# Patient Record
Sex: FEMALE | Race: WHITE | NOT HISPANIC OR LATINO | Employment: OTHER | URBAN - METROPOLITAN AREA
[De-identification: names, ages, dates, MRNs, and addresses within clinical notes are randomized per-mention and may not be internally consistent; named-entity substitution may affect disease eponyms.]

---

## 2017-03-09 DIAGNOSIS — D32.0 MENINGIOMA, RECURRENT OF BRAIN (HCC): Primary | ICD-10-CM

## 2017-03-10 DIAGNOSIS — D32.0 MENINGIOMA, RECURRENT OF BRAIN (HCC): Primary | ICD-10-CM

## 2017-03-28 ENCOUNTER — APPOINTMENT (OUTPATIENT)
Dept: MRI IMAGING | Facility: HOSPITAL | Age: 62
End: 2017-03-28

## 2017-04-11 RX ORDER — DIPHENHYDRAMINE HCL 50 MG
50 CAPSULE ORAL AS NEEDED
Qty: 1 CAPSULE | Refills: 0 | OUTPATIENT
Start: 2017-04-11 | End: 2017-12-04

## 2017-04-11 RX ORDER — DIAZEPAM 10 MG/1
10 TABLET ORAL AS NEEDED
Qty: 1 TABLET | Refills: 0 | OUTPATIENT
Start: 2017-04-11 | End: 2017-12-04

## 2017-04-11 RX ORDER — PHENOBARBITAL 100 MG/1
100 TABLET ORAL AS NEEDED
Qty: 1 TABLET | Refills: 0 | OUTPATIENT
Start: 2017-04-11 | End: 2017-12-04

## 2017-04-17 ENCOUNTER — OFFICE VISIT (OUTPATIENT)
Dept: NEUROSURGERY | Facility: CLINIC | Age: 62
End: 2017-04-17

## 2017-04-17 ENCOUNTER — HOSPITAL ENCOUNTER (OUTPATIENT)
Dept: MRI IMAGING | Facility: HOSPITAL | Age: 62
Discharge: HOME | End: 2017-04-17
Admitting: PHYSICIAN ASSISTANT

## 2017-04-17 VITALS
HEIGHT: 63 IN | SYSTOLIC BLOOD PRESSURE: 120 MMHG | DIASTOLIC BLOOD PRESSURE: 72 MMHG | WEIGHT: 158 LBS | BODY MASS INDEX: 28 KG/M2 | TEMPERATURE: 97.5 F

## 2017-04-17 DIAGNOSIS — D32.0 MENINGIOMA, RECURRENT OF BRAIN (HCC): Primary | ICD-10-CM

## 2017-04-17 PROCEDURE — 70553 MRI BRAIN STEM W/O & W/DYE: CPT

## 2017-04-17 PROCEDURE — A9577 INJ MULTIHANCE: HCPCS | Performed by: PHYSICIAN ASSISTANT

## 2017-04-17 PROCEDURE — 82565 ASSAY OF CREATININE: CPT

## 2017-04-17 PROCEDURE — 99213 OFFICE O/P EST LOW 20 MIN: CPT | Performed by: NEUROLOGICAL SURGERY

## 2017-04-17 PROCEDURE — 0 GADOBENATE DIMEGLUMINE 529 MG/ML SOLUTION: Performed by: PHYSICIAN ASSISTANT

## 2017-04-17 RX ADMIN — GADOBENATE DIMEGLUMINE 14 ML: 529 INJECTION, SOLUTION INTRAVENOUS at 09:25

## 2017-04-17 NOTE — PROGRESS NOTES
Tereza Ackerman  1955  [id number]                       CURRENT WORKING DIAGNOSIS:  [ Left temporal meningioma         MEDICAL HISTORY SINCE LAST ENCOUNTER:  She reports for 6 months follow-up.  She continues to do well and has no symptomatology at this time            Past Medical History:   Diagnosis Date   • Acid reflux    • Acid reflux    • Arthritis    • Brain tumor    • Brain tumor    • Depression    • Depression    • History of blood transfusion    • Hyperlipemia    • Memory loss or impairment    • Migraine    • Parathyroid adenoma     Incidental on thyroid US.   • Prediabetes     Last Impression: 06 Feb 2015  Reviewed labs. Excellent control.  Emery Connell Impression: 06 Feb 2015  Reviewed labs. Excellent control.  Michaela Connell   • Thyroid nodule      Last Impression: 13 Jun 2015  r/o thyroid cancer, will proceed with US.  Michaela Connell (Internal Medicine)    • UTI (urinary tract infection)               Past Surgical History:   Procedure Laterality Date   • BRAIN SURGERY  2003 & 2014    Dr. Werner Hollis   • CARPAL TUNNEL RELEASE  2002   • HYSTERECTOMY     • OTHER SURGICAL HISTORY      craniotomy tumor removal-complete   • OTHER SURGICAL HISTORY      esophagogastric fundoplasty nissen fundoplication   • TUBAL ABDOMINAL LIGATION                Family History   Problem Relation Age of Onset   • Obesity Mother    • Heart attack Mother    • Migraines Father    • Stroke Father    • Cancer Other    • Diabetes Other    • Breast cancer Maternal Aunt    • Breast cancer Paternal Aunt               Social History     Social History   • Marital status:      Spouse name: N/A   • Number of children: N/A   • Years of education: N/A     Occupational History   • Not on file.     Social History Main Topics   • Smoking status: Never Smoker   • Smokeless tobacco: Never Used   • Alcohol use No   • Drug use: No   • Sexual activity: Not on file     Other Topics Concern   • Not on file     Social  History Narrative              Allergies   Allergen Reactions   • Dilantin [Phenytoin Sodium Extended]    • Phenytoin Rash              Current Outpatient Prescriptions:   •  ALPRAZolam (XANAX) 0.5 MG tablet, Take 1 tablet by mouth 3 (three) times a day as needed., Disp: , Rfl:   •  diazePAM (VALIUM) 10 MG tablet, Take 1 tablet by mouth As Needed for Anxiety. Take one hour prior to MRI., Disp: 1 tablet, Rfl: 0  •  diphenhydrAMINE (BENADRYL) 50 MG capsule, Take 1 capsule by mouth As Needed for Allergies. Take one hour prior to MRI, Disp: 1 capsule, Rfl: 0  •  losartan-hydrochlorothiazide (HYZAAR) 50-12.5 MG per tablet, Take 1 tablet by mouth daily. Replaces lisinopril, Disp: , Rfl:   •  PHENobarbital (LUMINAL) 100 MG tablet, Take 1 tablet by mouth As Needed (One hour prior to MRI). Take one hour prior to MRI, Disp: 1 tablet, Rfl: 0  •  promethazine (PHENERGAN) 25 MG tablet, Take 1 tablet by mouth every 6 (six) hours as needed for nausea or vomiting., Disp: 30 tablet, Rfl: 2  •  sertraline (ZOLOFT) 100 MG tablet, TAKE ONE TABLET BY MOUTH DAILY, Disp: 90 tablet, Rfl: 0  •  traZODone (DESYREL) 50 MG tablet, Take  by mouth., Disp: , Rfl:   No current facility-administered medications for this visit.          Review of Systems   Constitutional: Negative for activity change, appetite change, chills, diaphoresis, fatigue, fever and unexpected weight change.   HENT: Negative for congestion, dental problem, drooling, ear discharge, ear pain, facial swelling, hearing loss, mouth sores, nosebleeds, postnasal drip, rhinorrhea, sinus pressure, sneezing, sore throat, tinnitus, trouble swallowing and voice change.    Eyes: Negative for photophobia, pain, discharge, redness, itching and visual disturbance.   Respiratory: Negative for apnea, cough, choking, chest tightness, shortness of breath, wheezing and stridor.    Cardiovascular: Negative for chest pain, palpitations and leg swelling.   Gastrointestinal: Negative for abdominal  "distention, abdominal pain, anal bleeding, blood in stool, constipation, diarrhea, nausea, rectal pain and vomiting.   Endocrine: Negative for cold intolerance, heat intolerance, polydipsia, polyphagia and polyuria.   Genitourinary: Negative for decreased urine volume, difficulty urinating, dysuria, enuresis, flank pain, frequency, genital sores, hematuria and urgency.   Musculoskeletal: Negative for arthralgias, back pain, gait problem, joint swelling, myalgias, neck pain and neck stiffness.   Skin: Negative for color change, pallor, rash and wound.   Allergic/Immunologic: Negative for environmental allergies, food allergies and immunocompromised state.   Neurological: Negative for dizziness, tremors, seizures, syncope, facial asymmetry, speech difficulty, weakness, light-headedness, numbness and headaches.   Hematological: Negative for adenopathy. Does not bruise/bleed easily.   Psychiatric/Behavioral: Negative for agitation, behavioral problems, confusion, decreased concentration, dysphoric mood, hallucinations, self-injury, sleep disturbance and suicidal ideas. The patient is not nervous/anxious and is not hyperactive.                Vitals:    04/17/17 1115   BP: 120/72   Temp: 97.5 °F (36.4 °C)   Weight: 158 lb (71.7 kg)   Height: 63\" (160 cm)               EXAMINATION: [Alert and oriented.  Cranial nerves are intact.  No weakness sensory loss or reflex asymmetry.]            MEDICAL DECISION MAKING: Reviewed her MRI and appears to be essentially the same as that done previously.           ASSESSMENT/DISPOSITION: [We'll continue to follow her on a sequential basis.  The tumor is in beneath the dura and the temporal fossa.  This been treated with SRS and seen to be stable. ]              I APPRECIATE THE OPPORTUNITY OF THIS REFERRAL. PLEASE CALL IF ANY  QUESTIONS [phone]    Scribed for Werner Hollis MD by Joy Corral CMA. 4/17/2017  11:36 AM     I have read and concur with the information provided by " the scribe.  Werner Hollis MD

## 2017-04-18 ENCOUNTER — OFFICE VISIT (OUTPATIENT)
Dept: INTERNAL MEDICINE | Facility: CLINIC | Age: 62
End: 2017-04-18

## 2017-04-18 VITALS
HEIGHT: 63 IN | DIASTOLIC BLOOD PRESSURE: 82 MMHG | BODY MASS INDEX: 28.31 KG/M2 | HEART RATE: 73 BPM | WEIGHT: 159.8 LBS | OXYGEN SATURATION: 99 % | SYSTOLIC BLOOD PRESSURE: 140 MMHG

## 2017-04-18 DIAGNOSIS — J02.9 SORE THROAT: Primary | ICD-10-CM

## 2017-04-18 DIAGNOSIS — J01.80 ACUTE NON-RECURRENT SINUSITIS OF OTHER SINUS: ICD-10-CM

## 2017-04-18 LAB
CREAT BLDA-MCNC: 0.6 MG/DL (ref 0.6–1.3)
EXPIRATION DATE: NORMAL
INTERNAL CONTROL: NORMAL
Lab: NORMAL
S PYO AG THROAT QL: NEGATIVE

## 2017-04-18 PROCEDURE — 87880 STREP A ASSAY W/OPTIC: CPT | Performed by: INTERNAL MEDICINE

## 2017-04-18 PROCEDURE — 99213 OFFICE O/P EST LOW 20 MIN: CPT | Performed by: INTERNAL MEDICINE

## 2017-04-18 RX ORDER — AZITHROMYCIN 250 MG/1
TABLET, FILM COATED ORAL
Qty: 6 TABLET | Refills: 0 | Status: SHIPPED | OUTPATIENT
Start: 2017-04-18 | End: 2017-07-02 | Stop reason: HOSPADM

## 2017-04-18 RX ORDER — FLUTICASONE PROPIONATE 50 MCG
2 SPRAY, SUSPENSION (ML) NASAL DAILY
Qty: 1 EACH | Refills: 11 | Status: SHIPPED | OUTPATIENT
Start: 2017-04-18 | End: 2017-05-18

## 2017-04-18 NOTE — PROGRESS NOTES
Sore Throat; Earache; and Cough    Subjective   Tereza Ackerman is a 62 y.o. female is here today for follow-up.    History of Present Illness   Was congested 2 weeks ago, coughing bad, but then better, and then a few days ago, ST and ears started hurting.  No fever or chills.    Current Outpatient Prescriptions:   •  ALPRAZolam (XANAX) 0.5 MG tablet, Take 1 tablet by mouth 3 (three) times a day as needed., Disp: , Rfl:   •  diazePAM (VALIUM) 10 MG tablet, Take 1 tablet by mouth As Needed for Anxiety. Take one hour prior to MRI., Disp: 1 tablet, Rfl: 0  •  diphenhydrAMINE (BENADRYL) 50 MG capsule, Take 1 capsule by mouth As Needed for Allergies. Take one hour prior to MRI, Disp: 1 capsule, Rfl: 0  •  losartan-hydrochlorothiazide (HYZAAR) 50-12.5 MG per tablet, Take 1 tablet by mouth daily. Replaces lisinopril, Disp: , Rfl:   •  PHENobarbital (LUMINAL) 100 MG tablet, Take 1 tablet by mouth As Needed (One hour prior to MRI). Take one hour prior to MRI, Disp: 1 tablet, Rfl: 0  •  promethazine (PHENERGAN) 25 MG tablet, Take 1 tablet by mouth every 6 (six) hours as needed for nausea or vomiting., Disp: 30 tablet, Rfl: 2  •  sertraline (ZOLOFT) 100 MG tablet, TAKE ONE TABLET BY MOUTH DAILY, Disp: 90 tablet, Rfl: 0  •  traZODone (DESYREL) 50 MG tablet, Take  by mouth., Disp: , Rfl:   •  azithromycin (ZITHROMAX) 250 MG tablet, Take 2 tablets the first day, then 1 tablet daily for 4 days., Disp: 6 tablet, Rfl: 0  •  fluticasone (FLONASE) 50 MCG/ACT nasal spray, 2 sprays into each nostril Daily for 30 days. Administer 2 sprays in each nostril for each dose for allergies, Disp: 1 each, Rfl: 11      The following portions of the patient's history were reviewed and updated as appropriate: allergies, current medications, past family history, past medical history, past social history, past surgical history and problem list.    Review of Systems   Constitutional: Negative.  Negative for chills and fever.   HENT: Positive for  "congestion, ear pain, postnasal drip, sore throat and trouble swallowing. Negative for ear discharge and sinus pressure.    Respiratory: Positive for cough. Negative for chest tightness and shortness of breath.    Cardiovascular: Negative for chest pain, palpitations and leg swelling.   Gastrointestinal: Negative for diarrhea, nausea and vomiting.   Musculoskeletal: Negative for arthralgias, back pain and myalgias.   Neurological: Negative for dizziness, syncope and headaches.   Psychiatric/Behavioral: Negative for confusion and sleep disturbance.       Objective   /82  Pulse 73  Ht 63\" (160 cm)  Wt 159 lb 12.8 oz (72.5 kg)  SpO2 99% Comment: ra  BMI 28.31 kg/m2  Physical Exam   Constitutional: She is oriented to person, place, and time. She appears well-developed and well-nourished.   HENT:   Head: Normocephalic and atraumatic.   Right Ear: Tympanic membrane is bulging.   Left Ear: Tympanic membrane is bulging.   Mouth/Throat: Posterior oropharyngeal erythema present. No oropharyngeal exudate or tonsillar abscesses.   Eyes: Conjunctivae are normal. Pupils are equal, round, and reactive to light.   Neck: Normal range of motion. Neck supple. No thyromegaly present.   Cardiovascular: Normal rate, regular rhythm and intact distal pulses.    Pulmonary/Chest: Effort normal and breath sounds normal. No stridor.   Abdominal: Soft. Bowel sounds are normal. She exhibits no distension. There is no tenderness.   Musculoskeletal: She exhibits no edema.   Lymphadenopathy:     She has cervical adenopathy.   Neurological: She is alert and oriented to person, place, and time. No cranial nerve deficit.   Skin: Skin is warm and dry.   Psychiatric: She has a normal mood and affect. Judgment normal.   Nursing note and vitals reviewed.        Results for orders placed or performed in visit on 04/18/17   POC Rapid Strep A   Result Value Ref Range    Rapid Strep A Screen Negative Negative, VALID, INVALID, Not Performed    " Internal Control Passed Passed    Lot Number [id number]     Expiration Date 7/2018              Assessment/Plan   Diagnoses and all orders for this visit:    Sore throat  -     POC Rapid Strep A    Acute non-recurrent sinusitis of other sinus  -     azithromycin (ZITHROMAX) 250 MG tablet; Take 2 tablets the first day, then 1 tablet daily for 4 days.  -     fluticasone (FLONASE) 50 MCG/ACT nasal spray; 2 sprays into each nostril Daily for 30 days. Administer 2 sprays in each nostril for each dose for allergies           Return if symptoms worsen or fail to improve.

## 2017-06-14 RX ORDER — SERTRALINE HYDROCHLORIDE 100 MG/1
100 TABLET, FILM COATED ORAL DAILY
Qty: 90 TABLET | Refills: 0 | Status: SHIPPED | OUTPATIENT
Start: 2017-06-14 | End: 2017-11-28 | Stop reason: SDUPTHER

## 2017-06-30 ENCOUNTER — APPOINTMENT (OUTPATIENT)
Dept: GENERAL RADIOLOGY | Facility: HOSPITAL | Age: 62
End: 2017-06-30

## 2017-06-30 ENCOUNTER — APPOINTMENT (OUTPATIENT)
Dept: CT IMAGING | Facility: HOSPITAL | Age: 62
End: 2017-06-30

## 2017-06-30 ENCOUNTER — HOSPITAL ENCOUNTER (INPATIENT)
Facility: HOSPITAL | Age: 62
LOS: 2 days | Discharge: HOME | End: 2017-07-02
Attending: EMERGENCY MEDICINE | Admitting: INTERNAL MEDICINE

## 2017-06-30 DIAGNOSIS — I10 UNCONTROLLED HYPERTENSION: ICD-10-CM

## 2017-06-30 DIAGNOSIS — K56.609 SMALL BOWEL OBSTRUCTION (HCC): Primary | ICD-10-CM

## 2017-06-30 PROBLEM — I16.9 HYPERTENSIVE CRISIS: Status: ACTIVE | Noted: 2017-06-30

## 2017-06-30 LAB
ALBUMIN SERPL-MCNC: 4.8 G/DL (ref 3.2–4.8)
ALBUMIN/GLOB SERPL: 1.3 G/DL (ref 1.5–2.5)
ALP SERPL-CCNC: 103 U/L (ref 25–100)
ALT SERPL W P-5'-P-CCNC: 18 U/L (ref 7–40)
ANION GAP SERPL CALCULATED.3IONS-SCNC: 15 MMOL/L (ref 3–11)
AST SERPL-CCNC: 30 U/L (ref 0–33)
BACTERIA UR QL AUTO: ABNORMAL /HPF
BASOPHILS # BLD AUTO: 0.02 10*3/MM3 (ref 0–0.2)
BASOPHILS NFR BLD AUTO: 0.3 % (ref 0–1)
BILIRUB SERPL-MCNC: 0.5 MG/DL (ref 0.3–1.2)
BILIRUB UR QL STRIP: NEGATIVE
BUN BLD-MCNC: 11 MG/DL (ref 9–23)
BUN/CREAT SERPL: 15.7 (ref 7–25)
CALCIUM SPEC-SCNC: 10.6 MG/DL (ref 8.7–10.4)
CHLORIDE SERPL-SCNC: 103 MMOL/L (ref 99–109)
CLARITY UR: ABNORMAL
CO2 SERPL-SCNC: 20 MMOL/L (ref 20–31)
COLOR UR: YELLOW
CREAT BLD-MCNC: 0.7 MG/DL (ref 0.6–1.3)
DEPRECATED RDW RBC AUTO: 43.9 FL (ref 37–54)
EOSINOPHIL # BLD AUTO: 0.09 10*3/MM3 (ref 0–0.3)
EOSINOPHIL NFR BLD AUTO: 1.3 % (ref 0–3)
ERYTHROCYTE [DISTWIDTH] IN BLOOD BY AUTOMATED COUNT: 13.3 % (ref 11.3–14.5)
GFR SERPL CREATININE-BSD FRML MDRD: 84 ML/MIN/1.73
GLOBULIN UR ELPH-MCNC: 3.6 GM/DL
GLUCOSE BLD-MCNC: 137 MG/DL (ref 70–100)
GLUCOSE UR STRIP-MCNC: ABNORMAL MG/DL
HCT VFR BLD AUTO: 44.2 % (ref 34.5–44)
HGB BLD-MCNC: 14.6 G/DL (ref 11.5–15.5)
HGB UR QL STRIP.AUTO: ABNORMAL
HOLD SPECIMEN: NORMAL
HOLD SPECIMEN: NORMAL
HYALINE CASTS UR QL AUTO: ABNORMAL /LPF
IMM GRANULOCYTES # BLD: 0.02 10*3/MM3 (ref 0–0.03)
IMM GRANULOCYTES NFR BLD: 0.3 % (ref 0–0.6)
KETONES UR QL STRIP: ABNORMAL
LEUKOCYTE ESTERASE UR QL STRIP.AUTO: NEGATIVE
LIPASE SERPL-CCNC: 39 U/L (ref 6–51)
LYMPHOCYTES # BLD AUTO: 3.32 10*3/MM3 (ref 0.6–4.8)
LYMPHOCYTES NFR BLD AUTO: 48 % (ref 24–44)
MCH RBC QN AUTO: 29.8 PG (ref 27–31)
MCHC RBC AUTO-ENTMCNC: 33 G/DL (ref 32–36)
MCV RBC AUTO: 90.2 FL (ref 80–99)
MONOCYTES # BLD AUTO: 0.57 10*3/MM3 (ref 0–1)
MONOCYTES NFR BLD AUTO: 8.2 % (ref 0–12)
NEUTROPHILS # BLD AUTO: 2.89 10*3/MM3 (ref 1.5–8.3)
NEUTROPHILS NFR BLD AUTO: 41.9 % (ref 41–71)
NITRITE UR QL STRIP: NEGATIVE
PH UR STRIP.AUTO: 7.5 [PH] (ref 5–8)
PLATELET # BLD AUTO: 257 10*3/MM3 (ref 150–450)
PMV BLD AUTO: 10.1 FL (ref 6–12)
POTASSIUM BLD-SCNC: 3.3 MMOL/L (ref 3.5–5.5)
PROT SERPL-MCNC: 8.4 G/DL (ref 5.7–8.2)
PROT UR QL STRIP: NEGATIVE
RBC # BLD AUTO: 4.9 10*6/MM3 (ref 3.89–5.14)
RBC # UR: ABNORMAL /HPF
REF LAB TEST METHOD: ABNORMAL
SODIUM BLD-SCNC: 138 MMOL/L (ref 132–146)
SP GR UR STRIP: 1.01 (ref 1–1.03)
SQUAMOUS #/AREA URNS HPF: ABNORMAL /HPF
UROBILINOGEN UR QL STRIP: ABNORMAL
WBC NRBC COR # BLD: 6.91 10*3/MM3 (ref 3.5–10.8)
WBC UR QL AUTO: ABNORMAL /HPF
WHOLE BLOOD HOLD SPECIMEN: NORMAL
WHOLE BLOOD HOLD SPECIMEN: NORMAL

## 2017-06-30 PROCEDURE — 99285 EMERGENCY DEPT VISIT HI MDM: CPT

## 2017-06-30 PROCEDURE — 85025 COMPLETE CBC W/AUTO DIFF WBC: CPT | Performed by: EMERGENCY MEDICINE

## 2017-06-30 PROCEDURE — 99223 1ST HOSP IP/OBS HIGH 75: CPT | Performed by: INTERNAL MEDICINE

## 2017-06-30 PROCEDURE — 83690 ASSAY OF LIPASE: CPT | Performed by: EMERGENCY MEDICINE

## 2017-06-30 PROCEDURE — 81001 URINALYSIS AUTO W/SCOPE: CPT | Performed by: EMERGENCY MEDICINE

## 2017-06-30 PROCEDURE — 25010000002 PROMETHAZINE PER 50 MG: Performed by: EMERGENCY MEDICINE

## 2017-06-30 PROCEDURE — 74176 CT ABD & PELVIS W/O CONTRAST: CPT

## 2017-06-30 PROCEDURE — 71010 HC CHEST PA OR AP: CPT

## 2017-06-30 PROCEDURE — 25010000002 HYDROMORPHONE PER 4 MG: Performed by: EMERGENCY MEDICINE

## 2017-06-30 PROCEDURE — 80053 COMPREHEN METABOLIC PANEL: CPT | Performed by: EMERGENCY MEDICINE

## 2017-06-30 PROCEDURE — 25010000002 ONDANSETRON PER 1 MG: Performed by: EMERGENCY MEDICINE

## 2017-06-30 RX ORDER — ONDANSETRON 2 MG/ML
4 INJECTION INTRAMUSCULAR; INTRAVENOUS EVERY 6 HOURS PRN
Status: DISCONTINUED | OUTPATIENT
Start: 2017-06-30 | End: 2017-07-02 | Stop reason: HOSPADM

## 2017-06-30 RX ORDER — HYDROMORPHONE HYDROCHLORIDE 1 MG/ML
0.5 INJECTION, SOLUTION INTRAMUSCULAR; INTRAVENOUS; SUBCUTANEOUS
Status: DISCONTINUED | OUTPATIENT
Start: 2017-06-30 | End: 2017-07-02 | Stop reason: HOSPADM

## 2017-06-30 RX ORDER — LABETALOL HYDROCHLORIDE 5 MG/ML
20 INJECTION, SOLUTION INTRAVENOUS ONCE
Status: COMPLETED | OUTPATIENT
Start: 2017-06-30 | End: 2017-06-30

## 2017-06-30 RX ORDER — SODIUM CHLORIDE 0.9 % (FLUSH) 0.9 %
1-10 SYRINGE (ML) INJECTION AS NEEDED
Status: DISCONTINUED | OUTPATIENT
Start: 2017-06-30 | End: 2017-06-30

## 2017-06-30 RX ORDER — ONDANSETRON 2 MG/ML
4 INJECTION INTRAMUSCULAR; INTRAVENOUS ONCE
Status: COMPLETED | OUTPATIENT
Start: 2017-06-30 | End: 2017-06-30

## 2017-06-30 RX ORDER — PANTOPRAZOLE SODIUM 40 MG/10ML
40 INJECTION, POWDER, LYOPHILIZED, FOR SOLUTION INTRAVENOUS
Status: DISCONTINUED | OUTPATIENT
Start: 2017-07-01 | End: 2017-07-02 | Stop reason: HOSPADM

## 2017-06-30 RX ORDER — ACETAMINOPHEN 325 MG/1
650 TABLET ORAL EVERY 4 HOURS PRN
Status: DISCONTINUED | OUTPATIENT
Start: 2017-06-30 | End: 2017-07-02 | Stop reason: HOSPADM

## 2017-06-30 RX ORDER — POTASSIUM CHLORIDE 7.45 MG/ML
10 INJECTION INTRAVENOUS
Status: DISCONTINUED | OUTPATIENT
Start: 2017-06-30 | End: 2017-07-02 | Stop reason: HOSPADM

## 2017-06-30 RX ORDER — ONDANSETRON 4 MG/1
4 TABLET, FILM COATED ORAL EVERY 6 HOURS PRN
Status: DISCONTINUED | OUTPATIENT
Start: 2017-06-30 | End: 2017-07-02 | Stop reason: HOSPADM

## 2017-06-30 RX ORDER — POTASSIUM CHLORIDE 1.5 G/1.77G
40 POWDER, FOR SOLUTION ORAL AS NEEDED
Status: DISCONTINUED | OUTPATIENT
Start: 2017-06-30 | End: 2017-07-02 | Stop reason: HOSPADM

## 2017-06-30 RX ORDER — HYDROMORPHONE HYDROCHLORIDE 1 MG/ML
0.5 INJECTION, SOLUTION INTRAMUSCULAR; INTRAVENOUS; SUBCUTANEOUS ONCE
Status: COMPLETED | OUTPATIENT
Start: 2017-06-30 | End: 2017-06-30

## 2017-06-30 RX ORDER — PROMETHAZINE HYDROCHLORIDE 25 MG/ML
12.5 INJECTION, SOLUTION INTRAMUSCULAR; INTRAVENOUS ONCE
Status: COMPLETED | OUTPATIENT
Start: 2017-06-30 | End: 2017-06-30

## 2017-06-30 RX ORDER — POTASSIUM CHLORIDE 750 MG/1
40 CAPSULE, EXTENDED RELEASE ORAL AS NEEDED
Status: DISCONTINUED | OUTPATIENT
Start: 2017-06-30 | End: 2017-07-02 | Stop reason: HOSPADM

## 2017-06-30 RX ORDER — SODIUM CHLORIDE 9 MG/ML
100 INJECTION, SOLUTION INTRAVENOUS CONTINUOUS
Status: DISCONTINUED | OUTPATIENT
Start: 2017-06-30 | End: 2017-07-02 | Stop reason: HOSPADM

## 2017-06-30 RX ORDER — HYDRALAZINE HYDROCHLORIDE 20 MG/ML
10 INJECTION INTRAMUSCULAR; INTRAVENOUS EVERY 6 HOURS PRN
Status: DISCONTINUED | OUTPATIENT
Start: 2017-06-30 | End: 2017-07-02 | Stop reason: HOSPADM

## 2017-06-30 RX ORDER — SODIUM CHLORIDE 0.9 % (FLUSH) 0.9 %
10 SYRINGE (ML) INJECTION AS NEEDED
Status: DISCONTINUED | OUTPATIENT
Start: 2017-06-30 | End: 2017-07-02 | Stop reason: HOSPADM

## 2017-06-30 RX ADMIN — PROMETHAZINE HYDROCHLORIDE 12.5 MG: 25 INJECTION INTRAMUSCULAR; INTRAVENOUS at 20:29

## 2017-06-30 RX ADMIN — ONDANSETRON 4 MG: 2 INJECTION INTRAMUSCULAR; INTRAVENOUS at 20:06

## 2017-06-30 RX ADMIN — SODIUM CHLORIDE 1000 ML: 9 INJECTION, SOLUTION INTRAVENOUS at 20:06

## 2017-06-30 RX ADMIN — SODIUM CHLORIDE 1000 ML: 9 INJECTION, SOLUTION INTRAVENOUS at 22:00

## 2017-06-30 RX ADMIN — ONDANSETRON 4 MG: 2 INJECTION INTRAMUSCULAR; INTRAVENOUS at 22:00

## 2017-06-30 RX ADMIN — HYDROMORPHONE HYDROCHLORIDE 0.5 MG: 1 INJECTION, SOLUTION INTRAMUSCULAR; INTRAVENOUS; SUBCUTANEOUS at 22:00

## 2017-06-30 RX ADMIN — LABETALOL HYDROCHLORIDE 20 MG: 5 INJECTION, SOLUTION INTRAVENOUS at 22:06

## 2017-07-01 PROBLEM — G47.00 INSOMNIA: Status: ACTIVE | Noted: 2017-07-01

## 2017-07-01 PROBLEM — Z98.890 HISTORY OF NISSEN FUNDOPLICATION: Status: ACTIVE | Noted: 2017-07-01

## 2017-07-01 LAB
ANION GAP SERPL CALCULATED.3IONS-SCNC: 6 MMOL/L (ref 3–11)
BUN BLD-MCNC: 9 MG/DL (ref 9–23)
BUN/CREAT SERPL: 15 (ref 7–25)
CALCIUM SPEC-SCNC: 8.7 MG/DL (ref 8.7–10.4)
CHLORIDE SERPL-SCNC: 108 MMOL/L (ref 99–109)
CO2 SERPL-SCNC: 26 MMOL/L (ref 20–31)
CREAT BLD-MCNC: 0.6 MG/DL (ref 0.6–1.3)
D-LACTATE SERPL-SCNC: 0.9 MMOL/L (ref 0.5–2)
DEPRECATED RDW RBC AUTO: 46.5 FL (ref 37–54)
ERYTHROCYTE [DISTWIDTH] IN BLOOD BY AUTOMATED COUNT: 13.6 % (ref 11.3–14.5)
GFR SERPL CREATININE-BSD FRML MDRD: 101 ML/MIN/1.73
GLUCOSE BLD-MCNC: 102 MG/DL (ref 70–100)
HCT VFR BLD AUTO: 36.8 % (ref 34.5–44)
HGB BLD-MCNC: 11.8 G/DL (ref 11.5–15.5)
MAGNESIUM SERPL-MCNC: 2.1 MG/DL (ref 1.3–2.7)
MCH RBC QN AUTO: 29.8 PG (ref 27–31)
MCHC RBC AUTO-ENTMCNC: 32.1 G/DL (ref 32–36)
MCV RBC AUTO: 92.9 FL (ref 80–99)
PHOSPHATE SERPL-MCNC: 4 MG/DL (ref 2.4–5.1)
PLATELET # BLD AUTO: 206 10*3/MM3 (ref 150–450)
PMV BLD AUTO: 10.3 FL (ref 6–12)
POTASSIUM BLD-SCNC: 4 MMOL/L (ref 3.5–5.5)
RBC # BLD AUTO: 3.96 10*6/MM3 (ref 3.89–5.14)
SODIUM BLD-SCNC: 140 MMOL/L (ref 132–146)
WBC NRBC COR # BLD: 6.35 10*3/MM3 (ref 3.5–10.8)

## 2017-07-01 PROCEDURE — 25010000002 HEPARIN (PORCINE) PER 1000 UNITS: Performed by: INTERNAL MEDICINE

## 2017-07-01 PROCEDURE — 25010000002 HYDROMORPHONE PER 4 MG: Performed by: INTERNAL MEDICINE

## 2017-07-01 PROCEDURE — 25010000002 ONDANSETRON PER 1 MG: Performed by: NURSE PRACTITIONER

## 2017-07-01 PROCEDURE — 99232 SBSQ HOSP IP/OBS MODERATE 35: CPT | Performed by: FAMILY MEDICINE

## 2017-07-01 PROCEDURE — 83605 ASSAY OF LACTIC ACID: CPT | Performed by: INTERNAL MEDICINE

## 2017-07-01 PROCEDURE — 84100 ASSAY OF PHOSPHORUS: CPT | Performed by: INTERNAL MEDICINE

## 2017-07-01 PROCEDURE — 85027 COMPLETE CBC AUTOMATED: CPT | Performed by: NURSE PRACTITIONER

## 2017-07-01 PROCEDURE — 80048 BASIC METABOLIC PNL TOTAL CA: CPT | Performed by: NURSE PRACTITIONER

## 2017-07-01 PROCEDURE — 83735 ASSAY OF MAGNESIUM: CPT | Performed by: NURSE PRACTITIONER

## 2017-07-01 RX ORDER — SERTRALINE HYDROCHLORIDE 100 MG/1
100 TABLET, FILM COATED ORAL DAILY
Status: DISCONTINUED | OUTPATIENT
Start: 2017-07-01 | End: 2017-07-02 | Stop reason: HOSPADM

## 2017-07-01 RX ORDER — TRAZODONE HYDROCHLORIDE 50 MG/1
50 TABLET ORAL NIGHTLY PRN
Status: DISCONTINUED | OUTPATIENT
Start: 2017-07-01 | End: 2017-07-02 | Stop reason: HOSPADM

## 2017-07-01 RX ORDER — ENALAPRILAT 2.5 MG/2ML
0.62 INJECTION INTRAVENOUS EVERY 6 HOURS SCHEDULED
Status: DISCONTINUED | OUTPATIENT
Start: 2017-07-01 | End: 2017-07-02 | Stop reason: HOSPADM

## 2017-07-01 RX ORDER — HEPARIN SODIUM 5000 [USP'U]/ML
5000 INJECTION, SOLUTION INTRAVENOUS; SUBCUTANEOUS EVERY 12 HOURS SCHEDULED
Status: DISCONTINUED | OUTPATIENT
Start: 2017-07-01 | End: 2017-07-02 | Stop reason: HOSPADM

## 2017-07-01 RX ADMIN — ACETAMINOPHEN 650 MG: 325 TABLET, FILM COATED ORAL at 21:19

## 2017-07-01 RX ADMIN — ENALAPRILAT 0.62 MG: 1.25 INJECTION INTRAVENOUS at 12:23

## 2017-07-01 RX ADMIN — ENALAPRILAT 0.62 MG: 1.25 INJECTION INTRAVENOUS at 23:33

## 2017-07-01 RX ADMIN — HYDROMORPHONE HYDROCHLORIDE 0.5 MG: 1 INJECTION, SOLUTION INTRAMUSCULAR; INTRAVENOUS; SUBCUTANEOUS at 20:13

## 2017-07-01 RX ADMIN — SODIUM CHLORIDE 100 ML/HR: 9 INJECTION, SOLUTION INTRAVENOUS at 13:23

## 2017-07-01 RX ADMIN — ENALAPRILAT 0.62 MG: 1.25 INJECTION INTRAVENOUS at 18:11

## 2017-07-01 RX ADMIN — ONDANSETRON 4 MG: 2 INJECTION INTRAMUSCULAR; INTRAVENOUS at 21:09

## 2017-07-01 RX ADMIN — ENALAPRILAT 0.62 MG: 1.25 INJECTION INTRAVENOUS at 05:18

## 2017-07-01 RX ADMIN — ENALAPRILAT 0.62 MG: 1.25 INJECTION INTRAVENOUS at 01:27

## 2017-07-01 RX ADMIN — HEPARIN SODIUM 5000 UNITS: 5000 INJECTION, SOLUTION INTRAVENOUS; SUBCUTANEOUS at 20:13

## 2017-07-01 RX ADMIN — SODIUM CHLORIDE 75 ML/HR: 9 INJECTION, SOLUTION INTRAVENOUS at 00:35

## 2017-07-01 RX ADMIN — SODIUM CHLORIDE 100 ML/HR: 9 INJECTION, SOLUTION INTRAVENOUS at 23:39

## 2017-07-01 RX ADMIN — PANTOPRAZOLE SODIUM 40 MG: 40 INJECTION, POWDER, FOR SOLUTION INTRAVENOUS at 05:11

## 2017-07-01 RX ADMIN — HEPARIN SODIUM 5000 UNITS: 5000 INJECTION, SOLUTION INTRAVENOUS; SUBCUTANEOUS at 08:44

## 2017-07-01 NOTE — PLAN OF CARE
Problem: Constipation (Adult)  Goal: Effective Bowel Elimination  Outcome: Ongoing (interventions implemented as appropriate)

## 2017-07-01 NOTE — CONSULTS
Tereza Ackerman  [id number]  1955       Patient Care Team:  Michaela Connell MD as PCP - General  Michaela Connell MD as PCP - Family Medicine   Consulting: Gio      [facility] Surgery History and Physical / Consult Note      Chief complaint abd pain    Subjective     Patient is a 62 y.o. female presents with lower abdominal/pelvic pain that began last night.  She had nausea and vomiting in the ER and an episode of diarrhea.  CT scan revealed evidence of SBO and she was admitted for hydration and NG tube was placed.  Pt reports this am she feels well.  She has no pain.  NG tube is clear and only scant amount.  Abd surgeries: lap Nissen and EFE in past.    Review of Systems   Pertinent items are noted in HPI    History  Past Medical History:   Diagnosis Date   • Acid reflux    • Acid reflux    • Arthritis    • Brain tumor    • Brain tumor    • Depression    • Depression    • History of blood transfusion    • History of Nissen fundoplication 7/1/2017   • Hyperlipemia    • Memory loss or impairment    • Migraine    • Parathyroid adenoma     Incidental on thyroid US.   • Prediabetes     Last Impression: 06 Feb 2015  Reviewed labs. Excellent control.  Maren Connellast Impression: 06 Feb 2015  Reviewed labs. Excellent control.  Michaela Connell   • Thyroid nodule      Last Impression: 13 Jun 2015  r/o thyroid cancer, will proceed with US.  Michaela Connell (Internal Medicine)    • UTI (urinary tract infection)      Past Surgical History:   Procedure Laterality Date   • BRAIN SURGERY  2003 & 2014    Dr. Werner Hollis   • CARPAL TUNNEL RELEASE  2002   • HYSTERECTOMY     • OTHER SURGICAL HISTORY      craniotomy tumor removal-complete   • OTHER SURGICAL HISTORY      esophagogastric fundoplasty nissen fundoplication   • TUBAL ABDOMINAL LIGATION       Family History   Problem Relation Age of Onset   • Obesity Mother    • Heart attack Mother    • Migraines Father    • Stroke Father    • Cancer Other    •  "Diabetes Other    • Breast cancer Maternal Aunt    • Breast cancer Paternal Aunt      Social History   Substance Use Topics   • Smoking status: Never Smoker   • Smokeless tobacco: Never Used   • Alcohol use No     Prescriptions Prior to Admission   Medication Sig Dispense Refill Last Dose   • ALPRAZolam (XANAX) 0.5 MG tablet Take 1 tablet by mouth 3 (three) times a day as needed.   Taking   • diazePAM (VALIUM) 10 MG tablet Take 1 tablet by mouth As Needed for Anxiety. Take one hour prior to MRI. 1 tablet 0 Taking   • diphenhydrAMINE (BENADRYL) 50 MG capsule Take 1 capsule by mouth As Needed for Allergies. Take one hour prior to MRI 1 capsule 0 Taking   • losartan-hydrochlorothiazide (HYZAAR) 50-12.5 MG per tablet Take 1 tablet by mouth daily. Replaces lisinopril   Taking   • promethazine (PHENERGAN) 25 MG tablet Take 1 tablet by mouth every 6 (six) hours as needed for nausea or vomiting. 30 tablet 2 Taking   • sertraline (ZOLOFT) 100 MG tablet Take 1 tablet by mouth Daily. 90 tablet 0    • traZODone (DESYREL) 50 MG tablet Take  by mouth.   Taking   • azithromycin (ZITHROMAX) 250 MG tablet Take 2 tablets the first day, then 1 tablet daily for 4 days. 6 tablet 0    • PHENobarbital (LUMINAL) 100 MG tablet Take 1 tablet by mouth As Needed (One hour prior to MRI). Take one hour prior to MRI 1 tablet 0 Taking     Allergies:  Dilantin [phenytoin sodium extended] and Phenytoin    Objective     Vital Signs  Blood pressure 135/73, pulse 66, temperature 98.3 °F (36.8 °C), temperature source Oral, resp. rate 16, height 63\" (160 cm), weight 160 lb 6.4 oz (72.8 kg), SpO2 94 %.      Physical Exam:      General Appearance:    Alert, cooperative, in no acute distress   Head:    Normocephalic, without obvious abnormality, atraumatic   Eyes:            Lids and lashes normal, conjunctivae and sclerae normal, no   icterus, no pallor, corneas clear, PERRLA   Ears:    Ears appear intact with no abnormalities noted   Throat:   No oral " lesions, no thrush, oral mucosa moist   Neck:   No adenopathy, supple, trachea midline, no thyromegaly, no     carotid bruit, no JVD   Back:     No kyphosis present, no scoliosis present, no skin lesions,       erythema or scars, no tenderness to percussion or                   palpation,   range of motion normal   Lungs:     Clear to auscultation,respirations regular, even and                   unlabored    Heart:    Regular rhythm and normal rate, normal S1 and S2, no            murmur, no gallop, no rub, no click   Breast Exam:    Deferred   Abdomen:     Normal bowel sounds, no masses, no organomegaly, soft        non-tender, non-distended, no guarding, no rebound                 tenderness   Genitalia:    Deferred   Extremities:   Moves all extremities well, no edema, no cyanosis, no              redness   Pulses:   Pulses palpable and equal bilaterally   Skin:   No bleeding, bruising or rash   Lymph nodes:   No palpable adenopathy   Neurologic:   Cranial nerves 2 - 12 grossly intact, sensation intact, DTR        present and equal bilaterally       Results Review:    I reviewed the patient's new clinical results.        LABS:  Lab Results (last 24 hours)     Procedure Component Value Units Date/Time    CBC & Differential [[id number]] Collected:  06/30/17 1959    Specimen:  Blood Updated:  06/30/17 2008    Narrative:       The following orders were created for panel order CBC & Differential.  Procedure                               Abnormality         Status                     ---------                               -----------         ------                     CBC Auto Differential[[id number]]        Abnormal            Final result                 Please view results for these tests on the individual orders.    CBC Auto Differential [[id number]]  (Abnormal) Collected:  06/30/17 1959    Specimen:  Blood Updated:  06/30/17 2008     WBC 6.91 10*3/mm3      RBC 4.90 10*6/mm3      Hemoglobin 14.6 g/dL      Hematocrit  44.2 (H) %      MCV 90.2 fL      MCH 29.8 pg      MCHC 33.0 g/dL      RDW 13.3 %      RDW-SD 43.9 fl      MPV 10.1 fL      Platelets 257 10*3/mm3      Neutrophil % 41.9 %      Lymphocyte % 48.0 (H) %      Monocyte % 8.2 %      Eosinophil % 1.3 %      Basophil % 0.3 %      Immature Grans % 0.3 %      Neutrophils, Absolute 2.89 10*3/mm3      Lymphocytes, Absolute 3.32 10*3/mm3      Monocytes, Absolute 0.57 10*3/mm3      Eosinophils, Absolute 0.09 10*3/mm3      Basophils, Absolute 0.02 10*3/mm3      Immature Grans, Absolute 0.02 10*3/mm3     Comprehensive Metabolic Panel [[id number]]  (Abnormal) Collected:  06/30/17 1959    Specimen:  Blood Updated:  06/30/17 2024     Glucose 137 (H) mg/dL      BUN 11 mg/dL      Creatinine 0.70 mg/dL      Sodium 138 mmol/L      Potassium 3.3 (L) mmol/L      Chloride 103 mmol/L      CO2 20.0 mmol/L      Calcium 10.6 (H) mg/dL      Total Protein 8.4 (H) g/dL      Albumin 4.80 g/dL      ALT (SGPT) 18 U/L      AST (SGOT) 30 U/L      Alkaline Phosphatase 103 (H) U/L      Total Bilirubin 0.5 mg/dL      eGFR Non African Amer 84 mL/min/1.73      Globulin 3.6 gm/dL      A/G Ratio 1.3 (L) g/dL      BUN/Creatinine Ratio 15.7     Anion Gap 15.0 (H) mmol/L     Narrative:       National Kid[employer] Guidelines    Stage     Description        GFR  1         Normal or High     90+  2         Mild decrease      60-89  3         Moderate decrease  30-59  4         Severe decrease    15-29  5         Kidney failure     <15    Lipase [[id number]]  (Normal) Collected:  06/30/17 1959    Specimen:  Blood Updated:  06/30/17 2024     Lipase 39 U/L     [place] Draw [[id number]] Collected:  06/30/17 1959    Specimen:  Blood Updated:  06/30/17 2101    Narrative:       The following orders were created for panel order [place] Draw.  Procedure                               Abnormality         Status                     ---------                               -----------         ------                     Light  Blue Top[[id number]]                                   Final result               Green Top (Gel)[[id number]]                                  Final result               Lavender Top[[id number]]                                     Final result               Gold Top - SST[[id number]]                                   Final result               Green Top (No Gel)[[id number]]                                                            Please view results for these tests on the individual orders.    Light Blue Top [[id number]] Collected:  06/30/17 1959    Specimen:  Blood Updated:  06/30/17 2101     Extra Tube hold for add-on      Auto resulted       Green Top (Gel) [[id number]] Collected:  06/30/17 1959    Specimen:  Blood Updated:  06/30/17 2101     Extra Tube Hold for add-ons.      Auto resulted.       Lavender Top [[id number]] Collected:  06/30/17 1959    Specimen:  Blood Updated:  06/30/17 2101     Extra Tube hold for add-on      Auto resulted       Gold Top - SST [[id number]] Collected:  06/30/17 1959    Specimen:  Blood Updated:  06/30/17 2101     Extra Tube Hold for add-ons.      Auto resulted.       Urinalysis With / Culture If Indicated [[id number]]  (Abnormal) Collected:  06/30/17 2159    Specimen:  Urine from Urine, Clean Catch Updated:  06/30/17 2214     Color, UA Yellow     Appearance, UA Cloudy (A)     pH, UA 7.5     Specific Gravity, UA 1.013     Glucose,  mg/dL (Trace) (A)     Ketones, UA 15 mg/dL (1+) (A)     Bilirubin, UA Negative     Blood, UA Trace (A)     Protein, UA Negative     Leuk Esterase, UA Negative     Nitrite, UA Negative     Urobilinogen, UA 0.2 E.U./dL    Urinalysis, Microscopic Only [[id number]]  (Abnormal) Collected:  06/30/17 2159    Specimen:  Urine from Urine, Clean Catch Updated:  06/30/17 2214     RBC, UA 3-6 (A) /HPF      WBC, UA None Seen /HPF      Bacteria, UA None Seen /HPF      Squamous Epithelial Cells, UA 0-2 /HPF      Hyaline Casts, UA None Seen /LPF      Methodology Automated  Microscopy    Lactic Acid, Plasma [[id number]]  (Normal) Collected:  07/01/17 0517    Specimen:  Blood Updated:  07/01/17 0621     Lactate 0.9 mmol/L       Falsely depressed results may occur on samples drawn from patients receiving N-Acetylcysteine (NAC) or Metamizole.       CBC (No Diff) [[id number]]  (Normal) Collected:  07/01/17 0517    Specimen:  Blood Updated:  07/01/17 0640     WBC 6.35 10*3/mm3      RBC 3.96 10*6/mm3      Hemoglobin 11.8 g/dL      Hematocrit 36.8 %      MCV 92.9 fL      MCH 29.8 pg      MCHC 32.1 g/dL      RDW 13.6 %      RDW-SD 46.5 fl      MPV 10.3 fL      Platelets 206 10*3/mm3     Magnesium [[id number]]  (Normal) Collected:  07/01/17 0517    Specimen:  Blood Updated:  07/01/17 0710     Magnesium 2.1 mg/dL     Phosphorus [[id number]]  (Normal) Collected:  07/01/17 0517    Specimen:  Blood Updated:  07/01/17 0710     Phosphorus 4.0 mg/dL     Basic Metabolic Panel [[id number]]  (Abnormal) Collected:  07/01/17 0517    Specimen:  Blood Updated:  07/01/17 0711     Glucose 102 (H) mg/dL      BUN 9 mg/dL      Creatinine 0.60 mg/dL      Sodium 140 mmol/L      Potassium 4.0 mmol/L      Chloride 108 mmol/L      CO2 26.0 mmol/L      Calcium 8.7 mg/dL      eGFR Non African Amer 101 mL/min/1.73      BUN/Creatinine Ratio 15.0     Anion Gap 6.0 mmol/L     Narrative:       National Kidney Foundation Guidelines    Stage     Description        GFR  1         Normal or High     90+  2         Mild decrease      60-89  3         Moderate decrease  30-59  4         Severe decrease    15-29  5         Kidney failure     <15            IMAGING:  Imaging Results (last 24 hours)     Procedure Component Value Units Date/Time    CT Abdomen Pelvis Without Contrast [[id number]]  (Abnormal) Collected:  06/30/17 2019     Updated:  06/30/17 2116    Narrative:       EXAM:    CT Abdomen and Pelvis Without Intravenous Contrast    CLINICAL HISTORY:    62 years old, female; Pain and signs and symptoms; Nausea and vomiting;    Abdominal pain; Other: Suprapubic/periumbilical pain; Prior surgery; Surgery   date: 6+ months; Surgery type: Tubal ligation, hysterectomy; Additional info:   Abd pain    TECHNIQUE:    Axial computed tomography images of the abdomen and pelvis without   intravenous contrast.  This CT exam was performed using one or more of the   following dose reduction techniques:  automated exposure control, adjustment of   the mA and/or kV according to patient size, and/or use of iterative   reconstruction technique.    Coronal reformatted images were created and reviewed.    COMPARISON:      No relevant comparison exams are available.    FINDINGS:    PANCREATICOHEPATOBILIARY: The liver is normal without a focal intrahepatic   mass or abnormality. No significant intra-or extrahepatic ductal dilation.    Gallbladder, pancreas and spleen are unremarkable.       GENITOURINARY: No adrenal mass.  Kidneys are unremarkable without obstructive   renal/ureteric calculi and no hydronephrosis.  Distended urinary bladder   otherwise appears unremarkable.  Uterus is not visualized consistent with   hysterectomy.  No free fluid in the pelvis.      GASTROINTESTINAL:  Colon contains moderate amount of fecal matter.  A few   colonic diverticula without evidence of acute diverticulitis.  Cecum is in the   LEFT lower quadrant of the abdomen.  Mild to moderately dilated fluid and   air-filled loops of proximal and mid small bowel with nondistended distal small   bowel.  A cluster of small bowel loops extending posterior to the stomach   displacing it anteriorly.       A moderate size hiatal hernia with nonspecific wall thickening of the distal   thoracic esophagus suggestive of esophagitis/reflux.  No evidence of free   intraperitoneal air or fluid collection.  A normal APPENDIX is visualized in   the LEFT lower quadrant.       OTHER STRUCTURES: Aorta appears unremarkable without evidence of aortic   aneurysm.  No bulky lymph node enlargement.   Chronic degenerative changes in   the visualized spine and hip joints.       Impression:         Mild to moderate grade distal SMALL BOWEL OBSTRUCTION most likely due to an   internal hernia versus adhesions.      Moderate-sized hiatal hernia with findings suspicious for esophagitis/reflux,   recommend followup as clinically indicated.      Other nonemergent findings as described.       NOTE: Suboptimal evaluation of bowel loops and abdominal organs due to lack   of oral and intravenous contrast.       THIS DOCUMENT HAS BEEN ELECTRONICALLY SIGNED BY TISHA COX MD    XR Chest 1 View [[id number]] Updated:  06/30/17 2300          Assessment/Plan    SBO resolving.  Abdominal pain resolved.  Pt has a benign exam and no NG output.  I recommended removing NG and starting clears.  She can be advanced as tolerated and then d/c if tolerates.  Enc ambulation.    Principal Problem:    Small bowel obstruction  Active Problems:    Hypertension    Arthritis    Depression    Hypertensive crisis    Insomnia    History of Nissen fundoplication        I discussed the patients findings and my recommendations with patient and family.     Andrea Bocanegra MD  07/01/17  10:33 AM

## 2017-07-01 NOTE — PROGRESS NOTES
[facility] Medicine Services    ASSESSMENT / PLAN          Small bowel obstruction  - -NG tube   connected to LWS, now removed per Dr Bocanegra, LBM 2 nights ago, started on liquid diet per surgery    Monitor  supportive care    Hypertensive urgeny, improved         Hypokalemia, resolved         Hx of GERD  -had Nissen fundoplication surgery to treat acid reflux; previously taking Nexium     -Protonix 40 mg daily           Hx of Depression  -takes Zoloft  -continue home med     Hx of Insomnia  -takes Trazodone  -continue home med     Hx of Arthritis  -PRN pain control     DVT prophylaxis:  -Lovenox  -TEDs and SCDs     Code Status:   -FULL code       SUBJECTIVE--HPI/ Events overnight / CC- Hospital Follow up visit/ ROS-not detailed ,  as performed below    Feel better  No nausea/vomiting  Much improved abd pain  Npo flatus  No bm since 2 nights ago   Gen -no fevers, chills         OBJECTIVE        Vital Sign Min/Max for last 24 hours  Temp  Min: 98.2 °F (36.8 °C)  Max: 98.8 °F (37.1 °C)   BP  Min: 129/67  Max: 204/97   Pulse  Min: 61  Max: 78   Resp  Min: 16  Max: 20   SpO2  Min: 89 %  Max: 100 %   No Data Recorded      Intake/Output Summary (Last 24 hours) at 07/01/17 1254  Last data filed at 07/01/17 0840   Gross per 24 hour   Intake              240 ml   Output                0 ml   Net              240 ml           Gen/Constitutional-no acute distress  CV-RRR, S1 S2 normal, no m/r/g  Resp-CTAB, no wheezes  Abd-soft, NT, ND, +BS  Ext-no edema  Neuro-A&Ox3, no focal deficits  Psych-appropriate mood  Skin, no new rashes over last 24 hours    Medications    enalaprilat 0.625 mg Intravenous Q6H   heparin (porcine) 5,000 Units Subcutaneous Q12H   pantoprazole 40 mg Intravenous Q AM   sertraline 100 mg Oral Daily     Meds Prn  •  acetaminophen  •  hydrALAZINE  •  HYDROmorphone  •  ondansetron **OR** ondansetron  •  potassium chloride **OR** potassium chloride **OR** potassium chloride  •  sodium  chloride  •  traZODone    I have reviewed the labs, culture data, radiology results, and diagnostic studies.    Results from last 7 days  Lab Units 07/01/17 0517 06/30/17 1959   SODIUM mmol/L 140 138   POTASSIUM mmol/L 4.0 3.3*   CHLORIDE mmol/L 108 103   CO2 mmol/L 26.0 20.0   BUN mg/dL 9 11   CREATININE mg/dL 0.60 0.70   CALCIUM mg/dL 8.7 10.6*   BILIRUBIN mg/dL  --  0.5   ALK PHOS U/L  --  103*   ALT (SGPT) U/L  --  18   AST (SGOT) U/L  --  30   GLUCOSE mg/dL 102* 137*       Results from last 7 days  Lab Units 07/01/17 0517 06/30/17 1959   WBC 10*3/mm3 6.35 6.91   HEMOGLOBIN g/dL 11.8 14.6   HEMATOCRIT % 36.8 44.2*   PLATELETS 10*3/mm3 206 257           Culture Data:    Radiology Results:  Imaging Results (last 24 hours)     Procedure Component Value Units Date/Time    CT Abdomen Pelvis Without Contrast [[id number]]  (Abnormal) Collected:  06/30/17 2019     Updated:  06/30/17 2116    Narrative:       EXAM:    CT Abdomen and Pelvis Without Intravenous Contrast    CLINICAL HISTORY:    62 years old, female; Pain and signs and symptoms; Nausea and vomiting;   Abdominal pain; Other: Suprapubic/periumbilical pain; Prior surgery; Surgery   date: 6+ months; Surgery type: Tubal ligation, hysterectomy; Additional info:   Abd pain    TECHNIQUE:    Axial computed tomography images of the abdomen and pelvis without   intravenous contrast.  This CT exam was performed using one or more of the   following dose reduction techniques:  automated exposure control, adjustment of   the mA and/or kV according to patient size, and/or use of iterative   reconstruction technique.    Coronal reformatted images were created and reviewed.    COMPARISON:      No relevant comparison exams are available.    FINDINGS:    PANCREATICOHEPATOBILIARY: The liver is normal without a focal intrahepatic   mass or abnormality. No significant intra-or extrahepatic ductal dilation.    Gallbladder, pancreas and spleen are unremarkable.        GENITOURINARY: No adrenal mass.  Kidneys are unremarkable without obstructive   renal/ureteric calculi and no hydronephrosis.  Distended urinary bladder   otherwise appears unremarkable.  Uterus is not visualized consistent with   hysterectomy.  No free fluid in the pelvis.      GASTROINTESTINAL:  Colon contains moderate amount of fecal matter.  A few   colonic diverticula without evidence of acute diverticulitis.  Cecum is in the   LEFT lower quadrant of the abdomen.  Mild to moderately dilated fluid and   air-filled loops of proximal and mid small bowel with nondistended distal small   bowel.  A cluster of small bowel loops extending posterior to the stomach   displacing it anteriorly.       A moderate size hiatal hernia with nonspecific wall thickening of the distal   thoracic esophagus suggestive of esophagitis/reflux.  No evidence of free   intraperitoneal air or fluid collection.  A normal APPENDIX is visualized in   the LEFT lower quadrant.       OTHER STRUCTURES: Aorta appears unremarkable without evidence of aortic   aneurysm.  No bulky lymph node enlargement.  Chronic degenerative changes in   the visualized spine and hip joints.       Impression:         Mild to moderate grade distal SMALL BOWEL OBSTRUCTION most likely due to an   internal hernia versus adhesions.      Moderate-sized hiatal hernia with findings suspicious for esophagitis/reflux,   recommend followup as clinically indicated.      Other nonemergent findings as described.       NOTE: Suboptimal evaluation of bowel loops and abdominal organs due to lack   of oral and intravenous contrast.       THIS DOCUMENT HAS BEEN ELECTRONICALLY SIGNED BY TISHA COX MD    XR Chest 1 View [[id number]] Collected:  07/01/17 1153     Updated:  07/01/17 1153    Narrative:          EXAMINATION: XR CHEST - 06/30/2017     INDICATION: K56.69-Other intestinal obstruction; I10-Essential (primary)  hypertension.      COMPARISON: None.     FINDINGS: Portable chest  reveals nasogastric tube tip in the mid  esophagus. Advancement is recommended. Heart is enlarged. Chronic  changes seen in the lung fields bilaterally. Degenerative changes seen  within the spine. Pulmonary vascularity is within normal limits.       Impression:       Nasogastric tube tip is in the midesophagus; advancement  recommended. Chronic changes seen in the lung fields. No acute disease.     DICTATED:     07/01/2017  EDITED:         07/01//2017              *. Please note that portions of this note were completed with a voice recognition program. Efforts were made to edit the dictations, but occasionally words are mistranscribed.  Matthew Powers MD07/01/1712:54 PM

## 2017-07-01 NOTE — PLAN OF CARE
Problem: Constipation (Adult)  Goal: Identify Related Risk Factors and Signs and Symptoms  Outcome: Ongoing (interventions implemented as appropriate)

## 2017-07-01 NOTE — PLAN OF CARE
Problem: Constipation (Adult)  Goal: Comfort  Outcome: Ongoing (interventions implemented as appropriate)

## 2017-07-01 NOTE — H&P
"    [facility] Medicine Services  HISTORY AND PHYSICAL    Primary Care Physician: Michaela Connell MD    Subjective     Chief Complaint:  Abdominal pain    History of Present Illness:     Ms. Tereza Ackerman is a 62 year old  female with PMH significant for hypertension, GERD s/p Nissen fundoplication, depression, arthritis, and insomnia who presents to the [facility] ED for c/o abdominal pain. She reports sudden onset of abdominal pain in her suprapubic region initially rated as 10/10 pain that was described as a cramping pain that has been intermittent. She states pain has progressively worsened and she reports associated nausea and vomiting. She notes that she has vomited about 10 times since being in the ED. She reports vomiting small amounts of bile colored emesis. She has hx of previous Nissen fundoplication surgery to treat her acid reflux and states that she typically only vomits small amounts at a time. She denies F/C, appetite change, unexpected weight change, cough, choking, SOA, CP, palpitations, abd distention, constipation, diarrhea, dysuria, or any other acute sx at this time. She presents to the [facility] ED for further evaluation.    In the ED, she presents with hypertensive urgency. Her highest BP was 204/97. She was given Labetolol 20 mg IV x 1 dose in the ED. She was previously prescribed to take Losartan-HCTZ for HTN, but states that she has not taken this medication in several months because she states that the BP meds \"make me feel funny\". Her CT abdomen/pelvis reveals mild to moderate grade distal small bowel obstruction most likely due to internal hernia vs adhesions. She had an NG tube place in the ED and was given IV dilaudid, IV zofran, IV phenergan, and IV fluid boluses. Her potassium level was 3.3. All other labs were unremarkable. At the time of my evaluation, she is alert and oriented x 4. She is hemodynamically stable. She denies any current pain at this time. She " will be admitted to Hospital medicine service for further evaluation and management.     Review of Systems   Constitutional: Negative for appetite change, chills and fever.   Respiratory: Negative for cough, choking, shortness of breath and wheezing.    Cardiovascular: Negative for chest pain, palpitations and leg swelling.   Gastrointestinal: Positive for abdominal pain, nausea and vomiting. Negative for abdominal distention, blood in stool, constipation and diarrhea.   Genitourinary: Negative for dysuria, frequency and urgency.   Musculoskeletal: Negative for arthralgias and back pain.   Skin: Negative for color change, pallor and rash.   Neurological: Negative for dizziness, syncope, weakness, light-headedness and headaches.   Hematological: Does not bruise/bleed easily.   Psychiatric/Behavioral: Negative for confusion.   All other systems reviewed and are negative.     Otherwise complete ROS performed and negative except as mentioned in the HPI.    Past Medical History:   Diagnosis Date   • Acid reflux    • Acid reflux    • Arthritis    • Brain tumor    • Brain tumor    • Depression    • Depression    • History of blood transfusion    • Hyperlipemia    • Memory loss or impairment    • Migraine    • Parathyroid adenoma     Incidental on thyroid US.   • Prediabetes     Last Impression: 06 Feb 2015  Reviewed labs. Excellent control.  Maren Connellast Impression: 06 Feb 2015  Reviewed labs. Excellent control.  Michaela Connell   • Thyroid nodule      Last Impression: 13 Jun 2015  r/o thyroid cancer, will proceed with US.  Michaela Connell (Internal Medicine)    • UTI (urinary tract infection)      Past Surgical History:   Procedure Laterality Date   • BRAIN SURGERY  2003 & 2014    Dr. Werner Hollis   • CARPAL TUNNEL RELEASE  2002   • HYSTERECTOMY     • OTHER SURGICAL HISTORY      craniotomy tumor removal-complete   • OTHER SURGICAL HISTORY      esophagogastric fundoplasty nissen fundoplication   • TUBAL  "ABDOMINAL LIGATION       Family History   Problem Relation Age of Onset   • Obesity Mother    • Heart attack Mother    • Migraines Father    • Stroke Father    • Cancer Other    • Diabetes Other    • Breast cancer Maternal Aunt    • Breast cancer Paternal Aunt      Social History     Social History   • Marital status:      Spouse name: N/A   • Number of children: N/A   • Years of education: N/A     Occupational History   • Not on file.     Social History Main Topics   • Smoking status: Never Smoker   • Smokeless tobacco: Never Used   • Alcohol use No   • Drug use: No   • Sexual activity: Not on file     Other Topics Concern   • Not on file     Social History Narrative     Medications:  Reviewed upon admission and reconciled.    Scheduled Meds:    enoxaparin 40 mg Subcutaneous Daily   pantoprazole 40 mg Intravenous Q AM   sertraline 100 mg Oral Daily     Continuous Infusions:    sodium chloride 75 mL/hr Last Rate: 75 mL/hr (07/01/17 0035)     PRN Meds:.•  acetaminophen  •  hydrALAZINE  •  HYDROmorphone  •  ondansetron **OR** ondansetron  •  potassium chloride **OR** potassium chloride **OR** potassium chloride  •  sodium chloride  •  traZODone    Allergies:  Allergies   Allergen Reactions   • Dilantin [Phenytoin Sodium Extended]    • Phenytoin Rash     Objective     Physical Exam:  Vital Signs: /90 (BP Location: Right arm, Patient Position: Lying)  Pulse 68  Temp 98.3 °F (36.8 °C) (Oral)   Resp 16  Ht 63\" (160 cm)  Wt 160 lb 6.4 oz (72.8 kg)  SpO2 94%  BMI 28.41 kg/m2  Physical Exam   Constitutional: She appears well-developed and well-nourished. She is cooperative. She is easily aroused. No distress.   HENT:   Head: Normocephalic and atraumatic.   Eyes: EOM are normal. Pupils are equal, round, and reactive to light. No scleral icterus.   Neck: Normal range of motion. Neck supple. No thyromegaly present.   Cardiovascular: Normal rate, regular rhythm, normal heart sounds and intact distal pulses.  " Exam reveals no gallop and no friction rub.    No murmur heard.  Pulses:       Radial pulses are 2+ on the right side, and 2+ on the left side.        Dorsalis pedis pulses are 2+ on the right side, and 2+ on the left side.        Posterior tibial pulses are 2+ on the right side, and 2+ on the left side.   Pulmonary/Chest: Effort normal and breath sounds normal. No respiratory distress. She has no wheezes. She has no rales.   Abdominal: Soft. Bowel sounds are normal. She exhibits no distension and no mass. There is no tenderness. There is no rebound and no guarding. No hernia.   Musculoskeletal: Normal range of motion. She exhibits no edema.   Neurological: She is alert and easily aroused. She has normal strength. GCS eye subscore is 4. GCS verbal subscore is 5. GCS motor subscore is 6.   Skin: Skin is warm and dry. No erythema.   Psychiatric: She has a normal mood and affect. Her behavior is normal. Judgment and thought content normal.   Vitals reviewed.    Results Reviewed:  Lab Results (last 24 hours)     Procedure Component Value Units Date/Time    CBC & Differential [[id number]] Collected:  06/30/17 1959    Specimen:  Blood Updated:  06/30/17 2008    Narrative:       The following orders were created for panel order CBC & Differential.  Procedure                               Abnormality         Status                     ---------                               -----------         ------                     CBC Auto Differential[[id number]]        Abnormal            Final result                 Please view results for these tests on the individual orders.    CBC Auto Differential [[id number]]  (Abnormal) Collected:  06/30/17 1959    Specimen:  Blood Updated:  06/30/17 2008     WBC 6.91 10*3/mm3      RBC 4.90 10*6/mm3      Hemoglobin 14.6 g/dL      Hematocrit 44.2 (H) %      MCV 90.2 fL      MCH 29.8 pg      MCHC 33.0 g/dL      RDW 13.3 %      RDW-SD 43.9 fl      MPV 10.1 fL      Platelets 257 10*3/mm3       Neutrophil % 41.9 %      Lymphocyte % 48.0 (H) %      Monocyte % 8.2 %      Eosinophil % 1.3 %      Basophil % 0.3 %      Immature Grans % 0.3 %      Neutrophils, Absolute 2.89 10*3/mm3      Lymphocytes, Absolute 3.32 10*3/mm3      Monocytes, Absolute 0.57 10*3/mm3      Eosinophils, Absolute 0.09 10*3/mm3      Basophils, Absolute 0.02 10*3/mm3      Immature Grans, Absolute 0.02 10*3/mm3     Comprehensive Metabolic Panel [[id number]]  (Abnormal) Collected:  06/30/17 1959    Specimen:  Blood Updated:  06/30/17 2024     Glucose 137 (H) mg/dL      BUN 11 mg/dL      Creatinine 0.70 mg/dL      Sodium 138 mmol/L      Potassium 3.3 (L) mmol/L      Chloride 103 mmol/L      CO2 20.0 mmol/L      Calcium 10.6 (H) mg/dL      Total Protein 8.4 (H) g/dL      Albumin 4.80 g/dL      ALT (SGPT) 18 U/L      AST (SGOT) 30 U/L      Alkaline Phosphatase 103 (H) U/L      Total Bilirubin 0.5 mg/dL      eGFR Non African Amer 84 mL/min/1.73      Globulin 3.6 gm/dL      A/G Ratio 1.3 (L) g/dL      BUN/Creatinine Ratio 15.7     Anion Gap 15.0 (H) mmol/L     Narrative:       [employer] Guidelines    Stage     Description        GFR  1         Normal or High     90+  2         Mild decrease      60-89  3         Moderate decrease  30-59  4         Severe decrease    15-29  5         Kidney failure     <15    Lipase [[id number]]  (Normal) Collected:  06/30/17 1959    Specimen:  Blood Updated:  06/30/17 2024     Lipase 39 U/L     [place] Draw [[id number]] Collected:  06/30/17 1959    Specimen:  Blood Updated:  06/30/17 2101    Narrative:       The following orders were created for panel order [place] Draw.  Procedure                               Abnormality         Status                     ---------                               -----------         ------                     Light Blue Top[[id number]]                                   Final result               Green Top (Gel)[[id number]]                                  Final result                Lavender Top[[id number]]                                     Final result               Gold Top - SST[[id number]]                                   Final result               Green Top (No Gel)[[id number]]                                                            Please view results for these tests on the individual orders.    Light Blue Top [[id number]] Collected:  06/30/17 1959    Specimen:  Blood Updated:  06/30/17 2101     Extra Tube hold for add-on      Auto resulted       Green Top (Gel) [[id number]] Collected:  06/30/17 1959    Specimen:  Blood Updated:  06/30/17 2101     Extra Tube Hold for add-ons.      Auto resulted.       Lavender Top [[id number]] Collected:  06/30/17 1959    Specimen:  Blood Updated:  06/30/17 2101     Extra Tube hold for add-on      Auto resulted       Gold Top - SST [[id number]] Collected:  06/30/17 1959    Specimen:  Blood Updated:  06/30/17 2101     Extra Tube Hold for add-ons.      Auto resulted.       Urinalysis With / Culture If Indicated [[id number]]  (Abnormal) Collected:  06/30/17 2159    Specimen:  Urine from Urine, Clean Catch Updated:  06/30/17 2214     Color, UA Yellow     Appearance, UA Cloudy (A)     pH, UA 7.5     Specific Gravity, UA 1.013     Glucose,  mg/dL (Trace) (A)     Ketones, UA 15 mg/dL (1+) (A)     Bilirubin, UA Negative     Blood, UA Trace (A)     Protein, UA Negative     Leuk Esterase, UA Negative     Nitrite, UA Negative     Urobilinogen, UA 0.2 E.U./dL    Urinalysis, Microscopic Only [[id number]]  (Abnormal) Collected:  06/30/17 2159    Specimen:  Urine from Urine, Clean Catch Updated:  06/30/17 2214     RBC, UA 3-6 (A) /HPF      WBC, UA None Seen /HPF      Bacteria, UA None Seen /HPF      Squamous Epithelial Cells, UA 0-2 /HPF      Hyaline Casts, UA None Seen /LPF      Methodology Automated Microscopy        Imaging Results (last 24 hours)     Procedure Component Value Units Date/Time    CT Abdomen Pelvis Without Contrast [[id number]]   (Abnormal) Collected:  06/30/17 2019     Updated:  06/30/17 2116    Narrative:       EXAM:    CT Abdomen and Pelvis Without Intravenous Contrast    CLINICAL HISTORY:    62 years old, female; Pain and signs and symptoms; Nausea and vomiting;   Abdominal pain; Other: Suprapubic/periumbilical pain; Prior surgery; Surgery   date: 6+ months; Surgery type: Tubal ligation, hysterectomy; Additional info:   Abd pain    TECHNIQUE:    Axial computed tomography images of the abdomen and pelvis without   intravenous contrast.  This CT exam was performed using one or more of the   following dose reduction techniques:  automated exposure control, adjustment of   the mA and/or kV according to patient size, and/or use of iterative   reconstruction technique.    Coronal reformatted images were created and reviewed.    COMPARISON:      No relevant comparison exams are available.    FINDINGS:    PANCREATICOHEPATOBILIARY: The liver is normal without a focal intrahepatic   mass or abnormality. No significant intra-or extrahepatic ductal dilation.    Gallbladder, pancreas and spleen are unremarkable.       GENITOURINARY: No adrenal mass.  Kidneys are unremarkable without obstructive   renal/ureteric calculi and no hydronephrosis.  Distended urinary bladder   otherwise appears unremarkable.  Uterus is not visualized consistent with   hysterectomy.  No free fluid in the pelvis.      GASTROINTESTINAL:  Colon contains moderate amount of fecal matter.  A few   colonic diverticula without evidence of acute diverticulitis.  Cecum is in the   LEFT lower quadrant of the abdomen.  Mild to moderately dilated fluid and   air-filled loops of proximal and mid small bowel with nondistended distal small   bowel.  A cluster of small bowel loops extending posterior to the stomach   displacing it anteriorly.       A moderate size hiatal hernia with nonspecific wall thickening of the distal   thoracic esophagus suggestive of esophagitis/reflux.  No evidence  of free   intraperitoneal air or fluid collection.  A normal APPENDIX is visualized in   the LEFT lower quadrant.       OTHER STRUCTURES: Aorta appears unremarkable without evidence of aortic   aneurysm.  No bulky lymph node enlargement.  Chronic degenerative changes in   the visualized spine and hip joints.       Impression:         Mild to moderate grade distal SMALL BOWEL OBSTRUCTION most likely due to an   internal hernia versus adhesions.      Moderate-sized hiatal hernia with findings suspicious for esophagitis/reflux,   recommend followup as clinically indicated.      Other nonemergent findings as described.       NOTE: Suboptimal evaluation of bowel loops and abdominal organs due to lack   of oral and intravenous contrast.       THIS DOCUMENT HAS BEEN ELECTRONICALLY SIGNED BY TISHA COX MD    XR Chest 1 View [[id number]] Updated:  06/30/17 2300        I have personally reviewed and interpreted available lab data, radiology studies and ECG obtained at time of admission.     Assessment / Plan     Assessment/Problem List:   Hospital Problem List     * (Principal)Small bowel obstruction    Hypertension    Overview Signed 7/6/2016 12:38 PM by Michaela Connell MD     Impression: 11/16/2015 - BP slightly elevated on the 1/2 dose of losartan.  Hence adv. to take the whole pill..  Impression: 11/16/2015 - BP better on the 1/2 dose of losartan.  Impression: 02/06/2015 - Adv. to cut back on her lis/hct to 1/2 tab daily.  Monitor BP regularly.;          Arthritis    Depression    Hypertensive crisis    Insomnia        Plan:  Small bowel obstruction  -c/o abdominal pain, nausea and vomiting  -CT scan abdomen/pelvis reveals mild to moderate grade distal small bowel obstruction most likely due to internal hernia vs adhesions  -NG tube placed in the ED, connected to LWS  -continue NGT to LWS   -bowel rest, keep NPO  -PRN pain control  -PRN nausea control  -routine AM labs in the AM    Hypertensive urgeny  -BP in the ED  "highest 204/97; given Labetalol 20 mg IV  -admit to telemetry, vital signs Q4H  -scheduled IV Vasotec 0.625 mg Q6 hours  -PRN IV hydralazine 10 mg Q6H for SBP > 180    Hypokalemia  -potassium level 3.3.  -replace potassium per electrolyte sliding scale  -add on magnesium level  -BMP and mag level in the AM    Hx of GERD  -had Nissen fundoplication surgery to treat acid reflux; previously taking Nexium  -reports some trouble with swallowing  -nursing swallow assessment  -Protonix 40 mg daily    Hx of HTN  -prescribed to take Losartan-HCTZ, but has not taken med for several months, states \"it makes me feel funny\"  -discussed importance of taking BP meds; plans to f/u PCP about restarting a BP medication  -will need to start a home BP med; defer to rounding physician in the AM  -consult CM in the AM    Hx of Depression  -takes Zoloft  -continue home med    Hx of Insomnia  -takes Trazodone  -continue home med    Hx of Arthritis  -PRN pain control    DVT prophylaxis:  -Lovenox  -TEDs and SCDs    Code Status:   -FULL code    Admission Status: Patient will be admitted to INPATIENT status due to the need for care which can only be reasonably provided in an hospital setting such as aggressive/expedited ancillary services and/or consultation services and the necessity for IV medications, close physician monitoring and/or the possible need for procedures.  In such, I feel patient’s risk for adverse outcomes and need for care warrant INPATIENT evaluation and predict the patient’s care encounter to likely last beyond 2 midnights.       AYDEE Ledezma 07/01/17 12:47 AM     Vision seen and examined at the bedside.  This 62-year-old  female with past medical history significant for hypertension, GERD, depression.  Is in her usual state of health until yesterday afternoon when she is started experiencing abdominal pain.  Pain was generalized and the intensity with very with time but at times it would reach 10 out of " 10.  After a few hours patient also started experiencing nausea and couple of episodes of vomiting.  Denies any fever or chills.  Patient has had previous total hysterectomy however this is the first time that patient has signs and symptoms of  bowel obstruction.    On physical exam patient is resting in bed and feels much more comfortable and is in no acute distress.  Patient is awake, alert, oriented ×3 in no focalities present.  Lungs are clear to auscultation and breathing is without labor.  S1 and S2 are present and normal no murmur gallop or rub audible.  Abdomen is soft, slightly tender diffusely, very sluggish bowel sounds are present.  No edema lower extremities.    Assessment and plan:  * Abdominal pain and nausea vomiting secondary to small bowel obstruction.  Patient does not have any flatus.  NG tube in place..  Admit the patient and surgery will see the patient.  We will continue the conservative treatment and pain control.  Hopefully this will resolve without any surgical  intervention.  For further details please see the above.  I have reviewed/edited the above to reflect my medical opinion.  Findings and plan of care was explained in detail to the patient at the bedside.  Patient needs in-hospital monitoring and treatment and hence will be admitted as inpatient.

## 2017-07-02 VITALS
WEIGHT: 160.4 LBS | BODY MASS INDEX: 28.42 KG/M2 | OXYGEN SATURATION: 94 % | HEIGHT: 63 IN | SYSTOLIC BLOOD PRESSURE: 149 MMHG | HEART RATE: 59 BPM | DIASTOLIC BLOOD PRESSURE: 80 MMHG | TEMPERATURE: 98.4 F | RESPIRATION RATE: 18 BRPM

## 2017-07-02 LAB
ANION GAP SERPL CALCULATED.3IONS-SCNC: 6 MMOL/L (ref 3–11)
BASOPHILS # BLD AUTO: 0.01 10*3/MM3 (ref 0–0.2)
BASOPHILS NFR BLD AUTO: 0.2 % (ref 0–1)
BUN BLD-MCNC: 6 MG/DL (ref 9–23)
BUN/CREAT SERPL: 10 (ref 7–25)
CALCIUM SPEC-SCNC: 9.2 MG/DL (ref 8.7–10.4)
CHLORIDE SERPL-SCNC: 106 MMOL/L (ref 99–109)
CO2 SERPL-SCNC: 26 MMOL/L (ref 20–31)
CREAT BLD-MCNC: 0.6 MG/DL (ref 0.6–1.3)
DEPRECATED RDW RBC AUTO: 46.6 FL (ref 37–54)
EOSINOPHIL # BLD AUTO: 0.08 10*3/MM3 (ref 0–0.3)
EOSINOPHIL NFR BLD AUTO: 1.7 % (ref 0–3)
ERYTHROCYTE [DISTWIDTH] IN BLOOD BY AUTOMATED COUNT: 13.6 % (ref 11.3–14.5)
GFR SERPL CREATININE-BSD FRML MDRD: 101 ML/MIN/1.73
GLUCOSE BLD-MCNC: 131 MG/DL (ref 70–100)
HCT VFR BLD AUTO: 38.6 % (ref 34.5–44)
HGB BLD-MCNC: 12.4 G/DL (ref 11.5–15.5)
IMM GRANULOCYTES # BLD: 0 10*3/MM3 (ref 0–0.03)
IMM GRANULOCYTES NFR BLD: 0 % (ref 0–0.6)
LYMPHOCYTES # BLD AUTO: 1.87 10*3/MM3 (ref 0.6–4.8)
LYMPHOCYTES NFR BLD AUTO: 40.4 % (ref 24–44)
MAGNESIUM SERPL-MCNC: 2 MG/DL (ref 1.3–2.7)
MCH RBC QN AUTO: 29.8 PG (ref 27–31)
MCHC RBC AUTO-ENTMCNC: 32.1 G/DL (ref 32–36)
MCV RBC AUTO: 92.8 FL (ref 80–99)
MONOCYTES # BLD AUTO: 0.36 10*3/MM3 (ref 0–1)
MONOCYTES NFR BLD AUTO: 7.8 % (ref 0–12)
NEUTROPHILS # BLD AUTO: 2.31 10*3/MM3 (ref 1.5–8.3)
NEUTROPHILS NFR BLD AUTO: 49.9 % (ref 41–71)
PLATELET # BLD AUTO: 205 10*3/MM3 (ref 150–450)
PMV BLD AUTO: 10.5 FL (ref 6–12)
POTASSIUM BLD-SCNC: 3.6 MMOL/L (ref 3.5–5.5)
RBC # BLD AUTO: 4.16 10*6/MM3 (ref 3.89–5.14)
SODIUM BLD-SCNC: 138 MMOL/L (ref 132–146)
WBC NRBC COR # BLD: 4.63 10*3/MM3 (ref 3.5–10.8)

## 2017-07-02 PROCEDURE — 99239 HOSP IP/OBS DSCHRG MGMT >30: CPT | Performed by: INTERNAL MEDICINE

## 2017-07-02 PROCEDURE — 25010000002 HEPARIN (PORCINE) PER 1000 UNITS: Performed by: INTERNAL MEDICINE

## 2017-07-02 PROCEDURE — 83735 ASSAY OF MAGNESIUM: CPT | Performed by: FAMILY MEDICINE

## 2017-07-02 PROCEDURE — 25010000002 HYDROMORPHONE PER 4 MG: Performed by: INTERNAL MEDICINE

## 2017-07-02 PROCEDURE — 80048 BASIC METABOLIC PNL TOTAL CA: CPT | Performed by: FAMILY MEDICINE

## 2017-07-02 PROCEDURE — 85025 COMPLETE CBC W/AUTO DIFF WBC: CPT | Performed by: FAMILY MEDICINE

## 2017-07-02 RX ORDER — LOSARTAN POTASSIUM AND HYDROCHLOROTHIAZIDE 12.5; 5 MG/1; MG/1
1 TABLET ORAL DAILY
Qty: 30 TABLET | Refills: 0 | Status: SHIPPED | OUTPATIENT
Start: 2017-07-02 | End: 2018-01-12 | Stop reason: HOSPADM

## 2017-07-02 RX ADMIN — ACETAMINOPHEN 650 MG: 325 TABLET, FILM COATED ORAL at 07:38

## 2017-07-02 RX ADMIN — SERTRALINE 100 MG: 100 TABLET, FILM COATED ORAL at 08:18

## 2017-07-02 RX ADMIN — PANTOPRAZOLE SODIUM 40 MG: 40 INJECTION, POWDER, FOR SOLUTION INTRAVENOUS at 06:31

## 2017-07-02 RX ADMIN — HYDROMORPHONE HYDROCHLORIDE 0.5 MG: 1 INJECTION, SOLUTION INTRAMUSCULAR; INTRAVENOUS; SUBCUTANEOUS at 06:52

## 2017-07-02 RX ADMIN — ENALAPRILAT 0.62 MG: 1.25 INJECTION INTRAVENOUS at 12:25

## 2017-07-02 RX ADMIN — HEPARIN SODIUM 5000 UNITS: 5000 INJECTION, SOLUTION INTRAVENOUS; SUBCUTANEOUS at 08:19

## 2017-07-02 RX ADMIN — ENALAPRILAT 0.62 MG: 1.25 INJECTION INTRAVENOUS at 06:32

## 2017-07-02 NOTE — DISCHARGE SUMMARY
[facility] Medicine Services  DISCHARGE SUMMARY       Date of Admission: 6/30/2017  Date of Discharge:  7/2/2017  Primary Care Physician: Michaela Connell MD  Consulting Physician(s)     Provider Relationship    Andrea Bocanegra MD Consulting Physician          Discharge Diagnoses:  Active Hospital Problems (** Indicates Principal Problem)    Diagnosis Date Noted   • **Small bowel obstruction [K56.69] 06/30/2017     Priority: High   • History of Nissen fundoplication [Z98.890] 07/01/2017     Priority: Medium   • Hypertensive crisis [I16.9] 06/30/2017     Priority: Medium   • Hypertension [I10] 07/06/2016     Priority: Medium     Impression: 11/16/2015 - BP slightly elevated on the 1/2 dose of losartan.  Hence adv. to take the whole pill..  Impression: 11/16/2015 - BP better on the 1/2 dose of losartan.  Impression: 02/06/2015 - Adv. to cut back on her lis/hct to 1/2 tab daily.  Monitor BP regularly.;      • Insomnia [G47.00] 07/01/2017     Priority: Low   • Depression [F32.9] 12/13/2016     Priority: Low   • Arthritis [M19.90] 12/13/2016     Priority: Low      Resolved Hospital Problems    Diagnosis Date Noted Date Resolved   No resolved problems to display.       Presenting Problem/History of Present Illness  Small bowel obstruction [K56.69]     Chief Complaint: abdominal pain    Day of Discharge: Doing well this am, still complaining of headache but thinks that is likely due to her lack of PO intake while abdominal issues were resolving vs due to uncontrolled HTN (BP currently 145 systolic).  Abdominal pain is better and she tolerated clear liquids well- wants to try full diet this afternoon.      Hospital Course  Patient is a a 62 year old  female with PMH significant for hypertension, GERD s/p Nissen fundoplication, depression, arthritis, and insomnia who presents to [facility] ED for c/o abdominal pain. CT A/P showed mild to moderate grade distal small bowel obstruction most  "likely due to internal hernia vs adhesions.  She had an NGT placed in the ED and was admitted to our service for further management.  We consulted General Surgery, Dr. Andrea Bocanegra, who advised nonoperative management.  Pt's symptoms resolved with symptomatic treatment and her NGT was removed successfully.  Her diet was advanced and she has had full return of bowel function.      Also of note, the patient had uncontrolled HTN on admission to the ER with BP of 204/97. She was given IV Labetalol initially and her BP improved.  She will be discharged home on her regular regimen.    Pt needs to follow up with her PCP within a week of discharge.     Procedures Performed     CT A/P-Mild to moderate grade distal SMALL BOWEL OBSTRUCTION most likely due to an   internal hernia versus adhesions.     Moderate-sized hiatal hernia with findings suspicious for esophagitis/reflux,   recommend followup as clinically indicated.    Consults:   Consults     Date and Time Order Name Status Description    7/1/2017 0622 Inpatient Consult to General Surgery Completed           Pertinent Test Results:    Results from last 7 days  Lab Units 07/02/17 0759 07/01/17 0517 06/30/17 1959   WBC 10*3/mm3 4.63 6.35 6.91   HEMOGLOBIN g/dL 12.4 11.8 14.6   HEMATOCRIT % 38.6 36.8 44.2*   PLATELETS 10*3/mm3 205 206 257       Results from last 7 days  Lab Units 07/02/17 0759 07/01/17 0517 06/30/17 1959   SODIUM mmol/L 138 140 138   POTASSIUM mmol/L 3.6 4.0 3.3*   CHLORIDE mmol/L 106 108 103   CO2 mmol/L 26.0 26.0 20.0   BUN mg/dL 6* 9 11   CREATININE mg/dL 0.60 0.60 0.70   GLUCOSE mg/dL 131* 102* 137*   CALCIUM mg/dL 9.2 8.7 10.6*         Condition on Discharge:  stable    Physical Exam on Discharge:/80 (BP Location: Left arm, Patient Position: Lying)  Pulse 59  Temp 98.4 °F (36.9 °C) (Oral)   Resp 18  Ht 63\" (160 cm)  Wt 160 lb 6.4 oz (72.8 kg)  SpO2 94%  BMI 28.41 kg/m2  Physical Exam  General- AOx3, NAD  HEENT- PERRLA  CVS- RRR, " no M/R/G  Pulm- CTAB  Abd- soft, NT, ND, (+) BS  Extr- no c/c/e  Neuro- no focal deficits  Psych- normal affect    Discharge Disposition  Home or Self Care    Discharge Medications   Tereza Ackerman   Home Medication Instructions MERCED:[id number]    Printed on:07/02/17 7214   Medication Information                      ALPRAZolam (XANAX) 0.5 MG tablet  Take 1 tablet by mouth 3 (three) times a day as needed.             diazePAM (VALIUM) 10 MG tablet  Take 1 tablet by mouth As Needed for Anxiety. Take one hour prior to MRI.             diphenhydrAMINE (BENADRYL) 50 MG capsule  Take 1 capsule by mouth As Needed for Allergies. Take one hour prior to MRI             losartan-hydrochlorothiazide (HYZAAR) 50-12.5 MG per tablet  Take 1 tablet by mouth daily. Replaces lisinopril             PHENobarbital (LUMINAL) 100 MG tablet  Take 1 tablet by mouth As Needed (One hour prior to MRI). Take one hour prior to MRI             promethazine (PHENERGAN) 25 MG tablet  Take 1 tablet by mouth every 6 (six) hours as needed for nausea or vomiting.             sertraline (ZOLOFT) 100 MG tablet  Take 1 tablet by mouth Daily.             traZODone (DESYREL) 50 MG tablet  Take  by mouth.                 Discharge Diet:   Diet Instructions     Advance Diet As Tolerated                     Discharge Care Plan / Instructions:    Activity at Discharge:   Activity Instructions     Activity as Tolerated                     Follow-up Appointments  Future Appointments  Date Time Provider Department Center   10/17/2017 11:00 AM [facility]T [facility]   10/17/2017 2:00 PM Werner Hollis MD [facility] None     Additional Instructions for the Follow-ups that You Need to Schedule     Discharge Follow-up with PCP    As directed    Follow Up Details:  one week with PCP                 Test Results Pending at Discharge   Order Current Status    [place] Draw In process           Oralia Newby MD 07/02/17 10:53 AM    Time: 35 minutes  spent in discharge coordination today.

## 2017-07-02 NOTE — PLAN OF CARE
Problem: Patient Care Overview (Adult)  Goal: Plan of Care Review  Outcome: Outcome(s) achieved Date Met:  07/02/17  Goal: Adult Individualization and Mutuality  Outcome: Outcome(s) achieved Date Met:  07/02/17  Goal: Discharge Needs Assessment  Outcome: Outcome(s) achieved Date Met:  07/02/17    Problem: Pain, Acute (Adult)  Goal: Identify Related Risk Factors and Signs and Symptoms  Outcome: Outcome(s) achieved Date Met:  07/02/17  Goal: Acceptable Pain Control/Comfort Level  Outcome: Outcome(s) achieved Date Met:  07/02/17    Problem: Constipation (Adult)  Goal: Identify Related Risk Factors and Signs and Symptoms  Outcome: Outcome(s) achieved Date Met:  07/02/17  Goal: Effective Bowel Elimination  Outcome: Outcome(s) achieved Date Met:  07/02/17  Goal: Comfort  Outcome: Outcome(s) achieved Date Met:  07/02/17

## 2017-07-02 NOTE — PLAN OF CARE
Problem: Patient Care Overview (Adult)  Goal: Plan of Care Review  Outcome: Ongoing (interventions implemented as appropriate)    07/02/17 0350   Coping/Psychosocial Response Interventions   Plan Of Care Reviewed With patient   Patient Care Overview   Progress improving   Outcome Evaluation   Outcome Summary/Follow up Plan BPs still elevated. Patient c/o headache and did have one small episode of N/V at beginning of shift after recieving Dilaudid IV.          Problem: Pain, Acute (Adult)  Goal: Identify Related Risk Factors and Signs and Symptoms  Outcome: Ongoing (interventions implemented as appropriate)    07/02/17 0350   Pain, Acute   Related Risk Factors (Acute Pain) patient perception   Signs and Symptoms (Acute Pain) sleep pattern alteration;nausea/vomiting/anorexia;verbalization of pain descriptors       Goal: Acceptable Pain Control/Comfort Level  Outcome: Ongoing (interventions implemented as appropriate)    07/02/17 0350   Pain, Acute (Adult)   Acceptable Pain Control/Comfort Level making progress toward outcome         Problem: Constipation (Adult)  Goal: Identify Related Risk Factors and Signs and Symptoms  Outcome: Ongoing (interventions implemented as appropriate)    07/02/17 0350   Constipation   Constipation: Related Risk Factors diet/fluid restriction;medication effects;weakness/fatigue   Signs and Symptoms (Constipation) abdominal pain/cramps;nausea and vomiting       Goal: Effective Bowel Elimination  Outcome: Ongoing (interventions implemented as appropriate)    07/02/17 0350   Constipation (Adult)   Effective Bowel Elimination making progress toward outcome       Goal: Comfort  Outcome: Ongoing (interventions implemented as appropriate)    07/02/17 0350   Constipation (Adult)   Comfort making progress toward outcome

## 2017-07-02 NOTE — NURSING NOTE
Discharged  home with her spouse.  Instructed on restarting her blood pressure medication (Losartin). She was also instructed to call her PCP for follow-up

## 2017-07-02 NOTE — PROGRESS NOTES
" LOS: 2 days   Patient Care Team:  Michaela Connell MD as PCP - General  Michaela Connell MD as PCP - Family Medicine        Subjective: feels great, tolerataing liquids, no abdominal pain, large BM yesterday      Objective     Vital Signs  Blood pressure 149/80, pulse 59, temperature 98.4 °F (36.9 °C), temperature source Oral, resp. rate 18, height 63\" (160 cm), weight 160 lb 6.4 oz (72.8 kg), SpO2 94 %.    Physical Exam:   Soft, no tenderness     Medication Review:  The patient's medications were reviewed and up to date      Assessment/Plan    SBO resolved: d/c home.    Principal Problem:    Small bowel obstruction  Active Problems:    Hypertension    Arthritis    Depression    Hypertensive crisis    Insomnia    History of Nissen fundoplication        Plan for disposition:Where: home    Andrea Bocanegra MD  07/02/17  9:31 AM      "

## 2017-07-03 ENCOUNTER — TRANSITIONAL CARE MANAGEMENT TELEPHONE ENCOUNTER (OUTPATIENT)
Dept: INTERNAL MEDICINE | Facility: CLINIC | Age: 62
End: 2017-07-03

## 2017-07-05 ENCOUNTER — OFFICE VISIT (OUTPATIENT)
Dept: INTERNAL MEDICINE | Facility: CLINIC | Age: 62
End: 2017-07-05

## 2017-07-05 VITALS
DIASTOLIC BLOOD PRESSURE: 74 MMHG | HEART RATE: 73 BPM | BODY MASS INDEX: 28.34 KG/M2 | SYSTOLIC BLOOD PRESSURE: 130 MMHG | OXYGEN SATURATION: 100 % | WEIGHT: 160 LBS

## 2017-07-05 DIAGNOSIS — K56.609 SMALL BOWEL OBSTRUCTION (HCC): Primary | ICD-10-CM

## 2017-07-05 PROCEDURE — 99495 TRANSJ CARE MGMT MOD F2F 14D: CPT | Performed by: NURSE PRACTITIONER

## 2017-07-05 NOTE — PROGRESS NOTES
"Subjective  Follow-up (Hospital follow up/[facility] for small bowel obstruction )      Tereza Ackerman is a 62 y.o. female.   Allergies   Allergen Reactions   • Dilantin [Phenytoin Sodium Extended]    • Phenytoin Rash     History of Present Illness      Was [facility] 6/30- 7/2/17, had bad low stomach pain with n/v, they did a ct scan and showed sbo, feels a little fatigued now and a little h/a \"but not that bad\"   The following portions of the patient's history were reviewed and updated as appropriate: allergies, current medications, past family history, past medical history, past social history, past surgical history and problem list.    Review of Systems   Constitutional: Positive for fatigue.   Gastrointestinal: Negative for abdominal distention, abdominal pain, anal bleeding, constipation, diarrhea, nausea, rectal pain and vomiting.   Neurological: Positive for headaches.   All other systems reviewed and are negative.      Objective   Physical Exam   Constitutional: She is oriented to person, place, and time. She appears well-developed and well-nourished.   HENT:   Head: Normocephalic and atraumatic.   Eyes: Conjunctivae are normal.   Cardiovascular: Normal rate and regular rhythm.    Pulmonary/Chest: Effort normal and breath sounds normal.   Abdominal: Soft. Bowel sounds are normal. She exhibits no distension and no mass. There is no tenderness. No hernia.   Neurological: She is alert and oriented to person, place, and time.   Skin: Skin is warm and dry.   Psychiatric: She has a normal mood and affect. Her behavior is normal. Judgment and thought content normal.   Nursing note and vitals reviewed.    /74  Pulse 73  Wt 160 lb (72.6 kg)  SpO2 100%  BMI 28.34 kg/m2    Assessment/Plan     Problem List Items Addressed This Visit        Digestive    Small bowel obstruction - Primary        Resolved  Continue bland diet x 1 week       "

## 2017-08-08 ENCOUNTER — TRANSCRIBE ORDERS (OUTPATIENT)
Dept: ADMINISTRATIVE | Facility: HOSPITAL | Age: 62
End: 2017-08-08

## 2017-08-08 DIAGNOSIS — Z12.31 VISIT FOR SCREENING MAMMOGRAM: Primary | ICD-10-CM

## 2017-08-28 ENCOUNTER — TELEPHONE (OUTPATIENT)
Dept: NEUROSURGERY | Facility: CLINIC | Age: 62
End: 2017-08-28

## 2017-09-11 ENCOUNTER — APPOINTMENT (OUTPATIENT)
Dept: MAMMOGRAPHY | Facility: HOSPITAL | Age: 62
End: 2017-09-11
Attending: OBSTETRICS & GYNECOLOGY

## 2017-10-03 ENCOUNTER — HOSPITAL ENCOUNTER (OUTPATIENT)
Dept: MAMMOGRAPHY | Facility: HOSPITAL | Age: 62
Discharge: HOME | End: 2017-10-03
Attending: OBSTETRICS & GYNECOLOGY | Admitting: OBSTETRICS & GYNECOLOGY

## 2017-10-03 ENCOUNTER — CLINICAL SUPPORT (OUTPATIENT)
Dept: INTERNAL MEDICINE | Facility: CLINIC | Age: 62
End: 2017-10-03

## 2017-10-03 DIAGNOSIS — Z12.31 VISIT FOR SCREENING MAMMOGRAM: ICD-10-CM

## 2017-10-03 PROCEDURE — 77063 BREAST TOMOSYNTHESIS BI: CPT

## 2017-10-03 PROCEDURE — G0202 SCR MAMMO BI INCL CAD: HCPCS

## 2017-10-03 PROCEDURE — 90686 IIV4 VACC NO PRSV 0.5 ML IM: CPT | Performed by: INTERNAL MEDICINE

## 2017-10-03 PROCEDURE — G0008 ADMIN INFLUENZA VIRUS VAC: HCPCS | Performed by: INTERNAL MEDICINE

## 2017-10-04 PROCEDURE — 77063 BREAST TOMOSYNTHESIS BI: CPT | Performed by: RADIOLOGY

## 2017-10-04 PROCEDURE — G0202 SCR MAMMO BI INCL CAD: HCPCS | Performed by: RADIOLOGY

## 2017-10-04 RX ORDER — DIAZEPAM 5 MG/1
5 TABLET ORAL AS NEEDED
Qty: 1 TABLET | Refills: 0 | Status: SHIPPED | OUTPATIENT
Start: 2017-10-04 | End: 2017-11-02

## 2017-10-04 NOTE — TELEPHONE ENCOUNTER
Provider:    Caller: Tereza   Phone #: [phone]   Surgery:  Crani  Surgery Date:  5/12/14  Last visit:   4/17/17  Next visit: 10/17/17    BENITA:         Reason for call:         Pt is requesting Valium 10 mg for her MRI on 10/17

## 2017-10-17 ENCOUNTER — OFFICE VISIT (OUTPATIENT)
Dept: NEUROSURGERY | Facility: CLINIC | Age: 62
End: 2017-10-17

## 2017-10-17 ENCOUNTER — HOSPITAL ENCOUNTER (OUTPATIENT)
Dept: MRI IMAGING | Facility: HOSPITAL | Age: 62
Discharge: HOME | End: 2017-10-17
Attending: NEUROLOGICAL SURGERY | Admitting: NEUROLOGICAL SURGERY

## 2017-10-17 VITALS
WEIGHT: 158.6 LBS | HEIGHT: 63 IN | DIASTOLIC BLOOD PRESSURE: 60 MMHG | SYSTOLIC BLOOD PRESSURE: 100 MMHG | BODY MASS INDEX: 28.1 KG/M2 | TEMPERATURE: 97.2 F

## 2017-10-17 DIAGNOSIS — D32.0 MENINGIOMA, RECURRENT OF BRAIN (HCC): Primary | ICD-10-CM

## 2017-10-17 PROCEDURE — 99213 OFFICE O/P EST LOW 20 MIN: CPT | Performed by: NEUROLOGICAL SURGERY

## 2017-10-17 PROCEDURE — A9577 INJ MULTIHANCE: HCPCS | Performed by: NEUROLOGICAL SURGERY

## 2017-10-17 PROCEDURE — 0 GADOBENATE DIMEGLUMINE 529 MG/ML SOLUTION: Performed by: NEUROLOGICAL SURGERY

## 2017-10-17 PROCEDURE — 70553 MRI BRAIN STEM W/O & W/DYE: CPT

## 2017-10-17 PROCEDURE — 82565 ASSAY OF CREATININE: CPT

## 2017-10-17 RX ADMIN — GADOBENATE DIMEGLUMINE 14 ML: 529 INJECTION, SOLUTION INTRAVENOUS at 11:50

## 2017-10-17 NOTE — PROGRESS NOTES
Tereza Ackerman  1955  [id number]                       CURRENT WORKING DIAGNOSIS:  Left orbital meningioma          MEDICAL HISTORY SINCE LAST ENCOUNTER:  We have been following this 62-year-old female for several years.  She had initial meningioma resected approximately 14 years ago from the left temporal fossa.  She had a recurrence in 2014 which was resected and she has done reasonably well since that time.  Nevertheless within the past several months she has developed diminished vision in her left eye.  She saw an ophthalmologist who was not terribly concerned and asked that she will return to see him in 4 months.  She will return to see me for regular follow-up with MRI.            Past Medical History:   Diagnosis Date   • Acid reflux    • Acid reflux    • Arthritis    • Brain tumor    • Brain tumor    • Depression    • Depression    • History of blood transfusion    • History of Nissen fundoplication 7/1/2017   • Hyperlipemia    • Memory loss or impairment    • Migraine    • Parathyroid adenoma     Incidental on thyroid US.   • Prediabetes     Last Impression: 06 Feb 2015  Reviewed labs. Excellent control.  Maren Connellast Impression: 06 Feb 2015  Reviewed labs. Excellent control.  Michaela Connell   • Thyroid nodule      Last Impression: 13 Jun 2015  r/o thyroid cancer, will proceed with US.  Michaela Connell (Internal Medicine)    • UTI (urinary tract infection)               Past Surgical History:   Procedure Laterality Date   • BRAIN SURGERY  2003 & 2014    Dr. Werner Hollis   • CARPAL TUNNEL RELEASE  2002   • HYSTERECTOMY     • OTHER SURGICAL HISTORY      craniotomy tumor removal-complete   • OTHER SURGICAL HISTORY      esophagogastric fundoplasty nissen fundoplication   • TUBAL ABDOMINAL LIGATION                Family History   Problem Relation Age of Onset   • Obesity Mother    • Heart attack Mother    • Migraines Father    • Stroke Father    • Cancer Other    • Diabetes Other    •  Breast cancer Maternal Aunt    • Breast cancer Paternal Aunt    • Ovarian cancer Neg Hx               Social History     Social History   • Marital status:      Spouse name: N/A   • Number of children: N/A   • Years of education: N/A     Occupational History   • Not on file.     Social History Main Topics   • Smoking status: Never Smoker   • Smokeless tobacco: Never Used   • Alcohol use No   • Drug use: No   • Sexual activity: Not on file     Other Topics Concern   • Not on file     Social History Narrative              Allergies   Allergen Reactions   • Dilantin [Phenytoin Sodium Extended]    • Phenytoin Rash              Current Outpatient Prescriptions:   •  ALPRAZolam (XANAX) 0.5 MG tablet, Take 1 tablet by mouth 3 (three) times a day as needed., Disp: , Rfl:   •  diazePAM (VALIUM) 10 MG tablet, Take 1 tablet by mouth As Needed for Anxiety. Take one hour prior to MRI., Disp: 1 tablet, Rfl: 0  •  diazePAM (VALIUM) 5 MG tablet, Take 1 tablet by mouth As Needed for Anxiety (Take 1 tablet 30 minutes Prior to MRI)., Disp: 1 tablet, Rfl: 0  •  diphenhydrAMINE (BENADRYL) 50 MG capsule, Take 1 capsule by mouth As Needed for Allergies. Take one hour prior to MRI, Disp: 1 capsule, Rfl: 0  •  losartan-hydrochlorothiazide (HYZAAR) 50-12.5 MG per tablet, Take 1 tablet by mouth Daily. Replaces lisinopril, Disp: 30 tablet, Rfl: 0  •  PHENobarbital (LUMINAL) 100 MG tablet, Take 1 tablet by mouth As Needed (One hour prior to MRI). Take one hour prior to MRI, Disp: 1 tablet, Rfl: 0  •  promethazine (PHENERGAN) 25 MG tablet, Take 1 tablet by mouth every 6 (six) hours as needed for nausea or vomiting., Disp: 30 tablet, Rfl: 2  •  sertraline (ZOLOFT) 100 MG tablet, Take 1 tablet by mouth Daily., Disp: 90 tablet, Rfl: 0  •  traZODone (DESYREL) 50 MG tablet, Take  by mouth., Disp: , Rfl:   No current facility-administered medications for this visit.          Review of Systems   Constitutional: Negative for activity change,  appetite change, chills, diaphoresis, fatigue, fever and unexpected weight change.   HENT: Negative for congestion, dental problem, drooling, ear discharge, ear pain, facial swelling, hearing loss, mouth sores, nosebleeds, postnasal drip, rhinorrhea, sinus pressure, sneezing, sore throat, tinnitus, trouble swallowing and voice change.    Eyes: Positive for visual disturbance. Negative for photophobia, pain, discharge, redness and itching.   Respiratory: Negative for apnea, cough, choking, chest tightness, shortness of breath, wheezing and stridor.    Cardiovascular: Negative for chest pain, palpitations and leg swelling.   Gastrointestinal: Negative for abdominal distention, abdominal pain, anal bleeding, blood in stool, constipation, diarrhea, nausea, rectal pain and vomiting.   Endocrine: Negative for cold intolerance, heat intolerance, polydipsia, polyphagia and polyuria.   Genitourinary: Negative for decreased urine volume, difficulty urinating, dysuria, enuresis, flank pain, frequency, genital sores, hematuria and urgency.   Musculoskeletal: Positive for back pain. Negative for arthralgias, gait problem, joint swelling, myalgias, neck pain and neck stiffness.   Skin: Negative for color change, pallor, rash and wound.   Allergic/Immunologic: Negative for environmental allergies, food allergies and immunocompromised state.   Neurological: Negative for dizziness, tremors, seizures, syncope, facial asymmetry, speech difficulty, weakness, light-headedness, numbness and headaches.   Hematological: Negative for adenopathy. Does not bruise/bleed easily.   Psychiatric/Behavioral: Positive for decreased concentration. Negative for agitation, behavioral problems, confusion, dysphoric mood, hallucinations, self-injury, sleep disturbance and suicidal ideas. The patient is nervous/anxious. The patient is not hyperactive.                Vitals:    10/17/17 1414   BP: 100/60   Temp: 97.2 °F (36.2 °C)   Weight: 158 lb 9.6 oz  "(71.9 kg)   Height: 63\" (160 cm)               EXAMINATION: She has a mild fullness and proptosis of her left eye which is new.  This was not present on her last encounter the proximal me 6 months ago.            MEDICAL DECISION MAKING: The MRI shows the tumor that has invaded of the orbit.  Tumor extends from the side of the skull base into the orbit on the left side.           ASSESSMENT/DISPOSITION: [I've asked her to see Dr. Moo Hopkins for ophthalmologically evaluation.  I believe that this needs to be treated either with surgical excision and or SRS. ]              I APPRECIATE THE OPPORTUNITY OF THIS REFERRAL. PLEASE CALL IF ANY       QUESTIONS [phone]    Scribed for Werner Hollis MD by Joy Corral CMA. 10/17/2017  2:23 PM     I have read and concur with the information provided by the scribe.  Werner Hollis MD      "

## 2017-10-17 NOTE — PATIENT INSTRUCTIONS
Call Dr. Hollis on a Monday or Tuesday.   Ask for Hanna,  and leave a message for  Dr. Hollis.  He will call you back at the end of the day as soon as he can.     [phone]

## 2017-10-18 LAB — CREAT BLDA-MCNC: 0.7 MG/DL (ref 0.6–1.3)

## 2017-10-24 ENCOUNTER — TELEPHONE (OUTPATIENT)
Dept: NEUROSURGERY | Facility: CLINIC | Age: 62
End: 2017-10-24

## 2017-10-24 NOTE — TELEPHONE ENCOUNTER
----- Message from Hanna Car sent at 10/23/2017 12:40 PM EDT -----  Contact: [phone]  PATIENT CALLING TO REPORT ON CONDITION.  WAS TOLD SHE NEEDS SURGERY.  HAS A TUMOR PUSHING ON HER EYE.      PATIENT IS IN EPIC.

## 2017-11-02 ENCOUNTER — APPOINTMENT (OUTPATIENT)
Dept: PREADMISSION TESTING | Facility: HOSPITAL | Age: 62
End: 2017-11-02

## 2017-11-02 ENCOUNTER — ANESTHESIA EVENT (OUTPATIENT)
Dept: PERIOP | Facility: HOSPITAL | Age: 62
End: 2017-11-02

## 2017-11-02 VITALS — HEIGHT: 63 IN | WEIGHT: 155.65 LBS | BODY MASS INDEX: 27.58 KG/M2

## 2017-11-02 LAB
DEPRECATED RDW RBC AUTO: 44.6 FL (ref 37–54)
ERYTHROCYTE [DISTWIDTH] IN BLOOD BY AUTOMATED COUNT: 13.5 % (ref 11.3–14.5)
HCT VFR BLD AUTO: 41.5 % (ref 34.5–44)
HGB BLD-MCNC: 13.7 G/DL (ref 11.5–15.5)
MCH RBC QN AUTO: 30.2 PG (ref 27–31)
MCHC RBC AUTO-ENTMCNC: 33 G/DL (ref 32–36)
MCV RBC AUTO: 91.4 FL (ref 80–99)
PLATELET # BLD AUTO: 255 10*3/MM3 (ref 150–450)
PMV BLD AUTO: 9.6 FL (ref 6–12)
POTASSIUM BLD-SCNC: 4 MMOL/L (ref 3.5–5.5)
RBC # BLD AUTO: 4.54 10*6/MM3 (ref 3.89–5.14)
WBC NRBC COR # BLD: 4.63 10*3/MM3 (ref 3.5–10.8)

## 2017-11-02 PROCEDURE — 93005 ELECTROCARDIOGRAM TRACING: CPT

## 2017-11-02 PROCEDURE — 84132 ASSAY OF SERUM POTASSIUM: CPT | Performed by: ANESTHESIOLOGY

## 2017-11-02 PROCEDURE — 36415 COLL VENOUS BLD VENIPUNCTURE: CPT

## 2017-11-02 PROCEDURE — 93010 ELECTROCARDIOGRAM REPORT: CPT | Performed by: INTERNAL MEDICINE

## 2017-11-02 PROCEDURE — 85027 COMPLETE CBC AUTOMATED: CPT | Performed by: ANESTHESIOLOGY

## 2017-11-03 ENCOUNTER — ANESTHESIA (OUTPATIENT)
Dept: PERIOP | Facility: HOSPITAL | Age: 62
End: 2017-11-03

## 2017-11-03 ENCOUNTER — HOSPITAL ENCOUNTER (OUTPATIENT)
Facility: HOSPITAL | Age: 62
Setting detail: OBSERVATION
Discharge: HOME | End: 2017-11-04
Attending: OPHTHALMOLOGY | Admitting: OPHTHALMOLOGY

## 2017-11-03 DIAGNOSIS — C69.62 MALIGNANT NEOPLASM OF LEFT ORBIT (HCC): ICD-10-CM

## 2017-11-03 PROCEDURE — 25010000002 EPINEPHRINE PER 0.1 MG: Performed by: OPHTHALMOLOGY

## 2017-11-03 PROCEDURE — G0378 HOSPITAL OBSERVATION PER HR: HCPCS

## 2017-11-03 PROCEDURE — 25010000002 DEXAMETHASONE PER 1 MG: Performed by: NURSE ANESTHETIST, CERTIFIED REGISTERED

## 2017-11-03 PROCEDURE — 25010000002 FENTANYL CITRATE (PF) 100 MCG/2ML SOLUTION: Performed by: NURSE ANESTHETIST, CERTIFIED REGISTERED

## 2017-11-03 PROCEDURE — 25010000002 NEOSTIGMINE PER 0.5 MG: Performed by: NURSE ANESTHETIST, CERTIFIED REGISTERED

## 2017-11-03 PROCEDURE — 25010000003 CEFAZOLIN IN DEXTROSE 2-4 GM/100ML-% SOLUTION: Performed by: OPHTHALMOLOGY

## 2017-11-03 PROCEDURE — 25010000002 ONDANSETRON PER 1 MG: Performed by: NURSE ANESTHETIST, CERTIFIED REGISTERED

## 2017-11-03 PROCEDURE — 88342 IMHCHEM/IMCYTCHM 1ST ANTB: CPT | Performed by: OPHTHALMOLOGY

## 2017-11-03 PROCEDURE — 88341 IMHCHEM/IMCYTCHM EA ADD ANTB: CPT | Performed by: OPHTHALMOLOGY

## 2017-11-03 PROCEDURE — 25010000002 PROPOFOL 10 MG/ML EMULSION: Performed by: NURSE ANESTHETIST, CERTIFIED REGISTERED

## 2017-11-03 PROCEDURE — 88360 TUMOR IMMUNOHISTOCHEM/MANUAL: CPT | Performed by: OPHTHALMOLOGY

## 2017-11-03 PROCEDURE — 88307 TISSUE EXAM BY PATHOLOGIST: CPT | Performed by: OPHTHALMOLOGY

## 2017-11-03 PROCEDURE — 25010000002 PROPOFOL 1000 MG/ML EMULSION: Performed by: NURSE ANESTHETIST, CERTIFIED REGISTERED

## 2017-11-03 RX ORDER — FENTANYL CITRATE 50 UG/ML
50 INJECTION, SOLUTION INTRAMUSCULAR; INTRAVENOUS
Status: DISCONTINUED | OUTPATIENT
Start: 2017-11-03 | End: 2017-11-03 | Stop reason: HOSPADM

## 2017-11-03 RX ORDER — MAGNESIUM HYDROXIDE 1200 MG/15ML
LIQUID ORAL AS NEEDED
Status: DISCONTINUED | OUTPATIENT
Start: 2017-11-03 | End: 2017-11-03 | Stop reason: HOSPADM

## 2017-11-03 RX ORDER — ONDANSETRON 2 MG/ML
4 INJECTION INTRAMUSCULAR; INTRAVENOUS EVERY 6 HOURS PRN
Status: DISCONTINUED | OUTPATIENT
Start: 2017-11-03 | End: 2017-11-04 | Stop reason: HOSPADM

## 2017-11-03 RX ORDER — DEXAMETHASONE SODIUM PHOSPHATE 4 MG/ML
INJECTION, SOLUTION INTRA-ARTICULAR; INTRALESIONAL; INTRAMUSCULAR; INTRAVENOUS; SOFT TISSUE AS NEEDED
Status: DISCONTINUED | OUTPATIENT
Start: 2017-11-03 | End: 2017-11-03 | Stop reason: SURG

## 2017-11-03 RX ORDER — HYDROMORPHONE HYDROCHLORIDE 1 MG/ML
0.5 INJECTION, SOLUTION INTRAMUSCULAR; INTRAVENOUS; SUBCUTANEOUS
Status: DISCONTINUED | OUTPATIENT
Start: 2017-11-03 | End: 2017-11-03 | Stop reason: HOSPADM

## 2017-11-03 RX ORDER — SCOLOPAMINE TRANSDERMAL SYSTEM 1 MG/1
1 PATCH, EXTENDED RELEASE TRANSDERMAL ONCE
Status: DISCONTINUED | OUTPATIENT
Start: 2017-11-03 | End: 2017-11-03

## 2017-11-03 RX ORDER — ONDANSETRON 2 MG/ML
4 INJECTION INTRAMUSCULAR; INTRAVENOUS ONCE AS NEEDED
Status: DISCONTINUED | OUTPATIENT
Start: 2017-11-03 | End: 2017-11-03 | Stop reason: HOSPADM

## 2017-11-03 RX ORDER — PROMETHAZINE HYDROCHLORIDE 25 MG/ML
6.25 INJECTION, SOLUTION INTRAMUSCULAR; INTRAVENOUS ONCE AS NEEDED
Status: DISCONTINUED | OUTPATIENT
Start: 2017-11-03 | End: 2017-11-03 | Stop reason: HOSPADM

## 2017-11-03 RX ORDER — FAMOTIDINE 20 MG/1
20 TABLET, FILM COATED ORAL ONCE
Status: COMPLETED | OUTPATIENT
Start: 2017-11-03 | End: 2017-11-03

## 2017-11-03 RX ORDER — NALOXONE HCL 0.4 MG/ML
0.1 VIAL (ML) INJECTION
Status: DISCONTINUED | OUTPATIENT
Start: 2017-11-03 | End: 2017-11-04 | Stop reason: HOSPADM

## 2017-11-03 RX ORDER — HYDROCODONE BITARTRATE AND ACETAMINOPHEN 7.5; 325 MG/1; MG/1
1 TABLET ORAL EVERY 4 HOURS PRN
Status: DISCONTINUED | OUTPATIENT
Start: 2017-11-03 | End: 2017-11-04 | Stop reason: HOSPADM

## 2017-11-03 RX ORDER — CEFAZOLIN SODIUM 2 G/100ML
2 INJECTION, SOLUTION INTRAVENOUS ONCE
Status: COMPLETED | OUTPATIENT
Start: 2017-11-03 | End: 2017-11-03

## 2017-11-03 RX ORDER — ONDANSETRON 2 MG/ML
INJECTION INTRAMUSCULAR; INTRAVENOUS AS NEEDED
Status: DISCONTINUED | OUTPATIENT
Start: 2017-11-03 | End: 2017-11-03 | Stop reason: SURG

## 2017-11-03 RX ORDER — ERYTHROMYCIN 5 MG/G
OINTMENT OPHTHALMIC EVERY 12 HOURS
Status: DISCONTINUED | OUTPATIENT
Start: 2017-11-03 | End: 2017-11-04 | Stop reason: HOSPADM

## 2017-11-03 RX ORDER — BALANCED SALT SOLUTION 6.4; .75; .48; .3; 3.9; 1.7 MG/ML; MG/ML; MG/ML; MG/ML; MG/ML; MG/ML
SOLUTION OPHTHALMIC AS NEEDED
Status: DISCONTINUED | OUTPATIENT
Start: 2017-11-03 | End: 2017-11-03 | Stop reason: HOSPADM

## 2017-11-03 RX ORDER — SODIUM CHLORIDE 0.9 % (FLUSH) 0.9 %
1-10 SYRINGE (ML) INJECTION AS NEEDED
Status: DISCONTINUED | OUTPATIENT
Start: 2017-11-03 | End: 2017-11-03

## 2017-11-03 RX ORDER — SODIUM CHLORIDE 0.9 % (FLUSH) 0.9 %
1-10 SYRINGE (ML) INJECTION AS NEEDED
Status: DISCONTINUED | OUTPATIENT
Start: 2017-11-03 | End: 2017-11-04 | Stop reason: HOSPADM

## 2017-11-03 RX ORDER — PROMETHAZINE HYDROCHLORIDE 25 MG/1
25 SUPPOSITORY RECTAL ONCE AS NEEDED
Status: DISCONTINUED | OUTPATIENT
Start: 2017-11-03 | End: 2017-11-03 | Stop reason: HOSPADM

## 2017-11-03 RX ORDER — ONDANSETRON 4 MG/1
4 TABLET, FILM COATED ORAL EVERY 6 HOURS PRN
Status: DISCONTINUED | OUTPATIENT
Start: 2017-11-03 | End: 2017-11-04 | Stop reason: HOSPADM

## 2017-11-03 RX ORDER — PROMETHAZINE HYDROCHLORIDE 25 MG/1
25 TABLET ORAL ONCE AS NEEDED
Status: DISCONTINUED | OUTPATIENT
Start: 2017-11-03 | End: 2017-11-03 | Stop reason: HOSPADM

## 2017-11-03 RX ORDER — LIDOCAINE HYDROCHLORIDE 10 MG/ML
INJECTION, SOLUTION INFILTRATION; PERINEURAL AS NEEDED
Status: DISCONTINUED | OUTPATIENT
Start: 2017-11-03 | End: 2017-11-03 | Stop reason: SURG

## 2017-11-03 RX ORDER — FAMOTIDINE 10 MG/ML
20 INJECTION, SOLUTION INTRAVENOUS ONCE
Status: DISCONTINUED | OUTPATIENT
Start: 2017-11-03 | End: 2017-11-03

## 2017-11-03 RX ORDER — SCOLOPAMINE TRANSDERMAL SYSTEM 1 MG/1
1 PATCH, EXTENDED RELEASE TRANSDERMAL ONCE
Status: DISCONTINUED | OUTPATIENT
Start: 2017-11-03 | End: 2017-11-04 | Stop reason: HOSPADM

## 2017-11-03 RX ORDER — ATRACURIUM BESYLATE 10 MG/ML
INJECTION, SOLUTION INTRAVENOUS AS NEEDED
Status: DISCONTINUED | OUTPATIENT
Start: 2017-11-03 | End: 2017-11-03 | Stop reason: SURG

## 2017-11-03 RX ORDER — LIDOCAINE HYDROCHLORIDE 10 MG/ML
0.5 INJECTION, SOLUTION EPIDURAL; INFILTRATION; INTRACAUDAL; PERINEURAL ONCE AS NEEDED
Status: COMPLETED | OUTPATIENT
Start: 2017-11-03 | End: 2017-11-03

## 2017-11-03 RX ORDER — FENTANYL CITRATE 50 UG/ML
INJECTION, SOLUTION INTRAMUSCULAR; INTRAVENOUS AS NEEDED
Status: DISCONTINUED | OUTPATIENT
Start: 2017-11-03 | End: 2017-11-03 | Stop reason: SURG

## 2017-11-03 RX ORDER — SODIUM CHLORIDE, SODIUM LACTATE, POTASSIUM CHLORIDE, CALCIUM CHLORIDE 600; 310; 30; 20 MG/100ML; MG/100ML; MG/100ML; MG/100ML
9 INJECTION, SOLUTION INTRAVENOUS CONTINUOUS
Status: DISCONTINUED | OUTPATIENT
Start: 2017-11-03 | End: 2017-11-03 | Stop reason: HOSPADM

## 2017-11-03 RX ORDER — EPINEPHRINE 1 MG/ML
INJECTION, SOLUTION, CONCENTRATE INTRAVENOUS AS NEEDED
Status: DISCONTINUED | OUTPATIENT
Start: 2017-11-03 | End: 2017-11-03 | Stop reason: HOSPADM

## 2017-11-03 RX ORDER — ERYTHROMYCIN 5 MG/G
OINTMENT OPHTHALMIC AS NEEDED
Status: DISCONTINUED | OUTPATIENT
Start: 2017-11-03 | End: 2017-11-03 | Stop reason: HOSPADM

## 2017-11-03 RX ORDER — PROPOFOL 10 MG/ML
VIAL (ML) INTRAVENOUS AS NEEDED
Status: DISCONTINUED | OUTPATIENT
Start: 2017-11-03 | End: 2017-11-03 | Stop reason: SURG

## 2017-11-03 RX ORDER — GLYCOPYRROLATE 0.2 MG/ML
INJECTION INTRAMUSCULAR; INTRAVENOUS AS NEEDED
Status: DISCONTINUED | OUTPATIENT
Start: 2017-11-03 | End: 2017-11-03 | Stop reason: SURG

## 2017-11-03 RX ADMIN — LIDOCAINE HYDROCHLORIDE 0.2 ML: 10 INJECTION, SOLUTION EPIDURAL; INFILTRATION; INTRACAUDAL; PERINEURAL at 11:38

## 2017-11-03 RX ADMIN — DEXAMETHASONE SODIUM PHOSPHATE 8 MG: 4 INJECTION, SOLUTION INTRAMUSCULAR; INTRAVENOUS at 13:25

## 2017-11-03 RX ADMIN — GLYCOPYRROLATE 0.2 MG: 0.2 INJECTION, SOLUTION INTRAMUSCULAR; INTRAVENOUS at 13:50

## 2017-11-03 RX ADMIN — SCOPALAMINE 1 PATCH: 1 PATCH, EXTENDED RELEASE TRANSDERMAL at 12:13

## 2017-11-03 RX ADMIN — SODIUM CHLORIDE, POTASSIUM CHLORIDE, SODIUM LACTATE AND CALCIUM CHLORIDE 9 ML/HR: 600; 310; 30; 20 INJECTION, SOLUTION INTRAVENOUS at 11:38

## 2017-11-03 RX ADMIN — FAMOTIDINE 20 MG: 20 TABLET, FILM COATED ORAL at 11:38

## 2017-11-03 RX ADMIN — CEFAZOLIN SODIUM 2 G: 2 INJECTION, SOLUTION INTRAVENOUS at 13:11

## 2017-11-03 RX ADMIN — GLYCOPYRROLATE 0.4 MG: 0.2 INJECTION, SOLUTION INTRAMUSCULAR; INTRAVENOUS at 15:53

## 2017-11-03 RX ADMIN — PROPOFOL 160 MG: 10 INJECTION, EMULSION INTRAVENOUS at 13:19

## 2017-11-03 RX ADMIN — Medication 3.5 MG: at 15:53

## 2017-11-03 RX ADMIN — PROPOFOL 25 MCG/KG/MIN: 10 INJECTION, EMULSION INTRAVENOUS at 13:22

## 2017-11-03 RX ADMIN — FENTANYL CITRATE 50 MCG: 50 INJECTION INTRAMUSCULAR; INTRAVENOUS at 17:20

## 2017-11-03 RX ADMIN — EPHEDRINE SULFATE 5 MG: 50 INJECTION INTRAMUSCULAR; INTRAVENOUS; SUBCUTANEOUS at 13:30

## 2017-11-03 RX ADMIN — FENTANYL CITRATE 100 MCG: 50 INJECTION, SOLUTION INTRAMUSCULAR; INTRAVENOUS at 13:19

## 2017-11-03 RX ADMIN — FENTANYL CITRATE 50 MCG: 50 INJECTION, SOLUTION INTRAMUSCULAR; INTRAVENOUS at 15:59

## 2017-11-03 RX ADMIN — ATRACURIUM BESYLATE 50 MG: 10 INJECTION, SOLUTION INTRAVENOUS at 13:19

## 2017-11-03 RX ADMIN — HYDROCODONE BITARTRATE AND ACETAMINOPHEN 1 TABLET: 7.5; 325 TABLET ORAL at 21:40

## 2017-11-03 RX ADMIN — EPHEDRINE SULFATE 5 MG: 50 INJECTION INTRAMUSCULAR; INTRAVENOUS; SUBCUTANEOUS at 14:08

## 2017-11-03 RX ADMIN — LIDOCAINE HYDROCHLORIDE 50 MG: 10 INJECTION, SOLUTION INFILTRATION; PERINEURAL at 13:19

## 2017-11-03 RX ADMIN — ERYTHROMYCIN 1 APPLICATION: 5 OINTMENT OPHTHALMIC at 21:39

## 2017-11-03 RX ADMIN — EPHEDRINE SULFATE 5 MG: 50 INJECTION INTRAMUSCULAR; INTRAVENOUS; SUBCUTANEOUS at 13:45

## 2017-11-03 RX ADMIN — ONDANSETRON 4 MG: 2 INJECTION INTRAMUSCULAR; INTRAVENOUS at 15:41

## 2017-11-03 NOTE — ANESTHESIA POSTPROCEDURE EVALUATION
Patient: Tereza Ackerman    Procedure Summary     Date Anesthesia Start Anesthesia Stop Room / Location    11/03/17 1311 1615 [facility] OR 14 / [facility]       Procedure Diagnosis Surgeon Provider     LEFT LATERAL ORBITOTOMY WITH DEBULKING OF TUMOR  (Left Eye) No diagnosis on file. MD Chip Aguirre MD          Anesthesia Type: general  Last vitals  BP   144/76 (11/03/17 1131)   Temp   98.3 °F (36.8 °C) (11/03/17 1131)   Pulse   70 (11/03/17 1131)   Resp   12 (11/03/17 1131)     SpO2   98 % (11/03/17 1131)     Post Anesthesia Care and Evaluation    Patient location during evaluation: PACU  Patient participation: complete - patient participated  Level of consciousness: awake and alert  Pain score: 0  Pain management: adequate  Airway patency: patent  Anesthetic complications: No anesthetic complications  PONV Status: none  Cardiovascular status: hemodynamically stable and acceptable  Respiratory status: nonlabored ventilation, acceptable and nasal cannula  Hydration status: acceptable

## 2017-11-03 NOTE — PLAN OF CARE
Problem: Perioperative Period (Adult)  Goal: Signs and Symptoms of Listed Potential Problems Will be Absent or Manageable (Perioperative Period)  Outcome: Ongoing (interventions implemented as appropriate)    11/03/17 1136   Perioperative Period   Problems Assessed (Perioperative Period) pain   Problems Present (Perioperative Period) none

## 2017-11-03 NOTE — OP NOTE
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       Post-Op Diagnosis Codes:  Same    Procedure/CPT® Codes:      Procedure(s):   LEFT LATERAL ORBITOTOMY WITH DEBULKING OF TUMOR     Surgeon(s):  Moo Hopkins MD    Anesthesia: General with local mixture of 2% lidocaine with 1:100,000 units epinephrine, .75% marcaine and vitrase    Staff:   Circulator: Kathy Pack RN  Scrub Person: Steve Newby  [place]: Natividad Dutta    Estimated Blood Loss: < 50 ml    Specimens:                  ID Type Source Tests Collected by Time Destination   A : SPHENOID WING MENINGOMA Tissue Eye, Left TISSUE EXAM Moo Hopkins MD 11/3/2017 1442        Findings: Left sphenoid wing meningioma    Complications: None    Description of procedure:The patient was brought to the operating room and general anesthesia was induced by the anesthesia service.  The patient was then prepped and draped in usual sterile ophthalmic fashion.  A time out was performed.  A left upper lid crease incision was marked.  Local anesthetic was injected to the left upper eyelid and lateral canthus using a 27-gauge needle.  A #15 blade was used to incise along the markings.  Hemostasis was achieved with bipolar cautery.  A Bovie cutting device was used to dissect deep to the orbicularis muscle to the superior and lateral orbital rims.  4-0 silk sutures were used for traction.  The Bovie cutting device was used to incise the periosteum from the fronto-zygomatic suture to the zygomatic arch.  A [place] elevator was used to elevate the periorbita.  The perforating vessels were cauterized carefully with the Bovie.  Next, the periorbita was reflected from the outside of the lateral orbital.  The temporalis muscle was dissected free using a Víctor elevator.  A wire pass drill bit was used to penetrate the bone in four places.  A  bone saw was used to cut the bone between the two drill holes.  A bone clamp was used to remove the lateral orbital rim, which was placed in moist gauze.  A Rongeur was used to remove the lateral orbital wall to the apex.  Care was taken to relax instrumentation intermittently to avoid pressure on the left globe.  A specimen of the meningioma was passed off the field for pathologic examination.  Hemostasis was achieved using the Bovie as well as gel-foam saturated in thrombin and bone wax.  Once complete hemostasis was achieved and as much of the mass was debulked as safely possible, attention was turned to closing the wound.  The bone window was re-attached with 2-0 prolene through the drill holes.  The periorbita was closed with 4-0 vicryl suture.  The orbicularis muscle layer was re-approximated with 6-0 vicryl suture.  The skin was closed with a running 6-0 prolene suture.  Erythromycin ophthalmic ointment was placed over the sutures.  A clear eye shield was gently secured over the left eye with paper tape.  The patient tolerated the procedure well, was extubated and transported to the recovery room in good condition.  The patient's vision was checked in the recovery room and was count fingers.  The patient will be admitted overnight for observation.         Moo Hopkins MD     Date: 11/3/2017  Time: 4:52 PM

## 2017-11-03 NOTE — BRIEF OP NOTE
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       Post-Op Diagnosis Codes:   Same    Procedure/CPT® Codes:      Procedure(s):  Left lateral orbitotomy with debulking of sphenoid wing meningioma    Surgeon(s):  Moo Hopkins MD    Anesthesia: General with local mixture (2% lido c epi, .75% marcaine and vitrase)    Staff:   Circulator: Kathy Pack RN  Scrub Person: Steve Newby    Estimated Blood Loss: < 25 ml    Urine Voided: * No values recorded between 11/3/2017 12:00 AM and 11/3/2017  1:08 PM *    Specimens:                Left sphenoid wing meningioma      Drains:       [REMOVED] Naso/Oral/Gastric Tube 06/30/17 4235 nasogastric 16 right nostril (Removed)   Removed 07/01/17 1109       Findings: Left sphenoid wing meningioma    Complications: None      Moo Hopkins MD     Date: 11/3/2017  Time: 1:08 PM

## 2017-11-03 NOTE — H&P
Pre-Op H&P  Tereza Ackerman  [id number]  1955    Chief complaint: Left vision changes    HPI:    Patient is a 62 y.o.female presents with left sphenoid wing meningioma with compressive optic neuropathy and here today for left lateral orbitotomy with removal of tumor.  S/P meningioma resection with Dr. Hollis in 2003 and recurrence with resection 2014.    Review of Systems:  General ROS: negative for chills, fever or skin lesions;  No changes since last office visit  Cardiovascular ROS: no chest pain or dyspnea on exertion  Respiratory ROS: no cough, shortness of breath, or wheezing  Neuro:  Left vision changes worsened    Allergies:   Allergies   Allergen Reactions   • Dilantin [Phenytoin Sodium Extended] Rash   • Phenytoin Rash     duplicate        Home Meds:    Prescriptions Prior to Admission   Medication Sig Dispense Refill Last Dose   • diazePAM (VALIUM) 10 MG tablet Take 1 tablet by mouth As Needed for Anxiety. Take one hour prior to MRI. 1 tablet 0 Past Month at Unknown time   • losartan-hydrochlorothiazide (HYZAAR) 50-12.5 MG per tablet Take 1 tablet by mouth Daily. Replaces lisinopril 30 tablet 0 11/3/2017 at 0800   • sertraline (ZOLOFT) 100 MG tablet Take 1 tablet by mouth Daily. 90 tablet 0 11/2/2017 at Unknown time   • diphenhydrAMINE (BENADRYL) 50 MG capsule Take 1 capsule by mouth As Needed for Allergies. Take one hour prior to MRI 1 capsule 0 More than a month at Unknown time   • PHENobarbital (LUMINAL) 100 MG tablet Take 1 tablet by mouth As Needed (One hour prior to MRI). Take one hour prior to MRI 1 tablet 0 More than a month at Unknown time   • promethazine (PHENERGAN) 25 MG tablet Take 1 tablet by mouth every 6 (six) hours as needed for nausea or vomiting. 30 tablet 2 More than a month at Unknown time   • traZODone (DESYREL) 50 MG tablet Take 50 mg by mouth At Night As Needed for Sleep.   More than a month at Unknown time       PMH:   Past Medical History:   Diagnosis Date   • Acid reflux     • Arthritis    • Back pain    • Brain tumor    • Depression    • History of blood transfusion    • History of Nissen fundoplication 7/1/2017   • Hyperlipemia    • Hypertension    • Memory loss or impairment    • Migraine    • Parathyroid adenoma     Incidental on thyroid US.   • PONV (postoperative nausea and vomiting)     nausea - preprocedural meds help    • Prediabetes     Last Impression: 06 Feb 2015  Reviewed labs. Excellent control.  Maren Connellast Impression: 06 Feb 2015  Reviewed labs. Excellent control.  Michaela Connell   • Thyroid nodule      Last Impression: 13 Jun 2015  r/o thyroid cancer, will proceed with US.  Michaela Connell (Internal Medicine)    • UTI (urinary tract infection)     h/o   • Vision changes     blockages left eye    • Wears glasses     readers     PSH:    Past Surgical History:   Procedure Laterality Date   • BRAIN SURGERY  2003 & 2014    Dr. Werner Hollis   • CARPAL TUNNEL RELEASE  2002    right    • COLONOSCOPY     • ENDOSCOPY     • HYSTERECTOMY      partial - both ovaries still present pt believes    • OTHER SURGICAL HISTORY      craniotomy tumor removal-complete   • OTHER SURGICAL HISTORY      esophagogastric fundoplasty nissen fundoplication   • TUBAL ABDOMINAL LIGATION         Immunization History:  Influenza: yes 2017  Pneumococcal: no  Tetanus: no    Social History:   Tobacco:   History   Smoking Status   • Never Smoker   Smokeless Tobacco   • Never Used      Alcohol:     History   Alcohol Use No       Vitals:           /76 (BP Location: Right arm, Patient Position: Lying)  Pulse 70  Temp 98.3 °F (36.8 °C) (Temporal Artery )   Resp 12  SpO2 98%    Physical Exam:  General Appearance:    Alert, cooperative, no distress, appears stated age   Head:    Normocephalic, without obvious abnormality, atraumatic  EYE:  Left eye fullness and proptosis   Lungs:     Clear to auscultation bilaterally, respirations unlabored    Heart:   Regular rate and rhythm, S1 and  S2 normal, no murmur, rub    or gallop    Abdomen:    Soft, non-tender.  +bowel sounds   Breast Exam:    deferred   Genitalia:    deferred   Extremities:   Extremities normal, atraumatic, no cyanosis or edema   Skin:   Skin color, texture, turgor normal, no rashes or lesions   Neurologic:   Grossly intact   Results Review  I reviewed the patient's new clinical results.    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  S/P meningioma resection with Dr. Hollis in 2003 and recurrence with resection 2014.    Eunice Bazan, AYDEE   11/3/2017   12:20 PM

## 2017-11-03 NOTE — ANESTHESIA PREPROCEDURE EVALUATION
Anesthesia Evaluation     Patient summary reviewed and Nursing notes reviewed   history of anesthetic complications: PONV         Airway   Mallampati: I  TM distance: >3 FB  Neck ROM: full  no difficulty expected  Dental      Pulmonary - negative pulmonary ROS   Cardiovascular     ECG reviewed    (+) hypertension, hyperlipidemia      Neuro/Psych  (+) headaches,    GI/Hepatic/Renal/Endo    (+)  GERD,     Musculoskeletal (-) negative ROS    Abdominal    Substance History - negative use     OB/GYN negative ob/gyn ROS         Other                                        Anesthesia Plan    ASA 2     general     intravenous induction   Anesthetic plan and risks discussed with patient.    Plan discussed with CRNA.

## 2017-11-04 VITALS
OXYGEN SATURATION: 97 % | DIASTOLIC BLOOD PRESSURE: 73 MMHG | HEART RATE: 56 BPM | TEMPERATURE: 98.1 F | SYSTOLIC BLOOD PRESSURE: 115 MMHG | RESPIRATION RATE: 17 BRPM

## 2017-11-04 PROCEDURE — G0378 HOSPITAL OBSERVATION PER HR: HCPCS

## 2017-11-04 RX ADMIN — HYDROCODONE BITARTRATE AND ACETAMINOPHEN 1 TABLET: 7.5; 325 TABLET ORAL at 08:11

## 2017-11-04 NOTE — PROGRESS NOTES
S:   Patient without events overnight.  Did not receive any cool compresses until this morning.  Per patient slept ok.  Has not received any pain medication since 8 pm last night.    O:  Left eye swollen shut.  Soft to retropulsion.  Sutures c/d/i.  EOM full and without pain.  No APD.  UCNA OS: 20/200    A/P:  Doing well POD #1 s/p left lateral orbitotomy with debulking of sphenoid wing meningioma    Discharge to home today with follow up in 1 week.  Prescriptions for Norco, Zofran, Medrol dose pack and erythromycin ophthalmic ointment given to patient's  yesterday as well as care instructions and my cell phone.

## 2017-11-04 NOTE — PLAN OF CARE
Problem: Patient Care Overview (Adult)  Goal: Plan of Care Review  Outcome: Ongoing (interventions implemented as appropriate)    11/04/17 0428   Coping/Psychosocial Response Interventions   Plan Of Care Reviewed With patient   Patient Care Overview   Progress progress toward functional goals as expected   Outcome Evaluation   Outcome Summary/Follow up Plan Patient reports pressure/aching to left side of face beginning of shift relieved with PRN pain meds. patient is sleeping well. Encourage fluid intake and ice pack use. vitals stable. plan to discharge today. continue to monitor.          Problem: Perioperative Period (Adult)  Goal: Signs and Symptoms of Listed Potential Problems Will be Absent or Manageable (Perioperative Period)  Outcome: Ongoing (interventions implemented as appropriate)    11/04/17 0428   Perioperative Period   Problems Assessed (Perioperative Period) all   Problems Present (Perioperative Period) pain

## 2017-11-06 ENCOUNTER — TELEPHONE (OUTPATIENT)
Dept: INTERNAL MEDICINE | Facility: CLINIC | Age: 62
End: 2017-11-06

## 2017-11-06 ENCOUNTER — TRANSITIONAL CARE MANAGEMENT TELEPHONE ENCOUNTER (OUTPATIENT)
Dept: INTERNAL MEDICINE | Facility: CLINIC | Age: 62
End: 2017-11-06

## 2017-11-06 ENCOUNTER — TRANSCRIBE ORDERS (OUTPATIENT)
Dept: INTERNAL MEDICINE | Facility: CLINIC | Age: 62
End: 2017-11-06

## 2017-11-13 ENCOUNTER — OFFICE VISIT (OUTPATIENT)
Dept: INTERNAL MEDICINE | Facility: CLINIC | Age: 62
End: 2017-11-13

## 2017-11-13 VITALS
DIASTOLIC BLOOD PRESSURE: 68 MMHG | WEIGHT: 154 LBS | HEIGHT: 63 IN | OXYGEN SATURATION: 98 % | BODY MASS INDEX: 27.29 KG/M2 | RESPIRATION RATE: 18 BRPM | SYSTOLIC BLOOD PRESSURE: 104 MMHG | HEART RATE: 62 BPM

## 2017-11-13 DIAGNOSIS — D32.0 MENINGIOMA, RECURRENT OF BRAIN (HCC): Primary | ICD-10-CM

## 2017-11-13 DIAGNOSIS — E78.2 MIXED HYPERLIPIDEMIA: ICD-10-CM

## 2017-11-13 DIAGNOSIS — Z11.59 SCREENING FOR VIRAL DISEASE: ICD-10-CM

## 2017-11-13 DIAGNOSIS — R73.02 IMPAIRED GLUCOSE TOLERANCE: ICD-10-CM

## 2017-11-13 DIAGNOSIS — F51.01 PRIMARY INSOMNIA: ICD-10-CM

## 2017-11-13 DIAGNOSIS — I10 ESSENTIAL HYPERTENSION: ICD-10-CM

## 2017-11-13 DIAGNOSIS — E55.9 VITAMIN D DEFICIENCY: ICD-10-CM

## 2017-11-13 PROBLEM — E78.5 HYPERLIPEMIA: Status: ACTIVE | Noted: 2017-11-13

## 2017-11-13 PROBLEM — R73.03 PREDIABETES: Status: ACTIVE | Noted: 2017-11-13

## 2017-11-13 LAB
25(OH)D3 SERPL-MCNC: 21.7 NG/ML
ALBUMIN SERPL-MCNC: 4.2 G/DL (ref 3.2–4.8)
ALBUMIN/GLOB SERPL: 1.4 G/DL (ref 1.5–2.5)
ALP SERPL-CCNC: 93 U/L (ref 25–100)
ALT SERPL W P-5'-P-CCNC: 17 U/L (ref 7–40)
ANION GAP SERPL CALCULATED.3IONS-SCNC: 6 MMOL/L (ref 3–11)
ARTICHOKE IGE QN: 148 MG/DL (ref 0–130)
AST SERPL-CCNC: 20 U/L (ref 0–33)
BILIRUB SERPL-MCNC: 0.4 MG/DL (ref 0.3–1.2)
BUN BLD-MCNC: 11 MG/DL (ref 9–23)
BUN/CREAT SERPL: 15.7 (ref 7–25)
CALCIUM SPEC-SCNC: 9.4 MG/DL (ref 8.7–10.4)
CHLORIDE SERPL-SCNC: 105 MMOL/L (ref 99–109)
CHOLEST SERPL-MCNC: 234 MG/DL (ref 0–200)
CO2 SERPL-SCNC: 29 MMOL/L (ref 20–31)
CREAT BLD-MCNC: 0.7 MG/DL (ref 0.6–1.3)
DEPRECATED RDW RBC AUTO: 43.9 FL (ref 37–54)
ERYTHROCYTE [DISTWIDTH] IN BLOOD BY AUTOMATED COUNT: 13.1 % (ref 11.3–14.5)
GFR SERPL CREATININE-BSD FRML MDRD: 84 ML/MIN/1.73
GLOBULIN UR ELPH-MCNC: 2.9 GM/DL
GLUCOSE BLD-MCNC: 89 MG/DL (ref 70–100)
HBA1C MFR BLD: 5.6 % (ref 4.8–5.6)
HCT VFR BLD AUTO: 41.7 % (ref 34.5–44)
HCV AB SER DONR QL: NORMAL
HDLC SERPL-MCNC: 62 MG/DL (ref 40–60)
HGB BLD-MCNC: 13.7 G/DL (ref 11.5–15.5)
MCH RBC QN AUTO: 30.2 PG (ref 27–31)
MCHC RBC AUTO-ENTMCNC: 32.9 G/DL (ref 32–36)
MCV RBC AUTO: 91.9 FL (ref 80–99)
PLATELET # BLD AUTO: 273 10*3/MM3 (ref 150–450)
PMV BLD AUTO: 9.8 FL (ref 6–12)
POTASSIUM BLD-SCNC: 4.3 MMOL/L (ref 3.5–5.5)
PROT SERPL-MCNC: 7.1 G/DL (ref 5.7–8.2)
RBC # BLD AUTO: 4.54 10*6/MM3 (ref 3.89–5.14)
SODIUM BLD-SCNC: 140 MMOL/L (ref 132–146)
TRIGL SERPL-MCNC: 180 MG/DL (ref 0–150)
TSH SERPL DL<=0.05 MIU/L-ACNC: 1.44 MIU/ML (ref 0.35–5.35)
WBC NRBC COR # BLD: 6.31 10*3/MM3 (ref 3.5–10.8)

## 2017-11-13 PROCEDURE — 99495 TRANSJ CARE MGMT MOD F2F 14D: CPT | Performed by: INTERNAL MEDICINE

## 2017-11-13 PROCEDURE — 80061 LIPID PANEL: CPT | Performed by: INTERNAL MEDICINE

## 2017-11-13 PROCEDURE — 86803 HEPATITIS C AB TEST: CPT | Performed by: INTERNAL MEDICINE

## 2017-11-13 PROCEDURE — 84443 ASSAY THYROID STIM HORMONE: CPT | Performed by: INTERNAL MEDICINE

## 2017-11-13 PROCEDURE — 83036 HEMOGLOBIN GLYCOSYLATED A1C: CPT | Performed by: INTERNAL MEDICINE

## 2017-11-13 PROCEDURE — 82306 VITAMIN D 25 HYDROXY: CPT | Performed by: INTERNAL MEDICINE

## 2017-11-13 PROCEDURE — 80053 COMPREHEN METABOLIC PANEL: CPT | Performed by: INTERNAL MEDICINE

## 2017-11-13 PROCEDURE — 85027 COMPLETE CBC AUTOMATED: CPT | Performed by: INTERNAL MEDICINE

## 2017-11-13 NOTE — PROGRESS NOTES
Follow-up (Hospital follow up, In [facility])  Transitional Care Follow Up Visit  Lam Ackerman is a 62 y.o. female who presents for a transitional care management visit.    Within 48 business hours after discharge our office contacted her via telephone to coordinate her care and needs.      I reviewed and discussed the details of that call along with the discharge summary, hospital problems, inpatient lab results, inpatient diagnostic studies, and consultation reports with Tereza.     Current outpatient and discharge medications have been reconciled for the patient.    Date of TCM Phone Call 11/6/2017   Hospital [facility]   Date of Admission 11/3/2017   Date of Discharge 11/4/2017   Discharge Disposition -     Risk for Readmission (LACE) Score: 4    History of Present Illness   Course During Hospital Stay: She had left sphenoid wing meningioma with compressive optic neuropathy and had left lateral orbitotomy with removal of tumor.  S/P meningioma resection with Dr. Hollis in 2003 and recurrence with resection 2014.She is s/p left lateral orbitotomy with removal of tumor.   Lam Ackerman is a 62 y.o. female is here today for follow-up.    History of Present Illness   Pt. Is here for a follow up after her Left Meningioma surgery. She is very confused as to why this was not found sooner, and what she should have done to have avoided this. She is now to be scheduled for Radiation, asking if malignant. Has appointment with Dr. Moo Hopkins next Friday. Wellness appt. Here next week       Current Outpatient Prescriptions:   •  diazePAM (VALIUM) 10 MG tablet, Take 1 tablet by mouth As Needed for Anxiety. Take one hour prior to MRI., Disp: 1 tablet, Rfl: 0  •  diphenhydrAMINE (BENADRYL) 50 MG capsule, Take 1 capsule by mouth As Needed for Allergies. Take one hour prior to MRI, Disp: 1 capsule, Rfl: 0  •  losartan-hydrochlorothiazide (HYZAAR) 50-12.5 MG per tablet, Take 1 tablet by mouth Daily.  "Replaces lisinopril, Disp: 30 tablet, Rfl: 0  •  PHENobarbital (LUMINAL) 100 MG tablet, Take 1 tablet by mouth As Needed (One hour prior to MRI). Take one hour prior to MRI, Disp: 1 tablet, Rfl: 0  •  promethazine (PHENERGAN) 25 MG tablet, Take 1 tablet by mouth every 6 (six) hours as needed for nausea or vomiting., Disp: 30 tablet, Rfl: 2  •  sertraline (ZOLOFT) 100 MG tablet, Take 1 tablet by mouth Daily., Disp: 90 tablet, Rfl: 0  •  traZODone (DESYREL) 50 MG tablet, Take 50 mg by mouth At Night As Needed for Sleep., Disp: , Rfl:       The following portions of the patient's history were reviewed and updated as appropriate: allergies, current medications, past family history, past medical history, past social history, past surgical history and problem list.    Review of Systems   Constitutional: Negative.  Negative for chills and fever.   HENT: Negative for ear discharge, ear pain, sinus pressure and sore throat.    Eyes: Positive for pain and visual disturbance.   Respiratory: Negative for cough, chest tightness and shortness of breath.    Cardiovascular: Negative for chest pain, palpitations and leg swelling.   Gastrointestinal: Negative for diarrhea, nausea and vomiting.   Musculoskeletal: Negative for arthralgias, back pain and myalgias.   Neurological: Negative for dizziness, syncope and headaches.   Psychiatric/Behavioral: Negative for confusion and sleep disturbance.       Objective   /68  Pulse 62  Resp 18  Ht 63\" (160 cm)  Wt 154 lb (69.9 kg)  SpO2 98%  BMI 27.28 kg/m2  Physical Exam   Constitutional: She is oriented to person, place, and time. She appears well-developed and well-nourished.   HENT:   Head: Normocephalic and atraumatic.   Right Ear: External ear normal.   Left Ear: External ear normal.   Mouth/Throat: No oropharyngeal exudate.   Eyes: Left eye exhibits chemosis. Left conjunctiva is injected. Left eye exhibits abnormal extraocular motion.   Neck: Neck supple. No thyromegaly " present.   Cardiovascular: Normal rate, regular rhythm and intact distal pulses.    Pulmonary/Chest: Effort normal and breath sounds normal.   Abdominal: Soft. Bowel sounds are normal. She exhibits no distension. There is no tenderness.   Musculoskeletal: She exhibits no edema.   Neurological: She is alert and oriented to person, place, and time. No cranial nerve deficit.   Skin: Skin is warm and dry. Bruising and ecchymosis (s/p surgery- improved) noted.        Psychiatric: She has a normal mood and affect. Judgment normal.   Nursing note and vitals reviewed.        Results for orders placed or performed in visit on 11/13/17   CBC (No Diff)   Result Value Ref Range    WBC 6.31 3.50 - 10.80 10*3/mm3    RBC 4.54 3.89 - 5.14 10*6/mm3    Hemoglobin 13.7 11.5 - 15.5 g/dL    Hematocrit 41.7 34.5 - 44.0 %    MCV 91.9 80.0 - 99.0 fL    MCH 30.2 27.0 - 31.0 pg    MCHC 32.9 32.0 - 36.0 g/dL    RDW 13.1 11.3 - 14.5 %    RDW-SD 43.9 37.0 - 54.0 fl    MPV 9.8 6.0 - 12.0 fL    Platelets 273 150 - 450 10*3/mm3   Comprehensive Metabolic Panel   Result Value Ref Range    Glucose 89 70 - 100 mg/dL    BUN 11 9 - 23 mg/dL    Creatinine 0.70 0.60 - 1.30 mg/dL    Sodium 140 132 - 146 mmol/L    Potassium 4.3 3.5 - 5.5 mmol/L    Chloride 105 99 - 109 mmol/L    CO2 29.0 20.0 - 31.0 mmol/L    Calcium 9.4 8.7 - 10.4 mg/dL    Total Protein 7.1 5.7 - 8.2 g/dL    Albumin 4.20 3.20 - 4.80 g/dL    ALT (SGPT) 17 7 - 40 U/L    AST (SGOT) 20 0 - 33 U/L    Alkaline Phosphatase 93 25 - 100 U/L    Total Bilirubin 0.4 0.3 - 1.2 mg/dL    eGFR Non African Amer 84 >60 mL/min/1.73    Globulin 2.9 gm/dL    A/G Ratio 1.4 (L) 1.5 - 2.5 g/dL    BUN/Creatinine Ratio 15.7 7.0 - 25.0    Anion Gap 6.0 3.0 - 11.0 mmol/L   Lipid Panel   Result Value Ref Range    Total Cholesterol 234 (H) 0 - 200 mg/dL    Triglycerides 180 (H) 0 - 150 mg/dL    HDL Cholesterol 62 (H) 40 - 60 mg/dL    LDL Cholesterol  148 (H) 0 - 130 mg/dL   TSH   Result Value Ref Range    TSH 1.440  0.350 - 5.350 mIU/mL   Vitamin D 25 Hydroxy   Result Value Ref Range    25 Hydroxy, Vitamin D 21.7 ng/ml   Hepatitis C Antibody   Result Value Ref Range    Hepatitis C Ab Non-Reactive Non-Reactive   Hemoglobin A1c   Result Value Ref Range    Hemoglobin A1C 5.60 4.80 - 5.60 %             Assessment/Plan   Diagnoses and all orders for this visit:    Meningioma, recurrent of brain  -     CBC (No Diff)    Essential hypertension  -     Comprehensive Metabolic Panel    Primary insomnia    Screening for viral disease  -     Hepatitis C Antibody    Mixed hyperlipidemia  -     Comprehensive Metabolic Panel  -     Lipid Panel  -     TSH    Vitamin D deficiency  -     Vitamin D 25 Hydroxy    Impaired glucose tolerance  -     Hemoglobin A1c      Reassured she did the right thing. MRI always came back stable, but was symptomatic, and dxed by Dr. Hollis.  Pathology still pending.           Return for Next scheduled follow up.

## 2017-11-15 DIAGNOSIS — D32.0 MENINGIOMA, RECURRENT OF BRAIN (HCC): Primary | ICD-10-CM

## 2017-11-15 DIAGNOSIS — Z98.890 S/P CRANIOTOMY: ICD-10-CM

## 2017-11-16 LAB
CYTO UR: NORMAL
LAB AP CASE REPORT: NORMAL
LAB AP CLINICAL INFORMATION: NORMAL
Lab: NORMAL
PATH REPORT.FINAL DX SPEC: NORMAL
PATH REPORT.GROSS SPEC: NORMAL

## 2017-11-17 ENCOUNTER — TELEPHONE (OUTPATIENT)
Dept: NEUROSURGERY | Facility: CLINIC | Age: 62
End: 2017-11-17

## 2017-11-17 NOTE — TELEPHONE ENCOUNTER
Provider:    Caller: Tereza   Phone #: [phone]   Surgery:  Crani   ALSO  LEFT LATERAL ORBITOTOMY WITH DEBULKING OF TUMOR  W    Surgery Date:04/17/17 - 11/03/17      Last visit:   10/17/17  Next visit: 11/22/17     BENITA:          Reason for call:          Pt is requesting Valium 10 mg for her MRI on 11/22/17

## 2017-11-22 ENCOUNTER — OFFICE VISIT (OUTPATIENT)
Dept: NEUROSURGERY | Facility: CLINIC | Age: 62
End: 2017-11-22

## 2017-11-22 ENCOUNTER — HOSPITAL ENCOUNTER (OUTPATIENT)
Dept: MRI IMAGING | Facility: HOSPITAL | Age: 62
Discharge: HOME | End: 2017-11-22
Attending: NEUROLOGICAL SURGERY | Admitting: NEUROLOGICAL SURGERY

## 2017-11-22 VITALS
DIASTOLIC BLOOD PRESSURE: 90 MMHG | HEIGHT: 65 IN | TEMPERATURE: 96.8 F | BODY MASS INDEX: 26.12 KG/M2 | WEIGHT: 156.8 LBS | SYSTOLIC BLOOD PRESSURE: 130 MMHG

## 2017-11-22 DIAGNOSIS — D32.0 MENINGIOMA, RECURRENT OF BRAIN (HCC): Primary | ICD-10-CM

## 2017-11-22 PROCEDURE — 70553 MRI BRAIN STEM W/O & W/DYE: CPT

## 2017-11-22 PROCEDURE — 0 GADOBENATE DIMEGLUMINE 529 MG/ML SOLUTION: Performed by: NEUROLOGICAL SURGERY

## 2017-11-22 PROCEDURE — A9577 INJ MULTIHANCE: HCPCS | Performed by: NEUROLOGICAL SURGERY

## 2017-11-22 PROCEDURE — 99213 OFFICE O/P EST LOW 20 MIN: CPT | Performed by: NEUROLOGICAL SURGERY

## 2017-11-22 RX ADMIN — GADOBENATE DIMEGLUMINE 14 ML: 529 INJECTION, SOLUTION INTRAVENOUS at 11:20

## 2017-11-22 NOTE — PROGRESS NOTES
Tereza Ackerman  1955  [id number]                       CURRENT WORKING DIAGNOSIS:  Right temporal meningioma          MEDICAL HISTORY SINCE LAST ENCOUNTER:   She is recent from her exploration of the orbit.  Her vision is less than that it was prior to surgery.           Past Medical History:   Diagnosis Date   • Acid reflux    • Arthritis    • Back pain    • Brain tumor    • Depression    • History of blood transfusion    • History of Nissen fundoplication 7/1/2017   • Hyperlipemia    • Hypertension    • Memory loss or impairment    • Migraine    • Parathyroid adenoma     Incidental on thyroid US.   • PONV (postoperative nausea and vomiting)     nausea - preprocedural meds help    • Prediabetes     Last Impression: 06 Feb 2015  Reviewed labs. Excellent control.  Emery Connell Impression: 06 Feb 2015  Reviewed labs. Excellent control.  Michaela Cnonell   • Thyroid nodule      Last Impression: 13 Jun 2015  r/o thyroid cancer, will proceed with US.  Michaela Connell (Internal Medicine)    • UTI (urinary tract infection)     h/o   • Vision changes     blockages left eye    • Wears glasses     readers              Past Surgical History:   Procedure Laterality Date   • BRAIN SURGERY  2003 & 2014    Dr. Werner Hollis   • CARPAL TUNNEL RELEASE  2002    right    • COLONOSCOPY     • ENDOSCOPY     • HYSTERECTOMY      partial - both ovaries still present pt believes    • ORBITOTOMY Left 11/3/2017    Procedure:  LEFT LATERAL ORBITOTOMY WITH DEBULKING OF TUMOR ;  Surgeon: Moo Hopkins MD;  Location: [facility] OR;  Service:    • OTHER SURGICAL HISTORY      craniotomy tumor removal-complete   • OTHER SURGICAL HISTORY      esophagogastric fundoplasty nissen fundoplication   • TUBAL ABDOMINAL LIGATION                Family History   Problem Relation Age of Onset   • Obesity Mother    • Heart attack Mother    • Migraines Father    • Stroke Father    • Cancer Other    • Diabetes Other    • Breast cancer  Maternal Aunt    • Breast cancer Paternal Aunt    • Ovarian cancer Neg Hx               Social History     Social History   • Marital status:      Spouse name: N/A   • Number of children: N/A   • Years of education: N/A     Occupational History   • Not on file.     Social History Main Topics   • Smoking status: Never Smoker   • Smokeless tobacco: Never Used   • Alcohol use No   • Drug use: No   • Sexual activity: Defer     Other Topics Concern   • Not on file     Social History Narrative              Allergies   Allergen Reactions   • Dilantin [Phenytoin Sodium Extended] Rash   • Phenytoin Rash     duplicate               Current Outpatient Prescriptions:   •  diazePAM (VALIUM) 10 MG tablet, Take 1 tablet by mouth As Needed for Anxiety. Take one hour prior to MRI., Disp: 1 tablet, Rfl: 0  •  diphenhydrAMINE (BENADRYL) 50 MG capsule, Take 1 capsule by mouth As Needed for Allergies. Take one hour prior to MRI, Disp: 1 capsule, Rfl: 0  •  losartan-hydrochlorothiazide (HYZAAR) 50-12.5 MG per tablet, Take 1 tablet by mouth Daily. Replaces lisinopril, Disp: 30 tablet, Rfl: 0  •  PHENobarbital (LUMINAL) 100 MG tablet, Take 1 tablet by mouth As Needed (One hour prior to MRI). Take one hour prior to MRI, Disp: 1 tablet, Rfl: 0  •  promethazine (PHENERGAN) 25 MG tablet, Take 1 tablet by mouth every 6 (six) hours as needed for nausea or vomiting., Disp: 30 tablet, Rfl: 2  •  sertraline (ZOLOFT) 100 MG tablet, Take 1 tablet by mouth Daily., Disp: 90 tablet, Rfl: 0  •  traZODone (DESYREL) 50 MG tablet, Take 50 mg by mouth At Night As Needed for Sleep., Disp: , Rfl:   No current facility-administered medications for this visit.          Review of Systems   Constitutional: Negative for activity change, appetite change, chills, diaphoresis, fatigue, fever and unexpected weight change.   HENT: Negative for congestion, dental problem, drooling, ear discharge, ear pain, facial swelling, hearing loss, mouth sores, nosebleeds,  "postnasal drip, rhinorrhea, sinus pressure, sneezing, sore throat, tinnitus, trouble swallowing and voice change.    Eyes: Positive for visual disturbance. Negative for photophobia, pain, discharge, redness and itching.   Respiratory: Negative for apnea, cough, choking, chest tightness, shortness of breath, wheezing and stridor.    Cardiovascular: Negative for chest pain, palpitations and leg swelling.   Gastrointestinal: Negative for abdominal distention, abdominal pain, anal bleeding, blood in stool, constipation, diarrhea, nausea, rectal pain and vomiting.   Endocrine: Negative for cold intolerance, heat intolerance, polydipsia, polyphagia and polyuria.   Genitourinary: Negative for decreased urine volume, difficulty urinating, dysuria, enuresis, flank pain, frequency, genital sores, hematuria and urgency.   Musculoskeletal: Positive for back pain. Negative for arthralgias, gait problem, joint swelling, myalgias, neck pain and neck stiffness.   Skin: Negative for color change, pallor, rash and wound.   Allergic/Immunologic: Negative for environmental allergies, food allergies and immunocompromised state.   Neurological: Negative for dizziness, tremors, seizures, syncope, facial asymmetry, speech difficulty, weakness, light-headedness, numbness and headaches.   Hematological: Negative for adenopathy. Does not bruise/bleed easily.   Psychiatric/Behavioral: Positive for decreased concentration. Negative for agitation, behavioral problems, confusion, dysphoric mood, hallucinations, self-injury, sleep disturbance and suicidal ideas. The patient is nervous/anxious. The patient is not hyperactive.                Vitals:    11/22/17 1217   BP: 130/90   Temp: 96.8 °F (36 °C)   Weight: 156 lb 12.8 oz (71.1 kg)   Height: 65\" (165.1 cm)               EXAMINATION: [ She had decreased visual acuity in her left eye.  I'm unable to detect any other abnormalities.]            MEDICAL DECISION MAKING: [The MRI of the brain " "compared to that prior to surgery looks very little different unfortunately.  I had hoped for a larger resection of the tumor and around the orbit. ]           ASSESSMENT/DISPOSITION: [I will review the case with ophthalmology and determine our next \"move\".  She will have her MRI repeated in 3 months ]              I APPRECIATE THE OPPORTUNITY OF THIS REFERRAL. PLEASE CALL IF ANY   QUESTIONS [phone]    Scribed for Werner Hollis MD by Joy Corral CMA. 11/22/2017  12:37 PM     I have read and concur with the information provided by the scribe.  Werner Hollis MD    "

## 2017-11-24 ENCOUNTER — TRANSCRIBE ORDERS (OUTPATIENT)
Dept: ADMINISTRATIVE | Facility: HOSPITAL | Age: 62
End: 2017-11-24

## 2017-11-24 ENCOUNTER — HOSPITAL ENCOUNTER (OUTPATIENT)
Dept: CT IMAGING | Facility: HOSPITAL | Age: 62
Discharge: HOME | End: 2017-11-24
Attending: OPHTHALMOLOGY | Admitting: OPHTHALMOLOGY

## 2017-11-24 DIAGNOSIS — C69.62 MALIGNANT NEOPLASM OF LEFT ORBIT (HCC): Primary | ICD-10-CM

## 2017-11-24 DIAGNOSIS — C69.62 MALIGNANT NEOPLASM OF LEFT ORBIT (HCC): ICD-10-CM

## 2017-11-24 PROCEDURE — 70486 CT MAXILLOFACIAL W/O DYE: CPT

## 2017-11-28 ENCOUNTER — TELEPHONE (OUTPATIENT)
Dept: NEUROSURGERY | Facility: CLINIC | Age: 62
End: 2017-11-28

## 2017-11-28 RX ORDER — SERTRALINE HYDROCHLORIDE 100 MG/1
100 TABLET, FILM COATED ORAL DAILY
Qty: 90 TABLET | Refills: 0 | Status: SHIPPED | OUTPATIENT
Start: 2017-11-28 | End: 2018-07-12 | Stop reason: SDUPTHER

## 2017-11-28 NOTE — TELEPHONE ENCOUNTER
----- Message from Hanna Car sent at 11/28/2017 10:31 AM EST -----  Contact: [phone]  PATIENT CALLING WITH SOME QUESTIONS ABOUT PROCEDURE SHE IS GOING TO HAVE TOMORROW.  WANTS TO KNOW IF YOU WILL BE THERE, ETC.

## 2017-12-04 ENCOUNTER — APPOINTMENT (OUTPATIENT)
Dept: CT IMAGING | Facility: HOSPITAL | Age: 62
End: 2017-12-04

## 2017-12-04 ENCOUNTER — HOSPITAL ENCOUNTER (INPATIENT)
Facility: HOSPITAL | Age: 62
LOS: 39 days | Discharge: HOME | End: 2018-01-12
Attending: EMERGENCY MEDICINE | Admitting: INTERNAL MEDICINE

## 2017-12-04 DIAGNOSIS — M19.90 ARTHRITIS: ICD-10-CM

## 2017-12-04 DIAGNOSIS — N17.9 ACUTE RENAL FAILURE, UNSPECIFIED ACUTE RENAL FAILURE TYPE (HCC): ICD-10-CM

## 2017-12-04 DIAGNOSIS — K56.609 SMALL BOWEL OBSTRUCTION (HCC): Primary | ICD-10-CM

## 2017-12-04 DIAGNOSIS — D32.0 MENINGIOMA, RECURRENT OF BRAIN (HCC): ICD-10-CM

## 2017-12-04 DIAGNOSIS — I10 ESSENTIAL HYPERTENSION: ICD-10-CM

## 2017-12-04 DIAGNOSIS — R55 SYNCOPE, UNSPECIFIED SYNCOPE TYPE: ICD-10-CM

## 2017-12-04 DIAGNOSIS — Z74.09 IMPAIRED FUNCTIONAL MOBILITY, BALANCE, GAIT, AND ENDURANCE: ICD-10-CM

## 2017-12-04 DIAGNOSIS — R11.2 INTRACTABLE VOMITING WITH NAUSEA, UNSPECIFIED VOMITING TYPE: ICD-10-CM

## 2017-12-04 DIAGNOSIS — I10 UNCONTROLLED HYPERTENSION: ICD-10-CM

## 2017-12-04 PROBLEM — R73.9 HYPERGLYCEMIA: Status: ACTIVE | Noted: 2017-12-04

## 2017-12-04 PROBLEM — D72.829 LEUKOCYTOSIS: Status: ACTIVE | Noted: 2017-12-04

## 2017-12-04 PROBLEM — R79.89 ELEVATED LACTIC ACID LEVEL: Status: ACTIVE | Noted: 2017-12-04

## 2017-12-04 LAB
ALBUMIN SERPL-MCNC: 4.3 G/DL (ref 3.2–4.8)
ALBUMIN/GLOB SERPL: 1.4 G/DL (ref 1.5–2.5)
ALP SERPL-CCNC: 83 U/L (ref 25–100)
ALT SERPL W P-5'-P-CCNC: 22 U/L (ref 7–40)
ANION GAP SERPL CALCULATED.3IONS-SCNC: 11 MMOL/L (ref 3–11)
AST SERPL-CCNC: 24 U/L (ref 0–33)
BASOPHILS # BLD AUTO: 0 10*3/MM3 (ref 0–0.2)
BASOPHILS NFR BLD AUTO: 0 % (ref 0–1)
BILIRUB SERPL-MCNC: 0.6 MG/DL (ref 0.3–1.2)
BILIRUB UR QL STRIP: NEGATIVE
BUN BLD-MCNC: 15 MG/DL (ref 9–23)
BUN/CREAT SERPL: 18.8 (ref 7–25)
CALCIUM SPEC-SCNC: 9.9 MG/DL (ref 8.7–10.4)
CHLORIDE SERPL-SCNC: 106 MMOL/L (ref 99–109)
CLARITY UR: CLEAR
CO2 SERPL-SCNC: 21 MMOL/L (ref 20–31)
COLOR UR: YELLOW
CREAT BLD-MCNC: 0.8 MG/DL (ref 0.6–1.3)
D-LACTATE SERPL-SCNC: 2.8 MMOL/L (ref 0.5–2)
D-LACTATE SERPL-SCNC: 3.2 MMOL/L (ref 0.5–2)
DEPRECATED RDW RBC AUTO: 44.1 FL (ref 37–54)
EOSINOPHIL # BLD AUTO: 0.01 10*3/MM3 (ref 0–0.3)
EOSINOPHIL NFR BLD AUTO: 0.1 % (ref 0–3)
ERYTHROCYTE [DISTWIDTH] IN BLOOD BY AUTOMATED COUNT: 13.4 % (ref 11.3–14.5)
GFR SERPL CREATININE-BSD FRML MDRD: 72 ML/MIN/1.73
GLOBULIN UR ELPH-MCNC: 3 GM/DL
GLUCOSE BLD-MCNC: 182 MG/DL (ref 70–100)
GLUCOSE BLDC GLUCOMTR-MCNC: 142 MG/DL (ref 70–130)
GLUCOSE BLDC GLUCOMTR-MCNC: 181 MG/DL (ref 70–130)
GLUCOSE UR STRIP-MCNC: ABNORMAL MG/DL
HCT VFR BLD AUTO: 44 % (ref 34.5–44)
HGB BLD-MCNC: 15.1 G/DL (ref 11.5–15.5)
HGB UR QL STRIP.AUTO: NEGATIVE
HOLD SPECIMEN: NORMAL
IMM GRANULOCYTES # BLD: 0.04 10*3/MM3 (ref 0–0.03)
IMM GRANULOCYTES NFR BLD: 0.3 % (ref 0–0.6)
KETONES UR QL STRIP: ABNORMAL
LEUKOCYTE ESTERASE UR QL STRIP.AUTO: NEGATIVE
LIPASE SERPL-CCNC: 52 U/L (ref 6–51)
LYMPHOCYTES # BLD AUTO: 1.81 10*3/MM3 (ref 0.6–4.8)
LYMPHOCYTES NFR BLD AUTO: 13.2 % (ref 24–44)
MCH RBC QN AUTO: 30.8 PG (ref 27–31)
MCHC RBC AUTO-ENTMCNC: 34.3 G/DL (ref 32–36)
MCV RBC AUTO: 89.6 FL (ref 80–99)
MONOCYTES # BLD AUTO: 0.58 10*3/MM3 (ref 0–1)
MONOCYTES NFR BLD AUTO: 4.2 % (ref 0–12)
NEUTROPHILS # BLD AUTO: 11.27 10*3/MM3 (ref 1.5–8.3)
NEUTROPHILS NFR BLD AUTO: 82.2 % (ref 41–71)
NITRITE UR QL STRIP: NEGATIVE
PH UR STRIP.AUTO: 7.5 [PH] (ref 5–8)
PLATELET # BLD AUTO: 321 10*3/MM3 (ref 150–450)
PMV BLD AUTO: 9.9 FL (ref 6–12)
POTASSIUM BLD-SCNC: 3.6 MMOL/L (ref 3.5–5.5)
PROT SERPL-MCNC: 7.3 G/DL (ref 5.7–8.2)
PROT UR QL STRIP: NEGATIVE
RBC # BLD AUTO: 4.91 10*6/MM3 (ref 3.89–5.14)
SODIUM BLD-SCNC: 138 MMOL/L (ref 132–146)
SP GR UR STRIP: 1.02 (ref 1–1.03)
TROPONIN I SERPL-MCNC: <0.006 NG/ML
UROBILINOGEN UR QL STRIP: ABNORMAL
WBC NRBC COR # BLD: 13.71 10*3/MM3 (ref 3.5–10.8)
WHOLE BLOOD HOLD SPECIMEN: NORMAL
WHOLE BLOOD HOLD SPECIMEN: NORMAL

## 2017-12-04 PROCEDURE — 25010000002 FENTANYL CITRATE (PF) 100 MCG/2ML SOLUTION: Performed by: EMERGENCY MEDICINE

## 2017-12-04 PROCEDURE — 83605 ASSAY OF LACTIC ACID: CPT | Performed by: EMERGENCY MEDICINE

## 2017-12-04 PROCEDURE — 25010000002 HYDRALAZINE PER 20 MG: Performed by: INTERNAL MEDICINE

## 2017-12-04 PROCEDURE — 25010000002 HYDROMORPHONE PER 4 MG: Performed by: EMERGENCY MEDICINE

## 2017-12-04 PROCEDURE — 99223 1ST HOSP IP/OBS HIGH 75: CPT | Performed by: INTERNAL MEDICINE

## 2017-12-04 PROCEDURE — 25010000002 PROMETHAZINE PER 50 MG: Performed by: INTERNAL MEDICINE

## 2017-12-04 PROCEDURE — 99285 EMERGENCY DEPT VISIT HI MDM: CPT

## 2017-12-04 PROCEDURE — 82962 GLUCOSE BLOOD TEST: CPT

## 2017-12-04 PROCEDURE — 84484 ASSAY OF TROPONIN QUANT: CPT | Performed by: EMERGENCY MEDICINE

## 2017-12-04 PROCEDURE — 83690 ASSAY OF LIPASE: CPT | Performed by: EMERGENCY MEDICINE

## 2017-12-04 PROCEDURE — 80053 COMPREHEN METABOLIC PANEL: CPT | Performed by: EMERGENCY MEDICINE

## 2017-12-04 PROCEDURE — 81003 URINALYSIS AUTO W/O SCOPE: CPT | Performed by: EMERGENCY MEDICINE

## 2017-12-04 PROCEDURE — 74176 CT ABD & PELVIS W/O CONTRAST: CPT

## 2017-12-04 PROCEDURE — 25010000002 ONDANSETRON PER 1 MG: Performed by: INTERNAL MEDICINE

## 2017-12-04 PROCEDURE — 85025 COMPLETE CBC W/AUTO DIFF WBC: CPT | Performed by: EMERGENCY MEDICINE

## 2017-12-04 PROCEDURE — 63710000001 INSULIN LISPRO (HUMAN) PER 5 UNITS: Performed by: NURSE PRACTITIONER

## 2017-12-04 PROCEDURE — 25010000002 PROMETHAZINE PER 50 MG: Performed by: EMERGENCY MEDICINE

## 2017-12-04 PROCEDURE — 25010000002 ONDANSETRON PER 1 MG

## 2017-12-04 RX ORDER — SODIUM CHLORIDE 0.9 % (FLUSH) 0.9 %
1-10 SYRINGE (ML) INJECTION AS NEEDED
Status: DISCONTINUED | OUTPATIENT
Start: 2017-12-04 | End: 2017-12-05

## 2017-12-04 RX ORDER — HYDROMORPHONE HYDROCHLORIDE 1 MG/ML
0.5 INJECTION, SOLUTION INTRAMUSCULAR; INTRAVENOUS; SUBCUTANEOUS ONCE
Status: COMPLETED | OUTPATIENT
Start: 2017-12-04 | End: 2017-12-04

## 2017-12-04 RX ORDER — PROMETHAZINE HYDROCHLORIDE 12.5 MG/1
12.5 TABLET ORAL EVERY 6 HOURS PRN
Status: DISCONTINUED | OUTPATIENT
Start: 2017-12-04 | End: 2017-12-04

## 2017-12-04 RX ORDER — PREDNISONE 20 MG/1
60 TABLET ORAL DAILY
COMMUNITY
End: 2018-01-12 | Stop reason: HOSPADM

## 2017-12-04 RX ORDER — LABETALOL HYDROCHLORIDE 5 MG/ML
10 INJECTION, SOLUTION INTRAVENOUS ONCE
Status: COMPLETED | OUTPATIENT
Start: 2017-12-04 | End: 2017-12-04

## 2017-12-04 RX ORDER — PROMETHAZINE HYDROCHLORIDE 25 MG/ML
6.25 INJECTION, SOLUTION INTRAMUSCULAR; INTRAVENOUS ONCE
Status: COMPLETED | OUTPATIENT
Start: 2017-12-04 | End: 2017-12-04

## 2017-12-04 RX ORDER — ONDANSETRON 2 MG/ML
4 INJECTION INTRAMUSCULAR; INTRAVENOUS ONCE
Status: COMPLETED | OUTPATIENT
Start: 2017-12-04 | End: 2017-12-04

## 2017-12-04 RX ORDER — SODIUM CHLORIDE 0.9 % (FLUSH) 0.9 %
10 SYRINGE (ML) INJECTION AS NEEDED
Status: DISCONTINUED | OUTPATIENT
Start: 2017-12-04 | End: 2018-01-12 | Stop reason: HOSPADM

## 2017-12-04 RX ORDER — ONDANSETRON 2 MG/ML
INJECTION INTRAMUSCULAR; INTRAVENOUS
Status: COMPLETED
Start: 2017-12-04 | End: 2017-12-04

## 2017-12-04 RX ORDER — DEXTROSE MONOHYDRATE 25 G/50ML
25 INJECTION, SOLUTION INTRAVENOUS
Status: DISCONTINUED | OUTPATIENT
Start: 2017-12-04 | End: 2017-12-06

## 2017-12-04 RX ORDER — HYDRALAZINE HYDROCHLORIDE 20 MG/ML
10 INJECTION INTRAMUSCULAR; INTRAVENOUS EVERY 6 HOURS PRN
Status: DISCONTINUED | OUTPATIENT
Start: 2017-12-04 | End: 2017-12-06

## 2017-12-04 RX ORDER — PREDNISONE 20 MG/1
60 TABLET ORAL DAILY
Status: DISCONTINUED | OUTPATIENT
Start: 2017-12-04 | End: 2017-12-05

## 2017-12-04 RX ORDER — FENTANYL CITRATE 50 UG/ML
25 INJECTION, SOLUTION INTRAMUSCULAR; INTRAVENOUS ONCE
Status: COMPLETED | OUTPATIENT
Start: 2017-12-04 | End: 2017-12-04

## 2017-12-04 RX ORDER — LOSARTAN POTASSIUM 50 MG/1
50 TABLET ORAL
Status: DISCONTINUED | OUTPATIENT
Start: 2017-12-04 | End: 2017-12-05

## 2017-12-04 RX ORDER — PROMETHAZINE HYDROCHLORIDE 12.5 MG/1
12.5 SUPPOSITORY RECTAL EVERY 6 HOURS PRN
Status: DISCONTINUED | OUTPATIENT
Start: 2017-12-04 | End: 2017-12-04

## 2017-12-04 RX ORDER — HYDROCODONE BITARTRATE AND ACETAMINOPHEN 5; 325 MG/1; MG/1
1 TABLET ORAL EVERY 4 HOURS PRN
COMMUNITY
End: 2018-01-12 | Stop reason: HOSPADM

## 2017-12-04 RX ORDER — SERTRALINE HYDROCHLORIDE 100 MG/1
100 TABLET, FILM COATED ORAL DAILY
Status: DISCONTINUED | OUTPATIENT
Start: 2017-12-04 | End: 2017-12-05

## 2017-12-04 RX ORDER — TRAZODONE HYDROCHLORIDE 50 MG/1
50 TABLET ORAL NIGHTLY PRN
Status: DISCONTINUED | OUTPATIENT
Start: 2017-12-04 | End: 2017-12-05

## 2017-12-04 RX ORDER — METHYLPREDNISOLONE SODIUM SUCCINATE 40 MG/ML
40 INJECTION, POWDER, LYOPHILIZED, FOR SOLUTION INTRAMUSCULAR; INTRAVENOUS ONCE
Status: DISCONTINUED | OUTPATIENT
Start: 2017-12-04 | End: 2017-12-05

## 2017-12-04 RX ORDER — SODIUM CHLORIDE 9 MG/ML
125 INJECTION, SOLUTION INTRAVENOUS CONTINUOUS
Status: DISCONTINUED | OUTPATIENT
Start: 2017-12-04 | End: 2017-12-05

## 2017-12-04 RX ORDER — PROMETHAZINE HYDROCHLORIDE 12.5 MG/1
12.5 SUPPOSITORY RECTAL EVERY 6 HOURS PRN
Status: DISCONTINUED | OUTPATIENT
Start: 2017-12-04 | End: 2017-12-25

## 2017-12-04 RX ORDER — PROMETHAZINE HYDROCHLORIDE 25 MG/ML
6.25 INJECTION, SOLUTION INTRAMUSCULAR; INTRAVENOUS EVERY 6 HOURS PRN
Status: DISCONTINUED | OUTPATIENT
Start: 2017-12-04 | End: 2017-12-04

## 2017-12-04 RX ORDER — PROMETHAZINE HYDROCHLORIDE 25 MG/ML
12.5 INJECTION, SOLUTION INTRAMUSCULAR; INTRAVENOUS EVERY 6 HOURS PRN
Status: DISCONTINUED | OUTPATIENT
Start: 2017-12-04 | End: 2017-12-04

## 2017-12-04 RX ORDER — ONDANSETRON 2 MG/ML
4 INJECTION INTRAMUSCULAR; INTRAVENOUS EVERY 6 HOURS PRN
Status: DISCONTINUED | OUTPATIENT
Start: 2017-12-04 | End: 2018-01-12 | Stop reason: HOSPADM

## 2017-12-04 RX ORDER — PROMETHAZINE HYDROCHLORIDE 25 MG/ML
12.5 INJECTION, SOLUTION INTRAMUSCULAR; INTRAVENOUS EVERY 6 HOURS PRN
Status: DISCONTINUED | OUTPATIENT
Start: 2017-12-04 | End: 2017-12-25

## 2017-12-04 RX ORDER — NICOTINE POLACRILEX 4 MG
15 LOZENGE BUCCAL
Status: DISCONTINUED | OUTPATIENT
Start: 2017-12-04 | End: 2017-12-06

## 2017-12-04 RX ORDER — PROMETHAZINE HYDROCHLORIDE 12.5 MG/1
12.5 TABLET ORAL EVERY 6 HOURS PRN
Status: DISCONTINUED | OUTPATIENT
Start: 2017-12-04 | End: 2017-12-25

## 2017-12-04 RX ORDER — MAGNESIUM CARB/ALUMINUM HYDROX 105-160MG
30 TABLET,CHEWABLE ORAL 3 TIMES DAILY
Status: DISCONTINUED | OUTPATIENT
Start: 2017-12-04 | End: 2017-12-05

## 2017-12-04 RX ADMIN — SODIUM CHLORIDE 1000 ML: 9 INJECTION, SOLUTION INTRAVENOUS at 12:17

## 2017-12-04 RX ADMIN — MINERAL OIL 30 ML: 1000 SOLUTION ORAL at 17:07

## 2017-12-04 RX ADMIN — PROMETHAZINE HYDROCHLORIDE 12.5 MG: 25 INJECTION INTRAMUSCULAR; INTRAVENOUS at 17:25

## 2017-12-04 RX ADMIN — ONDANSETRON 4 MG: 2 INJECTION INTRAMUSCULAR; INTRAVENOUS at 20:53

## 2017-12-04 RX ADMIN — HYDRALAZINE HYDROCHLORIDE 10 MG: 20 INJECTION INTRAMUSCULAR; INTRAVENOUS at 17:12

## 2017-12-04 RX ADMIN — SODIUM CHLORIDE 125 ML/HR: 9 INJECTION, SOLUTION INTRAVENOUS at 17:49

## 2017-12-04 RX ADMIN — INSULIN LISPRO 2 UNITS: 100 INJECTION, SOLUTION INTRAVENOUS; SUBCUTANEOUS at 17:27

## 2017-12-04 RX ADMIN — FENTANYL CITRATE 25 MCG: 50 INJECTION INTRAMUSCULAR; INTRAVENOUS at 11:33

## 2017-12-04 RX ADMIN — PROMETHAZINE HYDROCHLORIDE 6.25 MG: 25 INJECTION INTRAMUSCULAR; INTRAVENOUS at 10:52

## 2017-12-04 RX ADMIN — HYDROMORPHONE HYDROCHLORIDE 0.5 MG: 1 INJECTION, SOLUTION INTRAMUSCULAR; INTRAVENOUS; SUBCUTANEOUS at 13:56

## 2017-12-04 RX ADMIN — LABETALOL HYDROCHLORIDE 10 MG: 5 INJECTION INTRAVENOUS at 13:58

## 2017-12-04 RX ADMIN — SODIUM CHLORIDE 1000 ML: 9 INJECTION, SOLUTION INTRAVENOUS at 10:42

## 2017-12-04 RX ADMIN — ONDANSETRON 4 MG: 2 INJECTION INTRAMUSCULAR; INTRAVENOUS at 10:41

## 2017-12-05 ENCOUNTER — ANESTHESIA EVENT (OUTPATIENT)
Dept: PERIOP | Facility: HOSPITAL | Age: 62
End: 2017-12-05

## 2017-12-05 ENCOUNTER — ANESTHESIA (OUTPATIENT)
Dept: PERIOP | Facility: HOSPITAL | Age: 62
End: 2017-12-05

## 2017-12-05 ENCOUNTER — APPOINTMENT (OUTPATIENT)
Dept: GENERAL RADIOLOGY | Facility: HOSPITAL | Age: 62
End: 2017-12-05

## 2017-12-05 ENCOUNTER — APPOINTMENT (OUTPATIENT)
Dept: CARDIOLOGY | Facility: HOSPITAL | Age: 62
End: 2017-12-05
Attending: SURGERY

## 2017-12-05 ENCOUNTER — APPOINTMENT (OUTPATIENT)
Dept: CT IMAGING | Facility: HOSPITAL | Age: 62
End: 2017-12-05

## 2017-12-05 PROBLEM — R55 SYNCOPE: Status: ACTIVE | Noted: 2017-12-05

## 2017-12-05 PROBLEM — N17.9 ACUTE KIDNEY INJURY (HCC): Status: ACTIVE | Noted: 2017-12-05

## 2017-12-05 PROBLEM — R79.89 ELEVATED TROPONIN LEVEL: Status: ACTIVE | Noted: 2017-12-05

## 2017-12-05 PROBLEM — R77.8 ELEVATED TROPONIN LEVEL: Status: ACTIVE | Noted: 2017-12-05

## 2017-12-05 PROBLEM — N17.9 ACUTE RENAL FAILURE: Status: ACTIVE | Noted: 2017-12-05

## 2017-12-05 PROBLEM — I95.9 HYPOTENSION: Status: ACTIVE | Noted: 2017-12-05

## 2017-12-05 LAB
ALBUMIN SERPL-MCNC: 4.1 G/DL (ref 3.2–4.8)
ALBUMIN/GLOB SERPL: 1.3 G/DL (ref 1.5–2.5)
ALP SERPL-CCNC: 80 U/L (ref 25–100)
ALT SERPL W P-5'-P-CCNC: 28 U/L (ref 7–40)
ANION GAP SERPL CALCULATED.3IONS-SCNC: 17 MMOL/L (ref 3–11)
ANION GAP SERPL CALCULATED.3IONS-SCNC: 17 MMOL/L (ref 3–11)
AST SERPL-CCNC: 30 U/L (ref 0–33)
BACTERIA UR QL AUTO: ABNORMAL /HPF
BACTERIA UR QL AUTO: ABNORMAL /HPF
BASOPHILS # BLD AUTO: 0 10*3/MM3 (ref 0–0.2)
BASOPHILS NFR BLD AUTO: 0 % (ref 0–1)
BILIRUB SERPL-MCNC: 1 MG/DL (ref 0.3–1.2)
BILIRUB UR QL STRIP: ABNORMAL
BILIRUB UR QL STRIP: NEGATIVE
BUN BLD-MCNC: 18 MG/DL (ref 9–23)
BUN BLD-MCNC: 27 MG/DL (ref 9–23)
BUN/CREAT SERPL: 10 (ref 7–25)
BUN/CREAT SERPL: 9 (ref 7–25)
CALCIUM SPEC-SCNC: 8.4 MG/DL (ref 8.7–10.4)
CALCIUM SPEC-SCNC: 9.7 MG/DL (ref 8.7–10.4)
CHLORIDE SERPL-SCNC: 97 MMOL/L (ref 99–109)
CHLORIDE SERPL-SCNC: 99 MMOL/L (ref 99–109)
CLARITY UR: ABNORMAL
CLARITY UR: ABNORMAL
CO2 SERPL-SCNC: 18 MMOL/L (ref 20–31)
CO2 SERPL-SCNC: 23 MMOL/L (ref 20–31)
COLOR UR: ABNORMAL
COLOR UR: YELLOW
CREAT BLD-MCNC: 2 MG/DL (ref 0.6–1.3)
CREAT BLD-MCNC: 2.7 MG/DL (ref 0.6–1.3)
D-LACTATE SERPL-SCNC: 3.3 MMOL/L (ref 0.5–2)
D-LACTATE SERPL-SCNC: 5.9 MMOL/L (ref 0.5–2)
D-LACTATE SERPL-SCNC: 7.8 MMOL/L (ref 0.5–2)
DEPRECATED RDW RBC AUTO: 45.8 FL (ref 37–54)
DEPRECATED RDW RBC AUTO: 46.9 FL (ref 37–54)
EOSINOPHIL # BLD AUTO: 0 10*3/MM3 (ref 0–0.3)
EOSINOPHIL NFR BLD AUTO: 0 % (ref 0–3)
ERYTHROCYTE [DISTWIDTH] IN BLOOD BY AUTOMATED COUNT: 13.7 % (ref 11.3–14.5)
ERYTHROCYTE [DISTWIDTH] IN BLOOD BY AUTOMATED COUNT: 13.8 % (ref 11.3–14.5)
GASTROCULT GAST QL: POSITIVE
GFR SERPL CREATININE-BSD FRML MDRD: 18 ML/MIN/1.73
GFR SERPL CREATININE-BSD FRML MDRD: 25 ML/MIN/1.73
GLOBULIN UR ELPH-MCNC: 3.1 GM/DL
GLUCOSE BLD-MCNC: 171 MG/DL (ref 70–100)
GLUCOSE BLD-MCNC: 174 MG/DL (ref 70–100)
GLUCOSE BLDC GLUCOMTR-MCNC: 101 MG/DL (ref 70–130)
GLUCOSE BLDC GLUCOMTR-MCNC: 168 MG/DL (ref 70–130)
GLUCOSE BLDC GLUCOMTR-MCNC: 92 MG/DL (ref 70–130)
GLUCOSE BLDC GLUCOMTR-MCNC: 98 MG/DL (ref 70–130)
GLUCOSE UR STRIP-MCNC: ABNORMAL MG/DL
GLUCOSE UR STRIP-MCNC: NEGATIVE MG/DL
HCT VFR BLD AUTO: 44.2 % (ref 34.5–44)
HCT VFR BLD AUTO: 48.2 % (ref 34.5–44)
HGB BLD-MCNC: 14.7 G/DL (ref 11.5–15.5)
HGB BLD-MCNC: 16.4 G/DL (ref 11.5–15.5)
HGB UR QL STRIP.AUTO: ABNORMAL
HGB UR QL STRIP.AUTO: NEGATIVE
HYALINE CASTS UR QL AUTO: ABNORMAL /LPF
IMM GRANULOCYTES # BLD: 0.02 10*3/MM3 (ref 0–0.03)
IMM GRANULOCYTES NFR BLD: 0.2 % (ref 0–0.6)
KETONES UR QL STRIP: ABNORMAL
KETONES UR QL STRIP: NEGATIVE
LEUKOCYTE ESTERASE UR QL STRIP.AUTO: ABNORMAL
LEUKOCYTE ESTERASE UR QL STRIP.AUTO: NEGATIVE
LYMPHOCYTES # BLD AUTO: 1.11 10*3/MM3 (ref 0.6–4.8)
LYMPHOCYTES NFR BLD AUTO: 12.9 % (ref 24–44)
MAGNESIUM SERPL-MCNC: 1.8 MG/DL (ref 1.3–2.7)
MCH RBC QN AUTO: 30.4 PG (ref 27–31)
MCH RBC QN AUTO: 31.4 PG (ref 27–31)
MCHC RBC AUTO-ENTMCNC: 33.3 G/DL (ref 32–36)
MCHC RBC AUTO-ENTMCNC: 34 G/DL (ref 32–36)
MCV RBC AUTO: 91.3 FL (ref 80–99)
MCV RBC AUTO: 92.3 FL (ref 80–99)
MONOCYTES # BLD AUTO: 0.43 10*3/MM3 (ref 0–1)
MONOCYTES NFR BLD AUTO: 5 % (ref 0–12)
NEUTROPHILS # BLD AUTO: 7.03 10*3/MM3 (ref 1.5–8.3)
NEUTROPHILS NFR BLD AUTO: 81.9 % (ref 41–71)
NITRITE UR QL STRIP: NEGATIVE
NITRITE UR QL STRIP: NEGATIVE
PH GAST: ABNORMAL [PH]
PH UR STRIP.AUTO: 5.5 [PH] (ref 5–8)
PH UR STRIP.AUTO: 7.5 [PH] (ref 5–8)
PLATELET # BLD AUTO: 287 10*3/MM3 (ref 150–450)
PLATELET # BLD AUTO: 343 10*3/MM3 (ref 150–450)
PMV BLD AUTO: 9.6 FL (ref 6–12)
PMV BLD AUTO: 9.6 FL (ref 6–12)
POTASSIUM BLD-SCNC: 3.4 MMOL/L (ref 3.5–5.5)
POTASSIUM BLD-SCNC: 3.4 MMOL/L (ref 3.5–5.5)
PROCALCITONIN SERPL-MCNC: 47 NG/ML
PROT SERPL-MCNC: 7.2 G/DL (ref 5.7–8.2)
PROT UR QL STRIP: ABNORMAL
PROT UR QL STRIP: ABNORMAL
RBC # BLD AUTO: 4.84 10*6/MM3 (ref 3.89–5.14)
RBC # BLD AUTO: 5.22 10*6/MM3 (ref 3.89–5.14)
RBC # UR: ABNORMAL /HPF
RBC # UR: ABNORMAL /HPF
REF LAB TEST METHOD: ABNORMAL
REF LAB TEST METHOD: ABNORMAL
SODIUM BLD-SCNC: 134 MMOL/L (ref 132–146)
SODIUM BLD-SCNC: 137 MMOL/L (ref 132–146)
SP GR UR STRIP: 1.02 (ref 1–1.03)
SP GR UR STRIP: 1.02 (ref 1–1.03)
SQUAMOUS #/AREA URNS HPF: ABNORMAL /HPF
SQUAMOUS #/AREA URNS HPF: ABNORMAL /HPF
TROPONIN I SERPL-MCNC: 0.17 NG/ML
TROPONIN I SERPL-MCNC: 0.27 NG/ML
UROBILINOGEN UR QL STRIP: ABNORMAL
UROBILINOGEN UR QL STRIP: ABNORMAL
WBC NRBC COR # BLD: 10.3 10*3/MM3 (ref 3.5–10.8)
WBC NRBC COR # BLD: 8.59 10*3/MM3 (ref 3.5–10.8)
WBC UR QL AUTO: ABNORMAL /HPF
WBC UR QL AUTO: ABNORMAL /HPF

## 2017-12-05 PROCEDURE — 80053 COMPREHEN METABOLIC PANEL: CPT | Performed by: NURSE PRACTITIONER

## 2017-12-05 PROCEDURE — 87040 BLOOD CULTURE FOR BACTERIA: CPT | Performed by: NURSE PRACTITIONER

## 2017-12-05 PROCEDURE — 25010000002 PHENYLEPHRINE PER 1 ML: Performed by: NURSE ANESTHETIST, CERTIFIED REGISTERED

## 2017-12-05 PROCEDURE — 81001 URINALYSIS AUTO W/SCOPE: CPT | Performed by: FAMILY MEDICINE

## 2017-12-05 PROCEDURE — 70450 CT HEAD/BRAIN W/O DYE: CPT

## 2017-12-05 PROCEDURE — 25010000002 FENTANYL CITRATE (PF) 100 MCG/2ML SOLUTION: Performed by: NURSE ANESTHETIST, CERTIFIED REGISTERED

## 2017-12-05 PROCEDURE — 85027 COMPLETE CBC AUTOMATED: CPT | Performed by: SURGERY

## 2017-12-05 PROCEDURE — 99233 SBSQ HOSP IP/OBS HIGH 50: CPT | Performed by: INTERNAL MEDICINE

## 2017-12-05 PROCEDURE — 82962 GLUCOSE BLOOD TEST: CPT

## 2017-12-05 PROCEDURE — 25010000002 LORAZEPAM PER 2 MG: Performed by: FAMILY MEDICINE

## 2017-12-05 PROCEDURE — 0DN80ZZ RELEASE SMALL INTESTINE, OPEN APPROACH: ICD-10-PCS | Performed by: SURGERY

## 2017-12-05 PROCEDURE — 83735 ASSAY OF MAGNESIUM: CPT | Performed by: NURSE PRACTITIONER

## 2017-12-05 PROCEDURE — 25010000002 HYDROCORTISONE SODIUM SUCCINATE 100 MG RECONSTITUTED SOLUTION: Performed by: NURSE ANESTHETIST, CERTIFIED REGISTERED

## 2017-12-05 PROCEDURE — 93306 TTE W/DOPPLER COMPLETE: CPT | Performed by: INTERNAL MEDICINE

## 2017-12-05 PROCEDURE — 87086 URINE CULTURE/COLONY COUNT: CPT | Performed by: INTERNAL MEDICINE

## 2017-12-05 PROCEDURE — 84300 ASSAY OF URINE SODIUM: CPT | Performed by: INTERNAL MEDICINE

## 2017-12-05 PROCEDURE — 93306 TTE W/DOPPLER COMPLETE: CPT

## 2017-12-05 PROCEDURE — 99292 CRITICAL CARE ADDL 30 MIN: CPT | Performed by: NURSE PRACTITIONER

## 2017-12-05 PROCEDURE — 81001 URINALYSIS AUTO W/SCOPE: CPT | Performed by: INTERNAL MEDICINE

## 2017-12-05 PROCEDURE — 82271 OCCULT BLOOD OTHER SOURCES: CPT | Performed by: NURSE PRACTITIONER

## 2017-12-05 PROCEDURE — 88307 TISSUE EXAM BY PATHOLOGIST: CPT | Performed by: SURGERY

## 2017-12-05 PROCEDURE — 25010000002 PROPOFOL 10 MG/ML EMULSION: Performed by: NURSE ANESTHETIST, CERTIFIED REGISTERED

## 2017-12-05 PROCEDURE — 82570 ASSAY OF URINE CREATININE: CPT | Performed by: INTERNAL MEDICINE

## 2017-12-05 PROCEDURE — 25010000002 SUCCINYLCHOLINE PER 20 MG: Performed by: NURSE ANESTHETIST, CERTIFIED REGISTERED

## 2017-12-05 PROCEDURE — 25010000002 PROMETHAZINE PER 50 MG: Performed by: INTERNAL MEDICINE

## 2017-12-05 PROCEDURE — 93010 ELECTROCARDIOGRAM REPORT: CPT | Performed by: INTERNAL MEDICINE

## 2017-12-05 PROCEDURE — P9041 ALBUMIN (HUMAN),5%, 50ML: HCPCS | Performed by: NURSE ANESTHETIST, CERTIFIED REGISTERED

## 2017-12-05 PROCEDURE — 25010000002 NEOSTIGMINE PER 0.5 MG: Performed by: NURSE ANESTHETIST, CERTIFIED REGISTERED

## 2017-12-05 PROCEDURE — 0DB80ZZ EXCISION OF SMALL INTESTINE, OPEN APPROACH: ICD-10-PCS | Performed by: SURGERY

## 2017-12-05 PROCEDURE — 25010000002 ONDANSETRON PER 1 MG: Performed by: NURSE ANESTHETIST, CERTIFIED REGISTERED

## 2017-12-05 PROCEDURE — 93005 ELECTROCARDIOGRAM TRACING: CPT | Performed by: NURSE PRACTITIONER

## 2017-12-05 PROCEDURE — 83605 ASSAY OF LACTIC ACID: CPT | Performed by: NURSE PRACTITIONER

## 2017-12-05 PROCEDURE — 99291 CRITICAL CARE FIRST HOUR: CPT | Performed by: NURSE PRACTITIONER

## 2017-12-05 PROCEDURE — 71010 HC CHEST PA OR AP: CPT

## 2017-12-05 PROCEDURE — 25010000002 PIPERACILLIN SOD-TAZOBACTAM PER 1 G: Performed by: NURSE PRACTITIONER

## 2017-12-05 PROCEDURE — 25010000002 ALBUMIN HUMAN 5% PER 50 ML: Performed by: NURSE ANESTHETIST, CERTIFIED REGISTERED

## 2017-12-05 PROCEDURE — 85025 COMPLETE CBC W/AUTO DIFF WBC: CPT | Performed by: NURSE PRACTITIONER

## 2017-12-05 PROCEDURE — 84145 PROCALCITONIN (PCT): CPT | Performed by: NURSE PRACTITIONER

## 2017-12-05 PROCEDURE — 84484 ASSAY OF TROPONIN QUANT: CPT | Performed by: NURSE PRACTITIONER

## 2017-12-05 RX ORDER — IPRATROPIUM BROMIDE AND ALBUTEROL SULFATE 2.5; .5 MG/3ML; MG/3ML
3 SOLUTION RESPIRATORY (INHALATION) ONCE AS NEEDED
Status: DISCONTINUED | OUTPATIENT
Start: 2017-12-05 | End: 2017-12-05

## 2017-12-05 RX ORDER — SODIUM CHLORIDE 9 MG/ML
9 INJECTION, SOLUTION INTRAVENOUS CONTINUOUS
Status: DISCONTINUED | OUTPATIENT
Start: 2017-12-05 | End: 2017-12-05

## 2017-12-05 RX ORDER — PROMETHAZINE HYDROCHLORIDE 25 MG/1
25 SUPPOSITORY RECTAL ONCE AS NEEDED
Status: DISCONTINUED | OUTPATIENT
Start: 2017-12-05 | End: 2017-12-05

## 2017-12-05 RX ORDER — LIDOCAINE HYDROCHLORIDE 10 MG/ML
INJECTION, SOLUTION INFILTRATION; PERINEURAL AS NEEDED
Status: DISCONTINUED | OUTPATIENT
Start: 2017-12-05 | End: 2017-12-05 | Stop reason: SURG

## 2017-12-05 RX ORDER — FAMOTIDINE 10 MG/ML
20 INJECTION, SOLUTION INTRAVENOUS EVERY 12 HOURS SCHEDULED
Status: DISCONTINUED | OUTPATIENT
Start: 2017-12-05 | End: 2017-12-06

## 2017-12-05 RX ORDER — SODIUM CHLORIDE 9 MG/ML
75 INJECTION, SOLUTION INTRAVENOUS CONTINUOUS
Status: DISCONTINUED | OUTPATIENT
Start: 2017-12-05 | End: 2017-12-06 | Stop reason: DRUGHIGH

## 2017-12-05 RX ORDER — ALBUMIN, HUMAN INJ 5% 5 %
SOLUTION INTRAVENOUS CONTINUOUS PRN
Status: DISCONTINUED | OUTPATIENT
Start: 2017-12-05 | End: 2017-12-05 | Stop reason: SURG

## 2017-12-05 RX ORDER — ATRACURIUM BESYLATE 10 MG/ML
INJECTION, SOLUTION INTRAVENOUS AS NEEDED
Status: DISCONTINUED | OUTPATIENT
Start: 2017-12-05 | End: 2017-12-05 | Stop reason: SURG

## 2017-12-05 RX ORDER — PROPOFOL 10 MG/ML
VIAL (ML) INTRAVENOUS AS NEEDED
Status: DISCONTINUED | OUTPATIENT
Start: 2017-12-05 | End: 2017-12-05 | Stop reason: SURG

## 2017-12-05 RX ORDER — HYDROMORPHONE HYDROCHLORIDE 1 MG/ML
0.5 INJECTION, SOLUTION INTRAMUSCULAR; INTRAVENOUS; SUBCUTANEOUS
Status: DISCONTINUED | OUTPATIENT
Start: 2017-12-05 | End: 2017-12-07

## 2017-12-05 RX ORDER — SODIUM CHLORIDE 9 MG/ML
1000 INJECTION, SOLUTION INTRAVENOUS ONCE
Status: COMPLETED | OUTPATIENT
Start: 2017-12-05 | End: 2017-12-05

## 2017-12-05 RX ORDER — PROMETHAZINE HYDROCHLORIDE 25 MG/ML
6.25 INJECTION, SOLUTION INTRAMUSCULAR; INTRAVENOUS ONCE AS NEEDED
Status: DISCONTINUED | OUTPATIENT
Start: 2017-12-05 | End: 2017-12-05

## 2017-12-05 RX ORDER — FENTANYL CITRATE 50 UG/ML
INJECTION, SOLUTION INTRAMUSCULAR; INTRAVENOUS AS NEEDED
Status: DISCONTINUED | OUTPATIENT
Start: 2017-12-05 | End: 2017-12-05 | Stop reason: SURG

## 2017-12-05 RX ORDER — PROMETHAZINE HYDROCHLORIDE 25 MG/1
25 TABLET ORAL ONCE AS NEEDED
Status: DISCONTINUED | OUTPATIENT
Start: 2017-12-05 | End: 2017-12-05

## 2017-12-05 RX ORDER — HYDROMORPHONE HYDROCHLORIDE 1 MG/ML
0.5 INJECTION, SOLUTION INTRAMUSCULAR; INTRAVENOUS; SUBCUTANEOUS
Status: DISCONTINUED | OUTPATIENT
Start: 2017-12-05 | End: 2017-12-05

## 2017-12-05 RX ORDER — LORAZEPAM 2 MG/ML
0.5 INJECTION INTRAMUSCULAR ONCE
Status: COMPLETED | OUTPATIENT
Start: 2017-12-05 | End: 2017-12-05

## 2017-12-05 RX ORDER — SUCCINYLCHOLINE CHLORIDE 20 MG/ML
INJECTION INTRAMUSCULAR; INTRAVENOUS AS NEEDED
Status: DISCONTINUED | OUTPATIENT
Start: 2017-12-05 | End: 2017-12-05 | Stop reason: SURG

## 2017-12-05 RX ORDER — FENTANYL CITRATE 50 UG/ML
50 INJECTION, SOLUTION INTRAMUSCULAR; INTRAVENOUS
Status: DISCONTINUED | OUTPATIENT
Start: 2017-12-05 | End: 2017-12-05

## 2017-12-05 RX ORDER — MAGNESIUM HYDROXIDE 1200 MG/15ML
LIQUID ORAL AS NEEDED
Status: DISCONTINUED | OUTPATIENT
Start: 2017-12-05 | End: 2017-12-05 | Stop reason: HOSPADM

## 2017-12-05 RX ORDER — GLYCOPYRROLATE 0.2 MG/ML
INJECTION INTRAMUSCULAR; INTRAVENOUS AS NEEDED
Status: DISCONTINUED | OUTPATIENT
Start: 2017-12-05 | End: 2017-12-05 | Stop reason: SURG

## 2017-12-05 RX ORDER — ONDANSETRON 2 MG/ML
INJECTION INTRAMUSCULAR; INTRAVENOUS AS NEEDED
Status: DISCONTINUED | OUTPATIENT
Start: 2017-12-05 | End: 2017-12-05 | Stop reason: SURG

## 2017-12-05 RX ADMIN — Medication 3 MG: at 13:27

## 2017-12-05 RX ADMIN — SODIUM CHLORIDE 150 ML/HR: 9 INJECTION, SOLUTION INTRAVENOUS at 21:01

## 2017-12-05 RX ADMIN — HYDROCORTISONE SODIUM SUCCINATE 100 MG: 100 INJECTION, POWDER, FOR SOLUTION INTRAMUSCULAR; INTRAVENOUS at 12:36

## 2017-12-05 RX ADMIN — ATRACURIUM BESYLATE 5 MG: 10 INJECTION, SOLUTION INTRAVENOUS at 12:30

## 2017-12-05 RX ADMIN — SODIUM CHLORIDE 150 ML/HR: 9 INJECTION, SOLUTION INTRAVENOUS at 16:31

## 2017-12-05 RX ADMIN — PROMETHAZINE HYDROCHLORIDE 12.5 MG: 25 INJECTION INTRAMUSCULAR; INTRAVENOUS at 09:45

## 2017-12-05 RX ADMIN — PHENYLEPHRINE HYDROCHLORIDE 200 MCG: 10 INJECTION INTRAVENOUS at 12:39

## 2017-12-05 RX ADMIN — TAZOBACTAM SODIUM AND PIPERACILLIN SODIUM 3.38 G: 375; 3 INJECTION, SOLUTION INTRAVENOUS at 21:02

## 2017-12-05 RX ADMIN — FAMOTIDINE 20 MG: 10 INJECTION, SOLUTION INTRAVENOUS at 20:29

## 2017-12-05 RX ADMIN — SODIUM CHLORIDE 1000 ML: 9 INJECTION, SOLUTION INTRAVENOUS at 02:57

## 2017-12-05 RX ADMIN — SUCCINYLCHOLINE CHLORIDE 120 MG: 20 INJECTION, SOLUTION INTRAMUSCULAR; INTRAVENOUS at 12:30

## 2017-12-05 RX ADMIN — SERTRALINE HYDROCHLORIDE 100 MG: 100 TABLET ORAL at 09:44

## 2017-12-05 RX ADMIN — GLYCOPYRROLATE 0.4 MG: 0.2 INJECTION, SOLUTION INTRAMUSCULAR; INTRAVENOUS at 13:27

## 2017-12-05 RX ADMIN — SODIUM CHLORIDE 1000 ML: 9 INJECTION, SOLUTION INTRAVENOUS at 08:15

## 2017-12-05 RX ADMIN — TAZOBACTAM SODIUM AND PIPERACILLIN SODIUM 3.38 G: 375; 3 INJECTION, SOLUTION INTRAVENOUS at 05:54

## 2017-12-05 RX ADMIN — ALBUMIN HUMAN: 0.05 INJECTION, SOLUTION INTRAVENOUS at 12:45

## 2017-12-05 RX ADMIN — ATRACURIUM BESYLATE 35 MG: 10 INJECTION, SOLUTION INTRAVENOUS at 12:40

## 2017-12-05 RX ADMIN — MINERAL OIL 30 ML: 1000 SOLUTION ORAL at 09:44

## 2017-12-05 RX ADMIN — ALBUMIN HUMAN: 0.05 INJECTION, SOLUTION INTRAVENOUS at 13:08

## 2017-12-05 RX ADMIN — LIDOCAINE HYDROCHLORIDE 50 MG: 10 INJECTION, SOLUTION INFILTRATION; PERINEURAL at 12:30

## 2017-12-05 RX ADMIN — FENTANYL CITRATE 50 MCG: 50 INJECTION, SOLUTION INTRAMUSCULAR; INTRAVENOUS at 12:30

## 2017-12-05 RX ADMIN — SODIUM CHLORIDE 125 ML/HR: 9 INJECTION, SOLUTION INTRAVENOUS at 08:49

## 2017-12-05 RX ADMIN — PHENYLEPHRINE HYDROCHLORIDE 100 MCG: 10 INJECTION INTRAVENOUS at 12:50

## 2017-12-05 RX ADMIN — SODIUM CHLORIDE 500 ML: 9 INJECTION, SOLUTION INTRAVENOUS at 01:45

## 2017-12-05 RX ADMIN — SODIUM CHLORIDE 500 ML: 9 INJECTION, SOLUTION INTRAVENOUS at 23:40

## 2017-12-05 RX ADMIN — LORAZEPAM 0.5 MG: 2 INJECTION, SOLUTION INTRAMUSCULAR; INTRAVENOUS at 01:48

## 2017-12-05 RX ADMIN — PHENYLEPHRINE HYDROCHLORIDE 100 MCG: 10 INJECTION INTRAVENOUS at 12:45

## 2017-12-05 RX ADMIN — ONDANSETRON 4 MG: 2 INJECTION INTRAMUSCULAR; INTRAVENOUS at 13:19

## 2017-12-05 RX ADMIN — SODIUM CHLORIDE 1000 ML: 9 INJECTION, SOLUTION INTRAVENOUS at 20:51

## 2017-12-05 RX ADMIN — EPHEDRINE SULFATE 10 MG: 50 INJECTION INTRAMUSCULAR; INTRAVENOUS; SUBCUTANEOUS at 12:43

## 2017-12-05 RX ADMIN — PROPOFOL 140 MG: 10 INJECTION, EMULSION INTRAVENOUS at 12:30

## 2017-12-05 RX ADMIN — SODIUM CHLORIDE 9 ML/HR: 9 INJECTION, SOLUTION INTRAVENOUS at 11:34

## 2017-12-05 RX ADMIN — TAZOBACTAM SODIUM AND PIPERACILLIN SODIUM 3.38 G: 375; 3 INJECTION, SOLUTION INTRAVENOUS at 12:23

## 2017-12-05 NOTE — ANESTHESIA PROCEDURE NOTES
Peripheral Block    Patient location during procedure: OR  Reason for block: at surgeon's request and post-op pain management  Performed by  Anesthesiologist: ROBB BARRIOS  Preanesthetic Checklist  Completed: patient identified, site marked, surgical consent, pre-op evaluation, timeout performed, IV checked, risks and benefits discussed and monitors and equipment checked  Prep:  Pt Position: supine  Sterile barriers:cap, gloves, sterile barriers and mask  Prep: ChloraPrep  Patient monitoring: blood pressure monitoring, continuous pulse oximetry and EKG  Procedure  Sedation:yes  Performed under: general  Guidance:ultrasound guided  Images:still images obtained    Laterality:Bilateral  Block Type:TAP  Injection Technique:single-shot  Needle Type:short-bevel and echogenic  Needle Gauge:20 G    Medications  Comment:Block Injection:  LA dose divided between Right and Left block       Adjuncts:  Decadron 4mg PSF, Buprenex 0.3mg (Per total volume of LA)  Local Injected:bupivacaine 0.25% Local Amount Injected:60mL  Post Assessment  Injection Assessment: negative aspiration for heme, incremental injection and no paresthesia on injection  Patient Tolerance:comfortable throughout block  Complications:no  Additional Notes      Under Ultrasound guidance, a BBraun 4inch 360 degree needle was advanced with Normal Saline hydro dissection of tissue.  The Internal Oblique and Transversus Abdominus muscles where visualized.  At or before the aponeurosis of Internal Oblique, local anesthetic spread was visualized in the Transversus Abdominus Plane. Injection was made incrementally with aspiration every 5 mls.  There was no  intravascular injection,  injection pressure was normal, there was no neural injection, and the procedure was completed without difficulty.  Thank You.

## 2017-12-05 NOTE — ANESTHESIA PROCEDURE NOTES
Airway  Urgency: elective    Airway not difficult    General Information and Staff    Patient location during procedure: OR  CRNA: RIVAS WHATLEY    Indications and Patient Condition  Indications for airway management: airway protection    Preoxygenated: yes  MILS not maintained throughout  Mask difficulty assessment: 0 - not attempted (RSI)    Final Airway Details  Final airway type: endotracheal airway      Successful airway: ETT  Cuffed: yes   Successful intubation technique: direct laryngoscopy  Facilitating devices/methods: intubating stylet  Endotracheal tube insertion site: oral  Blade: José Miguel  Blade size: #3  ETT size: 7.0 mm  Cormack-Lehane Classification: grade I - full view of glottis  Placement verified by: chest auscultation and capnometry   Cuff volume (mL): 6  Measured from: lips  ETT to lips (cm): 20  Number of attempts at approach: 1    Additional Comments  Patient history, labs, physical exam, anesthetic plan reviewed with MDA and patient in preop.  Pt transported to room via RN. All ASA monitors applied, preoxygenated x 3 min/ ETO2 of >85, IV patent, rapid sequence induction with cricoid pressure, easy intubation, + ETCO2, negative epigastric sounds, breath sound equal bilaterally with symmetric chest rise and fall, tube secured, all VSS, cspine neutrality maintained throughout induction, intubation and postitioning, all ppp, arms <90.

## 2017-12-05 NOTE — ANESTHESIA POSTPROCEDURE EVALUATION
Patient: Tereza Ackerman    Procedure Summary     Date Anesthesia Start Anesthesia Stop Room / Location    12/05/17 1223   [facility] / [facility]       Procedure Diagnosis Surgeon Provider    eXPLORATORY LAPAROTOMY, SMALL BOWEL RESECTION (N/A Abdomen) Small bowel obstruction due to adhesions MD Chris Celeste MD          Anesthesia Type: general  Last vitals  BP   108/54 (12/05/17 1337)   Temp   99.3 °F (37.4 °C) (12/05/17 1337)   Pulse   (!) 128 (12/05/17 1337)   Resp   20 (12/05/17 1337)     SpO2   94 % (12/05/17 1337)     Post Anesthesia Care and Evaluation    Patient location during evaluation: PACU  Patient participation: complete - patient participated  Level of consciousness: awake and alert  Pain score: 0  Pain management: adequate  Airway patency: patent  Anesthetic complications: No anesthetic complications  PONV Status: none  Cardiovascular status: hemodynamically stable and acceptable  Respiratory status: nonlabored ventilation, acceptable and nasal cannula  Hydration status: acceptable

## 2017-12-05 NOTE — ANESTHESIA PREPROCEDURE EVALUATION
Anesthesia Evaluation     Patient summary reviewed and Nursing notes reviewed   history of anesthetic complications: PONV         Airway   Mallampati: I  TM distance: >3 FB  Neck ROM: full  no difficulty expected  Dental      Pulmonary - negative pulmonary ROS   Cardiovascular     ECG reviewed    (+) hypertension, hyperlipidemia      Neuro/Psych  (+) headaches, psychiatric history,    GI/Hepatic/Renal/Endo    (+)  GERD,     Musculoskeletal     (+) back pain,   Abdominal    Substance History - negative use     OB/GYN negative ob/gyn ROS         Other   (+) arthritis   history of cancer                                    Anesthesia Plan    ASA 4     general   (Tap, hydrocortisone 100 mg bolus)  intravenous induction   Anesthetic plan and risks discussed with patient.    Plan discussed with CRNA.

## 2017-12-06 ENCOUNTER — APPOINTMENT (OUTPATIENT)
Dept: ULTRASOUND IMAGING | Facility: HOSPITAL | Age: 62
End: 2017-12-06

## 2017-12-06 PROBLEM — E87.20 METABOLIC ACIDOSIS: Status: ACTIVE | Noted: 2017-12-06

## 2017-12-06 LAB
ANION GAP SERPL CALCULATED.3IONS-SCNC: 14 MMOL/L (ref 3–11)
ANION GAP SERPL CALCULATED.3IONS-SCNC: 14 MMOL/L (ref 3–11)
ANION GAP SERPL CALCULATED.3IONS-SCNC: 15 MMOL/L (ref 3–11)
ANION GAP SERPL CALCULATED.3IONS-SCNC: 16 MMOL/L (ref 3–11)
ANION GAP SERPL CALCULATED.3IONS-SCNC: 16 MMOL/L (ref 3–11)
ARTERIAL PATENCY WRIST A: ABNORMAL
ARTERIAL PATENCY WRIST A: ABNORMAL
ATMOSPHERIC PRESS: ABNORMAL MMHG
ATMOSPHERIC PRESS: ABNORMAL MMHG
BASE EXCESS BLDA CALC-SCNC: -11.7 MMOL/L (ref 0–2)
BASE EXCESS BLDA CALC-SCNC: -13.5 MMOL/L (ref 0–2)
BDY SITE: ABNORMAL
BDY SITE: ABNORMAL
BH CV ECHO MEAS - AO ROOT AREA (BSA CORRECTED): 1.4
BH CV ECHO MEAS - AO ROOT AREA: 4.3 CM^2
BH CV ECHO MEAS - AO ROOT DIAM: 2.3 CM
BH CV ECHO MEAS - BSA(HAYCOCK): 1.8 M^2
BH CV ECHO MEAS - BSA: 1.7 M^2
BH CV ECHO MEAS - BZI_BMI: 27.6 KILOGRAMS/M^2
BH CV ECHO MEAS - BZI_METRIC_HEIGHT: 157.5 CM
BH CV ECHO MEAS - BZI_METRIC_WEIGHT: 68.5 KG
BH CV ECHO MEAS - CONTRAST EF (2CH): 45.8 ML/M^2
BH CV ECHO MEAS - CONTRAST EF 4CH: 64.1 ML/M^2
BH CV ECHO MEAS - EDV(CUBED): 16.7 ML
BH CV ECHO MEAS - EDV(MOD-SP2): 24 ML
BH CV ECHO MEAS - EDV(MOD-SP4): 39 ML
BH CV ECHO MEAS - EDV(TEICH): 23.6 ML
BH CV ECHO MEAS - EF(CUBED): 50.7 %
BH CV ECHO MEAS - EF(MOD-SP2): 45.8 %
BH CV ECHO MEAS - EF(MOD-SP4): 64.1 %
BH CV ECHO MEAS - EF(TEICH): 44.7 %
BH CV ECHO MEAS - ESV(CUBED): 8.2 ML
BH CV ECHO MEAS - ESV(MOD-SP2): 13 ML
BH CV ECHO MEAS - ESV(MOD-SP4): 14 ML
BH CV ECHO MEAS - ESV(TEICH): 13 ML
BH CV ECHO MEAS - FS: 21 %
BH CV ECHO MEAS - IVS/LVPW: 1.2
BH CV ECHO MEAS - IVSD: 1.5 CM
BH CV ECHO MEAS - LA DIMENSION: 3.5 CM
BH CV ECHO MEAS - LA/AO: 1.5
BH CV ECHO MEAS - LAT PEAK E' VEL: 6 CM/SEC
BH CV ECHO MEAS - LV DIASTOLIC VOL/BSA (35-75): 23 ML/M^2
BH CV ECHO MEAS - LV MASS(C)D: 105.2 GRAMS
BH CV ECHO MEAS - LV MASS(C)DI: 62 GRAMS/M^2
BH CV ECHO MEAS - LV SYSTOLIC VOL/BSA (12-30): 8.3 ML/M^2
BH CV ECHO MEAS - LVIDD: 2.6 CM
BH CV ECHO MEAS - LVIDS: 2 CM
BH CV ECHO MEAS - LVLD AP2: 4.9 CM
BH CV ECHO MEAS - LVLD AP4: 5 CM
BH CV ECHO MEAS - LVLS AP2: 4.4 CM
BH CV ECHO MEAS - LVLS AP4: 4.7 CM
BH CV ECHO MEAS - LVPWD: 1.2 CM
BH CV ECHO MEAS - MED PEAK E' VEL: 5.8 CM/SEC
BH CV ECHO MEAS - MV A MAX VEL: 66.6 CM/SEC
BH CV ECHO MEAS - MV E MAX VEL: 33 CM/SEC
BH CV ECHO MEAS - MV E/A: 0.5
BH CV ECHO MEAS - PA ACC SLOPE: 656.9 CM/SEC^2
BH CV ECHO MEAS - PA ACC TIME: 0.12 SEC
BH CV ECHO MEAS - PA PR(ACCEL): 23.5 MMHG
BH CV ECHO MEAS - RAP SYSTOLE: 8 MMHG
BH CV ECHO MEAS - RVSP: 32 MMHG
BH CV ECHO MEAS - SI(CUBED): 5 ML/M^2
BH CV ECHO MEAS - SI(MOD-SP2): 6.5 ML/M^2
BH CV ECHO MEAS - SI(MOD-SP4): 14.7 ML/M^2
BH CV ECHO MEAS - SI(TEICH): 6.2 ML/M^2
BH CV ECHO MEAS - SV(CUBED): 8.4 ML
BH CV ECHO MEAS - SV(MOD-SP2): 11 ML
BH CV ECHO MEAS - SV(MOD-SP4): 25 ML
BH CV ECHO MEAS - SV(TEICH): 10.5 ML
BH CV ECHO MEAS - TAPSE (>1.6): 0.8 CM2
BH CV ECHO MEAS - TR MAX VEL: 245.2 CM/SEC
BH CV VAS BP RIGHT ARM: NORMAL MMHG
BH CV XLRA - RV BASE: 2.9 CM
BH CV XLRA - RV LENGTH: 5.4 CM
BH CV XLRA - RV MID: 2.4 CM
BH CV XLRA - TDI S': 19.4 CM/SEC
BUN BLD-MCNC: 44 MG/DL (ref 9–23)
BUN BLD-MCNC: 46 MG/DL (ref 9–23)
BUN BLD-MCNC: 47 MG/DL (ref 9–23)
BUN BLD-MCNC: 50 MG/DL (ref 9–23)
BUN BLD-MCNC: 61 MG/DL (ref 9–23)
BUN/CREAT SERPL: 11.5 (ref 7–25)
BUN/CREAT SERPL: 11.6 (ref 7–25)
BUN/CREAT SERPL: 11.6 (ref 7–25)
BUN/CREAT SERPL: 12.1 (ref 7–25)
BUN/CREAT SERPL: 12.2 (ref 7–25)
CALCIUM SPEC-SCNC: 6.9 MG/DL (ref 8.7–10.4)
CALCIUM SPEC-SCNC: 7.1 MG/DL (ref 8.7–10.4)
CALCIUM SPEC-SCNC: 7.3 MG/DL (ref 8.7–10.4)
CALCIUM SPEC-SCNC: 7.3 MG/DL (ref 8.7–10.4)
CALCIUM SPEC-SCNC: 8 MG/DL (ref 8.7–10.4)
CHLORIDE SERPL-SCNC: 105 MMOL/L (ref 99–109)
CHLORIDE SERPL-SCNC: 109 MMOL/L (ref 99–109)
CHLORIDE SERPL-SCNC: 111 MMOL/L (ref 99–109)
CHLORIDE SERPL-SCNC: 111 MMOL/L (ref 99–109)
CHLORIDE SERPL-SCNC: 112 MMOL/L (ref 99–109)
CO2 BLDA-SCNC: 13.4 MMOL/L (ref 22–33)
CO2 BLDA-SCNC: 16.2 MMOL/L (ref 22–33)
CO2 SERPL-SCNC: 14 MMOL/L (ref 20–31)
CO2 SERPL-SCNC: 14 MMOL/L (ref 20–31)
CO2 SERPL-SCNC: 15 MMOL/L (ref 20–31)
CO2 SERPL-SCNC: 15 MMOL/L (ref 20–31)
CO2 SERPL-SCNC: 20 MMOL/L (ref 20–31)
COHGB MFR BLD: 1.3 % (ref 0–2)
COHGB MFR BLD: 1.3 % (ref 0–2)
CREAT BLD-MCNC: 3.8 MG/DL (ref 0.6–1.3)
CREAT BLD-MCNC: 3.8 MG/DL (ref 0.6–1.3)
CREAT BLD-MCNC: 4.1 MG/DL (ref 0.6–1.3)
CREAT BLD-MCNC: 4.3 MG/DL (ref 0.6–1.3)
CREAT BLD-MCNC: 5 MG/DL (ref 0.6–1.3)
CREAT UR-MCNC: 113.3 MG/DL
CREAT UR-MCNC: 139.8 MG/DL
D-LACTATE SERPL-SCNC: 3 MMOL/L (ref 0.5–2)
D-LACTATE SERPL-SCNC: 3.2 MMOL/L (ref 0.5–2)
D-LACTATE SERPL-SCNC: 3.5 MMOL/L (ref 0.5–2)
D-LACTATE SERPL-SCNC: 4.5 MMOL/L (ref 0.5–2)
DEPRECATED RDW RBC AUTO: 51.4 FL (ref 37–54)
EOSINOPHIL SPEC QL MICRO: 0 % EOS/100 CELLS (ref 0–0)
ERYTHROCYTE [DISTWIDTH] IN BLOOD BY AUTOMATED COUNT: 15 % (ref 11.3–14.5)
GFR SERPL CREATININE-BSD FRML MDRD: 10 ML/MIN/1.73
GFR SERPL CREATININE-BSD FRML MDRD: 11 ML/MIN/1.73
GFR SERPL CREATININE-BSD FRML MDRD: 12 ML/MIN/1.73
GFR SERPL CREATININE-BSD FRML MDRD: 12 ML/MIN/1.73
GFR SERPL CREATININE-BSD FRML MDRD: 9 ML/MIN/1.73
GLUCOSE BLD-MCNC: 101 MG/DL (ref 70–100)
GLUCOSE BLD-MCNC: 104 MG/DL (ref 70–100)
GLUCOSE BLD-MCNC: 127 MG/DL (ref 70–100)
GLUCOSE BLD-MCNC: 174 MG/DL (ref 70–100)
GLUCOSE BLD-MCNC: 97 MG/DL (ref 70–100)
GLUCOSE BLDC GLUCOMTR-MCNC: 115 MG/DL (ref 70–130)
GLUCOSE BLDC GLUCOMTR-MCNC: 167 MG/DL (ref 70–130)
GLUCOSE BLDC GLUCOMTR-MCNC: 193 MG/DL (ref 70–130)
GLUCOSE BLDC GLUCOMTR-MCNC: 97 MG/DL (ref 70–130)
HCO3 BLDA-SCNC: 12.5 MMOL/L (ref 20–26)
HCO3 BLDA-SCNC: 15.1 MMOL/L (ref 20–26)
HCT VFR BLD AUTO: 39.5 % (ref 34.5–44)
HCT VFR BLD CALC: 39.3 %
HCT VFR BLD CALC: 40.5 %
HGB BLD-MCNC: 12.6 G/DL (ref 11.5–15.5)
HGB BLDA-MCNC: 12.8 G/DL (ref 14–18)
HGB BLDA-MCNC: 13.2 G/DL (ref 14–18)
HOROWITZ INDEX BLD+IHG-RTO: 30 %
HOROWITZ INDEX BLD+IHG-RTO: 32 %
MAGNESIUM SERPL-MCNC: 1.6 MG/DL (ref 1.3–2.7)
MAXIMAL PREDICTED HEART RATE: 156 BPM
MCH RBC QN AUTO: 30 PG (ref 27–31)
MCHC RBC AUTO-ENTMCNC: 31.9 G/DL (ref 32–36)
MCV RBC AUTO: 94 FL (ref 80–99)
METHGB BLD QL: 0.5 % (ref 0–1.5)
METHGB BLD QL: 0.9 % (ref 0–1.5)
MODALITY: ABNORMAL
MODALITY: ABNORMAL
OXYHGB MFR BLDV: 93.2 % (ref 94–99)
OXYHGB MFR BLDV: 93.7 % (ref 94–99)
PCO2 BLDA: 29.5 MM HG (ref 35–45)
PCO2 BLDA: 36.7 MM HG (ref 35–45)
PH BLDA: 7.22 PH UNITS (ref 7.35–7.45)
PH BLDA: 7.24 PH UNITS (ref 7.35–7.45)
PHOSPHATE SERPL-MCNC: 8 MG/DL (ref 2.4–5.1)
PLATELET # BLD AUTO: 227 10*3/MM3 (ref 150–450)
PMV BLD AUTO: 10.5 FL (ref 6–12)
PO2 BLDA: 78.1 MM HG (ref 83–108)
PO2 BLDA: 82.1 MM HG (ref 83–108)
POTASSIUM BLD-SCNC: 4.9 MMOL/L (ref 3.5–5.5)
POTASSIUM BLD-SCNC: 4.9 MMOL/L (ref 3.5–5.5)
POTASSIUM BLD-SCNC: 5 MMOL/L (ref 3.5–5.5)
POTASSIUM BLD-SCNC: 5.1 MMOL/L (ref 3.5–5.5)
POTASSIUM BLD-SCNC: 5.1 MMOL/L (ref 3.5–5.5)
PROT UR-MCNC: 294 MG/DL (ref 1–14)
RBC # BLD AUTO: 4.2 10*6/MM3 (ref 3.89–5.14)
SAO2 % BLDCOA: 93.2 %
SAO2 % BLDCOA: 93.2 %
SODIUM BLD-SCNC: 139 MMOL/L (ref 132–146)
SODIUM BLD-SCNC: 140 MMOL/L (ref 132–146)
SODIUM BLD-SCNC: 140 MMOL/L (ref 132–146)
SODIUM BLD-SCNC: 141 MMOL/L (ref 132–146)
SODIUM BLD-SCNC: 141 MMOL/L (ref 132–146)
SODIUM UR-SCNC: 40 MMOL/L (ref 30–90)
STRESS TARGET HR: 133 BPM
TROPONIN I SERPL-MCNC: 0.45 NG/ML
WBC NRBC COR # BLD: 14.95 10*3/MM3 (ref 3.5–10.8)

## 2017-12-06 PROCEDURE — 76775 US EXAM ABDO BACK WALL LIM: CPT

## 2017-12-06 PROCEDURE — 80048 BASIC METABOLIC PNL TOTAL CA: CPT | Performed by: NURSE PRACTITIONER

## 2017-12-06 PROCEDURE — 5A09357 ASSISTANCE WITH RESPIRATORY VENTILATION, LESS THAN 24 CONSECUTIVE HOURS, CONTINUOUS POSITIVE AIRWAY PRESSURE: ICD-10-PCS | Performed by: INTERNAL MEDICINE

## 2017-12-06 PROCEDURE — 83735 ASSAY OF MAGNESIUM: CPT | Performed by: NURSE PRACTITIONER

## 2017-12-06 PROCEDURE — 36600 WITHDRAWAL OF ARTERIAL BLOOD: CPT | Performed by: NURSE PRACTITIONER

## 2017-12-06 PROCEDURE — 94660 CPAP INITIATION&MGMT: CPT

## 2017-12-06 PROCEDURE — 94799 UNLISTED PULMONARY SVC/PX: CPT

## 2017-12-06 PROCEDURE — 25010000002 CALCIUM GLUCONATE PER 10 ML: Performed by: NURSE PRACTITIONER

## 2017-12-06 PROCEDURE — 84100 ASSAY OF PHOSPHORUS: CPT | Performed by: NURSE PRACTITIONER

## 2017-12-06 PROCEDURE — 82805 BLOOD GASES W/O2 SATURATION: CPT | Performed by: NURSE PRACTITIONER

## 2017-12-06 PROCEDURE — 82570 ASSAY OF URINE CREATININE: CPT | Performed by: INTERNAL MEDICINE

## 2017-12-06 PROCEDURE — 84156 ASSAY OF PROTEIN URINE: CPT | Performed by: INTERNAL MEDICINE

## 2017-12-06 PROCEDURE — 25010000002 PIPERACILLIN SOD-TAZOBACTAM PER 1 G: Performed by: NURSE PRACTITIONER

## 2017-12-06 PROCEDURE — 25010000002 MAGNESIUM SULFATE 2 GM/50ML SOLUTION: Performed by: NURSE PRACTITIONER

## 2017-12-06 PROCEDURE — 85027 COMPLETE CBC AUTOMATED: CPT | Performed by: SURGERY

## 2017-12-06 PROCEDURE — 83605 ASSAY OF LACTIC ACID: CPT | Performed by: NURSE PRACTITIONER

## 2017-12-06 PROCEDURE — 80048 BASIC METABOLIC PNL TOTAL CA: CPT | Performed by: INTERNAL MEDICINE

## 2017-12-06 PROCEDURE — 82962 GLUCOSE BLOOD TEST: CPT

## 2017-12-06 PROCEDURE — 94760 N-INVAS EAR/PLS OXIMETRY 1: CPT

## 2017-12-06 PROCEDURE — 25010000002 HEPARIN (PORCINE) PER 1000 UNITS: Performed by: SURGERY

## 2017-12-06 PROCEDURE — 25010000002 PIPERACILLIN SOD-TAZOBACTAM PER 1 G

## 2017-12-06 PROCEDURE — 84484 ASSAY OF TROPONIN QUANT: CPT | Performed by: INTERNAL MEDICINE

## 2017-12-06 PROCEDURE — 25010000002 METHYLPREDNISOLONE PER 40 MG: Performed by: INTERNAL MEDICINE

## 2017-12-06 PROCEDURE — 25010000002 HYDROMORPHONE PER 4 MG: Performed by: INTERNAL MEDICINE

## 2017-12-06 PROCEDURE — P9041 ALBUMIN (HUMAN),5%, 50ML: HCPCS | Performed by: NURSE PRACTITIONER

## 2017-12-06 PROCEDURE — 87205 SMEAR GRAM STAIN: CPT | Performed by: INTERNAL MEDICINE

## 2017-12-06 PROCEDURE — 99291 CRITICAL CARE FIRST HOUR: CPT | Performed by: INTERNAL MEDICINE

## 2017-12-06 PROCEDURE — 25010000002 ALBUMIN HUMAN 5% PER 50 ML: Performed by: NURSE PRACTITIONER

## 2017-12-06 RX ORDER — PREDNISONE 20 MG/1
60 TABLET ORAL
Status: DISCONTINUED | OUTPATIENT
Start: 2017-12-06 | End: 2017-12-06

## 2017-12-06 RX ORDER — BUMETANIDE 0.25 MG/ML
2 INJECTION INTRAMUSCULAR; INTRAVENOUS ONCE
Status: COMPLETED | OUTPATIENT
Start: 2017-12-06 | End: 2017-12-06

## 2017-12-06 RX ORDER — FAMOTIDINE 10 MG/ML
20 INJECTION, SOLUTION INTRAVENOUS DAILY
Status: DISCONTINUED | OUTPATIENT
Start: 2017-12-07 | End: 2017-12-11

## 2017-12-06 RX ORDER — MAGNESIUM SULFATE HEPTAHYDRATE 40 MG/ML
2 INJECTION, SOLUTION INTRAVENOUS ONCE
Status: COMPLETED | OUTPATIENT
Start: 2017-12-06 | End: 2017-12-06

## 2017-12-06 RX ORDER — METHYLPREDNISOLONE SODIUM SUCCINATE 40 MG/ML
40 INJECTION, POWDER, LYOPHILIZED, FOR SOLUTION INTRAMUSCULAR; INTRAVENOUS EVERY 12 HOURS
Status: COMPLETED | OUTPATIENT
Start: 2017-12-06 | End: 2017-12-07

## 2017-12-06 RX ORDER — ALBUMIN, HUMAN INJ 5% 5 %
500 SOLUTION INTRAVENOUS ONCE
Status: COMPLETED | OUTPATIENT
Start: 2017-12-06 | End: 2017-12-06

## 2017-12-06 RX ORDER — HEPARIN SODIUM 5000 [USP'U]/ML
5000 INJECTION, SOLUTION INTRAVENOUS; SUBCUTANEOUS EVERY 8 HOURS SCHEDULED
Status: DISCONTINUED | OUTPATIENT
Start: 2017-12-06 | End: 2017-12-08

## 2017-12-06 RX ADMIN — SODIUM BICARBONATE 150 ML/HR: 84 INJECTION, SOLUTION INTRAVENOUS at 23:30

## 2017-12-06 RX ADMIN — METHYLPREDNISOLONE SODIUM SUCCINATE 40 MG: 40 INJECTION, POWDER, FOR SOLUTION INTRAMUSCULAR; INTRAVENOUS at 13:13

## 2017-12-06 RX ADMIN — SODIUM CHLORIDE 150 ML/HR: 9 INJECTION, SOLUTION INTRAVENOUS at 02:46

## 2017-12-06 RX ADMIN — ALBUMIN HUMAN 500 ML: 0.05 INJECTION, SOLUTION INTRAVENOUS at 04:34

## 2017-12-06 RX ADMIN — BUMETANIDE 2 MG: 0.25 INJECTION INTRAMUSCULAR; INTRAVENOUS at 11:39

## 2017-12-06 RX ADMIN — FAMOTIDINE 20 MG: 10 INJECTION, SOLUTION INTRAVENOUS at 08:36

## 2017-12-06 RX ADMIN — TAZOBACTAM SODIUM AND PIPERACILLIN SODIUM 3.38 G: 375; 3 INJECTION, SOLUTION INTRAVENOUS at 16:14

## 2017-12-06 RX ADMIN — HYDROMORPHONE HYDROCHLORIDE 0.5 MG: 1 INJECTION, SOLUTION INTRAMUSCULAR; INTRAVENOUS; SUBCUTANEOUS at 12:59

## 2017-12-06 RX ADMIN — SODIUM BICARBONATE 75 ML/HR: 84 INJECTION, SOLUTION INTRAVENOUS at 06:00

## 2017-12-06 RX ADMIN — SODIUM CHLORIDE 1000 ML: 9 INJECTION, SOLUTION INTRAVENOUS at 01:18

## 2017-12-06 RX ADMIN — HEPARIN SODIUM 5000 UNITS: 5000 INJECTION, SOLUTION INTRAVENOUS; SUBCUTANEOUS at 13:10

## 2017-12-06 RX ADMIN — MAGNESIUM SULFATE HEPTAHYDRATE 2 G: 40 INJECTION, SOLUTION INTRAVENOUS at 05:48

## 2017-12-06 RX ADMIN — HEPARIN SODIUM 5000 UNITS: 5000 INJECTION, SOLUTION INTRAVENOUS; SUBCUTANEOUS at 21:10

## 2017-12-06 RX ADMIN — TAZOBACTAM SODIUM AND PIPERACILLIN SODIUM 3.38 G: 375; 3 INJECTION, SOLUTION INTRAVENOUS at 03:55

## 2017-12-06 RX ADMIN — CALCIUM GLUCONATE: 94 INJECTION, SOLUTION INTRAVENOUS at 06:00

## 2017-12-07 ENCOUNTER — ANESTHESIA (OUTPATIENT)
Dept: PERIOP | Facility: HOSPITAL | Age: 62
End: 2017-12-07

## 2017-12-07 ENCOUNTER — ANESTHESIA EVENT (OUTPATIENT)
Dept: PERIOP | Facility: HOSPITAL | Age: 62
End: 2017-12-07

## 2017-12-07 ENCOUNTER — APPOINTMENT (OUTPATIENT)
Dept: GENERAL RADIOLOGY | Facility: HOSPITAL | Age: 62
End: 2017-12-07

## 2017-12-07 PROBLEM — R79.89 ELEVATED LACTIC ACID LEVEL: Status: RESOLVED | Noted: 2017-12-04 | Resolved: 2017-12-07

## 2017-12-07 PROBLEM — I95.9 HYPOTENSION: Status: RESOLVED | Noted: 2017-12-05 | Resolved: 2017-12-07

## 2017-12-07 PROBLEM — R55 SYNCOPE: Status: RESOLVED | Noted: 2017-12-05 | Resolved: 2017-12-07

## 2017-12-07 LAB
ALBUMIN SERPL-MCNC: 2.5 G/DL (ref 3.2–4.8)
ALBUMIN/GLOB SERPL: 1.5 G/DL (ref 1.5–2.5)
ALP SERPL-CCNC: 74 U/L (ref 25–100)
ALT SERPL W P-5'-P-CCNC: 377 U/L (ref 7–40)
ANION GAP SERPL CALCULATED.3IONS-SCNC: 17 MMOL/L (ref 3–11)
ANION GAP SERPL CALCULATED.3IONS-SCNC: 17 MMOL/L (ref 3–11)
ARTERIAL PATENCY WRIST A: ABNORMAL
AST SERPL-CCNC: 243 U/L (ref 0–33)
ATMOSPHERIC PRESS: ABNORMAL MMHG
BACTERIA SPEC AEROBE CULT: NORMAL
BASE EXCESS BLDA CALC-SCNC: 1.2 MMOL/L (ref 0–2)
BDY SITE: ABNORMAL
BILIRUB SERPL-MCNC: 1 MG/DL (ref 0.3–1.2)
BUN BLD-MCNC: 66 MG/DL (ref 9–23)
BUN BLD-MCNC: 71 MG/DL (ref 9–23)
BUN/CREAT SERPL: 12.9 (ref 7–25)
BUN/CREAT SERPL: 13.1 (ref 7–25)
CALCIUM SPEC-SCNC: 7 MG/DL (ref 8.7–10.4)
CALCIUM SPEC-SCNC: 7 MG/DL (ref 8.7–10.4)
CHLORIDE SERPL-SCNC: 101 MMOL/L (ref 99–109)
CHLORIDE SERPL-SCNC: 98 MMOL/L (ref 99–109)
CO2 BLDA-SCNC: 25.5 MMOL/L (ref 22–33)
CO2 SERPL-SCNC: 22 MMOL/L (ref 20–31)
CO2 SERPL-SCNC: 23 MMOL/L (ref 20–31)
COHGB MFR BLD: 1.4 % (ref 0–2)
CREAT BLD-MCNC: 5.1 MG/DL (ref 0.6–1.3)
CREAT BLD-MCNC: 5.4 MG/DL (ref 0.6–1.3)
CYTO UR: NORMAL
D-LACTATE SERPL-SCNC: 3.6 MMOL/L (ref 0.5–2)
DEPRECATED RDW RBC AUTO: 48.6 FL (ref 37–54)
ERYTHROCYTE [DISTWIDTH] IN BLOOD BY AUTOMATED COUNT: 14.7 % (ref 11.3–14.5)
GFR SERPL CREATININE-BSD FRML MDRD: 8 ML/MIN/1.73
GFR SERPL CREATININE-BSD FRML MDRD: 9 ML/MIN/1.73
GLOBULIN UR ELPH-MCNC: 1.7 GM/DL
GLUCOSE BLD-MCNC: 161 MG/DL (ref 70–100)
GLUCOSE BLD-MCNC: 191 MG/DL (ref 70–100)
GLUCOSE BLDC GLUCOMTR-MCNC: 123 MG/DL (ref 70–130)
GLUCOSE BLDC GLUCOMTR-MCNC: 126 MG/DL (ref 70–130)
GLUCOSE BLDC GLUCOMTR-MCNC: 158 MG/DL (ref 70–130)
HAV IGM SERPL QL IA: NORMAL
HBV CORE IGM SERPL QL IA: NORMAL
HBV SURFACE AG SERPL QL IA: NORMAL
HCO3 BLDA-SCNC: 24.4 MMOL/L (ref 20–26)
HCT VFR BLD AUTO: 32.5 % (ref 34.5–44)
HCT VFR BLD CALC: 34.9 %
HCV AB SER DONR QL: NORMAL
HGB BLD-MCNC: 10.8 G/DL (ref 11.5–15.5)
HGB BLDA-MCNC: 11.4 G/DL (ref 14–18)
HOROWITZ INDEX BLD+IHG-RTO: 32 %
INR PPP: 1.38
LAB AP CASE REPORT: NORMAL
LAB AP CLINICAL INFORMATION: NORMAL
Lab: NORMAL
MAGNESIUM SERPL-MCNC: 2.1 MG/DL (ref 1.3–2.7)
MCH RBC QN AUTO: 29.8 PG (ref 27–31)
MCHC RBC AUTO-ENTMCNC: 33.2 G/DL (ref 32–36)
MCV RBC AUTO: 89.8 FL (ref 80–99)
METHGB BLD QL: 0.9 % (ref 0–1.5)
MODALITY: ABNORMAL
OXYHGB MFR BLDV: 92.1 % (ref 94–99)
PATH REPORT.FINAL DX SPEC: NORMAL
PATH REPORT.GROSS SPEC: NORMAL
PCO2 BLDA: 33.2 MM HG (ref 35–45)
PH BLDA: 7.47 PH UNITS (ref 7.35–7.45)
PHOSPHATE SERPL-MCNC: 6.5 MG/DL (ref 2.4–5.1)
PLATELET # BLD AUTO: 128 10*3/MM3 (ref 150–450)
PMV BLD AUTO: 10.2 FL (ref 6–12)
PO2 BLDA: 63.5 MM HG (ref 83–108)
POTASSIUM BLD-SCNC: 4.4 MMOL/L (ref 3.5–5.5)
POTASSIUM BLD-SCNC: 5 MMOL/L (ref 3.5–5.5)
PROT SERPL-MCNC: 4.2 G/DL (ref 5.7–8.2)
PROTHROMBIN TIME: 15.2 SECONDS (ref 9.6–11.5)
RBC # BLD AUTO: 3.62 10*6/MM3 (ref 3.89–5.14)
SODIUM BLD-SCNC: 138 MMOL/L (ref 132–146)
SODIUM BLD-SCNC: 140 MMOL/L (ref 132–146)
WBC NRBC COR # BLD: 11.71 10*3/MM3 (ref 3.5–10.8)

## 2017-12-07 PROCEDURE — 25010000002 PIPERACILLIN SOD-TAZOBACTAM PER 1 G

## 2017-12-07 PROCEDURE — 85027 COMPLETE CBC AUTOMATED: CPT | Performed by: SURGERY

## 2017-12-07 PROCEDURE — 83735 ASSAY OF MAGNESIUM: CPT | Performed by: INTERNAL MEDICINE

## 2017-12-07 PROCEDURE — 25010000002 FENTANYL CITRATE (PF) 100 MCG/2ML SOLUTION: Performed by: INTERNAL MEDICINE

## 2017-12-07 PROCEDURE — 94799 UNLISTED PULMONARY SVC/PX: CPT

## 2017-12-07 PROCEDURE — 36600 WITHDRAWAL OF ARTERIAL BLOOD: CPT | Performed by: INTERNAL MEDICINE

## 2017-12-07 PROCEDURE — 02HV33Z INSERTION OF INFUSION DEVICE INTO SUPERIOR VENA CAVA, PERCUTANEOUS APPROACH: ICD-10-PCS | Performed by: SURGERY

## 2017-12-07 PROCEDURE — 25010000002 HEPARIN (PORCINE) PER 1000 UNITS: Performed by: SURGERY

## 2017-12-07 PROCEDURE — 76000 FLUOROSCOPY <1 HR PHYS/QHP: CPT

## 2017-12-07 PROCEDURE — 5A1D70Z PERFORMANCE OF URINARY FILTRATION, INTERMITTENT, LESS THAN 6 HOURS PER DAY: ICD-10-PCS | Performed by: INTERNAL MEDICINE

## 2017-12-07 PROCEDURE — 84100 ASSAY OF PHOSPHORUS: CPT | Performed by: INTERNAL MEDICINE

## 2017-12-07 PROCEDURE — 99291 CRITICAL CARE FIRST HOUR: CPT | Performed by: INTERNAL MEDICINE

## 2017-12-07 PROCEDURE — 85610 PROTHROMBIN TIME: CPT | Performed by: INTERNAL MEDICINE

## 2017-12-07 PROCEDURE — 25010000002 METHYLPREDNISOLONE PER 40 MG: Performed by: INTERNAL MEDICINE

## 2017-12-07 PROCEDURE — 25010000002 PROPOFOL 1000 MG/ML EMULSION: Performed by: NURSE ANESTHETIST, CERTIFIED REGISTERED

## 2017-12-07 PROCEDURE — 80074 ACUTE HEPATITIS PANEL: CPT | Performed by: INTERNAL MEDICINE

## 2017-12-07 PROCEDURE — 82962 GLUCOSE BLOOD TEST: CPT

## 2017-12-07 PROCEDURE — 80053 COMPREHEN METABOLIC PANEL: CPT | Performed by: SURGERY

## 2017-12-07 PROCEDURE — C1894 INTRO/SHEATH, NON-LASER: HCPCS | Performed by: SURGERY

## 2017-12-07 PROCEDURE — 71010 HC CHEST PA OR AP: CPT

## 2017-12-07 PROCEDURE — 82805 BLOOD GASES W/O2 SATURATION: CPT | Performed by: INTERNAL MEDICINE

## 2017-12-07 PROCEDURE — C1750 CATH, HEMODIALYSIS,LONG-TERM: HCPCS | Performed by: SURGERY

## 2017-12-07 PROCEDURE — B548ZZA ULTRASONOGRAPHY OF SUPERIOR VENA CAVA, GUIDANCE: ICD-10-PCS | Performed by: SURGERY

## 2017-12-07 PROCEDURE — 25010000002 PROPOFOL 10 MG/ML EMULSION: Performed by: NURSE ANESTHETIST, CERTIFIED REGISTERED

## 2017-12-07 RX ORDER — METHYLPREDNISOLONE SODIUM SUCCINATE 40 MG/ML
40 INJECTION, POWDER, LYOPHILIZED, FOR SOLUTION INTRAMUSCULAR; INTRAVENOUS EVERY 24 HOURS
Status: DISCONTINUED | OUTPATIENT
Start: 2017-12-08 | End: 2017-12-08

## 2017-12-07 RX ORDER — QUETIAPINE FUMARATE 25 MG/1
25 TABLET, FILM COATED ORAL NIGHTLY
Status: DISCONTINUED | OUTPATIENT
Start: 2017-12-07 | End: 2017-12-08

## 2017-12-07 RX ORDER — SODIUM CHLORIDE, SODIUM LACTATE, POTASSIUM CHLORIDE, CALCIUM CHLORIDE 600; 310; 30; 20 MG/100ML; MG/100ML; MG/100ML; MG/100ML
INJECTION, SOLUTION INTRAVENOUS CONTINUOUS PRN
Status: DISCONTINUED | OUTPATIENT
Start: 2017-12-07 | End: 2017-12-07 | Stop reason: SURG

## 2017-12-07 RX ORDER — NICOTINE POLACRILEX 4 MG
15 LOZENGE BUCCAL
Status: DISCONTINUED | OUTPATIENT
Start: 2017-12-07 | End: 2018-01-12 | Stop reason: HOSPADM

## 2017-12-07 RX ORDER — PROPOFOL 10 MG/ML
VIAL (ML) INTRAVENOUS AS NEEDED
Status: DISCONTINUED | OUTPATIENT
Start: 2017-12-07 | End: 2017-12-07 | Stop reason: SURG

## 2017-12-07 RX ORDER — FENTANYL CITRATE 50 UG/ML
50 INJECTION, SOLUTION INTRAMUSCULAR; INTRAVENOUS
Status: DISCONTINUED | OUTPATIENT
Start: 2017-12-07 | End: 2017-12-07 | Stop reason: HOSPADM

## 2017-12-07 RX ORDER — PROMETHAZINE HYDROCHLORIDE 25 MG/1
25 SUPPOSITORY RECTAL ONCE AS NEEDED
Status: DISCONTINUED | OUTPATIENT
Start: 2017-12-07 | End: 2017-12-07 | Stop reason: HOSPADM

## 2017-12-07 RX ORDER — PROMETHAZINE HYDROCHLORIDE 25 MG/ML
6.25 INJECTION, SOLUTION INTRAMUSCULAR; INTRAVENOUS ONCE AS NEEDED
Status: DISCONTINUED | OUTPATIENT
Start: 2017-12-07 | End: 2017-12-07 | Stop reason: HOSPADM

## 2017-12-07 RX ORDER — LIDOCAINE HYDROCHLORIDE 10 MG/ML
INJECTION, SOLUTION INFILTRATION; PERINEURAL AS NEEDED
Status: DISCONTINUED | OUTPATIENT
Start: 2017-12-07 | End: 2017-12-07 | Stop reason: HOSPADM

## 2017-12-07 RX ORDER — ONDANSETRON 2 MG/ML
4 INJECTION INTRAMUSCULAR; INTRAVENOUS ONCE AS NEEDED
Status: DISCONTINUED | OUTPATIENT
Start: 2017-12-07 | End: 2017-12-07 | Stop reason: HOSPADM

## 2017-12-07 RX ORDER — LIDOCAINE HYDROCHLORIDE 10 MG/ML
INJECTION, SOLUTION INFILTRATION; PERINEURAL AS NEEDED
Status: DISCONTINUED | OUTPATIENT
Start: 2017-12-07 | End: 2017-12-07 | Stop reason: SURG

## 2017-12-07 RX ORDER — DEXTROSE MONOHYDRATE 25 G/50ML
25 INJECTION, SOLUTION INTRAVENOUS
Status: DISCONTINUED | OUTPATIENT
Start: 2017-12-07 | End: 2018-01-12 | Stop reason: HOSPADM

## 2017-12-07 RX ORDER — FENTANYL CITRATE 50 UG/ML
25 INJECTION, SOLUTION INTRAMUSCULAR; INTRAVENOUS
Status: DISCONTINUED | OUTPATIENT
Start: 2017-12-07 | End: 2017-12-11

## 2017-12-07 RX ORDER — HEPARIN SODIUM 5000 [USP'U]/ML
INJECTION, SOLUTION INTRAVENOUS; SUBCUTANEOUS AS NEEDED
Status: DISCONTINUED | OUTPATIENT
Start: 2017-12-07 | End: 2017-12-07 | Stop reason: HOSPADM

## 2017-12-07 RX ORDER — PROMETHAZINE HYDROCHLORIDE 25 MG/1
25 TABLET ORAL ONCE AS NEEDED
Status: DISCONTINUED | OUTPATIENT
Start: 2017-12-07 | End: 2017-12-07 | Stop reason: HOSPADM

## 2017-12-07 RX ADMIN — METHYLPREDNISOLONE SODIUM SUCCINATE 40 MG: 40 INJECTION, POWDER, FOR SOLUTION INTRAMUSCULAR; INTRAVENOUS at 13:09

## 2017-12-07 RX ADMIN — FAMOTIDINE 20 MG: 10 INJECTION, SOLUTION INTRAVENOUS at 09:22

## 2017-12-07 RX ADMIN — METHYLPREDNISOLONE SODIUM SUCCINATE 40 MG: 40 INJECTION, POWDER, FOR SOLUTION INTRAMUSCULAR; INTRAVENOUS at 01:49

## 2017-12-07 RX ADMIN — TAZOBACTAM SODIUM AND PIPERACILLIN SODIUM 3.38 G: 375; 3 INJECTION, SOLUTION INTRAVENOUS at 04:04

## 2017-12-07 RX ADMIN — PROPOFOL 50 MCG/KG/MIN: 10 INJECTION, EMULSION INTRAVENOUS at 15:05

## 2017-12-07 RX ADMIN — PROPOFOL 50 MG: 10 INJECTION, EMULSION INTRAVENOUS at 15:05

## 2017-12-07 RX ADMIN — HEPARIN SODIUM 5000 UNITS: 5000 INJECTION, SOLUTION INTRAVENOUS; SUBCUTANEOUS at 05:25

## 2017-12-07 RX ADMIN — LIDOCAINE HYDROCHLORIDE 50 MG: 10 INJECTION, SOLUTION INFILTRATION; PERINEURAL at 15:05

## 2017-12-07 RX ADMIN — FENTANYL CITRATE 25 MCG: 50 INJECTION INTRAMUSCULAR; INTRAVENOUS at 13:09

## 2017-12-07 RX ADMIN — SODIUM CHLORIDE, POTASSIUM CHLORIDE, SODIUM LACTATE AND CALCIUM CHLORIDE: 600; 310; 30; 20 INJECTION, SOLUTION INTRAVENOUS at 14:57

## 2017-12-07 RX ADMIN — HEPARIN SODIUM 5000 UNITS: 5000 INJECTION, SOLUTION INTRAVENOUS; SUBCUTANEOUS at 13:09

## 2017-12-07 NOTE — ANESTHESIA PREPROCEDURE EVALUATION
Anesthesia Evaluation     Patient summary reviewed and Nursing notes reviewed   history of anesthetic complications: PONV         Airway   Mallampati: II  TM distance: >3 FB  Neck ROM: full  possible difficult intubation  Dental - normal exam     Pulmonary    Cardiovascular     (+) hypertension,       Neuro/Psych  (+) syncope,    GI/Hepatic/Renal/Endo    (+)  renal disease (metabolic acidosis) ARF, diabetes mellitus (predm),     Musculoskeletal     Abdominal    Substance History      OB/GYN          Other                                      Anesthesia Plan    ASA 4     general and MAC     intravenous induction   Anesthetic plan and risks discussed with patient.    Plan discussed with CRNA.

## 2017-12-07 NOTE — ANESTHESIA POSTPROCEDURE EVALUATION
Patient: Tereza Ackerman    Procedure Summary     Date Anesthesia Start Anesthesia Stop Room / Location    12/07/17 [id number]  [facility] / [facility]       Procedure Diagnosis Surgeon Provider    HEMODIALYSIS CATHETER INSERTION (N/A ) No diagnosis on file. MD Roderick Love MD          Anesthesia Type: general, MAC  Last vitals  BP   126/82   Temp 37.2F   Pulse   90   Resp   22     SpO2   93%     Post Anesthesia Care and Evaluation    Patient location during evaluation: ICU  Patient participation: complete - patient participated  Level of consciousness: sleepy but conscious  Pain management: adequate  Airway patency: patent  Anesthetic complications: No anesthetic complications  PONV Status: none  Cardiovascular status: hemodynamically stable and acceptable  Respiratory status: nonlabored ventilation, acceptable and nasal cannula  Hydration status: acceptable    Comments: Pt transported to ICU with O2 via nasal cannula at 6 L/MIN. Vital signs stable.  Pt shows no signs of distress.  Report given to ICU RN.

## 2017-12-08 ENCOUNTER — APPOINTMENT (OUTPATIENT)
Dept: GENERAL RADIOLOGY | Facility: HOSPITAL | Age: 62
End: 2017-12-08

## 2017-12-08 ENCOUNTER — APPOINTMENT (OUTPATIENT)
Dept: NEPHROLOGY | Facility: HOSPITAL | Age: 62
End: 2017-12-08
Attending: INTERNAL MEDICINE

## 2017-12-08 PROBLEM — R74.01 TRANSAMINITIS: Status: ACTIVE | Noted: 2017-12-08

## 2017-12-08 LAB
ALBUMIN SERPL-MCNC: 2.6 G/DL (ref 3.2–4.8)
ANION GAP SERPL CALCULATED.3IONS-SCNC: 19 MMOL/L (ref 3–11)
BUN BLD-MCNC: 97 MG/DL (ref 9–23)
BUN/CREAT SERPL: 14.5 (ref 7–25)
CALCIUM SPEC-SCNC: 7.3 MG/DL (ref 8.7–10.4)
CHLORIDE SERPL-SCNC: 95 MMOL/L (ref 99–109)
CO2 SERPL-SCNC: 26 MMOL/L (ref 20–31)
CREAT BLD-MCNC: 6.7 MG/DL (ref 0.6–1.3)
DEPRECATED RDW RBC AUTO: 47 FL (ref 37–54)
ERYTHROCYTE [DISTWIDTH] IN BLOOD BY AUTOMATED COUNT: 14.6 % (ref 11.3–14.5)
GFR SERPL CREATININE-BSD FRML MDRD: 6 ML/MIN/1.73
GLUCOSE BLD-MCNC: 96 MG/DL (ref 70–100)
GLUCOSE BLDC GLUCOMTR-MCNC: 119 MG/DL (ref 70–130)
GLUCOSE BLDC GLUCOMTR-MCNC: 79 MG/DL (ref 70–130)
GLUCOSE BLDC GLUCOMTR-MCNC: 84 MG/DL (ref 70–130)
GLUCOSE BLDC GLUCOMTR-MCNC: 85 MG/DL (ref 70–130)
GLUCOSE BLDC GLUCOMTR-MCNC: 86 MG/DL (ref 70–130)
HCT VFR BLD AUTO: 30.8 % (ref 34.5–44)
HGB BLD-MCNC: 10.4 G/DL (ref 11.5–15.5)
MAGNESIUM SERPL-MCNC: 2.4 MG/DL (ref 1.3–2.7)
MCH RBC QN AUTO: 29.7 PG (ref 27–31)
MCHC RBC AUTO-ENTMCNC: 33.8 G/DL (ref 32–36)
MCV RBC AUTO: 88 FL (ref 80–99)
PHOSPHATE SERPL-MCNC: 8.2 MG/DL (ref 2.4–5.1)
PLATELET # BLD AUTO: 110 10*3/MM3 (ref 150–450)
PMV BLD AUTO: 11.6 FL (ref 6–12)
POTASSIUM BLD-SCNC: 4.5 MMOL/L (ref 3.5–5.5)
RBC # BLD AUTO: 3.5 10*6/MM3 (ref 3.89–5.14)
SODIUM BLD-SCNC: 140 MMOL/L (ref 132–146)
WBC NRBC COR # BLD: 13.27 10*3/MM3 (ref 3.5–10.8)

## 2017-12-08 PROCEDURE — 71010 HC CHEST PA OR AP: CPT

## 2017-12-08 PROCEDURE — 99233 SBSQ HOSP IP/OBS HIGH 50: CPT | Performed by: INTERNAL MEDICINE

## 2017-12-08 PROCEDURE — 83735 ASSAY OF MAGNESIUM: CPT | Performed by: INTERNAL MEDICINE

## 2017-12-08 PROCEDURE — 80069 RENAL FUNCTION PANEL: CPT | Performed by: INTERNAL MEDICINE

## 2017-12-08 PROCEDURE — 82962 GLUCOSE BLOOD TEST: CPT

## 2017-12-08 PROCEDURE — 25010000002 FENTANYL CITRATE (PF) 100 MCG/2ML SOLUTION: Performed by: INTERNAL MEDICINE

## 2017-12-08 PROCEDURE — 25010000002 PIPERACILLIN SOD-TAZOBACTAM PER 1 G

## 2017-12-08 PROCEDURE — 97162 PT EVAL MOD COMPLEX 30 MIN: CPT

## 2017-12-08 PROCEDURE — 85027 COMPLETE CBC AUTOMATED: CPT | Performed by: INTERNAL MEDICINE

## 2017-12-08 PROCEDURE — 25010000002 HEPARIN (PORCINE) PER 1000 UNITS: Performed by: INTERNAL MEDICINE

## 2017-12-08 RX ORDER — METHYLPREDNISOLONE SODIUM SUCCINATE 40 MG/ML
20 INJECTION, POWDER, LYOPHILIZED, FOR SOLUTION INTRAMUSCULAR; INTRAVENOUS EVERY 12 HOURS
Status: DISCONTINUED | OUTPATIENT
Start: 2017-12-09 | End: 2017-12-08

## 2017-12-08 RX ORDER — METHYLPREDNISOLONE SODIUM SUCCINATE 40 MG/ML
20 INJECTION, POWDER, LYOPHILIZED, FOR SOLUTION INTRAMUSCULAR; INTRAVENOUS EVERY 24 HOURS
Status: DISCONTINUED | OUTPATIENT
Start: 2017-12-11 | End: 2017-12-11

## 2017-12-08 RX ORDER — HALOPERIDOL 5 MG/ML
2 INJECTION INTRAMUSCULAR EVERY 6 HOURS PRN
Status: DISCONTINUED | OUTPATIENT
Start: 2017-12-08 | End: 2018-01-02

## 2017-12-08 RX ORDER — HEPARIN SODIUM 5000 [USP'U]/ML
5000 INJECTION, SOLUTION INTRAVENOUS; SUBCUTANEOUS EVERY 12 HOURS SCHEDULED
Status: DISCONTINUED | OUTPATIENT
Start: 2017-12-08 | End: 2017-12-11

## 2017-12-08 RX ORDER — HYDRALAZINE HYDROCHLORIDE 20 MG/ML
10 INJECTION INTRAMUSCULAR; INTRAVENOUS EVERY 6 HOURS PRN
Status: DISCONTINUED | OUTPATIENT
Start: 2017-12-08 | End: 2017-12-13 | Stop reason: SDUPTHER

## 2017-12-08 RX ORDER — HEPARIN SODIUM 5000 [USP'U]/ML
5000 INJECTION, SOLUTION INTRAVENOUS; SUBCUTANEOUS EVERY 12 HOURS SCHEDULED
Status: CANCELLED | OUTPATIENT
Start: 2017-12-08

## 2017-12-08 RX ORDER — METHYLPREDNISOLONE SODIUM SUCCINATE 40 MG/ML
20 INJECTION, POWDER, LYOPHILIZED, FOR SOLUTION INTRAMUSCULAR; INTRAVENOUS EVERY 12 HOURS
Status: COMPLETED | OUTPATIENT
Start: 2017-12-09 | End: 2017-12-10

## 2017-12-08 RX ORDER — ALBUMIN (HUMAN) 12.5 G/50ML
12.5 SOLUTION INTRAVENOUS AS NEEDED
Status: DISPENSED | OUTPATIENT
Start: 2017-12-08 | End: 2017-12-08

## 2017-12-08 RX ORDER — METHYLPREDNISOLONE SODIUM SUCCINATE 40 MG/ML
20 INJECTION, POWDER, LYOPHILIZED, FOR SOLUTION INTRAMUSCULAR; INTRAVENOUS EVERY 24 HOURS
Status: DISCONTINUED | OUTPATIENT
Start: 2017-12-10 | End: 2017-12-08

## 2017-12-08 RX ADMIN — FENTANYL CITRATE 25 MCG: 50 INJECTION INTRAMUSCULAR; INTRAVENOUS at 16:35

## 2017-12-08 RX ADMIN — TAZOBACTAM SODIUM AND PIPERACILLIN SODIUM 3.38 G: 375; 3 INJECTION, SOLUTION INTRAVENOUS at 04:50

## 2017-12-08 RX ADMIN — TAZOBACTAM SODIUM AND PIPERACILLIN SODIUM 3.38 G: 375; 3 INJECTION, SOLUTION INTRAVENOUS at 17:41

## 2017-12-08 RX ADMIN — HEPARIN SODIUM 5000 UNITS: 5000 INJECTION, SOLUTION INTRAVENOUS; SUBCUTANEOUS at 20:38

## 2017-12-08 RX ADMIN — FAMOTIDINE 20 MG: 10 INJECTION, SOLUTION INTRAVENOUS at 09:52

## 2017-12-09 ENCOUNTER — APPOINTMENT (OUTPATIENT)
Dept: NEPHROLOGY | Facility: HOSPITAL | Age: 62
End: 2017-12-09
Attending: INTERNAL MEDICINE

## 2017-12-09 ENCOUNTER — APPOINTMENT (OUTPATIENT)
Dept: GENERAL RADIOLOGY | Facility: HOSPITAL | Age: 62
End: 2017-12-09

## 2017-12-09 LAB
ALBUMIN SERPL-MCNC: 3.2 G/DL (ref 3.2–4.8)
ALBUMIN/GLOB SERPL: 1.5 G/DL (ref 1.5–2.5)
ALP SERPL-CCNC: 68 U/L (ref 25–100)
ALT SERPL W P-5'-P-CCNC: 124 U/L (ref 7–40)
ANION GAP SERPL CALCULATED.3IONS-SCNC: 17 MMOL/L (ref 3–11)
AST SERPL-CCNC: 47 U/L (ref 0–33)
BILIRUB SERPL-MCNC: 0.8 MG/DL (ref 0.3–1.2)
BUN BLD-MCNC: 81 MG/DL (ref 9–23)
BUN/CREAT SERPL: 15.6 (ref 7–25)
CALCIUM SPEC-SCNC: 8.3 MG/DL (ref 8.7–10.4)
CHLORIDE SERPL-SCNC: 105 MMOL/L (ref 99–109)
CO2 SERPL-SCNC: 19 MMOL/L (ref 20–31)
CREAT BLD-MCNC: 5.2 MG/DL (ref 0.6–1.3)
DEPRECATED RDW RBC AUTO: 47.5 FL (ref 37–54)
ERYTHROCYTE [DISTWIDTH] IN BLOOD BY AUTOMATED COUNT: 14.5 % (ref 11.3–14.5)
GFR SERPL CREATININE-BSD FRML MDRD: 8 ML/MIN/1.73
GLOBULIN UR ELPH-MCNC: 2.1 GM/DL
GLUCOSE BLD-MCNC: 78 MG/DL (ref 70–100)
GLUCOSE BLDC GLUCOMTR-MCNC: 76 MG/DL (ref 70–130)
GLUCOSE BLDC GLUCOMTR-MCNC: 82 MG/DL (ref 70–130)
GLUCOSE BLDC GLUCOMTR-MCNC: 88 MG/DL (ref 70–130)
GLUCOSE BLDC GLUCOMTR-MCNC: 96 MG/DL (ref 70–130)
HCT VFR BLD AUTO: 27.2 % (ref 34.5–44)
HGB BLD-MCNC: 9.3 G/DL (ref 11.5–15.5)
MAGNESIUM SERPL-MCNC: 2.6 MG/DL (ref 1.3–2.7)
MCH RBC QN AUTO: 30.3 PG (ref 27–31)
MCHC RBC AUTO-ENTMCNC: 34.2 G/DL (ref 32–36)
MCV RBC AUTO: 88.6 FL (ref 80–99)
PHOSPHATE SERPL-MCNC: 6.8 MG/DL (ref 2.4–5.1)
PLATELET # BLD AUTO: 80 10*3/MM3 (ref 150–450)
PMV BLD AUTO: 11.4 FL (ref 6–12)
POTASSIUM BLD-SCNC: 4.6 MMOL/L (ref 3.5–5.5)
PROT SERPL-MCNC: 5.3 G/DL (ref 5.7–8.2)
RBC # BLD AUTO: 3.07 10*6/MM3 (ref 3.89–5.14)
SODIUM BLD-SCNC: 141 MMOL/L (ref 132–146)
WBC NRBC COR # BLD: 7.76 10*3/MM3 (ref 3.5–10.8)

## 2017-12-09 PROCEDURE — 83735 ASSAY OF MAGNESIUM: CPT | Performed by: INTERNAL MEDICINE

## 2017-12-09 PROCEDURE — 25010000002 HEPARIN (PORCINE) PER 1000 UNITS: Performed by: INTERNAL MEDICINE

## 2017-12-09 PROCEDURE — 71010 HC CHEST PA OR AP: CPT

## 2017-12-09 PROCEDURE — 25010000002 METHYLPREDNISOLONE PER 40 MG: Performed by: INTERNAL MEDICINE

## 2017-12-09 PROCEDURE — 80053 COMPREHEN METABOLIC PANEL: CPT | Performed by: INTERNAL MEDICINE

## 2017-12-09 PROCEDURE — 99233 SBSQ HOSP IP/OBS HIGH 50: CPT | Performed by: INTERNAL MEDICINE

## 2017-12-09 PROCEDURE — 25010000002 PIPERACILLIN SOD-TAZOBACTAM PER 1 G

## 2017-12-09 PROCEDURE — 82962 GLUCOSE BLOOD TEST: CPT

## 2017-12-09 PROCEDURE — 84100 ASSAY OF PHOSPHORUS: CPT | Performed by: INTERNAL MEDICINE

## 2017-12-09 PROCEDURE — 25010000002 HYDRALAZINE PER 20 MG: Performed by: INTERNAL MEDICINE

## 2017-12-09 PROCEDURE — 85027 COMPLETE CBC AUTOMATED: CPT | Performed by: INTERNAL MEDICINE

## 2017-12-09 PROCEDURE — 25010000002 FENTANYL CITRATE (PF) 100 MCG/2ML SOLUTION: Performed by: INTERNAL MEDICINE

## 2017-12-09 RX ORDER — ALPRAZOLAM 0.5 MG/1
0.5 TABLET ORAL 3 TIMES DAILY PRN
Status: DISCONTINUED | OUTPATIENT
Start: 2017-12-09 | End: 2017-12-11

## 2017-12-09 RX ADMIN — HEPARIN SODIUM 5000 UNITS: 5000 INJECTION, SOLUTION INTRAVENOUS; SUBCUTANEOUS at 08:16

## 2017-12-09 RX ADMIN — TAZOBACTAM SODIUM AND PIPERACILLIN SODIUM 3.38 G: 375; 3 INJECTION, SOLUTION INTRAVENOUS at 16:20

## 2017-12-09 RX ADMIN — METHYLPREDNISOLONE SODIUM SUCCINATE 20 MG: 40 INJECTION, POWDER, FOR SOLUTION INTRAMUSCULAR; INTRAVENOUS at 08:15

## 2017-12-09 RX ADMIN — FAMOTIDINE 20 MG: 10 INJECTION, SOLUTION INTRAVENOUS at 08:16

## 2017-12-09 RX ADMIN — HEPARIN SODIUM 5000 UNITS: 5000 INJECTION, SOLUTION INTRAVENOUS; SUBCUTANEOUS at 20:00

## 2017-12-09 RX ADMIN — FENTANYL CITRATE 25 MCG: 50 INJECTION INTRAMUSCULAR; INTRAVENOUS at 07:46

## 2017-12-09 RX ADMIN — METHYLPREDNISOLONE SODIUM SUCCINATE 20 MG: 40 INJECTION, POWDER, FOR SOLUTION INTRAMUSCULAR; INTRAVENOUS at 20:00

## 2017-12-09 RX ADMIN — FENTANYL CITRATE 25 MCG: 50 INJECTION INTRAMUSCULAR; INTRAVENOUS at 05:09

## 2017-12-09 RX ADMIN — ALPRAZOLAM 0.5 MG: 0.5 TABLET ORAL at 11:33

## 2017-12-09 RX ADMIN — TAZOBACTAM SODIUM AND PIPERACILLIN SODIUM 3.38 G: 375; 3 INJECTION, SOLUTION INTRAVENOUS at 04:16

## 2017-12-09 RX ADMIN — HYDRALAZINE HYDROCHLORIDE 10 MG: 20 INJECTION INTRAMUSCULAR; INTRAVENOUS at 06:19

## 2017-12-09 RX ADMIN — FENTANYL CITRATE 25 MCG: 50 INJECTION INTRAMUSCULAR; INTRAVENOUS at 14:01

## 2017-12-09 RX ADMIN — ALPRAZOLAM 0.5 MG: 0.5 TABLET ORAL at 20:00

## 2017-12-10 LAB
ANION GAP SERPL CALCULATED.3IONS-SCNC: 16 MMOL/L (ref 3–11)
BACTERIA SPEC AEROBE CULT: NORMAL
BACTERIA SPEC AEROBE CULT: NORMAL
BASOPHILS # BLD AUTO: 0.01 10*3/MM3 (ref 0–0.2)
BASOPHILS NFR BLD AUTO: 0.1 % (ref 0–1)
BUN BLD-MCNC: 86 MG/DL (ref 9–23)
BUN/CREAT SERPL: 19.5 (ref 7–25)
CALCIUM SPEC-SCNC: 8 MG/DL (ref 8.7–10.4)
CHLORIDE SERPL-SCNC: 101 MMOL/L (ref 99–109)
CO2 SERPL-SCNC: 18 MMOL/L (ref 20–31)
CREAT BLD-MCNC: 4.4 MG/DL (ref 0.6–1.3)
DEPRECATED RDW RBC AUTO: 47.8 FL (ref 37–54)
EOSINOPHIL # BLD AUTO: 0 10*3/MM3 (ref 0–0.3)
EOSINOPHIL NFR BLD AUTO: 0 % (ref 0–3)
ERYTHROCYTE [DISTWIDTH] IN BLOOD BY AUTOMATED COUNT: 14.5 % (ref 11.3–14.5)
GFR SERPL CREATININE-BSD FRML MDRD: 10 ML/MIN/1.73
GLUCOSE BLD-MCNC: 111 MG/DL (ref 70–100)
GLUCOSE BLDC GLUCOMTR-MCNC: 108 MG/DL (ref 70–130)
GLUCOSE BLDC GLUCOMTR-MCNC: 120 MG/DL (ref 70–130)
GLUCOSE BLDC GLUCOMTR-MCNC: 90 MG/DL (ref 70–130)
HCT VFR BLD AUTO: 30.2 % (ref 34.5–44)
HGB BLD-MCNC: 10 G/DL (ref 11.5–15.5)
IMM GRANULOCYTES # BLD: 0.06 10*3/MM3 (ref 0–0.03)
IMM GRANULOCYTES NFR BLD: 0.7 % (ref 0–0.6)
LYMPHOCYTES # BLD AUTO: 0.69 10*3/MM3 (ref 0.6–4.8)
LYMPHOCYTES NFR BLD AUTO: 8 % (ref 24–44)
MCH RBC QN AUTO: 29.4 PG (ref 27–31)
MCHC RBC AUTO-ENTMCNC: 33.1 G/DL (ref 32–36)
MCV RBC AUTO: 88.8 FL (ref 80–99)
MONOCYTES # BLD AUTO: 0.54 10*3/MM3 (ref 0–1)
MONOCYTES NFR BLD AUTO: 6.3 % (ref 0–12)
NEUTROPHILS # BLD AUTO: 7.33 10*3/MM3 (ref 1.5–8.3)
NEUTROPHILS NFR BLD AUTO: 84.9 % (ref 41–71)
PLATELET # BLD AUTO: 75 10*3/MM3 (ref 150–450)
PMV BLD AUTO: 11.5 FL (ref 6–12)
POTASSIUM BLD-SCNC: 4.5 MMOL/L (ref 3.5–5.5)
RBC # BLD AUTO: 3.4 10*6/MM3 (ref 3.89–5.14)
SODIUM BLD-SCNC: 135 MMOL/L (ref 132–146)
WBC NRBC COR # BLD: 8.63 10*3/MM3 (ref 3.5–10.8)

## 2017-12-10 PROCEDURE — 99233 SBSQ HOSP IP/OBS HIGH 50: CPT | Performed by: INTERNAL MEDICINE

## 2017-12-10 PROCEDURE — 82962 GLUCOSE BLOOD TEST: CPT

## 2017-12-10 PROCEDURE — 80048 BASIC METABOLIC PNL TOTAL CA: CPT | Performed by: INTERNAL MEDICINE

## 2017-12-10 PROCEDURE — 25010000002 HEPARIN (PORCINE) PER 1000 UNITS: Performed by: INTERNAL MEDICINE

## 2017-12-10 PROCEDURE — 25010000002 HYDRALAZINE PER 20 MG: Performed by: INTERNAL MEDICINE

## 2017-12-10 PROCEDURE — 25010000002 ONDANSETRON PER 1 MG: Performed by: INTERNAL MEDICINE

## 2017-12-10 PROCEDURE — 97110 THERAPEUTIC EXERCISES: CPT

## 2017-12-10 PROCEDURE — 85025 COMPLETE CBC W/AUTO DIFF WBC: CPT | Performed by: INTERNAL MEDICINE

## 2017-12-10 PROCEDURE — 25010000002 PIPERACILLIN SOD-TAZOBACTAM PER 1 G

## 2017-12-10 PROCEDURE — 25010000002 FENTANYL CITRATE (PF) 100 MCG/2ML SOLUTION: Performed by: INTERNAL MEDICINE

## 2017-12-10 PROCEDURE — 25010000002 METHYLPREDNISOLONE PER 40 MG: Performed by: INTERNAL MEDICINE

## 2017-12-10 RX ORDER — CALCIUM CARBONATE 200(500)MG
2 TABLET,CHEWABLE ORAL 3 TIMES DAILY PRN
Status: DISCONTINUED | OUTPATIENT
Start: 2017-12-10 | End: 2018-01-12 | Stop reason: HOSPADM

## 2017-12-10 RX ORDER — HYDRALAZINE HYDROCHLORIDE 20 MG/ML
10 INJECTION INTRAMUSCULAR; INTRAVENOUS 3 TIMES DAILY
Status: DISCONTINUED | OUTPATIENT
Start: 2017-12-10 | End: 2017-12-11

## 2017-12-10 RX ADMIN — ALPRAZOLAM 0.5 MG: 0.5 TABLET ORAL at 21:38

## 2017-12-10 RX ADMIN — TAZOBACTAM SODIUM AND PIPERACILLIN SODIUM 3.38 G: 375; 3 INJECTION, SOLUTION INTRAVENOUS at 04:03

## 2017-12-10 RX ADMIN — HEPARIN SODIUM 5000 UNITS: 5000 INJECTION, SOLUTION INTRAVENOUS; SUBCUTANEOUS at 21:31

## 2017-12-10 RX ADMIN — HEPARIN SODIUM 5000 UNITS: 5000 INJECTION, SOLUTION INTRAVENOUS; SUBCUTANEOUS at 10:07

## 2017-12-10 RX ADMIN — HYDRALAZINE HYDROCHLORIDE 10 MG: 20 INJECTION INTRAMUSCULAR; INTRAVENOUS at 07:44

## 2017-12-10 RX ADMIN — FENTANYL CITRATE 25 MCG: 50 INJECTION INTRAMUSCULAR; INTRAVENOUS at 16:00

## 2017-12-10 RX ADMIN — TAZOBACTAM SODIUM AND PIPERACILLIN SODIUM 3.38 G: 375; 3 INJECTION, SOLUTION INTRAVENOUS at 16:02

## 2017-12-10 RX ADMIN — ONDANSETRON 4 MG: 2 INJECTION INTRAMUSCULAR; INTRAVENOUS at 13:36

## 2017-12-10 RX ADMIN — FENTANYL CITRATE 25 MCG: 50 INJECTION INTRAMUSCULAR; INTRAVENOUS at 23:02

## 2017-12-10 RX ADMIN — CALCIUM CARBONATE 2 TABLET: 500 TABLET ORAL at 16:11

## 2017-12-10 RX ADMIN — ALPRAZOLAM 0.5 MG: 0.5 TABLET ORAL at 15:59

## 2017-12-10 RX ADMIN — HYDRALAZINE HYDROCHLORIDE 10 MG: 20 INJECTION INTRAMUSCULAR; INTRAVENOUS at 16:00

## 2017-12-10 RX ADMIN — CALCIUM CARBONATE 2 TABLET: 500 TABLET ORAL at 13:54

## 2017-12-10 RX ADMIN — HYDRALAZINE HYDROCHLORIDE 10 MG: 20 INJECTION INTRAMUSCULAR; INTRAVENOUS at 21:31

## 2017-12-10 RX ADMIN — FENTANYL CITRATE 25 MCG: 50 INJECTION INTRAMUSCULAR; INTRAVENOUS at 07:44

## 2017-12-10 RX ADMIN — FENTANYL CITRATE 25 MCG: 50 INJECTION INTRAMUSCULAR; INTRAVENOUS at 10:07

## 2017-12-10 RX ADMIN — FAMOTIDINE 20 MG: 10 INJECTION, SOLUTION INTRAVENOUS at 10:07

## 2017-12-10 RX ADMIN — ALPRAZOLAM 0.5 MG: 0.5 TABLET ORAL at 07:43

## 2017-12-10 RX ADMIN — FENTANYL CITRATE 25 MCG: 50 INJECTION INTRAMUSCULAR; INTRAVENOUS at 00:54

## 2017-12-10 RX ADMIN — METHYLPREDNISOLONE SODIUM SUCCINATE 20 MG: 40 INJECTION, POWDER, FOR SOLUTION INTRAMUSCULAR; INTRAVENOUS at 10:07

## 2017-12-11 ENCOUNTER — APPOINTMENT (OUTPATIENT)
Dept: NEPHROLOGY | Facility: HOSPITAL | Age: 62
End: 2017-12-11
Attending: INTERNAL MEDICINE

## 2017-12-11 ENCOUNTER — APPOINTMENT (OUTPATIENT)
Dept: GENERAL RADIOLOGY | Facility: HOSPITAL | Age: 62
End: 2017-12-11

## 2017-12-11 LAB
ALBUMIN SERPL-MCNC: 2.9 G/DL (ref 3.2–4.8)
ANION GAP SERPL CALCULATED.3IONS-SCNC: 17 MMOL/L (ref 3–11)
BUN BLD-MCNC: 142 MG/DL (ref 9–23)
BUN/CREAT SERPL: 23.7 (ref 7–25)
CALCIUM SPEC-SCNC: 7.9 MG/DL (ref 8.7–10.4)
CHLORIDE SERPL-SCNC: 98 MMOL/L (ref 99–109)
CO2 SERPL-SCNC: 19 MMOL/L (ref 20–31)
CREAT BLD-MCNC: 6 MG/DL (ref 0.6–1.3)
GFR SERPL CREATININE-BSD FRML MDRD: 7 ML/MIN/1.73
GLUCOSE BLD-MCNC: 109 MG/DL (ref 70–100)
GLUCOSE BLDC GLUCOMTR-MCNC: 121 MG/DL (ref 70–130)
GLUCOSE BLDC GLUCOMTR-MCNC: 121 MG/DL (ref 70–130)
GLUCOSE BLDC GLUCOMTR-MCNC: 87 MG/DL (ref 70–130)
GLUCOSE BLDC GLUCOMTR-MCNC: 89 MG/DL (ref 70–130)
GLUCOSE BLDC GLUCOMTR-MCNC: 98 MG/DL (ref 70–130)
PHOSPHATE SERPL-MCNC: 7.5 MG/DL (ref 2.4–5.1)
POTASSIUM BLD-SCNC: 5 MMOL/L (ref 3.5–5.5)
SODIUM BLD-SCNC: 134 MMOL/L (ref 132–146)

## 2017-12-11 PROCEDURE — 25010000002 ALBUMIN HUMAN 25% PER 50 ML: Performed by: INTERNAL MEDICINE

## 2017-12-11 PROCEDURE — 25010000002 HEPARIN (PORCINE) PER 1000 UNITS: Performed by: INTERNAL MEDICINE

## 2017-12-11 PROCEDURE — P9046 ALBUMIN (HUMAN), 25%, 20 ML: HCPCS | Performed by: INTERNAL MEDICINE

## 2017-12-11 PROCEDURE — 85060 BLOOD SMEAR INTERPRETATION: CPT | Performed by: INTERNAL MEDICINE

## 2017-12-11 PROCEDURE — 25010000002 PROMETHAZINE PER 50 MG: Performed by: INTERNAL MEDICINE

## 2017-12-11 PROCEDURE — 86022 PLATELET ANTIBODIES: CPT | Performed by: INTERNAL MEDICINE

## 2017-12-11 PROCEDURE — 80069 RENAL FUNCTION PANEL: CPT | Performed by: INTERNAL MEDICINE

## 2017-12-11 PROCEDURE — 25010000002 HYDRALAZINE PER 20 MG: Performed by: INTERNAL MEDICINE

## 2017-12-11 PROCEDURE — 25010000002 PIPERACILLIN SOD-TAZOBACTAM PER 1 G

## 2017-12-11 PROCEDURE — 82962 GLUCOSE BLOOD TEST: CPT

## 2017-12-11 PROCEDURE — 94799 UNLISTED PULMONARY SVC/PX: CPT

## 2017-12-11 PROCEDURE — 25010000002 ONDANSETRON PER 1 MG: Performed by: INTERNAL MEDICINE

## 2017-12-11 PROCEDURE — 25010000002 METHYLPREDNISOLONE PER 40 MG: Performed by: INTERNAL MEDICINE

## 2017-12-11 PROCEDURE — 71010 HC CHEST PA OR AP: CPT

## 2017-12-11 PROCEDURE — 99232 SBSQ HOSP IP/OBS MODERATE 35: CPT | Performed by: INTERNAL MEDICINE

## 2017-12-11 RX ORDER — SERTRALINE HYDROCHLORIDE 100 MG/1
100 TABLET, FILM COATED ORAL DAILY
Status: DISCONTINUED | OUTPATIENT
Start: 2017-12-11 | End: 2018-01-12 | Stop reason: HOSPADM

## 2017-12-11 RX ORDER — OXYCODONE HYDROCHLORIDE AND ACETAMINOPHEN 5; 325 MG/1; MG/1
1 TABLET ORAL EVERY 4 HOURS PRN
Status: DISPENSED | OUTPATIENT
Start: 2017-12-11 | End: 2018-01-08

## 2017-12-11 RX ORDER — HYDRALAZINE HYDROCHLORIDE 20 MG/ML
10 INJECTION INTRAMUSCULAR; INTRAVENOUS EVERY 8 HOURS PRN
Status: DISCONTINUED | OUTPATIENT
Start: 2017-12-11 | End: 2018-01-06

## 2017-12-11 RX ORDER — OXYCODONE HYDROCHLORIDE AND ACETAMINOPHEN 5; 325 MG/1; MG/1
2 TABLET ORAL EVERY 4 HOURS PRN
Status: DISCONTINUED | OUTPATIENT
Start: 2017-12-11 | End: 2017-12-20

## 2017-12-11 RX ORDER — ALBUMIN (HUMAN) 12.5 G/50ML
12.5 SOLUTION INTRAVENOUS AS NEEDED
Status: ACTIVE | OUTPATIENT
Start: 2017-12-11 | End: 2017-12-12

## 2017-12-11 RX ORDER — FAMOTIDINE 20 MG/1
20 TABLET, FILM COATED ORAL 2 TIMES DAILY
Status: DISCONTINUED | OUTPATIENT
Start: 2017-12-11 | End: 2017-12-14

## 2017-12-11 RX ORDER — ACETAMINOPHEN 325 MG/1
650 TABLET ORAL EVERY 6 HOURS PRN
Status: DISCONTINUED | OUTPATIENT
Start: 2017-12-11 | End: 2017-12-22

## 2017-12-11 RX ORDER — ALBUMIN (HUMAN) 12.5 G/50ML
12.5 SOLUTION INTRAVENOUS AS NEEDED
Status: ACTIVE | OUTPATIENT
Start: 2017-12-12 | End: 2017-12-12

## 2017-12-11 RX ORDER — ALBUMIN (HUMAN) 12.5 G/50ML
25 SOLUTION INTRAVENOUS AS NEEDED
Status: COMPLETED | OUTPATIENT
Start: 2017-12-11 | End: 2017-12-12

## 2017-12-11 RX ORDER — SODIUM BICARBONATE 650 MG/1
650 TABLET ORAL 3 TIMES DAILY
Status: DISCONTINUED | OUTPATIENT
Start: 2017-12-11 | End: 2018-01-06

## 2017-12-11 RX ORDER — ALBUMIN (HUMAN) 12.5 G/50ML
12.5 SOLUTION INTRAVENOUS AS NEEDED
Status: DISCONTINUED | OUTPATIENT
Start: 2017-12-11 | End: 2017-12-11 | Stop reason: SDUPTHER

## 2017-12-11 RX ADMIN — HEPARIN SODIUM 5000 UNITS: 5000 INJECTION, SOLUTION INTRAVENOUS; SUBCUTANEOUS at 10:09

## 2017-12-11 RX ADMIN — METHYLPREDNISOLONE SODIUM SUCCINATE 20 MG: 40 INJECTION, POWDER, FOR SOLUTION INTRAMUSCULAR; INTRAVENOUS at 10:08

## 2017-12-11 RX ADMIN — OXYCODONE AND ACETAMINOPHEN 2 TABLET: 5; 325 TABLET ORAL at 20:24

## 2017-12-11 RX ADMIN — HYDRALAZINE HYDROCHLORIDE 10 MG: 20 INJECTION INTRAMUSCULAR; INTRAVENOUS at 10:08

## 2017-12-11 RX ADMIN — ONDANSETRON 4 MG: 2 INJECTION INTRAMUSCULAR; INTRAVENOUS at 21:16

## 2017-12-11 RX ADMIN — ONDANSETRON 4 MG: 2 INJECTION INTRAMUSCULAR; INTRAVENOUS at 08:13

## 2017-12-11 RX ADMIN — TAZOBACTAM SODIUM AND PIPERACILLIN SODIUM 3.38 G: 375; 3 INJECTION, SOLUTION INTRAVENOUS at 04:47

## 2017-12-11 RX ADMIN — ALBUMIN HUMAN 25 G: 0.25 SOLUTION INTRAVENOUS at 13:09

## 2017-12-11 RX ADMIN — ALBUMIN HUMAN 25 G: 0.25 SOLUTION INTRAVENOUS at 13:07

## 2017-12-11 RX ADMIN — FAMOTIDINE 20 MG: 20 TABLET, FILM COATED ORAL at 11:14

## 2017-12-11 RX ADMIN — SODIUM BICARBONATE 650 MG TABLET 650 MG: at 20:19

## 2017-12-11 RX ADMIN — SERTRALINE HYDROCHLORIDE 100 MG: 100 TABLET ORAL at 17:03

## 2017-12-11 RX ADMIN — PROMETHAZINE HYDROCHLORIDE 12.5 MG: 25 INJECTION INTRAMUSCULAR; INTRAVENOUS at 00:35

## 2017-12-11 RX ADMIN — TAZOBACTAM SODIUM AND PIPERACILLIN SODIUM 3.38 G: 375; 3 INJECTION, SOLUTION INTRAVENOUS at 17:03

## 2017-12-11 RX ADMIN — OXYCODONE AND ACETAMINOPHEN 2 TABLET: 5; 325 TABLET ORAL at 11:14

## 2017-12-11 RX ADMIN — ALPRAZOLAM 0.5 MG: 0.5 TABLET ORAL at 04:46

## 2017-12-11 RX ADMIN — SODIUM BICARBONATE 650 MG TABLET 650 MG: at 17:03

## 2017-12-11 RX ADMIN — FAMOTIDINE 20 MG: 20 TABLET, FILM COATED ORAL at 17:03

## 2017-12-12 ENCOUNTER — APPOINTMENT (OUTPATIENT)
Dept: NEPHROLOGY | Facility: HOSPITAL | Age: 62
End: 2017-12-12
Attending: INTERNAL MEDICINE

## 2017-12-12 LAB
ALBUMIN SERPL-MCNC: 3.5 G/DL (ref 3.2–4.8)
ANION GAP SERPL CALCULATED.3IONS-SCNC: 15 MMOL/L (ref 3–11)
BUN BLD-MCNC: 80 MG/DL (ref 9–23)
BUN/CREAT SERPL: 18.2 (ref 7–25)
CALCIUM SPEC-SCNC: 8.4 MG/DL (ref 8.7–10.4)
CHLORIDE SERPL-SCNC: 100 MMOL/L (ref 99–109)
CO2 SERPL-SCNC: 20 MMOL/L (ref 20–31)
CREAT BLD-MCNC: 4.4 MG/DL (ref 0.6–1.3)
CYTOLOGIST CVX/VAG CYTO: NORMAL
DEPRECATED RDW RBC AUTO: 46.5 FL (ref 37–54)
ERYTHROCYTE [DISTWIDTH] IN BLOOD BY AUTOMATED COUNT: 14 % (ref 11.3–14.5)
GFR SERPL CREATININE-BSD FRML MDRD: 10 ML/MIN/1.73
GLUCOSE BLD-MCNC: 87 MG/DL (ref 70–100)
GLUCOSE BLDC GLUCOMTR-MCNC: 114 MG/DL (ref 70–130)
GLUCOSE BLDC GLUCOMTR-MCNC: 83 MG/DL (ref 70–130)
GLUCOSE BLDC GLUCOMTR-MCNC: 87 MG/DL (ref 70–130)
GLUCOSE BLDC GLUCOMTR-MCNC: 93 MG/DL (ref 70–130)
GLUCOSE BLDC GLUCOMTR-MCNC: 94 MG/DL (ref 70–130)
HCT VFR BLD AUTO: 27.5 % (ref 34.5–44)
HGB BLD-MCNC: 8.9 G/DL (ref 11.5–15.5)
MCH RBC QN AUTO: 29.6 PG (ref 27–31)
MCHC RBC AUTO-ENTMCNC: 32.4 G/DL (ref 32–36)
MCV RBC AUTO: 91.4 FL (ref 80–99)
PATH INTERP BLD-IMP: NORMAL
PF4 HEPARIN CMPLX AB SER-ACNC: 0.17 OD (ref 0–0.4)
PHOSPHATE SERPL-MCNC: 6.4 MG/DL (ref 2.4–5.1)
PLATELET # BLD AUTO: 109 10*3/MM3 (ref 150–450)
PMV BLD AUTO: 11.4 FL (ref 6–12)
POTASSIUM BLD-SCNC: 4.7 MMOL/L (ref 3.5–5.5)
RBC # BLD AUTO: 3.01 10*6/MM3 (ref 3.89–5.14)
SODIUM BLD-SCNC: 135 MMOL/L (ref 132–146)
WBC NRBC COR # BLD: 11.98 10*3/MM3 (ref 3.5–10.8)

## 2017-12-12 PROCEDURE — 25010000002 ALBUMIN HUMAN 25% PER 50 ML: Performed by: INTERNAL MEDICINE

## 2017-12-12 PROCEDURE — 63510000001 EPOETIN ALFA PER 1000 UNITS: Performed by: INTERNAL MEDICINE

## 2017-12-12 PROCEDURE — 63710000001 PREDNISONE PER 5 MG: Performed by: INTERNAL MEDICINE

## 2017-12-12 PROCEDURE — 99232 SBSQ HOSP IP/OBS MODERATE 35: CPT | Performed by: INTERNAL MEDICINE

## 2017-12-12 PROCEDURE — 25010000002 PIPERACILLIN SOD-TAZOBACTAM PER 1 G

## 2017-12-12 PROCEDURE — 82962 GLUCOSE BLOOD TEST: CPT

## 2017-12-12 PROCEDURE — 87086 URINE CULTURE/COLONY COUNT: CPT | Performed by: NURSE PRACTITIONER

## 2017-12-12 PROCEDURE — 80069 RENAL FUNCTION PANEL: CPT | Performed by: INTERNAL MEDICINE

## 2017-12-12 PROCEDURE — 63710000001 PREDNISONE PER 1 MG: Performed by: INTERNAL MEDICINE

## 2017-12-12 PROCEDURE — P9046 ALBUMIN (HUMAN), 25%, 20 ML: HCPCS | Performed by: INTERNAL MEDICINE

## 2017-12-12 PROCEDURE — 25010000002 ONDANSETRON PER 1 MG: Performed by: INTERNAL MEDICINE

## 2017-12-12 PROCEDURE — 85027 COMPLETE CBC AUTOMATED: CPT | Performed by: INTERNAL MEDICINE

## 2017-12-12 RX ORDER — ALBUMIN (HUMAN) 12.5 G/50ML
25 SOLUTION INTRAVENOUS AS NEEDED
Status: ACTIVE | OUTPATIENT
Start: 2017-12-12 | End: 2017-12-13

## 2017-12-12 RX ORDER — BISACODYL 10 MG
10 SUPPOSITORY, RECTAL RECTAL 3 TIMES DAILY PRN
Status: DISCONTINUED | OUTPATIENT
Start: 2017-12-12 | End: 2017-12-22

## 2017-12-12 RX ADMIN — TAZOBACTAM SODIUM AND PIPERACILLIN SODIUM 3.38 G: 375; 3 INJECTION, SOLUTION INTRAVENOUS at 04:55

## 2017-12-12 RX ADMIN — SERTRALINE HYDROCHLORIDE 100 MG: 100 TABLET ORAL at 10:24

## 2017-12-12 RX ADMIN — FAMOTIDINE 20 MG: 20 TABLET, FILM COATED ORAL at 18:50

## 2017-12-12 RX ADMIN — FAMOTIDINE 20 MG: 20 TABLET, FILM COATED ORAL at 10:25

## 2017-12-12 RX ADMIN — SODIUM BICARBONATE 650 MG TABLET 650 MG: at 21:13

## 2017-12-12 RX ADMIN — OXYCODONE AND ACETAMINOPHEN 2 TABLET: 5; 325 TABLET ORAL at 21:14

## 2017-12-12 RX ADMIN — PREDNISONE 25 MG: 5 TABLET ORAL at 12:03

## 2017-12-12 RX ADMIN — ALBUMIN HUMAN 25 G: 0.25 SOLUTION INTRAVENOUS at 09:04

## 2017-12-12 RX ADMIN — OXYCODONE AND ACETAMINOPHEN 2 TABLET: 5; 325 TABLET ORAL at 00:43

## 2017-12-12 RX ADMIN — OXYCODONE AND ACETAMINOPHEN 2 TABLET: 5; 325 TABLET ORAL at 08:25

## 2017-12-12 RX ADMIN — BISACODYL 10 MG: 10 SUPPOSITORY RECTAL at 19:02

## 2017-12-12 RX ADMIN — SODIUM BICARBONATE 650 MG TABLET 650 MG: at 18:50

## 2017-12-12 RX ADMIN — SODIUM BICARBONATE 650 MG TABLET 650 MG: at 10:25

## 2017-12-12 RX ADMIN — ERYTHROPOIETIN 10000 UNITS: 10000 INJECTION, SOLUTION INTRAVENOUS; SUBCUTANEOUS at 10:23

## 2017-12-12 RX ADMIN — ONDANSETRON 4 MG: 2 INJECTION INTRAMUSCULAR; INTRAVENOUS at 08:22

## 2017-12-12 RX ADMIN — ALBUMIN HUMAN 25 G: 0.25 SOLUTION INTRAVENOUS at 09:03

## 2017-12-13 ENCOUNTER — APPOINTMENT (OUTPATIENT)
Dept: GENERAL RADIOLOGY | Facility: HOSPITAL | Age: 62
End: 2017-12-13
Attending: SURGERY

## 2017-12-13 LAB
ANION GAP SERPL CALCULATED.3IONS-SCNC: 15 MMOL/L (ref 3–11)
BUN BLD-MCNC: 59 MG/DL (ref 9–23)
BUN/CREAT SERPL: 16.4 (ref 7–25)
CALCIUM SPEC-SCNC: 8.2 MG/DL (ref 8.7–10.4)
CHLORIDE SERPL-SCNC: 100 MMOL/L (ref 99–109)
CO2 SERPL-SCNC: 19 MMOL/L (ref 20–31)
CREAT BLD-MCNC: 3.6 MG/DL (ref 0.6–1.3)
DEPRECATED RDW RBC AUTO: 47.5 FL (ref 37–54)
ERYTHROCYTE [DISTWIDTH] IN BLOOD BY AUTOMATED COUNT: 13.8 % (ref 11.3–14.5)
FERRITIN SERPL-MCNC: 417 NG/ML (ref 10–291)
GFR SERPL CREATININE-BSD FRML MDRD: 13 ML/MIN/1.73
GLUCOSE BLD-MCNC: 104 MG/DL (ref 70–100)
GLUCOSE BLDC GLUCOMTR-MCNC: 101 MG/DL (ref 70–130)
GLUCOSE BLDC GLUCOMTR-MCNC: 111 MG/DL (ref 70–130)
GLUCOSE BLDC GLUCOMTR-MCNC: 164 MG/DL (ref 70–130)
GLUCOSE BLDC GLUCOMTR-MCNC: 97 MG/DL (ref 70–130)
HCT VFR BLD AUTO: 26.5 % (ref 34.5–44)
HGB BLD-MCNC: 8.6 G/DL (ref 11.5–15.5)
IRON 24H UR-MRATE: 10 MCG/DL (ref 50–175)
IRON SATN MFR SERPL: 5 % (ref 15–50)
MCH RBC QN AUTO: 30 PG (ref 27–31)
MCHC RBC AUTO-ENTMCNC: 32.5 G/DL (ref 32–36)
MCV RBC AUTO: 92.3 FL (ref 80–99)
PLATELET # BLD AUTO: 200 10*3/MM3 (ref 150–450)
PMV BLD AUTO: 11 FL (ref 6–12)
POTASSIUM BLD-SCNC: 5.1 MMOL/L (ref 3.5–5.5)
RBC # BLD AUTO: 2.87 10*6/MM3 (ref 3.89–5.14)
SODIUM BLD-SCNC: 134 MMOL/L (ref 132–146)
TIBC SERPL-MCNC: 184 MCG/DL (ref 250–450)
WBC NRBC COR # BLD: 14.35 10*3/MM3 (ref 3.5–10.8)

## 2017-12-13 PROCEDURE — 25010000003 CEFAZOLIN IN DEXTROSE 2-4 GM/100ML-% SOLUTION: Performed by: SURGERY

## 2017-12-13 PROCEDURE — 25010000002 ONDANSETRON PER 1 MG: Performed by: INTERNAL MEDICINE

## 2017-12-13 PROCEDURE — 82728 ASSAY OF FERRITIN: CPT | Performed by: INTERNAL MEDICINE

## 2017-12-13 PROCEDURE — 25010000002 NA FERRIC GLUC CPLX PER 12.5 MG: Performed by: INTERNAL MEDICINE

## 2017-12-13 PROCEDURE — 80048 BASIC METABOLIC PNL TOTAL CA: CPT | Performed by: INTERNAL MEDICINE

## 2017-12-13 PROCEDURE — 82962 GLUCOSE BLOOD TEST: CPT

## 2017-12-13 PROCEDURE — 97116 GAIT TRAINING THERAPY: CPT

## 2017-12-13 PROCEDURE — 83550 IRON BINDING TEST: CPT | Performed by: INTERNAL MEDICINE

## 2017-12-13 PROCEDURE — 63710000001 PREDNISONE PER 1 MG: Performed by: INTERNAL MEDICINE

## 2017-12-13 PROCEDURE — 99232 SBSQ HOSP IP/OBS MODERATE 35: CPT | Performed by: INTERNAL MEDICINE

## 2017-12-13 PROCEDURE — 74020 HC XR ABDOMEN FLAT & UPRIGHT: CPT

## 2017-12-13 PROCEDURE — 83540 ASSAY OF IRON: CPT | Performed by: INTERNAL MEDICINE

## 2017-12-13 PROCEDURE — 85027 COMPLETE CBC AUTOMATED: CPT | Performed by: INTERNAL MEDICINE

## 2017-12-13 RX ORDER — PREDNISONE 20 MG/1
20 TABLET ORAL
Status: COMPLETED | OUTPATIENT
Start: 2017-12-13 | End: 2017-12-13

## 2017-12-13 RX ORDER — PREDNISONE 1 MG/1
5 TABLET ORAL
Status: COMPLETED | OUTPATIENT
Start: 2017-12-16 | End: 2017-12-17

## 2017-12-13 RX ORDER — CEFAZOLIN SODIUM 2 G/100ML
2 INJECTION, SOLUTION INTRAVENOUS EVERY 8 HOURS
Status: DISCONTINUED | OUTPATIENT
Start: 2017-12-13 | End: 2017-12-14

## 2017-12-13 RX ORDER — PREDNISONE 10 MG/1
10 TABLET ORAL
Status: COMPLETED | OUTPATIENT
Start: 2017-12-14 | End: 2017-12-15

## 2017-12-13 RX ORDER — ALPRAZOLAM 0.5 MG/1
0.5 TABLET ORAL 3 TIMES DAILY PRN
Status: DISPENSED | OUTPATIENT
Start: 2017-12-13 | End: 2018-01-01

## 2017-12-13 RX ORDER — AMLODIPINE BESYLATE 5 MG/1
5 TABLET ORAL
Status: DISCONTINUED | OUTPATIENT
Start: 2017-12-13 | End: 2017-12-14

## 2017-12-13 RX ADMIN — OXYCODONE AND ACETAMINOPHEN 2 TABLET: 5; 325 TABLET ORAL at 04:17

## 2017-12-13 RX ADMIN — ONDANSETRON 4 MG: 2 INJECTION INTRAMUSCULAR; INTRAVENOUS at 09:01

## 2017-12-13 RX ADMIN — OXYCODONE AND ACETAMINOPHEN 1 TABLET: 5; 325 TABLET ORAL at 11:28

## 2017-12-13 RX ADMIN — PREDNISONE 20 MG: 20 TABLET ORAL at 09:29

## 2017-12-13 RX ADMIN — FAMOTIDINE 20 MG: 20 TABLET, FILM COATED ORAL at 18:20

## 2017-12-13 RX ADMIN — SODIUM BICARBONATE 650 MG TABLET 650 MG: at 18:20

## 2017-12-13 RX ADMIN — SODIUM CHLORIDE 125 MG: 9 INJECTION, SOLUTION INTRAVENOUS at 12:59

## 2017-12-13 RX ADMIN — CEFAZOLIN SODIUM 2 G: 2 INJECTION, SOLUTION INTRAVENOUS at 09:10

## 2017-12-13 RX ADMIN — BISACODYL 10 MG: 10 SUPPOSITORY RECTAL at 18:20

## 2017-12-13 RX ADMIN — AMLODIPINE BESYLATE 5 MG: 5 TABLET ORAL at 09:28

## 2017-12-13 RX ADMIN — CEFAZOLIN SODIUM 2 G: 2 INJECTION, SOLUTION INTRAVENOUS at 18:23

## 2017-12-13 RX ADMIN — FAMOTIDINE 20 MG: 20 TABLET, FILM COATED ORAL at 09:22

## 2017-12-13 RX ADMIN — OXYCODONE AND ACETAMINOPHEN 1 TABLET: 5; 325 TABLET ORAL at 21:27

## 2017-12-13 RX ADMIN — SERTRALINE HYDROCHLORIDE 100 MG: 100 TABLET ORAL at 09:22

## 2017-12-13 RX ADMIN — SODIUM BICARBONATE 650 MG TABLET 650 MG: at 09:22

## 2017-12-13 RX ADMIN — BISACODYL 10 MG: 10 SUPPOSITORY RECTAL at 11:27

## 2017-12-13 RX ADMIN — SODIUM BICARBONATE 650 MG TABLET 650 MG: at 21:28

## 2017-12-14 ENCOUNTER — APPOINTMENT (OUTPATIENT)
Dept: NEPHROLOGY | Facility: HOSPITAL | Age: 62
End: 2017-12-14
Attending: INTERNAL MEDICINE

## 2017-12-14 LAB
ANION GAP SERPL CALCULATED.3IONS-SCNC: 14 MMOL/L (ref 3–11)
BACTERIA SPEC AEROBE CULT: ABNORMAL
BACTERIA SPEC AEROBE CULT: ABNORMAL
BUN BLD-MCNC: 74 MG/DL (ref 9–23)
BUN/CREAT SERPL: 14.2 (ref 7–25)
CALCIUM SPEC-SCNC: 8.5 MG/DL (ref 8.7–10.4)
CHLORIDE SERPL-SCNC: 101 MMOL/L (ref 99–109)
CO2 SERPL-SCNC: 19 MMOL/L (ref 20–31)
CREAT BLD-MCNC: 5.2 MG/DL (ref 0.6–1.3)
DEPRECATED RDW RBC AUTO: 47.4 FL (ref 37–54)
ERYTHROCYTE [DISTWIDTH] IN BLOOD BY AUTOMATED COUNT: 14.2 % (ref 11.3–14.5)
GFR SERPL CREATININE-BSD FRML MDRD: 8 ML/MIN/1.73
GLUCOSE BLD-MCNC: 76 MG/DL (ref 70–100)
GLUCOSE BLDC GLUCOMTR-MCNC: 74 MG/DL (ref 70–130)
GLUCOSE BLDC GLUCOMTR-MCNC: 75 MG/DL (ref 70–130)
GLUCOSE BLDC GLUCOMTR-MCNC: 79 MG/DL (ref 70–130)
GLUCOSE BLDC GLUCOMTR-MCNC: 89 MG/DL (ref 70–130)
GLUCOSE BLDC GLUCOMTR-MCNC: 91 MG/DL (ref 70–130)
HCT VFR BLD AUTO: 24.8 % (ref 34.5–44)
HGB BLD-MCNC: 8.1 G/DL (ref 11.5–15.5)
MCH RBC QN AUTO: 30.2 PG (ref 27–31)
MCHC RBC AUTO-ENTMCNC: 32.7 G/DL (ref 32–36)
MCV RBC AUTO: 92.5 FL (ref 80–99)
PLATELET # BLD AUTO: 225 10*3/MM3 (ref 150–450)
PMV BLD AUTO: 10.8 FL (ref 6–12)
POTASSIUM BLD-SCNC: 5 MMOL/L (ref 3.5–5.5)
RBC # BLD AUTO: 2.68 10*6/MM3 (ref 3.89–5.14)
SODIUM BLD-SCNC: 134 MMOL/L (ref 132–146)
WBC NRBC COR # BLD: 11.66 10*3/MM3 (ref 3.5–10.8)

## 2017-12-14 PROCEDURE — 25010000003 CEFAZOLIN IN DEXTROSE 2-4 GM/100ML-% SOLUTION: Performed by: SURGERY

## 2017-12-14 PROCEDURE — 85027 COMPLETE CBC AUTOMATED: CPT | Performed by: SURGERY

## 2017-12-14 PROCEDURE — 80048 BASIC METABOLIC PNL TOTAL CA: CPT | Performed by: INTERNAL MEDICINE

## 2017-12-14 PROCEDURE — 25010000002 NA FERRIC GLUC CPLX PER 12.5 MG: Performed by: INTERNAL MEDICINE

## 2017-12-14 PROCEDURE — 25010000002 HEPARIN (PORCINE) PER 1000 UNITS: Performed by: INTERNAL MEDICINE

## 2017-12-14 PROCEDURE — 82962 GLUCOSE BLOOD TEST: CPT

## 2017-12-14 PROCEDURE — P9046 ALBUMIN (HUMAN), 25%, 20 ML: HCPCS | Performed by: INTERNAL MEDICINE

## 2017-12-14 PROCEDURE — 63710000001 PREDNISONE PER 5 MG: Performed by: INTERNAL MEDICINE

## 2017-12-14 PROCEDURE — 25010000002 ALBUMIN HUMAN 25% PER 50 ML: Performed by: INTERNAL MEDICINE

## 2017-12-14 PROCEDURE — 63510000001 EPOETIN ALFA PER 1000 UNITS: Performed by: INTERNAL MEDICINE

## 2017-12-14 PROCEDURE — 99233 SBSQ HOSP IP/OBS HIGH 50: CPT | Performed by: INTERNAL MEDICINE

## 2017-12-14 PROCEDURE — 25010000002 METOCLOPRAMIDE PER 10 MG

## 2017-12-14 RX ORDER — MAGNESIUM CARB/ALUMINUM HYDROX 105-160MG
30 TABLET,CHEWABLE ORAL 3 TIMES DAILY
Status: DISCONTINUED | OUTPATIENT
Start: 2017-12-14 | End: 2017-12-20

## 2017-12-14 RX ORDER — ALBUMIN (HUMAN) 12.5 G/50ML
25 SOLUTION INTRAVENOUS AS NEEDED
Status: ACTIVE | OUTPATIENT
Start: 2017-12-14 | End: 2017-12-15

## 2017-12-14 RX ORDER — ALBUMIN (HUMAN) 12.5 G/50ML
12.5 SOLUTION INTRAVENOUS AS NEEDED
Status: ACTIVE | OUTPATIENT
Start: 2017-12-14 | End: 2017-12-14

## 2017-12-14 RX ORDER — HEPARIN SODIUM 5000 [USP'U]/ML
5000 INJECTION, SOLUTION INTRAVENOUS; SUBCUTANEOUS EVERY 8 HOURS SCHEDULED
Status: DISCONTINUED | OUTPATIENT
Start: 2017-12-14 | End: 2017-12-19

## 2017-12-14 RX ORDER — METOCLOPRAMIDE HYDROCHLORIDE 5 MG/ML
5 INJECTION INTRAMUSCULAR; INTRAVENOUS EVERY 6 HOURS SCHEDULED
Status: DISCONTINUED | OUTPATIENT
Start: 2017-12-14 | End: 2017-12-22

## 2017-12-14 RX ORDER — METOCLOPRAMIDE HYDROCHLORIDE 5 MG/ML
10 INJECTION INTRAMUSCULAR; INTRAVENOUS EVERY 6 HOURS
Status: DISCONTINUED | OUTPATIENT
Start: 2017-12-14 | End: 2017-12-14

## 2017-12-14 RX ORDER — FAMOTIDINE 20 MG/1
20 TABLET, FILM COATED ORAL DAILY
Status: DISCONTINUED | OUTPATIENT
Start: 2017-12-15 | End: 2017-12-22

## 2017-12-14 RX ADMIN — ALBUMIN HUMAN 25 G: 0.25 SOLUTION INTRAVENOUS at 08:32

## 2017-12-14 RX ADMIN — HEPARIN SODIUM 5000 UNITS: 5000 INJECTION, SOLUTION INTRAVENOUS; SUBCUTANEOUS at 21:11

## 2017-12-14 RX ADMIN — ERYTHROPOIETIN 10000 UNITS: 10000 INJECTION, SOLUTION INTRAVENOUS; SUBCUTANEOUS at 08:26

## 2017-12-14 RX ADMIN — MINERAL OIL 30 ML: 1000 SOLUTION ORAL at 17:57

## 2017-12-14 RX ADMIN — OXYCODONE AND ACETAMINOPHEN 1 TABLET: 5; 325 TABLET ORAL at 15:11

## 2017-12-14 RX ADMIN — SODIUM CHLORIDE 125 MG: 9 INJECTION, SOLUTION INTRAVENOUS at 08:26

## 2017-12-14 RX ADMIN — METOCLOPRAMIDE 5 MG: 5 INJECTION, SOLUTION INTRAMUSCULAR; INTRAVENOUS at 17:56

## 2017-12-14 RX ADMIN — MINERAL OIL 30 ML: 1000 SOLUTION ORAL at 13:07

## 2017-12-14 RX ADMIN — SODIUM BICARBONATE 650 MG TABLET 650 MG: at 17:56

## 2017-12-14 RX ADMIN — OXYCODONE AND ACETAMINOPHEN 1 TABLET: 5; 325 TABLET ORAL at 22:43

## 2017-12-14 RX ADMIN — ALBUMIN HUMAN 25 G: 0.25 SOLUTION INTRAVENOUS at 08:33

## 2017-12-14 RX ADMIN — SODIUM BICARBONATE 650 MG TABLET 650 MG: at 10:47

## 2017-12-14 RX ADMIN — MINERAL OIL 30 ML: 1000 SOLUTION ORAL at 21:11

## 2017-12-14 RX ADMIN — PREDNISONE 10 MG: 10 TABLET ORAL at 10:55

## 2017-12-14 RX ADMIN — SODIUM BICARBONATE 650 MG TABLET 650 MG: at 21:11

## 2017-12-14 RX ADMIN — FAMOTIDINE 20 MG: 20 TABLET, FILM COATED ORAL at 10:47

## 2017-12-14 RX ADMIN — CEFAZOLIN SODIUM 2 G: 2 INJECTION, SOLUTION INTRAVENOUS at 01:23

## 2017-12-14 RX ADMIN — SERTRALINE HYDROCHLORIDE 100 MG: 100 TABLET ORAL at 10:48

## 2017-12-14 RX ADMIN — HEPARIN SODIUM 5000 UNITS: 5000 INJECTION, SOLUTION INTRAVENOUS; SUBCUTANEOUS at 15:10

## 2017-12-14 RX ADMIN — ALPRAZOLAM 0.5 MG: 0.5 TABLET ORAL at 06:09

## 2017-12-14 RX ADMIN — CEFAZOLIN SODIUM 2 G: 2 INJECTION, SOLUTION INTRAVENOUS at 12:50

## 2017-12-15 ENCOUNTER — APPOINTMENT (OUTPATIENT)
Dept: GENERAL RADIOLOGY | Facility: HOSPITAL | Age: 62
End: 2017-12-15
Attending: SURGERY

## 2017-12-15 ENCOUNTER — TELEPHONE (OUTPATIENT)
Dept: NEUROSURGERY | Facility: CLINIC | Age: 62
End: 2017-12-15

## 2017-12-15 ENCOUNTER — APPOINTMENT (OUTPATIENT)
Dept: GENERAL RADIOLOGY | Facility: HOSPITAL | Age: 62
End: 2017-12-15

## 2017-12-15 LAB
ANION GAP SERPL CALCULATED.3IONS-SCNC: 17 MMOL/L (ref 3–11)
BUN BLD-MCNC: 44 MG/DL (ref 9–23)
BUN/CREAT SERPL: 11.6 (ref 7–25)
CALCIUM SPEC-SCNC: 9.2 MG/DL (ref 8.7–10.4)
CHLORIDE SERPL-SCNC: 102 MMOL/L (ref 99–109)
CO2 SERPL-SCNC: 21 MMOL/L (ref 20–31)
CREAT BLD-MCNC: 3.8 MG/DL (ref 0.6–1.3)
DEPRECATED RDW RBC AUTO: 49.3 FL (ref 37–54)
ERYTHROCYTE [DISTWIDTH] IN BLOOD BY AUTOMATED COUNT: 14.3 % (ref 11.3–14.5)
GFR SERPL CREATININE-BSD FRML MDRD: 12 ML/MIN/1.73
GLUCOSE BLD-MCNC: 92 MG/DL (ref 70–100)
GLUCOSE BLDC GLUCOMTR-MCNC: 110 MG/DL (ref 70–130)
GLUCOSE BLDC GLUCOMTR-MCNC: 112 MG/DL (ref 70–130)
GLUCOSE BLDC GLUCOMTR-MCNC: 135 MG/DL (ref 70–130)
HCT VFR BLD AUTO: 24.1 % (ref 34.5–44)
HGB BLD-MCNC: 7.7 G/DL (ref 11.5–15.5)
MAGNESIUM SERPL-MCNC: 2.4 MG/DL (ref 1.3–2.7)
MCH RBC QN AUTO: 30.2 PG (ref 27–31)
MCHC RBC AUTO-ENTMCNC: 32 G/DL (ref 32–36)
MCV RBC AUTO: 94.5 FL (ref 80–99)
PHOSPHATE SERPL-MCNC: 6.1 MG/DL (ref 2.4–5.1)
PLATELET # BLD AUTO: 261 10*3/MM3 (ref 150–450)
PMV BLD AUTO: 10.7 FL (ref 6–12)
POTASSIUM BLD-SCNC: 4.3 MMOL/L (ref 3.5–5.5)
RBC # BLD AUTO: 2.55 10*6/MM3 (ref 3.89–5.14)
SODIUM BLD-SCNC: 140 MMOL/L (ref 132–146)
WBC NRBC COR # BLD: 9.98 10*3/MM3 (ref 3.5–10.8)

## 2017-12-15 PROCEDURE — 71010 HC CHEST PA OR AP: CPT

## 2017-12-15 PROCEDURE — 25010000002 NA FERRIC GLUC CPLX PER 12.5 MG: Performed by: INTERNAL MEDICINE

## 2017-12-15 PROCEDURE — 25010000002 METOCLOPRAMIDE PER 10 MG

## 2017-12-15 PROCEDURE — 25010000002 MAGNESIUM SULFATE PER 500 MG OF MAGNESIUM: Performed by: SURGERY

## 2017-12-15 PROCEDURE — 05H633Z INSERTION OF INFUSION DEVICE INTO LEFT SUBCLAVIAN VEIN, PERCUTANEOUS APPROACH: ICD-10-PCS | Performed by: SURGERY

## 2017-12-15 PROCEDURE — 99233 SBSQ HOSP IP/OBS HIGH 50: CPT | Performed by: INTERNAL MEDICINE

## 2017-12-15 PROCEDURE — 25010000002 CALCIUM GLUCONATE PER 10 ML: Performed by: SURGERY

## 2017-12-15 PROCEDURE — 83735 ASSAY OF MAGNESIUM: CPT

## 2017-12-15 PROCEDURE — 80048 BASIC METABOLIC PNL TOTAL CA: CPT | Performed by: INTERNAL MEDICINE

## 2017-12-15 PROCEDURE — 63710000001 PREDNISONE PER 5 MG: Performed by: INTERNAL MEDICINE

## 2017-12-15 PROCEDURE — 74020 HC XR ABDOMEN FLAT & UPRIGHT: CPT

## 2017-12-15 PROCEDURE — 85027 COMPLETE CBC AUTOMATED: CPT | Performed by: INTERNAL MEDICINE

## 2017-12-15 PROCEDURE — 97110 THERAPEUTIC EXERCISES: CPT

## 2017-12-15 PROCEDURE — 25010000003 CEFAZOLIN 1 GM/50ML SOLUTION

## 2017-12-15 PROCEDURE — 84100 ASSAY OF PHOSPHORUS: CPT

## 2017-12-15 PROCEDURE — 3E0336Z INTRODUCTION OF NUTRITIONAL SUBSTANCE INTO PERIPHERAL VEIN, PERCUTANEOUS APPROACH: ICD-10-PCS | Performed by: INTERNAL MEDICINE

## 2017-12-15 PROCEDURE — 25010000002 HEPARIN (PORCINE) PER 1000 UNITS: Performed by: INTERNAL MEDICINE

## 2017-12-15 PROCEDURE — 97116 GAIT TRAINING THERAPY: CPT

## 2017-12-15 PROCEDURE — 82962 GLUCOSE BLOOD TEST: CPT

## 2017-12-15 PROCEDURE — 25010000002 POTASSIUM CHLORIDE PER 2 MEQ OF POTASSIUM: Performed by: SURGERY

## 2017-12-15 RX ADMIN — SODIUM BICARBONATE 650 MG TABLET 650 MG: at 17:40

## 2017-12-15 RX ADMIN — FAMOTIDINE 20 MG: 20 TABLET, FILM COATED ORAL at 08:46

## 2017-12-15 RX ADMIN — HEPARIN SODIUM 5000 UNITS: 5000 INJECTION, SOLUTION INTRAVENOUS; SUBCUTANEOUS at 13:46

## 2017-12-15 RX ADMIN — I.V. FAT EMULSION 250 ML: 20 EMULSION INTRAVENOUS at 21:00

## 2017-12-15 RX ADMIN — SODIUM CHLORIDE 125 MG: 9 INJECTION, SOLUTION INTRAVENOUS at 08:46

## 2017-12-15 RX ADMIN — MINERAL OIL 30 ML: 1000 SOLUTION ORAL at 20:58

## 2017-12-15 RX ADMIN — SERTRALINE HYDROCHLORIDE 100 MG: 100 TABLET ORAL at 08:46

## 2017-12-15 RX ADMIN — SODIUM BICARBONATE 650 MG TABLET 650 MG: at 08:46

## 2017-12-15 RX ADMIN — METOCLOPRAMIDE 5 MG: 5 INJECTION, SOLUTION INTRAMUSCULAR; INTRAVENOUS at 13:47

## 2017-12-15 RX ADMIN — HEPARIN SODIUM 5000 UNITS: 5000 INJECTION, SOLUTION INTRAVENOUS; SUBCUTANEOUS at 06:17

## 2017-12-15 RX ADMIN — HEPARIN SODIUM 5000 UNITS: 5000 INJECTION, SOLUTION INTRAVENOUS; SUBCUTANEOUS at 20:59

## 2017-12-15 RX ADMIN — POTASSIUM CHLORIDE: 2 INJECTION, SOLUTION, CONCENTRATE INTRAVENOUS at 21:00

## 2017-12-15 RX ADMIN — PREDNISONE 10 MG: 10 TABLET ORAL at 08:46

## 2017-12-15 RX ADMIN — SODIUM BICARBONATE 650 MG TABLET 650 MG: at 20:58

## 2017-12-15 RX ADMIN — METOCLOPRAMIDE 5 MG: 5 INJECTION, SOLUTION INTRAMUSCULAR; INTRAVENOUS at 00:16

## 2017-12-15 RX ADMIN — MINERAL OIL 30 ML: 1000 SOLUTION ORAL at 17:40

## 2017-12-15 RX ADMIN — CEFAZOLIN SODIUM 1 G: 1 INJECTION, SOLUTION INTRAVENOUS at 13:46

## 2017-12-15 RX ADMIN — METOCLOPRAMIDE 5 MG: 5 INJECTION, SOLUTION INTRAMUSCULAR; INTRAVENOUS at 23:50

## 2017-12-15 RX ADMIN — METOCLOPRAMIDE 5 MG: 5 INJECTION, SOLUTION INTRAMUSCULAR; INTRAVENOUS at 17:40

## 2017-12-15 RX ADMIN — ALPRAZOLAM 0.5 MG: 0.5 TABLET ORAL at 00:52

## 2017-12-15 RX ADMIN — OXYCODONE AND ACETAMINOPHEN 1 TABLET: 5; 325 TABLET ORAL at 15:11

## 2017-12-15 RX ADMIN — OXYCODONE AND ACETAMINOPHEN 1 TABLET: 5; 325 TABLET ORAL at 07:28

## 2017-12-15 RX ADMIN — METOCLOPRAMIDE 5 MG: 5 INJECTION, SOLUTION INTRAMUSCULAR; INTRAVENOUS at 06:17

## 2017-12-15 RX ADMIN — MINERAL OIL 30 ML: 1000 SOLUTION ORAL at 08:46

## 2017-12-15 RX ADMIN — OXYCODONE AND ACETAMINOPHEN 1 TABLET: 5; 325 TABLET ORAL at 20:58

## 2017-12-15 NOTE — TELEPHONE ENCOUNTER
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 Hollis a message, \" Tereza Ackerman is in rm 565 and has been there since 12/4.  We would like your advice on changing treatment.  Thanks.\"     He also wanted Dr. Hollis to know the pt is now having kidney problems and would like Dr. Hollis opinion as to what to do.     I sent Dr. Hollis a text message since he is at the hospital today. Kathy informed the  that we would be letting Dr. Hollis know.       "

## 2017-12-16 ENCOUNTER — APPOINTMENT (OUTPATIENT)
Dept: NEPHROLOGY | Facility: HOSPITAL | Age: 62
End: 2017-12-16
Attending: INTERNAL MEDICINE

## 2017-12-16 LAB
ALBUMIN SERPL-MCNC: 3.6 G/DL (ref 3.2–4.8)
ALBUMIN SERPL-MCNC: 3.6 G/DL (ref 3.2–4.8)
ALBUMIN/GLOB SERPL: 1.4 G/DL (ref 1.5–2.5)
ALP SERPL-CCNC: 110 U/L (ref 25–100)
ALT SERPL W P-5'-P-CCNC: 2 U/L (ref 7–40)
ANION GAP SERPL CALCULATED.3IONS-SCNC: 15 MMOL/L (ref 3–11)
ANION GAP SERPL CALCULATED.3IONS-SCNC: 17 MMOL/L (ref 3–11)
AST SERPL-CCNC: 26 U/L (ref 0–33)
BILIRUB SERPL-MCNC: 0.2 MG/DL (ref 0.3–1.2)
BUN BLD-MCNC: 67 MG/DL (ref 9–23)
BUN BLD-MCNC: 68 MG/DL (ref 9–23)
BUN/CREAT SERPL: 12 (ref 7–25)
BUN/CREAT SERPL: 12.4 (ref 7–25)
CA-I SERPL ISE-MCNC: 1.2 MMOL/L (ref 1.12–1.32)
CALCIUM SPEC-SCNC: 8.9 MG/DL (ref 8.7–10.4)
CALCIUM SPEC-SCNC: 9.1 MG/DL (ref 8.7–10.4)
CHLORIDE SERPL-SCNC: 100 MMOL/L (ref 99–109)
CHLORIDE SERPL-SCNC: 103 MMOL/L (ref 99–109)
CO2 SERPL-SCNC: 20 MMOL/L (ref 20–31)
CO2 SERPL-SCNC: 22 MMOL/L (ref 20–31)
CREAT BLD-MCNC: 5.5 MG/DL (ref 0.6–1.3)
CREAT BLD-MCNC: 5.6 MG/DL (ref 0.6–1.3)
DEPRECATED RDW RBC AUTO: 50.1 FL (ref 37–54)
ERYTHROCYTE [DISTWIDTH] IN BLOOD BY AUTOMATED COUNT: 14.6 % (ref 11.3–14.5)
GFR SERPL CREATININE-BSD FRML MDRD: 8 ML/MIN/1.73
GFR SERPL CREATININE-BSD FRML MDRD: 8 ML/MIN/1.73
GLOBULIN UR ELPH-MCNC: 2.6 GM/DL
GLUCOSE BLD-MCNC: 125 MG/DL (ref 70–100)
GLUCOSE BLD-MCNC: 126 MG/DL (ref 70–100)
GLUCOSE BLDC GLUCOMTR-MCNC: 132 MG/DL (ref 70–130)
GLUCOSE BLDC GLUCOMTR-MCNC: 136 MG/DL (ref 70–130)
GLUCOSE BLDC GLUCOMTR-MCNC: 141 MG/DL (ref 70–130)
GLUCOSE BLDC GLUCOMTR-MCNC: 150 MG/DL (ref 70–130)
HCT VFR BLD AUTO: 25.8 % (ref 34.5–44)
HGB BLD-MCNC: 8.2 G/DL (ref 11.5–15.5)
MAGNESIUM SERPL-MCNC: 2.5 MG/DL (ref 1.3–2.7)
MCH RBC QN AUTO: 30.3 PG (ref 27–31)
MCHC RBC AUTO-ENTMCNC: 31.8 G/DL (ref 32–36)
MCV RBC AUTO: 95.2 FL (ref 80–99)
PHOSPHATE SERPL-MCNC: 7.2 MG/DL (ref 2.4–5.1)
PHOSPHATE SERPL-MCNC: 7.2 MG/DL (ref 2.4–5.1)
PLATELET # BLD AUTO: 281 10*3/MM3 (ref 150–450)
PMV BLD AUTO: 10.5 FL (ref 6–12)
POTASSIUM BLD-SCNC: 4 MMOL/L (ref 3.5–5.5)
POTASSIUM BLD-SCNC: 4.1 MMOL/L (ref 3.5–5.5)
PROT SERPL-MCNC: 6.2 G/DL (ref 5.7–8.2)
RBC # BLD AUTO: 2.71 10*6/MM3 (ref 3.89–5.14)
SODIUM BLD-SCNC: 137 MMOL/L (ref 132–146)
SODIUM BLD-SCNC: 140 MMOL/L (ref 132–146)
WBC NRBC COR # BLD: 10.68 10*3/MM3 (ref 3.5–10.8)

## 2017-12-16 PROCEDURE — 25010000002 NA FERRIC GLUC CPLX PER 12.5 MG: Performed by: INTERNAL MEDICINE

## 2017-12-16 PROCEDURE — 85027 COMPLETE CBC AUTOMATED: CPT | Performed by: INTERNAL MEDICINE

## 2017-12-16 PROCEDURE — 25010000002 POTASSIUM CHLORIDE PER 2 MEQ OF POTASSIUM

## 2017-12-16 PROCEDURE — 99233 SBSQ HOSP IP/OBS HIGH 50: CPT | Performed by: INTERNAL MEDICINE

## 2017-12-16 PROCEDURE — 82330 ASSAY OF CALCIUM: CPT | Performed by: SURGERY

## 2017-12-16 PROCEDURE — 84100 ASSAY OF PHOSPHORUS: CPT | Performed by: SURGERY

## 2017-12-16 PROCEDURE — 80053 COMPREHEN METABOLIC PANEL: CPT | Performed by: SURGERY

## 2017-12-16 PROCEDURE — 25010000002 METOCLOPRAMIDE PER 10 MG

## 2017-12-16 PROCEDURE — 25010000002 HEPARIN (PORCINE) PER 1000 UNITS: Performed by: INTERNAL MEDICINE

## 2017-12-16 PROCEDURE — 63510000001 EPOETIN ALFA PER 1000 UNITS: Performed by: INTERNAL MEDICINE

## 2017-12-16 PROCEDURE — 25010000002 MAGNESIUM SULFATE PER 500 MG OF MAGNESIUM

## 2017-12-16 PROCEDURE — 83735 ASSAY OF MAGNESIUM: CPT | Performed by: SURGERY

## 2017-12-16 PROCEDURE — 82962 GLUCOSE BLOOD TEST: CPT

## 2017-12-16 PROCEDURE — 25010000002 CALCIUM GLUCONATE PER 10 ML

## 2017-12-16 PROCEDURE — 25010000002 THIAMINE PER 100 MG

## 2017-12-16 PROCEDURE — 80069 RENAL FUNCTION PANEL: CPT | Performed by: INTERNAL MEDICINE

## 2017-12-16 PROCEDURE — 63710000001 PREDNISONE PER 5 MG: Performed by: INTERNAL MEDICINE

## 2017-12-16 RX ORDER — THIAMINE HYDROCHLORIDE 100 MG/ML
100 INJECTION, SOLUTION INTRAMUSCULAR; INTRAVENOUS DAILY
Status: DISCONTINUED | OUTPATIENT
Start: 2017-12-21 | End: 2017-12-27

## 2017-12-16 RX ADMIN — METOCLOPRAMIDE 5 MG: 5 INJECTION, SOLUTION INTRAMUSCULAR; INTRAVENOUS at 12:04

## 2017-12-16 RX ADMIN — SODIUM BICARBONATE 650 MG TABLET 650 MG: at 20:36

## 2017-12-16 RX ADMIN — SODIUM BICARBONATE 650 MG TABLET 650 MG: at 09:36

## 2017-12-16 RX ADMIN — ALPRAZOLAM 0.5 MG: 0.5 TABLET ORAL at 05:35

## 2017-12-16 RX ADMIN — HEPARIN SODIUM 5000 UNITS: 5000 INJECTION, SOLUTION INTRAVENOUS; SUBCUTANEOUS at 20:36

## 2017-12-16 RX ADMIN — MINERAL OIL 30 ML: 1000 SOLUTION ORAL at 20:36

## 2017-12-16 RX ADMIN — THIAMINE HYDROCHLORIDE 500 MG: 100 INJECTION, SOLUTION INTRAMUSCULAR; INTRAVENOUS at 12:04

## 2017-12-16 RX ADMIN — MINERAL OIL 30 ML: 1000 SOLUTION ORAL at 17:56

## 2017-12-16 RX ADMIN — METOCLOPRAMIDE 5 MG: 5 INJECTION, SOLUTION INTRAMUSCULAR; INTRAVENOUS at 07:46

## 2017-12-16 RX ADMIN — MINERAL OIL 30 ML: 1000 SOLUTION ORAL at 09:37

## 2017-12-16 RX ADMIN — SERTRALINE HYDROCHLORIDE 100 MG: 100 TABLET ORAL at 09:36

## 2017-12-16 RX ADMIN — POTASSIUM CHLORIDE: 2 INJECTION, SOLUTION, CONCENTRATE INTRAVENOUS at 17:56

## 2017-12-16 RX ADMIN — FAMOTIDINE 20 MG: 20 TABLET, FILM COATED ORAL at 09:36

## 2017-12-16 RX ADMIN — SODIUM CHLORIDE 125 MG: 9 INJECTION, SOLUTION INTRAVENOUS at 08:10

## 2017-12-16 RX ADMIN — METOCLOPRAMIDE 5 MG: 5 INJECTION, SOLUTION INTRAMUSCULAR; INTRAVENOUS at 17:56

## 2017-12-16 RX ADMIN — SODIUM BICARBONATE 650 MG TABLET 650 MG: at 15:11

## 2017-12-16 RX ADMIN — ERYTHROPOIETIN 10000 UNITS: 10000 INJECTION, SOLUTION INTRAVENOUS; SUBCUTANEOUS at 09:30

## 2017-12-16 RX ADMIN — HEPARIN SODIUM 5000 UNITS: 5000 INJECTION, SOLUTION INTRAVENOUS; SUBCUTANEOUS at 09:32

## 2017-12-16 RX ADMIN — OXYCODONE AND ACETAMINOPHEN 1 TABLET: 5; 325 TABLET ORAL at 14:11

## 2017-12-16 RX ADMIN — HEPARIN SODIUM 5000 UNITS: 5000 INJECTION, SOLUTION INTRAVENOUS; SUBCUTANEOUS at 15:10

## 2017-12-16 RX ADMIN — PREDNISONE 5 MG: 5 TABLET ORAL at 09:36

## 2017-12-17 ENCOUNTER — APPOINTMENT (OUTPATIENT)
Dept: CT IMAGING | Facility: HOSPITAL | Age: 62
End: 2017-12-17

## 2017-12-17 LAB
ALBUMIN SERPL-MCNC: 3.8 G/DL (ref 3.2–4.8)
ALBUMIN/GLOB SERPL: 1.7 G/DL (ref 1.5–2.5)
ALP SERPL-CCNC: 93 U/L (ref 25–100)
ALT SERPL W P-5'-P-CCNC: 5 U/L (ref 7–40)
ANION GAP SERPL CALCULATED.3IONS-SCNC: 13 MMOL/L (ref 3–11)
AST SERPL-CCNC: 22 U/L (ref 0–33)
BILIRUB SERPL-MCNC: 0.3 MG/DL (ref 0.3–1.2)
BUN BLD-MCNC: 43 MG/DL (ref 9–23)
BUN/CREAT SERPL: 11.9 (ref 7–25)
CA-I SERPL ISE-MCNC: 1.2 MMOL/L (ref 1.12–1.32)
CALCIUM SPEC-SCNC: 8.7 MG/DL (ref 8.7–10.4)
CHLORIDE SERPL-SCNC: 100 MMOL/L (ref 99–109)
CO2 SERPL-SCNC: 22 MMOL/L (ref 20–31)
CREAT BLD-MCNC: 3.6 MG/DL (ref 0.6–1.3)
DEPRECATED RDW RBC AUTO: 50.2 FL (ref 37–54)
ERYTHROCYTE [DISTWIDTH] IN BLOOD BY AUTOMATED COUNT: 15 % (ref 11.3–14.5)
GFR SERPL CREATININE-BSD FRML MDRD: 13 ML/MIN/1.73
GLOBULIN UR ELPH-MCNC: 2.3 GM/DL
GLUCOSE BLD-MCNC: 124 MG/DL (ref 70–100)
GLUCOSE BLDC GLUCOMTR-MCNC: 118 MG/DL (ref 70–130)
GLUCOSE BLDC GLUCOMTR-MCNC: 142 MG/DL (ref 70–130)
GLUCOSE BLDC GLUCOMTR-MCNC: 148 MG/DL (ref 70–130)
HCT VFR BLD AUTO: 25.6 % (ref 34.5–44)
HGB BLD-MCNC: 8 G/DL (ref 11.5–15.5)
MAGNESIUM SERPL-MCNC: 2.5 MG/DL (ref 1.3–2.7)
MCH RBC QN AUTO: 29.5 PG (ref 27–31)
MCHC RBC AUTO-ENTMCNC: 31.3 G/DL (ref 32–36)
MCV RBC AUTO: 94.5 FL (ref 80–99)
PHOSPHATE SERPL-MCNC: 2.1 MG/DL (ref 2.4–5.1)
PLATELET # BLD AUTO: 290 10*3/MM3 (ref 150–450)
PMV BLD AUTO: 10.8 FL (ref 6–12)
POTASSIUM BLD-SCNC: 4.4 MMOL/L (ref 3.5–5.5)
PROT SERPL-MCNC: 6.1 G/DL (ref 5.7–8.2)
RBC # BLD AUTO: 2.71 10*6/MM3 (ref 3.89–5.14)
SODIUM BLD-SCNC: 135 MMOL/L (ref 132–146)
WBC NRBC COR # BLD: 8.34 10*3/MM3 (ref 3.5–10.8)

## 2017-12-17 PROCEDURE — 25010000002 METOCLOPRAMIDE PER 10 MG

## 2017-12-17 PROCEDURE — 25010000002 POTASSIUM CHLORIDE PER 2 MEQ OF POTASSIUM

## 2017-12-17 PROCEDURE — 74176 CT ABD & PELVIS W/O CONTRAST: CPT

## 2017-12-17 PROCEDURE — 25010000002 THIAMINE PER 100 MG

## 2017-12-17 PROCEDURE — 63710000001 PREDNISONE PER 5 MG: Performed by: INTERNAL MEDICINE

## 2017-12-17 PROCEDURE — 25010000002 HEPARIN (PORCINE) PER 1000 UNITS: Performed by: INTERNAL MEDICINE

## 2017-12-17 PROCEDURE — 83735 ASSAY OF MAGNESIUM: CPT | Performed by: SURGERY

## 2017-12-17 PROCEDURE — 85027 COMPLETE CBC AUTOMATED: CPT | Performed by: SURGERY

## 2017-12-17 PROCEDURE — 80053 COMPREHEN METABOLIC PANEL: CPT | Performed by: SURGERY

## 2017-12-17 PROCEDURE — 99232 SBSQ HOSP IP/OBS MODERATE 35: CPT | Performed by: INTERNAL MEDICINE

## 2017-12-17 PROCEDURE — 84100 ASSAY OF PHOSPHORUS: CPT | Performed by: SURGERY

## 2017-12-17 PROCEDURE — 25010000002 CALCIUM GLUCONATE PER 10 ML

## 2017-12-17 PROCEDURE — 82330 ASSAY OF CALCIUM: CPT | Performed by: SURGERY

## 2017-12-17 PROCEDURE — 25010000002 MAGNESIUM SULFATE PER 500 MG OF MAGNESIUM

## 2017-12-17 PROCEDURE — 82962 GLUCOSE BLOOD TEST: CPT

## 2017-12-17 RX ADMIN — METOCLOPRAMIDE 5 MG: 5 INJECTION, SOLUTION INTRAMUSCULAR; INTRAVENOUS at 11:31

## 2017-12-17 RX ADMIN — OXYCODONE AND ACETAMINOPHEN 2 TABLET: 5; 325 TABLET ORAL at 21:30

## 2017-12-17 RX ADMIN — MINERAL OIL 30 ML: 1000 SOLUTION ORAL at 21:18

## 2017-12-17 RX ADMIN — SODIUM BICARBONATE 650 MG TABLET 650 MG: at 15:18

## 2017-12-17 RX ADMIN — METOCLOPRAMIDE 5 MG: 5 INJECTION, SOLUTION INTRAMUSCULAR; INTRAVENOUS at 05:48

## 2017-12-17 RX ADMIN — HEPARIN SODIUM 5000 UNITS: 5000 INJECTION, SOLUTION INTRAVENOUS; SUBCUTANEOUS at 15:18

## 2017-12-17 RX ADMIN — HEPARIN SODIUM 5000 UNITS: 5000 INJECTION, SOLUTION INTRAVENOUS; SUBCUTANEOUS at 05:47

## 2017-12-17 RX ADMIN — POTASSIUM CHLORIDE: 2 INJECTION, SOLUTION, CONCENTRATE INTRAVENOUS at 17:44

## 2017-12-17 RX ADMIN — METOCLOPRAMIDE 5 MG: 5 INJECTION, SOLUTION INTRAMUSCULAR; INTRAVENOUS at 17:44

## 2017-12-17 RX ADMIN — THIAMINE HYDROCHLORIDE 500 MG: 100 INJECTION, SOLUTION INTRAMUSCULAR; INTRAVENOUS at 08:21

## 2017-12-17 RX ADMIN — SODIUM BICARBONATE 650 MG TABLET 650 MG: at 21:19

## 2017-12-17 RX ADMIN — MINERAL OIL 30 ML: 1000 SOLUTION ORAL at 08:21

## 2017-12-17 RX ADMIN — OXYCODONE AND ACETAMINOPHEN 1 TABLET: 5; 325 TABLET ORAL at 04:14

## 2017-12-17 RX ADMIN — SERTRALINE HYDROCHLORIDE 100 MG: 100 TABLET ORAL at 08:21

## 2017-12-17 RX ADMIN — MINERAL OIL 30 ML: 1000 SOLUTION ORAL at 15:18

## 2017-12-17 RX ADMIN — METOCLOPRAMIDE 5 MG: 5 INJECTION, SOLUTION INTRAMUSCULAR; INTRAVENOUS at 01:25

## 2017-12-17 RX ADMIN — FAMOTIDINE 20 MG: 20 TABLET, FILM COATED ORAL at 08:21

## 2017-12-17 RX ADMIN — SODIUM BICARBONATE 650 MG TABLET 650 MG: at 08:21

## 2017-12-17 RX ADMIN — BISACODYL 10 MG: 10 SUPPOSITORY RECTAL at 11:32

## 2017-12-17 RX ADMIN — PREDNISONE 5 MG: 5 TABLET ORAL at 08:21

## 2017-12-17 RX ADMIN — HEPARIN SODIUM 5000 UNITS: 5000 INJECTION, SOLUTION INTRAVENOUS; SUBCUTANEOUS at 21:19

## 2017-12-18 LAB
ALBUMIN SERPL-MCNC: 3.4 G/DL (ref 3.2–4.8)
ALBUMIN/GLOB SERPL: 1.3 G/DL (ref 1.5–2.5)
ALP SERPL-CCNC: 108 U/L (ref 25–100)
ALT SERPL W P-5'-P-CCNC: 6 U/L (ref 7–40)
ANION GAP SERPL CALCULATED.3IONS-SCNC: 8 MMOL/L (ref 3–11)
AST SERPL-CCNC: 29 U/L (ref 0–33)
BILIRUB SERPL-MCNC: 0.2 MG/DL (ref 0.3–1.2)
BUN BLD-MCNC: 75 MG/DL (ref 9–23)
BUN/CREAT SERPL: 13.6 (ref 7–25)
CA-I SERPL ISE-MCNC: 1.24 MMOL/L (ref 1.12–1.32)
CALCIUM SPEC-SCNC: 9 MG/DL (ref 8.7–10.4)
CHLORIDE SERPL-SCNC: 104 MMOL/L (ref 99–109)
CO2 SERPL-SCNC: 23 MMOL/L (ref 20–31)
CREAT BLD-MCNC: 5.5 MG/DL (ref 0.6–1.3)
GFR SERPL CREATININE-BSD FRML MDRD: 8 ML/MIN/1.73
GLOBULIN UR ELPH-MCNC: 2.6 GM/DL
GLUCOSE BLD-MCNC: 128 MG/DL (ref 70–100)
GLUCOSE BLDC GLUCOMTR-MCNC: 129 MG/DL (ref 70–130)
GLUCOSE BLDC GLUCOMTR-MCNC: 131 MG/DL (ref 70–130)
GLUCOSE BLDC GLUCOMTR-MCNC: 138 MG/DL (ref 70–130)
GLUCOSE BLDC GLUCOMTR-MCNC: 150 MG/DL (ref 70–130)
GLUCOSE BLDC GLUCOMTR-MCNC: 159 MG/DL (ref 70–130)
GLUCOSE BLDC GLUCOMTR-MCNC: 160 MG/DL (ref 70–130)
MAGNESIUM SERPL-MCNC: 2.9 MG/DL (ref 1.3–2.7)
PHOSPHATE SERPL-MCNC: 2.2 MG/DL (ref 2.4–5.1)
POTASSIUM BLD-SCNC: 5.3 MMOL/L (ref 3.5–5.5)
PROT SERPL-MCNC: 6 G/DL (ref 5.7–8.2)
SODIUM BLD-SCNC: 135 MMOL/L (ref 132–146)

## 2017-12-18 PROCEDURE — 99232 SBSQ HOSP IP/OBS MODERATE 35: CPT | Performed by: NURSE PRACTITIONER

## 2017-12-18 PROCEDURE — 82962 GLUCOSE BLOOD TEST: CPT

## 2017-12-18 PROCEDURE — 94799 UNLISTED PULMONARY SVC/PX: CPT

## 2017-12-18 PROCEDURE — 25010000002 POTASSIUM CHLORIDE PER 2 MEQ OF POTASSIUM

## 2017-12-18 PROCEDURE — 25010000002 HEPARIN (PORCINE) PER 1000 UNITS: Performed by: INTERNAL MEDICINE

## 2017-12-18 PROCEDURE — 80053 COMPREHEN METABOLIC PANEL: CPT | Performed by: SURGERY

## 2017-12-18 PROCEDURE — 25010000002 THIAMINE PER 100 MG

## 2017-12-18 PROCEDURE — 84100 ASSAY OF PHOSPHORUS: CPT | Performed by: INTERNAL MEDICINE

## 2017-12-18 PROCEDURE — 25010000002 MAGNESIUM SULFATE PER 500 MG OF MAGNESIUM

## 2017-12-18 PROCEDURE — 25010000002 METOCLOPRAMIDE PER 10 MG

## 2017-12-18 PROCEDURE — 82330 ASSAY OF CALCIUM: CPT | Performed by: SURGERY

## 2017-12-18 PROCEDURE — 25010000002 CALCIUM GLUCONATE PER 10 ML

## 2017-12-18 PROCEDURE — 63710000001 INSULIN REGULAR HUMAN PER 5 UNITS: Performed by: SURGERY

## 2017-12-18 PROCEDURE — 83735 ASSAY OF MAGNESIUM: CPT | Performed by: SURGERY

## 2017-12-18 RX ORDER — AMLODIPINE BESYLATE 2.5 MG/1
2.5 TABLET ORAL
Status: DISCONTINUED | OUTPATIENT
Start: 2017-12-18 | End: 2017-12-19

## 2017-12-18 RX ADMIN — METOCLOPRAMIDE 5 MG: 5 INJECTION, SOLUTION INTRAMUSCULAR; INTRAVENOUS at 23:27

## 2017-12-18 RX ADMIN — OXYCODONE AND ACETAMINOPHEN 2 TABLET: 5; 325 TABLET ORAL at 09:24

## 2017-12-18 RX ADMIN — SODIUM BICARBONATE 650 MG TABLET 650 MG: at 08:08

## 2017-12-18 RX ADMIN — SERTRALINE HYDROCHLORIDE 100 MG: 100 TABLET ORAL at 08:08

## 2017-12-18 RX ADMIN — MINERAL OIL 30 ML: 1000 SOLUTION ORAL at 21:39

## 2017-12-18 RX ADMIN — THIAMINE HYDROCHLORIDE 250 MG: 100 INJECTION, SOLUTION INTRAMUSCULAR; INTRAVENOUS at 08:07

## 2017-12-18 RX ADMIN — METOCLOPRAMIDE 5 MG: 5 INJECTION, SOLUTION INTRAMUSCULAR; INTRAVENOUS at 17:39

## 2017-12-18 RX ADMIN — HEPARIN SODIUM 5000 UNITS: 5000 INJECTION, SOLUTION INTRAVENOUS; SUBCUTANEOUS at 14:48

## 2017-12-18 RX ADMIN — MINERAL OIL 30 ML: 1000 SOLUTION ORAL at 16:01

## 2017-12-18 RX ADMIN — METOCLOPRAMIDE 5 MG: 5 INJECTION, SOLUTION INTRAMUSCULAR; INTRAVENOUS at 10:46

## 2017-12-18 RX ADMIN — SODIUM BICARBONATE 650 MG TABLET 650 MG: at 16:01

## 2017-12-18 RX ADMIN — HEPARIN SODIUM 5000 UNITS: 5000 INJECTION, SOLUTION INTRAVENOUS; SUBCUTANEOUS at 21:39

## 2017-12-18 RX ADMIN — SODIUM BICARBONATE 650 MG TABLET 650 MG: at 21:39

## 2017-12-18 RX ADMIN — OXYCODONE AND ACETAMINOPHEN 2 TABLET: 5; 325 TABLET ORAL at 21:49

## 2017-12-18 RX ADMIN — INSULIN HUMAN 2 UNITS: 100 INJECTION, SOLUTION PARENTERAL at 00:10

## 2017-12-18 RX ADMIN — FAMOTIDINE 20 MG: 20 TABLET, FILM COATED ORAL at 08:07

## 2017-12-18 RX ADMIN — OXYCODONE AND ACETAMINOPHEN 2 TABLET: 5; 325 TABLET ORAL at 13:51

## 2017-12-18 RX ADMIN — HEPARIN SODIUM 5000 UNITS: 5000 INJECTION, SOLUTION INTRAVENOUS; SUBCUTANEOUS at 05:42

## 2017-12-18 RX ADMIN — METOCLOPRAMIDE 5 MG: 5 INJECTION, SOLUTION INTRAMUSCULAR; INTRAVENOUS at 05:42

## 2017-12-18 RX ADMIN — AMLODIPINE BESYLATE 2.5 MG: 2.5 TABLET ORAL at 12:42

## 2017-12-18 RX ADMIN — MINERAL OIL 30 ML: 1000 SOLUTION ORAL at 08:07

## 2017-12-18 RX ADMIN — POTASSIUM CHLORIDE: 2 INJECTION, SOLUTION, CONCENTRATE INTRAVENOUS at 17:38

## 2017-12-18 RX ADMIN — METOCLOPRAMIDE 5 MG: 5 INJECTION, SOLUTION INTRAMUSCULAR; INTRAVENOUS at 00:03

## 2017-12-18 RX ADMIN — I.V. FAT EMULSION 250 ML: 20 EMULSION INTRAVENOUS at 17:39

## 2017-12-19 ENCOUNTER — APPOINTMENT (OUTPATIENT)
Dept: GENERAL RADIOLOGY | Facility: HOSPITAL | Age: 62
End: 2017-12-19

## 2017-12-19 ENCOUNTER — APPOINTMENT (OUTPATIENT)
Dept: NEPHROLOGY | Facility: HOSPITAL | Age: 62
End: 2017-12-19
Attending: INTERNAL MEDICINE

## 2017-12-19 LAB
ABO GROUP BLD: NORMAL
ALBUMIN SERPL-MCNC: 3.4 G/DL (ref 3.2–4.8)
ALBUMIN/GLOB SERPL: 1.2 G/DL (ref 1.5–2.5)
ALP SERPL-CCNC: 134 U/L (ref 25–100)
ALT SERPL W P-5'-P-CCNC: 4 U/L (ref 7–40)
ANION GAP SERPL CALCULATED.3IONS-SCNC: 14 MMOL/L (ref 3–11)
ANTI-FYA: NORMAL
ANTI-K: NORMAL
AST SERPL-CCNC: 25 U/L (ref 0–33)
BASOPHILS # BLD MANUAL: 0.32 10*3/MM3 (ref 0–0.2)
BASOPHILS NFR BLD AUTO: 2 % (ref 0–1)
BILIRUB SERPL-MCNC: 0.2 MG/DL (ref 0.3–1.2)
BLD GP AB SCN SERPL QL: POSITIVE
BUN BLD-MCNC: 92 MG/DL (ref 9–23)
BUN/CREAT SERPL: 15.3 (ref 7–25)
CALCIUM SPEC-SCNC: 8.9 MG/DL (ref 8.7–10.4)
CHLORIDE SERPL-SCNC: 99 MMOL/L (ref 99–109)
CO2 SERPL-SCNC: 19 MMOL/L (ref 20–31)
CREAT BLD-MCNC: 6 MG/DL (ref 0.6–1.3)
DAT IGG GEL: POSITIVE
DEPRECATED RDW RBC AUTO: 52.6 FL (ref 37–54)
EOSINOPHIL # BLD MANUAL: 0.32 10*3/MM3 (ref 0.1–0.3)
EOSINOPHIL NFR BLD MANUAL: 2 % (ref 0–3)
ERYTHROCYTE [DISTWIDTH] IN BLOOD BY AUTOMATED COUNT: 15.5 % (ref 11.3–14.5)
FERRITIN SERPL-MCNC: 1388 NG/ML (ref 10–291)
FOLATE SERPL-MCNC: 9.1 NG/ML (ref 3.2–20)
GFR SERPL CREATININE-BSD FRML MDRD: 7 ML/MIN/1.73
GLOBULIN UR ELPH-MCNC: 2.8 GM/DL
GLUCOSE BLD-MCNC: 128 MG/DL (ref 70–100)
GLUCOSE BLDC GLUCOMTR-MCNC: 109 MG/DL (ref 70–130)
GLUCOSE BLDC GLUCOMTR-MCNC: 131 MG/DL (ref 70–130)
GLUCOSE BLDC GLUCOMTR-MCNC: 131 MG/DL (ref 70–130)
GLUCOSE BLDC GLUCOMTR-MCNC: 148 MG/DL (ref 70–130)
HCT VFR BLD AUTO: 20.4 % (ref 34.5–44)
HCT VFR BLD AUTO: 21.4 % (ref 34.5–44)
HGB BLD-MCNC: 6.4 G/DL (ref 11.5–15.5)
HGB BLD-MCNC: 6.9 G/DL (ref 11.5–15.5)
IRON 24H UR-MRATE: 7 MCG/DL (ref 50–175)
IRON SATN MFR SERPL: 5 % (ref 15–50)
LYMPHOCYTES # BLD MANUAL: 0.81 10*3/MM3 (ref 0.6–4.8)
LYMPHOCYTES NFR BLD MANUAL: 10 % (ref 0–12)
LYMPHOCYTES NFR BLD MANUAL: 5 % (ref 24–44)
MAGNESIUM SERPL-MCNC: 3.1 MG/DL (ref 1.3–2.7)
MCH RBC QN AUTO: 30.1 PG (ref 27–31)
MCHC RBC AUTO-ENTMCNC: 32.2 G/DL (ref 32–36)
MCV RBC AUTO: 93.4 FL (ref 80–99)
MONOCYTES # BLD AUTO: 1.62 10*3/MM3 (ref 0–1)
MYELOCYTES NFR BLD MANUAL: 1 % (ref 0–0)
NEUTROPHILS # BLD AUTO: 12.98 10*3/MM3 (ref 1.5–8.3)
NEUTROPHILS NFR BLD MANUAL: 70 % (ref 41–71)
NEUTS BAND NFR BLD MANUAL: 10 % (ref 0–5)
PHOSPHATE SERPL-MCNC: 2.3 MG/DL (ref 2.4–5.1)
PLAT MORPH BLD: NORMAL
PLATELET # BLD AUTO: 297 10*3/MM3 (ref 150–450)
PMV BLD AUTO: 10.3 FL (ref 6–12)
POTASSIUM BLD-SCNC: 4.2 MMOL/L (ref 3.5–5.5)
POTASSIUM BLD-SCNC: 6 MMOL/L (ref 3.5–5.5)
PROT SERPL-MCNC: 6.2 G/DL (ref 5.7–8.2)
RBC # BLD AUTO: 2.29 10*6/MM3 (ref 3.89–5.14)
RBC MORPH BLD: NORMAL
RETICS/RBC NFR AUTO: 1.79 % (ref 0.5–1.5)
RH BLD: POSITIVE
SCAN SLIDE: NORMAL
SODIUM BLD-SCNC: 132 MMOL/L (ref 132–146)
TIBC SERPL-MCNC: 153 MCG/DL (ref 250–450)
VIT B12 BLD-MCNC: 847 PG/ML (ref 211–911)
WBC MORPH BLD: NORMAL
WBC NRBC COR # BLD: 16.23 10*3/MM3 (ref 3.5–10.8)

## 2017-12-19 PROCEDURE — 82728 ASSAY OF FERRITIN: CPT | Performed by: NURSE PRACTITIONER

## 2017-12-19 PROCEDURE — 86902 BLOOD TYPE ANTIGEN DONOR EA: CPT

## 2017-12-19 PROCEDURE — 86901 BLOOD TYPING SEROLOGIC RH(D): CPT | Performed by: NURSE PRACTITIONER

## 2017-12-19 PROCEDURE — 80053 COMPREHEN METABOLIC PANEL: CPT

## 2017-12-19 PROCEDURE — 85007 BL SMEAR W/DIFF WBC COUNT: CPT | Performed by: NURSE PRACTITIONER

## 2017-12-19 PROCEDURE — 63710000001 INSULIN REGULAR HUMAN PER 5 UNITS: Performed by: SURGERY

## 2017-12-19 PROCEDURE — 63510000001 EPOETIN ALFA PER 1000 UNITS: Performed by: INTERNAL MEDICINE

## 2017-12-19 PROCEDURE — 25010000002 NA FERRIC GLUC CPLX PER 12.5 MG: Performed by: INTERNAL MEDICINE

## 2017-12-19 PROCEDURE — 85025 COMPLETE CBC W/AUTO DIFF WBC: CPT | Performed by: NURSE PRACTITIONER

## 2017-12-19 PROCEDURE — 84132 ASSAY OF SERUM POTASSIUM: CPT

## 2017-12-19 PROCEDURE — 87040 BLOOD CULTURE FOR BACTERIA: CPT | Performed by: NURSE PRACTITIONER

## 2017-12-19 PROCEDURE — 84100 ASSAY OF PHOSPHORUS: CPT

## 2017-12-19 PROCEDURE — 86880 COOMBS TEST DIRECT: CPT | Performed by: NURSE PRACTITIONER

## 2017-12-19 PROCEDURE — 25010000002 METOCLOPRAMIDE PER 10 MG

## 2017-12-19 PROCEDURE — 86870 RBC ANTIBODY IDENTIFICATION: CPT | Performed by: NURSE PRACTITIONER

## 2017-12-19 PROCEDURE — 83735 ASSAY OF MAGNESIUM: CPT

## 2017-12-19 PROCEDURE — 86905 BLOOD TYPING RBC ANTIGENS: CPT

## 2017-12-19 PROCEDURE — 86850 RBC ANTIBODY SCREEN: CPT | Performed by: NURSE PRACTITIONER

## 2017-12-19 PROCEDURE — 85018 HEMOGLOBIN: CPT | Performed by: NURSE PRACTITIONER

## 2017-12-19 PROCEDURE — 86900 BLOOD TYPING SEROLOGIC ABO: CPT | Performed by: NURSE PRACTITIONER

## 2017-12-19 PROCEDURE — 85045 AUTOMATED RETICULOCYTE COUNT: CPT | Performed by: NURSE PRACTITIONER

## 2017-12-19 PROCEDURE — 25010000002 CALCIUM GLUCONATE PER 10 ML

## 2017-12-19 PROCEDURE — 85014 HEMATOCRIT: CPT | Performed by: NURSE PRACTITIONER

## 2017-12-19 PROCEDURE — 25010000002 ALBUMIN HUMAN 25% PER 50 ML: Performed by: INTERNAL MEDICINE

## 2017-12-19 PROCEDURE — 82962 GLUCOSE BLOOD TEST: CPT

## 2017-12-19 PROCEDURE — 82607 VITAMIN B-12: CPT | Performed by: NURSE PRACTITIONER

## 2017-12-19 PROCEDURE — P9016 RBC LEUKOCYTES REDUCED: HCPCS

## 2017-12-19 PROCEDURE — 83540 ASSAY OF IRON: CPT | Performed by: NURSE PRACTITIONER

## 2017-12-19 PROCEDURE — P9046 ALBUMIN (HUMAN), 25%, 20 ML: HCPCS | Performed by: INTERNAL MEDICINE

## 2017-12-19 PROCEDURE — 86900 BLOOD TYPING SEROLOGIC ABO: CPT

## 2017-12-19 PROCEDURE — 97110 THERAPEUTIC EXERCISES: CPT

## 2017-12-19 PROCEDURE — 99232 SBSQ HOSP IP/OBS MODERATE 35: CPT | Performed by: NURSE PRACTITIONER

## 2017-12-19 PROCEDURE — 71010 HC CHEST PA OR AP: CPT

## 2017-12-19 PROCEDURE — 25010000002 THIAMINE PER 100 MG

## 2017-12-19 PROCEDURE — 83550 IRON BINDING TEST: CPT | Performed by: NURSE PRACTITIONER

## 2017-12-19 PROCEDURE — 25010000002 HEPARIN (PORCINE) PER 1000 UNITS: Performed by: NURSE PRACTITIONER

## 2017-12-19 PROCEDURE — 86922 COMPATIBILITY TEST ANTIGLOB: CPT

## 2017-12-19 PROCEDURE — 82746 ASSAY OF FOLIC ACID SERUM: CPT | Performed by: NURSE PRACTITIONER

## 2017-12-19 PROCEDURE — 25010000002 HEPARIN (PORCINE) PER 1000 UNITS: Performed by: INTERNAL MEDICINE

## 2017-12-19 PROCEDURE — 86920 COMPATIBILITY TEST SPIN: CPT

## 2017-12-19 PROCEDURE — 86850 RBC ANTIBODY SCREEN: CPT

## 2017-12-19 RX ORDER — HEPARIN SODIUM 5000 [USP'U]/ML
5000 INJECTION, SOLUTION INTRAVENOUS; SUBCUTANEOUS EVERY 12 HOURS SCHEDULED
Status: DISCONTINUED | OUTPATIENT
Start: 2017-12-19 | End: 2017-12-27

## 2017-12-19 RX ORDER — ALBUMIN (HUMAN) 12.5 G/50ML
12.5 SOLUTION INTRAVENOUS AS NEEDED
Status: ACTIVE | OUTPATIENT
Start: 2017-12-19 | End: 2017-12-19

## 2017-12-19 RX ORDER — ALBUMIN (HUMAN) 12.5 G/50ML
25 SOLUTION INTRAVENOUS AS NEEDED
Status: DISPENSED | OUTPATIENT
Start: 2017-12-19 | End: 2017-12-20

## 2017-12-19 RX ADMIN — ALBUMIN HUMAN 25 G: 0.25 SOLUTION INTRAVENOUS at 07:37

## 2017-12-19 RX ADMIN — FAMOTIDINE 20 MG: 20 TABLET, FILM COATED ORAL at 10:06

## 2017-12-19 RX ADMIN — OXYCODONE AND ACETAMINOPHEN 2 TABLET: 5; 325 TABLET ORAL at 11:46

## 2017-12-19 RX ADMIN — HEPARIN SODIUM 5000 UNITS: 5000 INJECTION, SOLUTION INTRAVENOUS; SUBCUTANEOUS at 05:48

## 2017-12-19 RX ADMIN — ALBUMIN HUMAN 25 G: 0.25 SOLUTION INTRAVENOUS at 08:14

## 2017-12-19 RX ADMIN — METOCLOPRAMIDE 5 MG: 5 INJECTION, SOLUTION INTRAMUSCULAR; INTRAVENOUS at 18:17

## 2017-12-19 RX ADMIN — INSULIN HUMAN 2 UNITS: 100 INJECTION, SOLUTION PARENTERAL at 00:51

## 2017-12-19 RX ADMIN — ACETAMINOPHEN 650 MG: 325 TABLET, FILM COATED ORAL at 15:59

## 2017-12-19 RX ADMIN — SODIUM CHLORIDE 125 MG: 9 INJECTION, SOLUTION INTRAVENOUS at 18:16

## 2017-12-19 RX ADMIN — MINERAL OIL 30 ML: 1000 SOLUTION ORAL at 15:59

## 2017-12-19 RX ADMIN — MINERAL OIL 30 ML: 1000 SOLUTION ORAL at 21:45

## 2017-12-19 RX ADMIN — ALBUMIN HUMAN 25 G: 0.25 SOLUTION INTRAVENOUS at 08:09

## 2017-12-19 RX ADMIN — METOCLOPRAMIDE 5 MG: 5 INJECTION, SOLUTION INTRAMUSCULAR; INTRAVENOUS at 11:20

## 2017-12-19 RX ADMIN — SODIUM BICARBONATE 650 MG TABLET 650 MG: at 10:06

## 2017-12-19 RX ADMIN — ACETAMINOPHEN 650 MG: 325 TABLET, FILM COATED ORAL at 21:59

## 2017-12-19 RX ADMIN — ERYTHROPOIETIN 10000 UNITS: 10000 INJECTION, SOLUTION INTRAVENOUS; SUBCUTANEOUS at 09:43

## 2017-12-19 RX ADMIN — HEPARIN SODIUM 5000 UNITS: 5000 INJECTION, SOLUTION INTRAVENOUS; SUBCUTANEOUS at 21:45

## 2017-12-19 RX ADMIN — SODIUM BICARBONATE 650 MG TABLET 650 MG: at 21:45

## 2017-12-19 RX ADMIN — SODIUM BICARBONATE 650 MG TABLET 650 MG: at 15:59

## 2017-12-19 RX ADMIN — CALCIUM GLUCONATE: 94 INJECTION, SOLUTION INTRAVENOUS at 18:17

## 2017-12-19 RX ADMIN — METOCLOPRAMIDE 5 MG: 5 INJECTION, SOLUTION INTRAMUSCULAR; INTRAVENOUS at 05:48

## 2017-12-19 RX ADMIN — ALPRAZOLAM 0.5 MG: 0.5 TABLET ORAL at 05:12

## 2017-12-19 RX ADMIN — SERTRALINE HYDROCHLORIDE 100 MG: 100 TABLET ORAL at 10:06

## 2017-12-19 RX ADMIN — HEPARIN SODIUM 5000 UNITS: 5000 INJECTION, SOLUTION INTRAVENOUS; SUBCUTANEOUS at 14:08

## 2017-12-19 RX ADMIN — THIAMINE HYDROCHLORIDE 250 MG: 100 INJECTION, SOLUTION INTRAMUSCULAR; INTRAVENOUS at 11:20

## 2017-12-19 RX ADMIN — METOCLOPRAMIDE 5 MG: 5 INJECTION, SOLUTION INTRAMUSCULAR; INTRAVENOUS at 23:23

## 2017-12-20 LAB
ABO + RH BLD: NORMAL
ALBUMIN SERPL-MCNC: 3.8 G/DL (ref 3.2–4.8)
ALBUMIN/GLOB SERPL: 1.4 G/DL (ref 1.5–2.5)
ALP SERPL-CCNC: 161 U/L (ref 25–100)
ALT SERPL W P-5'-P-CCNC: 2 U/L (ref 7–40)
ANION GAP SERPL CALCULATED.3IONS-SCNC: 12 MMOL/L (ref 3–11)
AST SERPL-CCNC: 22 U/L (ref 0–33)
BH BB BLOOD EXPIRATION DATE: NORMAL
BH BB BLOOD TYPE BARCODE: 5100
BH BB DISPENSE STATUS: NORMAL
BH BB PRODUCT CODE: NORMAL
BH BB UNIT NUMBER: NORMAL
BILIRUB SERPL-MCNC: 0.4 MG/DL (ref 0.3–1.2)
BUN BLD-MCNC: 59 MG/DL (ref 9–23)
BUN/CREAT SERPL: 15.9 (ref 7–25)
CALCIUM SPEC-SCNC: 8.9 MG/DL (ref 8.7–10.4)
CHLORIDE SERPL-SCNC: 96 MMOL/L (ref 99–109)
CO2 SERPL-SCNC: 26 MMOL/L (ref 20–31)
CREAT BLD-MCNC: 3.7 MG/DL (ref 0.6–1.3)
CROSSMATCH INTERPRETATION: NORMAL
DEPRECATED RDW RBC AUTO: 62.7 FL (ref 37–54)
ERYTHROCYTE [DISTWIDTH] IN BLOOD BY AUTOMATED COUNT: 18.7 % (ref 11.3–14.5)
GFR SERPL CREATININE-BSD FRML MDRD: 12 ML/MIN/1.73
GLOBULIN UR ELPH-MCNC: 2.8 GM/DL
GLUCOSE BLD-MCNC: 109 MG/DL (ref 70–100)
GLUCOSE BLDC GLUCOMTR-MCNC: 137 MG/DL (ref 70–130)
GLUCOSE BLDC GLUCOMTR-MCNC: 144 MG/DL (ref 70–130)
GLUCOSE BLDC GLUCOMTR-MCNC: 147 MG/DL (ref 70–130)
GLUCOSE BLDC GLUCOMTR-MCNC: 149 MG/DL (ref 70–130)
HCT VFR BLD AUTO: 24.1 % (ref 34.5–44)
HGB BLD-MCNC: 7.5 G/DL (ref 11.5–15.5)
MAGNESIUM SERPL-MCNC: 2.4 MG/DL (ref 1.3–2.7)
MCH RBC QN AUTO: 28.6 PG (ref 27–31)
MCHC RBC AUTO-ENTMCNC: 31.1 G/DL (ref 32–36)
MCV RBC AUTO: 92 FL (ref 80–99)
PHOSPHATE SERPL-MCNC: 2.7 MG/DL (ref 2.4–5.1)
PLATELET # BLD AUTO: 271 10*3/MM3 (ref 150–450)
PMV BLD AUTO: 10.5 FL (ref 6–12)
POTASSIUM BLD-SCNC: 4 MMOL/L (ref 3.5–5.5)
PROT SERPL-MCNC: 6.6 G/DL (ref 5.7–8.2)
RBC # BLD AUTO: 2.62 10*6/MM3 (ref 3.89–5.14)
SODIUM BLD-SCNC: 134 MMOL/L (ref 132–146)
UNIT  ABO: NORMAL
UNIT  RH: NORMAL
WBC NRBC COR # BLD: 16.84 10*3/MM3 (ref 3.5–10.8)

## 2017-12-20 PROCEDURE — 99233 SBSQ HOSP IP/OBS HIGH 50: CPT | Performed by: HOSPITALIST

## 2017-12-20 PROCEDURE — 25010000002 METOCLOPRAMIDE PER 10 MG

## 2017-12-20 PROCEDURE — 93005 ELECTROCARDIOGRAM TRACING: CPT | Performed by: NURSE PRACTITIONER

## 2017-12-20 PROCEDURE — 25010000002 HEPARIN (PORCINE) PER 1000 UNITS: Performed by: NURSE PRACTITIONER

## 2017-12-20 PROCEDURE — 83605 ASSAY OF LACTIC ACID: CPT | Performed by: NURSE PRACTITIONER

## 2017-12-20 PROCEDURE — 99291 CRITICAL CARE FIRST HOUR: CPT | Performed by: NURSE PRACTITIONER

## 2017-12-20 PROCEDURE — 83735 ASSAY OF MAGNESIUM: CPT | Performed by: NURSE PRACTITIONER

## 2017-12-20 PROCEDURE — 25010000002 CALCIUM GLUCONATE PER 10 ML

## 2017-12-20 PROCEDURE — 84100 ASSAY OF PHOSPHORUS: CPT | Performed by: NURSE PRACTITIONER

## 2017-12-20 PROCEDURE — 85027 COMPLETE CBC AUTOMATED: CPT | Performed by: NURSE PRACTITIONER

## 2017-12-20 PROCEDURE — 84100 ASSAY OF PHOSPHORUS: CPT

## 2017-12-20 PROCEDURE — 82330 ASSAY OF CALCIUM: CPT | Performed by: NURSE PRACTITIONER

## 2017-12-20 PROCEDURE — 25010000002 MAGNESIUM SULFATE PER 500 MG OF MAGNESIUM

## 2017-12-20 PROCEDURE — 94799 UNLISTED PULMONARY SVC/PX: CPT

## 2017-12-20 PROCEDURE — 84484 ASSAY OF TROPONIN QUANT: CPT | Performed by: HOSPITALIST

## 2017-12-20 PROCEDURE — 85610 PROTHROMBIN TIME: CPT | Performed by: NURSE PRACTITIONER

## 2017-12-20 PROCEDURE — 25010000002 NA FERRIC GLUC CPLX PER 12.5 MG: Performed by: INTERNAL MEDICINE

## 2017-12-20 PROCEDURE — 82962 GLUCOSE BLOOD TEST: CPT

## 2017-12-20 PROCEDURE — 85027 COMPLETE CBC AUTOMATED: CPT | Performed by: HOSPITALIST

## 2017-12-20 PROCEDURE — 93010 ELECTROCARDIOGRAM REPORT: CPT | Performed by: INTERNAL MEDICINE

## 2017-12-20 PROCEDURE — 80053 COMPREHEN METABOLIC PANEL: CPT

## 2017-12-20 PROCEDURE — 25010000002 THIAMINE PER 100 MG

## 2017-12-20 PROCEDURE — 83735 ASSAY OF MAGNESIUM: CPT

## 2017-12-20 PROCEDURE — 85730 THROMBOPLASTIN TIME PARTIAL: CPT | Performed by: NURSE PRACTITIONER

## 2017-12-20 PROCEDURE — 25010000002 POTASSIUM CHLORIDE PER 2 MEQ OF POTASSIUM

## 2017-12-20 RX ORDER — METOPROLOL TARTRATE 5 MG/5ML
5 INJECTION INTRAVENOUS ONCE
Status: COMPLETED | OUTPATIENT
Start: 2017-12-21 | End: 2017-12-20

## 2017-12-20 RX ORDER — DILTIAZEM HYDROCHLORIDE 5 MG/ML
15 INJECTION INTRAVENOUS ONCE
Status: COMPLETED | OUTPATIENT
Start: 2017-12-21 | End: 2017-12-21

## 2017-12-20 RX ORDER — DILTIAZEM HCL IN NACL,ISO-OSM 125 MG/125
5-15 PLASTIC BAG, INJECTION (ML) INTRAVENOUS
Status: DISCONTINUED | OUTPATIENT
Start: 2017-12-21 | End: 2017-12-21

## 2017-12-20 RX ADMIN — OXYCODONE AND ACETAMINOPHEN 1 TABLET: 5; 325 TABLET ORAL at 10:37

## 2017-12-20 RX ADMIN — THIAMINE HYDROCHLORIDE 250 MG: 100 INJECTION, SOLUTION INTRAMUSCULAR; INTRAVENOUS at 08:23

## 2017-12-20 RX ADMIN — SERTRALINE HYDROCHLORIDE 100 MG: 100 TABLET ORAL at 08:23

## 2017-12-20 RX ADMIN — CALCIUM CARBONATE 2 TABLET: 500 TABLET ORAL at 10:40

## 2017-12-20 RX ADMIN — METOCLOPRAMIDE 5 MG: 5 INJECTION, SOLUTION INTRAMUSCULAR; INTRAVENOUS at 18:21

## 2017-12-20 RX ADMIN — SODIUM BICARBONATE 650 MG TABLET 650 MG: at 08:23

## 2017-12-20 RX ADMIN — METOCLOPRAMIDE 5 MG: 5 INJECTION, SOLUTION INTRAMUSCULAR; INTRAVENOUS at 13:15

## 2017-12-20 RX ADMIN — POTASSIUM CHLORIDE: 2 INJECTION, SOLUTION, CONCENTRATE INTRAVENOUS at 17:18

## 2017-12-20 RX ADMIN — SODIUM BICARBONATE 650 MG TABLET 650 MG: at 16:17

## 2017-12-20 RX ADMIN — HEPARIN SODIUM 5000 UNITS: 5000 INJECTION, SOLUTION INTRAVENOUS; SUBCUTANEOUS at 20:53

## 2017-12-20 RX ADMIN — FAMOTIDINE 20 MG: 20 TABLET, FILM COATED ORAL at 08:23

## 2017-12-20 RX ADMIN — OXYCODONE AND ACETAMINOPHEN 1 TABLET: 5; 325 TABLET ORAL at 22:52

## 2017-12-20 RX ADMIN — METOPROLOL TARTRATE 5 MG: 5 INJECTION INTRAVENOUS at 23:24

## 2017-12-20 RX ADMIN — SODIUM BICARBONATE 650 MG TABLET 650 MG: at 20:53

## 2017-12-20 RX ADMIN — HEPARIN SODIUM 5000 UNITS: 5000 INJECTION, SOLUTION INTRAVENOUS; SUBCUTANEOUS at 08:23

## 2017-12-20 RX ADMIN — ACETAMINOPHEN 650 MG: 325 TABLET, FILM COATED ORAL at 04:34

## 2017-12-20 RX ADMIN — I.V. FAT EMULSION 250 ML: 20 EMULSION INTRAVENOUS at 18:21

## 2017-12-20 RX ADMIN — SODIUM CHLORIDE 125 MG: 9 INJECTION, SOLUTION INTRAVENOUS at 08:23

## 2017-12-20 RX ADMIN — METOCLOPRAMIDE 5 MG: 5 INJECTION, SOLUTION INTRAMUSCULAR; INTRAVENOUS at 06:16

## 2017-12-20 RX ADMIN — ACETAMINOPHEN 650 MG: 325 TABLET, FILM COATED ORAL at 16:17

## 2017-12-21 ENCOUNTER — APPOINTMENT (OUTPATIENT)
Dept: GENERAL RADIOLOGY | Facility: HOSPITAL | Age: 62
End: 2017-12-21

## 2017-12-21 ENCOUNTER — TELEPHONE (OUTPATIENT)
Dept: NEUROSURGERY | Facility: CLINIC | Age: 62
End: 2017-12-21

## 2017-12-21 ENCOUNTER — APPOINTMENT (OUTPATIENT)
Dept: NEPHROLOGY | Facility: HOSPITAL | Age: 62
End: 2017-12-21
Attending: INTERNAL MEDICINE

## 2017-12-21 PROBLEM — I16.9 HYPERTENSIVE CRISIS: Status: RESOLVED | Noted: 2017-06-30 | Resolved: 2017-12-21

## 2017-12-21 PROBLEM — I48.92 ATRIAL FLUTTER WITH RAPID VENTRICULAR RESPONSE: Status: ACTIVE | Noted: 2017-12-21

## 2017-12-21 LAB
ALBUMIN SERPL-MCNC: 3.6 G/DL (ref 3.2–4.8)
ALBUMIN/GLOB SERPL: 1.2 G/DL (ref 1.5–2.5)
ALP SERPL-CCNC: 115 U/L (ref 25–100)
ALT SERPL W P-5'-P-CCNC: 3 U/L (ref 7–40)
ANION GAP SERPL CALCULATED.3IONS-SCNC: 12 MMOL/L (ref 3–11)
ANION GAP SERPL CALCULATED.3IONS-SCNC: 12 MMOL/L (ref 3–11)
APTT PPP: 51.9 SECONDS (ref 24–31)
APTT PPP: 53.6 SECONDS (ref 24–31)
AST SERPL-CCNC: 18 U/L (ref 0–33)
BILIRUB SERPL-MCNC: 0.2 MG/DL (ref 0.3–1.2)
BUN BLD-MCNC: 75 MG/DL (ref 9–23)
BUN BLD-MCNC: 81 MG/DL (ref 9–23)
BUN/CREAT SERPL: 17 (ref 7–25)
BUN/CREAT SERPL: 17.2 (ref 7–25)
CA-I SERPL ISE-MCNC: 1.21 MMOL/L (ref 1.12–1.32)
CALCIUM SPEC-SCNC: 9.2 MG/DL (ref 8.7–10.4)
CALCIUM SPEC-SCNC: 9.3 MG/DL (ref 8.7–10.4)
CHLORIDE SERPL-SCNC: 95 MMOL/L (ref 99–109)
CHLORIDE SERPL-SCNC: 97 MMOL/L (ref 99–109)
CK MB SERPL-CCNC: 3.29 NG/ML (ref 0–5)
CK SERPL-CCNC: 60 U/L (ref 26–174)
CO2 SERPL-SCNC: 22 MMOL/L (ref 20–31)
CO2 SERPL-SCNC: 24 MMOL/L (ref 20–31)
CREAT BLD-MCNC: 4.4 MG/DL (ref 0.6–1.3)
CREAT BLD-MCNC: 4.7 MG/DL (ref 0.6–1.3)
D-LACTATE SERPL-SCNC: 0.7 MMOL/L (ref 0.5–2)
D-LACTATE SERPL-SCNC: 0.9 MMOL/L (ref 0.5–2)
DEPRECATED RDW RBC AUTO: 59.5 FL (ref 37–54)
DEPRECATED RDW RBC AUTO: 59.7 FL (ref 37–54)
DEPRECATED RDW RBC AUTO: 62.6 FL (ref 37–54)
ERYTHROCYTE [DISTWIDTH] IN BLOOD BY AUTOMATED COUNT: 18.2 % (ref 11.3–14.5)
ERYTHROCYTE [DISTWIDTH] IN BLOOD BY AUTOMATED COUNT: 18.4 % (ref 11.3–14.5)
ERYTHROCYTE [DISTWIDTH] IN BLOOD BY AUTOMATED COUNT: 18.7 % (ref 11.3–14.5)
GFR SERPL CREATININE-BSD FRML MDRD: 10 ML/MIN/1.73
GFR SERPL CREATININE-BSD FRML MDRD: 9 ML/MIN/1.73
GLOBULIN UR ELPH-MCNC: 2.9 GM/DL
GLUCOSE BLD-MCNC: 134 MG/DL (ref 70–100)
GLUCOSE BLD-MCNC: 137 MG/DL (ref 70–100)
GLUCOSE BLDC GLUCOMTR-MCNC: 134 MG/DL (ref 70–130)
GLUCOSE BLDC GLUCOMTR-MCNC: 138 MG/DL (ref 70–130)
GLUCOSE BLDC GLUCOMTR-MCNC: 98 MG/DL (ref 70–130)
HCT VFR BLD AUTO: 23 % (ref 34.5–44)
HCT VFR BLD AUTO: 23.6 % (ref 34.5–44)
HCT VFR BLD AUTO: 23.8 % (ref 34.5–44)
HGB BLD-MCNC: 7.5 G/DL (ref 11.5–15.5)
HGB BLD-MCNC: 7.5 G/DL (ref 11.5–15.5)
HGB BLD-MCNC: 7.8 G/DL (ref 11.5–15.5)
INR PPP: 1.1
INR PPP: 1.13
MAGNESIUM SERPL-MCNC: 2.2 MG/DL (ref 1.3–2.7)
MAGNESIUM SERPL-MCNC: 2.2 MG/DL (ref 1.3–2.7)
MCH RBC QN AUTO: 29.1 PG (ref 27–31)
MCH RBC QN AUTO: 29.1 PG (ref 27–31)
MCH RBC QN AUTO: 29.4 PG (ref 27–31)
MCHC RBC AUTO-ENTMCNC: 31.5 G/DL (ref 32–36)
MCHC RBC AUTO-ENTMCNC: 32.6 G/DL (ref 32–36)
MCHC RBC AUTO-ENTMCNC: 33.1 G/DL (ref 32–36)
MCV RBC AUTO: 89.1 FL (ref 80–99)
MCV RBC AUTO: 89.1 FL (ref 80–99)
MCV RBC AUTO: 92.2 FL (ref 80–99)
PHOSPHATE SERPL-MCNC: 2.9 MG/DL (ref 2.4–5.1)
PHOSPHATE SERPL-MCNC: 3.3 MG/DL (ref 2.4–5.1)
PLATELET # BLD AUTO: 254 10*3/MM3 (ref 150–450)
PLATELET # BLD AUTO: 263 10*3/MM3 (ref 150–450)
PLATELET # BLD AUTO: 272 10*3/MM3 (ref 150–450)
PMV BLD AUTO: 9.6 FL (ref 6–12)
PMV BLD AUTO: 9.7 FL (ref 6–12)
PMV BLD AUTO: 9.7 FL (ref 6–12)
POTASSIUM BLD-SCNC: 3.9 MMOL/L (ref 3.5–5.5)
POTASSIUM BLD-SCNC: 4.1 MMOL/L (ref 3.5–5.5)
PROT SERPL-MCNC: 6.5 G/DL (ref 5.7–8.2)
PROTHROMBIN TIME: 12 SECONDS (ref 9.6–11.5)
PROTHROMBIN TIME: 12.4 SECONDS (ref 9.6–11.5)
RBC # BLD AUTO: 2.58 10*6/MM3 (ref 3.89–5.14)
RBC # BLD AUTO: 2.58 10*6/MM3 (ref 3.89–5.14)
RBC # BLD AUTO: 2.65 10*6/MM3 (ref 3.89–5.14)
SODIUM BLD-SCNC: 131 MMOL/L (ref 132–146)
SODIUM BLD-SCNC: 131 MMOL/L (ref 132–146)
TROPONIN I SERPL-MCNC: 0.05 NG/ML
WBC NRBC COR # BLD: 13.86 10*3/MM3 (ref 3.5–10.8)
WBC NRBC COR # BLD: 14.96 10*3/MM3 (ref 3.5–10.8)
WBC NRBC COR # BLD: 16.94 10*3/MM3 (ref 3.5–10.8)

## 2017-12-21 PROCEDURE — 99233 SBSQ HOSP IP/OBS HIGH 50: CPT | Performed by: HOSPITALIST

## 2017-12-21 PROCEDURE — 93010 ELECTROCARDIOGRAM REPORT: CPT | Performed by: INTERNAL MEDICINE

## 2017-12-21 PROCEDURE — 25010000002 THIAMINE PER 100 MG

## 2017-12-21 PROCEDURE — 25010000002 CALCIUM GLUCONATE PER 10 ML

## 2017-12-21 PROCEDURE — 94799 UNLISTED PULMONARY SVC/PX: CPT

## 2017-12-21 PROCEDURE — 82553 CREATINE MB FRACTION: CPT | Performed by: NURSE PRACTITIONER

## 2017-12-21 PROCEDURE — 93005 ELECTROCARDIOGRAM TRACING: CPT | Performed by: NURSE PRACTITIONER

## 2017-12-21 PROCEDURE — 99222 1ST HOSP IP/OBS MODERATE 55: CPT | Performed by: INTERNAL MEDICINE

## 2017-12-21 PROCEDURE — 25010000002 POTASSIUM CHLORIDE PER 2 MEQ OF POTASSIUM

## 2017-12-21 PROCEDURE — 82962 GLUCOSE BLOOD TEST: CPT

## 2017-12-21 PROCEDURE — 71010 HC CHEST PA OR AP: CPT

## 2017-12-21 PROCEDURE — 85027 COMPLETE CBC AUTOMATED: CPT | Performed by: HOSPITALIST

## 2017-12-21 PROCEDURE — 85610 PROTHROMBIN TIME: CPT | Performed by: NURSE PRACTITIONER

## 2017-12-21 PROCEDURE — 82550 ASSAY OF CK (CPK): CPT | Performed by: NURSE PRACTITIONER

## 2017-12-21 PROCEDURE — 93005 ELECTROCARDIOGRAM TRACING: CPT | Performed by: HOSPITALIST

## 2017-12-21 PROCEDURE — 25010000002 AMIODARONE IN DEXTROSE 5% 360-4.14 MG/200ML-% SOLUTION: Performed by: INTERNAL MEDICINE

## 2017-12-21 PROCEDURE — 74000 HC ABDOMEN KUB: CPT

## 2017-12-21 PROCEDURE — 25010000002 NA FERRIC GLUC CPLX PER 12.5 MG

## 2017-12-21 PROCEDURE — 83735 ASSAY OF MAGNESIUM: CPT

## 2017-12-21 PROCEDURE — 25010000002 AMIODARONE IN DEXTROSE 5% 150-4.21 MG/100ML-% SOLUTION: Performed by: INTERNAL MEDICINE

## 2017-12-21 PROCEDURE — 99291 CRITICAL CARE FIRST HOUR: CPT | Performed by: FAMILY MEDICINE

## 2017-12-21 PROCEDURE — 83605 ASSAY OF LACTIC ACID: CPT | Performed by: NURSE PRACTITIONER

## 2017-12-21 PROCEDURE — 25010000002 HEPARIN (PORCINE) PER 1000 UNITS: Performed by: NURSE PRACTITIONER

## 2017-12-21 PROCEDURE — 25010000002 PIPERACILLIN SOD-TAZOBACTAM PER 1 G

## 2017-12-21 PROCEDURE — 87040 BLOOD CULTURE FOR BACTERIA: CPT | Performed by: NURSE PRACTITIONER

## 2017-12-21 PROCEDURE — 85730 THROMBOPLASTIN TIME PARTIAL: CPT | Performed by: NURSE PRACTITIONER

## 2017-12-21 PROCEDURE — 25010000002 METOCLOPRAMIDE PER 10 MG

## 2017-12-21 PROCEDURE — 80053 COMPREHEN METABOLIC PANEL: CPT

## 2017-12-21 PROCEDURE — 84100 ASSAY OF PHOSPHORUS: CPT

## 2017-12-21 PROCEDURE — 25010000002 MAGNESIUM SULFATE PER 500 MG OF MAGNESIUM

## 2017-12-21 PROCEDURE — 25010000002 VANCOMYCIN 10 G RECONSTITUTED SOLUTION

## 2017-12-21 PROCEDURE — 84484 ASSAY OF TROPONIN QUANT: CPT | Performed by: HOSPITALIST

## 2017-12-21 RX ORDER — AMIODARONE HYDROCHLORIDE 200 MG/1
200 TABLET ORAL EVERY 12 HOURS
Status: COMPLETED | OUTPATIENT
Start: 2017-12-29 | End: 2018-01-11

## 2017-12-21 RX ORDER — VANCOMYCIN HYDROCHLORIDE
1250 ONCE
Status: COMPLETED | OUTPATIENT
Start: 2017-12-21 | End: 2017-12-21

## 2017-12-21 RX ORDER — AMIODARONE HYDROCHLORIDE 200 MG/1
200 TABLET ORAL EVERY 8 HOURS
Status: COMPLETED | OUTPATIENT
Start: 2017-12-23 | End: 2017-12-29

## 2017-12-21 RX ORDER — AMIODARONE HYDROCHLORIDE 200 MG/1
200 TABLET ORAL DAILY
Status: DISCONTINUED | OUTPATIENT
Start: 2018-01-12 | End: 2018-01-12 | Stop reason: HOSPADM

## 2017-12-21 RX ORDER — ALBUMIN (HUMAN) 12.5 G/50ML
12.5 SOLUTION INTRAVENOUS AS NEEDED
Status: ACTIVE | OUTPATIENT
Start: 2017-12-21 | End: 2017-12-21

## 2017-12-21 RX ORDER — ASPIRIN 325 MG
325 TABLET ORAL DAILY
Status: DISCONTINUED | OUTPATIENT
Start: 2017-12-21 | End: 2017-12-27

## 2017-12-21 RX ORDER — AMIODARONE HYDROCHLORIDE 200 MG/1
200 TABLET ORAL ONCE
Status: COMPLETED | OUTPATIENT
Start: 2017-12-22 | End: 2017-12-22

## 2017-12-21 RX ADMIN — ALPRAZOLAM 0.5 MG: 0.5 TABLET ORAL at 17:41

## 2017-12-21 RX ADMIN — OXYCODONE AND ACETAMINOPHEN 1 TABLET: 5; 325 TABLET ORAL at 15:18

## 2017-12-21 RX ADMIN — METOCLOPRAMIDE 5 MG: 5 INJECTION, SOLUTION INTRAMUSCULAR; INTRAVENOUS at 07:11

## 2017-12-21 RX ADMIN — AMIODARONE HYDROCHLORIDE 1 MG/MIN: 1.8 INJECTION, SOLUTION INTRAVENOUS at 17:12

## 2017-12-21 RX ADMIN — FAMOTIDINE 20 MG: 20 TABLET, FILM COATED ORAL at 11:12

## 2017-12-21 RX ADMIN — SODIUM CHLORIDE 125 MG: 9 INJECTION, SOLUTION INTRAVENOUS at 14:08

## 2017-12-21 RX ADMIN — ASPIRIN 325 MG ORAL TABLET 325 MG: 325 PILL ORAL at 17:49

## 2017-12-21 RX ADMIN — DILTIAZEM HYDROCHLORIDE 15 MG: 5 INJECTION INTRAVENOUS at 00:24

## 2017-12-21 RX ADMIN — METOCLOPRAMIDE 5 MG: 5 INJECTION, SOLUTION INTRAMUSCULAR; INTRAVENOUS at 00:46

## 2017-12-21 RX ADMIN — DOXYCYCLINE 100 MG: 100 INJECTION, POWDER, LYOPHILIZED, FOR SOLUTION INTRAVENOUS at 22:20

## 2017-12-21 RX ADMIN — ALPRAZOLAM 0.5 MG: 0.5 TABLET ORAL at 00:45

## 2017-12-21 RX ADMIN — METOCLOPRAMIDE 5 MG: 5 INJECTION, SOLUTION INTRAMUSCULAR; INTRAVENOUS at 17:38

## 2017-12-21 RX ADMIN — TAZOBACTAM SODIUM AND PIPERACILLIN SODIUM 2.25 G: 250; 2 INJECTION, SOLUTION INTRAVENOUS at 21:40

## 2017-12-21 RX ADMIN — TAZOBACTAM SODIUM AND PIPERACILLIN SODIUM 2.25 G: 250; 2 INJECTION, SOLUTION INTRAVENOUS at 07:12

## 2017-12-21 RX ADMIN — MAGNESIUM HYDROXIDE 10 ML: 2400 SUSPENSION ORAL at 21:40

## 2017-12-21 RX ADMIN — SODIUM BICARBONATE 650 MG TABLET 650 MG: at 21:41

## 2017-12-21 RX ADMIN — SODIUM BICARBONATE 650 MG TABLET 650 MG: at 11:12

## 2017-12-21 RX ADMIN — AMIODARONE HYDROCHLORIDE 150 MG: 1.5 INJECTION, SOLUTION INTRAVENOUS at 16:52

## 2017-12-21 RX ADMIN — HEPARIN SODIUM 5000 UNITS: 5000 INJECTION, SOLUTION INTRAVENOUS; SUBCUTANEOUS at 11:12

## 2017-12-21 RX ADMIN — SERTRALINE HYDROCHLORIDE 100 MG: 100 TABLET ORAL at 11:12

## 2017-12-21 RX ADMIN — POTASSIUM CHLORIDE: 2 INJECTION, SOLUTION, CONCENTRATE INTRAVENOUS at 18:28

## 2017-12-21 RX ADMIN — METOPROLOL TARTRATE 25 MG: 25 TABLET ORAL at 21:41

## 2017-12-21 RX ADMIN — VANCOMYCIN HYDROCHLORIDE 1250 MG: 10 INJECTION, POWDER, LYOPHILIZED, FOR SOLUTION INTRAVENOUS at 10:05

## 2017-12-21 RX ADMIN — OXYCODONE AND ACETAMINOPHEN 1 TABLET: 5; 325 TABLET ORAL at 22:19

## 2017-12-21 RX ADMIN — TAZOBACTAM SODIUM AND PIPERACILLIN SODIUM 2.25 G: 250; 2 INJECTION, SOLUTION INTRAVENOUS at 15:21

## 2017-12-21 RX ADMIN — METOCLOPRAMIDE 5 MG: 5 INJECTION, SOLUTION INTRAMUSCULAR; INTRAVENOUS at 11:47

## 2017-12-21 RX ADMIN — THIAMINE HYDROCHLORIDE 100 MG: 100 INJECTION, SOLUTION INTRAMUSCULAR; INTRAVENOUS at 11:46

## 2017-12-21 RX ADMIN — DILTIAZEM HCL-SODIUM CHLORIDE IV SOLN 125 MG/125ML-0.9% 5 MG/HR: 125-0.9/125 SOLUTION at 00:25

## 2017-12-21 RX ADMIN — HEPARIN SODIUM 5000 UNITS: 5000 INJECTION, SOLUTION INTRAVENOUS; SUBCUTANEOUS at 21:41

## 2017-12-21 RX ADMIN — DOXYCYCLINE 100 MG: 100 INJECTION, POWDER, LYOPHILIZED, FOR SOLUTION INTRAVENOUS at 11:45

## 2017-12-21 RX ADMIN — SODIUM BICARBONATE 650 MG TABLET 650 MG: at 17:37

## 2017-12-21 NOTE — TELEPHONE ENCOUNTER
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      Dr. Hollis told them to let him know if anything happens or changes.

## 2017-12-22 ENCOUNTER — APPOINTMENT (OUTPATIENT)
Dept: GENERAL RADIOLOGY | Facility: HOSPITAL | Age: 62
End: 2017-12-22
Attending: SURGERY

## 2017-12-22 ENCOUNTER — ANESTHESIA EVENT (OUTPATIENT)
Dept: PERIOP | Facility: HOSPITAL | Age: 62
End: 2017-12-22

## 2017-12-22 ENCOUNTER — APPOINTMENT (OUTPATIENT)
Dept: GENERAL RADIOLOGY | Facility: HOSPITAL | Age: 62
End: 2017-12-22

## 2017-12-22 ENCOUNTER — ANESTHESIA (OUTPATIENT)
Dept: PERIOP | Facility: HOSPITAL | Age: 62
End: 2017-12-22

## 2017-12-22 LAB
ALBUMIN SERPL-MCNC: 3.4 G/DL (ref 3.2–4.8)
ALBUMIN/GLOB SERPL: 1.2 G/DL (ref 1.5–2.5)
ALP SERPL-CCNC: 105 U/L (ref 25–100)
ALT SERPL W P-5'-P-CCNC: 4 U/L (ref 7–40)
ANION GAP SERPL CALCULATED.3IONS-SCNC: 14 MMOL/L (ref 3–11)
AST SERPL-CCNC: 19 U/L (ref 0–33)
BILIRUB SERPL-MCNC: 0.2 MG/DL (ref 0.3–1.2)
BUN BLD-MCNC: 59 MG/DL (ref 9–23)
BUN/CREAT SERPL: 16.9 (ref 7–25)
CALCIUM SPEC-SCNC: 9 MG/DL (ref 8.7–10.4)
CHLORIDE SERPL-SCNC: 94 MMOL/L (ref 99–109)
CO2 SERPL-SCNC: 27 MMOL/L (ref 20–31)
CREAT BLD-MCNC: 3.5 MG/DL (ref 0.6–1.3)
DEPRECATED RDW RBC AUTO: 58.8 FL (ref 37–54)
ERYTHROCYTE [DISTWIDTH] IN BLOOD BY AUTOMATED COUNT: 18.1 % (ref 11.3–14.5)
GFR SERPL CREATININE-BSD FRML MDRD: 13 ML/MIN/1.73
GLOBULIN UR ELPH-MCNC: 2.9 GM/DL
GLUCOSE BLD-MCNC: 106 MG/DL (ref 70–100)
GLUCOSE BLDC GLUCOMTR-MCNC: 130 MG/DL (ref 70–130)
GLUCOSE BLDC GLUCOMTR-MCNC: 135 MG/DL (ref 70–130)
GLUCOSE BLDC GLUCOMTR-MCNC: 140 MG/DL (ref 70–130)
HCT VFR BLD AUTO: 22.9 % (ref 34.5–44)
HGB BLD-MCNC: 7.3 G/DL (ref 11.5–15.5)
MAGNESIUM SERPL-MCNC: 2.1 MG/DL (ref 1.3–2.7)
MCH RBC QN AUTO: 28.6 PG (ref 27–31)
MCHC RBC AUTO-ENTMCNC: 31.9 G/DL (ref 32–36)
MCV RBC AUTO: 89.8 FL (ref 80–99)
PHOSPHATE SERPL-MCNC: 2.9 MG/DL (ref 2.4–5.1)
PLATELET # BLD AUTO: 267 10*3/MM3 (ref 150–450)
PMV BLD AUTO: 10.2 FL (ref 6–12)
POTASSIUM BLD-SCNC: 3.6 MMOL/L (ref 3.5–5.5)
PROT SERPL-MCNC: 6.3 G/DL (ref 5.7–8.2)
RBC # BLD AUTO: 2.55 10*6/MM3 (ref 3.89–5.14)
SODIUM BLD-SCNC: 135 MMOL/L (ref 132–146)
WBC NRBC COR # BLD: 11.68 10*3/MM3 (ref 3.5–10.8)

## 2017-12-22 PROCEDURE — 25010000002 PROPOFOL 10 MG/ML EMULSION: Performed by: NURSE ANESTHETIST, CERTIFIED REGISTERED

## 2017-12-22 PROCEDURE — 25010000002 SUCCINYLCHOLINE PER 20 MG: Performed by: NURSE ANESTHETIST, CERTIFIED REGISTERED

## 2017-12-22 PROCEDURE — 80053 COMPREHEN METABOLIC PANEL: CPT

## 2017-12-22 PROCEDURE — 25010000002 MAGNESIUM SULFATE PER 500 MG OF MAGNESIUM

## 2017-12-22 PROCEDURE — P9016 RBC LEUKOCYTES REDUCED: HCPCS

## 2017-12-22 PROCEDURE — 25010000002 NA FERRIC GLUC CPLX PER 12.5 MG

## 2017-12-22 PROCEDURE — 86900 BLOOD TYPING SEROLOGIC ABO: CPT

## 2017-12-22 PROCEDURE — 25010000002 HEPARIN (PORCINE) PER 1000 UNITS: Performed by: NURSE PRACTITIONER

## 2017-12-22 PROCEDURE — 25010000002 THIAMINE PER 100 MG

## 2017-12-22 PROCEDURE — 25010000002 AMIODARONE IN DEXTROSE 5% 360-4.14 MG/200ML-% SOLUTION: Performed by: INTERNAL MEDICINE

## 2017-12-22 PROCEDURE — 93005 ELECTROCARDIOGRAM TRACING: CPT | Performed by: INTERNAL MEDICINE

## 2017-12-22 PROCEDURE — 86920 COMPATIBILITY TEST SPIN: CPT

## 2017-12-22 PROCEDURE — 25010000002 POTASSIUM CHLORIDE PER 2 MEQ OF POTASSIUM

## 2017-12-22 PROCEDURE — 82962 GLUCOSE BLOOD TEST: CPT

## 2017-12-22 PROCEDURE — 25010000002 BUPRENORPHINE PER 0.1 MG: Performed by: NURSE ANESTHETIST, CERTIFIED REGISTERED

## 2017-12-22 PROCEDURE — 71010 HC CHEST PA OR AP: CPT

## 2017-12-22 PROCEDURE — 25010000002 PHENYLEPHRINE PER 1 ML: Performed by: NURSE ANESTHETIST, CERTIFIED REGISTERED

## 2017-12-22 PROCEDURE — 25010000002 CALCIUM GLUCONATE PER 10 ML

## 2017-12-22 PROCEDURE — 74250 X-RAY XM SM INT 1CNTRST STD: CPT

## 2017-12-22 PROCEDURE — 25010000002 DEXAMETHASONE PER 1 MG: Performed by: NURSE ANESTHETIST, CERTIFIED REGISTERED

## 2017-12-22 PROCEDURE — 25010000002 FENTANYL CITRATE (PF) 100 MCG/2ML SOLUTION: Performed by: NURSE ANESTHETIST, CERTIFIED REGISTERED

## 2017-12-22 PROCEDURE — 25010000002 ONDANSETRON PER 1 MG: Performed by: INTERNAL MEDICINE

## 2017-12-22 PROCEDURE — 83735 ASSAY OF MAGNESIUM: CPT

## 2017-12-22 PROCEDURE — 25010000002 PIPERACILLIN SOD-TAZOBACTAM PER 1 G

## 2017-12-22 PROCEDURE — 84100 ASSAY OF PHOSPHORUS: CPT

## 2017-12-22 PROCEDURE — 85027 COMPLETE CBC AUTOMATED: CPT | Performed by: SURGERY

## 2017-12-22 PROCEDURE — 0DN80ZZ RELEASE SMALL INTESTINE, OPEN APPROACH: ICD-10-PCS | Performed by: SURGERY

## 2017-12-22 PROCEDURE — 25010000002 DEXAMETHASONE SODIUM PHOSPHATE 10 MG/ML SOLUTION 1 ML VIAL: Performed by: NURSE ANESTHETIST, CERTIFIED REGISTERED

## 2017-12-22 PROCEDURE — 86922 COMPATIBILITY TEST ANTIGLOB: CPT

## 2017-12-22 PROCEDURE — 25010000002 METOCLOPRAMIDE PER 10 MG

## 2017-12-22 PROCEDURE — 36430 TRANSFUSION BLD/BLD COMPNT: CPT

## 2017-12-22 PROCEDURE — 99232 SBSQ HOSP IP/OBS MODERATE 35: CPT | Performed by: INTERNAL MEDICINE

## 2017-12-22 PROCEDURE — 99233 SBSQ HOSP IP/OBS HIGH 50: CPT | Performed by: FAMILY MEDICINE

## 2017-12-22 RX ORDER — MAGNESIUM HYDROXIDE 1200 MG/15ML
LIQUID ORAL AS NEEDED
Status: DISCONTINUED | OUTPATIENT
Start: 2017-12-22 | End: 2017-12-22 | Stop reason: HOSPADM

## 2017-12-22 RX ORDER — HYDROMORPHONE HYDROCHLORIDE 1 MG/ML
0.5 INJECTION, SOLUTION INTRAMUSCULAR; INTRAVENOUS; SUBCUTANEOUS
Status: DISCONTINUED | OUTPATIENT
Start: 2017-12-22 | End: 2017-12-22 | Stop reason: HOSPADM

## 2017-12-22 RX ORDER — FENTANYL CITRATE 50 UG/ML
50 INJECTION, SOLUTION INTRAMUSCULAR; INTRAVENOUS
Status: DISCONTINUED | OUTPATIENT
Start: 2017-12-22 | End: 2017-12-22 | Stop reason: HOSPADM

## 2017-12-22 RX ORDER — SUCCINYLCHOLINE CHLORIDE 20 MG/ML
INJECTION INTRAMUSCULAR; INTRAVENOUS AS NEEDED
Status: DISCONTINUED | OUTPATIENT
Start: 2017-12-22 | End: 2017-12-22 | Stop reason: SURG

## 2017-12-22 RX ORDER — LIDOCAINE HYDROCHLORIDE 10 MG/ML
INJECTION, SOLUTION EPIDURAL; INFILTRATION; INTRACAUDAL; PERINEURAL AS NEEDED
Status: DISCONTINUED | OUTPATIENT
Start: 2017-12-22 | End: 2017-12-22 | Stop reason: SURG

## 2017-12-22 RX ORDER — DEXAMETHASONE SODIUM PHOSPHATE 4 MG/ML
INJECTION, SOLUTION INTRA-ARTICULAR; INTRALESIONAL; INTRAMUSCULAR; INTRAVENOUS; SOFT TISSUE AS NEEDED
Status: DISCONTINUED | OUTPATIENT
Start: 2017-12-22 | End: 2017-12-22 | Stop reason: SURG

## 2017-12-22 RX ORDER — FENTANYL CITRATE 50 UG/ML
INJECTION, SOLUTION INTRAMUSCULAR; INTRAVENOUS AS NEEDED
Status: DISCONTINUED | OUTPATIENT
Start: 2017-12-22 | End: 2017-12-22 | Stop reason: SURG

## 2017-12-22 RX ORDER — SODIUM CHLORIDE, SODIUM LACTATE, POTASSIUM CHLORIDE, CALCIUM CHLORIDE 600; 310; 30; 20 MG/100ML; MG/100ML; MG/100ML; MG/100ML
INJECTION, SOLUTION INTRAVENOUS CONTINUOUS PRN
Status: DISCONTINUED | OUTPATIENT
Start: 2017-12-22 | End: 2017-12-22 | Stop reason: SURG

## 2017-12-22 RX ORDER — PROPOFOL 10 MG/ML
VIAL (ML) INTRAVENOUS AS NEEDED
Status: DISCONTINUED | OUTPATIENT
Start: 2017-12-22 | End: 2017-12-22 | Stop reason: SURG

## 2017-12-22 RX ORDER — PROMETHAZINE HYDROCHLORIDE 25 MG/ML
12.5 INJECTION, SOLUTION INTRAMUSCULAR; INTRAVENOUS ONCE AS NEEDED
Status: DISCONTINUED | OUTPATIENT
Start: 2017-12-22 | End: 2017-12-22 | Stop reason: HOSPADM

## 2017-12-22 RX ORDER — PROMETHAZINE HYDROCHLORIDE 25 MG/1
25 SUPPOSITORY RECTAL ONCE AS NEEDED
Status: DISCONTINUED | OUTPATIENT
Start: 2017-12-22 | End: 2017-12-22 | Stop reason: HOSPADM

## 2017-12-22 RX ORDER — HYDROMORPHONE HYDROCHLORIDE 1 MG/ML
0.5 INJECTION, SOLUTION INTRAMUSCULAR; INTRAVENOUS; SUBCUTANEOUS
Status: DISPENSED | OUTPATIENT
Start: 2017-12-22 | End: 2018-01-01

## 2017-12-22 RX ORDER — PROMETHAZINE HYDROCHLORIDE 25 MG/1
25 TABLET ORAL ONCE AS NEEDED
Status: DISCONTINUED | OUTPATIENT
Start: 2017-12-22 | End: 2017-12-22 | Stop reason: HOSPADM

## 2017-12-22 RX ORDER — ATRACURIUM BESYLATE 10 MG/ML
INJECTION, SOLUTION INTRAVENOUS AS NEEDED
Status: DISCONTINUED | OUTPATIENT
Start: 2017-12-22 | End: 2017-12-22 | Stop reason: SURG

## 2017-12-22 RX ORDER — ROCURONIUM BROMIDE 10 MG/ML
INJECTION, SOLUTION INTRAVENOUS AS NEEDED
Status: DISCONTINUED | OUTPATIENT
Start: 2017-12-22 | End: 2017-12-22 | Stop reason: SURG

## 2017-12-22 RX ADMIN — AMIODARONE HYDROCHLORIDE 200 MG: 200 TABLET ORAL at 21:29

## 2017-12-22 RX ADMIN — DEXAMETHASONE SODIUM PHOSPHATE 8 MG: 4 INJECTION, SOLUTION INTRAMUSCULAR; INTRAVENOUS at 16:10

## 2017-12-22 RX ADMIN — PHENYLEPHRINE HYDROCHLORIDE 200 MCG: 10 INJECTION INTRAVENOUS at 16:40

## 2017-12-22 RX ADMIN — PROPOFOL 100 MG: 10 INJECTION, EMULSION INTRAVENOUS at 16:03

## 2017-12-22 RX ADMIN — METOPROLOL TARTRATE 25 MG: 25 TABLET ORAL at 15:07

## 2017-12-22 RX ADMIN — PHENYLEPHRINE HYDROCHLORIDE 200 MCG: 10 INJECTION INTRAVENOUS at 16:15

## 2017-12-22 RX ADMIN — OXYCODONE AND ACETAMINOPHEN 1 TABLET: 5; 325 TABLET ORAL at 21:28

## 2017-12-22 RX ADMIN — FAMOTIDINE 20 MG: 20 TABLET, FILM COATED ORAL at 11:33

## 2017-12-22 RX ADMIN — LIDOCAINE HYDROCHLORIDE 60 MG: 10 INJECTION, SOLUTION EPIDURAL; INFILTRATION; INTRACAUDAL; PERINEURAL at 16:03

## 2017-12-22 RX ADMIN — OXYCODONE AND ACETAMINOPHEN 1 TABLET: 5; 325 TABLET ORAL at 06:58

## 2017-12-22 RX ADMIN — ROCURONIUM BROMIDE 5 MG: 10 INJECTION INTRAVENOUS at 16:03

## 2017-12-22 RX ADMIN — HEPARIN SODIUM 5000 UNITS: 5000 INJECTION, SOLUTION INTRAVENOUS; SUBCUTANEOUS at 21:35

## 2017-12-22 RX ADMIN — PHENYLEPHRINE HYDROCHLORIDE 200 MCG: 10 INJECTION INTRAVENOUS at 16:45

## 2017-12-22 RX ADMIN — AMIODARONE HYDROCHLORIDE 0.5 MG/MIN: 1.8 INJECTION, SOLUTION INTRAVENOUS at 02:09

## 2017-12-22 RX ADMIN — SODIUM CHLORIDE 125 MG: 9 INJECTION, SOLUTION INTRAVENOUS at 11:40

## 2017-12-22 RX ADMIN — SUCCINYLCHOLINE CHLORIDE 140 MG: 20 INJECTION, SOLUTION INTRAMUSCULAR; INTRAVENOUS at 16:03

## 2017-12-22 RX ADMIN — TAZOBACTAM SODIUM AND PIPERACILLIN SODIUM 2.25 G: 250; 2 INJECTION, SOLUTION INTRAVENOUS at 21:31

## 2017-12-22 RX ADMIN — ATRACURIUM BESYLATE 10 MG: 10 INJECTION, SOLUTION INTRAVENOUS at 16:30

## 2017-12-22 RX ADMIN — METOCLOPRAMIDE 5 MG: 5 INJECTION, SOLUTION INTRAMUSCULAR; INTRAVENOUS at 11:39

## 2017-12-22 RX ADMIN — PHENYLEPHRINE HYDROCHLORIDE 200 MCG: 10 INJECTION INTRAVENOUS at 16:50

## 2017-12-22 RX ADMIN — PHENYLEPHRINE HYDROCHLORIDE 200 MCG: 10 INJECTION INTRAVENOUS at 16:07

## 2017-12-22 RX ADMIN — PHENYLEPHRINE HYDROCHLORIDE 200 MCG: 10 INJECTION INTRAVENOUS at 16:10

## 2017-12-22 RX ADMIN — ASPIRIN 325 MG ORAL TABLET 325 MG: 325 PILL ORAL at 11:33

## 2017-12-22 RX ADMIN — AMIODARONE HYDROCHLORIDE 0.5 MG/MIN: 1.8 INJECTION, SOLUTION INTRAVENOUS at 13:21

## 2017-12-22 RX ADMIN — SODIUM CHLORIDE, POTASSIUM CHLORIDE, SODIUM LACTATE AND CALCIUM CHLORIDE: 600; 310; 30; 20 INJECTION, SOLUTION INTRAVENOUS at 16:00

## 2017-12-22 RX ADMIN — THIAMINE HYDROCHLORIDE 100 MG: 100 INJECTION, SOLUTION INTRAMUSCULAR; INTRAVENOUS at 11:34

## 2017-12-22 RX ADMIN — SODIUM BICARBONATE 650 MG TABLET 650 MG: at 21:31

## 2017-12-22 RX ADMIN — SERTRALINE HYDROCHLORIDE 100 MG: 100 TABLET ORAL at 11:39

## 2017-12-22 RX ADMIN — ONDANSETRON 4 MG: 2 INJECTION INTRAMUSCULAR; INTRAVENOUS at 16:30

## 2017-12-22 RX ADMIN — DOXYCYCLINE 100 MG: 100 INJECTION, POWDER, LYOPHILIZED, FOR SOLUTION INTRAVENOUS at 21:29

## 2017-12-22 RX ADMIN — SODIUM BICARBONATE 650 MG TABLET 650 MG: at 11:33

## 2017-12-22 RX ADMIN — ALPRAZOLAM 0.5 MG: 0.5 TABLET ORAL at 11:03

## 2017-12-22 RX ADMIN — TAZOBACTAM SODIUM AND PIPERACILLIN SODIUM 2.25 G: 250; 2 INJECTION, SOLUTION INTRAVENOUS at 06:59

## 2017-12-22 RX ADMIN — METOCLOPRAMIDE 5 MG: 5 INJECTION, SOLUTION INTRAMUSCULAR; INTRAVENOUS at 00:44

## 2017-12-22 RX ADMIN — PHENYLEPHRINE HYDROCHLORIDE 200 MCG: 10 INJECTION INTRAVENOUS at 16:36

## 2017-12-22 RX ADMIN — METOCLOPRAMIDE 5 MG: 5 INJECTION, SOLUTION INTRAMUSCULAR; INTRAVENOUS at 06:58

## 2017-12-22 RX ADMIN — POTASSIUM CHLORIDE: 2 INJECTION, SOLUTION, CONCENTRATE INTRAVENOUS at 18:52

## 2017-12-22 RX ADMIN — DOXYCYCLINE 100 MG: 100 INJECTION, POWDER, LYOPHILIZED, FOR SOLUTION INTRAVENOUS at 08:29

## 2017-12-22 RX ADMIN — DEXAMETHASONE SODIUM PHOSPHATE 60 ML: 10 INJECTION, SOLUTION INTRAMUSCULAR; INTRAVENOUS at 16:05

## 2017-12-22 RX ADMIN — TAZOBACTAM SODIUM AND PIPERACILLIN SODIUM 2.25 G: 250; 2 INJECTION, SOLUTION INTRAVENOUS at 15:12

## 2017-12-22 RX ADMIN — FENTANYL CITRATE 100 MCG: 50 INJECTION, SOLUTION INTRAMUSCULAR; INTRAVENOUS at 16:03

## 2017-12-22 NOTE — ANESTHESIA PROCEDURE NOTES
Airway  Urgency: elective    Airway not difficult    General Information and Staff    Patient location during procedure: OR    Indications and Patient Condition  Indications for airway management: airway protection    Preoxygenated: yes  MILS not maintained throughout  Mask difficulty assessment: 0 - not attempted    Final Airway Details  Final airway type: endotracheal airway      Successful airway: ETT  Cuffed: yes   Successful intubation technique: direct laryngoscopy  Facilitating devices/methods: cricoid pressure  Endotracheal tube insertion site: oral  Blade: José Miguel  Blade size: #3  ETT size: 6.5 mm  Cormack-Lehane Classification: grade I - full view of glottis  Placement verified by: chest auscultation and capnometry   Number of attempts at approach: 1    Additional Comments  Negative epigastric sounds, Breath sound equal bilaterally with symmetric chest rise and fall

## 2017-12-22 NOTE — ANESTHESIA PROCEDURE NOTES
Peripheral Block    Patient location during procedure: OR  Reason for block: at surgeon's request and post-op pain management  Preanesthetic Checklist  Completed: patient identified, site marked, surgical consent, pre-op evaluation, timeout performed, IV checked, risks and benefits discussed and monitors and equipment checked  Prep:  Pt Position: supine  Sterile barriers:cap, gloves, sterile barriers and mask  Prep: ChloraPrep  Patient monitoring: blood pressure monitoring, continuous pulse oximetry and EKG  Procedure  Sedation:yes  Performed under: general  Guidance:ultrasound guided  Images:still images obtained    Laterality:Bilateral  Block Type:TAP  Injection Technique:single-shot  Needle Type:short-bevel and echogenic  Needle Gauge:20 G    Medications  Comment:Block Injection:  LA dose divided between Right and Left block       Adjuncts:  Decadron 4mg PSF, Buprenex 0.3mg (Per total volume of LA)  Local Injected:bupivacaine 0.25% Local Amount Injected:60mL  Post Assessment  Injection Assessment: negative aspiration for heme, incremental injection and no paresthesia on injection  Patient Tolerance:comfortable throughout block  Complications:no  Additional Notes      Under Ultrasound guidance, a BBraun 4inch 360 degree needle was advanced with Normal Saline hydro dissection of tissue.  The Internal Oblique and Transversus Abdominus muscles where visualized.  At or before the aponeurosis of Internal Oblique, local anesthetic spread was visualized in the Transversus Abdominus Plane. Injection was made incrementally with aspiration every 5 mls.  There was no  intravascular injection,  injection pressure was normal, there was no neural injection, and the procedure was completed without difficulty.  Thank You.

## 2017-12-22 NOTE — ANESTHESIA PREPROCEDURE EVALUATION
Anesthesia Evaluation            Airway   Mallampati: II  TM distance: >3 FB  Neck ROM: full  no difficulty expected  Dental - normal exam     Pulmonary    (+) rhonchi,   Cardiovascular     Rhythm: regular  Rate: normal    (+) hypertension,   (-) murmur      Neuro/Psych  GI/Hepatic/Renal/Endo      Musculoskeletal     Abdominal    Substance History      OB/GYN          Other        ROS/Med Hx Other: Prior  SBO, exploratory lap back on 12/4/17, complicated course, now back with SBO per AJ, brought emergently to the OR>    Pt is anuric, had dialysis yesterday, labs were reviewed      Phys Exam Other: Distended abdomen                                Anesthesia Plan    ASA 3     general   Rapid sequence  intravenous induction     Plan discussed with CRNA.

## 2017-12-22 NOTE — ANESTHESIA POSTPROCEDURE EVALUATION
Patient: Tereza Ackerman    Procedure Summary     Date Anesthesia Start Anesthesia Stop Room / Location    12/22/17 1555   [facility]       Procedure Diagnosis Surgeon Provider    LAPAROTOMY EXPLORATORY FOR SMALL BOWEL OBSTRUCTION (N/A Abdomen) Partial small bowel obstruction MD Robi Celeste MD          Anesthesia Type: general  Last vitals  BP   126/79 (12/22/17 1507)   Temp   98 °F (36.7 °C) (12/22/17 0700)   Pulse   76 (12/22/17 1507)   Resp   18 (12/22/17 0700)     SpO2   98 % (12/22/17 0054)     Post Anesthesia Care and Evaluation    Patient location during evaluation: PACU  Patient participation: complete - patient participated  Level of consciousness: awake and alert  Pain score: 0  Pain management: adequate  Airway patency: patent  Anesthetic complications: No anesthetic complications  PONV Status: none  Cardiovascular status: hemodynamically stable and acceptable  Respiratory status: nonlabored ventilation, acceptable and nasal cannula  Hydration status: acceptable

## 2017-12-23 ENCOUNTER — APPOINTMENT (OUTPATIENT)
Dept: NEPHROLOGY | Facility: HOSPITAL | Age: 62
End: 2017-12-23
Attending: INTERNAL MEDICINE

## 2017-12-23 LAB
ABO + RH BLD: NORMAL
ABO GROUP BLD: NORMAL
ALBUMIN SERPL-MCNC: 3 G/DL (ref 3.2–4.8)
ALBUMIN SERPL-MCNC: 3.1 G/DL (ref 3.2–4.8)
ALBUMIN/GLOB SERPL: 1.1 G/DL (ref 1.5–2.5)
ALBUMIN/GLOB SERPL: 1.1 G/DL (ref 1.5–2.5)
ALP SERPL-CCNC: 112 U/L (ref 25–100)
ALP SERPL-CCNC: 116 U/L (ref 25–100)
ALT SERPL W P-5'-P-CCNC: 8 U/L (ref 7–40)
ALT SERPL W P-5'-P-CCNC: 9 U/L (ref 7–40)
ANION GAP SERPL CALCULATED.3IONS-SCNC: 11 MMOL/L (ref 3–11)
ANION GAP SERPL CALCULATED.3IONS-SCNC: 19 MMOL/L (ref 3–11)
ANTI-FYA: NORMAL
ANTI-K: NORMAL
AST SERPL-CCNC: 29 U/L (ref 0–33)
AST SERPL-CCNC: 30 U/L (ref 0–33)
BH BB BLOOD EXPIRATION DATE: NORMAL
BH BB BLOOD TYPE BARCODE: 5100
BH BB DISPENSE STATUS: NORMAL
BH BB PRODUCT CODE: NORMAL
BH BB UNIT NUMBER: NORMAL
BILIRUB SERPL-MCNC: 0.2 MG/DL (ref 0.3–1.2)
BILIRUB SERPL-MCNC: 0.4 MG/DL (ref 0.3–1.2)
BLD GP AB SCN SERPL QL ELUTION: NEGATIVE
BLD GP AB SCN SERPL QL: POSITIVE
BUN BLD-MCNC: 39 MG/DL (ref 9–23)
BUN BLD-MCNC: 81 MG/DL (ref 9–23)
BUN/CREAT SERPL: 16.3 (ref 7–25)
BUN/CREAT SERPL: 18.8 (ref 7–25)
CALCIUM SPEC-SCNC: 9.3 MG/DL (ref 8.7–10.4)
CALCIUM SPEC-SCNC: 9.4 MG/DL (ref 8.7–10.4)
CHLORIDE SERPL-SCNC: 103 MMOL/L (ref 99–109)
CHLORIDE SERPL-SCNC: 97 MMOL/L (ref 99–109)
CO2 SERPL-SCNC: 18 MMOL/L (ref 20–31)
CO2 SERPL-SCNC: 26 MMOL/L (ref 20–31)
CREAT BLD-MCNC: 2.4 MG/DL (ref 0.6–1.3)
CREAT BLD-MCNC: 4.3 MG/DL (ref 0.6–1.3)
CROSSMATCH INTERPRETATION: NORMAL
DAT IGG GEL: POSITIVE
DEPRECATED RDW RBC AUTO: 60.3 FL (ref 37–54)
ERYTHROCYTE [DISTWIDTH] IN BLOOD BY AUTOMATED COUNT: 19.3 % (ref 11.3–14.5)
GFR SERPL CREATININE-BSD FRML MDRD: 10 ML/MIN/1.73
GFR SERPL CREATININE-BSD FRML MDRD: 20 ML/MIN/1.73
GLOBULIN UR ELPH-MCNC: 2.8 GM/DL
GLOBULIN UR ELPH-MCNC: 2.8 GM/DL
GLUCOSE BLD-MCNC: 104 MG/DL (ref 70–100)
GLUCOSE BLD-MCNC: 147 MG/DL (ref 70–100)
GLUCOSE BLDC GLUCOMTR-MCNC: 105 MG/DL (ref 70–130)
GLUCOSE BLDC GLUCOMTR-MCNC: 111 MG/DL (ref 70–130)
GLUCOSE BLDC GLUCOMTR-MCNC: 139 MG/DL (ref 70–130)
GLUCOSE BLDC GLUCOMTR-MCNC: 190 MG/DL (ref 70–130)
GLUCOSE BLDC GLUCOMTR-MCNC: 209 MG/DL (ref 70–130)
HCT VFR BLD AUTO: 24 % (ref 34.5–44)
HCT VFR BLD AUTO: 27.7 % (ref 34.5–44)
HGB BLD-MCNC: 7.9 G/DL (ref 11.5–15.5)
HGB BLD-MCNC: 9.1 G/DL (ref 11.5–15.5)
MAGNESIUM SERPL-MCNC: 1.9 MG/DL (ref 1.3–2.7)
MCH RBC QN AUTO: 28.4 PG (ref 27–31)
MCHC RBC AUTO-ENTMCNC: 32.9 G/DL (ref 32–36)
MCV RBC AUTO: 86.3 FL (ref 80–99)
PHOSPHATE SERPL-MCNC: 2.1 MG/DL (ref 2.4–5.1)
PLATELET # BLD AUTO: 256 10*3/MM3 (ref 150–450)
PMV BLD AUTO: 10.8 FL (ref 6–12)
POTASSIUM BLD-SCNC: 3.4 MMOL/L (ref 3.5–5.5)
POTASSIUM BLD-SCNC: 4 MMOL/L (ref 3.5–5.5)
PROT SERPL-MCNC: 5.8 G/DL (ref 5.7–8.2)
PROT SERPL-MCNC: 5.9 G/DL (ref 5.7–8.2)
RBC # BLD AUTO: 2.78 10*6/MM3 (ref 3.89–5.14)
RH BLD: POSITIVE
SODIUM BLD-SCNC: 134 MMOL/L (ref 132–146)
SODIUM BLD-SCNC: 140 MMOL/L (ref 132–146)
UNIT  ABO: NORMAL
UNIT  RH: NORMAL
VANCOMYCIN SERPL-MCNC: 14.1 MCG/ML (ref 5–40)
WBC NRBC COR # BLD: 16.89 10*3/MM3 (ref 3.5–10.8)

## 2017-12-23 PROCEDURE — 25010000002 ONDANSETRON PER 1 MG: Performed by: INTERNAL MEDICINE

## 2017-12-23 PROCEDURE — 84100 ASSAY OF PHOSPHORUS: CPT

## 2017-12-23 PROCEDURE — 86900 BLOOD TYPING SEROLOGIC ABO: CPT | Performed by: SURGERY

## 2017-12-23 PROCEDURE — 86850 RBC ANTIBODY SCREEN: CPT

## 2017-12-23 PROCEDURE — 25010000002 THIAMINE PER 100 MG

## 2017-12-23 PROCEDURE — 25010000002 HYDROMORPHONE PER 4 MG: Performed by: SURGERY

## 2017-12-23 PROCEDURE — 25010000002 PIPERACILLIN SOD-TAZOBACTAM PER 1 G

## 2017-12-23 PROCEDURE — 82962 GLUCOSE BLOOD TEST: CPT

## 2017-12-23 PROCEDURE — 86920 COMPATIBILITY TEST SPIN: CPT

## 2017-12-23 PROCEDURE — 80202 ASSAY OF VANCOMYCIN: CPT

## 2017-12-23 PROCEDURE — 85018 HEMOGLOBIN: CPT | Performed by: SURGERY

## 2017-12-23 PROCEDURE — 86880 COOMBS TEST DIRECT: CPT | Performed by: SURGERY

## 2017-12-23 PROCEDURE — 86850 RBC ANTIBODY SCREEN: CPT | Performed by: SURGERY

## 2017-12-23 PROCEDURE — 86870 RBC ANTIBODY IDENTIFICATION: CPT | Performed by: SURGERY

## 2017-12-23 PROCEDURE — 25010000002 POTASSIUM CHLORIDE PER 2 MEQ OF POTASSIUM

## 2017-12-23 PROCEDURE — 25010000002 HEPARIN (PORCINE) PER 1000 UNITS: Performed by: NURSE PRACTITIONER

## 2017-12-23 PROCEDURE — 83735 ASSAY OF MAGNESIUM: CPT

## 2017-12-23 PROCEDURE — 85014 HEMATOCRIT: CPT | Performed by: SURGERY

## 2017-12-23 PROCEDURE — 86901 BLOOD TYPING SEROLOGIC RH(D): CPT | Performed by: SURGERY

## 2017-12-23 PROCEDURE — 80053 COMPREHEN METABOLIC PANEL: CPT | Performed by: FAMILY MEDICINE

## 2017-12-23 PROCEDURE — 86902 BLOOD TYPE ANTIGEN DONOR EA: CPT

## 2017-12-23 PROCEDURE — P9016 RBC LEUKOCYTES REDUCED: HCPCS

## 2017-12-23 PROCEDURE — 86922 COMPATIBILITY TEST ANTIGLOB: CPT

## 2017-12-23 PROCEDURE — 80053 COMPREHEN METABOLIC PANEL: CPT

## 2017-12-23 PROCEDURE — 25010000002 CALCIUM GLUCONATE PER 10 ML

## 2017-12-23 PROCEDURE — 86860 RBC ANTIBODY ELUTION: CPT | Performed by: SURGERY

## 2017-12-23 PROCEDURE — 99233 SBSQ HOSP IP/OBS HIGH 50: CPT | Performed by: FAMILY MEDICINE

## 2017-12-23 PROCEDURE — 99232 SBSQ HOSP IP/OBS MODERATE 35: CPT | Performed by: INTERNAL MEDICINE

## 2017-12-23 PROCEDURE — 25010000002 VANCOMYCIN PER 500 MG

## 2017-12-23 PROCEDURE — 63510000001 EPOETIN ALFA PER 1000 UNITS: Performed by: INTERNAL MEDICINE

## 2017-12-23 PROCEDURE — 86900 BLOOD TYPING SEROLOGIC ABO: CPT

## 2017-12-23 PROCEDURE — 85027 COMPLETE CBC AUTOMATED: CPT | Performed by: FAMILY MEDICINE

## 2017-12-23 PROCEDURE — 25010000002 MAGNESIUM SULFATE PER 500 MG OF MAGNESIUM

## 2017-12-23 RX ORDER — FAMOTIDINE 10 MG/ML
20 INJECTION, SOLUTION INTRAVENOUS EVERY 12 HOURS SCHEDULED
Status: DISCONTINUED | OUTPATIENT
Start: 2017-12-23 | End: 2018-01-03

## 2017-12-23 RX ORDER — ALBUMIN (HUMAN) 12.5 G/50ML
12.5 SOLUTION INTRAVENOUS AS NEEDED
Status: ACTIVE | OUTPATIENT
Start: 2017-12-23 | End: 2017-12-23

## 2017-12-23 RX ORDER — ALBUMIN (HUMAN) 12.5 G/50ML
25 SOLUTION INTRAVENOUS AS NEEDED
Status: ACTIVE | OUTPATIENT
Start: 2017-12-23 | End: 2017-12-24

## 2017-12-23 RX ADMIN — SERTRALINE HYDROCHLORIDE 100 MG: 100 TABLET ORAL at 09:06

## 2017-12-23 RX ADMIN — AMIODARONE HYDROCHLORIDE 200 MG: 200 TABLET ORAL at 11:54

## 2017-12-23 RX ADMIN — METOPROLOL TARTRATE 25 MG: 25 TABLET ORAL at 20:52

## 2017-12-23 RX ADMIN — AMIODARONE HYDROCHLORIDE 200 MG: 200 TABLET ORAL at 20:53

## 2017-12-23 RX ADMIN — TAZOBACTAM SODIUM AND PIPERACILLIN SODIUM 2.25 G: 250; 2 INJECTION, SOLUTION INTRAVENOUS at 09:04

## 2017-12-23 RX ADMIN — VANCOMYCIN HYDROCHLORIDE 750 MG: 750 INJECTION, SOLUTION INTRAVENOUS at 11:54

## 2017-12-23 RX ADMIN — HEPARIN SODIUM 5000 UNITS: 5000 INJECTION, SOLUTION INTRAVENOUS; SUBCUTANEOUS at 09:05

## 2017-12-23 RX ADMIN — DOXYCYCLINE 100 MG: 100 INJECTION, POWDER, LYOPHILIZED, FOR SOLUTION INTRAVENOUS at 09:22

## 2017-12-23 RX ADMIN — INSULIN HUMAN 3 UNITS: 100 INJECTION, SOLUTION PARENTERAL at 00:53

## 2017-12-23 RX ADMIN — THIAMINE HYDROCHLORIDE 100 MG: 100 INJECTION, SOLUTION INTRAMUSCULAR; INTRAVENOUS at 09:05

## 2017-12-23 RX ADMIN — OXYCODONE AND ACETAMINOPHEN 1 TABLET: 5; 325 TABLET ORAL at 16:10

## 2017-12-23 RX ADMIN — HEPARIN SODIUM 5000 UNITS: 5000 INJECTION, SOLUTION INTRAVENOUS; SUBCUTANEOUS at 20:53

## 2017-12-23 RX ADMIN — POTASSIUM CHLORIDE: 2 INJECTION, SOLUTION, CONCENTRATE INTRAVENOUS at 18:45

## 2017-12-23 RX ADMIN — TAZOBACTAM SODIUM AND PIPERACILLIN SODIUM 2.25 G: 250; 2 INJECTION, SOLUTION INTRAVENOUS at 23:23

## 2017-12-23 RX ADMIN — ALPRAZOLAM 0.5 MG: 0.5 TABLET ORAL at 04:56

## 2017-12-23 RX ADMIN — TAZOBACTAM SODIUM AND PIPERACILLIN SODIUM 2.25 G: 250; 2 INJECTION, SOLUTION INTRAVENOUS at 14:15

## 2017-12-23 RX ADMIN — DOXYCYCLINE 100 MG: 100 INJECTION, POWDER, LYOPHILIZED, FOR SOLUTION INTRAVENOUS at 20:53

## 2017-12-23 RX ADMIN — AMIODARONE HYDROCHLORIDE 200 MG: 200 TABLET ORAL at 04:56

## 2017-12-23 RX ADMIN — SODIUM BICARBONATE 650 MG TABLET 650 MG: at 16:10

## 2017-12-23 RX ADMIN — ERYTHROPOIETIN 10000 UNITS: 10000 INJECTION, SOLUTION INTRAVENOUS; SUBCUTANEOUS at 08:09

## 2017-12-23 RX ADMIN — FAMOTIDINE 20 MG: 10 INJECTION INTRAVENOUS at 20:53

## 2017-12-23 RX ADMIN — ASPIRIN 325 MG ORAL TABLET 325 MG: 325 PILL ORAL at 09:05

## 2017-12-23 RX ADMIN — SODIUM BICARBONATE 650 MG TABLET 650 MG: at 09:06

## 2017-12-23 RX ADMIN — SODIUM BICARBONATE 650 MG TABLET 650 MG: at 20:52

## 2017-12-23 RX ADMIN — HYDROMORPHONE HYDROCHLORIDE 0.5 MG: 1 INJECTION, SOLUTION INTRAMUSCULAR; INTRAVENOUS; SUBCUTANEOUS at 14:28

## 2017-12-23 RX ADMIN — ONDANSETRON 4 MG: 2 INJECTION INTRAMUSCULAR; INTRAVENOUS at 22:37

## 2017-12-23 RX ADMIN — FAMOTIDINE 20 MG: 10 INJECTION INTRAVENOUS at 11:53

## 2017-12-23 RX ADMIN — INSULIN HUMAN 2 UNITS: 100 INJECTION, SOLUTION PARENTERAL at 05:38

## 2017-12-23 RX ADMIN — HYDROMORPHONE HYDROCHLORIDE 0.5 MG: 1 INJECTION, SOLUTION INTRAMUSCULAR; INTRAVENOUS; SUBCUTANEOUS at 01:42

## 2017-12-23 RX ADMIN — METOPROLOL TARTRATE 25 MG: 25 TABLET ORAL at 09:55

## 2017-12-24 LAB
ABO + RH BLD: NORMAL
ALBUMIN SERPL-MCNC: 2.7 G/DL (ref 3.2–4.8)
AMMONIA BLD-SCNC: 25 UMOL/L (ref 19–60)
ANION GAP SERPL CALCULATED.3IONS-SCNC: 13 MMOL/L (ref 3–11)
BACTERIA SPEC AEROBE CULT: NORMAL
BACTERIA SPEC AEROBE CULT: NORMAL
BH BB BLOOD EXPIRATION DATE: NORMAL
BH BB BLOOD TYPE BARCODE: 5100
BH BB DISPENSE STATUS: NORMAL
BH BB PRODUCT CODE: NORMAL
BH BB UNIT NUMBER: NORMAL
BUN BLD-MCNC: 58 MG/DL (ref 9–23)
BUN/CREAT SERPL: 18.7 (ref 7–25)
CALCIUM SPEC-SCNC: 8.9 MG/DL (ref 8.7–10.4)
CHLORIDE SERPL-SCNC: 100 MMOL/L (ref 99–109)
CO2 SERPL-SCNC: 25 MMOL/L (ref 20–31)
CREAT BLD-MCNC: 3.1 MG/DL (ref 0.6–1.3)
CROSSMATCH INTERPRETATION: NORMAL
DEPRECATED RDW RBC AUTO: 59.8 FL (ref 37–54)
ERYTHROCYTE [DISTWIDTH] IN BLOOD BY AUTOMATED COUNT: 20.9 % (ref 11.3–14.5)
GFR SERPL CREATININE-BSD FRML MDRD: 15 ML/MIN/1.73
GLUCOSE BLD-MCNC: 117 MG/DL (ref 70–100)
GLUCOSE BLDC GLUCOMTR-MCNC: 113 MG/DL (ref 70–130)
GLUCOSE BLDC GLUCOMTR-MCNC: 114 MG/DL (ref 70–130)
GLUCOSE BLDC GLUCOMTR-MCNC: 119 MG/DL (ref 70–130)
GLUCOSE BLDC GLUCOMTR-MCNC: 126 MG/DL (ref 70–130)
GLUCOSE BLDC GLUCOMTR-MCNC: 132 MG/DL (ref 70–130)
HCT VFR BLD AUTO: 25.8 % (ref 34.5–44)
HGB BLD-MCNC: 8.4 G/DL (ref 11.5–15.5)
IRON 24H UR-MRATE: 14 MCG/DL (ref 50–175)
IRON SATN MFR SERPL: 8 % (ref 15–50)
MAGNESIUM SERPL-MCNC: 2 MG/DL (ref 1.3–2.7)
MCH RBC QN AUTO: 27.1 PG (ref 27–31)
MCHC RBC AUTO-ENTMCNC: 32.6 G/DL (ref 32–36)
MCV RBC AUTO: 83.2 FL (ref 80–99)
PHOSPHATE SERPL-MCNC: 2 MG/DL (ref 2.4–5.1)
PLATELET # BLD AUTO: 271 10*3/MM3 (ref 150–450)
PMV BLD AUTO: 10.4 FL (ref 6–12)
POTASSIUM BLD-SCNC: 3.5 MMOL/L (ref 3.5–5.5)
RBC # BLD AUTO: 3.1 10*6/MM3 (ref 3.89–5.14)
SODIUM BLD-SCNC: 138 MMOL/L (ref 132–146)
TIBC SERPL-MCNC: 167 MCG/DL (ref 250–450)
UNIT  ABO: NORMAL
UNIT  RH: NORMAL
WBC NRBC COR # BLD: 18.27 10*3/MM3 (ref 3.5–10.8)

## 2017-12-24 PROCEDURE — 99233 SBSQ HOSP IP/OBS HIGH 50: CPT | Performed by: FAMILY MEDICINE

## 2017-12-24 PROCEDURE — 25010000002 PIPERACILLIN SOD-TAZOBACTAM PER 1 G

## 2017-12-24 PROCEDURE — 83735 ASSAY OF MAGNESIUM: CPT

## 2017-12-24 PROCEDURE — 83550 IRON BINDING TEST: CPT | Performed by: INTERNAL MEDICINE

## 2017-12-24 PROCEDURE — 25010000002 MAGNESIUM SULFATE PER 500 MG OF MAGNESIUM

## 2017-12-24 PROCEDURE — 25010000002 CALCIUM GLUCONATE PER 10 ML

## 2017-12-24 PROCEDURE — 25010000002 HEPARIN (PORCINE) PER 1000 UNITS: Performed by: NURSE PRACTITIONER

## 2017-12-24 PROCEDURE — 82962 GLUCOSE BLOOD TEST: CPT

## 2017-12-24 PROCEDURE — 80069 RENAL FUNCTION PANEL: CPT | Performed by: INTERNAL MEDICINE

## 2017-12-24 PROCEDURE — 25010000002 HYDRALAZINE PER 20 MG: Performed by: INTERNAL MEDICINE

## 2017-12-24 PROCEDURE — 85027 COMPLETE CBC AUTOMATED: CPT | Performed by: SURGERY

## 2017-12-24 PROCEDURE — 25010000002 HYDROMORPHONE PER 4 MG: Performed by: SURGERY

## 2017-12-24 PROCEDURE — 83540 ASSAY OF IRON: CPT | Performed by: INTERNAL MEDICINE

## 2017-12-24 PROCEDURE — 99232 SBSQ HOSP IP/OBS MODERATE 35: CPT | Performed by: INTERNAL MEDICINE

## 2017-12-24 PROCEDURE — 25010000002 THIAMINE PER 100 MG

## 2017-12-24 PROCEDURE — 82140 ASSAY OF AMMONIA: CPT | Performed by: FAMILY MEDICINE

## 2017-12-24 PROCEDURE — 25010000002 POTASSIUM CHLORIDE PER 2 MEQ OF POTASSIUM

## 2017-12-24 RX ORDER — METOPROLOL TARTRATE 50 MG/1
50 TABLET, FILM COATED ORAL EVERY 12 HOURS SCHEDULED
Status: DISCONTINUED | OUTPATIENT
Start: 2017-12-24 | End: 2018-01-02

## 2017-12-24 RX ADMIN — SODIUM PHOSPHATE, MONOBASIC, MONOHYDRATE 9 MMOL: 276; 142 INJECTION, SOLUTION INTRAVENOUS at 14:43

## 2017-12-24 RX ADMIN — AMIODARONE HYDROCHLORIDE 200 MG: 200 TABLET ORAL at 04:21

## 2017-12-24 RX ADMIN — SODIUM BICARBONATE 650 MG TABLET 650 MG: at 18:31

## 2017-12-24 RX ADMIN — HEPARIN SODIUM 5000 UNITS: 5000 INJECTION, SOLUTION INTRAVENOUS; SUBCUTANEOUS at 20:07

## 2017-12-24 RX ADMIN — SODIUM BICARBONATE 650 MG TABLET 650 MG: at 20:08

## 2017-12-24 RX ADMIN — AMIODARONE HYDROCHLORIDE 200 MG: 200 TABLET ORAL at 12:20

## 2017-12-24 RX ADMIN — METOPROLOL TARTRATE 50 MG: 50 TABLET ORAL at 20:08

## 2017-12-24 RX ADMIN — TAZOBACTAM SODIUM AND PIPERACILLIN SODIUM 2.25 G: 250; 2 INJECTION, SOLUTION INTRAVENOUS at 14:43

## 2017-12-24 RX ADMIN — HYDROMORPHONE HYDROCHLORIDE 0.5 MG: 1 INJECTION, SOLUTION INTRAMUSCULAR; INTRAVENOUS; SUBCUTANEOUS at 21:09

## 2017-12-24 RX ADMIN — ASPIRIN 325 MG ORAL TABLET 325 MG: 325 PILL ORAL at 08:16

## 2017-12-24 RX ADMIN — DOXYCYCLINE 100 MG: 100 INJECTION, POWDER, LYOPHILIZED, FOR SOLUTION INTRAVENOUS at 20:08

## 2017-12-24 RX ADMIN — AMIODARONE HYDROCHLORIDE 200 MG: 200 TABLET ORAL at 20:08

## 2017-12-24 RX ADMIN — ALPRAZOLAM 0.5 MG: 0.5 TABLET ORAL at 12:19

## 2017-12-24 RX ADMIN — THIAMINE HYDROCHLORIDE 100 MG: 100 INJECTION, SOLUTION INTRAMUSCULAR; INTRAVENOUS at 08:16

## 2017-12-24 RX ADMIN — ALPRAZOLAM 0.5 MG: 0.5 TABLET ORAL at 23:18

## 2017-12-24 RX ADMIN — HEPARIN SODIUM 5000 UNITS: 5000 INJECTION, SOLUTION INTRAVENOUS; SUBCUTANEOUS at 08:16

## 2017-12-24 RX ADMIN — POTASSIUM CHLORIDE: 2 INJECTION, SOLUTION, CONCENTRATE INTRAVENOUS at 18:31

## 2017-12-24 RX ADMIN — FAMOTIDINE 20 MG: 10 INJECTION INTRAVENOUS at 08:16

## 2017-12-24 RX ADMIN — SERTRALINE HYDROCHLORIDE 100 MG: 100 TABLET ORAL at 08:16

## 2017-12-24 RX ADMIN — FAMOTIDINE 20 MG: 10 INJECTION INTRAVENOUS at 20:07

## 2017-12-24 RX ADMIN — HYDRALAZINE HYDROCHLORIDE 10 MG: 20 INJECTION INTRAMUSCULAR; INTRAVENOUS at 22:19

## 2017-12-24 RX ADMIN — PHENOL 2 SPRAY: 1.5 LIQUID ORAL at 08:15

## 2017-12-24 RX ADMIN — SODIUM BICARBONATE 650 MG TABLET 650 MG: at 08:15

## 2017-12-24 RX ADMIN — OXYCODONE AND ACETAMINOPHEN 1 TABLET: 5; 325 TABLET ORAL at 08:16

## 2017-12-24 RX ADMIN — DOXYCYCLINE 100 MG: 100 INJECTION, POWDER, LYOPHILIZED, FOR SOLUTION INTRAVENOUS at 11:17

## 2017-12-24 RX ADMIN — TAZOBACTAM SODIUM AND PIPERACILLIN SODIUM 2.25 G: 250; 2 INJECTION, SOLUTION INTRAVENOUS at 21:46

## 2017-12-24 RX ADMIN — METOPROLOL TARTRATE 25 MG: 25 TABLET ORAL at 08:15

## 2017-12-24 RX ADMIN — TAZOBACTAM SODIUM AND PIPERACILLIN SODIUM 2.25 G: 250; 2 INJECTION, SOLUTION INTRAVENOUS at 05:44

## 2017-12-24 RX ADMIN — ALPRAZOLAM 0.5 MG: 0.5 TABLET ORAL at 00:57

## 2017-12-25 ENCOUNTER — APPOINTMENT (OUTPATIENT)
Dept: GENERAL RADIOLOGY | Facility: HOSPITAL | Age: 62
End: 2017-12-25

## 2017-12-25 LAB
ALBUMIN SERPL-MCNC: 2.9 G/DL (ref 3.2–4.8)
ALBUMIN/GLOB SERPL: 1 G/DL (ref 1.5–2.5)
ALP SERPL-CCNC: 192 U/L (ref 25–100)
ALT SERPL W P-5'-P-CCNC: 5 U/L (ref 7–40)
ANION GAP SERPL CALCULATED.3IONS-SCNC: 16 MMOL/L (ref 3–11)
AST SERPL-CCNC: 23 U/L (ref 0–33)
BASOPHILS # BLD MANUAL: 0.14 10*3/MM3 (ref 0–0.2)
BASOPHILS NFR BLD AUTO: 1 % (ref 0–1)
BILIRUB SERPL-MCNC: 0.2 MG/DL (ref 0.3–1.2)
BUN BLD-MCNC: 80 MG/DL (ref 9–23)
BUN/CREAT SERPL: 19.5 (ref 7–25)
BURR CELLS BLD QL SMEAR: ABNORMAL
CA-I SERPL ISE-MCNC: 1.28 MMOL/L (ref 1.12–1.32)
CALCIUM SPEC-SCNC: 9.6 MG/DL (ref 8.7–10.4)
CHLORIDE SERPL-SCNC: 96 MMOL/L (ref 99–109)
CO2 SERPL-SCNC: 23 MMOL/L (ref 20–31)
CREAT BLD-MCNC: 4.1 MG/DL (ref 0.6–1.3)
CRP SERPL-MCNC: 30.35 MG/DL (ref 0–1)
DEPRECATED RDW RBC AUTO: 57.9 FL (ref 37–54)
EOSINOPHIL # BLD MANUAL: 0.14 10*3/MM3 (ref 0.1–0.3)
EOSINOPHIL NFR BLD MANUAL: 1 % (ref 0–3)
ERYTHROCYTE [DISTWIDTH] IN BLOOD BY AUTOMATED COUNT: 20.2 % (ref 11.3–14.5)
GFR SERPL CREATININE-BSD FRML MDRD: 11 ML/MIN/1.73
GLOBULIN UR ELPH-MCNC: 2.9 GM/DL
GLUCOSE BLD-MCNC: 118 MG/DL (ref 70–100)
GLUCOSE BLDC GLUCOMTR-MCNC: 116 MG/DL (ref 70–130)
GLUCOSE BLDC GLUCOMTR-MCNC: 134 MG/DL (ref 70–130)
GLUCOSE BLDC GLUCOMTR-MCNC: 143 MG/DL (ref 70–130)
GLUCOSE BLDC GLUCOMTR-MCNC: 143 MG/DL (ref 70–130)
HCT VFR BLD AUTO: 25.5 % (ref 34.5–44)
HEMOCCULT STL QL: POSITIVE
HGB BLD-MCNC: 8.6 G/DL (ref 11.5–15.5)
HYPOCHROMIA BLD QL: ABNORMAL
LYMPHOCYTES # BLD MANUAL: 1.28 10*3/MM3 (ref 0.6–4.8)
LYMPHOCYTES NFR BLD MANUAL: 7 % (ref 0–12)
LYMPHOCYTES NFR BLD MANUAL: 9 % (ref 24–44)
MAGNESIUM SERPL-MCNC: 2.1 MG/DL (ref 1.3–2.7)
MCH RBC QN AUTO: 27.6 PG (ref 27–31)
MCHC RBC AUTO-ENTMCNC: 33.7 G/DL (ref 32–36)
MCV RBC AUTO: 81.7 FL (ref 80–99)
METAMYELOCYTES NFR BLD MANUAL: 1 % (ref 0–0)
MONOCYTES # BLD AUTO: 0.99 10*3/MM3 (ref 0–1)
NEUTROPHILS # BLD AUTO: 11.34 10*3/MM3 (ref 1.5–8.3)
NEUTROPHILS NFR BLD MANUAL: 77 % (ref 41–71)
NEUTS BAND NFR BLD MANUAL: 3 % (ref 0–5)
NRBC SPEC MANUAL: 2 /100 WBC (ref 0–0)
PHOSPHATE SERPL-MCNC: 3.1 MG/DL (ref 2.4–5.1)
PLAT MORPH BLD: NORMAL
PLATELET # BLD AUTO: 298 10*3/MM3 (ref 150–450)
PMV BLD AUTO: 10.3 FL (ref 6–12)
POLYCHROMASIA BLD QL SMEAR: ABNORMAL
POTASSIUM BLD-SCNC: 3.3 MMOL/L (ref 3.5–5.5)
PREALB SERPL-MCNC: 6.3 MG/DL (ref 10–40)
PROT SERPL-MCNC: 5.8 G/DL (ref 5.7–8.2)
RBC # BLD AUTO: 3.12 10*6/MM3 (ref 3.89–5.14)
SODIUM BLD-SCNC: 135 MMOL/L (ref 132–146)
TARGETS BLD QL SMEAR: ABNORMAL
TRIGL SERPL-MCNC: 183 MG/DL (ref 0–150)
VARIANT LYMPHS NFR BLD MANUAL: 1 % (ref 0–5)
WBC MORPH BLD: NORMAL
WBC NRBC COR # BLD: 14.18 10*3/MM3 (ref 3.5–10.8)

## 2017-12-25 PROCEDURE — 25010000002 HYDROMORPHONE PER 4 MG: Performed by: SURGERY

## 2017-12-25 PROCEDURE — 86140 C-REACTIVE PROTEIN: CPT | Performed by: SURGERY

## 2017-12-25 PROCEDURE — 25010000002 POTASSIUM CHLORIDE PER 2 MEQ OF POTASSIUM

## 2017-12-25 PROCEDURE — 74000 HC ABDOMEN KUB: CPT

## 2017-12-25 PROCEDURE — 25010000002 HEPARIN (PORCINE) PER 1000 UNITS: Performed by: NURSE PRACTITIONER

## 2017-12-25 PROCEDURE — 97110 THERAPEUTIC EXERCISES: CPT

## 2017-12-25 PROCEDURE — 85025 COMPLETE CBC W/AUTO DIFF WBC: CPT | Performed by: FAMILY MEDICINE

## 2017-12-25 PROCEDURE — 99233 SBSQ HOSP IP/OBS HIGH 50: CPT | Performed by: FAMILY MEDICINE

## 2017-12-25 PROCEDURE — 84478 ASSAY OF TRIGLYCERIDES: CPT

## 2017-12-25 PROCEDURE — 83735 ASSAY OF MAGNESIUM: CPT

## 2017-12-25 PROCEDURE — 25010000002 HYDRALAZINE PER 20 MG: Performed by: INTERNAL MEDICINE

## 2017-12-25 PROCEDURE — 82330 ASSAY OF CALCIUM: CPT | Performed by: SURGERY

## 2017-12-25 PROCEDURE — 85007 BL SMEAR W/DIFF WBC COUNT: CPT | Performed by: FAMILY MEDICINE

## 2017-12-25 PROCEDURE — 84100 ASSAY OF PHOSPHORUS: CPT | Performed by: FAMILY MEDICINE

## 2017-12-25 PROCEDURE — 25010000002 METOCLOPRAMIDE PER 10 MG: Performed by: SURGERY

## 2017-12-25 PROCEDURE — 25010000002 CALCIUM GLUCONATE PER 10 ML

## 2017-12-25 PROCEDURE — 25010000002 PROMETHAZINE PER 50 MG: Performed by: INTERNAL MEDICINE

## 2017-12-25 PROCEDURE — 99232 SBSQ HOSP IP/OBS MODERATE 35: CPT | Performed by: INTERNAL MEDICINE

## 2017-12-25 PROCEDURE — 97116 GAIT TRAINING THERAPY: CPT

## 2017-12-25 PROCEDURE — 25010000002 THIAMINE PER 100 MG

## 2017-12-25 PROCEDURE — 25010000002 NA FERRIC GLUC CPLX PER 12.5 MG: Performed by: INTERNAL MEDICINE

## 2017-12-25 PROCEDURE — 80053 COMPREHEN METABOLIC PANEL: CPT

## 2017-12-25 PROCEDURE — 84134 ASSAY OF PREALBUMIN: CPT | Performed by: SURGERY

## 2017-12-25 PROCEDURE — 82272 OCCULT BLD FECES 1-3 TESTS: CPT | Performed by: FAMILY MEDICINE

## 2017-12-25 PROCEDURE — 97164 PT RE-EVAL EST PLAN CARE: CPT

## 2017-12-25 PROCEDURE — 82962 GLUCOSE BLOOD TEST: CPT

## 2017-12-25 PROCEDURE — 25010000003 POTASSIUM CHLORIDE 10 MEQ/100ML SOLUTION: Performed by: FAMILY MEDICINE

## 2017-12-25 PROCEDURE — 25010000002 PIPERACILLIN SOD-TAZOBACTAM PER 1 G

## 2017-12-25 PROCEDURE — 25010000002 MAGNESIUM SULFATE PER 500 MG OF MAGNESIUM

## 2017-12-25 RX ORDER — POTASSIUM CHLORIDE 7.45 MG/ML
10 INJECTION INTRAVENOUS
Status: DISCONTINUED | OUTPATIENT
Start: 2017-12-25 | End: 2018-01-12 | Stop reason: HOSPADM

## 2017-12-25 RX ORDER — MAGNESIUM SULFATE HEPTAHYDRATE 40 MG/ML
2 INJECTION, SOLUTION INTRAVENOUS AS NEEDED
Status: DISCONTINUED | OUTPATIENT
Start: 2017-12-25 | End: 2017-12-28

## 2017-12-25 RX ORDER — ALBUMIN (HUMAN) 12.5 G/50ML
12.5 SOLUTION INTRAVENOUS AS NEEDED
Status: ACTIVE | OUTPATIENT
Start: 2017-12-26 | End: 2017-12-26

## 2017-12-25 RX ORDER — MAGNESIUM SULFATE HEPTAHYDRATE 40 MG/ML
4 INJECTION, SOLUTION INTRAVENOUS AS NEEDED
Status: DISCONTINUED | OUTPATIENT
Start: 2017-12-25 | End: 2017-12-28

## 2017-12-25 RX ORDER — ACETAMINOPHEN 500 MG
500 TABLET ORAL 4 TIMES DAILY PRN
Status: DISCONTINUED | OUTPATIENT
Start: 2017-12-25 | End: 2018-01-12 | Stop reason: HOSPADM

## 2017-12-25 RX ORDER — METOCLOPRAMIDE HYDROCHLORIDE 5 MG/ML
10 INJECTION INTRAMUSCULAR; INTRAVENOUS EVERY 6 HOURS
Status: DISCONTINUED | OUTPATIENT
Start: 2017-12-25 | End: 2017-12-26

## 2017-12-25 RX ADMIN — FAMOTIDINE 20 MG: 10 INJECTION INTRAVENOUS at 21:16

## 2017-12-25 RX ADMIN — METOPROLOL TARTRATE 50 MG: 50 TABLET ORAL at 21:16

## 2017-12-25 RX ADMIN — METOCLOPRAMIDE 10 MG: 5 INJECTION, SOLUTION INTRAMUSCULAR; INTRAVENOUS at 15:54

## 2017-12-25 RX ADMIN — TAZOBACTAM SODIUM AND PIPERACILLIN SODIUM 2.25 G: 250; 2 INJECTION, SOLUTION INTRAVENOUS at 05:31

## 2017-12-25 RX ADMIN — I.V. FAT EMULSION 250 ML: 20 EMULSION INTRAVENOUS at 18:18

## 2017-12-25 RX ADMIN — POTASSIUM CHLORIDE 10 MEQ: 7.46 INJECTION, SOLUTION INTRAVENOUS at 22:20

## 2017-12-25 RX ADMIN — POTASSIUM CHLORIDE 10 MEQ: 7.46 INJECTION, SOLUTION INTRAVENOUS at 21:03

## 2017-12-25 RX ADMIN — HEPARIN SODIUM 5000 UNITS: 5000 INJECTION, SOLUTION INTRAVENOUS; SUBCUTANEOUS at 21:16

## 2017-12-25 RX ADMIN — ALPRAZOLAM 0.5 MG: 0.5 TABLET ORAL at 22:20

## 2017-12-25 RX ADMIN — DOXYCYCLINE 100 MG: 100 INJECTION, POWDER, LYOPHILIZED, FOR SOLUTION INTRAVENOUS at 21:17

## 2017-12-25 RX ADMIN — ALPRAZOLAM 0.5 MG: 0.5 TABLET ORAL at 05:55

## 2017-12-25 RX ADMIN — TAZOBACTAM SODIUM AND PIPERACILLIN SODIUM 2.25 G: 250; 2 INJECTION, SOLUTION INTRAVENOUS at 14:00

## 2017-12-25 RX ADMIN — HYDROMORPHONE HYDROCHLORIDE 0.5 MG: 1 INJECTION, SOLUTION INTRAMUSCULAR; INTRAVENOUS; SUBCUTANEOUS at 07:26

## 2017-12-25 RX ADMIN — METOPROLOL TARTRATE 50 MG: 50 TABLET ORAL at 11:21

## 2017-12-25 RX ADMIN — TAZOBACTAM SODIUM AND PIPERACILLIN SODIUM 2.25 G: 250; 2 INJECTION, SOLUTION INTRAVENOUS at 11:20

## 2017-12-25 RX ADMIN — METOCLOPRAMIDE 10 MG: 5 INJECTION, SOLUTION INTRAMUSCULAR; INTRAVENOUS at 21:16

## 2017-12-25 RX ADMIN — DOXYCYCLINE 100 MG: 100 INJECTION, POWDER, LYOPHILIZED, FOR SOLUTION INTRAVENOUS at 08:20

## 2017-12-25 RX ADMIN — ASPIRIN 325 MG ORAL TABLET 325 MG: 325 PILL ORAL at 11:21

## 2017-12-25 RX ADMIN — POTASSIUM CHLORIDE 10 MEQ: 7.46 INJECTION, SOLUTION INTRAVENOUS at 23:29

## 2017-12-25 RX ADMIN — AMIODARONE HYDROCHLORIDE 200 MG: 200 TABLET ORAL at 12:23

## 2017-12-25 RX ADMIN — HEPARIN SODIUM 5000 UNITS: 5000 INJECTION, SOLUTION INTRAVENOUS; SUBCUTANEOUS at 08:20

## 2017-12-25 RX ADMIN — PROMETHAZINE HYDROCHLORIDE 12.5 MG: 25 INJECTION INTRAMUSCULAR; INTRAVENOUS at 07:26

## 2017-12-25 RX ADMIN — FAMOTIDINE 20 MG: 10 INJECTION INTRAVENOUS at 08:21

## 2017-12-25 RX ADMIN — SERTRALINE HYDROCHLORIDE 100 MG: 100 TABLET ORAL at 11:22

## 2017-12-25 RX ADMIN — THIAMINE HYDROCHLORIDE 100 MG: 100 INJECTION, SOLUTION INTRAMUSCULAR; INTRAVENOUS at 11:22

## 2017-12-25 RX ADMIN — HYDRALAZINE HYDROCHLORIDE 10 MG: 20 INJECTION INTRAMUSCULAR; INTRAVENOUS at 05:55

## 2017-12-25 RX ADMIN — ACETAMINOPHEN 500 MG: 500 TABLET ORAL at 21:15

## 2017-12-25 RX ADMIN — POTASSIUM CHLORIDE: 2 INJECTION, SOLUTION, CONCENTRATE INTRAVENOUS at 18:18

## 2017-12-25 RX ADMIN — TAZOBACTAM SODIUM AND PIPERACILLIN SODIUM 2.25 G: 250; 2 INJECTION, SOLUTION INTRAVENOUS at 23:29

## 2017-12-25 RX ADMIN — POTASSIUM CHLORIDE 10 MEQ: 7.46 INJECTION, SOLUTION INTRAVENOUS at 19:44

## 2017-12-25 RX ADMIN — SODIUM CHLORIDE 250 MG: 9 INJECTION, SOLUTION INTRAVENOUS at 15:54

## 2017-12-25 RX ADMIN — SODIUM BICARBONATE 650 MG TABLET 650 MG: at 11:20

## 2017-12-25 RX ADMIN — AMIODARONE HYDROCHLORIDE 200 MG: 200 TABLET ORAL at 11:23

## 2017-12-25 RX ADMIN — AMIODARONE HYDROCHLORIDE 200 MG: 200 TABLET ORAL at 05:31

## 2017-12-25 RX ADMIN — SODIUM BICARBONATE 650 MG TABLET 650 MG: at 21:15

## 2017-12-25 RX ADMIN — SODIUM BICARBONATE 650 MG TABLET 650 MG: at 15:54

## 2017-12-25 RX ADMIN — AMIODARONE HYDROCHLORIDE 200 MG: 200 TABLET ORAL at 19:42

## 2017-12-26 ENCOUNTER — APPOINTMENT (OUTPATIENT)
Dept: NEPHROLOGY | Facility: HOSPITAL | Age: 62
End: 2017-12-26
Attending: INTERNAL MEDICINE

## 2017-12-26 LAB
ALBUMIN SERPL-MCNC: 2.8 G/DL (ref 3.2–4.8)
ALBUMIN/GLOB SERPL: 0.9 G/DL (ref 1.5–2.5)
ALP SERPL-CCNC: 116 U/L (ref 25–100)
ALT SERPL W P-5'-P-CCNC: 7 U/L (ref 7–40)
ANION GAP SERPL CALCULATED.3IONS-SCNC: 17 MMOL/L (ref 3–11)
AST SERPL-CCNC: 20 U/L (ref 0–33)
BACTERIA SPEC AEROBE CULT: NORMAL
BACTERIA SPEC AEROBE CULT: NORMAL
BASOPHILS # BLD AUTO: 0.04 10*3/MM3 (ref 0–0.2)
BASOPHILS NFR BLD AUTO: 0.3 % (ref 0–1)
BILIRUB SERPL-MCNC: 0.2 MG/DL (ref 0.3–1.2)
BUN BLD-MCNC: 99 MG/DL (ref 9–23)
BUN/CREAT SERPL: 20.6 (ref 7–25)
CA-I SERPL ISE-MCNC: 1.27 MMOL/L (ref 1.12–1.32)
CALCIUM SPEC-SCNC: 9.4 MG/DL (ref 8.7–10.4)
CHLORIDE SERPL-SCNC: 93 MMOL/L (ref 99–109)
CO2 SERPL-SCNC: 21 MMOL/L (ref 20–31)
CREAT BLD-MCNC: 4.8 MG/DL (ref 0.6–1.3)
DEPRECATED RDW RBC AUTO: 60.9 FL (ref 37–54)
EOSINOPHIL # BLD AUTO: 0.5 10*3/MM3 (ref 0–0.3)
EOSINOPHIL NFR BLD AUTO: 3.3 % (ref 0–3)
ERYTHROCYTE [DISTWIDTH] IN BLOOD BY AUTOMATED COUNT: 21.3 % (ref 11.3–14.5)
GFR SERPL CREATININE-BSD FRML MDRD: 9 ML/MIN/1.73
GLOBULIN UR ELPH-MCNC: 3 GM/DL
GLUCOSE BLD-MCNC: 110 MG/DL (ref 70–100)
GLUCOSE BLDC GLUCOMTR-MCNC: 103 MG/DL (ref 70–130)
GLUCOSE BLDC GLUCOMTR-MCNC: 107 MG/DL (ref 70–130)
GLUCOSE BLDC GLUCOMTR-MCNC: 117 MG/DL (ref 70–130)
GLUCOSE BLDC GLUCOMTR-MCNC: 118 MG/DL (ref 70–130)
GLUCOSE BLDC GLUCOMTR-MCNC: 128 MG/DL (ref 70–130)
HCT VFR BLD AUTO: 26.9 % (ref 34.5–44)
HGB BLD-MCNC: 8.9 G/DL (ref 11.5–15.5)
IMM GRANULOCYTES # BLD: 0.8 10*3/MM3 (ref 0–0.03)
IMM GRANULOCYTES NFR BLD: 5.2 % (ref 0–0.6)
LYMPHOCYTES # BLD AUTO: 1.13 10*3/MM3 (ref 0.6–4.8)
LYMPHOCYTES NFR BLD AUTO: 7.4 % (ref 24–44)
MAGNESIUM SERPL-MCNC: 2 MG/DL (ref 1.3–2.7)
MCH RBC QN AUTO: 27.6 PG (ref 27–31)
MCHC RBC AUTO-ENTMCNC: 33.1 G/DL (ref 32–36)
MCV RBC AUTO: 83.3 FL (ref 80–99)
MONOCYTES # BLD AUTO: 0.56 10*3/MM3 (ref 0–1)
MONOCYTES NFR BLD AUTO: 3.7 % (ref 0–12)
NEUTROPHILS # BLD AUTO: 12.31 10*3/MM3 (ref 1.5–8.3)
NEUTROPHILS NFR BLD AUTO: 80.1 % (ref 41–71)
PHOSPHATE SERPL-MCNC: 3.2 MG/DL (ref 2.4–5.1)
PLAT MORPH BLD: NORMAL
PLATELET # BLD AUTO: 305 10*3/MM3 (ref 150–450)
PMV BLD AUTO: 10.5 FL (ref 6–12)
POLYCHROMASIA BLD QL SMEAR: NORMAL
POTASSIUM BLD-SCNC: 3.4 MMOL/L (ref 3.5–5.5)
PROT SERPL-MCNC: 5.8 G/DL (ref 5.7–8.2)
RBC # BLD AUTO: 3.23 10*6/MM3 (ref 3.89–5.14)
SODIUM BLD-SCNC: 131 MMOL/L (ref 132–146)
TARGETS BLD QL SMEAR: NORMAL
VANCOMYCIN SERPL-MCNC: 16.4 MCG/ML (ref 5–40)
WBC MORPH BLD: NORMAL
WBC NRBC COR # BLD: 15.34 10*3/MM3 (ref 3.5–10.8)

## 2017-12-26 PROCEDURE — 82962 GLUCOSE BLOOD TEST: CPT

## 2017-12-26 PROCEDURE — 25010000002 METOCLOPRAMIDE PER 10 MG: Performed by: SURGERY

## 2017-12-26 PROCEDURE — 25010000002 HEPARIN (PORCINE) PER 1000 UNITS: Performed by: NURSE PRACTITIONER

## 2017-12-26 PROCEDURE — 83735 ASSAY OF MAGNESIUM: CPT

## 2017-12-26 PROCEDURE — 80202 ASSAY OF VANCOMYCIN: CPT

## 2017-12-26 PROCEDURE — 25010000002 PIPERACILLIN SOD-TAZOBACTAM PER 1 G

## 2017-12-26 PROCEDURE — 99232 SBSQ HOSP IP/OBS MODERATE 35: CPT | Performed by: FAMILY MEDICINE

## 2017-12-26 PROCEDURE — 80053 COMPREHEN METABOLIC PANEL: CPT | Performed by: FAMILY MEDICINE

## 2017-12-26 PROCEDURE — 93005 ELECTROCARDIOGRAM TRACING: CPT | Performed by: INTERNAL MEDICINE

## 2017-12-26 PROCEDURE — 85025 COMPLETE CBC W/AUTO DIFF WBC: CPT | Performed by: FAMILY MEDICINE

## 2017-12-26 PROCEDURE — 99232 SBSQ HOSP IP/OBS MODERATE 35: CPT | Performed by: INTERNAL MEDICINE

## 2017-12-26 PROCEDURE — 25010000002 NA FERRIC GLUC CPLX PER 12.5 MG: Performed by: INTERNAL MEDICINE

## 2017-12-26 PROCEDURE — 82330 ASSAY OF CALCIUM: CPT | Performed by: FAMILY MEDICINE

## 2017-12-26 PROCEDURE — 25010000002 POTASSIUM CHLORIDE PER 2 MEQ OF POTASSIUM

## 2017-12-26 PROCEDURE — 84100 ASSAY OF PHOSPHORUS: CPT

## 2017-12-26 PROCEDURE — 25010000002 VANCOMYCIN PER 500 MG

## 2017-12-26 PROCEDURE — 63510000001 EPOETIN ALFA PER 1000 UNITS: Performed by: INTERNAL MEDICINE

## 2017-12-26 PROCEDURE — 25010000002 CALCIUM GLUCONATE PER 10 ML

## 2017-12-26 PROCEDURE — 93010 ELECTROCARDIOGRAM REPORT: CPT | Performed by: INTERNAL MEDICINE

## 2017-12-26 PROCEDURE — 85007 BL SMEAR W/DIFF WBC COUNT: CPT | Performed by: FAMILY MEDICINE

## 2017-12-26 PROCEDURE — 25010000002 METOCLOPRAMIDE PER 10 MG

## 2017-12-26 PROCEDURE — 25010000002 MAGNESIUM SULFATE PER 500 MG OF MAGNESIUM

## 2017-12-26 RX ORDER — ALBUMIN (HUMAN) 12.5 G/50ML
25 SOLUTION INTRAVENOUS AS NEEDED
Status: DISCONTINUED | OUTPATIENT
Start: 2017-12-26 | End: 2017-12-26 | Stop reason: SDUPTHER

## 2017-12-26 RX ORDER — POTASSIUM CHLORIDE 1.5 G/1.77G
40 POWDER, FOR SOLUTION ORAL AS NEEDED
Status: DISCONTINUED | OUTPATIENT
Start: 2017-12-26 | End: 2018-01-12 | Stop reason: HOSPADM

## 2017-12-26 RX ORDER — POTASSIUM CHLORIDE 7.45 MG/ML
10 INJECTION INTRAVENOUS
Status: DISCONTINUED | OUTPATIENT
Start: 2017-12-26 | End: 2018-01-12 | Stop reason: HOSPADM

## 2017-12-26 RX ORDER — POTASSIUM CHLORIDE 750 MG/1
40 CAPSULE, EXTENDED RELEASE ORAL AS NEEDED
Status: DISCONTINUED | OUTPATIENT
Start: 2017-12-26 | End: 2018-01-12 | Stop reason: HOSPADM

## 2017-12-26 RX ORDER — METOCLOPRAMIDE HYDROCHLORIDE 5 MG/ML
5 INJECTION INTRAMUSCULAR; INTRAVENOUS EVERY 6 HOURS
Status: DISCONTINUED | OUTPATIENT
Start: 2017-12-26 | End: 2017-12-29

## 2017-12-26 RX ADMIN — ACETAMINOPHEN 500 MG: 500 TABLET ORAL at 05:11

## 2017-12-26 RX ADMIN — HEPARIN SODIUM 5000 UNITS: 5000 INJECTION, SOLUTION INTRAVENOUS; SUBCUTANEOUS at 22:09

## 2017-12-26 RX ADMIN — METOCLOPRAMIDE 5 MG: 5 INJECTION, SOLUTION INTRAMUSCULAR; INTRAVENOUS at 22:09

## 2017-12-26 RX ADMIN — SODIUM BICARBONATE 650 MG TABLET 650 MG: at 09:26

## 2017-12-26 RX ADMIN — ERYTHROPOIETIN 10000 UNITS: 10000 INJECTION, SOLUTION INTRAVENOUS; SUBCUTANEOUS at 09:27

## 2017-12-26 RX ADMIN — ASPIRIN 325 MG ORAL TABLET 325 MG: 325 PILL ORAL at 09:26

## 2017-12-26 RX ADMIN — DOXYCYCLINE 100 MG: 100 INJECTION, POWDER, LYOPHILIZED, FOR SOLUTION INTRAVENOUS at 09:26

## 2017-12-26 RX ADMIN — AMIODARONE HYDROCHLORIDE 200 MG: 200 TABLET ORAL at 03:42

## 2017-12-26 RX ADMIN — METOCLOPRAMIDE 10 MG: 5 INJECTION, SOLUTION INTRAMUSCULAR; INTRAVENOUS at 03:42

## 2017-12-26 RX ADMIN — METOPROLOL TARTRATE 50 MG: 50 TABLET ORAL at 22:06

## 2017-12-26 RX ADMIN — ALPRAZOLAM 0.5 MG: 0.5 TABLET ORAL at 05:02

## 2017-12-26 RX ADMIN — TAZOBACTAM SODIUM AND PIPERACILLIN SODIUM 2.25 G: 250; 2 INJECTION, SOLUTION INTRAVENOUS at 23:06

## 2017-12-26 RX ADMIN — ALPRAZOLAM 0.5 MG: 0.5 TABLET ORAL at 22:06

## 2017-12-26 RX ADMIN — FAMOTIDINE 20 MG: 10 INJECTION INTRAVENOUS at 22:09

## 2017-12-26 RX ADMIN — METOPROLOL TARTRATE 50 MG: 50 TABLET ORAL at 09:26

## 2017-12-26 RX ADMIN — SODIUM BICARBONATE 650 MG TABLET 650 MG: at 18:26

## 2017-12-26 RX ADMIN — AMIODARONE HYDROCHLORIDE 200 MG: 200 TABLET ORAL at 12:59

## 2017-12-26 RX ADMIN — SERTRALINE HYDROCHLORIDE 100 MG: 100 TABLET ORAL at 09:26

## 2017-12-26 RX ADMIN — SODIUM BICARBONATE 650 MG TABLET 650 MG: at 22:07

## 2017-12-26 RX ADMIN — FAMOTIDINE 20 MG: 10 INJECTION INTRAVENOUS at 09:28

## 2017-12-26 RX ADMIN — TAZOBACTAM SODIUM AND PIPERACILLIN SODIUM 2.25 G: 250; 2 INJECTION, SOLUTION INTRAVENOUS at 05:02

## 2017-12-26 RX ADMIN — TAZOBACTAM SODIUM AND PIPERACILLIN SODIUM 2.25 G: 250; 2 INJECTION, SOLUTION INTRAVENOUS at 13:00

## 2017-12-26 RX ADMIN — METOCLOPRAMIDE 10 MG: 5 INJECTION, SOLUTION INTRAMUSCULAR; INTRAVENOUS at 09:29

## 2017-12-26 RX ADMIN — DOXYCYCLINE 100 MG: 100 INJECTION, POWDER, LYOPHILIZED, FOR SOLUTION INTRAVENOUS at 22:19

## 2017-12-26 RX ADMIN — METOCLOPRAMIDE 5 MG: 5 INJECTION, SOLUTION INTRAMUSCULAR; INTRAVENOUS at 18:26

## 2017-12-26 RX ADMIN — AMIODARONE HYDROCHLORIDE 200 MG: 200 TABLET ORAL at 22:10

## 2017-12-26 RX ADMIN — VANCOMYCIN HYDROCHLORIDE 750 MG: 750 INJECTION, SOLUTION INTRAVENOUS at 13:00

## 2017-12-26 RX ADMIN — POTASSIUM CHLORIDE: 2 INJECTION, SOLUTION, CONCENTRATE INTRAVENOUS at 18:27

## 2017-12-26 RX ADMIN — SODIUM CHLORIDE 250 MG: 9 INJECTION, SOLUTION INTRAVENOUS at 08:17

## 2017-12-26 RX ADMIN — OXYCODONE AND ACETAMINOPHEN 1 TABLET: 5; 325 TABLET ORAL at 09:13

## 2017-12-26 RX ADMIN — HEPARIN SODIUM 5000 UNITS: 5000 INJECTION, SOLUTION INTRAVENOUS; SUBCUTANEOUS at 09:26

## 2017-12-27 LAB
ABO GROUP BLD: NORMAL
ALBUMIN SERPL-MCNC: 2.8 G/DL (ref 3.2–4.8)
ALBUMIN/GLOB SERPL: 1 G/DL (ref 1.5–2.5)
ALP SERPL-CCNC: 123 U/L (ref 25–100)
ALT SERPL W P-5'-P-CCNC: 10 U/L (ref 7–40)
ANION GAP SERPL CALCULATED.3IONS-SCNC: 12 MMOL/L (ref 3–11)
APTT PPP: 55 SECONDS (ref 24–31)
AST SERPL-CCNC: 26 U/L (ref 0–33)
BASOPHILS # BLD AUTO: 0.05 10*3/MM3 (ref 0–0.2)
BASOPHILS NFR BLD AUTO: 0.4 % (ref 0–1)
BILIRUB SERPL-MCNC: 0.2 MG/DL (ref 0.3–1.2)
BLD GP AB SCN SERPL QL: POSITIVE
BUN BLD-MCNC: 60 MG/DL (ref 9–23)
BUN/CREAT SERPL: 18.2 (ref 7–25)
CALCIUM SPEC-SCNC: 8.7 MG/DL (ref 8.7–10.4)
CHLORIDE SERPL-SCNC: 97 MMOL/L (ref 99–109)
CO2 SERPL-SCNC: 24 MMOL/L (ref 20–31)
CREAT BLD-MCNC: 3.3 MG/DL (ref 0.6–1.3)
DEPRECATED RDW RBC AUTO: 61.3 FL (ref 37–54)
EOSINOPHIL # BLD AUTO: 0.25 10*3/MM3 (ref 0–0.3)
EOSINOPHIL NFR BLD AUTO: 2 % (ref 0–3)
ERYTHROCYTE [DISTWIDTH] IN BLOOD BY AUTOMATED COUNT: 21.9 % (ref 11.3–14.5)
GFR SERPL CREATININE-BSD FRML MDRD: 14 ML/MIN/1.73
GLOBULIN UR ELPH-MCNC: 2.8 GM/DL
GLUCOSE BLD-MCNC: 113 MG/DL (ref 70–100)
GLUCOSE BLDC GLUCOMTR-MCNC: 108 MG/DL (ref 70–130)
GLUCOSE BLDC GLUCOMTR-MCNC: 121 MG/DL (ref 70–130)
GLUCOSE BLDC GLUCOMTR-MCNC: 132 MG/DL (ref 70–130)
HCT VFR BLD AUTO: 20.1 % (ref 34.5–44)
HCT VFR BLD AUTO: 24.3 % (ref 34.5–44)
HCT VFR BLD AUTO: 25 % (ref 34.5–44)
HGB BLD-MCNC: 6.7 G/DL (ref 11.5–15.5)
HGB BLD-MCNC: 7.9 G/DL (ref 11.5–15.5)
HGB BLD-MCNC: 8 G/DL (ref 11.5–15.5)
IMM GRANULOCYTES # BLD: 0.8 10*3/MM3 (ref 0–0.03)
IMM GRANULOCYTES NFR BLD: 6.4 % (ref 0–0.6)
INR PPP: 1.26
LYMPHOCYTES # BLD AUTO: 1.27 10*3/MM3 (ref 0.6–4.8)
LYMPHOCYTES NFR BLD AUTO: 10.2 % (ref 24–44)
MAGNESIUM SERPL-MCNC: 1.9 MG/DL (ref 1.3–2.7)
MCH RBC QN AUTO: 26.9 PG (ref 27–31)
MCHC RBC AUTO-ENTMCNC: 32 G/DL (ref 32–36)
MCV RBC AUTO: 84.2 FL (ref 80–99)
MONOCYTES # BLD AUTO: 0.7 10*3/MM3 (ref 0–1)
MONOCYTES NFR BLD AUTO: 5.6 % (ref 0–12)
NEUTROPHILS # BLD AUTO: 9.41 10*3/MM3 (ref 1.5–8.3)
NEUTROPHILS NFR BLD AUTO: 75.4 % (ref 41–71)
PHOSPHATE SERPL-MCNC: 2.3 MG/DL (ref 2.4–5.1)
PLATELET # BLD AUTO: 319 10*3/MM3 (ref 150–450)
PMV BLD AUTO: 10.5 FL (ref 6–12)
POTASSIUM BLD-SCNC: 3 MMOL/L (ref 3.5–5.5)
PROT SERPL-MCNC: 5.6 G/DL (ref 5.7–8.2)
PROTHROMBIN TIME: 13.8 SECONDS (ref 9.6–11.5)
RBC # BLD AUTO: 2.97 10*6/MM3 (ref 3.89–5.14)
RH BLD: POSITIVE
SODIUM BLD-SCNC: 133 MMOL/L (ref 132–146)
WBC NRBC COR # BLD: 12.48 10*3/MM3 (ref 3.5–10.8)

## 2017-12-27 PROCEDURE — 82962 GLUCOSE BLOOD TEST: CPT

## 2017-12-27 PROCEDURE — 86850 RBC ANTIBODY SCREEN: CPT

## 2017-12-27 PROCEDURE — 86900 BLOOD TYPING SEROLOGIC ABO: CPT | Performed by: INTERNAL MEDICINE

## 2017-12-27 PROCEDURE — 86922 COMPATIBILITY TEST ANTIGLOB: CPT

## 2017-12-27 PROCEDURE — 85730 THROMBOPLASTIN TIME PARTIAL: CPT | Performed by: INTERNAL MEDICINE

## 2017-12-27 PROCEDURE — 86850 RBC ANTIBODY SCREEN: CPT | Performed by: INTERNAL MEDICINE

## 2017-12-27 PROCEDURE — 99291 CRITICAL CARE FIRST HOUR: CPT | Performed by: NURSE PRACTITIONER

## 2017-12-27 PROCEDURE — 85014 HEMATOCRIT: CPT | Performed by: INTERNAL MEDICINE

## 2017-12-27 PROCEDURE — 25010000002 MAGNESIUM SULFATE PER 500 MG OF MAGNESIUM

## 2017-12-27 PROCEDURE — 86920 COMPATIBILITY TEST SPIN: CPT

## 2017-12-27 PROCEDURE — 85025 COMPLETE CBC W/AUTO DIFF WBC: CPT | Performed by: INTERNAL MEDICINE

## 2017-12-27 PROCEDURE — 25010000002 CALCIUM GLUCONATE PER 10 ML

## 2017-12-27 PROCEDURE — 25010000002 METOCLOPRAMIDE PER 10 MG

## 2017-12-27 PROCEDURE — 85018 HEMOGLOBIN: CPT | Performed by: INTERNAL MEDICINE

## 2017-12-27 PROCEDURE — 84100 ASSAY OF PHOSPHORUS: CPT | Performed by: INTERNAL MEDICINE

## 2017-12-27 PROCEDURE — 86880 COOMBS TEST DIRECT: CPT | Performed by: INTERNAL MEDICINE

## 2017-12-27 PROCEDURE — 86901 BLOOD TYPING SEROLOGIC RH(D): CPT | Performed by: INTERNAL MEDICINE

## 2017-12-27 PROCEDURE — 86902 BLOOD TYPE ANTIGEN DONOR EA: CPT

## 2017-12-27 PROCEDURE — 86860 RBC ANTIBODY ELUTION: CPT | Performed by: INTERNAL MEDICINE

## 2017-12-27 PROCEDURE — 25010000002 NA FERRIC GLUC CPLX PER 12.5 MG: Performed by: INTERNAL MEDICINE

## 2017-12-27 PROCEDURE — 80053 COMPREHEN METABOLIC PANEL: CPT | Performed by: FAMILY MEDICINE

## 2017-12-27 PROCEDURE — 25010000002 THIAMINE PER 100 MG

## 2017-12-27 PROCEDURE — 99232 SBSQ HOSP IP/OBS MODERATE 35: CPT | Performed by: NURSE PRACTITIONER

## 2017-12-27 PROCEDURE — 83735 ASSAY OF MAGNESIUM: CPT

## 2017-12-27 PROCEDURE — 25010000002 PIPERACILLIN SOD-TAZOBACTAM PER 1 G

## 2017-12-27 PROCEDURE — 85610 PROTHROMBIN TIME: CPT | Performed by: INTERNAL MEDICINE

## 2017-12-27 PROCEDURE — 25010000002 POTASSIUM CHLORIDE PER 2 MEQ OF POTASSIUM

## 2017-12-27 PROCEDURE — 86870 RBC ANTIBODY IDENTIFICATION: CPT | Performed by: INTERNAL MEDICINE

## 2017-12-27 PROCEDURE — 99232 SBSQ HOSP IP/OBS MODERATE 35: CPT | Performed by: INTERNAL MEDICINE

## 2017-12-27 PROCEDURE — 25010000002 HEPARIN (PORCINE) PER 1000 UNITS: Performed by: NURSE PRACTITIONER

## 2017-12-27 RX ORDER — AMLODIPINE BESYLATE 2.5 MG/1
2.5 TABLET ORAL
Status: DISCONTINUED | OUTPATIENT
Start: 2017-12-27 | End: 2017-12-27

## 2017-12-27 RX ORDER — AMLODIPINE BESYLATE 5 MG/1
5 TABLET ORAL
Status: DISCONTINUED | OUTPATIENT
Start: 2017-12-28 | End: 2018-01-05

## 2017-12-27 RX ORDER — DOCUSATE SODIUM 100 MG/1
100 CAPSULE, LIQUID FILLED ORAL 2 TIMES DAILY
Status: DISCONTINUED | OUTPATIENT
Start: 2017-12-27 | End: 2018-01-12 | Stop reason: HOSPADM

## 2017-12-27 RX ADMIN — SODIUM BICARBONATE 650 MG TABLET 650 MG: at 08:40

## 2017-12-27 RX ADMIN — DOCUSATE SODIUM 100 MG: 100 CAPSULE, LIQUID FILLED ORAL at 08:40

## 2017-12-27 RX ADMIN — METOCLOPRAMIDE 5 MG: 5 INJECTION, SOLUTION INTRAMUSCULAR; INTRAVENOUS at 08:44

## 2017-12-27 RX ADMIN — ASPIRIN 325 MG ORAL TABLET 325 MG: 325 PILL ORAL at 08:40

## 2017-12-27 RX ADMIN — AMIODARONE HYDROCHLORIDE 200 MG: 200 TABLET ORAL at 21:10

## 2017-12-27 RX ADMIN — SODIUM CHLORIDE 250 MG: 9 INJECTION, SOLUTION INTRAVENOUS at 08:44

## 2017-12-27 RX ADMIN — ACETAMINOPHEN 500 MG: 500 TABLET ORAL at 15:57

## 2017-12-27 RX ADMIN — PHENYLEPHRINE HYDROCHLORIDE 2 SPRAY: 0.5 SPRAY NASAL at 11:10

## 2017-12-27 RX ADMIN — DOXYCYCLINE 100 MG: 100 INJECTION, POWDER, LYOPHILIZED, FOR SOLUTION INTRAVENOUS at 08:44

## 2017-12-27 RX ADMIN — SODIUM BICARBONATE 650 MG TABLET 650 MG: at 15:41

## 2017-12-27 RX ADMIN — TAZOBACTAM SODIUM AND PIPERACILLIN SODIUM 2.25 G: 250; 2 INJECTION, SOLUTION INTRAVENOUS at 15:42

## 2017-12-27 RX ADMIN — METOCLOPRAMIDE 5 MG: 5 INJECTION, SOLUTION INTRAMUSCULAR; INTRAVENOUS at 15:42

## 2017-12-27 RX ADMIN — THIAMINE HYDROCHLORIDE 100 MG: 100 INJECTION, SOLUTION INTRAMUSCULAR; INTRAVENOUS at 12:31

## 2017-12-27 RX ADMIN — DOCUSATE SODIUM 100 MG: 100 CAPSULE, LIQUID FILLED ORAL at 21:10

## 2017-12-27 RX ADMIN — AMIODARONE HYDROCHLORIDE 200 MG: 200 TABLET ORAL at 04:42

## 2017-12-27 RX ADMIN — TAZOBACTAM SODIUM AND PIPERACILLIN SODIUM 2.25 G: 250; 2 INJECTION, SOLUTION INTRAVENOUS at 05:30

## 2017-12-27 RX ADMIN — FAMOTIDINE 20 MG: 10 INJECTION INTRAVENOUS at 08:41

## 2017-12-27 RX ADMIN — DOXYCYCLINE 100 MG: 100 INJECTION, POWDER, LYOPHILIZED, FOR SOLUTION INTRAVENOUS at 21:14

## 2017-12-27 RX ADMIN — I.V. FAT EMULSION 250 ML: 20 EMULSION INTRAVENOUS at 18:49

## 2017-12-27 RX ADMIN — FAMOTIDINE 20 MG: 10 INJECTION INTRAVENOUS at 21:10

## 2017-12-27 RX ADMIN — AMIODARONE HYDROCHLORIDE 200 MG: 200 TABLET ORAL at 13:22

## 2017-12-27 RX ADMIN — METOCLOPRAMIDE 5 MG: 5 INJECTION, SOLUTION INTRAMUSCULAR; INTRAVENOUS at 21:11

## 2017-12-27 RX ADMIN — METOPROLOL TARTRATE 50 MG: 50 TABLET ORAL at 21:10

## 2017-12-27 RX ADMIN — HEPARIN SODIUM 5000 UNITS: 5000 INJECTION, SOLUTION INTRAVENOUS; SUBCUTANEOUS at 08:43

## 2017-12-27 RX ADMIN — POTASSIUM CHLORIDE: 2 INJECTION, SOLUTION, CONCENTRATE INTRAVENOUS at 18:49

## 2017-12-27 RX ADMIN — METOCLOPRAMIDE 5 MG: 5 INJECTION, SOLUTION INTRAMUSCULAR; INTRAVENOUS at 04:43

## 2017-12-27 RX ADMIN — SERTRALINE HYDROCHLORIDE 100 MG: 100 TABLET ORAL at 08:42

## 2017-12-27 RX ADMIN — SODIUM BICARBONATE 650 MG TABLET 650 MG: at 21:10

## 2017-12-27 RX ADMIN — ALPRAZOLAM 0.5 MG: 0.5 TABLET ORAL at 22:12

## 2017-12-27 RX ADMIN — AMLODIPINE BESYLATE 2.5 MG: 2.5 TABLET ORAL at 08:43

## 2017-12-27 RX ADMIN — POTASSIUM CHLORIDE 40 MEQ: 750 CAPSULE, EXTENDED RELEASE ORAL at 12:31

## 2017-12-27 RX ADMIN — METOPROLOL TARTRATE 50 MG: 50 TABLET ORAL at 08:40

## 2017-12-28 ENCOUNTER — APPOINTMENT (OUTPATIENT)
Dept: NEPHROLOGY | Facility: HOSPITAL | Age: 62
End: 2017-12-28
Attending: INTERNAL MEDICINE

## 2017-12-28 LAB
ALBUMIN SERPL-MCNC: 2.6 G/DL (ref 3.2–4.8)
ANION GAP SERPL CALCULATED.3IONS-SCNC: 14 MMOL/L (ref 3–11)
ANTI-FYA: NORMAL
ANTI-K: NORMAL
BLD GP AB SCN SERPL QL ELUTION: NEGATIVE
BUN BLD-MCNC: 83 MG/DL (ref 9–23)
BUN/CREAT SERPL: 20.8 (ref 7–25)
CALCIUM SPEC-SCNC: 8.6 MG/DL (ref 8.7–10.4)
CHLORIDE SERPL-SCNC: 99 MMOL/L (ref 99–109)
CO2 SERPL-SCNC: 20 MMOL/L (ref 20–31)
CREAT BLD-MCNC: 4 MG/DL (ref 0.6–1.3)
DAT IGG GEL: POSITIVE
DEPRECATED RDW RBC AUTO: 60.9 FL (ref 37–54)
ERYTHROCYTE [DISTWIDTH] IN BLOOD BY AUTOMATED COUNT: 20.8 % (ref 11.3–14.5)
GFR SERPL CREATININE-BSD FRML MDRD: 11 ML/MIN/1.73
GLUCOSE BLD-MCNC: 121 MG/DL (ref 70–100)
GLUCOSE BLDC GLUCOMTR-MCNC: 106 MG/DL (ref 70–130)
GLUCOSE BLDC GLUCOMTR-MCNC: 123 MG/DL (ref 70–130)
GLUCOSE BLDC GLUCOMTR-MCNC: 123 MG/DL (ref 70–130)
GLUCOSE BLDC GLUCOMTR-MCNC: 139 MG/DL (ref 70–130)
GLUCOSE BLDC GLUCOMTR-MCNC: 139 MG/DL (ref 70–130)
HCT VFR BLD AUTO: 16.2 % (ref 34.5–44)
HCT VFR BLD AUTO: 24.3 % (ref 34.5–44)
HGB BLD-MCNC: 5.3 G/DL (ref 11.5–15.5)
HGB BLD-MCNC: 8.1 G/DL (ref 11.5–15.5)
MAGNESIUM SERPL-MCNC: 1.7 MG/DL (ref 1.3–2.7)
MCH RBC QN AUTO: 27.9 PG (ref 27–31)
MCHC RBC AUTO-ENTMCNC: 32.7 G/DL (ref 32–36)
MCV RBC AUTO: 85.3 FL (ref 80–99)
PHOSPHATE SERPL-MCNC: 2.7 MG/DL (ref 2.4–5.1)
PLATELET # BLD AUTO: 325 10*3/MM3 (ref 150–450)
PMV BLD AUTO: 10 FL (ref 6–12)
POTASSIUM BLD-SCNC: 3 MMOL/L (ref 3.5–5.5)
RBC # BLD AUTO: 1.9 10*6/MM3 (ref 3.89–5.14)
SODIUM BLD-SCNC: 133 MMOL/L (ref 132–146)
WBC NRBC COR # BLD: 16.92 10*3/MM3 (ref 3.5–10.8)

## 2017-12-28 PROCEDURE — 25010000002 MAGNESIUM SULFATE 2 GM/50ML SOLUTION: Performed by: INTERNAL MEDICINE

## 2017-12-28 PROCEDURE — 86900 BLOOD TYPING SEROLOGIC ABO: CPT

## 2017-12-28 PROCEDURE — 83735 ASSAY OF MAGNESIUM: CPT

## 2017-12-28 PROCEDURE — P9016 RBC LEUKOCYTES REDUCED: HCPCS

## 2017-12-28 PROCEDURE — 25010000002 THIAMINE PER 100 MG

## 2017-12-28 PROCEDURE — 99233 SBSQ HOSP IP/OBS HIGH 50: CPT | Performed by: INTERNAL MEDICINE

## 2017-12-28 PROCEDURE — 85014 HEMATOCRIT: CPT | Performed by: INTERNAL MEDICINE

## 2017-12-28 PROCEDURE — 85027 COMPLETE CBC AUTOMATED: CPT | Performed by: SURGERY

## 2017-12-28 PROCEDURE — 63510000001 EPOETIN ALFA PER 1000 UNITS: Performed by: INTERNAL MEDICINE

## 2017-12-28 PROCEDURE — 82962 GLUCOSE BLOOD TEST: CPT

## 2017-12-28 PROCEDURE — 25010000002 POTASSIUM CHLORIDE PER 2 MEQ OF POTASSIUM

## 2017-12-28 PROCEDURE — 25010000002 METOCLOPRAMIDE PER 10 MG

## 2017-12-28 PROCEDURE — 85018 HEMOGLOBIN: CPT | Performed by: INTERNAL MEDICINE

## 2017-12-28 PROCEDURE — 25010000002 CALCIUM GLUCONATE PER 10 ML

## 2017-12-28 PROCEDURE — 25010000002 MAGNESIUM SULFATE PER 500 MG OF MAGNESIUM

## 2017-12-28 PROCEDURE — 25010000002 NA FERRIC GLUC CPLX PER 12.5 MG: Performed by: INTERNAL MEDICINE

## 2017-12-28 PROCEDURE — 80069 RENAL FUNCTION PANEL: CPT | Performed by: INTERNAL MEDICINE

## 2017-12-28 RX ORDER — MAGNESIUM SULFATE HEPTAHYDRATE 40 MG/ML
2 INJECTION, SOLUTION INTRAVENOUS ONCE
Status: COMPLETED | OUTPATIENT
Start: 2017-12-28 | End: 2017-12-28

## 2017-12-28 RX ADMIN — MAGNESIUM SULFATE HEPTAHYDRATE 2 G: 40 INJECTION, SOLUTION INTRAVENOUS at 15:40

## 2017-12-28 RX ADMIN — THIAMINE HYDROCHLORIDE 100 MG: 100 INJECTION, SOLUTION INTRAMUSCULAR; INTRAVENOUS at 11:55

## 2017-12-28 RX ADMIN — AMIODARONE HYDROCHLORIDE 200 MG: 200 TABLET ORAL at 21:28

## 2017-12-28 RX ADMIN — AMIODARONE HYDROCHLORIDE 200 MG: 200 TABLET ORAL at 04:06

## 2017-12-28 RX ADMIN — ACETAMINOPHEN 500 MG: 500 TABLET ORAL at 04:06

## 2017-12-28 RX ADMIN — ERYTHROPOIETIN 10000 UNITS: 10000 INJECTION, SOLUTION INTRAVENOUS; SUBCUTANEOUS at 09:49

## 2017-12-28 RX ADMIN — FAMOTIDINE 20 MG: 10 INJECTION INTRAVENOUS at 09:50

## 2017-12-28 RX ADMIN — FAMOTIDINE 20 MG: 10 INJECTION INTRAVENOUS at 21:28

## 2017-12-28 RX ADMIN — DOCUSATE SODIUM 100 MG: 100 CAPSULE, LIQUID FILLED ORAL at 21:29

## 2017-12-28 RX ADMIN — POTASSIUM CHLORIDE 40 MEQ: 750 CAPSULE, EXTENDED RELEASE ORAL at 15:39

## 2017-12-28 RX ADMIN — POTASSIUM CHLORIDE 40 MEQ: 750 CAPSULE, EXTENDED RELEASE ORAL at 21:29

## 2017-12-28 RX ADMIN — AMLODIPINE BESYLATE 5 MG: 5 TABLET ORAL at 11:57

## 2017-12-28 RX ADMIN — SERTRALINE HYDROCHLORIDE 100 MG: 100 TABLET ORAL at 09:49

## 2017-12-28 RX ADMIN — POTASSIUM CHLORIDE 40 MEQ: 750 CAPSULE, EXTENDED RELEASE ORAL at 11:58

## 2017-12-28 RX ADMIN — SODIUM BICARBONATE 650 MG TABLET 650 MG: at 15:39

## 2017-12-28 RX ADMIN — SODIUM CHLORIDE 250 MG: 9 INJECTION, SOLUTION INTRAVENOUS at 06:23

## 2017-12-28 RX ADMIN — AMIODARONE HYDROCHLORIDE 200 MG: 200 TABLET ORAL at 11:56

## 2017-12-28 RX ADMIN — SODIUM BICARBONATE 650 MG TABLET 650 MG: at 09:49

## 2017-12-28 RX ADMIN — METOPROLOL TARTRATE 50 MG: 50 TABLET ORAL at 09:49

## 2017-12-28 RX ADMIN — ALPRAZOLAM 0.5 MG: 0.5 TABLET ORAL at 05:51

## 2017-12-28 RX ADMIN — POTASSIUM CHLORIDE: 2 INJECTION, SOLUTION, CONCENTRATE INTRAVENOUS at 18:23

## 2017-12-28 RX ADMIN — OXYCODONE AND ACETAMINOPHEN 1 TABLET: 5; 325 TABLET ORAL at 12:12

## 2017-12-28 RX ADMIN — SODIUM BICARBONATE 650 MG TABLET 650 MG: at 21:28

## 2017-12-28 RX ADMIN — METOCLOPRAMIDE 5 MG: 5 INJECTION, SOLUTION INTRAMUSCULAR; INTRAVENOUS at 09:51

## 2017-12-28 RX ADMIN — METOCLOPRAMIDE 5 MG: 5 INJECTION, SOLUTION INTRAMUSCULAR; INTRAVENOUS at 21:28

## 2017-12-28 RX ADMIN — METOCLOPRAMIDE 5 MG: 5 INJECTION, SOLUTION INTRAMUSCULAR; INTRAVENOUS at 15:39

## 2017-12-28 RX ADMIN — DOCUSATE SODIUM 100 MG: 100 CAPSULE, LIQUID FILLED ORAL at 09:49

## 2017-12-28 RX ADMIN — METOPROLOL TARTRATE 50 MG: 50 TABLET ORAL at 21:29

## 2017-12-28 RX ADMIN — METOCLOPRAMIDE 5 MG: 5 INJECTION, SOLUTION INTRAMUSCULAR; INTRAVENOUS at 04:05

## 2017-12-29 LAB
ABO + RH BLD: NORMAL
ABO + RH BLD: NORMAL
ALBUMIN SERPL-MCNC: 2.9 G/DL (ref 3.2–4.8)
ANION GAP SERPL CALCULATED.3IONS-SCNC: 10 MMOL/L (ref 3–11)
BASOPHILS # BLD MANUAL: 0 10*3/MM3 (ref 0–0.2)
BASOPHILS NFR BLD AUTO: 0 % (ref 0–1)
BH BB BLOOD EXPIRATION DATE: NORMAL
BH BB BLOOD EXPIRATION DATE: NORMAL
BH BB BLOOD TYPE BARCODE: 5100
BH BB BLOOD TYPE BARCODE: 5100
BH BB DISPENSE STATUS: NORMAL
BH BB DISPENSE STATUS: NORMAL
BH BB PRODUCT CODE: NORMAL
BH BB PRODUCT CODE: NORMAL
BH BB UNIT NUMBER: NORMAL
BH BB UNIT NUMBER: NORMAL
BUN BLD-MCNC: 57 MG/DL (ref 9–23)
BUN/CREAT SERPL: 19.7 (ref 7–25)
CALCIUM SPEC-SCNC: 8.6 MG/DL (ref 8.7–10.4)
CHLORIDE SERPL-SCNC: 103 MMOL/L (ref 99–109)
CO2 SERPL-SCNC: 24 MMOL/L (ref 20–31)
CREAT BLD-MCNC: 2.9 MG/DL (ref 0.6–1.3)
CROSSMATCH INTERPRETATION: NORMAL
CROSSMATCH INTERPRETATION: NORMAL
DEPRECATED RDW RBC AUTO: 56.6 FL (ref 37–54)
EOSINOPHIL # BLD MANUAL: 0.33 10*3/MM3 (ref 0.1–0.3)
EOSINOPHIL NFR BLD MANUAL: 2 % (ref 0–3)
ERYTHROCYTE [DISTWIDTH] IN BLOOD BY AUTOMATED COUNT: 20.2 % (ref 11.3–14.5)
GFR SERPL CREATININE-BSD FRML MDRD: 16 ML/MIN/1.73
GLUCOSE BLD-MCNC: 107 MG/DL (ref 70–100)
GLUCOSE BLDC GLUCOMTR-MCNC: 122 MG/DL (ref 70–130)
GLUCOSE BLDC GLUCOMTR-MCNC: 127 MG/DL (ref 70–130)
GLUCOSE BLDC GLUCOMTR-MCNC: 129 MG/DL (ref 70–130)
HCT VFR BLD AUTO: 22.8 % (ref 34.5–44)
HGB BLD-MCNC: 7.6 G/DL (ref 11.5–15.5)
LYMPHOCYTES # BLD MANUAL: 1.99 10*3/MM3 (ref 0.6–4.8)
LYMPHOCYTES NFR BLD MANUAL: 12 % (ref 24–44)
LYMPHOCYTES NFR BLD MANUAL: 5 % (ref 0–12)
MAGNESIUM SERPL-MCNC: 2.5 MG/DL (ref 1.3–2.7)
MCH RBC QN AUTO: 27.9 PG (ref 27–31)
MCHC RBC AUTO-ENTMCNC: 33.3 G/DL (ref 32–36)
MCV RBC AUTO: 83.8 FL (ref 80–99)
METAMYELOCYTES NFR BLD MANUAL: 3 % (ref 0–0)
MONOCYTES # BLD AUTO: 0.83 10*3/MM3 (ref 0–1)
NEUTROPHILS # BLD AUTO: 12.59 10*3/MM3 (ref 1.5–8.3)
NEUTROPHILS NFR BLD MANUAL: 73 % (ref 41–71)
NEUTS BAND NFR BLD MANUAL: 3 % (ref 0–5)
NRBC SPEC MANUAL: 2 /100 WBC (ref 0–0)
PHOSPHATE SERPL-MCNC: 2 MG/DL (ref 2.4–5.1)
PLAT MORPH BLD: NORMAL
PLATELET # BLD AUTO: 347 10*3/MM3 (ref 150–450)
PMV BLD AUTO: 10.9 FL (ref 6–12)
POTASSIUM BLD-SCNC: 4.1 MMOL/L (ref 3.5–5.5)
RBC # BLD AUTO: 2.72 10*6/MM3 (ref 3.89–5.14)
RBC MORPH BLD: NORMAL
SODIUM BLD-SCNC: 137 MMOL/L (ref 132–146)
UNIT  ABO: NORMAL
UNIT  ABO: NORMAL
UNIT  RH: NORMAL
UNIT  RH: NORMAL
VARIANT LYMPHS NFR BLD MANUAL: 2 % (ref 0–5)
WBC MORPH BLD: NORMAL
WBC NRBC COR # BLD: 16.57 10*3/MM3 (ref 3.5–10.8)

## 2017-12-29 PROCEDURE — 83735 ASSAY OF MAGNESIUM: CPT

## 2017-12-29 PROCEDURE — 25010000002 METOCLOPRAMIDE PER 10 MG

## 2017-12-29 PROCEDURE — 99233 SBSQ HOSP IP/OBS HIGH 50: CPT | Performed by: INTERNAL MEDICINE

## 2017-12-29 PROCEDURE — 94799 UNLISTED PULMONARY SVC/PX: CPT

## 2017-12-29 PROCEDURE — 82962 GLUCOSE BLOOD TEST: CPT

## 2017-12-29 PROCEDURE — 25010000002 POTASSIUM CHLORIDE PER 2 MEQ OF POTASSIUM

## 2017-12-29 PROCEDURE — 85007 BL SMEAR W/DIFF WBC COUNT: CPT | Performed by: INTERNAL MEDICINE

## 2017-12-29 PROCEDURE — 25010000002 MAGNESIUM SULFATE PER 500 MG OF MAGNESIUM

## 2017-12-29 PROCEDURE — 25010000002 METOCLOPRAMIDE PER 10 MG: Performed by: INTERNAL MEDICINE

## 2017-12-29 PROCEDURE — 25010000002 THIAMINE PER 100 MG

## 2017-12-29 PROCEDURE — 25010000002 HALOPERIDOL LACTATE PER 5 MG: Performed by: INTERNAL MEDICINE

## 2017-12-29 PROCEDURE — 80069 RENAL FUNCTION PANEL: CPT | Performed by: INTERNAL MEDICINE

## 2017-12-29 PROCEDURE — 25010000002 CALCIUM GLUCONATE PER 10 ML

## 2017-12-29 PROCEDURE — 85025 COMPLETE CBC W/AUTO DIFF WBC: CPT | Performed by: INTERNAL MEDICINE

## 2017-12-29 RX ORDER — METOCLOPRAMIDE HYDROCHLORIDE 5 MG/ML
5 INJECTION INTRAMUSCULAR; INTRAVENOUS EVERY 12 HOURS SCHEDULED
Status: DISCONTINUED | OUTPATIENT
Start: 2017-12-29 | End: 2018-01-11

## 2017-12-29 RX ADMIN — SODIUM BICARBONATE 650 MG TABLET 650 MG: at 21:30

## 2017-12-29 RX ADMIN — OXYCODONE AND ACETAMINOPHEN 1 TABLET: 5; 325 TABLET ORAL at 08:55

## 2017-12-29 RX ADMIN — FAMOTIDINE 20 MG: 10 INJECTION INTRAVENOUS at 21:29

## 2017-12-29 RX ADMIN — METOPROLOL TARTRATE 50 MG: 50 TABLET ORAL at 21:30

## 2017-12-29 RX ADMIN — AMLODIPINE BESYLATE 5 MG: 5 TABLET ORAL at 08:56

## 2017-12-29 RX ADMIN — DOCUSATE SODIUM 100 MG: 100 CAPSULE, LIQUID FILLED ORAL at 08:57

## 2017-12-29 RX ADMIN — METOPROLOL TARTRATE 50 MG: 50 TABLET ORAL at 08:57

## 2017-12-29 RX ADMIN — ALPRAZOLAM 0.5 MG: 0.5 TABLET ORAL at 00:10

## 2017-12-29 RX ADMIN — ALPRAZOLAM 0.5 MG: 0.5 TABLET ORAL at 23:38

## 2017-12-29 RX ADMIN — I.V. FAT EMULSION 250 ML: 20 EMULSION INTRAVENOUS at 17:07

## 2017-12-29 RX ADMIN — AMIODARONE HYDROCHLORIDE 200 MG: 200 TABLET ORAL at 11:32

## 2017-12-29 RX ADMIN — FAMOTIDINE 20 MG: 10 INJECTION INTRAVENOUS at 08:57

## 2017-12-29 RX ADMIN — METOCLOPRAMIDE 5 MG: 5 INJECTION, SOLUTION INTRAMUSCULAR; INTRAVENOUS at 21:29

## 2017-12-29 RX ADMIN — SODIUM PHOSPHATE, MONOBASIC, MONOHYDRATE AND SODIUM PHOSPHATE, DIBASIC, ANHYDROUS 9 MMOL: 276; 142 INJECTION, SOLUTION INTRAVENOUS at 16:05

## 2017-12-29 RX ADMIN — SODIUM BICARBONATE 650 MG TABLET 650 MG: at 08:57

## 2017-12-29 RX ADMIN — SERTRALINE HYDROCHLORIDE 100 MG: 100 TABLET ORAL at 08:57

## 2017-12-29 RX ADMIN — POTASSIUM CHLORIDE: 2 INJECTION, SOLUTION, CONCENTRATE INTRAVENOUS at 17:07

## 2017-12-29 RX ADMIN — SODIUM BICARBONATE 650 MG TABLET 650 MG: at 16:05

## 2017-12-29 RX ADMIN — AMIODARONE HYDROCHLORIDE 200 MG: 200 TABLET ORAL at 21:31

## 2017-12-29 RX ADMIN — AMIODARONE HYDROCHLORIDE 200 MG: 200 TABLET ORAL at 05:14

## 2017-12-29 RX ADMIN — HALOPERIDOL LACTATE 2 MG: 5 INJECTION, SOLUTION INTRAMUSCULAR at 05:14

## 2017-12-29 RX ADMIN — THIAMINE HYDROCHLORIDE 100 MG: 100 INJECTION, SOLUTION INTRAMUSCULAR; INTRAVENOUS at 08:58

## 2017-12-29 RX ADMIN — METOCLOPRAMIDE 5 MG: 5 INJECTION, SOLUTION INTRAMUSCULAR; INTRAVENOUS at 05:13

## 2017-12-29 RX ADMIN — METOCLOPRAMIDE 5 MG: 5 INJECTION, SOLUTION INTRAMUSCULAR; INTRAVENOUS at 08:55

## 2017-12-29 RX ADMIN — DOCUSATE SODIUM 100 MG: 100 CAPSULE, LIQUID FILLED ORAL at 21:30

## 2017-12-30 ENCOUNTER — APPOINTMENT (OUTPATIENT)
Dept: NEPHROLOGY | Facility: HOSPITAL | Age: 62
End: 2017-12-30
Attending: INTERNAL MEDICINE

## 2017-12-30 LAB
ALBUMIN SERPL-MCNC: 2.8 G/DL (ref 3.2–4.8)
ANION GAP SERPL CALCULATED.3IONS-SCNC: 8 MMOL/L (ref 3–11)
APTT PPP: 40.9 SECONDS (ref 24–31)
BUN BLD-MCNC: 65 MG/DL (ref 9–23)
BUN/CREAT SERPL: 18.6 (ref 7–25)
CALCIUM SPEC-SCNC: 8.5 MG/DL (ref 8.7–10.4)
CHLORIDE SERPL-SCNC: 101 MMOL/L (ref 99–109)
CO2 SERPL-SCNC: 26 MMOL/L (ref 20–31)
CREAT BLD-MCNC: 3.5 MG/DL (ref 0.6–1.3)
DEPRECATED RDW RBC AUTO: 56.8 FL (ref 37–54)
ERYTHROCYTE [DISTWIDTH] IN BLOOD BY AUTOMATED COUNT: 19.7 % (ref 11.3–14.5)
GFR SERPL CREATININE-BSD FRML MDRD: 13 ML/MIN/1.73
GLUCOSE BLD-MCNC: 105 MG/DL (ref 70–100)
GLUCOSE BLDC GLUCOMTR-MCNC: 105 MG/DL (ref 70–130)
GLUCOSE BLDC GLUCOMTR-MCNC: 119 MG/DL (ref 70–130)
GLUCOSE BLDC GLUCOMTR-MCNC: 119 MG/DL (ref 70–130)
GLUCOSE BLDC GLUCOMTR-MCNC: 154 MG/DL (ref 70–130)
HCT VFR BLD AUTO: 22.6 % (ref 34.5–44)
HCT VFR BLD AUTO: 22.6 % (ref 34.5–44)
HCT VFR BLD AUTO: 23 % (ref 34.5–44)
HGB BLD-MCNC: 7.2 G/DL (ref 11.5–15.5)
HGB BLD-MCNC: 7.2 G/DL (ref 11.5–15.5)
HGB BLD-MCNC: 7.4 G/DL (ref 11.5–15.5)
INR PPP: 1.18
MAGNESIUM SERPL-MCNC: 2.5 MG/DL (ref 1.3–2.7)
MCH RBC QN AUTO: 27.4 PG (ref 27–31)
MCHC RBC AUTO-ENTMCNC: 31.9 G/DL (ref 32–36)
MCV RBC AUTO: 85.9 FL (ref 80–99)
PHOSPHATE SERPL-MCNC: 2.9 MG/DL (ref 2.4–5.1)
PHOSPHATE SERPL-MCNC: 2.9 MG/DL (ref 2.4–5.1)
PLATELET # BLD AUTO: 362 10*3/MM3 (ref 150–450)
PMV BLD AUTO: 10.8 FL (ref 6–12)
POTASSIUM BLD-SCNC: 4.4 MMOL/L (ref 3.5–5.5)
PROTHROMBIN TIME: 12.9 SECONDS (ref 9.6–11.5)
RBC # BLD AUTO: 2.63 10*6/MM3 (ref 3.89–5.14)
SODIUM BLD-SCNC: 135 MMOL/L (ref 132–146)
WBC NRBC COR # BLD: 13.77 10*3/MM3 (ref 3.5–10.8)

## 2017-12-30 PROCEDURE — 82962 GLUCOSE BLOOD TEST: CPT

## 2017-12-30 PROCEDURE — 80069 RENAL FUNCTION PANEL: CPT | Performed by: INTERNAL MEDICINE

## 2017-12-30 PROCEDURE — 25010000002 CALCIUM GLUCONATE PER 10 ML

## 2017-12-30 PROCEDURE — 99233 SBSQ HOSP IP/OBS HIGH 50: CPT | Performed by: INTERNAL MEDICINE

## 2017-12-30 PROCEDURE — 85610 PROTHROMBIN TIME: CPT | Performed by: SURGERY

## 2017-12-30 PROCEDURE — 85014 HEMATOCRIT: CPT | Performed by: INTERNAL MEDICINE

## 2017-12-30 PROCEDURE — 84100 ASSAY OF PHOSPHORUS: CPT

## 2017-12-30 PROCEDURE — 85018 HEMOGLOBIN: CPT | Performed by: INTERNAL MEDICINE

## 2017-12-30 PROCEDURE — 94799 UNLISTED PULMONARY SVC/PX: CPT

## 2017-12-30 PROCEDURE — 83735 ASSAY OF MAGNESIUM: CPT

## 2017-12-30 PROCEDURE — 25010000002 POTASSIUM CHLORIDE PER 2 MEQ OF POTASSIUM

## 2017-12-30 PROCEDURE — 25010000002 MAGNESIUM SULFATE PER 500 MG OF MAGNESIUM

## 2017-12-30 PROCEDURE — 25010000002 THIAMINE PER 100 MG

## 2017-12-30 PROCEDURE — 85730 THROMBOPLASTIN TIME PARTIAL: CPT | Performed by: INTERNAL MEDICINE

## 2017-12-30 PROCEDURE — 25010000002 METOCLOPRAMIDE PER 10 MG: Performed by: INTERNAL MEDICINE

## 2017-12-30 PROCEDURE — 85027 COMPLETE CBC AUTOMATED: CPT | Performed by: INTERNAL MEDICINE

## 2017-12-30 RX ORDER — ALBUMIN (HUMAN) 12.5 G/50ML
12.5 SOLUTION INTRAVENOUS AS NEEDED
Status: ACTIVE | OUTPATIENT
Start: 2017-12-30 | End: 2017-12-31

## 2017-12-30 RX ORDER — ALBUMIN (HUMAN) 12.5 G/50ML
25 SOLUTION INTRAVENOUS AS NEEDED
Status: ACTIVE | OUTPATIENT
Start: 2017-12-30 | End: 2017-12-31

## 2017-12-30 RX ORDER — ALBUMIN (HUMAN) 12.5 G/50ML
12.5 SOLUTION INTRAVENOUS AS NEEDED
Status: ACTIVE | OUTPATIENT
Start: 2017-12-30 | End: 2017-12-30

## 2017-12-30 RX ADMIN — DOCUSATE SODIUM 100 MG: 100 CAPSULE, LIQUID FILLED ORAL at 22:07

## 2017-12-30 RX ADMIN — INSULIN HUMAN 2 UNITS: 100 INJECTION, SOLUTION PARENTERAL at 00:18

## 2017-12-30 RX ADMIN — AMIODARONE HYDROCHLORIDE 200 MG: 200 TABLET ORAL at 22:07

## 2017-12-30 RX ADMIN — METOCLOPRAMIDE 5 MG: 5 INJECTION, SOLUTION INTRAMUSCULAR; INTRAVENOUS at 22:06

## 2017-12-30 RX ADMIN — AMIODARONE HYDROCHLORIDE 200 MG: 200 TABLET ORAL at 11:22

## 2017-12-30 RX ADMIN — DOCUSATE SODIUM 100 MG: 100 CAPSULE, LIQUID FILLED ORAL at 11:22

## 2017-12-30 RX ADMIN — POTASSIUM CHLORIDE: 2 INJECTION, SOLUTION, CONCENTRATE INTRAVENOUS at 17:24

## 2017-12-30 RX ADMIN — METOCLOPRAMIDE 5 MG: 5 INJECTION, SOLUTION INTRAMUSCULAR; INTRAVENOUS at 11:21

## 2017-12-30 RX ADMIN — METOPROLOL TARTRATE 50 MG: 50 TABLET ORAL at 11:23

## 2017-12-30 RX ADMIN — METOPROLOL TARTRATE 50 MG: 50 TABLET ORAL at 22:06

## 2017-12-30 RX ADMIN — ALPRAZOLAM 0.5 MG: 0.5 TABLET ORAL at 08:00

## 2017-12-30 RX ADMIN — FAMOTIDINE 20 MG: 10 INJECTION INTRAVENOUS at 11:21

## 2017-12-30 RX ADMIN — AMLODIPINE BESYLATE 5 MG: 5 TABLET ORAL at 11:23

## 2017-12-30 RX ADMIN — OXYCODONE AND ACETAMINOPHEN 1 TABLET: 5; 325 TABLET ORAL at 09:49

## 2017-12-30 RX ADMIN — SODIUM BICARBONATE 650 MG TABLET 650 MG: at 15:36

## 2017-12-30 RX ADMIN — SODIUM BICARBONATE 650 MG TABLET 650 MG: at 11:21

## 2017-12-30 RX ADMIN — FAMOTIDINE 20 MG: 10 INJECTION INTRAVENOUS at 22:06

## 2017-12-30 RX ADMIN — SERTRALINE HYDROCHLORIDE 100 MG: 100 TABLET ORAL at 11:21

## 2017-12-30 RX ADMIN — THIAMINE HYDROCHLORIDE 100 MG: 100 INJECTION, SOLUTION INTRAMUSCULAR; INTRAVENOUS at 13:24

## 2017-12-31 LAB
ABO GROUP BLD: NORMAL
ALBUMIN SERPL-MCNC: 2.7 G/DL (ref 3.2–4.8)
ANION GAP SERPL CALCULATED.3IONS-SCNC: 11 MMOL/L (ref 3–11)
ANTI-FYA: NORMAL
ANTI-K: NORMAL
BLD GP AB SCN SERPL QL ELUTION: NEGATIVE
BLD GP AB SCN SERPL QL: POSITIVE
BUN BLD-MCNC: 40 MG/DL (ref 9–23)
BUN/CREAT SERPL: 15.4 (ref 7–25)
CALCIUM SPEC-SCNC: 8.2 MG/DL (ref 8.7–10.4)
CHLORIDE SERPL-SCNC: 99 MMOL/L (ref 99–109)
CO2 SERPL-SCNC: 28 MMOL/L (ref 20–31)
CREAT BLD-MCNC: 2.6 MG/DL (ref 0.6–1.3)
DAT IGG GEL: POSITIVE
DEPRECATED RDW RBC AUTO: 58.8 FL (ref 37–54)
ERYTHROCYTE [DISTWIDTH] IN BLOOD BY AUTOMATED COUNT: 20.6 % (ref 11.3–14.5)
GFR SERPL CREATININE-BSD FRML MDRD: 19 ML/MIN/1.73
GLUCOSE BLD-MCNC: 80 MG/DL (ref 70–100)
GLUCOSE BLDC GLUCOMTR-MCNC: 112 MG/DL (ref 70–130)
GLUCOSE BLDC GLUCOMTR-MCNC: 117 MG/DL (ref 70–130)
GLUCOSE BLDC GLUCOMTR-MCNC: 168 MG/DL (ref 70–130)
GLUCOSE BLDC GLUCOMTR-MCNC: 90 MG/DL (ref 70–130)
GLUCOSE BLDC GLUCOMTR-MCNC: 99 MG/DL (ref 70–130)
HCT VFR BLD AUTO: 22.1 % (ref 34.5–44)
HCT VFR BLD AUTO: 23.3 % (ref 34.5–44)
HCT VFR BLD AUTO: 23.5 % (ref 34.5–44)
HCT VFR BLD AUTO: 23.5 % (ref 34.5–44)
HGB BLD-MCNC: 6.8 G/DL (ref 11.5–15.5)
HGB BLD-MCNC: 7.2 G/DL (ref 11.5–15.5)
HGB BLD-MCNC: 7.3 G/DL (ref 11.5–15.5)
HGB BLD-MCNC: 7.3 G/DL (ref 11.5–15.5)
MAGNESIUM SERPL-MCNC: 2.2 MG/DL (ref 1.3–2.7)
MCH RBC QN AUTO: 27.3 PG (ref 27–31)
MCHC RBC AUTO-ENTMCNC: 30.8 G/DL (ref 32–36)
MCV RBC AUTO: 88.8 FL (ref 80–99)
PHOSPHATE SERPL-MCNC: 3.3 MG/DL (ref 2.4–5.1)
PLATELET # BLD AUTO: 380 10*3/MM3 (ref 150–450)
PMV BLD AUTO: 11.2 FL (ref 6–12)
POTASSIUM BLD-SCNC: 4.1 MMOL/L (ref 3.5–5.5)
RBC # BLD AUTO: 2.49 10*6/MM3 (ref 3.89–5.14)
RH BLD: POSITIVE
SODIUM BLD-SCNC: 138 MMOL/L (ref 132–146)
WBC NRBC COR # BLD: 12.32 10*3/MM3 (ref 3.5–10.8)

## 2017-12-31 PROCEDURE — 86902 BLOOD TYPE ANTIGEN DONOR EA: CPT

## 2017-12-31 PROCEDURE — 86860 RBC ANTIBODY ELUTION: CPT | Performed by: INTERNAL MEDICINE

## 2017-12-31 PROCEDURE — 86850 RBC ANTIBODY SCREEN: CPT | Performed by: INTERNAL MEDICINE

## 2017-12-31 PROCEDURE — 25010000002 THIAMINE PER 100 MG

## 2017-12-31 PROCEDURE — 86880 COOMBS TEST DIRECT: CPT | Performed by: INTERNAL MEDICINE

## 2017-12-31 PROCEDURE — 86901 BLOOD TYPING SEROLOGIC RH(D): CPT | Performed by: INTERNAL MEDICINE

## 2017-12-31 PROCEDURE — 86922 COMPATIBILITY TEST ANTIGLOB: CPT

## 2017-12-31 PROCEDURE — 86920 COMPATIBILITY TEST SPIN: CPT

## 2017-12-31 PROCEDURE — 85027 COMPLETE CBC AUTOMATED: CPT | Performed by: SURGERY

## 2017-12-31 PROCEDURE — 36430 TRANSFUSION BLD/BLD COMPNT: CPT

## 2017-12-31 PROCEDURE — 86900 BLOOD TYPING SEROLOGIC ABO: CPT | Performed by: INTERNAL MEDICINE

## 2017-12-31 PROCEDURE — 80069 RENAL FUNCTION PANEL: CPT | Performed by: SURGERY

## 2017-12-31 PROCEDURE — 83735 ASSAY OF MAGNESIUM: CPT

## 2017-12-31 PROCEDURE — P9016 RBC LEUKOCYTES REDUCED: HCPCS

## 2017-12-31 PROCEDURE — 25010000002 METOCLOPRAMIDE PER 10 MG: Performed by: INTERNAL MEDICINE

## 2017-12-31 PROCEDURE — 82962 GLUCOSE BLOOD TEST: CPT

## 2017-12-31 PROCEDURE — 86900 BLOOD TYPING SEROLOGIC ABO: CPT

## 2017-12-31 PROCEDURE — 85018 HEMOGLOBIN: CPT | Performed by: INTERNAL MEDICINE

## 2017-12-31 PROCEDURE — 86870 RBC ANTIBODY IDENTIFICATION: CPT | Performed by: INTERNAL MEDICINE

## 2017-12-31 PROCEDURE — 85014 HEMATOCRIT: CPT | Performed by: INTERNAL MEDICINE

## 2017-12-31 RX ORDER — FUROSEMIDE 10 MG/ML
20 INJECTION INTRAMUSCULAR; INTRAVENOUS ONCE
Status: DISCONTINUED | OUTPATIENT
Start: 2017-12-31 | End: 2018-01-12 | Stop reason: HOSPADM

## 2017-12-31 RX ADMIN — AMIODARONE HYDROCHLORIDE 200 MG: 200 TABLET ORAL at 09:45

## 2017-12-31 RX ADMIN — METOPROLOL TARTRATE 50 MG: 50 TABLET ORAL at 09:46

## 2017-12-31 RX ADMIN — SERTRALINE HYDROCHLORIDE 100 MG: 100 TABLET ORAL at 09:46

## 2017-12-31 RX ADMIN — SODIUM BICARBONATE 650 MG TABLET 650 MG: at 17:49

## 2017-12-31 RX ADMIN — FAMOTIDINE 20 MG: 10 INJECTION INTRAVENOUS at 20:24

## 2017-12-31 RX ADMIN — METOCLOPRAMIDE 5 MG: 5 INJECTION, SOLUTION INTRAMUSCULAR; INTRAVENOUS at 09:45

## 2017-12-31 RX ADMIN — AMIODARONE HYDROCHLORIDE 200 MG: 200 TABLET ORAL at 20:25

## 2017-12-31 RX ADMIN — METOPROLOL TARTRATE 50 MG: 50 TABLET ORAL at 20:25

## 2017-12-31 RX ADMIN — ACETAMINOPHEN 500 MG: 500 TABLET ORAL at 13:19

## 2017-12-31 RX ADMIN — SODIUM BICARBONATE 650 MG TABLET 650 MG: at 09:45

## 2017-12-31 RX ADMIN — FAMOTIDINE 20 MG: 10 INJECTION INTRAVENOUS at 09:45

## 2017-12-31 RX ADMIN — SODIUM BICARBONATE 650 MG TABLET 650 MG: at 20:25

## 2017-12-31 RX ADMIN — OXYCODONE AND ACETAMINOPHEN 1 TABLET: 5; 325 TABLET ORAL at 20:25

## 2017-12-31 RX ADMIN — DOCUSATE SODIUM 100 MG: 100 CAPSULE, LIQUID FILLED ORAL at 20:25

## 2017-12-31 RX ADMIN — METOCLOPRAMIDE 5 MG: 5 INJECTION, SOLUTION INTRAMUSCULAR; INTRAVENOUS at 20:24

## 2017-12-31 RX ADMIN — AMLODIPINE BESYLATE 5 MG: 5 TABLET ORAL at 09:46

## 2017-12-31 RX ADMIN — OXYCODONE AND ACETAMINOPHEN 1 TABLET: 5; 325 TABLET ORAL at 02:47

## 2017-12-31 RX ADMIN — THIAMINE HYDROCHLORIDE 100 MG: 100 INJECTION, SOLUTION INTRAMUSCULAR; INTRAVENOUS at 09:44

## 2018-01-01 LAB
ABO + RH BLD: NORMAL
ABO + RH BLD: NORMAL
ANION GAP SERPL CALCULATED.3IONS-SCNC: 14 MMOL/L (ref 3–11)
BH BB BLOOD EXPIRATION DATE: NORMAL
BH BB BLOOD EXPIRATION DATE: NORMAL
BH BB BLOOD TYPE BARCODE: 5100
BH BB BLOOD TYPE BARCODE: 9500
BH BB DISPENSE STATUS: NORMAL
BH BB DISPENSE STATUS: NORMAL
BH BB PRODUCT CODE: NORMAL
BH BB PRODUCT CODE: NORMAL
BH BB UNIT NUMBER: NORMAL
BH BB UNIT NUMBER: NORMAL
BUN BLD-MCNC: 48 MG/DL (ref 9–23)
BUN/CREAT SERPL: 14.5 (ref 7–25)
CA-I SERPL ISE-MCNC: 1.16 MMOL/L (ref 1.12–1.32)
CALCIUM SPEC-SCNC: 8.4 MG/DL (ref 8.7–10.4)
CHLORIDE SERPL-SCNC: 101 MMOL/L (ref 99–109)
CO2 SERPL-SCNC: 23 MMOL/L (ref 20–31)
CREAT BLD-MCNC: 3.3 MG/DL (ref 0.6–1.3)
CROSSMATCH INTERPRETATION: NORMAL
CROSSMATCH INTERPRETATION: NORMAL
CRP SERPL-MCNC: 15.07 MG/DL (ref 0–1)
DEPRECATED RDW RBC AUTO: 55.3 FL (ref 37–54)
ERYTHROCYTE [DISTWIDTH] IN BLOOD BY AUTOMATED COUNT: 19.1 % (ref 11.3–14.5)
GFR SERPL CREATININE-BSD FRML MDRD: 14 ML/MIN/1.73
GLUCOSE BLD-MCNC: 84 MG/DL (ref 70–100)
GLUCOSE BLDC GLUCOMTR-MCNC: 104 MG/DL (ref 70–130)
GLUCOSE BLDC GLUCOMTR-MCNC: 127 MG/DL (ref 70–130)
GLUCOSE BLDC GLUCOMTR-MCNC: 91 MG/DL (ref 70–130)
GLUCOSE BLDC GLUCOMTR-MCNC: 92 MG/DL (ref 70–130)
HCT VFR BLD AUTO: 31 % (ref 34.5–44)
HCT VFR BLD AUTO: 31.1 % (ref 34.5–44)
HCT VFR BLD AUTO: 31.1 % (ref 34.5–44)
HGB BLD-MCNC: 9.9 G/DL (ref 11.5–15.5)
MAGNESIUM SERPL-MCNC: 2.1 MG/DL (ref 1.3–2.7)
MCH RBC QN AUTO: 28.7 PG (ref 27–31)
MCHC RBC AUTO-ENTMCNC: 31.8 G/DL (ref 32–36)
MCV RBC AUTO: 90.1 FL (ref 80–99)
PHOSPHATE SERPL-MCNC: 4.9 MG/DL (ref 2.4–5.1)
PLATELET # BLD AUTO: 348 10*3/MM3 (ref 150–450)
PMV BLD AUTO: 10.2 FL (ref 6–12)
POTASSIUM BLD-SCNC: 3.7 MMOL/L (ref 3.5–5.5)
PREALB SERPL-MCNC: 10.8 MG/DL (ref 10–40)
RBC # BLD AUTO: 3.45 10*6/MM3 (ref 3.89–5.14)
SODIUM BLD-SCNC: 138 MMOL/L (ref 132–146)
UNIT  ABO: NORMAL
UNIT  ABO: NORMAL
UNIT  RH: NORMAL
UNIT  RH: NORMAL
WBC NRBC COR # BLD: 12.94 10*3/MM3 (ref 3.5–10.8)

## 2018-01-01 PROCEDURE — 25010000002 HYDRALAZINE PER 20 MG: Performed by: INTERNAL MEDICINE

## 2018-01-01 PROCEDURE — 85014 HEMATOCRIT: CPT | Performed by: INTERNAL MEDICINE

## 2018-01-01 PROCEDURE — 84134 ASSAY OF PREALBUMIN: CPT | Performed by: SURGERY

## 2018-01-01 PROCEDURE — 82962 GLUCOSE BLOOD TEST: CPT

## 2018-01-01 PROCEDURE — 84100 ASSAY OF PHOSPHORUS: CPT

## 2018-01-01 PROCEDURE — 85018 HEMOGLOBIN: CPT | Performed by: INTERNAL MEDICINE

## 2018-01-01 PROCEDURE — 82330 ASSAY OF CALCIUM: CPT | Performed by: SURGERY

## 2018-01-01 PROCEDURE — 86140 C-REACTIVE PROTEIN: CPT | Performed by: SURGERY

## 2018-01-01 PROCEDURE — 99233 SBSQ HOSP IP/OBS HIGH 50: CPT | Performed by: INTERNAL MEDICINE

## 2018-01-01 PROCEDURE — 85027 COMPLETE CBC AUTOMATED: CPT | Performed by: INTERNAL MEDICINE

## 2018-01-01 PROCEDURE — 83735 ASSAY OF MAGNESIUM: CPT

## 2018-01-01 PROCEDURE — 25010000002 METOCLOPRAMIDE PER 10 MG: Performed by: INTERNAL MEDICINE

## 2018-01-01 PROCEDURE — 25010000002 THIAMINE PER 100 MG

## 2018-01-01 PROCEDURE — 80048 BASIC METABOLIC PNL TOTAL CA: CPT | Performed by: INTERNAL MEDICINE

## 2018-01-01 RX ORDER — SODIUM CHLORIDE 0.9 % (FLUSH) 0.9 %
1-10 SYRINGE (ML) INJECTION AS NEEDED
Status: DISCONTINUED | OUTPATIENT
Start: 2018-01-01 | End: 2018-01-12 | Stop reason: HOSPADM

## 2018-01-01 RX ADMIN — METOCLOPRAMIDE 5 MG: 5 INJECTION, SOLUTION INTRAMUSCULAR; INTRAVENOUS at 08:45

## 2018-01-01 RX ADMIN — DOCUSATE SODIUM 100 MG: 100 CAPSULE, LIQUID FILLED ORAL at 20:14

## 2018-01-01 RX ADMIN — METOPROLOL TARTRATE 50 MG: 50 TABLET ORAL at 08:45

## 2018-01-01 RX ADMIN — METOCLOPRAMIDE 5 MG: 5 INJECTION, SOLUTION INTRAMUSCULAR; INTRAVENOUS at 20:14

## 2018-01-01 RX ADMIN — AMIODARONE HYDROCHLORIDE 200 MG: 200 TABLET ORAL at 20:15

## 2018-01-01 RX ADMIN — AMLODIPINE BESYLATE 5 MG: 5 TABLET ORAL at 08:45

## 2018-01-01 RX ADMIN — THIAMINE HYDROCHLORIDE 100 MG: 100 INJECTION, SOLUTION INTRAMUSCULAR; INTRAVENOUS at 08:44

## 2018-01-01 RX ADMIN — FAMOTIDINE 20 MG: 10 INJECTION INTRAVENOUS at 20:14

## 2018-01-01 RX ADMIN — SODIUM BICARBONATE 650 MG TABLET 650 MG: at 20:14

## 2018-01-01 RX ADMIN — SERTRALINE HYDROCHLORIDE 100 MG: 100 TABLET ORAL at 08:45

## 2018-01-01 RX ADMIN — OXYCODONE AND ACETAMINOPHEN 1 TABLET: 5; 325 TABLET ORAL at 16:17

## 2018-01-01 RX ADMIN — AMIODARONE HYDROCHLORIDE 200 MG: 200 TABLET ORAL at 08:45

## 2018-01-01 RX ADMIN — SODIUM BICARBONATE 650 MG TABLET 650 MG: at 16:59

## 2018-01-01 RX ADMIN — SODIUM BICARBONATE 650 MG TABLET 650 MG: at 08:45

## 2018-01-01 RX ADMIN — OXYCODONE AND ACETAMINOPHEN 1 TABLET: 5; 325 TABLET ORAL at 05:45

## 2018-01-01 RX ADMIN — OXYCODONE AND ACETAMINOPHEN 1 TABLET: 5; 325 TABLET ORAL at 20:37

## 2018-01-01 RX ADMIN — HYDRALAZINE HYDROCHLORIDE 10 MG: 20 INJECTION INTRAMUSCULAR; INTRAVENOUS at 05:08

## 2018-01-01 RX ADMIN — DOCUSATE SODIUM 100 MG: 100 CAPSULE, LIQUID FILLED ORAL at 08:45

## 2018-01-01 RX ADMIN — ALPRAZOLAM 0.5 MG: 0.5 TABLET ORAL at 01:57

## 2018-01-01 RX ADMIN — METOPROLOL TARTRATE 50 MG: 50 TABLET ORAL at 20:15

## 2018-01-01 RX ADMIN — FAMOTIDINE 20 MG: 10 INJECTION INTRAVENOUS at 08:45

## 2018-01-01 NOTE — PLAN OF CARE
Problem: Patient Care Overview (Adult)  Goal: Plan of Care Review  Outcome: Ongoing (interventions implemented as appropriate)   01/01/18 1736   Coping/Psychosocial Response Interventions   Plan Of Care Reviewed With patient   Patient Care Overview   Progress improving   Outcome Evaluation   Outcome Summary/Follow up Plan Pt has been up multiple times today. Deep line was pulled and PIV access was established. Dressing changed, wound is healing well, however periwound skin is becoming excoriated. VSS.        Problem: Renal Failure/Kidney Injury, Acute (Adult)  Goal: Signs and Symptoms of Listed Potential Problems Will be Absent or Manageable (Renal Failure/Kidney Injury, Acute)  Outcome: Ongoing (interventions implemented as appropriate)   01/01/18 1736   Renal Failure/Kidney Injury, Acute   Problems Assessed (Acute Renal Failure/Kidney Injury) all   Problems Present (Acute Renal Failure/Kidney Injury) situational response       Problem: Fall Risk (Adult)  Goal: Absence of Falls  Outcome: Ongoing (interventions implemented as appropriate)   01/01/18 1736   Fall Risk (Adult)   Absence of Falls making progress toward outcome       Problem: Pressure Ulcer Risk (Jairo Scale) (Adult,Obstetrics,Pediatric)  Goal: Skin Integrity  Outcome: Ongoing (interventions implemented as appropriate)   01/01/18 1736   Pressure Ulcer Risk (Jairo Scale) (Adult,Obstetrics,Pediatric)   Skin Integrity making progress toward outcome       Problem: Hemodialysis (Adult)  Goal: Signs and Symptoms of Listed Potential Problems Will be Absent or Manageable (Hemodialysis)  Outcome: Ongoing (interventions implemented as appropriate)   01/01/18 1736   Hemodialysis   Problems Assessed (Hemodialysis) all   Problems Present (Hemodialysis) fluid imbalance       Problem: Pain, Acute (Adult)  Goal: Acceptable Pain Control/Comfort Level  Outcome: Ongoing (interventions implemented as appropriate)   01/01/18 1736   Pain, Acute (Adult)   Acceptable Pain  Control/Comfort Level making progress toward outcome       Problem: Anxiety (Adult)  Goal: Identify Related Risk Factors and Signs and Symptoms  Outcome: Outcome(s) achieved Date Met: 01/01/18 01/01/18 3186   Anxiety   Related Risk Factors (Anxiety) cognitive status;knowledge deficit;health status change;procedure/treatment;psychosocial factor;pain   Signs and Symptoms (Anxiety) physical complaints/symptoms;nervousness/tension/restlessness     Goal: Reduction/Resolution  Outcome: Ongoing (interventions implemented as appropriate)   01/01/18 6421   Anxiety (Adult)   Reduction/Resolution making progress toward outcome

## 2018-01-01 NOTE — PROGRESS NOTES
"   LOS: 28 days    Patient Care Team:  Michaela Connell MD as PCP - General  Michaela Connell MD as PCP - Family Medicine    Reason For Visit:  F/U DEJA.  Subjective           Review of Systems:    Pulm: No soa   CV:  No CP      Objective       amiodarone 200 mg Oral Q12H   Followed by      [START ON 1/12/2018] amiodarone 200 mg Oral Daily   amLODIPine 5 mg Oral Q24H   docusate sodium 100 mg Oral BID   epoetin marcel 10,000 Units Subcutaneous Once per day on Tue Thu Sat   famotidine 20 mg Intravenous Q12H   furosemide 20 mg Intravenous Once   metoclopramide 5 mg Intravenous Q12H   metoprolol tartrate 50 mg Oral Q12H   sertraline 100 mg Oral Daily   sodium bicarbonate 650 mg Oral TID   thiamine (B-1) 100 mg in sodium chloride 0.9 % 250 mL 100 mg Intravenous Daily            Vital Signs:  Blood pressure 147/97, pulse 70, temperature 97.9 °F (36.6 °C), temperature source Oral, resp. rate 18, height 160 cm (62.99\"), weight 77.4 kg (170 lb 9.6 oz), SpO2 97 %.    Flowsheet Rows         First Filed Value    Admission Height  160 cm (63\") Documented at 12/04/2017 1002    Admission Weight  68.9 kg (152 lb) Documented at 12/04/2017 1002          12/31 0701 - 01/01 0700  In: 1964 [P.O.:360]  Out: 700 [Urine:700]    Physical Exam:    General Appearance: NAD, alert and cooperative, Ox3  Eyes: PER, conjunctivae and sclerae normal, no icterus  Lungs: respirations regular and unlabored, no crepitus, clear to auscultation  Heart/CV: regular rhythm & normal rate, no murmur, no gallop, no rub and 1+ edema  Abdomen: not distended, soft, non-tender, no masses,  bowel sounds present  Skin: No rash, Warm and dry. TUNNELED HD CATH.    Radiology:            Labs:    Results from last 7 days  Lab Units 01/01/18  0740 01/01/18  0342 12/31/17  1616  12/31/17  0523  12/30/17  0446   WBC 10*3/mm3  --  12.94*  --   --  12.32*  --  13.77*   HEMOGLOBIN g/dL 9.9* 9.9*  9.9* 7.3*  < > 6.8*  < > 7.2*  7.2*   HEMATOCRIT % 31.0* 31.1*  31.1* 23.3*  < > " 22.1*  < > 22.6*  22.6*   PLATELETS 10*3/mm3  --  348  --   --  380  --  362   < > = values in this interval not displayed.    Results from last 7 days  Lab Units 01/01/18  0342 12/31/17  0523 12/30/17  0446 12/29/17  1228 12/28/17  0456   SODIUM mmol/L 138 138 135 137 133   POTASSIUM mmol/L 3.7 4.1 4.4 4.1 3.0*   CHLORIDE mmol/L 101 99 101 103 99   CO2 mmol/L 23.0 28.0 26.0 24.0 20.0   BUN mg/dL 48* 40* 65* 57* 83*   CREATININE mg/dL 3.30* 2.60* 3.50* 2.90* 4.00*   CALCIUM mg/dL 8.4* 8.2* 8.5* 8.6* 8.6*   PHOSPHORUS mg/dL 4.9 3.3 2.9  2.9 2.0* 2.7   MAGNESIUM mg/dL 2.1 2.2 2.5 2.5 1.7   ALBUMIN g/dL  --  2.70* 2.80* 2.90* 2.60*       Results from last 7 days  Lab Units 01/01/18  0342   GLUCOSE mg/dL 84         Results from last 7 days  Lab Units 12/27/17  0637   ALK PHOS U/L 123*   BILIRUBIN mg/dL 0.2*   ALT (SGPT) U/L 10   AST (SGOT) U/L 26                 Estimated Creatinine Clearance: 17 mL/min (by C-G formula based on Cr of 3.3).      Assessment     Principal Problem:    Small bowel obstruction  Active Problems:    Meningioma, recurrent of brain    Insomnia    Malignant neoplasm of left orbit    Acute renal failure    Elevated troponin level    Metabolic acidosis    Transaminitis    Atrial flutter with rapid ventricular response            Impression: NONOLIGURIC DEJA. ANEMIA BETTER AFTER TRANSFUSION.             Recommendations: HD 1/2/18 WITH PARAMETERS.      Werner Covarrubias MD  01/01/18  11:14 AM

## 2018-01-01 NOTE — PROGRESS NOTES
"Adult Nutrition  Assessment/PES    Patient Name:  Tereza Ackerman  YOB: 1955  MRN: [id number]  Admit Date:  12/4/2017    Assessment Date:  1/1/2018          Reason for Assessment       01/01/18 1429    Reason for Assessment    Reason For Assessment/Visit follow up protocol    Time Spent (min) 20    Diagnosis Diagnosis   Per notes this admission               Nutrition/Diet History       01/01/18 1429    Nutrition/Diet History    Reported/Observed By Patient;Family    Other Pt reported appetite to be improving, tolerating solids so far. Pt reported drinking 1-2 supplements per day, prefers the nepro to the boost supplements.             Anthropometrics       01/01/18 1430    Anthropometrics    Height 160 cm (62.99\")    Weight 77.1 kg (170 lb)    Ideal Body Weight (IBW)    Ideal Body Weight (IBW), Female 53.1    % Ideal Body Weight 145.54    Body Mass Index (BMI)    BMI (kg/m2) 30.19            Labs/Tests/Procedures/Meds       01/01/18 1430    Labs/Tests/Procedures/Meds    Labs/Tests Review Reviewed                Nutrition Prescription Ordered       01/01/18 1430    Nutrition Prescription PO    Current PO Diet Regular    Supplement Nepro    Supplement Frequency 3 times a day    Common Modifiers Renal            Evaluation of Received Nutrient/Fluid Intake       01/01/18 1431    PO Evaluation    Number of Meals 7    % PO Intake 32   Pt reported drinking 1-2 supplements per day.             Problem/Interventions:        Problem 1       01/01/18 1431    Nutrition Diagnoses Problem 1    Problem 1 Inadequate Intake/Infusion    Etiology (related to) Medical Diagnosis   clinical condition     Signs/Symptoms (evidenced by) PO Intake    Percent (%) intake recorded 32 %   Pt reported drinking 1-2 supplements per day.     Over number of meals 7                    Intervention Goal       01/01/18 1432    Intervention Goal    General Nutrition support treatment    PO Increase intake            Nutrition " Intervention       01/01/18 1432    Nutrition Intervention    RD/Tech Action Advise alternate selection;Advise available snack;Encourage intake;Follow Tx progress;Care plan reviewd              Education/Evaluation       01/01/18 1433    Monitor/Evaluation    Monitor Per protocol;PO intake;Supplement intake        Electronically signed by:  Poornima James  01/01/18 2:34 PM

## 2018-01-01 NOTE — PROGRESS NOTES
"Patient Name:  Tereza Ackerman  YOB: 1955  [id number]    Surgery Progress Note    Date of visit: 1/1/2018    Subjective   Subjective: Feeling better. Tolerating PO, having bowel function. Transfused yesterday, HGB stable.         Objective     Objective:     /97  Pulse 70  Temp 97.9 °F (36.6 °C) (Oral)   Resp 18  Ht 160 cm (62.99\")  Wt 77.4 kg (170 lb 9.6 oz)  SpO2 97%  BMI 30.23 kg/m2    Intake/Output Summary (Last 24 hours) at 01/01/18 0905  Last data filed at 01/01/18 0302   Gross per 24 hour   Intake             1844 ml   Output              700 ml   Net             1144 ml       CV:  Rhythm  regular and rate regular   L:  Clear  to auscultation bilaterally   Abd:  Bowel sounds positive , soft, wound c/d/i  Ext:  No cyanosis, clubbing, edema    Labs that are back at this time have been reviewed. HGb stable       Assessment/Plan     Assessment/ Plan:    Hospital Problem List     * (Principal)Small bowel obstruction - Resolved. Agree with rehab placement when medically ready. Essentially ready from a surgical standpoint.    Overview Signed 12/5/2017  5:16 PM by AYDEE Hernandez     S/p abdominal exploration with adhesiolysis and resection of strangulated segment of small bowel by Dr. CUI 12/5/17         Meningioma, recurrent of brain    Overview Signed 12/6/2017  3:49 PM by Julia COTE Case, DO     First occurrence with resection by Bunny in 2003.   Recurrence with resection by Bunny in 2014.          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          Acute renal failure        Elevated troponin level    Metabolic acidosis    Transaminitis    Atrial flutter with rapid ventricular response    Overview Addendum 12/21/2017 12:10 PM by JEIMY Dalal     1. Echo 12-21-17:  · LVEF difficult to evaluate with tachycardia- globally " appears mildly depressed.  · Left ventricular wall thickness is consistent with concentric hypertrophy.  · Mild tricuspid valve regurgitation is present.  · Calculated right ventricular systolic pressure from tricuspid regurgitation is 32 mmHg.                   Ronnie Segura MD  1/1/2018  9:05 AM

## 2018-01-01 NOTE — PROGRESS NOTES
[facility] Medicine Services  PROGRESS NOTE    Patient Name: Tereza Ackerman  : 1955  MRN: [id number]    Date of Admission: 2017  Length of Stay: 28  Primary Care Physician: Michaela Connell MD    Subjective   Subjective     CC: f/u abd pain    HPI:  Still feels weak, but getting a little stronger each day. Tolerating diet well. Received 2 units prbc's overnight.       Review of Systems  Gen- No fevers, chills  CV- No chest pain, palpitations  Resp- No cough, dyspnea  GI- No N/V/D, abd pain    Otherwise ROS is negative except as mentioned in the HPI.    Objective   Objective     Vital Signs:   Temp:  [97.3 °F (36.3 °C)-98.6 °F (37 °C)] 98.1 °F (36.7 °C)  Heart Rate:  [62-76] 75  Resp:  [18] 18  BP: (124-198)/() 198/98      Physical Exam:  Constitutional -no acute distress, non toxic, in bed, right HD catheter, left subclavian  HEENT-NCAT, mucous membranes moist  CV-RRR, S1 S2 normal, no m/r/g  Resp-CTAB, no wheezes, rhonchi or rales  Abd-soft, non-tender, mild distention, bowel sounds a little hypoactive but present  Ext-1+ edema  Neuro-alert , speech clear, moves all extremities   Psych-normal affect   Skin- No rash on exposed UE or LE bilaterally      Results Reviewed:  I have personally reviewed current lab, radiology, and data and agree.      Results from last 7 days  Lab Units 18  0740 18  0342 17  1616  17  0523  17  0446  17  1332   WBC 10*3/mm3  --  12.94*  --   --  12.32*  --  13.77*  < >  --    HEMOGLOBIN g/dL 9.9* 9.9*  9.9* 7.3*  < > 6.8*  < > 7.2*  7.2*  < > 7.9*   HEMATOCRIT % 31.0* 31.1*  31.1* 23.3*  < > 22.1*  < > 22.6*  22.6*  < > 24.3*   PLATELETS 10*3/mm3  --  348  --   --  380  --  362  < >  --    INR   --   --   --   --   --   --  1.18  --  1.26   < > = values in this interval not displayed.    Results from last 7 days  Lab Units 18  0342 17  0523 17  0446  17  0637 17  0503    SODIUM mmol/L 138 138 135  < > 133 131*   POTASSIUM mmol/L 3.7 4.1 4.4  < > 3.0* 3.4*   CHLORIDE mmol/L 101 99 101  < > 97* 93*   CO2 mmol/L 23.0 28.0 26.0  < > 24.0 21.0   BUN mg/dL 48* 40* 65*  < > 60* 99*   CREATININE mg/dL 3.30* 2.60* 3.50*  < > 3.30* 4.80*   GLUCOSE mg/dL 84 80 105*  < > 113* 110*   CALCIUM mg/dL 8.4* 8.2* 8.5*  < > 8.7 9.4   ALT (SGPT) U/L  --   --   --   --  10 7   AST (SGOT) U/L  --   --   --   --  26 20   < > = values in this interval not displayed.  No results found for: BNP  No results found for: PHART    Microbiology Results Abnormal     Procedure Component Value - Date/Time    Blood Culture - Blood, [[id number]]  (Normal) Collected:  12/21/17 0700    Lab Status:  Final result Specimen:  Blood from Arm, Right Updated:  12/26/17 0816     Blood Culture No growth at 5 days    Blood Culture - Blood, [[id number]]  (Normal) Collected:  12/21/17 0705    Lab Status:  Final result Specimen:  Blood from Arm, Left Updated:  12/26/17 0816     Blood Culture No growth at 5 days    Blood Culture - Blood, [[id number]]  (Normal) Collected:  12/19/17 1316    Lab Status:  Final result Specimen:  Blood from Arm, Left Updated:  12/24/17 1346     Blood Culture No growth at 5 days    Blood Culture - Blood, [[id number]]  (Normal) Collected:  12/19/17 1316    Lab Status:  Final result Specimen:  Blood from Hand, Left Updated:  12/24/17 1346     Blood Culture No growth at 5 days    Urine Culture - Urine, Urine, Clean Catch [[id number]]  (Abnormal) Collected:  12/12/17 0001    Lab Status:  Final result Specimen:  Urine from Urine, Clean Catch Updated:  12/14/17 0646     Urine Culture --      <10,000 CFU/mL Yeast isolated (A)    Blood Culture - Blood, [[id number]]  (Normal) Collected:  12/05/17 0313    Lab Status:  Final result Specimen:  Blood from Arm, Right Updated:  12/10/17 0416     Blood Culture No growth at 5 days    Blood Culture - Blood, [[id number]]  (Normal) Collected:  12/05/17 0313    Lab Status:  Final  result Specimen:  Blood from Arm, Right Updated:  12/10/17 0416     Blood Culture No growth at 5 days    Narrative:       Aerobic bottle only    Urine Culture - Urine, Urine, Clean Catch [[id number]]  (Normal) Collected:  12/05/17 1704    Lab Status:  Final result Specimen:  Urine from Urine, Catheter Updated:  12/07/17 0842     Urine Culture No growth at 2 days    Eosinophil Smear - Urine, Urine, Clean Catch [[id number]]  (Normal) Collected:  12/06/17 1106    Lab Status:  Final result Specimen:  Urine from Urine, Catheter Updated:  12/06/17 1347     Eosinophil Smear 0 % EOS/100 Cells     Narrative:       No eosinophil seen        Results for orders placed during the hospital encounter of 12/04/17   Adult Transthoracic Echo Complete W/ Cont if Necessary Per Protocol    Narrative · LVEF difficult to evaluate with tachycardia- globally appears mildly   depressed.  · Left ventricular wall thickness is consistent with concentric   hypertrophy.  · Mild tricuspid valve regurgitation is present.  · Calculated right ventricular systolic pressure from tricuspid   regurgitation is 32 mmHg.          I have reviewed the medications.    amiodarone 200 mg Oral Q12H   Followed by      [START ON 1/12/2018] amiodarone 200 mg Oral Daily   amLODIPine 5 mg Oral Q24H   docusate sodium 100 mg Oral BID   epoetin marcel 10,000 Units Subcutaneous Once per day on Tue Thu Sat   famotidine 20 mg Intravenous Q12H   furosemide 20 mg Intravenous Once   metoclopramide 5 mg Intravenous Q12H   metoprolol tartrate 50 mg Oral Q12H   sertraline 100 mg Oral Daily   sodium bicarbonate 650 mg Oral TID   thiamine (B-1) 100 mg in sodium chloride 0.9 % 250 mL 100 mg Intravenous Daily         Assessment/Plan   Assessment / Plan     Hospital Problem List     * (Principal)Small bowel obstruction    Overview Signed 12/5/2017  5:16 PM by AYDEE Hernandez     S/p abdominal exploration with adhesiolysis and resection of strangulated segment of small bowel by  Dr. CUI 12/5/17         Meningioma, recurrent of brain    Overview Signed 12/6/2017  3:49 PM by Julia COTE Case, DO     First occurrence with resection by Bunny in 2003.   Recurrence with resection by Bunny in 2014.          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          Acute renal failure    Elevated troponin level    Metabolic acidosis    Transaminitis    Atrial flutter with rapid ventricular response    Overview Addendum 12/21/2017 12:10 PM by JEIMY Dalal     1. Echo 12-21-17:  · LVEF difficult to evaluate with tachycardia- globally appears mildly depressed.  · Left ventricular wall thickness is consistent with concentric hypertrophy.  · Mild tricuspid valve regurgitation is present.  · Calculated right ventricular systolic pressure from tricuspid regurgitation is 32 mmHg.               Brief Hospital Course to date:  Tereza Ackerman is a 62 y.o. female presenting with small bowel obstruction s/p ex-lap with adhesiolysis and resection of strangulated segment of small bowel by Dr. CUI 12/5/17 complicated by acute renal failure (ATN from sepsis/bowel ischemia/hypotension) requiring hemodialysis now transferred out of ICU but with persistent ileus/obstruction requiring NGT replacement and TPN for nutrition. Slow to improve. Developed rapid atrial flutter evening of 12/20, requiring amiodarone drip.      Assessment & Plan:    Anemia  - appropriate response to transfusion of 2 units PRBCs --> hgb 6.8 to 9.9  - hold off serial hemoglobin checks, repeat hemaglobin in am    Epistaxis  - resolved    SBO  - tolerating PO, off TPN, encourage ambulation and pulmonary toilet      Afib RVR  - continue amiodarone taper  - resume asa stroke prophylaxis when OK from bleeding/anemia standpoint  - if recurrent arrythmia, consider antiocoagulation  - may need future  outpatient stress testing. Follow up Cardiology Dr Aguirre in 4 weeks.    DEJA    --Nephrology following, S/P Right IJ tunneled hemodialysis catheter 12/7/17, requiring HD 3 times a week, TTS, dialysis Tuesday.    Access  - will obtain peripheral IV today then d/c sublcavian line    HTN  - some fluctuating blood pressures from high to low, sensitive to even low dose Amlodipine 2.5 mg which was stopped 12/19. Metoprolol with holding parameters.  On hydralazine IV prn. Has been dropping during dialysis. Continue to monitor closely.       TCP - resolved  - HIT ab sent 12/11-- negative    Generalized weakness  - not sure why PT dropped off - will reconsult      Complex patient.      DVT Prophylaxis:  [facility]      CODE STATUS: Full Code      Disposition: I expect the patient to be discharged TBD; considering inpatient rehab. Appears to be making progress.    Wil Garza MD  01/01/18  8:16 AM

## 2018-01-02 ENCOUNTER — APPOINTMENT (OUTPATIENT)
Dept: NEPHROLOGY | Facility: HOSPITAL | Age: 63
End: 2018-01-02
Attending: INTERNAL MEDICINE

## 2018-01-02 LAB
ALBUMIN SERPL-MCNC: 3.2 G/DL (ref 3.2–4.8)
ANION GAP SERPL CALCULATED.3IONS-SCNC: 14 MMOL/L (ref 3–11)
BUN BLD-MCNC: 55 MG/DL (ref 9–23)
BUN/CREAT SERPL: 14.1 (ref 7–25)
CALCIUM SPEC-SCNC: 8.4 MG/DL (ref 8.7–10.4)
CHLORIDE SERPL-SCNC: 99 MMOL/L (ref 99–109)
CO2 SERPL-SCNC: 25 MMOL/L (ref 20–31)
CREAT BLD-MCNC: 3.9 MG/DL (ref 0.6–1.3)
GFR SERPL CREATININE-BSD FRML MDRD: 12 ML/MIN/1.73
GLUCOSE BLD-MCNC: 101 MG/DL (ref 70–100)
GLUCOSE BLDC GLUCOMTR-MCNC: 101 MG/DL (ref 70–130)
GLUCOSE BLDC GLUCOMTR-MCNC: 106 MG/DL (ref 70–130)
GLUCOSE BLDC GLUCOMTR-MCNC: 106 MG/DL (ref 70–130)
GLUCOSE BLDC GLUCOMTR-MCNC: 82 MG/DL (ref 70–130)
HCT VFR BLD AUTO: 31.5 % (ref 34.5–44)
HGB BLD-MCNC: 10.1 G/DL (ref 11.5–15.5)
MAGNESIUM SERPL-MCNC: 1.9 MG/DL (ref 1.3–2.7)
PHOSPHATE SERPL-MCNC: 5.5 MG/DL (ref 2.4–5.1)
POTASSIUM BLD-SCNC: 3.3 MMOL/L (ref 3.5–5.5)
SODIUM BLD-SCNC: 138 MMOL/L (ref 132–146)

## 2018-01-02 PROCEDURE — 83735 ASSAY OF MAGNESIUM: CPT

## 2018-01-02 PROCEDURE — 25010000002 HEPARIN (PORCINE) PER 1000 UNITS: Performed by: HOSPITALIST

## 2018-01-02 PROCEDURE — 85018 HEMOGLOBIN: CPT | Performed by: INTERNAL MEDICINE

## 2018-01-02 PROCEDURE — 25010000002 METOCLOPRAMIDE PER 10 MG: Performed by: INTERNAL MEDICINE

## 2018-01-02 PROCEDURE — 80069 RENAL FUNCTION PANEL: CPT | Performed by: INTERNAL MEDICINE

## 2018-01-02 PROCEDURE — 25010000002 THIAMINE PER 100 MG

## 2018-01-02 PROCEDURE — 99232 SBSQ HOSP IP/OBS MODERATE 35: CPT | Performed by: HOSPITALIST

## 2018-01-02 PROCEDURE — 25010000002 HALOPERIDOL LACTATE PER 5 MG: Performed by: INTERNAL MEDICINE

## 2018-01-02 PROCEDURE — 85014 HEMATOCRIT: CPT | Performed by: INTERNAL MEDICINE

## 2018-01-02 PROCEDURE — 63510000001 EPOETIN ALFA PER 1000 UNITS: Performed by: INTERNAL MEDICINE

## 2018-01-02 PROCEDURE — 82962 GLUCOSE BLOOD TEST: CPT

## 2018-01-02 RX ORDER — QUETIAPINE FUMARATE 25 MG/1
25 TABLET, FILM COATED ORAL DAILY PRN
Status: DISCONTINUED | OUTPATIENT
Start: 2018-01-02 | End: 2018-01-05

## 2018-01-02 RX ORDER — DOCUSATE SODIUM 100 MG/1
100 CAPSULE, LIQUID FILLED ORAL 2 TIMES DAILY
Status: DISCONTINUED | OUTPATIENT
Start: 2018-01-02 | End: 2018-01-02

## 2018-01-02 RX ORDER — NYSTATIN 100000 U/G
CREAM TOPICAL EVERY 12 HOURS SCHEDULED
Status: DISCONTINUED | OUTPATIENT
Start: 2018-01-02 | End: 2018-01-12 | Stop reason: HOSPADM

## 2018-01-02 RX ORDER — HYDRALAZINE HYDROCHLORIDE 25 MG/1
25 TABLET, FILM COATED ORAL EVERY 8 HOURS PRN
Status: DISCONTINUED | OUTPATIENT
Start: 2018-01-02 | End: 2018-01-12 | Stop reason: HOSPADM

## 2018-01-02 RX ORDER — CARVEDILOL 12.5 MG/1
12.5 TABLET ORAL EVERY 12 HOURS SCHEDULED
Status: DISCONTINUED | OUTPATIENT
Start: 2018-01-02 | End: 2018-01-03

## 2018-01-02 RX ORDER — HEPARIN SODIUM 5000 [USP'U]/ML
5000 INJECTION, SOLUTION INTRAVENOUS; SUBCUTANEOUS EVERY 8 HOURS SCHEDULED
Status: DISCONTINUED | OUTPATIENT
Start: 2018-01-02 | End: 2018-01-12 | Stop reason: HOSPADM

## 2018-01-02 RX ADMIN — SODIUM BICARBONATE 650 MG TABLET 650 MG: at 11:21

## 2018-01-02 RX ADMIN — FAMOTIDINE 20 MG: 10 INJECTION INTRAVENOUS at 22:22

## 2018-01-02 RX ADMIN — OXYCODONE AND ACETAMINOPHEN 1 TABLET: 5; 325 TABLET ORAL at 02:28

## 2018-01-02 RX ADMIN — MAGNESIUM HYDROXIDE 10 ML: 2400 SUSPENSION ORAL at 22:21

## 2018-01-02 RX ADMIN — METOCLOPRAMIDE 5 MG: 5 INJECTION, SOLUTION INTRAMUSCULAR; INTRAVENOUS at 14:07

## 2018-01-02 RX ADMIN — METOCLOPRAMIDE 5 MG: 5 INJECTION, SOLUTION INTRAMUSCULAR; INTRAVENOUS at 22:22

## 2018-01-02 RX ADMIN — DOCUSATE SODIUM 100 MG: 100 CAPSULE, LIQUID FILLED ORAL at 11:22

## 2018-01-02 RX ADMIN — ERYTHROPOIETIN 10000 UNITS: 10000 INJECTION, SOLUTION INTRAVENOUS; SUBCUTANEOUS at 14:07

## 2018-01-02 RX ADMIN — NYSTATIN: 100000 CREAM TOPICAL at 22:22

## 2018-01-02 RX ADMIN — AMIODARONE HYDROCHLORIDE 200 MG: 200 TABLET ORAL at 22:23

## 2018-01-02 RX ADMIN — SODIUM BICARBONATE 650 MG TABLET 650 MG: at 16:55

## 2018-01-02 RX ADMIN — DOCUSATE SODIUM 100 MG: 100 CAPSULE, LIQUID FILLED ORAL at 22:21

## 2018-01-02 RX ADMIN — HEPARIN SODIUM 5000 UNITS: 5000 INJECTION, SOLUTION INTRAVENOUS; SUBCUTANEOUS at 22:21

## 2018-01-02 RX ADMIN — AMLODIPINE BESYLATE 5 MG: 5 TABLET ORAL at 11:21

## 2018-01-02 RX ADMIN — OXYCODONE AND ACETAMINOPHEN 1 TABLET: 5; 325 TABLET ORAL at 22:21

## 2018-01-02 RX ADMIN — METOPROLOL TARTRATE 50 MG: 50 TABLET ORAL at 11:21

## 2018-01-02 RX ADMIN — THIAMINE HYDROCHLORIDE 100 MG: 100 INJECTION, SOLUTION INTRAMUSCULAR; INTRAVENOUS at 14:06

## 2018-01-02 RX ADMIN — OXYCODONE AND ACETAMINOPHEN 1 TABLET: 5; 325 TABLET ORAL at 09:08

## 2018-01-02 RX ADMIN — FAMOTIDINE 20 MG: 10 INJECTION INTRAVENOUS at 11:22

## 2018-01-02 RX ADMIN — HALOPERIDOL LACTATE 2 MG: 5 INJECTION, SOLUTION INTRAMUSCULAR at 07:47

## 2018-01-02 RX ADMIN — SERTRALINE HYDROCHLORIDE 100 MG: 100 TABLET ORAL at 11:22

## 2018-01-02 RX ADMIN — CARVEDILOL 12.5 MG: 12.5 TABLET, FILM COATED ORAL at 22:23

## 2018-01-02 RX ADMIN — ACETAMINOPHEN 500 MG: 500 TABLET ORAL at 16:55

## 2018-01-02 RX ADMIN — NYSTATIN: 100000 CREAM TOPICAL at 14:08

## 2018-01-02 RX ADMIN — SODIUM BICARBONATE 650 MG TABLET 650 MG: at 22:21

## 2018-01-02 RX ADMIN — AMIODARONE HYDROCHLORIDE 200 MG: 200 TABLET ORAL at 11:22

## 2018-01-02 NOTE — PROGRESS NOTES
"Patient Name:  Tereza Ackerman  YOB: 1955  [id number]    Surgery Progress Note    Date of visit: 1/2/2018    Subjective   Subjective: Feels OK, tolerating PO.         Objective     Objective:     BP (!) 187/98 (BP Location: Right arm, Patient Position: Lying)  Pulse 89  Temp 97.1 °F (36.2 °C) (Oral)   Resp 18  Ht 160 cm (62.99\")  Wt 76.3 kg (168 lb 3.2 oz)  SpO2 97%  BMI 29.8 kg/m2    Intake/Output Summary (Last 24 hours) at 01/02/18 0654  Last data filed at 01/02/18 0400   Gross per 24 hour   Intake                0 ml   Output             1000 ml   Net            -1000 ml       CV:  Rhythm  regular and rate regular   L:  Clear  to auscultation bilaterally   Abd:  Bowel sounds positive , soft, wound c/d/i. There is some dermatitis from tape around incision  Ext:  No cyanosis, clubbing, edema    Labs that are back at this time have been reviewed. HGB stable       Assessment/Plan     Assessment/ Plan:    Hospital Problem List     * (Principal)Small bowel obstruction - Resolved. Continue current treatment, await rehab. Topical ointment to wound edges.    Overview Signed 12/5/2017  5:16 PM by AYDEE Hernandez     S/p abdominal exploration with adhesiolysis and resection of strangulated segment of small bowel by Dr. CUI 12/5/17         Meningioma, recurrent of brain    Overview Signed 12/6/2017  3:49 PM by Julia COTE Case, DO     First occurrence with resection by Bunny in 2003.   Recurrence with resection by Bunny in 2014.          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          Acute renal failure        Elevated troponin level    Metabolic acidosis    Transaminitis    Atrial flutter with rapid ventricular response    Overview Addendum 12/21/2017 12:10 PM by JEIMY Dalal     1. Echo 12-21-17:  · LVEF difficult to evaluate with " tachycardia- globally appears mildly depressed.  · Left ventricular wall thickness is consistent with concentric hypertrophy.  · Mild tricuspid valve regurgitation is present.  · Calculated right ventricular systolic pressure from tricuspid regurgitation is 32 mmHg.                   Ronnie Segura MD  1/2/2018  6:54 AM

## 2018-01-02 NOTE — PROGRESS NOTES
"   LOS: 29 days    Patient Care Team:  Michaela Connell MD as PCP - General  Michaela Connell MD as PCP - Family Medicine    Reason For Visit:  F/U DEJA. SEEN ON DIALYSIS.  Subjective           Review of Systems:    Pulm: No soa   CV:  No CP      Objective       amiodarone 200 mg Oral Q12H   Followed by      [START ON 1/12/2018] amiodarone 200 mg Oral Daily   amLODIPine 5 mg Oral Q24H   docusate sodium 100 mg Oral BID   epoetin marcel 10,000 Units Subcutaneous Once per day on Tue Thu Sat   famotidine 20 mg Intravenous Q12H   furosemide 20 mg Intravenous Once   metoclopramide 5 mg Intravenous Q12H   metoprolol tartrate 50 mg Oral Q12H   nystatin  Topical Q12H   sertraline 100 mg Oral Daily   sodium bicarbonate 650 mg Oral TID   thiamine (B-1) 100 mg in sodium chloride 0.9 % 250 mL 100 mg Intravenous Daily            Vital Signs:  Blood pressure (!) 187/98, pulse 89, temperature 97.1 °F (36.2 °C), temperature source Oral, resp. rate 18, height 160 cm (62.99\"), weight 76.3 kg (168 lb 3.2 oz), SpO2 97 %.    Flowsheet Rows         First Filed Value    Admission Height  160 cm (63\") Documented at 12/04/2017 1002    Admission Weight  68.9 kg (152 lb) Documented at 12/04/2017 1002          01/01 0701 - 01/02 0700  In: -   Out: 1000 [Urine:1000]    Physical Exam:    General Appearance: NAD, alert and cooperative, Ox3  Eyes: PER, conjunctivae and sclerae normal, no icterus  Lungs: respirations regular and unlabored, no crepitus, clear to auscultation  Heart/CV: regular rhythm & normal rate, no murmur, no gallop, no rub and 1+ edema  Abdomen: not distended, soft, non-tender, no masses,  bowel sounds present  Skin: No rash, Warm and dry. TUNNELED HD CATH.    Radiology:            Labs:    Results from last 7 days  Lab Units 01/02/18  0453 01/01/18  0740 01/01/18  0342  12/31/17  0523  12/30/17  0446   WBC 10*3/mm3  --   --  12.94*  --  12.32*  --  13.77*   HEMOGLOBIN g/dL 10.1* 9.9* 9.9*  9.9*  < > 6.8*  < > 7.2*  7.2* "   HEMATOCRIT % 31.5* 31.0* 31.1*  31.1*  < > 22.1*  < > 22.6*  22.6*   PLATELETS 10*3/mm3  --   --  348  --  380  --  362   < > = values in this interval not displayed.    Results from last 7 days  Lab Units 01/02/18  0453 01/01/18  0342 12/31/17  0523 12/30/17  0446 12/29/17  1228   SODIUM mmol/L 138 138 138 135 137   POTASSIUM mmol/L 3.3* 3.7 4.1 4.4 4.1   CHLORIDE mmol/L 99 101 99 101 103   CO2 mmol/L 25.0 23.0 28.0 26.0 24.0   BUN mg/dL 55* 48* 40* 65* 57*   CREATININE mg/dL 3.90* 3.30* 2.60* 3.50* 2.90*   CALCIUM mg/dL 8.4* 8.4* 8.2* 8.5* 8.6*   PHOSPHORUS mg/dL 5.5* 4.9 3.3 2.9  2.9 2.0*   MAGNESIUM mg/dL 1.9 2.1 2.2 2.5 2.5   ALBUMIN g/dL 3.20  --  2.70* 2.80* 2.90*       Results from last 7 days  Lab Units 01/02/18  0453   GLUCOSE mg/dL 101*         Results from last 7 days  Lab Units 12/27/17  0637   ALK PHOS U/L 123*   BILIRUBIN mg/dL 0.2*   ALT (SGPT) U/L 10   AST (SGOT) U/L 26                 Estimated Creatinine Clearance: 14.3 mL/min (by C-G formula based on Cr of 3.9).      Assessment     Principal Problem:    Small bowel obstruction  Active Problems:    Meningioma, recurrent of brain    Insomnia    Malignant neoplasm of left orbit    Acute renal failure    Elevated troponin level    Metabolic acidosis    Transaminitis    Atrial flutter with rapid ventricular response            Impression: NONOLIGURIC DEJA. ATN. INCREASED U/O.? STARTING TO RECOVER. POOR GFR. ANEMIA.              Recommendations: HD TODAY.      Werner Covarrubias MD  01/02/18  7:55 AM

## 2018-01-02 NOTE — PROGRESS NOTES
Continued Stay Note  [facility]     Patient Name: Tereza Ackerman  MRN: [id number]  Today's Date: 1/2/2018    Admit Date: 12/4/2017          Discharge Plan       01/02/18 1614    Case Management/Social Work Plan    Plan discharge planning    Additional Comments CM visited pt. this afternoon.  Pt. was sitting up in chair and c/o of being uncomfortable.  CM asked pt. about thoughts on SNF placement.  Pt. stated she hadn't looked at the list provided by CM last week.  Pt. stated her  feels SNF placement might be beneficial.  CM will follow-up with pt. and spouse together on 1/3/18 to further discuss discharge planning.              Discharge Codes     None        Expected Discharge Date and Time     Expected Discharge Date Expected Discharge Time    Dec 11, 2017             KIRSTY Santo

## 2018-01-02 NOTE — PROGRESS NOTES
Psychiatric Medicine Services  PROGRESS NOTE    Patient Name: Tereza Ackerman  : 1955  MRN: [id number]    Date of Admission: 2017  Length of Stay: 29  Primary Care Physician: Michaela Connell MD    Subjective   Subjective     CC:  FU SBO    HPI:  Pt reports feeling well. Tolerated the HD well. +Flatus. No BM yet.     Review of Systems  Gen- No fevers, chills  CV- No chest pain, palpitations  Resp- No cough, dyspnea  GI- No N/V/D, +abd pain     Otherwise ROS is negative except as mentioned in the HPI.    Objective   Objective     Vital Signs:   Temp:  [97.1 °F (36.2 °C)-97.6 °F (36.4 °C)] 97.6 °F (36.4 °C)  Heart Rate:  [71-89] 73  Resp:  [18] 18  BP: (153-187)/() 153/97        Physical Exam:  Constitutional: No acute distress, awake, alert on RA  Eyes: PERRLA, sclerae anicteric, no conjunctival injection  HENT: NCAT, mucous membranes moist  Neck: Supple, no thyromegaly, no lymphadenopathy, trachea midline  Respiratory: Clear to auscultation bilaterally, nonlabored respirations   Cardiovascular: RRR, no murmurs, rubs, or gallops, palpable pedal pulses bilaterally  Gastrointestinal: Positive bowel sounds, soft, nontender, nondistended, midline incision dressing C/D/I  Musculoskeletal: No bilateral ankle edema, no clubbing or cyanosis to extremities  Psychiatric: Appropriate affect, cooperative  Neurologic: Oriented x 3, strength symmetric in all extremities, Cranial Nerves grossly intact to confrontation, speech clear  Skin: No rashes    Results Reviewed:  I have personally reviewed current lab, radiology, and data and agree.      Results from last 7 days  Lab Units 18  0453 18  0740 18  0342  17  0523  17  0446  17  1332   WBC 10*3/mm3  --   --  12.94*  --  12.32*  --  13.77*  < >  --    HEMOGLOBIN g/dL 10.1* 9.9* 9.9*  9.9*  < > 6.8*  < > 7.2*  7.2*  < > 7.9*   HEMATOCRIT % 31.5* 31.0* 31.1*  31.1*  < > 22.1*  < > 22.6*  22.6*  < >  24.3*   PLATELETS 10*3/mm3  --   --  348  --  380  --  362  < >  --    INR   --   --   --   --   --   --  1.18  --  1.26   < > = values in this interval not displayed.    Results from last 7 days  Lab Units 01/02/18  0453 01/01/18  0342 12/31/17  0523  12/27/17  0637   SODIUM mmol/L 138 138 138  < > 133   POTASSIUM mmol/L 3.3* 3.7 4.1  < > 3.0*   CHLORIDE mmol/L 99 101 99  < > 97*   CO2 mmol/L 25.0 23.0 28.0  < > 24.0   BUN mg/dL 55* 48* 40*  < > 60*   CREATININE mg/dL 3.90* 3.30* 2.60*  < > 3.30*   GLUCOSE mg/dL 101* 84 80  < > 113*   CALCIUM mg/dL 8.4* 8.4* 8.2*  < > 8.7   ALT (SGPT) U/L  --   --   --   --  10   AST (SGOT) U/L  --   --   --   --  26   < > = values in this interval not displayed.  No results found for: BNP  No results found for: PHART    Microbiology Results Abnormal     Procedure Component Value - Date/Time    Blood Culture - Blood, [[id number]]  (Normal) Collected:  12/21/17 0700    Lab Status:  Final result Specimen:  Blood from Arm, Right Updated:  12/26/17 0816     Blood Culture No growth at 5 days    Blood Culture - Blood, [[id number]]  (Normal) Collected:  12/21/17 0705    Lab Status:  Final result Specimen:  Blood from Arm, Left Updated:  12/26/17 0816     Blood Culture No growth at 5 days    Blood Culture - Blood, [[id number]]  (Normal) Collected:  12/19/17 1316    Lab Status:  Final result Specimen:  Blood from Arm, Left Updated:  12/24/17 1346     Blood Culture No growth at 5 days    Blood Culture - Blood, [[id number]]  (Normal) Collected:  12/19/17 1316    Lab Status:  Final result Specimen:  Blood from Hand, Left Updated:  12/24/17 1346     Blood Culture No growth at 5 days    Urine Culture - Urine, Urine, Clean Catch [[id number]]  (Abnormal) Collected:  12/12/17 0001    Lab Status:  Final result Specimen:  Urine from Urine, Clean Catch Updated:  12/14/17 0646     Urine Culture --      <10,000 CFU/mL Yeast isolated (A)    Blood Culture - Blood, [[id number]]  (Normal) Collected:  12/05/17  0313    Lab Status:  Final result Specimen:  Blood from Arm, Right Updated:  12/10/17 0416     Blood Culture No growth at 5 days    Blood Culture - Blood, [[id number]]  (Normal) Collected:  12/05/17 0313    Lab Status:  Final result Specimen:  Blood from Arm, Right Updated:  12/10/17 0416     Blood Culture No growth at 5 days    Narrative:       Aerobic bottle only    Urine Culture - Urine, Urine, Clean Catch [[id number]]  (Normal) Collected:  12/05/17 1704    Lab Status:  Final result Specimen:  Urine from Urine, Catheter Updated:  12/07/17 0842     Urine Culture No growth at 2 days    Eosinophil Smear - Urine, Urine, Clean Catch [[id number]]  (Normal) Collected:  12/06/17 1106    Lab Status:  Final result Specimen:  Urine from Urine, Catheter Updated:  12/06/17 1347     Eosinophil Smear 0 % EOS/100 Cells     Narrative:       No eosinophil seen          Imaging Results (last 24 hours)     ** No results found for the last 24 hours. **        Results for orders placed during the hospital encounter of 12/04/17   Adult Transthoracic Echo Complete W/ Cont if Necessary Per Protocol    Narrative · LVEF difficult to evaluate with tachycardia- globally appears mildly   depressed.  · Left ventricular wall thickness is consistent with concentric   hypertrophy.  · Mild tricuspid valve regurgitation is present.  · Calculated right ventricular systolic pressure from tricuspid   regurgitation is 32 mmHg.          I have reviewed the medications.    Assessment/Plan   Assessment / Plan     Hospital Problem List     * (Principal)Small bowel obstruction    Overview Signed 12/5/2017  5:16 PM by AYDEE Hernandez     S/p abdominal exploration with adhesiolysis and resection of strangulated segment of small bowel by Dr. CUI 12/5/17         Meningioma, recurrent of brain    Overview Signed 12/6/2017  3:49 PM by Julia COTE Case, DO     First occurrence with resection by Bunny in 2003.   Recurrence with resection by Bunny in 2014.           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          Acute renal failure    Elevated troponin level    Metabolic acidosis    Transaminitis    Atrial flutter with rapid ventricular response    Overview Addendum 12/21/2017 12:10 PM by JEIMY Dalal     1. Echo 12-21-17:  · LVEF difficult to evaluate with tachycardia- globally appears mildly depressed.  · Left ventricular wall thickness is consistent with concentric hypertrophy.  · Mild tricuspid valve regurgitation is present.  · Calculated right ventricular systolic pressure from tricuspid regurgitation is 32 mmHg.             Assessment & Plan:  - SBO: s/p ex-lap with adhesiolysis and resection of strangulated segment of small bowel by Dr. CUI 12/5/17  - Non-oliguric DEJA: HD 1/2, sodium bicarb, Epo  - A. Flutter: amiodarone taper. BB. FU with Dr. Aguirre in 4 weeks  - HTN: Amlodipine. Metoprolol changed to carvedilol BID for better BP control. Increase dose as tolerated. Hydralazine PO PRN  - Mood: Sertraline. DC Haldol PRN. Seroquel PRN    DVT Prophylaxis:  H    CODE STATUS: Full Code    Disposition: I expect the patient to be discharged to [facility]     Maite Frank MD  01/02/18  5:38 PM

## 2018-01-02 NOTE — PROGRESS NOTES
"Tereza Ackerman  1955  [id number]    Surgery Progress Note    Date of visit: 1/2/2018    Subjective: in dialysis  Overall doing better  Taking PO with no complaints of nausea  Ambulating better  Renal function improving slowly          Objective:    /97 (BP Location: Right arm, Patient Position: Standing)  Pulse 73  Temp 97.6 °F (36.4 °C) (Temporal Artery )   Resp 18  Ht 160 cm (62.99\")  Wt 76.3 kg (168 lb 3.2 oz)  SpO2 97%  BMI 29.8 kg/m2    Intake/Output Summary (Last 24 hours) at 01/02/18 1154  Last data filed at 01/02/18 1100   Gross per 24 hour   Intake                0 ml   Output             3350 ml   Net            -3350 ml       CV: Regular rate and rhythm  L: normal air entry    Abd: Soft  Wound is healing well  Dressings intact    LABS:      Results from last 7 days  Lab Units 01/02/18 0453  01/01/18  0342   WBC 10*3/mm3  --   --  12.94*   HEMOGLOBIN g/dL 10.1*  < > 9.9*  9.9*   HEMATOCRIT % 31.5*  < > 31.1*  31.1*   PLATELETS 10*3/mm3  --   --  348   < > = values in this interval not displayed.    Results from last 7 days  Lab Units 01/02/18 0453  12/27/17  0637   SODIUM mmol/L 138  < > 133   POTASSIUM mmol/L 3.3*  < > 3.0*   CHLORIDE mmol/L 99  < > 97*   CO2 mmol/L 25.0  < > 24.0   BUN mg/dL 55*  < > 60*   CREATININE mg/dL 3.90*  < > 3.30*   CALCIUM mg/dL 8.4*  < > 8.7   BILIRUBIN mg/dL  --   --  0.2*   ALK PHOS U/L  --   --  123*   ALT (SGPT) U/L  --   --  10   AST (SGOT) U/L  --   --  26   GLUCOSE mg/dL 101*  < > 113*   < > = values in this interval not displayed.    Results from last 7 days  Lab Units 01/02/18  0453   SODIUM mmol/L 138   POTASSIUM mmol/L 3.3*   CHLORIDE mmol/L 99   CO2 mmol/L 25.0   BUN mg/dL 55*   CREATININE mg/dL 3.90*   GLUCOSE mg/dL 101*   CALCIUM mg/dL 8.4*       Lab Results  Lab Value Date/Time   LIPASE 52 (H) 12/04/2017 1043   LIPASE 39 06/30/2017 1959         Assessment/ Plan: Progressing slowly  Continue with current management  Awaiting rehabilitation " placement    Problem List Items Addressed This Visit     Hypertension    Relevant Medications    amLODIPine (NORVASC) tablet 5 mg    * (Principal)Small bowel obstruction - Primary    Relevant Orders    Tissue Pathology Exam - Tissue, Small Intestine (Completed)    Acute renal failure    Relevant Medications    potassium chloride (MICRO-K) CR capsule 40 mEq    potassium chloride (KLOR-CON) packet 40 mEq    potassium chloride 10 mEq in 100 mL IVPB      Other Visit Diagnoses     Intractable vomiting with nausea, unspecified vomiting type        Uncontrolled hypertension                Ирина Cobian MD  1/2/2018  11:54 AM

## 2018-01-02 NOTE — PLAN OF CARE
Problem: Patient Care Overview (Adult)  Goal: Plan of Care Review  Outcome: Ongoing (interventions implemented as appropriate)    Goal: Discharge Needs Assessment  Outcome: Ongoing (interventions implemented as appropriate)

## 2018-01-03 LAB
ALBUMIN SERPL-MCNC: 2.6 G/DL (ref 3.2–4.8)
ANION GAP SERPL CALCULATED.3IONS-SCNC: 12 MMOL/L (ref 3–11)
BUN BLD-MCNC: 28 MG/DL (ref 9–23)
BUN/CREAT SERPL: 10.8 (ref 7–25)
CALCIUM SPEC-SCNC: 7.8 MG/DL (ref 8.7–10.4)
CHLORIDE SERPL-SCNC: 102 MMOL/L (ref 99–109)
CO2 SERPL-SCNC: 26 MMOL/L (ref 20–31)
CREAT BLD-MCNC: 2.6 MG/DL (ref 0.6–1.3)
DEPRECATED RDW RBC AUTO: 56.7 FL (ref 37–54)
ERYTHROCYTE [DISTWIDTH] IN BLOOD BY AUTOMATED COUNT: 19.3 % (ref 11.3–14.5)
GFR SERPL CREATININE-BSD FRML MDRD: 19 ML/MIN/1.73
GLUCOSE BLD-MCNC: 101 MG/DL (ref 70–100)
GLUCOSE BLDC GLUCOMTR-MCNC: 111 MG/DL (ref 70–130)
GLUCOSE BLDC GLUCOMTR-MCNC: 129 MG/DL (ref 70–130)
GLUCOSE BLDC GLUCOMTR-MCNC: 139 MG/DL (ref 70–130)
GLUCOSE BLDC GLUCOMTR-MCNC: 94 MG/DL (ref 70–130)
HCT VFR BLD AUTO: 26.8 % (ref 34.5–44)
HGB BLD-MCNC: 8.5 G/DL (ref 11.5–15.5)
MAGNESIUM SERPL-MCNC: 1.9 MG/DL (ref 1.3–2.7)
MCH RBC QN AUTO: 28.6 PG (ref 27–31)
MCHC RBC AUTO-ENTMCNC: 31.7 G/DL (ref 32–36)
MCV RBC AUTO: 90.2 FL (ref 80–99)
PHOSPHATE SERPL-MCNC: 3.5 MG/DL (ref 2.4–5.1)
PLATELET # BLD AUTO: 357 10*3/MM3 (ref 150–450)
PMV BLD AUTO: 10.8 FL (ref 6–12)
POTASSIUM BLD-SCNC: 3.2 MMOL/L (ref 3.5–5.5)
RBC # BLD AUTO: 2.97 10*6/MM3 (ref 3.89–5.14)
SODIUM BLD-SCNC: 140 MMOL/L (ref 132–146)
WBC NRBC COR # BLD: 10.42 10*3/MM3 (ref 3.5–10.8)

## 2018-01-03 PROCEDURE — 25010000002 METOCLOPRAMIDE PER 10 MG: Performed by: INTERNAL MEDICINE

## 2018-01-03 PROCEDURE — 82962 GLUCOSE BLOOD TEST: CPT

## 2018-01-03 PROCEDURE — 80069 RENAL FUNCTION PANEL: CPT | Performed by: INTERNAL MEDICINE

## 2018-01-03 PROCEDURE — 83735 ASSAY OF MAGNESIUM: CPT

## 2018-01-03 PROCEDURE — 99232 SBSQ HOSP IP/OBS MODERATE 35: CPT | Performed by: HOSPITALIST

## 2018-01-03 PROCEDURE — 85027 COMPLETE CBC AUTOMATED: CPT | Performed by: HOSPITALIST

## 2018-01-03 PROCEDURE — 25010000002 HEPARIN (PORCINE) PER 1000 UNITS: Performed by: HOSPITALIST

## 2018-01-03 RX ORDER — THIAMINE MONONITRATE (VIT B1) 100 MG
100 TABLET ORAL DAILY
Status: DISCONTINUED | OUTPATIENT
Start: 2018-01-03 | End: 2018-01-12 | Stop reason: HOSPADM

## 2018-01-03 RX ORDER — CARVEDILOL 12.5 MG/1
12.5 TABLET ORAL ONCE
Status: COMPLETED | OUTPATIENT
Start: 2018-01-03 | End: 2018-01-03

## 2018-01-03 RX ORDER — CARVEDILOL 12.5 MG/1
25 TABLET ORAL EVERY 12 HOURS SCHEDULED
Status: DISCONTINUED | OUTPATIENT
Start: 2018-01-03 | End: 2018-01-12 | Stop reason: HOSPADM

## 2018-01-03 RX ORDER — THIAMINE MONONITRATE (VIT B1) 100 MG
100 TABLET ORAL DAILY
Status: DISCONTINUED | OUTPATIENT
Start: 2018-01-04 | End: 2018-01-03

## 2018-01-03 RX ADMIN — POTASSIUM CHLORIDE 40 MEQ: 750 CAPSULE, EXTENDED RELEASE ORAL at 16:29

## 2018-01-03 RX ADMIN — QUETIAPINE FUMARATE 25 MG: 25 TABLET ORAL at 03:59

## 2018-01-03 RX ADMIN — Medication 100 MG: at 10:02

## 2018-01-03 RX ADMIN — SODIUM BICARBONATE 650 MG TABLET 650 MG: at 21:08

## 2018-01-03 RX ADMIN — SODIUM BICARBONATE 650 MG TABLET 650 MG: at 09:47

## 2018-01-03 RX ADMIN — METOCLOPRAMIDE 5 MG: 5 INJECTION, SOLUTION INTRAMUSCULAR; INTRAVENOUS at 09:48

## 2018-01-03 RX ADMIN — CARVEDILOL 12.5 MG: 12.5 TABLET, FILM COATED ORAL at 16:30

## 2018-01-03 RX ADMIN — METOCLOPRAMIDE 5 MG: 5 INJECTION, SOLUTION INTRAMUSCULAR; INTRAVENOUS at 21:05

## 2018-01-03 RX ADMIN — SERTRALINE HYDROCHLORIDE 100 MG: 100 TABLET ORAL at 09:47

## 2018-01-03 RX ADMIN — MAGNESIUM HYDROXIDE 10 ML: 2400 SUSPENSION ORAL at 10:02

## 2018-01-03 RX ADMIN — DOCUSATE SODIUM 100 MG: 100 CAPSULE, LIQUID FILLED ORAL at 09:47

## 2018-01-03 RX ADMIN — POTASSIUM CHLORIDE 40 MEQ: 1.5 POWDER, FOR SOLUTION ORAL at 21:08

## 2018-01-03 RX ADMIN — CARVEDILOL 25 MG: 12.5 TABLET, FILM COATED ORAL at 21:08

## 2018-01-03 RX ADMIN — DOCUSATE SODIUM 100 MG: 100 CAPSULE, LIQUID FILLED ORAL at 21:08

## 2018-01-03 RX ADMIN — ACETAMINOPHEN 500 MG: 500 TABLET ORAL at 21:06

## 2018-01-03 RX ADMIN — AMIODARONE HYDROCHLORIDE 200 MG: 200 TABLET ORAL at 09:48

## 2018-01-03 RX ADMIN — NYSTATIN: 100000 CREAM TOPICAL at 21:08

## 2018-01-03 RX ADMIN — HEPARIN SODIUM 5000 UNITS: 5000 INJECTION, SOLUTION INTRAVENOUS; SUBCUTANEOUS at 21:04

## 2018-01-03 RX ADMIN — HEPARIN SODIUM 5000 UNITS: 5000 INJECTION, SOLUTION INTRAVENOUS; SUBCUTANEOUS at 16:31

## 2018-01-03 RX ADMIN — NYSTATIN: 100000 CREAM TOPICAL at 09:49

## 2018-01-03 RX ADMIN — ACETAMINOPHEN 500 MG: 500 TABLET ORAL at 10:02

## 2018-01-03 RX ADMIN — AMIODARONE HYDROCHLORIDE 200 MG: 200 TABLET ORAL at 21:07

## 2018-01-03 RX ADMIN — SODIUM BICARBONATE 650 MG TABLET 650 MG: at 16:30

## 2018-01-03 RX ADMIN — CARVEDILOL 12.5 MG: 12.5 TABLET, FILM COATED ORAL at 09:47

## 2018-01-03 RX ADMIN — AMLODIPINE BESYLATE 5 MG: 5 TABLET ORAL at 09:47

## 2018-01-03 NOTE — PROGRESS NOTES
Continued Stay Note  [facility]     Patient Name: Tereza Ackerman  MRN: [id number]  Today's Date: 1/3/2018    Admit Date: 12/4/2017          Discharge Plan       01/03/18 1020    Case Management/Social Work Plan    Plan referral to [facility]    Patient/Family In Agreement With Plan yes    Additional Comments CM met with pt. this morning.  She stated her spouse had not arrived at the hospital yet.  Pt. stated that after talking to Dr. CUI, she would like a referral made to [facility]. This is the first day pt has indicated any preference or desire to initiate discharge placement.  Pt. questioned if she will still require dialysis once she is discharged from [facility].  CM will verify discharge needs with providers and initiate [facility] referral today.  CM will continue to follow for discharge planning              Discharge Codes     None        Expected Discharge Date and Time     Expected Discharge Date Expected Discharge Time    Dec 11, 2017             KIRSTY Santo

## 2018-01-03 NOTE — PLAN OF CARE
Problem: Bowel Obstruction (Adult)  Goal: Signs and Symptoms of Listed Potential Problems Will be Absent or Manageable (Bowel Obstruction)  Outcome: Ongoing (interventions implemented as appropriate)   01/03/18 1821   Bowel Obstruction   Problems Assessed (Bowel Obstruction) all   Problems Present (Bowel Obstruction) pain       Problem: Skin Integrity Impairment, Risk/Actual (Adult)  Goal: Skin Integrity/Wound Healing  Outcome: Ongoing (interventions implemented as appropriate)   01/03/18 1821   Skin Integrity Impairment, Risk/Actual (Adult)   Skin Integrity/Wound Healing making progress toward outcome       Problem: Renal Failure/Kidney Injury, Acute (Adult)  Goal: Signs and Symptoms of Listed Potential Problems Will be Absent or Manageable (Renal Failure/Kidney Injury, Acute)  Outcome: Ongoing (interventions implemented as appropriate)   01/03/18 1821   Renal Failure/Kidney Injury, Acute   Problems Assessed (Acute Renal Failure/Kidney Injury) all   Problems Present (Acute Renal Failure/Kidney Injury) situational response       Problem: Fall Risk (Adult)  Goal: Absence of Falls  Outcome: Ongoing (interventions implemented as appropriate)   01/03/18 1821   Fall Risk (Adult)   Absence of Falls making progress toward outcome       Problem: Pressure Ulcer Risk (Jairo Scale) (Adult,Obstetrics,Pediatric)  Goal: Skin Integrity  Outcome: Ongoing (interventions implemented as appropriate)   01/03/18 1821   Pressure Ulcer Risk (Jairo Scale) (Adult,Obstetrics,Pediatric)   Skin Integrity making progress toward outcome       Problem: Hemodialysis (Adult)  Goal: Signs and Symptoms of Listed Potential Problems Will be Absent or Manageable (Hemodialysis)  Outcome: Ongoing (interventions implemented as appropriate)   01/03/18 1821   Hemodialysis   Problems Assessed (Hemodialysis) all   Problems Present (Hemodialysis) situational response       Problem: Pain, Acute (Adult)  Goal: Acceptable Pain Control/Comfort Level  Outcome:  Ongoing (interventions implemented as appropriate)   01/03/18 1821   Pain, Acute (Adult)   Acceptable Pain Control/Comfort Level making progress toward outcome       Problem: Anxiety (Adult)  Goal: Reduction/Resolution  Outcome: Ongoing (interventions implemented as appropriate)   01/03/18 1821   Anxiety (Adult)   Reduction/Resolution making progress toward outcome

## 2018-01-03 NOTE — PROGRESS NOTES
Adult Nutrition  Assessment/PES    Patient Name:  Tereza Ackerman  YOB: 1955  MRN: [id number]  Admit Date:  12/4/2017    Assessment Date:  1/3/2018          Reason for Assessment       01/03/18 1510    Reason for Assessment    Reason For Assessment/Visit follow up protocol    Time Spent (min) 20    Diagnosis --   per notes this admission    Gastrointestinal --   mild abdominal discomfort, having flatus, no bowel movements.                  Labs/Tests/Procedures/Meds       01/03/18 1513    Labs/Tests/Procedures/Meds    Labs/Tests Review Reviewed                Nutrition Prescription Ordered       01/03/18 1521    Nutrition Prescription PO    Current PO Diet Regular    Fluid Consistency Thin    Supplement Nepro    Supplement Frequency 3 times a day    Common Modifiers Renal            Evaluation of Received Nutrient/Fluid Intake       01/03/18 1521    PO Evaluation    Number of Meals 2    % PO Intake 63            Problem/Interventions:        Problem 1       01/03/18 1522    Nutrition Diagnoses Problem 1    Problem 1 Inadequate Intake/Infusion    Inadequate Intake Type Oral    Etiology (related to) Factors Affecting Nutrition    Appetite Improved    Percent (%) intake recorded 63 %    Over number of meals 2                    Intervention Goal       01/03/18 1523    Intervention Goal    General Nutrition support treatment    PO Continue positive trend            Nutrition Intervention       01/03/18 1523    Nutrition Intervention    RD/Tech Action Follow Tx progress;Supplement provided   At time of visit, pt. was not in room. Nsg. advised  patient did not advise her she was leaving the floor.              Education/Evaluation       01/03/18 1524    Monitor/Evaluation    Monitor Per protocol        Electronically signed by:  Santa Price RD  01/03/18 3:24 PM

## 2018-01-03 NOTE — PROGRESS NOTES
"Tereza Ackerman  1955  [id number]    Surgery Progress Note    Date of visit: 1/3/2018    Subjective: Up in chair eating breakfast  Complaints of mild abdominal discomfort  Having flatus  No bowel movement    Objective:    /83 (BP Location: Left arm, Patient Position: Lying)  Pulse 73  Temp 98.4 °F (36.9 °C) (Oral)   Resp 18  Ht 160 cm (62.99\")  Wt 74.1 kg (163 lb 6.4 oz)  SpO2 97%  BMI 28.95 kg/m2    Intake/Output Summary (Last 24 hours) at 01/03/18 0843  Last data filed at 01/03/18 0809   Gross per 24 hour   Intake              100 ml   Output             4125 ml   Net            -4025 ml       CV: Regular rate and rhythm  L: normal air entry    Abd: Soft  Wound is almost healed      LABS:      Results from last 7 days  Lab Units 01/03/18  0612   WBC 10*3/mm3 10.42   HEMOGLOBIN g/dL 8.5*   HEMATOCRIT % 26.8*   PLATELETS 10*3/mm3 357       Results from last 7 days  Lab Units 01/03/18  0612   SODIUM mmol/L 140   POTASSIUM mmol/L 3.2*   CHLORIDE mmol/L 102   CO2 mmol/L 26.0   BUN mg/dL 28*   CREATININE mg/dL 2.60*   CALCIUM mg/dL 7.8*   GLUCOSE mg/dL 101*       Results from last 7 days  Lab Units 01/03/18  0612   SODIUM mmol/L 140   POTASSIUM mmol/L 3.2*   CHLORIDE mmol/L 102   CO2 mmol/L 26.0   BUN mg/dL 28*   CREATININE mg/dL 2.60*   GLUCOSE mg/dL 101*   CALCIUM mg/dL 7.8*       Lab Results  Lab Value Date/Time   LIPASE 52 (H) 12/04/2017 1043   LIPASE 39 06/30/2017 1959         Assessment/ Plan: Overall progressing better  I encouraged the patient to stop taking any narcotics and increase her activity level  Renal function is improving as well  Milk of magnesia as needed for constipation  Await rehabilitation placement      Problem List Items Addressed This Visit     Hypertension    Relevant Medications    amLODIPine (NORVASC) tablet 5 mg    * (Principal)Small bowel obstruction - Primary    Relevant Orders    Tissue Pathology Exam - Tissue, Small Intestine (Completed)    Acute renal failure    " Relevant Medications    potassium chloride (MICRO-K) CR capsule 40 mEq    potassium chloride (KLOR-CON) packet 40 mEq    potassium chloride 10 mEq in 100 mL IVPB      Other Visit Diagnoses     Intractable vomiting with nausea, unspecified vomiting type        Uncontrolled hypertension                Ирина Cobian MD  1/3/2018  8:43 AM

## 2018-01-03 NOTE — PROGRESS NOTES
Continued Stay Note  [facility]     Patient Name: Tereza Ackerman  MRN: [id number]  Today's Date: 1/3/2018    Admit Date: 12/4/2017          Discharge Plan       01/03/18 1245    Case Management/Social Work Plan    Plan discharge planning    Additional Comments CM has completed referral to [facility] and provided information to Rula ([phone]).  CM also spoke with [facility] Dialysis coordinator, Selene ([phone]), and provided update.  [facility] is aware of referral to [facility] and will assist with coordinating dialysis with the facility in closest available proximity.      01/03/18 1020    Case Management/Social Work Plan    Plan referral to [facility]    Patient/Family In Agreement With Plan yes    Additional Comments CM met with pt. this morning.  She stated her spouse had not arrived at the hospital yet.  Pt. stated that after talking to Dr. CUI, she would like a referral made to [facility].  Pt. questioned if she will still require dialysis once she is discharged from [facility].  CM will verify discharge needs with providers and initiate [facility] referral today.  CM will continue to follow for discharge planning              Discharge Codes     None        Expected Discharge Date and Time     Expected Discharge Date Expected Discharge Time    Dec 11, 2017             KIRSTY Santo

## 2018-01-03 NOTE — PROGRESS NOTES
[facility] Medicine Services  PROGRESS NOTE    Patient Name: Tereza Ackerman  : 1955  MRN: [id number]    Date of Admission: 2017  Length of Stay: 30  Primary Care Physician: Michaela Connell MD    Subjective   Subjective     CC:  FU SBO    HPI:  Pt reports feeling well. +Flatus. No BM yet. Eating okay. Abd pain well-controlled.     Review of Systems  Gen- No fevers, chills  CV- No chest pain, palpitations  Resp- No cough, dyspnea  GI- No N/V/D, +abd pain     Otherwise ROS is negative except as mentioned in the HPI.    Objective   Objective     Vital Signs:   Temp:  [98.2 °F (36.8 °C)-98.4 °F (36.9 °C)] 98.4 °F (36.9 °C)  Heart Rate:  [70-73] 73  Resp:  [18] 18  BP: (131-173)/(80-94) 173/83        Physical Exam:  Constitutional: No acute distress, awake, alert on RA  Eyes: PERRLA, sclerae anicteric, no conjunctival injection  HENT: NCAT, mucous membranes moist  Neck: Supple, no thyromegaly, no lymphadenopathy, trachea midline  Respiratory: Clear to auscultation bilaterally, nonlabored respirations   Cardiovascular: RRR, no murmurs, rubs, or gallops, palpable pedal pulses bilaterally  Gastrointestinal: Positive bowel sounds, soft, nontender, nondistended, midline incision dressing healing well. Good granulation tissue, appears to have contact dermatitis in a rectangular form from previous dressing  Musculoskeletal: No bilateral ankle edema, no clubbing or cyanosis to extremities  Psychiatric: Appropriate affect, cooperative  Neurologic: Oriented x 3, strength symmetric in all extremities, Cranial Nerves grossly intact to confrontation, speech clear  Skin: No rashes    Results Reviewed:  I have personally reviewed current lab, radiology, and data and agree.      Results from last 7 days  Lab Units 18  0612 18  0453 18  0740 18  0342  17  0523  17  0446   WBC 10*3/mm3 10.42  --   --  12.94*  --  12.32*  --  13.77*   HEMOGLOBIN g/dL 8.5* 10.1*  9.9* 9.9*  9.9*  < > 6.8*  < > 7.2*  7.2*   HEMATOCRIT % 26.8* 31.5* 31.0* 31.1*  31.1*  < > 22.1*  < > 22.6*  22.6*   PLATELETS 10*3/mm3 357  --   --  348  --  380  --  362   INR   --   --   --   --   --   --   --  1.18   < > = values in this interval not displayed.    Results from last 7 days  Lab Units 01/03/18  0612 01/02/18  0453 01/01/18  0342   SODIUM mmol/L 140 138 138   POTASSIUM mmol/L 3.2* 3.3* 3.7   CHLORIDE mmol/L 102 99 101   CO2 mmol/L 26.0 25.0 23.0   BUN mg/dL 28* 55* 48*   CREATININE mg/dL 2.60* 3.90* 3.30*   GLUCOSE mg/dL 101* 101* 84   CALCIUM mg/dL 7.8* 8.4* 8.4*     No results found for: BNP  No results found for: PHART    Microbiology Results Abnormal     Procedure Component Value - Date/Time    Blood Culture - Blood, [[id number]]  (Normal) Collected:  12/21/17 0700    Lab Status:  Final result Specimen:  Blood from Arm, Right Updated:  12/26/17 0816     Blood Culture No growth at 5 days    Blood Culture - Blood, [[id number]]  (Normal) Collected:  12/21/17 0705    Lab Status:  Final result Specimen:  Blood from Arm, Left Updated:  12/26/17 0816     Blood Culture No growth at 5 days    Blood Culture - Blood, [[id number]]  (Normal) Collected:  12/19/17 1316    Lab Status:  Final result Specimen:  Blood from Arm, Left Updated:  12/24/17 1346     Blood Culture No growth at 5 days    Blood Culture - Blood, [[id number]]  (Normal) Collected:  12/19/17 1316    Lab Status:  Final result Specimen:  Blood from Hand, Left Updated:  12/24/17 1346     Blood Culture No growth at 5 days    Urine Culture - Urine, Urine, Clean Catch [[id number]]  (Abnormal) Collected:  12/12/17 0001    Lab Status:  Final result Specimen:  Urine from Urine, Clean Catch Updated:  12/14/17 0646     Urine Culture --      <10,000 CFU/mL Yeast isolated (A)    Blood Culture - Blood, [[id number]]  (Normal) Collected:  12/05/17 0313    Lab Status:  Final result Specimen:  Blood from Arm, Right Updated:  12/10/17 0416     Blood Culture No  growth at 5 days    Blood Culture - Blood, [[id number]]  (Normal) Collected:  12/05/17 0313    Lab Status:  Final result Specimen:  Blood from Arm, Right Updated:  12/10/17 0416     Blood Culture No growth at 5 days    Narrative:       Aerobic bottle only    Urine Culture - Urine, Urine, Clean Catch [[id number]]  (Normal) Collected:  12/05/17 1704    Lab Status:  Final result Specimen:  Urine from Urine, Catheter Updated:  12/07/17 0842     Urine Culture No growth at 2 days    Eosinophil Smear - Urine, Urine, Clean Catch [[id number]]  (Normal) Collected:  12/06/17 1106    Lab Status:  Final result Specimen:  Urine from Urine, Catheter Updated:  12/06/17 1347     Eosinophil Smear 0 % EOS/100 Cells     Narrative:       No eosinophil seen          Imaging Results (last 24 hours)     ** No results found for the last 24 hours. **        Results for orders placed during the hospital encounter of 12/04/17   Adult Transthoracic Echo Complete W/ Cont if Necessary Per Protocol    Narrative · LVEF difficult to evaluate with tachycardia- globally appears mildly   depressed.  · Left ventricular wall thickness is consistent with concentric   hypertrophy.  · Mild tricuspid valve regurgitation is present.  · Calculated right ventricular systolic pressure from tricuspid   regurgitation is 32 mmHg.          I have reviewed the medications.    Assessment/Plan   Assessment / Plan     Hospital Problem List     * (Principal)Small bowel obstruction    Overview Signed 12/5/2017  5:16 PM by AYDEE Hernandez     S/p abdominal exploration with adhesiolysis and resection of strangulated segment of small bowel by Dr. CUI 12/5/17         Meningioma, recurrent of brain    Overview Signed 12/6/2017  3:49 PM by Julia Kennedy, DO     First occurrence with resection by Bunny in 2003.   Recurrence with resection by Bunny in 2014.          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          Acute renal failure    Elevated troponin level    Metabolic acidosis    Transaminitis    Atrial flutter with rapid ventricular response    Overview Addendum 12/21/2017 12:10 PM by JEIMY Dalal     1. Echo 12-21-17:  · LVEF difficult to evaluate with tachycardia- globally appears mildly depressed.  · Left ventricular wall thickness is consistent with concentric hypertrophy.  · Mild tricuspid valve regurgitation is present.  · Calculated right ventricular systolic pressure from tricuspid regurgitation is 32 mmHg.             Assessment & Plan:  - SBO: s/p ex-lap with adhesiolysis and resection of strangulated segment of small bowel by Dr. CUI 12/5/17  - Non-oliguric DEJA: HD 1/2, sodium bicarb, Epo  - A. Flutter: amiodarone taper. BB. FU with Dr. Aguirre in 4 weeks  - HTN: Amlodipine. Metoprolol changed to carvedilol BID for better BP control. Increased dose of carvedilol. Increase dose as tolerated. Hydralazine PO PRN  - Mood: Sertraline. Seroquel PRN    DVT Prophylaxis:  SQH    CODE STATUS: Full Code    Disposition: I expect the patient to be discharged to [facility]     Maite Frank MD  01/03/18  3:03 PM

## 2018-01-03 NOTE — PLAN OF CARE
Problem: Patient Care Overview (Adult)  Goal: Plan of Care Review  Outcome: Ongoing (interventions implemented as appropriate)   01/03/18 1430   Coping/Psychosocial Response Interventions   Plan Of Care Reviewed With patient   Patient Care Overview   Progress improving   Outcome Evaluation   Outcome Summary/Follow up Plan WOC nurse f/u r/t abdominal incision. Area around incision where initial dressing was placed is reddened; appears to be excoriation from moisture and possible contact dermatitis; does not appear to be yeast. Open area of dehiscence is granulating. Barrier spray applied to reddened skin around wound. Open area lightly packed with gauze moistened with N/S. Dry gauze applied to incision, then covered with ABD pad and secured with paper tape. Dressing needs to be changed BID. WOC will f/u. Please contact WOC nurse as needed for concerns.

## 2018-01-04 ENCOUNTER — APPOINTMENT (OUTPATIENT)
Dept: NEPHROLOGY | Facility: HOSPITAL | Age: 63
End: 2018-01-04
Attending: INTERNAL MEDICINE

## 2018-01-04 LAB
ALBUMIN SERPL-MCNC: 3.2 G/DL (ref 3.2–4.8)
ANION GAP SERPL CALCULATED.3IONS-SCNC: 16 MMOL/L (ref 3–11)
BUN BLD-MCNC: 37 MG/DL (ref 9–23)
BUN/CREAT SERPL: 12.3 (ref 7–25)
CALCIUM SPEC-SCNC: 8.4 MG/DL (ref 8.7–10.4)
CHLORIDE SERPL-SCNC: 97 MMOL/L (ref 99–109)
CO2 SERPL-SCNC: 29 MMOL/L (ref 20–31)
CREAT BLD-MCNC: 3 MG/DL (ref 0.6–1.3)
DEPRECATED RDW RBC AUTO: 59.2 FL (ref 37–54)
ERYTHROCYTE [DISTWIDTH] IN BLOOD BY AUTOMATED COUNT: 19.4 % (ref 11.3–14.5)
GFR SERPL CREATININE-BSD FRML MDRD: 16 ML/MIN/1.73
GLUCOSE BLD-MCNC: 98 MG/DL (ref 70–100)
GLUCOSE BLDC GLUCOMTR-MCNC: 109 MG/DL (ref 70–130)
HCT VFR BLD AUTO: 31.3 % (ref 34.5–44)
HGB BLD-MCNC: 9.7 G/DL (ref 11.5–15.5)
MAGNESIUM SERPL-MCNC: 2.3 MG/DL (ref 1.3–2.7)
MCH RBC QN AUTO: 28.4 PG (ref 27–31)
MCHC RBC AUTO-ENTMCNC: 31 G/DL (ref 32–36)
MCV RBC AUTO: 91.5 FL (ref 80–99)
PHOSPHATE SERPL-MCNC: 2.6 MG/DL (ref 2.4–5.1)
PLATELET # BLD AUTO: 421 10*3/MM3 (ref 150–450)
PMV BLD AUTO: 9.9 FL (ref 6–12)
POTASSIUM BLD-SCNC: 3.8 MMOL/L (ref 3.5–5.5)
POTASSIUM BLD-SCNC: 3.8 MMOL/L (ref 3.5–5.5)
RBC # BLD AUTO: 3.42 10*6/MM3 (ref 3.89–5.14)
SODIUM BLD-SCNC: 142 MMOL/L (ref 132–146)
WBC NRBC COR # BLD: 10.57 10*3/MM3 (ref 3.5–10.8)

## 2018-01-04 PROCEDURE — 99232 SBSQ HOSP IP/OBS MODERATE 35: CPT | Performed by: NURSE PRACTITIONER

## 2018-01-04 PROCEDURE — 84132 ASSAY OF SERUM POTASSIUM: CPT | Performed by: HOSPITALIST

## 2018-01-04 PROCEDURE — 25010000002 METOCLOPRAMIDE PER 10 MG: Performed by: INTERNAL MEDICINE

## 2018-01-04 PROCEDURE — 83735 ASSAY OF MAGNESIUM: CPT

## 2018-01-04 PROCEDURE — 93010 ELECTROCARDIOGRAM REPORT: CPT | Performed by: INTERNAL MEDICINE

## 2018-01-04 PROCEDURE — 85027 COMPLETE CBC AUTOMATED: CPT | Performed by: HOSPITALIST

## 2018-01-04 PROCEDURE — 80069 RENAL FUNCTION PANEL: CPT | Performed by: HOSPITALIST

## 2018-01-04 PROCEDURE — 82962 GLUCOSE BLOOD TEST: CPT

## 2018-01-04 PROCEDURE — 63510000001 EPOETIN ALFA PER 1000 UNITS: Performed by: INTERNAL MEDICINE

## 2018-01-04 PROCEDURE — 93005 ELECTROCARDIOGRAM TRACING: CPT | Performed by: INTERNAL MEDICINE

## 2018-01-04 PROCEDURE — 25010000002 HEPARIN (PORCINE) PER 1000 UNITS: Performed by: HOSPITALIST

## 2018-01-04 RX ORDER — ALBUMIN (HUMAN) 12.5 G/50ML
12.5 SOLUTION INTRAVENOUS AS NEEDED
Status: ACTIVE | OUTPATIENT
Start: 2018-01-04 | End: 2018-01-04

## 2018-01-04 RX ORDER — ALBUMIN (HUMAN) 12.5 G/50ML
25 SOLUTION INTRAVENOUS AS NEEDED
Status: ACTIVE | OUTPATIENT
Start: 2018-01-04 | End: 2018-01-05

## 2018-01-04 RX ADMIN — SODIUM BICARBONATE 650 MG TABLET 650 MG: at 16:25

## 2018-01-04 RX ADMIN — CARVEDILOL 25 MG: 12.5 TABLET, FILM COATED ORAL at 10:07

## 2018-01-04 RX ADMIN — SODIUM BICARBONATE 650 MG TABLET 650 MG: at 22:10

## 2018-01-04 RX ADMIN — NYSTATIN: 100000 CREAM TOPICAL at 22:10

## 2018-01-04 RX ADMIN — AMLODIPINE BESYLATE 5 MG: 5 TABLET ORAL at 10:06

## 2018-01-04 RX ADMIN — QUETIAPINE FUMARATE 25 MG: 25 TABLET ORAL at 06:46

## 2018-01-04 RX ADMIN — HEPARIN SODIUM 5000 UNITS: 5000 INJECTION, SOLUTION INTRAVENOUS; SUBCUTANEOUS at 22:09

## 2018-01-04 RX ADMIN — AMIODARONE HYDROCHLORIDE 200 MG: 200 TABLET ORAL at 22:09

## 2018-01-04 RX ADMIN — Medication 100 MG: at 10:09

## 2018-01-04 RX ADMIN — SERTRALINE HYDROCHLORIDE 100 MG: 100 TABLET ORAL at 10:08

## 2018-01-04 RX ADMIN — ACETAMINOPHEN 500 MG: 500 TABLET ORAL at 15:14

## 2018-01-04 RX ADMIN — HEPARIN SODIUM 5000 UNITS: 5000 INJECTION, SOLUTION INTRAVENOUS; SUBCUTANEOUS at 05:42

## 2018-01-04 RX ADMIN — CARVEDILOL 25 MG: 12.5 TABLET, FILM COATED ORAL at 22:10

## 2018-01-04 RX ADMIN — ACETAMINOPHEN 500 MG: 500 TABLET ORAL at 22:09

## 2018-01-04 RX ADMIN — ERYTHROPOIETIN 10000 UNITS: 10000 INJECTION, SOLUTION INTRAVENOUS; SUBCUTANEOUS at 10:00

## 2018-01-04 RX ADMIN — METOCLOPRAMIDE 5 MG: 5 INJECTION, SOLUTION INTRAMUSCULAR; INTRAVENOUS at 22:10

## 2018-01-04 RX ADMIN — DOCUSATE SODIUM 100 MG: 100 CAPSULE, LIQUID FILLED ORAL at 10:05

## 2018-01-04 RX ADMIN — SODIUM BICARBONATE 650 MG TABLET 650 MG: at 10:08

## 2018-01-04 RX ADMIN — METOCLOPRAMIDE 5 MG: 5 INJECTION, SOLUTION INTRAMUSCULAR; INTRAVENOUS at 10:01

## 2018-01-04 RX ADMIN — HEPARIN SODIUM 5000 UNITS: 5000 INJECTION, SOLUTION INTRAVENOUS; SUBCUTANEOUS at 16:26

## 2018-01-04 RX ADMIN — AMIODARONE HYDROCHLORIDE 200 MG: 200 TABLET ORAL at 10:06

## 2018-01-04 NOTE — PLAN OF CARE
Problem: Patient Care Overview (Adult)  Goal: Plan of Care Review  Outcome: Ongoing (interventions implemented as appropriate)   01/04/18 1821   Coping/Psychosocial Response Interventions   Plan Of Care Reviewed With patient;spouse   Patient Care Overview   Progress improving     Goal: Discharge Needs Assessment  Outcome: Ongoing (interventions implemented as appropriate)   12/25/17 0900 01/04/18 0425 01/04/18 1821   Discharge Needs Assessment   Concerns To Be Addressed --  --  denies needs/concerns at this time   Readmission Within The Last 30 Days --  --  no previous admission in last 30 days   Discharge Facility/Level Of Care Needs --  rehabilitation facility --    Current Discharge Risk --  cognitively impaired;physical impairment --    Discharge Disposition --  still a patient --    Current Health   Anticipated Changes Related to Illness --  inability to care for self --    Living Environment   Transportation Available --  car;family or friend will provide --    Self-Care   Equipment Currently Used at Home walker, rolling;wheelchair;shower chair;commode --  --        Problem: Bowel Obstruction (Adult)  Goal: Signs and Symptoms of Listed Potential Problems Will be Absent or Manageable (Bowel Obstruction)  Outcome: Ongoing (interventions implemented as appropriate)   01/04/18 1821   Bowel Obstruction   Problems Assessed (Bowel Obstruction) all   Problems Present (Bowel Obstruction) pain       Problem: Perioperative Period (Adult)  Goal: Signs and Symptoms of Listed Potential Problems Will be Absent or Manageable (Perioperative Period)  Outcome: Ongoing (interventions implemented as appropriate)   01/04/18 1821   Perioperative Period   Problems Assessed (Perioperative Period) all   Problems Present (Perioperative Period) pain       Problem: Skin Integrity Impairment, Risk/Actual (Adult)  Goal: Identify Related Risk Factors and Signs and Symptoms  Outcome: Ongoing (interventions implemented as appropriate)    01/04/18 1821   Skin Integrity Impairment, Risk/Actual   Skin Integrity Impairment, Risk/Actual: Related Risk Factors surgery/procedure   Signs and Symptoms (Skin Integrity Impairment) edema     Goal: Skin Integrity/Wound Healing  Outcome: Ongoing (interventions implemented as appropriate)   01/04/18 1821   Skin Integrity Impairment, Risk/Actual (Adult)   Skin Integrity/Wound Healing making progress toward outcome       Problem: Renal Failure/Kidney Injury, Acute (Adult)  Goal: Signs and Symptoms of Listed Potential Problems Will be Absent or Manageable (Renal Failure/Kidney Injury, Acute)  Outcome: Ongoing (interventions implemented as appropriate)   01/04/18 1821   Renal Failure/Kidney Injury, Acute   Problems Assessed (Acute Renal Failure/Kidney Injury) all   Problems Present (Acute Renal Failure/Kidney Injury) situational response       Problem: Fall Risk (Adult)  Goal: Absence of Falls  Outcome: Ongoing (interventions implemented as appropriate)      Problem: Pressure Ulcer Risk (Jairo Scale) (Adult,Obstetrics,Pediatric)  Goal: Skin Integrity  Outcome: Ongoing (interventions implemented as appropriate)   01/04/18 1821   Pressure Ulcer Risk (Jairo Scale) (Adult,Obstetrics,Pediatric)   Skin Integrity making progress toward outcome       Problem: Pain, Acute (Adult)  Goal: Acceptable Pain Control/Comfort Level  Outcome: Ongoing (interventions implemented as appropriate)   01/04/18 1821   Pain, Acute (Adult)   Acceptable Pain Control/Comfort Level making progress toward outcome       Problem: Anxiety (Adult)  Goal: Reduction/Resolution   01/04/18 1821   Anxiety (Adult)   Reduction/Resolution making progress toward outcome

## 2018-01-04 NOTE — PROGRESS NOTES
"Tereza Ackerman  1955  [id number]    Surgery Progress Note    Date of visit: 1/4/2018    Subjective: in dialysis  Overall feels better  Had a bowel movement yesterday and eating better  Has been ambulating      Objective:    /80 (BP Location: Left arm, Patient Position: Lying)  Pulse 68  Temp 99 °F (37.2 °C) (Tympanic)   Resp 20  Ht 160 cm (62.99\")  Wt 74.1 kg (163 lb 6.4 oz)  SpO2 97%  BMI 28.95 kg/m2    Intake/Output Summary (Last 24 hours) at 01/04/18 0901  Last data filed at 01/04/18 0510   Gross per 24 hour   Intake              360 ml   Output                0 ml   Net              360 ml       CV: Regular rate and rhythm  L: normal air entry  Abd: Soft and nontender  Wound is granulating and healing well      LABS:      Results from last 7 days  Lab Units 01/04/18  0548   WBC 10*3/mm3 10.57   HEMOGLOBIN g/dL 9.7*   HEMATOCRIT % 31.3*   PLATELETS 10*3/mm3 421       Results from last 7 days  Lab Units 01/04/18  0548   SODIUM mmol/L 142   POTASSIUM mmol/L 3.8  3.8   CHLORIDE mmol/L 97*   CO2 mmol/L 29.0   BUN mg/dL 37*   CREATININE mg/dL 3.00*   CALCIUM mg/dL 8.4*   GLUCOSE mg/dL 98       Results from last 7 days  Lab Units 01/04/18  0548   SODIUM mmol/L 142   POTASSIUM mmol/L 3.8  3.8   CHLORIDE mmol/L 97*   CO2 mmol/L 29.0   BUN mg/dL 37*   CREATININE mg/dL 3.00*   GLUCOSE mg/dL 98   CALCIUM mg/dL 8.4*       Lab Results  Lab Value Date/Time   LIPASE 52 (H) 12/04/2017 1043   LIPASE 39 06/30/2017 1959         Assessment/ Plan: Overall stable course with slow progress   continue with the current management  Awaiting transfer to rehabilitation      Problem List Items Addressed This Visit     Hypertension    Relevant Medications    amLODIPine (NORVASC) tablet 5 mg    * (Principal)Small bowel obstruction - Primary    Relevant Orders    Tissue Pathology Exam - Tissue, Small Intestine (Completed)    Acute renal failure    Relevant Medications    potassium chloride (MICRO-K) CR capsule 40 mEq    " potassium chloride (KLOR-CON) packet 40 mEq    potassium chloride 10 mEq in 100 mL IVPB      Other Visit Diagnoses     Intractable vomiting with nausea, unspecified vomiting type        Uncontrolled hypertension                Ирина Cobian MD  1/4/2018  9:01 AM

## 2018-01-04 NOTE — PROGRESS NOTES
"   LOS: 31 days    Patient Care Team:  Michaela Connell MD as PCP - General  Michaela Connell MD as PCP - Family Medicine    Reason For Visit:  F/U DEJA. SEEN ON DIALYSIS.  Subjective           Review of Systems:    Pulm: No soa   CV:  No CP      Objective       amiodarone 200 mg Oral Q12H   Followed by      [START ON 1/12/2018] amiodarone 200 mg Oral Daily   amLODIPine 5 mg Oral Q24H   carvedilol 25 mg Oral Q12H   docusate sodium 100 mg Oral BID   epoetin marcel 10,000 Units Subcutaneous Once per day on Tue Thu Sat   furosemide 20 mg Intravenous Once   heparin (porcine) 5,000 Units Subcutaneous Q8H   metoclopramide 5 mg Intravenous Q12H   nystatin  Topical Q12H   sertraline 100 mg Oral Daily   sodium bicarbonate 650 mg Oral TID   thiamine 100 mg Oral Daily            Vital Signs:  Blood pressure 178/90, pulse 70, temperature 99 °F (37.2 °C), temperature source Tympanic, resp. rate 20, height 160 cm (62.99\"), weight 74.1 kg (163 lb 6.4 oz), SpO2 97 %.    Flowsheet Rows         First Filed Value    Admission Height  160 cm (63\") Documented at 12/04/2017 1002    Admission Weight  68.9 kg (152 lb) Documented at 12/04/2017 1002          01/03 0701 - 01/04 0700  In: 840 [P.O.:840]  Out: 250 [Urine:250]    Physical Exam:    General Appearance: NAD, alert and cooperative, Ox3  Eyes: PER, conjunctivae and sclerae normal, no icterus  Lungs: respirations regular and unlabored, no crepitus, clear to auscultation  Heart/CV: regular rhythm & normal rate, no murmur, no gallop, no rub and 1+ edema  Abdomen: not distended, soft, non-tender, no masses,  bowel sounds present  Skin: No rash, Warm and dry. TUNNELED HD CATH.    Radiology:            Labs:    Results from last 7 days  Lab Units 01/04/18  0548 01/03/18  0612 01/02/18  0453  01/01/18  0342   WBC 10*3/mm3 10.57 10.42  --   --  12.94*   HEMOGLOBIN g/dL 9.7* 8.5* 10.1*  < > 9.9*  9.9*   HEMATOCRIT % 31.3* 26.8* 31.5*  < > 31.1*  31.1*   PLATELETS 10*3/mm3 421 357  --   --  348 "   < > = values in this interval not displayed.    Results from last 7 days  Lab Units 01/04/18  0548 01/03/18  0612 01/02/18  0453 01/01/18  0342 12/31/17  0523   SODIUM mmol/L 142 140 138 138 138   POTASSIUM mmol/L 3.8  3.8 3.2* 3.3* 3.7 4.1   CHLORIDE mmol/L 97* 102 99 101 99   CO2 mmol/L 29.0 26.0 25.0 23.0 28.0   BUN mg/dL 37* 28* 55* 48* 40*   CREATININE mg/dL 3.00* 2.60* 3.90* 3.30* 2.60*   CALCIUM mg/dL 8.4* 7.8* 8.4* 8.4* 8.2*   PHOSPHORUS mg/dL 2.6 3.5 5.5* 4.9 3.3   MAGNESIUM mg/dL 2.3 1.9 1.9 2.1 2.2   ALBUMIN g/dL 3.20 2.60* 3.20  --  2.70*       Results from last 7 days  Lab Units 01/04/18  0548   GLUCOSE mg/dL 98                       Estimated Creatinine Clearance: 18.3 mL/min (by C-G formula based on Cr of 3).      Assessment     Principal Problem:    Small bowel obstruction  Active Problems:    Meningioma, recurrent of brain    Insomnia    Malignant neoplasm of left orbit    Acute renal failure    Elevated troponin level    Metabolic acidosis    Transaminitis    Atrial flutter with rapid ventricular response            Impression: NONOLIGURIC DEJA. ATN. INCREASED U/O.? STARTING TO RECOVER. POOR GFR. ANEMIA.        Recommendations:   Patient seen on dialysis. Minimal UF. Monitor I/O. UOP and renal function showing signs of recovery.   Renal diet.   Epo with HD.   Evaluate blood pressure after dialysis.     Jamal Ramos MD  01/04/18  9:28 AM

## 2018-01-04 NOTE — PROGRESS NOTES
"    [facility] Medicine Services  PROGRESS NOTE    Patient Name: Tereza Ackerman  : 1955  MRN: [id number]    Date of Admission: 2017  Length of Stay: 31  Primary Care Physician: Michaela Connell MD    Subjective   Subjective     CC:  FU SBO      HPI:  Seen at 10:45 AM resting upright in chair in no acute distress.  No visitors at bedside.  Reports that she is feeling much better.  She certainly is more awake, alert and has much better skin color then when I saw her last several weeks ago.  Sitting upright watching television.  Reports she is tolerating diet \"okay\".  Denies nausea, vomiting, chest pain, shortness of air.  Tolerating hemodialysis and reports that her nephrologist feels she is improving.  Passing flatus.  No new issues.    Review of Systems  Gen- No fevers, chills  CV- No chest pain, palpitations  Resp- No cough, dyspnea  GI- No N/V/D, +abd pain (improved)    Otherwise ROS is negative except as mentioned in the HPI.    Objective   Objective     Vital Signs:   Temp:  [97.9 °F (36.6 °C)-99.1 °F (37.3 °C)] 97.9 °F (36.6 °C)  Heart Rate:  [65-81] 81  Resp:  [16-20] 20  BP: (159-188)/(80-94) 178/94        Physical Exam:  Constitutional: No acute distress, awake, alert on RA.  Sitting upright in chair.  No visitors at bs.   Eyes: PERRLA, sclerae anicteric, no conjunctival injection  HENT: NCAT, mucous membranes moist  Neck: Supple,  trachea midline  Respiratory: Clear to auscultation bilaterally A/P, nonlabored respirations   Cardiovascular: RRR, no murmurs, rubs, or gallops, palpable pedal pulses bilaterally  Gastrointestinal: Positive bowel sounds, soft, nontender, nondistended,Abd incision with dressing in place c/d/i.  Good granulation tissue, appears to have contact dermatitis in a rectangular form from previous dressing (stable)  Musculoskeletal: No bilateral ankle edema, no clubbing or cyanosis to extremities  Psychiatric: Appropriate affect, " cooperative  Neurologic: Oriented x 3, strength symmetric in all extremities, Cranial Nerves grossly intact to confrontation, speech clear.  Follows commands   Skin: No rashes.  Rt upper CW tunneled HD cath in place.  C/d/i.        Results Reviewed:  I have personally reviewed current lab, radiology, and data and agree.      Results from last 7 days  Lab Units 01/04/18  0548 01/03/18  0612 01/02/18  0453  01/01/18  0342  12/30/17  0446   WBC 10*3/mm3 10.57 10.42  --   --  12.94*  < > 13.77*   HEMOGLOBIN g/dL 9.7* 8.5* 10.1*  < > 9.9*  9.9*  < > 7.2*  7.2*   HEMATOCRIT % 31.3* 26.8* 31.5*  < > 31.1*  31.1*  < > 22.6*  22.6*   PLATELETS 10*3/mm3 421 357  --   --  348  < > 362   INR   --   --   --   --   --   --  1.18   < > = values in this interval not displayed.    Results from last 7 days  Lab Units 01/04/18  0548 01/03/18  0612 01/02/18  0453   SODIUM mmol/L 142 140 138   POTASSIUM mmol/L 3.8  3.8 3.2* 3.3*   CHLORIDE mmol/L 97* 102 99   CO2 mmol/L 29.0 26.0 25.0   BUN mg/dL 37* 28* 55*   CREATININE mg/dL 3.00* 2.60* 3.90*   GLUCOSE mg/dL 98 101* 101*   CALCIUM mg/dL 8.4* 7.8* 8.4*     No results found for: BNP  No results found for: PHART    Microbiology Results Abnormal     Procedure Component Value - Date/Time    Blood Culture - Blood, [[id number]]  (Normal) Collected:  12/21/17 0700    Lab Status:  Final result Specimen:  Blood from Arm, Right Updated:  12/26/17 0816     Blood Culture No growth at 5 days    Blood Culture - Blood, [[id number]]  (Normal) Collected:  12/21/17 0705    Lab Status:  Final result Specimen:  Blood from Arm, Left Updated:  12/26/17 0816     Blood Culture No growth at 5 days    Blood Culture - Blood, [[id number]]  (Normal) Collected:  12/19/17 1316    Lab Status:  Final result Specimen:  Blood from Arm, Left Updated:  12/24/17 1346     Blood Culture No growth at 5 days    Blood Culture - Blood, [[id number]]  (Normal) Collected:  12/19/17 1316    Lab Status:  Final result Specimen:   Blood from Hand, Left Updated:  12/24/17 1346     Blood Culture No growth at 5 days    Urine Culture - Urine, Urine, Clean Catch [[id number]]  (Abnormal) Collected:  12/12/17 0001    Lab Status:  Final result Specimen:  Urine from Urine, Clean Catch Updated:  12/14/17 0646     Urine Culture --      <10,000 CFU/mL Yeast isolated (A)    Blood Culture - Blood, [[id number]]  (Normal) Collected:  12/05/17 0313    Lab Status:  Final result Specimen:  Blood from Arm, Right Updated:  12/10/17 0416     Blood Culture No growth at 5 days    Blood Culture - Blood, [[id number]]  (Normal) Collected:  12/05/17 0313    Lab Status:  Final result Specimen:  Blood from Arm, Right Updated:  12/10/17 0416     Blood Culture No growth at 5 days    Narrative:       Aerobic bottle only    Urine Culture - Urine, Urine, Clean Catch [[id number]]  (Normal) Collected:  12/05/17 1704    Lab Status:  Final result Specimen:  Urine from Urine, Catheter Updated:  12/07/17 0842     Urine Culture No growth at 2 days    Eosinophil Smear - Urine, Urine, Clean Catch [[id number]]  (Normal) Collected:  12/06/17 1106    Lab Status:  Final result Specimen:  Urine from Urine, Catheter Updated:  12/06/17 1347     Eosinophil Smear 0 % EOS/100 Cells     Narrative:       No eosinophil seen          Imaging Results (last 24 hours)     ** No results found for the last 24 hours. **        Results for orders placed during the hospital encounter of 12/04/17   Adult Transthoracic Echo Complete W/ Cont if Necessary Per Protocol    Narrative · LVEF difficult to evaluate with tachycardia- globally appears mildly   depressed.  · Left ventricular wall thickness is consistent with concentric   hypertrophy.  · Mild tricuspid valve regurgitation is present.  · Calculated right ventricular systolic pressure from tricuspid   regurgitation is 32 mmHg.          I have reviewed the medications.    Assessment/Plan   Assessment / Plan     Hospital Problem List     * (Principal)Small bowel  obstruction    Overview Signed 12/5/2017  5:16 PM by AYDEE Hernandez     S/p abdominal exploration with adhesiolysis and resection of strangulated segment of small bowel by Dr. CUI 12/5/17         Meningioma, recurrent of brain    Overview Signed 12/6/2017  3:49 PM by Julia COTE Case, DO     First occurrence with resection by Bunny in 2003.   Recurrence with resection by Bunny in 2014.          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          Acute renal failure    Elevated troponin level    Metabolic acidosis    Transaminitis    Atrial flutter with rapid ventricular response    Overview Addendum 12/21/2017 12:10 PM by JEIMY Dalal     1. Echo 12-21-17:  · LVEF difficult to evaluate with tachycardia- globally appears mildly depressed.  · Left ventricular wall thickness is consistent with concentric hypertrophy.  · Mild tricuspid valve regurgitation is present.  · Calculated right ventricular systolic pressure from tricuspid regurgitation is 32 mmHg.                       Assessment & Plan:  - SBO: s/p ex-lap with adhesiolysis and resection of strangulated segment of small bowel by Dr. CUI 12/5/17  - Non-oliguric DEJA: HD 1/2, sodium bicarb, Epo. Nephrology following.   - A. Flutter: amiodarone taper. BB. FU with Dr. Aguirre in 4 weeks.   - HTN: Amlodipine. Metoprolol changed to carvedilol BID for better BP control. Increased dose of carvedilol. Increase dose as tolerated. Hydralazine PO PRN  - Mood: slowly improving on Sertraline. Seroquel PRN  -glucoses wnl  --am labs  -am working on placement for rehab      DVT Prophylaxis:  [facility]     CODE STATUS: Full Code     Disposition: I expect the patient to be discharged to [facility]         Val Barker, APRN  01/04/18  2:54 PM

## 2018-01-04 NOTE — PLAN OF CARE
Problem: Patient Care Overview (Adult)  Goal: Plan of Care Review  Outcome: Ongoing (interventions implemented as appropriate)   01/04/18 0425   Coping/Psychosocial Response Interventions   Plan Of Care Reviewed With patient   Patient Care Overview   Progress improving     Goal: Discharge Needs Assessment  Outcome: Ongoing (interventions implemented as appropriate)   12/25/17 0900 01/04/18 0425   Discharge Needs Assessment   Concerns To Be Addressed --  denies needs/concerns at this time   Readmission Within The Last 30 Days --  no previous admission in last 30 days   Equipment Needed After Discharge --  none   Discharge Facility/Level Of Care Needs --  rehabilitation facility   Current Discharge Risk --  cognitively impaired;physical impairment   Discharge Disposition --  still a patient   Current Health   Anticipated Changes Related to Illness --  inability to care for self   Living Environment   Transportation Available --  car;family or friend will provide   Self-Care   Equipment Currently Used at Home walker, rolling;wheelchair;shower chair;commode --        Problem: Bowel Obstruction (Adult)  Goal: Signs and Symptoms of Listed Potential Problems Will be Absent or Manageable (Bowel Obstruction)  Outcome: Ongoing (interventions implemented as appropriate)   01/04/18 0425   Bowel Obstruction   Problems Assessed (Bowel Obstruction) all   Problems Present (Bowel Obstruction) pain       Problem: Perioperative Period (Adult)  Goal: Signs and Symptoms of Listed Potential Problems Will be Absent or Manageable (Perioperative Period)  Outcome: Ongoing (interventions implemented as appropriate)   01/04/18 0425   Perioperative Period   Problems Assessed (Perioperative Period) all   Problems Present (Perioperative Period) pain       Problem: Skin Integrity Impairment, Risk/Actual (Adult)  Goal: Identify Related Risk Factors and Signs and Symptoms  Outcome: Ongoing (interventions implemented as appropriate)   01/04/18 0425    Skin Integrity Impairment, Risk/Actual   Skin Integrity Impairment, Risk/Actual: Related Risk Factors surgery/procedure   Signs and Symptoms (Skin Integrity Impairment) edema     Goal: Skin Integrity/Wound Healing  Outcome: Ongoing (interventions implemented as appropriate)   01/04/18 0425   Skin Integrity Impairment, Risk/Actual (Adult)   Skin Integrity/Wound Healing making progress toward outcome       Problem: Renal Failure/Kidney Injury, Acute (Adult)  Goal: Signs and Symptoms of Listed Potential Problems Will be Absent or Manageable (Renal Failure/Kidney Injury, Acute)  Outcome: Ongoing (interventions implemented as appropriate)   01/04/18 0425   Renal Failure/Kidney Injury, Acute   Problems Assessed (Acute Renal Failure/Kidney Injury) all   Problems Present (Acute Renal Failure/Kidney Injury) situational response       Problem: Fall Risk (Adult)  Goal: Absence of Falls  Outcome: Ongoing (interventions implemented as appropriate)   01/04/18 0425   Fall Risk (Adult)   Absence of Falls making progress toward outcome       Problem: Pressure Ulcer Risk (Jairo Scale) (Adult,Obstetrics,Pediatric)  Goal: Skin Integrity  Outcome: Ongoing (interventions implemented as appropriate)   01/04/18 0425   Pressure Ulcer Risk (Jairo Scale) (Adult,Obstetrics,Pediatric)   Skin Integrity making progress toward outcome       Problem: Hemodialysis (Adult)  Goal: Signs and Symptoms of Listed Potential Problems Will be Absent or Manageable (Hemodialysis)  Outcome: Ongoing (interventions implemented as appropriate)   01/04/18 0425   Hemodialysis   Problems Assessed (Hemodialysis) all   Problems Present (Hemodialysis) situational response       Problem: Pain, Acute (Adult)  Goal: Acceptable Pain Control/Comfort Level  Outcome: Ongoing (interventions implemented as appropriate)   01/04/18 0425   Pain, Acute (Adult)   Acceptable Pain Control/Comfort Level making progress toward outcome       Problem: Anxiety (Adult)  Goal:  Reduction/Resolution  Outcome: Ongoing (interventions implemented as appropriate)   01/04/18 0425   Anxiety (Adult)   Reduction/Resolution making progress toward outcome

## 2018-01-05 LAB
ANION GAP SERPL CALCULATED.3IONS-SCNC: 12 MMOL/L (ref 3–11)
BASOPHILS # BLD AUTO: 0.05 10*3/MM3 (ref 0–0.2)
BASOPHILS NFR BLD AUTO: 0.6 % (ref 0–1)
BUN BLD-MCNC: 25 MG/DL (ref 9–23)
BUN/CREAT SERPL: 11.9 (ref 7–25)
CALCIUM SPEC-SCNC: 7.9 MG/DL (ref 8.7–10.4)
CHLORIDE SERPL-SCNC: 102 MMOL/L (ref 99–109)
CO2 SERPL-SCNC: 28 MMOL/L (ref 20–31)
CREAT BLD-MCNC: 2.1 MG/DL (ref 0.6–1.3)
DEPRECATED RDW RBC AUTO: 61.1 FL (ref 37–54)
EOSINOPHIL # BLD AUTO: 0.08 10*3/MM3 (ref 0–0.3)
EOSINOPHIL NFR BLD AUTO: 1 % (ref 0–3)
ERYTHROCYTE [DISTWIDTH] IN BLOOD BY AUTOMATED COUNT: 19.2 % (ref 11.3–14.5)
GFR SERPL CREATININE-BSD FRML MDRD: 24 ML/MIN/1.73
GLUCOSE BLD-MCNC: 82 MG/DL (ref 70–100)
GLUCOSE BLDC GLUCOMTR-MCNC: 125 MG/DL (ref 70–130)
HBV SURFACE AB SER RIA-ACNC: NORMAL
HCT VFR BLD AUTO: 29.2 % (ref 34.5–44)
HGB BLD-MCNC: 8.9 G/DL (ref 11.5–15.5)
IMM GRANULOCYTES # BLD: 0.06 10*3/MM3 (ref 0–0.03)
IMM GRANULOCYTES NFR BLD: 0.7 % (ref 0–0.6)
LYMPHOCYTES # BLD AUTO: 1.71 10*3/MM3 (ref 0.6–4.8)
LYMPHOCYTES NFR BLD AUTO: 21.2 % (ref 24–44)
MAGNESIUM SERPL-MCNC: 1.9 MG/DL (ref 1.3–2.7)
MCH RBC QN AUTO: 28.2 PG (ref 27–31)
MCHC RBC AUTO-ENTMCNC: 30.5 G/DL (ref 32–36)
MCV RBC AUTO: 92.4 FL (ref 80–99)
MONOCYTES # BLD AUTO: 0.62 10*3/MM3 (ref 0–1)
MONOCYTES NFR BLD AUTO: 7.7 % (ref 0–12)
NEUTROPHILS # BLD AUTO: 5.53 10*3/MM3 (ref 1.5–8.3)
NEUTROPHILS NFR BLD AUTO: 68.8 % (ref 41–71)
PHOSPHATE SERPL-MCNC: 2.5 MG/DL (ref 2.4–5.1)
PLATELET # BLD AUTO: 359 10*3/MM3 (ref 150–450)
PMV BLD AUTO: 10.1 FL (ref 6–12)
POTASSIUM BLD-SCNC: 3.5 MMOL/L (ref 3.5–5.5)
RBC # BLD AUTO: 3.16 10*6/MM3 (ref 3.89–5.14)
SODIUM BLD-SCNC: 142 MMOL/L (ref 132–146)
WBC NRBC COR # BLD: 8.05 10*3/MM3 (ref 3.5–10.8)

## 2018-01-05 PROCEDURE — 84100 ASSAY OF PHOSPHORUS: CPT

## 2018-01-05 PROCEDURE — 25010000002 METOCLOPRAMIDE PER 10 MG: Performed by: INTERNAL MEDICINE

## 2018-01-05 PROCEDURE — 99232 SBSQ HOSP IP/OBS MODERATE 35: CPT | Performed by: NURSE PRACTITIONER

## 2018-01-05 PROCEDURE — 25010000002 HEPARIN (PORCINE) PER 1000 UNITS: Performed by: HOSPITALIST

## 2018-01-05 PROCEDURE — 83735 ASSAY OF MAGNESIUM: CPT

## 2018-01-05 PROCEDURE — 97110 THERAPEUTIC EXERCISES: CPT

## 2018-01-05 PROCEDURE — 97116 GAIT TRAINING THERAPY: CPT

## 2018-01-05 PROCEDURE — 86706 HEP B SURFACE ANTIBODY: CPT | Performed by: NURSE PRACTITIONER

## 2018-01-05 PROCEDURE — 80048 BASIC METABOLIC PNL TOTAL CA: CPT | Performed by: NURSE PRACTITIONER

## 2018-01-05 PROCEDURE — 85025 COMPLETE CBC W/AUTO DIFF WBC: CPT | Performed by: NURSE PRACTITIONER

## 2018-01-05 PROCEDURE — 82962 GLUCOSE BLOOD TEST: CPT

## 2018-01-05 RX ORDER — AMLODIPINE BESYLATE 5 MG/1
5 TABLET ORAL ONCE
Status: COMPLETED | OUTPATIENT
Start: 2018-01-05 | End: 2018-01-05

## 2018-01-05 RX ORDER — QUETIAPINE FUMARATE 25 MG/1
25 TABLET, FILM COATED ORAL NIGHTLY
Status: DISCONTINUED | OUTPATIENT
Start: 2018-01-05 | End: 2018-01-12 | Stop reason: HOSPADM

## 2018-01-05 RX ORDER — AMLODIPINE BESYLATE 10 MG/1
10 TABLET ORAL
Status: DISCONTINUED | OUTPATIENT
Start: 2018-01-06 | End: 2018-01-12 | Stop reason: HOSPADM

## 2018-01-05 RX ADMIN — ACETAMINOPHEN 500 MG: 500 TABLET ORAL at 18:47

## 2018-01-05 RX ADMIN — HEPARIN SODIUM 5000 UNITS: 5000 INJECTION, SOLUTION INTRAVENOUS; SUBCUTANEOUS at 06:42

## 2018-01-05 RX ADMIN — ACETAMINOPHEN 500 MG: 500 TABLET ORAL at 13:02

## 2018-01-05 RX ADMIN — SERTRALINE HYDROCHLORIDE 100 MG: 100 TABLET ORAL at 08:02

## 2018-01-05 RX ADMIN — METOCLOPRAMIDE 5 MG: 5 INJECTION, SOLUTION INTRAMUSCULAR; INTRAVENOUS at 20:31

## 2018-01-05 RX ADMIN — AMIODARONE HYDROCHLORIDE 200 MG: 200 TABLET ORAL at 20:32

## 2018-01-05 RX ADMIN — CARVEDILOL 25 MG: 12.5 TABLET, FILM COATED ORAL at 08:02

## 2018-01-05 RX ADMIN — HEPARIN SODIUM 5000 UNITS: 5000 INJECTION, SOLUTION INTRAVENOUS; SUBCUTANEOUS at 13:02

## 2018-01-05 RX ADMIN — Medication 100 MG: at 08:02

## 2018-01-05 RX ADMIN — AMLODIPINE BESYLATE 5 MG: 5 TABLET ORAL at 10:01

## 2018-01-05 RX ADMIN — AMLODIPINE BESYLATE 5 MG: 5 TABLET ORAL at 08:01

## 2018-01-05 RX ADMIN — HEPARIN SODIUM 5000 UNITS: 5000 INJECTION, SOLUTION INTRAVENOUS; SUBCUTANEOUS at 20:33

## 2018-01-05 RX ADMIN — SODIUM BICARBONATE 650 MG TABLET 650 MG: at 08:01

## 2018-01-05 RX ADMIN — SODIUM BICARBONATE 650 MG TABLET 650 MG: at 20:34

## 2018-01-05 RX ADMIN — NYSTATIN: 100000 CREAM TOPICAL at 22:49

## 2018-01-05 RX ADMIN — CARVEDILOL 25 MG: 12.5 TABLET, FILM COATED ORAL at 20:33

## 2018-01-05 RX ADMIN — ACETAMINOPHEN 500 MG: 500 TABLET ORAL at 07:13

## 2018-01-05 RX ADMIN — NYSTATIN: 100000 CREAM TOPICAL at 08:02

## 2018-01-05 RX ADMIN — AMIODARONE HYDROCHLORIDE 200 MG: 200 TABLET ORAL at 08:02

## 2018-01-05 RX ADMIN — SODIUM BICARBONATE 650 MG TABLET 650 MG: at 14:46

## 2018-01-05 RX ADMIN — METOCLOPRAMIDE 5 MG: 5 INJECTION, SOLUTION INTRAMUSCULAR; INTRAVENOUS at 08:01

## 2018-01-05 NOTE — THERAPY TREATMENT NOTE
Acute Care - Physical Therapy Treatment Note  [facility]     Patient Name: Tereza Akcerman  : 1955  MRN: [id number]  Today's Date: 2018  Onset of Illness/Injury or Date of Surgery Date: 17  Date of Referral to PT: 17  Referring Physician: Dr CUI    Admit Date: 2017    Visit Dx:    ICD-10-CM ICD-9-CM   1. Small bowel obstruction K56.609 560.9   2. Intractable vomiting with nausea, unspecified vomiting type R11.2 536.2   3. Uncontrolled hypertension I10 401.9   4. Impaired functional mobility, balance, gait, and endurance Z74.09 V49.89   5. Acute renal failure, unspecified acute renal failure type N17.9 584.9   6. Essential hypertension I10 401.9     Patient Active Problem List   Diagnosis   • Impaired glucose tolerance   • Hypertension   • Parathyroid adenoma   • Reaction to chronic stress   • Multinodular goiter   • Vitamin D deficiency   • Meningioma, recurrent of brain   • Arthritis   • Depression   • Small bowel obstruction   • Insomnia   • History of Nissen fundoplication   • Malignant neoplasm of left orbit   • Prediabetes   • Hyperlipemia   • Hyperglycemia   • Acute renal failure   • Elevated troponin level   • Metabolic acidosis   • Transaminitis   • Atrial flutter with rapid ventricular response               Adult Rehabilitation Note       18 1025          Rehab Assessment/Intervention    Discipline physical therapy assistant  -AS      Document Type therapy note (daily note)  -AS      Subjective Information agree to therapy;complains of;weakness  -AS      Patient Effort, Rehab Treatment good  -AS      Symptoms Noted During/After Treatment none  -AS      Precautions/Limitations fall precautions;other (see comments)   abdominal incision  -AS      Recorded by [AS] Poornima Lebron PTA      Pain Assessment    Pain Assessment 0-10  -AS      Pain Score 3  -AS      Post Pain Score 3  -AS      Pain Type Acute pain;Surgical pain  -AS      Pain Location Abdomen  -AS      Pain  Intervention(s) Repositioned;Ambulation/increased activity  -AS      Response to Interventions tolerated  -AS      Recorded by [AS] Poornima Lebron PTA      Cognitive Assessment/Intervention    Current Cognitive/Communication Assessment functional  -AS      Orientation Status oriented x 4  -AS      Follows Commands/Answers Questions 100% of the time  -AS      Personal Safety WNL/WFL  -AS      Personal Safety Interventions gait belt;nonskid shoes/slippers when out of bed  -AS      Recorded by [AS] Poornima Lebron PTA      Bed Mobility, Assessment/Treatment    Bed Mobility, Comment UIC  -AS      Recorded by [AS] Poornima Lebron PTA      Transfer Assessment/Treatment    Transfers, Sit-Stand [place] contact guard assist  -AS      Transfers, Stand-Sit [place] contact guard assist  -AS      Transfers, Sit-Stand-Sit, Assist Device rolling walker  -AS      Transfer, Comment verbal cues for hand placement  -AS      Recorded by [AS] Poornima Lebron PTA      Gait Assessment/Treatment    Gait, [place] Level verbal cues required;contact guard assist  -AS      Gait, Assistive Device rolling walker  -AS      Gait, Distance (Feet) 150  -AS      Gait, Gait Deviations becky decreased;step length decreased  -AS      Gait, Safety Issues step length decreased  -AS      Gait, Impairments strength decreased  -AS      Gait, Comment patient demonstrated safe use of rolling walker, continues to have weakness with activity.  -AS      Recorded by [AS] Poornima Lebron PTA      Therapy Exercises    Bilateral Lower Extremities AROM:;10 reps;sitting;ankle pumps/circles;hip flexion;LAQ  -AS      Recorded by [AS] Poornima Lebron PTA      Positioning and Restraints    Pre-Treatment Position sitting in chair/recliner  -AS      Post Treatment Position chair  -AS      In Chair sitting;call light within reach;encouraged to call for assist;with family/caregiver  -AS      Recorded by [AS] Poornima Lebron PTA         User Key  (r) = Recorded By, (t) = Taken By, (c) = Cosigned By    Initials Name Effective Dates    AS Poornima Lebron PTA 06/22/15 -                 IP PT Goals       01/05/18 1059 12/25/17 1013       Bed Mobility PT LTG    Bed Mobility PT LTG, Date Goal Reviewed 01/05/18  -AS 12/25/17  -SC     Bed Mobility PT LTG, Outcome goal ongoing  -AS goal ongoing  -SC     Transfer Training PT LTG    Transfer Training PT  LTG, Date Goal Reviewed 01/05/18  -AS 12/25/17  -SC     Transfer Training PT LTG, Outcome goal ongoing  -AS goal ongoing  -SC     Gait Training PT LTG    Gait Training Goal PT LTG, Date Goal Reviewed 01/05/18  -AS 12/18/17  -SC     Gait Training Goal PT LTG, Outcome goal ongoing  -AS goal ongoing  -SC       User Key  (r) = Recorded By, (t) = Taken By, (c) = Cosigned By    Initials Name Provider Type    SC Charli Keller, PT Physical Therapist    AS Poornima Lebron PTA Physical Therapy Assistant          Physical Therapy Education     Title: PT OT SLP Therapies (Active)     Topic: Physical Therapy (Active)     Point: Mobility training (Active)    Learning Progress Summary    Learner Readiness Method Response Comment Documented by Status   Patient Acceptance E NR  AS 01/05/18 1058 Active    Acceptance E NL REVIEWED BENEFITS OF ACTIVITY--PATIENT VERY LETHERGIC SC 12/25/17 1012 Active    Eager E,D NR  DM 12/19/17 1730 Active    Acceptance E,D DU,NR  UD 12/15/17 1138 Done    Acceptance E NR  AS 12/13/17 1404 Active    Acceptance E NR  ES 12/10/17 1606 Active    Acceptance E,D,H NR  LS 12/08/17 1537 Active   Family Acceptance E NR  AS 01/05/18 1058 Active    Acceptance E,D DU,NR  UD 12/15/17 1138 Done    Acceptance E,D,H NR  LS 12/08/17 1537 Active   Significant Other Eager E,D NR  DM 12/19/17 1730 Active               Point: Home exercise program (Active)    Learning Progress Summary    Learner Readiness Method Response Comment Documented by Status   Patient Acceptance E NR  AS 01/05/18 1058 Active     Acceptance E NL REVIEWED BENEFITS OF ACTIVITY--PATIENT VERY LETHERGIC SC 12/25/17 1012 Active    Eager E,D NR  DM 12/19/17 1730 Active    Acceptance E,D DU,NR  UD 12/15/17 1138 Done    Acceptance E NR  AS 12/13/17 1404 Active    Acceptance E NR  ES 12/10/17 1606 Active    Acceptance E,D,H NR  LS 12/08/17 1537 Active   Family Acceptance E NR  AS 01/05/18 1058 Active    Acceptance E,D DU,NR  UD 12/15/17 1138 Done    Acceptance E,D,H NR  LS 12/08/17 1537 Active   Significant Other Eager E,D NR  DM 12/19/17 1730 Active               Point: Body mechanics (Active)    Learning Progress Summary    Learner Readiness Method Response Comment Documented by Status   Patient Acceptance E NR  AS 01/05/18 1058 Active    Acceptance E NL REVIEWED BENEFITS OF ACTIVITY--PATIENT VERY LETHERGIC SC 12/25/17 1012 Active    Eager E,D NR  DM 12/19/17 1730 Active    Acceptance E,D DU,NR  UD 12/15/17 1138 Done    Acceptance E NR  AS 12/13/17 1404 Active    Acceptance E NR  ES 12/10/17 1606 Active    Acceptance E,D,H NR  LS 12/08/17 1537 Active   Family Acceptance E NR  AS 01/05/18 1058 Active    Acceptance E,D DU,NR  UD 12/15/17 1138 Done    Acceptance E,D,H NR  LS 12/08/17 1537 Active   Significant Other Eager E,D NR  DM 12/19/17 1730 Active               Point: Precautions (Active)    Learning Progress Summary    Learner Readiness Method Response Comment Documented by Status   Patient Acceptance E NR  AS 01/05/18 1058 Active    Acceptance E NL REVIEWED BENEFITS OF ACTIVITY--PATIENT VERY LETHERGIC SC 12/25/17 1012 Active    Eager E,D NR  DM 12/19/17 1730 Active    Acceptance E,D DU,NR  UD 12/15/17 1138 Done    Acceptance E NR  AS 12/13/17 1404 Active    Acceptance E NR  ES 12/10/17 1606 Active    Acceptance E,D,H NR  LS 12/08/17 1537 Active   Family Acceptance E NR  AS 01/05/18 1058 Active    Acceptance E,D DU,NR  UD 12/15/17 1138 Done    Acceptance E,D,H NR  LS 12/08/17 1537 Active   Significant Other Anuer E,D NR  DM 12/19/17 5567 Active                       User Key     Initials Effective Dates Name Provider Type Discipline    SC 06/19/15 -  Eveon MEME Keller, PT Physical Therapist PT    UD 06/22/15 -  Tete Dawson, PTA Physical Therapy Assistant PT    DM 06/19/15 -  Santa Sherman, PT Physical Therapist PT    AS 06/22/15 -  Poornima Lebron, PTA Physical Therapy Assistant PT    LS 06/19/15 -  Namrata Crowe, PT Physical Therapist PT    ES 11/13/17 -  Camilla Cueva, PT Physical Therapist PT                    PT Recommendation and Plan  Anticipated Discharge Disposition: inpatient rehabilitation facility  Planned Therapy Interventions: bed mobility training, gait training, home exercise program, patient/family education, strengthening, transfer training  PT Frequency: daily  Plan of Care Review  Plan Of Care Reviewed With: patient  Progress: improving  Outcome Summary/Follow up Plan: patient doing welll with all activity, continues to use rolling walker for support, complaints of weakness with all activity.          Outcome Measures       01/05/18 1025          How much help from another person do you currently need...    Turning from your back to your side while in flat bed without using bedrails? 3  -AS      Moving from lying on back to sitting on the side of a flat bed without bedrails? 3  -AS      Moving to and from a bed to a chair (including a wheelchair)? 3  -AS      Standing up from a chair using your arms (e.g., wheelchair, bedside chair)? 3  -AS      Climbing 3-5 steps with a railing? 2  -AS      To walk in hospital room? 3  -AS      AM-PAC 6 Clicks Score 17  -AS      Functional Assessment    Outcome Measure Options AM-PAC 6 Clicks Basic Mobility (PT)  -AS        User Key  (r) = Recorded By, (t) = Taken By, (c) = Cosigned By    Initials Name Provider Type    AS Poornima Lebron, FLAVIA Physical Therapy Assistant           Time Calculation:         PT Charges       01/05/18 1100          Time Calculation    Start Time 1025  -AS      PT  Received On 01/05/18  -AS      PT Goal Re-Cert Due Date 01/04/18  -AS      Time Calculation- PT    Total Timed Code Minutes- PT 23 minute(s)  -AS        User Key  (r) = Recorded By, (t) = Taken By, (c) = Cosigned By    Initials Name Provider Type    AS Poornima Lebron PTA Physical Therapy Assistant          Therapy Charges for Today     Code Description Service Date Service Provider Modifiers Qty    98146961387 HC GAIT TRAINING EA 15 MIN 1/5/2018 Poornima Lebron PTA GP 1    08290038890 HC PT THER PROC EA 15 MIN 1/5/2018 Poornima Lebron PTA GP 1    74321463582 HC PT THER SUPP EA 15 MIN 1/5/2018 Poornima Lebron, FLAVIA GP 1          PT G-Codes  Outcome Measure Options: AM-PAC 6 Clicks Basic Mobility (PT)    Poornima Lebron PTA  1/5/2018

## 2018-01-05 NOTE — PROGRESS NOTES
Continued Stay Note  [facility]     Patient Name: Tereza Ackerman  MRN: [id number]  Today's Date: 1/5/2018    Admit Date: 12/4/2017          Discharge Plan       01/05/18 1532    Case Management/Social Work Plan    Plan discharge planning    Patient/Family In Agreement With Plan yes    Additional Comments CM met with pt. at bedside for a second time today.  Again, no family was present.  Pt seems more understanding that [facility] is unable to accept for rehab services.  Pt. is agreeable to referrals being sent out to local SNFs, including [facility], [facility], and [facility]; CM completed this task.  CM spoke with Marilyn villarreal, with [facility] and they do not have female beds available.  CM attempted to reach pt's spouse by phone to discuss transportation arrangements for HD, but CM had to leave a message and hasn't received call back.  CM will continue to follow for discharge planning.              Discharge Codes     None        Expected Discharge Date and Time     Expected Discharge Date Expected Discharge Time    Dec 11, 2017             KIRSTY Santo

## 2018-01-05 NOTE — PROGRESS NOTES
Continued Stay Note  [facility]     Patient Name: Tereza Ackerman  MRN: [id number]  Today's Date: 1/5/2018    Admit Date: 12/4/2017          Discharge Plan       01/05/18 0921    Case Management/Social Work Plan    Plan [facility] cannot accept pt     Additional Comments CM spoke with [facility] liaisonRula.  [facility] is not able to accept with for rehab at this time due to her also needing dialysis. It was suggested pt. consider SNF.  CM will speak with pt. about discharge planning today.              Discharge Codes     None        Expected Discharge Date and Time     Expected Discharge Date Expected Discharge Time    Dec 11, 2017             KIRSTY Santo

## 2018-01-05 NOTE — PROGRESS NOTES
"   LOS: 32 days    Patient Care Team:  Michaela Connell MD as PCP - General  Michaela Connell MD as PCP - Family Medicine    Reason For Visit:  F/U DEJA.   Subjective   No acute events overnight. No new complaints.         Review of Systems:    Pulm: No soa   CV:  No CP      Objective       amiodarone 200 mg Oral Q12H   Followed by      [START ON 1/12/2018] amiodarone 200 mg Oral Daily   amLODIPine 5 mg Oral Q24H   carvedilol 25 mg Oral Q12H   docusate sodium 100 mg Oral BID   epoetin marcel 10,000 Units Subcutaneous Once per day on Tue Thu Sat   furosemide 20 mg Intravenous Once   heparin (porcine) 5,000 Units Subcutaneous Q8H   metoclopramide 5 mg Intravenous Q12H   nystatin  Topical Q12H   sertraline 100 mg Oral Daily   sodium bicarbonate 650 mg Oral TID   thiamine 100 mg Oral Daily            Vital Signs:  Blood pressure 156/85, pulse 76, temperature 98.3 °F (36.8 °C), temperature source Oral, resp. rate 18, height 160 cm (62.99\"), weight 73.8 kg (162 lb 12.8 oz), SpO2 98 %.    Flowsheet Rows         First Filed Value    Admission Height  160 cm (63\") Documented at 12/04/2017 1002    Admission Weight  68.9 kg (152 lb) Documented at 12/04/2017 1002          01/04 0701 - 01/05 0700  In: -   Out: 1395 [Urine:565]    Physical Exam:    General Appearance: NAD, alert and cooperative, Ox3  Eyes: PER, conjunctivae and sclerae normal, no icterus  Lungs: respirations regular and unlabored, no crepitus, clear to auscultation  Heart/CV: regular rhythm & normal rate, no murmur, no gallop, no rub and 1+ edema  Abdomen: not distended, soft, non-tender, no masses,  bowel sounds present  Skin: No rash, Warm and dry. TUNNELED HD CATH.    Radiology:            Labs:    Results from last 7 days  Lab Units 01/05/18  0629 01/04/18  0548 01/03/18  0612   WBC 10*3/mm3 8.05 10.57 10.42   HEMOGLOBIN g/dL 8.9* 9.7* 8.5*   HEMATOCRIT % 29.2* 31.3* 26.8*   PLATELETS 10*3/mm3 359 421 357       Results from last 7 days  Lab Units 01/05/18  0629 " 01/04/18  0548 01/03/18  0612 01/02/18  0453  12/31/17  0523   SODIUM mmol/L 142 142 140 138  < > 138   POTASSIUM mmol/L 3.5 3.8  3.8 3.2* 3.3*  < > 4.1   CHLORIDE mmol/L 102 97* 102 99  < > 99   CO2 mmol/L 28.0 29.0 26.0 25.0  < > 28.0   BUN mg/dL 25* 37* 28* 55*  < > 40*   CREATININE mg/dL 2.10* 3.00* 2.60* 3.90*  < > 2.60*   CALCIUM mg/dL 7.9* 8.4* 7.8* 8.4*  < > 8.2*   PHOSPHORUS mg/dL 2.5 2.6 3.5 5.5*  < > 3.3   MAGNESIUM mg/dL 1.9 2.3 1.9 1.9  < > 2.2   ALBUMIN g/dL  --  3.20 2.60* 3.20  --  2.70*   < > = values in this interval not displayed.    Results from last 7 days  Lab Units 01/05/18  0629   GLUCOSE mg/dL 82                       Estimated Creatinine Clearance: 26.1 mL/min (by C-G formula based on Cr of 2.1).      Assessment     Principal Problem:    Small bowel obstruction  Active Problems:    Meningioma, recurrent of brain    Insomnia    Malignant neoplasm of left orbit    Acute renal failure    Elevated troponin level    Metabolic acidosis    Transaminitis    Atrial flutter with rapid ventricular response            Impression: NONOLIGURIC DEJA. ATN. INCREASED U/O.STARTING TO RECOVER. POOR GFR. ANEMIA.        Recommendations:    Monitor I/O. UOP and renal function showing signs of recovery.   Renal diet.   Epo with HD.   Will increase amlodipine to 10 mg po q daily.    Will evaluate for dialysis tomorrow.     Jamal Ramos MD  01/05/18  8:56 AM

## 2018-01-05 NOTE — PROGRESS NOTES
"Continued Stay Note  [facility]     Patient Name: Tereza Ackerman  MRN: [id number]  Today's Date: 1/5/2018    Admit Date: 12/4/2017          Discharge Plan       01/05/18 1033    Case Management/Social Work Plan    Plan discharge planning    Additional Comments CM me with pt. to discuss discharge planning.  No family was present.  CM reported to pt. that [facility] was unable to accept pt for rehab and did not have a bed to offer pt.  Pt. was upset by this and stated this is what her mind had been set on.  CM and pt. discussed that there are multiple other SNF facilities that can offer rehab, but pt. continued to state that she wanted her name to remain on [facility]'s \"list\".  CM attempted to explain to pt. numerous times that [facility] was not an option and other facilities need to be considered.  Pt. requested CM call and speak with Dr. Hollis about the situation.  CM will visit pt. again later today when spouse is present.       01/05/18 0921    Case Management/Social Work Plan    Plan [facility] cannot accept pt     Additional Comments CM spoke with [facility] liaisonRula.  [facility] is not able to accept with for rehab at this time due to her also needing dialysis. It was suggested pt. consider SNF.  CM will speak with pt. about discharge planning today.              Discharge Codes     None        Expected Discharge Date and Time     Expected Discharge Date Expected Discharge Time    Dec 11, 2017             KIRSTY Santo    "

## 2018-01-05 NOTE — CONSULTS
Adult Nutrition  Assessment/PES    Patient Name:  Tereza Ackerman  YOB: 1955  MRN: [id number]  Admit Date:  12/4/2017    Assessment Date:  1/5/2018    Comments:  Per patient's request gave her information to Pre-DM and renal diet. Advised her to get okay from  MD before starting any regular exercise. Gave her general guidelines regarding renal diet. Advised her as her  kidney function improves she may not be as restricted when she returns home. Noted plan of care is to go to rehab.  Advised her to contact RD as needed.          Reason for Assessment       01/05/18 1515    Reason for Assessment    Reason For Assessment/Visit education;follow up protocol   Per pt.'s request, wants info on Pre-DM and renal diet.    Time Spent (min) 30    Diagnosis --   per notes this admission    Hematological Anemia    Renal --   Increased U/O starting to recover poor GFR.              Nutrition/Diet History       01/05/18 1516    Nutrition/Diet History    Supplemental Drinks/Foods/Additives Pt. stated she averages drinking two nepros per day.              Labs/Tests/Procedures/Meds       01/05/18 1523    Labs/Tests/Procedures/Meds    Labs/Tests Review Reviewed;BUN;Creat;K+                Nutrition Prescription Ordered       01/05/18 1523    Nutrition Prescription PO    Current PO Diet Regular    Supplement Nepro    Supplement Frequency 3 times a day    Common Modifiers Renal            Evaluation of Received Nutrient/Fluid Intake       01/05/18 1524    PO Evaluation    Number of Days PO Intake Evaluated Insufficient Data            Problem/Interventions:          Problem 2       01/05/18 1524    Nutrition Diagnoses Problem 2    Resolved? Yes            Problem 3       01/05/18 1524    Nutrition Diagnoses Problem 3    Resolved? Yes            Problem 4       01/05/18 1524    Nutrition Diagnoses Problem 4    Problem 4 Knowledge Deficit    Etiology (related to) --    clinical condition    Signs/Symptoms (evidenced by)  Reported Information Deficit              Intervention Goal       01/05/18 1525    Intervention Goal    General Nutrition support treatment    PO Establish PO            Nutrition Intervention       01/05/18 1525    Nutrition Intervention    RD/Tech Action Follow Tx progress;Supplement provided   Catering assoc. assisting pt. with menu selections.              Education/Evaluation       01/05/18 1527    RD provided nutrition education. Written educational materials provided.Patient voiced understanding of information reviewed and asked appropriate questions. Advised patient to follow-up with RD as needed after discharge.    Education    Education Education topics    Education Topics Renal diet;PreDiabetes   Advised her for prevention of DM, wt. loss and regular physical activity is rec. Advised her wt. loss not  recommended as still recuperating for surgery. Gave her general guidelines related to renal diet as she may not need to be as restricted.    Monitor/Evaluation    Monitor Per protocol        Electronically signed by:  Santa Price RD  01/05/18 3:32 PM

## 2018-01-05 NOTE — PLAN OF CARE
Problem: Patient Care Overview (Adult)  Goal: Plan of Care Review  Outcome: Ongoing (interventions implemented as appropriate)   01/05/18 1059   Coping/Psychosocial Response Interventions   Plan Of Care Reviewed With patient   Patient Care Overview   Progress improving   Outcome Evaluation   Outcome Summary/Follow up Plan patient doing welll with all activity, continues to use rolling walker for support, complaints of weakness with all activity.       Problem: Inpatient Physical Therapy  Goal: Bed Mobility Goal LTG- PT  Outcome: Ongoing (interventions implemented as appropriate)   12/08/17 1538 01/05/18 1059   Bed Mobility PT LTG   Bed Mobility PT LTG, Date Established 12/08/17 --    Bed Mobility PT LTG, Time to Achieve 2 wks --    Bed Mobility PT LTG, Activity Type supine to sit/sit to supine --    Bed Mobility PT LTG, [place] Level minimum assist (75% patient effort) --    Bed Mobility PT LTG, Date Goal Reviewed --  01/05/18   Bed Mobility PT LTG, Outcome --  goal ongoing     Goal: Transfer Training Goal 1 LTG- PT  Outcome: Ongoing (interventions implemented as appropriate)   12/08/17 1538 01/05/18 1059   Transfer Training PT LTG   Transfer Training PT LTG, Date Established 12/08/17 --    Transfer Training PT LTG, Time to Achieve 2 wks --    Transfer Training PT LTG, Activity Type sit to stand/stand to sit --    Transfer Training PT LTG, [place] Level contact guard assist --    Transfer Training PT LTG, Assist Device walker, rolling --    Transfer Training PT LTG, Date Goal Reviewed --  01/05/18   Transfer Training PT LTG, Outcome --  goal ongoing     Goal: Gait Training Goal LTG- PT  Outcome: Ongoing (interventions implemented as appropriate)   12/08/17 1538 01/05/18 1059   Gait Training PT LTG   Gait Training Goal PT LTG, Date Established 12/08/17 --    Gait Training Goal PT LTG, Time to Achieve 2 wks --    Gait Training Goal PT LTG, [place] Level contact guard assist --    Gait Training Goal PT  LTG, Assist Device walker, rolling --    Gait Training Goal PT LTG, Distance to Achieve 100 --    Gait Training Goal PT LTG, Date Goal Reviewed --  01/05/18   Gait Training Goal PT LTG, Outcome --  goal ongoing

## 2018-01-05 NOTE — PROGRESS NOTES
"Tereza Ackerman  1955  [id number]    Surgery Progress Note    Date of visit: 1/5/2018    Subjective:overall feels better  Taking PO with adequate bowel function    Objective:    /85  Pulse 76  Temp 98.3 °F (36.8 °C) (Oral)   Resp 18  Ht 160 cm (62.99\")  Wt 73.8 kg (162 lb 12.8 oz)  SpO2 98%  BMI 28.85 kg/m2    Intake/Output Summary (Last 24 hours) at 01/05/18 1412  Last data filed at 01/05/18 0736   Gross per 24 hour   Intake                0 ml   Output              715 ml   Net             -715 ml       CV: Regular rate and rhythm  L: normal air entry  Abd: soft  wound granulating well      LABS:      Results from last 7 days  Lab Units 01/05/18  0629   WBC 10*3/mm3 8.05   HEMOGLOBIN g/dL 8.9*   HEMATOCRIT % 29.2*   PLATELETS 10*3/mm3 359       Results from last 7 days  Lab Units 01/05/18  0629   SODIUM mmol/L 142   POTASSIUM mmol/L 3.5   CHLORIDE mmol/L 102   CO2 mmol/L 28.0   BUN mg/dL 25*   CREATININE mg/dL 2.10*   CALCIUM mg/dL 7.9*   GLUCOSE mg/dL 82       Results from last 7 days  Lab Units 01/05/18  0629   SODIUM mmol/L 142   POTASSIUM mmol/L 3.5   CHLORIDE mmol/L 102   CO2 mmol/L 28.0   BUN mg/dL 25*   CREATININE mg/dL 2.10*   GLUCOSE mg/dL 82   CALCIUM mg/dL 7.9*       Lab Results  Lab Value Date/Time   LIPASE 52 (H) 12/04/2017 1043   LIPASE 39 06/30/2017 1959         Assessment/ Plan: stable course  Continue with current management   Awaiting rehab placement      Problem List Items Addressed This Visit     Hypertension    Relevant Medications    amLODIPine (NORVASC) tablet 10 mg (Start on 1/6/2018  9:00 AM)    * (Principal)Small bowel obstruction - Primary    Relevant Orders    Tissue Pathology Exam - Tissue, Small Intestine (Completed)    Acute renal failure    Relevant Medications    potassium chloride (MICRO-K) CR capsule 40 mEq    potassium chloride (KLOR-CON) packet 40 mEq    potassium chloride 10 mEq in 100 mL IVPB      Other Visit Diagnoses     Intractable vomiting with nausea, " unspecified vomiting type        Uncontrolled hypertension                Ирина Cobian MD  1/5/2018  2:12 PM

## 2018-01-05 NOTE — PLAN OF CARE
Problem: Patient Care Overview (Adult)  Goal: Plan of Care Review  Outcome: Ongoing (interventions implemented as appropriate)   01/05/18 0651   Coping/Psychosocial Response Interventions   Plan Of Care Reviewed With patient;spouse   Patient Care Overview   Progress improving     Goal: Discharge Needs Assessment   01/05/18 0651   Discharge Needs Assessment   Concerns To Be Addressed denies needs/concerns at this time   Readmission Within The Last 30 Days no previous admission in last 30 days   Equipment Needed After Discharge none   Discharge Facility/Level Of Care Needs rehabilitation facility   Current Discharge Risk cognitively impaired   Discharge Disposition still a patient   Current Health   Anticipated Changes Related to Illness inability to care for self   Living Environment   Transportation Available car;family or friend will provide

## 2018-01-05 NOTE — PROGRESS NOTES
[facility] Medicine Services  PROGRESS NOTE    Patient Name: Tereza Ackerman  : 1955  MRN: [id number]    Date of Admission: 2017  Length of Stay: 32  Primary Care Physician: Michaela Connell MD    Subjective   Subjective     CC:  FU SBO      HPI:  Didn't sleep well last night. Her anxiety is worse at night.     Review of Systems  Gen- No fevers, chills  CV- No chest pain, palpitations  Resp- No cough, dyspnea  GI- No N/V/D, +abd pain (improved)    Otherwise ROS is negative except as mentioned in the HPI.    Objective   Objective     Vital Signs:   Temp:  [98.3 °F (36.8 °C)] 98.3 °F (36.8 °C)  Heart Rate:  [76-78] 76  Resp:  [18] 18  BP: (156-169)/(85-91) 156/85        Physical Exam:  Constitutional: No acute distress, awake, alert   Respiratory: Clear to auscultation bilaterally A/P, nonlabored respirations   Cardiovascular: RRR, no murmurs, rubs, or gallops, palpable pedal pulses bilaterally  Gastrointestinal: Positive bowel sounds, soft, nontender, nondistended,Abd incision with dressing in place c/d/i.   Musculoskeletal: 3+ pitting edema BLE including feet  Psychiatric: Appropriate affect, cooperative  Neurologic: Oriented x 3, no focal deficits     Rt upper CW tunneled HD cath in place.  C/d/i.        Results Reviewed:  I have personally reviewed current lab, radiology, and data and agree.      Results from last 7 days  Lab Units 18  061848 18  0612  17  0446   WBC 10*3/mm3 8.05 10.57 10.42  < > 13.77*   HEMOGLOBIN g/dL 8.9* 9.7* 8.5*  < > 7.2*  7.2*   HEMATOCRIT % 29.2* 31.3* 26.8*  < > 22.6*  22.6*   PLATELETS 10*3/mm3 359 421 357  < > 362   INR   --   --   --   --  1.18   < > = values in this interval not displayed.    Results from last 7 days  Lab Units 18  061848 18  0612   SODIUM mmol/L 142 142 140   POTASSIUM mmol/L 3.5 3.8  3.8 3.2*   CHLORIDE mmol/L 102 97* 102   CO2 mmol/L 28.0 29.0 26.0   BUN mg/dL 25*  37* 28*   CREATININE mg/dL 2.10* 3.00* 2.60*   GLUCOSE mg/dL 82 98 101*   CALCIUM mg/dL 7.9* 8.4* 7.8*         Microbiology Results Abnormal     Procedure Component Value - Date/Time    Blood Culture - Blood, [[id number]]  (Normal) Collected:  12/21/17 0700    Lab Status:  Final result Specimen:  Blood from Arm, Right Updated:  12/26/17 0816     Blood Culture No growth at 5 days    Blood Culture - Blood, [[id number]]  (Normal) Collected:  12/21/17 0705    Lab Status:  Final result Specimen:  Blood from Arm, Left Updated:  12/26/17 0816     Blood Culture No growth at 5 days    Blood Culture - Blood, [[id number]]  (Normal) Collected:  12/19/17 1316    Lab Status:  Final result Specimen:  Blood from Arm, Left Updated:  12/24/17 1346     Blood Culture No growth at 5 days    Blood Culture - Blood, [[id number]]  (Normal) Collected:  12/19/17 1316    Lab Status:  Final result Specimen:  Blood from Hand, Left Updated:  12/24/17 1346     Blood Culture No growth at 5 days    Urine Culture - Urine, Urine, Clean Catch [[id number]]  (Abnormal) Collected:  12/12/17 0001    Lab Status:  Final result Specimen:  Urine from Urine, Clean Catch Updated:  12/14/17 0646     Urine Culture --      <10,000 CFU/mL Yeast isolated (A)    Blood Culture - Blood, [[id number]]  (Normal) Collected:  12/05/17 0313    Lab Status:  Final result Specimen:  Blood from Arm, Right Updated:  12/10/17 0416     Blood Culture No growth at 5 days    Blood Culture - Blood, [[id number]]  (Normal) Collected:  12/05/17 0313    Lab Status:  Final result Specimen:  Blood from Arm, Right Updated:  12/10/17 0416     Blood Culture No growth at 5 days    Narrative:       Aerobic bottle only    Urine Culture - Urine, Urine, Clean Catch [[id number]]  (Normal) Collected:  12/05/17 1704    Lab Status:  Final result Specimen:  Urine from Urine, Catheter Updated:  12/07/17 0842     Urine Culture No growth at 2 days    Eosinophil Smear - Urine, Urine, Clean Catch [[id number]]  (Normal)  Collected:  12/06/17 1106    Lab Status:  Final result Specimen:  Urine from Urine, Catheter Updated:  12/06/17 1347     Eosinophil Smear 0 % EOS/100 Cells     Narrative:       No eosinophil seen            Results for orders placed during the hospital encounter of 12/04/17   Adult Transthoracic Echo Complete W/ Cont if Necessary Per Protocol    Narrative · LVEF difficult to evaluate with tachycardia- globally appears mildly   depressed.  · Left ventricular wall thickness is consistent with concentric   hypertrophy.  · Mild tricuspid valve regurgitation is present.  · Calculated right ventricular systolic pressure from tricuspid   regurgitation is 32 mmHg.          I have reviewed the medications.    Assessment/Plan   Assessment / Plan     Hospital Problem List     * (Principal)Small bowel obstruction    Overview Signed 12/5/2017  5:16 PM by AYDEE Hernandez     S/p abdominal exploration with adhesiolysis and resection of strangulated segment of small bowel by Dr. CUI 12/5/17         Meningioma, recurrent of brain    Overview Signed 12/6/2017  3:49 PM by Julia COTE Case, DO     First occurrence with resection by Bunny in 2003.   Recurrence with resection by Bunny in 2014.          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          Acute renal failure    Elevated troponin level    Metabolic acidosis    Transaminitis    Atrial flutter with rapid ventricular response    Overview Addendum 12/21/2017 12:10 PM by JEIMY Dalal     1. Echo 12-21-17:  · LVEF difficult to evaluate with tachycardia- globally appears mildly depressed.  · Left ventricular wall thickness is consistent with concentric hypertrophy.  · Mild tricuspid valve regurgitation is present.  · Calculated right ventricular systolic pressure from tricuspid regurgitation is 32 mmHg.                          Brief Hospital Course to date:  Tereza Ackerman is a 62 y.o. female presenting with small bowel obstruction s/p ex-lap with adhesiolysis and resection of strangulated segment of small bowel by Dr. CUI 12/5/17 complicated by acute renal failure (ATN from sepsis/bowel ischemia/hypotension) requiring hemodialysis now transferred out of ICU but with persistent ileus/obstruction requiring NGT replacement and TPN for nutrition. Slow to improve. Developed rapid atrial flutter evening of 12/20, requiring amiodarone drip.        Assessment & Plan:  - SBO: s/p ex-lap with adhesiolysis and resection of strangulated segment of small bowel by Dr. CUI 12/5/17  - Non-oliguric DEJA: HD 1/2, sodium bicarb, Epo. Nephrology following, creatinine is improving.   - A. Flutter: amiodarone taper. BB. FU with Dr. Aguirre in 4 weeks.   - HTN: Amlodipine. coreg. Increase dose as tolerated. Hydralazine PO PRN  - Mood: slowly improving on Sertraline. Will scheduled seroquel at night for her anxiety and restlessness  --am labs  -cm working on placement for rehab cannot do [facility] due to dialysis     DVT Prophylaxis:  [facility]     CODE STATUS: Full Code     Disposition: I expect the patient to be discharged to [facility]         AYDEE Godwin  01/05/18  1:38 PM

## 2018-01-05 NOTE — DISCHARGE PLACEMENT REQUEST
"Gagandeep Whatley (62 y.o. Female)     To SNF    From Dominique [place], [phone]      Date of Birth Social Security Number Address Home Phone MRN    1955  [id number]  [facility] [address] [phone] [id number]    Congregation Marital Status          Zoroastrian        Admission Date Admission Type Admitting Provider Attending Provider Department, Room/Bed    17 Emergency Maite Farnk MD Krill, Kaleigh, MD [facility] 5G, S565/1    Discharge Date Discharge Disposition Discharge Destination                      Attending Provider: Maite Frank MD     Allergies:  Dilantin [Phenytoin Sodium Extended], Phenytoin    Isolation:  None   Infection:  None   Code Status:  FULL    Ht:  160 cm (62.99\")   Wt:  73.8 kg (162 lb 12.8 oz)    Admission Cmt:  None   Principal Problem:  Small bowel obstruction [K56.609] More...                 Active Insurance as of 2017     Primary Coverage     Payor Plan Insurance Group Employer/Plan Group    ANTHEM MEDICARE REPLACEMENT ANTHEM MEDICARE ADVANTAGE [id number]     Payor Plan Address Payor Plan Phone Number Effective From Effective To    [address] [phone] 2017     [place] [address]       Subscriber Name Subscriber Birth Date Member ID       GAGANDEEP WHATLEY 1955 [id number]                 Emergency Contacts      (Rel.) Home Phone Work Phone Mobile Phone    Cash Whatley (Spouse) [phone] -- --               History & Physical      Christine Bourgeois MD at 2017  3:43 PM              [facility] Medicine Services  HISTORY AND PHYSICAL    Patient Name: Gagandeep Whatley  : 1955  MRN: [id number]  Primary Care Physician: Michaela Connell MD    Subjective   Subjective     Chief Complaint:  N/V and abdominal pain    HPI:  Gagandeep Whatley is a 62 y.o. female that presented to [facility] ED with acute onset nausea/vomiting and abdominal pain.  Rates pain 7/10 and is intermittent " sharp pains.  She has vomited about 6 times since being in the ED.  Symptoms began this morning a couple hours after eating breakfast.  She reports having oatmeal and bagel.  She was hospitalized in June 2017 with a partial small bowel obstruction that resolved spontaneously.  Patient had surgery about a month ago to remove a left orbital mass and has been on narcotics since then.  She was placed on steroids one week ago in preparation for another eye surgery 3 days ago but was cancelled due to scheduling conflict.  She had a previous orbitotomy in 2009.  Patient has had BM x 2 since being in the ED and reports last BM prior to today was 2 days ago.  She also c/o headache and lightheadedness.  In the ED, WBC 13.71, potassium 3.6, glucose 182, lactate 3.2, lipase 52 and troponin <0.006.  Other labs unremarkable.  Blood pressure elevated at 201/120.  Patient was given labetalol in the ED.  CT abdomen/pelvis showed small bowel obstruction with dilated loops of bowel especially in the pelvis.  Surgery was consulted and saw patient in the ED.  NGT placed and patient started on mineral oral.  If no improvement will proceed with Hypaque small bowel follow through and surgical intervention as necessary.  Patient will be admitted by the hospital medicine service.      Review of Systems   Gen- No fevers, chills, + headache  CV- No chest pain, palpitations  Resp- No cough, dyspnea  GI- + N/V and abdominal pain, no diarrhea    Otherwise 10-system ROS reviewed and is negative except as mentioned in the HPI.    Personal History     Past Medical History:   Diagnosis Date   • Acid reflux    • Arthritis    • Back pain    • Brain tumor    • Depression    • History of blood transfusion    • History of Nissen fundoplication 7/1/2017   • Hyperlipemia    • Hypertension    • Memory loss or impairment    • Migraine    • Parathyroid adenoma     Incidental on thyroid US.   • PONV (postoperative nausea and vomiting)     nausea - preprocedural  meds help    • Prediabetes     Last Impression: 06 Feb 2015  Reviewed labs. Excellent control.  Maren Connellast Impression: 06 Feb 2015  Reviewed labs. Excellent control.  Michaela Connell   • Thyroid nodule      Last Impression: 13 Jun 2015  r/o thyroid cancer, will proceed with US.  Michaela Connell (Internal Medicine)    • UTI (urinary tract infection)     h/o   • Vision changes     blockages left eye    • Wears glasses     readers       Past Surgical History:   Procedure Laterality Date   • BRAIN SURGERY  2003 & 2014    Dr. Werner Hollis   • CARPAL TUNNEL RELEASE  2002    right    • COLONOSCOPY     • ENDOSCOPY     • HYSTERECTOMY      partial - both ovaries still present pt believes    • ORBITOTOMY Left 11/3/2017    Procedure:  LEFT LATERAL ORBITOTOMY WITH DEBULKING OF TUMOR ;  Surgeon: Moo Hopkins MD;  Location: [facility] OR;  Service:    • OTHER SURGICAL HISTORY      craniotomy tumor removal-complete   • OTHER SURGICAL HISTORY      esophagogastric fundoplasty nissen fundoplication   • TUBAL ABDOMINAL LIGATION         Family History: family history includes Breast cancer in her maternal aunt and paternal aunt; Cancer in her other; Diabetes in her other; Heart attack in her mother; Migraines in her father; Obesity in her mother; Stroke in her father. There is no history of Ovarian cancer.     Social History:  reports that she has never smoked. She has never used smokeless tobacco. She reports that she does not drink alcohol or use illicit drugs.    Medications:    (Not in a hospital admission)    Allergies   Allergen Reactions   • Dilantin [Phenytoin Sodium Extended] Rash   • Phenytoin Rash     duplicate        Objective   Objective     Vital Signs:   Temp:  [97.5 °F (36.4 °C)] 97.5 °F (36.4 °C)  Heart Rate:  [54-83] 68  Resp:  [20] 20  BP: (201-247)/() 214/120        Physical Exam   Constitutional: No acute distress, awake, alert  Eyes: PERRLA, sclerae anicteric, no conjunctival  injection  HENT: NCAT, mucous membranes moist, mild left eye orbital swelling   Neck: Supple, no thyromegaly, no lymphadenopathy, trachea midline  Respiratory: Clear to auscultation bilaterally, nonlabored respirations   Cardiovascular: RRR, no murmurs, rubs, or gallops, palpable pedal pulses bilaterally  Gastrointestinal: Hypoactive bowel sounds, soft, non distended, TTP in all 4 quadrants, NGT to LWS  Musculoskeletal: No bilateral ankle edema, no clubbing or cyanosis to extremities  Psychiatric: Appropriate affect, cooperative  Neurologic: Oriented x 3, strength symmetric in all extremities, Cranial Nerves grossly intact to confrontation, speech clear  Skin: No rashes, pale    Results Reviewed:  I have personally reviewed current lab, radiology, and data and agree.      Results from last 7 days  Lab Units 12/04/17  1043   WBC 10*3/mm3 13.71*   HEMOGLOBIN g/dL 15.1   HEMATOCRIT % 44.0   PLATELETS 10*3/mm3 321       Results from last 7 days  Lab Units 12/04/17  1043   SODIUM mmol/L 138   POTASSIUM mmol/L 3.6   CHLORIDE mmol/L 106   CO2 mmol/L 21.0   BUN mg/dL 15   CREATININE mg/dL 0.80   GLUCOSE mg/dL 182*   CALCIUM mg/dL 9.9   ALT (SGPT) U/L 22   AST (SGOT) U/L 24   TROPONIN I ng/mL <0.006     No results found for: BNP  No results found for: PHART  Imaging Results (last 24 hours)     Procedure Component Value Units Date/Time    CT Abdomen Pelvis Without Contrast [[id number]] Collected:  12/04/17 1334     Updated:  12/04/17 1503    Narrative:       EXAMINATION: CT ABDOMEN AND PELVIS WITHOUT CONTRAST-12/04/2017:      INDICATION: Abdominal pain, vomiting, nausea.     TECHNIQUE: Multiple axial CT imaging was obtained of the abdomen and  pelvis from the lung bases through the pubic symphysis without the  administration of oral or intravenous contrast.     The radiation dose reduction device was turned on for each scan per the  ALARA (As Low as Reasonably Achievable) protocol.     COMPARISON: 06/30/2017.     FINDINGS:       ABDOMEN: The lung bases are grossly clear. No change seen in the  paraesophageal hernia. The liver is homogeneous in appearance as well as  the spleen. Kidneys and adrenal glands are within normal limits. No  stones seen in the gallbladder. The pancreas is homogeneous in  appearance. No abdominal or retroperitoneal lymphadenopathy. No free  fluid or free air. No abnormal mass or fluid collection is identified.     PELVIS: Several dilated fluid-filled loops of small bowel seen within  the pelvis. There is some free fluid as well seen within the pelvis.  Findings concerning for small bowel obstruction. The transition point is  seen within the pelvis. Internal hernia cannot be excluded. The colon is  unremarkable in appearance. Diverticulosis of colon without evidence of  diverticulitis. No extraluminal air to suggest perforation. The uterus  has been surgically removed. Mild distention of the bladder. No pelvic  adenopathy. The bony structures reveal no evidence of osseous  abnormality.       Impression:       Some mildly dilated loops of small bowel seen within the  pelvis with development of free fluid. This has slightly progressed in  the interval with decompression of the distal small bowel. Findings  suggesting a mid small bowel obstruction mild to moderate in appearance,  possibly related to internal hernia or adhesions.     D:  12/04/2017  E:  12/04/2017           This report was finalized on 12/4/2017 3:01 PM by Dr. Eulalia Lim MD.                Assessment/Plan   Assessment / Plan     Hospital Problem List     Hypertension    Overview Signed 7/6/2016 12:38 PM by Michaela Connell MD     Impression: 11/16/2015 - BP slightly elevated on the 1/2 dose of losartan.  Hence adv. to take the whole pill..  Impression: 11/16/2015 - BP better on the 1/2 dose of losartan.  Impression: 02/06/2015 - Adv. to cut back on her lis/hct to 1/2 tab daily.  Monitor BP regularly.;          Small bowel obstruction     Hypertensive crisis    Leukocytosis    Elevated lactic acid level    Hyperglycemia          Assessment & Plan    SBO  --NPO  --NGT to LWS  --surgery consulted  --mineral oil   --phenergan PRN    Hypertension  --elevated in the ED, patient has not taken medication today  --continue losartan, hold HCTZ at this time  --may need PRN medication    Leukocytosis  --could be related to steroids  --no fever or chills  --monitor    Elevated lactic acid level  --3.2 today  --recheck in the AM  --receiving IVF's    Hyperglycemia  --hemoglobin A1c 5.6 on 11/13/17  --FSBG w/ssi  --monitor      DVT prophylaxis:  TEDs/SCDs, hold pharmacological for now in case surgery needed, add heparin in AM if symptoms improve    CODE STATUS:  Full    Admission Status:  I believe this patient meets INPATIENT status due to the need for care which can only be reasonably provided in an hospital setting such as aggressive/expedited ancillary services and/or consultation services, the necessity for IV medications, close physician monitoring and/or the possible need for procedures.  In such, I feel patient’s risk for adverse outcomes and need for care warrant INPATIENT evaluation and predict the patient’s care encounter to likely last beyond 2 midnights.     Rhonda Oconnell, APRN   12/04/17   3:43 PM      Brief Attending Admission Attestation     I have seen and examined the patient, performing an independent face-to-face diagnostic evaluation with plan of care reviewed and developed with the advanced practice clinician (APC).      Brief Summary Statement/HPI:   Tereza Ackerman is a 62 y.o. female with history of meningioma and recent surgery on narcotics for pain, Nissen fundoplication and partial hysterectomy in the past, partial SBO earlier this year managed medically who presented with acute onset nausea, vomiting and generalized abdominal pain earlier today.  She was found to have small bowel obstruction on CT scan and was evaluated by general  surgery.  NG tube placed in ED without much improvement in symptoms.      Attending Physical Exam:  Constitutional: No acute distress, awake, alert, sitting up in bed, later vomiting in mild distress.  Eyes: PERRLA, sclerae anicteric, no conjunctival injection  HENT: NCAT, mucous membranes moist, NG tube in place  Neck: Supple, trachea midline  Respiratory: Clear to auscultation bilaterally, nonlabored respirations   Cardiovascular: RRR, no murmurs, rubs, or gallops  Gastrointestinal: Positive bowel sounds, soft, diffusely tender without guarding or rebound tenderness, nondistended  Musculoskeletal: No bilateral ankle edema, no clubbing or cyanosis to extremities  Psychiatric: Appropriate affect, cooperative  Neurologic: Oriented x 3, strength symmetric in all extremities, Cranial Nerves grossly intact to confrontation, speech clear  Skin: No rashes    Brief Assessment/Plan :  See above for further detailed assessment and plan developed with APC which I have reviewed and/or edited.    -NG tube to low wall suction  -Surgery following  -Antiemetics PRN  -Mineral oil  -Continue IVF  -PRN antihypertensives  -Will give one dose IV steroids tonight to replace her home PO prednisone dose    I believe this patient meets INPATIENT status due to the need for care which can only be reasonably provided in an hospital setting such as aggressive/expedited ancillary services and/or consultation services, the necessity for IV medications, close physician monitoring and/or the possible need for procedures.  In such, I feel patient’s risk for adverse outcomes and need for care warrant INPATIENT evaluation and predict the patient’s care encounter to likely last beyond 2 midnights.    Christine Bourgeois MD  12/04/17  11:35 PM            Electronically signed by Christine Bourgeois MD at 12/4/2017 11:47 PM        Hospital Medications (active)       Dose Frequency Start End    acetaminophen (TYLENOL) tablet 500 mg 500 mg 4 Times Daily PRN  "12/25/2017     Sig - Route: Take 1 tablet by mouth 4 (Four) Times a Day As Needed for Mild Pain  or Moderate Pain . - Oral    albumin human 25 % IV SOLN 12.5 g 12.5 g As Needed 1/4/2018 1/4/2018    Sig - Route: Infuse 50 mL into a venous catheter As Needed (Hypotension During Dialysis). - Intravenous    albumin human 25 % IV SOLN 12.5 g 12.5 g As Needed 1/4/2018 1/4/2018    Sig - Route: Infuse 50 mL into a venous catheter As Needed (Hypotension During Dialysis). - Intravenous    albumin human 25 % IV SOLN 25 g 25 g As Needed 1/4/2018 1/5/2018    Sig - Route: Infuse 100 mL into a venous catheter As Needed (Hypotension During Dialysis). - Intravenous    amiodarone (PACERONE) tablet 200 mg 200 mg Every 12 Hours 12/29/2017 1/12/2018    Sig - Route: Take 1 tablet by mouth Every 12 (Twelve) Hours. - Oral    Linked Group 1:  \"Followed by\" Linked Group Details        amiodarone (PACERONE) tablet 200 mg 200 mg Daily 1/12/2018     Sig - Route: Take 1 tablet by mouth Daily. - Oral    Linked Group 1:  \"Followed by\" Linked Group Details        amLODIPine (NORVASC) tablet 10 mg 10 mg Every 24 Hours Scheduled 1/6/2018     Sig - Route: Take 1 tablet by mouth Daily. - Oral    amLODIPine (NORVASC) tablet 5 mg 5 mg Once 1/5/2018 1/5/2018    Sig - Route: Take 1 tablet by mouth 1 (One) Time. - Oral    calcium carbonate (TUMS) chewable tablet 500 mg (200 mg elemental) 2 tablet 3 Times Daily PRN 12/10/2017     Sig - Route: Chew 1,000 mg 3 (Three) Times a Day As Needed for Indigestion or Heartburn. - Oral    carvedilol (COREG) tablet 25 mg 25 mg Every 12 Hours Scheduled 1/3/2018     Sig - Route: Take 2 tablets by mouth Every 12 (Twelve) Hours. - Oral    dextrose (D50W) solution 25 g 25 g Every 15 Minutes PRN 12/7/2017     Sig - Route: Infuse 50 mL into a venous catheter Every 15 (Fifteen) Minutes As Needed for Low Blood Sugar (Blood Sugar Less Than 70, Patient Has IV Access - Unresponsive, NPO or Unable To Safely Swallow). - Intravenous    " dextrose (GLUTOSE) oral gel 15 g 15 g Every 15 Minutes PRN 12/7/2017     Sig - Route: Take 15 g by mouth Every 15 (Fifteen) Minutes As Needed for Low Blood Sugar (Blood Sugar Less Than 70, Patient Alert, Is Not NPO & Can Safely Swallow). - Oral    docusate sodium (COLACE) capsule 100 mg 100 mg 2 Times Daily (BID) 12/27/2017     Sig - Route: Take 1 capsule by mouth 2 (Two) Times a Day. - Oral    epoetin marcel (EPOGEN,PROCRIT) injection 10,000 Units 10,000 Units 3 Times Weekly 12/12/2017     Sig - Route: Inject 1 mL under the skin Once per day on Tue Thu Sat. - Subcutaneous    furosemide (LASIX) injection 20 mg 20 mg Once 12/31/2017     Sig - Route: Infuse 2 mL into a venous catheter 1 (One) Time. - Intravenous    glucagon (human recombinant) (GLUCAGEN) injection 1 mg 1 mg Every 15 Minutes PRN 12/7/2017     Sig - Route: Inject 1 mg under the skin Every 15 (Fifteen) Minutes As Needed (Blood Glucose Less Than 70 - Patient Without IV Access - Unresponsive, NPO or Unable To Safely Swallow). - Subcutaneous    heparin (porcine) 5000 UNIT/ML injection 5,000 Units 5,000 Units Every 8 Hours Scheduled 1/2/2018     Sig - Route: Inject 1 mL under the skin Every 8 (Eight) Hours. - Subcutaneous    hydrALAZINE (APRESOLINE) injection 10 mg 10 mg Every 8 Hours PRN 12/11/2017     Sig - Route: Infuse 0.5 mL into a venous catheter Every 8 (Eight) Hours As Needed for High Blood Pressure (SBP >180). - Intravenous    hydrALAZINE (APRESOLINE) tablet 25 mg 25 mg Every 8 Hours PRN 1/2/2018     Sig - Route: Take 1 tablet by mouth Every 8 (Eight) Hours As Needed (SBP>180). - Oral    magnesium hydroxide (MILK OF MAGNESIA) suspension 2400 mg/10mL 10 mL 10 mL 3 Times Daily PRN 1/2/2018     Sig - Route: Take 10 mL by mouth 3 (Three) Times a Day As Needed for Constipation. - Oral    metoclopramide (REGLAN) injection 5 mg 5 mg Every 12 Hours Scheduled 12/29/2017     Sig - Route: Infuse 1 mL into a venous catheter Every 12 (Twelve) Hours. - Intravenous  "   nystatin (MYCOSTATIN) 309107 UNIT/GM cream  Every 12 Hours Scheduled 1/2/2018     Sig - Route: Apply  topically Every 12 (Twelve) Hours. - Topical    ondansetron (ZOFRAN) injection 4 mg 4 mg Every 6 Hours PRN 12/4/2017     Sig - Route: Infuse 2 mL into a venous catheter Every 6 (Six) Hours As Needed for Nausea or Vomiting. - Intravenous    oxyCODONE-acetaminophen (PERCOCET) 5-325 MG per tablet 1 tablet 1 tablet Every 4 Hours PRN 12/11/2017 1/8/2018    Sig - Route: Take 1 tablet by mouth Every 4 (Four) Hours As Needed for Moderate Pain . - Oral    phenol (CHLORASEPTIC) 1.4 % liquid 2 spray 2 spray Every 2 Hours PRN 12/4/2017     Sig - Route: Apply 2 sprays to the mouth or throat Every 2 (Two) Hours As Needed for Sore Throat. - Mouth/Throat    potassium chloride (KLOR-CON) packet 40 mEq 40 mEq As Needed 12/26/2017     Sig - Route: Take 40 mEq by mouth As Needed (potassium replacement, see admin instructions). - Oral    Linked Group 2:  \"Or\" Linked Group Details        potassium chloride (MICRO-K) CR capsule 40 mEq 40 mEq As Needed 12/26/2017     Sig - Route: Take 4 capsules by mouth As Needed (potassium replacement.  see admin instructions). - Oral    Linked Group 2:  \"Or\" Linked Group Details        potassium chloride 10 mEq in 100 mL IVPB 10 mEq Every 1 Hour PRN 12/25/2017     Sig - Route: Infuse 100 mL into a venous catheter Every 1 (One) Hour As Needed (See admin Instructions.). - Intravenous    potassium chloride 10 mEq in 100 mL IVPB 10 mEq Every 1 Hour PRN 12/26/2017     Sig - Route: Infuse 100 mL into a venous catheter Every 1 (One) Hour As Needed (potassium protocol PERIPHERAL - see admin instructions). - Intravenous    Linked Group 2:  \"Or\" Linked Group Details        potassium phosphate 15 mmol in sodium chloride 0.9 % 100 mL infusion 15 mmol As Needed 12/25/2017     Sig - Route: Infuse 15 mmol into a venous catheter As Needed (Peripheral IV - Phosphorus 1.8 - 2.5). - Intravenous    Linked Group 3:  \"Or\" " "Linked Group Details        potassium phosphate 30 mmol in sodium chloride 0.9 % 250 mL infusion 30 mmol As Needed 12/25/2017     Sig - Route: Infuse 30 mmol into a venous catheter As Needed (Peripheral IV - Phosphorus 1.3 - 1.7). - Intravenous    Linked Group 3:  \"Or\" Linked Group Details        potassium phosphate 45 mmol in sodium chloride 0.9 % 500 mL infusion 45 mmol As Needed 12/25/2017     Sig - Route: Infuse 45 mmol into a venous catheter As Needed (Peripheral IV - Phosphorus Less Than 1.3). - Intravenous    Linked Group 3:  \"Or\" Linked Group Details        QUEtiapine (SEROquel) tablet 25 mg 25 mg Nightly 1/5/2018     Sig - Route: Take 1 tablet by mouth Every Night. - Oral    sertraline (ZOLOFT) tablet 100 mg 100 mg Daily 12/11/2017     Sig - Route: Take 1 tablet by mouth Daily. - Oral    sodium bicarbonate tablet 650 mg 650 mg 3 Times Daily 12/11/2017     Sig - Route: Take 1 tablet by mouth 3 (Three) Times a Day. - Oral    sodium chloride 0.9 % flush 1-10 mL 1-10 mL As Needed 1/1/2018     Sig - Route: Infuse 1-10 mL into a venous catheter As Needed for Line Care. - Intravenous    sodium chloride 0.9 % flush 10 mL 10 mL As Needed 12/4/2017     Sig - Route: Infuse 10 mL into a venous catheter As Needed for Line Care. - Intravenous    Cosign for Ordering: Accepted by Inderjit Santizo MD on 12/5/2017  5:00 PM    sodium phosphates 15 mmol in sodium chloride 0.9 % 250 mL IVPB 15 mmol As Needed 12/24/2017     Sig - Route: Infuse 15 mmol into a venous catheter As Needed (Central Line - Phosphorus 1.3 - 1.7 mg/dL). - Intravenous    Linked Group 4:  \"Or\" Linked Group Details        sodium phosphates 15 mmol in sodium chloride 0.9 % 250 mL IVPB 15 mmol As Needed 12/25/2017     Sig - Route: Infuse 15 mmol into a venous catheter As Needed (Peripheral IV - Phosphorus 1.8 - 2.5 & Potassium Greater Than 4). - Intravenous    Linked Group 3:  \"Or\" Linked Group Details        sodium phosphates 21 mmol in sodium chloride 0.9 % " "250 mL IVPB 21 mmol As Needed 12/24/2017     Sig - Route: Infuse 21 mmol into a venous catheter As Needed (Central Line - Phosphorus Less Than 1.3 mg/dL). - Intravenous    Linked Group 4:  \"Or\" Linked Group Details        sodium phosphates 30 mmol in sodium chloride 0.9 % 250 mL IVPB 30 mmol As Needed 12/25/2017     Sig - Route: Infuse 30 mmol into a venous catheter As Needed (Peripheral IV - Phosphorus 1.3-1.7 & Potassium Greater Than 4). - Intravenous    Linked Group 3:  \"Or\" Linked Group Details        sodium phosphates 45 mmol in sodium chloride 0.9 % 500 mL IVPB 45 mmol As Needed 12/25/2017     Sig - Route: Infuse 45 mmol into a venous catheter As Needed (Peripheral IV - Phosphorus Less Than 1.3 & Potassium Greater Than 4). - Intravenous    Linked Group 3:  \"Or\" Linked Group Details        sodium phosphates 9 mmol in sodium chloride 0.9 % 250 mL IVPB 9 mmol As Needed 12/24/2017     Sig - Route: Infuse 9 mmol into a venous catheter As Needed (Central Line - Phosphorus 1.8 - 2.5 mg/dL). - Intravenous    Linked Group 4:  \"Or\" Linked Group Details        thiamine (VITAMIN B-1) tablet 100 mg 100 mg Daily 1/3/2018     Sig - Route: Take 1 tablet by mouth Daily. - Oral    amLODIPine (NORVASC) tablet 5 mg (Discontinued) 5 mg Every 24 Hours Scheduled 12/28/2017 1/5/2018    Sig - Route: Take 1 tablet by mouth Daily. - Oral    QUEtiapine (SEROquel) tablet 25 mg (Discontinued) 25 mg Daily PRN 1/2/2018 1/5/2018    Sig - Route: Take 1 tablet by mouth Daily As Needed (agitation). - Oral             Physician Progress Notes (most recent note)      Ирина Cobian MD at 1/5/2018  2:12 PM  Version 1 of 1         Tereza Ackerman  1955  [id number]    Surgery Progress Note    Date of visit: 1/5/2018    Subjective:overall feels better  Taking PO with adequate bowel function    Objective:    /85  Pulse 76  Temp 98.3 °F (36.8 °C) (Oral)   Resp 18  Ht 160 cm (62.99\")  Wt 73.8 kg (162 lb 12.8 oz)  SpO2 98%  BMI " 28.85 kg/m2    Intake/Output Summary (Last 24 hours) at 01/05/18 1412  Last data filed at 01/05/18 0736   Gross per 24 hour   Intake                0 ml   Output              715 ml   Net             -715 ml       CV: Regular rate and rhythm  L: normal air entry  Abd: soft  wound granulating well      LABS:      Results from last 7 days  Lab Units 01/05/18  0629   WBC 10*3/mm3 8.05   HEMOGLOBIN g/dL 8.9*   HEMATOCRIT % 29.2*   PLATELETS 10*3/mm3 359       Results from last 7 days  Lab Units 01/05/18  0629   SODIUM mmol/L 142   POTASSIUM mmol/L 3.5   CHLORIDE mmol/L 102   CO2 mmol/L 28.0   BUN mg/dL 25*   CREATININE mg/dL 2.10*   CALCIUM mg/dL 7.9*   GLUCOSE mg/dL 82       Results from last 7 days  Lab Units 01/05/18  0629   SODIUM mmol/L 142   POTASSIUM mmol/L 3.5   CHLORIDE mmol/L 102   CO2 mmol/L 28.0   BUN mg/dL 25*   CREATININE mg/dL 2.10*   GLUCOSE mg/dL 82   CALCIUM mg/dL 7.9*       Lab Results  Lab Value Date/Time   LIPASE 52 (H) 12/04/2017 1043   LIPASE 39 06/30/2017 1959         Assessment/ Plan: stable course  Continue with current management   Awaiting rehab placement      Problem List Items Addressed This Visit     Hypertension    Relevant Medications    amLODIPine (NORVASC) tablet 10 mg (Start on 1/6/2018  9:00 AM)    * (Principal)Small bowel obstruction - Primary    Relevant Orders    Tissue Pathology Exam - Tissue, Small Intestine (Completed)    Acute renal failure    Relevant Medications    potassium chloride (MICRO-K) CR capsule 40 mEq    potassium chloride (KLOR-CON) packet 40 mEq    potassium chloride 10 mEq in 100 mL IVPB      Other Visit Diagnoses     Intractable vomiting with nausea, unspecified vomiting type        Uncontrolled hypertension                Ирина Cobian MD  1/5/2018  2:12 PM       Electronically signed by Ирина Cobian MD at 1/5/2018  2:13 PM           Consult Notes (most recent note)      Alli Aguirre MD at 12/21/2017 11:48 AM  Version 2 of 2     Consult  Orders:    1. Inpatient Consult to Cardiology [[id number]] ordered by AYDEE Thomason at 17 0538                [facility]        Date of Hospital Visit: 17      Place of Service: [facility]    Patient Name: Tereza Ackerman  :1955    Referral Provider: Christine Bourgeois MD  Primary Care Provider: Michaela Connell MD    Chief complaint/Reason for Consultation:  Atrial Flutter    Subjective    Problem List:  Problem   Atrial Flutter With Rapid Ventricular Response    1. Echo :  · LVEF difficult to evaluate with tachycardia- globally appears mildly depressed.  · Left ventricular wall thickness is consistent with concentric hypertrophy.  · Mild tricuspid valve regurgitation is present.  · Calculated right ventricular systolic pressure from tricuspid regurgitation is 32 mmHg.     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      Meningioma, Recurrent of Brain    First occurrence with resection by Bunny in .   Recurrence with resection by Bunny in .      Hypertension    Impression: 2015 - BP slightly elevated on the 1/2 dose of losartan.  Hence adv. to take the whole pill..  Impression: 2015 - BP better on the 1/2 dose of losartan.  Impression: 2015 - Adv. to cut back on her lis/hct to 1/2 tab daily.  Monitor BP regularly.;      History of Nissen Fundoplication   Arthritis   Hypertensive Crisis (Resolved)       History of Present Illness:  This is a 62-year-old hypertensive dyslipidemic female admitted for small bowel obstruction.  She has a history of recent left sphenoid resection for meningioma.  Last night she had sudden onset of atrial flutter with rapid ventricular response.  She was moderately aware of her rate and rhythm.  She reports noticing fluttering in  her chest but did not complaining of chest pain.  She has no prior significant cardiac history.  She was started on a Cardizem drip and has subsequently converted to normal sinus rhythm.  She's had serial troponins overnight which peaked at 0.051 with declined to 0.048.  She is currently comfortable in no apparent distress.  She is a moderately poor historian.  An echocardiogram was obtained that was difficult to interpret due to rapid ventricular response.  She had hemodialysis earlier this morning.  She had mild hyperkalemia during this admission with a peak of 6 which has subsequently normalized.  She is moderately anemic with a hemoglobin of 7.5.          Past Medical History:   Diagnosis Date   • Acid reflux    • Arthritis    • Atrial flutter with rapid ventricular response 12/21/2017   • Back pain    • Brain tumor    • Depression    • History of blood transfusion    • History of Nissen fundoplication 7/1/2017   • Hyperlipemia    • Hypertension    • Memory loss or impairment    • Migraine    • Parathyroid adenoma     Incidental on thyroid US.   • PONV (postoperative nausea and vomiting)     nausea - preprocedural meds help    • Prediabetes     Last Impression: 06 Feb 2015  Reviewed labs. Excellent control.  Maren Connellast Impression: 06 Feb 2015  Reviewed labs. Excellent control.  Michaela Connell   • Thyroid nodule      Last Impression: 13 Jun 2015  r/o thyroid cancer, will proceed with US.  Michaela Connell (Internal Medicine)    • UTI (urinary tract infection)    • Vision changes     blockages left eye    • Wears glasses     readers       Past Surgical History:   Procedure Laterality Date   • BRAIN SURGERY  2003 & 2014    Dr. Werner Hollis   • CARPAL TUNNEL RELEASE  2002    right    • COLONOSCOPY     • ENDOSCOPY     • EXPLORATORY LAPAROTOMY N/A 12/5/2017    Procedure: EXPLORATORY LAPAROTOMY, SMALL BOWEL RESECTION;  Surgeon: Ирина Cobian MD;  Location: [facility] OR;  Service:    •  HYSTERECTOMY      partial - both ovaries still present pt believes    • INSERTION HEMODIALYSIS CATHETER N/A 12/7/2017    Procedure: HEMODIALYSIS CATHETER INSERTION;  Surgeon: Chance Valenzuela MD;  Location:  [facility];  Service:    • ORBITOTOMY Left 11/3/2017    Procedure:  LEFT LATERAL ORBITOTOMY WITH DEBULKING OF TUMOR ;  Surgeon: Moo Hopkins MD;  Location:  [facility];  Service:    • OTHER SURGICAL HISTORY      craniotomy tumor removal-complete   • OTHER SURGICAL HISTORY      esophagogastric fundoplasty nissen fundoplication   • TUBAL ABDOMINAL LIGATION         Allergies   Allergen Reactions   • Dilantin [Phenytoin Sodium Extended] Rash   • Phenytoin Rash     duplicate      Prescriptions Prior to Admission   Medication Sig Dispense Refill Last Dose   • HYDROcodone-acetaminophen (NORCO) 5-325 MG per tablet Take 1 tablet by mouth Every 4 (Four) Hours As Needed.      • losartan-hydrochlorothiazide (HYZAAR) 50-12.5 MG per tablet Take 1 tablet by mouth Daily. Replaces lisinopril 30 tablet 0 Taking   • predniSONE (DELTASONE) 20 MG tablet Take 60 mg by mouth Daily.      • promethazine (PHENERGAN) 25 MG tablet Take 1 tablet by mouth every 6 (six) hours as needed for nausea or vomiting. 30 tablet 2 Taking   • sertraline (ZOLOFT) 100 MG tablet Take 1 tablet by mouth Daily. 90 tablet 0    • traZODone (DESYREL) 50 MG tablet Take 50 mg by mouth At Night As Needed for Sleep.   Taking       Current Facility-Administered Medications:   •  acetaminophen (TYLENOL) tablet 650 mg, 650 mg, Oral, Q6H PRN, Ирина Cobian MD, 650 mg at 12/20/17 1617  •  Adult Central 2-in-1 TPN, , Intravenous, Continuous, Deborah Limon [facility], Last Rate: 70 mL/hr at 12/20/17 1718  •  albumin human 25 % IV SOLN 12.5 g, 12.5 g, Intravenous, PRN, Arian Jacobo MD  •  ALPRAZolam (XANAX) tablet 0.5 mg, 0.5 mg, Oral, TID PRN, Ирина Cobian MD, 0.5 mg at 12/21/17 0045  •  bisacodyl (DULCOLAX) suppository 10 mg, 10 mg, Rectal,  TID PRN, Ирина Cobian MD, 10 mg at 17 1132  •  calcium carbonate (TUMS) chewable tablet 500 mg (200 mg elemental), 2 tablet, Oral, TID PRN, Kelsy Carrizales, APRN, 2 tablet at 17 1040  •  dextrose (D50W) solution 25 g, 25 g, Intravenous, Q15 Min PRN, Julia V. Case, DO  •  dextrose (GLUTOSE) oral gel 15 g, 15 g, Oral, Q15 Min PRN, Julia V. Case, DO  •  diltiaZEM (CARDIZEM) 125mg/125 mL infusion, 5-15 mg/hr, Intravenous, Titrated, Yazmin Bell, APRN, Last Rate: 5 mL/hr at 17 0025, 5 mg/hr at 17 0025  •  doxycycline (VIBRAMYCIN) 100 mg in sodium chloride 0.9 % 250 mL IVPB, 100 mg, Intravenous, Q12H, Jesús Méndez, RP, 100 mg at 17 1145  •  epoetin marcel (EPOGEN,PROCRIT) injection 10,000 Units, 10,000 Units, Subcutaneous, Once per day on Tue Thu Sat, Berryisabela Ramos MD, 10,000 Units at 17 0943  •  famotidine (PEPCID) tablet 20 mg, 20 mg, Oral, Daily, Riddhi Sherman, [facility], 20 mg at 17 1112  •  [] Adult Central 2-in-1 TPN, , Intravenous, Continuous, Last Rate: 70 mL/hr at 17 1535 **AND** fat emulsion (INTRALIPID,LIPOSYN) 20 % infusion emulsion 250 mL, 250 mL, Intravenous, Once per day on , Christine Bourgeois MD, Last Rate: 40 mL/hr at 17 182, 250 mL at 17 182  •  ferric gluconate (FERRLECIT) 125 mg in sodium chloride 0.9 % 260 mL IVPB, 125 mg, Intravenous, Daily, Deborah Limon [facility], Last Rate: 260 mL/hr at 12/20/17 0823, 125 mg at 17 0823  •  glucagon (human recombinant) (GLUCAGEN) injection 1 mg, 1 mg, Subcutaneous, Q15 Min PRN, Julia V. Case, DO  •  haloperidol lactate (HALDOL) injection 2 mg, 2 mg, Intravenous, Q6H PRN, Julia V. Case, DO  •  heparin (porcine) 5000 UNIT/ML injection 5,000 Units, 5,000 Units, Subcutaneous, Q12H, Val Barker APRN, 5,000 Units at 17 1112  •  hydrALAZINE (APRESOLINE) injection 10 mg, 10 mg, Intravenous, Q8H PRN, Christine Bourgeois MD  •  insulin regular  (humuLIN R,novoLIN R) injection 0-7 Units, 0-7 Units, Subcutaneous, Q6H, Ирина Cobian MD, 2 Units at 12/19/17 0051  •  magnesium hydroxide (MILK OF MAGNESIA) suspension 2400 mg/10mL 10 mL, 10 mL, Oral, BID, Ирина Cobian MD  •  metoclopramide (REGLAN) injection 5 mg, 5 mg, Intravenous, Q6H, Riddhi Sherman, [facility], 5 mg at 12/21/17 1147  •  metoprolol tartrate (LOPRESSOR) tablet 25 mg, 25 mg, Oral, Q12H, Yazmin Bell, APRN  •  ondansetron (ZOFRAN) injection 4 mg, 4 mg, Intravenous, Q6H PRN, Christine Bourgeois MD, 4 mg at 12/13/17 0901  •  oxyCODONE-acetaminophen (PERCOCET) 5-325 MG per tablet 1 tablet, 1 tablet, Oral, Q4H PRN, Rachel SINGH MD, 1 tablet at 12/20/17 2252  •  Pharmacy Consult - Pharmacy to dose, , Does not apply, Continuous PRN, Yazmin Bell, APRN  •  Pharmacy to Dose TPN, , Does not apply, Continuous PRN, Ирина Cobian MD  •  Pharmacy to dose vancomycin, , Does not apply, Continuous PRN, Yazmin Bell, APRN  •  phenol (CHLORASEPTIC) 1.4 % liquid 2 spray, 2 spray, Mouth/Throat, Q2H PRN, Ирина Cobian MD  •  piperacillin-tazobactam (ZOSYN) in iso-osmotic dextrose IVPB 2.25 g (premix), 2.25 g, Intravenous, Q8H, Jesús Méndez, [facility], 2.25 g at 12/21/17 0712  •  promethazine (PHENERGAN) tablet 12.5 mg, 12.5 mg, Oral, Q6H PRN **OR** promethazine (PHENERGAN) injection 12.5 mg, 12.5 mg, Intravenous, Q6H PRN, 12.5 mg at 12/11/17 0035 **OR** promethazine (PHENERGAN) suppository 12.5 mg, 12.5 mg, Rectal, Q6H PRN, Christine Bourgeois MD  •  sertraline (ZOLOFT) tablet 100 mg, 100 mg, Oral, Daily, Christine Bourgeois MD, 100 mg at 12/21/17 1112  •  sodium bicarbonate tablet 650 mg, 650 mg, Oral, TID, Jamal Ramos MD, 650 mg at 12/21/17 1112  •  sodium chloride 0.9 % flush 10 mL, 10 mL, Intravenous, PRN, Inderjit Santizo MD  •  [COMPLETED] thiamine (B-1) 500 mg in sodium chloride 0.9 % 250 mL IVPB, 500 mg, Intravenous, Daily, Last Rate: 500 mL/hr at 12/17/17 0821, 500 mg at  12/17/17 0821 **FOLLOWED BY** [COMPLETED] thiamine (B-1) 250 mg in sodium chloride 0.9 % 250 mL IVPB, 250 mg, Intravenous, Daily, Last Rate: 500 mL/hr at 12/20/17 0823, 250 mg at 12/20/17 0823 **FOLLOWED BY** thiamine (B-1) injection 100 mg, 100 mg, Intravenous, Daily, Rich AGUILAR Amezquita, [facility], 100 mg at 12/21/17 1146  •  vancomycin (dosing per levels), , Does not apply, Daily, Jesús VALENTINE Méndez, [facility]      Social History     Social History   • Marital status:      Spouse name: N/A   • Number of children: N/A   • Years of education: N/A     Occupational History   • Not on file.     Social History Main Topics   • Smoking status: Never Smoker   • Smokeless tobacco: Never Used   • Alcohol use No   • Drug use: No   • Sexual activity: Defer     Other Topics Concern   • Not on file     Social History Narrative       Family History   Problem Relation Age of Onset   • Obesity Mother    • Heart attack Mother    • Migraines Father    • Stroke Father    • Cancer Other    • Diabetes Other    • Breast cancer Maternal Aunt    • Breast cancer Paternal Aunt    • Ovarian cancer Neg Hx        REVIEW OF SYSTEMS:   Review of Systems   Constitution: Negative.   HENT: Negative.    Eyes: Negative.    Cardiovascular: Positive for irregular heartbeat. Negative for chest pain, leg swelling, orthopnea and paroxysmal nocturnal dyspnea.   Respiratory: Negative.    Endocrine: Negative.    Hematologic/Lymphatic: Negative.    Skin: Negative.    Musculoskeletal: Negative.    Gastrointestinal: Negative.    Genitourinary: Negative.    Neurological: Negative.    Psychiatric/Behavioral: Negative.    Allergic/Immunologic: Negative.    All other systems reviewed and are negative.      Objective    Objective:  Vitals:    12/21/17 1000 12/21/17 1030 12/21/17 1100 12/21/17 1105   BP: (!) 133/112 129/95 100/52 94/66   BP Location: Right arm Right arm Right arm Right arm   Patient Position: Lying Lying Lying Lying   Pulse: 78 76 67 73   Resp:    16   Temp:     "97.4 °F (36.3 °C)   TempSrc:    Tympanic   SpO2:       Weight:       Height:         Body mass index is 29.2 kg/(m^2).  Flowsheet Rows         First Filed Value    Admission Height  160 cm (63\") Documented at 12/04/2017 1002    Admission Weight  68.9 kg (152 lb) Documented at 12/04/2017 1002          Intake/Output Summary (Last 24 hours) at 12/21/17 1159  Last data filed at 12/21/17 1148   Gross per 24 hour   Intake             1621 ml   Output             2380 ml   Net             -759 ml       Physical Exam   General: No acute distress, well-developed and well-nourished.    Skin: Skin is warm and dry. No obvious cyanosis, erythema or pallor.   HEENT: Atraumatic, normocephalic, no conjunctival pallor, no scleral icterus.   Neck: Supple, no JVD. Normal carotid upstrokes, no bruits.    Chest:No respiratory distres No chest wall tenderness. Breath sounds are normal. No wheezes,  rhonchi or rales.  Cardiovascular: Normal S1 and S2, no murmer, gallop or rub. PMI is not displaced.    Pulses:Radial and pedal pulses are 2+ and symmetric.    Abdomen: Soft, mildly distended, tender, normal bowel sounds.   Musculoskeletal/Extremities:  No clubbing, cyanosis or edema. No gross deformity.   Neurological: Alert and oriented to person, place, and time, no gross focal deficits.   Psychiatric: Normal mood and affect.Speech and behavior is normal.        Lab Review:                  Results from last 7 days  Lab Units 12/21/17  0504   SODIUM mmol/L 131*   POTASSIUM mmol/L 4.1   CHLORIDE mmol/L 95*   CO2 mmol/L 24.0   BUN mg/dL 81*   CREATININE mg/dL 4.70*   GLUCOSE mg/dL 134*   CALCIUM mg/dL 9.3       Results from last 7 days  Lab Units 12/21/17  0705 12/21/17  0504 12/20/17  2359   CK TOTAL U/L  --  60  --    TROPONIN I ng/mL 0.048* 0.051* 0.051*       Results from last 7 days  Lab Units 12/21/17  0504   WBC 10*3/mm3 14.96*   HEMOGLOBIN g/dL 7.5*   HEMATOCRIT % 23.0*   PLATELETS 10*3/mm3 254       Results from last 7 days  Lab " Units 17  0504 17  2359   INR  1.13 1.10   APTT seconds 51.9* 53.6*       Results from last 7 days  Lab Units 17  0504   MAGNESIUM mg/dL 2.2               EKG:                   Assessment      Assessment:   · Atrial fibrillation/flutter with RVR, started around 10PM yesterday, converted on diltiazem but reverted to AFib  · Mildly elevated troponin likely secondary to RVR, no angina  · Anemia with hemoglobin 7.5  · Hypertension  · Hyperlipidemia  · SBO    Plan:   · Due to being back in AFib/Flutter, will discontinue diltiazem and start amio bolus/gtt (within 24 hrs of AFib onset)  · ASA 325mg daily for now; possible anti-coagulation depending on duration of AFib/Flutter. CHADSVASc almost 3 (Age 62, gender, HTN). Choice of anti-coagulation will depend on whether or not the patient needs surgery for abdominal syndrome  · Will consider future outpatient stress testing once improved from abdominal standpoint    Scribed for Alli Aguirre MD, by Werner Augustine PA-C. 2017  11:59 AM     I, Alli Aguirre MD, personally performed the services as scribed by the above named individual. I have made any necessary edits and it is both accurate and complete.     Alli Aguirre MD, MSc, [facility]  Interventional Cardiology  [facility]           Electronically signed by Alli Aguirre MD at 2017  4:21 PM      Alli Aguirre MD at 2017 11:48 AM  Version 1 of 2         [facility] at [facility]        Date of Hospital Visit: 17      Place of Service: [facility]    Patient Name: Tereza Ackerman  :1955    Referral Provider: Christine Bourgeois MD  Primary Care Provider: Michaela Connell MD    Chief complaint/Reason for Consultation:  Atrial Flutter    Subjective    Problem List:  Problem   Atrial Flutter With Rapid Ventricular Response    1. Echo -:  · LVEF difficult to evaluate with tachycardia- globally appears mildly  depressed.  · Left ventricular wall thickness is consistent with concentric hypertrophy.  · Mild tricuspid valve regurgitation is present.  · Calculated right ventricular systolic pressure from tricuspid regurgitation is 32 mmHg.     Elevated Troponin Level   Acute Renal Failure   Hyperglycemia   Hyperlipemia   Malignant Neoplasm of Left Orbit    Left Sphenoid Wing Meningioma s/p resection in November 2017 by Dr. Hollis.      Meningioma, Recurrent of Brain    First occurrence with resection by Bunny in 2003.   Recurrence with resection by Bunny in 2014.      Hypertension    Impression: 11/16/2015 - BP slightly elevated on the 1/2 dose of losartan.  Hence adv. to take the whole pill..  Impression: 11/16/2015 - BP better on the 1/2 dose of losartan.  Impression: 02/06/2015 - Adv. to cut back on her lis/hct to 1/2 tab daily.  Monitor BP regularly.;      History of Nissen Fundoplication   Arthritis   Hypertensive Crisis (Resolved)       History of Present Illness:  This is a 62-year-old hypertensive dyslipidemic female admitted for small bowel obstruction.  She has a history of recent left sphenoid resection for meningioma.  Last night she had sudden onset of atrial flutter with rapid ventricular response.  She was moderately aware of her rate and rhythm.  She reports noticing fluttering in her chest but did not complaining of chest pain.  She has no prior significant cardiac history.  She was started on a Cardizem drip and has subsequently converted to normal sinus rhythm.  She's had serial troponins overnight which peaked at 0.051 with declined to 0.048.  She is currently comfortable in no apparent distress.  She is a moderately poor historian.  An echocardiogram was obtained that was difficult to interpret due to rapid ventricular response.  She had hemodialysis earlier this morning.  She had mild hyperkalemia during this admission with a peak of 6 which has subsequently normalized.  She is moderately anemic with a  hemoglobin of 7.5.          Past Medical History:   Diagnosis Date   • Acid reflux    • Arthritis    • Atrial flutter with rapid ventricular response 12/21/2017   • Back pain    • Brain tumor    • Depression    • History of blood transfusion    • History of Nissen fundoplication 7/1/2017   • Hyperlipemia    • Hypertension    • Memory loss or impairment    • Migraine    • Parathyroid adenoma     Incidental on thyroid US.   • PONV (postoperative nausea and vomiting)     nausea - preprocedural meds help    • Prediabetes     Last Impression: 06 Feb 2015  Reviewed labs. Excellent control.  Maren Connellast Impression: 06 Feb 2015  Reviewed labs. Excellent control.  Michaela Connell   • Thyroid nodule      Last Impression: 13 Jun 2015  r/o thyroid cancer, will proceed with US.  Michaela Connell (Internal Medicine)    • UTI (urinary tract infection)    • Vision changes     blockages left eye    • Wears glasses     readers       Past Surgical History:   Procedure Laterality Date   • BRAIN SURGERY  2003 & 2014    Dr. Werner Hollis   • CARPAL TUNNEL RELEASE  2002    right    • COLONOSCOPY     • ENDOSCOPY     • EXPLORATORY LAPAROTOMY N/A 12/5/2017    Procedure: EXPLORATORY LAPAROTOMY, SMALL BOWEL RESECTION;  Surgeon: Ирина Cobian MD;  Location:  [facility];  Service:    • HYSTERECTOMY      partial - both ovaries still present pt believes    • INSERTION HEMODIALYSIS CATHETER N/A 12/7/2017    Procedure: HEMODIALYSIS CATHETER INSERTION;  Surgeon: Chance Valenzuela MD;  Location:  [facility];  Service:    • ORBITOTOMY Left 11/3/2017    Procedure:  LEFT LATERAL ORBITOTOMY WITH DEBULKING OF TUMOR ;  Surgeon: Moo Hopkins MD;  Location:  [facility];  Service:    • OTHER SURGICAL HISTORY      craniotomy tumor removal-complete   • OTHER SURGICAL HISTORY      esophagogastric fundoplasty nissen fundoplication   • TUBAL ABDOMINAL LIGATION         Allergies   Allergen Reactions   • Dilantin [Phenytoin Sodium  Extended] Rash   • Phenytoin Rash     duplicate      Prescriptions Prior to Admission   Medication Sig Dispense Refill Last Dose   • HYDROcodone-acetaminophen (NORCO) 5-325 MG per tablet Take 1 tablet by mouth Every 4 (Four) Hours As Needed.      • losartan-hydrochlorothiazide (HYZAAR) 50-12.5 MG per tablet Take 1 tablet by mouth Daily. Replaces lisinopril 30 tablet 0 Taking   • predniSONE (DELTASONE) 20 MG tablet Take 60 mg by mouth Daily.      • promethazine (PHENERGAN) 25 MG tablet Take 1 tablet by mouth every 6 (six) hours as needed for nausea or vomiting. 30 tablet 2 Taking   • sertraline (ZOLOFT) 100 MG tablet Take 1 tablet by mouth Daily. 90 tablet 0    • traZODone (DESYREL) 50 MG tablet Take 50 mg by mouth At Night As Needed for Sleep.   Taking       Current Facility-Administered Medications:   •  acetaminophen (TYLENOL) tablet 650 mg, 650 mg, Oral, Q6H PRN, Ирина Cobian MD, 650 mg at 12/20/17 1617  •  Adult Central 2-in-1 TPN, , Intravenous, Continuous, Deborah Limon, [facility], Last Rate: 70 mL/hr at 12/20/17 1718  •  albumin human 25 % IV SOLN 12.5 g, 12.5 g, Intravenous, PRN, Arian Jacobo MD  •  ALPRAZolam (XANAX) tablet 0.5 mg, 0.5 mg, Oral, TID PRN, Ирина Cobian MD, 0.5 mg at 12/21/17 0045  •  bisacodyl (DULCOLAX) suppository 10 mg, 10 mg, Rectal, TID PRN, Ирина Cobian MD, 10 mg at 12/17/17 1132  •  calcium carbonate (TUMS) chewable tablet 500 mg (200 mg elemental), 2 tablet, Oral, TID PRN, Kelsy Carrizales APRN, 2 tablet at 12/20/17 1040  •  dextrose (D50W) solution 25 g, 25 g, Intravenous, Q15 Min PRN, Julia V. Case, DO  •  dextrose (GLUTOSE) oral gel 15 g, 15 g, Oral, Q15 Min PRN, Julia V. Case, DO  •  diltiaZEM (CARDIZEM) 125mg/125 mL infusion, 5-15 mg/hr, Intravenous, Titrated, Yazmin Bell, APRN, Last Rate: 5 mL/hr at 12/21/17 0025, 5 mg/hr at 12/21/17 0025  •  doxycycline (VIBRAMYCIN) 100 mg in sodium chloride 0.9 % 250 mL IVPB, 100 mg, Intravenous, Q12H,  Jesús Méndez, [facility], 100 mg at 17 1145  •  epoetin marcel (EPOGEN,PROCRIT) injection 10,000 Units, 10,000 Units, Subcutaneous, Once per day on Tue Thu Sat, Jamal Ramos MD, 10,000 Units at 17 0943  •  famotidine (PEPCID) tablet 20 mg, 20 mg, Oral, Daily, Riddhi Sherman, [facility], 20 mg at 17 1112  •  [] Adult Central 2-in-1 TPN, , Intravenous, Continuous, Last Rate: 70 mL/hr at 17 1535 **AND** fat emulsion (INTRALIPID,LIPOSYN) 20 % infusion emulsion 250 mL, 250 mL, Intravenous, Once per day on , Christine Bourgeois MD, Last Rate: 40 mL/hr at 17 1821, 250 mL at 17 1821  •  ferric gluconate (FERRLECIT) 125 mg in sodium chloride 0.9 % 260 mL IVPB, 125 mg, Intravenous, Daily, Deborah Limon, [facility], Last Rate: 260 mL/hr at 17 0823, 125 mg at 17 0823  •  glucagon (human recombinant) (GLUCAGEN) injection 1 mg, 1 mg, Subcutaneous, Q15 Min PRN, Julia V. Case, DO  •  haloperidol lactate (HALDOL) injection 2 mg, 2 mg, Intravenous, Q6H PRN, Julia V. Case, DO  •  heparin (porcine) 5000 UNIT/ML injection 5,000 Units, 5,000 Units, Subcutaneous, Q12H, Val Barker, APRN, 5,000 Units at 17 1112  •  hydrALAZINE (APRESOLINE) injection 10 mg, 10 mg, Intravenous, Q8H PRN, Christine Bourgeois MD  •  insulin regular (humuLIN R,novoLIN R) injection 0-7 Units, 0-7 Units, Subcutaneous, Q6H, Ирина Cobian MD, 2 Units at 17 0051  •  magnesium hydroxide (MILK OF MAGNESIA) suspension 2400 mg/10mL 10 mL, 10 mL, Oral, BID, Ирина Cobian MD  •  metoclopramide (REGLAN) injection 5 mg, 5 mg, Intravenous, Q6H, Riddhi Sherman, [facility], 5 mg at 17 1147  •  metoprolol tartrate (LOPRESSOR) tablet 25 mg, 25 mg, Oral, Q12H, AYDEE Thomason  •  ondansetron (ZOFRAN) injection 4 mg, 4 mg, Intravenous, Q6H PRN, Christine Bourgeois MD, 4 mg at 17 0901  •  oxyCODONE-acetaminophen (PERCOCET) 5-325 MG per tablet 1 tablet, 1 tablet, Oral, Q4H PRN, Rachel  Martín SINGH MD, 1 tablet at 12/20/17 2252  •  Pharmacy Consult - Pharmacy to dose, , Does not apply, Continuous PRN, Yazmin Bell APRN  •  Pharmacy to Dose TPN, , Does not apply, Continuous PRN, Ирина Cobian MD  •  Pharmacy to dose vancomycin, , Does not apply, Continuous PRN, Yazmin Bell, APRN  •  phenol (CHLORASEPTIC) 1.4 % liquid 2 spray, 2 spray, Mouth/Throat, Q2H PRN, Ирина Cobian MD  •  piperacillin-tazobactam (ZOSYN) in iso-osmotic dextrose IVPB 2.25 g (premix), 2.25 g, Intravenous, Q8H, Jesús Méndez, [facility], 2.25 g at 12/21/17 0712  •  promethazine (PHENERGAN) tablet 12.5 mg, 12.5 mg, Oral, Q6H PRN **OR** promethazine (PHENERGAN) injection 12.5 mg, 12.5 mg, Intravenous, Q6H PRN, 12.5 mg at 12/11/17 0035 **OR** promethazine (PHENERGAN) suppository 12.5 mg, 12.5 mg, Rectal, Q6H PRN, Christine Bourgeois MD  •  sertraline (ZOLOFT) tablet 100 mg, 100 mg, Oral, Daily, Christine Bourgeois MD, 100 mg at 12/21/17 1112  •  sodium bicarbonate tablet 650 mg, 650 mg, Oral, TID, Jamal Ramos MD, 650 mg at 12/21/17 1112  •  sodium chloride 0.9 % flush 10 mL, 10 mL, Intravenous, PRN, Inderjit Santizo MD  •  [COMPLETED] thiamine (B-1) 500 mg in sodium chloride 0.9 % 250 mL IVPB, 500 mg, Intravenous, Daily, Last Rate: 500 mL/hr at 12/17/17 0821, 500 mg at 12/17/17 0821 **FOLLOWED BY** [COMPLETED] thiamine (B-1) 250 mg in sodium chloride 0.9 % 250 mL IVPB, 250 mg, Intravenous, Daily, Last Rate: 500 mL/hr at 12/20/17 0823, 250 mg at 12/20/17 0823 **FOLLOWED BY** thiamine (B-1) injection 100 mg, 100 mg, Intravenous, Daily, Rich Amezquita [facility], 100 mg at 12/21/17 1146  •  vancomycin (dosing per levels), , Does not apply, Daily, Jesús Méndez [facility]      Social History     Social History   • Marital status:      Spouse name: N/A   • Number of children: N/A   • Years of education: N/A     Occupational History   • Not on file.     Social History Main Topics   • Smoking status: Never Smoker   •  "Smokeless tobacco: Never Used   • Alcohol use No   • Drug use: No   • Sexual activity: Defer     Other Topics Concern   • Not on file     Social History Narrative       Family History   Problem Relation Age of Onset   • Obesity Mother    • Heart attack Mother    • Migraines Father    • Stroke Father    • Cancer Other    • Diabetes Other    • Breast cancer Maternal Aunt    • Breast cancer Paternal Aunt    • Ovarian cancer Neg Hx        REVIEW OF SYSTEMS:   Review of Systems   Constitution: Negative.   HENT: Negative.    Eyes: Negative.    Cardiovascular: Positive for irregular heartbeat. Negative for chest pain, leg swelling, orthopnea and paroxysmal nocturnal dyspnea.   Respiratory: Negative.    Endocrine: Negative.    Hematologic/Lymphatic: Negative.    Skin: Negative.    Musculoskeletal: Negative.    Gastrointestinal: Negative.    Genitourinary: Negative.    Neurological: Negative.    Psychiatric/Behavioral: Negative.    Allergic/Immunologic: Negative.    All other systems reviewed and are negative.      Objective    Objective:  Vitals:    12/21/17 1000 12/21/17 1030 12/21/17 1100 12/21/17 1105   BP: (!) 133/112 129/95 100/52 94/66   BP Location: Right arm Right arm Right arm Right arm   Patient Position: Lying Lying Lying Lying   Pulse: 78 76 67 73   Resp:    16   Temp:    97.4 °F (36.3 °C)   TempSrc:    Tympanic   SpO2:       Weight:       Height:         Body mass index is 29.2 kg/(m^2).  Flowsheet Rows         First Filed Value    Admission Height  160 cm (63\") Documented at 12/04/2017 1002    Admission Weight  68.9 kg (152 lb) Documented at 12/04/2017 1002          Intake/Output Summary (Last 24 hours) at 12/21/17 1159  Last data filed at 12/21/17 1148   Gross per 24 hour   Intake             1621 ml   Output             2380 ml   Net             -759 ml       Physical Exam   General: No acute distress, well-developed and well-nourished.    Skin: Skin is warm and dry. No obvious cyanosis, erythema or pallor. "   HEENT: Atraumatic, normocephalic, no conjunctival pallor, no scleral icterus.   Neck: Supple, no JVD. Normal carotid upstrokes, no bruits.    Chest:No respiratory distres No chest wall tenderness. Breath sounds are normal. No wheezes,  rhonchi or rales.  Cardiovascular: Normal S1 and S2, no murmer, gallop or rub. PMI is not displaced.    Pulses:Radial and pedal pulses are 2+ and symmetric.    Abdomen: Soft, mildly distended, tender, normal bowel sounds.   Musculoskeletal/Extremities:  No clubbing, cyanosis or edema. No gross deformity.   Neurological: Alert and oriented to person, place, and time, no gross focal deficits.   Psychiatric: Normal mood and affect.Speech and behavior is normal.        Lab Review:                  Results from last 7 days  Lab Units 12/21/17  0504   SODIUM mmol/L 131*   POTASSIUM mmol/L 4.1   CHLORIDE mmol/L 95*   CO2 mmol/L 24.0   BUN mg/dL 81*   CREATININE mg/dL 4.70*   GLUCOSE mg/dL 134*   CALCIUM mg/dL 9.3       Results from last 7 days  Lab Units 12/21/17  0705 12/21/17  0504 12/20/17  2359   CK TOTAL U/L  --  60  --    TROPONIN I ng/mL 0.048* 0.051* 0.051*       Results from last 7 days  Lab Units 12/21/17  0504   WBC 10*3/mm3 14.96*   HEMOGLOBIN g/dL 7.5*   HEMATOCRIT % 23.0*   PLATELETS 10*3/mm3 254       Results from last 7 days  Lab Units 12/21/17  0504 12/20/17  2359   INR  1.13 1.10   APTT seconds 51.9* 53.6*       Results from last 7 days  Lab Units 12/21/17  0504   MAGNESIUM mg/dL 2.2               EKG:                   Assessment      Assessment:   · Atrial fibrillation/flutter with RVR, started around 10PM yesterday, converted on diltiazem but reverted to AFib  · Mildly elevated troponin likely secondary to RVR, no angina  · Anemia with hemoglobin 7.5  · Hypertension  · Hyperlipidemia  · SBO    Plan:   · Due to being back in AFib/Flutter, will discontinue diltiazem and start amio bolus/gtt (within 24 hrs of AFib onset)  · ASA 325mg daily for now; possible  anti-coagulation depending on duration of AFib/Flutter. CHADSVASc almost 3 (Age 62, gender, HTN). Choice of anti-coagulation will depend on whether or not the patient needs surgery for abdominal syndrome    Scribed for Alli Aguirre MD, by Werner Augustine PA-C. 2017  11:59 AM     I, Alli Aguirre MD, personally performed the services as scribed by the above named individual. I have made any necessary edits and it is both accurate and complete.     Alli Aguirre MD, MSc, [facility]  Interventional Cardiology  [facility]           Electronically signed by Alli Aguirre MD at 2017  4:19 PM           Physical Therapy Notes (most recent note)      Poornima Lebron PTA at 2018 11:00 AM  Version 1 of 1         Acute Care - Physical Therapy Treatment Note  [facility]     Patient Name: Tereza Ackerman  : 1955  MRN: [id number]  Today's Date: 2018  Onset of Illness/Injury or Date of Surgery Date: 17  Date of Referral to PT: 17  Referring Physician: Dr CUI    Admit Date: 2017    Visit Dx:    ICD-10-CM ICD-9-CM   1. Small bowel obstruction K56.609 560.9   2. Intractable vomiting with nausea, unspecified vomiting type R11.2 536.2   3. Uncontrolled hypertension I10 401.9   4. Impaired functional mobility, balance, gait, and endurance Z74.09 V49.89   5. Acute renal failure, unspecified acute renal failure type N17.9 584.9   6. Essential hypertension I10 401.9     Patient Active Problem List   Diagnosis   • Impaired glucose tolerance   • Hypertension   • Parathyroid adenoma   • Reaction to chronic stress   • Multinodular goiter   • Vitamin D deficiency   • Meningioma, recurrent of brain   • Arthritis   • Depression   • Small bowel obstruction   • Insomnia   • History of Nissen fundoplication   • Malignant neoplasm of left orbit   • Prediabetes   • Hyperlipemia   • Hyperglycemia   • Acute renal failure   • Elevated troponin level   • Metabolic acidosis    • Transaminitis   • Atrial flutter with rapid ventricular response               Adult Rehabilitation Note       01/05/18 1025          Rehab Assessment/Intervention    Discipline physical therapy assistant  -AS      Document Type therapy note (daily note)  -AS      Subjective Information agree to therapy;complains of;weakness  -AS      Patient Effort, Rehab Treatment good  -AS      Symptoms Noted During/After Treatment none  -AS      Precautions/Limitations fall precautions;other (see comments)   abdominal incision  -AS      Recorded by [AS] Poornima Lebron PTA      Pain Assessment    Pain Assessment 0-10  -AS      Pain Score 3  -AS      Post Pain Score 3  -AS      Pain Type Acute pain;Surgical pain  -AS      Pain Location Abdomen  -AS      Pain Intervention(s) Repositioned;Ambulation/increased activity  -AS      Response to Interventions tolerated  -AS      Recorded by [AS] Poornima Lebron PTA      Cognitive Assessment/Intervention    Current Cognitive/Communication Assessment functional  -AS      Orientation Status oriented x 4  -AS      Follows Commands/Answers Questions 100% of the time  -AS      Personal Safety WNL/WFL  -AS      Personal Safety Interventions gait belt;nonskid shoes/slippers when out of bed  -AS      Recorded by [AS] Poornima Lebron PTA      Bed Mobility, Assessment/Treatment    Bed Mobility, Comment UIC  -AS      Recorded by [AS] Poornima Lebron PTA      Transfer Assessment/Treatment    Transfers, Sit-Stand [place] contact guard assist  -AS      Transfers, Stand-Sit [place] contact guard assist  -AS      Transfers, Sit-Stand-Sit, Assist Device rolling walker  -AS      Transfer, Comment verbal cues for hand placement  -AS      Recorded by [AS] Poornima Lebron PTA      Gait Assessment/Treatment    Gait, [place] Level verbal cues required;contact guard assist  -AS      Gait, Assistive Device rolling walker  -AS      Gait, Distance (Feet) 150  -AS      Gait,  Gait Deviations becky decreased;step length decreased  -AS      Gait, Safety Issues step length decreased  -AS      Gait, Impairments strength decreased  -AS      Gait, Comment patient demonstrated safe use of rolling walker, continues to have weakness with activity.  -AS      Recorded by [AS] Poornima Lebron PTA      Therapy Exercises    Bilateral Lower Extremities AROM:;10 reps;sitting;ankle pumps/circles;hip flexion;LAQ  -AS      Recorded by [AS] Poornima Lebron PTA      Positioning and Restraints    Pre-Treatment Position sitting in chair/recliner  -AS      Post Treatment Position chair  -AS      In Chair sitting;call light within reach;encouraged to call for assist;with family/caregiver  -AS      Recorded by [AS] Poornima Lebron PTA        User Key  (r) = Recorded By, (t) = Taken By, (c) = Cosigned By    Initials Name Effective Dates    AS Poornima Lebron PTA 06/22/15 -                 IP PT Goals       01/05/18 1059 12/25/17 1013       Bed Mobility PT LTG    Bed Mobility PT LTG, Date Goal Reviewed 01/05/18  -AS 12/25/17  -SC     Bed Mobility PT LTG, Outcome goal ongoing  -AS goal ongoing  -SC     Transfer Training PT LTG    Transfer Training PT  LTG, Date Goal Reviewed 01/05/18  -AS 12/25/17  -SC     Transfer Training PT LTG, Outcome goal ongoing  -AS goal ongoing  -SC     Gait Training PT LTG    Gait Training Goal PT LTG, Date Goal Reviewed 01/05/18  -AS 12/18/17  -SC     Gait Training Goal PT LTG, Outcome goal ongoing  -AS goal ongoing  -SC       User Key  (r) = Recorded By, (t) = Taken By, (c) = Cosigned By    Initials Name Provider Type    ZACH Keller, PT Physical Therapist    AS Poornima Lebron PTA Physical Therapy Assistant          Physical Therapy Education     Title: PT OT SLP Therapies (Active)     Topic: Physical Therapy (Active)     Point: Mobility training (Active)    Learning Progress Summary    Learner Readiness Method Response Comment Documented by Status   Patient  Acceptance E NR  AS 01/05/18 1058 Active    Acceptance E NL REVIEWED BENEFITS OF ACTIVITY--PATIENT VERY LETHERGIC SC 12/25/17 1012 Active    Eager E,D NR  DM 12/19/17 1730 Active    Acceptance E,D DU,NR  UD 12/15/17 1138 Done    Acceptance E NR  AS 12/13/17 1404 Active    Acceptance E NR  ES 12/10/17 1606 Active    Acceptance E,D,H NR  LS 12/08/17 1537 Active   Family Acceptance E NR  AS 01/05/18 1058 Active    Acceptance E,D DU,NR  UD 12/15/17 1138 Done    Acceptance E,D,H NR  LS 12/08/17 1537 Active   Significant Other Eager E,D NR  DM 12/19/17 1730 Active               Point: Home exercise program (Active)    Learning Progress Summary    Learner Readiness Method Response Comment Documented by Status   Patient Acceptance E NR  AS 01/05/18 1058 Active    Acceptance E NL REVIEWED BENEFITS OF ACTIVITY--PATIENT VERY LETHERGIC SC 12/25/17 1012 Active    Eager E,D NR  DM 12/19/17 1730 Active    Acceptance E,D DU,NR  UD 12/15/17 1138 Done    Acceptance E NR  AS 12/13/17 1404 Active    Acceptance E NR  ES 12/10/17 1606 Active    Acceptance E,D,H NR  LS 12/08/17 1537 Active   Family Acceptance E NR  AS 01/05/18 1058 Active    Acceptance E,D DU,NR  UD 12/15/17 1138 Done    Acceptance E,D,H NR  LS 12/08/17 1537 Active   Significant Other Eager E,D NR  DM 12/19/17 1730 Active               Point: Body mechanics (Active)    Learning Progress Summary    Learner Readiness Method Response Comment Documented by Status   Patient Acceptance E NR  AS 01/05/18 1058 Active    Acceptance E NL REVIEWED BENEFITS OF ACTIVITY--PATIENT VERY LETHERGIC SC 12/25/17 1012 Active    Eager E,D NR  DM 12/19/17 1730 Active    Acceptance E,D DU,NR  UD 12/15/17 1138 Done    Acceptance E NR  AS 12/13/17 1404 Active    Acceptance E NR  ES 12/10/17 1606 Active    Acceptance E,D,H NR  LS 12/08/17 1537 Active   Family Acceptance E NR  AS 01/05/18 1058 Active    Acceptance E,D DU,NR  UD 12/15/17 1138 Done    Acceptance E,D,H NR   12/08/17 1537 Active    Significant Other Eager E,D NR   12/19/17 1730 Active               Point: Precautions (Active)    Learning Progress Summary    Learner Readiness Method Response Comment Documented by Status   Patient Acceptance E NR  AS 01/05/18 1058 Active    Acceptance E NL REVIEWED BENEFITS OF ACTIVITY--PATIENT VERY LETHERGIC SC 12/25/17 1012 Active    Eager E,D NR   12/19/17 1730 Active    Acceptance E,D DU,NR  UD 12/15/17 1138 Done    Acceptance E NR  AS 12/13/17 1404 Active    Acceptance E NR   12/10/17 1606 Active    Acceptance E,D,H NR   12/08/17 1537 Active   Family Acceptance E NR  AS 01/05/18 1058 Active    Acceptance E,D DU,NR  UD 12/15/17 1138 Done    Acceptance E,D,H NR   12/08/17 1537 Active   Significant Other Eager E,D NR   12/19/17 1730 Active                      User Key     Initials Effective Dates Name Provider Type Discipline    SC 06/19/15 -  Charli Keller, PT Physical Therapist PT     06/22/15 -  Tete Dawson, PTA Physical Therapy Assistant PT     06/19/15 -  Santa Sherman, PT Physical Therapist PT    AS 06/22/15 -  Poornima Lebron, PTA Physical Therapy Assistant PT     06/19/15 -  Namrata Crowe, PT Physical Therapist PT     11/13/17 -  Camilla Cueva, PT Physical Therapist PT                    PT Recommendation and Plan  Anticipated Discharge Disposition: inpatient rehabilitation facility  Planned Therapy Interventions: bed mobility training, gait training, home exercise program, patient/family education, strengthening, transfer training  PT Frequency: daily  Plan of Care Review  Plan Of Care Reviewed With: patient  Progress: improving  Outcome Summary/Follow up Plan: patient doing welll with all activity, continues to use rolling walker for support, complaints of weakness with all activity.          Outcome Measures       01/05/18 1025          How much help from another person do you currently need...    Turning from your back to your side while in flat bed without using  bedrails? 3  -AS      Moving from lying on back to sitting on the side of a flat bed without bedrails? 3  -AS      Moving to and from a bed to a chair (including a wheelchair)? 3  -AS      Standing up from a chair using your arms (e.g., wheelchair, bedside chair)? 3  -AS      Climbing 3-5 steps with a railing? 2  -AS      To walk in hospital room? 3  -AS      AM-PAC 6 Clicks Score 17  -AS      Functional Assessment    Outcome Measure Options AM-PAC 6 Clicks Basic Mobility (PT)  -AS        User Key  (r) = Recorded By, (t) = Taken By, (c) = Cosigned By    Initials Name Provider Type    AS Poornima Lebron PTA Physical Therapy Assistant           Time Calculation:         PT Charges       01/05/18 1100          Time Calculation    Start Time 1025  -AS      PT Received On 01/05/18  -AS      PT Goal Re-Cert Due Date 01/04/18  -AS      Time Calculation- PT    Total Timed Code Minutes- PT 23 minute(s)  -AS        User Key  (r) = Recorded By, (t) = Taken By, (c) = Cosigned By    Initials Name Provider Type    AS Poornima Lebron PTA Physical Therapy Assistant          Therapy Charges for Today     Code Description Service Date Service Provider Modifiers Qty    84744288009 HC GAIT TRAINING EA 15 MIN 1/5/2018 Poornima Lebron PTA GP 1    92288919399 HC PT THER PROC EA 15 MIN 1/5/2018 Poornima Lebron PTA GP 1    81326062897 HC PT THER SUPP EA 15 MIN 1/5/2018 Poornima Lebron PTA GP 1          PT G-Codes  Outcome Measure Options: AM-PAC 6 Clicks Basic Mobility (PT)    Poornima Lebron PTA  1/5/2018          Electronically signed by Poornima Lebron PTA at 1/5/2018 11:01 AM        Occupational Therapy Notes (most recent note)     No notes of this type exist for this encounter.        Speech Language Pathology Notes (most recent note)     No notes of this type exist for this encounter.

## 2018-01-06 LAB
ALBUMIN SERPL-MCNC: 2.8 G/DL (ref 3.2–4.8)
ANION GAP SERPL CALCULATED.3IONS-SCNC: 9 MMOL/L (ref 3–11)
BUN BLD-MCNC: 32 MG/DL (ref 9–23)
BUN/CREAT SERPL: 13.3 (ref 7–25)
CALCIUM SPEC-SCNC: 8 MG/DL (ref 8.7–10.4)
CHLORIDE SERPL-SCNC: 101 MMOL/L (ref 99–109)
CO2 SERPL-SCNC: 28 MMOL/L (ref 20–31)
CREAT BLD-MCNC: 2.4 MG/DL (ref 0.6–1.3)
DEPRECATED RDW RBC AUTO: 59.5 FL (ref 37–54)
ERYTHROCYTE [DISTWIDTH] IN BLOOD BY AUTOMATED COUNT: 18.7 % (ref 11.3–14.5)
GFR SERPL CREATININE-BSD FRML MDRD: 20 ML/MIN/1.73
GLUCOSE BLD-MCNC: 136 MG/DL (ref 70–100)
HCT VFR BLD AUTO: 28.5 % (ref 34.5–44)
HGB BLD-MCNC: 8.7 G/DL (ref 11.5–15.5)
MCH RBC QN AUTO: 28 PG (ref 27–31)
MCHC RBC AUTO-ENTMCNC: 30.5 G/DL (ref 32–36)
MCV RBC AUTO: 91.6 FL (ref 80–99)
PHOSPHATE SERPL-MCNC: 2.4 MG/DL (ref 2.4–5.1)
PLATELET # BLD AUTO: 374 10*3/MM3 (ref 150–450)
PMV BLD AUTO: 9.5 FL (ref 6–12)
POTASSIUM BLD-SCNC: 2.9 MMOL/L (ref 3.5–5.5)
RBC # BLD AUTO: 3.11 10*6/MM3 (ref 3.89–5.14)
SODIUM BLD-SCNC: 138 MMOL/L (ref 132–146)
WBC NRBC COR # BLD: 8.57 10*3/MM3 (ref 3.5–10.8)

## 2018-01-06 PROCEDURE — 63510000001 EPOETIN ALFA PER 1000 UNITS: Performed by: INTERNAL MEDICINE

## 2018-01-06 PROCEDURE — 25010000002 METOCLOPRAMIDE PER 10 MG: Performed by: INTERNAL MEDICINE

## 2018-01-06 PROCEDURE — 99233 SBSQ HOSP IP/OBS HIGH 50: CPT | Performed by: NURSE PRACTITIONER

## 2018-01-06 PROCEDURE — 80069 RENAL FUNCTION PANEL: CPT | Performed by: NURSE PRACTITIONER

## 2018-01-06 PROCEDURE — 25010000002 HEPARIN (PORCINE) PER 1000 UNITS: Performed by: HOSPITALIST

## 2018-01-06 PROCEDURE — 85027 COMPLETE CBC AUTOMATED: CPT | Performed by: NURSE PRACTITIONER

## 2018-01-06 RX ORDER — POTASSIUM CHLORIDE 750 MG/1
40 CAPSULE, EXTENDED RELEASE ORAL ONCE
Status: COMPLETED | OUTPATIENT
Start: 2018-01-06 | End: 2018-01-06

## 2018-01-06 RX ORDER — HYDRALAZINE HYDROCHLORIDE 20 MG/ML
20 INJECTION INTRAMUSCULAR; INTRAVENOUS EVERY 6 HOURS PRN
Status: DISCONTINUED | OUTPATIENT
Start: 2018-01-06 | End: 2018-01-12 | Stop reason: HOSPADM

## 2018-01-06 RX ADMIN — HEPARIN SODIUM 5000 UNITS: 5000 INJECTION, SOLUTION INTRAVENOUS; SUBCUTANEOUS at 13:02

## 2018-01-06 RX ADMIN — AMLODIPINE BESYLATE 10 MG: 10 TABLET ORAL at 07:34

## 2018-01-06 RX ADMIN — POTASSIUM CHLORIDE 40 MEQ: 750 CAPSULE, EXTENDED RELEASE ORAL at 09:54

## 2018-01-06 RX ADMIN — SODIUM BICARBONATE 650 MG TABLET 650 MG: at 07:34

## 2018-01-06 RX ADMIN — DOCUSATE SODIUM 100 MG: 100 CAPSULE, LIQUID FILLED ORAL at 07:35

## 2018-01-06 RX ADMIN — ERYTHROPOIETIN 10000 UNITS: 10000 INJECTION, SOLUTION INTRAVENOUS; SUBCUTANEOUS at 07:33

## 2018-01-06 RX ADMIN — SERTRALINE HYDROCHLORIDE 100 MG: 100 TABLET ORAL at 07:34

## 2018-01-06 RX ADMIN — METOCLOPRAMIDE 5 MG: 5 INJECTION, SOLUTION INTRAMUSCULAR; INTRAVENOUS at 20:26

## 2018-01-06 RX ADMIN — CARVEDILOL 25 MG: 12.5 TABLET, FILM COATED ORAL at 20:25

## 2018-01-06 RX ADMIN — NYSTATIN: 100000 CREAM TOPICAL at 20:27

## 2018-01-06 RX ADMIN — ACETAMINOPHEN 500 MG: 500 TABLET ORAL at 21:44

## 2018-01-06 RX ADMIN — AMIODARONE HYDROCHLORIDE 200 MG: 200 TABLET ORAL at 07:37

## 2018-01-06 RX ADMIN — ACETAMINOPHEN 500 MG: 500 TABLET ORAL at 16:12

## 2018-01-06 RX ADMIN — ACETAMINOPHEN 500 MG: 500 TABLET ORAL at 07:46

## 2018-01-06 RX ADMIN — HEPARIN SODIUM 5000 UNITS: 5000 INJECTION, SOLUTION INTRAVENOUS; SUBCUTANEOUS at 20:26

## 2018-01-06 RX ADMIN — METOCLOPRAMIDE 5 MG: 5 INJECTION, SOLUTION INTRAMUSCULAR; INTRAVENOUS at 07:35

## 2018-01-06 RX ADMIN — NYSTATIN: 100000 CREAM TOPICAL at 07:35

## 2018-01-06 RX ADMIN — AMIODARONE HYDROCHLORIDE 200 MG: 200 TABLET ORAL at 20:26

## 2018-01-06 RX ADMIN — Medication 100 MG: at 07:34

## 2018-01-06 RX ADMIN — HEPARIN SODIUM 5000 UNITS: 5000 INJECTION, SOLUTION INTRAVENOUS; SUBCUTANEOUS at 05:43

## 2018-01-06 RX ADMIN — CARVEDILOL 25 MG: 12.5 TABLET, FILM COATED ORAL at 07:34

## 2018-01-06 NOTE — PROGRESS NOTES
"Tereza Ackerman  1955  [id number]    Surgery Progress Note    Date of visit: 1/6/2018    Subjective:no new complaints  Has been eating better  Having BM  Ambulating some    Objective:    /83  Pulse 71  Temp 98.7 °F (37.1 °C) (Oral)   Resp 18  Ht 160 cm (62.99\")  Wt 71 kg (156 lb 9.6 oz)  SpO2 98%  BMI 27.75 kg/m2    Intake/Output Summary (Last 24 hours) at 01/06/18 1014  Last data filed at 01/06/18 0900   Gross per 24 hour   Intake              360 ml   Output              600 ml   Net             -240 ml       CV: Regular rate and rhythm  L: normal air entry    Abd: soft and non tender  Wound granulating well      LABS:      Results from last 7 days  Lab Units 01/06/18  0543   WBC 10*3/mm3 8.57   HEMOGLOBIN g/dL 8.7*   HEMATOCRIT % 28.5*   PLATELETS 10*3/mm3 374       Results from last 7 days  Lab Units 01/06/18  0543   SODIUM mmol/L 138   POTASSIUM mmol/L 2.9*   CHLORIDE mmol/L 101   CO2 mmol/L 28.0   BUN mg/dL 32*   CREATININE mg/dL 2.40*   CALCIUM mg/dL 8.0*   GLUCOSE mg/dL 136*       Results from last 7 days  Lab Units 01/06/18  0543   SODIUM mmol/L 138   POTASSIUM mmol/L 2.9*   CHLORIDE mmol/L 101   CO2 mmol/L 28.0   BUN mg/dL 32*   CREATININE mg/dL 2.40*   GLUCOSE mg/dL 136*   CALCIUM mg/dL 8.0*       Lab Results  Lab Value Date/Time   LIPASE 52 (H) 12/04/2017 1043   LIPASE 39 06/30/2017 1959         Assessment/ Plan: stable course  Slow progress  Renal function better  Increase activity  Await rehab/SNF placement  Will follow on Monday    Problem List Items Addressed This Visit     Hypertension    Relevant Medications    amLODIPine (NORVASC) tablet 10 mg    * (Principal)Small bowel obstruction - Primary    Relevant Orders    Tissue Pathology Exam - Tissue, Small Intestine (Completed)    Acute renal failure    Relevant Medications    potassium chloride (MICRO-K) CR capsule 40 mEq    potassium chloride (KLOR-CON) packet 40 mEq    potassium chloride 10 mEq in 100 mL IVPB      Other Visit " Diagnoses     Intractable vomiting with nausea, unspecified vomiting type        Uncontrolled hypertension                Ирина Cobian MD  1/6/2018  10:14 AM

## 2018-01-06 NOTE — PROGRESS NOTES
"    [facility] Medicine Services  PROGRESS NOTE    Patient Name: Tereza Ackerman  : 1955  MRN: [id number]    Date of Admission: 2017  Length of Stay: 33  Primary Care Physician: Michaela Connell MD    Subjective   Subjective     CC:  Follow up SBO    HPI:  Patient sitting up in bed, family at bedside.  Reports she slept well last night.  Didn't require seroquel due to family stayed with her overnight.  Reports she feels \"weak\" but otherwise no complaints. Tolerating food and denies any abdominal pain at this time.     Review of Systems  Gen- No fevers, chills  CV- No chest pain, palpitations  Resp- No cough, dyspnea  GI- No N/V/D, no abd pain    Otherwise ROS is negative except as mentioned in the HPI.    Objective   Objective     Vital Signs:   Temp:  [98.1 °F (36.7 °C)-98.7 °F (37.1 °C)] 98.7 °F (37.1 °C)  Heart Rate:  [68-77] 71  Resp:  [18-21] 18  BP: (157-179)/(79-86) 179/83        Physical Exam:  Constitutional: No acute distress, awake, alert. Family at bedside.  Respiratory: Clear to auscultation bilaterally A/P, nonlabored respirations   Cardiovascular: RRR, no murmurs, rubs, or gallops, palpable pedal pulses bilaterally  Gastrointestinal: Positive bowel sounds, soft, nontender, nondistended,Abd incision with dressing in place c/d/i.   Musculoskeletal: 1+ pitting edema BLE  Psychiatric: Appropriate affect, cooperative  Neurologic: Oriented x 3, no focal deficits   Skin:  Rt upper CW tunneled HD cath in place.  C/d/i.     Results Reviewed:  I have personally reviewed current lab, radiology, and data and agree.      Results from last 7 days  Lab Units 18  0543 18  0618  0548   WBC 10*3/mm3 8.57 8.05 10.57   HEMOGLOBIN g/dL 8.7* 8.9* 9.7*   HEMATOCRIT % 28.5* 29.2* 31.3*   PLATELETS 10*3/mm3 374 359 421       Results from last 7 days  Lab Units 18  0543 18  0629 18  0548   SODIUM mmol/L 138 142 142   POTASSIUM mmol/L 2.9* 3.5 3.8  3.8 "   CHLORIDE mmol/L 101 102 97*   CO2 mmol/L 28.0 28.0 29.0   BUN mg/dL 32* 25* 37*   CREATININE mg/dL 2.40* 2.10* 3.00*   GLUCOSE mg/dL 136* 82 98   CALCIUM mg/dL 8.0* 7.9* 8.4*     No results found for: BNP  No results found for: PHART    Microbiology Results Abnormal     Procedure Component Value - Date/Time    Blood Culture - Blood, [[id number]]  (Normal) Collected:  12/21/17 0700    Lab Status:  Final result Specimen:  Blood from Arm, Right Updated:  12/26/17 0816     Blood Culture No growth at 5 days    Blood Culture - Blood, [[id number]]  (Normal) Collected:  12/21/17 0705    Lab Status:  Final result Specimen:  Blood from Arm, Left Updated:  12/26/17 0816     Blood Culture No growth at 5 days    Blood Culture - Blood, [[id number]]  (Normal) Collected:  12/19/17 1316    Lab Status:  Final result Specimen:  Blood from Arm, Left Updated:  12/24/17 1346     Blood Culture No growth at 5 days    Blood Culture - Blood, [[id number]]  (Normal) Collected:  12/19/17 1316    Lab Status:  Final result Specimen:  Blood from Hand, Left Updated:  12/24/17 1346     Blood Culture No growth at 5 days    Urine Culture - Urine, Urine, Clean Catch [[id number]]  (Abnormal) Collected:  12/12/17 0001    Lab Status:  Final result Specimen:  Urine from Urine, Clean Catch Updated:  12/14/17 0646     Urine Culture --      <10,000 CFU/mL Yeast isolated (A)    Blood Culture - Blood, [[id number]]  (Normal) Collected:  12/05/17 0313    Lab Status:  Final result Specimen:  Blood from Arm, Right Updated:  12/10/17 0416     Blood Culture No growth at 5 days    Blood Culture - Blood, [[id number]]  (Normal) Collected:  12/05/17 0313    Lab Status:  Final result Specimen:  Blood from Arm, Right Updated:  12/10/17 0416     Blood Culture No growth at 5 days    Narrative:       Aerobic bottle only    Urine Culture - Urine, Urine, Clean Catch [[id number]]  (Normal) Collected:  12/05/17 1704    Lab Status:  Final result Specimen:  Urine from Urine, Catheter  Updated:  12/07/17 0842     Urine Culture No growth at 2 days    Eosinophil Smear - Urine, Urine, Clean Catch [[id number]]  (Normal) Collected:  12/06/17 1106    Lab Status:  Final result Specimen:  Urine from Urine, Catheter Updated:  12/06/17 1347     Eosinophil Smear 0 % EOS/100 Cells     Narrative:       No eosinophil seen          Imaging Results (last 24 hours)     ** No results found for the last 24 hours. **        Results for orders placed during the hospital encounter of 12/04/17   Adult Transthoracic Echo Complete W/ Cont if Necessary Per Protocol    Narrative · LVEF difficult to evaluate with tachycardia- globally appears mildly   depressed.  · Left ventricular wall thickness is consistent with concentric   hypertrophy.  · Mild tricuspid valve regurgitation is present.  · Calculated right ventricular systolic pressure from tricuspid   regurgitation is 32 mmHg.          I have reviewed the medications.    Assessment/Plan   Assessment / Plan     Hospital Problem List     * (Principal)Small bowel obstruction    Overview Signed 12/5/2017  5:16 PM by AYDEE Hernandez     S/p abdominal exploration with adhesiolysis and resection of strangulated segment of small bowel by Dr. CUI 12/5/17         Meningioma, recurrent of brain    Overview Signed 12/6/2017  3:49 PM by Julia COTE Case, DO     First occurrence with resection by Bunny in 2003.   Recurrence with resection by Bunny in 2014.          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          Acute renal failure    Elevated troponin level    Metabolic acidosis    Transaminitis    Atrial flutter with rapid ventricular response    Overview Addendum 12/21/2017 12:10 PM by JEIMY Dalal     1. Echo 12-21-17:  · LVEF difficult to evaluate with tachycardia- globally appears mildly depressed.  · Left  ventricular wall thickness is consistent with concentric hypertrophy.  · Mild tricuspid valve regurgitation is present.  · Calculated right ventricular systolic pressure from tricuspid regurgitation is 32 mmHg.                  Brief Hospital Course to date:  Tereza Ackerman is a 62 y.o. female presenting with small bowel obstruction s/p ex-lap with adhesiolysis and resection of strangulated segment of small bowel by Dr. CUI 12/5/17 complicated by acute renal failure (ATN from sepsis/bowel ischemia/hypotension) requiring hemodialysis now transferred out of ICU but with persistent ileus/obstruction requiring NGT replacement and TPN for nutrition. Slow to improve. Developed rapid atrial flutter evening of 12/20, requiring amiodarone drip.      Assessment & Plan:  SBO: s/p ex-lap with adhesiolysis and resection of strangulated segment of small bowel by Dr. CUI 12/5/17    Non-oliguric DEJA  - HD 1/2, Epo. Nephrology following, creatinine is improving, dc'd sod bicarb today     A. Flutter  - amiodarone taper. BB. FU with Dr. Aguirre in 4 weeks.     HTN  Amlodipine. coreg. Increase dose as tolerated. Hydralazine PO PRN    Anxiety  - mood slowly improving on Sertraline.   - scheduled seroquel at night for her anxiety and restlessness  -am labs  -cm working on placement for rehab cannot do [facility] due to dialysis    DVT Prophylaxis:      [facility]    CODE STATUS: Full Code    Disposition: I expect the patient to be discharged to SNF once placement arranged.  CM following.    Anny Rodarte, APRN  01/06/18  12:20 PM

## 2018-01-06 NOTE — PLAN OF CARE
Problem: Patient Care Overview (Adult)  Goal: Plan of Care Review  Outcome: Ongoing (interventions implemented as appropriate)   01/05/18 1059 01/06/18 0800   Coping/Psychosocial Response Interventions   Plan Of Care Reviewed With --  patient   Patient Care Overview   Progress improving --      Goal: Discharge Needs Assessment  Outcome: Ongoing (interventions implemented as appropriate)   12/25/17 0900 01/05/18 0651   Discharge Needs Assessment   Concerns To Be Addressed --  denies needs/concerns at this time   Readmission Within The Last 30 Days --  no previous admission in last 30 days   Equipment Needed After Discharge --  none   Discharge Facility/Level Of Care Needs --  rehabilitation facility   Current Discharge Risk --  cognitively impaired   Discharge Disposition --  still a patient   Current Health   Anticipated Changes Related to Illness --  inability to care for self   Living Environment   Transportation Available --  car;family or friend will provide   Self-Care   Equipment Currently Used at Home walker, rolling;wheelchair;shower chair;commode --        Problem: Bowel Obstruction (Adult)  Goal: Signs and Symptoms of Listed Potential Problems Will be Absent or Manageable (Bowel Obstruction)  Outcome: Ongoing (interventions implemented as appropriate)   01/04/18 1821   Bowel Obstruction   Problems Assessed (Bowel Obstruction) all   Problems Present (Bowel Obstruction) pain       Problem: Perioperative Period (Adult)  Goal: Signs and Symptoms of Listed Potential Problems Will be Absent or Manageable (Perioperative Period)  Outcome: Ongoing (interventions implemented as appropriate)   01/04/18 1821   Perioperative Period   Problems Assessed (Perioperative Period) all   Problems Present (Perioperative Period) pain       Problem: Skin Integrity Impairment, Risk/Actual (Adult)  Goal: Identify Related Risk Factors and Signs and Symptoms  Outcome: Ongoing (interventions implemented as appropriate)   01/04/18 1821    Skin Integrity Impairment, Risk/Actual   Skin Integrity Impairment, Risk/Actual: Related Risk Factors surgery/procedure   Signs and Symptoms (Skin Integrity Impairment) edema     Goal: Skin Integrity/Wound Healing  Outcome: Ongoing (interventions implemented as appropriate)   01/06/18 1603   Skin Integrity Impairment, Risk/Actual (Adult)   Skin Integrity/Wound Healing making progress toward outcome       Problem: Renal Failure/Kidney Injury, Acute (Adult)  Goal: Signs and Symptoms of Listed Potential Problems Will be Absent or Manageable (Renal Failure/Kidney Injury, Acute)  Outcome: Ongoing (interventions implemented as appropriate)   01/04/18 1821   Renal Failure/Kidney Injury, Acute   Problems Assessed (Acute Renal Failure/Kidney Injury) all   Problems Present (Acute Renal Failure/Kidney Injury) situational response       Problem: Fall Risk (Adult)  Goal: Absence of Falls  Outcome: Ongoing (interventions implemented as appropriate)   01/06/18 1603   Fall Risk (Adult)   Absence of Falls making progress toward outcome       Problem: Pressure Ulcer Risk (Jairo Scale) (Adult,Obstetrics,Pediatric)  Goal: Skin Integrity  Outcome: Ongoing (interventions implemented as appropriate)   01/06/18 1603   Pressure Ulcer Risk (Jairo Scale) (Adult,Obstetrics,Pediatric)   Skin Integrity making progress toward outcome       Problem: Hemodialysis (Adult)  Goal: Signs and Symptoms of Listed Potential Problems Will be Absent or Manageable (Hemodialysis)  Outcome: Ongoing (interventions implemented as appropriate)   01/04/18 0425   Hemodialysis   Problems Assessed (Hemodialysis) all   Problems Present (Hemodialysis) situational response       Problem: Pain, Acute (Adult)  Goal: Acceptable Pain Control/Comfort Level  Outcome: Ongoing (interventions implemented as appropriate)   01/06/18 1603   Pain, Acute (Adult)   Acceptable Pain Control/Comfort Level making progress toward outcome       Problem: Anxiety (Adult)  Goal:  Reduction/Resolution  Outcome: Ongoing (interventions implemented as appropriate)

## 2018-01-06 NOTE — PROGRESS NOTES
"   LOS: 33 days    Patient Care Team:  Michaela Connell MD as PCP - General  Michaela Connell MD as PCP - Family Medicine    Reason For Visit:  F/U DEJA.   Subjective   No acute events overnight. No new complaints.         Review of Systems:    Pulm: No soa   CV:  No CP      Objective       amiodarone 200 mg Oral Q12H   Followed by      [START ON 1/12/2018] amiodarone 200 mg Oral Daily   amLODIPine 10 mg Oral Q24H   carvedilol 25 mg Oral Q12H   docusate sodium 100 mg Oral BID   epoetin marcel 10,000 Units Subcutaneous Once per day on Tue Thu Sat   furosemide 20 mg Intravenous Once   heparin (porcine) 5,000 Units Subcutaneous Q8H   metoclopramide 5 mg Intravenous Q12H   nystatin  Topical Q12H   QUEtiapine 25 mg Oral Nightly   sertraline 100 mg Oral Daily   thiamine 100 mg Oral Daily            Vital Signs:  Blood pressure 179/83, pulse 71, temperature 98.7 °F (37.1 °C), temperature source Oral, resp. rate 18, height 160 cm (62.99\"), weight 71 kg (156 lb 9.6 oz), SpO2 98 %.    Flowsheet Rows         First Filed Value    Admission Height  160 cm (63\") Documented at 12/04/2017 1002    Admission Weight  68.9 kg (152 lb) Documented at 12/04/2017 1002          01/05 0701 - 01/06 0700  In: 600 [P.O.:600]  Out: 150 [Urine:150]    Physical Exam:    General Appearance: NAD, alert and cooperative, Ox3  Eyes: PER, conjunctivae and sclerae normal, no icterus  Lungs: respirations regular and unlabored, no crepitus, clear to auscultation  Heart/CV: regular rhythm & normal rate, no murmur, no gallop, no rub and 1+ edema  Abdomen: not distended, soft, non-tender, no masses,  bowel sounds present  Skin: No rash, Warm and dry. TUNNELED HD CATH.    Radiology:            Labs:    Results from last 7 days  Lab Units 01/06/18  0543 01/05/18  0629 01/04/18  0548   WBC 10*3/mm3 8.57 8.05 10.57   HEMOGLOBIN g/dL 8.7* 8.9* 9.7*   HEMATOCRIT % 28.5* 29.2* 31.3*   PLATELETS 10*3/mm3 374 359 421       Results from last 7 days  Lab Units " 01/06/18  0543 01/05/18  0629 01/04/18  0548 01/03/18  0612 01/02/18  0453   SODIUM mmol/L 138 142 142 140 138   POTASSIUM mmol/L 2.9* 3.5 3.8  3.8 3.2* 3.3*   CHLORIDE mmol/L 101 102 97* 102 99   CO2 mmol/L 28.0 28.0 29.0 26.0 25.0   BUN mg/dL 32* 25* 37* 28* 55*   CREATININE mg/dL 2.40* 2.10* 3.00* 2.60* 3.90*   CALCIUM mg/dL 8.0* 7.9* 8.4* 7.8* 8.4*   PHOSPHORUS mg/dL 2.4 2.5 2.6 3.5 5.5*   MAGNESIUM mg/dL  --  1.9 2.3 1.9 1.9   ALBUMIN g/dL 2.80*  --  3.20 2.60* 3.20       Results from last 7 days  Lab Units 01/06/18  0543   GLUCOSE mg/dL 136*                       Estimated Creatinine Clearance: 22.4 mL/min (by C-G formula based on Cr of 2.4).      Assessment     Principal Problem:    Small bowel obstruction  Active Problems:    Meningioma, recurrent of brain    Insomnia    Malignant neoplasm of left orbit    Acute renal failure    Elevated troponin level    Metabolic acidosis    Transaminitis    Atrial flutter with rapid ventricular response            Impression: NONOLIGURIC DEJA. ATN. INCREASED U/O.STARTING TO RECOVER. POOR GFR. ANEMIA.        Recommendations:    Monitor I/O. UOP and renal function showing signs of recovery.   Renal diet.    Hydralazine PRN for SBP greater than 160 mmHg.   No need of dialysis today.   Will give KCL 40 mEq once.   Will discontinue Sodium bicarb.     Jamal Ramos MD  01/06/18  9:08 AM

## 2018-01-07 LAB
ALBUMIN SERPL-MCNC: 3.2 G/DL (ref 3.2–4.8)
ANION GAP SERPL CALCULATED.3IONS-SCNC: 5 MMOL/L (ref 3–11)
BUN BLD-MCNC: 29 MG/DL (ref 9–23)
BUN/CREAT SERPL: 13.2 (ref 7–25)
CALCIUM SPEC-SCNC: 8 MG/DL (ref 8.7–10.4)
CHLORIDE SERPL-SCNC: 104 MMOL/L (ref 99–109)
CO2 SERPL-SCNC: 28 MMOL/L (ref 20–31)
CREAT BLD-MCNC: 2.2 MG/DL (ref 0.6–1.3)
DEPRECATED RDW RBC AUTO: 59.7 FL (ref 37–54)
ERYTHROCYTE [DISTWIDTH] IN BLOOD BY AUTOMATED COUNT: 19.4 % (ref 11.3–14.5)
GFR SERPL CREATININE-BSD FRML MDRD: 22 ML/MIN/1.73
GLUCOSE BLD-MCNC: 97 MG/DL (ref 70–100)
HCT VFR BLD AUTO: 32.7 % (ref 34.5–44)
HGB BLD-MCNC: 10 G/DL (ref 11.5–15.5)
MCH RBC QN AUTO: 28.3 PG (ref 27–31)
MCHC RBC AUTO-ENTMCNC: 30.6 G/DL (ref 32–36)
MCV RBC AUTO: 92.6 FL (ref 80–99)
PHOSPHATE SERPL-MCNC: 2.9 MG/DL (ref 2.4–5.1)
PLATELET # BLD AUTO: 443 10*3/MM3 (ref 150–450)
PMV BLD AUTO: 10.1 FL (ref 6–12)
POTASSIUM BLD-SCNC: 3.3 MMOL/L (ref 3.5–5.5)
RBC # BLD AUTO: 3.53 10*6/MM3 (ref 3.89–5.14)
SODIUM BLD-SCNC: 137 MMOL/L (ref 132–146)
WBC NRBC COR # BLD: 8.74 10*3/MM3 (ref 3.5–10.8)

## 2018-01-07 PROCEDURE — 25010000002 HEPARIN (PORCINE) PER 1000 UNITS: Performed by: HOSPITALIST

## 2018-01-07 PROCEDURE — 99233 SBSQ HOSP IP/OBS HIGH 50: CPT | Performed by: NURSE PRACTITIONER

## 2018-01-07 PROCEDURE — 85027 COMPLETE CBC AUTOMATED: CPT | Performed by: NURSE PRACTITIONER

## 2018-01-07 PROCEDURE — 80069 RENAL FUNCTION PANEL: CPT | Performed by: NURSE PRACTITIONER

## 2018-01-07 PROCEDURE — 25010000002 METOCLOPRAMIDE PER 10 MG: Performed by: INTERNAL MEDICINE

## 2018-01-07 RX ORDER — TERAZOSIN 2 MG/1
2 CAPSULE ORAL NIGHTLY
Status: DISCONTINUED | OUTPATIENT
Start: 2018-01-07 | End: 2018-01-10

## 2018-01-07 RX ADMIN — Medication 100 MG: at 09:34

## 2018-01-07 RX ADMIN — HEPARIN SODIUM 5000 UNITS: 5000 INJECTION, SOLUTION INTRAVENOUS; SUBCUTANEOUS at 21:24

## 2018-01-07 RX ADMIN — CARVEDILOL 25 MG: 12.5 TABLET, FILM COATED ORAL at 21:24

## 2018-01-07 RX ADMIN — HEPARIN SODIUM 5000 UNITS: 5000 INJECTION, SOLUTION INTRAVENOUS; SUBCUTANEOUS at 15:28

## 2018-01-07 RX ADMIN — DOCUSATE SODIUM 100 MG: 100 CAPSULE, LIQUID FILLED ORAL at 09:35

## 2018-01-07 RX ADMIN — METOCLOPRAMIDE 5 MG: 5 INJECTION, SOLUTION INTRAMUSCULAR; INTRAVENOUS at 09:31

## 2018-01-07 RX ADMIN — CARVEDILOL 25 MG: 12.5 TABLET, FILM COATED ORAL at 09:35

## 2018-01-07 RX ADMIN — METOCLOPRAMIDE 5 MG: 5 INJECTION, SOLUTION INTRAMUSCULAR; INTRAVENOUS at 21:24

## 2018-01-07 RX ADMIN — NYSTATIN: 100000 CREAM TOPICAL at 21:24

## 2018-01-07 RX ADMIN — ACETAMINOPHEN 500 MG: 500 TABLET ORAL at 23:53

## 2018-01-07 RX ADMIN — AMLODIPINE BESYLATE 10 MG: 10 TABLET ORAL at 12:23

## 2018-01-07 RX ADMIN — ACETAMINOPHEN 500 MG: 500 TABLET ORAL at 18:40

## 2018-01-07 RX ADMIN — AMIODARONE HYDROCHLORIDE 200 MG: 200 TABLET ORAL at 20:09

## 2018-01-07 RX ADMIN — SERTRALINE HYDROCHLORIDE 100 MG: 100 TABLET ORAL at 09:35

## 2018-01-07 RX ADMIN — NYSTATIN: 100000 CREAM TOPICAL at 12:25

## 2018-01-07 RX ADMIN — POTASSIUM CHLORIDE 40 MEQ: 750 CAPSULE, EXTENDED RELEASE ORAL at 19:34

## 2018-01-07 RX ADMIN — POTASSIUM CHLORIDE 40 MEQ: 750 CAPSULE, EXTENDED RELEASE ORAL at 09:34

## 2018-01-07 RX ADMIN — AMIODARONE HYDROCHLORIDE 200 MG: 200 TABLET ORAL at 09:35

## 2018-01-07 RX ADMIN — HEPARIN SODIUM 5000 UNITS: 5000 INJECTION, SOLUTION INTRAVENOUS; SUBCUTANEOUS at 05:01

## 2018-01-07 RX ADMIN — TERAZOSIN HYDROCHLORIDE ANHYDROUS 2 MG: 2 CAPSULE ORAL at 21:31

## 2018-01-07 RX ADMIN — ACETAMINOPHEN 500 MG: 500 TABLET ORAL at 11:49

## 2018-01-07 RX ADMIN — QUETIAPINE FUMARATE 25 MG: 25 TABLET ORAL at 21:24

## 2018-01-07 NOTE — PLAN OF CARE
Problem: Patient Care Overview (Adult)  Goal: Plan of Care Review  Outcome: Ongoing (interventions implemented as appropriate)   01/07/18 0608   Coping/Psychosocial Response Interventions   Plan Of Care Reviewed With patient   Patient Care Overview   Progress improving   Outcome Evaluation   Outcome Summary/Follow up Plan VSS. Pt had no c/o pain or soa. Pt is stable with a walker with standby assist. No other new concerns. SNF at D/C.     Goal: Discharge Needs Assessment  Outcome: Ongoing (interventions implemented as appropriate)   12/25/17 0900 01/05/18 0651   Discharge Needs Assessment   Concerns To Be Addressed --  denies needs/concerns at this time   Readmission Within The Last 30 Days --  no previous admission in last 30 days   Equipment Needed After Discharge --  none   Discharge Facility/Level Of Care Needs --  rehabilitation facility   Current Discharge Risk --  cognitively impaired   Discharge Disposition --  still a patient   Current Health   Anticipated Changes Related to Illness --  inability to care for self   Living Environment   Transportation Available --  car;family or friend will provide   Self-Care   Equipment Currently Used at Home walker, rolling;wheelchair;shower chair;commode --        Problem: Bowel Obstruction (Adult)  Goal: Signs and Symptoms of Listed Potential Problems Will be Absent or Manageable (Bowel Obstruction)  Outcome: Outcome(s) achieved Date Met: 01/07/18 01/07/18 0608   Bowel Obstruction   Problems Assessed (Bowel Obstruction) all   Problems Present (Bowel Obstruction) pain       Problem: Skin Integrity Impairment, Risk/Actual (Adult)  Goal: Identify Related Risk Factors and Signs and Symptoms  Outcome: Outcome(s) achieved Date Met: 01/07/18 01/04/18 1821   Skin Integrity Impairment, Risk/Actual   Skin Integrity Impairment, Risk/Actual: Related Risk Factors surgery/procedure   Signs and Symptoms (Skin Integrity Impairment) edema     Goal: Skin Integrity/Wound  Healing  Outcome: Ongoing (interventions implemented as appropriate)   01/07/18 0608   Skin Integrity Impairment, Risk/Actual (Adult)   Skin Integrity/Wound Healing making progress toward outcome       Problem: Renal Failure/Kidney Injury, Acute (Adult)  Goal: Signs and Symptoms of Listed Potential Problems Will be Absent or Manageable (Renal Failure/Kidney Injury, Acute)  Outcome: Outcome(s) achieved Date Met: 01/07/18 01/07/18 0608   Renal Failure/Kidney Injury, Acute   Problems Assessed (Acute Renal Failure/Kidney Injury) all   Problems Present (Acute Renal Failure/Kidney Injury) situational response       Problem: Fall Risk (Adult)  Goal: Absence of Falls  Outcome: Ongoing (interventions implemented as appropriate)   01/07/18 0608   Fall Risk (Adult)   Absence of Falls making progress toward outcome       Problem: Pressure Ulcer Risk (Jairo Scale) (Adult,Obstetrics,Pediatric)  Goal: Skin Integrity  Outcome: Ongoing (interventions implemented as appropriate)      Problem: Hemodialysis (Adult)  Goal: Signs and Symptoms of Listed Potential Problems Will be Absent or Manageable (Hemodialysis)  Outcome: Outcome(s) achieved Date Met: 01/07/18      Problem: Pain, Acute (Adult)  Goal: Acceptable Pain Control/Comfort Level  Outcome: Ongoing (interventions implemented as appropriate)   01/07/18 0608   Pain, Acute (Adult)   Acceptable Pain Control/Comfort Level making progress toward outcome       Problem: Anxiety (Adult)  Goal: Reduction/Resolution  Outcome: Ongoing (interventions implemented as appropriate)   01/07/18 0608   Anxiety (Adult)   Reduction/Resolution making progress toward outcome

## 2018-01-07 NOTE — PROGRESS NOTES
[facility] Medicine Services  PROGRESS NOTE    Patient Name: Tereza Ackerman  : 1955  MRN: [id number]    Date of Admission: 2017  Length of Stay: 34  Primary Care Physician: Michaela Connell MD    Subjective   Subjective     CC:  Follow up SBO    HPI:  Patient sitting in chair, family at bedside.  Reports she slept well last night without any new issues.  Tolerating diet and feeling better daily.  Some abdominal discomfort today but controlled with current medication regimen.  Having daily BMs.     Review of Systems  Gen- No fevers, chills  CV- No chest pain, palpitations  Resp- No cough, dyspnea  GI- No N/V/D    Otherwise ROS is negative except as mentioned in the HPI.    Objective   Objective     Vital Signs:   Temp:  [97.9 °F (36.6 °C)-98.1 °F (36.7 °C)] 97.9 °F (36.6 °C)  Heart Rate:  [65-81] 81  Resp:  [16-19] 19  BP: (164-177)/(82) 177/82        Physical Exam:  Constitutional: No acute distress, awake, alert. Family at bedside.  Respiratory: Clear to auscultation bilaterally A/P, nonlabored respirations   Cardiovascular: RRR, no murmurs, rubs, or gallops, palpable pedal pulses bilaterally  Gastrointestinal: Positive bowel sounds, soft, nondistended, tender only in area of surgical site, Abd incision with dressing in place c/d/i.   Musculoskeletal: 1+ pitting edema BLE  Psychiatric: Appropriate affect, cooperative  Neurologic: Oriented x 3, no focal deficits   Skin:  Rt upper CW tunneled HD cath in place.  C/d/i.     Results Reviewed:  I have personally reviewed current lab, radiology, and data and agree.      Results from last 7 days  Lab Units 18  0512 18  0543 18  0629   WBC 10*3/mm3 8.74 8.57 8.05   HEMOGLOBIN g/dL 10.0* 8.7* 8.9*   HEMATOCRIT % 32.7* 28.5* 29.2*   PLATELETS 10*3/mm3 443 374 359       Results from last 7 days  Lab Units 18  0512 18  0543 18  0629   SODIUM mmol/L 137 138 142   POTASSIUM mmol/L 3.3* 2.9* 3.5    CHLORIDE mmol/L 104 101 102   CO2 mmol/L 28.0 28.0 28.0   BUN mg/dL 29* 32* 25*   CREATININE mg/dL 2.20* 2.40* 2.10*   GLUCOSE mg/dL 97 136* 82   CALCIUM mg/dL 8.0* 8.0* 7.9*     No results found for: BNP  No results found for: PHART    Microbiology Results Abnormal     Procedure Component Value - Date/Time    Blood Culture - Blood, [[id number]]  (Normal) Collected:  12/21/17 0700    Lab Status:  Final result Specimen:  Blood from Arm, Right Updated:  12/26/17 0816     Blood Culture No growth at 5 days    Blood Culture - Blood, [[id number]]  (Normal) Collected:  12/21/17 0705    Lab Status:  Final result Specimen:  Blood from Arm, Left Updated:  12/26/17 0816     Blood Culture No growth at 5 days    Blood Culture - Blood, [[id number]]  (Normal) Collected:  12/19/17 1316    Lab Status:  Final result Specimen:  Blood from Arm, Left Updated:  12/24/17 1346     Blood Culture No growth at 5 days    Blood Culture - Blood, [[id number]]  (Normal) Collected:  12/19/17 1316    Lab Status:  Final result Specimen:  Blood from Hand, Left Updated:  12/24/17 1346     Blood Culture No growth at 5 days    Urine Culture - Urine, Urine, Clean Catch [[id number]]  (Abnormal) Collected:  12/12/17 0001    Lab Status:  Final result Specimen:  Urine from Urine, Clean Catch Updated:  12/14/17 0646     Urine Culture --      <10,000 CFU/mL Yeast isolated (A)    Blood Culture - Blood, [[id number]]  (Normal) Collected:  12/05/17 0313    Lab Status:  Final result Specimen:  Blood from Arm, Right Updated:  12/10/17 0416     Blood Culture No growth at 5 days    Blood Culture - Blood, [[id number]]  (Normal) Collected:  12/05/17 0313    Lab Status:  Final result Specimen:  Blood from Arm, Right Updated:  12/10/17 0416     Blood Culture No growth at 5 days    Narrative:       Aerobic bottle only    Urine Culture - Urine, Urine, Clean Catch [[id number]]  (Normal) Collected:  12/05/17 1704    Lab Status:  Final result Specimen:  Urine from Urine, Catheter  Updated:  12/07/17 0842     Urine Culture No growth at 2 days    Eosinophil Smear - Urine, Urine, Clean Catch [[id number]]  (Normal) Collected:  12/06/17 1106    Lab Status:  Final result Specimen:  Urine from Urine, Catheter Updated:  12/06/17 1347     Eosinophil Smear 0 % EOS/100 Cells     Narrative:       No eosinophil seen          Imaging Results (last 24 hours)     ** No results found for the last 24 hours. **        Results for orders placed during the hospital encounter of 12/04/17   Adult Transthoracic Echo Complete W/ Cont if Necessary Per Protocol    Narrative · LVEF difficult to evaluate with tachycardia- globally appears mildly   depressed.  · Left ventricular wall thickness is consistent with concentric   hypertrophy.  · Mild tricuspid valve regurgitation is present.  · Calculated right ventricular systolic pressure from tricuspid   regurgitation is 32 mmHg.          I have reviewed the medications.    Assessment/Plan   Assessment / Plan     Hospital Problem List     * (Principal)Small bowel obstruction    Overview Signed 12/5/2017  5:16 PM by AYDEE Hernandez     S/p abdominal exploration with adhesiolysis and resection of strangulated segment of small bowel by Dr. CUI 12/5/17         Meningioma, recurrent of brain    Overview Signed 12/6/2017  3:49 PM by Julia COTE Case, DO     First occurrence with resection by Bunny in 2003.   Recurrence with resection by Bunny in 2014.          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          Acute renal failure    Elevated troponin level    Metabolic acidosis    Transaminitis    Atrial flutter with rapid ventricular response    Overview Addendum 12/21/2017 12:10 PM by JEIMY Dalal     1. Echo 12-21-17:  · LVEF difficult to evaluate with tachycardia- globally appears mildly depressed.  · Left  ventricular wall thickness is consistent with concentric hypertrophy.  · Mild tricuspid valve regurgitation is present.  · Calculated right ventricular systolic pressure from tricuspid regurgitation is 32 mmHg.                  Brief Hospital Course to date:  Tereza Ackerman is a 62 y.o. female presenting with small bowel obstruction s/p ex-lap with adhesiolysis and resection of strangulated segment of small bowel by Dr. CUI 12/5/17 complicated by acute renal failure (ATN from sepsis/bowel ischemia/hypotension) requiring hemodialysis now transferred out of ICU but with persistent ileus/obstruction requiring NGT replacement and TPN for nutrition. Slow to improve. Developed rapid atrial flutter evening of 12/20, requiring amiodarone drip.      Assessment & Plan:    SBO  -s/p ex-lap with adhesiolysis and resection of strangulated segment of small bowel by Dr. CUI 12/5/17     Non-oliguric DEJA  - HD 1/2, Epo.   -Nephrology following; dc'd sod bicar yesterdy  - creatinine is improving, 2.2 today and not requiring dialysis at this time.    A. Flutter  - continue amiodarone taper. BB. FU with Dr. Aguirre in 4 weeks.      HTN  -continue amlodipine,coreg  -Hydralazine PO PRN  -Terazosin 2mg qhs added per nephrology     Anemia  - Hgb stable, 10.0 today    Anxiety  - mood improved on Sertraline.   - seroquel prn at night for her anxiety and restlessness but patient has not required this  -cm working on placement for rehab cannot do [facility] if dialysis is continued    -am labs    DVT Prophylaxis:    H    CODE STATUS: Full Code    Disposition: I expect the patient to be discharged to SNF once placement arranged.  CM following.      Anny Rodarte, APRN  01/07/18  12:17 PM

## 2018-01-07 NOTE — PROGRESS NOTES
"   LOS: 34 days    Patient Care Team:  Michaela Connell MD as PCP - General  Michaela Connell MD as PCP - Family Medicine    Reason For Visit:  F/U DEJA.   Subjective   No acute events overnight. No new complaints.         Review of Systems:    Pulm: No soa   CV:  No CP      Objective       amiodarone 200 mg Oral Q12H   Followed by      [START ON 1/12/2018] amiodarone 200 mg Oral Daily   amLODIPine 10 mg Oral Q24H   carvedilol 25 mg Oral Q12H   docusate sodium 100 mg Oral BID   epoetin marcel 10,000 Units Subcutaneous Once per day on Tue Thu Sat   furosemide 20 mg Intravenous Once   heparin (porcine) 5,000 Units Subcutaneous Q8H   metoclopramide 5 mg Intravenous Q12H   nystatin  Topical Q12H   QUEtiapine 25 mg Oral Nightly   sertraline 100 mg Oral Daily   thiamine 100 mg Oral Daily            Vital Signs:  Blood pressure 177/82, pulse 81, temperature 97.9 °F (36.6 °C), temperature source Oral, resp. rate 19, height 160 cm (62.99\"), weight 68.9 kg (152 lb), SpO2 98 %.    Flowsheet Rows         First Filed Value    Admission Height  160 cm (63\") Documented at 12/04/2017 1002    Admission Weight  68.9 kg (152 lb) Documented at 12/04/2017 1002          01/06 0701 - 01/07 0700  In: -   Out: 600 [Urine:600]    Physical Exam:    General Appearance: NAD, alert and cooperative, Ox3  Eyes: PER, conjunctivae and sclerae normal, no icterus  Lungs: respirations regular and unlabored, no crepitus, clear to auscultation  Heart/CV: regular rhythm & normal rate, no murmur, no gallop, no rub and 1+ edema  Abdomen: not distended, soft, non-tender, no masses,  bowel sounds present  Skin: No rash, Warm and dry. TUNNELED HD CATH.    Radiology:            Labs:    Results from last 7 days  Lab Units 01/07/18  0512 01/06/18  0543 01/05/18  0629   WBC 10*3/mm3 8.74 8.57 8.05   HEMOGLOBIN g/dL 10.0* 8.7* 8.9*   HEMATOCRIT % 32.7* 28.5* 29.2*   PLATELETS 10*3/mm3 443 374 359       Results from last 7 days  Lab Units 01/07/18  0512 01/06/18  0543 " 01/05/18  0629 01/04/18  0548 01/03/18  0612 01/02/18  0453   SODIUM mmol/L 137 138 142 142 140 138   POTASSIUM mmol/L 3.3* 2.9* 3.5 3.8  3.8 3.2* 3.3*   CHLORIDE mmol/L 104 101 102 97* 102 99   CO2 mmol/L 28.0 28.0 28.0 29.0 26.0 25.0   BUN mg/dL 29* 32* 25* 37* 28* 55*   CREATININE mg/dL 2.20* 2.40* 2.10* 3.00* 2.60* 3.90*   CALCIUM mg/dL 8.0* 8.0* 7.9* 8.4* 7.8* 8.4*   PHOSPHORUS mg/dL 2.9 2.4 2.5 2.6 3.5 5.5*   MAGNESIUM mg/dL  --   --  1.9 2.3 1.9 1.9   ALBUMIN g/dL 3.20 2.80*  --  3.20 2.60* 3.20       Results from last 7 days  Lab Units 01/07/18  0512   GLUCOSE mg/dL 97                       Estimated Creatinine Clearance: 24.1 mL/min (by C-G formula based on Cr of 2.2).      Assessment     Principal Problem:    Small bowel obstruction  Active Problems:    Meningioma, recurrent of brain    Insomnia    Malignant neoplasm of left orbit    Acute renal failure    Elevated troponin level    Metabolic acidosis    Transaminitis    Atrial flutter with rapid ventricular response            Impression: NONOLIGURIC DEJA. ATN. INCREASED U/O.STARTING TO RECOVER. POOR GFR. ANEMIA.        Recommendations:    Monitor I/O. UOP and renal function showing signs of recovery. Scr improved to 2.2 today from 2.4 without dialysis.   Renal diet.    Will start on terazosin 2 mg po q bedtime.   Hydralazine PRN for SBP greater than 160 mmHg.   No need of dialysis today.        Jamal Ramos MD  01/07/18  8:58 AM

## 2018-01-08 LAB
ALBUMIN SERPL-MCNC: 2.9 G/DL (ref 3.2–4.8)
ANION GAP SERPL CALCULATED.3IONS-SCNC: 9 MMOL/L (ref 3–11)
BUN BLD-MCNC: 29 MG/DL (ref 9–23)
BUN/CREAT SERPL: 13.8 (ref 7–25)
CA-I SERPL ISE-MCNC: 1.2 MMOL/L (ref 1.12–1.32)
CALCIUM SPEC-SCNC: 8.1 MG/DL (ref 8.7–10.4)
CHLORIDE SERPL-SCNC: 106 MMOL/L (ref 99–109)
CO2 SERPL-SCNC: 25 MMOL/L (ref 20–31)
CREAT BLD-MCNC: 2.1 MG/DL (ref 0.6–1.3)
CRP SERPL-MCNC: 6.95 MG/DL (ref 0–1)
DEPRECATED RDW RBC AUTO: 61 FL (ref 37–54)
ERYTHROCYTE [DISTWIDTH] IN BLOOD BY AUTOMATED COUNT: 19.5 % (ref 11.3–14.5)
GFR SERPL CREATININE-BSD FRML MDRD: 24 ML/MIN/1.73
GLUCOSE BLD-MCNC: 89 MG/DL (ref 70–100)
HCT VFR BLD AUTO: 29.4 % (ref 34.5–44)
HGB BLD-MCNC: 9 G/DL (ref 11.5–15.5)
MCH RBC QN AUTO: 28.2 PG (ref 27–31)
MCHC RBC AUTO-ENTMCNC: 30.6 G/DL (ref 32–36)
MCV RBC AUTO: 92.2 FL (ref 80–99)
PHOSPHATE SERPL-MCNC: 2.7 MG/DL (ref 2.4–5.1)
PLATELET # BLD AUTO: 356 10*3/MM3 (ref 150–450)
PMV BLD AUTO: 9.7 FL (ref 6–12)
POTASSIUM BLD-SCNC: 3.2 MMOL/L (ref 3.5–5.5)
PREALB SERPL-MCNC: 11 MG/DL (ref 10–40)
RBC # BLD AUTO: 3.19 10*6/MM3 (ref 3.89–5.14)
SODIUM BLD-SCNC: 140 MMOL/L (ref 132–146)
WBC NRBC COR # BLD: 8.49 10*3/MM3 (ref 3.5–10.8)

## 2018-01-08 PROCEDURE — 80069 RENAL FUNCTION PANEL: CPT | Performed by: NURSE PRACTITIONER

## 2018-01-08 PROCEDURE — 25010000002 METOCLOPRAMIDE PER 10 MG: Performed by: INTERNAL MEDICINE

## 2018-01-08 PROCEDURE — 82330 ASSAY OF CALCIUM: CPT | Performed by: SURGERY

## 2018-01-08 PROCEDURE — 84134 ASSAY OF PREALBUMIN: CPT | Performed by: SURGERY

## 2018-01-08 PROCEDURE — 86140 C-REACTIVE PROTEIN: CPT | Performed by: SURGERY

## 2018-01-08 PROCEDURE — 84132 ASSAY OF SERUM POTASSIUM: CPT | Performed by: INTERNAL MEDICINE

## 2018-01-08 PROCEDURE — 99232 SBSQ HOSP IP/OBS MODERATE 35: CPT | Performed by: INTERNAL MEDICINE

## 2018-01-08 PROCEDURE — 25010000002 HEPARIN (PORCINE) PER 1000 UNITS: Performed by: HOSPITALIST

## 2018-01-08 PROCEDURE — 85027 COMPLETE CBC AUTOMATED: CPT | Performed by: NURSE PRACTITIONER

## 2018-01-08 RX ADMIN — METOCLOPRAMIDE 5 MG: 5 INJECTION, SOLUTION INTRAMUSCULAR; INTRAVENOUS at 21:31

## 2018-01-08 RX ADMIN — SERTRALINE HYDROCHLORIDE 100 MG: 100 TABLET ORAL at 08:20

## 2018-01-08 RX ADMIN — CARVEDILOL 25 MG: 12.5 TABLET, FILM COATED ORAL at 08:25

## 2018-01-08 RX ADMIN — METOCLOPRAMIDE 5 MG: 5 INJECTION, SOLUTION INTRAMUSCULAR; INTRAVENOUS at 08:19

## 2018-01-08 RX ADMIN — QUETIAPINE FUMARATE 25 MG: 25 TABLET ORAL at 21:37

## 2018-01-08 RX ADMIN — HEPARIN SODIUM 5000 UNITS: 5000 INJECTION, SOLUTION INTRAVENOUS; SUBCUTANEOUS at 14:43

## 2018-01-08 RX ADMIN — POTASSIUM CHLORIDE 40 MEQ: 750 CAPSULE, EXTENDED RELEASE ORAL at 13:16

## 2018-01-08 RX ADMIN — ACETAMINOPHEN 500 MG: 500 TABLET ORAL at 21:35

## 2018-01-08 RX ADMIN — POTASSIUM CHLORIDE 40 MEQ: 750 CAPSULE, EXTENDED RELEASE ORAL at 18:46

## 2018-01-08 RX ADMIN — AMIODARONE HYDROCHLORIDE 200 MG: 200 TABLET ORAL at 20:15

## 2018-01-08 RX ADMIN — NYSTATIN: 100000 CREAM TOPICAL at 08:22

## 2018-01-08 RX ADMIN — TERAZOSIN HYDROCHLORIDE ANHYDROUS 2 MG: 2 CAPSULE ORAL at 21:36

## 2018-01-08 RX ADMIN — HEPARIN SODIUM 5000 UNITS: 5000 INJECTION, SOLUTION INTRAVENOUS; SUBCUTANEOUS at 21:33

## 2018-01-08 RX ADMIN — AMIODARONE HYDROCHLORIDE 200 MG: 200 TABLET ORAL at 08:22

## 2018-01-08 RX ADMIN — CARVEDILOL 25 MG: 12.5 TABLET, FILM COATED ORAL at 21:34

## 2018-01-08 RX ADMIN — Medication 100 MG: at 08:20

## 2018-01-08 RX ADMIN — ACETAMINOPHEN 500 MG: 500 TABLET ORAL at 16:20

## 2018-01-08 RX ADMIN — HEPARIN SODIUM 5000 UNITS: 5000 INJECTION, SOLUTION INTRAVENOUS; SUBCUTANEOUS at 05:20

## 2018-01-08 RX ADMIN — ACETAMINOPHEN 500 MG: 500 TABLET ORAL at 09:59

## 2018-01-08 RX ADMIN — NYSTATIN: 100000 CREAM TOPICAL at 21:37

## 2018-01-08 RX ADMIN — AMLODIPINE BESYLATE 10 MG: 10 TABLET ORAL at 08:20

## 2018-01-08 NOTE — PROGRESS NOTES
"   LOS: 35 days    Patient Care Team:  Michaela Connell MD as PCP - General  Michaela Connell MD as PCP - Family Medicine    Reason For Visit:  F/U DEJA.   Subjective   No acute events overnight. No new complaints.         Review of Systems:    Pulm: No soa   CV:  No CP      Objective       amiodarone 200 mg Oral Q12H   Followed by      [START ON 1/12/2018] amiodarone 200 mg Oral Daily   amLODIPine 10 mg Oral Q24H   carvedilol 25 mg Oral Q12H   docusate sodium 100 mg Oral BID   epoetin marcel 10,000 Units Subcutaneous Once per day on Tue Thu Sat   furosemide 20 mg Intravenous Once   heparin (porcine) 5,000 Units Subcutaneous Q8H   metoclopramide 5 mg Intravenous Q12H   nystatin  Topical Q12H   QUEtiapine 25 mg Oral Nightly   sertraline 100 mg Oral Daily   terazosin 2 mg Oral Nightly   thiamine 100 mg Oral Daily            Vital Signs:  Blood pressure 165/85, pulse 74, temperature 97.6 °F (36.4 °C), temperature source Oral, resp. rate 16, height 160 cm (62.99\"), weight 68.6 kg (151 lb 3.2 oz), SpO2 97 %.    Flowsheet Rows         First Filed Value    Admission Height  160 cm (63\") Documented at 12/04/2017 1002    Admission Weight  68.9 kg (152 lb) Documented at 12/04/2017 1002          01/07 0701 - 01/08 0700  In: 240 [P.O.:240]  Out: 400 [Urine:400]    Physical Exam:    General Appearance: NAD, alert and cooperative, Ox3  Eyes: PER, conjunctivae and sclerae normal, no icterus  Lungs: respirations regular and unlabored, no crepitus, clear to auscultation  Heart/CV: regular rhythm & normal rate, no murmur, no gallop, no rub and 1+ edema  Abdomen: not distended, soft, non-tender, no masses,  bowel sounds present  Skin: No rash, Warm and dry. TUNNELED HD CATH.    Radiology:            Labs:    Results from last 7 days  Lab Units 01/08/18  0618 01/07/18  0512 01/06/18  0543   WBC 10*3/mm3 8.49 8.74 8.57   HEMOGLOBIN g/dL 9.0* 10.0* 8.7*   HEMATOCRIT % 29.4* 32.7* 28.5*   PLATELETS 10*3/mm3 153 499 229       Results from " last 7 days  Lab Units 01/08/18  0618 01/07/18  0512 01/06/18  0543 01/05/18  0629 01/04/18  0548 01/03/18  0612 01/02/18  0453   SODIUM mmol/L 140 137 138 142 142 140 138   POTASSIUM mmol/L 3.2* 3.3* 2.9* 3.5 3.8  3.8 3.2* 3.3*   CHLORIDE mmol/L 106 104 101 102 97* 102 99   CO2 mmol/L 25.0 28.0 28.0 28.0 29.0 26.0 25.0   BUN mg/dL 29* 29* 32* 25* 37* 28* 55*   CREATININE mg/dL 2.10* 2.20* 2.40* 2.10* 3.00* 2.60* 3.90*   CALCIUM mg/dL 8.1* 8.0* 8.0* 7.9* 8.4* 7.8* 8.4*   PHOSPHORUS mg/dL 2.7 2.9 2.4 2.5 2.6 3.5 5.5*   MAGNESIUM mg/dL  --   --   --  1.9 2.3 1.9 1.9   ALBUMIN g/dL 2.90* 3.20 2.80*  --  3.20 2.60* 3.20       Results from last 7 days  Lab Units 01/08/18  0618   GLUCOSE mg/dL 89                       Estimated Creatinine Clearance: 25.2 mL/min (by C-G formula based on Cr of 2.1).      Assessment     Principal Problem:    Small bowel obstruction  Active Problems:    Meningioma, recurrent of brain    Insomnia    Malignant neoplasm of left orbit    Acute renal failure    Elevated troponin level    Metabolic acidosis    Transaminitis    Atrial flutter with rapid ventricular response            Impression: NONOLIGURIC DEJA. ATN. INCREASED U/O.STARTING TO RECOVER.  ANEMIA.        Recommendations:    Monitor I/O. UOP and renal function showing signs of recovery. Scr improved to 2.1 today from 2.2 without dialysis. If scr further improved tomorrow. Will get Tunneled catheter out.   Renal diet.    Continue with  terazosin 2 mg po q bedtime.   Hydralazine PRN for SBP greater than 160 mmHg.   No need of dialysis today.        Jamal Ramos MD  01/08/18  8:53 AM

## 2018-01-08 NOTE — PLAN OF CARE
Problem: Patient Care Overview (Adult)  Goal: Plan of Care Review  Outcome: Ongoing (interventions implemented as appropriate)   01/08/18 0413   Patient Care Overview   Progress progress toward functional goals as expected       Problem: Skin Integrity Impairment, Risk/Actual (Adult)  Goal: Skin Integrity/Wound Healing  Outcome: Ongoing (interventions implemented as appropriate)   01/08/18 0413   Skin Integrity Impairment, Risk/Actual (Adult)   Skin Integrity/Wound Healing making progress toward outcome       Problem: Fall Risk (Adult)  Goal: Absence of Falls  Outcome: Ongoing (interventions implemented as appropriate)   01/08/18 0413   Fall Risk (Adult)   Absence of Falls making progress toward outcome       Problem: Pressure Ulcer Risk (Jairo Scale) (Adult,Obstetrics,Pediatric)  Goal: Skin Integrity  Outcome: Ongoing (interventions implemented as appropriate)   01/08/18 0413   Pressure Ulcer Risk (Jairo Scale) (Adult,Obstetrics,Pediatric)   Skin Integrity making progress toward outcome       Problem: Pain, Acute (Adult)  Goal: Acceptable Pain Control/Comfort Level  Outcome: Ongoing (interventions implemented as appropriate)   01/08/18 0413   Pain, Acute (Adult)   Acceptable Pain Control/Comfort Level making progress toward outcome       Problem: Anxiety (Adult)  Goal: Reduction/Resolution  Outcome: Ongoing (interventions implemented as appropriate)   01/08/18 0413   Anxiety (Adult)   Reduction/Resolution making progress toward outcome
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
## 2018-01-08 NOTE — PROGRESS NOTES
"Tereza Ackerman  1955  [id number]    Surgery Progress Note    Date of visit: 1/8/2018    Subjective:continues to improve  Tolerating diet well with adequate bowel function    Objective:    /87  Pulse 71  Temp 97.6 °F (36.4 °C) (Oral)   Resp 16  Ht 160 cm (62.99\")  Wt 68.6 kg (151 lb 3.2 oz)  SpO2 97%  BMI 26.79 kg/m2    Intake/Output Summary (Last 24 hours) at 01/08/18 0811  Last data filed at 01/08/18 0005   Gross per 24 hour   Intake              240 ml   Output                0 ml   Net              240 ml       CV: Regular rate and rhythm  L: normal air entry  Abd: soft. wound granulating well    LABS:      Results from last 7 days  Lab Units 01/08/18  0618   WBC 10*3/mm3 8.49   HEMOGLOBIN g/dL 9.0*   HEMATOCRIT % 29.4*   PLATELETS 10*3/mm3 356       Results from last 7 days  Lab Units 01/08/18  0618   SODIUM mmol/L 140   POTASSIUM mmol/L 3.2*   CHLORIDE mmol/L 106   CO2 mmol/L 25.0   BUN mg/dL 29*   CREATININE mg/dL 2.10*   CALCIUM mg/dL 8.1*   GLUCOSE mg/dL 89       Results from last 7 days  Lab Units 01/08/18  0618   SODIUM mmol/L 140   POTASSIUM mmol/L 3.2*   CHLORIDE mmol/L 106   CO2 mmol/L 25.0   BUN mg/dL 29*   CREATININE mg/dL 2.10*   GLUCOSE mg/dL 89   CALCIUM mg/dL 8.1*       Lab Results  Lab Value Date/Time   LIPASE 52 (H) 12/04/2017 1043   LIPASE 39 06/30/2017 1959         Assessment/ Plan: progressing nicely  Renal function improving  Await rehab placement  DC staples prior to transfer to rehab      Problem List Items Addressed This Visit     Hypertension    Relevant Medications    amLODIPine (NORVASC) tablet 10 mg    * (Principal)Small bowel obstruction - Primary    Relevant Orders    Tissue Pathology Exam - Tissue, Small Intestine (Completed)    Acute renal failure    Relevant Medications    potassium chloride (MICRO-K) CR capsule 40 mEq    potassium chloride (KLOR-CON) packet 40 mEq    potassium chloride 10 mEq in 100 mL IVPB      Other Visit Diagnoses     Intractable vomiting " with nausea, unspecified vomiting type        Uncontrolled hypertension                Ирина Cobian MD  1/8/2018  8:11 AM

## 2018-01-08 NOTE — PLAN OF CARE
Problem: Patient Care Overview (Adult)  Goal: Plan of Care Review  Outcome: Ongoing (interventions implemented as appropriate)   01/08/18 1802   Coping/Psychosocial Response Interventions   Plan Of Care Reviewed With patient   Patient Care Overview   Progress progress toward functional goals as expected   Outcome Evaluation   Outcome Summary/Follow up Plan Pt has controlled pain with prn tylenol. no c/o soa or nausea. pt able to ambulate. Pt to go to SNF at d/c. VSS. No new concerns at this time.

## 2018-01-08 NOTE — PROGRESS NOTES
[facility] Medicine Services  PROGRESS NOTE    Patient Name: Tereza Ackerman  : 1955  MRN: [id number]    Date of Admission: 2017  Length of Stay: 35  Primary Care Physician: Michaela Connell MD    Subjective   Subjective     CC:  Follow up SBO    HPI:  No new complaints. Walked with husbnad and walker this morning.  Tolerating small meals. Having daily BMs. Abd is still sore    Review of Systems  Gen- No fevers, chills  CV- No chest pain, palpitations  Resp- No cough, dyspnea  GI- No N/V/D    Otherwise ROS is negative except as mentioned in the HPI.    Objective   Objective     Vital Signs:   Temp:  [97.6 °F (36.4 °C)-98.3 °F (36.8 °C)] 97.6 °F (36.4 °C)  Heart Rate:  [66-80] 66  Resp:  [14-16] 16  BP: (133-165)/(80-87) 133/80        Physical Exam:  Constitutional: No acute distress, awake, alert., in recliner, looks comfortable  Respiratory: Clear to auscultation bilaterally A/P, nonlabored respirations   Cardiovascular: RRR, no murmurs, rubs, or gallops, palpable pedal pulses bilaterally  Gastrointestinal: Positive bowel sounds, soft, nondistended, mildly tender, lower abd dressing, incision not inspected  Musculoskeletal: 1+ pitting edema BLE  Psychiatric: Appropriate affect, cooperative  Neurologic: Oriented x 3, no focal deficits   Skin:  Rt upper CW tunneled HD cath in place.  C/d/i.     Results Reviewed:  I have personally reviewed current lab, radiology, and data and agree.      Results from last 7 days  Lab Units 1818 18  0543   WBC 10*3/mm3 8.49 8.74 8.57   HEMOGLOBIN g/dL 9.0* 10.0* 8.7*   HEMATOCRIT % 29.4* 32.7* 28.5*   PLATELETS 10*3/mm3 356 443 374       Results from last 7 days  Lab Units 1818 18  0512 18  0543   SODIUM mmol/L 140 137 138   POTASSIUM mmol/L 3.2* 3.3* 2.9*   CHLORIDE mmol/L 106 104 101   CO2 mmol/L 25.0 28.0 28.0   BUN mg/dL 29* 29* 32*   CREATININE mg/dL 2.10* 2.20* 2.40*   GLUCOSE mg/dL 89  97 136*   CALCIUM mg/dL 8.1* 8.0* 8.0*     No results found for: BNP  No results found for: PHART    Microbiology Results Abnormal     Procedure Component Value - Date/Time    Blood Culture - Blood, [[id number]]  (Normal) Collected:  12/21/17 0700    Lab Status:  Final result Specimen:  Blood from Arm, Right Updated:  12/26/17 0816     Blood Culture No growth at 5 days    Blood Culture - Blood, [[id number]]  (Normal) Collected:  12/21/17 0705    Lab Status:  Final result Specimen:  Blood from Arm, Left Updated:  12/26/17 0816     Blood Culture No growth at 5 days    Blood Culture - Blood, [[id number]]  (Normal) Collected:  12/19/17 1316    Lab Status:  Final result Specimen:  Blood from Arm, Left Updated:  12/24/17 1346     Blood Culture No growth at 5 days    Blood Culture - Blood, [[id number]]  (Normal) Collected:  12/19/17 1316    Lab Status:  Final result Specimen:  Blood from Hand, Left Updated:  12/24/17 1346     Blood Culture No growth at 5 days    Urine Culture - Urine, Urine, Clean Catch [[id number]]  (Abnormal) Collected:  12/12/17 0001    Lab Status:  Final result Specimen:  Urine from Urine, Clean Catch Updated:  12/14/17 0646     Urine Culture --      <10,000 CFU/mL Yeast isolated (A)    Blood Culture - Blood, [[id number]]  (Normal) Collected:  12/05/17 0313    Lab Status:  Final result Specimen:  Blood from Arm, Right Updated:  12/10/17 0416     Blood Culture No growth at 5 days    Blood Culture - Blood, [[id number]]  (Normal) Collected:  12/05/17 0313    Lab Status:  Final result Specimen:  Blood from Arm, Right Updated:  12/10/17 0416     Blood Culture No growth at 5 days    Narrative:       Aerobic bottle only    Urine Culture - Urine, Urine, Clean Catch [[id number]]  (Normal) Collected:  12/05/17 1704    Lab Status:  Final result Specimen:  Urine from Urine, Catheter Updated:  12/07/17 0842     Urine Culture No growth at 2 days    Eosinophil Smear - Urine, Urine, Clean Catch [[id number]]  (Normal)  Collected:  12/06/17 1106    Lab Status:  Final result Specimen:  Urine from Urine, Catheter Updated:  12/06/17 1347     Eosinophil Smear 0 % EOS/100 Cells     Narrative:       No eosinophil seen          Imaging Results (last 24 hours)     ** No results found for the last 24 hours. **        Results for orders placed during the hospital encounter of 12/04/17   Adult Transthoracic Echo Complete W/ Cont if Necessary Per Protocol    Narrative · LVEF difficult to evaluate with tachycardia- globally appears mildly   depressed.  · Left ventricular wall thickness is consistent with concentric   hypertrophy.  · Mild tricuspid valve regurgitation is present.  · Calculated right ventricular systolic pressure from tricuspid   regurgitation is 32 mmHg.          I have reviewed the medications.    Assessment/Plan   Assessment / Plan     Hospital Problem List     * (Principal)Small bowel obstruction    Overview Signed 12/5/2017  5:16 PM by AYDEE Hernandez     S/p abdominal exploration with adhesiolysis and resection of strangulated segment of small bowel by Dr. CUI 12/5/17         Meningioma, recurrent of brain    Overview Signed 12/6/2017  3:49 PM by Julia COTE Case, DO     First occurrence with resection by Bunny in 2003.   Recurrence with resection by Bunny in 2014.          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          Acute renal failure    Elevated troponin level    Metabolic acidosis    Transaminitis    Atrial flutter with rapid ventricular response    Overview Addendum 12/21/2017 12:10 PM by JEIMY Dalal     1. Echo 12-21-17:  · LVEF difficult to evaluate with tachycardia- globally appears mildly depressed.  · Left ventricular wall thickness is consistent with concentric hypertrophy.  · Mild tricuspid valve regurgitation is present.  · Calculated right  ventricular systolic pressure from tricuspid regurgitation is 32 mmHg.                  Brief Hospital Course to date:  Tereza Ackerman is a 62 y.o. female presenting with small bowel obstruction s/p ex-lap with adhesiolysis and resection of strangulated segment of small bowel by Dr. CUI 12/5/17 complicated by acute renal failure (ATN from sepsis/bowel ischemia/hypotension) requiring hemodialysis now transferred out of ICU but with persistent ileus/obstruction requiring NGT replacement and TPN for nutrition. Slow to improve. Developed rapid atrial flutter evening of 12/20, requiring amiodarone drip.      Assessment & Plan:    SBO  -s/p ex-lap with adhesiolysis and resection of strangulated segment of small bowel by Dr. CUI 12/5/17     Non-oliguric DEJA  - HD 1/2, Epo.   -Nephrology following; dc'd sod bicar   - creatinine is improving slowly  -- DC cath soon?    A. Flutter  - continue amiodarone taper. BB. FU with Dr. Aguirre in 4 weeks.      HTN  -continue amlodipine,coreg  -Hydralazine PO PRN  -Terazosin 2mg qhs added per nephrology     Anemia  - Hgb 9 today, watch    Anxiety  - mood improved on Sertraline.   - seroquel prn at night for her anxiety and restlessness but patient has not required this  -cm working on placement for rehab     She is walking with , eating small meals, breathing okay, still has sore abd.  Walks with walker/assistance.       DVT Prophylaxis:    SQH    CODE STATUS: Full Code    Disposition: I expect the patient to be discharged to SNF once placement arranged.  CM following.      Catalina Arevalo MD  01/08/18  11:02 AM

## 2018-01-09 LAB
ANION GAP SERPL CALCULATED.3IONS-SCNC: 9 MMOL/L (ref 3–11)
BUN BLD-MCNC: 27 MG/DL (ref 9–23)
BUN/CREAT SERPL: 13.5 (ref 7–25)
CALCIUM SPEC-SCNC: 8.1 MG/DL (ref 8.7–10.4)
CHLORIDE SERPL-SCNC: 106 MMOL/L (ref 99–109)
CO2 SERPL-SCNC: 24 MMOL/L (ref 20–31)
CREAT BLD-MCNC: 2 MG/DL (ref 0.6–1.3)
DEPRECATED RDW RBC AUTO: 64.6 FL (ref 37–54)
ERYTHROCYTE [DISTWIDTH] IN BLOOD BY AUTOMATED COUNT: 20.1 % (ref 11.3–14.5)
GFR SERPL CREATININE-BSD FRML MDRD: 25 ML/MIN/1.73
GLUCOSE BLD-MCNC: 85 MG/DL (ref 70–100)
HCT VFR BLD AUTO: 30.2 % (ref 34.5–44)
HGB BLD-MCNC: 9.1 G/DL (ref 11.5–15.5)
MCH RBC QN AUTO: 28.3 PG (ref 27–31)
MCHC RBC AUTO-ENTMCNC: 30.1 G/DL (ref 32–36)
MCV RBC AUTO: 93.8 FL (ref 80–99)
PLATELET # BLD AUTO: 357 10*3/MM3 (ref 150–450)
PMV BLD AUTO: 10.6 FL (ref 6–12)
POTASSIUM BLD-SCNC: 3.9 MMOL/L (ref 3.5–5.5)
POTASSIUM BLD-SCNC: 4.3 MMOL/L (ref 3.5–5.5)
RBC # BLD AUTO: 3.22 10*6/MM3 (ref 3.89–5.14)
SODIUM BLD-SCNC: 139 MMOL/L (ref 132–146)
WBC NRBC COR # BLD: 8.09 10*3/MM3 (ref 3.5–10.8)

## 2018-01-09 PROCEDURE — 25010000002 METOCLOPRAMIDE PER 10 MG: Performed by: INTERNAL MEDICINE

## 2018-01-09 PROCEDURE — 80048 BASIC METABOLIC PNL TOTAL CA: CPT | Performed by: INTERNAL MEDICINE

## 2018-01-09 PROCEDURE — 97116 GAIT TRAINING THERAPY: CPT

## 2018-01-09 PROCEDURE — 05PYX3Z REMOVAL OF INFUSION DEVICE FROM UPPER VEIN, EXTERNAL APPROACH: ICD-10-PCS | Performed by: SURGERY

## 2018-01-09 PROCEDURE — 85027 COMPLETE CBC AUTOMATED: CPT | Performed by: INTERNAL MEDICINE

## 2018-01-09 PROCEDURE — 99232 SBSQ HOSP IP/OBS MODERATE 35: CPT | Performed by: NURSE PRACTITIONER

## 2018-01-09 PROCEDURE — 97110 THERAPEUTIC EXERCISES: CPT

## 2018-01-09 PROCEDURE — 63510000001 EPOETIN ALFA PER 1000 UNITS: Performed by: INTERNAL MEDICINE

## 2018-01-09 PROCEDURE — 25010000002 HEPARIN (PORCINE) PER 1000 UNITS: Performed by: HOSPITALIST

## 2018-01-09 RX ORDER — CLONIDINE HYDROCHLORIDE 0.1 MG/1
0.1 TABLET ORAL DAILY
Status: DISCONTINUED | OUTPATIENT
Start: 2018-01-09 | End: 2018-01-10

## 2018-01-09 RX ORDER — LIDOCAINE HYDROCHLORIDE 10 MG/ML
INJECTION, SOLUTION EPIDURAL; INFILTRATION; INTRACAUDAL; PERINEURAL
Status: DISPENSED
Start: 2018-01-09 | End: 2018-01-10

## 2018-01-09 RX ADMIN — CARVEDILOL 25 MG: 12.5 TABLET, FILM COATED ORAL at 09:21

## 2018-01-09 RX ADMIN — Medication 100 MG: at 09:21

## 2018-01-09 RX ADMIN — ACETAMINOPHEN 500 MG: 500 TABLET ORAL at 22:38

## 2018-01-09 RX ADMIN — SERTRALINE HYDROCHLORIDE 100 MG: 100 TABLET ORAL at 09:22

## 2018-01-09 RX ADMIN — AMLODIPINE BESYLATE 10 MG: 10 TABLET ORAL at 09:20

## 2018-01-09 RX ADMIN — CARVEDILOL 25 MG: 12.5 TABLET, FILM COATED ORAL at 21:24

## 2018-01-09 RX ADMIN — METOCLOPRAMIDE 5 MG: 5 INJECTION, SOLUTION INTRAMUSCULAR; INTRAVENOUS at 21:23

## 2018-01-09 RX ADMIN — ERYTHROPOIETIN 10000 UNITS: 10000 INJECTION, SOLUTION INTRAVENOUS; SUBCUTANEOUS at 09:22

## 2018-01-09 RX ADMIN — ACETAMINOPHEN 500 MG: 500 TABLET ORAL at 16:36

## 2018-01-09 RX ADMIN — ACETAMINOPHEN 500 MG: 500 TABLET ORAL at 11:09

## 2018-01-09 RX ADMIN — HEPARIN SODIUM 5000 UNITS: 5000 INJECTION, SOLUTION INTRAVENOUS; SUBCUTANEOUS at 05:45

## 2018-01-09 RX ADMIN — CLONIDINE HYDROCHLORIDE 0.1 MG: 0.1 TABLET ORAL at 12:34

## 2018-01-09 RX ADMIN — AMIODARONE HYDROCHLORIDE 200 MG: 200 TABLET ORAL at 09:21

## 2018-01-09 RX ADMIN — AMIODARONE HYDROCHLORIDE 200 MG: 200 TABLET ORAL at 21:35

## 2018-01-09 RX ADMIN — METOCLOPRAMIDE 5 MG: 5 INJECTION, SOLUTION INTRAMUSCULAR; INTRAVENOUS at 09:22

## 2018-01-09 RX ADMIN — ACETAMINOPHEN 500 MG: 500 TABLET ORAL at 04:33

## 2018-01-09 RX ADMIN — NYSTATIN: 100000 CREAM TOPICAL at 09:23

## 2018-01-09 RX ADMIN — TERAZOSIN HYDROCHLORIDE ANHYDROUS 2 MG: 2 CAPSULE ORAL at 21:28

## 2018-01-09 RX ADMIN — HEPARIN SODIUM 5000 UNITS: 5000 INJECTION, SOLUTION INTRAVENOUS; SUBCUTANEOUS at 16:36

## 2018-01-09 NOTE — THERAPY PROGRESS REPORT/RE-CERT
Acute Care - Physical Therapy Progress Note  [facility]     Patient Name: Tereza Ackerman  : 1955  MRN: [id number]  Today's Date: 2018  Onset of Illness/Injury or Date of Surgery Date: 17  Date of Referral to PT: 17  Referring Physician: Dr CUI    Admit Date: 2017    Visit Dx:    ICD-10-CM ICD-9-CM   1. Small bowel obstruction K56.609 560.9   2. Intractable vomiting with nausea, unspecified vomiting type R11.2 536.2   3. Uncontrolled hypertension I10 401.9   4. Impaired functional mobility, balance, gait, and endurance Z74.09 V49.89   5. Acute renal failure, unspecified acute renal failure type N17.9 584.9   6. Essential hypertension I10 401.9     Patient Active Problem List   Diagnosis   • Impaired glucose tolerance   • Hypertension   • Parathyroid adenoma   • Reaction to chronic stress   • Multinodular goiter   • Vitamin D deficiency   • Meningioma, recurrent of brain   • Arthritis   • Depression   • Small bowel obstruction   • Insomnia   • History of Nissen fundoplication   • Malignant neoplasm of left orbit   • Prediabetes   • Hyperlipemia   • Hyperglycemia   • Acute renal failure   • Elevated troponin level   • Metabolic acidosis   • Transaminitis   • Atrial flutter with rapid ventricular response               Adult Rehabilitation Note       18 1310          Rehab Assessment/Intervention    Discipline physical therapist  -      Document Type progress note  -SJ      Subjective Information no complaints;agree to therapy  -SJ      Patient Effort, Rehab Treatment good   self-limiting  -SJ      Symptoms Noted During/After Treatment fatigue  -SJ      Precautions/Limitations no known precautions/limitations   abdominal incision  -SJ      Recorded by [SJ] Selene Wagoner PT      Vital Signs    Pre Systolic BP Rehab 129  -SJ      Pre Treatment Diastolic BP 74  -SJ      Intratreatment Heart Rate (beats/min) 67  -SJ      Recorded by [SJ] Selene Wagoner PT      Pain Assessment     Pain Assessment Garcia-Tsai FACES  -SJ      Pain Score 1  -SJ      Post Pain Score 1  -SJ      Pain Type Acute pain;Surgical pain  -SJ      Pain Location Abdomen  -SJ      Pain Intervention(s) Repositioned;Ambulation/increased activity  -SJ      Response to Interventions neida  -SJ      Recorded by [SJ] Selene Wagoner, PT      Cognitive Assessment/Intervention    Current Cognitive/Communication Assessment functional  -SJ      Orientation Status oriented x 4  -SJ      Follows Commands/Answers Questions 100% of the time;able to follow multi-step instructions  -SJ      Personal Safety WNL/WFL  -SJ      Personal Safety Interventions muscle strengthening facilitated;nonskid shoes/slippers when out of bed  -SJ      Recorded by [SJ] Selene Wagoner, PT      Cognitive Assessment Intervention    Behavior/Mood Observations alert;behavior appropriate to situation, WNL/WFL  -SJ      Attention WNL/WFL  -SJ      Recorded by [SJ] Selene Wagoner, PT      Bed Mobility, Assessment/Treatment    Bed Mobility, Comment UIC  -SJ      Recorded by [SJ] Selene Wagoner, PT      Transfer Assessment/Treatment    Transfers, Sit-Stand [place] conditional independence  -SJ      Transfers, Stand-Sit [place] conditional independence  -SJ      Transfers, Sit-Stand-Sit, Assist Device rolling walker  -SJ      Toilet Transfer, [place] conditional independence  -SJ      Toilet Transfer, Assistive Device rolling walker  -SJ      Transfer, Comment good safety awareness  -SJ      Recorded by [SJ] Selene Wagoner, PT      Gait Assessment/Treatment    Gait, [place] Level supervision required  -SJ      Gait, Assistive Device rolling walker  -SJ      Gait, Distance (Feet) 150  -SJ      Gait, Gait Pattern Analysis swing-through gait  -SJ      Gait, Gait Deviations becky decreased  -SJ      Gait, Safety Issues step length decreased  -SJ      Gait, Comment Self-limiting distance, limited by c/o fatigue. No LOB, good safety  -SJ      Recorded  by [SJ] Selene Wagoner PT      Balance Skills Training    Sitting-Level of Assistance Independent  -SJ      Sitting-Balance Support Feet supported  -SJ      Standing-Level of Assistance Independent  -SJ      Static Standing Balance Support No upper extremity supported  -SJ      Gait Balance-Level of Assistance Close supervision  -SJ      Gait Balance Support assistive device  -SJ      Recorded by [SJ] Selene Wagoner PT      Therapy Exercises    Bilateral Lower Extremities AROM:;10 reps;standing;mini squats;hip abduction/adduction   pt declined extra reps due to fatigue  -SJ      Recorded by [SJ] Selene Wagoner PT      Positioning and Restraints    Pre-Treatment Position sitting in chair/recliner  -SJ      Post Treatment Position chair  -SJ      In Chair reclined;call light within reach;encouraged to call for assist;heels elevated  -SJ      Recorded by [SJ] Selene Wagoner PT        User Key  (r) = Recorded By, (t) = Taken By, (c) = Cosigned By    Initials Name Effective Dates    MANSOOR Wagoner, PT 06/19/15 -                 IP PT Goals       01/09/18 1354 01/05/18 1059       Bed Mobility PT LTG    Bed Mobility PT LTG, Date Goal Reviewed  01/05/18  -AS     Bed Mobility PT LTG, Outcome goal met  -SJ goal ongoing  -AS     Transfer Training PT LTG    Transfer Training PT  LTG, Date Goal Reviewed  01/05/18  -AS     Transfer Training PT LTG, Outcome goal met  -SJ goal ongoing  -AS     Gait Training PT LTG    Gait Training Goal PT LTG, Date Established 01/09/18  -SJ      Gait Training Goal PT LTG, Time to Achieve by discharge  -SJ      Gait Training Goal PT LTG, [place] Level conditional independence  -SJ      Gait Training Goal PT LTG, Assist Device walker, rolling  -SJ      Gait Training Goal PT LTG, Distance to Achieve 200  -SJ      Gait Training Goal PT LTG, Date Goal Reviewed  01/05/18  -AS     Gait Training Goal PT LTG, Outcome goal revised  -SJ goal ongoing  -AS     Gait Training Goal PT LTG, Reason  Goal Not Met goals revised this date  -        User Key  (r) = Recorded By, (t) = Taken By, (c) = Cosigned By    Initials Name Provider Type    MANSOOR Wagoner, PT Physical Therapist    AS Poornima Lebron, PTA Physical Therapy Assistant          Physical Therapy Education     Title: PT OT SLP Therapies (Active)     Topic: Physical Therapy (Active)     Point: Mobility training (Active)    Learning Progress Summary    Learner Readiness Method Response Comment Documented by Status   Patient Acceptance E DU,VU,NR   01/09/18 1358 Done    Acceptance E NR  AS 01/05/18 1058 Active    Acceptance E NL REVIEWED BENEFITS OF ACTIVITY--PATIENT VERY LETHERGIC SC 12/25/17 1012 Active    Eager E,D NR  DM 12/19/17 1730 Active    Acceptance E,D DU,NR  UD 12/15/17 1138 Done    Acceptance E NR  AS 12/13/17 1404 Active    Acceptance E NR  ES 12/10/17 1606 Active    Acceptance E,D,H NR  LS 12/08/17 1537 Active   Family Acceptance E NR  AS 01/05/18 1058 Active    Acceptance E,D DU,NR  UD 12/15/17 1138 Done    Acceptance E,D,H NR  LS 12/08/17 1537 Active   Significant Other Eager E,D NR  DM 12/19/17 1730 Active               Point: Home exercise program (Active)    Learning Progress Summary    Learner Readiness Method Response Comment Documented by Status   Patient Acceptance E DU,VU,NR   01/09/18 1358 Done    Acceptance E NR  AS 01/05/18 1058 Active    Acceptance E NL REVIEWED BENEFITS OF ACTIVITY--PATIENT VERY LETHERGIC SC 12/25/17 1012 Active    Eager E,D NR  DM 12/19/17 1730 Active    Acceptance E,D DU,NR  UD 12/15/17 1138 Done    Acceptance E NR  AS 12/13/17 1404 Active    Acceptance E NR  ES 12/10/17 1606 Active    Acceptance E,D,H NR  LS 12/08/17 1537 Active   Family Acceptance E NR  AS 01/05/18 1058 Active    Acceptance E,D DU,NR  UD 12/15/17 1138 Done    Acceptance E,D,H NR  LS 12/08/17 1537 Active   Significant Other Eager E,D NR  DM 12/19/17 1730 Active               Point: Body mechanics (Active)    Learning Progress  Summary    Learner Readiness Method Response Comment Documented by Status   Patient Acceptance E DU,VU,NR  SJ 01/09/18 1358 Done    Acceptance E NR  AS 01/05/18 1058 Active    Acceptance E NL REVIEWED BENEFITS OF ACTIVITY--PATIENT VERY LETHERGIC SC 12/25/17 1012 Active    Eager E,D NR  DM 12/19/17 1730 Active    Acceptance E,D DU,NR  UD 12/15/17 1138 Done    Acceptance E NR  AS 12/13/17 1404 Active    Acceptance E NR  ES 12/10/17 1606 Active    Acceptance E,D,H NR  LS 12/08/17 1537 Active   Family Acceptance E NR  AS 01/05/18 1058 Active    Acceptance E,D DU,NR  UD 12/15/17 1138 Done    Acceptance E,D,H NR  LS 12/08/17 1537 Active   Significant Other Eager E,D NR  DM 12/19/17 1730 Active               Point: Precautions (Active)    Learning Progress Summary    Learner Readiness Method Response Comment Documented by Status   Patient Acceptance E DU,VU,NR  SJ 01/09/18 1358 Done    Acceptance E NR  AS 01/05/18 1058 Active    Acceptance E NL REVIEWED BENEFITS OF ACTIVITY--PATIENT VERY LETHERGIC SC 12/25/17 1012 Active    Eager E,D NR  DM 12/19/17 1730 Active    Acceptance E,D DU,NR  UD 12/15/17 1138 Done    Acceptance E NR  AS 12/13/17 1404 Active    Acceptance E NR  ES 12/10/17 1606 Active    Acceptance E,D,H NR  LS 12/08/17 1537 Active   Family Acceptance E NR  AS 01/05/18 1058 Active    Acceptance E,D DU,NR  UD 12/15/17 1138 Done    Acceptance E,D,H NR  LS 12/08/17 1537 Active   Significant Other Eager E,D NR  DM 12/19/17 1730 Active                      User Key     Initials Effective Dates Name Provider Type Discipline    SC 06/19/15 -  Charli Keller, PT Physical Therapist PT    UD 06/22/15 -  Tete Dawson, PTA Physical Therapy Assistant PT     06/19/15 -  Selene Wagoner, PT Physical Therapist PT    DM 06/19/15 -  Santa Sherman, PT Physical Therapist PT    AS 06/22/15 -  Poornima Lebron, PTA Physical Therapy Assistant PT    LS 06/19/15 -  Namrata Crowe, PT Physical Therapist PT    ES 11/13/17 Salvador BARR  CORNELIO Cueva Physical Therapist PT                    PT Recommendation and Plan  Anticipated Discharge Disposition: inpatient rehabilitation facility  Planned Therapy Interventions: bed mobility training, gait training, home exercise program, patient/family education, strengthening, transfer training  PT Frequency: daily  Plan of Care Review  Plan Of Care Reviewed With: patient  Progress: improving  Outcome Summary/Follow up Plan: PT progress note completed. Pt demonstrates improved independence with mobility today, with pt independent with sit to stand t/f and toilet t/f's. Pt ambulates 150ft with RW with SBA, limited by fatigue. PT feels pt safe to d/c home with 's assistance and outpatient PT services to continue to progress strength and endurance.          Outcome Measures       01/09/18 1310          How much help from another person do you currently need...    Turning from your back to your side while in flat bed without using bedrails? 4  -SJ      Moving from lying on back to sitting on the side of a flat bed without bedrails? 4  -SJ      Moving to and from a bed to a chair (including a wheelchair)? 4  -SJ      Standing up from a chair using your arms (e.g., wheelchair, bedside chair)? 4  -SJ      Climbing 3-5 steps with a railing? 3  -SJ      To walk in hospital room? 4  -SJ      AM-PAC 6 Clicks Score 23  -SJ      Functional Assessment    Outcome Measure Options AM-PAC 6 Clicks Basic Mobility (PT)  -        User Key  (r) = Recorded By, (t) = Taken By, (c) = Cosigned By    Initials Name Provider Type    MANSOOR Wagoner PT Physical Therapist           Time Calculation:         PT Charges       01/09/18 1358          Time Calculation    Start Time 1310  -SJ      PT Received On 01/09/18  -      PT Goal Re-Cert Due Date 01/19/18  -      Time Calculation- PT    Total Timed Code Minutes- PT 23 minute(s)  -        User Key  (r) = Recorded By, (t) = Taken By, (c) = Cosigned By    Initials Name  Provider Type    SJ Selene Wagoner PT Physical Therapist          Therapy Charges for Today     Code Description Service Date Service Provider Modifiers Qty    40642194372 HC GAIT TRAINING EA 15 MIN 1/9/2018 Selene Wagoner, PT GP 1    43194365191 HC PT THER PROC EA 15 MIN 1/9/2018 Selene Wagoner PT GP 1          PT G-Codes  Outcome Measure Options: AM-PAC 6 Clicks Basic Mobility (PT)    Selene Wagoner PT  1/9/2018

## 2018-01-09 NOTE — PROGRESS NOTES
"   LOS: 36 days    Patient Care Team:  Michaela Connell MD as PCP - General  Michaela Connell MD as PCP - Family Medicine    Reason For Visit:  F/U DEJA.   Subjective   No acute events overnight. No new complaints.         Review of Systems:    Pulm: No soa   CV:  No CP      Objective       amiodarone 200 mg Oral Q12H   Followed by      [START ON 1/12/2018] amiodarone 200 mg Oral Daily   amLODIPine 10 mg Oral Q24H   carvedilol 25 mg Oral Q12H   CloNIDine 0.1 mg Oral Daily   docusate sodium 100 mg Oral BID   epoetin marcel 10,000 Units Subcutaneous Once per day on Tue Thu Sat   furosemide 20 mg Intravenous Once   heparin (porcine) 5,000 Units Subcutaneous Q8H   metoclopramide 5 mg Intravenous Q12H   nystatin  Topical Q12H   QUEtiapine 25 mg Oral Nightly   sertraline 100 mg Oral Daily   terazosin 2 mg Oral Nightly   thiamine 100 mg Oral Daily            Vital Signs:  Blood pressure 155/89, pulse 74, temperature 98 °F (36.7 °C), temperature source Oral, resp. rate 18, height 160 cm (62.99\"), weight 67 kg (147 lb 12.8 oz), SpO2 97 %.    Flowsheet Rows         First Filed Value    Admission Height  160 cm (63\") Documented at 12/04/2017 1002    Admission Weight  68.9 kg (152 lb) Documented at 12/04/2017 1002          01/08 0701 - 01/09 0700  In: 600 [P.O.:600]  Out: 300 [Urine:300]    Physical Exam:    General Appearance: NAD, alert and cooperative, Ox3  Eyes: PER, conjunctivae and sclerae normal, no icterus  Lungs: respirations regular and unlabored, no crepitus, clear to auscultation  Heart/CV: regular rhythm & normal rate, no murmur, no gallop, no rub and 1+ edema  Abdomen: not distended, soft, non-tender, no masses,  bowel sounds present  Skin: No rash, Warm and dry. TUNNELED HD CATH.    Radiology:            Labs:    Results from last 7 days  Lab Units 01/08/18  0618 01/07/18  0512 01/06/18  0543   WBC 10*3/mm3 8.49 8.74 8.57   HEMOGLOBIN g/dL 9.0* 10.0* 8.7*   HEMATOCRIT % 29.4* 32.7* 28.5*   PLATELETS 10*3/mm3 356 443 " 374       Results from last 7 days  Lab Units 01/09/18  0523 01/08/18  2231 01/08/18  0618 01/07/18  0512 01/06/18  0543 01/05/18  0629 01/04/18  0548 01/03/18  0612   SODIUM mmol/L 139  --  140 137 138 142 142 140   POTASSIUM mmol/L 4.3 3.9 3.2* 3.3* 2.9* 3.5 3.8  3.8 3.2*   CHLORIDE mmol/L 106  --  106 104 101 102 97* 102   CO2 mmol/L 24.0  --  25.0 28.0 28.0 28.0 29.0 26.0   BUN mg/dL 27*  --  29* 29* 32* 25* 37* 28*   CREATININE mg/dL 2.00*  --  2.10* 2.20* 2.40* 2.10* 3.00* 2.60*   CALCIUM mg/dL 8.1*  --  8.1* 8.0* 8.0* 7.9* 8.4* 7.8*   PHOSPHORUS mg/dL  --   --  2.7 2.9 2.4 2.5 2.6 3.5   MAGNESIUM mg/dL  --   --   --   --   --  1.9 2.3 1.9   ALBUMIN g/dL  --   --  2.90* 3.20 2.80*  --  3.20 2.60*       Results from last 7 days  Lab Units 01/09/18  0523   GLUCOSE mg/dL 85                       Estimated Creatinine Clearance: 26.1 mL/min (by C-G formula based on Cr of 2).      Assessment     Principal Problem:    Small bowel obstruction  Active Problems:    Meningioma, recurrent of brain    Insomnia    Malignant neoplasm of left orbit    Acute renal failure    Elevated troponin level    Metabolic acidosis    Transaminitis    Atrial flutter with rapid ventricular response            Impression: NONOLIGURIC DEJA. ATN. INCREASED U/O.STARTING TO RECOVER.  ANEMIA.        Recommendations:    Monitor I/O. UOP and renal function showing signs of recovery. Scr improved to 2.0 today from 2.1 without dialysis. If  Will get Tunneled catheter out.   Renal diet.    Will add clonidine        Jamal Ramos MD  01/09/18  9:28 AM

## 2018-01-09 NOTE — PROGRESS NOTES
[facility] Medicine Services  PROGRESS NOTE    Patient Name: Tereza Ackerman  : 1955  MRN: [id number]    Date of Admission: 2017  Length of Stay: 36  Primary Care Physician: Michaela Connell MD    Subjective   Subjective     CC:  Follow up SBO    HPI:  No new complaints. Abdomen is still sore. Isn't as restless and anxious as before.     Review of Systems  Gen- No fevers, chills  CV- No chest pain, palpitations  Resp- No cough, dyspnea  GI- No N/V/D    Otherwise ROS is negative except as mentioned in the HPI.    Objective   Objective     Vital Signs:   Temp:  [97.9 °F (36.6 °C)-98 °F (36.7 °C)] 98 °F (36.7 °C)  Heart Rate:  [67-74] 68  Resp:  [16-18] 18  BP: (125-155)/(73-90) 129/74        Physical Exam:  Constitutional: No acute distress, awake, alert., in recliner, looks comfortable  Respiratory: Clear to auscultation bilaterally A/P, nonlabored respirations   Cardiovascular: RRR, no murmurs, rubs, or gallops, palpable pedal pulses bilaterally  Gastrointestinal: Positive bowel sounds, soft, nondistended, mildly tender, lower abd dressing, did not take dressing down.   Musculoskeletal: 1+ pitting edema BLE  Psychiatric: Appropriate affect, cooperative  Neurologic: Oriented x 3, no focal deficits   Skin:  Rt upper CW tunneled HD cath in place.  C/d/i.     Results Reviewed:  I have personally reviewed current lab, radiology, and data and agree.      Results from last 7 days  Lab Units 18   WBC 10*3/mm3 8.09 8.49 8.74   HEMOGLOBIN g/dL 9.1* 9.0* 10.0*   HEMATOCRIT % 30.2* 29.4* 32.7*   PLATELETS 10*3/mm3 357 356 443       Results from last 7 days  Lab Units 1823 181 1812   SODIUM mmol/L 139  --  140 137   POTASSIUM mmol/L 4.3 3.9 3.2* 3.3*   CHLORIDE mmol/L 106  --  106 104   CO2 mmol/L 24.0  --  25.0 28.0   BUN mg/dL 27*  --  29* 29*   CREATININE mg/dL 2.00*  --  2.10* 2.20*   GLUCOSE mg/dL  85  --  89 97   CALCIUM mg/dL 8.1*  --  8.1* 8.0*           Microbiology Results Abnormal     Procedure Component Value - Date/Time    Blood Culture - Blood, [[id number]]  (Normal) Collected:  12/21/17 0700    Lab Status:  Final result Specimen:  Blood from Arm, Right Updated:  12/26/17 0816     Blood Culture No growth at 5 days    Blood Culture - Blood, [[id number]]  (Normal) Collected:  12/21/17 0705    Lab Status:  Final result Specimen:  Blood from Arm, Left Updated:  12/26/17 0816     Blood Culture No growth at 5 days    Blood Culture - Blood, [[id number]]  (Normal) Collected:  12/19/17 1316    Lab Status:  Final result Specimen:  Blood from Arm, Left Updated:  12/24/17 1346     Blood Culture No growth at 5 days    Blood Culture - Blood, [[id number]]  (Normal) Collected:  12/19/17 1316    Lab Status:  Final result Specimen:  Blood from Hand, Left Updated:  12/24/17 1346     Blood Culture No growth at 5 days    Urine Culture - Urine, Urine, Clean Catch [[id number]]  (Abnormal) Collected:  12/12/17 0001    Lab Status:  Final result Specimen:  Urine from Urine, Clean Catch Updated:  12/14/17 0646     Urine Culture --      <10,000 CFU/mL Yeast isolated (A)    Blood Culture - Blood, [[id number]]  (Normal) Collected:  12/05/17 0313    Lab Status:  Final result Specimen:  Blood from Arm, Right Updated:  12/10/17 0416     Blood Culture No growth at 5 days    Blood Culture - Blood, [[id number]]  (Normal) Collected:  12/05/17 0313    Lab Status:  Final result Specimen:  Blood from Arm, Right Updated:  12/10/17 0416     Blood Culture No growth at 5 days    Narrative:       Aerobic bottle only    Urine Culture - Urine, Urine, Clean Catch [[id number]]  (Normal) Collected:  12/05/17 1704    Lab Status:  Final result Specimen:  Urine from Urine, Catheter Updated:  12/07/17 0842     Urine Culture No growth at 2 days    Eosinophil Smear - Urine, Urine, Clean Catch [[id number]]  (Normal) Collected:  12/06/17 1106    Lab Status:  Final  result Specimen:  Urine from Urine, Catheter Updated:  12/06/17 1347     Eosinophil Smear 0 % EOS/100 Cells     Narrative:       No eosinophil seen            Results for orders placed during the hospital encounter of 12/04/17   Adult Transthoracic Echo Complete W/ Cont if Necessary Per Protocol    Narrative · LVEF difficult to evaluate with tachycardia- globally appears mildly   depressed.  · Left ventricular wall thickness is consistent with concentric   hypertrophy.  · Mild tricuspid valve regurgitation is present.  · Calculated right ventricular systolic pressure from tricuspid   regurgitation is 32 mmHg.          I have reviewed the medications.    Assessment/Plan   Assessment / Plan     Hospital Problem List     * (Principal)Small bowel obstruction    Overview Signed 12/5/2017  5:16 PM by AYDEE Hernandez     S/p abdominal exploration with adhesiolysis and resection of strangulated segment of small bowel by Dr. CUI 12/5/17         Meningioma, recurrent of brain    Overview Signed 12/6/2017  3:49 PM by Julia COTE Case, DO     First occurrence with resection by Bunny in 2003.   Recurrence with resection by Bunny in 2014.          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          Acute renal failure    Elevated troponin level    Metabolic acidosis    Transaminitis    Atrial flutter with rapid ventricular response    Overview Addendum 12/21/2017 12:10 PM by JEIMY Dalal     1. Echo 12-21-17:  · LVEF difficult to evaluate with tachycardia- globally appears mildly depressed.  · Left ventricular wall thickness is consistent with concentric hypertrophy.  · Mild tricuspid valve regurgitation is present.  · Calculated right ventricular systolic pressure from tricuspid regurgitation is 32 mmHg.                  Brief Hospital Course to date:  Tereza RIVERO  Tyron is a 62 y.o. female presenting with small bowel obstruction s/p ex-lap with adhesiolysis and resection of strangulated segment of small bowel by Dr. CUI 12/5/17 complicated by acute renal failure (ATN from sepsis/bowel ischemia/hypotension) requiring hemodialysis now transferred out of ICU but with persistent ileus/obstruction requiring NGT replacement and TPN for nutrition. Slow to improve. Developed rapid atrial flutter evening of 12/20, requiring amiodarone drip.      Assessment & Plan:    SBO  -s/p ex-lap with adhesiolysis and resection of strangulated segment of small bowel by Dr. CUI 12/5/17     Non-oliguric DEJA  - HD 1/2, Epo.   -Nephrology following; dc'd sod bicar   - creatinine is improving slowly  --nephrology is planning on removing dialysis access    A. Flutter  - continue amiodarone taper. BB. FU with Dr. Aguirre in 4 weeks.      HTN  -continue amlodipine,coreg  -Hydralazine PO PRN  -Terazosin 2mg qhs added per nephrology     Anemia  - Hgb 9, stable, monitor    Anxiety  - mood improved on Sertraline.   - seroquel at night for her anxiety and restlessness     DVT Prophylaxis:    [facility]    CODE STATUS: Full Code    Disposition: I expect the patient to be discharged to rehab once placement arranged.  CM following. Notes reviewed.      AYDEE Godwin  01/09/18  1:59 PM

## 2018-01-09 NOTE — PROGRESS NOTES
Continued Stay Note  [facility]     Patient Name: Tereza Ackerman  MRN: [id number]  Today's Date: 1/9/2018    Admit Date: 12/4/2017          Discharge Plan       01/09/18 1133    Case Management/Social Work Plan    Plan discharge planning    Patient/Family In Agreement With Plan yes    Additional Comments CM met with pt. and her spouse. Pt. will have Jarred cath removed today and will then be able to proceed with discharge planning as soon as pt. has bed availability. CM also discussed this with hospitalist  CM discussed potential issue with open auto claims with pt. and spouse.  CM will try to get a firmer update from [employer], regarding claims.  Pt. and her spouse are perplexed as these incidents occurred over 1 year ago and the other was over 3 years ago.  Pt. stated neither incident has anything to do with her current hospitalization.  Pt. is still being reviewed by [facility], and the [facility] for possible SNF placement.  CM will continue to follow for discharge planning.              Discharge Codes     None        Expected Discharge Date and Time     Expected Discharge Date Expected Discharge Time    Dec 11, 2017             KIRSTY Santo

## 2018-01-09 NOTE — PROGRESS NOTES
Adult Nutrition  Assessment/PES    Patient Name:  Tereza Ackermna  YOB: 1955  MRN: [id number]  Admit Date:  12/4/2017    Assessment Date:  1/9/2018          Reason for Assessment       01/09/18 1519    Reason for Assessment    Reason For Assessment/Visit follow up protocol    Time Spent (min) 30    Diagnosis --   per notes this admission    Gastrointestinal --   abd. soft and non-tender, wound granulating nicely    Renal --   remove tunneled catheter, UOP improve, renal function showing signs of recovery.                  Labs/Tests/Procedures/Meds       01/09/18 1521    Labs/Tests/Procedures/Meds    Labs/Tests Review Reviewed                Nutrition Prescription Ordered       01/09/18 1523    Nutrition Prescription PO    Current PO Diet Regular    Supplement Nepro    Supplement Frequency 3 times a day            Evaluation of Received Nutrient/Fluid Intake       01/09/18 1523    PO Evaluation    Number of Meals 10    % PO Intake 63            Problem/Interventions:        Problem 1       01/09/18 1523    Nutrition Diagnoses Problem 1    Problem 1 Inadequate Intake/Infusion    Inadequate Intake Type Oral    Etiology (related to) Factors Affecting Nutrition    Appetite Good    Percent (%) intake recorded 63 %    Over number of meals 10                    Intervention Goal       01/09/18 1524    Intervention Goal    General Nutrition support treatment    PO Continue positive trend            Nutrition Intervention       01/09/18 1524    Nutrition Intervention    RD/Tech Action Follow Tx progress;Adjusted supplement;Care plan reviewd            Nutrition Prescription       01/09/18 1524    Nutrition Prescription PO    PO Prescription Discontinue supplement   nepro, per pt.'s request            Education/Evaluation       01/09/18 1525    Education    Education --   Noted diet upgraded to regular related to improved  renal function.    Monitor/Evaluation    Monitor Per protocol        Electronically  signed by:  Santa Price RD  01/09/18 3:26 PM

## 2018-01-09 NOTE — PLAN OF CARE
Problem: Bowel Obstruction (Adult)  Intervention: Monitor/Manage Gastrointestinal Function/Elimination   01/08/18 0820 01/08/18 1700 01/08/18 1850   Monitor/Manage Chemotherapy Gastrointestinal Effects   Bowel Dysfunction Management --  --  toileting offered;sitting position facilitated   Gastrointestinal (GI) Interventions   Diarrhea Management --  --  fluids promoted   Monitor/Manage Gastrointestinal Function/Elimination   Abdominal Appearance rounded --  --    Activity   Activity Type --  activity adjusted per tolerance --      Intervention: Monitor/Manage Pain   01/08/18 1850   Safety Interventions   Medication Review/Management medications reviewed   Manage Acute Burn Pain   Bowel Intervention ambulation promoted;adequate fluid intake promoted;commode/bedpan at bedside;diet adjusted;privacy promoted   Pain Management Interventions pillow support     Intervention: Support/Optimize Psychosocial Response to Illness   01/08/18 0820 01/08/18 1850   Coping/Psychosocial Interventions   Environmental Support --  calm environment promoted;rest periods encouraged   Coping Strategies   Family/Support System Care presence promoted --    Supportive Measures active listening utilized;decision-making supported;positive reinforcement provided --      Intervention: Promote/Optimize Nutrition   01/08/18 1850   Hygiene Care Assistance   Oral Care oral rinse provided   Nutrition Interventions   Oral Nutrition Promotion rest periods promoted;physical activity promoted     Intervention: Monitor/Manage Fluid Electrolyte Balance   01/08/18 0959 01/08/18 1850   Nutrition Interventions   Fluid/Electrolyte Management --  fluids provided   Positioning   Positioning throne;legs elevated --          Problem: Perioperative Period (Adult)  Intervention: Promote Pulmonary Hygiene and Secretion Clearance   01/08/18 1850   Positioning   Head Of Bed (HOB) Position HOB elevated   Promote Aggressive Pulmonary Hygiene/Secretion Management   Cough  And Deep Breathing done independently per patient   Activity   Activity Type activity adjusted per tolerance;activity encouraged   Incentive Spirometer   Administration (Incentive Spirometer) done with encouragement     Intervention: Promote Effective Elimination   01/08/18 1850   Manage Acute Burn Pain   Bowel Intervention ambulation promoted;adequate fluid intake promoted;commode/bedpan at bedside;diet adjusted;privacy promoted   Genitourinary () Interventions   Urinary Elimination Promotion toileting offered     Intervention: Monitor/Manage Pain   01/08/18 1850   Safety Interventions   Medication Review/Management medications reviewed   Manage Acute Burn Pain   Bowel Intervention ambulation promoted;adequate fluid intake promoted;commode/bedpan at bedside;diet adjusted;privacy promoted   Pain Management Interventions pillow support     Intervention: Prevent/Manage Post-surgical Infection   01/08/18 1850   Safety Interventions   Infection Prevention rest/sleep promoted   Prevent/Manage Colorectal Surgical Infection   Fever Reduction/Comfort Measures lightweight bedding     Intervention: Prevent Hailey-procedural Injury   01/08/18 0959 01/08/18 1850   Positioning   Positioning throne;legs elevated --    Head Of Bed (HOB) Position --  HOB elevated     Intervention: Prevent/Manage DVT/VTE Risk   01/08/18 1000   Support Surgical/Anesthesia Recovery   Venous Thromboembolism Prevent/Manage patient refused intervention(s)     Intervention: Promote Normothermia   01/08/18 1850   Cardiac Interventions   Cooling Thermoregulation Maintenance lightweight clothing/blankets used       Goal: Signs and Symptoms of Listed Potential Problems Will be Absent or Manageable (Perioperative Period)  Outcome: Ongoing (interventions implemented as appropriate)   01/08/18 1925   Perioperative Period   Problems Assessed (Perioperative Period) all   Problems Present (Perioperative Period) pain       Problem: Skin Integrity Impairment,  Risk/Actual (Adult)  Intervention: Promote/Optimize Nutrition   01/08/18 1850   Hygiene Care Assistance   Oral Care oral rinse provided   Nutrition Interventions   Oral Nutrition Promotion rest periods promoted;physical activity promoted     Intervention: Prevent/Manage Excess Moisture   01/08/18 1000 01/08/18 1100 01/08/18 1850   Hygiene Care Assistance   Perineal Care --  --  cleansed;perianal area cleansed  (blue wipes)   Skin Interventions   Skin Protection adhesive use limited;incontinence pads utilized;tubing/devices free from skin contact --  --    Hygiene Care   Bathing/Skin Care --  bath, chlorhexidine --      Intervention: Prevent/Minimize Sheer/Friction Injuries   01/08/18 0959 01/08/18 1000   Skin Interventions   Pressure Reduction Techniques --  frequent weight shift encouraged;heels elevated off bed;weight shift assistance provided   Pressure Reduction Devices --  chair cushion utilized;pressure-redistributing mattress utilized;feet on footrest/footstool   Positioning   Positioning/Transfer Devices pillows;in use --        Goal: Skin Integrity/Wound Healing  Outcome: Ongoing (interventions implemented as appropriate)   01/08/18 1850   Skin Integrity Impairment, Risk/Actual (Adult)   Skin Integrity/Wound Healing making progress toward outcome       Problem: Renal Failure/Kidney Injury, Acute (Adult)  Intervention: Prevent/Manage Infection   01/08/18 1850   Safety Interventions   Infection Prevention rest/sleep promoted   Hygiene Care Assistance   Oral Care oral rinse provided   Prevent/Manage Colorectal Surgical Infection   Fever Reduction/Comfort Measures lightweight bedding     Intervention: Monitor/Manage Fluid, Acid Base Balance   01/08/18 0959 01/08/18 1850   Safety Interventions   Medication Review/Management --  medications reviewed   Nutrition Interventions   Fluid/Electrolyte Management --  fluids provided   Positioning   Positioning throne;legs elevated --      Intervention: Evaluate/Maintain  Nutrition Support   01/08/18 1850   Hygiene Care Assistance   Oral Care oral rinse provided   Coping/Psychosocial Interventions   Environmental Support calm environment promoted;rest periods encouraged     Intervention: Monitor/Manage Anemia/Signs of Bleeding   01/08/18 1850   Safety Interventions   Bleeding Precautions blood pressure closely monitored   Bleeding Management dressing monitored     Intervention: Promote Energy Conservation   01/08/18 1850   Coping/Psychosocial Interventions   Environmental Support calm environment promoted;rest periods encouraged   Pain/Comfort/Sleep Interventions   Sleep/Rest Enhancement family presence promoted;regular sleep/rest pattern promoted     Intervention: Provide Oxygenation/Ventilation/Perfusion Support   01/08/18 1850   Safety Interventions   Medication Review/Management medications reviewed   Positioning   Head Of Bed (HOB) Position HOB elevated   Activity   Activity Type activity adjusted per tolerance;activity encouraged   Respiratory Interventions   Airway/Ventilation Management calming measures promoted         Problem: Fall Risk (Adult)  Intervention: Monitor/Assist with Self Care   12/30/17 1800 01/08/18 0820 01/08/18 1700   Activity   Activity Type --  --  --    Activity Level of Assistance --  --  assistance, 1 person   Monitor/Assist with Self Care   Ambulation --  2-->assistive person --    Transferring --  2-->assistive person --    Toileting --  2-->assistive person --    Bathing --  2-->assistive person --    Dressing --  2-->assistive person --    Eating --  0-->independent --    Communication --  0-->understands/communicates without difficulty --    Swallowing (if score 2 or more for any item, consult Rehab Services) --  0-->swallows foods/liquids without difficulty --    Musculoskeletal Interventions   Self-Care Promotion independence encouraged --  --     01/08/18 1850   Activity   Activity Type activity adjusted per tolerance;activity encouraged    Activity Level of Assistance --    Monitor/Assist with Self Care   Ambulation --    Transferring --    Toileting --    Bathing --    Dressing --    Eating --    Communication --    Swallowing (if score 2 or more for any item, consult Rehab Services) --    Musculoskeletal Interventions   Self-Care Promotion --      Intervention: Reduce Risk/Promote Restraint Free Environment   01/08/18 0959 01/08/18 1700   Safety Interventions   Safety/Security Measures family to remain at bedside --    Environmental Safety Modification --  lighting adjusted;assistive device/personal items within reach;clutter free environment maintained;room organization consistent   Restraint Interventions   Safety Promotion/Fall Prevention --  safety round/check completed;fall prevention program maintained;nonskid shoes/slippers when out of bed;supervised activity;toileting scheduled;gait belt     Intervention: Review Medications/Identify Contributors to Fall Risk   01/08/18 1850   Safety Interventions   Medication Review/Management medications reviewed       Goal: Absence of Falls  Outcome: Ongoing (interventions implemented as appropriate)   01/08/18 1850   Fall Risk (Adult)   Absence of Falls making progress toward outcome       Problem: Pressure Ulcer Risk (Jairo Scale) (Adult,Obstetrics,Pediatric)  Intervention: Promote/Optimize Nutrition   01/08/18 1850   Hygiene Care Assistance   Oral Care oral rinse provided   Nutrition Interventions   Oral Nutrition Promotion rest periods promoted;physical activity promoted     Intervention: Prevent/Manage Excess Moisture   01/08/18 1000 01/08/18 1100 01/08/18 1850   Hygiene Care Assistance   Perineal Care --  --  cleansed;perianal area cleansed  (blue wipes)   Skin Interventions   Skin Protection adhesive use limited;incontinence pads utilized;tubing/devices free from skin contact --  --    Hygiene Care   Bathing/Skin Care --  bath, chlorhexidine --      Intervention: Maintain Head of Bed Elevation  Less Than 30 degrees as Tolerated   01/08/18 1925   Positioning   Head Of Bed (HOB) Position HOB elevated     Intervention: Prevent/Minimize Sheer/Friction Injuries   01/08/18 0959 01/08/18 1000   Skin Interventions   Pressure Reduction Techniques --  frequent weight shift encouraged;heels elevated off bed;weight shift assistance provided   Pressure Reduction Devices --  chair cushion utilized;pressure-redistributing mattress utilized;feet on footrest/footstool   Positioning   Positioning/Transfer Devices pillows;in use --      Intervention: Turn/Reposition Often   01/08/18 0959 01/08/18 1000   Skin Interventions   Pressure Reduction Techniques --  frequent weight shift encouraged;heels elevated off bed;weight shift assistance provided   Positioning   Positioning throne;legs elevated --        Goal: Skin Integrity  Outcome: Ongoing (interventions implemented as appropriate)   01/08/18 1850   Pressure Ulcer Risk (Jairo Scale) (Adult,Obstetrics,Pediatric)   Skin Integrity making progress toward outcome       Problem: Pain, Acute (Adult)  Intervention: Monitor/Manage Analgesia   01/08/18 1850   Safety Interventions   Medication Review/Management medications reviewed   Manage Acute Burn Pain   Bowel Intervention ambulation promoted;adequate fluid intake promoted;commode/bedpan at bedside;diet adjusted;privacy promoted   Pain Management Interventions pillow support     Intervention: Mutually Develop/Implement Acute Pain Management Plan   01/08/18 1850   Manage Acute Burn Pain   Pain Management Interventions pillow support   Cognitive Interventions   Sensory Stimulation Regulation care clustered     Intervention: Support/Optimize Psychosocial Response to Acute Pain   01/08/18 0820   Coping Strategies   Trust Relationship/Rapport care explained   Diversional Activities television   Family/Support System Care presence promoted   Supportive Measures active listening utilized;decision-making supported;positive reinforcement  provided       Goal: Acceptable Pain Control/Comfort Level  Outcome: Ongoing (interventions implemented as appropriate)   01/08/18 1850   Pain, Acute (Adult)   Acceptable Pain Control/Comfort Level making progress toward outcome       Problem: Anxiety (Adult)  Goal: Reduction/Resolution  Outcome: Ongoing (interventions implemented as appropriate)   01/08/18 1850   Anxiety (Adult)   Reduction/Resolution making progress toward outcome

## 2018-01-09 NOTE — OP NOTE
VASCULAR SURGERY OPERATIVE NOTE     Tereza Ackerman  1/9/2018    Diagnosis  Acute renal failure    Procedure  Removal of right internal jugular tunneled hemodialysis catheter    Surgeon  Chance Valenzuela M.D.    Anesthesia  Local    INDICATIONS:  This 62-year-old female with multiple medical problems developed acute renal failure last month.  She required placement of a tunneled hemodialysis catheter at that time.  She has since recovered renal function and no longer requires hemodialysis access.  Removal of the catheter was requested.    PROCEDURE:  The right chest was prepped with alcohol and Betadine.  1% lidocaine was infiltrated around the exit site of the tunneled catheter.  With gentle traction, the Dacron cuff was disrupted and the catheter was removed in its entirety.  Pressure was held on the neck access site as well as the chest exit site for several minutes.  A gauze dressing was then applied.  The patient tolerated procedure well.    Chance Valenzuela MD  01/09/18  6:28 PM

## 2018-01-09 NOTE — PLAN OF CARE
Problem: Patient Care Overview (Adult)  Goal: Plan of Care Review  Outcome: Ongoing (interventions implemented as appropriate)   01/09/18 0257   Patient Care Overview   Progress progress toward functional goals as expected       Problem: Fall Risk (Adult)  Goal: Absence of Falls  Outcome: Ongoing (interventions implemented as appropriate)   01/09/18 0257   Fall Risk (Adult)   Absence of Falls making progress toward outcome       Problem: Pressure Ulcer Risk (Jairo Scale) (Adult,Obstetrics,Pediatric)  Goal: Skin Integrity  Outcome: Ongoing (interventions implemented as appropriate)   01/09/18 0257   Pressure Ulcer Risk (Jairo Scale) (Adult,Obstetrics,Pediatric)   Skin Integrity making progress toward outcome       Problem: Pain, Acute (Adult)  Goal: Acceptable Pain Control/Comfort Level  Outcome: Ongoing (interventions implemented as appropriate)   01/09/18 0257   Pain, Acute (Adult)   Acceptable Pain Control/Comfort Level making progress toward outcome       Problem: Anxiety (Adult)  Goal: Reduction/Resolution  Outcome: Ongoing (interventions implemented as appropriate)   01/09/18 0257   Anxiety (Adult)   Reduction/Resolution making progress toward outcome

## 2018-01-09 NOTE — PROGRESS NOTES
"Tereza Ackerman  1955  [id number]    Surgery Progress Note    Date of visit: 1/9/2018    Subjective: Sitting up in chair  Has been eating better and ambulating better  No more dialysis      Objective:    /74  Pulse 68  Temp 98 °F (36.7 °C) (Oral)   Resp 18  Ht 160 cm (62.99\")  Wt 67 kg (147 lb 12.8 oz)  SpO2 97%  BMI 26.19 kg/m2    Intake/Output Summary (Last 24 hours) at 01/09/18 1424  Last data filed at 01/09/18 0920   Gross per 24 hour   Intake              540 ml   Output              300 ml   Net              240 ml       CV: Regular rate and rhythm  L: normal air entry    Abd: Soft and nontender  Wound granulating nicely      LABS:      Results from last 7 days  Lab Units 01/09/18  0523   WBC 10*3/mm3 8.09   HEMOGLOBIN g/dL 9.1*   HEMATOCRIT % 30.2*   PLATELETS 10*3/mm3 357       Results from last 7 days  Lab Units 01/09/18  0523   SODIUM mmol/L 139   POTASSIUM mmol/L 4.3   CHLORIDE mmol/L 106   CO2 mmol/L 24.0   BUN mg/dL 27*   CREATININE mg/dL 2.00*   CALCIUM mg/dL 8.1*   GLUCOSE mg/dL 85       Results from last 7 days  Lab Units 01/09/18  0523   SODIUM mmol/L 139   POTASSIUM mmol/L 4.3   CHLORIDE mmol/L 106   CO2 mmol/L 24.0   BUN mg/dL 27*   CREATININE mg/dL 2.00*   GLUCOSE mg/dL 85   CALCIUM mg/dL 8.1*       Lab Results  Lab Value Date/Time   LIPASE 52 (H) 12/04/2017 1043   LIPASE 39 06/30/2017 1959         Assessment/ Plan: Stable course  Progressing nicely  Plan to remove the hemodialysis catheter  Await rehabilitation placement    Problem List Items Addressed This Visit     Hypertension    Relevant Medications    amLODIPine (NORVASC) tablet 10 mg    * (Principal)Small bowel obstruction - Primary    Relevant Orders    Tissue Pathology Exam - Tissue, Small Intestine (Completed)    Acute renal failure    Relevant Medications    potassium chloride (MICRO-K) CR capsule 40 mEq    potassium chloride (KLOR-CON) packet 40 mEq    potassium chloride 10 mEq in 100 mL IVPB      Other Visit " Diagnoses     Intractable vomiting with nausea, unspecified vomiting type        Uncontrolled hypertension                Ирина Cobian MD  1/9/2018  2:24 PM

## 2018-01-09 NOTE — PLAN OF CARE
Problem: Patient Care Overview (Adult)  Goal: Plan of Care Review  Outcome: Ongoing (interventions implemented as appropriate)   01/09/18 1354   Coping/Psychosocial Response Interventions   Plan Of Care Reviewed With patient   Patient Care Overview   Progress improving   Outcome Evaluation   Outcome Summary/Follow up Plan PT progress note completed. Pt demonstrates improved independence with mobility today, with pt independent with sit to stand t/f and toilet t/f's. Pt ambulates 150ft with RW with SBA, limited by fatigue. PT feels pt safe to d/c home with 's assistance and outpatient PT services to continue to progress strength and endurance.       Problem: Inpatient Physical Therapy  Goal: Bed Mobility Goal LTG- PT  Outcome: Outcome(s) achieved Date Met: 01/09/18 12/08/17 1538 01/05/18 1059 01/09/18 1354   Bed Mobility PT LTG   Bed Mobility PT LTG, Date Established 12/08/17 --  --    Bed Mobility PT LTG, Time to Achieve 2 wks --  --    Bed Mobility PT LTG, Activity Type supine to sit/sit to supine --  --    Bed Mobility PT LTG, [place] Level minimum assist (75% patient effort) --  --    Bed Mobility PT LTG, Date Goal Reviewed --  01/05/18 --    Bed Mobility PT LTG, Outcome --  --  goal met     Goal: Transfer Training Goal 1 LTG- PT  Outcome: Outcome(s) achieved Date Met: 01/09/18 12/08/17 1538 01/05/18 1059 01/09/18 1354   Transfer Training PT LTG   Transfer Training PT LTG, Date Established 12/08/17 --  --    Transfer Training PT LTG, Time to Achieve 2 wks --  --    Transfer Training PT LTG, Activity Type sit to stand/stand to sit --  --    Transfer Training PT LTG, [place] Level contact guard assist --  --    Transfer Training PT LTG, Assist Device walker, rolling --  --    Transfer Training PT LTG, Date Goal Reviewed --  01/05/18 --    Transfer Training PT LTG, Outcome --  --  goal met     Goal: Gait Training Goal LTG- PT  Outcome: Revised   01/09/18 1354   Gait Training PT LTG   Gait  Training Goal PT LTG, Date Established 01/09/18   Gait Training Goal PT LTG, Time to Achieve by discharge   Gait Training Goal PT LTG, [place] Level conditional independence   Gait Training Goal PT LTG, Assist Device walker, rolling   Gait Training Goal PT LTG, Distance to Achieve 200   Gait Training Goal PT LTG, Outcome goal revised   Gait Training Goal PT LTG, Reason Goal Not Met goals revised this date

## 2018-01-10 PROCEDURE — 97110 THERAPEUTIC EXERCISES: CPT

## 2018-01-10 PROCEDURE — 93005 ELECTROCARDIOGRAM TRACING: CPT | Performed by: INTERNAL MEDICINE

## 2018-01-10 PROCEDURE — 93010 ELECTROCARDIOGRAM REPORT: CPT | Performed by: INTERNAL MEDICINE

## 2018-01-10 PROCEDURE — 25010000002 HEPARIN (PORCINE) PER 1000 UNITS: Performed by: HOSPITALIST

## 2018-01-10 PROCEDURE — 25010000002 METOCLOPRAMIDE PER 10 MG: Performed by: INTERNAL MEDICINE

## 2018-01-10 PROCEDURE — 97116 GAIT TRAINING THERAPY: CPT

## 2018-01-10 PROCEDURE — 99232 SBSQ HOSP IP/OBS MODERATE 35: CPT | Performed by: NURSE PRACTITIONER

## 2018-01-10 RX ORDER — CLONIDINE HYDROCHLORIDE 0.1 MG/1
0.1 TABLET ORAL EVERY 12 HOURS SCHEDULED
Status: DISCONTINUED | OUTPATIENT
Start: 2018-01-10 | End: 2018-01-12 | Stop reason: HOSPADM

## 2018-01-10 RX ADMIN — NYSTATIN: 100000 CREAM TOPICAL at 20:22

## 2018-01-10 RX ADMIN — SERTRALINE HYDROCHLORIDE 100 MG: 100 TABLET ORAL at 08:51

## 2018-01-10 RX ADMIN — CARVEDILOL 25 MG: 12.5 TABLET, FILM COATED ORAL at 20:22

## 2018-01-10 RX ADMIN — ACETAMINOPHEN 500 MG: 500 TABLET ORAL at 15:52

## 2018-01-10 RX ADMIN — HEPARIN SODIUM 5000 UNITS: 5000 INJECTION, SOLUTION INTRAVENOUS; SUBCUTANEOUS at 06:59

## 2018-01-10 RX ADMIN — AMIODARONE HYDROCHLORIDE 200 MG: 200 TABLET ORAL at 20:22

## 2018-01-10 RX ADMIN — ACETAMINOPHEN 500 MG: 500 TABLET ORAL at 06:59

## 2018-01-10 RX ADMIN — HEPARIN SODIUM 5000 UNITS: 5000 INJECTION, SOLUTION INTRAVENOUS; SUBCUTANEOUS at 15:51

## 2018-01-10 RX ADMIN — NYSTATIN: 100000 CREAM TOPICAL at 08:53

## 2018-01-10 RX ADMIN — CLONIDINE HYDROCHLORIDE 0.1 MG: 0.1 TABLET ORAL at 20:21

## 2018-01-10 RX ADMIN — POLYETHYLENE GLYCOL 3350 17 G: 17 POWDER, FOR SOLUTION ORAL at 08:52

## 2018-01-10 RX ADMIN — Medication 100 MG: at 08:51

## 2018-01-10 RX ADMIN — METOCLOPRAMIDE 5 MG: 5 INJECTION, SOLUTION INTRAMUSCULAR; INTRAVENOUS at 08:51

## 2018-01-10 RX ADMIN — CLONIDINE HYDROCHLORIDE 0.1 MG: 0.1 TABLET ORAL at 08:51

## 2018-01-10 RX ADMIN — CARVEDILOL 25 MG: 12.5 TABLET, FILM COATED ORAL at 09:13

## 2018-01-10 RX ADMIN — AMIODARONE HYDROCHLORIDE 200 MG: 200 TABLET ORAL at 08:51

## 2018-01-10 RX ADMIN — ACETAMINOPHEN 500 MG: 500 TABLET ORAL at 21:37

## 2018-01-10 RX ADMIN — METOCLOPRAMIDE 5 MG: 5 INJECTION, SOLUTION INTRAMUSCULAR; INTRAVENOUS at 20:21

## 2018-01-10 RX ADMIN — AMLODIPINE BESYLATE 10 MG: 10 TABLET ORAL at 08:51

## 2018-01-10 NOTE — THERAPY TREATMENT NOTE
Acute Care - Physical Therapy Treatment Note  [facility]     Patient Name: Tereza Ackerman  : 1955  MRN: [id number]  Today's Date: 1/10/2018  Onset of Illness/Injury or Date of Surgery Date: 17  Date of Referral to PT: 17  Referring Physician: Dr CUI    Admit Date: 2017    Visit Dx:    ICD-10-CM ICD-9-CM   1. Small bowel obstruction K56.609 560.9   2. Intractable vomiting with nausea, unspecified vomiting type R11.2 536.2   3. Uncontrolled hypertension I10 401.9   4. Impaired functional mobility, balance, gait, and endurance Z74.09 V49.89   5. Acute renal failure, unspecified acute renal failure type N17.9 584.9   6. Essential hypertension I10 401.9     Patient Active Problem List   Diagnosis   • Impaired glucose tolerance   • Hypertension   • Parathyroid adenoma   • Reaction to chronic stress   • Multinodular goiter   • Vitamin D deficiency   • Meningioma, recurrent of brain   • Arthritis   • Depression   • Small bowel obstruction   • Insomnia   • History of Nissen fundoplication   • Malignant neoplasm of left orbit   • Prediabetes   • Hyperlipemia   • Hyperglycemia   • Acute renal failure   • Elevated troponin level   • Metabolic acidosis   • Transaminitis   • Atrial flutter with rapid ventricular response               Adult Rehabilitation Note       01/10/18 0804 18 1310       Rehab Assessment/Intervention    Discipline physical therapy assistant  -AS physical therapist  -SJ     Document Type therapy note (daily note)  -AS progress note  -SJ     Subjective Information agree to therapy;complains of;pain  -AS no complaints;agree to therapy  -SJ     Patient Effort, Rehab Treatment good  -AS good   self-limiting  -SJ     Symptoms Noted During/After Treatment fatigue  -AS fatigue  -SJ     Precautions/Limitations no known precautions/limitations;other (see comments)   abdominal incision  -AS no known precautions/limitations   abdominal incision  -SJ     Recorded by [AS] Poornima Abarca  FLAVIA Lebron [SJ] Selene Wagoner, PT     Vital Signs    Pre Systolic BP Rehab  129  -SJ     Pre Treatment Diastolic BP  74  -SJ     Intratreatment Heart Rate (beats/min)  67  -SJ     Recorded by  [SJ] Selene Wagoner PT     Pain Assessment    Pain Assessment 0-10  -AS Garcia-Tsai FACES  -SJ     Pain Score 3  -AS 1  -SJ     Post Pain Score 3  -AS 1  -SJ     Pain Type Acute pain;Surgical pain  -AS Acute pain;Surgical pain  -SJ     Pain Location Abdomen   also back pain  -AS Abdomen  -SJ     Pain Intervention(s) Repositioned;Ambulation/increased activity  -AS Repositioned;Ambulation/increased activity  -SJ     Response to Interventions tolerated  -AS neida  -SJ     Recorded by [AS] Poornima Lebron PTA [SJ] Selene Wagoner, PT     Cognitive Assessment/Intervention    Current Cognitive/Communication Assessment functional  -AS functional  -SJ     Orientation Status oriented x 4  -AS oriented x 4  -SJ     Follows Commands/Answers Questions 100% of the time  -% of the time;able to follow multi-step instructions  -SJ     Personal Safety WNL/WFL  -AS WNL/WFL  -SJ     Personal Safety Interventions fall prevention program maintained;gait belt;nonskid shoes/slippers when out of bed;muscle strengthening facilitated  -AS muscle strengthening facilitated;nonskid shoes/slippers when out of bed  -SJ     Recorded by [AS] Poornima Lebron PTA [SJ] Selene Wagoner, PT     Cognitive Assessment Intervention    Behavior/Mood Observations  alert;behavior appropriate to situation, WNL/WFL  -SJ     Attention  WNL/WFL  -SJ     Recorded by  [SJ] Selene Wagoner PT     Bed Mobility, Assessment/Treatment    Bed Mobility, Comment UIC  -AS UIC  -SJ     Recorded by [AS] Poornima Lebron PTA [SJ] Selene Wagoner, PT     Transfer Assessment/Treatment    Transfers, Sit-Stand [place] conditional independence  -AS conditional independence  -SJ     Transfers, Stand-Sit [place] conditional independence  -AS conditional independence   -SJ     Transfers, Sit-Stand-Sit, Assist Device rolling walker  -AS rolling walker  -SJ     Toilet Transfer, [place]  conditional independence  -SJ     Toilet Transfer, Assistive Device  rolling walker  -SJ     Transfer, Comment good safety awareness  -AS good safety awareness  -SJ     Recorded by [AS] Poornima Lebron PTA [SJ] Selene Wagoner, PT     Gait Assessment/Treatment    Gait, [place] Level supervision required   assist with IV pole  -AS supervision required  -SJ     Gait, Assistive Device rolling walker  -AS rolling walker  -SJ     Gait, Distance (Feet) 150  -  -SJ     Gait, Gait Pattern Analysis  swing-through gait  -SJ     Gait, Gait Deviations becky decreased;forward flexed posture;step length decreased  -AS becky decreased  -SJ     Gait, Safety Issues step length decreased  -AS step length decreased  -SJ     Gait, Impairments impaired balance;strength decreased  -AS      Gait, Comment verbal cues for safe use of walker when turning, distnce limited by fatigue  -AS Self-limiting distance, limited by c/o fatigue. No LOB, good safety  -SJ     Recorded by [AS] Poornima Lebron PTA [SJ] Selene Wagoner PT     Balance Skills Training    Sitting-Level of Assistance  Independent  -SJ     Sitting-Balance Support  Feet supported  -SJ     Standing-Level of Assistance  Independent  -SJ     Static Standing Balance Support  No upper extremity supported  -SJ     Gait Balance-Level of Assistance  Close supervision  -SJ     Gait Balance Support  assistive device  -SJ     Recorded by  [SJ] Selene Wagoner, CORNELIO     Therapy Exercises    Bilateral Lower Extremities AROM:;10 reps;sitting;ankle pumps/circles;hip flexion;LAQ  -AS AROM:;10 reps;standing;mini squats;hip abduction/adduction   pt declined extra reps due to fatigue  -SJ     Recorded by [AS] Poornima Lebron PTA [SJ] Selene Wagoner, PT     Positioning and Restraints    Pre-Treatment Position sitting in chair/recliner  -AS sitting in  chair/recliner  -SJ     Post Treatment Position chair  -AS chair  -SJ     In Chair reclined;call light within reach;encouraged to call for assist  -AS reclined;call light within reach;encouraged to call for assist;heels elevated  -SJ     Recorded by [AS] Poornima Lebron PTA [SJ] Selene Wagoner PT       User Key  (r) = Recorded By, (t) = Taken By, (c) = Cosigned By    Initials Name Effective Dates    SJ Selene Wagoner PT 06/19/15 -     AS Poornima Lebron PTA 06/22/15 -                 IP PT Goals       01/10/18 0903 01/09/18 1354 01/05/18 1059    Bed Mobility PT LTG    Bed Mobility PT LTG, Date Goal Reviewed   01/05/18  -AS    Bed Mobility PT LTG, Outcome  goal met  -SJ goal ongoing  -AS    Transfer Training PT LTG    Transfer Training PT  LTG, Date Goal Reviewed   01/05/18  -AS    Transfer Training PT LTG, Outcome  goal met  -SJ goal ongoing  -AS    Gait Training PT LTG    Gait Training Goal PT LTG, Date Established  01/09/18  -SJ     Gait Training Goal PT LTG, Time to Achieve  by discharge  -SJ     Gait Training Goal PT LTG, [place] Level  conditional independence  -SJ     Gait Training Goal PT LTG, Assist Device  walker, rolling  -SJ     Gait Training Goal PT LTG, Distance to Achieve  200  -SJ     Gait Training Goal PT LTG, Date Goal Reviewed 01/10/18  -AS  01/05/18  -AS    Gait Training Goal PT LTG, Outcome goal ongoing  -AS goal revised  -SJ goal ongoing  -AS    Gait Training Goal PT LTG, Reason Goal Not Met  goals revised this date  -       User Key  (r) = Recorded By, (t) = Taken By, (c) = Cosigned By    Initials Name Provider Type    SJ Selene Wagoner, PT Physical Therapist    AS Poornima Lebron PTA Physical Therapy Assistant          Physical Therapy Education     Title: PT OT SLP Therapies (Active)     Topic: Physical Therapy (Active)     Point: Mobility training (Active)    Learning Progress Summary    Learner Readiness Method Response Comment Documented by Status   Patient  Acceptance E VU  AS 01/10/18 0903 Done    Acceptance E DU,VU,NR  SJ 01/09/18 1358 Done    Acceptance E NR  AS 01/05/18 1058 Active    Acceptance E NL REVIEWED BENEFITS OF ACTIVITY--PATIENT VERY LETHERGIC SC 12/25/17 1012 Active    Eager E,D NR  DM 12/19/17 1730 Active    Acceptance E,D DU,NR  UD 12/15/17 1138 Done    Acceptance E NR  AS 12/13/17 1404 Active    Acceptance E NR  ES 12/10/17 1606 Active    Acceptance E,D,H NR  LS 12/08/17 1537 Active   Family Acceptance E NR  AS 01/05/18 1058 Active    Acceptance E,D DU,NR  UD 12/15/17 1138 Done    Acceptance E,D,H NR  LS 12/08/17 1537 Active   Significant Other Eager E,D NR  DM 12/19/17 1730 Active               Point: Home exercise program (Active)    Learning Progress Summary    Learner Readiness Method Response Comment Documented by Status   Patient Acceptance E VU  AS 01/10/18 0903 Done    Acceptance E DU,VU,NR   01/09/18 1358 Done    Acceptance E NR  AS 01/05/18 1058 Active    Acceptance E NL REVIEWED BENEFITS OF ACTIVITY--PATIENT VERY LETHERGIC SC 12/25/17 1012 Active    Eager E,D NR  DM 12/19/17 1730 Active    Acceptance E,D DU,NR  UD 12/15/17 1138 Done    Acceptance E NR  AS 12/13/17 1404 Active    Acceptance E NR  ES 12/10/17 1606 Active    Acceptance E,D,H NR  LS 12/08/17 1537 Active   Family Acceptance E NR  AS 01/05/18 1058 Active    Acceptance E,D DU,NR  UD 12/15/17 1138 Done    Acceptance E,D,H NR  LS 12/08/17 1537 Active   Significant Other Eager E,D NR  DM 12/19/17 1730 Active               Point: Body mechanics (Active)    Learning Progress Summary    Learner Readiness Method Response Comment Documented by Status   Patient Acceptance E VU  AS 01/10/18 0903 Done    Acceptance E DU,VU,NR   01/09/18 1358 Done    Acceptance E NR  AS 01/05/18 1058 Active    Acceptance E NL REVIEWED BENEFITS OF ACTIVITY--PATIENT VERY LETHERGIC SC 12/25/17 1012 Active    Eager E,D NR  DM 12/19/17 1730 Active    Acceptance E,D QUAN,NR  UD 12/15/17 1138 Done    Acceptance E  NR  AS 12/13/17 1404 Active    Acceptance E NR  ES 12/10/17 1606 Active    Acceptance E,D,H NR  LS 12/08/17 1537 Active   Family Acceptance E NR  AS 01/05/18 1058 Active    Acceptance E,D DU,NR  UD 12/15/17 1138 Done    Acceptance E,D,H NR  LS 12/08/17 1537 Active   Significant Other Eager E,D NR   12/19/17 1730 Active               Point: Precautions (Active)    Learning Progress Summary    Learner Readiness Method Response Comment Documented by Status   Patient Acceptance E VU  AS 01/10/18 0903 Done    Acceptance E DU,VU,NR   01/09/18 1358 Done    Acceptance E NR  AS 01/05/18 1058 Active    Acceptance E NL REVIEWED BENEFITS OF ACTIVITY--PATIENT VERY LETHERGIC SC 12/25/17 1012 Active    Eager E,D NR   12/19/17 1730 Active    Acceptance E,D DU,NR  UD 12/15/17 1138 Done    Acceptance E NR  AS 12/13/17 1404 Active    Acceptance E NR  ES 12/10/17 1606 Active    Acceptance E,D,H NR   12/08/17 1537 Active   Family Acceptance E NR  AS 01/05/18 1058 Active    Acceptance E,D DU,NR  UD 12/15/17 1138 Done    Acceptance E,D,H NR   12/08/17 1537 Active   Significant Other Eager E,D NR   12/19/17 1730 Active                      User Key     Initials Effective Dates Name Provider Type Discipline    SC 06/19/15 -  Charli Keller, PT Physical Therapist PT     06/22/15 -  Tete Dawson, PTA Physical Therapy Assistant PT     06/19/15 -  Selene Wagoner, PT Physical Therapist PT     06/19/15 -  Santa Sherman, PT Physical Therapist PT    AS 06/22/15 -  Poornima Lebron, PTA Physical Therapy Assistant PT     06/19/15 -  Namrata Crowe, PT Physical Therapist PT     11/13/17 -  Camilla Cueva, PT Physical Therapist PT                    PT Recommendation and Plan  Anticipated Discharge Disposition: inpatient rehabilitation facility  Planned Therapy Interventions: bed mobility training, gait training, home exercise program, patient/family education, strengthening, transfer training  PT Frequency: daily  Plan of  Care Review  Plan Of Care Reviewed With: patient  Progress: improving  Outcome Summary/Follow up Plan: patient doing well with mobility but continues to be limited by weakness and abdominal/back pain.          Outcome Measures       01/10/18 0804 01/09/18 1310       How much help from another person do you currently need...    Turning from your back to your side while in flat bed without using bedrails? 4  -AS 4  -SJ     Moving from lying on back to sitting on the side of a flat bed without bedrails? 4  -AS 4  -SJ     Moving to and from a bed to a chair (including a wheelchair)? 4  -AS 4  -SJ     Standing up from a chair using your arms (e.g., wheelchair, bedside chair)? 4  -AS 4  -SJ     Climbing 3-5 steps with a railing? 3  -AS 3  -SJ     To walk in hospital room? 3  -AS 4  -SJ     AM-PAC 6 Clicks Score 22  -AS 23  -SJ     Functional Assessment    Outcome Measure Options AM-PAC 6 Clicks Basic Mobility (PT)  -AS AM-PAC 6 Clicks Basic Mobility (PT)  -       User Key  (r) = Recorded By, (t) = Taken By, (c) = Cosigned By    Initials Name Provider Type    SJ Selene Wagoner, PT Physical Therapist    AS Poornima Lebron PTA Physical Therapy Assistant           Time Calculation:         PT Charges       01/10/18 0904          Time Calculation    Start Time 0804  -AS      PT Received On 01/10/18  -AS      PT Goal Re-Cert Due Date 01/19/18  -AS      Time Calculation- PT    Total Timed Code Minutes- PT 23 minute(s)  -AS        User Key  (r) = Recorded By, (t) = Taken By, (c) = Cosigned By    Initials Name Provider Type    AS Poornima Lebron PTA Physical Therapy Assistant          Therapy Charges for Today     Code Description Service Date Service Provider Modifiers Qty    86474973734 HC GAIT TRAINING EA 15 MIN 1/10/2018 Poornima Lebron PTA GP 1    21216489945 HC PT THER PROC EA 15 MIN 1/10/2018 Poornima Lebron PTA GP 1          PT G-Codes  Outcome Measure Options: AM-PAC 6 Clicks Basic Mobility  (PT)    Poornima Lebron, PTA  1/10/2018

## 2018-01-10 NOTE — PLAN OF CARE
Problem: Patient Care Overview (Adult)  Goal: Plan of Care Review  Outcome: Ongoing (interventions implemented as appropriate)   01/10/18 0903   Coping/Psychosocial Response Interventions   Plan Of Care Reviewed With patient   Patient Care Overview   Progress improving   Outcome Evaluation   Outcome Summary/Follow up Plan patient doing well with mobility but continues to be limited by weakness and abdominal/back pain.       Problem: Inpatient Physical Therapy  Goal: Gait Training Goal LTG- PT  Outcome: Ongoing (interventions implemented as appropriate)   01/09/18 1354 01/10/18 0903   Gait Training PT LTG   Gait Training Goal PT LTG, Date Established 01/09/18 --    Gait Training Goal PT LTG, Time to Achieve by discharge --    Gait Training Goal PT LTG, [place] Level conditional independence --    Gait Training Goal PT LTG, Assist Device walker, rolling --    Gait Training Goal PT LTG, Distance to Achieve 200 --    Gait Training Goal PT LTG, Date Goal Reviewed --  01/10/18   Gait Training Goal PT LTG, Outcome --  goal ongoing   Gait Training Goal PT LTG, Reason Goal Not Met goals revised this date --

## 2018-01-10 NOTE — PROGRESS NOTES
Continued Stay Note  [facility]     Patient Name: Tereza Ackerman  MRN: [id number]  Today's Date: 1/10/2018    Admit Date: 12/4/2017          Discharge Plan       01/10/18 1340    Case Management/Social Work Plan    Plan Awaiting pre-cert for SNF    Patient/Family In Agreement With Plan yes    Additional Comments CM met with pt as well as pt's spouse and daughter who were present.  Pt. and family are agreeable to short-term skilled rehab at [facility] in [place]. Spouse will transfer pt. via private vehicle at discharge.  Pt. was also offered a bed at [facility], however, pt. declined placement at this facility.  [facility] did not have bed availability. JACQUES was informed by Delmy with [facility] that they are awaiting pre-cert which will hopefully be approved later today or tomorrow.  [facility] will be able to accept pt. tomorrow, 1/11/18 provided pre-cert is approved.  JACQUES provided RN and APRN update.              Discharge Codes     None        Expected Discharge Date and Time     Expected Discharge Date Expected Discharge Time    Dec 11, 2017             KIRSTY Santo

## 2018-01-10 NOTE — PROGRESS NOTES
[facility] Medicine Services  PROGRESS NOTE    Patient Name: Tereza Ackerman  : 1955  MRN: [id number]    Date of Admission: 2017  Length of Stay: 37  Primary Care Physician: Michaela Connell MD    Subjective   Subjective     CC:  Follow up SBO    HPI:  Sitting up on SOB, appears comfortable. Having some pain in abd incision, but pain has been well controlled overall. Eager to go to rehab soon. Having BM's and tolerating oral intake well. Urinating well.     Review of Systems  Gen- No fevers, chills  CV- No chest pain, palpitations  Resp- No cough, dyspnea  GI- No N/V/D    Otherwise ROS is negative except as mentioned in the HPI.    Objective   Objective     Vital Signs:   Temp:  [98.1 °F (36.7 °C)] 98.1 °F (36.7 °C)  Heart Rate:  [65-75] 75  Resp:  [16-18] 16  BP: (129-158)/(78-83) 158/83        Physical Exam:  Constitutional: No acute distress, awake, alert, sitting on SOB, looks comfortable  Respiratory: Clear to auscultation bilaterally A/P, nonlabored respirations   Cardiovascular: RRR, no murmurs, rubs, or gallops, palpable pedal pulses bilaterally  Gastrointestinal: Positive bowel sounds, soft, nondistended, mildly tender, lower abd dressing, did not take dressing down.   Musculoskeletal: trace edema BLE  Psychiatric: Appropriate affect, cooperative  Neurologic: Oriented x 3, no focal deficits   Skin:  Rt upper CW with CDI dressing s/p tunneled HD cath     Results Reviewed:  I have personally reviewed current lab, radiology, and data and agree.      Results from last 7 days  Lab Units 18  0512   WBC 10*3/mm3 8.09 8.49 8.74   HEMOGLOBIN g/dL 9.1* 9.0* 10.0*   HEMATOCRIT % 30.2* 29.4* 32.7*   PLATELETS 10*3/mm3 357 356 443       Results from last 7 days  Lab Units 1823 181 1818 18  0512   SODIUM mmol/L 139  --  140 137   POTASSIUM mmol/L 4.3 3.9 3.2* 3.3*   CHLORIDE mmol/L 106  --  106 104   CO2  mmol/L 24.0  --  25.0 28.0   BUN mg/dL 27*  --  29* 29*   CREATININE mg/dL 2.00*  --  2.10* 2.20*   GLUCOSE mg/dL 85  --  89 97   CALCIUM mg/dL 8.1*  --  8.1* 8.0*           Microbiology Results Abnormal     Procedure Component Value - Date/Time    Blood Culture - Blood, [[id number]]  (Normal) Collected:  12/21/17 0700    Lab Status:  Final result Specimen:  Blood from Arm, Right Updated:  12/26/17 0816     Blood Culture No growth at 5 days    Blood Culture - Blood, [[id number]]  (Normal) Collected:  12/21/17 0705    Lab Status:  Final result Specimen:  Blood from Arm, Left Updated:  12/26/17 0816     Blood Culture No growth at 5 days    Blood Culture - Blood, [[id number]]  (Normal) Collected:  12/19/17 1316    Lab Status:  Final result Specimen:  Blood from Arm, Left Updated:  12/24/17 1346     Blood Culture No growth at 5 days    Blood Culture - Blood, [[id number]]  (Normal) Collected:  12/19/17 1316    Lab Status:  Final result Specimen:  Blood from Hand, Left Updated:  12/24/17 1346     Blood Culture No growth at 5 days    Urine Culture - Urine, Urine, Clean Catch [[id number]]  (Abnormal) Collected:  12/12/17 0001    Lab Status:  Final result Specimen:  Urine from Urine, Clean Catch Updated:  12/14/17 0646     Urine Culture --      <10,000 CFU/mL Yeast isolated (A)    Blood Culture - Blood, [[id number]]  (Normal) Collected:  12/05/17 0313    Lab Status:  Final result Specimen:  Blood from Arm, Right Updated:  12/10/17 0416     Blood Culture No growth at 5 days    Blood Culture - Blood, [[id number]]  (Normal) Collected:  12/05/17 0313    Lab Status:  Final result Specimen:  Blood from Arm, Right Updated:  12/10/17 0416     Blood Culture No growth at 5 days    Narrative:       Aerobic bottle only    Urine Culture - Urine, Urine, Clean Catch [[id number]]  (Normal) Collected:  12/05/17 1704    Lab Status:  Final result Specimen:  Urine from Urine, Catheter Updated:  12/07/17 0842     Urine Culture No growth at 2 days     Eosinophil Smear - Urine, Urine, Clean Catch [[id number]]  (Normal) Collected:  12/06/17 1106    Lab Status:  Final result Specimen:  Urine from Urine, Catheter Updated:  12/06/17 1347     Eosinophil Smear 0 % EOS/100 Cells     Narrative:       No eosinophil seen            Results for orders placed during the hospital encounter of 12/04/17   Adult Transthoracic Echo Complete W/ Cont if Necessary Per Protocol    Narrative · LVEF difficult to evaluate with tachycardia- globally appears mildly   depressed.  · Left ventricular wall thickness is consistent with concentric   hypertrophy.  · Mild tricuspid valve regurgitation is present.  · Calculated right ventricular systolic pressure from tricuspid   regurgitation is 32 mmHg.          I have reviewed the medications.    Assessment/Plan   Assessment / Plan     Hospital Problem List     * (Principal)Small bowel obstruction    Overview Signed 12/5/2017  5:16 PM by AYDEE Hernandez     S/p abdominal exploration with adhesiolysis and resection of strangulated segment of small bowel by Dr. CUI 12/5/17         Meningioma, recurrent of brain    Overview Signed 12/6/2017  3:49 PM by Julia COTE Case, DO     First occurrence with resection by Bunny in 2003.   Recurrence with resection by Bunny in 2014.          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          Acute renal failure    Elevated troponin level    Metabolic acidosis    Transaminitis    Atrial flutter with rapid ventricular response    Overview Addendum 12/21/2017 12:10 PM by JEIMY Dalal     1. Echo 12-21-17:  · LVEF difficult to evaluate with tachycardia- globally appears mildly depressed.  · Left ventricular wall thickness is consistent with concentric hypertrophy.  · Mild tricuspid valve regurgitation is present.  · Calculated right ventricular systolic  pressure from tricuspid regurgitation is 32 mmHg.                  Brief Hospital Course to date:  Tereza Ackerman is a 62 y.o. female presenting with small bowel obstruction s/p ex-lap with adhesiolysis and resection of strangulated segment of small bowel by Dr. CUI 12/5/17 complicated by acute renal failure (ATN from sepsis/bowel ischemia/hypotension) requiring hemodialysis now transferred out of ICU but with persistent ileus/obstruction requiring NGT replacement and TPN for nutrition. Slow to improve. Developed rapid atrial flutter evening of 12/20, requiring amiodarone drip.      Assessment & Plan:    SBO  -s/p ex-lap with adhesiolysis and resection of strangulated segment of small bowel by Dr. CUI 12/5/17  --resolved and tolerating diet well, having BM's  --Vertical midline abdominal incision with staples to be removed today  --f/u with [facility] in 2 weeks      Non-oliguric DEJA  - HD 1/2, Epo.   -Nephrology following; dc'd sod bicar   - creatinine is improving slowly  --nephrology had HD cath removed on 1/9/18  --will receive last dose of Procrit today and likely will not need any additional doses  --Will need to follow up with NAL in 2 weeks, needs BMP in 1 week, log BP's at home    A. Flutter  - continue amiodarone taper. BB. FU with Dr. Aguirre in 4 weeks.      HTN  -continue amlodipine,coreg  -Hydralazine PO PRN  -Terazosin 2mg qhs added per nephrology     Anemia  - Hgb 9, stable, monitor    Anxiety  - mood improved on Sertraline.   - seroquel at night for her anxiety and restlessness     DVT Prophylaxis:    [facility]    CODE STATUS: Full Code    Disposition: I expect the patient to be discharged to [facility] tomorrow     Lulu Ramirez, APRN  01/10/18  1:48 PM

## 2018-01-10 NOTE — PLAN OF CARE
Problem: Pressure Ulcer Risk (Jairo Scale) (Adult,Obstetrics,Pediatric)  Intervention: Turn/Reposition Often   01/10/18 0511   Skin Interventions   Pressure Reduction Techniques frequent weight shift encouraged;positioned off wounds       Goal: Skin Integrity  Outcome: Ongoing (interventions implemented as appropriate)

## 2018-01-10 NOTE — NURSING NOTE
Pt F/U for wound on abdomen. Wound dressing changed to -Cleanse wound area with N/S. Higbee reddened skin around incision with barrier spray.Cover wound with Ag foam alginate dressing (in room). Change dressing BID. Wound improving and there is some hypergranulation tissue noted. Staples come out today. wOCN will reassess.

## 2018-01-10 NOTE — PLAN OF CARE
Problem: Fall Risk (Adult)  Goal: Absence of Falls  Outcome: Ongoing (interventions implemented as appropriate)   01/10/18 1801   Fall Risk (Adult)   Absence of Falls making progress toward outcome       Problem: Pain, Acute (Adult)  Goal: Acceptable Pain Control/Comfort Level  Outcome: Ongoing (interventions implemented as appropriate)   01/10/18 1801   Pain, Acute (Adult)   Acceptable Pain Control/Comfort Level making progress toward outcome

## 2018-01-10 NOTE — PROGRESS NOTES
"Tereza Ackerman  1955  [id number]    Surgery Progress Note    Date of visit: 1/10/2018    Subjective: Continues to improve and feels better  Dialysis catheter was removed last night    Objective:    /83 (BP Location: Left arm, Patient Position: Sitting)  Pulse 65  Temp 98.1 °F (36.7 °C) (Oral)   Resp 16  Ht 160 cm (62.99\")  Wt 67.4 kg (148 lb 11.2 oz)  SpO2 97%  BMI 26.35 kg/m2    Intake/Output Summary (Last 24 hours) at 01/10/18 0819  Last data filed at 01/10/18 0719   Gross per 24 hour   Intake             1020 ml   Output              600 ml   Net              420 ml       CV: Regular rate and rhythm  L: normal air entry  Abd: Soft and nontender  Incision is intact and healing well with granulation tissue in the open wound      LABS:      Results from last 7 days  Lab Units 01/09/18  0523   WBC 10*3/mm3 8.09   HEMOGLOBIN g/dL 9.1*   HEMATOCRIT % 30.2*   PLATELETS 10*3/mm3 357       Results from last 7 days  Lab Units 01/09/18  0523   SODIUM mmol/L 139   POTASSIUM mmol/L 4.3   CHLORIDE mmol/L 106   CO2 mmol/L 24.0   BUN mg/dL 27*   CREATININE mg/dL 2.00*   CALCIUM mg/dL 8.1*   GLUCOSE mg/dL 85       Results from last 7 days  Lab Units 01/09/18  0523   SODIUM mmol/L 139   POTASSIUM mmol/L 4.3   CHLORIDE mmol/L 106   CO2 mmol/L 24.0   BUN mg/dL 27*   CREATININE mg/dL 2.00*   GLUCOSE mg/dL 85   CALCIUM mg/dL 8.1*       Lab Results  Lab Value Date/Time   LIPASE 52 (H) 12/04/2017 1043   LIPASE 39 06/30/2017 1959         Assessment/ Plan: Overall progressing nicely and eating better  Has been ambulating better however she still weak  Awaiting rehabilitation placement    Problem List Items Addressed This Visit     Hypertension    Relevant Medications    amLODIPine (NORVASC) tablet 10 mg    * (Principal)Small bowel obstruction - Primary    Relevant Orders    Tissue Pathology Exam - Tissue, Small Intestine (Completed)    Acute renal failure    Relevant Medications    potassium chloride (MICRO-K) CR " capsule 40 mEq    potassium chloride (KLOR-CON) packet 40 mEq    potassium chloride 10 mEq in 100 mL IVPB      Other Visit Diagnoses     Intractable vomiting with nausea, unspecified vomiting type        Uncontrolled hypertension                Ирина Cobian MD  1/10/2018  8:19 AM

## 2018-01-10 NOTE — PROGRESS NOTES
"   LOS: 37 days    Patient Care Team:  Michaela Connell MD as PCP - General  Michaela Connell MD as PCP - Family Medicine    Reason For Visit:  F/U DEJA.   Subjective   No acute events overnight. No new complaints.         Review of Systems:    Pulm: No soa   CV:  No CP      Objective       amiodarone 200 mg Oral Q12H   Followed by      [START ON 1/12/2018] amiodarone 200 mg Oral Daily   amLODIPine 10 mg Oral Q24H   carvedilol 25 mg Oral Q12H   CloNIDine 0.1 mg Oral Daily   docusate sodium 100 mg Oral BID   epoetin marcel 10,000 Units Subcutaneous Once per day on Tue Thu Sat   furosemide 20 mg Intravenous Once   heparin (porcine) 5,000 Units Subcutaneous Q8H   metoclopramide 5 mg Intravenous Q12H   nystatin  Topical Q12H   polyethylene glycol 17 g Oral Daily   QUEtiapine 25 mg Oral Nightly   sertraline 100 mg Oral Daily   terazosin 2 mg Oral Nightly   thiamine 100 mg Oral Daily            Vital Signs:  Blood pressure 158/83, pulse 75, temperature 98.1 °F (36.7 °C), temperature source Oral, resp. rate 16, height 160 cm (62.99\"), weight 67.4 kg (148 lb 11.2 oz), SpO2 97 %.    Flowsheet Rows         First Filed Value    Admission Height  160 cm (63\") Documented at 12/04/2017 1002    Admission Weight  68.9 kg (152 lb) Documented at 12/04/2017 1002          01/09 0701 - 01/10 0700  In: 1020 [P.O.:1020]  Out: 500 [Urine:500]    Physical Exam:    General Appearance: NAD, alert and cooperative, Ox3  Eyes: PER, conjunctivae and sclerae normal, no icterus  Lungs: respirations regular and unlabored, no crepitus, clear to auscultation  Heart/CV: regular rhythm & normal rate, no murmur, no gallop, no rub and 1+ edema  Abdomen: not distended, soft, non-tender, no masses,  bowel sounds present  Skin: No rash, Warm and dry. TUNNELED HD CATH.    Radiology:            Labs:    Results from last 7 days  Lab Units 01/09/18  0523 01/08/18  0618 01/07/18  0512   WBC 10*3/mm3 8.09 8.49 8.74   HEMOGLOBIN g/dL 9.1* 9.0* 10.0*   HEMATOCRIT % " 30.2* 29.4* 32.7*   PLATELETS 10*3/mm3 357 356 443       Results from last 7 days  Lab Units 01/09/18  0523 01/08/18  2231 01/08/18 0618 01/07/18  0512 01/06/18  0543 01/05/18  0629 01/04/18  0548   SODIUM mmol/L 139  --  140 137 138 142 142   POTASSIUM mmol/L 4.3 3.9 3.2* 3.3* 2.9* 3.5 3.8  3.8   CHLORIDE mmol/L 106  --  106 104 101 102 97*   CO2 mmol/L 24.0  --  25.0 28.0 28.0 28.0 29.0   BUN mg/dL 27*  --  29* 29* 32* 25* 37*   CREATININE mg/dL 2.00*  --  2.10* 2.20* 2.40* 2.10* 3.00*   CALCIUM mg/dL 8.1*  --  8.1* 8.0* 8.0* 7.9* 8.4*   PHOSPHORUS mg/dL  --   --  2.7 2.9 2.4 2.5 2.6   MAGNESIUM mg/dL  --   --   --   --   --  1.9 2.3   ALBUMIN g/dL  --   --  2.90* 3.20 2.80*  --  3.20       Results from last 7 days  Lab Units 01/09/18  0523   GLUCOSE mg/dL 85                       Estimated Creatinine Clearance: 26.2 mL/min (by C-G formula based on Cr of 2).      Assessment     Principal Problem:    Small bowel obstruction  Active Problems:    Meningioma, recurrent of brain    Insomnia    Malignant neoplasm of left orbit    Acute renal failure    Elevated troponin level    Metabolic acidosis    Transaminitis    Atrial flutter with rapid ventricular response            Impression: NONOLIGURIC DEJA. ATN. INCREASED U/O.STARTING TO RECOVER.  ANEMIA.        Recommendations:   Continue with current regimen.   Monitor I/O   Tunneled catheter out.     Follow up with NAL in 2 weeks after discharge. Renal function panel 1 week prior to her appointment.   Monitor Blood pressure at home. Keep log book.     Jamal Ramos MD  01/10/18  9:22 AM

## 2018-01-11 PROCEDURE — 25010000002 METOCLOPRAMIDE PER 10 MG: Performed by: INTERNAL MEDICINE

## 2018-01-11 PROCEDURE — 99232 SBSQ HOSP IP/OBS MODERATE 35: CPT | Performed by: NURSE PRACTITIONER

## 2018-01-11 PROCEDURE — 63510000001 EPOETIN ALFA PER 1000 UNITS: Performed by: INTERNAL MEDICINE

## 2018-01-11 PROCEDURE — 93005 ELECTROCARDIOGRAM TRACING: CPT | Performed by: INTERNAL MEDICINE

## 2018-01-11 PROCEDURE — 93010 ELECTROCARDIOGRAM REPORT: CPT | Performed by: INTERNAL MEDICINE

## 2018-01-11 RX ORDER — METOCLOPRAMIDE 5 MG/1
5 TABLET ORAL 2 TIMES DAILY
Status: DISCONTINUED | OUTPATIENT
Start: 2018-01-11 | End: 2018-01-12 | Stop reason: HOSPADM

## 2018-01-11 RX ADMIN — ACETAMINOPHEN 500 MG: 500 TABLET ORAL at 22:45

## 2018-01-11 RX ADMIN — AMIODARONE HYDROCHLORIDE 200 MG: 200 TABLET ORAL at 20:10

## 2018-01-11 RX ADMIN — NYSTATIN: 100000 CREAM TOPICAL at 08:28

## 2018-01-11 RX ADMIN — SERTRALINE HYDROCHLORIDE 100 MG: 100 TABLET ORAL at 08:27

## 2018-01-11 RX ADMIN — METOCLOPRAMIDE 5 MG: 5 TABLET ORAL at 20:10

## 2018-01-11 RX ADMIN — CLONIDINE HYDROCHLORIDE 0.1 MG: 0.1 TABLET ORAL at 20:10

## 2018-01-11 RX ADMIN — CLONIDINE HYDROCHLORIDE 0.1 MG: 0.1 TABLET ORAL at 08:26

## 2018-01-11 RX ADMIN — ACETAMINOPHEN 500 MG: 500 TABLET ORAL at 16:41

## 2018-01-11 RX ADMIN — ACETAMINOPHEN 500 MG: 500 TABLET ORAL at 10:09

## 2018-01-11 RX ADMIN — CARVEDILOL 25 MG: 12.5 TABLET, FILM COATED ORAL at 20:10

## 2018-01-11 RX ADMIN — NYSTATIN: 100000 CREAM TOPICAL at 20:11

## 2018-01-11 RX ADMIN — AMLODIPINE BESYLATE 10 MG: 10 TABLET ORAL at 08:26

## 2018-01-11 RX ADMIN — AMIODARONE HYDROCHLORIDE 200 MG: 200 TABLET ORAL at 08:28

## 2018-01-11 RX ADMIN — ERYTHROPOIETIN 10000 UNITS: 10000 INJECTION, SOLUTION INTRAVENOUS; SUBCUTANEOUS at 12:39

## 2018-01-11 RX ADMIN — Medication 100 MG: at 08:26

## 2018-01-11 RX ADMIN — METOCLOPRAMIDE 5 MG: 5 INJECTION, SOLUTION INTRAMUSCULAR; INTRAVENOUS at 08:26

## 2018-01-11 RX ADMIN — CARVEDILOL 25 MG: 12.5 TABLET, FILM COATED ORAL at 08:26

## 2018-01-11 NOTE — PROGRESS NOTES
Continued Stay Note  [facility]     Patient Name: Tereza Ackerman  MRN: [id number]  Today's Date: 1/11/2018    Admit Date: 12/4/2017          Discharge Plan       01/11/18 1147    Case Management/Social Work Plan    Plan awaiting insurance pre-cert    Patient/Family In Agreement With Plan yes    Additional Comments JACQUES spoke with pt. and her spouse.  JACQUES has talked to Delmy with [facility].  Pt's insurance pre-cert has still not come back.  JACQUES also gave update to Rhonda BAJWA.              Discharge Codes     None        Expected Discharge Date and Time     Expected Discharge Date Expected Discharge Time    Dec 11, 2017             KIRSTY Santo

## 2018-01-11 NOTE — PROGRESS NOTES
Continued Stay Note  [facility]     Patient Name: Tereza Ackerman  MRN: [id number]  Today's Date: 1/11/2018    Admit Date: 12/4/2017          Discharge Plan       01/11/18 1611    Case Management/Social Work Plan    Plan awaiting insurance pre-cert    Additional Comments  has met with pt. and her spouse twice today.  CM is still awaiting insurance approval for pt. to be able to transfer to SNF.  Delmy with [employer] contacted  and stated pt's insurance has requested to review updated notes.   has submitted those records to [employer], which will now delay possible insurance approval until Friday at the earliest.  Pt. and her spouse are quite disappointed about the delay this has caused in pt's discharge.  Pt wishes to pursue skilled rehab, however pt's spouse wants her to return home.  This conflict was shared with  this afternoon.  Ultimately it will depend on insurance approval and IF if it is approved, pt's desire to move forward with rehab.  CM will continue to follow.                Discharge Codes     None        Expected Discharge Date and Time     Expected Discharge Date Expected Discharge Time    Dec 11, 2017             KIRSTY Santo

## 2018-01-11 NOTE — PROGRESS NOTES
"   LOS: 38 days    Patient Care Team:  Michaela Connell MD as PCP - General  Michaela Connell MD as PCP - Family Medicine    Reason For Visit:  F/U DEJA.   Subjective   No acute events overnight. No new complaints.         Review of Systems:    Pulm: No soa   CV:  No CP      Objective       amiodarone 200 mg Oral Q12H   Followed by      [START ON 1/12/2018] amiodarone 200 mg Oral Daily   amLODIPine 10 mg Oral Q24H   carvedilol 25 mg Oral Q12H   CloNIDine 0.1 mg Oral Q12H   docusate sodium 100 mg Oral BID   epoetin marcel 10,000 Units Subcutaneous Once per day on Tue Thu Sat   furosemide 20 mg Intravenous Once   heparin (porcine) 5,000 Units Subcutaneous Q8H   metoclopramide 5 mg Intravenous Q12H   nystatin  Topical Q12H   polyethylene glycol 17 g Oral Daily   QUEtiapine 25 mg Oral Nightly   sertraline 100 mg Oral Daily   thiamine 100 mg Oral Daily            Vital Signs:  Blood pressure 157/82, pulse 65, temperature 98.3 °F (36.8 °C), temperature source Oral, resp. rate 18, height 160 cm (62.99\"), weight 67.3 kg (148 lb 6 oz), SpO2 97 %.    Flowsheet Rows         First Filed Value    Admission Height  160 cm (63\") Documented at 12/04/2017 1002    Admission Weight  68.9 kg (152 lb) Documented at 12/04/2017 1002          01/10 0701 - 01/11 0700  In: -   Out: 600 [Urine:600]    Physical Exam:    General Appearance: NAD, alert and cooperative, Ox3  Eyes: PER, conjunctivae and sclerae normal, no icterus  Lungs: respirations regular and unlabored, no crepitus, clear to auscultation  Heart/CV: regular rhythm & normal rate, no murmur, no gallop, no rub and 1+ edema  Abdomen: not distended, soft, non-tender, no masses,  bowel sounds present  Skin: No rash, Warm and dry. TUNNELED HD CATH.    Radiology:            Labs:    Results from last 7 days  Lab Units 01/09/18  0523 01/08/18  0618 01/07/18  0512   WBC 10*3/mm3 8.09 8.49 8.74   HEMOGLOBIN g/dL 9.1* 9.0* 10.0*   HEMATOCRIT % 30.2* 29.4* 32.7*   PLATELETS 10*3/mm3 357 823 443 "       Results from last 7 days  Lab Units 01/09/18  0523 01/08/18  2231 01/08/18  0618 01/07/18  0512 01/06/18  0543 01/05/18  0629   SODIUM mmol/L 139  --  140 137 138 142   POTASSIUM mmol/L 4.3 3.9 3.2* 3.3* 2.9* 3.5   CHLORIDE mmol/L 106  --  106 104 101 102   CO2 mmol/L 24.0  --  25.0 28.0 28.0 28.0   BUN mg/dL 27*  --  29* 29* 32* 25*   CREATININE mg/dL 2.00*  --  2.10* 2.20* 2.40* 2.10*   CALCIUM mg/dL 8.1*  --  8.1* 8.0* 8.0* 7.9*   PHOSPHORUS mg/dL  --   --  2.7 2.9 2.4 2.5   MAGNESIUM mg/dL  --   --   --   --   --  1.9   ALBUMIN g/dL  --   --  2.90* 3.20 2.80*  --        Results from last 7 days  Lab Units 01/09/18  0523   GLUCOSE mg/dL 85                       Estimated Creatinine Clearance: 26.2 mL/min (by C-G formula based on Cr of 2).      Assessment     Principal Problem:    Small bowel obstruction  Active Problems:    Meningioma, recurrent of brain    Insomnia    Malignant neoplasm of left orbit    Acute renal failure    Elevated troponin level    Metabolic acidosis    Transaminitis    Atrial flutter with rapid ventricular response            Impression: NONOLIGURIC DEJA. ATN. INCREASED U/O.STARTING TO RECOVER.  ANEMIA.        Recommendations:   Continue with current regimen.   Monitor I/O     Follow up with NAL in 2 weeks after discharge. Renal function panel 1 week prior to her appointment.   Monitor Blood pressure at home. Keep log book.     Jamal Ramos MD  01/11/18  9:11 AM

## 2018-01-11 NOTE — PROGRESS NOTES
[facility] Medicine Services  PROGRESS NOTE    Patient Name: Tereza Ackerman  : 1955  MRN: [id number]    Date of Admission: 2017  Length of Stay: 38  Primary Care Physician: Michaela Connell MD    Subjective   Subjective     CC:  Follow up SBO    HPI:  Patient is sitting up in chair without any new complaints or issues.  Patient has been ambulating in halls with .  Incisional pain controlled with PRN medication.  Eager to go to rehab.  Had BM this morning and tolerating oral intake.      Review of Systems  Gen- No fevers, chills  CV- No chest pain, palpitations  Resp- No cough, dyspnea  GI- No N/V/D    Otherwise ROS is negative except as mentioned in the HPI.    Objective   Objective     Vital Signs:   Temp:  [98.3 °F (36.8 °C)-98.4 °F (36.9 °C)] 98.3 °F (36.8 °C)  Heart Rate:  [64-66] 65  Resp:  [18] 18  BP: (103-157)/(61-82) 157/82        Physical Exam:  Constitutional: No acute distress, awake, alert, sitting up in chair, looks comfortable  Respiratory: Clear to auscultation bilaterally A/P, nonlabored respirations   Cardiovascular: RRR, no murmurs, rubs, or gallops, palpable pedal pulses bilaterally  Gastrointestinal: Positive bowel sounds, soft, nondistended, mildly tender, lower abd dressing, did not take dressing down.   Musculoskeletal: trace edema BLE  Psychiatric: Appropriate affect, cooperative  Neurologic: Oriented x 3, no focal deficits   Skin:  Rt upper CW with CDI dressing s/p tunneled HD cath     Results Reviewed:  I have personally reviewed current lab, radiology, and data and agree.      Results from last 7 days  Lab Units 1818 18  0512   WBC 10*3/mm3 8.09 8.49 8.74   HEMOGLOBIN g/dL 9.1* 9.0* 10.0*   HEMATOCRIT % 30.2* 29.4* 32.7*   PLATELETS 10*3/mm3 357 356 443       Results from last 7 days  Lab Units 18  0523 181 1818 18  0512   SODIUM mmol/L 139  --  140 137   POTASSIUM mmol/L 4.3  3.9 3.2* 3.3*   CHLORIDE mmol/L 106  --  106 104   CO2 mmol/L 24.0  --  25.0 28.0   BUN mg/dL 27*  --  29* 29*   CREATININE mg/dL 2.00*  --  2.10* 2.20*   GLUCOSE mg/dL 85  --  89 97   CALCIUM mg/dL 8.1*  --  8.1* 8.0*           Microbiology Results Abnormal     Procedure Component Value - Date/Time    Blood Culture - Blood, [[id number]]  (Normal) Collected:  12/21/17 0700    Lab Status:  Final result Specimen:  Blood from Arm, Right Updated:  12/26/17 0816     Blood Culture No growth at 5 days    Blood Culture - Blood, [[id number]]  (Normal) Collected:  12/21/17 0705    Lab Status:  Final result Specimen:  Blood from Arm, Left Updated:  12/26/17 0816     Blood Culture No growth at 5 days    Blood Culture - Blood, [[id number]]  (Normal) Collected:  12/19/17 1316    Lab Status:  Final result Specimen:  Blood from Arm, Left Updated:  12/24/17 1346     Blood Culture No growth at 5 days    Blood Culture - Blood, [[id number]]  (Normal) Collected:  12/19/17 1316    Lab Status:  Final result Specimen:  Blood from Hand, Left Updated:  12/24/17 1346     Blood Culture No growth at 5 days    Urine Culture - Urine, Urine, Clean Catch [[id number]]  (Abnormal) Collected:  12/12/17 0001    Lab Status:  Final result Specimen:  Urine from Urine, Clean Catch Updated:  12/14/17 0646     Urine Culture --      <10,000 CFU/mL Yeast isolated (A)    Blood Culture - Blood, [[id number]]  (Normal) Collected:  12/05/17 0313    Lab Status:  Final result Specimen:  Blood from Arm, Right Updated:  12/10/17 0416     Blood Culture No growth at 5 days    Blood Culture - Blood, [[id number]]  (Normal) Collected:  12/05/17 0313    Lab Status:  Final result Specimen:  Blood from Arm, Right Updated:  12/10/17 0416     Blood Culture No growth at 5 days    Narrative:       Aerobic bottle only    Urine Culture - Urine, Urine, Clean Catch [[id number]]  (Normal) Collected:  12/05/17 1704    Lab Status:  Final result Specimen:  Urine from Urine, Catheter Updated:   12/07/17 0842     Urine Culture No growth at 2 days    Eosinophil Smear - Urine, Urine, Clean Catch [[id number]]  (Normal) Collected:  12/06/17 1106    Lab Status:  Final result Specimen:  Urine from Urine, Catheter Updated:  12/06/17 1347     Eosinophil Smear 0 % EOS/100 Cells     Narrative:       No eosinophil seen            Results for orders placed during the hospital encounter of 12/04/17   Adult Transthoracic Echo Complete W/ Cont if Necessary Per Protocol    Narrative · LVEF difficult to evaluate with tachycardia- globally appears mildly   depressed.  · Left ventricular wall thickness is consistent with concentric   hypertrophy.  · Mild tricuspid valve regurgitation is present.  · Calculated right ventricular systolic pressure from tricuspid   regurgitation is 32 mmHg.          I have reviewed the medications.    Assessment/Plan   Assessment / Plan     Hospital Problem List     * (Principal)Small bowel obstruction    Overview Signed 12/5/2017  5:16 PM by AYDEE Hernandez     S/p abdominal exploration with adhesiolysis and resection of strangulated segment of small bowel by Dr. CUI 12/5/17         Meningioma, recurrent of brain    Overview Signed 12/6/2017  3:49 PM by Julia SINGH. Case, DO     First occurrence with resection by Bunny in 2003.   Recurrence with resection by Bunny in 2014.          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          Acute renal failure    Elevated troponin level    Metabolic acidosis    Transaminitis    Atrial flutter with rapid ventricular response    Overview Addendum 12/21/2017 12:10 PM by JEIMY Dalal     1. Echo 12-21-17:  · LVEF difficult to evaluate with tachycardia- globally appears mildly depressed.  · Left ventricular wall thickness is consistent with concentric hypertrophy.  · Mild tricuspid valve  regurgitation is present.  · Calculated right ventricular systolic pressure from tricuspid regurgitation is 32 mmHg.                  Brief Hospital Course to date:  Tereza Ackerman is a 62 y.o. female presenting with small bowel obstruction s/p ex-lap with adhesiolysis and resection of strangulated segment of small bowel by Dr. CUI 12/5/17 complicated by acute renal failure (ATN from sepsis/bowel ischemia/hypotension) requiring hemodialysis now transferred out of ICU but with persistent ileus/obstruction requiring NGT replacement and TPN for nutrition. Slow to improve. Developed rapid atrial flutter evening of 12/20, requiring amiodarone drip.      Assessment & Plan:    SBO  -s/p ex-lap with adhesiolysis and resection of strangulated segment of small bowel by Dr. CUI 12/5/17  --resolved and tolerating diet well, having BM's  --Vertical midline abdominal incision with dressing CDI  --f/u with [facility] in 2 weeks      Non-oliguric DEJA  - HD 1/2, Epo.   -Nephrology following; dc'd sod bicar   - creatinine is improving slowly  --nephrology had HD cath removed on 1/9/18  --will receive last dose of Procrit today and likely will not need any additional doses  --Will need to follow up with [facility] in 2 weeks, needs BMP in 1 week, log BP's at home    A. Flutter  - continue amiodarone taper. BB. FU with Dr. Aguirre in 4 weeks.      HTN  -continue amlodipine,coreg  -Hydralazine PO PRN  -Terazosin 2mg qhs added per nephrology     Anemia  - Hgb 9, stable, monitor    Anxiety  - mood improved on Sertraline.   - seroquel at night for her anxiety and restlessness     DVT Prophylaxis:    [facility]    CODE STATUS: Full Code    Disposition: I expect the patient to be discharged to [facility] hopefully tomorrow when receive insurance approval.      Rhonda Oconnell, APRN  01/11/18  5:08 PM

## 2018-01-11 NOTE — PLAN OF CARE
Problem: Patient Care Overview (Adult)  Goal: Plan of Care Review  Outcome: Ongoing (interventions implemented as appropriate)   01/11/18 9106   Coping/Psychosocial Response Interventions   Plan Of Care Reviewed With patient;spouse   Patient Care Overview   Progress improving   Outcome Evaluation   Outcome Summary/Follow up Plan No complaints overnight, staples [facility]

## 2018-01-11 NOTE — DISCHARGE PLACEMENT REQUEST
"Gagandeep Whatley (62 y.o. Female)     To [place]    From Dominique [place], [phone]      Date of Birth Social Security Number Address [facility] Phone MRN    1955  [id number] [facility] [address] [phone] [id number]    [employer] Marital Status          Islam        Admission Date Admission Type Admitting Provider Attending Provider Department, Room/Bed    12/4/17 Emergency Catalina Arevalo MD Mini, Jocelyn, MD [facility] 5G, S565/1    Discharge Date Discharge Disposition Discharge Destination                      Attending Provider: Catalina Arevalo MD     Allergies:  Dilantin [Phenytoin Sodium Extended], Phenytoin    Isolation:  None   Infection:  None   Code Status:  FULL    Ht:  160 cm (62.99\")   Wt:  67.3 kg (148 lb 6 oz)    Admission Cmt:  None   Principal Problem:  Small bowel obstruction [K56.609] More...                 Active Insurance as of 12/4/2017     Primary Coverage     Payor Plan Insurance Group Employer/Plan Group    ANTHEM MEDICARE REPLACEMENT ANTHEM MEDICARE ADVANTAGE [id number]     Payor Plan Address Payor Plan Phone Number Effective From Effective To    [address] [phone] 4/1/2017     [place] [address]       Subscriber Name Subscriber Birth Date Member ID       GAGANDEEP WHATLEY 1955 [id number]                 Emergency Contacts      (Rel.) Home Phone Work Phone Mobile Phone    Cash Whatley (Spouse) [phone] -- --               Physician Progress Notes (most recent note)      Jamal Ramos MD at 1/11/2018  9:11 AM  Version 1 of 1            LOS: 38 days    Patient Care Team:  Michaela Connell MD as PCP - General  Michaela Connell MD as PCP - Family Medicine    Reason For Visit:  F/U DEJA.   Subjective   No acute events overnight. No new complaints.         Review of Systems:    Pulm: No soa   CV:  No CP      Objective       amiodarone 200 mg Oral Q12H   Followed by      [START ON 1/12/2018] amiodarone 200 mg Oral " "Daily   amLODIPine 10 mg Oral Q24H   carvedilol 25 mg Oral Q12H   CloNIDine 0.1 mg Oral Q12H   docusate sodium 100 mg Oral BID   epoetin marcel 10,000 Units Subcutaneous Once per day on Tue Thu Sat   furosemide 20 mg Intravenous Once   heparin (porcine) 5,000 Units Subcutaneous Q8H   metoclopramide 5 mg Intravenous Q12H   nystatin  Topical Q12H   polyethylene glycol 17 g Oral Daily   QUEtiapine 25 mg Oral Nightly   sertraline 100 mg Oral Daily   thiamine 100 mg Oral Daily            Vital Signs:  Blood pressure 157/82, pulse 65, temperature 98.3 °F (36.8 °C), temperature source Oral, resp. rate 18, height 160 cm (62.99\"), weight 67.3 kg (148 lb 6 oz), SpO2 97 %.    Flowsheet Rows         First Filed Value    Admission Height  160 cm (63\") Documented at 12/04/2017 1002    Admission Weight  68.9 kg (152 lb) Documented at 12/04/2017 1002          01/10 0701 - 01/11 0700  In: -   Out: 600 [Urine:600]    Physical Exam:    General Appearance: NAD, alert and cooperative, Ox3  Eyes: PER, conjunctivae and sclerae normal, no icterus  Lungs: respirations regular and unlabored, no crepitus, clear to auscultation  Heart/CV: regular rhythm & normal rate, no murmur, no gallop, no rub and 1+ edema  Abdomen: not distended, soft, non-tender, no masses,  bowel sounds present  Skin: No rash, Warm and dry. TUNNELED HD CATH.    Radiology:            Labs:    Results from last 7 days  Lab Units 01/09/18  0523 01/08/18 0618 01/07/18  0512   WBC 10*3/mm3 8.09 8.49 8.74   HEMOGLOBIN g/dL 9.1* 9.0* 10.0*   HEMATOCRIT % 30.2* 29.4* 32.7*   PLATELETS 10*3/mm3 357 356 443       Results from last 7 days  Lab Units 01/09/18  0523 01/08/18 2231 01/08/18  0618 01/07/18  0512 01/06/18  0543 01/05/18  0629   SODIUM mmol/L 139  --  140 137 138 142   POTASSIUM mmol/L 4.3 3.9 3.2* 3.3* 2.9* 3.5   CHLORIDE mmol/L 106  --  106 104 101 102   CO2 mmol/L 24.0  --  25.0 28.0 28.0 28.0   BUN mg/dL 27*  --  29* 29* 32* 25*   CREATININE mg/dL 2.00*  --  2.10* " 2.20* 2.40* 2.10*   CALCIUM mg/dL 8.1*  --  8.1* 8.0* 8.0* 7.9*   PHOSPHORUS mg/dL  --   --  2.7 2.9 2.4 2.5   MAGNESIUM mg/dL  --   --   --   --   --  1.9   ALBUMIN g/dL  --   --  2.90* 3.20 2.80*  --        Results from last 7 days  Lab Units 01/09/18  0523   GLUCOSE mg/dL 85                       Estimated Creatinine Clearance: 26.2 mL/min (by C-G formula based on Cr of 2).      Assessment     Principal Problem:    Small bowel obstruction  Active Problems:    Meningioma, recurrent of brain    Insomnia    Malignant neoplasm of left orbit    Acute renal failure    Elevated troponin level    Metabolic acidosis    Transaminitis    Atrial flutter with rapid ventricular response            Impression: NONOLIGURIC DEJA. ATN. INCREASED U/O.STARTING TO RECOVER.  ANEMIA.        Recommendations:   Continue with current regimen.   Monitor I/O     Follow up with NAL in 2 weeks after discharge. Renal function panel 1 week prior to her appointment.   Monitor Blood pressure at home. Keep log book.     Jamal Ramos MD  01/11/18  9:11 AM           Electronically signed by Jamal Ramos MD at 1/11/2018 11:55 AM           Physical Therapy Notes (last 72 hours) (Notes from 1/8/2018  4:06 PM through 1/11/2018  4:06 PM)      Selene Wagoner, PT at 1/9/2018  1:58 PM  Version 1 of 1         Problem: Patient Care Overview (Adult)  Goal: Plan of Care Review  Outcome: Ongoing (interventions implemented as appropriate)   01/09/18 1354   Coping/Psychosocial Response Interventions   Plan Of Care Reviewed With patient   Patient Care Overview   Progress improving   Outcome Evaluation   Outcome Summary/Follow up Plan PT progress note completed. Pt demonstrates improved independence with mobility today, with pt independent with sit to stand t/f and toilet t/f's. Pt ambulates 150ft with RW with SBA, limited by fatigue. PT feels pt safe to d/c home with 's assistance and outpatient PT services to continue to progress strength  and endurance.       Problem: Inpatient Physical Therapy  Goal: Bed Mobility Goal LTG- PT  Outcome: Outcome(s) achieved Date Met: 01/09/18 12/08/17 1538 01/05/18 1059 01/09/18 1354   Bed Mobility PT LTG   Bed Mobility PT LTG, Date Established 12/08/17 --  --    Bed Mobility PT LTG, Time to Achieve 2 wks --  --    Bed Mobility PT LTG, Activity Type supine to sit/sit to supine --  --    Bed Mobility PT LTG, [place] Level minimum assist (75% patient effort) --  --    Bed Mobility PT LTG, Date Goal Reviewed --  01/05/18 --    Bed Mobility PT LTG, Outcome --  --  goal met     Goal: Transfer Training Goal 1 LTG- PT  Outcome: Outcome(s) achieved Date Met: 01/09/18 12/08/17 1538 01/05/18 1059 01/09/18 1354   Transfer Training PT LTG   Transfer Training PT LTG, Date Established 12/08/17 --  --    Transfer Training PT LTG, Time to Achieve 2 wks --  --    Transfer Training PT LTG, Activity Type sit to stand/stand to sit --  --    Transfer Training PT LTG, [place] Level contact guard assist --  --    Transfer Training PT LTG, Assist Device walker, rolling --  --    Transfer Training PT LTG, Date Goal Reviewed --  01/05/18 --    Transfer Training PT LTG, Outcome --  --  goal met     Goal: Gait Training Goal LTG- PT  Outcome: Revised   01/09/18 1354   Gait Training PT LTG   Gait Training Goal PT LTG, Date Established 01/09/18   Gait Training Goal PT LTG, Time to Achieve by discharge   Gait Training Goal PT LTG, [place] Level conditional independence   Gait Training Goal PT LTG, Assist Device walker, rolling   Gait Training Goal PT LTG, Distance to Achieve 200   Gait Training Goal PT LTG, Outcome goal revised   Gait Training Goal PT LTG, Reason Goal Not Met goals revised this date            Electronically signed by Selene Wagoner, PT at 1/9/2018  1:58 PM      Selene Wagoner, PT at 1/9/2018  1:59 PM  Version 1 of 1         Acute Care - Physical Therapy Progress Note   Nancy     Patient Name: Tereza RIVERO  Tyron  : 1955  MRN: [id number]  Today's Date: 2018  Onset of Illness/Injury or Date of Surgery Date: 17  Date of Referral to PT: 17  Referring Physician: Dr CUI    Admit Date: 2017    Visit Dx:    ICD-10-CM ICD-9-CM   1. Small bowel obstruction K56.609 560.9   2. Intractable vomiting with nausea, unspecified vomiting type R11.2 536.2   3. Uncontrolled hypertension I10 401.9   4. Impaired functional mobility, balance, gait, and endurance Z74.09 V49.89   5. Acute renal failure, unspecified acute renal failure type N17.9 584.9   6. Essential hypertension I10 401.9     Patient Active Problem List   Diagnosis   • Impaired glucose tolerance   • Hypertension   • Parathyroid adenoma   • Reaction to chronic stress   • Multinodular goiter   • Vitamin D deficiency   • Meningioma, recurrent of brain   • Arthritis   • Depression   • Small bowel obstruction   • Insomnia   • History of Nissen fundoplication   • Malignant neoplasm of left orbit   • Prediabetes   • Hyperlipemia   • Hyperglycemia   • Acute renal failure   • Elevated troponin level   • Metabolic acidosis   • Transaminitis   • Atrial flutter with rapid ventricular response               Adult Rehabilitation Note       18 1310          Rehab Assessment/Intervention    Discipline physical therapist  -SJ      Document Type progress note  -SJ      Subjective Information no complaints;agree to therapy  -SJ      Patient Effort, Rehab Treatment good   self-limiting  -SJ      Symptoms Noted During/After Treatment fatigue  -SJ      Precautions/Limitations no known precautions/limitations   abdominal incision  -SJ      Recorded by [SJ] Selene Wagoner, PT      Vital Signs    Pre Systolic BP Rehab 129  -SJ      Pre Treatment Diastolic BP 74  -SJ      Intratreatment Heart Rate (beats/min) 67  -SJ      Recorded by [SJ] Selene Wagoner, PT      Pain Assessment    Pain Assessment Garcia-Tsai FACES  -SJ      Pain Score 1  -SJ      Post Pain Score 1   -SJ      Pain Type Acute pain;Surgical pain  -SJ      Pain Location Abdomen  -SJ      Pain Intervention(s) Repositioned;Ambulation/increased activity  -SJ      Response to Interventions neida  -SJ      Recorded by [SJ] Selene Wagoner, PT      Cognitive Assessment/Intervention    Current Cognitive/Communication Assessment functional  -SJ      Orientation Status oriented x 4  -SJ      Follows Commands/Answers Questions 100% of the time;able to follow multi-step instructions  -SJ      Personal Safety WNL/WFL  -SJ      Personal Safety Interventions muscle strengthening facilitated;nonskid shoes/slippers when out of bed  -SJ      Recorded by [SJ] Selene Wagoner, PT      Cognitive Assessment Intervention    Behavior/Mood Observations alert;behavior appropriate to situation, WNL/WFL  -SJ      Attention WNL/WFL  -SJ      Recorded by [SJ] Selene Wagoner, PT      Bed Mobility, Assessment/Treatment    Bed Mobility, Comment UIC  -SJ      Recorded by [SJ] Selene Wagoner, PT      Transfer Assessment/Treatment    Transfers, Sit-Stand [place] conditional independence  -SJ      Transfers, Stand-Sit [place] conditional independence  -SJ      Transfers, Sit-Stand-Sit, Assist Device rolling walker  -SJ      Toilet Transfer, [place] conditional independence  -SJ      Toilet Transfer, Assistive Device rolling walker  -SJ      Transfer, Comment good safety awareness  -SJ      Recorded by [SJ] Selene Wagoner, PT      Gait Assessment/Treatment    Gait, [place] Level supervision required  -SJ      Gait, Assistive Device rolling walker  -SJ      Gait, Distance (Feet) 150  -SJ      Gait, Gait Pattern Analysis swing-through gait  -SJ      Gait, Gait Deviations becky decreased  -SJ      Gait, Safety Issues step length decreased  -SJ      Gait, Comment Self-limiting distance, limited by c/o fatigue. No LOB, good safety  -SJ      Recorded by [SJ] Selene Wagoner, PT      Balance Skills Training    Sitting-Level of  Assistance Independent  -SJ      Sitting-Balance Support Feet supported  -SJ      Standing-Level of Assistance Independent  -SJ      Static Standing Balance Support No upper extremity supported  -SJ      Gait Balance-Level of Assistance Close supervision  -SJ      Gait Balance Support assistive device  -SJ      Recorded by [SJ] Selene Wagoner PT      Therapy Exercises    Bilateral Lower Extremities AROM:;10 reps;standing;mini squats;hip abduction/adduction   pt declined extra reps due to fatigue  -SJ      Recorded by [SJ] Selene Wagoner PT      Positioning and Restraints    Pre-Treatment Position sitting in chair/recliner  -SJ      Post Treatment Position chair  -SJ      In Chair reclined;call light within reach;encouraged to call for assist;heels elevated  -SJ      Recorded by [SJ] Selene Wagoner PT        User Key  (r) = Recorded By, (t) = Taken By, (c) = Cosigned By    Initials Name Effective Dates    MANSOOR Wagoner, PT 06/19/15 -                 IP PT Goals       01/09/18 1354 01/05/18 1059       Bed Mobility PT LTG    Bed Mobility PT LTG, Date Goal Reviewed  01/05/18  -AS     Bed Mobility PT LTG, Outcome goal met  -SJ goal ongoing  -AS     Transfer Training PT LTG    Transfer Training PT  LTG, Date Goal Reviewed  01/05/18  -AS     Transfer Training PT LTG, Outcome goal met  -SJ goal ongoing  -AS     Gait Training PT LTG    Gait Training Goal PT LTG, Date Established 01/09/18  -SJ      Gait Training Goal PT LTG, Time to Achieve by discharge  -SJ      Gait Training Goal PT LTG, [place] Level conditional independence  -SJ      Gait Training Goal PT LTG, Assist Device walker, rolling  -SJ      Gait Training Goal PT LTG, Distance to Achieve 200  -SJ      Gait Training Goal PT LTG, Date Goal Reviewed  01/05/18  -AS     Gait Training Goal PT LTG, Outcome goal revised  -SJ goal ongoing  -AS     Gait Training Goal PT LTG, Reason Goal Not Met goals revised this date  -        User Key  (r) = Recorded By,  (t) = Taken By, (c) = Cosigned By    Initials Name Provider Type    SJ Selene Wagoner, PT Physical Therapist    AS Poornima Lebron, PTA Physical Therapy Assistant          Physical Therapy Education     Title: PT OT SLP Therapies (Active)     Topic: Physical Therapy (Active)     Point: Mobility training (Active)    Learning Progress Summary    Learner Readiness Method Response Comment Documented by Status   Patient Acceptance E DU,VU,NR   01/09/18 1358 Done    Acceptance E NR  AS 01/05/18 1058 Active    Acceptance E NL REVIEWED BENEFITS OF ACTIVITY--PATIENT VERY LETHERGIC SC 12/25/17 1012 Active    Eager E,D NR  DM 12/19/17 1730 Active    Acceptance E,D DU,NR  UD 12/15/17 1138 Done    Acceptance E NR  AS 12/13/17 1404 Active    Acceptance E NR  ES 12/10/17 1606 Active    Acceptance E,D,H NR  LS 12/08/17 1537 Active   Family Acceptance E NR  AS 01/05/18 1058 Active    Acceptance E,D DU,NR  UD 12/15/17 1138 Done    Acceptance E,D,H NR  LS 12/08/17 1537 Active   Significant Other Eager E,D NR  DM 12/19/17 1730 Active               Point: Home exercise program (Active)    Learning Progress Summary    Learner Readiness Method Response Comment Documented by Status   Patient Acceptance E DU,VU,NR   01/09/18 1358 Done    Acceptance E NR  AS 01/05/18 1058 Active    Acceptance E NL REVIEWED BENEFITS OF ACTIVITY--PATIENT VERY LETHERGIC SC 12/25/17 1012 Active    Eager E,D NR  DM 12/19/17 1730 Active    Acceptance E,D DU,NR  UD 12/15/17 1138 Done    Acceptance E NR  AS 12/13/17 1404 Active    Acceptance E NR  ES 12/10/17 1606 Active    Acceptance E,D,H NR  LS 12/08/17 1537 Active   Family Acceptance E NR  AS 01/05/18 1058 Active    Acceptance E,D DU,NR  UD 12/15/17 1138 Done    Acceptance E,D,H NR  LS 12/08/17 1537 Active   Significant Other Eager E,D NR  DM 12/19/17 1730 Active               Point: Body mechanics (Active)    Learning Progress Summary    Learner Readiness Method Response Comment Documented by Status    Patient Acceptance E DU,VU,NR  SJ 01/09/18 1358 Done    Acceptance E NR  AS 01/05/18 1058 Active    Acceptance E NL REVIEWED BENEFITS OF ACTIVITY--PATIENT VERY LETHERGIC SC 12/25/17 1012 Active    Eager E,D NR  DM 12/19/17 1730 Active    Acceptance E,D DU,NR  UD 12/15/17 1138 Done    Acceptance E NR  AS 12/13/17 1404 Active    Acceptance E NR  ES 12/10/17 1606 Active    Acceptance E,D,H NR  LS 12/08/17 1537 Active   Family Acceptance E NR  AS 01/05/18 1058 Active    Acceptance E,D DU,NR  UD 12/15/17 1138 Done    Acceptance E,D,H NR  LS 12/08/17 1537 Active   Significant Other Eager E,D NR  DM 12/19/17 1730 Active               Point: Precautions (Active)    Learning Progress Summary    Learner Readiness Method Response Comment Documented by Status   Patient Acceptance E DU,VU,NR  SJ 01/09/18 1358 Done    Acceptance E NR  AS 01/05/18 1058 Active    Acceptance E NL REVIEWED BENEFITS OF ACTIVITY--PATIENT VERY LETHERGIC SC 12/25/17 1012 Active    Eager E,D NR  DM 12/19/17 1730 Active    Acceptance E,D DU,NR  UD 12/15/17 1138 Done    Acceptance E NR  AS 12/13/17 1404 Active    Acceptance E NR  ES 12/10/17 1606 Active    Acceptance E,D,H NR  LS 12/08/17 1537 Active   Family Acceptance E NR  AS 01/05/18 1058 Active    Acceptance E,D DU,NR  UD 12/15/17 1138 Done    Acceptance E,D,H NR  LS 12/08/17 1537 Active   Significant Other Eager E,D NR  DM 12/19/17 1730 Active                      User Key     Initials Effective Dates Name Provider Type Discipline    SC 06/19/15 -  Charli Keller, PT Physical Therapist PT    UD 06/22/15 -  Tete Dawson, PTA Physical Therapy Assistant PT     06/19/15 -  Selene Wagoner, PT Physical Therapist PT    DM 06/19/15 -  Santa Sherman, PT Physical Therapist PT    AS 06/22/15 -  Poornima Lebron, PTA Physical Therapy Assistant PT    LS 06/19/15 -  Namrata Crowe, PT Physical Therapist PT    ES 11/13/17 -  Camilla Cueva, PT Physical Therapist PT                    PT Recommendation and  Plan  Anticipated Discharge Disposition: inpatient rehabilitation facility  Planned Therapy Interventions: bed mobility training, gait training, home exercise program, patient/family education, strengthening, transfer training  PT Frequency: daily  Plan of Care Review  Plan Of Care Reviewed With: patient  Progress: improving  Outcome Summary/Follow up Plan: PT progress note completed. Pt demonstrates improved independence with mobility today, with pt independent with sit to stand t/f and toilet t/f's. Pt ambulates 150ft with RW with SBA, limited by fatigue. PT feels pt safe to d/c home with 's assistance and outpatient PT services to continue to progress strength and endurance.          Outcome Measures       01/09/18 1310          How much help from another person do you currently need...    Turning from your back to your side while in flat bed without using bedrails? 4  -SJ      Moving from lying on back to sitting on the side of a flat bed without bedrails? 4  -SJ      Moving to and from a bed to a chair (including a wheelchair)? 4  -SJ      Standing up from a chair using your arms (e.g., wheelchair, bedside chair)? 4  -SJ      Climbing 3-5 steps with a railing? 3  -SJ      To walk in hospital room? 4  -SJ      AM-PAC 6 Clicks Score 23  -SJ      Functional Assessment    Outcome Measure Options AM-PAC 6 Clicks Basic Mobility (PT)  -        User Key  (r) = Recorded By, (t) = Taken By, (c) = Cosigned By    Initials Name Provider Type    MANSOOR Wagonre PT Physical Therapist           Time Calculation:         PT Charges       01/09/18 1358          Time Calculation    Start Time 1310  -SJ      PT Received On 01/09/18  -      PT Goal Re-Cert Due Date 01/19/18  -      Time Calculation- PT    Total Timed Code Minutes- PT 23 minute(s)  -        User Key  (r) = Recorded By, (t) = Taken By, (c) = Cosigned By    Initials Name Provider Type    MANSOOR Wagoner PT Physical Therapist          Therapy  Charges for Today     Code Description Service Date Service Provider Modifiers Qty    75514688242 HC GAIT TRAINING EA 15 MIN 2018 Selene Wagoner, PT GP 1    33032445376 HC PT THER PROC EA 15 MIN 2018 Selene Wagoner PT GP 1          PT G-Codes  Outcome Measure Options: AM-PAC 6 Clicks Basic Mobility (PT)    Selene Wagoner PT  2018          Electronically signed by Selene Wagoner PT at 2018  1:59 PM      Poornima Lebron PTA at 1/10/2018  9:04 AM  Version 1 of 1         Problem: Patient Care Overview (Adult)  Goal: Plan of Care Review  Outcome: Ongoing (interventions implemented as appropriate)   01/10/18 09   Coping/Psychosocial Response Interventions   Plan Of Care Reviewed With patient   Patient Care Overview   Progress improving   Outcome Evaluation   Outcome Summary/Follow up Plan patient doing well with mobility but continues to be limited by weakness and abdominal/back pain.       Problem: Inpatient Physical Therapy  Goal: Gait Training Goal LTG- PT  Outcome: Ongoing (interventions implemented as appropriate)   18 1354 01/10/18 0903   Gait Training PT LTG   Gait Training Goal PT LTG, Date Established 18 --    Gait Training Goal PT LTG, Time to Achieve by discharge --    Gait Training Goal PT LTG, [place] Level conditional independence --    Gait Training Goal PT LTG, Assist Device walker, rolling --    Gait Training Goal PT LTG, Distance to Achieve 200 --    Gait Training Goal PT LTG, Date Goal Reviewed --  01/10/18   Gait Training Goal PT LTG, Outcome --  goal ongoing   Gait Training Goal PT LTG, Reason Goal Not Met goals revised this date --             Electronically signed by Poornima Lebron PTA at 1/10/2018  9:04 AM      Poornima Lebron PTA at 1/10/2018  9:05 AM  Version 1 of 1         Acute Care - Physical Therapy Treatment Note   Nancy     Patient Name: Tereza Ackerman  : 1955  MRN: [id number]  Today's Date: 1/10/2018  Onset of  Illness/Injury or Date of Surgery Date: 12/04/17  Date of Referral to PT: 12/25/17  Referring Physician: Dr CUI    Admit Date: 12/4/2017    Visit Dx:    ICD-10-CM ICD-9-CM   1. Small bowel obstruction K56.609 560.9   2. Intractable vomiting with nausea, unspecified vomiting type R11.2 536.2   3. Uncontrolled hypertension I10 401.9   4. Impaired functional mobility, balance, gait, and endurance Z74.09 V49.89   5. Acute renal failure, unspecified acute renal failure type N17.9 584.9   6. Essential hypertension I10 401.9     Patient Active Problem List   Diagnosis   • Impaired glucose tolerance   • Hypertension   • Parathyroid adenoma   • Reaction to chronic stress   • Multinodular goiter   • Vitamin D deficiency   • Meningioma, recurrent of brain   • Arthritis   • Depression   • Small bowel obstruction   • Insomnia   • History of Nissen fundoplication   • Malignant neoplasm of left orbit   • Prediabetes   • Hyperlipemia   • Hyperglycemia   • Acute renal failure   • Elevated troponin level   • Metabolic acidosis   • Transaminitis   • Atrial flutter with rapid ventricular response               Adult Rehabilitation Note       01/10/18 0804 01/09/18 1310       Rehab Assessment/Intervention    Discipline physical therapy assistant  -AS physical therapist  -SJ     Document Type therapy note (daily note)  -AS progress note  -SJ     Subjective Information agree to therapy;complains of;pain  -AS no complaints;agree to therapy  -SJ     Patient Effort, Rehab Treatment good  -AS good   self-limiting  -SJ     Symptoms Noted During/After Treatment fatigue  -AS fatigue  -SJ     Precautions/Limitations no known precautions/limitations;other (see comments)   abdominal incision  -AS no known precautions/limitations   abdominal incision  -SJ     Recorded by [AS] Poornima Lebron PTA [SJ] Selene Wagoner, PT     Vital Signs    Pre Systolic BP Rehab  129  -SJ     Pre Treatment Diastolic BP  74  -SJ     Intratreatment Heart Rate  (beats/min)  67  -SJ     Recorded by  [SJ] Selene Wagoner, PT     Pain Assessment    Pain Assessment 0-10  -AS Garcia-Tsai FACES  -SJ     Pain Score 3  -AS 1  -SJ     Post Pain Score 3  -AS 1  -SJ     Pain Type Acute pain;Surgical pain  -AS Acute pain;Surgical pain  -SJ     Pain Location Abdomen   also back pain  -AS Abdomen  -SJ     Pain Intervention(s) Repositioned;Ambulation/increased activity  -AS Repositioned;Ambulation/increased activity  -SJ     Response to Interventions tolerated  -AS neida  -SJ     Recorded by [AS] Poornima Lebron PTA [SJ] Selene Wagoner, PT     Cognitive Assessment/Intervention    Current Cognitive/Communication Assessment functional  -AS functional  -SJ     Orientation Status oriented x 4  -AS oriented x 4  -SJ     Follows Commands/Answers Questions 100% of the time  -% of the time;able to follow multi-step instructions  -SJ     Personal Safety WNL/WFL  -AS WNL/WFL  -SJ     Personal Safety Interventions fall prevention program maintained;gait belt;nonskid shoes/slippers when out of bed;muscle strengthening facilitated  -AS muscle strengthening facilitated;nonskid shoes/slippers when out of bed  -SJ     Recorded by [AS] Poornima Lebron PTA [SJ] Selene Wagoner, PT     Cognitive Assessment Intervention    Behavior/Mood Observations  alert;behavior appropriate to situation, WNL/WFL  -SJ     Attention  WNL/WFL  -SJ     Recorded by  [SJ] Selene Wagoner, PT     Bed Mobility, Assessment/Treatment    Bed Mobility, Comment UIC  -AS UIC  -SJ     Recorded by [AS] Poornima Lebron PTA [SJ] Selene Wagoner, PT     Transfer Assessment/Treatment    Transfers, Sit-Stand [place] conditional independence  -AS conditional independence  -SJ     Transfers, Stand-Sit [place] conditional independence  -AS conditional independence  -SJ     Transfers, Sit-Stand-Sit, Assist Device rolling walker  -AS rolling walker  -SJ     Toilet Transfer, [place]  conditional independence  -SJ      Toilet Transfer, Assistive Device  rolling walker  -SJ     Transfer, Comment good safety awareness  -AS good safety awareness  -SJ     Recorded by [AS] Poornima Lebron PTA [SJ] Selene Wagoner PT     Gait Assessment/Treatment    Gait, Divide Level supervision required   assist with IV pole  -AS supervision required  -SJ     Gait, Assistive Device rolling walker  -AS rolling walker  -SJ     Gait, Distance (Feet) 150  -  -SJ     Gait, Gait Pattern Analysis  swing-through gait  -SJ     Gait, Gait Deviations becky decreased;forward flexed posture;step length decreased  -AS becky decreased  -SJ     Gait, Safety Issues step length decreased  -AS step length decreased  -SJ     Gait, Impairments impaired balance;strength decreased  -AS      Gait, Comment verbal cues for safe use of walker when turning, distnce limited by fatigue  -AS Self-limiting distance, limited by c/o fatigue. No LOB, good safety  -SJ     Recorded by [AS] Poornima Lebron PTA [SJ] Sleene Wagoner PT     Balance Skills Training    Sitting-Level of Assistance  Independent  -SJ     Sitting-Balance Support  Feet supported  -SJ     Standing-Level of Assistance  Independent  -SJ     Static Standing Balance Support  No upper extremity supported  -SJ     Gait Balance-Level of Assistance  Close supervision  -SJ     Gait Balance Support  assistive device  -SJ     Recorded by  [SJ] Selene Wagoner PT     Therapy Exercises    Bilateral Lower Extremities AROM:;10 reps;sitting;ankle pumps/circles;hip flexion;LAQ  -AS AROM:;10 reps;standing;mini squats;hip abduction/adduction   pt declined extra reps due to fatigue  -SJ     Recorded by [AS] Poornima Lebron PTA [SJ] Selene Wagoner PT     Positioning and Restraints    Pre-Treatment Position sitting in chair/recliner  -AS sitting in chair/recliner  -SJ     Post Treatment Position chair  -AS chair  -SJ     In Chair reclined;call light within reach;encouraged to call for assist  -AS  reclined;call light within reach;encouraged to call for assist;heels elevated  -SJ     Recorded by [AS] Poornima Lebron PTA [SJ] Selene Wagoner PT       User Key  (r) = Recorded By, (t) = Taken By, (c) = Cosigned By    Initials Name Effective Dates    MANSOOR Wagoner, PT 06/19/15 -     AS Poornima Lebron PTA 06/22/15 -                 IP PT Goals       01/10/18 0903 01/09/18 1354 01/05/18 1059    Bed Mobility PT LTG    Bed Mobility PT LTG, Date Goal Reviewed   01/05/18  -AS    Bed Mobility PT LTG, Outcome  goal met  -SJ goal ongoing  -AS    Transfer Training PT LTG    Transfer Training PT  LTG, Date Goal Reviewed   01/05/18  -AS    Transfer Training PT LTG, Outcome  goal met  -SJ goal ongoing  -AS    Gait Training PT LTG    Gait Training Goal PT LTG, Date Established  01/09/18  -SJ     Gait Training Goal PT LTG, Time to Achieve  by discharge  -SJ     Gait Training Goal PT LTG, [place] Level  conditional independence  -SJ     Gait Training Goal PT LTG, Assist Device  walker, rolling  -SJ     Gait Training Goal PT LTG, Distance to Achieve  200  -SJ     Gait Training Goal PT LTG, Date Goal Reviewed 01/10/18  -AS  01/05/18  -AS    Gait Training Goal PT LTG, Outcome goal ongoing  -AS goal revised  -SJ goal ongoing  -AS    Gait Training Goal PT LTG, Reason Goal Not Met  goals revised this date  -       User Key  (r) = Recorded By, (t) = Taken By, (c) = Cosigned By    Initials Name Provider Type    MANSOOR Wagoner, PT Physical Therapist    AS Poornima Lebron PTA Physical Therapy Assistant          Physical Therapy Education     Title: PT OT SLP Therapies (Active)     Topic: Physical Therapy (Active)     Point: Mobility training (Active)    Learning Progress Summary    Learner Readiness Method Response Comment Documented by Status   Patient Acceptance E VU  AS 01/10/18 0903 Done    Acceptance E JOSE ANGEL GLASS NR  SJ 01/09/18 1358 Done    Acceptance E NR  AS 01/05/18 1058 Active    Acceptance E NL REVIEWED  BENEFITS OF ACTIVITY--PATIENT VERY LETHERGIC SC 12/25/17 1012 Active    Eager E,D NR  DM 12/19/17 1730 Active    Acceptance E,D DU,NR  UD 12/15/17 1138 Done    Acceptance E NR  AS 12/13/17 1404 Active    Acceptance E NR  ES 12/10/17 1606 Active    Acceptance E,D,H NR  LS 12/08/17 1537 Active   Family Acceptance E NR  AS 01/05/18 1058 Active    Acceptance E,D DU,NR  UD 12/15/17 1138 Done    Acceptance E,D,H NR  LS 12/08/17 1537 Active   Significant Other Eager E,D NR  DM 12/19/17 1730 Active               Point: Home exercise program (Active)    Learning Progress Summary    Learner Readiness Method Response Comment Documented by Status   Patient Acceptance E VU  AS 01/10/18 0903 Done    Acceptance E DU,VU,NR   01/09/18 1358 Done    Acceptance E NR  AS 01/05/18 1058 Active    Acceptance E NL REVIEWED BENEFITS OF ACTIVITY--PATIENT VERY LETHERGIC SC 12/25/17 1012 Active    Eager E,D NR  DM 12/19/17 1730 Active    Acceptance E,D DU,NR  UD 12/15/17 1138 Done    Acceptance E NR  AS 12/13/17 1404 Active    Acceptance E NR  ES 12/10/17 1606 Active    Acceptance E,D,H NR  LS 12/08/17 1537 Active   Family Acceptance E NR  AS 01/05/18 1058 Active    Acceptance E,D DU,NR  UD 12/15/17 1138 Done    Acceptance E,D,H NR  LS 12/08/17 1537 Active   Significant Other Eager E,D NR  DM 12/19/17 1730 Active               Point: Body mechanics (Active)    Learning Progress Summary    Learner Readiness Method Response Comment Documented by Status   Patient Acceptance E VU  AS 01/10/18 0903 Done    Acceptance E DU,VU,NR   01/09/18 1358 Done    Acceptance E NR  AS 01/05/18 1058 Active    Acceptance E NL REVIEWED BENEFITS OF ACTIVITY--PATIENT VERY LETHERGIC SC 12/25/17 1012 Active    Eager E,D NR  DM 12/19/17 1730 Active    Acceptance E,D DU,NR  UD 12/15/17 1138 Done    Acceptance E NR  AS 12/13/17 1404 Active    Acceptance E NR  ES 12/10/17 1606 Active    Acceptance E,D,H NR  LS 12/08/17 1537 Active   Family Acceptance E NR  AS 01/05/18  1058 Active    Acceptance E,D DU,NR  UD 12/15/17 1138 Done    Acceptance E,D,H NR  LS 12/08/17 1537 Active   Significant Other Eager E,D NR   12/19/17 1730 Active               Point: Precautions (Active)    Learning Progress Summary    Learner Readiness Method Response Comment Documented by Status   Patient Acceptance E VU  AS 01/10/18 0903 Done    Acceptance E DU,VU,NR   01/09/18 1358 Done    Acceptance E NR  AS 01/05/18 1058 Active    Acceptance E NL REVIEWED BENEFITS OF ACTIVITY--PATIENT VERY LETHERGIC SC 12/25/17 1012 Active    Eager E,D NR  DM 12/19/17 1730 Active    Acceptance E,D DU,NR  UD 12/15/17 1138 Done    Acceptance E NR  AS 12/13/17 1404 Active    Acceptance E NR  ES 12/10/17 1606 Active    Acceptance E,D,H NR   12/08/17 1537 Active   Family Acceptance E NR  AS 01/05/18 1058 Active    Acceptance E,D DU,NR  UD 12/15/17 1138 Done    Acceptance E,D,H NR   12/08/17 1537 Active   Significant Other Eager E,D NR   12/19/17 1730 Active                      User Key     Initials Effective Dates Name Provider Type Discipline    SC 06/19/15 -  Charli Keller, PT Physical Therapist PT     06/22/15 -  Tete Dawson, PTA Physical Therapy Assistant PT     06/19/15 -  Selene Wagoner, PT Physical Therapist PT     06/19/15 -  Santa Sherman, PT Physical Therapist PT    AS 06/22/15 -  Poornima Lebron, PTA Physical Therapy Assistant PT     06/19/15 -  Namrata Crowe, PT Physical Therapist PT     11/13/17 -  Camilla Cueva, PT Physical Therapist PT                    PT Recommendation and Plan  Anticipated Discharge Disposition: inpatient rehabilitation facility  Planned Therapy Interventions: bed mobility training, gait training, home exercise program, patient/family education, strengthening, transfer training  PT Frequency: daily  Plan of Care Review  Plan Of Care Reviewed With: patient  Progress: improving  Outcome Summary/Follow up Plan: patient doing well with mobility but continues to be  limited by weakness and abdominal/back pain.          Outcome Measures       01/10/18 0804 01/09/18 1310       How much help from another person do you currently need...    Turning from your back to your side while in flat bed without using bedrails? 4  -AS 4  -SJ     Moving from lying on back to sitting on the side of a flat bed without bedrails? 4  -AS 4  -SJ     Moving to and from a bed to a chair (including a wheelchair)? 4  -AS 4  -SJ     Standing up from a chair using your arms (e.g., wheelchair, bedside chair)? 4  -AS 4  -SJ     Climbing 3-5 steps with a railing? 3  -AS 3  -SJ     To walk in hospital room? 3  -AS 4  -SJ     AM-PAC 6 Clicks Score 22  -AS 23  -SJ     Functional Assessment    Outcome Measure Options AM-PAC 6 Clicks Basic Mobility (PT)  -AS AM-PAC 6 Clicks Basic Mobility (PT)  -SJ       User Key  (r) = Recorded By, (t) = Taken By, (c) = Cosigned By    Initials Name Provider Type    MANSOOR Wagoner, PT Physical Therapist    AS Poornima Lebron PTA Physical Therapy Assistant           Time Calculation:         PT Charges       01/10/18 0904          Time Calculation    Start Time 0804  -AS      PT Received On 01/10/18  -AS      PT Goal Re-Cert Due Date 01/19/18  -AS      Time Calculation- PT    Total Timed Code Minutes- PT 23 minute(s)  -AS        User Key  (r) = Recorded By, (t) = Taken By, (c) = Cosigned By    Initials Name Provider Type    AS Poornima Lebron PTA Physical Therapy Assistant          Therapy Charges for Today     Code Description Service Date Service Provider Modifiers Qty    04566400712 HC GAIT TRAINING EA 15 MIN 1/10/2018 Poornima Lebron PTA GP 1    17238884291 HC PT THER PROC EA 15 MIN 1/10/2018 Poornima Lebron PTA GP 1          PT G-Codes  Outcome Measure Options: AM-PAC 6 Clicks Basic Mobility (PT)    Poornima Lebron PTA  1/10/2018          Electronically signed by Poornima Lebron PTA at 1/10/2018  9:05 AM        Occupational Therapy Notes (most  recent note)     No notes of this type exist for this encounter.

## 2018-01-12 ENCOUNTER — TELEPHONE (OUTPATIENT)
Dept: INTERNAL MEDICINE | Facility: CLINIC | Age: 63
End: 2018-01-12

## 2018-01-12 VITALS
DIASTOLIC BLOOD PRESSURE: 82 MMHG | OXYGEN SATURATION: 96 % | HEART RATE: 67 BPM | BODY MASS INDEX: 26.07 KG/M2 | SYSTOLIC BLOOD PRESSURE: 158 MMHG | RESPIRATION RATE: 18 BRPM | WEIGHT: 147.13 LBS | TEMPERATURE: 97.8 F | HEIGHT: 63 IN

## 2018-01-12 PROCEDURE — 99239 HOSP IP/OBS DSCHRG MGMT >30: CPT | Performed by: INTERNAL MEDICINE

## 2018-01-12 PROCEDURE — 93005 ELECTROCARDIOGRAM TRACING: CPT | Performed by: INTERNAL MEDICINE

## 2018-01-12 PROCEDURE — 93010 ELECTROCARDIOGRAM REPORT: CPT | Performed by: INTERNAL MEDICINE

## 2018-01-12 RX ORDER — CLONIDINE HYDROCHLORIDE 0.1 MG/1
0.1 TABLET ORAL EVERY 12 HOURS SCHEDULED
Qty: 60 TABLET | Refills: 0 | Status: SHIPPED | OUTPATIENT
Start: 2018-01-12 | End: 2018-02-06 | Stop reason: HOSPADM

## 2018-01-12 RX ORDER — AMLODIPINE BESYLATE 10 MG/1
10 TABLET ORAL
Qty: 30 TABLET | Refills: 0 | Status: ON HOLD | OUTPATIENT
Start: 2018-01-13 | End: 2018-02-06

## 2018-01-12 RX ORDER — METOCLOPRAMIDE 5 MG/1
5 TABLET ORAL 2 TIMES DAILY
Qty: 60 TABLET | Refills: 0 | Status: SHIPPED | OUTPATIENT
Start: 2018-01-12 | End: 2018-02-11

## 2018-01-12 RX ORDER — ASPIRIN 325 MG
325 TABLET, DELAYED RELEASE (ENTERIC COATED) ORAL DAILY
Qty: 30 TABLET | Refills: 0 | Status: SHIPPED | OUTPATIENT
Start: 2018-01-12 | End: 2018-02-11

## 2018-01-12 RX ORDER — PSEUDOEPHEDRINE HCL 30 MG
100 TABLET ORAL 2 TIMES DAILY
Qty: 60 EACH | Refills: 0 | Status: SHIPPED | OUTPATIENT
Start: 2018-01-12 | End: 2018-02-11

## 2018-01-12 RX ORDER — QUETIAPINE FUMARATE 25 MG/1
25 TABLET, FILM COATED ORAL NIGHTLY
Qty: 30 TABLET | Refills: 0 | Status: SHIPPED | OUTPATIENT
Start: 2018-01-12 | End: 2018-02-11

## 2018-01-12 RX ORDER — AMIODARONE HYDROCHLORIDE 200 MG/1
200 TABLET ORAL DAILY
Qty: 30 TABLET | Refills: 0 | Status: SHIPPED | OUTPATIENT
Start: 2018-01-12 | End: 2018-02-11

## 2018-01-12 RX ORDER — LANOLIN ALCOHOL/MO/W.PET/CERES
100 CREAM (GRAM) TOPICAL DAILY
Qty: 30 TABLET | Refills: 0 | Status: SHIPPED | OUTPATIENT
Start: 2018-01-13 | End: 2018-02-15 | Stop reason: SDUPTHER

## 2018-01-12 RX ORDER — CARVEDILOL 25 MG/1
25 TABLET ORAL EVERY 12 HOURS SCHEDULED
Qty: 60 TABLET | Refills: 0 | Status: ON HOLD | OUTPATIENT
Start: 2018-01-12 | End: 2018-02-06

## 2018-01-12 RX ADMIN — AMLODIPINE BESYLATE 10 MG: 10 TABLET ORAL at 08:46

## 2018-01-12 RX ADMIN — CLONIDINE HYDROCHLORIDE 0.1 MG: 0.1 TABLET ORAL at 08:46

## 2018-01-12 RX ADMIN — SERTRALINE HYDROCHLORIDE 100 MG: 100 TABLET ORAL at 08:46

## 2018-01-12 RX ADMIN — CARVEDILOL 25 MG: 12.5 TABLET, FILM COATED ORAL at 08:46

## 2018-01-12 RX ADMIN — NYSTATIN: 100000 CREAM TOPICAL at 08:47

## 2018-01-12 RX ADMIN — METOCLOPRAMIDE 5 MG: 5 TABLET ORAL at 08:46

## 2018-01-12 RX ADMIN — ACETAMINOPHEN 500 MG: 500 TABLET ORAL at 05:52

## 2018-01-12 RX ADMIN — Medication 100 MG: at 08:46

## 2018-01-12 NOTE — PROGRESS NOTES
Adult Nutrition  Assessment/PES    Patient Name:  Tereza Ackerman  YOB: 1955  MRN: [id number]  Admit Date:  12/4/2017    Assessment Date:  1/12/2018    Comments:  Patient eating optimally continue to follow per protocol.          Reason for Assessment       01/12/18 1106    Reason for Assessment    Reason For Assessment/Visit follow up protocol    Time Spent (min) 15                        Nutrition Prescription Ordered       01/12/18 1107    Nutrition Prescription PO    Current PO Diet Regular    Fluid Consistency Thin            Evaluation of Received Nutrient/Fluid Intake       01/12/18 1107    PO Evaluation    Number of Meals 5    % PO Intake 80            Problem/Interventions:        Problem 1       01/12/18 1108    Nutrition Diagnoses Problem 1    Problem 1 Nutrition Appropriate for Condition at this Time    Inadequate Intake Type Oral    Appetite Good    Percent (%) intake recorded 80 %    Over number of meals 5                    Intervention Goal       01/12/18 1108    Intervention Goal    General Nutrition support treatment    PO Continue positive trend            Nutrition Intervention       01/12/18 1108    Nutrition Intervention    RD/Tech Action Follow Tx progress   Catering assoc. assisting pt. with menu selections.              Education/Evaluation       01/12/18 1109    Monitor/Evaluation    Monitor Per protocol        Electronically signed by:  Santa Price RD  01/12/18 11:09 AM

## 2018-01-12 NOTE — PLAN OF CARE
Problem: Patient Care Overview (Adult)  Goal: Plan of Care Review  Outcome: Ongoing (interventions implemented as appropriate)   01/11/18 1800 01/11/18 2010 01/12/18 0416   Coping/Psychosocial Response Interventions   Plan Of Care Reviewed With --  patient --    Patient Care Overview   Progress improving --  --    Outcome Evaluation   Outcome Summary/Follow up Plan --  --  VSS, pt c/o pain, medicated with PRN. Has been up to walk. No futher issues or concerns.       Problem: Perioperative Period (Adult)  Goal: Signs and Symptoms of Listed Potential Problems Will be Absent or Manageable (Perioperative Period)  Outcome: Ongoing (interventions implemented as appropriate)      Problem: Skin Integrity Impairment, Risk/Actual (Adult)  Goal: Skin Integrity/Wound Healing  Outcome: Ongoing (interventions implemented as appropriate)      Problem: Fall Risk (Adult)  Goal: Absence of Falls  Outcome: Ongoing (interventions implemented as appropriate)      Problem: Pressure Ulcer Risk (Jairo Scale) (Adult,Obstetrics,Pediatric)  Goal: Skin Integrity  Outcome: Ongoing (interventions implemented as appropriate)      Problem: Pain, Acute (Adult)  Goal: Acceptable Pain Control/Comfort Level  Outcome: Ongoing (interventions implemented as appropriate)      Problem: Anxiety (Adult)  Goal: Reduction/Resolution  Outcome: Ongoing (interventions implemented as appropriate)

## 2018-01-12 NOTE — DISCHARGE SUMMARY
[facility] Medicine Services  DISCHARGE SUMMARY    Patient Name: Tereza Ackerman  : 1955  MRN: [id number]    Date of Admission: 2017  Date of Discharge:  2018  Primary Care Physician: Michaela Connell MD    Consults     Date and Time Order Name Status Description    2018 0931 Inpatient Consult to Vascular Surgery Completed     2017 0539 Inpatient Consult to Cardiology Completed     2017 0951 Inpatient Consult to General Surgery Completed     2017 0358 Inpatient Consult to Nephrology          Hospital Course     Presenting Problem:   Small bowel obstruction [K56.609]  Small bowel obstruction [K56.609]    Active Hospital Problems (** Indicates Principal Problem)    Diagnosis Date Noted   • **Small bowel obstruction [K56.609] 2017   • Atrial flutter with rapid ventricular response [I48.92] 2017   • Transaminitis [R74.0] 2017   • Metabolic acidosis [E87.2] 2017   • Acute renal failure [N17.9] 2017   • Elevated troponin level [R74.8] 2017   • 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 female with hx of meningioma who presented on  with complaints of nausea, vomiting and abdominal pain. CT abd/pelvis on admission showed SBO with dilated loops of bowel. NGT was placed.On  patient developed hypotension, lactic acidosis with lactate of 7.8, pro-felipe 47, acute renal failure. She underwent emergent exploratory lap and SBR with anastamosis for gangrenous bowel on  after which she was transferred to the ICU. In the ICU patient developed acute renal  failure, metabolic acidosis with oligura with creatinine increase to 5.4 . A tunneled HD cath was placed and patient was started on HD 12/8. Patient was stable for transfer to the floor on 12/11, she remained stable until 12/13 when she developed worsening abdominal pain. X-ray showed recurrent obstruction vs. Ileus, CT scan consistent with ileus. NGT was replaced and patient was started on suppositories, mineral oil and ambulation was encouraged. Central line was placed on 12/15 and TPN was initiated for nutritional support. On the evening of 12/20 patient developed rapi A.flutter requiring initiation of cardizem gtt and elevated troponin (thought to be from RVR). Cardiology was consulted and started patient on IV amiodarone. Patient was transitioned to PO amiodarone and has remained in NSR. Card recommended full dose ASA 325mg daily for anticoagulation given onset during acute illness/in setting of SBO. CHADSVASc 2 (gender and HTN), if patient were to develop paroxysmal A.fib despite amiodarone would consider initiation of anti-coag. They plan to consider outpatient stress testing at discharge. Patient developed significant epistaxis on 12/27 after removal of NGT, her hemoglobin dropped to 5.3 and she required 4 units of PRBCs. She also was given both IV iron and procrit. Patient had good renal recovery with last HD 1/2/2018. HD catheter was removed on 1/9/18. Patient continued to improve. Her diet was advanced. She continued to ambulate well in the hallways with her . Patient will f/u with [facility] in 2 weeks with BMP 1 week prior to her appointment. She will follow up with cardiology (Dr. Aguirre) in 4 weeks. She will continue current medications and bowel regimen at discharge. Her insurance denied her rehab bed, so she will d/c home with her  and outpatient therapy at [facility].    Procedure(s):  LAPAROTOMY EXPLORATORY FOR SMALL BOWEL OBSTRUCTION       Day of Discharge     HPI:   Patient feels well today. She  is excited about going home. She has chronic abdominal pain, but tolerating regular diet. Ambulating well in the halls with .     Review of Systems  Gen- No fevers, chills  CV- No chest pain, palpitations  Resp- No cough, dyspnea  GI- No N/V/D, abd pain    Otherwise ROS is negative except as mentioned in the HPI.    Vital Signs:   Temp:  [97.3 °F (36.3 °C)-97.8 °F (36.6 °C)] 97.8 °F (36.6 °C)  Heart Rate:  [67] 67  Resp:  [18] 18  BP: (150-158)/(74-82) 158/82     Physical Exam:  Constitutional: No acute distress, awake, alert  HENT: NCAT, mucous membranes moist  Respiratory: Clear to auscultation bilaterally, respiratory effort normal   Cardiovascular: RRR, no murmurs, rubs, or gallops, palpable pedal pulses bilaterally  Gastrointestinal: Positive bowel sounds, soft, nontender, nondistended, abdominal dressing in place  Musculoskeletal: No bilateral ankle edema  Psychiatric: Appropriate affect, cooperative  Neurologic: Oriented x 3, strength symmetric in all extremities, Cranial Nerves grossly intact to confrontation, speech clear  Skin: No rashes, right upper chest wall with dressing in place s/p HD cath removal      Pertinent  and/or Most Recent Results       Results from last 7 days  Lab Units 01/09/18  0523 01/08/18  2231 01/08/18  0618 01/07/18  0512 01/06/18  0543   WBC 10*3/mm3 8.09  --  8.49 8.74 8.57   HEMOGLOBIN g/dL 9.1*  --  9.0* 10.0* 8.7*   HEMATOCRIT % 30.2*  --  29.4* 32.7* 28.5*   PLATELETS 10*3/mm3 357  --  356 443 374   SODIUM mmol/L 139  --  140 137 138   POTASSIUM mmol/L 4.3 3.9 3.2* 3.3* 2.9*   CHLORIDE mmol/L 106  --  106 104 101   CO2 mmol/L 24.0  --  25.0 28.0 28.0   BUN mg/dL 27*  --  29* 29* 32*   CREATININE mg/dL 2.00*  --  2.10* 2.20* 2.40*   GLUCOSE mg/dL 85  --  89 97 136*   CALCIUM mg/dL 8.1*  --  8.1* 8.0* 8.0*           Invalid input(s): PROT, LABALBU        Invalid input(s): TG, LDLREALC      Brief Urine Lab Results  (Last result in the past 365 days)      Color   Clarity    Blood   Leuk Est   Nitrite   Protein   CREAT   Urine HCG        12/06/17 1106             113.3             Microbiology Results Abnormal     Procedure Component Value - Date/Time    Blood Culture - Blood, [[id number]]  (Normal) Collected:  12/21/17 0700    Lab Status:  Final result Specimen:  Blood from Arm, Right Updated:  12/26/17 0816     Blood Culture No growth at 5 days    Blood Culture - Blood, [[id number]]  (Normal) Collected:  12/21/17 0705    Lab Status:  Final result Specimen:  Blood from Arm, Left Updated:  12/26/17 0816     Blood Culture No growth at 5 days    Blood Culture - Blood, [[id number]]  (Normal) Collected:  12/19/17 1316    Lab Status:  Final result Specimen:  Blood from Arm, Left Updated:  12/24/17 1346     Blood Culture No growth at 5 days    Blood Culture - Blood, [[id number]]  (Normal) Collected:  12/19/17 1316    Lab Status:  Final result Specimen:  Blood from Hand, Left Updated:  12/24/17 1346     Blood Culture No growth at 5 days    Urine Culture - Urine, Urine, Clean Catch [[id number]]  (Abnormal) Collected:  12/12/17 0001    Lab Status:  Final result Specimen:  Urine from Urine, Clean Catch Updated:  12/14/17 0646     Urine Culture --      <10,000 CFU/mL Yeast isolated (A)    Blood Culture - Blood, [[id number]]  (Normal) Collected:  12/05/17 0313    Lab Status:  Final result Specimen:  Blood from Arm, Right Updated:  12/10/17 0416     Blood Culture No growth at 5 days    Blood Culture - Blood, [[id number]]  (Normal) Collected:  12/05/17 0313    Lab Status:  Final result Specimen:  Blood from Arm, Right Updated:  12/10/17 0416     Blood Culture No growth at 5 days    Narrative:       Aerobic bottle only    Urine Culture - Urine, Urine, Clean Catch [[id number]]  (Normal) Collected:  12/05/17 1704    Lab Status:  Final result Specimen:  Urine from Urine, Catheter Updated:  12/07/17 0842     Urine Culture No growth at 2 days    Eosinophil Smear - Urine, Urine, Clean Catch [[id number]]  (Normal)  Collected:  12/06/17 1106    Lab Status:  Final result Specimen:  Urine from Urine, Catheter Updated:  12/06/17 1347     Eosinophil Smear 0 % EOS/100 Cells     Narrative:       No eosinophil seen          Imaging Results (all)     Procedure Component Value Units Date/Time    CT Abdomen Pelvis Without Contrast [[id number]] Collected:  12/04/17 1334     Updated:  12/04/17 1503    Narrative:       EXAMINATION: CT ABDOMEN AND PELVIS WITHOUT CONTRAST-12/04/2017:      INDICATION: Abdominal pain, vomiting, nausea.     TECHNIQUE: Multiple axial CT imaging was obtained of the abdomen and  pelvis from the lung bases through the pubic symphysis without the  administration of oral or intravenous contrast.     The radiation dose reduction device was turned on for each scan per the  ALARA (As Low as Reasonably Achievable) protocol.     COMPARISON: 06/30/2017.     FINDINGS:      ABDOMEN: The lung bases are grossly clear. No change seen in the  paraesophageal hernia. The liver is homogeneous in appearance as well as  the spleen. Kidneys and adrenal glands are within normal limits. No  stones seen in the gallbladder. The pancreas is homogeneous in  appearance. No abdominal or retroperitoneal lymphadenopathy. No free  fluid or free air. No abnormal mass or fluid collection is identified.     PELVIS: Several dilated fluid-filled loops of small bowel seen within  the pelvis. There is some free fluid as well seen within the pelvis.  Findings concerning for small bowel obstruction. The transition point is  seen within the pelvis. Internal hernia cannot be excluded. The colon is  unremarkable in appearance. Diverticulosis of colon without evidence of  diverticulitis. No extraluminal air to suggest perforation. The uterus  has been surgically removed. Mild distention of the bladder. No pelvic  adenopathy. The bony structures reveal no evidence of osseous  abnormality.       Impression:       Some mildly dilated loops of small bowel seen  within the  pelvis with development of free fluid. This has slightly progressed in  the interval with decompression of the distal small bowel. Findings  suggesting a mid small bowel obstruction mild to moderate in appearance,  possibly related to internal hernia or adhesions.     D:  12/04/2017  E:  12/04/2017           This report was finalized on 12/4/2017 3:01 PM by Dr. Eulalia Lim MD.       CT Head Without Contrast [[id number]] Collected:  12/05/17 0619     Updated:  12/05/17 0713    Narrative:       EXAM:    CT Head Without Intravenous Contrast    EXAM DATE/TIME:    12/5/2017 6:19 AM    CLINICAL HISTORY:    62 years old, female; Unspecified intestinal obstruction, unspecified as to   partial versus complete obstruction; Essential (primary) hypertension; Nausea   with vomiting, unspecified; Signs and symptoms; Altered mental status/memory   loss; Prior surgery; Additional info: Confusion, HX crani    TECHNIQUE:    Axial computed tomography images of the head/brain without intravenous   contrast.  All CT scans at this facility use one or more dose reduction   techniques, viz.: automated exposure control; ma/kV adjustment per patient size   (including targeted exams where dose is matched to indication; i.e. head); or   iterative reconstruction technique.    COMPARISON:    CT HEAD WO CONTRAST 2014-06-02 12:30    FINDINGS:    Brain: Stable prior left pterional craniotomy with old left frontal and   temporal lobe tissue loss. Resolution of previously noted subjacent dural   reaction. No acute hemorrhage.  No significant white matter disease.  No edema.    Ventricles:  Unremarkable.  No ventriculomegaly.    Bones/joints:  Unremarkable.  No acute fracture.    Soft tissues:  Unremarkable.    Sinuses:  Unremarkable as visualized.  No acute sinusitis.    Mastoid air cells:  Unremarkable as visualized.  No mastoid effusion.      Impression:       Essentially stable appearance compared to 6/2/14, with resolution of  left   frontotemporal dural reaction subjacent to the craniotomy.    THIS DOCUMENT HAS BEEN ELECTRONICALLY SIGNED BY MAJO MAK MD    XR Chest 1 View [[id number]] Collected:  12/05/17 0851     Updated:  12/05/17 0941    Narrative:       EXAMINATION: XR CHEST 1 VW- 12/05/2017     INDICATION: K56.609-Unspecified intestinal obstruction, unspecified as  to partial versus complete obstruction; R11.2-Nausea with vomiting,  unspecified; I10-Essential (primary) hypertension      COMPARISON: NONE     FINDINGS:   1. Nasogastric tube is folded in the upper stomach. Heart size is  borderline.  2. Patient is rotated rightward and this is a lordotic projection.  3. There is trace atelectasis in the left cardiophrenic recess.  Otherwise, the lungs are clear. Heart is compensated.           Impression:       1. Trace atelectasis cardiophrenic recess left chest.  2. Other findings as noted above. The patient is somewhat malaligned.  3. There is no new or progressive disease.     D:  12/05/2017  E:  12/05/2017     This report was finalized on 12/5/2017 9:39 AM by Dr. Kenton Carrizales MD.       US Renal Limited [[id number]] Collected:  12/06/17 1446     Updated:  12/06/17 1631    Narrative:       EXAMINATION: US RENAL, LIMITED-12/06/2017:     INDICATION: Acute kidney injury; K56.609-Unspecified intestinal  obstruction, unspecified as to partial versus complete obstruction;  R11.2-Nausea with vomiting, unspecified; I10-Essential (primary)  hypertension.      TECHNIQUE: Ultrasound of the kidneys and urinary bladder.     COMPARISON: NONE.     FINDINGS:  The right kidney measures 11.4 cm in length without evidence  of hydronephrosis, contour deforming mass or obvious calculi.     The left kidney measures 11.4 cm in length without evidence of  hydronephrosis, contour deforming mass or obvious calculi.     The urinary bladder is decompressed and unremarkable with Lerner catheter  in situ.       Impression:       No sonographic evidence for  abnormality within the  visualized kidneys or urinary bladder. No hydronephrosis.     D:  12/06/2017  E:  12/06/2017            This report was finalized on 12/6/2017 4:29 PM by Dr. Santo Zhang.       FL C Arm During Surgery [[id number]] Collected:  12/07/17 1554     Updated:  12/07/17 1820    Narrative:       EXAMINATION: FL C ARM DURING SURGERY - 12/07/2017     INDICATION: K56.609-Unspecified intestinal obstruction, unspecified as  to partial versus complete obstruction; R11.2-Nausea with vomiting,  unspecified; I10-Essential (primary) hypertension.     TECHNIQUE: Intraoperative fluoroscopy for improved localization and  treatment planning.     COMPARISON: Chest x-ray dated earlier same day.     FINDINGS: Intraoperative fluoroscopy with total fluoroscopic time usage  2 seconds and 2 representative images saved of right internal jugular  approach dialysis catheter placement terminating deep in the right  atrium.       Impression:       Intraoperative fluoroscopy used for right dialysis catheter  placement terminating deep within the right heart, presumably the right  atrium.     DICTATED:     12/07/2017  EDITED:          12/07/2017        This report was finalized on 12/7/2017 6:17 PM by Dr. Santo Zhang.       XR Chest 1 View [[id number]] Collected:  12/07/17 0910     Updated:  12/07/17 2156    Narrative:       EXAMINATION: XR CHEST 1 VW-12/07/2017:      INDICATION: Hypoxia; K56.609-Unspecified intestinal obstruction,  unspecified as to partial versus complete obstruction; R11.2-Nausea with  vomiting, unspecified; I10-Essential (primary) hypertension.      COMPARISON: 12/05/2017.     FINDINGS: An NG tube is seen in the distal stomach. The heart is normal  in size. The vasculature is cephalized. There has been interval  development of moderate perihilar disease, resembling interstitial  edema, but given low lung volumes today possibly reflecting some  generalized atelectasis. Upper lungs remain clear. No  pneumothorax is  seen. There may be a very small left effusion.           Impression:       Low lung volumes and new hazy interstitial opacity of the  lower lungs. No other new chest disease is seen.     D:  12/07/2017  E:  12/07/2017     This report was finalized on 12/7/2017 9:54 PM by DR. Jareth Gómez MD.       XR Chest 1 View [[id number]] Collected:  12/08/17 0852     Updated:  12/08/17 1313    Narrative:       EXAMINATION: XR CHEST 1 VW-12/08/2017:      INDICATION: Hypoxia; K56.609-Unspecified intestinal obstruction,  unspecified as to partial versus complete obstruction; R11.2-Nausea with  vomiting, unspecified; I10-Essential (primary) hypertension.      COMPARISON: Chest x-ray 12/07/2017.     FINDINGS: The patient is noted to be rotated. Interval placement of  right internal jugular approach dialysis catheter. Esophagogastric tube  remains in place. Improved lung volumes with persistent bilateral hazy  pulmonary opacifications, greatest within the left lower lobe, obscuring  the left lateral costophrenic sulcus. No discrete pneumothorax. Probable  trace right and small left pleural effusions.           Impression:       Status post right internal jugular approach dialysis  catheter overlying the right heart. No postprocedural pneumothorax is  identified with persistent bilateral pulmonary opacifications and  improved lung volumes. Persistent blunting of the left lateral  costophrenic sulcus including component of small left pleural effusion.     D:  12/08/2017  E:  12/08/2017     This report was finalized on 12/8/2017 1:11 PM by Dr. Santo Zhang.       XR Chest 1 View [[id number]] Collected:  12/09/17 1327     Updated:  12/09/17 1406    Narrative:          EXAMINATION: XR CHEST 1 VW - 12/09/2017.      INDICATION: Hypoxia; K56.609-Unspecified intestinal obstruction,  unspecified as to partial versus complete obstruction; R11.2-Nausea with  vomiting, unspecified; I10-Essential (primary) hypertension;  Z74.09-Other  reduced mobility.      COMPARISON: None.     FINDINGS:   1. Cardiomegaly is noted. There is consolidative disease left base with  a left effusion. Bilateral basilar effusions are noted.     2. Triple-lumen catheter is in place in the right atrium in good  position. The mid and upper lung zones are clear.           Impression:       Compared to previous examinations of yesterday, considering  technical differences, there has been mild clearing of the left mid lung  and all other findings are stable. The cardiomegaly and left base  consolidation have not significantly changed or improved and there are  stable bibasilar small effusions.     DICTATED:     12/09/2017  EDITED:          12/09/2017        This report was finalized on 12/9/2017 2:04 PM by Dr. Kenton Carrizales MD.       XR Chest 1 View [[id number]] Collected:  12/11/17 0944     Updated:  12/11/17 1339    Narrative:       EXAMINATION: XR CHEST 1 VW-      INDICATION: Status post right IJ tunneled HD catheter;  K56.609-Unspecified intestinal obstruction, unspecified as to partial  versus complete obstruction; R11.2-Nausea with vomiting, unspecified;  I10-Essential (primary) hypertension; Z74.09-Other reduced mobility.      COMPARISON: 12/09/2017.     FINDINGS: Portable chest reveals dialysis catheter stable in the right.  Patchy ill-defined opacification lung bases bilaterally with bilateral  pleural effusions. Degenerative change is seen within the spine.  Pulmonary vascularity is within normal limits.           Impression:       Deep line catheter identified on the right with no change  seen in the increased markings at the lung bases bilaterally or small  bilateral pleural effusions. No pneumothorax.     D:  12/11/2017  E:  12/11/2017     This report was finalized on 12/11/2017 1:37 PM by Dr. Eulalia Lim MD.       XR Abdomen Flat & Upright [[id number]] Collected:  12/13/17 1337     Updated:  12/13/17 1410    Narrative:       EXAMINATION: XR ABDOMEN, FLAT  AND UPRIGHT-12/13/2017:      INDICATION: Abdominal distention; K56.609-Unspecified intestinal  obstruction, unspecified as to partial versus complete obstruction;  R11.2-Nausea with vomiting, unspecified; I10-Essential (primary)  hypertension; Z74.09-Other reduced mobility.      COMPARISON: NONE.     FINDINGS:   1.  Pathologic distention of the small bowel is noted with multiple  small bowel air-fluid levels at multiple areas within the abdomen. There  is prominence of the valvulae conniventes.  2.  A lower abdominal midline incision is noted. The portions of the  colon that are seen are decompressed.  3.  It appears that the stomach is quite distended with gas.  4.  There is no evidence of pneumatosis.       Impression:       1.  Pathologic distention of the small bowel diffusely with marked  air-fluid levels throughout the abdomen indicating severe third space  fluid loss. There is prominence of the valvulae conniventes without  evidence of edema of the valvulae.  2.  There is gaseous distention of the stomach.  3.  It appears that the colon is decompressed.  4.  Mechanical partial small bowel obstruction would be the favored  consideration from a radiographic standpoint. Obviously, a marked ileus  can present in this fashion. There is considerable third space fluid  loss.     D:  12/13/2017  E:  12/13/2017     This report was finalized on 12/13/2017 2:17 PM by Dr. Kenton Carrizales MD.       XR Abdomen Flat & Upright [[id number]] Collected:  12/15/17 1119     Updated:  12/15/17 1412    Narrative:       EXAMINATION: XR ABDOMEN FLAT AND UPRIGHT- 12/15/2017     INDICATION: SBO; K56.609-Unspecified intestinal obstruction, unspecified  as to partial versus complete obstruction; R11.2-Nausea with vomiting,  unspecified; I10-Essential (primary) hypertension; Z74.09-Other reduced  mobility      COMPARISON: KUB 12/13/2017     FINDINGS: Interval placement of nasogastric tube which is coiled within  the stomach. Persistent  gas-filled dilated small bowel loops with slight  decrease of stomach and proximal duodenum otherwise grossly unchanged.  Findings remain consistent with obstructive pattern. No gross  intraperitoneal free air.           Impression:       Interval placement of nasogastric tube with mild  decompression of stomach and proximal duodenum distention however  persistent small bowel dilatation concerning for obstructive process. No  gross intraperitoneal free air.     D:  12/15/2017  E:  12/15/2017     This report was finalized on 12/15/2017 2:10 PM by Dr. Santo Zhang.       XR Chest 1 View [[id number]] Collected:  12/15/17 2141     Updated:  12/16/17 0832    Narrative:          EXAMINATION: XR CHEST 1 VW - 12/15/2017     INDICATION: K56.609-Unspecified intestinal obstruction, unspecified as  to partial versus complete obstruction; R11.2-Nausea with vomiting,  unspecified; I10-Essential (primary) hypertension; Z74.09-Other reduced  mobility.     COMPARISON: 12/11/2017.     FINDINGS: A central venous catheter has been inserted left as it in the  superior vena cava. A large-bore right central venous catheter is noted  and there are patchy bibasilar airspace changes.           Impression:       A left central venous catheter has been inserted from the  left. There has otherwise been no change since 12/11/2017.     DICTATED:     12/15/2017  EDITED:          12/15/2017           This report was finalized on 12/16/2017 8:30 AM by Dr. Chance Peterson MD.       CT Abdomen Pelvis Without Contrast [[id number]] Collected:  12/17/17 1816     Updated:  12/19/17 0830    Narrative:       EXAMINATION: CT ABDOMEN PELVIS WO CONTRAST - 12/17/2017     INDICATION: K56.609-Unspecified intestinal obstruction, unspecified as  to partial versus complete obstruction; R11.2-Nausea with vomiting,  unspecified; I10-Essential (primary) hypertension; Z74.09-Other reduced  mobility.     TECHNIQUE: CT scan of the abdomen and pelvis was performed  without  intravenous contrast. Images are displayed in both the sagittal and  coronal projections.      The radiation dose reduction device was turned on for each scan per the  ALARA (As Low as Reasonably Achievable) protocol.     COMPARISON: 12/04/2017.     FINDINGS: The most superior images demonstrate new bibasilar airspace  changes with small effusions. An NG tube is well-positioned. The liver  and spleen are normal. There is no adrenal or pancreatic mass. There is  no renal mass, stone or obstruction. There is no ascites, aneurysm or  retroperitoneal lymphadenopathy. There are moderately distended small  bowel loops with air-fluid levels, but there is also a larger amount of  air and fecal material throughout the colon. There is a small amount of  pelvic fluid. There is no pelvic mass. There are postoperative changes  related to segmental small bowel resection.       Impression:       There are distended small bowel loops with air-fluid levels  but there is equal distention of the colon, particularly the transverse  colon, with no evidence of a transition zone such that the overall  appearance would be more consistent with an ileus than mechanical bowel  obstruction. There are new bibasilar small airspace changes and small  pleural effusions.     DICTATED:     12/17/2017  EDITED:          12/17/2017        This report was finalized on 12/19/2017 8:28 AM by Dr. Chance Peterson MD.       XR Chest 1 View [[id number]] Collected:  12/19/17 1315     Updated:  12/19/17 1500    Narrative:       EXAMINATION: XR CHEST 1 VW-12/19/2017:      INDICATION: Elevated WBC; K56.609-Unspecified intestinal obstruction,  unspecified as to partial versus complete obstruction; R11.2-Nausea with  vomiting, unspecified; I10-Essential (primary) hypertension;  Z74.09-Other reduced mobility.      COMPARISON: Chest x-ray 12/15/2017.     FINDINGS: Support hardware unchanged. Cardiac size enlarged with  increased prominence from decreased lung  volumes. Bibasilar opacities,  greatest on the left, similar to prior. No pneumothorax or large pleural  effusion.       Impression:       Decreased lung volumes from prior with increased prominence  of the cardiac silhouette. Bibasilar opacities, greatest on the left,  similar to prior. No new parenchymal disease or consolidation.     D:  12/19/2017  E:  12/19/2017     This report was finalized on 12/19/2017 2:57 PM by Dr. Santo Zhang.       XR Chest 1 View [[id number]] Collected:  12/21/17 0044     Updated:  12/21/17 0128    Narrative:       EXAM:    XR Chest, 1 View    CLINICAL HISTORY:    62 years old, female; Unspecified intestinal obstruction, unspecified as to   partial versus complete obstruction; Essential (primary) hypertension; Nausea   with vomiting, unspecified; Other reduced mobility; Signs and symptoms;   Wheezing; Patient HX: Acute hypoxia, audible wheeze HX: Meningioma, recurrent   of brain small bowel obstruction insomnia malignant neoplasm of left orbit   acute renal failure elevated troponin level metabolic acidosis transaminitis   atrial flutter with rapid ventricular response non-hospital impaired glucose   tolerance hypertension parathyroid adenoma reaction to chronic stress   multinodular goiter vitamin d deficiency arthritis depression hypertensive   crisis history of nissen fundoplication prediabetes hyperlipemia hyperglycemia    TECHNIQUE:    Frontal view of the chest.    COMPARISON:    CR - XR CHEST 1 VW 2017-12-19 12:43    FINDINGS:    Lungs:  There is moderate nonspecific prominence of the pulmonary   vasculature.  Bilateral lower lobe airspace disease, fluid versus infiltrate.    Pleural space:  There are small pleural effusions.  No pneumothorax.    Heart:  The heart demonstrates diffuse enlargement.    Mediastinum:  Unremarkable.    Bones/joints:  Unremarkable.    Tubes, lines and devices:  A dialysis catheter is in place, likely in the   right atrium, unchanged.    Upper abdomen:   Dilated loops of bowel consistent with the history of bowel   obstruction.      Impression:       1.  Cardiomegaly with pulmonary vascular congestion.  2.  Bilateral lower lobe airspace disease, fluid versus infiltrate.    THIS DOCUMENT HAS BEEN ELECTRONICALLY SIGNED BY FABI HUFF MD    XR Abdomen KUB [[id number]] Collected:  12/21/17 0921     Updated:  12/21/17 2221    Narrative:       EXAMINATION: XR ABDOMEN KUB-      INDICATION: Distended abdomen; K56.609-Unspecified intestinal  obstruction, unspecified as to partial versus complete obstruction;  R11.2-Nausea with vomiting, unspecified; I10-Essential (primary)  hypertension; Z74.09-Other reduced mobility.      COMPARISON: 12/15/2017.     FINDINGS: There is diffuse dilatation of the small bowel, with more  small bowel gas than seen on previous exam, although the largest loops  are now larger than on the previous study. There appears to be some  colon gas present on today's exam. No bowel wall edema or pneumatosis is  seen.       Impression:       Findings consistent with distal small bowel obstruction,  which appears fairly high-grade, but with colon gas present, suggesting  incomplete obstruction. Please correlate with patient's clinical  symptoms     D:  12/21/2017  E:  12/21/2017     This report was finalized on 12/21/2017 10:19 PM by DR. Jareth Gómez MD.       FL Small Bowel Follow Through [[id number]] Collected:  12/22/17 1444     Updated:  12/22/17 1547    Narrative:       EXAMINATION: FL SMALL BOWEL FOLLOW THROUGH-     INDICATION: PSBO; K56.609-Unspecified intestinal obstruction,  unspecified as to partial versus complete obstruction; R11.2-Nausea with  vomiting, unspecified; I10-Essential (primary) hypertension;  Z74.09-Other reduced mobility     TECHNIQUE: 10 associated images were saved.  imaging reveals  multiple loops of moderately dilated small bowel. A single contrast  study using water-soluble contrast was performed. Images of the small  bowel  were obtained at 45 minutes, 90 minutes, 3 hours, and 4 hours and  15 minutes.     COMPARISON: NONE     FINDINGS: There is moderate gross dilatation of the proximal to mid  small bowel. Loops of dilated small bowel measure approximately 5.5 cm  in diameter. There is moderate gross distention of the stomach. There is  air visible in the distal small bowel, and colon, however there is no  contrast opacification of the distal small bowel, or colon at 4 hours  and 15 minutes. This may represent a distal small bowel obstruction, or  partial distal small bowel obstruction. Ileus may also be considered. A  follow-up KUB is scheduled to be obtained at 1800. These results were  discussed with Dr. Cobian.          Impression:       Multiple loops of moderately dilated proximal to mid small  bowel with failure to opacify distal small bowel, or colon with contrast  may represent-, or is complete distal small bowel obstruction. Please  consider further evaluation of clinically relevant.         This report was finalized on 12/22/2017 3:45 PM by Dr. Santo Zhang.       XR Chest 1 View [[id number]] Collected:  12/22/17 1100     Updated:  12/22/17 2210    Narrative:       EXAMINATION: XR CHEST 1 VW-      INDICATION: Followup shortness of air; K56.609-Unspecified intestinal  obstruction, unspecified as to partial versus complete obstruction;  R11.2-Nausea with vomiting, unspecified; I10-Essential (primary)  hypertension; Z74.09-Other reduced mobility.      COMPARISON: 12/21/2017.     FINDINGS: Heart is enlarged. The pulmonary vasculature, however, appears  normal. There is mild residual hazy opacity right base and small area of  discoid atelectasis at the right base, unchanged. Right IJ dialysis  catheter remains in the area of the right atrium. Increased upper  abdominal bowel gas is again noted, evaluated yesterday with KUB..           Impression:       1. Stable cardiomegaly without congestive failure.  2. Mild left and  minimal right basilar atelectasis unchanged.  3. Distended upper abdominal bowel loops again noted.     D:  12/22/2017  E:  12/22/2017     This report was finalized on 12/22/2017 10:08 PM by DR. Jareth Gómez MD.       XR Abdomen KUB [[id number]] Collected:  12/25/17 0405     Updated:  12/25/17 0556    Narrative:       EXAM:    XR Abdomen, 1 View    CLINICAL HISTORY:    62 years old, female; Unspecified intestinal obstruction, unspecified as to   partial versus complete obstruction; Essential (primary) hypertension; Nausea   with vomiting, unspecified; Other reduced mobility; Pain; Abdominal pain; Prior   surgery; Surgery date: Post-operative (0-2 days); Surgery type: Ex lap for sbo.   ; Patient HX: Abdomen pain. Sbo. Decreased bowel sounds. Surgery x 2 days ago.   ; Additional info: Abd pain, decreased bowel sounds, sbo    TECHNIQUE:    Frontal supine view of the abdomen/pelvis.    COMPARISON:    CR - XR ABDOMEN KUB 2017-12-21 06:29    FINDINGS:    Gastrointestinal tract:  Prominent gas noted within portions of the large and   small bowel.  No visible bowel dilatation.  Residual oral contrast within the   right colon.    Bones/joints:  Unremarkable.    Soft tissues:  Lower abdominal skin staples.    Tubes, lines and devices:  NG tube tip overlies the proximal stomach.      Impression:         Prominent gas noted within portions of the large and small bowel.  No visible   bowel dilatation.  This is suggestive of a mild postoperative ileus.    THIS DOCUMENT HAS BEEN ELECTRONICALLY SIGNED BY RIVAS HUNTER MD          Results for orders placed during the hospital encounter of 12/04/17   Adult Transthoracic Echo Complete W/ Cont if Necessary Per Protocol    Narrative · LVEF difficult to evaluate with tachycardia- globally appears mildly   depressed.  · Left ventricular wall thickness is consistent with concentric   hypertrophy.  · Mild tricuspid valve regurgitation is present.  · Calculated right ventricular systolic  pressure from tricuspid   regurgitation is 32 mmHg.            Discharge Details      Tereza Ackerman   Home Medication Instructions MERCED:[id number]    Printed on:01/12/18 4493   Medication Information                      amiodarone (PACERONE) 200 MG tablet  Take 1 tablet by mouth Daily for 30 days.             amLODIPine (NORVASC) 10 MG tablet  Take 1 tablet by mouth Daily for 30 days.             aspirin  MG tablet  Take 1 tablet by mouth Daily for 30 days. For your heart             carvedilol (COREG) 25 MG tablet  Take 1 tablet by mouth Every 12 (Twelve) Hours for 30 days.             CloNIDine (CATAPRES) 0.1 MG tablet  Take 1 tablet by mouth Every 12 (Twelve) Hours for 30 days.             docusate sodium 100 MG capsule  Take 100 mg by mouth 2 (Two) Times a Day for 30 days.             metoclopramide (REGLAN) 5 MG tablet  Take 1 tablet by mouth 2 (Two) Times a Day for 30 days.             polyethylene glycol (MIRALAX) pack packet  Take 17 g by mouth Daily for 14 days.             QUEtiapine (SEROquel) 25 MG tablet  Take 1 tablet by mouth Every Night for 30 days.             sertraline (ZOLOFT) 100 MG tablet  Take 1 tablet by mouth Daily.             thiamine (VITAMIN B1) 100 MG tablet  Take 1 tablet by mouth Daily for 30 days.                 Discharge Disposition:  Home or Self Care    Discharge Diet:   Diet Order   Procedures   • Diet Regular     Discharge Activity: as tolerated    Special Instructions:    Future Appointments  Date Time Provider Department Center   1/22/2018 1:45 PM Michaela Connell MD [facility] None   2/20/2018 10:30 AM [facility]   2/20/2018 12:40 PM Werner Hollis MD [facility] None   3/7/2018 2:00 PM Alli Aguirre MD [facility] None       Additional Instructions for the Follow-ups that You Need to Schedule     Ambulatory Referral to Occupational Therapy    As directed        Ambulatory Referral to Physical Therapy Evaluate and treat    As directed     Specialty modality needed?:  Evaluate and treat                     Time Spent on Discharge:  60 minutes    Shaneka Arango DO  01/12/18  2:56 PM

## 2018-01-12 NOTE — PLAN OF CARE
Problem: Patient Care Overview (Adult)  Goal: Plan of Care Review  Outcome: Ongoing (interventions implemented as appropriate)   01/11/18 1800   Coping/Psychosocial Response Interventions   Plan Of Care Reviewed With patient   Patient Care Overview   Progress improving   Outcome Evaluation   Outcome Summary/Follow up Plan no c/o nausea or soa. pt has c/o pain but controlled with prn tylenol. pt ambulating in ling during shift. dressing changes are bid and have been completed. Case management looking for placement. VSS. no new concerns at this time,       Problem: Skin Integrity Impairment, Risk/Actual (Adult)  Goal: Skin Integrity/Wound Healing  Outcome: Ongoing (interventions implemented as appropriate)   01/11/18 1800   Skin Integrity Impairment, Risk/Actual (Adult)   Skin Integrity/Wound Healing making progress toward outcome       Problem: Fall Risk (Adult)  Goal: Absence of Falls  Outcome: Ongoing (interventions implemented as appropriate)   01/11/18 1800   Fall Risk (Adult)   Absence of Falls making progress toward outcome

## 2018-01-12 NOTE — PROGRESS NOTES
Discharge Planning Assessment  [facility]     Patient Name: Tereza Ackerman  MRN: [id number]  Today's Date: 1/11/2018    Admit Date: 12/4/2017          Discharge Needs Assessment     None            Discharge Plan       01/11/18 1906    Case Management/Social Work Plan    Plan update    Additional Comments CM went to see pt in her room with news from [facility]. They are requesting updated paper work before decision is made to accept her for rehab, answered any questions and again explained approval process re: insurance precerts.        Discharge Placement     Facility/Agency Request Status Selected? Address Phone Number Fax Number    [facility] Pending - Request Sent     [address] [facility] [address] [phone]    [facility] Pending - Request Sent     [address] [facility] [address] [phone]        Expected Discharge Date and Time     Expected Discharge Date Expected Discharge Time    Dec 11, 2017               Demographic Summary     None            Functional Status     None            Psychosocial     None            Abuse/Neglect     None            Legal     None            Substance Abuse     None            Patient Forms     None          Carol Donato

## 2018-01-12 NOTE — PROGRESS NOTES
Continued Stay Note  [facility]     Patient Name: Tereza Ackerman  MRN: [id number]  Today's Date: 1/12/2018    Admit Date: 12/4/2017          Discharge Plan       01/12/18 1009    Case Management/Social Work Plan    Plan [facility] DENIED request for rehab    Additional Comments CM received a phone call from Delmy with [facility].  [facility] has denied the request for pt to receive skilled rehab due to pt. being to high a level of functioning.  CM discussed this possibility with pt. and her spouse already this morning.  Pt. will likely go home with spouse and was willing to receive outpatient PT and OT services from [facility].  CM will discuss this information with pt. and spouse and confirm the discharge plan.  KIRSTY Mckinnon [phone]              Discharge Codes     None        Expected Discharge Date and Time     Expected Discharge Date Expected Discharge Time    Dec 11, 2017             KIRSTY Santo

## 2018-01-15 ENCOUNTER — TRANSITIONAL CARE MANAGEMENT TELEPHONE ENCOUNTER (OUTPATIENT)
Dept: INTERNAL MEDICINE | Facility: CLINIC | Age: 63
End: 2018-01-15

## 2018-01-22 ENCOUNTER — OFFICE VISIT (OUTPATIENT)
Dept: INTERNAL MEDICINE | Facility: CLINIC | Age: 63
End: 2018-01-22

## 2018-01-22 VITALS
HEART RATE: 74 BPM | DIASTOLIC BLOOD PRESSURE: 82 MMHG | OXYGEN SATURATION: 99 % | SYSTOLIC BLOOD PRESSURE: 138 MMHG | BODY MASS INDEX: 26.05 KG/M2 | WEIGHT: 147 LBS

## 2018-01-22 DIAGNOSIS — K56.609 SMALL BOWEL OBSTRUCTION (HCC): Primary | ICD-10-CM

## 2018-01-22 DIAGNOSIS — D64.9 ANEMIA, UNSPECIFIED TYPE: ICD-10-CM

## 2018-01-22 DIAGNOSIS — R74.01 TRANSAMINITIS: ICD-10-CM

## 2018-01-22 LAB
BASOPHILS # BLD AUTO: 0.06 10*3/MM3 (ref 0–0.2)
BASOPHILS NFR BLD AUTO: 0.7 % (ref 0–1)
DEPRECATED RDW RBC AUTO: 66.9 FL (ref 37–54)
EOSINOPHIL # BLD AUTO: 0.39 10*3/MM3 (ref 0–0.3)
EOSINOPHIL NFR BLD AUTO: 4.3 % (ref 0–3)
ERYTHROCYTE [DISTWIDTH] IN BLOOD BY AUTOMATED COUNT: 19.6 % (ref 11.3–14.5)
HCT VFR BLD AUTO: 32.6 % (ref 34.5–44)
HGB BLD-MCNC: 10 G/DL (ref 11.5–15.5)
IMM GRANULOCYTES # BLD: 0.06 10*3/MM3 (ref 0–0.03)
IMM GRANULOCYTES NFR BLD: 0.7 % (ref 0–0.6)
LYMPHOCYTES # BLD AUTO: 2.33 10*3/MM3 (ref 0.6–4.8)
LYMPHOCYTES NFR BLD AUTO: 25.7 % (ref 24–44)
MCH RBC QN AUTO: 29 PG (ref 27–31)
MCHC RBC AUTO-ENTMCNC: 30.7 G/DL (ref 32–36)
MCV RBC AUTO: 94.5 FL (ref 80–99)
MONOCYTES # BLD AUTO: 0.87 10*3/MM3 (ref 0–1)
MONOCYTES NFR BLD AUTO: 9.6 % (ref 0–12)
NEUTROPHILS # BLD AUTO: 5.34 10*3/MM3 (ref 1.5–8.3)
NEUTROPHILS NFR BLD AUTO: 59 % (ref 41–71)
PLATELET # BLD AUTO: 358 10*3/MM3 (ref 150–450)
PMV BLD AUTO: 9.4 FL (ref 6–12)
RBC # BLD AUTO: 3.45 10*6/MM3 (ref 3.89–5.14)
WBC NRBC COR # BLD: 9.05 10*3/MM3 (ref 3.5–10.8)

## 2018-01-22 PROCEDURE — 85025 COMPLETE CBC W/AUTO DIFF WBC: CPT | Performed by: INTERNAL MEDICINE

## 2018-01-22 PROCEDURE — 99214 OFFICE O/P EST MOD 30 MIN: CPT | Performed by: INTERNAL MEDICINE

## 2018-01-22 RX ORDER — ONDANSETRON 8 MG/1
TABLET, ORALLY DISINTEGRATING ORAL
COMMUNITY
Start: 2017-11-03 | End: 2018-02-19 | Stop reason: HOSPADM

## 2018-01-22 NOTE — PROGRESS NOTES
Follow-up (KANDI)  Transitional Care Follow Up Visit  Lam Ackerman is a 62 y.o. female who presents for a transitional care management visit.    Within 48 business hours after discharge our office contacted her via telephone to coordinate her care and needs.      I reviewed and discussed the details of that call along with the discharge summary, hospital problems, inpatient lab results, inpatient diagnostic studies, and consultation reports with Tereza.     Current outpatient and discharge medications have been reconciled for the patient.    Date of TCM Phone Call 7/3/2017 11/6/2017 1/15/2018   [facility]   Date of Admission 6/30/2017 11/3/2017 12/4/2017   Date of Discharge 7/2/2017 11/4/2017 1/12/2018   Discharge Disposition Home or Self Care - Home or Self Care     Risk for Readmission (LACE) Score: 11      Lam Ackerman is a 62 y.o. female is here today for follow-up.    History of Present Illness   Tereza is here for a follow up after her extensive hospital stay for small bowel obstructiona nd perforation, with septic shock, and had to do HD for a few days.  Now she she's recovered and still feels very weak.  HAs not been given her zoloft by  as she's on seroquel as well, but notes not taking seroquel either.  To see Dr. Ramos- nephrology , and Dr. CUI, and Dr. Aguirre.  She is also concerned about her meningioma, which was due for repeat surgery, but does not think she can handle it now.      Course During Hospital Stay:    Pt admitted for SBO on 12/4.CT abd/pelvis on admission showed SBO with dilated loops of bowel. NGT was placed.On 12/5 patient developed hypotension, lactic acidosis with lactate of 7.8, pro-felipe 47, acute renal failure. She underwent emergent exploratory lap and SBR with anastamosis for gangrenous bowel on 12/5 after which she was transferred to the ICU. In the ICU patient developed acute renal  failure, metabolic acidosis with oligura with creatinine increase to 5.4 . A tunneled HD cath was placed and patient was started on HD 12/8. Patient was stable for transfer to the floor on 12/11, she remained stable until 12/13 when she developed worsening abdominal pain. X-ray showed recurrent obstruction vs. Ileus, CT scan consistent with ileus. NGT was replaced and patient was started on suppositories, mineral oil and ambulation was encouraged. Central line was placed on 12/15 and TPN was initiated for nutritional support. On the evening of 12/20 patient developed rapi A.flutter requiring initiation of cardizem gtt and elevated troponin (thought to be from RVR). Cardiology was consulted and started patient on IV amiodarone. Patient was transitioned to PO amiodarone and has remained in NSR. Card recommended full dose ASA 325mg daily for anticoagulation given onset during acute illness/in setting of SBO. CHADSVASc 2 (gender and HTN), if patient were to develop paroxysmal A.fib despite amiodarone would consider initiation of anti-coag. They plan to consider outpatient stress testing at discharge. Patient developed significant epistaxis on 12/27 after removal of NGT, her hemoglobin dropped to 5.3 and she required 4 units of PRBCs. She also was given both IV iron and procrit. Patient had good renal recovery with last HD 1/2/2018. HD catheter was removed on 1/9/18. Patient continued to improve. Her diet was advanced.  She was discharged home with her .        Current Outpatient Prescriptions:   •  amiodarone (PACERONE) 200 MG tablet, Take 1 tablet by mouth Daily for 30 days., Disp: 30 tablet, Rfl: 0  •  amLODIPine (NORVASC) 10 MG tablet, Take 1 tablet by mouth Daily for 30 days., Disp: 30 tablet, Rfl: 0  •  aspirin  MG tablet, Take 1 tablet by mouth Daily for 30 days. For your heart, Disp: 30 tablet, Rfl: 0  •  carvedilol (COREG) 25 MG tablet, Take 1 tablet by mouth Every 12 (Twelve) Hours for 30 days.,  Disp: 60 tablet, Rfl: 0  •  CloNIDine (CATAPRES) 0.1 MG tablet, Take 1 tablet by mouth Every 12 (Twelve) Hours for 30 days., Disp: 60 tablet, Rfl: 0  •  docusate sodium 100 MG capsule, Take 100 mg by mouth 2 (Two) Times a Day for 30 days., Disp: 60 each, Rfl: 0  •  metoclopramide (REGLAN) 5 MG tablet, Take 1 tablet by mouth 2 (Two) Times a Day for 30 days., Disp: 60 tablet, Rfl: 0  •  ondansetron ODT (ZOFRAN-ODT) 8 MG disintegrating tablet, , Disp: , Rfl:   •  polyethylene glycol (MIRALAX) pack packet, Take 17 g by mouth Daily for 14 days., Disp: 238 g, Rfl: 0  •  QUEtiapine (SEROquel) 25 MG tablet, Take 1 tablet by mouth Every Night for 30 days., Disp: 30 tablet, Rfl: 0  •  sertraline (ZOLOFT) 100 MG tablet, Take 1 tablet by mouth Daily., Disp: 90 tablet, Rfl: 0  •  thiamine (VITAMIN B1) 100 MG tablet, Take 1 tablet by mouth Daily for 30 days., Disp: 30 tablet, Rfl: 0  •  mupirocin (BACTROBAN) 2 % ointment, Apply  topically 2 (Two) Times a Day. To wound area until healed, Disp: 22 g, Rfl: 2      The following portions of the patient's history were reviewed and updated as appropriate: allergies, current medications, past family history, past medical history, past social history, past surgical history and problem list.    Review of Systems   Constitutional: Negative for chills and fever.   HENT: Negative for ear discharge, ear pain, sinus pressure and sore throat.    Respiratory: Negative for cough, chest tightness and shortness of breath.    Cardiovascular: Negative for chest pain, palpitations and leg swelling.   Gastrointestinal: Negative for diarrhea, nausea and vomiting.   Musculoskeletal: Negative for arthralgias, back pain and myalgias.   Neurological: Positive for weakness. Negative for dizziness, syncope and headaches.   Psychiatric/Behavioral: Negative for confusion and sleep disturbance.       Objective   /82  Pulse 74  Wt 66.7 kg (147 lb)  SpO2 99%  BMI 26.05 kg/m2  Physical Exam    Constitutional: She is oriented to person, place, and time. She appears well-developed and well-nourished. She appears lethargic. She has a sickly appearance.   HENT:   Head: Normocephalic and atraumatic.   Right Ear: External ear normal.   Left Ear: External ear normal.   Mouth/Throat: No oropharyngeal exudate.   Eyes: Conjunctivae are normal. Pupils are equal, round, and reactive to light.   Neck: Neck supple. No thyromegaly present.   Cardiovascular: Normal rate, regular rhythm and intact distal pulses.    Pulmonary/Chest: Effort normal and breath sounds normal.   Abdominal: Soft. Bowel sounds are normal. She exhibits no distension. There is no tenderness.   Musculoskeletal: She exhibits no edema.   Neurological: She is oriented to person, place, and time. She appears lethargic. No cranial nerve deficit.   Skin: Skin is warm and dry.   Psychiatric: She has a normal mood and affect. Judgment normal.   Nursing note and vitals reviewed.                Assessment/Plan   Diagnoses and all orders for this visit:    Small bowel obstruction  -     ondansetron ODT (ZOFRAN-ODT) 8 MG disintegrating tablet;   -     CBC & Differential  -     mupirocin (BACTROBAN) 2 % ointment; Apply  topically 2 (Two) Times a Day. To wound area until healed  -     CBC Auto Differential    Anemia, unspecified type  -     CBC & Differential    Transaminitis  -     ondansetron ODT (ZOFRAN-ODT) 8 MG disintegrating tablet;       Check labs, reassured patient, weakness from her prolonged illness.  Continue to hydrate, eat regular meals. Should wait on Meningioma surgery until stronger.           Return in about 4 weeks (around 2/19/2018) for Next scheduled follow up.

## 2018-02-04 ENCOUNTER — HOSPITAL ENCOUNTER (OUTPATIENT)
Facility: HOSPITAL | Age: 63
Setting detail: OBSERVATION
Discharge: HOME | End: 2018-02-06
Attending: EMERGENCY MEDICINE | Admitting: INTERNAL MEDICINE

## 2018-02-04 DIAGNOSIS — D64.9 ANEMIA, UNSPECIFIED TYPE: ICD-10-CM

## 2018-02-04 DIAGNOSIS — I10 ESSENTIAL HYPERTENSION: ICD-10-CM

## 2018-02-04 DIAGNOSIS — I95.2 HYPOTENSION DUE TO DRUGS: Primary | ICD-10-CM

## 2018-02-04 PROCEDURE — 99284 EMERGENCY DEPT VISIT MOD MDM: CPT

## 2018-02-05 ENCOUNTER — APPOINTMENT (OUTPATIENT)
Dept: GENERAL RADIOLOGY | Facility: HOSPITAL | Age: 63
End: 2018-02-05

## 2018-02-05 PROBLEM — D64.9 ANEMIA: Status: ACTIVE | Noted: 2018-02-05

## 2018-02-05 PROBLEM — R74.01 TRANSAMINITIS: Status: RESOLVED | Noted: 2017-12-08 | Resolved: 2018-02-05

## 2018-02-05 PROBLEM — R42 DIZZINESS: Status: ACTIVE | Noted: 2018-02-05

## 2018-02-05 PROBLEM — I95.2 HYPOTENSION DUE TO MEDICATION: Status: ACTIVE | Noted: 2018-02-05

## 2018-02-05 PROBLEM — E87.20 METABOLIC ACIDOSIS: Status: RESOLVED | Noted: 2017-12-06 | Resolved: 2018-02-05

## 2018-02-05 PROBLEM — R79.89 ELEVATED TROPONIN LEVEL: Status: RESOLVED | Noted: 2017-12-05 | Resolved: 2018-02-05

## 2018-02-05 PROBLEM — R77.8 ELEVATED TROPONIN LEVEL: Status: RESOLVED | Noted: 2017-12-05 | Resolved: 2018-02-05

## 2018-02-05 PROBLEM — N17.9 ACUTE RENAL FAILURE (HCC): Status: RESOLVED | Noted: 2017-12-05 | Resolved: 2018-02-05

## 2018-02-05 LAB
ABO GROUP BLD: NORMAL
ALBUMIN SERPL-MCNC: 3.4 G/DL (ref 3.2–4.8)
ALBUMIN/GLOB SERPL: 1 G/DL (ref 1.5–2.5)
ALP SERPL-CCNC: 96 U/L (ref 25–100)
ALT SERPL W P-5'-P-CCNC: 13 U/L (ref 7–40)
ANION GAP SERPL CALCULATED.3IONS-SCNC: 5 MMOL/L (ref 3–11)
ANION GAP SERPL CALCULATED.3IONS-SCNC: 6 MMOL/L (ref 3–11)
ANTI-FYA: NORMAL
ANTI-K: NORMAL
AST SERPL-CCNC: 20 U/L (ref 0–33)
BACTERIA UR QL AUTO: ABNORMAL /HPF
BASOPHILS # BLD AUTO: 0.06 10*3/MM3 (ref 0–0.2)
BASOPHILS NFR BLD AUTO: 0.8 % (ref 0–1)
BILIRUB SERPL-MCNC: 0.2 MG/DL (ref 0.3–1.2)
BILIRUB UR QL STRIP: NEGATIVE
BLD GP AB SCN SERPL QL: POSITIVE
BUN BLD-MCNC: 17 MG/DL (ref 9–23)
BUN BLD-MCNC: 19 MG/DL (ref 9–23)
BUN/CREAT SERPL: 14.6 (ref 7–25)
BUN/CREAT SERPL: 15.5 (ref 7–25)
CALCIUM SPEC-SCNC: 8.4 MG/DL (ref 8.7–10.4)
CALCIUM SPEC-SCNC: 8.9 MG/DL (ref 8.7–10.4)
CHLORIDE SERPL-SCNC: 105 MMOL/L (ref 99–109)
CHLORIDE SERPL-SCNC: 107 MMOL/L (ref 99–109)
CLARITY UR: CLEAR
CO2 SERPL-SCNC: 24 MMOL/L (ref 20–31)
CO2 SERPL-SCNC: 26 MMOL/L (ref 20–31)
COLOR UR: YELLOW
CREAT BLD-MCNC: 1.1 MG/DL (ref 0.6–1.3)
CREAT BLD-MCNC: 1.3 MG/DL (ref 0.6–1.3)
D-LACTATE SERPL-SCNC: 0.6 MMOL/L (ref 0.5–2)
DEPRECATED RDW RBC AUTO: 62.2 FL (ref 37–54)
DEPRECATED RDW RBC AUTO: 62.7 FL (ref 37–54)
EOSINOPHIL # BLD AUTO: 0.39 10*3/MM3 (ref 0–0.3)
EOSINOPHIL NFR BLD AUTO: 5.2 % (ref 0–3)
ERYTHROCYTE [DISTWIDTH] IN BLOOD BY AUTOMATED COUNT: 17.5 % (ref 11.3–14.5)
ERYTHROCYTE [DISTWIDTH] IN BLOOD BY AUTOMATED COUNT: 17.7 % (ref 11.3–14.5)
FERRITIN SERPL-MCNC: 1363 NG/ML (ref 10–291)
FOLATE SERPL-MCNC: 14.12 NG/ML (ref 3.2–20)
GFR SERPL CREATININE-BSD FRML MDRD: 41 ML/MIN/1.73
GFR SERPL CREATININE-BSD FRML MDRD: 50 ML/MIN/1.73
GLOBULIN UR ELPH-MCNC: 3.4 GM/DL
GLUCOSE BLD-MCNC: 110 MG/DL (ref 70–100)
GLUCOSE BLD-MCNC: 96 MG/DL (ref 70–100)
GLUCOSE UR STRIP-MCNC: NEGATIVE MG/DL
HCT VFR BLD AUTO: 27.4 % (ref 34.5–44)
HCT VFR BLD AUTO: 28.3 % (ref 34.5–44)
HGB BLD-MCNC: 8.6 G/DL (ref 11.5–15.5)
HGB BLD-MCNC: 8.9 G/DL (ref 11.5–15.5)
HGB UR QL STRIP.AUTO: NEGATIVE
HYALINE CASTS UR QL AUTO: ABNORMAL /LPF
IMM GRANULOCYTES # BLD: 0.05 10*3/MM3 (ref 0–0.03)
IMM GRANULOCYTES NFR BLD: 0.7 % (ref 0–0.6)
INR PPP: 1.03
IRON 24H UR-MRATE: 69 MCG/DL (ref 50–175)
IRON SATN MFR SERPL: 29 % (ref 15–50)
KETONES UR QL STRIP: NEGATIVE
LEUKOCYTE ESTERASE UR QL STRIP.AUTO: ABNORMAL
LYMPHOCYTES # BLD AUTO: 2.44 10*3/MM3 (ref 0.6–4.8)
LYMPHOCYTES NFR BLD AUTO: 32.3 % (ref 24–44)
MCH RBC QN AUTO: 30.3 PG (ref 27–31)
MCH RBC QN AUTO: 30.3 PG (ref 27–31)
MCHC RBC AUTO-ENTMCNC: 31.4 G/DL (ref 32–36)
MCHC RBC AUTO-ENTMCNC: 31.4 G/DL (ref 32–36)
MCV RBC AUTO: 96.3 FL (ref 80–99)
MCV RBC AUTO: 96.5 FL (ref 80–99)
MONOCYTES # BLD AUTO: 0.88 10*3/MM3 (ref 0–1)
MONOCYTES NFR BLD AUTO: 11.6 % (ref 0–12)
NEUTROPHILS # BLD AUTO: 3.74 10*3/MM3 (ref 1.5–8.3)
NEUTROPHILS NFR BLD AUTO: 49.4 % (ref 41–71)
NITRITE UR QL STRIP: NEGATIVE
PH UR STRIP.AUTO: 5.5 [PH] (ref 5–8)
PLATELET # BLD AUTO: 254 10*3/MM3 (ref 150–450)
PLATELET # BLD AUTO: 310 10*3/MM3 (ref 150–450)
PMV BLD AUTO: 9.3 FL (ref 6–12)
PMV BLD AUTO: 9.7 FL (ref 6–12)
POTASSIUM BLD-SCNC: 4 MMOL/L (ref 3.5–5.5)
POTASSIUM BLD-SCNC: 4.5 MMOL/L (ref 3.5–5.5)
PROT SERPL-MCNC: 6.8 G/DL (ref 5.7–8.2)
PROT UR QL STRIP: NEGATIVE
PROTHROMBIN TIME: 11.2 SECONDS (ref 9.6–11.5)
RBC # BLD AUTO: 2.84 10*6/MM3 (ref 3.89–5.14)
RBC # BLD AUTO: 2.94 10*6/MM3 (ref 3.89–5.14)
RBC # UR: ABNORMAL /HPF
REF LAB TEST METHOD: ABNORMAL
RH BLD: POSITIVE
SODIUM BLD-SCNC: 136 MMOL/L (ref 132–146)
SODIUM BLD-SCNC: 137 MMOL/L (ref 132–146)
SP GR UR STRIP: 1.01 (ref 1–1.03)
SQUAMOUS #/AREA URNS HPF: ABNORMAL /HPF
TIBC SERPL-MCNC: 239 MCG/DL (ref 250–450)
TROPONIN I SERPL-MCNC: 0 NG/ML (ref 0–0.07)
UROBILINOGEN UR QL STRIP: ABNORMAL
VIT B12 BLD-MCNC: 569 PG/ML (ref 211–911)
WBC NRBC COR # BLD: 7.5 10*3/MM3 (ref 3.5–10.8)
WBC NRBC COR # BLD: 7.56 10*3/MM3 (ref 3.5–10.8)
WBC UR QL AUTO: ABNORMAL /HPF

## 2018-02-05 PROCEDURE — 83550 IRON BINDING TEST: CPT | Performed by: NURSE PRACTITIONER

## 2018-02-05 PROCEDURE — 82728 ASSAY OF FERRITIN: CPT | Performed by: NURSE PRACTITIONER

## 2018-02-05 PROCEDURE — 85610 PROTHROMBIN TIME: CPT | Performed by: EMERGENCY MEDICINE

## 2018-02-05 PROCEDURE — 81001 URINALYSIS AUTO W/SCOPE: CPT | Performed by: EMERGENCY MEDICINE

## 2018-02-05 PROCEDURE — 82607 VITAMIN B-12: CPT | Performed by: NURSE PRACTITIONER

## 2018-02-05 PROCEDURE — 93005 ELECTROCARDIOGRAM TRACING: CPT | Performed by: EMERGENCY MEDICINE

## 2018-02-05 PROCEDURE — 99220 PR INITIAL OBSERVATION CARE/DAY 70 MINUTES: CPT | Performed by: INTERNAL MEDICINE

## 2018-02-05 PROCEDURE — G0378 HOSPITAL OBSERVATION PER HR: HCPCS

## 2018-02-05 PROCEDURE — 86850 RBC ANTIBODY SCREEN: CPT

## 2018-02-05 PROCEDURE — 85025 COMPLETE CBC W/AUTO DIFF WBC: CPT | Performed by: EMERGENCY MEDICINE

## 2018-02-05 PROCEDURE — 80053 COMPREHEN METABOLIC PANEL: CPT | Performed by: EMERGENCY MEDICINE

## 2018-02-05 PROCEDURE — 71045 X-RAY EXAM CHEST 1 VIEW: CPT

## 2018-02-05 PROCEDURE — 83605 ASSAY OF LACTIC ACID: CPT | Performed by: EMERGENCY MEDICINE

## 2018-02-05 PROCEDURE — 85027 COMPLETE CBC AUTOMATED: CPT | Performed by: NURSE PRACTITIONER

## 2018-02-05 PROCEDURE — 86870 RBC ANTIBODY IDENTIFICATION: CPT | Performed by: NURSE PRACTITIONER

## 2018-02-05 PROCEDURE — 83540 ASSAY OF IRON: CPT | Performed by: NURSE PRACTITIONER

## 2018-02-05 PROCEDURE — 96360 HYDRATION IV INFUSION INIT: CPT

## 2018-02-05 PROCEDURE — 86900 BLOOD TYPING SEROLOGIC ABO: CPT | Performed by: NURSE PRACTITIONER

## 2018-02-05 PROCEDURE — 86850 RBC ANTIBODY SCREEN: CPT | Performed by: NURSE PRACTITIONER

## 2018-02-05 PROCEDURE — 82746 ASSAY OF FOLIC ACID SERUM: CPT | Performed by: NURSE PRACTITIONER

## 2018-02-05 PROCEDURE — 86901 BLOOD TYPING SEROLOGIC RH(D): CPT | Performed by: NURSE PRACTITIONER

## 2018-02-05 PROCEDURE — 96361 HYDRATE IV INFUSION ADD-ON: CPT

## 2018-02-05 PROCEDURE — 84484 ASSAY OF TROPONIN QUANT: CPT

## 2018-02-05 RX ORDER — QUETIAPINE FUMARATE 25 MG/1
25 TABLET, FILM COATED ORAL NIGHTLY
Status: DISCONTINUED | OUTPATIENT
Start: 2018-02-05 | End: 2018-02-06 | Stop reason: HOSPADM

## 2018-02-05 RX ORDER — SODIUM CHLORIDE 0.9 % (FLUSH) 0.9 %
1-10 SYRINGE (ML) INJECTION AS NEEDED
Status: DISCONTINUED | OUTPATIENT
Start: 2018-02-05 | End: 2018-02-06 | Stop reason: HOSPADM

## 2018-02-05 RX ORDER — AMIODARONE HYDROCHLORIDE 200 MG/1
200 TABLET ORAL DAILY
Status: DISCONTINUED | OUTPATIENT
Start: 2018-02-05 | End: 2018-02-06 | Stop reason: HOSPADM

## 2018-02-05 RX ORDER — DOCUSATE SODIUM 100 MG/1
100 CAPSULE, LIQUID FILLED ORAL 2 TIMES DAILY
Status: DISCONTINUED | OUTPATIENT
Start: 2018-02-05 | End: 2018-02-06 | Stop reason: HOSPADM

## 2018-02-05 RX ORDER — SODIUM CHLORIDE 9 MG/ML
250 INJECTION, SOLUTION INTRAVENOUS CONTINUOUS
Status: DISCONTINUED | OUTPATIENT
Start: 2018-02-05 | End: 2018-02-06

## 2018-02-05 RX ORDER — ASPIRIN 325 MG
325 TABLET, DELAYED RELEASE (ENTERIC COATED) ORAL DAILY
Status: DISCONTINUED | OUTPATIENT
Start: 2018-02-05 | End: 2018-02-06 | Stop reason: HOSPADM

## 2018-02-05 RX ORDER — SODIUM CHLORIDE 9 MG/ML
100 INJECTION, SOLUTION INTRAVENOUS CONTINUOUS
Status: ACTIVE | OUTPATIENT
Start: 2018-02-05 | End: 2018-02-05

## 2018-02-05 RX ADMIN — SODIUM CHLORIDE 100 ML/HR: 9 INJECTION, SOLUTION INTRAVENOUS at 06:20

## 2018-02-05 RX ADMIN — AMIODARONE HYDROCHLORIDE 200 MG: 200 TABLET ORAL at 13:56

## 2018-02-05 RX ADMIN — SODIUM CHLORIDE 250 ML/HR: 9 INJECTION, SOLUTION INTRAVENOUS at 00:54

## 2018-02-05 RX ADMIN — SODIUM CHLORIDE 100 ML/HR: 9 INJECTION, SOLUTION INTRAVENOUS at 12:35

## 2018-02-05 RX ADMIN — ASPIRIN 325 MG: 325 TABLET, DELAYED RELEASE ORAL at 13:56

## 2018-02-05 RX ADMIN — SERTRALINE HYDROCHLORIDE 50 MG: 50 TABLET ORAL at 13:56

## 2018-02-05 RX ADMIN — SODIUM CHLORIDE 500 ML: 9 INJECTION, SOLUTION INTRAVENOUS at 00:54

## 2018-02-05 RX ADMIN — DOCUSATE SODIUM 100 MG: 100 CAPSULE, LIQUID FILLED ORAL at 20:11

## 2018-02-05 RX ADMIN — QUETIAPINE FUMARATE 25 MG: 25 TABLET ORAL at 21:10

## 2018-02-05 RX ADMIN — SODIUM CHLORIDE 100 ML/HR: 9 INJECTION, SOLUTION INTRAVENOUS at 03:38

## 2018-02-05 NOTE — PLAN OF CARE
Problem: Hypertensive Disease/Crisis (Arterial) (Adult)  Goal: Signs and Symptoms of Listed Potential Problems Will be Absent or Manageable (Hypertensive Disease/Crisis)  Outcome: Ongoing (interventions implemented as appropriate)  Patient admitted with orthostatic hypotension related to hypertensive medication     Problem: Fluid Volume Deficit (Adult)  Goal: Identify Related Risk Factors and Signs and Symptoms  Outcome: Ongoing (interventions implemented as appropriate)   02/05/18 0417   Fluid Volume Deficit   Fluid Volume Deficit: Related Risk Factors medication effects   Signs and Symptoms (Fluid Volume Deficit) lab value changes;muscle weakness     Goal: Fluid/Electrolyte Balance  Outcome: Ongoing (interventions implemented as appropriate)   02/05/18 0417   Fluid Volume Deficit (Adult)   Fluid/Electrolyte Balance making progress toward outcome     Goal: Comfort/Well Being  Outcome: Ongoing (interventions implemented as appropriate)   02/05/18 0417   Fluid Volume Deficit (Adult)   Comfort/Well Being making progress toward outcome

## 2018-02-05 NOTE — ED PROVIDER NOTES
Subjective   History of Present Illness  This 63-year-old female presents to the emergency department with complaints of a sensation of lightheadedness beginning earlier this evening.  The patient checked her blood pressure at home and it was found to be depressed with a systolic in the 80s.  She became concerned regarding this as she has recently had significant medical issues and a prolonged hospitalization for a bowel obstruction with subsequent development of gangrenous bowel requiring bowel resection.  Patient has been home for several weeks now and has been doing reasonably well though her significant other who is with her this evening no sitter blood pressures have been somewhat low.  Her significant other has apparently been administering her clonidine intermittently though he apparently read online that this was not an appropriate thing to do and he is now scheduled her clonidine dose regularly and is not checking her blood pressures beforehand.  The patient received her evening dose of clonidine approximately 1 hour prior to the initiation of her symptoms.  There've been no tachypalpitations she denies chest pain or shortness of breath she denies nausea vomiting or abdominal pain.  She's had no alteration in her bowel or bladder habits of late she is noted no melena hematochezia or hematemesis.  She's had no fever or chills.  The patient does note that she is lightheaded with this and has had these sensations intermittently with the administration of clonidine over the past several days.  In addition to the patient's bowel issues she had experienced paroxysmal atrial fibrillation felt related to her medical problems while hospitalized.  This resolved and she has been discharged on amiodarone without anticoagulation other than aspirin daily per cardiology recommendations.  Additionally she had acute renal failure necessitating hemodialysis transiently as a result of her illness.  Her current  antihypertensive regimen includes amlodipine 10 mg daily, carvedilol 25 mg twice daily and clonidine 0.1 mg twice daily.    Past medical history is most significant for her recent medical issues as noted in the history of present illness.  Other problems include a history of hypertension, hyperlipidemia, as well as a meningioma status post debulking surgery.    Current medications are as noted on the chart    Social history she does not drink smoke or utilize drugs  Review of Systems   Constitutional: Negative for chills and fever.   Respiratory: Negative for chest tightness and shortness of breath.    Cardiovascular: Negative for chest pain, palpitations and leg swelling.   Gastrointestinal: Negative for abdominal pain, blood in stool, diarrhea, nausea and vomiting.   All other systems reviewed and are negative.      Past Medical History:   Diagnosis Date   • Acid reflux    • Arthritis    • Atrial flutter with rapid ventricular response 12/21/2017   • Back pain    • Brain tumor    • Depression    • History of blood transfusion    • History of Nissen fundoplication 7/1/2017   • Hyperlipemia    • Hypertension    • Memory loss or impairment    • Migraine    • Parathyroid adenoma     Incidental on thyroid US.   • PONV (postoperative nausea and vomiting)     nausea - preprocedural meds help    • Prediabetes     Last Impression: 06 Feb 2015  Reviewed labs. Excellent control.  Maren Connellast Impression: 06 Feb 2015  Reviewed labs. Excellent control.  Michaela Connell   • Thyroid nodule      Last Impression: 13 Jun 2015  r/o thyroid cancer, will proceed with US.  Michaela Connell (Internal Medicine)    • UTI (urinary tract infection)    • Vision changes     blockages left eye    • Wears glasses     readers       Allergies   Allergen Reactions   • Dilantin [Phenytoin Sodium Extended] Rash   • Phenytoin Rash     duplicate        Past Surgical History:   Procedure Laterality Date   • BRAIN SURGERY  2003 & 2014     Dr. Werner Hollis   • CARPAL TUNNEL RELEASE  2002    right    • COLONOSCOPY     • ENDOSCOPY     • EXPLORATORY LAPAROTOMY N/A 12/5/2017    Procedure: EXPLORATORY LAPAROTOMY, SMALL BOWEL RESECTION;  Surgeon: Ирина Cobian MD;  Location:  [facility];  Service:    • EXPLORATORY LAPAROTOMY N/A 12/22/2017    Procedure: LAPAROTOMY EXPLORATORY FOR SMALL BOWEL OBSTRUCTION;  Surgeon: Ирина Cobian MD;  Location:  [facility];  Service:    • HYSTERECTOMY      partial - both ovaries still present pt believes    • INSERTION HEMODIALYSIS CATHETER N/A 12/7/2017    Procedure: HEMODIALYSIS CATHETER INSERTION;  Surgeon: Chance Valenzuela MD;  Location:  [facility];  Service:    • ORBITOTOMY Left 11/3/2017    Procedure:  LEFT LATERAL ORBITOTOMY WITH DEBULKING OF TUMOR ;  Surgeon: Moo Hopkins MD;  Location:  [facility];  Service:    • OTHER SURGICAL HISTORY      craniotomy tumor removal-complete   • OTHER SURGICAL HISTORY      esophagogastric fundoplasty nissen fundoplication   • TUBAL ABDOMINAL LIGATION         Family History   Problem Relation Age of Onset   • Obesity Mother    • Heart attack Mother    • Migraines Father    • Stroke Father    • Cancer Other    • Diabetes Other    • Breast cancer Maternal Aunt    • Breast cancer Paternal Aunt    • Ovarian cancer Neg Hx        Social History     Social History   • Marital status:      Spouse name: N/A   • Number of children: N/A   • Years of education: N/A     Social History Main Topics   • Smoking status: Never Smoker   • Smokeless tobacco: Never Used   • Alcohol use No   • Drug use: No   • Sexual activity: Defer     Other Topics Concern   • None     Social History Narrative           Objective   Physical Exam   Constitutional: She is oriented to person, place, and time. She appears well-developed and well-nourished. No distress.   HENT:   Head: Normocephalic and atraumatic.   Eyes: Pupils are equal, round, and reactive to light. No scleral icterus.    Neck: Neck supple.   Cardiovascular: Regular rhythm and normal heart sounds.    No murmur heard.  Pulmonary/Chest: Effort normal and breath sounds normal. No respiratory distress.   Abdominal: Soft. Bowel sounds are normal. She exhibits no distension. There is no tenderness.   Musculoskeletal: She exhibits no edema.   Lymphadenopathy:     She has no cervical adenopathy.   Neurological: She is alert and oriented to person, place, and time. No cranial nerve deficit. Coordination normal.   Skin: Skin is warm and dry. She is not diaphoretic. There is pallor.   Psychiatric: She has a normal mood and affect. Her behavior is normal.   Nursing note and vitals reviewed.  Patient is noted to be somewhat more anemic than her baseline.  The remainder of her evaluation shows no significant abnormalities.  The patient has had episodes of blood pressures as low as 84 systolic in the emergency department prior to fluid administration.  Following fluid bolus her pressure has responded by increasing to a systolic of 110+.    Assessment hypotension and anemia likely a result of a combination of events including her mild anemia as well as possibly overly aggressive treatment of her blood pressure with clonidine and overly aggressive heart rate control with her Coreg.  The patient's heart rates have consistently been in the mid 50s and low 60s.    Plan patient will require overnight admission given that she is had symptomatic hypotension to allow her medications to metabolize off as well as for monitoring and over the next 24 hours she should be able to have her medication regimen adjusted as well as have her mild anemia checked into to see if there is any active bleeding from her surgical sites.    Procedures         ED Course  ED Course                  MDM    Final diagnoses:   Hypotension due to drugs   Anemia, unspecified type       elly Martínez MD  02/05/18 5194

## 2018-02-05 NOTE — PROGRESS NOTES
Pt sitting up in chair, feels weak. But stronger than yesterday.  All her bp meds have been held     138/73  HR 71    Awake/alert oriented  RRR no RMG  CTA bL  Abd soft non tender  NO edema, JENSEN    Trop negative, BMP ok  Elevated Ferritin/Elevated TIBC  Normal lactate    62 Y/O FEMALE W/ RECENT COMPLICATED HOSPITAL COURSE ADMITTED 2/4/18 W/ DIZZINESS AND ASSOCIATED HYPOTENSION LIKELY FROM CLONIDINE MISUSE W/ ONGOING WORSENING ANEMIA;    - hold clonidine  - add back bp meds as we need  - continue light IVF  - nutriotion to see  - anemia   - up in chair, pt/ot.    Macrina Watson, DO  1:03 PM  02/05/18

## 2018-02-05 NOTE — PLAN OF CARE
Problem: Patient Care Overview (Adult)  Goal: Plan of Care Review  Outcome: Ongoing (interventions implemented as appropriate)   02/05/18 0423   Coping/Psychosocial Response Interventions   Plan Of Care Reviewed With patient;spouse   Patient Care Overview   Progress no change     Goal: Adult Individualization and Mutuality  Outcome: Ongoing (interventions implemented as appropriate)   02/05/18 0423   Individualization   Patient Specific Interventions monitor orthostatic bp, assist patient with transfers and ambulation, replenish fluids, monitor lab values

## 2018-02-05 NOTE — H&P
[facility] Medicine Services  HISTORY AND PHYSICAL    Patient Name: Tereza Ackerman  : 1955  MRN: [id number]  Primary Care Physician: Micahela Connell MD    Subjective   Subjective     Chief Complaint: Hypotension/Dizziness    HPI:  Tereza Ackerman is a 63 y.o. female who presents to [facility] ED with complaints of hypotension and dizziness. Patient was discharged on 18 after extended hospitalization for SBO and gangrenous bowel. Patient was discharged home, and has followed up with Dr. CUI and Dr. Connell as scheduled. Patient has been taking amlodipine 10mg daily, carvedilol 25mg BID, and Clonidine 0.1mg BID. Patient has associated these episodes and dizziness after taking clonidine. She states this evening her SBP was in the 80s at home. Additionally, in the emergency department, patient was found to have a H&H of 8.9/28.3. Patient denies any acute blood loss. She has a past medical history that includes HTN, hyperlipidemia, pre-diabetes, parathyroid adenoma, recurrent meningioma, arthritis, and depression. Patient will be admitted to the hospital medicine service for further evaluation and treatment.     62 YO FEMALE WHO WAS JUST DISCHARGED FROM HERE 18 AFTER VERY PROLONGED COMPLICATED COURSE FOLLOWING SURGICAL CORRECTION OF SBO/GANGRENOUS BOWEL.  PT PRESENTS NOW AFTER FEELING LIGHT HEADED AND WAS FOUND TO BE HYPOTENSIVE W/ SBP IN 80'S.  REPORTEDLY  GIVING CLONIDINE SCHEDULED RATHER THAN PRN W/O CHECKING BP FIRST.  PT FEELS BETTER AFTER NS INFUSION.  HGB HAS DROPPED AS WELL BUT PT DENIES ANY BLOOD LOSS THAT HAS BEEN VISIBLE TO HER.  Review of Systems   Constitutional: Negative for chills, diaphoresis, fatigue and fever.   Respiratory: Negative for cough, shortness of breath and wheezing.    Cardiovascular: Negative for chest pain, palpitations and leg swelling.   Gastrointestinal: Negative for abdominal pain, nausea and vomiting.   Genitourinary: Negative for  dysuria, hematuria and urgency.   Musculoskeletal: Negative for arthralgias and myalgias.   Neurological: Positive for dizziness and light-headedness. Negative for syncope and weakness.   Psychiatric/Behavioral: Negative for agitation and confusion.   All other systems reviewed and are negative.    Otherwise 10-system ROS reviewed and is negative except as mentioned in the HPI.    Personal History     Past Medical History:   Diagnosis Date   • Acid reflux    • Arthritis    • Atrial flutter with rapid ventricular response 12/21/2017   • Back pain    • Brain tumor    • Depression    • History of blood transfusion    • History of Nissen fundoplication 7/1/2017   • Hyperlipemia    • Hypertension    • Memory loss or impairment    • Migraine    • Parathyroid adenoma     Incidental on thyroid US.   • PONV (postoperative nausea and vomiting)     nausea - preprocedural meds help    • Prediabetes     Last Impression: 06 Feb 2015  Reviewed labs. Excellent control.  Maren Connellast Impression: 06 Feb 2015  Reviewed labs. Excellent control.  Michaela Connell   • Thyroid nodule      Last Impression: 13 Jun 2015  r/o thyroid cancer, will proceed with US.  Michaela Connell (Internal Medicine)    • UTI (urinary tract infection)    • Vision changes     blockages left eye    • Wears glasses     readers       Past Surgical History:   Procedure Laterality Date   • BRAIN SURGERY  2003 & 2014    Dr. Werner Hollis   • CARPAL TUNNEL RELEASE  2002    right    • COLONOSCOPY     • ENDOSCOPY     • EXPLORATORY LAPAROTOMY N/A 12/5/2017    Procedure: EXPLORATORY LAPAROTOMY, SMALL BOWEL RESECTION;  Surgeon: Ирина Cobian MD;  Location:  [facility];  Service:    • EXPLORATORY LAPAROTOMY N/A 12/22/2017    Procedure: LAPAROTOMY EXPLORATORY FOR SMALL BOWEL OBSTRUCTION;  Surgeon: Ирина Cobian MD;  Location:  [facility];  Service:    • HYSTERECTOMY      partial - both ovaries still present pt believes    • INSERTION HEMODIALYSIS  CATHETER N/A 12/7/2017    Procedure: HEMODIALYSIS CATHETER INSERTION;  Surgeon: Chance Valenzuela MD;  Location:  [facility];  Service:    • ORBITOTOMY Left 11/3/2017    Procedure:  LEFT LATERAL ORBITOTOMY WITH DEBULKING OF TUMOR ;  Surgeon: Moo Hopkins MD;  [facility];  Service:    • OTHER SURGICAL HISTORY      craniotomy tumor removal-complete   • OTHER SURGICAL HISTORY      esophagogastric fundoplasty nissen fundoplication   • TUBAL ABDOMINAL LIGATION         Family History: family history includes Breast cancer in her maternal aunt and paternal aunt; Cancer in her other; Diabetes in her other; Heart attack in her mother; Migraines in her father; Obesity in her mother; Stroke in her father. There is no history of Ovarian cancer.     Social History:  reports that she has never smoked. She has never used smokeless tobacco. She reports that she does not drink alcohol or use illicit drugs.  Social History     Social History Narrative       Medications:  Reviewed on admission    Allergies   Allergen Reactions   • Dilantin [Phenytoin Sodium Extended] Rash   • Phenytoin Rash     duplicate        Objective   Objective     Vital Signs:   Temp:  [97.6 °F (36.4 °C)] 97.6 °F (36.4 °C)  Heart Rate:  [56-62] 59  Resp:  [14] 14  BP: ()/(43-76) 151/74        Physical Exam   Constitutional: She is oriented to person, place, and time. She appears well-developed and well-nourished.   HENT:   Head: Normocephalic and atraumatic.   Eyes: EOM are normal. Pupils are equal, round, and reactive to light. No scleral icterus.   Neck: Normal range of motion. Neck supple. No JVD present.   Cardiovascular: Normal rate, regular rhythm, normal heart sounds and intact distal pulses.  Exam reveals no gallop and no friction rub.    No murmur heard.  Pulmonary/Chest: Effort normal and breath sounds normal. No respiratory distress. She has no wheezes. She has no rales. She exhibits no tenderness.   Abdominal: Soft. Bowel  sounds are normal. She exhibits no distension and no mass. There is no tenderness. There is no rebound and no guarding. No hernia.   Musculoskeletal: Normal range of motion. She exhibits no edema, tenderness or deformity.   Neurological: She is alert and oriented to person, place, and time.   Skin: Skin is warm and dry. No rash noted. No erythema. No pallor.   Healing mid-line abdominal incision   Psychiatric: She has a normal mood and affect. Her behavior is normal. Judgment and thought content normal.   Nursing note and vitals reviewed.     GEN; ALERT, ORIENTED, NAD  CV; RRR. NO MRG  L; CTAB, NO WHEEZE/CRACKLES  ABD; SOFT, +BS, NTND  EXT; NO CCE, 2+ PULSES  SKIN; CDI,WARM  NEURO; GROSSLY INTACT  PSYCH; MOOD AND AFFECT APPROPRIATE    Results Reviewed:  I have personally reviewed current lab, radiology, and data and agree.      Results from last 7 days  Lab Units 02/05/18  0044 02/05/18  0040   WBC 10*3/mm3  --  7.56   HEMOGLOBIN g/dL  --  8.9*   HEMATOCRIT %  --  28.3*   PLATELETS 10*3/mm3  --  310   INR  1.03  --        Results from last 7 days  Lab Units 02/05/18  0044   SODIUM mmol/L 137   POTASSIUM mmol/L 4.5   CHLORIDE mmol/L 105   CO2 mmol/L 26.0   BUN mg/dL 19   CREATININE mg/dL 1.30   GLUCOSE mg/dL 96   CALCIUM mg/dL 8.9   ALT (SGPT) U/L 13   AST (SGOT) U/L 20     Estimated Creatinine Clearance: 39 mL/min (by C-G formula based on Cr of 1.3).  Brief Urine Lab Results  (Last result in the past 365 days)      Color   Clarity   Blood   Leuk Est   Nitrite   Protein   CREAT   Urine HCG        12/06/17 1106             113.3           No results found for: BNP  No results found for: PHART  Imaging Results (last 24 hours)     Procedure Component Value Units Date/Time    XR Chest 1 View [[id number]] Collected:  02/05/18 0017     Updated:  02/05/18 0145    Narrative:       EXAM:    XR Chest, 1 View    EXAM DATE/TIME:    2/5/2018 12:17 AM    CLINICAL HISTORY:    63 years old, female; Signs and symptoms; Other:  Hypotension    TECHNIQUE:    Frontal view of the chest.    COMPARISON:    CR - XR CHEST 1 VW 2017-12-22 03:51    FINDINGS:    No acute infiltrate, pulmonary edema, pneumothorax, or pleural effusion.  Interim removal of previous internal jugular venous catheter.  Heart top normal to mildly enlarged, improved.  Likely small to moderate hiatal hernia.  No acute bone abnormality.      Impression:         No evidence new acute cardiopulmonary disease with resolution of previous   abnormalities demonstrated on 12/22/2017.    Cardiomegaly, stable.    Additional nonacute findings as noted.    THIS DOCUMENT HAS BEEN ELECTRONICALLY SIGNED BY JENISE JEFFREY MD        Results for orders placed during the hospital encounter of 12/04/17   Adult Transthoracic Echo Complete W/ Cont if Necessary Per Protocol    Narrative · LVEF difficult to evaluate with tachycardia- globally appears mildly   depressed.  · Left ventricular wall thickness is consistent with concentric   hypertrophy.  · Mild tricuspid valve regurgitation is present.  · Calculated right ventricular systolic pressure from tricuspid   regurgitation is 32 mmHg.          Assessment/Plan   Assessment / Plan     Hospital Problem List     * (Principal)Hypotension    Parathyroid adenoma    Overview Signed 7/6/2016 12:38 PM by Michaela Connell MD     Description: Incidental on thyroid US.         Meningioma, recurrent of brain    Overview Signed 12/6/2017  3:49 PM by Julia COTE Case, DO     First occurrence with resection by Bunny in 2003.   Recurrence with resection by Bunny in 2014.          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 Hollis.          Prediabetes    Overview Signed 11/13/2017 12:10 PM by Michaela Connell MD     Last Impression: 06 Feb 2015  Reviewed labs. Excellent control.  Emery Connell Impression: 06  Feb 2015  Reviewed labs. Excellent control.  Michaela Connell         Hyperlipemia    Atrial flutter with rapid ventricular response    Overview Addendum 12/21/2017 12:10 PM by JEIMY Dalal     1. Echo 12-21-17:  · LVEF difficult to evaluate with tachycardia- globally appears mildly depressed.  · Left ventricular wall thickness is consistent with concentric hypertrophy.  · Mild tricuspid valve regurgitation is present.  · Calculated right ventricular systolic pressure from tricuspid regurgitation is 32 mmHg.         Anemia    Dizziness        Assessment & Plan:  62 Y/O FEMALE W/ RECENT COMPLICATED HOSPITAL COURSE HERE NOW W/ DIZZINESS AND ASSOCIATED HYPOTENSION PRESUMABLY FROM CLONIDINE MISUSE W/ ONGOING WORSENING ANEMIA;  - Admit to telemetry  - VS q4h  - IVF overnight  - AM Labs  - Hold clonidine    - Will need evaluation of BP medications  - Anemia studies    - previously done on last admission   - Patient received multiple transfusions during previous hospitalization   - Continue home medications as appropriate   - Fall precautions    DVT prophylaxis: TEDs/SCDs    CODE STATUS: FULL      Admission Status:  I believe this patient meets OBSERVATION status, however if further evaluation or treatment plans warrant, status may change.  Based upon current information, I predict patient's care encounter to be less than or equal to 2 midnights.    Mary Salinas, APRN  02/05/18   3:02 AM

## 2018-02-05 NOTE — PROGRESS NOTES
Discharge Planning Assessment  [facility]     Patient Name: Tereza Ackerman  MRN: [id number]  Today's Date: 2/5/2018    Admit Date: 2/4/2018          Discharge Needs Assessment       02/05/18 1116    Living Environment    Lives With spouse    Living Arrangements house    Stair Railings at Home none    Type of Financial/Environmental Concern none    Transportation Available car;family or friend will provide    Living Environment    Primary Care Provided By spouse/significant other    Quality Of Family Relationships supportive    Able to Return to Prior Living Arrangements yes    Discharge Needs Assessment    Concerns To Be Addressed no discharge needs identified;denies needs/concerns at this time    Readmission Within The Last 30 Days no previous admission in last 30 days    Anticipated Changes Related to Illness none    Equipment Currently Used at Home none    Equipment Needed After Discharge none    Discharge Disposition home or self-care            Discharge Plan       02/05/18 1117    Case Management/Social Work Plan    Plan Home at discharge    Patient/Family In Agreement With Plan yes    Additional Comments Patient lives with her spouse in [facility]. She is independent with her ADL's and has no current needs for DME or home health. Her goal is to return home when medically ready and denies any concerns regarding discharge -  following -  [phone]         Discharge Placement     No information found                Demographic Summary       02/05/18 1115    Referral Information    Admission Type observation    Arrived From still a patient    Referral Source admission list    Reason For Consult discharge planning    Record Reviewed history and physical;medical record    Contact Information    Permission Granted to Share Information With     Primary Care Physician Information    Name Michaela Connell             Functional Status       02/05/18 1116    Functional Status Current    Current  Functional Level Comment Please see nursing notes     Functional Status Prior    Ambulation 0-->independent    Transferring 0-->independent    Toileting 0-->independent    Bathing 0-->independent    Dressing 0-->independent    Eating 0-->independent    Communication 0-->understands/communicates without difficulty    Swallowing 0-->swallows foods/liquids without difficulty    IADL    Medications independent    Meal Preparation independent    Housekeeping independent    Laundry independent    Shopping independent    Oral Care independent    Activity Tolerance    Current Activity Limitations none    Usual Activity Tolerance good    Current Activity Tolerance moderate    Cognitive/Perceptual/Developmental    Current Mental Status/Cognitive Functioning no deficits noted    Employment/Financial    Employment/Finance Comments [place] medicare replacement             Psychosocial     None            Abuse/Neglect     None            Legal     None            Substance Abuse     None            Patient Forms     None          Luci Castrejon RN

## 2018-02-06 ENCOUNTER — TELEPHONE (OUTPATIENT)
Dept: INTERNAL MEDICINE | Facility: CLINIC | Age: 63
End: 2018-02-06

## 2018-02-06 VITALS
HEART RATE: 69 BPM | TEMPERATURE: 98.6 F | WEIGHT: 149.3 LBS | OXYGEN SATURATION: 94 % | RESPIRATION RATE: 18 BRPM | BODY MASS INDEX: 26.45 KG/M2 | HEIGHT: 63 IN | SYSTOLIC BLOOD PRESSURE: 156 MMHG | DIASTOLIC BLOOD PRESSURE: 89 MMHG

## 2018-02-06 PROBLEM — I95.9 HYPOTENSION: Status: RESOLVED | Noted: 2017-12-05 | Resolved: 2018-02-06

## 2018-02-06 PROBLEM — I95.2 HYPOTENSION DUE TO MEDICATION: Status: RESOLVED | Noted: 2018-02-05 | Resolved: 2018-02-06

## 2018-02-06 PROBLEM — R42 DIZZINESS: Status: RESOLVED | Noted: 2018-02-05 | Resolved: 2018-02-06

## 2018-02-06 PROCEDURE — G0378 HOSPITAL OBSERVATION PER HR: HCPCS

## 2018-02-06 PROCEDURE — 99217 PR OBSERVATION CARE DISCHARGE MANAGEMENT: CPT | Performed by: NURSE PRACTITIONER

## 2018-02-06 RX ORDER — AMLODIPINE BESYLATE 10 MG/1
10 TABLET ORAL
Qty: 30 TABLET | Refills: 0
Start: 2018-02-06 | End: 2018-02-14 | Stop reason: SDUPTHER

## 2018-02-06 RX ORDER — CARVEDILOL 25 MG/1
12.5 TABLET ORAL EVERY 12 HOURS SCHEDULED
Qty: 30 TABLET | Refills: 0
Start: 2018-02-06 | End: 2018-02-13 | Stop reason: SDUPTHER

## 2018-02-06 RX ORDER — CARVEDILOL 12.5 MG/1
12.5 TABLET ORAL EVERY 12 HOURS SCHEDULED
Status: DISCONTINUED | OUTPATIENT
Start: 2018-02-06 | End: 2018-02-06 | Stop reason: HOSPADM

## 2018-02-06 RX ADMIN — SERTRALINE HYDROCHLORIDE 50 MG: 50 TABLET ORAL at 08:46

## 2018-02-06 RX ADMIN — AMIODARONE HYDROCHLORIDE 200 MG: 200 TABLET ORAL at 08:46

## 2018-02-06 RX ADMIN — ASPIRIN 325 MG: 325 TABLET, DELAYED RELEASE ORAL at 08:46

## 2018-02-06 NOTE — PLAN OF CARE
Problem: Patient Care Overview (Adult)  Goal: Plan of Care Review  Outcome: Ongoing (interventions implemented as appropriate)   02/06/18 1026   Coping/Psychosocial Response Interventions   Plan Of Care Reviewed With patient   Patient Care Overview   Progress progress toward functional goals as expected   Outcome Evaluation   Outcome Summary/Follow up Plan patient ready for discharge     Goal: Adult Individualization and Mutuality  Outcome: Ongoing (interventions implemented as appropriate)   02/06/18 1026   Individualization   Patient Specific Interventions monitored BP q4hr        Problem: Fluid Volume Deficit (Adult)  Goal: Fluid/Electrolyte Balance  Outcome: Ongoing (interventions implemented as appropriate)   02/06/18 1026   Fluid Volume Deficit (Adult)   Fluid/Electrolyte Balance achieves outcome     Goal: Comfort/Well Being  Outcome: Ongoing (interventions implemented as appropriate)   02/06/18 1026   Fluid Volume Deficit (Adult)   Comfort/Well Being achieves outcome       Problem: Fall Risk (Adult)  Goal: Identify Related Risk Factors and Signs and Symptoms  Outcome: Ongoing (interventions implemented as appropriate)   02/06/18 1026   Fall Risk   Fall Risk: Related Risk Factors history of falls   Fall Risk: Signs and Symptoms presence of risk factors     Goal: Absence of Falls  Outcome: Ongoing (interventions implemented as appropriate)   02/06/18 1026   Fall Risk (Adult)   Absence of Falls achieves outcome

## 2018-02-06 NOTE — PLAN OF CARE
Problem: Patient Care Overview (Adult)  Goal: Plan of Care Review  Outcome: Ongoing (interventions implemented as appropriate)   02/05/18 1519 02/06/18 0400   Coping/Psychosocial Response Interventions   Plan Of Care Reviewed With --  patient   Patient Care Overview   Progress improving --      Goal: Adult Individualization and Mutuality  Outcome: Ongoing (interventions implemented as appropriate)      Problem: Fluid Volume Deficit (Adult)  Goal: Identify Related Risk Factors and Signs and Symptoms  Outcome: Ongoing (interventions implemented as appropriate)   02/05/18 1519   Fluid Volume Deficit   Fluid Volume Deficit: Related Risk Factors medication effects   Signs and Symptoms (Fluid Volume Deficit) lab value changes     Goal: Fluid/Electrolyte Balance  Outcome: Ongoing (interventions implemented as appropriate)   02/06/18 0400   Fluid Volume Deficit (Adult)   Fluid/Electrolyte Balance making progress toward outcome     Goal: Comfort/Well Being  Outcome: Ongoing (interventions implemented as appropriate)   02/06/18 0400   Fluid Volume Deficit (Adult)   Comfort/Well Being making progress toward outcome       Problem: Fall Risk (Adult)  Goal: Identify Related Risk Factors and Signs and Symptoms  Outcome: Ongoing (interventions implemented as appropriate)   02/05/18 1519   Fall Risk   Fall Risk: Related Risk Factors history of falls   Fall Risk: Signs and Symptoms presence of risk factors     Goal: Absence of Falls  Outcome: Ongoing (interventions implemented as appropriate)   02/06/18 0400   Fall Risk (Adult)   Absence of Falls making progress toward outcome

## 2018-02-06 NOTE — DISCHARGE SUMMARY
[facility] Medicine Services  DISCHARGE SUMMARY    Patient Name: Tereza Ackerman  : 1955  MRN: [id number]    Date of Admission: 2018  Date of Discharge:  18  Primary Care Physician: Michaela Connell MD    Consults     No orders found from 2018 to 2018.        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 female recently discharged from [facility] 2018 after extended hospitalization for small bowel obstruction/gangrenous bowel that was complicated by acute renal failure requiring short-term dialysis, long course antibiotics, blood transfusions for severe anemia, and several new blood pressure medications at discharge.  Patient was admitted on 2018 after experiencing episodes of dizziness, shakiness, feeling of weakness.  She had noted hemoglobin/hematocrit 8.9/28.3 as well as systolic blood pressure in the 80s at home which prompted her arrival to [facility] ED.  Patient had persistent blood pressure in 80s on arrival.  She was taken off all blood pressure medications and started on IV fluids.  Patient was to be taking clonidine as needed for systolic blood pressure greater  than 180 however was taking scheduled daily without blood pressure check.    Patient to continue amiodarone and half dose carvedilol at 12.5 mg twice a day.  She is to monitor blood pressure daily and restart full dose of carvedilol as blood pressure allows.  She will then restart amlodipine as can tolerate/as needed.  Have instructed patient to stop clonidine for now.  Follow-up with primary care in 1 week.  She will need repeat CBC at this time.  Hemoglobin/hematocrit have remained stable without any significant drops or requirement for blood transfusion.  Patient has not had any signs or symptoms of blood loss.  Patient to continue all other home medications at discharge.           Day of Discharge     HPI:   Patient feeling better today. Still having fatigue, but no more than baseline. Denies, dizziness, n/v or weakness.     Review of Systems  Gen- No fevers, chills  CV- No chest pain, palpitations  Resp- No cough, dyspnea  GI- No N/V/D, abd pain      Otherwise ROS is negative except as mentioned in the HPI.    Vital Signs:   Temp:  [97.2 °F (36.2 °C)-98.6 °F (37 °C)] 98.6 °F (37 °C)  Heart Rate:  [67-71] 69  Resp:  [16-18] 18  BP: (114-157)/(68-95) 156/89     Physical Exam:  Constitutional: No acute distress, awake, alert  HENT: NCAT, mucous membranes moist  Respiratory: Clear to auscultation bilaterally, respiratory effort normal   Cardiovascular: RRR, no murmurs, rubs, or gallops, palpable pedal pulses bilaterally  Gastrointestinal: Positive bowel sounds, soft, nontender, nondistended  Musculoskeletal: No bilateral ankle edema  Psychiatric: Appropriate affect, cooperative  Neurologic: Oriented x 3, strength symmetric in all extremities, Cranial Nerves grossly intact to confrontation, speech clear  Skin: No rashes      Pertinent  and/or Most Recent Results       Results from last 7 days  Lab Units 02/05/18  0426 02/05/18  0044 02/05/18  0040   WBC 10*3/mm3 7.50  --  7.56   HEMOGLOBIN g/dL 8.6*  --  8.9*    HEMATOCRIT % 27.4*  --  28.3*   PLATELETS 10*3/mm3 254  --  310   SODIUM mmol/L 136 137  --    POTASSIUM mmol/L 4.0 4.5  --    CHLORIDE mmol/L 107 105  --    CO2 mmol/L 24.0 26.0  --    BUN mg/dL 17 19  --    CREATININE mg/dL 1.10 1.30  --    GLUCOSE mg/dL 110* 96  --    CALCIUM mg/dL 8.4* 8.9  --        Results from last 7 days  Lab Units 02/05/18  0044   BILIRUBIN mg/dL 0.2*   ALK PHOS U/L 96   ALT (SGPT) U/L 13   AST (SGOT) U/L 20   PROTIME Seconds 11.2   INR  1.03           Invalid input(s): TG, LDLREALC      Brief Urine Lab Results  (Last result in the past 365 days)      Color   Clarity   Blood   Leuk Est   Nitrite   Protein   CREAT   Urine HCG        02/05/18 0231 Yellow Clear Negative Trace(A) Negative Negative               Microbiology Results Abnormal     None          Imaging Results (all)     Procedure Component Value Units Date/Time    XR Chest 1 View [[id number]] Collected:  02/05/18 0017     Updated:  02/05/18 0145    Narrative:       EXAM:    XR Chest, 1 View    EXAM DATE/TIME:    2/5/2018 12:17 AM    CLINICAL HISTORY:    63 years old, female; Signs and symptoms; Other: Hypotension    TECHNIQUE:    Frontal view of the chest.    COMPARISON:    CR - XR CHEST 1 VW 2017-12-22 03:51    FINDINGS:    No acute infiltrate, pulmonary edema, pneumothorax, or pleural effusion.  Interim removal of previous internal jugular venous catheter.  Heart top normal to mildly enlarged, improved.  Likely small to moderate hiatal hernia.  No acute bone abnormality.      Impression:         No evidence new acute cardiopulmonary disease with resolution of previous   abnormalities demonstrated on 12/22/2017.    Cardiomegaly, stable.    Additional nonacute findings as noted.    THIS DOCUMENT HAS BEEN ELECTRONICALLY SIGNED BY JENISE JEFFREY MD                    Results for orders placed during the hospital encounter of 12/04/17   Adult Transthoracic Echo Complete W/ Cont if Necessary Per Protocol    Narrative · LVEF difficult  to evaluate with tachycardia- globally appears mildly   depressed.  · Left ventricular wall thickness is consistent with concentric   hypertrophy.  · Mild tricuspid valve regurgitation is present.  · Calculated right ventricular systolic pressure from tricuspid   regurgitation is 32 mmHg.            Discharge Details      Tereza Ackerman   Home Medication Instructions MERCED:[id number]    Printed on:02/06/18 0479   Medication Information                      amiodarone (PACERONE) 200 MG tablet  Take 1 tablet by mouth Daily for 30 days.             amLODIPine (NORVASC) 10 MG tablet  Take 1 tablet by mouth Daily for 30 days. Hold for now.  restart carvedilol at 25mg bid if SBP >160. If SBP  >160 then restart amlodipine             aspirin  MG tablet  Take 1 tablet by mouth Daily for 30 days. For your heart             carvedilol (COREG) 25 MG tablet  Take 0.5 tablets by mouth Every 12 (Twelve) Hours for 30 days.             docusate sodium 100 MG capsule  Take 100 mg by mouth 2 (Two) Times a Day for 30 days.             metoclopramide (REGLAN) 5 MG tablet  Take 1 tablet by mouth 2 (Two) Times a Day for 30 days.             mupirocin (BACTROBAN) 2 % ointment  Apply  topically 2 (Two) Times a Day. To wound area until healed             ondansetron ODT (ZOFRAN-ODT) 8 MG disintegrating tablet               QUEtiapine (SEROquel) 25 MG tablet  Take 1 tablet by mouth Every Night for 30 days.             sertraline (ZOLOFT) 100 MG tablet  Take 1 tablet by mouth Daily.             thiamine (VITAMIN B1) 100 MG tablet  Take 1 tablet by mouth Daily for 30 days.                   Discharge Disposition:  Home or Self Care    Discharge Diet:  Diet Instructions     Advance Diet As Tolerated                     Discharge Activity:   Activity Instructions     Activity as Tolerated                     Special Instructions:  Hold Clonidine, hold norvasc  Restart full dose coreg if SBP >160  Restart amlodipine for persistent SBP  >160  Repeat CBC 1 week    Future Appointments  Date Time Provider Department Center   2/19/2018 11:00 AM Alli Aguirre MD [facility] None   2/20/2018 11:15 AM Michaela Connell MD [facility] None       Additional Instructions for the Follow-ups that You Need to Schedule     Discharge Follow-up with PCP    As directed    Follow Up Details:  1 week                     Time Spent on Discharge:  39 minutes    Macrina Santiago, AYDEE  02/06/18  9:32 AM

## 2018-02-06 NOTE — TELEPHONE ENCOUNTER
PT BEING DISCHARGED TODAY FROM [facility] FOR ANEMIA AND HYPOTENSION.  NEEDS KANDI APPOINTMENT IN 1-2 WKS,   SCHEDULED PATIENT WITH DR SHEFFIELD FOR 02/19/18 @ 145

## 2018-02-07 ENCOUNTER — TRANSITIONAL CARE MANAGEMENT TELEPHONE ENCOUNTER (OUTPATIENT)
Dept: INTERNAL MEDICINE | Facility: CLINIC | Age: 63
End: 2018-02-07

## 2018-02-13 DIAGNOSIS — I10 ESSENTIAL HYPERTENSION: ICD-10-CM

## 2018-02-13 DIAGNOSIS — K56.609 SMALL BOWEL OBSTRUCTION (HCC): ICD-10-CM

## 2018-02-14 RX ORDER — AMLODIPINE BESYLATE 10 MG/1
10 TABLET ORAL
Qty: 30 TABLET | Refills: 3
Start: 2018-02-14 | End: 2018-02-19 | Stop reason: ALTCHOICE

## 2018-02-14 RX ORDER — CARVEDILOL 25 MG/1
12.5 TABLET ORAL EVERY 12 HOURS SCHEDULED
Qty: 30 TABLET | Refills: 3 | Status: SHIPPED | OUTPATIENT
Start: 2018-02-14 | End: 2018-02-19 | Stop reason: SDUPTHER

## 2018-02-14 NOTE — TELEPHONE ENCOUNTER
PT CALLED BACK TODAY TO GIVE US THE REST OF HER MEDS SHE NEEDS CALLED IN     [facility]  COREG  BACTROBAN  AMIODARONE  MG

## 2018-02-14 NOTE — TELEPHONE ENCOUNTER
LOV: 01/22/18  Pls advise on refills, different prescriber in chart for coreg and norvasc; thiamine and amiodarone not on med list.

## 2018-02-15 RX ORDER — AMIODARONE HYDROCHLORIDE 200 MG/1
200 TABLET ORAL DAILY
Qty: 30 TABLET | Refills: 0 | Status: SHIPPED | OUTPATIENT
Start: 2018-02-15 | End: 2018-02-19 | Stop reason: ALTCHOICE

## 2018-02-15 RX ORDER — QUETIAPINE FUMARATE 25 MG/1
25 TABLET, FILM COATED ORAL NIGHTLY
Qty: 90 TABLET | Refills: 0 | Status: SHIPPED | OUTPATIENT
Start: 2018-02-15 | End: 2018-06-21 | Stop reason: SDUPTHER

## 2018-02-15 RX ORDER — LANOLIN ALCOHOL/MO/W.PET/CERES
100 CREAM (GRAM) TOPICAL DAILY
Qty: 90 TABLET | Refills: 0 | Status: SHIPPED | OUTPATIENT
Start: 2018-02-15 | End: 2018-11-19 | Stop reason: SDUPTHER

## 2018-02-15 RX ORDER — METOCLOPRAMIDE 5 MG/1
5 TABLET ORAL 3 TIMES DAILY
Qty: 90 TABLET | Refills: 0 | Status: SHIPPED | OUTPATIENT
Start: 2018-02-15 | End: 2018-02-24

## 2018-02-15 RX ORDER — AMIODARONE HYDROCHLORIDE 200 MG/1
200 TABLET ORAL DAILY
Qty: 30 TABLET | Refills: 0 | Status: CANCELLED | OUTPATIENT
Start: 2018-02-15

## 2018-02-15 NOTE — TELEPHONE ENCOUNTER
PATIENT IS CALLING TO CHECK ON THE STATUS OF THESE MEDICATIONS SHE HAS REQUESTED TO BE FILLED. PHARMACY IS SUPPOSE TO BE FAXING US THIS INFORMATION ABOUT THESE MEDICATIONS AS WELL. SHE NEEDS US TO GET THESE PRESCRIPTIONS FILLED AS SHE IS OUT OF MEDICATIONS.

## 2018-02-15 NOTE — TELEPHONE ENCOUNTER
Spoke with patient very confused about RX  Please advise on refill from medications that patient was on in the hospital.

## 2018-02-16 NOTE — TELEPHONE ENCOUNTER
Sent 90 day refills on all except for amiodarone, which I only sent 30 day refill on as she needs to get that from Cardiology( Appt. Next week)

## 2018-02-19 ENCOUNTER — OFFICE VISIT (OUTPATIENT)
Dept: CARDIOLOGY | Facility: CLINIC | Age: 63
End: 2018-02-19

## 2018-02-19 ENCOUNTER — OFFICE VISIT (OUTPATIENT)
Dept: INTERNAL MEDICINE | Facility: CLINIC | Age: 63
End: 2018-02-19

## 2018-02-19 VITALS
RESPIRATION RATE: 18 BRPM | DIASTOLIC BLOOD PRESSURE: 82 MMHG | SYSTOLIC BLOOD PRESSURE: 122 MMHG | OXYGEN SATURATION: 99 % | WEIGHT: 147 LBS | HEART RATE: 75 BPM | BODY MASS INDEX: 26.05 KG/M2 | HEIGHT: 63 IN

## 2018-02-19 VITALS
DIASTOLIC BLOOD PRESSURE: 80 MMHG | WEIGHT: 139 LBS | BODY MASS INDEX: 24.63 KG/M2 | HEIGHT: 63 IN | OXYGEN SATURATION: 96 % | HEART RATE: 62 BPM | SYSTOLIC BLOOD PRESSURE: 140 MMHG

## 2018-02-19 DIAGNOSIS — I48.92 ATRIAL FLUTTER WITH RAPID VENTRICULAR RESPONSE (HCC): Primary | ICD-10-CM

## 2018-02-19 DIAGNOSIS — I95.9 HYPOTENSION, UNSPECIFIED HYPOTENSION TYPE: Primary | ICD-10-CM

## 2018-02-19 DIAGNOSIS — D50.0 ANEMIA DUE TO BLOOD LOSS: ICD-10-CM

## 2018-02-19 DIAGNOSIS — I10 ESSENTIAL HYPERTENSION: ICD-10-CM

## 2018-02-19 PROCEDURE — 93000 ELECTROCARDIOGRAM COMPLETE: CPT | Performed by: INTERNAL MEDICINE

## 2018-02-19 PROCEDURE — 99495 TRANSJ CARE MGMT MOD F2F 14D: CPT | Performed by: INTERNAL MEDICINE

## 2018-02-19 PROCEDURE — 99214 OFFICE O/P EST MOD 30 MIN: CPT | Performed by: INTERNAL MEDICINE

## 2018-02-19 RX ORDER — ASPIRIN 325 MG
325 TABLET ORAL
COMMUNITY
End: 2018-08-24 | Stop reason: HOSPADM

## 2018-02-19 RX ORDER — CARVEDILOL 12.5 MG/1
12.5 TABLET ORAL EVERY 12 HOURS SCHEDULED
Qty: 60 TABLET | Refills: 3 | Status: SHIPPED | OUTPATIENT
Start: 2018-02-19 | End: 2018-12-08 | Stop reason: SDUPTHER

## 2018-02-19 NOTE — PROGRESS NOTES
[facility] AT [address], [address]  [place], [place], [address] [phone]  [email or web address]           OUTPATIENT CLINIC FOLLOW UP NOTE    Patient Care Team:  Patient Care Team:  Michaela Connell MD as PCP - General  Michaela Connell MD as PCP - Family Medicine    Subjective:      Chief Complaint   Patient presents with   • Dizziness   • Hypertension       HPI:    Tereza Ackerman is a 63 y.o. female.  History of Present Illness     The patient presents today for follow-up for atrial fibrillation and hypertension.  She developed atrial fibrillation in the presence of a small bowel obstruction.  Since she was released from the hospital she denies any knowledge of recurrent atrial fibrillation and denies any chest pain, palpitation, shortness of breath, or swelling in the extremities. She has had difficulty with her blood pressure since discharge and was seen 2 weeks ago in the ER for a blood pressure of 80/50.  At that time they stopped her clonidine, decreased her carvedilol, and placed parameters on her amlodipine.  If her BP is > or = to 160 mmHg she is to double her carvedilol and take her amlodipine.  She states her BP's have been fluctuating between the 110's- 160's systolic and 70's diastolic.  She has not had to undergo dialysis since being discharged, she is being followed by Dr. Ramos.     Review of systems:    PFSH:  Patient Active Problem List   Diagnosis   • Impaired glucose tolerance   • Hypertension   • Parathyroid adenoma   • Reaction to chronic stress   • Multinodular goiter   • Vitamin D deficiency   • Meningioma, recurrent of brain   • Arthritis   • Depression   • Small bowel obstruction   • Insomnia   • History of Nissen fundoplication   • Malignant neoplasm of left orbit   • Prediabetes   • Hyperlipemia   • Hyperglycemia   • Atrial flutter with rapid ventricular response   • Anemia         Current Outpatient Prescriptions:   •  amiodarone  "(PACERONE) 200 MG tablet, Take 1 tablet by mouth Daily., Disp: 30 tablet, Rfl: 0  •  amLODIPine (NORVASC) 10 MG tablet, Take 1 tablet by mouth Daily. Hold for now. restart carvedilol at 25mg bid if SBP >160. If SBP  >160 then restart amlodipine, Disp: 30 tablet, Rfl: 3  •  aspirin 325 MG tablet, Take 325 mg by mouth Daily., Disp: , Rfl:   •  carvedilol (COREG) 25 MG tablet, Take 0.5 tablets by mouth Every 12 (Twelve) Hours for 30 days., Disp: 30 tablet, Rfl: 3  •  metoclopramide (REGLAN) 5 MG tablet, Take 1 tablet by mouth 3 (Three) Times a Day., Disp: 90 tablet, Rfl: 0  •  QUEtiapine (SEROQUEL) 25 MG tablet, Take 1 tablet by mouth Every Night., Disp: 90 tablet, Rfl: 0  •  sertraline (ZOLOFT) 100 MG tablet, Take 1 tablet by mouth Daily. (Patient taking differently: Take 50 mg by mouth Daily.), Disp: 90 tablet, Rfl: 0  •  thiamine (VITAMIN B1) 100 MG tablet, Take 1 tablet by mouth Daily., Disp: 90 tablet, Rfl: 0    Allergies   Allergen Reactions   • Dilantin [Phenytoin Sodium Extended] Rash   • Phenytoin Rash     duplicate         reports that she has never smoked. She has never used smokeless tobacco.    Family History   Problem Relation Age of Onset   • Obesity Mother    • Heart attack Mother    • Migraines Father    • Stroke Father    • Cancer Other    • Diabetes Other    • Breast cancer Maternal Aunt    • Breast cancer Paternal Aunt    • Ovarian cancer Neg Hx        Review of Systems:  Negative for chest pain, dyspnea with exertion, orthopnea, PND, lower extremity edema, palpitations, lightheadedness, syncope.  Positive for abdominal tenderness.  All other systems reviewed and are negative.    Objective:   Blood pressure 140/80, pulse 62, height 160 cm (63\"), weight 63 kg (139 lb), SpO2 96 %.  CONSTITUTIONAL: No acute distress, normal affect  RESPIRATORY: Normal effort. Clear to auscultation bilaterally without wheezing or rales  CARDIOVASCULAR: Carotids with normal upstrokes without bruits.  Regular rate and " rhythm with normal S1 and S2. Without murmur, gallop or rub.  PERIPHERAL VASCULAR: Normal radial pulses bilaterally. There is no lower extremity edema bilaterally.    Labs:  Lab Results   Component Value Date    ALT 13 02/05/2018    AST 20 02/05/2018     Lab Results   Component Value Date    CHOL 234 (H) 11/13/2017    TRIG 183 (H) 12/25/2017    HDL 62 (H) 11/13/2017    CREATININE 1.10 02/05/2018       Diagnostic Data:      ECG 12 Lead  Date/Time: 2/19/2018 12:06 PM  Performed by: TAURUS POOLE  Authorized by: TAURUS POOLE   Comparison: compared with previous ECG from 2/5/2018  Similar to previous ECG  Rhythm: sinus rhythm  Rate: normal  BPM: 60  Comments: QRS 88ms  QTC 430ms            Assessment and Plan:   Tereza was seen today for dizziness and hypertension.    Diagnoses and all orders for this visit:    Atrial flutter with rapid ventricular response  -Brief AFlutter in setting of SBO, less than 48 hours  -Stop amiodarone  -Continue carvedilol  -Continue Aspirin 325mg daily  -MCOT Event monitor for 30 days.    Essential hypertension  -Continue carvedilol, continue amlodipine as previously prescribed     - Dietary and exercise counseling was provided during this visit  - Return in about 6 months (around 8/19/2018).    AYDEE Chavez obtained past medical, family history, social history, review of systems and functioned as a scribe for the remainder of the dictation for Dr. Aguirre

## 2018-02-19 NOTE — PROGRESS NOTES
GI Problem (small intestinal obstruction- fu from hospital); Hypotension (fu from hospital); and Palpitations (Heart flutter, monitor put on today for 4 wks)  Transitional Care Follow Up Visit  Subjective     Tereza Ackerman is a 63 y.o. female who presents for a transitional care management visit.    Within 48 business hours after discharge our office contacted her via telephone to coordinate her care and needs.      I reviewed and discussed the details of that call along with the discharge summary, hospital problems, inpatient lab results, inpatient diagnostic studies, and consultation reports with Tereza.     Current outpatient and discharge medications have been reconciled for the patient.    Date of TCM Phone Call 7/3/2017 11/6/2017 1/15/2018 2/7/2018   [facility]   Date of Admission 6/30/2017 11/3/2017 12/4/2017 -   Date of Discharge 7/2/2017 11/4/2017 1/12/2018 2/6/2018   Discharge Disposition Home or Self Care - Home or Self Care Home or Self Care     Risk for Readmission (LACE) Score: 5    History of Present Illness   Course During Hospital Stay: Tereza was admitted on 2/4/2018 after experiencing episodes of dizziness, shakiness, feeling of weakness.  She had noted hemoglobin/hematocrit 8.9/28.3 as well as systolic blood pressure in the 80s at home which prompted her arrival to [facility] ED.  Patient had persistent blood pressure in 80s on arrival.  She was taken off all blood pressure medications and started on IV fluids.  Patient was to be taking clonidine as needed for systolic blood pressure greater than 180 however was taking scheduled daily without blood pressure check.      She is to monitor blood pressure daily and restart full dose of carvedilol as blood pressure allows.  She will then restart amlodipine as can tolerate/as needed.  Have instructed patient to stop clonidine for now..  Hemoglobin/hematocrit had remained  stable without any significant drops or requirement for blood transfusion.  Patient has not had any signs or symptoms of blood loss    Subjective   Tereza Ackerman is a 63 y.o. female is here today for follow-up.    History of Present Illness   Pt. Was seen back in the hospital due to hypotension.  She developed atrial fibrillation in the presence of a small bowel obstruction.  She has had difficulty with her blood pressure since discharge and was seen 2 weeks ago in the ER for a blood pressure of 80/50.  At that time they stopped her clonidine, decreased her carvedilol, and placed parameters on her amlodipine.  If her BP is > or = to 160 mmHg she is to double her carvedilol and take her amlodipine.  She states her BP's have been fluctuating between the 110's- 160's systolic and 70's diastolic.    Has seen Dr. Aguirre today and is on the Event monitor for 30 days. Off Amiodarone.  She is a little confused about her instructions on the amlodipine.    Current Outpatient Prescriptions:   •  aspirin 325 MG tablet, Take 325 mg by mouth Daily., Disp: , Rfl:   •  carvedilol (COREG) 12.5 MG tablet, Take 1 tablet by mouth Every 12 (Twelve) Hours., Disp: 60 tablet, Rfl: 3  •  metoclopramide (REGLAN) 5 MG tablet, Take 1 tablet by mouth 3 (Three) Times a Day., Disp: 90 tablet, Rfl: 0  •  QUEtiapine (SEROQUEL) 25 MG tablet, Take 1 tablet by mouth Every Night., Disp: 90 tablet, Rfl: 0  •  sertraline (ZOLOFT) 100 MG tablet, Take 1 tablet by mouth Daily. (Patient taking differently: Take 50 mg by mouth Daily.), Disp: 90 tablet, Rfl: 0  •  thiamine (VITAMIN B1) 100 MG tablet, Take 1 tablet by mouth Daily., Disp: 90 tablet, Rfl: 0      The following portions of the patient's history were reviewed and updated as appropriate: allergies, current medications, past family history, past medical history, past social history, past surgical history and problem list.    Review of Systems   Constitutional: Negative.  Negative for chills and  "fever.   HENT: Negative for ear discharge, ear pain, sinus pressure and sore throat.    Eyes: Positive for pain and visual disturbance.   Respiratory: Negative for cough, chest tightness and shortness of breath.    Cardiovascular: Negative for chest pain, palpitations and leg swelling.   Gastrointestinal: Negative for diarrhea, nausea and vomiting.   Musculoskeletal: Negative for arthralgias, back pain and myalgias.   Neurological: Negative for dizziness, syncope and headaches.   Psychiatric/Behavioral: Negative for confusion and sleep disturbance.       Objective   /82 (BP Location: Left arm, Patient Position: Sitting, Cuff Size: Adult)  Pulse 75  Resp 18  Ht 160 cm (62.99\")  Wt 66.7 kg (147 lb)  SpO2 99%  BMI 26.05 kg/m2  Physical Exam   Constitutional: She is oriented to person, place, and time. She appears well-developed and well-nourished.   HENT:   Head: Atraumatic.   asymmetric left upper face s/p surgery and extensive bruising resolved.   Eyes: Conjunctivae are normal.   Cardiovascular: Normal rate and regular rhythm.    Pulmonary/Chest: Effort normal and breath sounds normal.   Abdominal: Soft. Bowel sounds are normal. She exhibits no distension and no mass. There is no tenderness. No hernia.   Neurological: She is alert and oriented to person, place, and time.   Skin: Skin is warm and dry.   Psychiatric: She has a normal mood and affect. Her behavior is normal. Judgment and thought content normal.   Nursing note and vitals reviewed.        Results for orders placed or performed during the hospital encounter of 02/04/18   Comprehensive Metabolic Panel   Result Value Ref Range    Glucose 96 70 - 100 mg/dL    BUN 19 9 - 23 mg/dL    Creatinine 1.30 0.60 - 1.30 mg/dL    Sodium 137 132 - 146 mmol/L    Potassium 4.5 3.5 - 5.5 mmol/L    Chloride 105 99 - 109 mmol/L    CO2 26.0 20.0 - 31.0 mmol/L    Calcium 8.9 8.7 - 10.4 mg/dL    Total Protein 6.8 5.7 - 8.2 g/dL    Albumin 3.40 3.20 - 4.80 g/dL    ALT " (SGPT) 13 7 - 40 U/L    AST (SGOT) 20 0 - 33 U/L    Alkaline Phosphatase 96 25 - 100 U/L    Total Bilirubin 0.2 (L) 0.3 - 1.2 mg/dL    eGFR Non African Amer 41 (L) >60 mL/min/1.73    Globulin 3.4 gm/dL    A/G Ratio 1.0 (L) 1.5 - 2.5 g/dL    BUN/Creatinine Ratio 14.6 7.0 - 25.0    Anion Gap 6.0 3.0 - 11.0 mmol/L   Protime-INR   Result Value Ref Range    Protime 11.2 9.6 - 11.5 Seconds    INR 1.03    Urinalysis With / Culture If Indicated - Urine, Clean Catch   Result Value Ref Range    Color, UA Yellow Yellow, Straw    Appearance, UA Clear Clear    pH, UA 5.5 5.0 - 8.0    Specific Gravity, UA 1.013 1.001 - 1.030    Glucose, UA Negative Negative    Ketones, UA Negative Negative    Bilirubin, UA Negative Negative    Blood, UA Negative Negative    Protein, UA Negative Negative    Leuk Esterase, UA Trace (A) Negative    Nitrite, UA Negative Negative    Urobilinogen, UA 0.2 E.U./dL 0.2 - 1.0 E.U./dL   Lactic Acid, Plasma   Result Value Ref Range    Lactate 0.6 0.5 - 2.0 mmol/L   CBC Auto Differential   Result Value Ref Range    WBC 7.56 3.50 - 10.80 10*3/mm3    RBC 2.94 (L) 3.89 - 5.14 10*6/mm3    Hemoglobin 8.9 (L) 11.5 - 15.5 g/dL    Hematocrit 28.3 (L) 34.5 - 44.0 %    MCV 96.3 80.0 - 99.0 fL    MCH 30.3 27.0 - 31.0 pg    MCHC 31.4 (L) 32.0 - 36.0 g/dL    RDW 17.7 (H) 11.3 - 14.5 %    RDW-SD 62.7 (H) 37.0 - 54.0 fl    MPV 9.7 6.0 - 12.0 fL    Platelets 310 150 - 450 10*3/mm3    Neutrophil % 49.4 41.0 - 71.0 %    Lymphocyte % 32.3 24.0 - 44.0 %    Monocyte % 11.6 0.0 - 12.0 %    Eosinophil % 5.2 (H) 0.0 - 3.0 %    Basophil % 0.8 0.0 - 1.0 %    Immature Grans % 0.7 (H) 0.0 - 0.6 %    Neutrophils, Absolute 3.74 1.50 - 8.30 10*3/mm3    Lymphocytes, Absolute 2.44 0.60 - 4.80 10*3/mm3    Monocytes, Absolute 0.88 0.00 - 1.00 10*3/mm3    Eosinophils, Absolute 0.39 (H) 0.00 - 0.30 10*3/mm3    Basophils, Absolute 0.06 0.00 - 0.20 10*3/mm3    Immature Grans, Absolute 0.05 (H) 0.00 - 0.03 10*3/mm3   Urinalysis, Microscopic Only -  Urine, Clean Catch   Result Value Ref Range    RBC, UA None Seen None Seen, 0-2 /HPF    WBC, UA 0-2 (A) None Seen /HPF    Bacteria, UA None Seen None Seen, Trace /HPF    Squamous Epithelial Cells, UA 0-2 None Seen, 0-2 /HPF    Hyaline Casts, UA None Seen 0 - 6 /LPF    Methodology Automated Microscopy    CBC (No Diff)   Result Value Ref Range    WBC 7.50 3.50 - 10.80 10*3/mm3    RBC 2.84 (L) 3.89 - 5.14 10*6/mm3    Hemoglobin 8.6 (L) 11.5 - 15.5 g/dL    Hematocrit 27.4 (L) 34.5 - 44.0 %    MCV 96.5 80.0 - 99.0 fL    MCH 30.3 27.0 - 31.0 pg    MCHC 31.4 (L) 32.0 - 36.0 g/dL    RDW 17.5 (H) 11.3 - 14.5 %    RDW-SD 62.2 (H) 37.0 - 54.0 fl    MPV 9.3 6.0 - 12.0 fL    Platelets 254 150 - 450 10*3/mm3   Basic Metabolic Panel   Result Value Ref Range    Glucose 110 (H) 70 - 100 mg/dL    BUN 17 9 - 23 mg/dL    Creatinine 1.10 0.60 - 1.30 mg/dL    Sodium 136 132 - 146 mmol/L    Potassium 4.0 3.5 - 5.5 mmol/L    Chloride 107 99 - 109 mmol/L    CO2 24.0 20.0 - 31.0 mmol/L    Calcium 8.4 (L) 8.7 - 10.4 mg/dL    eGFR Non African Amer 50 (L) >60 mL/min/1.73    BUN/Creatinine Ratio 15.5 7.0 - 25.0    Anion Gap 5.0 3.0 - 11.0 mmol/L   Vitamin B12   Result Value Ref Range    Vitamin B-12 569 211 - 911 pg/mL   Folate   Result Value Ref Range    Folate 14.12 3.20 - 20.00 ng/mL   Iron Profile   Result Value Ref Range    Iron 69 50 - 175 mcg/dL    TIBC 239 (L) 250 - 450 mcg/dL    Iron Saturation 29 15 - 50 %   Ferritin   Result Value Ref Range    Ferritin 1363.00 (H) 10.00 - 291.00 ng/mL   POC Troponin, Rapid   Result Value Ref Range    Troponin I 0.00 0.00 - 0.07 ng/mL   Type & Screen   Result Value Ref Range    ABO Type B     RH type Positive     Antibody Screen Positive    Antibody Identification   Result Value Ref Range    Anti-K ANTI-K     Anti-Fya ANTI-FYA              Assessment/Plan   Diagnoses and all orders for this visit:    Hypotension, unspecified hypotension type  Comments:  Off amiodarone, on Holter. OFF AMlodipine and  lower dose coreg. Monitor. Pt. aware of Cardiology instructions- reiterated.  Orders:  -     Basic Metabolic Panel    Anemia due to blood loss  Comments:  Repeat labs , likely contributing to low BP. Monitor closely.  Orders:  -     CBC & Differential  -     Basic Metabolic Panel             Reviewed labs from her wellness appt.  Will recheck labs next month, has appt. With dr Ramos.    Return in about 4 weeks (around 3/19/2018) for Next scheduled follow up. with labs and a1c.

## 2018-02-20 ENCOUNTER — APPOINTMENT (OUTPATIENT)
Dept: MRI IMAGING | Facility: HOSPITAL | Age: 63
End: 2018-02-20
Attending: NEUROLOGICAL SURGERY

## 2018-02-22 ENCOUNTER — TELEPHONE (OUTPATIENT)
Dept: INTERNAL MEDICINE | Facility: CLINIC | Age: 63
End: 2018-02-22

## 2018-02-22 LAB
ANION GAP SERPL CALCULATED.3IONS-SCNC: 9 MMOL/L (ref 3–11)
BASOPHILS # BLD AUTO: 0.04 10*3/MM3 (ref 0–0.2)
BASOPHILS NFR BLD AUTO: 0.5 % (ref 0–1)
BUN BLD-MCNC: 14 MG/DL (ref 9–23)
BUN/CREAT SERPL: 14 (ref 7–25)
CALCIUM SPEC-SCNC: 8.8 MG/DL (ref 8.7–10.4)
CHLORIDE SERPL-SCNC: 103 MMOL/L (ref 99–109)
CO2 SERPL-SCNC: 25 MMOL/L (ref 20–31)
CREAT BLD-MCNC: 1 MG/DL (ref 0.6–1.3)
DEPRECATED RDW RBC AUTO: 53.2 FL (ref 37–54)
EOSINOPHIL # BLD AUTO: 0.26 10*3/MM3 (ref 0–0.3)
EOSINOPHIL NFR BLD AUTO: 3.3 % (ref 0–3)
ERYTHROCYTE [DISTWIDTH] IN BLOOD BY AUTOMATED COUNT: 14.9 % (ref 11.3–14.5)
GFR SERPL CREATININE-BSD FRML MDRD: 56 ML/MIN/1.73
GLUCOSE BLD-MCNC: 85 MG/DL (ref 70–100)
HCT VFR BLD AUTO: 32.1 % (ref 34.5–44)
HGB BLD-MCNC: 10 G/DL (ref 11.5–15.5)
IMM GRANULOCYTES # BLD: 0.01 10*3/MM3 (ref 0–0.03)
IMM GRANULOCYTES NFR BLD: 0.1 % (ref 0–0.6)
LYMPHOCYTES # BLD AUTO: 2.23 10*3/MM3 (ref 0.6–4.8)
LYMPHOCYTES NFR BLD AUTO: 28.6 % (ref 24–44)
MCH RBC QN AUTO: 30.5 PG (ref 27–31)
MCHC RBC AUTO-ENTMCNC: 31.2 G/DL (ref 32–36)
MCV RBC AUTO: 97.9 FL (ref 80–99)
MONOCYTES # BLD AUTO: 0.65 10*3/MM3 (ref 0–1)
MONOCYTES NFR BLD AUTO: 8.3 % (ref 0–12)
NEUTROPHILS # BLD AUTO: 4.62 10*3/MM3 (ref 1.5–8.3)
NEUTROPHILS NFR BLD AUTO: 59.2 % (ref 41–71)
PLATELET # BLD AUTO: 317 10*3/MM3 (ref 150–450)
PMV BLD AUTO: 9.9 FL (ref 6–12)
POTASSIUM BLD-SCNC: 4 MMOL/L (ref 3.5–5.5)
RBC # BLD AUTO: 3.28 10*6/MM3 (ref 3.89–5.14)
SODIUM BLD-SCNC: 137 MMOL/L (ref 132–146)
WBC NRBC COR # BLD: 7.81 10*3/MM3 (ref 3.5–10.8)

## 2018-02-22 PROCEDURE — 85025 COMPLETE CBC W/AUTO DIFF WBC: CPT | Performed by: INTERNAL MEDICINE

## 2018-02-22 PROCEDURE — 80048 BASIC METABOLIC PNL TOTAL CA: CPT | Performed by: INTERNAL MEDICINE

## 2018-02-22 NOTE — TELEPHONE ENCOUNTER
----- Message from Michaela Connell MD sent at 2/21/2018 10:46 PM EST -----  Yes, as she needs blood counts checked, 1 week from her hospital stay, which would be this week.    Thank you    ----- Message -----     From: Aileen SINGH MA     Sent: 2/21/2018  11:40 AM       To: Michaela Connell MD    Called and informed pt, pt she stated that she discussed with you at [facility] that she will be going in to [facility] for blood work beginning of March prior to going in to see Dr. Brizuela and you said that you will just request those lab results once available, pt is curious if she still has to come in for blood work this week too? Pls advise.     ----- Message -----     From: Michaela Connell MD     Sent: 2/20/2018  10:58 PM       To: [facility] [place] [facility]    Patient needed labs drawn at her visit on the 19th,for a follow up on her hospitalization  but I didn't have her stop for them. Please call and see if she can have them drawn sometime this week, no need to fast.  thanks

## 2018-02-24 ENCOUNTER — TELEPHONE (OUTPATIENT)
Dept: INTERNAL MEDICINE | Facility: CLINIC | Age: 63
End: 2018-02-24

## 2018-02-24 NOTE — TELEPHONE ENCOUNTER
Has she tried OTC imodium or pepto.  Also should stop her Reglan( Metoclopramide) 5mg.  If continues to have it, then she will need to be seen, to eval for an infectious cause of her diarrhea

## 2018-02-24 NOTE — TELEPHONE ENCOUNTER
PT HAS LAST SEEN ON 2/19 AND SINCE HAS BEEN HAVING REALLY BAD DIARRHEA. SHE WANTS TO KNOW IF SHE NEEDS TO COME BACK IN AND BE SEEN OR IF DR. SHEFFIELD CAN CALL SOMETHING IN. PLEASE ADVISE. THANKS.    SEND TO [facility] IN [place].

## 2018-02-24 NOTE — TELEPHONE ENCOUNTER
Spoke with patient and advised to stop taking the Reglan. Asked patient if she has tried OTC imodium or pepto and pt stated that the pepto did not work at all but just tried imodium and will wait and see if that will help. I advised that per Dr. Connell if patient continues to have problems after trying the immodium to give the office a call back and schedule an appt to see Dr. Connell. Pt verbalized understanding.

## 2018-03-05 ENCOUNTER — APPOINTMENT (OUTPATIENT)
Dept: GENERAL RADIOLOGY | Facility: HOSPITAL | Age: 63
End: 2018-03-05

## 2018-03-05 ENCOUNTER — APPOINTMENT (OUTPATIENT)
Dept: CT IMAGING | Facility: HOSPITAL | Age: 63
End: 2018-03-05

## 2018-03-05 ENCOUNTER — HOSPITAL ENCOUNTER (EMERGENCY)
Facility: HOSPITAL | Age: 63
Discharge: HOME | End: 2018-03-05
Attending: EMERGENCY MEDICINE | Admitting: EMERGENCY MEDICINE

## 2018-03-05 VITALS
BODY MASS INDEX: 25.52 KG/M2 | OXYGEN SATURATION: 96 % | DIASTOLIC BLOOD PRESSURE: 89 MMHG | TEMPERATURE: 98.3 F | HEIGHT: 63 IN | WEIGHT: 144 LBS | RESPIRATION RATE: 18 BRPM | SYSTOLIC BLOOD PRESSURE: 164 MMHG | HEART RATE: 72 BPM

## 2018-03-05 DIAGNOSIS — R11.2 NAUSEA VOMITING AND DIARRHEA: Primary | ICD-10-CM

## 2018-03-05 DIAGNOSIS — R42 LIGHTHEADEDNESS: ICD-10-CM

## 2018-03-05 DIAGNOSIS — R19.7 NAUSEA VOMITING AND DIARRHEA: Primary | ICD-10-CM

## 2018-03-05 LAB
ALBUMIN SERPL-MCNC: 4.3 G/DL (ref 3.2–4.8)
ALBUMIN/GLOB SERPL: 1.1 G/DL (ref 1.5–2.5)
ALP SERPL-CCNC: 82 U/L (ref 25–100)
ALT SERPL W P-5'-P-CCNC: 14 U/L (ref 7–40)
ANION GAP SERPL CALCULATED.3IONS-SCNC: 6 MMOL/L (ref 3–11)
AST SERPL-CCNC: 18 U/L (ref 0–33)
BASOPHILS # BLD AUTO: 0.03 10*3/MM3 (ref 0–0.2)
BASOPHILS NFR BLD AUTO: 0.3 % (ref 0–1)
BILIRUB SERPL-MCNC: 0.3 MG/DL (ref 0.3–1.2)
BILIRUB UR QL STRIP: NEGATIVE
BUN BLD-MCNC: 17 MG/DL (ref 9–23)
BUN/CREAT SERPL: 15.5 (ref 7–25)
CALCIUM SPEC-SCNC: 9.7 MG/DL (ref 8.7–10.4)
CHLORIDE SERPL-SCNC: 104 MMOL/L (ref 99–109)
CLARITY UR: CLEAR
CO2 SERPL-SCNC: 28 MMOL/L (ref 20–31)
COLOR UR: YELLOW
CREAT BLD-MCNC: 1.1 MG/DL (ref 0.6–1.3)
DEPRECATED RDW RBC AUTO: 48.6 FL (ref 37–54)
EOSINOPHIL # BLD AUTO: 0.16 10*3/MM3 (ref 0–0.3)
EOSINOPHIL NFR BLD AUTO: 1.5 % (ref 0–3)
ERYTHROCYTE [DISTWIDTH] IN BLOOD BY AUTOMATED COUNT: 13.7 % (ref 11.3–14.5)
GFR SERPL CREATININE-BSD FRML MDRD: 50 ML/MIN/1.73
GLOBULIN UR ELPH-MCNC: 3.9 GM/DL
GLUCOSE BLD-MCNC: 126 MG/DL (ref 70–100)
GLUCOSE UR STRIP-MCNC: NEGATIVE MG/DL
HCT VFR BLD AUTO: 36 % (ref 34.5–44)
HGB BLD-MCNC: 11.1 G/DL (ref 11.5–15.5)
HGB UR QL STRIP.AUTO: NEGATIVE
HOLD SPECIMEN: NORMAL
HOLD SPECIMEN: NORMAL
IMM GRANULOCYTES # BLD: 0.03 10*3/MM3 (ref 0–0.03)
IMM GRANULOCYTES NFR BLD: 0.3 % (ref 0–0.6)
KETONES UR QL STRIP: NEGATIVE
LEUKOCYTE ESTERASE UR QL STRIP.AUTO: NEGATIVE
LYMPHOCYTES # BLD AUTO: 1.95 10*3/MM3 (ref 0.6–4.8)
LYMPHOCYTES NFR BLD AUTO: 18.6 % (ref 24–44)
MAGNESIUM SERPL-MCNC: 2.1 MG/DL (ref 1.3–2.7)
MCH RBC QN AUTO: 30.3 PG (ref 27–31)
MCHC RBC AUTO-ENTMCNC: 30.8 G/DL (ref 32–36)
MCV RBC AUTO: 98.4 FL (ref 80–99)
MONOCYTES # BLD AUTO: 0.82 10*3/MM3 (ref 0–1)
MONOCYTES NFR BLD AUTO: 7.8 % (ref 0–12)
NEUTROPHILS # BLD AUTO: 7.49 10*3/MM3 (ref 1.5–8.3)
NEUTROPHILS NFR BLD AUTO: 71.5 % (ref 41–71)
NITRITE UR QL STRIP: NEGATIVE
PH UR STRIP.AUTO: 6 [PH] (ref 5–8)
PLATELET # BLD AUTO: 356 10*3/MM3 (ref 150–450)
PMV BLD AUTO: 9.4 FL (ref 6–12)
POTASSIUM BLD-SCNC: 4.5 MMOL/L (ref 3.5–5.5)
PROT SERPL-MCNC: 8.2 G/DL (ref 5.7–8.2)
PROT UR QL STRIP: NEGATIVE
RBC # BLD AUTO: 3.66 10*6/MM3 (ref 3.89–5.14)
SODIUM BLD-SCNC: 138 MMOL/L (ref 132–146)
SP GR UR STRIP: <=1.005 (ref 1–1.03)
TROPONIN I SERPL-MCNC: 0 NG/ML (ref 0–0.07)
UROBILINOGEN UR QL STRIP: NORMAL
WBC NRBC COR # BLD: 10.48 10*3/MM3 (ref 3.5–10.8)
WHOLE BLOOD HOLD SPECIMEN: NORMAL
WHOLE BLOOD HOLD SPECIMEN: NORMAL

## 2018-03-05 PROCEDURE — 96361 HYDRATE IV INFUSION ADD-ON: CPT

## 2018-03-05 PROCEDURE — 83735 ASSAY OF MAGNESIUM: CPT | Performed by: EMERGENCY MEDICINE

## 2018-03-05 PROCEDURE — 85025 COMPLETE CBC W/AUTO DIFF WBC: CPT | Performed by: EMERGENCY MEDICINE

## 2018-03-05 PROCEDURE — 71045 X-RAY EXAM CHEST 1 VIEW: CPT

## 2018-03-05 PROCEDURE — 93005 ELECTROCARDIOGRAM TRACING: CPT

## 2018-03-05 PROCEDURE — 81003 URINALYSIS AUTO W/O SCOPE: CPT | Performed by: EMERGENCY MEDICINE

## 2018-03-05 PROCEDURE — 99284 EMERGENCY DEPT VISIT MOD MDM: CPT

## 2018-03-05 PROCEDURE — 84484 ASSAY OF TROPONIN QUANT: CPT

## 2018-03-05 PROCEDURE — 74176 CT ABD & PELVIS W/O CONTRAST: CPT

## 2018-03-05 PROCEDURE — 96374 THER/PROPH/DIAG INJ IV PUSH: CPT

## 2018-03-05 PROCEDURE — 25010000002 ONDANSETRON PER 1 MG: Performed by: NURSE PRACTITIONER

## 2018-03-05 PROCEDURE — 80053 COMPREHEN METABOLIC PANEL: CPT | Performed by: EMERGENCY MEDICINE

## 2018-03-05 PROCEDURE — 36415 COLL VENOUS BLD VENIPUNCTURE: CPT

## 2018-03-05 RX ORDER — ONDANSETRON 4 MG/1
4 TABLET, FILM COATED ORAL EVERY 8 HOURS PRN
Qty: 10 TABLET | Refills: 0 | Status: ON HOLD | OUTPATIENT
Start: 2018-03-05 | End: 2020-04-27

## 2018-03-05 RX ORDER — SODIUM CHLORIDE 0.9 % (FLUSH) 0.9 %
10 SYRINGE (ML) INJECTION AS NEEDED
Status: DISCONTINUED | OUTPATIENT
Start: 2018-03-05 | End: 2018-03-05 | Stop reason: HOSPADM

## 2018-03-05 RX ORDER — ONDANSETRON 2 MG/ML
4 INJECTION INTRAMUSCULAR; INTRAVENOUS ONCE
Status: COMPLETED | OUTPATIENT
Start: 2018-03-05 | End: 2018-03-05

## 2018-03-05 RX ADMIN — ONDANSETRON 4 MG: 2 INJECTION INTRAMUSCULAR; INTRAVENOUS at 15:07

## 2018-03-05 RX ADMIN — SODIUM CHLORIDE 1000 ML: 9 INJECTION, SOLUTION INTRAVENOUS at 15:03

## 2018-03-05 NOTE — DISCHARGE INSTRUCTIONS
Clear liquid diet x24 hr then advance as tolerated. Follow up with PCP in 1 day or return to ED with worsening of symptoms or development of new symptoms.

## 2018-03-05 NOTE — ED PROVIDER NOTES
Subjective   Patient is a 63 y.o. female presenting with vomiting.   History provided by:  Patient and spouse  Vomiting   The primary symptoms include nausea and vomiting. Primary symptoms do not include fever, abdominal pain, diarrhea or dysuria. The illness began 3 to 5 days ago. The onset was gradual. The problem has not changed since onset.  The vomiting began today. Vomiting occurred once. The emesis contains stomach contents.   The illness does not include chills. Associated medical issues comments: Reports diarrhea ×3 days..   Patient reports loose watery bowel movements ×3 days.  Denies any bloody bowel movements.  Thought her diarrhea was getting better today but had an episode of vomiting prior to eating lunch.  Came to ER for evaluation because she felt lightheaded after the vomiting episode and because of her history of small bowel obstruction.    Review of Systems   Constitutional: Negative for chills and fever.   Respiratory: Negative for shortness of breath.    Cardiovascular: Negative for chest pain.   Gastrointestinal: Positive for nausea and vomiting. Negative for abdominal pain and diarrhea.   Genitourinary: Negative for dysuria and frequency.   Neurological: Positive for light-headedness.   All other systems reviewed and are negative.      Past Medical History:   Diagnosis Date   • Acid reflux    • Arthritis    • Atrial flutter with rapid ventricular response 12/21/2017   • Back pain    • Brain tumor    • Depression    • History of blood transfusion    • History of Nissen fundoplication 7/1/2017   • Hyperlipemia    • Hypertension    • Memory loss or impairment    • Migraine    • Parathyroid adenoma     Incidental on thyroid US.   • PONV (postoperative nausea and vomiting)     nausea - preprocedural meds help    • Prediabetes     Last Impression: 06 Feb 2015  Reviewed labs. Excellent control.  Emery Connell Impression: 06 Feb 2015  Reviewed labs. Excellent control.  Michaela Connell   •  Thyroid nodule      Last Impression: 13 Jun 2015  r/o thyroid cancer, will proceed with US.  Michaela Connell (Internal Medicine)    • UTI (urinary tract infection)    • Vision changes     blockages left eye    • Wears glasses     readers       Allergies   Allergen Reactions   • Dilantin [Phenytoin Sodium Extended] Rash   • Phenytoin Rash     duplicate        Past Surgical History:   Procedure Laterality Date   • BRAIN SURGERY  2003 & 2014    Dr. Werner Hollis   • CARPAL TUNNEL RELEASE  2002    right    • COLONOSCOPY     • ENDOSCOPY     • EXPLORATORY LAPAROTOMY N/A 12/5/2017    Procedure: EXPLORATORY LAPAROTOMY, SMALL BOWEL RESECTION;  Surgeon: Ирина Cobian MD;  Location:  [facility];  Service:    • EXPLORATORY LAPAROTOMY N/A 12/22/2017    Procedure: LAPAROTOMY EXPLORATORY FOR SMALL BOWEL OBSTRUCTION;  Surgeon: Ирина Cobian MD;  Location:  [facility];  Service:    • HYSTERECTOMY      partial - both ovaries still present pt believes    • INSERTION HEMODIALYSIS CATHETER N/A 12/7/2017    Procedure: HEMODIALYSIS CATHETER INSERTION;  Surgeon: Chance Valenzuela MD;  Location:  [facility];  Service:    • ORBITOTOMY Left 11/3/2017    Procedure:  LEFT LATERAL ORBITOTOMY WITH DEBULKING OF TUMOR ;  Surgeon: Moo Hopkins MD;  Location:  [facility];  Service:    • OTHER SURGICAL HISTORY      craniotomy tumor removal-complete   • OTHER SURGICAL HISTORY      esophagogastric fundoplasty nissen fundoplication   • TUBAL ABDOMINAL LIGATION         Family History   Problem Relation Age of Onset   • Obesity Mother    • Heart attack Mother    • Migraines Father    • Stroke Father    • Cancer Other    • Diabetes Other    • Breast cancer Maternal Aunt    • Breast cancer Paternal Aunt    • Ovarian cancer Neg Hx        Social History     Social History   • Marital status:      Social History Main Topics   • Smoking status: Never Smoker   • Smokeless tobacco: Never Used   • Alcohol use No   • Drug use: No   •  Sexual activity: Defer           Objective   Physical Exam   Constitutional: She is oriented to person, place, and time. She appears well-developed and well-nourished.   HENT:   Right Ear: External ear normal.   Left Ear: External ear normal.   Mouth/Throat: Oropharynx is clear and moist.   Eyes: Conjunctivae are normal.   Neck: Normal range of motion.   Cardiovascular: Normal rate, regular rhythm and intact distal pulses.    Pulmonary/Chest: Effort normal and breath sounds normal.   Abdominal: Soft. Bowel sounds are normal. There is no tenderness.       Neurological: She is alert and oriented to person, place, and time.   Skin: Skin is warm and dry.   Psychiatric: She has a normal mood and affect. Her behavior is normal.   Vitals reviewed.      Procedures         ED Course  ED Course      Recent Results (from the past 24 hour(s))   Comprehensive Metabolic Panel    Collection Time: 03/05/18  1:10 PM   Result Value Ref Range    Glucose 126 (H) 70 - 100 mg/dL    BUN 17 9 - 23 mg/dL    Creatinine 1.10 0.60 - 1.30 mg/dL    Sodium 138 132 - 146 mmol/L    Potassium 4.5 3.5 - 5.5 mmol/L    Chloride 104 99 - 109 mmol/L    CO2 28.0 20.0 - 31.0 mmol/L    Calcium 9.7 8.7 - 10.4 mg/dL    Total Protein 8.2 5.7 - 8.2 g/dL    Albumin 4.30 3.20 - 4.80 g/dL    ALT (SGPT) 14 7 - 40 U/L    AST (SGOT) 18 0 - 33 U/L    Alkaline Phosphatase 82 25 - 100 U/L    Total Bilirubin 0.3 0.3 - 1.2 mg/dL    eGFR Non African Amer 50 (L) >60 mL/min/1.73    Globulin 3.9 gm/dL    A/G Ratio 1.1 (L) 1.5 - 2.5 g/dL    BUN/Creatinine Ratio 15.5 7.0 - 25.0    Anion Gap 6.0 3.0 - 11.0 mmol/L   Magnesium    Collection Time: 03/05/18  1:10 PM   Result Value Ref Range    Magnesium 2.1 1.3 - 2.7 mg/dL   Light Blue Top    Collection Time: 03/05/18  1:10 PM   Result Value Ref Range    Extra Tube hold for add-on    Green Top (Gel)    Collection Time: 03/05/18  1:10 PM   Result Value Ref Range    Extra Tube Hold for add-ons.    Lavender Top    Collection Time:  03/05/18  1:10 PM   Result Value Ref Range    Extra Tube hold for add-on    Gold Top - SST    Collection Time: 03/05/18  1:10 PM   Result Value Ref Range    Extra Tube Hold for add-ons.    CBC Auto Differential    Collection Time: 03/05/18  1:10 PM   Result Value Ref Range    WBC 10.48 3.50 - 10.80 10*3/mm3    RBC 3.66 (L) 3.89 - 5.14 10*6/mm3    Hemoglobin 11.1 (L) 11.5 - 15.5 g/dL    Hematocrit 36.0 34.5 - 44.0 %    MCV 98.4 80.0 - 99.0 fL    MCH 30.3 27.0 - 31.0 pg    MCHC 30.8 (L) 32.0 - 36.0 g/dL    RDW 13.7 11.3 - 14.5 %    RDW-SD 48.6 37.0 - 54.0 fl    MPV 9.4 6.0 - 12.0 fL    Platelets 356 150 - 450 10*3/mm3    Neutrophil % 71.5 (H) 41.0 - 71.0 %    Lymphocyte % 18.6 (L) 24.0 - 44.0 %    Monocyte % 7.8 0.0 - 12.0 %    Eosinophil % 1.5 0.0 - 3.0 %    Basophil % 0.3 0.0 - 1.0 %    Immature Grans % 0.3 0.0 - 0.6 %    Neutrophils, Absolute 7.49 1.50 - 8.30 10*3/mm3    Lymphocytes, Absolute 1.95 0.60 - 4.80 10*3/mm3    Monocytes, Absolute 0.82 0.00 - 1.00 10*3/mm3    Eosinophils, Absolute 0.16 0.00 - 0.30 10*3/mm3    Basophils, Absolute 0.03 0.00 - 0.20 10*3/mm3    Immature Grans, Absolute 0.03 0.00 - 0.03 10*3/mm3   POC Troponin, Rapid    Collection Time: 03/05/18  1:16 PM   Result Value Ref Range    Troponin I 0.00 0.00 - 0.07 ng/mL     Note: In addition to lab results from this visit, the labs listed above may include labs taken at another facility or during a different encounter within the last 24 hours. Please correlate lab times with ED admission and discharge times for further clarification of the services performed during this visit.    XR Chest 1 View   Preliminary Result   Chronic changes seen within the lung fields with no evidence   of acute parenchymal disease. No change in the small hiatal hernia.       D:  03/05/2018   E:  03/05/2018              CT Abdomen Pelvis Without Contrast    (Results Pending)     Vitals:    03/05/18 1251 03/05/18 1539   BP: 120/63 138/74   BP Location: Left arm    Patient  "Position: Sitting    Pulse: 66    Resp: 18    Temp: 98.3 °F (36.8 °C)    TempSrc: Oral    SpO2: 98% 96%   Weight: 65.3 kg (144 lb)    Height: 160 cm (63\")      Medications   sodium chloride 0.9 % flush 10 mL (not administered)   sodium chloride 0.9 % flush 10 mL (not administered)   sodium chloride 0.9 % bolus 1,000 mL (1,000 mL Intravenous New Bag 3/5/18 1503)   ondansetron (ZOFRAN) injection 4 mg (4 mg Intravenous Given 3/5/18 1507)     ECG/EMG Results (last 24 hours)     Procedure Component Value Units Date/Time    ECG 12 Lead [[id number]] Collected:  03/05/18 1304     Updated:  03/05/18 1638    Narrative:       Test Reason : Weak/Dizzy/AMS protocol  Blood Pressure : **/** mmHG  Vent. Rate : 068 BPM     Atrial Rate : 068 BPM     P-R Int : 152 ms          QRS Dur : 082 ms      QT Int : 400 ms       P-R-T Axes : 019 019 040 degrees     QTc Int : 425 ms    Normal sinus rhythm  Normal ECG  When compared with ECG of 05-FEB-2018 01:51,  No significant change was found  Confirmed by EWA PEDERSEN MD ([id number]) on 3/5/2018 4:37:57 PM    Referred By:  EDMD           Confirmed By:EWA PEDERSEN MD        Reexamination 1700: Patient states that she is feeling better and is no longer nauseated.  There is been no vomiting since arrival in the department.  Discussed CT findings and lab findings with patient and spouse.  Advised her to follow a clear liquid diet for the next 24 hours and advance as tolerated We'll have her follow with her primary care provider in one to 2 days or return to the ER with worsening of symptoms or development of new symptoms.Pt and spouse verbalize understanding.            MDM    Final diagnoses:   Nausea vomiting and diarrhea   Lightheadedness            AYDEE Fung  03/05/18 3860    "

## 2018-03-20 ENCOUNTER — TELEPHONE (OUTPATIENT)
Dept: NEUROSURGERY | Facility: CLINIC | Age: 63
End: 2018-03-20

## 2018-03-20 DIAGNOSIS — D32.0 MENINGIOMA, RECURRENT OF BRAIN (HCC): ICD-10-CM

## 2018-03-20 NOTE — TELEPHONE ENCOUNTER
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     She is now feeling better and would like to proceed with MRI and F/U.      - I have pended the order, will need to be signed.    - Also routing to Aicha Coker to get scheduled

## 2018-03-21 ENCOUNTER — OFFICE VISIT (OUTPATIENT)
Dept: INTERNAL MEDICINE | Facility: CLINIC | Age: 63
End: 2018-03-21

## 2018-03-21 VITALS
HEART RATE: 59 BPM | BODY MASS INDEX: 26.05 KG/M2 | WEIGHT: 147 LBS | DIASTOLIC BLOOD PRESSURE: 88 MMHG | HEIGHT: 63 IN | OXYGEN SATURATION: 97 % | SYSTOLIC BLOOD PRESSURE: 138 MMHG

## 2018-03-21 DIAGNOSIS — N18.9 CHRONIC KIDNEY DISEASE, UNSPECIFIED CKD STAGE: ICD-10-CM

## 2018-03-21 DIAGNOSIS — D49.89 ORBITAL TUMOR: Primary | ICD-10-CM

## 2018-03-21 DIAGNOSIS — I48.92 ATRIAL FLUTTER WITH RAPID VENTRICULAR RESPONSE (HCC): ICD-10-CM

## 2018-03-21 PROCEDURE — 99213 OFFICE O/P EST LOW 20 MIN: CPT | Performed by: INTERNAL MEDICINE

## 2018-03-21 NOTE — PROGRESS NOTES
Anemia (4 week fu); Hypertension (4 week fu); and hospital follow-up (Seen in ER on 3/15/18)    Subjective   Tereza Ackerman is a 63 y.o. female is here today for follow-up.    History of Present Illness   Tereza is here for a follow up on her recent hospitalization, or Sepsis, SBO.. She is s/p Recent heart monitor, and has shipped back the monitor.   Now concerned about  Saw the nephrologist and renal function better.  Has seen Dr. Hollis , and will be seeing him again, and will have MRI, plans for possible [place] referral.  Her abdominal incision has healed well.      Current Outpatient Prescriptions:   •  aspirin 325 MG tablet, Take 325 mg by mouth Daily., Disp: , Rfl:   •  carvedilol (COREG) 12.5 MG tablet, Take 1 tablet by mouth Every 12 (Twelve) Hours., Disp: 60 tablet, Rfl: 3  •  QUEtiapine (SEROQUEL) 25 MG tablet, Take 1 tablet by mouth Every Night., Disp: 90 tablet, Rfl: 0  •  sertraline (ZOLOFT) 100 MG tablet, Take 1 tablet by mouth Daily. (Patient taking differently: Take 50 mg by mouth Daily.), Disp: 90 tablet, Rfl: 0  •  thiamine (VITAMIN B1) 100 MG tablet, Take 1 tablet by mouth Daily., Disp: 90 tablet, Rfl: 0  •  ondansetron (ZOFRAN) 4 MG tablet, Take 1 tablet by mouth Every 8 (Eight) Hours As Needed for Nausea or Vomiting., Disp: 10 tablet, Rfl: 0      The following portions of the patient's history were reviewed and updated as appropriate: allergies, current medications, past family history, past medical history, past social history, past surgical history and problem list.    Review of Systems   Constitutional: Negative.  Negative for chills and fever.   HENT: Negative for ear discharge, ear pain, sinus pressure and sore throat.    Eyes: Positive for visual disturbance.   Respiratory: Negative for cough, chest tightness and shortness of breath.    Cardiovascular: Negative for chest pain, palpitations and leg swelling.   Gastrointestinal: Negative for diarrhea, nausea and vomiting.  "  Musculoskeletal: Negative for arthralgias, back pain and myalgias.   Neurological: Positive for headaches. Negative for dizziness and syncope.   Psychiatric/Behavioral: Negative for confusion and sleep disturbance.       Objective   /88   Pulse 59   Ht 160 cm (62.99\")   Wt 66.7 kg (147 lb)   SpO2 97%   BMI 26.05 kg/m²   Physical Exam   Constitutional: She is oriented to person, place, and time. She appears well-developed and well-nourished.   HENT:   Mouth/Throat: No oropharyngeal exudate.   S/p Left cranial surgery.   Neck: Neck supple. No thyromegaly present.   Cardiovascular: Normal rate and regular rhythm.    Pulmonary/Chest: Effort normal and breath sounds normal.   Abdominal: Soft. Bowel sounds are normal. She exhibits no distension. There is no tenderness.   Musculoskeletal: She exhibits no edema.   Neurological: She is alert and oriented to person, place, and time. No cranial nerve deficit.   Psychiatric: She has a normal mood and affect. Judgment normal.   Nursing note and vitals reviewed.        Results for orders placed or performed during the hospital encounter of 03/05/18   Comprehensive Metabolic Panel   Result Value Ref Range    Glucose 126 (H) 70 - 100 mg/dL    BUN 17 9 - 23 mg/dL    Creatinine 1.10 0.60 - 1.30 mg/dL    Sodium 138 132 - 146 mmol/L    Potassium 4.5 3.5 - 5.5 mmol/L    Chloride 104 99 - 109 mmol/L    CO2 28.0 20.0 - 31.0 mmol/L    Calcium 9.7 8.7 - 10.4 mg/dL    Total Protein 8.2 5.7 - 8.2 g/dL    Albumin 4.30 3.20 - 4.80 g/dL    ALT (SGPT) 14 7 - 40 U/L    AST (SGOT) 18 0 - 33 U/L    Alkaline Phosphatase 82 25 - 100 U/L    Total Bilirubin 0.3 0.3 - 1.2 mg/dL    eGFR Non African Amer 50 (L) >60 mL/min/1.73    Globulin 3.9 gm/dL    A/G Ratio 1.1 (L) 1.5 - 2.5 g/dL    BUN/Creatinine Ratio 15.5 7.0 - 25.0    Anion Gap 6.0 3.0 - 11.0 mmol/L   Magnesium   Result Value Ref Range    Magnesium 2.1 1.3 - 2.7 mg/dL   Urinalysis With / Culture If Indicated - Urine, Clean Catch "   Result Value Ref Range    Color, UA Yellow Yellow, Straw    Appearance, UA Clear Clear    pH, UA 6.0 5.0 - 8.0    Specific Gravity, UA <=1.005 1.001 - 1.030    Glucose, UA Negative Negative    Ketones, UA Negative Negative    Bilirubin, UA Negative Negative    Blood, UA Negative Negative    Protein, UA Negative Negative    Leuk Esterase, UA Negative Negative    Nitrite, UA Negative Negative    Urobilinogen, UA 0.2 E.U./dL 0.2 - 1.0 E.U./dL   CBC Auto Differential   Result Value Ref Range    WBC 10.48 3.50 - 10.80 10*3/mm3    RBC 3.66 (L) 3.89 - 5.14 10*6/mm3    Hemoglobin 11.1 (L) 11.5 - 15.5 g/dL    Hematocrit 36.0 34.5 - 44.0 %    MCV 98.4 80.0 - 99.0 fL    MCH 30.3 27.0 - 31.0 pg    MCHC 30.8 (L) 32.0 - 36.0 g/dL    RDW 13.7 11.3 - 14.5 %    RDW-SD 48.6 37.0 - 54.0 fl    MPV 9.4 6.0 - 12.0 fL    Platelets 356 150 - 450 10*3/mm3    Neutrophil % 71.5 (H) 41.0 - 71.0 %    Lymphocyte % 18.6 (L) 24.0 - 44.0 %    Monocyte % 7.8 0.0 - 12.0 %    Eosinophil % 1.5 0.0 - 3.0 %    Basophil % 0.3 0.0 - 1.0 %    Immature Grans % 0.3 0.0 - 0.6 %    Neutrophils, Absolute 7.49 1.50 - 8.30 10*3/mm3    Lymphocytes, Absolute 1.95 0.60 - 4.80 10*3/mm3    Monocytes, Absolute 0.82 0.00 - 1.00 10*3/mm3    Eosinophils, Absolute 0.16 0.00 - 0.30 10*3/mm3    Basophils, Absolute 0.03 0.00 - 0.20 10*3/mm3    Immature Grans, Absolute 0.03 0.00 - 0.03 10*3/mm3   POC Troponin, Rapid   Result Value Ref Range    Troponin I 0.00 0.00 - 0.07 ng/mL   Light Blue Top   Result Value Ref Range    Extra Tube hold for add-on    Green Top (Gel)   Result Value Ref Range    Extra Tube Hold for add-ons.    Lavender Top   Result Value Ref Range    Extra Tube hold for add-on    Gold Top - SST   Result Value Ref Range    Extra Tube Hold for add-ons.              Assessment/Plan   Diagnoses and all orders for this visit:    Orbital tumor  Comments:  She plans to d/w Dr. Hollis re: Nice referral. She will also follwo up with Dr. Hopkins.  Counseled to contact  us if needs paperwork sent.    Atrial flutter with rapid ventricular response  Comments:  awaiting Card. recs.    Chronic kidney disease, unspecified CKD stage  Comments:  signifiacanlty improved, resolved.                 Return in about 3 months (around 6/21/2018) for Next scheduled follow up.

## 2018-04-05 RX ORDER — DIAZEPAM 10 MG/1
10 TABLET ORAL TAKE AS DIRECTED
Qty: 1 TABLET | Refills: 0 | OUTPATIENT
Start: 2018-04-05 | End: 2018-07-12

## 2018-04-05 NOTE — TELEPHONE ENCOUNTER
Provider:  Bunny  Caller: pt  Time of call: 09:43am     Phone #:  [phone]  Surgery:  LEFT LATERAL ORBITOTOMY WITH DEBULKING OF TUMOR  11/03/17 & 11/29/17     CRANI - RECURRENT MENINGIOMA 2003 & 2014     CTR 2002     Last visit:   11/22/17  Next visit: 04/12/18    Reason for call:         Pt left msg requesting a 10mg valium for her upcoming MRI.     Please call into [facility].

## 2018-04-12 ENCOUNTER — HOSPITAL ENCOUNTER (OUTPATIENT)
Dept: MRI IMAGING | Facility: HOSPITAL | Age: 63
Discharge: HOME | End: 2018-04-12
Admitting: PHYSICIAN ASSISTANT

## 2018-04-12 ENCOUNTER — OFFICE VISIT (OUTPATIENT)
Dept: NEUROSURGERY | Facility: CLINIC | Age: 63
End: 2018-04-12

## 2018-04-12 VITALS
SYSTOLIC BLOOD PRESSURE: 138 MMHG | BODY MASS INDEX: 27 KG/M2 | HEIGHT: 63 IN | TEMPERATURE: 97.6 F | DIASTOLIC BLOOD PRESSURE: 80 MMHG | WEIGHT: 152.4 LBS

## 2018-04-12 DIAGNOSIS — D32.0 MENINGIOMA, RECURRENT OF BRAIN (HCC): Primary | ICD-10-CM

## 2018-04-12 PROCEDURE — 0 GADOBENATE DIMEGLUMINE 529 MG/ML SOLUTION: Performed by: NEUROLOGICAL SURGERY

## 2018-04-12 PROCEDURE — 70553 MRI BRAIN STEM W/O & W/DYE: CPT

## 2018-04-12 PROCEDURE — A9577 INJ MULTIHANCE: HCPCS | Performed by: NEUROLOGICAL SURGERY

## 2018-04-12 PROCEDURE — 99213 OFFICE O/P EST LOW 20 MIN: CPT | Performed by: NEUROLOGICAL SURGERY

## 2018-04-12 RX ADMIN — GADOBENATE DIMEGLUMINE 12 ML: 529 INJECTION, SOLUTION INTRAVENOUS at 12:15

## 2018-04-12 NOTE — PROGRESS NOTES
Tereza Ackerman  1955  [id number]                       CURRENT WORKING DIAGNOSIS:  [Recurrent meningioma ]         MEDICAL HISTORY SINCE LAST ENCOUNTER:  [She has shown 3-4 millimeter progression.  As displacement of the orbit and with tumor invading into the orbit and in that the subtemporal area.  The predominance of the neoplasm is extra calvarial. ]           Past Medical History:   Diagnosis Date   • Acid reflux    • Arthritis    • Atrial flutter with rapid ventricular response 12/21/2017   • Back pain    • Brain tumor    • Depression    • History of blood transfusion    • History of Nissen fundoplication 7/1/2017   • Hyperlipemia    • Hypertension    • Memory loss or impairment    • Migraine    • Parathyroid adenoma     Incidental on thyroid US.   • PONV (postoperative nausea and vomiting)     nausea - preprocedural meds help    • Prediabetes     Last Impression: 06 Feb 2015  Reviewed labs. Excellent control.  Maren Connellast Impression: 06 Feb 2015  Reviewed labs. Excellent control.  Michaela Connell   • Thyroid nodule      Last Impression: 13 Jun 2015  r/o thyroid cancer, will proceed with US.  Michaela Connell (Internal Medicine)    • UTI (urinary tract infection)    • Vision changes     blockages left eye    • Wears glasses     readers              Past Surgical History:   Procedure Laterality Date   • BRAIN SURGERY  2003 & 2014    Dr. Werner Hollis   • CARPAL TUNNEL RELEASE  2002    right    • COLONOSCOPY     • ENDOSCOPY     • EXPLORATORY LAPAROTOMY N/A 12/5/2017    Procedure: EXPLORATORY LAPAROTOMY, SMALL BOWEL RESECTION;  Surgeon: Ирина Cobian MD;  Location:  [facility];  Service:    • EXPLORATORY LAPAROTOMY N/A 12/22/2017    Procedure: LAPAROTOMY EXPLORATORY FOR SMALL BOWEL OBSTRUCTION;  Surgeon: Ирина Cobian MD;  Location:  [facility];  Service:    • HYSTERECTOMY      partial - both ovaries still present pt believes    • INSERTION HEMODIALYSIS CATHETER N/A 12/7/2017     Procedure: HEMODIALYSIS CATHETER INSERTION;  Surgeon: Chance Valenzuela MD;  Location:  [facility];  Service:    • ORBITOTOMY Left 11/3/2017    Procedure:  LEFT LATERAL ORBITOTOMY WITH DEBULKING OF TUMOR ;  Surgeon: Moo Hopkins MD;  Location:  [facility];  Service:    • OTHER SURGICAL HISTORY      craniotomy tumor removal-complete   • OTHER SURGICAL HISTORY      esophagogastric fundoplasty nissen fundoplication   • TUBAL ABDOMINAL LIGATION                Family History   Problem Relation Age of Onset   • Obesity Mother    • Heart attack Mother    • Migraines Father    • Stroke Father    • Cancer Other    • Diabetes Other    • Breast cancer Maternal Aunt    • Breast cancer Paternal Aunt    • Ovarian cancer Neg Hx               Social History     Social History   • Marital status:      Spouse name: N/A   • Number of children: N/A   • Years of education: N/A     Occupational History   • Not on file.     Social History Main Topics   • Smoking status: Never Smoker   • Smokeless tobacco: Never Used   • Alcohol use No   • Drug use: No   • Sexual activity: Defer     Other Topics Concern   • Not on file     Social History Narrative   • No narrative on file              Allergies   Allergen Reactions   • Dilantin [Phenytoin Sodium Extended] Rash   • Phenytoin Rash     duplicate               Current Outpatient Prescriptions:   •  aspirin 325 MG tablet, Take 325 mg by mouth Daily., Disp: , Rfl:   •  carvedilol (COREG) 12.5 MG tablet, Take 1 tablet by mouth Every 12 (Twelve) Hours., Disp: 60 tablet, Rfl: 3  •  diazePAM (VALIUM) 10 MG tablet, Take 1 tablet by mouth Take As Directed. 30 minutes prior to MRI, Disp: 1 tablet, Rfl: 0  •  ondansetron (ZOFRAN) 4 MG tablet, Take 1 tablet by mouth Every 8 (Eight) Hours As Needed for Nausea or Vomiting., Disp: 10 tablet, Rfl: 0  •  QUEtiapine (SEROQUEL) 25 MG tablet, Take 1 tablet by mouth Every Night., Disp: 90 tablet, Rfl: 0  •  sertraline (ZOLOFT) 100 MG tablet,  Take 1 tablet by mouth Daily. (Patient taking differently: Take 50 mg by mouth Daily.), Disp: 90 tablet, Rfl: 0  •  thiamine (VITAMIN B1) 100 MG tablet, Take 1 tablet by mouth Daily., Disp: 90 tablet, Rfl: 0  No current facility-administered medications for this visit.          Review of Systems   Constitutional: Negative for activity change, appetite change, chills, diaphoresis, fatigue, fever and unexpected weight change.   HENT: Negative for congestion, dental problem, drooling, ear discharge, ear pain, facial swelling, hearing loss, mouth sores, nosebleeds, postnasal drip, rhinorrhea, sinus pressure, sneezing, sore throat, tinnitus, trouble swallowing and voice change.    Eyes: Negative for photophobia, pain, discharge, redness, itching and visual disturbance.   Respiratory: Negative for apnea, cough, choking, chest tightness, shortness of breath, wheezing and stridor.    Cardiovascular: Negative for chest pain, palpitations and leg swelling.   Gastrointestinal: Positive for diarrhea. Negative for abdominal distention, abdominal pain, anal bleeding, blood in stool, constipation, nausea, rectal pain and vomiting.   Endocrine: Negative for cold intolerance, heat intolerance, polydipsia, polyphagia and polyuria.   Genitourinary: Negative for decreased urine volume, difficulty urinating, dysuria, enuresis, flank pain, frequency, genital sores, hematuria and urgency.   Musculoskeletal: Negative for arthralgias, back pain, gait problem, joint swelling, myalgias, neck pain and neck stiffness.   Skin: Negative for color change, pallor, rash and wound.   Allergic/Immunologic: Negative for environmental allergies, food allergies and immunocompromised state.   Neurological: Negative for dizziness, tremors, seizures, syncope, facial asymmetry, speech difficulty, weakness, light-headedness, numbness and headaches.   Hematological: Negative for adenopathy. Does not bruise/bleed easily.   Psychiatric/Behavioral: Negative for  "agitation, behavioral problems, confusion, decreased concentration, dysphoric mood, hallucinations, self-injury, sleep disturbance and suicidal ideas. The patient is not nervous/anxious and is not hyperactive.                Vitals:    04/12/18 1343   Height: 160 cm (62.99\")               EXAMINATION: Has only an island vision available.  I deviated toward her left.  Decreased sensation in V2            MEDICAL DECISION MAKING: The MRI shows progression.  I think we need to make a decision as to whether this can be approached surgically locally or whether we should refer.           ASSESSMENT/DISPOSITION: I will send these studies to neurosurgery to [facility].  She will see ophthalmology.              I APPRECIATE THE OPPORTUNITY OF THIS REFERRAL. PLEASE CALL IF ANY  QUESTIONS [phone]    Scribed for Werner Hollis MD by Joy Corral CMA. 4/12/2018  2:04 PM    I have read and concur with the information provided by the scribe.  Werner Hollis MD          "

## 2018-04-17 ENCOUNTER — TRANSCRIBE ORDERS (OUTPATIENT)
Dept: ADMINISTRATIVE | Facility: HOSPITAL | Age: 63
End: 2018-04-17

## 2018-04-17 DIAGNOSIS — H05.242 CONSTANT EXOPHTHALMOS OF LEFT EYE: ICD-10-CM

## 2018-04-17 DIAGNOSIS — C69.62 MALIGNANT NEOPLASM OF LEFT ORBIT (HCC): Primary | ICD-10-CM

## 2018-04-18 ENCOUNTER — TELEPHONE (OUTPATIENT)
Dept: NEUROSURGERY | Facility: CLINIC | Age: 63
End: 2018-04-18

## 2018-04-18 NOTE — TELEPHONE ENCOUNTER
Tereza called a few days ago with no response back. She wanted to know if Dr. Hollis had a chance to speak with Dr. Hopkins and if he is aware of the oncology appt and CT scans that Dr. Hopkins has arranged.  She states Dr. Hollis was to speak with someone at [facility] about her case as well and wanted to know if he has heard anything.  I told pt I would speak with Dr. Hollis later today.  She verbalized understanding.

## 2018-04-19 ENCOUNTER — HOSPITAL ENCOUNTER (OUTPATIENT)
Dept: CT IMAGING | Facility: HOSPITAL | Age: 63
Discharge: HOME | End: 2018-04-19
Attending: OPHTHALMOLOGY | Admitting: OPHTHALMOLOGY

## 2018-04-19 DIAGNOSIS — C69.62 MALIGNANT NEOPLASM OF LEFT ORBIT (HCC): ICD-10-CM

## 2018-04-19 DIAGNOSIS — H05.242 CONSTANT EXOPHTHALMOS OF LEFT EYE: ICD-10-CM

## 2018-04-19 PROCEDURE — 25010000002 IOPAMIDOL 61 % SOLUTION: Performed by: OPHTHALMOLOGY

## 2018-04-19 PROCEDURE — 82565 ASSAY OF CREATININE: CPT

## 2018-04-19 PROCEDURE — 70482 CT ORBIT/EAR/FOSSA W/O&W/DYE: CPT

## 2018-04-19 RX ADMIN — IOPAMIDOL 50 ML: 612 INJECTION, SOLUTION INTRAVENOUS at 14:04

## 2018-04-23 LAB — CREAT BLDA-MCNC: 1.2 MG/DL (ref 0.6–1.3)

## 2018-04-24 ENCOUNTER — OFFICE VISIT (OUTPATIENT)
Dept: RADIATION ONCOLOGY | Facility: HOSPITAL | Age: 63
End: 2018-04-24

## 2018-04-24 ENCOUNTER — HOSPITAL ENCOUNTER (OUTPATIENT)
Dept: RADIATION ONCOLOGY | Facility: HOSPITAL | Age: 63
Setting detail: RADIATION/ONCOLOGY SERIES
Discharge: HOME | End: 2018-04-24

## 2018-04-24 VITALS
WEIGHT: 152.1 LBS | DIASTOLIC BLOOD PRESSURE: 75 MMHG | RESPIRATION RATE: 20 BRPM | TEMPERATURE: 97.6 F | SYSTOLIC BLOOD PRESSURE: 158 MMHG | OXYGEN SATURATION: 97 % | BODY MASS INDEX: 26.95 KG/M2 | HEART RATE: 64 BPM

## 2018-04-24 DIAGNOSIS — D32.0 MENINGIOMA, RECURRENT OF BRAIN (HCC): Primary | ICD-10-CM

## 2018-04-24 PROCEDURE — G0463 HOSPITAL OUTPT CLINIC VISIT: HCPCS

## 2018-04-24 RX ORDER — MELATONIN
2000 EVERY OTHER DAY
COMMUNITY

## 2018-04-24 NOTE — PROGRESS NOTES
CONSULTATION NOTE      :                                                          1955  DATE OF CONSULTATION:                       2018   REQUESTING PHYSICIAN:                   Werner Hollis MD  REASON FOR CONSULTATION:           Recurrent Meningioma              BRIEF HISTORY:  The patient is a very pleasant 63 y.o. female  with left sphenoid wing meningioma initially resected by Dr. Hollis in .  In  she was found to have locally recurrent tumor which was treated with fractionated stereotactic radiosurgery, 25 Mariano in 5 fractions.  (We're trying to restore that plan for comparison to her current tumor location).  In  she was again found to have recurrent tumor for which she underwent redo craniotomy with Dr. Hollis.  2017 Dr. Moo Hopkins performed another surgical debulking procedure in the left sphenoid wing.  Tumor was not able to be completely resected.  Prior to another combined resection with her neurosurgeon, the patient developed other medical problems requiring a prolonged 40 day hospital stay for gastrointestinal bleed, small bowel obstruction and renal insufficiency requiring dialysis.  She has recovered from those other problems however in the interim over the past couple months she's had further decline in left eye visual acuity to the point where she no longer has any serviceable vision in the left eye.  MRI 2018 shows 3 x 3.2 cm mass involving the left retro-orbital structures which is primarily extra-axial but with impingement on the superior and superior lateral conal contents, orbits and optic nerve.  There was approximately 3-4 mm increase in size compared to the previous study from 2017.  CT orbits 2018 also demonstrates 3.3 x 3.1 cm infiltrative superior left orbital mass with intraconal extension and rightward deviation of the optic nerve.  There is significant osseous erosion/infiltration and mass effect causing  proptosis.      Allergies   Allergen Reactions   • Dilantin [Phenytoin Sodium Extended] Rash   • Phenytoin Rash     duplicate        Social History     Social History   • Marital status:      Social History Main Topics   • Smoking status: Never Smoker   • Smokeless tobacco: Never Used   • Alcohol use No   • Drug use: No   • Sexual activity: Defer     Other Topics Concern   • Not on file       Past Medical History:   Diagnosis Date   • Acid reflux    • Arthritis    • Atrial flutter with rapid ventricular response 12/21/2017   • Back pain    • Brain tumor    • Depression    • History of blood transfusion    • History of Nissen fundoplication 7/1/2017   • Hyperlipemia    • Hypertension    • Memory loss or impairment    • Migraine    • Parathyroid adenoma     Incidental on thyroid US.   • PONV (postoperative nausea and vomiting)     nausea - preprocedural meds help    • Prediabetes     Last Impression: 06 Feb 2015  Reviewed labs. Excellent control.  Maren Connellast Impression: 06 Feb 2015  Reviewed labs. Excellent control.  Michaela Connell   • Thyroid nodule      Last Impression: 13 Jun 2015  r/o thyroid cancer, will proceed with US.  Michaela Connell (Internal Medicine)    • UTI (urinary tract infection)    • Vision changes     blockages left eye    • Wears glasses     readers       family history includes Breast cancer in her maternal aunt and paternal aunt; Cancer in her other; Diabetes in her father and other; Heart attack in her mother; Migraines in her father; Obesity in her mother; Stroke in her father.     Past Surgical History:   Procedure Laterality Date   • BRAIN SURGERY  2003 & 2014    Dr. Werner Hollis   • CARPAL TUNNEL RELEASE  2002    right    • COLONOSCOPY     • ENDOSCOPY     • EXPLORATORY LAPAROTOMY N/A 12/5/2017    Procedure: EXPLORATORY LAPAROTOMY, SMALL BOWEL RESECTION;  Surgeon: Ирина Cobian MD;  Location: [facility];  Service:    • EXPLORATORY LAPAROTOMY N/A 12/22/2017     Procedure: LAPAROTOMY EXPLORATORY FOR SMALL BOWEL OBSTRUCTION;  Surgeon: Ирина Cobian MD;  Location:  [facility];  Service:    • HYSTERECTOMY      partial - both ovaries still present pt believes    • INSERTION HEMODIALYSIS CATHETER N/A 12/7/2017    Procedure: HEMODIALYSIS CATHETER INSERTION;  Surgeon: Chance Valenzuela MD;  Location:  [facility];  Service:    • ORBITOTOMY Left 11/3/2017    Procedure:  LEFT LATERAL ORBITOTOMY WITH DEBULKING OF TUMOR ;  Surgeon: Moo Hopkins MD;  Location:  [facility];  Service:    • OTHER SURGICAL HISTORY      craniotomy tumor removal-complete   • OTHER SURGICAL HISTORY      esophagogastric fundoplasty nissen fundoplication   • TUBAL ABDOMINAL LIGATION          Review of Systems   Constitutional: Positive for fatigue.   Eyes: Positive for eye problems (blind left eye).   Cardiovascular: Negative.    Gastrointestinal: Positive for diarrhea.   Endocrine: Negative.    Genitourinary: Negative.     Musculoskeletal: Negative.    Skin: Negative.    Neurological: Positive for dizziness, headaches and light-headedness.   Hematological: Negative.    Psychiatric/Behavioral: Positive for depression. The patient is nervous/anxious.            Objective   VITAL SIGNS:   Vitals:    04/24/18 1000   BP: 158/75   Pulse: 64   Resp: 20   Temp: 97.6 °F (36.4 °C)   TempSrc: Temporal Artery    SpO2: 97%   Weight: 69 kg (152 lb 1.6 oz)   PainSc: 0-No pain        Karnofsky score: 80      Physical Exam   Constitutional: She is oriented to person, place, and time. She appears well-developed and well-nourished.   HENT:   Healed left scalp incisions.  No palpable mass.  Left eye mild proptosis   Neck: Normal range of motion. Neck supple.   Cardiovascular: Normal rate, regular rhythm and normal heart sounds.    No murmur heard.  Pulmonary/Chest: Effort normal and breath sounds normal. She has no wheezes. She has no rales.   Abdominal: Soft. Bowel sounds are normal. She exhibits no distension.  There is no hepatosplenomegaly. There is no tenderness.   Musculoskeletal: Normal range of motion. She exhibits no edema or tenderness.   Lymphadenopathy:     She has no cervical adenopathy.     She has no axillary adenopathy.        Right: No supraclavicular adenopathy present.        Left: No supraclavicular adenopathy present.   Neurological: She is alert and oriented to person, place, and time. She has normal strength. A cranial nerve deficit (near complete loss of vision in the left eye.  Grossly normal in right eye) is present. No sensory deficit.   Skin: Skin is warm and dry.   Psychiatric: She has a normal mood and affect. Her behavior is normal. Judgment and thought content normal.   Nursing note and vitals reviewed.           The following portions of the patient's history were reviewed and updated as appropriate: allergies, current medications, past family history, past medical history, past social history, past surgical history and problem list.    Assessment      Recurrent left sphenoid wing meningioma, 15 years after initial resection.  She has now had 3 craniotomies and fractionated stereotactic radiosurgery ×1 approximately 11 years ago.  My physics staff is trying to restore the previous radiosurgery plan in order to fuse the images with her current MRI and CT studies to determine if the current tumor was previously treated or if this is a new location.  The longest previous interval of disease remission was for 7 years after her previous radiosurgery.  The current tumor may be amenable to re-treatment with stereotactic radiosurgery.  If there is significant overlap on the optic apparatus or other critical structures, there could be increased risk, however, patient currently has no serviceable vision in that eye.  Alternatively, she could undergo another surgical procedure, however, it may require enucleation to clear all disease and the patient would understandably prefer to avoid such as procedure  if other options are available.    RECOMMENDATIONS:  I will follow-up on previous radiosurgery treatment imaging to determine how much, if any, additional radiosurgery can be safely delivered to the current tumor.  In this difficult situation, there is certainly increasing risk/benefit ratio which will have to be considered.  This was discussed with patient and her other physicians today.  Awaiting additional information, I'll also post this case on the [employer] discussion board for other opinions or comments about how to proceed.    Return in about 1 week (around 5/1/2018) for Office Visit.  Tereza was seen today for other.    Diagnoses and all orders for this visit:    Meningioma, recurrent of brain         Costa Raygoza MD

## 2018-04-25 ENCOUNTER — TELEPHONE (OUTPATIENT)
Dept: CARDIOLOGY | Facility: CLINIC | Age: 63
End: 2018-04-25

## 2018-05-02 ENCOUNTER — OFFICE VISIT (OUTPATIENT)
Dept: RADIATION ONCOLOGY | Facility: HOSPITAL | Age: 63
End: 2018-05-02

## 2018-05-02 ENCOUNTER — HOSPITAL ENCOUNTER (OUTPATIENT)
Dept: RADIATION ONCOLOGY | Facility: HOSPITAL | Age: 63
Setting detail: RADIATION/ONCOLOGY SERIES
Discharge: HOME | End: 2018-05-02

## 2018-05-02 VITALS
DIASTOLIC BLOOD PRESSURE: 70 MMHG | OXYGEN SATURATION: 98 % | WEIGHT: 151.6 LBS | SYSTOLIC BLOOD PRESSURE: 144 MMHG | TEMPERATURE: 97.8 F | HEART RATE: 66 BPM | RESPIRATION RATE: 18 BRPM | BODY MASS INDEX: 26.86 KG/M2

## 2018-05-02 DIAGNOSIS — D32.0 MENINGIOMA, RECURRENT OF BRAIN (HCC): ICD-10-CM

## 2018-05-02 PROCEDURE — G0463 HOSPITAL OUTPT CLINIC VISIT: HCPCS | Performed by: RADIOLOGY

## 2018-05-02 PROCEDURE — 77334 RADIATION TREATMENT AID(S): CPT | Performed by: RADIOLOGY

## 2018-05-02 PROCEDURE — 77290 THER RAD SIMULAJ FIELD CPLX: CPT | Performed by: RADIOLOGY

## 2018-05-02 PROCEDURE — 77470 SPECIAL RADIATION TREATMENT: CPT | Performed by: RADIOLOGY

## 2018-05-02 NOTE — PROGRESS NOTES
FOLLOW UP NOTE    PATIENT:                                                      Tereza Ackerman  MEDICAL RECORD #:                        [id number]  :                                                          1955  COMPLETION DATE:    2007  DIAGNOSIS:     Recurrent left sphenoid wing meningioma.      BRIEF HISTORY:    She's had no change in health systems she was here recently.  She still has no serviceable vision in the left eye.  She's been considering treatment options of more aggressive surgical intervention versus repeat stereotactic radiosurgery.  She would like to be treated more conservatively with SRS at this time.  I have discussed this case with other involved physicians.  I've also posted her case with images on the radiosurgery Society discussion board.  There is evidence to support use of retreatment with SRS using higher dose per fraction in this difficult situation.  Patient still be a very good chance of obtaining local control and surgery would still be a viable option in the future.    MEDICATIONS: Medication reconciliation for the patient was reviewed and confirmed in the electronic medical record.    Review of Systems   Constitutional: Positive for fatigue.   Eyes: Positive for eye problems (left eye blind- per pt).   Gastrointestinal: Positive for diarrhea (intermettent- per pt).   Neurological: Positive for headaches.   Psychiatric/Behavioral: The patient is nervous/anxious.    All other systems reviewed and are negative.      KPS 80%    Physical Exam    VITAL SIGNS:   Vitals:    18 0810   BP: 144/70   Pulse: 66   Resp: 18   Temp: 97.8 °F (36.6 °C)   TempSrc: Temporal Artery    SpO2: 98%   Weight: 68.8 kg (151 lb 9.6 oz)   PainSc:   2   PainLoc: Eye  Comment: left       The following portions of the patient's history were reviewed and updated as appropriate: allergies, current medications, past family history, past medical history, past social history, past surgical history  and problem list.         Tereza was seen today for other.    Diagnoses and all orders for this visit:    Meningioma, recurrent of brain         IMPRESSION:  Recurrent left sphenoid wing meningioma.    RECOMMENDATIONS:  We'll proceed with salvage treatment using stereotactic radiosurgery.  Patient understands that left eye vision is not likely to improve with any intervention.  Cosmetic asymmetry is currently acceptable to patient but may also not improve after treatment.  She also understands that she may require future intervention with resection.  She will be given a single fraction of 18 Gray on the CyberKnife.    Return in about 1 week (around 5/9/2018) for CyberKnife treatment.    Costa Raygoza MD    Errors in dictation may reflect use of voice recognition software and not all errors in transcription may have been detected prior to signing.

## 2018-05-17 ENCOUNTER — TELEPHONE (OUTPATIENT)
Dept: RADIATION ONCOLOGY | Facility: HOSPITAL | Age: 63
End: 2018-05-17

## 2018-05-17 NOTE — TELEPHONE ENCOUNTER
Pt called on 5/16/18 with questions about when treatment would start and with questions about her vision preservation.  I explained I would give her number to Dr. Raygoza and if he had time he could call or she could speak to him just prior to her ck treatment.  She canceled her appt I had made for her to ask additional questions.  Pt verbalized understanding.

## 2018-05-21 PROCEDURE — 77300 RADIATION THERAPY DOSE PLAN: CPT | Performed by: RADIOLOGY

## 2018-05-21 PROCEDURE — 77334 RADIATION TREATMENT AID(S): CPT | Performed by: RADIOLOGY

## 2018-05-21 PROCEDURE — 77370 RADIATION PHYSICS CONSULT: CPT | Performed by: RADIOLOGY

## 2018-05-21 PROCEDURE — 77295 3-D RADIOTHERAPY PLAN: CPT | Performed by: RADIOLOGY

## 2018-05-23 ENCOUNTER — TELEPHONE (OUTPATIENT)
Dept: NEUROSURGERY | Facility: CLINIC | Age: 63
End: 2018-05-23

## 2018-05-23 DIAGNOSIS — D32.0 MENINGIOMA, RECURRENT OF BRAIN (HCC): Primary | ICD-10-CM

## 2018-05-23 NOTE — TELEPHONE ENCOUNTER
Ena Eastman to [facility]   17 minutes ago  Can someone please place an order for a mri brain with and without for this patient's 6 week follow up s/p cyber knife

## 2018-05-25 ENCOUNTER — DOCUMENTATION (OUTPATIENT)
Dept: NEUROSURGERY | Facility: CLINIC | Age: 63
End: 2018-05-25

## 2018-05-25 ENCOUNTER — HOSPITAL ENCOUNTER (EMERGENCY)
Facility: HOSPITAL | Age: 63
Discharge: HOME | End: 2018-05-25
Attending: EMERGENCY MEDICINE | Admitting: EMERGENCY MEDICINE

## 2018-05-25 ENCOUNTER — TELEPHONE (OUTPATIENT)
Dept: NEUROSURGERY | Facility: CLINIC | Age: 63
End: 2018-05-25

## 2018-05-25 ENCOUNTER — APPOINTMENT (OUTPATIENT)
Dept: CT IMAGING | Facility: HOSPITAL | Age: 63
End: 2018-05-25

## 2018-05-25 ENCOUNTER — HOSPITAL ENCOUNTER (OUTPATIENT)
Dept: RADIATION ONCOLOGY | Facility: HOSPITAL | Age: 63
Discharge: HOME | End: 2018-05-25

## 2018-05-25 VITALS
WEIGHT: 150 LBS | TEMPERATURE: 98.1 F | OXYGEN SATURATION: 94 % | SYSTOLIC BLOOD PRESSURE: 140 MMHG | RESPIRATION RATE: 16 BRPM | BODY MASS INDEX: 26.58 KG/M2 | DIASTOLIC BLOOD PRESSURE: 77 MMHG | HEART RATE: 63 BPM | HEIGHT: 63 IN

## 2018-05-25 DIAGNOSIS — I10 ELEVATED BLOOD PRESSURE READING WITH DIAGNOSIS OF HYPERTENSION: ICD-10-CM

## 2018-05-25 DIAGNOSIS — R51.9 ACUTE NONINTRACTABLE HEADACHE, UNSPECIFIED HEADACHE TYPE: Primary | ICD-10-CM

## 2018-05-25 DIAGNOSIS — D32.9 MENINGIOMA (HCC): ICD-10-CM

## 2018-05-25 LAB
ALBUMIN SERPL-MCNC: 4.5 G/DL (ref 3.2–4.8)
ALBUMIN/GLOB SERPL: 1.3 G/DL (ref 1.5–2.5)
ALP SERPL-CCNC: 81 U/L (ref 25–100)
ALT SERPL W P-5'-P-CCNC: 21 U/L (ref 7–40)
ANION GAP SERPL CALCULATED.3IONS-SCNC: 9 MMOL/L (ref 3–11)
AST SERPL-CCNC: 29 U/L (ref 0–33)
BASOPHILS # BLD AUTO: 0.02 10*3/MM3 (ref 0–0.2)
BASOPHILS NFR BLD AUTO: 0.1 % (ref 0–1)
BILIRUB SERPL-MCNC: 0.4 MG/DL (ref 0.3–1.2)
BUN BLD-MCNC: 19 MG/DL (ref 9–23)
BUN/CREAT SERPL: 15.8 (ref 7–25)
CALCIUM SPEC-SCNC: 9.4 MG/DL (ref 8.7–10.4)
CHLORIDE SERPL-SCNC: 104 MMOL/L (ref 99–109)
CO2 SERPL-SCNC: 25 MMOL/L (ref 20–31)
CREAT BLD-MCNC: 1.2 MG/DL (ref 0.6–1.3)
DEPRECATED RDW RBC AUTO: 45.5 FL (ref 37–54)
EOSINOPHIL # BLD AUTO: 0.1 10*3/MM3 (ref 0–0.3)
EOSINOPHIL NFR BLD AUTO: 0.7 % (ref 0–3)
ERYTHROCYTE [DISTWIDTH] IN BLOOD BY AUTOMATED COUNT: 13.8 % (ref 11.3–14.5)
GFR SERPL CREATININE-BSD FRML MDRD: 45 ML/MIN/1.73
GLOBULIN UR ELPH-MCNC: 3.4 GM/DL
GLUCOSE BLD-MCNC: 173 MG/DL (ref 70–100)
HCT VFR BLD AUTO: 38.2 % (ref 34.5–44)
HGB BLD-MCNC: 12.7 G/DL (ref 11.5–15.5)
IMM GRANULOCYTES # BLD: 0.05 10*3/MM3 (ref 0–0.03)
IMM GRANULOCYTES NFR BLD: 0.3 % (ref 0–0.6)
INR PPP: 1.06 (ref 0.91–1.09)
LYMPHOCYTES # BLD AUTO: 1.89 10*3/MM3 (ref 0.6–4.8)
LYMPHOCYTES NFR BLD AUTO: 12.7 % (ref 24–44)
MCH RBC QN AUTO: 29.8 PG (ref 27–31)
MCHC RBC AUTO-ENTMCNC: 33.2 G/DL (ref 32–36)
MCV RBC AUTO: 89.7 FL (ref 80–99)
MONOCYTES # BLD AUTO: 0.78 10*3/MM3 (ref 0–1)
MONOCYTES NFR BLD AUTO: 5.2 % (ref 0–12)
NEUTROPHILS # BLD AUTO: 12.08 10*3/MM3 (ref 1.5–8.3)
NEUTROPHILS NFR BLD AUTO: 81 % (ref 41–71)
PLATELET # BLD AUTO: 276 10*3/MM3 (ref 150–450)
PMV BLD AUTO: 10.1 FL (ref 6–12)
POTASSIUM BLD-SCNC: 4.1 MMOL/L (ref 3.5–5.5)
PROT SERPL-MCNC: 7.9 G/DL (ref 5.7–8.2)
PROTHROMBIN TIME: 11.1 SECONDS (ref 9.6–11.5)
RBC # BLD AUTO: 4.26 10*6/MM3 (ref 3.89–5.14)
SODIUM BLD-SCNC: 138 MMOL/L (ref 132–146)
WBC NRBC COR # BLD: 14.92 10*3/MM3 (ref 3.5–10.8)

## 2018-05-25 PROCEDURE — 80053 COMPREHEN METABOLIC PANEL: CPT | Performed by: EMERGENCY MEDICINE

## 2018-05-25 PROCEDURE — 85025 COMPLETE CBC W/AUTO DIFF WBC: CPT | Performed by: EMERGENCY MEDICINE

## 2018-05-25 PROCEDURE — 70450 CT HEAD/BRAIN W/O DYE: CPT

## 2018-05-25 PROCEDURE — 99284 EMERGENCY DEPT VISIT MOD MDM: CPT

## 2018-05-25 PROCEDURE — 77290 THER RAD SIMULAJ FIELD CPLX: CPT | Performed by: RADIOLOGY

## 2018-05-25 PROCEDURE — 96374 THER/PROPH/DIAG INJ IV PUSH: CPT

## 2018-05-25 PROCEDURE — 85610 PROTHROMBIN TIME: CPT | Performed by: EMERGENCY MEDICINE

## 2018-05-25 PROCEDURE — 25010000002 HYDROMORPHONE PER 4 MG: Performed by: EMERGENCY MEDICINE

## 2018-05-25 PROCEDURE — 25010000002 PROMETHAZINE PER 50 MG: Performed by: EMERGENCY MEDICINE

## 2018-05-25 PROCEDURE — 77372 SRS LINEAR BASED: CPT | Performed by: RADIOLOGY

## 2018-05-25 PROCEDURE — 25010000002 DEXAMETHASONE PER 1 MG: Performed by: EMERGENCY MEDICINE

## 2018-05-25 PROCEDURE — 96375 TX/PRO/DX INJ NEW DRUG ADDON: CPT

## 2018-05-25 RX ORDER — PROMETHAZINE HYDROCHLORIDE 25 MG/ML
12.5 INJECTION, SOLUTION INTRAMUSCULAR; INTRAVENOUS ONCE
Status: COMPLETED | OUTPATIENT
Start: 2018-05-25 | End: 2018-05-25

## 2018-05-25 RX ORDER — HYDROMORPHONE HYDROCHLORIDE 1 MG/ML
0.5 INJECTION, SOLUTION INTRAMUSCULAR; INTRAVENOUS; SUBCUTANEOUS ONCE
Status: COMPLETED | OUTPATIENT
Start: 2018-05-25 | End: 2018-05-25

## 2018-05-25 RX ORDER — DEXAMETHASONE SODIUM PHOSPHATE 4 MG/ML
4 INJECTION, SOLUTION INTRA-ARTICULAR; INTRALESIONAL; INTRAMUSCULAR; INTRAVENOUS; SOFT TISSUE ONCE
Status: COMPLETED | OUTPATIENT
Start: 2018-05-25 | End: 2018-05-25

## 2018-05-25 RX ORDER — LABETALOL HYDROCHLORIDE 5 MG/ML
10 INJECTION, SOLUTION INTRAVENOUS ONCE
Status: COMPLETED | OUTPATIENT
Start: 2018-05-25 | End: 2018-05-25

## 2018-05-25 RX ORDER — SODIUM CHLORIDE 0.9 % (FLUSH) 0.9 %
10 SYRINGE (ML) INJECTION AS NEEDED
Status: DISCONTINUED | OUTPATIENT
Start: 2018-05-25 | End: 2018-05-26 | Stop reason: HOSPADM

## 2018-05-25 RX ADMIN — LABETALOL HYDROCHLORIDE 10 MG: 5 INJECTION INTRAVENOUS at 20:34

## 2018-05-25 RX ADMIN — DEXAMETHASONE SODIUM PHOSPHATE 4 MG: 4 INJECTION, SOLUTION INTRAMUSCULAR; INTRAVENOUS at 21:12

## 2018-05-25 RX ADMIN — PROMETHAZINE HYDROCHLORIDE 12.5 MG: 25 INJECTION INTRAMUSCULAR; INTRAVENOUS at 20:24

## 2018-05-25 RX ADMIN — HYDROMORPHONE HYDROCHLORIDE 0.5 MG: 1 INJECTION, SOLUTION INTRAMUSCULAR; INTRAVENOUS; SUBCUTANEOUS at 20:27

## 2018-05-25 NOTE — OP NOTE
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    Surgeon: Werner Hollis M.D.    Indications for treatment:  This is a 63-year-old female who has undergone craniotomy for excision of meningioma with recurrence through the skull involving the orbit on the left side.  Decompression was performed however sequential MRI showed progression.  She previously had SRS and now is admitted for a second attempt at management of progressive meningioma.       The risks and benefits of this procedure have been discussed with the patient.  Consent forms were given and signatures obtained from the patient.  All questions have been answered satisfactorily and the patient wished to proceed with the procedure.    Description of procedure:     The patient was admitted to the CyberKnife suite, placed on the treatment table and an aquaplast mask was fitted to the calvarium to be used during treatment.    MRI and CT scanning data set were performed and entered into the CyberKnife planning and contouring station.  The tumor and surrounding critical structures including brainstem; visual apparatus; and lens of the eye were identified and coutoured to be excluded from the radiation plan.  The area of interest left posterior orbit and lateral aspect was identified and outlined.  A treatment plan was formulated in conjunction with the radiation oncologist and radiation therapist.  The patient received a total of 1800 cGy  in 1 fraction.    On the day of treatment, the patient returned to the CyberKnife suite and was placed on the treatment table.  The Aquaplast mask was reapplied and the coordinates for treatment using CT and skull x-rays were confirmed.  The treatment time was 37 minutes.  The patient tolerated the procedure very well incurring no neurological  dysfunction or complications.  The patient was discharged from the CyberKnife Suite to be followed by neurosurgery and radiation therapy.    Complications: None

## 2018-05-25 NOTE — TELEPHONE ENCOUNTER
MARICHUY Anguiano called over from the OR on behalf of  and asked for me to call in Xanax 0.5mg 1 po BID PRN- #40 1 RF .    Called into pharmacy- pt aware.       - Encounter closed.

## 2018-06-21 ENCOUNTER — TELEPHONE (OUTPATIENT)
Dept: INTERNAL MEDICINE | Facility: CLINIC | Age: 63
End: 2018-06-21

## 2018-06-21 ENCOUNTER — OFFICE VISIT (OUTPATIENT)
Dept: INTERNAL MEDICINE | Facility: CLINIC | Age: 63
End: 2018-06-21

## 2018-06-21 VITALS
WEIGHT: 150.25 LBS | SYSTOLIC BLOOD PRESSURE: 130 MMHG | HEART RATE: 56 BPM | BODY MASS INDEX: 26.62 KG/M2 | DIASTOLIC BLOOD PRESSURE: 68 MMHG | OXYGEN SATURATION: 99 %

## 2018-06-21 DIAGNOSIS — R35.0 URINARY FREQUENCY: ICD-10-CM

## 2018-06-21 DIAGNOSIS — F41.8 SITUATIONAL ANXIETY: Primary | ICD-10-CM

## 2018-06-21 DIAGNOSIS — F32.89 OTHER DEPRESSION: ICD-10-CM

## 2018-06-21 DIAGNOSIS — R29.898 WEAKNESS OF BOTH LOWER EXTREMITIES: ICD-10-CM

## 2018-06-21 LAB
BILIRUB BLD-MCNC: NEGATIVE MG/DL
CLARITY, POC: CLEAR
COLOR UR: YELLOW
GLUCOSE UR STRIP-MCNC: NEGATIVE MG/DL
KETONES UR QL: NEGATIVE
LEUKOCYTE EST, POC: ABNORMAL
NITRITE UR-MCNC: NEGATIVE MG/ML
PH UR: 6.5 [PH] (ref 5–8)
PROT UR STRIP-MCNC: NEGATIVE MG/DL
RBC # UR STRIP: NEGATIVE /UL
SP GR UR: 1 (ref 1–1.03)
UROBILINOGEN UR QL: NORMAL

## 2018-06-21 PROCEDURE — 81003 URINALYSIS AUTO W/O SCOPE: CPT | Performed by: INTERNAL MEDICINE

## 2018-06-21 PROCEDURE — 99214 OFFICE O/P EST MOD 30 MIN: CPT | Performed by: INTERNAL MEDICINE

## 2018-06-21 RX ORDER — LORAZEPAM 0.5 MG/1
TABLET ORAL
Qty: 30 TABLET | Refills: 2 | Status: SHIPPED | OUTPATIENT
Start: 2018-06-21 | End: 2018-09-12

## 2018-06-21 RX ORDER — TRAZODONE HYDROCHLORIDE 50 MG/1
50 TABLET ORAL NIGHTLY
Qty: 30 TABLET | Refills: 3 | Status: SHIPPED | OUTPATIENT
Start: 2018-06-21 | End: 2018-07-12

## 2018-06-21 RX ORDER — QUETIAPINE FUMARATE 50 MG/1
50 TABLET, FILM COATED ORAL NIGHTLY
Qty: 30 TABLET | Refills: 5 | Status: SHIPPED | OUTPATIENT
Start: 2018-06-21 | End: 2019-01-11 | Stop reason: SDUPTHER

## 2018-06-21 RX ORDER — ALPRAZOLAM 0.5 MG/1
0.5 TABLET ORAL DAILY
COMMUNITY
Start: 2018-06-19 | End: 2018-07-16

## 2018-06-21 NOTE — PROGRESS NOTES
Follow-up (3 mo f/u) and Anxiety (since may)    Subjective   Tereza Ackerman is a 63 y.o. female is here today for follow-up.    History of Present Illness   Tereza is here for a follow up after her recent Cyber knife treatment. Reports that she has been more depressed and anxious.  Has been taking xanax from Dr. Hollis, and has been getting Rx from the xanax.  Has been having suicidal thoughts and has felt very frustrated, asking for any help after hrs.  She is concerned her  has been halving her meds and holding from giving it to her, and thinks may be worsening her anxiety.  Would zain for me to talk to her  re: her predicament.. Took a trazodone from 2014 for sleep, didn't help.    Current Outpatient Prescriptions:   •  ALPRAZolam (XANAX) 0.5 MG tablet, Take 0.5 mg by mouth Daily., Disp: , Rfl:   •  aspirin 325 MG tablet, Take 325 mg by mouth Daily., Disp: , Rfl:   •  carvedilol (COREG) 12.5 MG tablet, Take 1 tablet by mouth Every 12 (Twelve) Hours. (Patient taking differently: Take 6.25 mg by mouth Every 12 (Twelve) Hours.), Disp: 60 tablet, Rfl: 3  •  cholecalciferol (VITAMIN D3) 1000 units tablet, Take 2,000 Units by mouth Every Other Day., Disp: , Rfl:   •  diazePAM (VALIUM) 10 MG tablet, Take 1 tablet by mouth Take As Directed. 30 minutes prior to MRI (Patient taking differently: Take 10 mg by mouth Take As Directed. 30 minutes prior to procedure), Disp: 1 tablet, Rfl: 0  •  ondansetron (ZOFRAN) 4 MG tablet, Take 1 tablet by mouth Every 8 (Eight) Hours As Needed for Nausea or Vomiting., Disp: 10 tablet, Rfl: 0  •  QUEtiapine (SEROquel) 50 MG tablet, Take 1 tablet by mouth Every Night., Disp: 30 tablet, Rfl: 5  •  sertraline (ZOLOFT) 100 MG tablet, Take 1 tablet by mouth Daily. (Patient taking differently: Take 50 mg by mouth Daily.), Disp: 90 tablet, Rfl: 0  •  thiamine (VITAMIN B1) 100 MG tablet, Take 1 tablet by mouth Daily. (Patient taking differently: Take 100 mg by mouth Every Other  Day.), Disp: 90 tablet, Rfl: 0  •  LORazepam (ATIVAN) 0.5 MG tablet, 1/2 PO BID prn anxiety, Disp: 30 tablet, Rfl: 2  •  traZODone (DESYREL) 50 MG tablet, Take 1 tablet by mouth Every Night., Disp: 30 tablet, Rfl: 3      The following portions of the patient's history were reviewed and updated as appropriate: allergies, current medications, past family history, past medical history, past social history, past surgical history and problem list.    Review of Systems   Constitutional: Positive for activity change, appetite change and fatigue. Negative for chills and fever.   HENT: Negative for ear discharge, ear pain, sinus pressure and sore throat.    Eyes: Positive for visual disturbance.   Respiratory: Negative for cough, chest tightness and shortness of breath.    Cardiovascular: Negative for chest pain, palpitations and leg swelling.   Gastrointestinal: Negative for diarrhea, nausea and vomiting.   Musculoskeletal: Negative for arthralgias, back pain and myalgias.   Neurological: Negative for dizziness, syncope and headaches.   Psychiatric/Behavioral: Positive for agitation, dysphoric mood, sleep disturbance and suicidal ideas. Negative for confusion. The patient is nervous/anxious.        Objective   /68   Pulse 56   Wt 68.2 kg (150 lb 4 oz)   SpO2 99%   BMI 26.62 kg/m²   Physical Exam   Constitutional: She is oriented to person, place, and time. She appears well-developed and well-nourished.   HENT:   asymmetric left upper face s/p surgery    Eyes: Conjunctivae are normal. No scleral icterus.   Cardiovascular: Normal rate and regular rhythm.    Pulmonary/Chest: Effort normal and breath sounds normal.   Abdominal: Soft. Bowel sounds are normal. She exhibits no distension and no mass. There is no tenderness. No hernia.   Neurological: She is alert and oriented to person, place, and time.   Skin: Skin is warm and dry.   Psychiatric: She has a normal mood and affect. Her behavior is normal. Judgment and  thought content normal.   Nursing note and vitals reviewed.        Results for orders placed or performed in visit on 06/21/18   POC Urinalysis Dipstick, Automated   Result Value Ref Range    Color Yellow Yellow, Straw, Dark Yellow, Jackie    Clarity, UA Clear Clear    Specific Gravity  1.001 (A) 1.005 - 1.030    pH, Urine 6.5 5.0 - 8.0    Leukocytes 75 Danny/ul (A) Negative    Nitrite, UA Negative Negative    Protein, POC Negative Negative mg/dL    Glucose, UA Negative Negative, 1000 mg/dL (3+) mg/dL    Ketones, UA Negative Negative    Urobilinogen, UA Normal Normal    Bilirubin Negative Negative    Blood, UA Negative Negative             Assessment/Plan   Diagnoses and all orders for this visit:    Situational anxiety  -     LORazepam (ATIVAN) 0.5 MG tablet; 1/2 PO BID prn anxiety    Other depression  -     traZODone (DESYREL) 50 MG tablet; Take 1 tablet by mouth Every Night.  -     QUEtiapine (SEROquel) 50 MG tablet; Take 1 tablet by mouth Every Night.    Weakness of both lower extremities  -     Ambulatory Referral to Physical Therapy Evaluate and treat    Urinary frequency  -     POC Urinalysis Dipstick, Automated    Other orders  -     ALPRAZolam (XANAX) 0.5 MG tablet; Take 0.5 mg by mouth Daily.      Start ativan to replace xanax. Increase seroquel, and add trazodone.  Start PT.  Get counseling, number for [facility] given.  Adv. To call exchange after hrs  Will get contract next visit.  Discussed and explained above to  as well.    Total time spent today with Tereza Ackerman  was 35 minutes (level 4).  Of this time, 30 + min was spent face-to-face time coordinating care, answering her questions and counseling regarding pathophysiology of her presenting problem along with plans for any diagnostic work-up and treatment.           Return in about 3 weeks (around 7/12/2018) for Next scheduled follow up.

## 2018-06-22 NOTE — TELEPHONE ENCOUNTER
Please let patient know that her urinalysis just showed minimal white cells and no sign of infection. So no need for antibiotics.  She should let us know if she has any burning or pain, so that we can recheck.    thanks

## 2018-07-12 ENCOUNTER — TELEPHONE (OUTPATIENT)
Dept: INTERNAL MEDICINE | Facility: CLINIC | Age: 63
End: 2018-07-12

## 2018-07-12 ENCOUNTER — OFFICE VISIT (OUTPATIENT)
Dept: INTERNAL MEDICINE | Facility: CLINIC | Age: 63
End: 2018-07-12

## 2018-07-12 VITALS
HEART RATE: 60 BPM | DIASTOLIC BLOOD PRESSURE: 78 MMHG | OXYGEN SATURATION: 99 % | WEIGHT: 149.6 LBS | SYSTOLIC BLOOD PRESSURE: 120 MMHG | BODY MASS INDEX: 26.5 KG/M2

## 2018-07-12 DIAGNOSIS — F32.89 OTHER DEPRESSION: ICD-10-CM

## 2018-07-12 DIAGNOSIS — F43.89 ADJUSTMENT REACTION TO CHRONIC STRESS: Primary | ICD-10-CM

## 2018-07-12 DIAGNOSIS — I10 ESSENTIAL HYPERTENSION: ICD-10-CM

## 2018-07-12 PROCEDURE — 99213 OFFICE O/P EST LOW 20 MIN: CPT | Performed by: INTERNAL MEDICINE

## 2018-07-12 RX ORDER — SERTRALINE HYDROCHLORIDE 100 MG/1
100 TABLET, FILM COATED ORAL DAILY
Qty: 90 TABLET | Refills: 0 | Status: SHIPPED | OUTPATIENT
Start: 2018-07-12 | End: 2018-11-18 | Stop reason: SDUPTHER

## 2018-07-12 RX ORDER — BUSPIRONE HYDROCHLORIDE 5 MG/1
5 TABLET ORAL 3 TIMES DAILY
Qty: 90 TABLET | Refills: 5 | Status: SHIPPED | OUTPATIENT
Start: 2018-07-12 | End: 2019-05-03 | Stop reason: SDUPTHER

## 2018-07-12 NOTE — PROGRESS NOTES
Follow-up (3 week for medication)    Subjective   Tereza Ackerman is a 63 y.o. female is here today for follow-up.    History of Present Illness   Taking the ativan 1/2 bid, and reports mornings are the worst, and has to take ativan at 7 am and 1 pm. Still depressed, but is better in the evening. Goes to bed at 10.30.  Has started PT at [facility].   Driving a little more, and is helping her confidence.  Worried about addiction issues with ativan.  Has appointment with Dr. Hollis next week, and will be getting scans.  Would like to see a different Eye dr. Bar ses Dr. Sweeney, at [facility].    Current Outpatient Prescriptions:   •  aspirin 325 MG tablet, Take 325 mg by mouth Daily., Disp: , Rfl:   •  carvedilol (COREG) 12.5 MG tablet, Take 1 tablet by mouth Every 12 (Twelve) Hours. (Patient taking differently: Take 6.25 mg by mouth Every 12 (Twelve) Hours.), Disp: 60 tablet, Rfl: 3  •  cholecalciferol (VITAMIN D3) 1000 units tablet, Take 2,000 Units by mouth Every Other Day., Disp: , Rfl:   •  LORazepam (ATIVAN) 0.5 MG tablet, 1/2 PO BID prn anxiety, Disp: 30 tablet, Rfl: 2  •  QUEtiapine (SEROquel) 50 MG tablet, Take 1 tablet by mouth Every Night., Disp: 30 tablet, Rfl: 5  •  sertraline (ZOLOFT) 100 MG tablet, Take 1 tablet by mouth Daily., Disp: 90 tablet, Rfl: 0  •  thiamine (VITAMIN B1) 100 MG tablet, Take 1 tablet by mouth Daily. (Patient taking differently: Take 100 mg by mouth Every Other Day.), Disp: 90 tablet, Rfl: 0  •  ALPRAZolam (XANAX) 0.5 MG tablet, Take 0.5 mg by mouth Daily., Disp: , Rfl:   •  busPIRone (BUSPAR) 5 MG tablet, Take 1 tablet by mouth 3 (Three) Times a Day., Disp: 90 tablet, Rfl: 5  •  ondansetron (ZOFRAN) 4 MG tablet, Take 1 tablet by mouth Every 8 (Eight) Hours As Needed for Nausea or Vomiting., Disp: 10 tablet, Rfl: 0      The following portions of the patient's history were reviewed and updated as appropriate: allergies, current medications, past family history, past medical history,  past social history, past surgical history and problem list.    Review of Systems   Constitutional: Positive for appetite change and fatigue. Negative for chills and fever.   HENT: Negative for ear discharge, ear pain, sinus pressure and sore throat.    Eyes: Positive for visual disturbance.   Respiratory: Negative for cough, chest tightness and shortness of breath.    Cardiovascular: Negative for chest pain, palpitations and leg swelling.   Gastrointestinal: Negative for diarrhea, nausea and vomiting.   Musculoskeletal: Negative for arthralgias, back pain and myalgias.   Neurological: Negative for dizziness, syncope and headaches.   Psychiatric/Behavioral: Positive for dysphoric mood. Negative for confusion. The patient is nervous/anxious.        Objective   /78   Pulse 60   Wt 67.9 kg (149 lb 9.6 oz)   SpO2 99%   BMI 26.50 kg/m²   Physical Exam   Constitutional: She is oriented to person, place, and time. She appears well-developed and well-nourished.   HENT:   asymmetric left upper face s/p surgery    Eyes: Conjunctivae are normal. No scleral icterus.   Cardiovascular: Normal rate and regular rhythm.    Pulmonary/Chest: Effort normal and breath sounds normal.   Abdominal: Soft. Bowel sounds are normal. She exhibits no distension and no mass. There is no tenderness. No hernia.   Neurological: She is alert and oriented to person, place, and time.   Skin: Skin is warm and dry.   Psychiatric: Judgment and thought content normal. She is slowed. She exhibits a depressed mood.   Nursing note and vitals reviewed.        Results for orders placed or performed in visit on 06/21/18   POC Urinalysis Dipstick, Automated   Result Value Ref Range    Color Yellow Yellow, Straw, Dark Yellow, Jackie    Clarity, UA Clear Clear    Specific Gravity  1.001 (A) 1.005 - 1.030    pH, Urine 6.5 5.0 - 8.0    Leukocytes 75 Danny/ul (A) Negative    Nitrite, UA Negative Negative    Protein, POC Negative Negative mg/dL    Glucose, UA  Negative Negative, 1000 mg/dL (3+) mg/dL    Ketones, UA Negative Negative    Urobilinogen, UA Normal Normal    Bilirubin Negative Negative    Blood, UA Negative Negative             Assessment/Plan   Diagnoses and all orders for this visit:    Adjustment reaction to chronic stress  Comments:  Start buspar and if not helping, change to trintellix  Orders:  -     busPIRone (BUSPAR) 5 MG tablet; Take 1 tablet by mouth 3 (Three) Times a Day.  -     sertraline (ZOLOFT) 100 MG tablet; Take 1 tablet by mouth Daily.    Other depression  Comments:  Adv. to try behavioural therapy- self referral given.    Essential hypertension  Comments:  continue coreg.    splinter on finger- that she had removed, continue peroxide cleans.             Return in about 2 years (around 7/12/2020) for Next scheduled follow up.

## 2018-07-12 NOTE — TELEPHONE ENCOUNTER
PLEASE LET ME KNOW WHEN I CAN SCHEDULE PATIENT FOR A FOLLOW UP.   THE CHECK OUT NOTE SAID TWO YEARS.   DID YOU MEAN 2 WEEKS OR TWO MONTHS?

## 2018-07-16 ENCOUNTER — HOSPITAL ENCOUNTER (OUTPATIENT)
Dept: MRI IMAGING | Facility: HOSPITAL | Age: 63
Discharge: HOME | End: 2018-07-16
Admitting: PHYSICIAN ASSISTANT

## 2018-07-16 ENCOUNTER — OFFICE VISIT (OUTPATIENT)
Dept: NEUROSURGERY | Facility: CLINIC | Age: 63
End: 2018-07-16

## 2018-07-16 VITALS
WEIGHT: 151.6 LBS | DIASTOLIC BLOOD PRESSURE: 72 MMHG | BODY MASS INDEX: 26.86 KG/M2 | SYSTOLIC BLOOD PRESSURE: 124 MMHG | TEMPERATURE: 97.4 F | HEIGHT: 63 IN

## 2018-07-16 DIAGNOSIS — D32.0 MENINGIOMA, RECURRENT OF BRAIN (HCC): ICD-10-CM

## 2018-07-16 DIAGNOSIS — D32.9 MENINGIOMA (HCC): Primary | ICD-10-CM

## 2018-07-16 PROCEDURE — A9577 INJ MULTIHANCE: HCPCS | Performed by: PHYSICIAN ASSISTANT

## 2018-07-16 PROCEDURE — 70553 MRI BRAIN STEM W/O & W/DYE: CPT

## 2018-07-16 PROCEDURE — 82565 ASSAY OF CREATININE: CPT

## 2018-07-16 PROCEDURE — 0 GADOBENATE DIMEGLUMINE 529 MG/ML SOLUTION: Performed by: PHYSICIAN ASSISTANT

## 2018-07-16 PROCEDURE — 99024 POSTOP FOLLOW-UP VISIT: CPT | Performed by: NEUROLOGICAL SURGERY

## 2018-07-16 RX ADMIN — GADOBENATE DIMEGLUMINE 13 ML: 529 INJECTION, SOLUTION INTRAVENOUS at 12:15

## 2018-07-16 NOTE — PROGRESS NOTES
Tereza Ackerman  1955  [id number]                       CURRENT WORKING DIAGNOSIS:  Invasive Left lateral subtemporal, medial temporal meningioma          MEDICAL HISTORY SINCE LAST ENCOUNTER:   She reports for first follow-up subsequent to SRS.  She says her vision has improved somewhat.  The MRI as compared to the one previous has shown improvement as well.           Past Medical History:   Diagnosis Date   • Acid reflux    • Arthritis    • Atrial flutter with rapid ventricular response (CMS/HCC) 12/21/2017   • Back pain    • Brain tumor (CMS/HCC)    • Depression    • History of blood transfusion    • History of Nissen fundoplication 7/1/2017   • Hyperlipemia    • Hypertension    • Memory loss or impairment    • Migraine    • Parathyroid adenoma     Incidental on thyroid US.   • PONV (postoperative nausea and vomiting)     nausea - preprocedural meds help    • Prediabetes     Last Impression: 06 Feb 2015  Reviewed labs. Excellent control.  Maren Connellast Impression: 06 Feb 2015  Reviewed labs. Excellent control.  Michaela Connell   • Thyroid nodule      Last Impression: 13 Jun 2015  r/o thyroid cancer, will proceed with US.  Michaela Connell (Internal Medicine)    • UTI (urinary tract infection)    • Vision changes     blockages left eye    • Wears glasses     readers              Past Surgical History:   Procedure Laterality Date   • BRAIN SURGERY  2003 & 2014    Dr. Werner Hollis   • CARPAL TUNNEL RELEASE  2002    right    • COLONOSCOPY     • ENDOSCOPY     • EXPLORATORY LAPAROTOMY N/A 12/5/2017    Procedure: EXPLORATORY LAPAROTOMY, SMALL BOWEL RESECTION;  Surgeon: Ирина Cobian MD;  Location:  [facility];  Service:    • EXPLORATORY LAPAROTOMY N/A 12/22/2017    Procedure: LAPAROTOMY EXPLORATORY FOR SMALL BOWEL OBSTRUCTION;  Surgeon: Ирина Cobian MD;  Location:  [facility];  Service:    • HYSTERECTOMY      partial - both ovaries still present pt believes    • INSERTION HEMODIALYSIS  CATHETER N/A 12/7/2017    Procedure: HEMODIALYSIS CATHETER INSERTION;  Surgeon: Chance Valenzuela MD;  Location:  [facility];  Service:    • ORBITOTOMY Left 11/3/2017    Procedure:  LEFT LATERAL ORBITOTOMY WITH DEBULKING OF TUMOR ;  Surgeon: Moo Hopkins MD;  Location:  [facility];  Service:    • OTHER SURGICAL HISTORY      craniotomy tumor removal-complete   • OTHER SURGICAL HISTORY      esophagogastric fundoplasty nissen fundoplication   • TUBAL ABDOMINAL LIGATION                Family History   Problem Relation Age of Onset   • Obesity Mother    • Heart attack Mother    • Migraines Father    • Stroke Father    • Diabetes Father    • Cancer Other    • Diabetes Other    • Breast cancer Maternal Aunt    • Breast cancer Paternal Aunt    • Ovarian cancer Neg Hx               Social History     Social History   • Marital status:      Spouse name: N/A   • Number of children: N/A   • Years of education: N/A     Occupational History   • Not on file.     Social History Main Topics   • Smoking status: Never Smoker   • Smokeless tobacco: Never Used   • Alcohol use No   • Drug use: No   • Sexual activity: Defer     Other Topics Concern   • Not on file     Social History Narrative   • No narrative on file              Allergies   Allergen Reactions   • Dilantin [Phenytoin Sodium Extended] Rash   • Phenytoin Rash     duplicate               Current Outpatient Prescriptions:   •  aspirin 325 MG tablet, Take 325 mg by mouth Daily., Disp: , Rfl:   •  busPIRone (BUSPAR) 5 MG tablet, Take 1 tablet by mouth 3 (Three) Times a Day., Disp: 90 tablet, Rfl: 5  •  carvedilol (COREG) 12.5 MG tablet, Take 1 tablet by mouth Every 12 (Twelve) Hours. (Patient taking differently: Take 6.25 mg by mouth Every 12 (Twelve) Hours.), Disp: 60 tablet, Rfl: 3  •  cholecalciferol (VITAMIN D3) 1000 units tablet, Take 2,000 Units by mouth Every Other Day., Disp: , Rfl:   •  LORazepam (ATIVAN) 0.5 MG tablet, 1/2 PO BID prn anxiety, Disp:  30 tablet, Rfl: 2  •  ondansetron (ZOFRAN) 4 MG tablet, Take 1 tablet by mouth Every 8 (Eight) Hours As Needed for Nausea or Vomiting., Disp: 10 tablet, Rfl: 0  •  QUEtiapine (SEROquel) 50 MG tablet, Take 1 tablet by mouth Every Night., Disp: 30 tablet, Rfl: 5  •  sertraline (ZOLOFT) 100 MG tablet, Take 1 tablet by mouth Daily., Disp: 90 tablet, Rfl: 0  •  thiamine (VITAMIN B1) 100 MG tablet, Take 1 tablet by mouth Daily. (Patient taking differently: Take 100 mg by mouth Every Other Day.), Disp: 90 tablet, Rfl: 0  No current facility-administered medications for this visit.          Review of Systems   Constitutional: Positive for fatigue. Negative for activity change, appetite change, chills, diaphoresis, fever and unexpected weight change.   HENT: Negative for congestion, dental problem, drooling, ear discharge, ear pain, facial swelling, hearing loss, mouth sores, nosebleeds, postnasal drip, rhinorrhea, sinus pressure, sneezing, sore throat, tinnitus, trouble swallowing and voice change.    Eyes: Positive for photophobia and visual disturbance. Negative for pain, discharge, redness and itching.   Respiratory: Negative for apnea, cough, choking, chest tightness, shortness of breath, wheezing and stridor.    Cardiovascular: Negative for chest pain, palpitations and leg swelling.   Gastrointestinal: Positive for diarrhea. Negative for abdominal distention, abdominal pain, anal bleeding, blood in stool, constipation, nausea, rectal pain and vomiting.   Endocrine: Negative for cold intolerance, heat intolerance, polydipsia, polyphagia and polyuria.   Genitourinary: Negative for decreased urine volume, difficulty urinating, dysuria, enuresis, flank pain, frequency, genital sores, hematuria and urgency.   Musculoskeletal: Negative for arthralgias, back pain, gait problem, joint swelling, myalgias, neck pain and neck stiffness.   Skin: Negative for color change, pallor, rash and wound.   Allergic/Immunologic: Negative  "for environmental allergies, food allergies and immunocompromised state.   Neurological: Positive for weakness and headaches. Negative for dizziness, tremors, seizures, syncope, facial asymmetry, speech difficulty, light-headedness and numbness.   Hematological: Negative for adenopathy. Does not bruise/bleed easily.   Psychiatric/Behavioral: Positive for decreased concentration and dysphoric mood. Negative for agitation, behavioral problems, confusion, hallucinations, self-injury, sleep disturbance and suicidal ideas. The patient is nervous/anxious. The patient is not hyperactive.    All other systems reviewed and are negative.              Vitals:    07/16/18 1416   BP: 124/72   BP Location: Right arm   Patient Position: Sitting   Cuff Size: Adult   Temp: 97.4 °F (36.3 °C)   TempSrc: Temporal Artery    Weight: 68.8 kg (151 lb 9.6 oz)   Height: 160 cm (62.99\")               EXAMINATION: [ Exophthalmus and enlargement of the globe on the left persist.]            MEDICAL DECISION MAKING: [Status post SRS to left invasive meningioma ]           ASSESSMENT/DISPOSITION: [She has shown improvement with SRS.  She has shown early response to stereotactic radiosurgery.  The plan is to continue to monitor this and hopes that we will get a long-term the facet without progression.  I've asked her to see ophthalmology for visual field examination and the possibility of prism glasses she may drive.  We will return to see me in 3 months for repeat MRI.. ]              I APPRECIATE THE OPPORTUNITY OF THIS REFERRAL. PLEASE CALL IF ANY       QUESTIONS [phone]    "

## 2018-07-17 LAB — CREAT BLDA-MCNC: 0.9 MG/DL (ref 0.6–1.3)

## 2018-07-23 ENCOUNTER — ANESTHESIA (OUTPATIENT)
Dept: PERIOP | Facility: HOSPITAL | Age: 63
End: 2018-07-23

## 2018-07-23 ENCOUNTER — APPOINTMENT (OUTPATIENT)
Dept: CT IMAGING | Facility: HOSPITAL | Age: 63
End: 2018-07-23

## 2018-07-23 ENCOUNTER — ANESTHESIA EVENT (OUTPATIENT)
Dept: PERIOP | Facility: HOSPITAL | Age: 63
End: 2018-07-23

## 2018-07-23 ENCOUNTER — HOSPITAL ENCOUNTER (INPATIENT)
Facility: HOSPITAL | Age: 63
LOS: 7 days | Discharge: HOME | End: 2018-07-31
Attending: EMERGENCY MEDICINE | Admitting: SURGERY

## 2018-07-23 DIAGNOSIS — R10.30 LOWER ABDOMINAL PAIN: ICD-10-CM

## 2018-07-23 DIAGNOSIS — K56.609 SMALL BOWEL OBSTRUCTION (HCC): Primary | ICD-10-CM

## 2018-07-23 LAB
ALBUMIN SERPL-MCNC: 4.38 G/DL (ref 3.2–4.8)
ALBUMIN/GLOB SERPL: 1.4 G/DL (ref 1.5–2.5)
ALP SERPL-CCNC: 92 U/L (ref 25–100)
ALT SERPL W P-5'-P-CCNC: 20 U/L (ref 7–40)
ANION GAP SERPL CALCULATED.3IONS-SCNC: 11 MMOL/L (ref 3–11)
AST SERPL-CCNC: 27 U/L (ref 0–33)
BASOPHILS # BLD AUTO: 0.03 10*3/MM3 (ref 0–0.2)
BASOPHILS NFR BLD AUTO: 0.4 % (ref 0–1)
BILIRUB SERPL-MCNC: 0.4 MG/DL (ref 0.3–1.2)
BILIRUB UR QL STRIP: NEGATIVE
BNP SERPL-MCNC: 24 PG/ML (ref 0–100)
BUN BLD-MCNC: 14 MG/DL (ref 9–23)
BUN/CREAT SERPL: 14.6 (ref 7–25)
CALCIUM SPEC-SCNC: 9.7 MG/DL (ref 8.7–10.4)
CHLORIDE SERPL-SCNC: 106 MMOL/L (ref 99–109)
CLARITY UR: CLEAR
CO2 SERPL-SCNC: 23 MMOL/L (ref 20–31)
COLOR UR: YELLOW
CREAT BLD-MCNC: 0.96 MG/DL (ref 0.6–1.3)
D-LACTATE SERPL-SCNC: 1.1 MMOL/L (ref 0.5–2)
DEPRECATED RDW RBC AUTO: 46.8 FL (ref 37–54)
EOSINOPHIL # BLD AUTO: 0.15 10*3/MM3 (ref 0–0.3)
EOSINOPHIL NFR BLD AUTO: 2.1 % (ref 0–3)
ERYTHROCYTE [DISTWIDTH] IN BLOOD BY AUTOMATED COUNT: 13.9 % (ref 11.3–14.5)
GFR SERPL CREATININE-BSD FRML MDRD: 58 ML/MIN/1.73
GLOBULIN UR ELPH-MCNC: 3.2 GM/DL
GLUCOSE BLD-MCNC: 121 MG/DL (ref 70–100)
GLUCOSE UR STRIP-MCNC: NEGATIVE MG/DL
HCT VFR BLD AUTO: 42.8 % (ref 34.5–44)
HGB BLD-MCNC: 14.1 G/DL (ref 11.5–15.5)
HGB UR QL STRIP.AUTO: NEGATIVE
HOLD SPECIMEN: NORMAL
HOLD SPECIMEN: NORMAL
IMM GRANULOCYTES # BLD: 0.01 10*3/MM3 (ref 0–0.03)
IMM GRANULOCYTES NFR BLD: 0.1 % (ref 0–0.6)
KETONES UR QL STRIP: NEGATIVE
LEUKOCYTE ESTERASE UR QL STRIP.AUTO: NEGATIVE
LIPASE SERPL-CCNC: 34 U/L (ref 6–51)
LYMPHOCYTES # BLD AUTO: 2.25 10*3/MM3 (ref 0.6–4.8)
LYMPHOCYTES NFR BLD AUTO: 31.3 % (ref 24–44)
MCH RBC QN AUTO: 30.5 PG (ref 27–31)
MCHC RBC AUTO-ENTMCNC: 32.9 G/DL (ref 32–36)
MCV RBC AUTO: 92.6 FL (ref 80–99)
MONOCYTES # BLD AUTO: 0.42 10*3/MM3 (ref 0–1)
MONOCYTES NFR BLD AUTO: 5.8 % (ref 0–12)
NEUTROPHILS # BLD AUTO: 4.33 10*3/MM3 (ref 1.5–8.3)
NEUTROPHILS NFR BLD AUTO: 60.3 % (ref 41–71)
NITRITE UR QL STRIP: NEGATIVE
PH UR STRIP.AUTO: 8 [PH] (ref 5–8)
PLATELET # BLD AUTO: 289 10*3/MM3 (ref 150–450)
PMV BLD AUTO: 10.1 FL (ref 6–12)
POTASSIUM BLD-SCNC: 3.9 MMOL/L (ref 3.5–5.5)
PROT SERPL-MCNC: 7.6 G/DL (ref 5.7–8.2)
PROT UR QL STRIP: NEGATIVE
RBC # BLD AUTO: 4.62 10*6/MM3 (ref 3.89–5.14)
SODIUM BLD-SCNC: 140 MMOL/L (ref 132–146)
SP GR UR STRIP: 1.03 (ref 1–1.03)
TROPONIN I SERPL-MCNC: 0 NG/ML (ref 0–0.07)
UROBILINOGEN UR QL STRIP: NORMAL
WBC NRBC COR # BLD: 7.19 10*3/MM3 (ref 3.5–10.8)
WHOLE BLOOD HOLD SPECIMEN: NORMAL
WHOLE BLOOD HOLD SPECIMEN: NORMAL

## 2018-07-23 PROCEDURE — 25010000002 IOPAMIDOL 61 % SOLUTION: Performed by: EMERGENCY MEDICINE

## 2018-07-23 PROCEDURE — 74177 CT ABD & PELVIS W/CONTRAST: CPT

## 2018-07-23 PROCEDURE — 25010000002 DEXAMETHASONE PER 1 MG: Performed by: ANESTHESIOLOGY

## 2018-07-23 PROCEDURE — 25010000002 PROPOFOL 10 MG/ML EMULSION: Performed by: ANESTHESIOLOGY

## 2018-07-23 PROCEDURE — 80053 COMPREHEN METABOLIC PANEL: CPT | Performed by: EMERGENCY MEDICINE

## 2018-07-23 PROCEDURE — 81003 URINALYSIS AUTO W/O SCOPE: CPT | Performed by: EMERGENCY MEDICINE

## 2018-07-23 PROCEDURE — 25010000002 PIPERACILLIN SOD-TAZOBACTAM PER 1 G: Performed by: NURSE PRACTITIONER

## 2018-07-23 PROCEDURE — 85025 COMPLETE CBC W/AUTO DIFF WBC: CPT | Performed by: EMERGENCY MEDICINE

## 2018-07-23 PROCEDURE — 83690 ASSAY OF LIPASE: CPT | Performed by: EMERGENCY MEDICINE

## 2018-07-23 PROCEDURE — 25010000002 ONDANSETRON PER 1 MG: Performed by: EMERGENCY MEDICINE

## 2018-07-23 PROCEDURE — 25010000002 HYDROMORPHONE PER 4 MG: Performed by: EMERGENCY MEDICINE

## 2018-07-23 PROCEDURE — 99285 EMERGENCY DEPT VISIT HI MDM: CPT

## 2018-07-23 PROCEDURE — 25010000002 SUCCINYLCHOLINE PER 20 MG: Performed by: ANESTHESIOLOGY

## 2018-07-23 PROCEDURE — 84484 ASSAY OF TROPONIN QUANT: CPT

## 2018-07-23 PROCEDURE — 93005 ELECTROCARDIOGRAM TRACING: CPT | Performed by: EMERGENCY MEDICINE

## 2018-07-23 PROCEDURE — 25010000002 HYDRALAZINE PER 20 MG: Performed by: NURSE PRACTITIONER

## 2018-07-23 PROCEDURE — 83880 ASSAY OF NATRIURETIC PEPTIDE: CPT | Performed by: NURSE PRACTITIONER

## 2018-07-23 PROCEDURE — 87040 BLOOD CULTURE FOR BACTERIA: CPT | Performed by: NURSE PRACTITIONER

## 2018-07-23 PROCEDURE — 83605 ASSAY OF LACTIC ACID: CPT | Performed by: NURSE PRACTITIONER

## 2018-07-23 RX ORDER — SODIUM CHLORIDE 9 MG/ML
125 INJECTION, SOLUTION INTRAVENOUS CONTINUOUS
Status: DISCONTINUED | OUTPATIENT
Start: 2018-07-23 | End: 2018-07-28

## 2018-07-23 RX ORDER — ONDANSETRON 2 MG/ML
4 INJECTION INTRAMUSCULAR; INTRAVENOUS ONCE
Status: COMPLETED | OUTPATIENT
Start: 2018-07-23 | End: 2018-07-23

## 2018-07-23 RX ORDER — HYDROMORPHONE HYDROCHLORIDE 1 MG/ML
0.5 INJECTION, SOLUTION INTRAMUSCULAR; INTRAVENOUS; SUBCUTANEOUS
Status: CANCELLED | OUTPATIENT
Start: 2018-07-23

## 2018-07-23 RX ORDER — LIDOCAINE HYDROCHLORIDE 10 MG/ML
0.5 INJECTION, SOLUTION EPIDURAL; INFILTRATION; INTRACAUDAL; PERINEURAL ONCE AS NEEDED
Status: CANCELLED | OUTPATIENT
Start: 2018-07-23

## 2018-07-23 RX ORDER — FENTANYL CITRATE 50 UG/ML
50 INJECTION, SOLUTION INTRAMUSCULAR; INTRAVENOUS
Status: CANCELLED | OUTPATIENT
Start: 2018-07-23

## 2018-07-23 RX ORDER — SODIUM CHLORIDE 0.9 % (FLUSH) 0.9 %
1-10 SYRINGE (ML) INJECTION AS NEEDED
Status: CANCELLED | OUTPATIENT
Start: 2018-07-23

## 2018-07-23 RX ORDER — SODIUM CHLORIDE 0.9 % (FLUSH) 0.9 %
10 SYRINGE (ML) INJECTION AS NEEDED
Status: DISCONTINUED | OUTPATIENT
Start: 2018-07-23 | End: 2018-07-24

## 2018-07-23 RX ORDER — SODIUM CHLORIDE, SODIUM LACTATE, POTASSIUM CHLORIDE, CALCIUM CHLORIDE 600; 310; 30; 20 MG/100ML; MG/100ML; MG/100ML; MG/100ML
9 INJECTION, SOLUTION INTRAVENOUS CONTINUOUS
Status: CANCELLED | OUTPATIENT
Start: 2018-07-23

## 2018-07-23 RX ORDER — FAMOTIDINE 10 MG/ML
20 INJECTION, SOLUTION INTRAVENOUS ONCE
Status: CANCELLED | OUTPATIENT
Start: 2018-07-23 | End: 2018-07-23

## 2018-07-23 RX ORDER — HYDRALAZINE HYDROCHLORIDE 20 MG/ML
20 INJECTION INTRAMUSCULAR; INTRAVENOUS ONCE
Status: COMPLETED | OUTPATIENT
Start: 2018-07-23 | End: 2018-07-23

## 2018-07-23 RX ORDER — HYDROMORPHONE HYDROCHLORIDE 1 MG/ML
0.5 INJECTION, SOLUTION INTRAMUSCULAR; INTRAVENOUS; SUBCUTANEOUS ONCE
Status: COMPLETED | OUTPATIENT
Start: 2018-07-23 | End: 2018-07-23

## 2018-07-23 RX ORDER — FAMOTIDINE 20 MG/1
20 TABLET, FILM COATED ORAL ONCE
Status: CANCELLED | OUTPATIENT
Start: 2018-07-23 | End: 2018-07-23

## 2018-07-23 RX ADMIN — ATRACURIUM BESYLATE 35 MG: 10 INJECTION, SOLUTION INTRAVENOUS at 23:39

## 2018-07-23 RX ADMIN — HYDRALAZINE HYDROCHLORIDE 20 MG: 20 INJECTION INTRAMUSCULAR; INTRAVENOUS at 20:18

## 2018-07-23 RX ADMIN — TAZOBACTAM SODIUM AND PIPERACILLIN SODIUM 3.38 G: 375; 3 INJECTION, SOLUTION INTRAVENOUS at 22:59

## 2018-07-23 RX ADMIN — HYDROMORPHONE HYDROCHLORIDE 1 MG: 1 INJECTION, SOLUTION INTRAMUSCULAR; INTRAVENOUS; SUBCUTANEOUS at 21:56

## 2018-07-23 RX ADMIN — SODIUM CHLORIDE, POTASSIUM CHLORIDE, SODIUM LACTATE AND CALCIUM CHLORIDE: 600; 310; 30; 20 INJECTION, SOLUTION INTRAVENOUS at 23:31

## 2018-07-23 RX ADMIN — IOPAMIDOL 95 ML: 612 INJECTION, SOLUTION INTRAVENOUS at 20:43

## 2018-07-23 RX ADMIN — PROPOFOL 150 MG: 10 INJECTION, EMULSION INTRAVENOUS at 23:36

## 2018-07-23 RX ADMIN — ATRACURIUM BESYLATE 5 MG: 10 INJECTION, SOLUTION INTRAVENOUS at 23:34

## 2018-07-23 RX ADMIN — HYDROMORPHONE HYDROCHLORIDE 0.5 MG: 1 INJECTION, SOLUTION INTRAMUSCULAR; INTRAVENOUS; SUBCUTANEOUS at 20:28

## 2018-07-23 RX ADMIN — SODIUM CHLORIDE 125 ML/HR: 9 INJECTION, SOLUTION INTRAVENOUS at 20:17

## 2018-07-23 RX ADMIN — SUCCINYLCHOLINE CHLORIDE 140 MG: 20 INJECTION, SOLUTION INTRAMUSCULAR; INTRAVENOUS at 23:36

## 2018-07-23 RX ADMIN — FENTANYL CITRATE 100 MCG: 50 INJECTION, SOLUTION INTRAMUSCULAR; INTRAVENOUS at 23:55

## 2018-07-23 RX ADMIN — FENTANYL CITRATE 100 MCG: 50 INJECTION, SOLUTION INTRAMUSCULAR; INTRAVENOUS at 23:36

## 2018-07-23 RX ADMIN — ONDANSETRON 4 MG: 2 INJECTION INTRAMUSCULAR; INTRAVENOUS at 21:54

## 2018-07-23 RX ADMIN — DEXAMETHASONE SODIUM PHOSPHATE 8 MG: 10 INJECTION INTRAMUSCULAR; INTRAVENOUS at 23:40

## 2018-07-23 RX ADMIN — ONDANSETRON 4 MG: 2 INJECTION INTRAMUSCULAR; INTRAVENOUS at 20:27

## 2018-07-24 PROCEDURE — 0DTJ0ZZ RESECTION OF APPENDIX, OPEN APPROACH: ICD-10-PCS | Performed by: SURGERY

## 2018-07-24 PROCEDURE — 25010000002 ONDANSETRON PER 1 MG: Performed by: SURGERY

## 2018-07-24 PROCEDURE — 0DNU0ZZ RELEASE OMENTUM, OPEN APPROACH: ICD-10-PCS | Performed by: SURGERY

## 2018-07-24 PROCEDURE — 25010000002 FENTANYL CITRATE (PF) 100 MCG/2ML SOLUTION: Performed by: ANESTHESIOLOGY

## 2018-07-24 PROCEDURE — 0WQF0ZZ REPAIR ABDOMINAL WALL, OPEN APPROACH: ICD-10-PCS | Performed by: SURGERY

## 2018-07-24 PROCEDURE — 25010000002 ONDANSETRON PER 1 MG: Performed by: ANESTHESIOLOGY

## 2018-07-24 PROCEDURE — 0DNK0ZZ RELEASE ASCENDING COLON, OPEN APPROACH: ICD-10-PCS | Performed by: SURGERY

## 2018-07-24 PROCEDURE — 0DSH0ZZ REPOSITION CECUM, OPEN APPROACH: ICD-10-PCS | Performed by: SURGERY

## 2018-07-24 PROCEDURE — 0DNM0ZZ RELEASE DESCENDING COLON, OPEN APPROACH: ICD-10-PCS | Performed by: SURGERY

## 2018-07-24 PROCEDURE — 25010000002 NEOSTIGMINE 10 MG/10ML SOLUTION: Performed by: ANESTHESIOLOGY

## 2018-07-24 PROCEDURE — 25810000003 POTASSIUM CHLORIDE PER 2 MEQ: Performed by: SURGERY

## 2018-07-24 PROCEDURE — 25010000002 MORPHINE PER 10 MG: Performed by: SURGERY

## 2018-07-24 PROCEDURE — 0DN80ZZ RELEASE SMALL INTESTINE, OPEN APPROACH: ICD-10-PCS | Performed by: SURGERY

## 2018-07-24 PROCEDURE — 88304 TISSUE EXAM BY PATHOLOGIST: CPT | Performed by: SURGERY

## 2018-07-24 PROCEDURE — 94799 UNLISTED PULMONARY SVC/PX: CPT

## 2018-07-24 PROCEDURE — 25010000002 PROMETHAZINE PER 50 MG: Performed by: SURGERY

## 2018-07-24 RX ORDER — ONDANSETRON 2 MG/ML
4 INJECTION INTRAMUSCULAR; INTRAVENOUS ONCE AS NEEDED
Status: COMPLETED | OUTPATIENT
Start: 2018-07-24 | End: 2018-07-24

## 2018-07-24 RX ORDER — PROPOFOL 10 MG/ML
VIAL (ML) INTRAVENOUS AS NEEDED
Status: DISCONTINUED | OUTPATIENT
Start: 2018-07-23 | End: 2018-07-24 | Stop reason: SURG

## 2018-07-24 RX ORDER — GLYCOPYRROLATE 0.2 MG/ML
INJECTION INTRAMUSCULAR; INTRAVENOUS AS NEEDED
Status: DISCONTINUED | OUTPATIENT
Start: 2018-07-24 | End: 2018-07-24 | Stop reason: SURG

## 2018-07-24 RX ORDER — MORPHINE SULFATE 4 MG/ML
4 INJECTION, SOLUTION INTRAMUSCULAR; INTRAVENOUS
Status: DISCONTINUED | OUTPATIENT
Start: 2018-07-24 | End: 2018-07-31 | Stop reason: HOSPADM

## 2018-07-24 RX ORDER — NEOSTIGMINE METHYLSULFATE 1 MG/ML
INJECTION, SOLUTION INTRAVENOUS AS NEEDED
Status: DISCONTINUED | OUTPATIENT
Start: 2018-07-24 | End: 2018-07-24 | Stop reason: SURG

## 2018-07-24 RX ORDER — ONDANSETRON 2 MG/ML
4 INJECTION INTRAMUSCULAR; INTRAVENOUS EVERY 6 HOURS PRN
Status: DISCONTINUED | OUTPATIENT
Start: 2018-07-24 | End: 2018-07-31 | Stop reason: HOSPADM

## 2018-07-24 RX ORDER — FENTANYL CITRATE 50 UG/ML
INJECTION, SOLUTION INTRAMUSCULAR; INTRAVENOUS AS NEEDED
Status: DISCONTINUED | OUTPATIENT
Start: 2018-07-23 | End: 2018-07-24 | Stop reason: SURG

## 2018-07-24 RX ORDER — FENTANYL CITRATE 50 UG/ML
50 INJECTION, SOLUTION INTRAMUSCULAR; INTRAVENOUS
Status: DISCONTINUED | OUTPATIENT
Start: 2018-07-24 | End: 2018-07-24 | Stop reason: HOSPADM

## 2018-07-24 RX ORDER — MAGNESIUM HYDROXIDE 1200 MG/15ML
LIQUID ORAL AS NEEDED
Status: DISCONTINUED | OUTPATIENT
Start: 2018-07-24 | End: 2018-07-24 | Stop reason: HOSPADM

## 2018-07-24 RX ORDER — DEXAMETHASONE SODIUM PHOSPHATE 10 MG/ML
INJECTION INTRAMUSCULAR; INTRAVENOUS AS NEEDED
Status: DISCONTINUED | OUTPATIENT
Start: 2018-07-23 | End: 2018-07-24 | Stop reason: SURG

## 2018-07-24 RX ORDER — NALOXONE HCL 0.4 MG/ML
0.4 VIAL (ML) INJECTION
Status: DISCONTINUED | OUTPATIENT
Start: 2018-07-24 | End: 2018-07-31 | Stop reason: HOSPADM

## 2018-07-24 RX ORDER — ONDANSETRON 4 MG/1
4 TABLET, FILM COATED ORAL EVERY 6 HOURS PRN
Status: DISCONTINUED | OUTPATIENT
Start: 2018-07-24 | End: 2018-07-31 | Stop reason: HOSPADM

## 2018-07-24 RX ORDER — SODIUM CHLORIDE AND POTASSIUM CHLORIDE 150; 450 MG/100ML; MG/100ML
50 INJECTION, SOLUTION INTRAVENOUS CONTINUOUS
Status: DISCONTINUED | OUTPATIENT
Start: 2018-07-24 | End: 2018-07-29

## 2018-07-24 RX ORDER — SUCCINYLCHOLINE CHLORIDE 20 MG/ML
INJECTION INTRAMUSCULAR; INTRAVENOUS AS NEEDED
Status: DISCONTINUED | OUTPATIENT
Start: 2018-07-23 | End: 2018-07-24 | Stop reason: SURG

## 2018-07-24 RX ORDER — ATRACURIUM BESYLATE 10 MG/ML
INJECTION, SOLUTION INTRAVENOUS AS NEEDED
Status: DISCONTINUED | OUTPATIENT
Start: 2018-07-23 | End: 2018-07-24 | Stop reason: SURG

## 2018-07-24 RX ORDER — PROMETHAZINE HYDROCHLORIDE 25 MG/ML
12.5 INJECTION, SOLUTION INTRAMUSCULAR; INTRAVENOUS EVERY 4 HOURS PRN
Status: DISCONTINUED | OUTPATIENT
Start: 2018-07-24 | End: 2018-07-31 | Stop reason: HOSPADM

## 2018-07-24 RX ORDER — SODIUM CHLORIDE, SODIUM LACTATE, POTASSIUM CHLORIDE, CALCIUM CHLORIDE 600; 310; 30; 20 MG/100ML; MG/100ML; MG/100ML; MG/100ML
INJECTION, SOLUTION INTRAVENOUS CONTINUOUS PRN
Status: DISCONTINUED | OUTPATIENT
Start: 2018-07-23 | End: 2018-07-24 | Stop reason: SURG

## 2018-07-24 RX ORDER — ONDANSETRON 2 MG/ML
INJECTION INTRAMUSCULAR; INTRAVENOUS AS NEEDED
Status: DISCONTINUED | OUTPATIENT
Start: 2018-07-24 | End: 2018-07-24 | Stop reason: SURG

## 2018-07-24 RX ADMIN — MORPHINE SULFATE 4 MG: 4 INJECTION, SOLUTION INTRAMUSCULAR; INTRAVENOUS at 11:16

## 2018-07-24 RX ADMIN — FENTANYL CITRATE 50 MCG: 50 INJECTION, SOLUTION INTRAMUSCULAR; INTRAVENOUS at 00:05

## 2018-07-24 RX ADMIN — POTASSIUM CHLORIDE AND SODIUM CHLORIDE 100 ML/HR: 450; 150 INJECTION, SOLUTION INTRAVENOUS at 11:15

## 2018-07-24 RX ADMIN — ATRACURIUM BESYLATE 10 MG: 10 INJECTION, SOLUTION INTRAVENOUS at 00:15

## 2018-07-24 RX ADMIN — MORPHINE SULFATE 4 MG: 4 INJECTION, SOLUTION INTRAMUSCULAR; INTRAVENOUS at 16:42

## 2018-07-24 RX ADMIN — FENTANYL CITRATE 50 MCG: 50 INJECTION INTRAMUSCULAR; INTRAVENOUS at 02:02

## 2018-07-24 RX ADMIN — POTASSIUM CHLORIDE AND SODIUM CHLORIDE 100 ML/HR: 450; 150 INJECTION, SOLUTION INTRAVENOUS at 20:59

## 2018-07-24 RX ADMIN — MORPHINE SULFATE 4 MG: 4 INJECTION, SOLUTION INTRAMUSCULAR; INTRAVENOUS at 05:53

## 2018-07-24 RX ADMIN — MORPHINE SULFATE 4 MG: 4 INJECTION, SOLUTION INTRAMUSCULAR; INTRAVENOUS at 20:49

## 2018-07-24 RX ADMIN — GLYCOPYRROLATE 0.6 MG: 0.2 INJECTION INTRAMUSCULAR; INTRAVENOUS at 01:00

## 2018-07-24 RX ADMIN — MORPHINE SULFATE 4 MG: 4 INJECTION, SOLUTION INTRAMUSCULAR; INTRAVENOUS at 14:46

## 2018-07-24 RX ADMIN — MORPHINE SULFATE 4 MG: 4 INJECTION, SOLUTION INTRAMUSCULAR; INTRAVENOUS at 23:30

## 2018-07-24 RX ADMIN — ATRACURIUM BESYLATE 10 MG: 10 INJECTION, SOLUTION INTRAVENOUS at 00:35

## 2018-07-24 RX ADMIN — NEOSTIGMINE METHYLSULFATE 5 MG: 1 INJECTION, SOLUTION INTRAVENOUS at 01:00

## 2018-07-24 RX ADMIN — MORPHINE SULFATE 4 MG: 4 INJECTION, SOLUTION INTRAMUSCULAR; INTRAVENOUS at 18:38

## 2018-07-24 RX ADMIN — FENTANYL CITRATE 50 MCG: 50 INJECTION INTRAMUSCULAR; INTRAVENOUS at 01:42

## 2018-07-24 RX ADMIN — ONDANSETRON 4 MG: 2 INJECTION INTRAMUSCULAR; INTRAVENOUS at 01:42

## 2018-07-24 RX ADMIN — ONDANSETRON 4 MG: 2 INJECTION INTRAMUSCULAR; INTRAVENOUS at 16:42

## 2018-07-24 RX ADMIN — POTASSIUM CHLORIDE AND SODIUM CHLORIDE 100 ML/HR: 450; 150 INJECTION, SOLUTION INTRAVENOUS at 02:47

## 2018-07-24 RX ADMIN — PROMETHAZINE HYDROCHLORIDE 12.5 MG: 25 INJECTION INTRAMUSCULAR; INTRAVENOUS at 06:06

## 2018-07-24 RX ADMIN — ONDANSETRON 4 MG: 2 INJECTION INTRAMUSCULAR; INTRAVENOUS at 01:00

## 2018-07-24 NOTE — ANESTHESIA PREPROCEDURE EVALUATION
Anesthesia Evaluation     history of anesthetic complications: PONV               Airway   Mallampati: I  TM distance: >3 FB  Neck ROM: full  No difficulty expected  Dental - normal exam     Pulmonary - normal exam   Cardiovascular - normal exam    (+) hypertension, dysrhythmias, hyperlipidemia,       Neuro/Psych  (+) headaches, psychiatric history Depression,     GI/Hepatic/Renal/Endo    (+)  GERD,      Musculoskeletal     (+) back pain,   Abdominal  - normal exam    Bowel sounds: normal.   Substance History      OB/GYN          Other   (+) arthritis   history of cancer                    Anesthesia Plan    ASA 3     general     intravenous induction   Anesthetic plan and risks discussed with patient.

## 2018-07-24 NOTE — ANESTHESIA PROCEDURE NOTES
Airway  Urgency: elective    Airway not difficult    General Information and Staff    Patient location during procedure: OR  Anesthesiologist: GABINO WOOD    Indications and Patient Condition  Indications for airway management: airway protection    Preoxygenated: yes  MILS not maintained throughout  Mask difficulty assessment: 1 - vent by mask    Final Airway Details  Final airway type: endotracheal airway      Successful airway: ETT  Cuffed: yes   Successful intubation technique: direct laryngoscopy  Endotracheal tube insertion site: oral  Blade: Stephenson  Blade size: #2  ETT size: 7.0 mm  Cormack-Lehane Classification: grade I - full view of glottis  Placement verified by: chest auscultation and capnometry   Measured from: lips  ETT to lips (cm): 20  Number of attempts at approach: 1    Additional Comments  Negative epigastric sounds, Breath sound equal bilaterally with symmetric chest rise and fall

## 2018-07-24 NOTE — ANESTHESIA POSTPROCEDURE EVALUATION
Patient: Tereza Ackerman    Procedure Summary     Date:  07/23/18 Room / Location:   [facility] / [facility]    Anesthesia Start:  2331 Anesthesia Stop:  07/24/18 0130    Procedure:  EXPLORATORY LAPAROTOMY, APPENDECTOMY, CECOPEXY, INCISIONAL HERNIA REPAIR, LYSIS OF ADHESIONS (N/A Abdomen) Diagnosis:      Surgeon:  Andrea Bocanegra MD Provider:  Costa Nielson MD    Anesthesia Type:  general ASA Status:  3          Anesthesia Type: general  Last vitals  BP   153/82 (07/23/18 2313)   Temp   98.9 °F (37.2 °C) (07/23/18 2313)   Pulse   73 (07/23/18 2313)   Resp   16 (07/23/18 2313)     SpO2   96 % (07/23/18 2313)     Post Anesthesia Care and Evaluation    Patient location during evaluation: PACU  Patient participation: complete - patient participated  Level of consciousness: sleepy but conscious  Pain score: 0  Pain management: adequate  Airway patency: patent  Anesthetic complications: No anesthetic complications  PONV Status: none  Cardiovascular status: hemodynamically stable and acceptable  Respiratory status: nonlabored ventilation, acceptable and nasal cannula  Hydration status: acceptable

## 2018-07-25 LAB
CYTO UR: NORMAL
LAB AP CASE REPORT: NORMAL
LAB AP CLINICAL INFORMATION: NORMAL
PATH REPORT.FINAL DX SPEC: NORMAL
PATH REPORT.GROSS SPEC: NORMAL

## 2018-07-25 PROCEDURE — 25010000002 ENOXAPARIN PER 10 MG: Performed by: SURGERY

## 2018-07-25 PROCEDURE — 25010000002 MORPHINE PER 10 MG: Performed by: SURGERY

## 2018-07-25 PROCEDURE — 25010000002 ONDANSETRON PER 1 MG: Performed by: SURGERY

## 2018-07-25 PROCEDURE — 25810000003 POTASSIUM CHLORIDE PER 2 MEQ: Performed by: SURGERY

## 2018-07-25 PROCEDURE — 25010000002 METOCLOPRAMIDE PER 10 MG: Performed by: SURGERY

## 2018-07-25 RX ORDER — MELATONIN
1000 DAILY
Status: DISCONTINUED | OUTPATIENT
Start: 2018-07-25 | End: 2018-07-31 | Stop reason: HOSPADM

## 2018-07-25 RX ORDER — CARVEDILOL 12.5 MG/1
12.5 TABLET ORAL EVERY 12 HOURS SCHEDULED
Status: DISCONTINUED | OUTPATIENT
Start: 2018-07-25 | End: 2018-07-31 | Stop reason: HOSPADM

## 2018-07-25 RX ORDER — THIAMINE MONONITRATE (VIT B1) 100 MG
100 TABLET ORAL DAILY
Status: DISCONTINUED | OUTPATIENT
Start: 2018-07-25 | End: 2018-07-31 | Stop reason: HOSPADM

## 2018-07-25 RX ORDER — METOCLOPRAMIDE HYDROCHLORIDE 5 MG/ML
10 INJECTION INTRAMUSCULAR; INTRAVENOUS EVERY 6 HOURS
Status: DISCONTINUED | OUTPATIENT
Start: 2018-07-25 | End: 2018-07-28

## 2018-07-25 RX ORDER — ASPIRIN 325 MG
325 TABLET ORAL DAILY
Status: DISCONTINUED | OUTPATIENT
Start: 2018-07-25 | End: 2018-07-31 | Stop reason: HOSPADM

## 2018-07-25 RX ORDER — BISACODYL 10 MG
10 SUPPOSITORY, RECTAL RECTAL DAILY
Status: DISCONTINUED | OUTPATIENT
Start: 2018-07-25 | End: 2018-07-31 | Stop reason: HOSPADM

## 2018-07-25 RX ORDER — QUETIAPINE FUMARATE 25 MG/1
50 TABLET, FILM COATED ORAL NIGHTLY
Status: DISCONTINUED | OUTPATIENT
Start: 2018-07-25 | End: 2018-07-31 | Stop reason: HOSPADM

## 2018-07-25 RX ORDER — SERTRALINE HYDROCHLORIDE 100 MG/1
100 TABLET, FILM COATED ORAL DAILY
Status: DISCONTINUED | OUTPATIENT
Start: 2018-07-25 | End: 2018-07-31 | Stop reason: HOSPADM

## 2018-07-25 RX ADMIN — BISACODYL 10 MG: 10 SUPPOSITORY RECTAL at 09:03

## 2018-07-25 RX ADMIN — MORPHINE SULFATE 4 MG: 4 INJECTION, SOLUTION INTRAMUSCULAR; INTRAVENOUS at 20:00

## 2018-07-25 RX ADMIN — MORPHINE SULFATE 4 MG: 4 INJECTION, SOLUTION INTRAMUSCULAR; INTRAVENOUS at 09:04

## 2018-07-25 RX ADMIN — MORPHINE SULFATE 4 MG: 4 INJECTION, SOLUTION INTRAMUSCULAR; INTRAVENOUS at 04:51

## 2018-07-25 RX ADMIN — MORPHINE SULFATE 4 MG: 4 INJECTION, SOLUTION INTRAMUSCULAR; INTRAVENOUS at 22:01

## 2018-07-25 RX ADMIN — MORPHINE SULFATE 4 MG: 4 INJECTION, SOLUTION INTRAMUSCULAR; INTRAVENOUS at 11:53

## 2018-07-25 RX ADMIN — ONDANSETRON 4 MG: 2 INJECTION INTRAMUSCULAR; INTRAVENOUS at 19:59

## 2018-07-25 RX ADMIN — POLYETHYLENE GLYCOL 3350 17 G: 17 POWDER, FOR SOLUTION ORAL at 09:14

## 2018-07-25 RX ADMIN — ASPIRIN 325 MG ORAL TABLET 325 MG: 325 PILL ORAL at 09:04

## 2018-07-25 RX ADMIN — METOCLOPRAMIDE 10 MG: 5 INJECTION, SOLUTION INTRAMUSCULAR; INTRAVENOUS at 13:58

## 2018-07-25 RX ADMIN — POTASSIUM CHLORIDE AND SODIUM CHLORIDE 100 ML/HR: 450; 150 INJECTION, SOLUTION INTRAVENOUS at 17:39

## 2018-07-25 RX ADMIN — CARVEDILOL 12.5 MG: 12.5 TABLET, FILM COATED ORAL at 09:03

## 2018-07-25 RX ADMIN — QUETIAPINE FUMARATE 50 MG: 25 TABLET ORAL at 21:21

## 2018-07-25 RX ADMIN — MORPHINE SULFATE 4 MG: 4 INJECTION, SOLUTION INTRAMUSCULAR; INTRAVENOUS at 13:58

## 2018-07-25 RX ADMIN — POTASSIUM CHLORIDE AND SODIUM CHLORIDE 100 ML/HR: 450; 150 INJECTION, SOLUTION INTRAVENOUS at 09:04

## 2018-07-25 RX ADMIN — ENOXAPARIN SODIUM 40 MG: 40 INJECTION SUBCUTANEOUS at 09:03

## 2018-07-25 RX ADMIN — METOCLOPRAMIDE 10 MG: 5 INJECTION, SOLUTION INTRAMUSCULAR; INTRAVENOUS at 19:59

## 2018-07-25 RX ADMIN — CARVEDILOL 12.5 MG: 12.5 TABLET, FILM COATED ORAL at 19:59

## 2018-07-25 RX ADMIN — MORPHINE SULFATE 4 MG: 4 INJECTION, SOLUTION INTRAMUSCULAR; INTRAVENOUS at 07:01

## 2018-07-25 RX ADMIN — METOCLOPRAMIDE 10 MG: 5 INJECTION, SOLUTION INTRAMUSCULAR; INTRAVENOUS at 09:03

## 2018-07-26 LAB
ANION GAP SERPL CALCULATED.3IONS-SCNC: 10 MMOL/L (ref 3–11)
BUN BLD-MCNC: 14 MG/DL (ref 9–23)
BUN/CREAT SERPL: 18.9 (ref 7–25)
CALCIUM SPEC-SCNC: 8.5 MG/DL (ref 8.7–10.4)
CHLORIDE SERPL-SCNC: 99 MMOL/L (ref 99–109)
CO2 SERPL-SCNC: 25 MMOL/L (ref 20–31)
CREAT BLD-MCNC: 0.74 MG/DL (ref 0.6–1.3)
DEPRECATED RDW RBC AUTO: 48 FL (ref 37–54)
ERYTHROCYTE [DISTWIDTH] IN BLOOD BY AUTOMATED COUNT: 14.1 % (ref 11.3–14.5)
GFR SERPL CREATININE-BSD FRML MDRD: 79 ML/MIN/1.73
GLUCOSE BLD-MCNC: 90 MG/DL (ref 70–100)
HCT VFR BLD AUTO: 35.3 % (ref 34.5–44)
HGB BLD-MCNC: 11.4 G/DL (ref 11.5–15.5)
MCH RBC QN AUTO: 30.2 PG (ref 27–31)
MCHC RBC AUTO-ENTMCNC: 32.3 G/DL (ref 32–36)
MCV RBC AUTO: 93.4 FL (ref 80–99)
PLATELET # BLD AUTO: 250 10*3/MM3 (ref 150–450)
PMV BLD AUTO: 10.5 FL (ref 6–12)
POTASSIUM BLD-SCNC: 4.2 MMOL/L (ref 3.5–5.5)
RBC # BLD AUTO: 3.78 10*6/MM3 (ref 3.89–5.14)
SODIUM BLD-SCNC: 134 MMOL/L (ref 132–146)
WBC NRBC COR # BLD: 8.95 10*3/MM3 (ref 3.5–10.8)

## 2018-07-26 PROCEDURE — 80048 BASIC METABOLIC PNL TOTAL CA: CPT | Performed by: SURGERY

## 2018-07-26 PROCEDURE — 25810000003 POTASSIUM CHLORIDE PER 2 MEQ: Performed by: SURGERY

## 2018-07-26 PROCEDURE — 85027 COMPLETE CBC AUTOMATED: CPT | Performed by: SURGERY

## 2018-07-26 PROCEDURE — 25010000002 MORPHINE PER 10 MG: Performed by: SURGERY

## 2018-07-26 PROCEDURE — 25010000002 ONDANSETRON PER 1 MG: Performed by: SURGERY

## 2018-07-26 PROCEDURE — 25010000002 ENOXAPARIN PER 10 MG: Performed by: SURGERY

## 2018-07-26 PROCEDURE — 25010000002 METOCLOPRAMIDE PER 10 MG: Performed by: SURGERY

## 2018-07-26 RX ORDER — ALPRAZOLAM 0.5 MG/1
0.5 TABLET ORAL 3 TIMES DAILY PRN
Status: DISCONTINUED | OUTPATIENT
Start: 2018-07-26 | End: 2018-07-31 | Stop reason: HOSPADM

## 2018-07-26 RX ORDER — HYDROCODONE BITARTRATE AND ACETAMINOPHEN 5; 325 MG/1; MG/1
1 TABLET ORAL EVERY 6 HOURS PRN
Status: DISCONTINUED | OUTPATIENT
Start: 2018-07-26 | End: 2018-07-31 | Stop reason: HOSPADM

## 2018-07-26 RX ADMIN — ENOXAPARIN SODIUM 40 MG: 40 INJECTION SUBCUTANEOUS at 09:03

## 2018-07-26 RX ADMIN — MORPHINE SULFATE 4 MG: 4 INJECTION, SOLUTION INTRAMUSCULAR; INTRAVENOUS at 06:31

## 2018-07-26 RX ADMIN — POLYETHYLENE GLYCOL 3350 17 G: 17 POWDER, FOR SOLUTION ORAL at 09:04

## 2018-07-26 RX ADMIN — POTASSIUM CHLORIDE AND SODIUM CHLORIDE 100 ML/HR: 450; 150 INJECTION, SOLUTION INTRAVENOUS at 02:50

## 2018-07-26 RX ADMIN — BISACODYL 10 MG: 10 SUPPOSITORY RECTAL at 09:05

## 2018-07-26 RX ADMIN — HYDROCODONE BITARTRATE AND ACETAMINOPHEN 1 TABLET: 5; 325 TABLET ORAL at 09:18

## 2018-07-26 RX ADMIN — ASPIRIN 325 MG ORAL TABLET 325 MG: 325 PILL ORAL at 09:04

## 2018-07-26 RX ADMIN — HYDROCODONE BITARTRATE AND ACETAMINOPHEN 1 TABLET: 5; 325 TABLET ORAL at 15:58

## 2018-07-26 RX ADMIN — METOCLOPRAMIDE 10 MG: 5 INJECTION, SOLUTION INTRAMUSCULAR; INTRAVENOUS at 02:51

## 2018-07-26 RX ADMIN — METOCLOPRAMIDE 10 MG: 5 INJECTION, SOLUTION INTRAMUSCULAR; INTRAVENOUS at 22:00

## 2018-07-26 RX ADMIN — VITAMIN D, TAB 1000IU (100/BT) 1000 UNITS: 25 TAB at 09:04

## 2018-07-26 RX ADMIN — Medication 100 MG: at 09:04

## 2018-07-26 RX ADMIN — METOCLOPRAMIDE 10 MG: 5 INJECTION, SOLUTION INTRAMUSCULAR; INTRAVENOUS at 09:04

## 2018-07-26 RX ADMIN — SERTRALINE HYDROCHLORIDE 100 MG: 100 TABLET ORAL at 09:04

## 2018-07-26 RX ADMIN — MORPHINE SULFATE 4 MG: 4 INJECTION, SOLUTION INTRAMUSCULAR; INTRAVENOUS at 02:51

## 2018-07-26 RX ADMIN — ONDANSETRON 4 MG: 2 INJECTION INTRAMUSCULAR; INTRAVENOUS at 05:04

## 2018-07-26 RX ADMIN — METOCLOPRAMIDE 10 MG: 5 INJECTION, SOLUTION INTRAMUSCULAR; INTRAVENOUS at 15:58

## 2018-07-26 RX ADMIN — POTASSIUM CHLORIDE AND SODIUM CHLORIDE 100 ML/HR: 450; 150 INJECTION, SOLUTION INTRAVENOUS at 13:27

## 2018-07-26 RX ADMIN — CARVEDILOL 12.5 MG: 12.5 TABLET, FILM COATED ORAL at 09:04

## 2018-07-26 RX ADMIN — HYDROCODONE BITARTRATE AND ACETAMINOPHEN 1 TABLET: 5; 325 TABLET ORAL at 22:01

## 2018-07-26 RX ADMIN — QUETIAPINE FUMARATE 50 MG: 25 TABLET ORAL at 22:01

## 2018-07-26 RX ADMIN — CARVEDILOL 12.5 MG: 12.5 TABLET, FILM COATED ORAL at 22:01

## 2018-07-27 PROCEDURE — 25010000002 METOCLOPRAMIDE PER 10 MG: Performed by: SURGERY

## 2018-07-27 PROCEDURE — 25010000002 ENOXAPARIN PER 10 MG: Performed by: SURGERY

## 2018-07-27 RX ADMIN — METOCLOPRAMIDE 10 MG: 5 INJECTION, SOLUTION INTRAMUSCULAR; INTRAVENOUS at 01:21

## 2018-07-27 RX ADMIN — ENOXAPARIN SODIUM 40 MG: 40 INJECTION SUBCUTANEOUS at 07:39

## 2018-07-27 RX ADMIN — CARVEDILOL 12.5 MG: 12.5 TABLET, FILM COATED ORAL at 21:42

## 2018-07-27 RX ADMIN — METOCLOPRAMIDE 10 MG: 5 INJECTION, SOLUTION INTRAMUSCULAR; INTRAVENOUS at 14:00

## 2018-07-27 RX ADMIN — HYDROCODONE BITARTRATE AND ACETAMINOPHEN 1 TABLET: 5; 325 TABLET ORAL at 08:38

## 2018-07-27 RX ADMIN — CARVEDILOL 12.5 MG: 12.5 TABLET, FILM COATED ORAL at 07:39

## 2018-07-27 RX ADMIN — VITAMIN D, TAB 1000IU (100/BT) 1000 UNITS: 25 TAB at 07:39

## 2018-07-27 RX ADMIN — QUETIAPINE FUMARATE 50 MG: 25 TABLET ORAL at 21:42

## 2018-07-27 RX ADMIN — Medication 100 MG: at 07:39

## 2018-07-27 RX ADMIN — METOCLOPRAMIDE 10 MG: 5 INJECTION, SOLUTION INTRAMUSCULAR; INTRAVENOUS at 07:40

## 2018-07-27 RX ADMIN — HYDROCODONE BITARTRATE AND ACETAMINOPHEN 1 TABLET: 5; 325 TABLET ORAL at 17:55

## 2018-07-27 RX ADMIN — ASPIRIN 325 MG ORAL TABLET 325 MG: 325 PILL ORAL at 07:39

## 2018-07-27 RX ADMIN — ALPRAZOLAM 0.5 MG: 0.5 TABLET ORAL at 07:40

## 2018-07-27 RX ADMIN — METOCLOPRAMIDE 10 MG: 5 INJECTION, SOLUTION INTRAMUSCULAR; INTRAVENOUS at 19:56

## 2018-07-27 RX ADMIN — SERTRALINE HYDROCHLORIDE 100 MG: 100 TABLET ORAL at 07:39

## 2018-07-28 LAB
BACTERIA SPEC AEROBE CULT: NORMAL
BACTERIA SPEC AEROBE CULT: NORMAL

## 2018-07-28 PROCEDURE — 25010000002 METOCLOPRAMIDE PER 10 MG: Performed by: SURGERY

## 2018-07-28 PROCEDURE — 25010000002 ENOXAPARIN PER 10 MG: Performed by: SURGERY

## 2018-07-28 RX ORDER — METOCLOPRAMIDE 10 MG/1
10 TABLET ORAL
Status: DISCONTINUED | OUTPATIENT
Start: 2018-07-28 | End: 2018-07-31 | Stop reason: HOSPADM

## 2018-07-28 RX ADMIN — CARVEDILOL 12.5 MG: 12.5 TABLET, FILM COATED ORAL at 08:25

## 2018-07-28 RX ADMIN — QUETIAPINE FUMARATE 50 MG: 25 TABLET ORAL at 21:21

## 2018-07-28 RX ADMIN — SERTRALINE HYDROCHLORIDE 100 MG: 100 TABLET ORAL at 08:26

## 2018-07-28 RX ADMIN — METOCLOPRAMIDE 10 MG: 5 INJECTION, SOLUTION INTRAMUSCULAR; INTRAVENOUS at 01:55

## 2018-07-28 RX ADMIN — HYDROCODONE BITARTRATE AND ACETAMINOPHEN 1 TABLET: 5; 325 TABLET ORAL at 08:25

## 2018-07-28 RX ADMIN — METOCLOPRAMIDE HYDROCHLORIDE 10 MG: 10 TABLET ORAL at 17:24

## 2018-07-28 RX ADMIN — ASPIRIN 325 MG ORAL TABLET 325 MG: 325 PILL ORAL at 08:25

## 2018-07-28 RX ADMIN — METOCLOPRAMIDE 10 MG: 5 INJECTION, SOLUTION INTRAMUSCULAR; INTRAVENOUS at 08:32

## 2018-07-28 RX ADMIN — Medication 100 MG: at 08:26

## 2018-07-28 RX ADMIN — CARVEDILOL 12.5 MG: 12.5 TABLET, FILM COATED ORAL at 21:21

## 2018-07-28 RX ADMIN — ALPRAZOLAM 0.5 MG: 0.5 TABLET ORAL at 10:40

## 2018-07-28 RX ADMIN — ENOXAPARIN SODIUM 40 MG: 40 INJECTION SUBCUTANEOUS at 08:26

## 2018-07-28 RX ADMIN — HYDROCODONE BITARTRATE AND ACETAMINOPHEN 1 TABLET: 5; 325 TABLET ORAL at 21:22

## 2018-07-28 RX ADMIN — VITAMIN D, TAB 1000IU (100/BT) 1000 UNITS: 25 TAB at 08:26

## 2018-07-29 PROCEDURE — 25010000002 ENOXAPARIN PER 10 MG: Performed by: SURGERY

## 2018-07-29 PROCEDURE — 94799 UNLISTED PULMONARY SVC/PX: CPT

## 2018-07-29 RX ADMIN — CARVEDILOL 12.5 MG: 12.5 TABLET, FILM COATED ORAL at 20:57

## 2018-07-29 RX ADMIN — HYDROCODONE BITARTRATE AND ACETAMINOPHEN 1 TABLET: 5; 325 TABLET ORAL at 20:57

## 2018-07-29 RX ADMIN — ASPIRIN 325 MG ORAL TABLET 325 MG: 325 PILL ORAL at 09:01

## 2018-07-29 RX ADMIN — Medication 100 MG: at 09:01

## 2018-07-29 RX ADMIN — CARVEDILOL 12.5 MG: 12.5 TABLET, FILM COATED ORAL at 09:01

## 2018-07-29 RX ADMIN — QUETIAPINE FUMARATE 50 MG: 25 TABLET ORAL at 20:56

## 2018-07-29 RX ADMIN — VITAMIN D, TAB 1000IU (100/BT) 1000 UNITS: 25 TAB at 09:01

## 2018-07-29 RX ADMIN — HYDROCODONE BITARTRATE AND ACETAMINOPHEN 1 TABLET: 5; 325 TABLET ORAL at 09:08

## 2018-07-29 RX ADMIN — METOCLOPRAMIDE HYDROCHLORIDE 10 MG: 10 TABLET ORAL at 09:01

## 2018-07-29 RX ADMIN — ALPRAZOLAM 0.5 MG: 0.5 TABLET ORAL at 09:08

## 2018-07-29 RX ADMIN — SERTRALINE HYDROCHLORIDE 100 MG: 100 TABLET ORAL at 09:02

## 2018-07-29 RX ADMIN — METOCLOPRAMIDE HYDROCHLORIDE 10 MG: 10 TABLET ORAL at 17:12

## 2018-07-29 RX ADMIN — ENOXAPARIN SODIUM 40 MG: 40 INJECTION SUBCUTANEOUS at 09:01

## 2018-07-29 RX ADMIN — METOCLOPRAMIDE HYDROCHLORIDE 10 MG: 10 TABLET ORAL at 11:30

## 2018-07-30 PROCEDURE — 25010000002 ENOXAPARIN PER 10 MG: Performed by: SURGERY

## 2018-07-30 RX ADMIN — CARVEDILOL 12.5 MG: 12.5 TABLET, FILM COATED ORAL at 08:16

## 2018-07-30 RX ADMIN — HYDROCODONE BITARTRATE AND ACETAMINOPHEN 1 TABLET: 5; 325 TABLET ORAL at 21:19

## 2018-07-30 RX ADMIN — METOCLOPRAMIDE HYDROCHLORIDE 10 MG: 10 TABLET ORAL at 12:23

## 2018-07-30 RX ADMIN — SERTRALINE HYDROCHLORIDE 100 MG: 100 TABLET ORAL at 08:15

## 2018-07-30 RX ADMIN — VITAMIN D, TAB 1000IU (100/BT) 1000 UNITS: 25 TAB at 08:15

## 2018-07-30 RX ADMIN — METOCLOPRAMIDE HYDROCHLORIDE 10 MG: 10 TABLET ORAL at 16:46

## 2018-07-30 RX ADMIN — ENOXAPARIN SODIUM 40 MG: 40 INJECTION SUBCUTANEOUS at 08:16

## 2018-07-30 RX ADMIN — ASPIRIN 325 MG ORAL TABLET 325 MG: 325 PILL ORAL at 08:15

## 2018-07-30 RX ADMIN — METOCLOPRAMIDE HYDROCHLORIDE 10 MG: 10 TABLET ORAL at 08:16

## 2018-07-30 RX ADMIN — CARVEDILOL 12.5 MG: 12.5 TABLET, FILM COATED ORAL at 21:19

## 2018-07-30 RX ADMIN — QUETIAPINE FUMARATE 25 MG: 25 TABLET ORAL at 21:20

## 2018-07-30 RX ADMIN — HYDROCODONE BITARTRATE AND ACETAMINOPHEN 1 TABLET: 5; 325 TABLET ORAL at 08:16

## 2018-07-30 RX ADMIN — ALPRAZOLAM 0.5 MG: 0.5 TABLET ORAL at 08:16

## 2018-07-30 RX ADMIN — Medication 100 MG: at 08:15

## 2018-07-31 VITALS
SYSTOLIC BLOOD PRESSURE: 148 MMHG | WEIGHT: 157 LBS | TEMPERATURE: 98.5 F | HEART RATE: 67 BPM | BODY MASS INDEX: 27.82 KG/M2 | HEIGHT: 63 IN | RESPIRATION RATE: 18 BRPM | DIASTOLIC BLOOD PRESSURE: 81 MMHG | OXYGEN SATURATION: 97 %

## 2018-07-31 PROCEDURE — 25010000002 ENOXAPARIN PER 10 MG: Performed by: SURGERY

## 2018-07-31 RX ADMIN — ALPRAZOLAM 0.5 MG: 0.5 TABLET ORAL at 09:42

## 2018-07-31 RX ADMIN — HYDROCODONE BITARTRATE AND ACETAMINOPHEN 1 TABLET: 5; 325 TABLET ORAL at 07:34

## 2018-07-31 RX ADMIN — SERTRALINE HYDROCHLORIDE 100 MG: 100 TABLET ORAL at 08:22

## 2018-07-31 RX ADMIN — CARVEDILOL 12.5 MG: 12.5 TABLET, FILM COATED ORAL at 08:22

## 2018-07-31 RX ADMIN — ENOXAPARIN SODIUM 40 MG: 40 INJECTION SUBCUTANEOUS at 08:22

## 2018-07-31 RX ADMIN — METOCLOPRAMIDE HYDROCHLORIDE 10 MG: 10 TABLET ORAL at 12:14

## 2018-07-31 RX ADMIN — ASPIRIN 325 MG ORAL TABLET 325 MG: 325 PILL ORAL at 08:22

## 2018-07-31 RX ADMIN — METOCLOPRAMIDE HYDROCHLORIDE 10 MG: 10 TABLET ORAL at 08:22

## 2018-07-31 RX ADMIN — Medication 100 MG: at 08:22

## 2018-07-31 RX ADMIN — VITAMIN D, TAB 1000IU (100/BT) 1000 UNITS: 25 TAB at 08:22

## 2018-08-01 ENCOUNTER — TRANSITIONAL CARE MANAGEMENT TELEPHONE ENCOUNTER (OUTPATIENT)
Dept: INTERNAL MEDICINE | Facility: CLINIC | Age: 63
End: 2018-08-01

## 2018-08-01 ENCOUNTER — READMISSION MANAGEMENT (OUTPATIENT)
Dept: CALL CENTER | Facility: HOSPITAL | Age: 63
End: 2018-08-01

## 2018-08-01 NOTE — OUTREACH NOTE
Prep Survey      Responses   Facility patient discharged from?  [facility]   Is patient eligible?  Yes   Discharge diagnosis  small bowel obstruction, explor lap 7/23   Does the patient have one of the following disease processes/diagnoses(primary or secondary)?  General Surgery   Does the patient have Home health ordered?  No   Is there a DME ordered?  No   Prep survey completed?  Yes          Bhumi Chambers RN

## 2018-08-01 NOTE — DISCHARGE SUMMARY
Discharge Summary  Date of Discharge:  7/31/18  Discharge Diagnosis: Small bowel obstruction secondary to multiple adhesions    Presenting Problem/History of Present Illness  Small bowel obstruction [K56.609]       Hospital Course  Patient is a 63 y.o. female presented with to the emergency room on 7/23/2018 with complaints of abdominal pain with workup in the emergency room remarkable for concern about possible cecal volvulus and possible signs of ischemia.  Patient is status post emergent abdominal exploration in December 2017 secondary to internal hernia with ischemic bowel which time she underwent resection however 2 weeks later she developed recurrent bowel obstruction from adhesion obstructing the sigmoid colon.  She subsequently recovered and was doing well up until this presentation.  She was evaluated by Dr. Nelia Bocanegra and taken emergently to the operating room where she underwent an abdominal exploration with lysis of adhesions, appendectomy, cecopexy as well as incisional hernia repair which she tolerated relatively well.  Paul-Turner drain was placed in the pelvis.  With return of bowel function she was started on clear liquid diet and advance slowly to regular diet which she tolerated well.  Paul-Turner drain was removed when she started tolerating the diet well.  She was having good bowel function.  She was ambulating well.  She maintained a febrile course throughout her hospitalization and incision has been healing well.  She has been wearing the abdominal binder when she ambulates.  Such plan to discharge her home.  She was instructed to call with any nausea, vomiting, severe abdominal pain or delay in bowel function.  She was instructed to resume her medications and use Norco 5/325 as needed.    Procedures Performed  Procedure(s):  EXPLORATORY LAPAROTOMY, APPENDECTOMY, CECOPEXY, INCISIONAL HERNIA REPAIR, LYSIS OF ADHESIONS       Consults:   Consults     No orders found from 6/24/2018 to  7/24/2018.              Condition on Discharge:  Stable    Discharge Disposition  Home or Self Care    Discharge Medications     Discharge Medications      Changes to Medications      Instructions Start Date   carvedilol 12.5 MG tablet  Commonly known as:  COREG  What changed:  how much to take   12.5 mg, Oral, Every 12 Hours Scheduled      thiamine 100 MG tablet  Commonly known as:  VITAMIN B1  What changed:  when to take this   100 mg, Oral, Daily         Continue These Medications      Instructions Start Date   aspirin 325 MG tablet   325 mg, Oral, Daily      busPIRone 5 MG tablet  Commonly known as:  BUSPAR   5 mg, Oral, 3 Times Daily      cholecalciferol 1000 units tablet  Commonly known as:  VITAMIN D3   2,000 Units, Oral, Every Other Day      LORazepam 0.5 MG tablet  Commonly known as:  ATIVAN   1/2 PO BID prn anxiety      ondansetron 4 MG tablet  Commonly known as:  ZOFRAN   4 mg, Oral, Every 8 Hours PRN      QUEtiapine 50 MG tablet  Commonly known as:  SEROQUEL   50 mg, Oral, Nightly      sertraline 100 MG tablet  Commonly known as:  ZOLOFT   100 mg, Oral, Daily             Discharge Diet:  regular diet    Activity at Discharge:   Activity Instructions     Avoid lifting over 10 pounds for 8 weeks.                     Follow-up Appointments  Future Appointments  Date Time Provider Department Center   8/6/2018 6:15 AM  [facility]  [facility]   8/6/2018 2:00 PM Costa Raygoza MD [facility] None   8/7/2018 6:15 AM  [facility]  [facility]   8/27/2018 10:45 AM Alli Aguirre MD [facility] None   9/12/2018 10:15 AM Michaela Connell MD [facility] None   10/16/2018 10:00 AM [facility]   10/16/2018 12:40 PM Werner Hollis MD [facility] None     Additional Instructions for the Follow-ups that You Need to Schedule     Discharge Follow-up with Specified Provider: Dr Bocanegra; 1 Week    As directed      To:  Dr Bocanegra    Follow Up:  1 Week                 Ирина  KHADAR Cobian MD  08/01/18  6:05 PM

## 2018-08-02 ENCOUNTER — READMISSION MANAGEMENT (OUTPATIENT)
Dept: CALL CENTER | Facility: HOSPITAL | Age: 63
End: 2018-08-02

## 2018-08-02 NOTE — OUTREACH NOTE
General Surgery Week 1 Survey      Responses   Facility patient discharged from?  [facility]   Does the patient have one of the following disease processes/diagnoses(primary or secondary)?  General Surgery   Is there a successful TCM telephone encounter documented?  No   Week 1 attempt successful?  Yes   Call start time  1359   Call end time  1407   Discharge diagnosis  small bowel obstruction, explor lap 7/23   Meds reviewed with patient/caregiver?  Yes   Is the patient having any side effects they believe may be caused by any medication additions or changes?  No   Does the patient have all medications related to this admission filled (includes all antibiotics, pain medications, etc.)  Yes   Prescription comments  pt given Hydrocodone prescript at d/c   Is the patient taking all medications as directed (includes completed medication regime)?  Yes   Does the patient have a follow up appointment scheduled with their surgeon?  Yes   Has the patient kept scheduled appointments due by today?  N/A   Comments  appt with Dr Connell moved up to August 2018   Did the patient receive a copy of their discharge instructions?  Yes   Nursing interventions  Reviewed instructions with patient   What is the patient's perception of their health status since discharge?  Improving   Nursing interventions  Nurse provided patient education   Is the patient /caregiver able to teach back basic post-op care?  Drive as instructed by MD in discharge instructions, Continue use of incentive spirometry at least 1 week post discharge, Take showers only when approved by MD-sponge bathe until then, No tub bath, swimming, or hot tub until instructed by MD, Keep incision areas clean,dry and protected, Do not remove steri-strips, Lifting as instructed by MD in discharge instructions   Is the patient/caregiver able to teach back signs and symptoms of incisional infection?  Increased redness, swelling or pain at the incisonal site, Increased drainage or  bleeding, Incisional warmth, Pus or odor from incision, Fever   Is the patient/caregiver able to teach back steps to recovery at home?  Set small, achievable goals for return to baseline health, Rest and rebuild strength, gradually increase activity, Eat a well-balance diet, Make a list of questions for surgeon's appointment   Additional teach back comments  wearing abd binder   Week 1 call completed?  Yes          Zulma Carrizales RN

## 2018-08-02 NOTE — OUTREACH NOTE
KANDI call completed.  Please refer to TCM call flowsheet for call documentation.       The patient is agreeable to scheduling a hospital follow up appt with Dr. Kelly however she would like an appt the week of August 20th if possible.  She has multiple follow up appts over the next couple of weeks and prefers to extend the primary care follow up to occur after her other appts.  She is aware Dr. Connell is out of the office at this time however I informed her PCP office may be in touch next week to coordinate her appt.  Please call her at [phone] to assist with scheduling her KANDI.  TCM will not actually be applicable after 8/14/18. Thank you.

## 2018-08-06 ENCOUNTER — OFFICE VISIT (OUTPATIENT)
Dept: RADIATION ONCOLOGY | Facility: HOSPITAL | Age: 63
End: 2018-08-06

## 2018-08-06 ENCOUNTER — HOSPITAL ENCOUNTER (OUTPATIENT)
Dept: RADIATION ONCOLOGY | Facility: HOSPITAL | Age: 63
Setting detail: RADIATION/ONCOLOGY SERIES
Discharge: HOME | End: 2018-08-06

## 2018-08-06 VITALS
BODY MASS INDEX: 26.2 KG/M2 | TEMPERATURE: 97.6 F | HEART RATE: 70 BPM | SYSTOLIC BLOOD PRESSURE: 136 MMHG | WEIGHT: 147.9 LBS | RESPIRATION RATE: 20 BRPM | OXYGEN SATURATION: 98 % | DIASTOLIC BLOOD PRESSURE: 64 MMHG

## 2018-08-06 DIAGNOSIS — C69.62 MALIGNANT NEOPLASM OF LEFT ORBIT (HCC): Primary | ICD-10-CM

## 2018-08-06 PROCEDURE — G0463 HOSPITAL OUTPT CLINIC VISIT: HCPCS

## 2018-08-06 RX ORDER — HYDROCODONE BITARTRATE AND ACETAMINOPHEN 5; 325 MG/1; MG/1
TABLET ORAL EVERY 6 HOURS PRN
COMMUNITY
Start: 2018-07-31 | End: 2018-08-19 | Stop reason: HOSPADM

## 2018-08-06 NOTE — PROGRESS NOTES
FOLLOW UP NOTE    PATIENT:                                                      Tereza Ackerman  MEDICAL RECORD #:                        [id number]  :                                                          1955  COMPLETION DATE:   2018  DIAGNOSIS:   Recurrent meningioma of the brain      BRIEF HISTORY:    Initial follow-up visit after [employer] stereotactic radiosurgery for recurrent left sphenoid wing meningioma.  She appears undergone multiple surgical procedures over the past 15 years.  She underwent fractionated stereotactic radiosurgery 11 years ago.  She tolerated treatment well but did experience nausea the day of treatment which resolved with medication and IV fluids.  She has since noted significant improvement in vision in the left eye.  The lid swelling and protrusion of the eye has significantly reduced.  She experiencedDermatitis or alopecia.  MRI the brain 2018 showed decrease in the sphenoid wing tumor from 3 x 3.2 cm to approximately 2.9 x 2.2 cm.    MEDICATIONS: Medication reconciliation for the patient was reviewed and confirmed in the electronic medical record.    Review of Systems   Constitutional: Positive for fatigue.   HENT:  Negative.    Eyes: Eye problems: left eye vision decrease.   Respiratory: Negative.    Cardiovascular: Negative.    Gastrointestinal: Positive for diarrhea.   Endocrine: Negative.    Genitourinary: Negative.     Musculoskeletal: Negative.    Skin: Negative.    Neurological: Positive for light-headedness.   Hematological: Bruises/bleeds easily.   Psychiatric/Behavioral: Positive for depression. The patient is nervous/anxious.        KPS 80%    Physical Exam   Constitutional: She is oriented to person, place, and time. She appears well-developed and well-nourished.   HENT:   Head: Normocephalic and atraumatic.   Left eye proptosis has nearly completely resolved.   Eyes: Conjunctivae, EOM and lids are normal.   Neck: Normal range of motion. Neck  supple.   Cardiovascular: Normal rate, regular rhythm and normal heart sounds.    No murmur heard.  Pulmonary/Chest: Effort normal and breath sounds normal. She has no wheezes. She has no rales.   Abdominal: Soft. Bowel sounds are normal. She exhibits no distension. There is no hepatosplenomegaly. There is no tenderness.   Musculoskeletal: Normal range of motion. She exhibits no edema or tenderness.   Lymphadenopathy:     She has no cervical adenopathy.     She has no axillary adenopathy.        Right: No supraclavicular adenopathy present.        Left: No supraclavicular adenopathy present.   Neurological: She is alert and oriented to person, place, and time. She has normal strength. A cranial nerve deficit (marked improvement in left eye vision.  she can now see most of the visual field and can count fingers at close range.) is present. No sensory deficit.   Skin: Skin is warm and dry.   Psychiatric: She has a normal mood and affect. Her behavior is normal. Judgment and thought content normal.   Nursing note and vitals reviewed.      VITAL SIGNS:   Vitals:    08/06/18 1427   BP: 136/64   Pulse: 70   Resp: 20   Temp: 97.6 °F (36.4 °C)   TempSrc: Temporal Artery    SpO2: 98%   Weight: 67.1 kg (147 lb 14.4 oz)   PainSc: Comment: 3/10   PainLoc: Abdomen       The following portions of the patient's history were reviewed and updated as appropriate: allergies, current medications, past family history, past medical history, past social history, past surgical history and problem list.         Tereza was seen today for meningioma of brain.    Diagnoses and all orders for this visit:    Malignant neoplasm of left orbit (CMS/HCC)         IMPRESSION:  Recurrent left sphenoid wing meningioma.  2 months status post retreatment with stereotactic radiosurgery she's had an excellent partial clinical and radiographic response.  Vision has improved.  The tumor has debulked.  Proptosis has nearly resolved.    RECOMMENDATIONS:  She  plans to continue surveillance under the care of Dr. Hollis.    Return if symptoms worsen or fail to improve.    Costa Raygoza MD    Errors in dictation may reflect use of voice recognition software and not all errors in transcription may have been detected prior to signing.

## 2018-08-09 ENCOUNTER — READMISSION MANAGEMENT (OUTPATIENT)
Dept: CALL CENTER | Facility: HOSPITAL | Age: 63
End: 2018-08-09

## 2018-08-09 NOTE — OUTREACH NOTE
General Surgery Week 2 Survey      Responses   Facility patient discharged from?  [place]   Does the patient have one of the following disease processes/diagnoses(primary or secondary)?  General Surgery   Week 2 attempt successful?  Yes   Call start time  1011   Call end time  1015   Discharge diagnosis  small bowel obstruction, explor lap 7/23   Meds reviewed with patient/caregiver?  Yes   Is the patient having any side effects they believe may be caused by any medication additions or changes?  No   Does the patient have all medications related to this admission filled (includes all antibiotics, pain medications, etc.)  Yes   Prescription comments  pt given Hydrocodone prescript at d/c   Is the patient taking all medications as directed (includes completed medication regime)?  Yes   Medication comments  Pt reports she is using pain medication in the morning and at bedtime. Reports she began using Miralax yesterday because of difficulty having BM. Advised to call Dr. Bocanegra's office, and discuss bowel troubles. Pt verbalized understanding.   Does the patient have a follow up appointment scheduled with their surgeon?  Yes   Has the patient kept scheduled appointments due by today?  N/A   Has home health visited the patient within 72 hours of discharge?  N/A   Psychosocial issues?  No   Did the patient receive a copy of their discharge instructions?  Yes   Nursing interventions  Reviewed instructions with patient   What is the patient's perception of their health status since discharge?  Improving   Nursing interventions  Nurse provided patient education   Is the patient /caregiver able to teach back basic post-op care?  Drive as instructed by MD in discharge instructions, Take showers only when approved by MD-sponge bathe until then, No tub bath, swimming, or hot tub until instructed by MD, Keep incision areas clean,dry and protected, Lifting as instructed by MD in discharge instructions   Is the patient/caregiver  able to teach back signs and symptoms of incisional infection?  Increased redness, swelling or pain at the incisonal site, Increased drainage or bleeding, Incisional warmth, Pus or odor from incision, Fever   Is the patient/caregiver able to teach back steps to recovery at home?  Set small, achievable goals for return to baseline health, Rest and rebuild strength, gradually increase activity, Eat a well-balance diet   Is the patient/caregiver able to teach back the hierarchy of who to call/visit for symptoms/problems? PCP, Specialist, Home health nurse, Urgent Care, ED, 911  Yes   Additional teach back comments  wearing abd binder   Week 2 call completed?  Yes          Eliot Munson, RN

## 2018-08-18 ENCOUNTER — READMISSION MANAGEMENT (OUTPATIENT)
Dept: CALL CENTER | Facility: HOSPITAL | Age: 63
End: 2018-08-18

## 2018-08-18 ENCOUNTER — ANESTHESIA EVENT (OUTPATIENT)
Dept: GASTROENTEROLOGY | Facility: HOSPITAL | Age: 63
End: 2018-08-18

## 2018-08-18 ENCOUNTER — HOSPITAL ENCOUNTER (INPATIENT)
Facility: HOSPITAL | Age: 63
LOS: 1 days | Discharge: HOME | End: 2018-08-19
Attending: EMERGENCY MEDICINE | Admitting: INTERNAL MEDICINE

## 2018-08-18 ENCOUNTER — ANESTHESIA (OUTPATIENT)
Dept: GASTROENTEROLOGY | Facility: HOSPITAL | Age: 63
End: 2018-08-18

## 2018-08-18 ENCOUNTER — APPOINTMENT (OUTPATIENT)
Dept: GENERAL RADIOLOGY | Facility: HOSPITAL | Age: 63
End: 2018-08-18

## 2018-08-18 ENCOUNTER — APPOINTMENT (OUTPATIENT)
Dept: CT IMAGING | Facility: HOSPITAL | Age: 63
End: 2018-08-18

## 2018-08-18 DIAGNOSIS — R14.0 ABDOMINAL DISTENSION: ICD-10-CM

## 2018-08-18 DIAGNOSIS — K56.2 SIGMOID VOLVULUS (HCC): ICD-10-CM

## 2018-08-18 DIAGNOSIS — K56.609 LARGE BOWEL OBSTRUCTION (HCC): Primary | ICD-10-CM

## 2018-08-18 DIAGNOSIS — R11.2 NON-INTRACTABLE VOMITING WITH NAUSEA, UNSPECIFIED VOMITING TYPE: ICD-10-CM

## 2018-08-18 DIAGNOSIS — R10.9 ABDOMINAL PAIN, UNSPECIFIED ABDOMINAL LOCATION: ICD-10-CM

## 2018-08-18 PROBLEM — I10 HYPERTENSION: Status: ACTIVE | Noted: 2018-08-18

## 2018-08-18 LAB
ALBUMIN SERPL-MCNC: 4.21 G/DL (ref 3.2–4.8)
ALBUMIN/GLOB SERPL: 1.3 G/DL (ref 1.5–2.5)
ALP SERPL-CCNC: 82 U/L (ref 25–100)
ALT SERPL W P-5'-P-CCNC: 17 U/L (ref 7–40)
ANION GAP SERPL CALCULATED.3IONS-SCNC: 11 MMOL/L (ref 3–11)
AST SERPL-CCNC: 22 U/L (ref 0–33)
BACTERIA UR QL AUTO: ABNORMAL /HPF
BASOPHILS # BLD AUTO: 0.03 10*3/MM3 (ref 0–0.2)
BASOPHILS NFR BLD AUTO: 0.4 % (ref 0–1)
BILIRUB SERPL-MCNC: 0.3 MG/DL (ref 0.3–1.2)
BILIRUB UR QL STRIP: NEGATIVE
BUN BLD-MCNC: 14 MG/DL (ref 9–23)
BUN/CREAT SERPL: 13.3 (ref 7–25)
CALCIUM SPEC-SCNC: 9.8 MG/DL (ref 8.7–10.4)
CHLORIDE SERPL-SCNC: 104 MMOL/L (ref 99–109)
CLARITY UR: CLEAR
CO2 SERPL-SCNC: 26 MMOL/L (ref 20–31)
COLOR UR: YELLOW
CREAT BLD-MCNC: 1.05 MG/DL (ref 0.6–1.3)
DEPRECATED RDW RBC AUTO: 46.9 FL (ref 37–54)
EOSINOPHIL # BLD AUTO: 0.56 10*3/MM3 (ref 0–0.3)
EOSINOPHIL NFR BLD AUTO: 8 % (ref 0–3)
ERYTHROCYTE [DISTWIDTH] IN BLOOD BY AUTOMATED COUNT: 13.6 % (ref 11.3–14.5)
GFR SERPL CREATININE-BSD FRML MDRD: 53 ML/MIN/1.73
GLOBULIN UR ELPH-MCNC: 3.3 GM/DL
GLUCOSE BLD-MCNC: 128 MG/DL (ref 70–100)
GLUCOSE UR STRIP-MCNC: NEGATIVE MG/DL
HCT VFR BLD AUTO: 35.8 % (ref 34.5–44)
HGB BLD-MCNC: 11.6 G/DL (ref 11.5–15.5)
HGB UR QL STRIP.AUTO: NEGATIVE
HYALINE CASTS UR QL AUTO: ABNORMAL /LPF
IMM GRANULOCYTES # BLD: 0.01 10*3/MM3 (ref 0–0.03)
IMM GRANULOCYTES NFR BLD: 0.1 % (ref 0–0.6)
KETONES UR QL STRIP: NEGATIVE
LEUKOCYTE ESTERASE UR QL STRIP.AUTO: ABNORMAL
LIPASE SERPL-CCNC: 35 U/L (ref 6–51)
LYMPHOCYTES # BLD AUTO: 2.99 10*3/MM3 (ref 0.6–4.8)
LYMPHOCYTES NFR BLD AUTO: 42.9 % (ref 24–44)
MCH RBC QN AUTO: 30.4 PG (ref 27–31)
MCHC RBC AUTO-ENTMCNC: 32.4 G/DL (ref 32–36)
MCV RBC AUTO: 93.7 FL (ref 80–99)
MONOCYTES # BLD AUTO: 0.49 10*3/MM3 (ref 0–1)
MONOCYTES NFR BLD AUTO: 7 % (ref 0–12)
NEUTROPHILS # BLD AUTO: 2.9 10*3/MM3 (ref 1.5–8.3)
NEUTROPHILS NFR BLD AUTO: 41.7 % (ref 41–71)
NITRITE UR QL STRIP: NEGATIVE
PH UR STRIP.AUTO: 8 [PH] (ref 5–8)
PLATELET # BLD AUTO: 300 10*3/MM3 (ref 150–450)
PMV BLD AUTO: 10.5 FL (ref 6–12)
POTASSIUM BLD-SCNC: 3.7 MMOL/L (ref 3.5–5.5)
PROT SERPL-MCNC: 7.5 G/DL (ref 5.7–8.2)
PROT UR QL STRIP: NEGATIVE
RBC # BLD AUTO: 3.82 10*6/MM3 (ref 3.89–5.14)
RBC # UR: ABNORMAL /HPF
REF LAB TEST METHOD: ABNORMAL
SODIUM BLD-SCNC: 141 MMOL/L (ref 132–146)
SP GR UR STRIP: 1.01 (ref 1–1.03)
SQUAMOUS #/AREA URNS HPF: ABNORMAL /HPF
TROPONIN I SERPL-MCNC: 0 NG/ML (ref 0–0.07)
UROBILINOGEN UR QL STRIP: ABNORMAL
WBC NRBC COR # BLD: 6.97 10*3/MM3 (ref 3.5–10.8)
WBC UR QL AUTO: ABNORMAL /HPF

## 2018-08-18 PROCEDURE — 99284 EMERGENCY DEPT VISIT MOD MDM: CPT

## 2018-08-18 PROCEDURE — 25010000002 PROPOFOL 10 MG/ML EMULSION: Performed by: ANESTHESIOLOGY

## 2018-08-18 PROCEDURE — 83690 ASSAY OF LIPASE: CPT | Performed by: EMERGENCY MEDICINE

## 2018-08-18 PROCEDURE — 74018 RADEX ABDOMEN 1 VIEW: CPT

## 2018-08-18 PROCEDURE — 85025 COMPLETE CBC W/AUTO DIFF WBC: CPT | Performed by: EMERGENCY MEDICINE

## 2018-08-18 PROCEDURE — 96375 TX/PRO/DX INJ NEW DRUG ADDON: CPT

## 2018-08-18 PROCEDURE — 81001 URINALYSIS AUTO W/SCOPE: CPT | Performed by: EMERGENCY MEDICINE

## 2018-08-18 PROCEDURE — 99220 PR INITIAL OBSERVATION CARE/DAY 70 MINUTES: CPT | Performed by: HOSPITALIST

## 2018-08-18 PROCEDURE — 96374 THER/PROPH/DIAG INJ IV PUSH: CPT

## 2018-08-18 PROCEDURE — 99204 OFFICE O/P NEW MOD 45 MIN: CPT | Performed by: INTERNAL MEDICINE

## 2018-08-18 PROCEDURE — 25010000002 ONDANSETRON PER 1 MG: Performed by: EMERGENCY MEDICINE

## 2018-08-18 PROCEDURE — 93005 ELECTROCARDIOGRAM TRACING: CPT | Performed by: EMERGENCY MEDICINE

## 2018-08-18 PROCEDURE — 96376 TX/PRO/DX INJ SAME DRUG ADON: CPT

## 2018-08-18 PROCEDURE — 25010000002 SUCCINYLCHOLINE PER 20 MG: Performed by: ANESTHESIOLOGY

## 2018-08-18 PROCEDURE — 84484 ASSAY OF TROPONIN QUANT: CPT

## 2018-08-18 PROCEDURE — 0D9N8ZZ DRAINAGE OF SIGMOID COLON, VIA NATURAL OR ARTIFICIAL OPENING ENDOSCOPIC: ICD-10-PCS | Performed by: INTERNAL MEDICINE

## 2018-08-18 PROCEDURE — 25010000002 IOPAMIDOL 61 % SOLUTION: Performed by: EMERGENCY MEDICINE

## 2018-08-18 PROCEDURE — 25010000002 PHENYLEPHRINE PER 1 ML: Performed by: ANESTHESIOLOGY

## 2018-08-18 PROCEDURE — 25010000002 HYDROMORPHONE PER 4 MG: Performed by: EMERGENCY MEDICINE

## 2018-08-18 PROCEDURE — 0DJD8ZZ INSPECTION OF LOWER INTESTINAL TRACT, VIA NATURAL OR ARTIFICIAL OPENING ENDOSCOPIC: ICD-10-PCS | Performed by: INTERNAL MEDICINE

## 2018-08-18 PROCEDURE — 74177 CT ABD & PELVIS W/CONTRAST: CPT

## 2018-08-18 PROCEDURE — 25010000002 PROMETHAZINE PER 50 MG: Performed by: EMERGENCY MEDICINE

## 2018-08-18 PROCEDURE — 80053 COMPREHEN METABOLIC PANEL: CPT | Performed by: EMERGENCY MEDICINE

## 2018-08-18 RX ORDER — PROMETHAZINE HYDROCHLORIDE 25 MG/ML
12.5 INJECTION, SOLUTION INTRAMUSCULAR; INTRAVENOUS ONCE
Status: COMPLETED | OUTPATIENT
Start: 2018-08-18 | End: 2018-08-18

## 2018-08-18 RX ORDER — THIAMINE MONONITRATE (VIT B1) 100 MG
100 TABLET ORAL EVERY OTHER DAY
Status: DISCONTINUED | OUTPATIENT
Start: 2018-08-18 | End: 2018-08-19 | Stop reason: HOSPADM

## 2018-08-18 RX ORDER — SODIUM CHLORIDE, SODIUM LACTATE, POTASSIUM CHLORIDE, CALCIUM CHLORIDE 600; 310; 30; 20 MG/100ML; MG/100ML; MG/100ML; MG/100ML
INJECTION, SOLUTION INTRAVENOUS CONTINUOUS PRN
Status: DISCONTINUED | OUTPATIENT
Start: 2018-08-18 | End: 2018-08-18 | Stop reason: SURG

## 2018-08-18 RX ORDER — SUCCINYLCHOLINE CHLORIDE 20 MG/ML
INJECTION INTRAMUSCULAR; INTRAVENOUS AS NEEDED
Status: DISCONTINUED | OUTPATIENT
Start: 2018-08-18 | End: 2018-08-18 | Stop reason: SURG

## 2018-08-18 RX ORDER — FENTANYL CITRATE 50 UG/ML
50 INJECTION, SOLUTION INTRAMUSCULAR; INTRAVENOUS
Status: DISCONTINUED | OUTPATIENT
Start: 2018-08-18 | End: 2018-08-18 | Stop reason: HOSPADM

## 2018-08-18 RX ORDER — GLYCOPYRROLATE 0.2 MG/ML
INJECTION INTRAMUSCULAR; INTRAVENOUS AS NEEDED
Status: DISCONTINUED | OUTPATIENT
Start: 2018-08-18 | End: 2018-08-18 | Stop reason: SURG

## 2018-08-18 RX ORDER — ONDANSETRON 2 MG/ML
4 INJECTION INTRAMUSCULAR; INTRAVENOUS ONCE
Status: COMPLETED | OUTPATIENT
Start: 2018-08-18 | End: 2018-08-18

## 2018-08-18 RX ORDER — ASPIRIN 325 MG
325 TABLET ORAL
Status: DISCONTINUED | OUTPATIENT
Start: 2018-08-18 | End: 2018-08-19 | Stop reason: HOSPADM

## 2018-08-18 RX ORDER — SODIUM PHOSPHATE, DIBASIC AND SODIUM PHOSPHATE, MONOBASIC 7; 19 G/133ML; G/133ML
1 ENEMA RECTAL ONCE
Status: COMPLETED | OUTPATIENT
Start: 2018-08-18 | End: 2018-08-18

## 2018-08-18 RX ORDER — MELATONIN
2000 EVERY OTHER DAY
Status: DISCONTINUED | OUTPATIENT
Start: 2018-08-18 | End: 2018-08-19 | Stop reason: HOSPADM

## 2018-08-18 RX ORDER — ONDANSETRON 4 MG/1
4 TABLET, FILM COATED ORAL EVERY 6 HOURS PRN
Status: DISCONTINUED | OUTPATIENT
Start: 2018-08-18 | End: 2018-08-19 | Stop reason: HOSPADM

## 2018-08-18 RX ORDER — NALOXONE HCL 0.4 MG/ML
0.4 VIAL (ML) INJECTION
Status: DISCONTINUED | OUTPATIENT
Start: 2018-08-18 | End: 2018-08-19 | Stop reason: HOSPADM

## 2018-08-18 RX ORDER — HYDROMORPHONE HYDROCHLORIDE 1 MG/ML
0.5 INJECTION, SOLUTION INTRAMUSCULAR; INTRAVENOUS; SUBCUTANEOUS ONCE
Status: COMPLETED | OUTPATIENT
Start: 2018-08-18 | End: 2018-08-18

## 2018-08-18 RX ORDER — HYDROCODONE BITARTRATE AND ACETAMINOPHEN 5; 325 MG/1; MG/1
1 TABLET ORAL EVERY 6 HOURS PRN
Status: DISCONTINUED | OUTPATIENT
Start: 2018-08-18 | End: 2018-08-19 | Stop reason: HOSPADM

## 2018-08-18 RX ORDER — SERTRALINE HYDROCHLORIDE 100 MG/1
100 TABLET, FILM COATED ORAL DAILY
Status: DISCONTINUED | OUTPATIENT
Start: 2018-08-18 | End: 2018-08-19 | Stop reason: HOSPADM

## 2018-08-18 RX ORDER — PROPOFOL 10 MG/ML
VIAL (ML) INTRAVENOUS AS NEEDED
Status: DISCONTINUED | OUTPATIENT
Start: 2018-08-18 | End: 2018-08-18 | Stop reason: SURG

## 2018-08-18 RX ORDER — LORAZEPAM 0.5 MG/1
0.25 TABLET ORAL 2 TIMES DAILY PRN
Status: DISCONTINUED | OUTPATIENT
Start: 2018-08-18 | End: 2018-08-19 | Stop reason: HOSPADM

## 2018-08-18 RX ORDER — PROMETHAZINE HYDROCHLORIDE 12.5 MG/1
12.5 TABLET ORAL EVERY 6 HOURS PRN
Status: DISCONTINUED | OUTPATIENT
Start: 2018-08-18 | End: 2018-08-19 | Stop reason: HOSPADM

## 2018-08-18 RX ORDER — SODIUM CHLORIDE 9 MG/ML
100 INJECTION, SOLUTION INTRAVENOUS CONTINUOUS
Status: DISCONTINUED | OUTPATIENT
Start: 2018-08-18 | End: 2018-08-19 | Stop reason: HOSPADM

## 2018-08-18 RX ORDER — LABETALOL HYDROCHLORIDE 5 MG/ML
10 INJECTION, SOLUTION INTRAVENOUS EVERY 6 HOURS PRN
Status: DISCONTINUED | OUTPATIENT
Start: 2018-08-18 | End: 2018-08-19 | Stop reason: HOSPADM

## 2018-08-18 RX ORDER — QUETIAPINE FUMARATE 25 MG/1
50 TABLET, FILM COATED ORAL NIGHTLY
Status: DISCONTINUED | OUTPATIENT
Start: 2018-08-18 | End: 2018-08-19 | Stop reason: HOSPADM

## 2018-08-18 RX ORDER — SODIUM CHLORIDE 0.9 % (FLUSH) 0.9 %
1-10 SYRINGE (ML) INJECTION AS NEEDED
Status: DISCONTINUED | OUTPATIENT
Start: 2018-08-18 | End: 2018-08-19 | Stop reason: HOSPADM

## 2018-08-18 RX ORDER — CARVEDILOL 6.25 MG/1
6.25 TABLET ORAL EVERY 12 HOURS SCHEDULED
Status: DISCONTINUED | OUTPATIENT
Start: 2018-08-18 | End: 2018-08-19 | Stop reason: HOSPADM

## 2018-08-18 RX ORDER — HYDROMORPHONE HYDROCHLORIDE 1 MG/ML
0.5 INJECTION, SOLUTION INTRAMUSCULAR; INTRAVENOUS; SUBCUTANEOUS
Status: DISCONTINUED | OUTPATIENT
Start: 2018-08-18 | End: 2018-08-19 | Stop reason: HOSPADM

## 2018-08-18 RX ORDER — ONDANSETRON 2 MG/ML
4 INJECTION INTRAMUSCULAR; INTRAVENOUS ONCE AS NEEDED
Status: DISCONTINUED | OUTPATIENT
Start: 2018-08-18 | End: 2018-08-18 | Stop reason: HOSPADM

## 2018-08-18 RX ORDER — SODIUM CHLORIDE 9 MG/ML
INJECTION, SOLUTION INTRAVENOUS CONTINUOUS PRN
Status: DISCONTINUED | OUTPATIENT
Start: 2018-08-18 | End: 2018-08-18 | Stop reason: SURG

## 2018-08-18 RX ORDER — ONDANSETRON 2 MG/ML
4 INJECTION INTRAMUSCULAR; INTRAVENOUS EVERY 6 HOURS PRN
Status: DISCONTINUED | OUTPATIENT
Start: 2018-08-18 | End: 2018-08-19 | Stop reason: HOSPADM

## 2018-08-18 RX ORDER — BUSPIRONE HYDROCHLORIDE 5 MG/1
5 TABLET ORAL 3 TIMES DAILY
Status: DISCONTINUED | OUTPATIENT
Start: 2018-08-18 | End: 2018-08-19 | Stop reason: HOSPADM

## 2018-08-18 RX ORDER — PROMETHAZINE HYDROCHLORIDE 25 MG/ML
12.5 INJECTION, SOLUTION INTRAMUSCULAR; INTRAVENOUS EVERY 6 HOURS PRN
Status: DISCONTINUED | OUTPATIENT
Start: 2018-08-18 | End: 2018-08-19 | Stop reason: HOSPADM

## 2018-08-18 RX ADMIN — SODIUM PHOSPHATE 1 ENEMA: 7; 19 ENEMA RECTAL at 08:35

## 2018-08-18 RX ADMIN — IOPAMIDOL 80 ML: 612 INJECTION, SOLUTION INTRAVENOUS at 05:32

## 2018-08-18 RX ADMIN — PROMETHAZINE HYDROCHLORIDE 12.5 MG: 25 INJECTION INTRAMUSCULAR; INTRAVENOUS at 07:17

## 2018-08-18 RX ADMIN — ONDANSETRON 4 MG: 2 INJECTION INTRAMUSCULAR; INTRAVENOUS at 04:11

## 2018-08-18 RX ADMIN — BUSPIRONE HYDROCHLORIDE 5 MG: 5 TABLET ORAL at 21:08

## 2018-08-18 RX ADMIN — EPHEDRINE SULFATE 10 MG: 50 INJECTION INTRAMUSCULAR; INTRAVENOUS; SUBCUTANEOUS at 09:20

## 2018-08-18 RX ADMIN — HYDROMORPHONE HYDROCHLORIDE 0.5 MG: 1 INJECTION, SOLUTION INTRAMUSCULAR; INTRAVENOUS; SUBCUTANEOUS at 04:13

## 2018-08-18 RX ADMIN — BUSPIRONE HYDROCHLORIDE 5 MG: 5 TABLET ORAL at 12:34

## 2018-08-18 RX ADMIN — CARVEDILOL 6.25 MG: 6.25 TABLET, FILM COATED ORAL at 12:34

## 2018-08-18 RX ADMIN — Medication 100 MG: at 12:34

## 2018-08-18 RX ADMIN — SERTRALINE HYDROCHLORIDE 100 MG: 100 TABLET ORAL at 12:34

## 2018-08-18 RX ADMIN — ASPIRIN 325 MG ORAL TABLET 325 MG: 325 PILL ORAL at 12:34

## 2018-08-18 RX ADMIN — PROMETHAZINE HYDROCHLORIDE 12.5 MG: 25 INJECTION INTRAMUSCULAR; INTRAVENOUS at 05:13

## 2018-08-18 RX ADMIN — GLYCOPYRROLATE 0.4 MG: 0.2 INJECTION, SOLUTION INTRAMUSCULAR; INTRAVENOUS at 09:20

## 2018-08-18 RX ADMIN — SODIUM CHLORIDE, POTASSIUM CHLORIDE, SODIUM LACTATE AND CALCIUM CHLORIDE: 600; 310; 30; 20 INJECTION, SOLUTION INTRAVENOUS at 09:45

## 2018-08-18 RX ADMIN — QUETIAPINE FUMARATE 50 MG: 25 TABLET ORAL at 21:08

## 2018-08-18 RX ADMIN — HYDROMORPHONE HYDROCHLORIDE 0.5 MG: 1 INJECTION, SOLUTION INTRAMUSCULAR; INTRAVENOUS; SUBCUTANEOUS at 07:52

## 2018-08-18 RX ADMIN — PROPOFOL 150 MG: 10 INJECTION, EMULSION INTRAVENOUS at 09:16

## 2018-08-18 RX ADMIN — VITAMIN D, TAB 1000IU (100/BT) 2000 UNITS: 25 TAB at 12:34

## 2018-08-18 RX ADMIN — HYDROCODONE BITARTRATE AND ACETAMINOPHEN 1 TABLET: 5; 325 TABLET ORAL at 21:08

## 2018-08-18 RX ADMIN — SUCCINYLCHOLINE CHLORIDE 134.2 MG: 20 INJECTION, SOLUTION INTRAMUSCULAR; INTRAVENOUS at 09:16

## 2018-08-18 RX ADMIN — SODIUM CHLORIDE 1000 ML: 9 INJECTION, SOLUTION INTRAVENOUS at 04:04

## 2018-08-18 RX ADMIN — PHENYLEPHRINE HYDROCHLORIDE 100 MCG: 10 INJECTION INTRAVENOUS at 09:20

## 2018-08-18 RX ADMIN — CARVEDILOL 6.25 MG: 6.25 TABLET, FILM COATED ORAL at 21:08

## 2018-08-18 RX ADMIN — BUSPIRONE HYDROCHLORIDE 5 MG: 5 TABLET ORAL at 17:25

## 2018-08-18 RX ADMIN — SODIUM CHLORIDE: 9 INJECTION, SOLUTION INTRAVENOUS at 09:14

## 2018-08-18 NOTE — ANESTHESIA PREPROCEDURE EVALUATION
Anesthesia Evaluation     Patient summary reviewed and Nursing notes reviewed                Airway   Mallampati: II  Dental      Pulmonary - negative pulmonary ROS   Cardiovascular     (+) hypertension, dysrhythmias,       Neuro/Psych- negative ROS  GI/Hepatic/Renal/Endo - negative ROS     Musculoskeletal (-) negative ROS    Abdominal    Substance History - negative use     OB/GYN negative ob/gyn ROS         Other                        Anesthesia Plan    ASA 3     general     intravenous induction   Anesthetic plan and risks discussed with patient.

## 2018-08-18 NOTE — ANESTHESIA PROCEDURE NOTES
Airway  Urgency: elective    Date/Time: 8/18/2018 9:20 AM  Airway not difficult    General Information and Staff    Patient location during procedure: OR  Anesthesiologist: BERNIE BOWDEN    Indications and Patient Condition  Indications for airway management: airway protection    Preoxygenated: yes  MILS not maintained throughout  Mask difficulty assessment: 1 - vent by mask    Final Airway Details  Final airway type: endotracheal airway      Successful airway: ETT  Cuffed: yes   Successful intubation technique: direct laryngoscopy  Endotracheal tube insertion site: oral  Blade: José Miguel  Blade size: #3  ETT size: 7.0 mm  Cormack-Lehane Classification: grade I - full view of glottis  Placement verified by: chest auscultation and capnometry   Measured from: lips  ETT to lips (cm): 20  Number of attempts at approach: 1    Additional Comments  Negative epigastric sounds, Breath sound equal bilaterally with symmetric chest rise and fall

## 2018-08-18 NOTE — OUTREACH NOTE
General Surgery Week 3 Survey      Responses   Facility patient discharged from?  [place]   Does the patient have one of the following disease processes/diagnoses(primary or secondary)?  General Surgery   Week 3 attempt successful?  No   Revoke  Readmitted          Brooke Warren RN

## 2018-08-18 NOTE — ANESTHESIA POSTPROCEDURE EVALUATION
Patient: Tereza Ackerman    Procedure Summary     Date:  08/18/18 Room / Location:   [facility]    Anesthesia Start:  0914 Anesthesia Stop:      Procedure:  COLONOSCOPY (N/A ) Diagnosis:       Sigmoid volvulus (CMS/HCC)      (Sigmoid volvulus (CMS/HCC) [K56.2])    Surgeon:  Inderjit Campos MD Provider:  Chacne Duran MD    Anesthesia Type:  general ASA Status:  3          Anesthesia Type: general  Last vitals  BP   (!) 166/103 (08/18/18 0815)   Temp   97.5 °F (36.4 °C) (08/18/18 0343)   Pulse   66 (08/18/18 0815)   Resp   16 (08/18/18 0815)     SpO2   94 % (08/18/18 0815)     Post Anesthesia Care and Evaluation    Patient location during evaluation: PACU  Patient participation: complete - patient participated  Level of consciousness: awake and alert  Pain score: 0  Pain management: adequate  Airway patency: patent  Anesthetic complications: No anesthetic complications  PONV Status: none  Cardiovascular status: hemodynamically stable and acceptable  Respiratory status: nonlabored ventilation, acceptable and nasal cannula  Hydration status: acceptable

## 2018-08-19 ENCOUNTER — ANESTHESIA (OUTPATIENT)
Dept: PERIOP | Facility: HOSPITAL | Age: 63
End: 2018-08-19

## 2018-08-19 ENCOUNTER — HOSPITAL ENCOUNTER (INPATIENT)
Facility: HOSPITAL | Age: 63
LOS: 5 days | Discharge: HOME | End: 2018-08-24
Attending: EMERGENCY MEDICINE | Admitting: SURGERY

## 2018-08-19 ENCOUNTER — ANESTHESIA EVENT (OUTPATIENT)
Dept: PERIOP | Facility: HOSPITAL | Age: 63
End: 2018-08-19

## 2018-08-19 ENCOUNTER — APPOINTMENT (OUTPATIENT)
Dept: GENERAL RADIOLOGY | Facility: HOSPITAL | Age: 63
End: 2018-08-19

## 2018-08-19 VITALS
TEMPERATURE: 98.4 F | HEIGHT: 63 IN | HEART RATE: 75 BPM | OXYGEN SATURATION: 96 % | RESPIRATION RATE: 16 BRPM | BODY MASS INDEX: 26.22 KG/M2 | SYSTOLIC BLOOD PRESSURE: 175 MMHG | WEIGHT: 148 LBS | DIASTOLIC BLOOD PRESSURE: 93 MMHG

## 2018-08-19 DIAGNOSIS — K56.2 SIGMOID VOLVULUS (HCC): Primary | ICD-10-CM

## 2018-08-19 DIAGNOSIS — Z78.9 IMPAIRED MOBILITY AND ADLS: ICD-10-CM

## 2018-08-19 DIAGNOSIS — Z74.09 IMPAIRED FUNCTIONAL MOBILITY, BALANCE, GAIT, AND ENDURANCE: ICD-10-CM

## 2018-08-19 DIAGNOSIS — Z74.09 IMPAIRED MOBILITY AND ADLS: ICD-10-CM

## 2018-08-19 PROBLEM — Z90.49 STATUS POST PARTIAL COLECTOMY: Status: ACTIVE | Noted: 2018-08-19

## 2018-08-19 PROBLEM — Z93.9 HISTORY OF CREATION OF OSTOMY (HCC): Status: ACTIVE | Noted: 2018-08-19

## 2018-08-19 LAB
ALBUMIN SERPL-MCNC: 3.93 G/DL (ref 3.2–4.8)
ALBUMIN/GLOB SERPL: 1.2 G/DL (ref 1.5–2.5)
ALP SERPL-CCNC: 77 U/L (ref 25–100)
ALT SERPL W P-5'-P-CCNC: 14 U/L (ref 7–40)
ANION GAP SERPL CALCULATED.3IONS-SCNC: 5 MMOL/L (ref 3–11)
ANION GAP SERPL CALCULATED.3IONS-SCNC: 7 MMOL/L (ref 3–11)
AST SERPL-CCNC: 23 U/L (ref 0–33)
BACTERIA UR QL AUTO: ABNORMAL /HPF
BASOPHILS # BLD AUTO: 0.02 10*3/MM3 (ref 0–0.2)
BASOPHILS NFR BLD AUTO: 0.2 % (ref 0–1)
BILIRUB SERPL-MCNC: 0.5 MG/DL (ref 0.3–1.2)
BILIRUB UR QL STRIP: NEGATIVE
BUN BLD-MCNC: 11 MG/DL (ref 9–23)
BUN BLD-MCNC: 13 MG/DL (ref 9–23)
BUN/CREAT SERPL: 14.1 (ref 7–25)
BUN/CREAT SERPL: 15.1 (ref 7–25)
CALCIUM SPEC-SCNC: 8.7 MG/DL (ref 8.7–10.4)
CALCIUM SPEC-SCNC: 9.2 MG/DL (ref 8.7–10.4)
CHLORIDE SERPL-SCNC: 105 MMOL/L (ref 99–109)
CHLORIDE SERPL-SCNC: 107 MMOL/L (ref 99–109)
CLARITY UR: CLEAR
CO2 SERPL-SCNC: 25 MMOL/L (ref 20–31)
CO2 SERPL-SCNC: 27 MMOL/L (ref 20–31)
COLOR UR: YELLOW
CREAT BLD-MCNC: 0.78 MG/DL (ref 0.6–1.3)
CREAT BLD-MCNC: 0.86 MG/DL (ref 0.6–1.3)
DEPRECATED RDW RBC AUTO: 48 FL (ref 37–54)
DEPRECATED RDW RBC AUTO: 48.2 FL (ref 37–54)
EOSINOPHIL # BLD AUTO: 0.33 10*3/MM3 (ref 0–0.3)
EOSINOPHIL NFR BLD AUTO: 3.7 % (ref 0–3)
ERYTHROCYTE [DISTWIDTH] IN BLOOD BY AUTOMATED COUNT: 13.8 % (ref 11.3–14.5)
ERYTHROCYTE [DISTWIDTH] IN BLOOD BY AUTOMATED COUNT: 13.9 % (ref 11.3–14.5)
GFR SERPL CREATININE-BSD FRML MDRD: 66 ML/MIN/1.73
GFR SERPL CREATININE-BSD FRML MDRD: 74 ML/MIN/1.73
GLOBULIN UR ELPH-MCNC: 3.2 GM/DL
GLUCOSE BLD-MCNC: 90 MG/DL (ref 70–100)
GLUCOSE BLD-MCNC: 93 MG/DL (ref 70–100)
GLUCOSE UR STRIP-MCNC: NEGATIVE MG/DL
HCT VFR BLD AUTO: 32.1 % (ref 34.5–44)
HCT VFR BLD AUTO: 35.2 % (ref 34.5–44)
HGB BLD-MCNC: 10.3 G/DL (ref 11.5–15.5)
HGB BLD-MCNC: 11.3 G/DL (ref 11.5–15.5)
HGB UR QL STRIP.AUTO: NEGATIVE
HYALINE CASTS UR QL AUTO: ABNORMAL /LPF
IMM GRANULOCYTES # BLD: 0.01 10*3/MM3 (ref 0–0.03)
IMM GRANULOCYTES NFR BLD: 0.1 % (ref 0–0.6)
KETONES UR QL STRIP: NEGATIVE
LEUKOCYTE ESTERASE UR QL STRIP.AUTO: ABNORMAL
LIPASE SERPL-CCNC: 30 U/L (ref 6–51)
LYMPHOCYTES # BLD AUTO: 2.08 10*3/MM3 (ref 0.6–4.8)
LYMPHOCYTES NFR BLD AUTO: 23.4 % (ref 24–44)
MCH RBC QN AUTO: 30.3 PG (ref 27–31)
MCH RBC QN AUTO: 30.5 PG (ref 27–31)
MCHC RBC AUTO-ENTMCNC: 32.1 G/DL (ref 32–36)
MCHC RBC AUTO-ENTMCNC: 32.1 G/DL (ref 32–36)
MCV RBC AUTO: 94.4 FL (ref 80–99)
MCV RBC AUTO: 94.9 FL (ref 80–99)
MONOCYTES # BLD AUTO: 0.8 10*3/MM3 (ref 0–1)
MONOCYTES NFR BLD AUTO: 9 % (ref 0–12)
NEUTROPHILS # BLD AUTO: 5.67 10*3/MM3 (ref 1.5–8.3)
NEUTROPHILS NFR BLD AUTO: 63.7 % (ref 41–71)
NITRITE UR QL STRIP: NEGATIVE
PH UR STRIP.AUTO: 6.5 [PH] (ref 5–8)
PLATELET # BLD AUTO: 229 10*3/MM3 (ref 150–450)
PLATELET # BLD AUTO: 267 10*3/MM3 (ref 150–450)
PMV BLD AUTO: 10.5 FL (ref 6–12)
PMV BLD AUTO: 10.6 FL (ref 6–12)
POTASSIUM BLD-SCNC: 3.9 MMOL/L (ref 3.5–5.5)
POTASSIUM BLD-SCNC: 3.9 MMOL/L (ref 3.5–5.5)
PROT SERPL-MCNC: 7.1 G/DL (ref 5.7–8.2)
PROT UR QL STRIP: NEGATIVE
RBC # BLD AUTO: 3.4 10*6/MM3 (ref 3.89–5.14)
RBC # BLD AUTO: 3.71 10*6/MM3 (ref 3.89–5.14)
RBC # UR: ABNORMAL /HPF
REF LAB TEST METHOD: ABNORMAL
SODIUM BLD-SCNC: 137 MMOL/L (ref 132–146)
SODIUM BLD-SCNC: 139 MMOL/L (ref 132–146)
SP GR UR STRIP: 1.01 (ref 1–1.03)
SQUAMOUS #/AREA URNS HPF: ABNORMAL /HPF
TROPONIN I SERPL-MCNC: <0.006 NG/ML
UROBILINOGEN UR QL STRIP: ABNORMAL
WBC NRBC COR # BLD: 5.54 10*3/MM3 (ref 3.5–10.8)
WBC NRBC COR # BLD: 8.9 10*3/MM3 (ref 3.5–10.8)
WBC UR QL AUTO: ABNORMAL /HPF

## 2018-08-19 PROCEDURE — 25010000002 SUCCINYLCHOLINE PER 20 MG: Performed by: ANESTHESIOLOGY

## 2018-08-19 PROCEDURE — 25010000002 ONDANSETRON PER 1 MG: Performed by: ANESTHESIOLOGY

## 2018-08-19 PROCEDURE — 85027 COMPLETE CBC AUTOMATED: CPT | Performed by: HOSPITALIST

## 2018-08-19 PROCEDURE — 84484 ASSAY OF TROPONIN QUANT: CPT | Performed by: EMERGENCY MEDICINE

## 2018-08-19 PROCEDURE — 25010000002 ONDANSETRON PER 1 MG: Performed by: EMERGENCY MEDICINE

## 2018-08-19 PROCEDURE — 85025 COMPLETE CBC W/AUTO DIFF WBC: CPT | Performed by: EMERGENCY MEDICINE

## 2018-08-19 PROCEDURE — 0D1N0Z4 BYPASS SIGMOID COLON TO CUTANEOUS, OPEN APPROACH: ICD-10-PCS | Performed by: SURGERY

## 2018-08-19 PROCEDURE — 93005 ELECTROCARDIOGRAM TRACING: CPT | Performed by: EMERGENCY MEDICINE

## 2018-08-19 PROCEDURE — 81001 URINALYSIS AUTO W/SCOPE: CPT | Performed by: EMERGENCY MEDICINE

## 2018-08-19 PROCEDURE — 99285 EMERGENCY DEPT VISIT HI MDM: CPT

## 2018-08-19 PROCEDURE — 94770: CPT

## 2018-08-19 PROCEDURE — 25010000002 HYDROMORPHONE PER 4 MG: Performed by: SURGERY

## 2018-08-19 PROCEDURE — 25010000002 PIPERACILLIN SOD-TAZOBACTAM PER 1 G: Performed by: SURGERY

## 2018-08-19 PROCEDURE — 25010000002 NEOSTIGMINE PER 0.5 MG: Performed by: ANESTHESIOLOGY

## 2018-08-19 PROCEDURE — 80053 COMPREHEN METABOLIC PANEL: CPT | Performed by: HOSPITALIST

## 2018-08-19 PROCEDURE — 25010000002 DEXAMETHASONE PER 1 MG: Performed by: ANESTHESIOLOGY

## 2018-08-19 PROCEDURE — 25010000002 HYDROMORPHONE PER 4 MG: Performed by: EMERGENCY MEDICINE

## 2018-08-19 PROCEDURE — 74018 RADEX ABDOMEN 1 VIEW: CPT

## 2018-08-19 PROCEDURE — 25010000002 PROPOFOL 10 MG/ML EMULSION: Performed by: ANESTHESIOLOGY

## 2018-08-19 PROCEDURE — 83690 ASSAY OF LIPASE: CPT | Performed by: EMERGENCY MEDICINE

## 2018-08-19 PROCEDURE — 88307 TISSUE EXAM BY PATHOLOGIST: CPT | Performed by: SURGERY

## 2018-08-19 PROCEDURE — 25010000002 FENTANYL CITRATE (PF) 100 MCG/2ML SOLUTION: Performed by: ANESTHESIOLOGY

## 2018-08-19 PROCEDURE — G0378 HOSPITAL OBSERVATION PER HR: HCPCS

## 2018-08-19 PROCEDURE — 99217 PR OBSERVATION CARE DISCHARGE MANAGEMENT: CPT | Performed by: HOSPITALIST

## 2018-08-19 PROCEDURE — 0DTN0ZZ RESECTION OF SIGMOID COLON, OPEN APPROACH: ICD-10-PCS | Performed by: SURGERY

## 2018-08-19 PROCEDURE — 25010000002 HYDROMORPHONE PER 4 MG: Performed by: ANESTHESIOLOGY

## 2018-08-19 PROCEDURE — 99223 1ST HOSP IP/OBS HIGH 75: CPT | Performed by: INTERNAL MEDICINE

## 2018-08-19 RX ORDER — NALOXONE HCL 0.4 MG/ML
0.1 VIAL (ML) INJECTION
Status: DISCONTINUED | OUTPATIENT
Start: 2018-08-19 | End: 2018-08-24 | Stop reason: HOSPADM

## 2018-08-19 RX ORDER — ONDANSETRON 2 MG/ML
4 INJECTION INTRAMUSCULAR; INTRAVENOUS ONCE
Status: COMPLETED | OUTPATIENT
Start: 2018-08-19 | End: 2018-08-19

## 2018-08-19 RX ORDER — HYDROCODONE BITARTRATE AND ACETAMINOPHEN 5; 325 MG/1; MG/1
1 TABLET ORAL EVERY 6 HOURS PRN
Qty: 12 TABLET | Refills: 0 | Status: SHIPPED | OUTPATIENT
Start: 2018-08-19 | End: 2018-08-24 | Stop reason: HOSPADM

## 2018-08-19 RX ORDER — ONDANSETRON 2 MG/ML
INJECTION INTRAMUSCULAR; INTRAVENOUS AS NEEDED
Status: DISCONTINUED | OUTPATIENT
Start: 2018-08-19 | End: 2018-08-19 | Stop reason: SURG

## 2018-08-19 RX ORDER — THIAMINE MONONITRATE (VIT B1) 100 MG
100 TABLET ORAL DAILY
Status: DISCONTINUED | OUTPATIENT
Start: 2018-08-20 | End: 2018-08-24 | Stop reason: HOSPADM

## 2018-08-19 RX ORDER — LABETALOL HYDROCHLORIDE 5 MG/ML
10 INJECTION, SOLUTION INTRAVENOUS EVERY 6 HOURS PRN
Status: DISCONTINUED | OUTPATIENT
Start: 2018-08-19 | End: 2018-08-24 | Stop reason: HOSPADM

## 2018-08-19 RX ORDER — SODIUM CHLORIDE 9 MG/ML
INJECTION, SOLUTION INTRAVENOUS CONTINUOUS PRN
Status: DISCONTINUED | OUTPATIENT
Start: 2018-08-19 | End: 2018-08-19 | Stop reason: SURG

## 2018-08-19 RX ORDER — FENTANYL CITRATE 50 UG/ML
INJECTION, SOLUTION INTRAMUSCULAR; INTRAVENOUS AS NEEDED
Status: DISCONTINUED | OUTPATIENT
Start: 2018-08-19 | End: 2018-08-19 | Stop reason: SURG

## 2018-08-19 RX ORDER — SODIUM CHLORIDE 0.9 % (FLUSH) 0.9 %
1-10 SYRINGE (ML) INJECTION AS NEEDED
Status: DISCONTINUED | OUTPATIENT
Start: 2018-08-19 | End: 2018-08-24 | Stop reason: HOSPADM

## 2018-08-19 RX ORDER — MAGNESIUM HYDROXIDE 1200 MG/15ML
LIQUID ORAL AS NEEDED
Status: DISCONTINUED | OUTPATIENT
Start: 2018-08-19 | End: 2018-08-19 | Stop reason: HOSPADM

## 2018-08-19 RX ORDER — PROPOFOL 10 MG/ML
VIAL (ML) INTRAVENOUS AS NEEDED
Status: DISCONTINUED | OUTPATIENT
Start: 2018-08-19 | End: 2018-08-19 | Stop reason: SURG

## 2018-08-19 RX ORDER — SUCCINYLCHOLINE CHLORIDE 20 MG/ML
INJECTION INTRAMUSCULAR; INTRAVENOUS AS NEEDED
Status: DISCONTINUED | OUTPATIENT
Start: 2018-08-19 | End: 2018-08-19 | Stop reason: SURG

## 2018-08-19 RX ORDER — HYDROMORPHONE HYDROCHLORIDE 1 MG/ML
0.5 INJECTION, SOLUTION INTRAMUSCULAR; INTRAVENOUS; SUBCUTANEOUS ONCE
Status: COMPLETED | OUTPATIENT
Start: 2018-08-19 | End: 2018-08-19

## 2018-08-19 RX ORDER — LORAZEPAM 0.5 MG/1
0.25 TABLET ORAL 2 TIMES DAILY PRN
Status: DISCONTINUED | OUTPATIENT
Start: 2018-08-19 | End: 2018-08-24 | Stop reason: HOSPADM

## 2018-08-19 RX ORDER — LABETALOL HYDROCHLORIDE 5 MG/ML
INJECTION, SOLUTION INTRAVENOUS AS NEEDED
Status: DISCONTINUED | OUTPATIENT
Start: 2018-08-19 | End: 2018-08-19 | Stop reason: SURG

## 2018-08-19 RX ORDER — ONDANSETRON 4 MG/1
4 TABLET, FILM COATED ORAL EVERY 6 HOURS PRN
Status: DISCONTINUED | OUTPATIENT
Start: 2018-08-19 | End: 2018-08-21

## 2018-08-19 RX ORDER — HYDROMORPHONE HYDROCHLORIDE 1 MG/ML
0.5 INJECTION, SOLUTION INTRAMUSCULAR; INTRAVENOUS; SUBCUTANEOUS
Status: DISCONTINUED | OUTPATIENT
Start: 2018-08-19 | End: 2018-08-19 | Stop reason: HOSPADM

## 2018-08-19 RX ORDER — SODIUM CHLORIDE 9 MG/ML
100 INJECTION, SOLUTION INTRAVENOUS CONTINUOUS
Status: DISCONTINUED | OUTPATIENT
Start: 2018-08-19 | End: 2018-08-22

## 2018-08-19 RX ORDER — ATRACURIUM BESYLATE 10 MG/ML
INJECTION, SOLUTION INTRAVENOUS AS NEEDED
Status: DISCONTINUED | OUTPATIENT
Start: 2018-08-19 | End: 2018-08-19 | Stop reason: SURG

## 2018-08-19 RX ORDER — CARVEDILOL 12.5 MG/1
12.5 TABLET ORAL EVERY 12 HOURS SCHEDULED
Status: DISCONTINUED | OUTPATIENT
Start: 2018-08-19 | End: 2018-08-24 | Stop reason: HOSPADM

## 2018-08-19 RX ORDER — ONDANSETRON 2 MG/ML
4 INJECTION INTRAMUSCULAR; INTRAVENOUS EVERY 6 HOURS PRN
Status: DISCONTINUED | OUTPATIENT
Start: 2018-08-19 | End: 2018-08-21

## 2018-08-19 RX ORDER — DEXAMETHASONE SODIUM PHOSPHATE 10 MG/ML
INJECTION INTRAMUSCULAR; INTRAVENOUS AS NEEDED
Status: DISCONTINUED | OUTPATIENT
Start: 2018-08-19 | End: 2018-08-19 | Stop reason: SURG

## 2018-08-19 RX ORDER — FENTANYL CITRATE 50 UG/ML
50 INJECTION, SOLUTION INTRAMUSCULAR; INTRAVENOUS
Status: DISCONTINUED | OUTPATIENT
Start: 2018-08-19 | End: 2018-08-19 | Stop reason: HOSPADM

## 2018-08-19 RX ORDER — GLYCOPYRROLATE 0.2 MG/ML
INJECTION INTRAMUSCULAR; INTRAVENOUS AS NEEDED
Status: DISCONTINUED | OUTPATIENT
Start: 2018-08-19 | End: 2018-08-19 | Stop reason: SURG

## 2018-08-19 RX ORDER — HYDROMORPHONE HYDROCHLORIDE 1 MG/ML
0.5 INJECTION, SOLUTION INTRAMUSCULAR; INTRAVENOUS; SUBCUTANEOUS
Status: DISCONTINUED | OUTPATIENT
Start: 2018-08-19 | End: 2018-08-24 | Stop reason: HOSPADM

## 2018-08-19 RX ADMIN — ONDANSETRON 4 MG: 2 INJECTION INTRAMUSCULAR; INTRAVENOUS at 15:44

## 2018-08-19 RX ADMIN — HYDROMORPHONE HYDROCHLORIDE 0.5 MG: 1 INJECTION, SOLUTION INTRAMUSCULAR; INTRAVENOUS; SUBCUTANEOUS at 20:26

## 2018-08-19 RX ADMIN — SODIUM CHLORIDE: 9 INJECTION, SOLUTION INTRAVENOUS at 22:46

## 2018-08-19 RX ADMIN — SUCCINYLCHOLINE CHLORIDE 67.2 MG: 20 INJECTION, SOLUTION INTRAMUSCULAR; INTRAVENOUS at 18:02

## 2018-08-19 RX ADMIN — HYDROMORPHONE HYDROCHLORIDE 0.5 MG: 1 INJECTION, SOLUTION INTRAMUSCULAR; INTRAVENOUS; SUBCUTANEOUS at 15:45

## 2018-08-19 RX ADMIN — SERTRALINE HYDROCHLORIDE 100 MG: 100 TABLET ORAL at 08:18

## 2018-08-19 RX ADMIN — SODIUM CHLORIDE 100 ML/HR: 9 INJECTION, SOLUTION INTRAVENOUS at 08:19

## 2018-08-19 RX ADMIN — SODIUM CHLORIDE: 9 INJECTION, SOLUTION INTRAVENOUS at 22:30

## 2018-08-19 RX ADMIN — DEXAMETHASONE SODIUM PHOSPHATE 8 MG: 10 INJECTION INTRAMUSCULAR; INTRAVENOUS at 19:30

## 2018-08-19 RX ADMIN — SODIUM CHLORIDE 100 ML/HR: 9 INJECTION, SOLUTION INTRAVENOUS at 22:46

## 2018-08-19 RX ADMIN — CARVEDILOL 12.5 MG: 12.5 TABLET, FILM COATED ORAL at 21:18

## 2018-08-19 RX ADMIN — PROPOFOL 200 MG: 10 INJECTION, EMULSION INTRAVENOUS at 18:02

## 2018-08-19 RX ADMIN — ONDANSETRON 4 MG: 2 INJECTION INTRAMUSCULAR; INTRAVENOUS at 14:13

## 2018-08-19 RX ADMIN — FENTANYL CITRATE 250 MCG: 50 INJECTION, SOLUTION INTRAMUSCULAR; INTRAVENOUS at 18:02

## 2018-08-19 RX ADMIN — HYDROMORPHONE HYDROCHLORIDE 0.5 MG: 1 INJECTION, SOLUTION INTRAMUSCULAR; INTRAVENOUS; SUBCUTANEOUS at 21:45

## 2018-08-19 RX ADMIN — HYDROCODONE BITARTRATE AND ACETAMINOPHEN 1 TABLET: 5; 325 TABLET ORAL at 05:19

## 2018-08-19 RX ADMIN — GLYCOPYRROLATE 0.4 MG: 0.2 INJECTION, SOLUTION INTRAMUSCULAR; INTRAVENOUS at 19:37

## 2018-08-19 RX ADMIN — SODIUM CHLORIDE: 9 INJECTION, SOLUTION INTRAVENOUS at 17:28

## 2018-08-19 RX ADMIN — Medication 3 MG: at 19:37

## 2018-08-19 RX ADMIN — LABETALOL HYDROCHLORIDE 10 MG: 5 INJECTION INTRAVENOUS at 23:49

## 2018-08-19 RX ADMIN — HYDROMORPHONE HYDROCHLORIDE 0.5 MG: 1 INJECTION, SOLUTION INTRAMUSCULAR; INTRAVENOUS; SUBCUTANEOUS at 23:43

## 2018-08-19 RX ADMIN — HYDROMORPHONE HYDROCHLORIDE 0.5 MG: 1 INJECTION, SOLUTION INTRAMUSCULAR; INTRAVENOUS; SUBCUTANEOUS at 14:13

## 2018-08-19 RX ADMIN — CARVEDILOL 6.25 MG: 6.25 TABLET, FILM COATED ORAL at 08:18

## 2018-08-19 RX ADMIN — BUSPIRONE HYDROCHLORIDE 5 MG: 5 TABLET ORAL at 08:18

## 2018-08-19 RX ADMIN — SODIUM CHLORIDE 500 ML: 9 INJECTION, SOLUTION INTRAVENOUS at 14:14

## 2018-08-19 RX ADMIN — ONDANSETRON 4 MG: 2 INJECTION INTRAMUSCULAR; INTRAVENOUS at 19:37

## 2018-08-19 RX ADMIN — ATRACURIUM BESYLATE 20 MG: 10 INJECTION, SOLUTION INTRAVENOUS at 18:02

## 2018-08-19 RX ADMIN — LABETALOL HYDROCHLORIDE 5 MG: 5 INJECTION, SOLUTION INTRAVENOUS at 18:48

## 2018-08-19 RX ADMIN — ATRACURIUM BESYLATE 20 MG: 10 INJECTION, SOLUTION INTRAVENOUS at 18:08

## 2018-08-19 RX ADMIN — TAZOBACTAM SODIUM AND PIPERACILLIN SODIUM 3.38 G: 375; 3 INJECTION, SOLUTION INTRAVENOUS at 18:05

## 2018-08-19 RX ADMIN — SODIUM CHLORIDE 100 ML/HR: 9 INJECTION, SOLUTION INTRAVENOUS at 01:21

## 2018-08-19 NOTE — ANESTHESIA PROCEDURE NOTES
Airway  Urgency: elective    Airway not difficult    General Information and Staff    Patient location during procedure: OR  Anesthesiologist: ROBB BARRIOS    Indications and Patient Condition  Indications for airway management: airway protection    Preoxygenated: yes  MILS not maintained throughout  Mask difficulty assessment: 1 - vent by mask    Final Airway Details  Final airway type: endotracheal airway      Successful airway: ETT  Cuffed: yes   Successful intubation technique: direct laryngoscopy  Endotracheal tube insertion site: oral  Blade: José Miguel  Blade size: #3  ETT size: 7.0 mm  Cormack-Lehane Classification: grade I - full view of glottis  Placement verified by: chest auscultation and capnometry   Measured from: lips  ETT to lips (cm): 20  Number of attempts at approach: 1    Additional Comments  Negative epigastric sounds, Breath sound equal bilaterally with symmetric chest rise and fall

## 2018-08-19 NOTE — ANESTHESIA PREPROCEDURE EVALUATION
Anesthesia Evaluation     history of anesthetic complications:               Airway   Mallampati: I  TM distance: >3 FB  Neck ROM: full  No difficulty expected  Dental      Pulmonary    Cardiovascular     (+) hypertension,       Neuro/Psych  (+) headaches,     GI/Hepatic/Renal/Endo    (+)  GERD,      Musculoskeletal     (+) back pain,   Abdominal    Substance History      OB/GYN          Other                        Anesthesia Plan    ASA 3 - emergent     general   (Tap)  intravenous induction   Anesthetic plan and risks discussed with patient.

## 2018-08-20 LAB
ANION GAP SERPL CALCULATED.3IONS-SCNC: 7 MMOL/L (ref 3–11)
BASOPHILS # BLD AUTO: 0.01 10*3/MM3 (ref 0–0.2)
BASOPHILS # BLD AUTO: 0.02 10*3/MM3 (ref 0–0.2)
BASOPHILS NFR BLD AUTO: 0.1 % (ref 0–1)
BASOPHILS NFR BLD AUTO: 0.2 % (ref 0–1)
BUN BLD-MCNC: 9 MG/DL (ref 9–23)
BUN/CREAT SERPL: 12.3 (ref 7–25)
CALCIUM SPEC-SCNC: 8.4 MG/DL (ref 8.7–10.4)
CHLORIDE SERPL-SCNC: 104 MMOL/L (ref 99–109)
CO2 SERPL-SCNC: 23 MMOL/L (ref 20–31)
CREAT BLD-MCNC: 0.73 MG/DL (ref 0.6–1.3)
DEPRECATED RDW RBC AUTO: 46.6 FL (ref 37–54)
DEPRECATED RDW RBC AUTO: 47.2 FL (ref 37–54)
EOSINOPHIL # BLD AUTO: 0 10*3/MM3 (ref 0–0.3)
EOSINOPHIL # BLD AUTO: 0 10*3/MM3 (ref 0–0.3)
EOSINOPHIL NFR BLD AUTO: 0 % (ref 0–3)
EOSINOPHIL NFR BLD AUTO: 0 % (ref 0–3)
ERYTHROCYTE [DISTWIDTH] IN BLOOD BY AUTOMATED COUNT: 13.5 % (ref 11.3–14.5)
ERYTHROCYTE [DISTWIDTH] IN BLOOD BY AUTOMATED COUNT: 13.6 % (ref 11.3–14.5)
GFR SERPL CREATININE-BSD FRML MDRD: 80 ML/MIN/1.73
GLUCOSE BLD-MCNC: 159 MG/DL (ref 70–100)
HCT VFR BLD AUTO: 34.1 % (ref 34.5–44)
HCT VFR BLD AUTO: 34.7 % (ref 34.5–44)
HGB BLD-MCNC: 11.2 G/DL (ref 11.5–15.5)
HGB BLD-MCNC: 11.2 G/DL (ref 11.5–15.5)
IMM GRANULOCYTES # BLD: 0.02 10*3/MM3 (ref 0–0.03)
IMM GRANULOCYTES # BLD: 0.02 10*3/MM3 (ref 0–0.03)
IMM GRANULOCYTES NFR BLD: 0.2 % (ref 0–0.6)
IMM GRANULOCYTES NFR BLD: 0.2 % (ref 0–0.6)
LYMPHOCYTES # BLD AUTO: 0.82 10*3/MM3 (ref 0.6–4.8)
LYMPHOCYTES # BLD AUTO: 1.2 10*3/MM3 (ref 0.6–4.8)
LYMPHOCYTES NFR BLD AUTO: 12.7 % (ref 24–44)
LYMPHOCYTES NFR BLD AUTO: 8.4 % (ref 24–44)
MCH RBC QN AUTO: 30.4 PG (ref 27–31)
MCH RBC QN AUTO: 30.7 PG (ref 27–31)
MCHC RBC AUTO-ENTMCNC: 32.3 G/DL (ref 32–36)
MCHC RBC AUTO-ENTMCNC: 32.8 G/DL (ref 32–36)
MCV RBC AUTO: 93.4 FL (ref 80–99)
MCV RBC AUTO: 94 FL (ref 80–99)
MONOCYTES # BLD AUTO: 0.44 10*3/MM3 (ref 0–1)
MONOCYTES # BLD AUTO: 0.79 10*3/MM3 (ref 0–1)
MONOCYTES NFR BLD AUTO: 4.5 % (ref 0–12)
MONOCYTES NFR BLD AUTO: 8.4 % (ref 0–12)
NEUTROPHILS # BLD AUTO: 7.44 10*3/MM3 (ref 1.5–8.3)
NEUTROPHILS # BLD AUTO: 8.54 10*3/MM3 (ref 1.5–8.3)
NEUTROPHILS NFR BLD AUTO: 78.7 % (ref 41–71)
NEUTROPHILS NFR BLD AUTO: 87 % (ref 41–71)
PLATELET # BLD AUTO: 263 10*3/MM3 (ref 150–450)
PLATELET # BLD AUTO: 267 10*3/MM3 (ref 150–450)
PMV BLD AUTO: 10.4 FL (ref 6–12)
PMV BLD AUTO: 10.7 FL (ref 6–12)
POTASSIUM BLD-SCNC: 3.9 MMOL/L (ref 3.5–5.5)
RBC # BLD AUTO: 3.65 10*6/MM3 (ref 3.89–5.14)
RBC # BLD AUTO: 3.69 10*6/MM3 (ref 3.89–5.14)
SODIUM BLD-SCNC: 134 MMOL/L (ref 132–146)
WBC NRBC COR # BLD: 9.45 10*3/MM3 (ref 3.5–10.8)
WBC NRBC COR # BLD: 9.81 10*3/MM3 (ref 3.5–10.8)

## 2018-08-20 PROCEDURE — 25010000002 PIPERACILLIN SOD-TAZOBACTAM PER 1 G: Performed by: SURGERY

## 2018-08-20 PROCEDURE — G8978 MOBILITY CURRENT STATUS: HCPCS

## 2018-08-20 PROCEDURE — 99233 SBSQ HOSP IP/OBS HIGH 50: CPT | Performed by: INTERNAL MEDICINE

## 2018-08-20 PROCEDURE — 97165 OT EVAL LOW COMPLEX 30 MIN: CPT

## 2018-08-20 PROCEDURE — 25010000002 HYDROMORPHONE PER 4 MG: Performed by: SURGERY

## 2018-08-20 PROCEDURE — 25010000002 ONDANSETRON PER 1 MG: Performed by: SURGERY

## 2018-08-20 PROCEDURE — 25010000002 ENOXAPARIN PER 10 MG: Performed by: SURGERY

## 2018-08-20 PROCEDURE — 80048 BASIC METABOLIC PNL TOTAL CA: CPT | Performed by: INTERNAL MEDICINE

## 2018-08-20 PROCEDURE — G8979 MOBILITY GOAL STATUS: HCPCS

## 2018-08-20 PROCEDURE — 85025 COMPLETE CBC W/AUTO DIFF WBC: CPT | Performed by: INTERNAL MEDICINE

## 2018-08-20 PROCEDURE — 97161 PT EVAL LOW COMPLEX 20 MIN: CPT

## 2018-08-20 PROCEDURE — 85025 COMPLETE CBC W/AUTO DIFF WBC: CPT | Performed by: SURGERY

## 2018-08-20 RX ORDER — SERTRALINE HYDROCHLORIDE 100 MG/1
100 TABLET, FILM COATED ORAL DAILY
Status: DISCONTINUED | OUTPATIENT
Start: 2018-08-20 | End: 2018-08-24 | Stop reason: HOSPADM

## 2018-08-20 RX ORDER — LORAZEPAM 0.5 MG/1
0.5 TABLET ORAL EVERY 12 HOURS SCHEDULED
Status: DISCONTINUED | OUTPATIENT
Start: 2018-08-20 | End: 2018-08-24 | Stop reason: HOSPADM

## 2018-08-20 RX ORDER — AMLODIPINE BESYLATE 5 MG/1
5 TABLET ORAL
Status: DISCONTINUED | OUTPATIENT
Start: 2018-08-20 | End: 2018-08-24 | Stop reason: HOSPADM

## 2018-08-20 RX ORDER — BUSPIRONE HYDROCHLORIDE 10 MG/1
5 TABLET ORAL 3 TIMES DAILY
Status: DISCONTINUED | OUTPATIENT
Start: 2018-08-20 | End: 2018-08-24 | Stop reason: HOSPADM

## 2018-08-20 RX ORDER — DOCUSATE SODIUM 100 MG/1
100 CAPSULE, LIQUID FILLED ORAL 2 TIMES DAILY
Status: DISCONTINUED | OUTPATIENT
Start: 2018-08-20 | End: 2018-08-24 | Stop reason: HOSPADM

## 2018-08-20 RX ORDER — IBUPROFEN 600 MG/1
600 TABLET ORAL EVERY 4 HOURS PRN
Status: DISCONTINUED | OUTPATIENT
Start: 2018-08-20 | End: 2018-08-21

## 2018-08-20 RX ORDER — QUETIAPINE FUMARATE 25 MG/1
50 TABLET, FILM COATED ORAL NIGHTLY
Status: DISCONTINUED | OUTPATIENT
Start: 2018-08-20 | End: 2018-08-24 | Stop reason: HOSPADM

## 2018-08-20 RX ORDER — ACETAMINOPHEN 325 MG/1
650 TABLET ORAL EVERY 6 HOURS
Status: DISCONTINUED | OUTPATIENT
Start: 2018-08-20 | End: 2018-08-24 | Stop reason: HOSPADM

## 2018-08-20 RX ORDER — OXYCODONE HYDROCHLORIDE 5 MG/1
5 TABLET ORAL EVERY 4 HOURS PRN
Status: DISCONTINUED | OUTPATIENT
Start: 2018-08-20 | End: 2018-08-24 | Stop reason: HOSPADM

## 2018-08-20 RX ADMIN — IBUPROFEN 600 MG: 600 TABLET ORAL at 17:13

## 2018-08-20 RX ADMIN — ONDANSETRON 4 MG: 2 INJECTION INTRAMUSCULAR; INTRAVENOUS at 03:17

## 2018-08-20 RX ADMIN — BUSPIRONE HYDROCHLORIDE 5 MG: 10 TABLET ORAL at 17:12

## 2018-08-20 RX ADMIN — IBUPROFEN 600 MG: 600 TABLET ORAL at 09:58

## 2018-08-20 RX ADMIN — BUSPIRONE HYDROCHLORIDE 5 MG: 10 TABLET ORAL at 21:52

## 2018-08-20 RX ADMIN — SODIUM CHLORIDE 100 ML/HR: 9 INJECTION, SOLUTION INTRAVENOUS at 07:26

## 2018-08-20 RX ADMIN — ACETAMINOPHEN 650 MG: 325 TABLET ORAL at 09:58

## 2018-08-20 RX ADMIN — SODIUM CHLORIDE 100 ML/HR: 9 INJECTION, SOLUTION INTRAVENOUS at 17:13

## 2018-08-20 RX ADMIN — HYDROMORPHONE HYDROCHLORIDE 0.5 MG: 1 INJECTION, SOLUTION INTRAMUSCULAR; INTRAVENOUS; SUBCUTANEOUS at 07:53

## 2018-08-20 RX ADMIN — Medication 100 MG: at 08:01

## 2018-08-20 RX ADMIN — TAZOBACTAM SODIUM AND PIPERACILLIN SODIUM 3.38 G: 375; 3 INJECTION, SOLUTION INTRAVENOUS at 01:47

## 2018-08-20 RX ADMIN — QUETIAPINE FUMARATE 50 MG: 25 TABLET ORAL at 21:52

## 2018-08-20 RX ADMIN — TAZOBACTAM SODIUM AND PIPERACILLIN SODIUM 3.38 G: 375; 3 INJECTION, SOLUTION INTRAVENOUS at 17:12

## 2018-08-20 RX ADMIN — ACETAMINOPHEN 650 MG: 325 TABLET ORAL at 21:51

## 2018-08-20 RX ADMIN — DOCUSATE SODIUM 100 MG: 100 CAPSULE, LIQUID FILLED ORAL at 14:26

## 2018-08-20 RX ADMIN — ACETAMINOPHEN 650 MG: 325 TABLET ORAL at 14:26

## 2018-08-20 RX ADMIN — SERTRALINE HYDROCHLORIDE 100 MG: 100 TABLET ORAL at 14:26

## 2018-08-20 RX ADMIN — DOCUSATE SODIUM 100 MG: 100 CAPSULE, LIQUID FILLED ORAL at 21:51

## 2018-08-20 RX ADMIN — HYDROMORPHONE HYDROCHLORIDE 0.5 MG: 1 INJECTION, SOLUTION INTRAMUSCULAR; INTRAVENOUS; SUBCUTANEOUS at 05:16

## 2018-08-20 RX ADMIN — OXYCODONE HYDROCHLORIDE 5 MG: 5 TABLET ORAL at 17:13

## 2018-08-20 RX ADMIN — HYDROMORPHONE HYDROCHLORIDE 0.5 MG: 1 INJECTION, SOLUTION INTRAMUSCULAR; INTRAVENOUS; SUBCUTANEOUS at 11:54

## 2018-08-20 RX ADMIN — AMLODIPINE BESYLATE 5 MG: 5 TABLET ORAL at 14:26

## 2018-08-20 RX ADMIN — TAZOBACTAM SODIUM AND PIPERACILLIN SODIUM 3.38 G: 375; 3 INJECTION, SOLUTION INTRAVENOUS at 10:00

## 2018-08-20 RX ADMIN — CARVEDILOL 12.5 MG: 12.5 TABLET, FILM COATED ORAL at 08:01

## 2018-08-20 RX ADMIN — HYDROMORPHONE HYDROCHLORIDE 0.5 MG: 1 INJECTION, SOLUTION INTRAMUSCULAR; INTRAVENOUS; SUBCUTANEOUS at 03:14

## 2018-08-20 RX ADMIN — ENOXAPARIN SODIUM 40 MG: 40 INJECTION SUBCUTANEOUS at 08:01

## 2018-08-20 RX ADMIN — ONDANSETRON 4 MG: 2 INJECTION INTRAMUSCULAR; INTRAVENOUS at 08:14

## 2018-08-20 RX ADMIN — CARVEDILOL 12.5 MG: 12.5 TABLET, FILM COATED ORAL at 21:52

## 2018-08-20 RX ADMIN — LORAZEPAM 0.5 MG: 0.5 TABLET ORAL at 21:52

## 2018-08-20 RX ADMIN — LORAZEPAM 0.5 MG: 0.5 TABLET ORAL at 14:26

## 2018-08-20 NOTE — ANESTHESIA POSTPROCEDURE EVALUATION
Patient: Tereza Ackerman    Procedure Summary     Date:  08/19/18 Room / Location:   [facility]    Anesthesia Start:  1758 Anesthesia Stop:  2016    Procedure:  LAPAROTOMY EXPLORATORY, SIGMOID COLECTOMY, CREATION OF OSTOMY (N/A Abdomen) Diagnosis:      Surgeon:  Andrea Bocanegra MD Provider:  Chance Barrera MD    Anesthesia Type:  general ASA Status:  3 - Emergent          Anesthesia Type: general  Last vitals  BP   (!) 198/101 (08/19/18 1726)   Temp   99 °F (37.2 °C) (08/19/18 1337)   Pulse   80 (08/19/18 1726)   Resp   18 (08/19/18 1726)     SpO2   100 % (08/19/18 1726)     Post Anesthesia Care and Evaluation    Patient location during evaluation: PACU  Patient participation: complete - patient participated  Level of consciousness: lethargic  Pain score: 3  Pain management: adequate  Airway patency: patent  Anesthetic complications: No anesthetic complications  PONV Status: none  Cardiovascular status: acceptable  Respiratory status: acceptable  Hydration status: acceptable

## 2018-08-21 LAB
ANION GAP SERPL CALCULATED.3IONS-SCNC: 6 MMOL/L (ref 3–11)
BUN BLD-MCNC: 13 MG/DL (ref 9–23)
BUN/CREAT SERPL: 15.5 (ref 7–25)
CALCIUM SPEC-SCNC: 8.4 MG/DL (ref 8.7–10.4)
CHLORIDE SERPL-SCNC: 110 MMOL/L (ref 99–109)
CO2 SERPL-SCNC: 24 MMOL/L (ref 20–31)
CREAT BLD-MCNC: 0.84 MG/DL (ref 0.6–1.3)
CYTO UR: NORMAL
DEPRECATED RDW RBC AUTO: 48 FL (ref 37–54)
ERYTHROCYTE [DISTWIDTH] IN BLOOD BY AUTOMATED COUNT: 13.9 % (ref 11.3–14.5)
GFR SERPL CREATININE-BSD FRML MDRD: 68 ML/MIN/1.73
GLUCOSE BLD-MCNC: 84 MG/DL (ref 70–100)
HCT VFR BLD AUTO: 27.8 % (ref 34.5–44)
HGB BLD-MCNC: 9.1 G/DL (ref 11.5–15.5)
LAB AP CASE REPORT: NORMAL
LAB AP CLINICAL INFORMATION: NORMAL
MCH RBC QN AUTO: 30.8 PG (ref 27–31)
MCHC RBC AUTO-ENTMCNC: 32.7 G/DL (ref 32–36)
MCV RBC AUTO: 94.2 FL (ref 80–99)
PATH REPORT.FINAL DX SPEC: NORMAL
PATH REPORT.GROSS SPEC: NORMAL
PLATELET # BLD AUTO: 181 10*3/MM3 (ref 150–450)
PMV BLD AUTO: 10.9 FL (ref 6–12)
POTASSIUM BLD-SCNC: 3.4 MMOL/L (ref 3.5–5.5)
RBC # BLD AUTO: 2.95 10*6/MM3 (ref 3.89–5.14)
SODIUM BLD-SCNC: 140 MMOL/L (ref 132–146)
WBC NRBC COR # BLD: 6.42 10*3/MM3 (ref 3.5–10.8)

## 2018-08-21 PROCEDURE — 25010000002 HYDROMORPHONE PER 4 MG: Performed by: SURGERY

## 2018-08-21 PROCEDURE — 25010000002 ONDANSETRON PER 1 MG: Performed by: SURGERY

## 2018-08-21 PROCEDURE — 25010000002 ENOXAPARIN PER 10 MG: Performed by: SURGERY

## 2018-08-21 PROCEDURE — 25010000002 PIPERACILLIN SOD-TAZOBACTAM PER 1 G: Performed by: SURGERY

## 2018-08-21 PROCEDURE — 80048 BASIC METABOLIC PNL TOTAL CA: CPT | Performed by: INTERNAL MEDICINE

## 2018-08-21 PROCEDURE — 99232 SBSQ HOSP IP/OBS MODERATE 35: CPT | Performed by: INTERNAL MEDICINE

## 2018-08-21 PROCEDURE — 85027 COMPLETE CBC AUTOMATED: CPT | Performed by: INTERNAL MEDICINE

## 2018-08-21 RX ORDER — PROMETHAZINE HYDROCHLORIDE 25 MG/ML
12.5 INJECTION, SOLUTION INTRAMUSCULAR; INTRAVENOUS EVERY 6 HOURS PRN
Status: DISCONTINUED | OUTPATIENT
Start: 2018-08-21 | End: 2018-08-24 | Stop reason: HOSPADM

## 2018-08-21 RX ORDER — IBUPROFEN 400 MG/1
400 TABLET ORAL EVERY 6 HOURS SCHEDULED
Status: DISCONTINUED | OUTPATIENT
Start: 2018-08-21 | End: 2018-08-24 | Stop reason: HOSPADM

## 2018-08-21 RX ADMIN — SERTRALINE HYDROCHLORIDE 100 MG: 100 TABLET ORAL at 10:28

## 2018-08-21 RX ADMIN — ENOXAPARIN SODIUM 40 MG: 40 INJECTION SUBCUTANEOUS at 10:29

## 2018-08-21 RX ADMIN — ONDANSETRON 4 MG: 2 INJECTION INTRAMUSCULAR; INTRAVENOUS at 11:08

## 2018-08-21 RX ADMIN — AMLODIPINE BESYLATE 5 MG: 5 TABLET ORAL at 10:28

## 2018-08-21 RX ADMIN — ACETAMINOPHEN 650 MG: 325 TABLET ORAL at 04:05

## 2018-08-21 RX ADMIN — QUETIAPINE FUMARATE 50 MG: 25 TABLET ORAL at 21:53

## 2018-08-21 RX ADMIN — CARVEDILOL 12.5 MG: 12.5 TABLET, FILM COATED ORAL at 21:52

## 2018-08-21 RX ADMIN — SODIUM CHLORIDE 100 ML/HR: 9 INJECTION, SOLUTION INTRAVENOUS at 03:27

## 2018-08-21 RX ADMIN — LORAZEPAM 0.5 MG: 0.5 TABLET ORAL at 21:53

## 2018-08-21 RX ADMIN — CARVEDILOL 12.5 MG: 12.5 TABLET, FILM COATED ORAL at 10:27

## 2018-08-21 RX ADMIN — ACETAMINOPHEN 650 MG: 325 TABLET ORAL at 10:27

## 2018-08-21 RX ADMIN — OXYCODONE HYDROCHLORIDE 5 MG: 5 TABLET ORAL at 21:50

## 2018-08-21 RX ADMIN — OXYCODONE HYDROCHLORIDE 5 MG: 5 TABLET ORAL at 09:54

## 2018-08-21 RX ADMIN — IBUPROFEN 400 MG: 400 TABLET ORAL at 12:04

## 2018-08-21 RX ADMIN — HYDROMORPHONE HYDROCHLORIDE 0.5 MG: 1 INJECTION, SOLUTION INTRAMUSCULAR; INTRAVENOUS; SUBCUTANEOUS at 12:11

## 2018-08-21 RX ADMIN — DOCUSATE SODIUM 100 MG: 100 CAPSULE, LIQUID FILLED ORAL at 10:28

## 2018-08-21 RX ADMIN — TAZOBACTAM SODIUM AND PIPERACILLIN SODIUM 3.38 G: 375; 3 INJECTION, SOLUTION INTRAVENOUS at 02:35

## 2018-08-21 RX ADMIN — SODIUM CHLORIDE 100 ML/HR: 9 INJECTION, SOLUTION INTRAVENOUS at 15:57

## 2018-08-21 RX ADMIN — DOCUSATE SODIUM 100 MG: 100 CAPSULE, LIQUID FILLED ORAL at 21:52

## 2018-08-21 RX ADMIN — BUSPIRONE HYDROCHLORIDE 5 MG: 10 TABLET ORAL at 21:52

## 2018-08-21 RX ADMIN — IBUPROFEN 400 MG: 400 TABLET ORAL at 17:28

## 2018-08-21 RX ADMIN — Medication 100 MG: at 10:29

## 2018-08-21 RX ADMIN — BUSPIRONE HYDROCHLORIDE 5 MG: 10 TABLET ORAL at 10:28

## 2018-08-21 RX ADMIN — ACETAMINOPHEN 650 MG: 325 TABLET ORAL at 21:52

## 2018-08-21 RX ADMIN — LORAZEPAM 0.5 MG: 0.5 TABLET ORAL at 10:28

## 2018-08-21 RX ADMIN — OXYCODONE HYDROCHLORIDE 5 MG: 5 TABLET ORAL at 05:55

## 2018-08-21 RX ADMIN — ACETAMINOPHEN 650 MG: 325 TABLET ORAL at 15:52

## 2018-08-21 RX ADMIN — BUSPIRONE HYDROCHLORIDE 5 MG: 10 TABLET ORAL at 15:52

## 2018-08-22 LAB
DEPRECATED RDW RBC AUTO: 48.1 FL (ref 37–54)
ERYTHROCYTE [DISTWIDTH] IN BLOOD BY AUTOMATED COUNT: 13.9 % (ref 11.3–14.5)
HCT VFR BLD AUTO: 25.5 % (ref 34.5–44)
HGB BLD-MCNC: 8.3 G/DL (ref 11.5–15.5)
MCH RBC QN AUTO: 30.6 PG (ref 27–31)
MCHC RBC AUTO-ENTMCNC: 32.5 G/DL (ref 32–36)
MCV RBC AUTO: 94.1 FL (ref 80–99)
PLATELET # BLD AUTO: 186 10*3/MM3 (ref 150–450)
PMV BLD AUTO: 10.1 FL (ref 6–12)
RBC # BLD AUTO: 2.71 10*6/MM3 (ref 3.89–5.14)
WBC NRBC COR # BLD: 6.27 10*3/MM3 (ref 3.5–10.8)

## 2018-08-22 PROCEDURE — 85027 COMPLETE CBC AUTOMATED: CPT | Performed by: INTERNAL MEDICINE

## 2018-08-22 PROCEDURE — 25810000003 SODIUM CHLORIDE 0.9 % WITH KCL 20 MEQ 20-0.9 MEQ/L-% SOLUTION: Performed by: NURSE PRACTITIONER

## 2018-08-22 PROCEDURE — 99232 SBSQ HOSP IP/OBS MODERATE 35: CPT | Performed by: NURSE PRACTITIONER

## 2018-08-22 PROCEDURE — 25010000002 ENOXAPARIN PER 10 MG: Performed by: SURGERY

## 2018-08-22 PROCEDURE — 25010000002 HYDROMORPHONE PER 4 MG: Performed by: SURGERY

## 2018-08-22 RX ORDER — POTASSIUM CHLORIDE 7.45 MG/ML
10 INJECTION INTRAVENOUS
Status: DISCONTINUED | OUTPATIENT
Start: 2018-08-22 | End: 2018-08-24 | Stop reason: HOSPADM

## 2018-08-22 RX ORDER — POTASSIUM CHLORIDE 1.5 G/1.77G
40 POWDER, FOR SOLUTION ORAL AS NEEDED
Status: DISCONTINUED | OUTPATIENT
Start: 2018-08-22 | End: 2018-08-24 | Stop reason: HOSPADM

## 2018-08-22 RX ORDER — SODIUM CHLORIDE AND POTASSIUM CHLORIDE 150; 900 MG/100ML; MG/100ML
100 INJECTION, SOLUTION INTRAVENOUS CONTINUOUS
Status: ACTIVE | OUTPATIENT
Start: 2018-08-22 | End: 2018-08-22

## 2018-08-22 RX ORDER — POTASSIUM CHLORIDE 750 MG/1
40 CAPSULE, EXTENDED RELEASE ORAL AS NEEDED
Status: DISCONTINUED | OUTPATIENT
Start: 2018-08-22 | End: 2018-08-24 | Stop reason: HOSPADM

## 2018-08-22 RX ADMIN — IBUPROFEN 400 MG: 400 TABLET ORAL at 17:27

## 2018-08-22 RX ADMIN — OXYCODONE HYDROCHLORIDE 5 MG: 5 TABLET ORAL at 10:09

## 2018-08-22 RX ADMIN — HYDROMORPHONE HYDROCHLORIDE 0.5 MG: 1 INJECTION, SOLUTION INTRAMUSCULAR; INTRAVENOUS; SUBCUTANEOUS at 23:49

## 2018-08-22 RX ADMIN — ACETAMINOPHEN 650 MG: 325 TABLET ORAL at 04:00

## 2018-08-22 RX ADMIN — BUSPIRONE HYDROCHLORIDE 5 MG: 10 TABLET ORAL at 17:27

## 2018-08-22 RX ADMIN — CARVEDILOL 12.5 MG: 12.5 TABLET, FILM COATED ORAL at 10:10

## 2018-08-22 RX ADMIN — AMLODIPINE BESYLATE 5 MG: 5 TABLET ORAL at 10:08

## 2018-08-22 RX ADMIN — BUSPIRONE HYDROCHLORIDE 5 MG: 10 TABLET ORAL at 21:20

## 2018-08-22 RX ADMIN — IBUPROFEN 400 MG: 400 TABLET ORAL at 23:49

## 2018-08-22 RX ADMIN — LORAZEPAM 0.5 MG: 0.5 TABLET ORAL at 10:11

## 2018-08-22 RX ADMIN — QUETIAPINE FUMARATE 50 MG: 25 TABLET ORAL at 21:20

## 2018-08-22 RX ADMIN — OXYCODONE HYDROCHLORIDE 5 MG: 5 TABLET ORAL at 20:03

## 2018-08-22 RX ADMIN — ENOXAPARIN SODIUM 40 MG: 40 INJECTION SUBCUTANEOUS at 10:05

## 2018-08-22 RX ADMIN — ACETAMINOPHEN 650 MG: 325 TABLET ORAL at 10:09

## 2018-08-22 RX ADMIN — OXYCODONE HYDROCHLORIDE 5 MG: 5 TABLET ORAL at 04:25

## 2018-08-22 RX ADMIN — DOCUSATE SODIUM 100 MG: 100 CAPSULE, LIQUID FILLED ORAL at 21:19

## 2018-08-22 RX ADMIN — OXYCODONE HYDROCHLORIDE 5 MG: 5 TABLET ORAL at 14:55

## 2018-08-22 RX ADMIN — LORAZEPAM 0.5 MG: 0.5 TABLET ORAL at 21:20

## 2018-08-22 RX ADMIN — ACETAMINOPHEN 650 MG: 325 TABLET ORAL at 21:20

## 2018-08-22 RX ADMIN — ACETAMINOPHEN 650 MG: 325 TABLET ORAL at 14:55

## 2018-08-22 RX ADMIN — POTASSIUM CHLORIDE AND SODIUM CHLORIDE 100 ML/HR: 900; 150 INJECTION, SOLUTION INTRAVENOUS at 19:33

## 2018-08-22 RX ADMIN — IBUPROFEN 400 MG: 400 TABLET ORAL at 06:37

## 2018-08-22 RX ADMIN — IBUPROFEN 400 MG: 400 TABLET ORAL at 01:00

## 2018-08-22 RX ADMIN — POTASSIUM CHLORIDE AND SODIUM CHLORIDE 100 ML/HR: 900; 150 INJECTION, SOLUTION INTRAVENOUS at 10:18

## 2018-08-22 RX ADMIN — HYDROMORPHONE HYDROCHLORIDE 0.5 MG: 1 INJECTION, SOLUTION INTRAMUSCULAR; INTRAVENOUS; SUBCUTANEOUS at 19:36

## 2018-08-22 RX ADMIN — CARVEDILOL 12.5 MG: 12.5 TABLET, FILM COATED ORAL at 21:22

## 2018-08-22 RX ADMIN — DOCUSATE SODIUM 100 MG: 100 CAPSULE, LIQUID FILLED ORAL at 10:06

## 2018-08-22 RX ADMIN — BUSPIRONE HYDROCHLORIDE 5 MG: 10 TABLET ORAL at 10:08

## 2018-08-22 RX ADMIN — SERTRALINE HYDROCHLORIDE 100 MG: 100 TABLET ORAL at 10:08

## 2018-08-22 RX ADMIN — Medication 100 MG: at 10:09

## 2018-08-22 RX ADMIN — HYDROMORPHONE HYDROCHLORIDE 0.5 MG: 1 INJECTION, SOLUTION INTRAMUSCULAR; INTRAVENOUS; SUBCUTANEOUS at 06:37

## 2018-08-23 PROBLEM — K56.2 SIGMOID VOLVULUS (HCC): Status: RESOLVED | Noted: 2018-08-19 | Resolved: 2018-08-23

## 2018-08-23 LAB
ANION GAP SERPL CALCULATED.3IONS-SCNC: 6 MMOL/L (ref 3–11)
BUN BLD-MCNC: 10 MG/DL (ref 9–23)
BUN/CREAT SERPL: 15.4 (ref 7–25)
CALCIUM SPEC-SCNC: 8.2 MG/DL (ref 8.7–10.4)
CHLORIDE SERPL-SCNC: 107 MMOL/L (ref 99–109)
CO2 SERPL-SCNC: 26 MMOL/L (ref 20–31)
CREAT BLD-MCNC: 0.65 MG/DL (ref 0.6–1.3)
DEPRECATED RDW RBC AUTO: 48.2 FL (ref 37–54)
ERYTHROCYTE [DISTWIDTH] IN BLOOD BY AUTOMATED COUNT: 13.8 % (ref 11.3–14.5)
GFR SERPL CREATININE-BSD FRML MDRD: 91 ML/MIN/1.73
GLUCOSE BLD-MCNC: 83 MG/DL (ref 70–100)
HCT VFR BLD AUTO: 26 % (ref 34.5–44)
HGB BLD-MCNC: 8.4 G/DL (ref 11.5–15.5)
MCH RBC QN AUTO: 30.8 PG (ref 27–31)
MCHC RBC AUTO-ENTMCNC: 32.3 G/DL (ref 32–36)
MCV RBC AUTO: 95.2 FL (ref 80–99)
PLATELET # BLD AUTO: 193 10*3/MM3 (ref 150–450)
PMV BLD AUTO: 10.5 FL (ref 6–12)
POTASSIUM BLD-SCNC: 3.8 MMOL/L (ref 3.5–5.5)
RBC # BLD AUTO: 2.73 10*6/MM3 (ref 3.89–5.14)
SODIUM BLD-SCNC: 139 MMOL/L (ref 132–146)
WBC NRBC COR # BLD: 5.61 10*3/MM3 (ref 3.5–10.8)

## 2018-08-23 PROCEDURE — 80048 BASIC METABOLIC PNL TOTAL CA: CPT | Performed by: NURSE PRACTITIONER

## 2018-08-23 PROCEDURE — 99239 HOSP IP/OBS DSCHRG MGMT >30: CPT | Performed by: NURSE PRACTITIONER

## 2018-08-23 PROCEDURE — 25010000002 ENOXAPARIN PER 10 MG: Performed by: SURGERY

## 2018-08-23 PROCEDURE — 85027 COMPLETE CBC AUTOMATED: CPT | Performed by: NURSE PRACTITIONER

## 2018-08-23 RX ORDER — IBUPROFEN 600 MG/1
600 TABLET ORAL EVERY 6 HOURS SCHEDULED
Qty: 60 TABLET | Refills: 0 | Status: SHIPPED | OUTPATIENT
Start: 2018-08-23 | End: 2019-02-15

## 2018-08-23 RX ORDER — AMLODIPINE BESYLATE 5 MG/1
5 TABLET ORAL
Qty: 30 TABLET | Refills: 0 | Status: SHIPPED | OUTPATIENT
Start: 2018-08-24 | End: 2019-03-25

## 2018-08-23 RX ORDER — PSEUDOEPHEDRINE HCL 30 MG
100 TABLET ORAL 2 TIMES DAILY
Qty: 60 CAPSULE | Refills: 0 | Status: SHIPPED | OUTPATIENT
Start: 2018-08-23 | End: 2018-09-25 | Stop reason: SDUPTHER

## 2018-08-23 RX ORDER — OXYCODONE HYDROCHLORIDE 5 MG/1
5 TABLET ORAL EVERY 4 HOURS PRN
Qty: 30 TABLET | Refills: 0 | Status: SHIPPED | OUTPATIENT
Start: 2018-08-23 | End: 2018-08-28

## 2018-08-23 RX ORDER — ACETAMINOPHEN 325 MG/1
650 TABLET ORAL EVERY 6 HOURS
Qty: 120 TABLET | Refills: 0 | Status: SHIPPED | OUTPATIENT
Start: 2018-08-23 | End: 2019-02-15

## 2018-08-23 RX ADMIN — QUETIAPINE FUMARATE 50 MG: 25 TABLET ORAL at 21:02

## 2018-08-23 RX ADMIN — BUSPIRONE HYDROCHLORIDE 5 MG: 10 TABLET ORAL at 15:14

## 2018-08-23 RX ADMIN — ENOXAPARIN SODIUM 40 MG: 40 INJECTION SUBCUTANEOUS at 09:10

## 2018-08-23 RX ADMIN — IBUPROFEN 400 MG: 400 TABLET ORAL at 17:39

## 2018-08-23 RX ADMIN — LORAZEPAM 0.5 MG: 0.5 TABLET ORAL at 21:02

## 2018-08-23 RX ADMIN — DOCUSATE SODIUM 100 MG: 100 CAPSULE, LIQUID FILLED ORAL at 21:00

## 2018-08-23 RX ADMIN — IBUPROFEN 400 MG: 400 TABLET ORAL at 23:36

## 2018-08-23 RX ADMIN — IBUPROFEN 400 MG: 400 TABLET ORAL at 05:45

## 2018-08-23 RX ADMIN — ACETAMINOPHEN 650 MG: 325 TABLET ORAL at 15:15

## 2018-08-23 RX ADMIN — OXYCODONE HYDROCHLORIDE 5 MG: 5 TABLET ORAL at 23:36

## 2018-08-23 RX ADMIN — OXYCODONE HYDROCHLORIDE 5 MG: 5 TABLET ORAL at 16:09

## 2018-08-23 RX ADMIN — SERTRALINE HYDROCHLORIDE 100 MG: 100 TABLET ORAL at 09:11

## 2018-08-23 RX ADMIN — ACETAMINOPHEN 650 MG: 325 TABLET ORAL at 21:02

## 2018-08-23 RX ADMIN — CARVEDILOL 12.5 MG: 12.5 TABLET, FILM COATED ORAL at 09:10

## 2018-08-23 RX ADMIN — OXYCODONE HYDROCHLORIDE 5 MG: 5 TABLET ORAL at 09:10

## 2018-08-23 RX ADMIN — OXYCODONE HYDROCHLORIDE 5 MG: 5 TABLET ORAL at 19:47

## 2018-08-23 RX ADMIN — ACETAMINOPHEN 650 MG: 325 TABLET ORAL at 09:10

## 2018-08-23 RX ADMIN — AMLODIPINE BESYLATE 5 MG: 5 TABLET ORAL at 09:10

## 2018-08-23 RX ADMIN — CARVEDILOL 12.5 MG: 12.5 TABLET, FILM COATED ORAL at 21:02

## 2018-08-23 RX ADMIN — DOCUSATE SODIUM 100 MG: 100 CAPSULE, LIQUID FILLED ORAL at 09:11

## 2018-08-23 RX ADMIN — ACETAMINOPHEN 650 MG: 325 TABLET ORAL at 02:23

## 2018-08-23 RX ADMIN — Medication 100 MG: at 09:11

## 2018-08-23 RX ADMIN — BUSPIRONE HYDROCHLORIDE 5 MG: 10 TABLET ORAL at 09:11

## 2018-08-23 RX ADMIN — IBUPROFEN 400 MG: 400 TABLET ORAL at 11:54

## 2018-08-23 RX ADMIN — BUSPIRONE HYDROCHLORIDE 5 MG: 10 TABLET ORAL at 21:01

## 2018-08-23 RX ADMIN — LORAZEPAM 0.5 MG: 0.5 TABLET ORAL at 09:11

## 2018-08-23 RX ADMIN — OXYCODONE HYDROCHLORIDE 5 MG: 5 TABLET ORAL at 05:45

## 2018-08-24 VITALS
HEIGHT: 63 IN | WEIGHT: 150.12 LBS | OXYGEN SATURATION: 98 % | BODY MASS INDEX: 26.6 KG/M2 | DIASTOLIC BLOOD PRESSURE: 57 MMHG | HEART RATE: 65 BPM | SYSTOLIC BLOOD PRESSURE: 120 MMHG | TEMPERATURE: 99 F | RESPIRATION RATE: 18 BRPM

## 2018-08-24 PROCEDURE — 25010000002 ENOXAPARIN PER 10 MG: Performed by: SURGERY

## 2018-08-24 PROCEDURE — 97530 THERAPEUTIC ACTIVITIES: CPT | Performed by: OCCUPATIONAL THERAPIST

## 2018-08-24 PROCEDURE — 97535 SELF CARE MNGMENT TRAINING: CPT | Performed by: OCCUPATIONAL THERAPIST

## 2018-08-24 RX ADMIN — ENOXAPARIN SODIUM 40 MG: 40 INJECTION SUBCUTANEOUS at 08:15

## 2018-08-24 RX ADMIN — DOCUSATE SODIUM 100 MG: 100 CAPSULE, LIQUID FILLED ORAL at 08:15

## 2018-08-24 RX ADMIN — CARVEDILOL 12.5 MG: 12.5 TABLET, FILM COATED ORAL at 08:16

## 2018-08-24 RX ADMIN — AMLODIPINE BESYLATE 5 MG: 5 TABLET ORAL at 08:15

## 2018-08-24 RX ADMIN — BUSPIRONE HYDROCHLORIDE 5 MG: 10 TABLET ORAL at 08:16

## 2018-08-24 RX ADMIN — SERTRALINE HYDROCHLORIDE 100 MG: 100 TABLET ORAL at 08:15

## 2018-08-24 RX ADMIN — IBUPROFEN 400 MG: 400 TABLET ORAL at 11:47

## 2018-08-24 RX ADMIN — Medication 100 MG: at 08:16

## 2018-08-24 RX ADMIN — OXYCODONE HYDROCHLORIDE 5 MG: 5 TABLET ORAL at 11:47

## 2018-08-24 RX ADMIN — ACETAMINOPHEN 650 MG: 325 TABLET ORAL at 08:15

## 2018-08-24 RX ADMIN — OXYCODONE HYDROCHLORIDE 5 MG: 5 TABLET ORAL at 05:10

## 2018-08-24 RX ADMIN — LORAZEPAM 0.5 MG: 0.5 TABLET ORAL at 08:17

## 2018-08-24 RX ADMIN — ACETAMINOPHEN 650 MG: 325 TABLET ORAL at 03:42

## 2018-08-24 RX ADMIN — IBUPROFEN 400 MG: 400 TABLET ORAL at 05:10

## 2018-08-25 ENCOUNTER — READMISSION MANAGEMENT (OUTPATIENT)
Dept: CALL CENTER | Facility: HOSPITAL | Age: 63
End: 2018-08-25

## 2018-08-25 NOTE — OUTREACH NOTE
Prep Survey      Responses   Facility patient discharged from?  [place]   Is patient eligible?  Yes   Discharge diagnosis   severe abdominal pain,     Sigmoid volvulus,    ex lap now s/p Ami's   Does the patient have one of the following disease processes/diagnoses(primary or secondary)?  General Surgery   Does the patient have Home health ordered?  No   Is there a DME ordered?  No   General alerts for this patient  Patient admitted on 8/18 with sigmoid volvulus and underwent an emergent decompressive colonoscopy with success and discharged home 8/19.  No lifting, tugging, pulling > 5lb x 4 weeks.    Prep survey completed?  Yes          Missy Ybarra RN

## 2018-08-27 ENCOUNTER — TRANSITIONAL CARE MANAGEMENT TELEPHONE ENCOUNTER (OUTPATIENT)
Dept: INTERNAL MEDICINE | Facility: CLINIC | Age: 63
End: 2018-08-27

## 2018-08-27 NOTE — OUTREACH NOTE
The patient says she is doing ok today.  She denies any fever.  She admits to post-op pain well managed with prescription pain medication.  The pt says she has a call into the ostomy nurse regarding recommendations to further softener stool in addition to docusate sodium which she has been taking.  Encouraged her to follow up with ostomy nurse by phone today and/or place a call to her surgeon for additional recommendations. Pt verbalized understanding and agreed.  She declined earlier eval with PCP.  She denies any other questions, concerns, or needs at time of call.

## 2018-08-28 ENCOUNTER — READMISSION MANAGEMENT (OUTPATIENT)
Dept: CALL CENTER | Facility: HOSPITAL | Age: 63
End: 2018-08-28

## 2018-08-28 ENCOUNTER — HOSPITAL ENCOUNTER (OUTPATIENT)
Dept: WOUND CARE | Facility: HOSPITAL | Age: 63
Discharge: HOME | End: 2018-08-28
Admitting: SURGERY

## 2018-08-28 PROCEDURE — G0463 HOSPITAL OUTPT CLINIC VISIT: HCPCS

## 2018-08-28 NOTE — OUTREACH NOTE
General Surgery Week 1 Survey      Responses   Facility patient discharged from?  [place]   Does the patient have one of the following disease processes/diagnoses(primary or secondary)?  General Surgery   Is there a successful TCM telephone encounter documented?  Yes          Kimberlee Mckinley RN

## 2018-08-31 ENCOUNTER — TELEPHONE (OUTPATIENT)
Dept: WOUND CARE | Facility: HOSPITAL | Age: 63
End: 2018-08-31

## 2018-09-04 ENCOUNTER — TELEPHONE (OUTPATIENT)
Dept: WOUND CARE | Facility: HOSPITAL | Age: 63
End: 2018-09-04

## 2018-09-04 ENCOUNTER — HOSPITAL ENCOUNTER (OUTPATIENT)
Dept: WOUND CARE | Facility: HOSPITAL | Age: 63
Setting detail: RECURRING SERIES
Discharge: HOME | End: 2018-09-04

## 2018-09-04 PROCEDURE — G0463 HOSPITAL OUTPT CLINIC VISIT: HCPCS

## 2018-09-04 NOTE — NURSING NOTE
Patient seen today for follow-up for peristomal skin breakdown and appliance fitting. At this time peristomal skin is greatly improved and has slight denuded area at 4-7 o'clock. MC separation noted at the top and bottom of stoma. Stoma powder applied and crusted. Patient has bilateral abdominal creasing and needs shallow convexity. Patient was also experiencing pancaking. Instructed on proper fitting and placed Coloplast Cuauhtemoc Flex shallow convexity. Provided information on what products to order. Patient will follow-up with [facility] nurse if needed.

## 2018-09-05 ENCOUNTER — READMISSION MANAGEMENT (OUTPATIENT)
Dept: CALL CENTER | Facility: HOSPITAL | Age: 63
End: 2018-09-05

## 2018-09-05 NOTE — OUTREACH NOTE
General Surgery Week 2 Survey      Responses   Facility patient discharged from?  [place]   Does the patient have one of the following disease processes/diagnoses(primary or secondary)?  General Surgery   Week 2 attempt successful?  Yes   Call start time  1403   Call end time  1421   Discharge diagnosis   severe abdominal pain,     Sigmoid volvulus,    ex lap now s/p Ami's, s/p ostomy formation   Meds reviewed with patient/caregiver?  Yes   Is the patient having any side effects they believe may be caused by any medication additions or changes?  No   Does the patient have all medications related to this admission filled (includes all antibiotics, pain medications, etc.)  No   What is keeping the patient from filling the prescriptions?  Patient desires to consult PCP   Nursing Interventions  No intervention needed   Prescription comments  pt states had been advised not take Norvasc previously as well as having low BP's currently, so has not taken until discusses with Dr Aguirre on 9/17/18   Is the patient taking all medications as directed (includes completed medication regime)?  N/A   Medication comments  pt monitors BP several times/day , and plans to take data to appts to discuss meds   Does the patient have a follow up appointment scheduled with their surgeon?  Yes   Has the patient kept scheduled appointments due by today?  N/A   Home health comments  pt states HH not necessary, and instead has utilized ostomy specialists at ostomy clinic, has all supplies needed   Comments  pt states has adapted well to new ostomy with help of  and ostomy clinic, anticipates ostomy reversal in 3 months   Did the patient receive a copy of their discharge instructions?  Yes   Nursing interventions  Reviewed instructions with patient   What is the patient's perception of their health status since discharge?  Improving   Nursing interventions  Nurse provided patient education   Is the patient /caregiver able to teach  back basic post-op care?  Drive as instructed by MD in discharge instructions, Take showers only when approved by MD-sponge bathe until then, No tub bath, swimming, or hot tub until instructed by MD, Keep incision areas clean,dry and protected, Do not remove steri-strips, Lifting as instructed by MD in discharge instructions   Is the patient/caregiver able to teach back signs and symptoms of incisional infection?  Increased redness, swelling or pain at the incisonal site, Increased drainage or bleeding, Incisional warmth, Pus or odor from incision, Fever   Is the patient/caregiver able to teach back steps to recovery at home?  Set small, achievable goals for return to baseline health, Rest and rebuild strength, gradually increase activity, Make a list of questions for surgeon's appointment   Week 2 call completed?  Yes          Zulma Carrizales RN

## 2018-09-11 NOTE — PROGRESS NOTES
[facility] AT [address], [address]  [place], [place], [address] [phone]  [email or web address]           OUTPATIENT CLINIC FOLLOW UP NOTE    Patient Care Team:  Patient Care Team:  Michaela Connell MD as PCP - General  Michaela Connell MD as PCP - Family Medicine  Michaela Connell MD as PCP - Claims Attributed  Werner Hollis MD as Referring Physician (Neurosurgery)  Moo Hopkins MD as Consulting Physician (Ophthalmology)  Jamal Ramos MD as Consulting Physician (Nephrology)  Alli Aguirre MD as Consulting Physician (Cardiology)  Costa Raygoza MD as Consulting Physician (Radiation Oncology)  Andrea Bocanegra MD as Consulting Physician (General Surgery)    Subjective:      Chief Complaint   Patient presents with   • Hypertension       HPI:    Tereza Ackerman is a 63 y.o. female.  History of Present Illness     The patient presents today for follow-up for atrial fibrillation and hypertension.  She developed atrial fibrillation in the presence of a small bowel obstruction.      Since the patient was last seen in the clinic she reports being hospitalized twice.  Most recently she had a temporary colostomy placed with plans for reversal future.  She denies any recurrent palpitations.  She also denies any chest pain, shortness of air, lower extremity edema, lightheadedness, or syncope.     PFSH:  Patient Active Problem List   Diagnosis   • Impaired glucose tolerance   • Hypertension   • Parathyroid adenoma   • Reaction to chronic stress   • Multinodular goiter   • Vitamin D deficiency   • Meningioma, recurrent of brain (CMS/HCC)   • Arthritis   • Depression   • Small bowel obstruction   • Insomnia   • History of Nissen fundoplication   • Malignant neoplasm of left orbit (CMS/HCC)   • Prediabetes   • Hyperlipemia   • Hyperglycemia   • Atrial flutter with rapid ventricular response (CMS/HCC)   • Anemia   • Large bowel obstruction   •  Abdominal pain   • Essential hypertension   • History of creation of ostomy         Current Outpatient Prescriptions:   •  acetaminophen (TYLENOL) 325 MG tablet, Take 2 tablets by mouth Every 6 (Six) Hours., Disp: 120 tablet, Rfl: 0  •  amLODIPine (NORVASC) 5 MG tablet, Take 1 tablet by mouth Daily., Disp: 30 tablet, Rfl: 0  •  busPIRone (BUSPAR) 5 MG tablet, Take 1 tablet by mouth 3 (Three) Times a Day., Disp: 90 tablet, Rfl: 5  •  carvedilol (COREG) 12.5 MG tablet, Take 1 tablet by mouth Every 12 (Twelve) Hours. (Patient taking differently: Take 6.25 mg by mouth Every 12 (Twelve) Hours.), Disp: 60 tablet, Rfl: 3  •  cholecalciferol (VITAMIN D3) 1000 units tablet, Take 2,000 Units by mouth Every Other Day., Disp: , Rfl:   •  docusate sodium 100 MG capsule, Take 100 mg by mouth 2 (Two) Times a Day., Disp: 60 capsule, Rfl: 0  •  ibuprofen (ADVIL,MOTRIN) 600 MG tablet, Take 1 tablet by mouth Every 6 (Six) Hours., Disp: 60 tablet, Rfl: 0  •  ondansetron (ZOFRAN) 4 MG tablet, Take 1 tablet by mouth Every 8 (Eight) Hours As Needed for Nausea or Vomiting., Disp: 10 tablet, Rfl: 0  •  QUEtiapine (SEROquel) 50 MG tablet, Take 1 tablet by mouth Every Night., Disp: 30 tablet, Rfl: 5  •  sertraline (ZOLOFT) 100 MG tablet, Take 1 tablet by mouth Daily., Disp: 90 tablet, Rfl: 0  •  thiamine (VITAMIN B1) 100 MG tablet, Take 1 tablet by mouth Daily. (Patient taking differently: Take 100 mg by mouth Every Other Day.), Disp: 90 tablet, Rfl: 0    Allergies   Allergen Reactions   • Dilantin [Phenytoin Sodium Extended] Rash   • Phenytoin Rash     duplicate         reports that she has never smoked. She has never used smokeless tobacco.    Family History   Problem Relation Age of Onset   • Obesity Mother    • Heart attack Mother    • Migraines Father    • Stroke Father    • Diabetes Father    • Cancer Other    • Diabetes Other    • Breast cancer Maternal Aunt    • Breast cancer Paternal Aunt    • Ovarian cancer Neg Hx        Review of  "Systems:  Negative for chest pain, dyspnea with exertion, orthopnea, PND, lower extremity edema, palpitations, lightheadedness, syncope.      Objective:   Blood pressure 126/60, pulse 65, height 165.1 cm (65\"), weight 65.8 kg (145 lb), not currently breastfeeding.  CONSTITUTIONAL: No acute distress, normal affect  RESPIRATORY: Normal effort. Clear to auscultation bilaterally without wheezing or rales  CARDIOVASCULAR: Carotids with normal upstrokes without bruits.  Regular rate and rhythm with normal S1 and S2. Without murmur, gallop or rub.  PERIPHERAL VASCULAR: Normal radial pulses bilaterally. There is no lower extremity edema bilaterally.    Labs:  Lab Results   Component Value Date    ALT 14 08/19/2018    AST 23 08/19/2018     Lab Results   Component Value Date    CHOL 234 (H) 11/13/2017    TRIG 183 (H) 12/25/2017    HDL 62 (H) 11/13/2017    CREATININE 0.65 08/23/2018       Diagnostic Data:    Procedures    Event Monitor 2/2018  -Events were normal sinus rhythm  -No atrial flutter    TTE 12/2017  · LVEF difficult to evaluate with tachycardia- globally appears mildly depressed.  · Left ventricular wall thickness is consistent with concentric hypertrophy.  · Mild tricuspid valve regurgitation is present.  · Calculated right ventricular systolic pressure from tricuspid regurgitation is 32 mmHg.    Assessment and Plan:   Tereza was seen today for dizziness and hypertension.    Diagnoses and all orders for this visit:    Atrial flutter with rapid ventricular response  -Brief AFlutter in setting of SBO, less than 48 hours  -MCOT monitor revealed no recurrent atrial flutter.  -Underwent further GI operations without recurrent arrhythmia on carvedilol  -Continue carvedilol  -Resume Aspirin 325mg daily.  It was discontinued for unclear reasons.  Should she have recurrent atrial flutter, would switch aspirin to a novel anticoagulant    Essential hypertension  -Continue carvedilol. Increase to 25mg BID if BP averages " above 140/90 and heart rate above 60.  If heart rate 60 below, would initiate amlodipine 5 mg instead  -Is taking amlodipine rarely PRN      - Return in about 6 months (around 3/17/2019).    AYDEE Chavez obtained past medical, family history, social history, review of systems and functioned as a scribe for the remainder of the dictation for Dr. Aguirre.    I, Alli Aguirre MD, personally performed the services as scribed by the above named individual. I have made any necessary edits and it is both accurate and complete.     Alli Aguirre MD, MSc, [facility]  Interventional Cardiology  [facility] at [facility]

## 2018-09-12 ENCOUNTER — OFFICE VISIT (OUTPATIENT)
Dept: INTERNAL MEDICINE | Facility: CLINIC | Age: 63
End: 2018-09-12

## 2018-09-12 VITALS
HEIGHT: 63 IN | SYSTOLIC BLOOD PRESSURE: 128 MMHG | DIASTOLIC BLOOD PRESSURE: 60 MMHG | BODY MASS INDEX: 25.69 KG/M2 | WEIGHT: 145 LBS | HEART RATE: 71 BPM | OXYGEN SATURATION: 98 %

## 2018-09-12 DIAGNOSIS — Z23 IMMUNIZATION DUE: ICD-10-CM

## 2018-09-12 DIAGNOSIS — Z93.3 COLOSTOMY IN PLACE (HCC): ICD-10-CM

## 2018-09-12 DIAGNOSIS — D64.9 POSTOPERATIVE ANEMIA: Primary | ICD-10-CM

## 2018-09-12 DIAGNOSIS — F43.89 REACTION TO CHRONIC STRESS: ICD-10-CM

## 2018-09-12 DIAGNOSIS — Z20.5 EXPOSURE TO HEPATITIS A: ICD-10-CM

## 2018-09-12 LAB
DEPRECATED RDW RBC AUTO: 48.7 FL (ref 37–54)
ERYTHROCYTE [DISTWIDTH] IN BLOOD BY AUTOMATED COUNT: 13.6 % (ref 11.3–14.5)
HCT VFR BLD AUTO: 35.1 % (ref 34.5–44)
HGB BLD-MCNC: 10.9 G/DL (ref 11.5–15.5)
MCH RBC QN AUTO: 30.2 PG (ref 27–31)
MCHC RBC AUTO-ENTMCNC: 31.1 G/DL (ref 32–36)
MCV RBC AUTO: 97.2 FL (ref 80–99)
PLATELET # BLD AUTO: 369 10*3/MM3 (ref 150–450)
PMV BLD AUTO: 10.7 FL (ref 6–12)
RBC # BLD AUTO: 3.61 10*6/MM3 (ref 3.89–5.14)
WBC NRBC COR # BLD: 6.25 10*3/MM3 (ref 3.5–10.8)

## 2018-09-12 PROCEDURE — 90662 IIV NO PRSV INCREASED AG IM: CPT | Performed by: INTERNAL MEDICINE

## 2018-09-12 PROCEDURE — 90670 PCV13 VACCINE IM: CPT | Performed by: INTERNAL MEDICINE

## 2018-09-12 PROCEDURE — 85027 COMPLETE CBC AUTOMATED: CPT | Performed by: INTERNAL MEDICINE

## 2018-09-12 PROCEDURE — 99214 OFFICE O/P EST MOD 30 MIN: CPT | Performed by: INTERNAL MEDICINE

## 2018-09-12 PROCEDURE — G0008 ADMIN INFLUENZA VIRUS VAC: HCPCS | Performed by: INTERNAL MEDICINE

## 2018-09-12 PROCEDURE — G0009 ADMIN PNEUMOCOCCAL VACCINE: HCPCS | Performed by: INTERNAL MEDICINE

## 2018-09-12 PROCEDURE — 86708 HEPATITIS A ANTIBODY: CPT | Performed by: INTERNAL MEDICINE

## 2018-09-12 NOTE — PROGRESS NOTES
Hypertension and Depression (follow up)    Subjective   Tereza Ackerman is a 63 y.o. female is here today for follow-up.    History of Present Illness   Tereza is here for a hopital follow u  severe abdominal pain just after being discharged for same symptoms. Patient admitted on 8/18 with sigmoid volvulus and underwent an emergent decompressive colonoscopy with success and discharged home 8/19. She returned later that evening with severe abdominal pain and was taken to surgery by Dr. Bocanegra for emergent ex lap now s/p Ami's.     Patient has progressed well post operatively . Doing well with the colostomy. Plans for revision of her colostomy- in 3-6 weeks.    Notes anxiety is better, and is off the ativan, still taking the seroquel. Bp is okay, occasionally goes up to 190s, and takes coreg daily, but not her norvasc.  Left eye is better, concerned about her vision.      Current Outpatient Prescriptions:   •  acetaminophen (TYLENOL) 325 MG tablet, Take 2 tablets by mouth Every 6 (Six) Hours., Disp: 120 tablet, Rfl: 0  •  amLODIPine (NORVASC) 5 MG tablet, Take 1 tablet by mouth Daily., Disp: 30 tablet, Rfl: 0  •  busPIRone (BUSPAR) 5 MG tablet, Take 1 tablet by mouth 3 (Three) Times a Day., Disp: 90 tablet, Rfl: 5  •  carvedilol (COREG) 12.5 MG tablet, Take 1 tablet by mouth Every 12 (Twelve) Hours. (Patient taking differently: Take 6.25 mg by mouth Every 12 (Twelve) Hours.), Disp: 60 tablet, Rfl: 3  •  cholecalciferol (VITAMIN D3) 1000 units tablet, Take 2,000 Units by mouth Every Other Day., Disp: , Rfl:   •  docusate sodium 100 MG capsule, Take 100 mg by mouth 2 (Two) Times a Day., Disp: 60 capsule, Rfl: 0  •  ibuprofen (ADVIL,MOTRIN) 600 MG tablet, Take 1 tablet by mouth Every 6 (Six) Hours., Disp: 60 tablet, Rfl: 0  •  ondansetron (ZOFRAN) 4 MG tablet, Take 1 tablet by mouth Every 8 (Eight) Hours As Needed for Nausea or Vomiting., Disp: 10 tablet, Rfl: 0  •  QUEtiapine (SEROquel) 50 MG tablet, Take 1  "tablet by mouth Every Night., Disp: 30 tablet, Rfl: 5  •  sertraline (ZOLOFT) 100 MG tablet, Take 1 tablet by mouth Daily., Disp: 90 tablet, Rfl: 0  •  thiamine (VITAMIN B1) 100 MG tablet, Take 1 tablet by mouth Daily. (Patient taking differently: Take 100 mg by mouth Every Other Day.), Disp: 90 tablet, Rfl: 0      The following portions of the patient's history were reviewed and updated as appropriate: allergies, current medications, past family history, past medical history, past social history, past surgical history and problem list.    Review of Systems   Constitutional: Positive for fatigue. Negative for chills and fever.   HENT: Negative for ear discharge, ear pain, sinus pressure and sore throat.    Eyes: Positive for visual disturbance.   Respiratory: Negative for cough, chest tightness and shortness of breath.    Cardiovascular: Negative for chest pain, palpitations and leg swelling.   Gastrointestinal: Negative for abdominal distention, abdominal pain, diarrhea, nausea and vomiting.   Musculoskeletal: Negative for arthralgias, back pain and myalgias.   Neurological: Positive for weakness. Negative for dizziness, syncope and headaches.   Psychiatric/Behavioral: Positive for sleep disturbance. Negative for confusion. The patient is nervous/anxious (better).        Objective   /60   Pulse 71   Ht 160 cm (63\")   Wt 65.8 kg (145 lb)   SpO2 98%   BMI 25.69 kg/m²   Physical Exam   Constitutional: She is oriented to person, place, and time. She appears well-developed and well-nourished.   HENT:   Head: Normocephalic and atraumatic.   Mouth/Throat: No oropharyngeal exudate.   Eyes: Pupils are equal, round, and reactive to light. Conjunctivae are normal.   Neck: Neck supple. No thyromegaly present.   Cardiovascular: Normal rate and regular rhythm.    Pulmonary/Chest: Effort normal and breath sounds normal.   Abdominal: Soft. Bowel sounds are normal. There is tenderness.   Surgical site healing well, " colostomy site wo irritation. intact   Musculoskeletal: She exhibits no edema.   Neurological: She is alert and oriented to person, place, and time. No cranial nerve deficit.   Skin: Skin is warm and dry.   Psychiatric: She has a normal mood and affect. Judgment normal.   Nursing note and vitals reviewed.        Results for orders placed or performed in visit on 09/12/18   CBC (No Diff)   Result Value Ref Range    WBC 6.25 3.50 - 10.80 10*3/mm3    RBC 3.61 (L) 3.89 - 5.14 10*6/mm3    Hemoglobin 10.9 (L) 11.5 - 15.5 g/dL    Hematocrit 35.1 34.5 - 44.0 %    MCV 97.2 80.0 - 99.0 fL    MCH 30.2 27.0 - 31.0 pg    MCHC 31.1 (L) 32.0 - 36.0 g/dL    RDW 13.6 11.3 - 14.5 %    RDW-SD 48.7 37.0 - 54.0 fl    MPV 10.7 6.0 - 12.0 fL    Platelets 369 150 - 450 10*3/mm3   Hepatitis A Antibody, Total   Result Value Ref Range    Hep A Total Ab Positive (A) Negative             Assessment/Plan   Diagnoses and all orders for this visit:    Postoperative anemia  -     CBC (No Diff)    Colostomy in place (CMS/[facility])  Comments:  doing well, colostomy site okay.    Reaction to chronic stress  Comments:  better, continue seroquel for insomnia as well.    Immunization due  -     Pneumococcal Conjugate Vaccine 13-Valent All (PCV13)    Exposure to hepatitis A  -     Hepatitis A Antibody, Total    Other orders  -     Fluzone High Dose =>65Years      Encouraged to continue to get more physically active, but hold off PT as abdominal strain may be contributing to her volvulus.       Reviewed hospital records and notes.      Return in about 3 months (around 12/12/2018) for Medicare Wellness- plese use 2 x 15 min.

## 2018-09-14 ENCOUNTER — TRANSCRIBE ORDERS (OUTPATIENT)
Dept: ADMINISTRATIVE | Facility: HOSPITAL | Age: 63
End: 2018-09-14

## 2018-09-14 DIAGNOSIS — Z12.31 VISIT FOR SCREENING MAMMOGRAM: Primary | ICD-10-CM

## 2018-09-14 LAB — HAV AB SER QL IA: POSITIVE

## 2018-09-17 ENCOUNTER — OFFICE VISIT (OUTPATIENT)
Dept: CARDIOLOGY | Facility: CLINIC | Age: 63
End: 2018-09-17

## 2018-09-17 ENCOUNTER — READMISSION MANAGEMENT (OUTPATIENT)
Dept: CALL CENTER | Facility: HOSPITAL | Age: 63
End: 2018-09-17

## 2018-09-17 VITALS
HEIGHT: 65 IN | WEIGHT: 145 LBS | HEART RATE: 65 BPM | SYSTOLIC BLOOD PRESSURE: 126 MMHG | BODY MASS INDEX: 24.16 KG/M2 | DIASTOLIC BLOOD PRESSURE: 60 MMHG

## 2018-09-17 DIAGNOSIS — I10 ESSENTIAL HYPERTENSION: ICD-10-CM

## 2018-09-17 DIAGNOSIS — I48.92 ATRIAL FLUTTER WITH RAPID VENTRICULAR RESPONSE (HCC): Primary | ICD-10-CM

## 2018-09-17 PROCEDURE — 99214 OFFICE O/P EST MOD 30 MIN: CPT | Performed by: INTERNAL MEDICINE

## 2018-09-17 NOTE — OUTREACH NOTE
General Surgery Week 3 Survey      Responses   Facility patient discharged from?  [place]   Does the patient have one of the following disease processes/diagnoses(primary or secondary)?  General Surgery   Week 3 attempt successful?  No   Unsuccessful attempts  Attempt 1          Candice Vazquez RN

## 2018-09-21 ENCOUNTER — READMISSION MANAGEMENT (OUTPATIENT)
Dept: CALL CENTER | Facility: HOSPITAL | Age: 63
End: 2018-09-21

## 2018-09-21 NOTE — OUTREACH NOTE
General Surgery Week 3 Survey      Responses   Facility patient discharged from?  [place]   Does the patient have one of the following disease processes/diagnoses(primary or secondary)?  General Surgery   Week 3 attempt successful?  No   Unsuccessful attempts  Attempt 2          Rima Fletcher RN

## 2018-09-27 RX ORDER — PSEUDOEPHEDRINE HCL 30 MG
100 TABLET ORAL 2 TIMES DAILY
Qty: 30 EACH | Refills: 5 | Status: ON HOLD | OUTPATIENT
Start: 2018-09-27 | End: 2018-11-28

## 2018-10-15 ENCOUNTER — PREP FOR SURGERY (OUTPATIENT)
Dept: OTHER | Facility: HOSPITAL | Age: 63
End: 2018-10-15

## 2018-10-15 DIAGNOSIS — Z12.11 SCREEN FOR COLON CANCER: Primary | ICD-10-CM

## 2018-10-16 ENCOUNTER — OFFICE VISIT (OUTPATIENT)
Dept: NEUROSURGERY | Facility: CLINIC | Age: 63
End: 2018-10-16

## 2018-10-16 ENCOUNTER — HOSPITAL ENCOUNTER (OUTPATIENT)
Dept: MRI IMAGING | Facility: HOSPITAL | Age: 63
Discharge: HOME | End: 2018-10-16
Attending: NEUROLOGICAL SURGERY | Admitting: NEUROLOGICAL SURGERY

## 2018-10-16 VITALS
DIASTOLIC BLOOD PRESSURE: 70 MMHG | TEMPERATURE: 97.6 F | WEIGHT: 152.2 LBS | HEIGHT: 65 IN | SYSTOLIC BLOOD PRESSURE: 120 MMHG | BODY MASS INDEX: 25.36 KG/M2

## 2018-10-16 DIAGNOSIS — D32.0 MENINGIOMA, RECURRENT OF BRAIN (HCC): Primary | ICD-10-CM

## 2018-10-16 PROCEDURE — 0 GADOBENATE DIMEGLUMINE 529 MG/ML SOLUTION: Performed by: NEUROLOGICAL SURGERY

## 2018-10-16 PROCEDURE — 70553 MRI BRAIN STEM W/O & W/DYE: CPT

## 2018-10-16 PROCEDURE — A9577 INJ MULTIHANCE: HCPCS | Performed by: NEUROLOGICAL SURGERY

## 2018-10-16 PROCEDURE — 82565 ASSAY OF CREATININE: CPT

## 2018-10-16 PROCEDURE — 99213 OFFICE O/P EST LOW 20 MIN: CPT | Performed by: NEUROLOGICAL SURGERY

## 2018-10-16 RX ADMIN — GADOBENATE DIMEGLUMINE 13 ML: 529 INJECTION, SOLUTION INTRAVENOUS at 10:55

## 2018-10-16 NOTE — PROGRESS NOTES
Tereza Ackerman  1955  [id number]                        CHIEF COMPLAINT:  [ Left orbital meningioma]         MEDICAL HISTORY SINCE LAST ENCOUNTER:  [She reports for follow-up having had SRS for a persistent and recurrent meningioma which extended from the temporal fossa into the lateral orbit.  And has been stable and has been followed by ophthalmology.  She reports today for an MRI and follow-up.  Her neurological history remains unchanged. ]           Past Medical History:   Diagnosis Date   • Acid reflux    • Arthritis    • Atrial flutter with rapid ventricular response (CMS/HCC) 12/21/2017   • Back pain    • Brain tumor (CMS/HCC)    • Depression    • History of blood transfusion    • History of Nissen fundoplication 7/1/2017   • Hyperlipemia    • Hypertension    • Memory loss or impairment    • Migraine    • Parathyroid adenoma     Incidental on thyroid US.   • PONV (postoperative nausea and vomiting)     nausea - preprocedural meds help    • Prediabetes     Last Impression: 06 Feb 2015  Reviewed labs. Excellent control.  Maren Connellast Impression: 06 Feb 2015  Reviewed labs. Excellent control.  Michaela Connell   • Thyroid nodule      Last Impression: 13 Jun 2015  r/o thyroid cancer, will proceed with US.  Michaela Connell (Internal Medicine)    • UTI (urinary tract infection)    • Vision changes     blockages left eye    • Wears glasses     readers              Past Surgical History:   Procedure Laterality Date   • BRAIN SURGERY  2003 & 2014    Dr. Werner Hollis   • CARPAL TUNNEL RELEASE  2002    right    • COLONOSCOPY     • COLONOSCOPY N/A 8/18/2018    Procedure: COLONOSCOPY;  Surgeon: Inderjit Campos MD;  Location: [facility] ENDOSCOPY;  Service: Gastroenterology   • ENDOSCOPY     • EXPLORATORY LAPAROTOMY N/A 12/5/2017    Procedure: EXPLORATORY LAPAROTOMY, SMALL BOWEL RESECTION;  Surgeon: Ирина Cobian MD;  Location: [facility] OR;  Service:    • EXPLORATORY LAPAROTOMY N/A 12/22/2017     Procedure: LAPAROTOMY EXPLORATORY FOR SMALL BOWEL OBSTRUCTION;  Surgeon: Ирина Cobian MD;  Location:  [facility] OR;  Service:    • EXPLORATORY LAPAROTOMY N/A 7/23/2018    Procedure: EXPLORATORY LAPAROTOMY, APPENDECTOMY, CECOPEXY, INCISIONAL HERNIA REPAIR, LYSIS OF ADHESIONS;  Surgeon: Andrea Bocanegra MD;  Location:  [facility] OR;  Service: General   • EXPLORATORY LAPAROTOMY N/A 8/19/2018    Procedure: LAPAROTOMY EXPLORATORY, SIGMOID COLECTOMY, CREATION OF OSTOMY;  Surgeon: Andrea Bocanegra MD;  Location:  [facility] OR;  Service: General   • HYSTERECTOMY      partial - both ovaries still present pt believes    • INSERTION HEMODIALYSIS CATHETER N/A 12/7/2017    Procedure: HEMODIALYSIS CATHETER INSERTION;  Surgeon: Chance Valenzuela MD;  Location:  [facility] OR;  Service:    • ORBITOTOMY Left 11/3/2017    Procedure:  LEFT LATERAL ORBITOTOMY WITH DEBULKING OF TUMOR ;  Surgeon: Moo Hopkins MD;  Location:  [facility] OR;  Service:    • OTHER SURGICAL HISTORY      craniotomy tumor removal-complete   • OTHER SURGICAL HISTORY      esophagogastric fundoplasty nissen fundoplication   • TUBAL ABDOMINAL LIGATION                Family History   Problem Relation Age of Onset   • Obesity Mother    • Heart attack Mother    • Migraines Father    • Stroke Father    • Diabetes Father    • Cancer Other    • Diabetes Other    • Breast cancer Maternal Aunt    • Breast cancer Paternal Aunt    • Ovarian cancer Neg Hx               Social History     Social History   • Marital status:      Spouse name: N/A   • Number of children: N/A   • Years of education: N/A     Occupational History   • Not on file.     Social History Main Topics   • Smoking status: Never Smoker   • Smokeless tobacco: Never Used   • Alcohol use No   • Drug use: No   • Sexual activity: Defer     Other Topics Concern   • Not on file     Social History Narrative   • No narrative on file              Allergies   Allergen Reactions   • Dilantin [Phenytoin Sodium  Extended] Rash   • Phenytoin Rash     duplicate               Current Outpatient Prescriptions:   •  acetaminophen (TYLENOL) 325 MG tablet, Take 2 tablets by mouth Every 6 (Six) Hours., Disp: 120 tablet, Rfl: 0  •  amLODIPine (NORVASC) 5 MG tablet, Take 1 tablet by mouth Daily., Disp: 30 tablet, Rfl: 0  •  busPIRone (BUSPAR) 5 MG tablet, Take 1 tablet by mouth 3 (Three) Times a Day., Disp: 90 tablet, Rfl: 5  •  carvedilol (COREG) 12.5 MG tablet, Take 1 tablet by mouth Every 12 (Twelve) Hours. (Patient taking differently: Take 6.25 mg by mouth Every 12 (Twelve) Hours.), Disp: 60 tablet, Rfl: 3  •  cholecalciferol (VITAMIN D3) 1000 units tablet, Take 2,000 Units by mouth Every Other Day., Disp: , Rfl:   •  docusate sodium 100 MG capsule, Take 100 mg by mouth 2 (Two) Times a Day., Disp: 30 each, Rfl: 5  •  ibuprofen (ADVIL,MOTRIN) 600 MG tablet, Take 1 tablet by mouth Every 6 (Six) Hours., Disp: 60 tablet, Rfl: 0  •  ondansetron (ZOFRAN) 4 MG tablet, Take 1 tablet by mouth Every 8 (Eight) Hours As Needed for Nausea or Vomiting., Disp: 10 tablet, Rfl: 0  •  QUEtiapine (SEROquel) 50 MG tablet, Take 1 tablet by mouth Every Night., Disp: 30 tablet, Rfl: 5  •  sertraline (ZOLOFT) 100 MG tablet, Take 1 tablet by mouth Daily., Disp: 90 tablet, Rfl: 0  •  thiamine (VITAMIN B1) 100 MG tablet, Take 1 tablet by mouth Daily. (Patient taking differently: Take 100 mg by mouth Every Other Day.), Disp: 90 tablet, Rfl: 0  No current facility-administered medications for this visit.          Review of Systems   Constitutional: Positive for activity change and fatigue. Negative for appetite change, chills, diaphoresis, fever and unexpected weight change.   HENT: Negative for congestion, dental problem, drooling, ear discharge, ear pain, facial swelling, hearing loss, mouth sores, nosebleeds, postnasal drip, rhinorrhea, sinus pressure, sneezing, sore throat, tinnitus, trouble swallowing and voice change.    Eyes: Positive for visual  "disturbance. Negative for photophobia, pain, discharge, redness and itching.   Respiratory: Negative for apnea, cough, choking, chest tightness, shortness of breath, wheezing and stridor.    Cardiovascular: Negative for chest pain, palpitations and leg swelling.   Gastrointestinal: Negative for abdominal distention, abdominal pain, anal bleeding, blood in stool, constipation, diarrhea, nausea, rectal pain and vomiting.   Endocrine: Negative for cold intolerance, heat intolerance, polydipsia, polyphagia and polyuria.   Genitourinary: Negative for decreased urine volume, difficulty urinating, dysuria, enuresis, flank pain, frequency, genital sores, hematuria and urgency.   Musculoskeletal: Negative for arthralgias, back pain, gait problem, joint swelling, myalgias, neck pain and neck stiffness.   Skin: Negative for color change, pallor, rash and wound.   Allergic/Immunologic: Negative for environmental allergies, food allergies and immunocompromised state.   Neurological: Negative for dizziness, tremors, seizures, syncope, facial asymmetry, speech difficulty, weakness, light-headedness, numbness and headaches.   Hematological: Negative for adenopathy. Does not bruise/bleed easily.   Psychiatric/Behavioral: Positive for decreased concentration. Negative for agitation, behavioral problems, confusion, dysphoric mood, hallucinations, self-injury, sleep disturbance and suicidal ideas. The patient is nervous/anxious. The patient is not hyperactive.                Vitals:    10/16/18 1238   BP: 120/70   BP Location: Left arm   Patient Position: Sitting   Cuff Size: Adult   Temp: 97.6 °F (36.4 °C)   TempSrc: Temporal Artery    Weight: 69 kg (152 lb 3.2 oz)   Height: 165.1 cm (65\")               EXAMINATION: Unchanged without progression            MEDICAL DECISION MAKING: MRI shows slight decreased size of the meningioma.           ASSESSMENT/DISPOSITION: She has shown improvement with SRS and therefore we will continue to " monitor both from an ophthalmologic perspective as well as MRI.  See her in 4 months.              I APPRECIATE THE OPPORTUNITY OF THIS REFERRAL. PLEASE CALL IF ANY       QUESTIONS [phone]    Scribed for Werner Hollis MD by Joy Corral CMA. 10/16/2018  1:05 PM     I have read and concur with the information provided by the scribe.  Werner Hollis MD

## 2018-10-18 LAB — CREAT BLDA-MCNC: 0.9 MG/DL (ref 0.6–1.3)

## 2018-10-25 PROBLEM — Z12.11 SCREEN FOR COLON CANCER: Status: ACTIVE | Noted: 2018-10-25

## 2018-10-26 ENCOUNTER — HOSPITAL ENCOUNTER (OUTPATIENT)
Dept: MAMMOGRAPHY | Facility: HOSPITAL | Age: 63
Discharge: HOME | End: 2018-10-26
Attending: INTERNAL MEDICINE | Admitting: INTERNAL MEDICINE

## 2018-10-26 DIAGNOSIS — Z12.31 VISIT FOR SCREENING MAMMOGRAM: ICD-10-CM

## 2018-10-26 PROCEDURE — 77067 SCR MAMMO BI INCL CAD: CPT

## 2018-10-26 PROCEDURE — 77063 BREAST TOMOSYNTHESIS BI: CPT | Performed by: RADIOLOGY

## 2018-10-26 PROCEDURE — 77063 BREAST TOMOSYNTHESIS BI: CPT

## 2018-10-26 PROCEDURE — 77067 SCR MAMMO BI INCL CAD: CPT | Performed by: RADIOLOGY

## 2018-11-05 ENCOUNTER — TELEPHONE (OUTPATIENT)
Dept: GASTROENTEROLOGY | Facility: CLINIC | Age: 63
End: 2018-11-05

## 2018-11-05 RX ORDER — LORAZEPAM 0.5 MG/1
TABLET ORAL
Qty: 30 TABLET | Refills: 0 | Status: SHIPPED | OUTPATIENT
Start: 2018-11-05 | End: 2019-02-15

## 2018-11-05 NOTE — TELEPHONE ENCOUNTER
Pt called to check on the refill and she said she would like to have a refill because the medication is as needed.    Pt said she has felt like she has needed it the past few weeks. Sometimes she only takes a half of a pill a day, sometimes she does the full dose and other days, she doesn't take it.      Can a refill be called in to [facility] in [place]?  If not, can the patient be contacted at [phone]?

## 2018-11-05 NOTE — TELEPHONE ENCOUNTER
She had told kenneth lewis visit that she was no longer taking this med. Please check if the request came from her or an automatic refill request from pharm?    thanks

## 2018-11-07 ENCOUNTER — TELEPHONE (OUTPATIENT)
Dept: GASTROENTEROLOGY | Facility: CLINIC | Age: 63
End: 2018-11-07

## 2018-11-07 NOTE — TELEPHONE ENCOUNTER
Returned patient's phone call regarding upcoming colonoscopy.   She is scheduled for November 13,2018 with Dr. Campos

## 2018-11-12 RX ORDER — PEG-3350, SODIUM SULFATE, SODIUM CHLORIDE, POTASSIUM CHLORIDE, SODIUM ASCORBATE AND ASCORBIC ACID 7.5-2.691G
KIT ORAL
Qty: 1 EACH | Refills: 0 | Status: SHIPPED | OUTPATIENT
Start: 2018-11-12 | End: 2018-11-26 | Stop reason: SDUPTHER

## 2018-11-18 DIAGNOSIS — F43.89 ADJUSTMENT REACTION TO CHRONIC STRESS: ICD-10-CM

## 2018-11-19 DIAGNOSIS — F43.89 ADJUSTMENT REACTION TO CHRONIC STRESS: ICD-10-CM

## 2018-11-19 RX ORDER — LANOLIN ALCOHOL/MO/W.PET/CERES
100 CREAM (GRAM) TOPICAL EVERY OTHER DAY
Qty: 45 TABLET | Refills: 1 | Status: SHIPPED | OUTPATIENT
Start: 2018-11-19 | End: 2020-11-04 | Stop reason: SDUPTHER

## 2018-11-19 RX ORDER — SERTRALINE HYDROCHLORIDE 100 MG/1
TABLET, FILM COATED ORAL
Qty: 90 TABLET | Refills: 0 | Status: SHIPPED | OUTPATIENT
Start: 2018-11-19 | End: 2018-11-19 | Stop reason: SDUPTHER

## 2018-11-19 RX ORDER — SERTRALINE HYDROCHLORIDE 100 MG/1
100 TABLET, FILM COATED ORAL DAILY
Qty: 90 TABLET | Refills: 1 | Status: SHIPPED | OUTPATIENT
Start: 2018-11-19 | End: 2019-03-14 | Stop reason: SDUPTHER

## 2018-11-19 NOTE — TELEPHONE ENCOUNTER
PT WOULD LIKE A REFILL ON HER ZOLOFT 100MG, 90 DAY SUPPLY. SHE WOULD LIKE IT CALLED IN TO [facility] IN [place]. IF ANY QUESTIONS PLEASE CALL PT [phone]

## 2018-11-26 DIAGNOSIS — Z12.11 SCREENING FOR COLON CANCER: Primary | ICD-10-CM

## 2018-11-26 RX ORDER — PEG-3350, SODIUM SULFATE, SODIUM CHLORIDE, POTASSIUM CHLORIDE, SODIUM ASCORBATE AND ASCORBIC ACID 7.5-2.691G
KIT ORAL
Qty: 1 EACH | Refills: 0 | Status: SHIPPED | OUTPATIENT
Start: 2018-11-26 | End: 2018-11-28 | Stop reason: HOSPADM

## 2018-11-28 ENCOUNTER — HOSPITAL ENCOUNTER (OUTPATIENT)
Facility: HOSPITAL | Age: 63
Setting detail: HOSPITAL OUTPATIENT SURGERY
Discharge: HOME | End: 2018-11-28
Attending: INTERNAL MEDICINE | Admitting: INTERNAL MEDICINE

## 2018-11-28 ENCOUNTER — ANESTHESIA (OUTPATIENT)
Dept: GASTROENTEROLOGY | Facility: HOSPITAL | Age: 63
End: 2018-11-28

## 2018-11-28 ENCOUNTER — ANESTHESIA EVENT (OUTPATIENT)
Dept: GASTROENTEROLOGY | Facility: HOSPITAL | Age: 63
End: 2018-11-28

## 2018-11-28 VITALS
HEART RATE: 52 BPM | OXYGEN SATURATION: 99 % | WEIGHT: 153 LBS | TEMPERATURE: 97.7 F | HEIGHT: 63 IN | RESPIRATION RATE: 18 BRPM | DIASTOLIC BLOOD PRESSURE: 75 MMHG | BODY MASS INDEX: 27.11 KG/M2 | SYSTOLIC BLOOD PRESSURE: 144 MMHG

## 2018-11-28 LAB — POTASSIUM BLDA-SCNC: 4.57 MMOL/L (ref 3.5–5.3)

## 2018-11-28 PROCEDURE — S0260 H&P FOR SURGERY: HCPCS | Performed by: INTERNAL MEDICINE

## 2018-11-28 PROCEDURE — 84132 ASSAY OF SERUM POTASSIUM: CPT | Performed by: ANESTHESIOLOGY

## 2018-11-28 PROCEDURE — 25010000002 PROPOFOL 1000 MG/ML EMULSION: Performed by: NURSE ANESTHETIST, CERTIFIED REGISTERED

## 2018-11-28 RX ORDER — LIDOCAINE HYDROCHLORIDE 10 MG/ML
INJECTION, SOLUTION EPIDURAL; INFILTRATION; INTRACAUDAL; PERINEURAL AS NEEDED
Status: DISCONTINUED | OUTPATIENT
Start: 2018-11-28 | End: 2018-11-28 | Stop reason: SURG

## 2018-11-28 RX ORDER — SODIUM CHLORIDE, SODIUM LACTATE, POTASSIUM CHLORIDE, CALCIUM CHLORIDE 600; 310; 30; 20 MG/100ML; MG/100ML; MG/100ML; MG/100ML
9 INJECTION, SOLUTION INTRAVENOUS CONTINUOUS
Status: DISCONTINUED | OUTPATIENT
Start: 2018-11-28 | End: 2018-11-28 | Stop reason: HOSPADM

## 2018-11-28 RX ORDER — FAMOTIDINE 20 MG/1
20 TABLET, FILM COATED ORAL ONCE
Status: DISCONTINUED | OUTPATIENT
Start: 2018-11-28 | End: 2018-11-28 | Stop reason: HOSPADM

## 2018-11-28 RX ORDER — ONDANSETRON 2 MG/ML
4 INJECTION INTRAMUSCULAR; INTRAVENOUS ONCE AS NEEDED
Status: DISCONTINUED | OUTPATIENT
Start: 2018-11-28 | End: 2018-11-28 | Stop reason: HOSPADM

## 2018-11-28 RX ORDER — SODIUM CHLORIDE 0.9 % (FLUSH) 0.9 %
3-10 SYRINGE (ML) INJECTION AS NEEDED
Status: DISCONTINUED | OUTPATIENT
Start: 2018-11-28 | End: 2018-11-28 | Stop reason: HOSPADM

## 2018-11-28 RX ORDER — FAMOTIDINE 10 MG/ML
20 INJECTION, SOLUTION INTRAVENOUS ONCE
Status: COMPLETED | OUTPATIENT
Start: 2018-11-28 | End: 2018-11-28

## 2018-11-28 RX ORDER — LIDOCAINE HYDROCHLORIDE 10 MG/ML
0.5 INJECTION, SOLUTION EPIDURAL; INFILTRATION; INTRACAUDAL; PERINEURAL ONCE AS NEEDED
Status: DISCONTINUED | OUTPATIENT
Start: 2018-11-28 | End: 2018-11-28 | Stop reason: HOSPADM

## 2018-11-28 RX ORDER — SODIUM CHLORIDE 0.9 % (FLUSH) 0.9 %
3 SYRINGE (ML) INJECTION EVERY 12 HOURS SCHEDULED
Status: DISCONTINUED | OUTPATIENT
Start: 2018-11-28 | End: 2018-11-28 | Stop reason: HOSPADM

## 2018-11-28 RX ADMIN — SODIUM CHLORIDE, POTASSIUM CHLORIDE, SODIUM LACTATE AND CALCIUM CHLORIDE 9 ML/HR: 600; 310; 30; 20 INJECTION, SOLUTION INTRAVENOUS at 07:23

## 2018-11-28 RX ADMIN — PROPOFOL 100 MCG/KG/MIN: 10 INJECTION, EMULSION INTRAVENOUS at 07:59

## 2018-11-28 RX ADMIN — FAMOTIDINE 20 MG: 10 INJECTION INTRAVENOUS at 07:28

## 2018-11-28 RX ADMIN — LIDOCAINE HYDROCHLORIDE 50 MG: 10 INJECTION, SOLUTION EPIDURAL; INFILTRATION; INTRACAUDAL; PERINEURAL at 07:57

## 2018-11-28 NOTE — H&P
[facility]    Referring Provider: Inderjit Campos MD    Primary Care Provider: Michaela Connell MD    Reason for Consultation: screening colon    Chief complaint none    History of present illness:  Tereza Ackerman is a 63 y.o. female who  Presents for screening colonoscopy. Had surgery in August with temporary colostomy for volvulus.   Has done well since.  Has A Fib but not on blood thinners  Take  QOD    Allergies:  Dilantin [phenytoin sodium extended] and Phenytoin    Scheduled Meds:    famotidine 20 mg Oral Once   sodium chloride 3 mL Intravenous Q12H        Infusions:    lactated ringers 9 mL/hr Last Rate: 9 mL/hr (11/28/18 0723)       PRN Meds:  lidocaine PF 1%  •  sodium chloride    Home Meds:  Medications Prior to Admission   Medication Sig Dispense Refill Last Dose   • acetaminophen (TYLENOL) 325 MG tablet Take 2 tablets by mouth Every 6 (Six) Hours. 120 tablet 0 Past Month at Unknown time   • busPIRone (BUSPAR) 5 MG tablet Take 1 tablet by mouth 3 (Three) Times a Day. 90 tablet 5 11/28/2018 at am   • carvedilol (COREG) 12.5 MG tablet Take 1 tablet by mouth Every 12 (Twelve) Hours. (Patient taking differently: Take 6.25 mg by mouth Every 12 (Twelve) Hours.) 60 tablet 3 11/28/2018 at am   • cholecalciferol (VITAMIN D3) 1000 units tablet Take 2,000 Units by mouth Every Other Day.   Past Week at Unknown time   • LORazepam (ATIVAN) 0.5 MG tablet TAKE ONE-HALF TABLET BY MOUTH TWO TIMES A DAY AS NEEDED FOR ANXIETY 30 tablet 0 11/27/2018 at pm   • PEG-KCl-NaCl-NaSulf-Na Asc-C (MOVIPREP) 100 g reconstituted solution powder Dispense 1 kit. Follow instructions mailed to your home. If you didn't receive  Instructions; call [phone]. 1 each 0 11/28/2018 at Unknown time   • QUEtiapine (SEROquel) 50 MG tablet Take 1 tablet by mouth Every Night. 30 tablet 5 11/27/2018 at pm   • sertraline (ZOLOFT) 100 MG tablet Take 1 tablet by mouth Daily. 90 tablet 1 11/27/2018 at am   • thiamine (VITAMIN  B1) 100 MG tablet Take 1 tablet by mouth Every Other Day. 45 tablet 1 Past Week at Unknown time   • amLODIPine (NORVASC) 5 MG tablet Take 1 tablet by mouth Daily. 30 tablet 0 Patient Taking Differently   • ibuprofen (ADVIL,MOTRIN) 600 MG tablet Take 1 tablet by mouth Every 6 (Six) Hours. 60 tablet 0 Unknown at Unknown time   • ondansetron (ZOFRAN) 4 MG tablet Take 1 tablet by mouth Every 8 (Eight) Hours As Needed for Nausea or Vomiting. 10 tablet 0 Unknown       ROS: Review of Systems   Respiratory: Negative for shortness of breath.    Cardiovascular: Negative for chest pain.       PAST MED HX: Pt  has a past medical history of Acid reflux, Arthritis, Atrial flutter with rapid ventricular response (CMS/HCC) (12/21/2017), Back pain, Brain tumor (CMS/HCC), Depression, History of blood transfusion, History of Nissen fundoplication (7/1/2017), Hyperlipemia, Hypertension, Memory loss or impairment, Migraine, Parathyroid adenoma, PONV (postoperative nausea and vomiting), Prediabetes, Thyroid nodule, UTI (urinary tract infection), Vision changes, and Wears glasses.  PAST SURG HX: Pt  has a past surgical history that includes Other surgical history; Other surgical history; Tubal ligation; Carpal tunnel release (2002); Brain surgery (2003 & 2014); Hysterectomy; Colonoscopy; Esophagogastroduodenoscopy; LAPAROTOMY EXPLORATORY, SIGMOID COLECTOMY, CREATION OF OSTOMY (N/A, 8/19/2018); COLONOSCOPY (N/A, 8/18/2018); EXPLORATORY LAPAROTOMY, APPENDECTOMY, CECOPEXY, INCISIONAL HERNIA REPAIR, LYSIS OF ADHESIONS (N/A, 7/23/2018); LAPAROTOMY EXPLORATORY FOR SMALL BOWEL OBSTRUCTION (N/A, 12/22/2017); HEMODIALYSIS CATHETER INSERTION (N/A, 12/7/2017); EXPLORATORY LAPAROTOMY, SMALL BOWEL RESECTION (N/A, 12/5/2017); and LEFT LATERAL ORBITOTOMY WITH DEBULKING OF TUMOR (Left, 11/3/2017).  FAM HX: family history includes Breast cancer in her maternal aunt and paternal aunt; Cancer in her other; Diabetes in her father and other; Heart attack in  "her mother; Migraines in her father; Obesity in her mother; Stroke in her father.  SOC HX: Pt  reports that  has never smoked. she has never used smokeless tobacco. She reports that she does not drink alcohol or use drugs.    /82 (BP Location: Left arm, Patient Position: Lying)   Pulse 54   Temp 97.4 °F (36.3 °C) (Tympanic)   Resp 16   Ht 160 cm (63\")   Wt 69.4 kg (153 lb)   SpO2 97%   BMI 27.10 kg/m²     Physical Exam  Wt Readings from Last 3 Encounters:   11/28/18 69.4 kg (153 lb)   10/16/18 69 kg (152 lb 3.2 oz)   09/17/18 65.8 kg (145 lb)   ,body mass index is 27.1 kg/m².    General Well developed; well nourished; no acute distress.   ENT Good dentition.  Oral mucosa pink & moist without thrush or lesions.    Neck Neck supple; trachea midline. No thyromegaly  Resp CTA; no rhonchi, rales, or wheezes.  Respiration effort normal  CV RRR; ; no M/R/G. No lower extremity edema  GI Abd soft, NT, ND, normal active bowel sounds.  No HSM    Colostomy left abd  Skin No rash; no lesions; no bruises.  Skin turgor normal  Musc No clubbing; no cyanosis.    Psych Oriented to time, place, and person.  Appropriate affect      Results Review:   I reviewed the patient's new clinical results.    Lab Results   Component Value Date    WBC 6.25 09/12/2018    HGB 10.9 (L) 09/12/2018    HCT 35.1 09/12/2018    MCV 97.2 09/12/2018     09/12/2018       Lab Results   Component Value Date    GLUCOSE 83 08/23/2018    BUN 10 08/23/2018    CREATININE 0.90 10/16/2018    EGFRIFNONA 91 08/23/2018    BCR 15.4 08/23/2018    CO2 26.0 08/23/2018    CALCIUM 8.2 (L) 08/23/2018    ALBUMIN 3.93 08/19/2018    AST 23 08/19/2018    ALT 14 08/19/2018       ASSESSMENTS/PLANS    Will plan screening colonoscopy via rectum and colostomy      I discussed the patients findings and my recommendations with patient    Inderjit Campos MD  11/28/18  7:52 AM  "

## 2018-11-28 NOTE — DISCHARGE INSTRUCTIONS
Call Dr. Campos's office with any questions or concerns

## 2018-12-10 RX ORDER — CARVEDILOL 12.5 MG/1
TABLET ORAL
Qty: 60 TABLET | Refills: 2 | Status: SHIPPED | OUTPATIENT
Start: 2018-12-10 | End: 2019-02-15

## 2019-01-11 DIAGNOSIS — F32.89 OTHER DEPRESSION: ICD-10-CM

## 2019-01-11 RX ORDER — QUETIAPINE FUMARATE 50 MG/1
50 TABLET, FILM COATED ORAL NIGHTLY
Qty: 30 TABLET | Refills: 1 | Status: SHIPPED | OUTPATIENT
Start: 2019-01-11 | End: 2019-03-14 | Stop reason: SDUPTHER

## 2019-01-11 NOTE — TELEPHONE ENCOUNTER
PATIENT IS OUT OF THIS MEDICATION (SEOQUEL) AND WOULD LIKE FOR DR SHEFFIELD TO SEND OVER A REFILL REQUEST TO THE [facility] IN [facility].

## 2019-02-11 ENCOUNTER — OFFICE VISIT (OUTPATIENT)
Dept: NEUROSURGERY | Facility: CLINIC | Age: 64
End: 2019-02-11

## 2019-02-11 ENCOUNTER — HOSPITAL ENCOUNTER (OUTPATIENT)
Dept: MRI IMAGING | Facility: HOSPITAL | Age: 64
Discharge: HOME | End: 2019-02-11
Attending: NEUROLOGICAL SURGERY | Admitting: NEUROLOGICAL SURGERY

## 2019-02-11 VITALS
HEIGHT: 63 IN | SYSTOLIC BLOOD PRESSURE: 142 MMHG | DIASTOLIC BLOOD PRESSURE: 80 MMHG | BODY MASS INDEX: 29.09 KG/M2 | WEIGHT: 164.2 LBS | TEMPERATURE: 96.9 F

## 2019-02-11 DIAGNOSIS — D32.9 MENINGIOMA (HCC): Primary | ICD-10-CM

## 2019-02-11 DIAGNOSIS — D32.0 MENINGIOMA, RECURRENT OF BRAIN (HCC): ICD-10-CM

## 2019-02-11 PROCEDURE — 0 GADOBENATE DIMEGLUMINE 529 MG/ML SOLUTION: Performed by: NEUROLOGICAL SURGERY

## 2019-02-11 PROCEDURE — A9577 INJ MULTIHANCE: HCPCS | Performed by: NEUROLOGICAL SURGERY

## 2019-02-11 PROCEDURE — 82565 ASSAY OF CREATININE: CPT

## 2019-02-11 PROCEDURE — 99213 OFFICE O/P EST LOW 20 MIN: CPT | Performed by: NEUROLOGICAL SURGERY

## 2019-02-11 PROCEDURE — 70553 MRI BRAIN STEM W/O & W/DYE: CPT

## 2019-02-11 RX ADMIN — GADOBENATE DIMEGLUMINE 15 ML: 529 INJECTION, SOLUTION INTRAVENOUS at 08:52

## 2019-02-11 NOTE — PROGRESS NOTES
Tereza Ackerman  1955  [id number]                        CHIEF COMPLAINT: Status post meningioma         MEDICAL HISTORY SINCE LAST ENCOUNTER: This 64-year-old reports for follow-up with sequential MRI of the brain.  She remains without symptoms in reference to the meningioma.           Past Medical History:   Diagnosis Date   • Acid reflux    • Arthritis    • Atrial flutter with rapid ventricular response (CMS/HCC) 12/21/2017   • Back pain    • Brain tumor (CMS/HCC)    • Depression    • History of blood transfusion    • History of Nissen fundoplication 7/1/2017   • Hyperlipemia    • Hypertension    • Memory loss or impairment    • Migraine    • Parathyroid adenoma     Incidental on thyroid US.   • PONV (postoperative nausea and vomiting)     nausea - preprocedural meds help    • Prediabetes     Last Impression: 06 Feb 2015  Reviewed labs. Excellent control.  Maren Connellast Impression: 06 Feb 2015  Reviewed labs. Excellent control.  Michaela Connell   • Thyroid nodule      Last Impression: 13 Jun 2015  r/o thyroid cancer, will proceed with US.  Michaela Connell (Internal Medicine)    • UTI (urinary tract infection)    • Vision changes     blockages left eye    • Wears glasses     readers              Past Surgical History:   Procedure Laterality Date   • BRAIN SURGERY  2003 & 2014    Dr. Werner Hollis   • CARPAL TUNNEL RELEASE  2002    right    • COLONOSCOPY     • COLONOSCOPY N/A 8/18/2018    Procedure: COLONOSCOPY;  Surgeon: Inderjit Campos MD;  Location:  [facility];  Service: Gastroenterology   • COLONOSCOPY N/A 11/28/2018    Procedure: COLONOSCOPY;  Surgeon: Inderjit Campos MD;  Location:  [facility];  Service: Gastroenterology   • ENDOSCOPY     • EXPLORATORY LAPAROTOMY N/A 12/5/2017    Procedure: EXPLORATORY LAPAROTOMY, SMALL BOWEL RESECTION;  Surgeon: Ирина Cobian MD;  Location:  [facility];  Service:    • EXPLORATORY LAPAROTOMY N/A 12/22/2017    Procedure: LAPAROTOMY  EXPLORATORY FOR SMALL BOWEL OBSTRUCTION;  Surgeon: Ирина Cobian MD;  Location:  [facility];  Service:    • EXPLORATORY LAPAROTOMY N/A 7/23/2018    Procedure: EXPLORATORY LAPAROTOMY, APPENDECTOMY, CECOPEXY, INCISIONAL HERNIA REPAIR, LYSIS OF ADHESIONS;  Surgeon: Andrea Bocanegra MD;  Location:  [facility];  Service: General   • EXPLORATORY LAPAROTOMY N/A 8/19/2018    Procedure: LAPAROTOMY EXPLORATORY, SIGMOID COLECTOMY, CREATION OF OSTOMY;  Surgeon: Andrea Bocanegra MD;  Location:  [facility];  Service: General   • HYSTERECTOMY      partial - both ovaries still present pt believes    • INSERTION HEMODIALYSIS CATHETER N/A 12/7/2017    Procedure: HEMODIALYSIS CATHETER INSERTION;  Surgeon: Chance Valenzuela MD;  Location:  [facility];  Service:    • ORBITOTOMY Left 11/3/2017    Procedure:  LEFT LATERAL ORBITOTOMY WITH DEBULKING OF TUMOR ;  Surgeon: Moo Hopkins MD;  Location:  [facility];  Service:    • OTHER SURGICAL HISTORY      craniotomy tumor removal-complete   • OTHER SURGICAL HISTORY      esophagogastric fundoplasty nissen fundoplication   • TUBAL ABDOMINAL LIGATION                Family History   Problem Relation Age of Onset   • Obesity Mother    • Heart attack Mother    • Migraines Father    • Stroke Father    • Diabetes Father    • Cancer Other    • Diabetes Other    • Breast cancer Maternal Aunt    • Breast cancer Paternal Aunt    • Ovarian cancer Neg Hx               Social History     Socioeconomic History   • Marital status:      Spouse name: Not on file   • Number of children: Not on file   • Years of education: Not on file   • Highest education level: Not on file   Social Needs   • Financial resource strain: Not on file   • Food insecurity - worry: Not on file   • Food insecurity - inability: Not on file   • Transportation needs - medical: Not on file   • Transportation needs - non-medical: Not on file   Occupational History   • Not on file   Tobacco Use   • Smoking status: Never  Smoker   • Smokeless tobacco: Never Used   Substance and Sexual Activity   • Alcohol use: No   • Drug use: No   • Sexual activity: Defer   Other Topics Concern   • Not on file   Social History Narrative   • Not on file              Allergies   Allergen Reactions   • Dilantin [Phenytoin Sodium Extended] Rash   • Phenytoin Rash     duplicate               Current Outpatient Medications:   •  acetaminophen (TYLENOL) 325 MG tablet, Take 2 tablets by mouth Every 6 (Six) Hours., Disp: 120 tablet, Rfl: 0  •  amLODIPine (NORVASC) 5 MG tablet, Take 1 tablet by mouth Daily., Disp: 30 tablet, Rfl: 0  •  busPIRone (BUSPAR) 5 MG tablet, Take 1 tablet by mouth 3 (Three) Times a Day., Disp: 90 tablet, Rfl: 5  •  carvedilol (COREG) 12.5 MG tablet, TAKE ONE TABLET BY MOUTH EVERY 12 HOURS, Disp: 60 tablet, Rfl: 2  •  cholecalciferol (VITAMIN D3) 1000 units tablet, Take 2,000 Units by mouth Every Other Day., Disp: , Rfl:   •  ibuprofen (ADVIL,MOTRIN) 600 MG tablet, Take 1 tablet by mouth Every 6 (Six) Hours., Disp: 60 tablet, Rfl: 0  •  LORazepam (ATIVAN) 0.5 MG tablet, TAKE ONE-HALF TABLET BY MOUTH TWO TIMES A DAY AS NEEDED FOR ANXIETY, Disp: 30 tablet, Rfl: 0  •  ondansetron (ZOFRAN) 4 MG tablet, Take 1 tablet by mouth Every 8 (Eight) Hours As Needed for Nausea or Vomiting., Disp: 10 tablet, Rfl: 0  •  QUEtiapine (SEROquel) 50 MG tablet, Take 1 tablet by mouth Every Night. APPT NEEDED FOR ADDITIONAL REFILLS, Disp: 30 tablet, Rfl: 1  •  sertraline (ZOLOFT) 100 MG tablet, Take 1 tablet by mouth Daily., Disp: 90 tablet, Rfl: 1  •  thiamine (VITAMIN B1) 100 MG tablet, Take 1 tablet by mouth Every Other Day., Disp: 45 tablet, Rfl: 1  No current facility-administered medications for this visit.          Review of Systems   Constitutional: Positive for appetite change and fatigue. Negative for activity change, chills, diaphoresis, fever and unexpected weight change.   HENT: Negative for congestion, dental problem, drooling, ear discharge,  ear pain, facial swelling, hearing loss, mouth sores, nosebleeds, postnasal drip, rhinorrhea, sinus pressure, sneezing, sore throat, tinnitus, trouble swallowing and voice change.    Eyes: Positive for visual disturbance. Negative for photophobia, pain, discharge, redness and itching.   Respiratory: Negative for apnea, cough, choking, chest tightness, shortness of breath, wheezing and stridor.    Cardiovascular: Negative for chest pain, palpitations and leg swelling.   Gastrointestinal: Negative for abdominal distention, abdominal pain, anal bleeding, blood in stool, constipation, diarrhea, nausea, rectal pain and vomiting.   Endocrine: Negative for cold intolerance, heat intolerance, polydipsia, polyphagia and polyuria.   Genitourinary: Negative for decreased urine volume, difficulty urinating, dysuria, enuresis, flank pain, frequency, genital sores, hematuria and urgency.   Musculoskeletal: Negative for arthralgias, back pain, gait problem, joint swelling, myalgias, neck pain and neck stiffness.   Skin: Negative for color change, pallor, rash and wound.   Allergic/Immunologic: Negative for environmental allergies, food allergies and immunocompromised state.   Neurological: Positive for speech difficulty, weakness, light-headedness and headaches. Negative for dizziness, tremors, seizures, syncope, facial asymmetry and numbness.   Hematological: Negative for adenopathy. Does not bruise/bleed easily.   Psychiatric/Behavioral: Positive for decreased concentration and dysphoric mood. Negative for agitation, behavioral problems, confusion, hallucinations, self-injury, sleep disturbance and suicidal ideas. The patient is nervous/anxious. The patient is not hyperactive.              There were no vitals filed for this visit.            EXAMINATION: Has some fullness along the temporal fossa.  Vision is unchanged.  She has been followed by ophthalmology.            MEDICAL DECISION MAKING: Comparison of the MRI today with  that in October shows no progression or change.  Obviously she has responded to SRS at this time.           ASSESSMENT/DISPOSITION: She will return in 4 months for continued surveillance.              I APPRECIATE THE OPPORTUNITY OF THIS REFERRAL. PLEASE CALL IF ANY       QUESTIONS [phone]    Scribed for Werner Hollis MD by Joy Corral CMA. 2/11/2019  11:29 AM     I have read and concur with the information provided by the scribe.  Werner Hollis MD

## 2019-02-14 LAB — CREAT BLDA-MCNC: 0.9 MG/DL (ref 0.6–1.3)

## 2019-02-15 ENCOUNTER — APPOINTMENT (OUTPATIENT)
Dept: PREADMISSION TESTING | Facility: HOSPITAL | Age: 64
End: 2019-02-15

## 2019-02-15 VITALS — HEIGHT: 63 IN | BODY MASS INDEX: 29.77 KG/M2 | WEIGHT: 168 LBS

## 2019-02-15 LAB
ALBUMIN SERPL-MCNC: 4.05 G/DL (ref 3.2–4.8)
ALBUMIN/GLOB SERPL: 1.7 G/DL (ref 1.5–2.5)
ALP SERPL-CCNC: 98 U/L (ref 25–100)
ALT SERPL W P-5'-P-CCNC: 25 U/L (ref 7–40)
ANION GAP SERPL CALCULATED.3IONS-SCNC: 4 MMOL/L (ref 3–11)
AST SERPL-CCNC: 25 U/L (ref 0–33)
BILIRUB SERPL-MCNC: 0.3 MG/DL (ref 0.3–1.2)
BUN BLD-MCNC: 15 MG/DL (ref 9–23)
BUN/CREAT SERPL: 14.2 (ref 7–25)
CALCIUM SPEC-SCNC: 9.5 MG/DL (ref 8.7–10.4)
CHLORIDE SERPL-SCNC: 107 MMOL/L (ref 99–109)
CO2 SERPL-SCNC: 29 MMOL/L (ref 20–31)
CREAT BLD-MCNC: 1.06 MG/DL (ref 0.6–1.3)
DEPRECATED RDW RBC AUTO: 47 FL (ref 37–54)
ERYTHROCYTE [DISTWIDTH] IN BLOOD BY AUTOMATED COUNT: 13.5 % (ref 11.3–14.5)
GFR SERPL CREATININE-BSD FRML MDRD: 52 ML/MIN/1.73
GLOBULIN UR ELPH-MCNC: 2.5 GM/DL
GLUCOSE BLD-MCNC: 84 MG/DL (ref 70–100)
HBA1C MFR BLD: 5 % (ref 4.8–5.6)
HCT VFR BLD AUTO: 40 % (ref 34.5–44)
HGB BLD-MCNC: 13 G/DL (ref 11.5–15.5)
MCH RBC QN AUTO: 30.8 PG (ref 27–31)
MCHC RBC AUTO-ENTMCNC: 32.5 G/DL (ref 32–36)
MCV RBC AUTO: 94.8 FL (ref 80–99)
PLATELET # BLD AUTO: 225 10*3/MM3 (ref 150–450)
PMV BLD AUTO: 10.4 FL (ref 6–12)
POTASSIUM BLD-SCNC: 4.2 MMOL/L (ref 3.5–5.5)
PROT SERPL-MCNC: 6.5 G/DL (ref 5.7–8.2)
RBC # BLD AUTO: 4.22 10*6/MM3 (ref 3.89–5.14)
SODIUM BLD-SCNC: 140 MMOL/L (ref 132–146)
WBC NRBC COR # BLD: 4.86 10*3/MM3 (ref 3.5–10.8)

## 2019-02-15 PROCEDURE — 80053 COMPREHEN METABOLIC PANEL: CPT | Performed by: SURGERY

## 2019-02-15 PROCEDURE — 36415 COLL VENOUS BLD VENIPUNCTURE: CPT

## 2019-02-15 PROCEDURE — 85027 COMPLETE CBC AUTOMATED: CPT | Performed by: SURGERY

## 2019-02-15 PROCEDURE — 83036 HEMOGLOBIN GLYCOSYLATED A1C: CPT | Performed by: SURGERY

## 2019-02-15 RX ORDER — CARVEDILOL 12.5 MG/1
12.5 TABLET ORAL 2 TIMES DAILY WITH MEALS
COMMUNITY
End: 2019-03-27 | Stop reason: SDUPTHER

## 2019-02-15 RX ORDER — LORAZEPAM 0.5 MG/1
0.25 TABLET ORAL 2 TIMES DAILY PRN
COMMUNITY
End: 2019-05-03

## 2019-02-15 RX ORDER — ASPIRIN 325 MG
325 TABLET ORAL EVERY OTHER DAY
COMMUNITY
End: 2019-03-25

## 2019-02-15 NOTE — DISCHARGE INSTRUCTIONS
The following information and instructions were given:    Nothing to eat or drink after midnight except sips of water with routine prescribed medication (except blood thinners, certain blood pressure medications, diabetes medications, or weight reducing medications) unless otherwise instructed by your physician.  Do not eat, drink, smoke or chew gum after midnight the night before surgery. This also includes no mints.    DO NOT shave for two days before your procedure.  Do not wear makeup.      DO NOT wear fingernail polish (gel/regular) and/or acrylic/artificial nails on the day of surgery.   If a patient had recent manicure and would rather not remove polish or artificial nails, then the minimum requirement is that the polish/artificial nails must be removed from the middle finger on each hand.      If patient is having surgery/procedure on an upper extremity, then the patient was instructed that fingernail polish/artificial fingernails must be removed for surgery.  NO EXCEPTIONS.      If patient is having surgery/procedure on a lower extremity, then the patient was instructed that toenail polish on both extremities must be removed for surgery.  NO EXCEPTIONS.    Remove all jewelry (advised to go to jeweler if unable to remove).  Jewelry, especially rings, can no longer be taped for surgery.    Leave anything you consider valuable at home.    Leave your suitcase in the car until after your surgery.    Bring the following with you (if applicable)       -picture ID and insurance cards       -Co-pay/deductible required by insurance       -Medications in the original bottles (not a list) including all over-the-counter  medications if not brought to PAT       -Copy of advance directive, living will or power of  documents if not  brought to PAT       -CPAP or BIPAP mask and tubing (do not bring machine)       -Skin prep instructions sheet       -PAT Pass    Education booklet, brochure, handout or binder given to  patient.      When applicable, an ERAS booklet was given to patient.    Pain Control After Surgery handout given to patient.    Respirex use (handout given to patient) and pneumonia prevention.    Signs and Symptoms of infection discussed.    DVT Prevention education given.  Stressing the importance of ambulation.    Patient to apply Chlorhexadine wipes to surgical area (as instructed) the night before procedure and the AM of procedure.    When applicable patients with ERAS orders were instructed to drink 20 ounces of Gatorade or G2 for diabetics (or until full) the morning of surgery.  The Gatorade or G2 must be consumed at least 1 hour before arrival time on the day of surgery .  No RED Gatorade or G2.  Appropriate ERAS handout and/or booklet given to patient during PAT visit.        Patient to apply Chlorhexadine wipes  to surgical area (as instructed) the night before procedure and the AM of procedure. Wipes provided.    Patient instructed to drink 20 ounces (or until full) of Gatorade or 20 ounces of G2 (if diabetic) and complete 3 hours before your surgery start time. (NO RED Gatorade or G2)    Patient verbalized understanding.

## 2019-02-15 NOTE — PAT
Per Mechelle at the office, pt to stop aspirin 325 mg for surgery.    Patient to apply Chlorhexadine wipes  to surgical area (as instructed) the night before procedure and the AM of procedure. Wipes provided.    The following information and handout were given during PAT visit:    ERAS Colon    ERAS stands for Enhanced Recovery After Surgery.  Enhanced recovery protocols are evidence-based designed to standardize medical care, improve outcomes and lower health costs.    ERAS focuses on early post-op oral intake, pain management options, decreased use of narcotics, and early ambulation or walking.    The benefits of ERAS include lower post-op complications, earlier return of bowel function, shorter stay at hospital, and improved patient satisfaction.    Patient Responsibilities include:   Before surgery   -clear liquids day before surgery   -bowel prep per physician instructions   -drink 20 ounces (or until full) of Gatorade or 20 ounces of G2 (if diabetic) and complete three hours before surgery.  (NO RED Gatorade or G2).     After surgery   -clear liquids 2 hours after surgery   -soft diet the day after surgery and advance as tolerated   -out of bed the evening of surgery-ambulate twice or up in the chair   -out of bed and walking 5 times the day after surgery   -Bring 2 packs of chewing gum; chew gum for 30 minutes three times a day after surgery if not having ileostomy.    Keep in mind your efforts to drink and walk early contribute to greater success.      The following information and instructions were given:    NPO after MN except sips of water with routine prescribed medication (except blood thinner, diabetes, or weight reducing medication) unless otherwise instructed by your physician.  Do not eat, drink, smoke or chew gum after MN the night before surgery. This also includes no mints.    DO NOT shave for two days before surgery.  Do not wear makeup.    All nail polish, artificial nails, acrylic nails, gel  nails/polish must be removed for surgery.  If you are having a surgery on a lower extremity, you must remove all polish from toenails on both feet for surgery.  (If you have had a recent manicure, you may remove polish/nails from the middle fingers on each hand for surgery).    Remove all jewelry (advised to go to jeweler if unable to remove).    Leave anything you consider valuable at home.    Leave your suitcase in the car until after your surgery.    Bring the following with you (if applicable)   -picture ID and insurance cards   -Co-pay/deductible required by insurance   -Medications in the original bottles (not a list) including all over-the-counter  medications if not brought to PAT   -Copy of advance directive, living will or power of  documents if not  brought to PAT   -CPAP or BIPAP mask and tubing (do not bring machine)   -Skin prep instructions sheet   -PAT Pass  Education booklet, brochure, handout or binder given to patient including ERAS booklet.    Pain Control After Surgery handout given to patient.    Respirex use (handout given to patient) and pneumonia prevention.    Signs and Symptoms of infection.    DVT Prevention stressing the importance of ambulation.    Patient to apply Chlorhexadine wipes to surgical area (as instructed) the night before procedure and the AM of procedure.          Patient instructed to drink 20 ounces (or until full) of Gatorade or 20 ounces of G2 (if diabetic) and complete 3 hours before your surgery start time. (NO RED Gatorade or G2)    Patient verbalized understanding.      ekg 8/19/18

## 2019-02-19 ENCOUNTER — HOSPITAL ENCOUNTER (INPATIENT)
Facility: HOSPITAL | Age: 64
LOS: 7 days | Discharge: HOME | End: 2019-02-26
Attending: SURGERY | Admitting: SURGERY

## 2019-02-19 ENCOUNTER — ANESTHESIA EVENT (OUTPATIENT)
Dept: PERIOP | Facility: HOSPITAL | Age: 64
End: 2019-02-19

## 2019-02-19 ENCOUNTER — ANESTHESIA (OUTPATIENT)
Dept: PERIOP | Facility: HOSPITAL | Age: 64
End: 2019-02-19

## 2019-02-19 DIAGNOSIS — K56.609 LARGE BOWEL OBSTRUCTION (HCC): ICD-10-CM

## 2019-02-19 DIAGNOSIS — K56.609 SMALL BOWEL OBSTRUCTION (HCC): Primary | ICD-10-CM

## 2019-02-19 DIAGNOSIS — Z93.9 HISTORY OF CREATION OF OSTOMY (HCC): ICD-10-CM

## 2019-02-19 DIAGNOSIS — K56.2 VOLVULUS (HCC): ICD-10-CM

## 2019-02-19 LAB — POTASSIUM BLDA-SCNC: 4.11 MMOL/L (ref 3.5–5.3)

## 2019-02-19 PROCEDURE — 25010000002 DEXAMETHASONE PER 1 MG: Performed by: NURSE ANESTHETIST, CERTIFIED REGISTERED

## 2019-02-19 PROCEDURE — 25010000002 NEOSTIGMINE 10 MG/10ML SOLUTION: Performed by: NURSE ANESTHETIST, CERTIFIED REGISTERED

## 2019-02-19 PROCEDURE — 84132 ASSAY OF SERUM POTASSIUM: CPT | Performed by: ANESTHESIOLOGY

## 2019-02-19 PROCEDURE — 25010000002 PROPOFOL 10 MG/ML EMULSION: Performed by: NURSE ANESTHETIST, CERTIFIED REGISTERED

## 2019-02-19 PROCEDURE — 25010000002 ONDANSETRON PER 1 MG: Performed by: SURGERY

## 2019-02-19 PROCEDURE — 25010000002 DEXAMETHASONE SODIUM PHOSPHATE 10 MG/ML SOLUTION: Performed by: ANESTHESIOLOGY

## 2019-02-19 PROCEDURE — 25010000002 PROPOFOL 1000 MG/ML EMULSION: Performed by: NURSE ANESTHETIST, CERTIFIED REGISTERED

## 2019-02-19 PROCEDURE — 25010000002 ONDANSETRON PER 1 MG: Performed by: NURSE ANESTHETIST, CERTIFIED REGISTERED

## 2019-02-19 PROCEDURE — 25010000002 BUPRENORPHINE PER 0.1 MG: Performed by: ANESTHESIOLOGY

## 2019-02-19 PROCEDURE — 88304 TISSUE EXAM BY PATHOLOGIST: CPT | Performed by: SURGERY

## 2019-02-19 DEVICE — CONTOUR CURVED CUTTER STAPLER RELOAD
Type: IMPLANTABLE DEVICE | Site: ABDOMEN | Status: FUNCTIONAL
Brand: CONTOUR

## 2019-02-19 RX ORDER — NALOXONE HCL 0.4 MG/ML
0.1 VIAL (ML) INJECTION
Status: DISCONTINUED | OUTPATIENT
Start: 2019-02-19 | End: 2019-02-26 | Stop reason: HOSPADM

## 2019-02-19 RX ORDER — LIDOCAINE HYDROCHLORIDE 20 MG/ML
INJECTION, SOLUTION INFILTRATION; PERINEURAL AS NEEDED
Status: DISCONTINUED | OUTPATIENT
Start: 2019-02-19 | End: 2019-02-19 | Stop reason: SURG

## 2019-02-19 RX ORDER — ONDANSETRON 2 MG/ML
INJECTION INTRAMUSCULAR; INTRAVENOUS AS NEEDED
Status: DISCONTINUED | OUTPATIENT
Start: 2019-02-19 | End: 2019-02-19 | Stop reason: SURG

## 2019-02-19 RX ORDER — ATRACURIUM BESYLATE 10 MG/ML
INJECTION, SOLUTION INTRAVENOUS AS NEEDED
Status: DISCONTINUED | OUTPATIENT
Start: 2019-02-19 | End: 2019-02-19 | Stop reason: SURG

## 2019-02-19 RX ORDER — SODIUM CHLORIDE, SODIUM LACTATE, POTASSIUM CHLORIDE, CALCIUM CHLORIDE 600; 310; 30; 20 MG/100ML; MG/100ML; MG/100ML; MG/100ML
9 INJECTION, SOLUTION INTRAVENOUS CONTINUOUS
Status: DISCONTINUED | OUTPATIENT
Start: 2019-02-19 | End: 2019-02-22

## 2019-02-19 RX ORDER — HYDROMORPHONE HYDROCHLORIDE 1 MG/ML
0.5 INJECTION, SOLUTION INTRAMUSCULAR; INTRAVENOUS; SUBCUTANEOUS
Status: DISCONTINUED | OUTPATIENT
Start: 2019-02-19 | End: 2019-02-19 | Stop reason: HOSPADM

## 2019-02-19 RX ORDER — SODIUM CHLORIDE 0.9 % (FLUSH) 0.9 %
3-10 SYRINGE (ML) INJECTION AS NEEDED
Status: CANCELLED | OUTPATIENT
Start: 2019-02-19

## 2019-02-19 RX ORDER — MAGNESIUM HYDROXIDE 1200 MG/15ML
LIQUID ORAL AS NEEDED
Status: DISCONTINUED | OUTPATIENT
Start: 2019-02-19 | End: 2019-02-19 | Stop reason: HOSPADM

## 2019-02-19 RX ORDER — ACETAMINOPHEN 500 MG
1000 TABLET ORAL
Status: DISCONTINUED | OUTPATIENT
Start: 2019-02-19 | End: 2019-02-19 | Stop reason: HOSPADM

## 2019-02-19 RX ORDER — FAMOTIDINE 20 MG/1
20 TABLET, FILM COATED ORAL ONCE
Status: COMPLETED | OUTPATIENT
Start: 2019-02-19 | End: 2019-02-19

## 2019-02-19 RX ORDER — SODIUM CHLORIDE AND POTASSIUM CHLORIDE 150; 450 MG/100ML; MG/100ML
100 INJECTION, SOLUTION INTRAVENOUS CONTINUOUS
Status: DISCONTINUED | OUTPATIENT
Start: 2019-02-19 | End: 2019-02-22

## 2019-02-19 RX ORDER — CARVEDILOL 12.5 MG/1
12.5 TABLET ORAL 2 TIMES DAILY WITH MEALS
Status: DISCONTINUED | OUTPATIENT
Start: 2019-02-19 | End: 2019-02-26 | Stop reason: HOSPADM

## 2019-02-19 RX ORDER — SODIUM CHLORIDE 0.9 % (FLUSH) 0.9 %
3 SYRINGE (ML) INJECTION EVERY 12 HOURS SCHEDULED
Status: CANCELLED | OUTPATIENT
Start: 2019-02-19

## 2019-02-19 RX ORDER — ONDANSETRON 4 MG/1
4 TABLET, FILM COATED ORAL EVERY 6 HOURS PRN
Status: DISCONTINUED | OUTPATIENT
Start: 2019-02-19 | End: 2019-02-26 | Stop reason: HOSPADM

## 2019-02-19 RX ORDER — DEXAMETHASONE SODIUM PHOSPHATE 10 MG/ML
INJECTION, SOLUTION INTRAMUSCULAR; INTRAVENOUS
Status: COMPLETED | OUTPATIENT
Start: 2019-02-19 | End: 2019-02-19

## 2019-02-19 RX ORDER — PREGABALIN 75 MG/1
75 CAPSULE ORAL
Status: DISCONTINUED | OUTPATIENT
Start: 2019-02-19 | End: 2019-02-19 | Stop reason: HOSPADM

## 2019-02-19 RX ORDER — ONDANSETRON 2 MG/ML
4 INJECTION INTRAMUSCULAR; INTRAVENOUS EVERY 6 HOURS PRN
Status: DISCONTINUED | OUTPATIENT
Start: 2019-02-19 | End: 2019-02-26 | Stop reason: HOSPADM

## 2019-02-19 RX ORDER — CEFOXITIN 2 G/1
2 INJECTION, POWDER, FOR SOLUTION INTRAVENOUS ONCE
Status: DISCONTINUED | OUTPATIENT
Start: 2019-02-19 | End: 2019-02-19

## 2019-02-19 RX ORDER — PROPOFOL 10 MG/ML
VIAL (ML) INTRAVENOUS AS NEEDED
Status: DISCONTINUED | OUTPATIENT
Start: 2019-02-19 | End: 2019-02-19 | Stop reason: SURG

## 2019-02-19 RX ORDER — SERTRALINE HYDROCHLORIDE 100 MG/1
100 TABLET, FILM COATED ORAL DAILY
Status: DISCONTINUED | OUTPATIENT
Start: 2019-02-19 | End: 2019-02-26 | Stop reason: HOSPADM

## 2019-02-19 RX ORDER — NEOSTIGMINE METHYLSULFATE 1 MG/ML
INJECTION, SOLUTION INTRAVENOUS AS NEEDED
Status: DISCONTINUED | OUTPATIENT
Start: 2019-02-19 | End: 2019-02-19 | Stop reason: SURG

## 2019-02-19 RX ORDER — ONDANSETRON 2 MG/ML
4 INJECTION INTRAMUSCULAR; INTRAVENOUS ONCE AS NEEDED
Status: DISCONTINUED | OUTPATIENT
Start: 2019-02-19 | End: 2019-02-19 | Stop reason: HOSPADM

## 2019-02-19 RX ORDER — HYDROMORPHONE HYDROCHLORIDE 1 MG/ML
0.5 INJECTION, SOLUTION INTRAMUSCULAR; INTRAVENOUS; SUBCUTANEOUS
Status: DISCONTINUED | OUTPATIENT
Start: 2019-02-19 | End: 2019-02-26 | Stop reason: HOSPADM

## 2019-02-19 RX ORDER — DEXAMETHASONE SODIUM PHOSPHATE 10 MG/ML
INJECTION INTRAMUSCULAR; INTRAVENOUS AS NEEDED
Status: DISCONTINUED | OUTPATIENT
Start: 2019-02-19 | End: 2019-02-19 | Stop reason: SURG

## 2019-02-19 RX ORDER — BUPRENORPHINE HYDROCHLORIDE 0.32 MG/ML
INJECTION INTRAMUSCULAR; INTRAVENOUS
Status: COMPLETED | OUTPATIENT
Start: 2019-02-19 | End: 2019-02-19

## 2019-02-19 RX ORDER — FAMOTIDINE 10 MG/ML
20 INJECTION, SOLUTION INTRAVENOUS ONCE
Status: CANCELLED | OUTPATIENT
Start: 2019-02-19 | End: 2019-02-19

## 2019-02-19 RX ORDER — MELOXICAM 7.5 MG/1
15 TABLET ORAL
Status: DISCONTINUED | OUTPATIENT
Start: 2019-02-19 | End: 2019-02-19 | Stop reason: HOSPADM

## 2019-02-19 RX ORDER — FENTANYL CITRATE 50 UG/ML
50 INJECTION, SOLUTION INTRAMUSCULAR; INTRAVENOUS
Status: DISCONTINUED | OUTPATIENT
Start: 2019-02-19 | End: 2019-02-19 | Stop reason: HOSPADM

## 2019-02-19 RX ORDER — SCOLOPAMINE TRANSDERMAL SYSTEM 1 MG/1
1 PATCH, EXTENDED RELEASE TRANSDERMAL ONCE
Status: DISCONTINUED | OUTPATIENT
Start: 2019-02-19 | End: 2019-02-19

## 2019-02-19 RX ORDER — DEXAMETHASONE SODIUM PHOSPHATE 4 MG/ML
8 INJECTION, SOLUTION INTRA-ARTICULAR; INTRALESIONAL; INTRAMUSCULAR; INTRAVENOUS; SOFT TISSUE ONCE AS NEEDED
Status: DISCONTINUED | OUTPATIENT
Start: 2019-02-19 | End: 2019-02-19 | Stop reason: HOSPADM

## 2019-02-19 RX ORDER — LIDOCAINE HYDROCHLORIDE 10 MG/ML
0.5 INJECTION, SOLUTION EPIDURAL; INFILTRATION; INTRACAUDAL; PERINEURAL ONCE AS NEEDED
Status: COMPLETED | OUTPATIENT
Start: 2019-02-19 | End: 2019-02-19

## 2019-02-19 RX ORDER — GLYCOPYRROLATE 0.2 MG/ML
INJECTION INTRAMUSCULAR; INTRAVENOUS AS NEEDED
Status: DISCONTINUED | OUTPATIENT
Start: 2019-02-19 | End: 2019-02-19 | Stop reason: SURG

## 2019-02-19 RX ORDER — BUPIVACAINE HYDROCHLORIDE 2.5 MG/ML
INJECTION, SOLUTION EPIDURAL; INFILTRATION; INTRACAUDAL
Status: COMPLETED | OUTPATIENT
Start: 2019-02-19 | End: 2019-02-19

## 2019-02-19 RX ADMIN — LIDOCAINE HYDROCHLORIDE 50 MG: 20 INJECTION, SOLUTION INFILTRATION; PERINEURAL at 13:15

## 2019-02-19 RX ADMIN — BUPIVACAINE HYDROCHLORIDE 60 ML: 2.5 INJECTION, SOLUTION EPIDURAL; INFILTRATION; INTRACAUDAL; PERINEURAL at 13:17

## 2019-02-19 RX ADMIN — GLYCOPYRROLATE 0.2 MG: 0.2 INJECTION, SOLUTION INTRAMUSCULAR; INTRAVENOUS at 13:40

## 2019-02-19 RX ADMIN — SERTRALINE HYDROCHLORIDE 100 MG: 100 TABLET ORAL at 17:15

## 2019-02-19 RX ADMIN — ONDANSETRON 4 MG: 2 INJECTION INTRAMUSCULAR; INTRAVENOUS at 14:41

## 2019-02-19 RX ADMIN — BUPRENORPHINE HYDROCHLORIDE 0.3 MG: 0.32 INJECTION INTRAMUSCULAR; INTRAVENOUS at 13:17

## 2019-02-19 RX ADMIN — PREGABALIN 75 MG: 75 CAPSULE ORAL at 11:35

## 2019-02-19 RX ADMIN — ATRACURIUM BESYLATE 10 MG: 10 INJECTION, SOLUTION INTRAVENOUS at 13:45

## 2019-02-19 RX ADMIN — PROPOFOL 20 MCG/KG/MIN: 10 INJECTION, EMULSION INTRAVENOUS at 13:35

## 2019-02-19 RX ADMIN — ONDANSETRON 4 MG: 2 INJECTION INTRAMUSCULAR; INTRAVENOUS at 17:35

## 2019-02-19 RX ADMIN — SODIUM CHLORIDE, POTASSIUM CHLORIDE, SODIUM LACTATE AND CALCIUM CHLORIDE 9 ML/HR: 600; 310; 30; 20 INJECTION, SOLUTION INTRAVENOUS at 11:36

## 2019-02-19 RX ADMIN — POTASSIUM CHLORIDE AND SODIUM CHLORIDE 100 ML/HR: 450; 150 INJECTION, SOLUTION INTRAVENOUS at 17:11

## 2019-02-19 RX ADMIN — EPHEDRINE SULFATE 10 MG: 50 INJECTION INTRAMUSCULAR; INTRAVENOUS; SUBCUTANEOUS at 13:39

## 2019-02-19 RX ADMIN — DEXAMETHASONE SODIUM PHOSPHATE 4 MG: 10 INJECTION, SOLUTION INTRAMUSCULAR; INTRAVENOUS at 13:17

## 2019-02-19 RX ADMIN — FAMOTIDINE 20 MG: 20 TABLET, FILM COATED ORAL at 11:35

## 2019-02-19 RX ADMIN — ATRACURIUM BESYLATE 30 MG: 10 INJECTION, SOLUTION INTRAVENOUS at 13:16

## 2019-02-19 RX ADMIN — PROPOFOL 150 MG: 10 INJECTION, EMULSION INTRAVENOUS at 13:16

## 2019-02-19 RX ADMIN — ACETAMINOPHEN 1000 MG: 500 TABLET ORAL at 11:35

## 2019-02-19 RX ADMIN — GLYCOPYRROLATE 0.4 MG: 0.2 INJECTION, SOLUTION INTRAMUSCULAR; INTRAVENOUS at 15:13

## 2019-02-19 RX ADMIN — LIDOCAINE HYDROCHLORIDE 0.5 ML: 10 INJECTION, SOLUTION EPIDURAL; INFILTRATION; INTRACAUDAL; PERINEURAL at 11:35

## 2019-02-19 RX ADMIN — MELOXICAM 15 MG: 7.5 TABLET ORAL at 11:35

## 2019-02-19 RX ADMIN — SCOPALAMINE 1 PATCH: 1 PATCH, EXTENDED RELEASE TRANSDERMAL at 14:11

## 2019-02-19 RX ADMIN — DEXAMETHASONE SODIUM PHOSPHATE 8 MG: 10 INJECTION INTRAMUSCULAR; INTRAVENOUS at 13:31

## 2019-02-19 RX ADMIN — NEOSTIGMINE METHYLSULFATE 3.5 MG: 1 INJECTION, SOLUTION INTRAVENOUS at 15:13

## 2019-02-19 NOTE — ANESTHESIA PROCEDURE NOTES
Airway  Urgency: elective    Airway not difficult    General Information and Staff    Patient location during procedure: OR    Indications and Patient Condition  Indications for airway management: airway protection    Preoxygenated: yes  Mask difficulty assessment: 1 - vent by mask    Final Airway Details  Final airway type: endotracheal airway      Successful airway: ETT  Cuffed: yes   Successful intubation technique: direct laryngoscopy  Facilitating devices/methods: intubating stylet  Endotracheal tube insertion site: oral  Blade: Stephenson  Blade size: 2  ETT size (mm): 7.0  Cormack-Lehane Classification: grade I - full view of glottis  Placement verified by: chest auscultation and capnometry   Measured from: lips  ETT to lips (cm): 21  Number of attempts at approach: 1

## 2019-02-19 NOTE — ANESTHESIA PROCEDURE NOTES
Peripheral Block      Patient location during procedure: OR  Reason for block: at surgeon's request and post-op pain management  Performed by  Anesthesiologist: Chris Palmer MD  Preanesthetic Checklist  Completed: patient identified, site marked, surgical consent, pre-op evaluation, timeout performed, IV checked, risks and benefits discussed and monitors and equipment checked  Prep:  Pt Position: supine  Sterile barriers:cap, gloves, sterile barriers and mask  Prep: ChloraPrep  Patient monitoring: blood pressure monitoring, continuous pulse oximetry and EKG  Procedure  Sedation:yes  Performed under: general  Guidance:ultrasound guided  Images:still images obtained    Laterality:Bilateral  Block Type:TAP  Injection Technique:single-shot  Needle Type:short-bevel and echogenic  Needle Gauge:20 G    Medications Used: buprenorphine (BUPRENEX) injection, 0.3 mg  dexamethasone sodium phosphate injection, 4 mg  bupivacaine PF (MARCAINE) 0.25 % injection, 60 mL  Med admintered at 2/19/2019 1:17 PM  Medications  Comment:Block Injection:  LA dose divided between Right and Left block       Adjuncts:  Decadron 4mg PSF, Buprenex 0.3mg (Per total volume of LA)    Post Assessment  Injection Assessment: negative aspiration for heme, incremental injection and no paresthesia on injection  Patient Tolerance:comfortable throughout block  Complications:no  Additional Notes      Under Ultrasound guidance, a BBraun 4inch 360 degree needle was advanced with Normal Saline hydro dissection of tissue.  The Internal Oblique and Transversus Abdominus muscles where visualized.  At or before the aponeurosis of Internal Oblique, local anesthetic spread was visualized in the Transversus Abdominus Plane. Injection was made incrementally with aspiration every 5 mls.  There was no  intravascular injection,  injection pressure was normal, there was no neural injection, and the procedure was completed without difficulty.  Thank You.

## 2019-02-19 NOTE — ANESTHESIA PREPROCEDURE EVALUATION
Anesthesia Evaluation     NPO Solid Status: > 8 hours  NPO Liquid Status: > 8 hours           Airway   Mallampati: II  TM distance: >3 FB  Neck ROM: limited  Possible difficult intubation  Dental      Pulmonary    (+) decreased breath sounds,   (-) asthma, not a smoker  Cardiovascular     ECG reviewed  Rhythm: regular  Rate: normal    (+) hypertension, dysrhythmias Atrial Fib, Atrial Flutter,   (-) pacemaker, angina, murmur, cardiac stents      Neuro/Psych  (-) seizures, TIA, CVA  GI/Hepatic/Renal/Endo    (+)   renal disease (Bun and Cr now normal, GFR is slightly down at 52ml/min),   (-) liver disease, diabetes    Musculoskeletal     Abdominal    Substance History      OB/GYN          Other        ROS/Med Hx Other: H/O brain tumor/menginoma, followed by dr Hollis  H/O sepsis 12/2017 in hospital 40 days, on dialysis for 2 weeks  H/O sigmoid volvulus  H/O a fib, flutter                Anesthesia Plan    ASA 3     general   (GA  Taps)  intravenous induction   Anesthetic plan, all risks, benefits, and alternatives have been provided, discussed and informed consent has been obtained with: patient.    Plan discussed with CRNA.

## 2019-02-19 NOTE — ANESTHESIA POSTPROCEDURE EVALUATION
Patient: Tereza Ackerman    Procedure Summary     Date:  02/19/19 [facility] / [facility]    Anesthesia Start:  1311 Anesthesia Stop:      Procedure:  COLOSTOMY TAKEDOWN, INCISIONAL HERNIA REPAIR (N/A Abdomen) Diagnosis:      Surgeon:  Andrea Bocanegra MD Provider:  Robi Field MD    Anesthesia Type:  general ASA Status:  3          Anesthesia Type: general  Last vitals  BP   148/76 (02/19/19 1526)   Temp   97.8 °F (36.6 °C) (02/19/19 1526)   Pulse   62 (02/19/19 1526)   Resp   12 (02/19/19 1526)     SpO2   93 % (02/19/19 1526)     Post Anesthesia Care and Evaluation    Patient location during evaluation: PACU  Patient participation: complete - patient participated  Level of consciousness: awake and alert  Pain score: 0  Pain management: adequate  Airway patency: patent  Anesthetic complications: No anesthetic complications  PONV Status: none  Cardiovascular status: hemodynamically stable and acceptable  Respiratory status: nonlabored ventilation, acceptable and nasal cannula  Hydration status: acceptable

## 2019-02-20 PROCEDURE — 25010000002 ENOXAPARIN PER 10 MG: Performed by: SURGERY

## 2019-02-20 PROCEDURE — 25010000002 HYDROMORPHONE PER 4 MG: Performed by: SURGERY

## 2019-02-20 PROCEDURE — 94799 UNLISTED PULMONARY SVC/PX: CPT

## 2019-02-20 RX ORDER — ACETAMINOPHEN 325 MG/1
650 TABLET ORAL EVERY 6 HOURS PRN
Status: DISCONTINUED | OUTPATIENT
Start: 2019-02-20 | End: 2019-02-26 | Stop reason: HOSPADM

## 2019-02-20 RX ORDER — BUSPIRONE HYDROCHLORIDE 5 MG/1
5 TABLET ORAL EVERY 12 HOURS SCHEDULED
Status: DISCONTINUED | OUTPATIENT
Start: 2019-02-20 | End: 2019-02-26 | Stop reason: HOSPADM

## 2019-02-20 RX ORDER — QUETIAPINE FUMARATE 25 MG/1
50 TABLET, FILM COATED ORAL NIGHTLY
Status: DISCONTINUED | OUTPATIENT
Start: 2019-02-20 | End: 2019-02-26 | Stop reason: HOSPADM

## 2019-02-20 RX ADMIN — HYDROMORPHONE HYDROCHLORIDE 0.5 MG: 1 INJECTION, SOLUTION INTRAMUSCULAR; INTRAVENOUS; SUBCUTANEOUS at 02:47

## 2019-02-20 RX ADMIN — QUETIAPINE FUMARATE 50 MG: 25 TABLET ORAL at 20:05

## 2019-02-20 RX ADMIN — POTASSIUM CHLORIDE AND SODIUM CHLORIDE 100 ML/HR: 450; 150 INJECTION, SOLUTION INTRAVENOUS at 10:56

## 2019-02-20 RX ADMIN — SERTRALINE HYDROCHLORIDE 100 MG: 100 TABLET ORAL at 07:42

## 2019-02-20 RX ADMIN — ENOXAPARIN SODIUM 40 MG: 40 INJECTION SUBCUTANEOUS at 07:41

## 2019-02-20 RX ADMIN — HYDROMORPHONE HYDROCHLORIDE 0.5 MG: 1 INJECTION, SOLUTION INTRAMUSCULAR; INTRAVENOUS; SUBCUTANEOUS at 21:24

## 2019-02-20 RX ADMIN — POTASSIUM CHLORIDE AND SODIUM CHLORIDE 100 ML/HR: 450; 150 INJECTION, SOLUTION INTRAVENOUS at 23:05

## 2019-02-20 RX ADMIN — CARVEDILOL 12.5 MG: 12.5 TABLET, FILM COATED ORAL at 07:41

## 2019-02-20 RX ADMIN — HYDROMORPHONE HYDROCHLORIDE 0.5 MG: 1 INJECTION, SOLUTION INTRAMUSCULAR; INTRAVENOUS; SUBCUTANEOUS at 13:37

## 2019-02-20 RX ADMIN — BUSPIRONE HYDROCHLORIDE 5 MG: 5 TABLET ORAL at 20:05

## 2019-02-20 RX ADMIN — BUSPIRONE HYDROCHLORIDE 5 MG: 5 TABLET ORAL at 10:55

## 2019-02-20 RX ADMIN — POTASSIUM CHLORIDE AND SODIUM CHLORIDE 100 ML/HR: 450; 150 INJECTION, SOLUTION INTRAVENOUS at 02:49

## 2019-02-20 RX ADMIN — ACETAMINOPHEN 650 MG: 325 TABLET ORAL at 17:55

## 2019-02-21 LAB
ANION GAP SERPL CALCULATED.3IONS-SCNC: 4 MMOL/L (ref 3–11)
BUN BLD-MCNC: 15 MG/DL (ref 9–23)
BUN/CREAT SERPL: 18.8 (ref 7–25)
CALCIUM SPEC-SCNC: 8.8 MG/DL (ref 8.7–10.4)
CHLORIDE SERPL-SCNC: 102 MMOL/L (ref 99–109)
CO2 SERPL-SCNC: 24 MMOL/L (ref 20–31)
CREAT BLD-MCNC: 0.8 MG/DL (ref 0.6–1.3)
CYTO UR: NORMAL
GFR SERPL CREATININE-BSD FRML MDRD: 72 ML/MIN/1.73
GLUCOSE BLD-MCNC: 93 MG/DL (ref 70–100)
LAB AP CASE REPORT: NORMAL
LAB AP CLINICAL INFORMATION: NORMAL
PATH REPORT.FINAL DX SPEC: NORMAL
PATH REPORT.GROSS SPEC: NORMAL
POTASSIUM BLD-SCNC: 4.3 MMOL/L (ref 3.5–5.5)
SODIUM BLD-SCNC: 130 MMOL/L (ref 132–146)

## 2019-02-21 PROCEDURE — 80048 BASIC METABOLIC PNL TOTAL CA: CPT | Performed by: SURGERY

## 2019-02-21 PROCEDURE — 25010000002 ONDANSETRON PER 1 MG: Performed by: SURGERY

## 2019-02-21 PROCEDURE — 25010000002 HYDROMORPHONE PER 4 MG: Performed by: SURGERY

## 2019-02-21 PROCEDURE — 25010000002 ENOXAPARIN PER 10 MG: Performed by: SURGERY

## 2019-02-21 RX ADMIN — POTASSIUM CHLORIDE AND SODIUM CHLORIDE 100 ML/HR: 450; 150 INJECTION, SOLUTION INTRAVENOUS at 18:54

## 2019-02-21 RX ADMIN — BUSPIRONE HYDROCHLORIDE 5 MG: 5 TABLET ORAL at 20:05

## 2019-02-21 RX ADMIN — CARVEDILOL 12.5 MG: 12.5 TABLET, FILM COATED ORAL at 19:00

## 2019-02-21 RX ADMIN — POTASSIUM CHLORIDE AND SODIUM CHLORIDE 100 ML/HR: 450; 150 INJECTION, SOLUTION INTRAVENOUS at 09:58

## 2019-02-21 RX ADMIN — HYDROMORPHONE HYDROCHLORIDE 0.5 MG: 1 INJECTION, SOLUTION INTRAMUSCULAR; INTRAVENOUS; SUBCUTANEOUS at 16:25

## 2019-02-21 RX ADMIN — HYDROMORPHONE HYDROCHLORIDE 0.5 MG: 1 INJECTION, SOLUTION INTRAMUSCULAR; INTRAVENOUS; SUBCUTANEOUS at 08:18

## 2019-02-21 RX ADMIN — HYDROMORPHONE HYDROCHLORIDE 0.5 MG: 1 INJECTION, SOLUTION INTRAMUSCULAR; INTRAVENOUS; SUBCUTANEOUS at 04:49

## 2019-02-21 RX ADMIN — QUETIAPINE FUMARATE 50 MG: 25 TABLET ORAL at 20:05

## 2019-02-21 RX ADMIN — SERTRALINE HYDROCHLORIDE 100 MG: 100 TABLET ORAL at 08:18

## 2019-02-21 RX ADMIN — BUSPIRONE HYDROCHLORIDE 5 MG: 5 TABLET ORAL at 08:18

## 2019-02-21 RX ADMIN — ENOXAPARIN SODIUM 40 MG: 40 INJECTION SUBCUTANEOUS at 08:18

## 2019-02-21 RX ADMIN — CARVEDILOL 12.5 MG: 12.5 TABLET, FILM COATED ORAL at 08:18

## 2019-02-21 RX ADMIN — ONDANSETRON 4 MG: 2 INJECTION INTRAMUSCULAR; INTRAVENOUS at 16:29

## 2019-02-22 LAB
DEPRECATED RDW RBC AUTO: 46.5 FL (ref 37–54)
ERYTHROCYTE [DISTWIDTH] IN BLOOD BY AUTOMATED COUNT: 13.3 % (ref 11.3–14.5)
HCT VFR BLD AUTO: 27.6 % (ref 34.5–44)
HGB BLD-MCNC: 8.9 G/DL (ref 11.5–15.5)
MCH RBC QN AUTO: 30.8 PG (ref 27–31)
MCHC RBC AUTO-ENTMCNC: 32.2 G/DL (ref 32–36)
MCV RBC AUTO: 95.5 FL (ref 80–99)
PLATELET # BLD AUTO: 189 10*3/MM3 (ref 150–450)
PMV BLD AUTO: 10.1 FL (ref 6–12)
RBC # BLD AUTO: 2.89 10*6/MM3 (ref 3.89–5.14)
WBC NRBC COR # BLD: 7.21 10*3/MM3 (ref 3.5–10.8)

## 2019-02-22 PROCEDURE — 25010000002 ENOXAPARIN PER 10 MG: Performed by: SURGERY

## 2019-02-22 PROCEDURE — 25010000002 HYDROMORPHONE PER 4 MG: Performed by: SURGERY

## 2019-02-22 PROCEDURE — 85027 COMPLETE CBC AUTOMATED: CPT | Performed by: SURGERY

## 2019-02-22 RX ADMIN — ACETAMINOPHEN 650 MG: 325 TABLET ORAL at 20:09

## 2019-02-22 RX ADMIN — BUSPIRONE HYDROCHLORIDE 5 MG: 5 TABLET ORAL at 09:09

## 2019-02-22 RX ADMIN — CARVEDILOL 12.5 MG: 12.5 TABLET, FILM COATED ORAL at 18:25

## 2019-02-22 RX ADMIN — ENOXAPARIN SODIUM 40 MG: 40 INJECTION SUBCUTANEOUS at 09:09

## 2019-02-22 RX ADMIN — ACETAMINOPHEN 650 MG: 325 TABLET ORAL at 00:25

## 2019-02-22 RX ADMIN — HYDROMORPHONE HYDROCHLORIDE 0.5 MG: 1 INJECTION, SOLUTION INTRAMUSCULAR; INTRAVENOUS; SUBCUTANEOUS at 04:20

## 2019-02-22 RX ADMIN — CARVEDILOL 12.5 MG: 12.5 TABLET, FILM COATED ORAL at 09:08

## 2019-02-22 RX ADMIN — BUSPIRONE HYDROCHLORIDE 5 MG: 5 TABLET ORAL at 20:09

## 2019-02-22 RX ADMIN — QUETIAPINE FUMARATE 50 MG: 25 TABLET ORAL at 20:09

## 2019-02-22 RX ADMIN — HYDROMORPHONE HYDROCHLORIDE 0.5 MG: 1 INJECTION, SOLUTION INTRAMUSCULAR; INTRAVENOUS; SUBCUTANEOUS at 10:10

## 2019-02-22 RX ADMIN — SERTRALINE HYDROCHLORIDE 100 MG: 100 TABLET ORAL at 09:09

## 2019-02-22 RX ADMIN — HYDROMORPHONE HYDROCHLORIDE 0.5 MG: 1 INJECTION, SOLUTION INTRAMUSCULAR; INTRAVENOUS; SUBCUTANEOUS at 16:55

## 2019-02-23 ENCOUNTER — APPOINTMENT (OUTPATIENT)
Dept: GENERAL RADIOLOGY | Facility: HOSPITAL | Age: 64
End: 2019-02-23

## 2019-02-23 LAB
ANION GAP SERPL CALCULATED.3IONS-SCNC: 7 MMOL/L (ref 3–11)
BUN BLD-MCNC: 12 MG/DL (ref 9–23)
BUN/CREAT SERPL: 15.2 (ref 7–25)
CALCIUM SPEC-SCNC: 8.7 MG/DL (ref 8.7–10.4)
CHLORIDE SERPL-SCNC: 107 MMOL/L (ref 99–109)
CO2 SERPL-SCNC: 25 MMOL/L (ref 20–31)
CREAT BLD-MCNC: 0.79 MG/DL (ref 0.6–1.3)
DEPRECATED RDW RBC AUTO: 46.3 FL (ref 37–54)
ERYTHROCYTE [DISTWIDTH] IN BLOOD BY AUTOMATED COUNT: 13.2 % (ref 11.3–14.5)
GFR SERPL CREATININE-BSD FRML MDRD: 73 ML/MIN/1.73
GLUCOSE BLD-MCNC: 117 MG/DL (ref 70–100)
HCT VFR BLD AUTO: 31.4 % (ref 34.5–44)
HGB BLD-MCNC: 10.2 G/DL (ref 11.5–15.5)
MCH RBC QN AUTO: 31 PG (ref 27–31)
MCHC RBC AUTO-ENTMCNC: 32.5 G/DL (ref 32–36)
MCV RBC AUTO: 95.4 FL (ref 80–99)
PLATELET # BLD AUTO: 298 10*3/MM3 (ref 150–450)
PMV BLD AUTO: 10 FL (ref 6–12)
POTASSIUM BLD-SCNC: 3.7 MMOL/L (ref 3.5–5.5)
RBC # BLD AUTO: 3.29 10*6/MM3 (ref 3.89–5.14)
SODIUM BLD-SCNC: 139 MMOL/L (ref 132–146)
WBC NRBC COR # BLD: 8.15 10*3/MM3 (ref 3.5–10.8)

## 2019-02-23 PROCEDURE — 80048 BASIC METABOLIC PNL TOTAL CA: CPT | Performed by: SURGERY

## 2019-02-23 PROCEDURE — 25010000002 ENOXAPARIN PER 10 MG: Performed by: SURGERY

## 2019-02-23 PROCEDURE — 25010000002 ONDANSETRON PER 1 MG: Performed by: SURGERY

## 2019-02-23 PROCEDURE — 74022 RADEX COMPL AQT ABD SERIES: CPT

## 2019-02-23 PROCEDURE — 25010000002 HYDROMORPHONE PER 4 MG: Performed by: SURGERY

## 2019-02-23 PROCEDURE — 85027 COMPLETE CBC AUTOMATED: CPT | Performed by: SURGERY

## 2019-02-23 RX ADMIN — BUSPIRONE HYDROCHLORIDE 5 MG: 5 TABLET ORAL at 20:54

## 2019-02-23 RX ADMIN — HYDROMORPHONE HYDROCHLORIDE 0.5 MG: 1 INJECTION, SOLUTION INTRAMUSCULAR; INTRAVENOUS; SUBCUTANEOUS at 05:36

## 2019-02-23 RX ADMIN — ONDANSETRON 4 MG: 2 INJECTION INTRAMUSCULAR; INTRAVENOUS at 08:45

## 2019-02-23 RX ADMIN — BUSPIRONE HYDROCHLORIDE 5 MG: 5 TABLET ORAL at 08:18

## 2019-02-23 RX ADMIN — CARVEDILOL 12.5 MG: 12.5 TABLET, FILM COATED ORAL at 08:18

## 2019-02-23 RX ADMIN — ENOXAPARIN SODIUM 40 MG: 40 INJECTION SUBCUTANEOUS at 08:19

## 2019-02-23 RX ADMIN — QUETIAPINE FUMARATE 50 MG: 25 TABLET ORAL at 20:54

## 2019-02-23 RX ADMIN — CARVEDILOL 12.5 MG: 12.5 TABLET, FILM COATED ORAL at 18:19

## 2019-02-23 RX ADMIN — HYDROMORPHONE HYDROCHLORIDE 0.5 MG: 1 INJECTION, SOLUTION INTRAMUSCULAR; INTRAVENOUS; SUBCUTANEOUS at 18:19

## 2019-02-23 RX ADMIN — SERTRALINE HYDROCHLORIDE 100 MG: 100 TABLET ORAL at 08:18

## 2019-02-24 PROCEDURE — 25010000002 HYDROMORPHONE PER 4 MG: Performed by: SURGERY

## 2019-02-24 RX ORDER — HYDROCODONE BITARTRATE AND ACETAMINOPHEN 5; 325 MG/1; MG/1
1 TABLET ORAL EVERY 6 HOURS PRN
Status: DISCONTINUED | OUTPATIENT
Start: 2019-02-24 | End: 2019-02-25

## 2019-02-24 RX ADMIN — HYDROMORPHONE HYDROCHLORIDE 0.5 MG: 1 INJECTION, SOLUTION INTRAMUSCULAR; INTRAVENOUS; SUBCUTANEOUS at 05:12

## 2019-02-24 RX ADMIN — BUSPIRONE HYDROCHLORIDE 5 MG: 5 TABLET ORAL at 07:59

## 2019-02-24 RX ADMIN — BUSPIRONE HYDROCHLORIDE 5 MG: 5 TABLET ORAL at 21:16

## 2019-02-24 RX ADMIN — HYDROMORPHONE HYDROCHLORIDE 0.5 MG: 1 INJECTION, SOLUTION INTRAMUSCULAR; INTRAVENOUS; SUBCUTANEOUS at 18:39

## 2019-02-24 RX ADMIN — QUETIAPINE FUMARATE 50 MG: 25 TABLET ORAL at 21:16

## 2019-02-24 RX ADMIN — CARVEDILOL 12.5 MG: 12.5 TABLET, FILM COATED ORAL at 17:33

## 2019-02-24 RX ADMIN — CARVEDILOL 12.5 MG: 12.5 TABLET, FILM COATED ORAL at 07:58

## 2019-02-24 RX ADMIN — HYDROMORPHONE HYDROCHLORIDE 0.5 MG: 1 INJECTION, SOLUTION INTRAMUSCULAR; INTRAVENOUS; SUBCUTANEOUS at 08:03

## 2019-02-24 RX ADMIN — HYDROCODONE BITARTRATE AND ACETAMINOPHEN 1 TABLET: 5; 325 TABLET ORAL at 15:44

## 2019-02-24 RX ADMIN — SERTRALINE HYDROCHLORIDE 100 MG: 100 TABLET ORAL at 07:58

## 2019-02-25 PROCEDURE — 97163 PT EVAL HIGH COMPLEX 45 MIN: CPT

## 2019-02-25 PROCEDURE — 0WQF0ZZ REPAIR ABDOMINAL WALL, OPEN APPROACH: ICD-10-PCS | Performed by: SURGERY

## 2019-02-25 PROCEDURE — 0DBM0ZZ EXCISION OF DESCENDING COLON, OPEN APPROACH: ICD-10-PCS | Performed by: SURGERY

## 2019-02-25 PROCEDURE — 2W13X6Z COMPRESSION OF ABDOMINAL WALL USING PRESSURE DRESSING: ICD-10-PCS | Performed by: SURGERY

## 2019-02-25 PROCEDURE — 97605 NEG PRS WND THER DME<=50SQCM: CPT

## 2019-02-25 RX ORDER — KETOROLAC TROMETHAMINE 15 MG/ML
15 INJECTION, SOLUTION INTRAMUSCULAR; INTRAVENOUS EVERY 8 HOURS PRN
Status: DISCONTINUED | OUTPATIENT
Start: 2019-02-25 | End: 2019-02-26 | Stop reason: HOSPADM

## 2019-02-25 RX ORDER — HYDROCODONE BITARTRATE AND ACETAMINOPHEN 5; 325 MG/1; MG/1
1 TABLET ORAL EVERY 4 HOURS PRN
Status: DISCONTINUED | OUTPATIENT
Start: 2019-02-25 | End: 2019-02-26 | Stop reason: HOSPADM

## 2019-02-25 RX ADMIN — HYDROCODONE BITARTRATE AND ACETAMINOPHEN 1 TABLET: 5; 325 TABLET ORAL at 08:46

## 2019-02-25 RX ADMIN — SERTRALINE HYDROCHLORIDE 100 MG: 100 TABLET ORAL at 08:46

## 2019-02-25 RX ADMIN — CARVEDILOL 12.5 MG: 12.5 TABLET, FILM COATED ORAL at 22:03

## 2019-02-25 RX ADMIN — CARVEDILOL 12.5 MG: 12.5 TABLET, FILM COATED ORAL at 08:46

## 2019-02-25 RX ADMIN — HYDROCODONE BITARTRATE AND ACETAMINOPHEN 1 TABLET: 5; 325 TABLET ORAL at 18:50

## 2019-02-25 RX ADMIN — BUSPIRONE HYDROCHLORIDE 5 MG: 5 TABLET ORAL at 22:03

## 2019-02-25 RX ADMIN — HYDROCODONE BITARTRATE AND ACETAMINOPHEN 1 TABLET: 5; 325 TABLET ORAL at 12:39

## 2019-02-25 RX ADMIN — BUSPIRONE HYDROCHLORIDE 5 MG: 5 TABLET ORAL at 08:46

## 2019-02-25 RX ADMIN — QUETIAPINE FUMARATE 50 MG: 25 TABLET ORAL at 22:03

## 2019-02-25 RX ADMIN — HYDROCODONE BITARTRATE AND ACETAMINOPHEN 1 TABLET: 5; 325 TABLET ORAL at 00:59

## 2019-02-26 ENCOUNTER — TELEPHONE (OUTPATIENT)
Dept: INTERNAL MEDICINE | Facility: CLINIC | Age: 64
End: 2019-02-26

## 2019-02-26 VITALS
HEIGHT: 63 IN | TEMPERATURE: 98.8 F | BODY MASS INDEX: 29.77 KG/M2 | DIASTOLIC BLOOD PRESSURE: 70 MMHG | RESPIRATION RATE: 18 BRPM | HEART RATE: 66 BPM | OXYGEN SATURATION: 97 % | SYSTOLIC BLOOD PRESSURE: 132 MMHG | WEIGHT: 168 LBS

## 2019-02-26 PROCEDURE — 97605 NEG PRS WND THER DME<=50SQCM: CPT

## 2019-02-26 RX ORDER — HYDROCODONE BITARTRATE AND ACETAMINOPHEN 5; 325 MG/1; MG/1
1 TABLET ORAL EVERY 4 HOURS PRN
Qty: 15 TABLET | Refills: 0 | Status: SHIPPED | OUTPATIENT
Start: 2019-02-26 | End: 2019-05-03

## 2019-02-26 RX ADMIN — HYDROCODONE BITARTRATE AND ACETAMINOPHEN 1 TABLET: 5; 325 TABLET ORAL at 15:48

## 2019-02-26 RX ADMIN — HYDROCODONE BITARTRATE AND ACETAMINOPHEN 1 TABLET: 5; 325 TABLET ORAL at 08:44

## 2019-02-26 RX ADMIN — BUSPIRONE HYDROCHLORIDE 5 MG: 5 TABLET ORAL at 08:44

## 2019-02-26 RX ADMIN — SERTRALINE HYDROCHLORIDE 100 MG: 100 TABLET ORAL at 09:07

## 2019-02-26 RX ADMIN — CARVEDILOL 12.5 MG: 12.5 TABLET, FILM COATED ORAL at 08:44

## 2019-02-26 NOTE — TELEPHONE ENCOUNTER
Pt was discharged 2/26/2019 from [facility] dx colostomy reversal    appt 3/6/2019 with Dr Connell

## 2019-02-27 ENCOUNTER — READMISSION MANAGEMENT (OUTPATIENT)
Dept: CALL CENTER | Facility: HOSPITAL | Age: 64
End: 2019-02-27

## 2019-02-27 ENCOUNTER — TRANSITIONAL CARE MANAGEMENT TELEPHONE ENCOUNTER (OUTPATIENT)
Dept: INTERNAL MEDICINE | Facility: CLINIC | Age: 64
End: 2019-02-27

## 2019-02-27 NOTE — OUTREACH NOTE
Prep Survey      Responses   Facility patient discharged from?  [facility]   Is patient eligible?  Yes   Discharge diagnosis  Sigmoid volvulus COLOSTOMY TAKEDOWN, INCISIONAL HERNIA REPAIR   Does the patient have one of the following disease processes/diagnoses(primary or secondary)?  General Surgery   Does the patient have Home health ordered?  No   Is there a DME ordered?  No   Prep survey completed?  Yes          Sanjana Mortensen RN

## 2019-02-27 NOTE — OUTREACH NOTE
KANDI call completed.  Please refer to TCM call flowsheet for call documentation.    Pt states feeling not too bad just tired. Pain from surgery controlled well with pain medication rx'd from the hospital. Denies any questions concerns or needs. Has wound care appt tomorrow. Confirms f/u with surgeon 3/5 and pcp/kandi 3/6.

## 2019-02-28 ENCOUNTER — READMISSION MANAGEMENT (OUTPATIENT)
Dept: CALL CENTER | Facility: HOSPITAL | Age: 64
End: 2019-02-28

## 2019-02-28 ENCOUNTER — HOSPITAL ENCOUNTER (OUTPATIENT)
Dept: PHYSICAL THERAPY | Facility: HOSPITAL | Age: 64
Setting detail: THERAPIES SERIES
Discharge: HOME | End: 2019-02-28

## 2019-02-28 DIAGNOSIS — S31.109D OPEN WOUND OF ABDOMEN, SUBSEQUENT ENCOUNTER: Primary | ICD-10-CM

## 2019-02-28 PROCEDURE — 97162 PT EVAL MOD COMPLEX 30 MIN: CPT

## 2019-02-28 PROCEDURE — 97605 NEG PRS WND THER DME<=50SQCM: CPT

## 2019-02-28 NOTE — OUTREACH NOTE
General Surgery Week 1 Survey      Responses   Facility patient discharged from?  [facility]   Does the patient have one of the following disease processes/diagnoses(primary or secondary)?  General Surgery   Is there a successful TCM telephone encounter documented?  Yes          Vandana Peck RN

## 2019-03-03 ENCOUNTER — HOSPITAL ENCOUNTER (OUTPATIENT)
Dept: PHYSICAL THERAPY | Facility: HOSPITAL | Age: 64
Setting detail: THERAPIES SERIES
Discharge: HOME | End: 2019-03-03

## 2019-03-03 DIAGNOSIS — S31.109D OPEN WOUND OF ABDOMEN, SUBSEQUENT ENCOUNTER: Primary | ICD-10-CM

## 2019-03-03 PROCEDURE — 97602 WOUND(S) CARE NON-SELECTIVE: CPT

## 2019-03-03 PROCEDURE — 97605 NEG PRS WND THER DME<=50SQCM: CPT

## 2019-03-03 NOTE — THERAPY WOUND CARE TREATMENT
Outpatient Rehabilitation - Wound/Debridement Treatment Note   Nancy     Patient Name: Tereza Ackerman  : 1955  MRN: [id number]  Today's Date: 3/3/2019                 Admit Date: 3/3/2019    Visit Dx:    ICD-10-CM ICD-9-CM   1. Open wound of abdomen, subsequent encounter S31.109D V58.89     879.2       Patient Active Problem List   Diagnosis   • Impaired glucose tolerance   • Hypertension   • Parathyroid adenoma   • Reaction to chronic stress   • Multinodular goiter   • Vitamin D deficiency   • Meningioma, recurrent of brain (CMS/HCC)   • Arthritis   • Depression   • Small bowel obstruction (CMS/HCC)   • Insomnia   • History of Nissen fundoplication   • Malignant neoplasm of left orbit (CMS/HCC)   • Prediabetes   • Hyperlipemia   • Hyperglycemia   • Atrial flutter with rapid ventricular response (CMS/HCC)   • Anemia   • Large bowel obstruction (CMS/HCC)   • Abdominal pain   • Essential hypertension   • History of creation of ostomy (CMS/HCC)   • Screen for colon cancer   • Sigmoid volvulus (CMS/HCC)        Past Medical History:   Diagnosis Date   • Arthritis     rt knee    • Atrial flutter with rapid ventricular response (CMS/HCC) 2017   • Back pain    • Brain tumor (CMS/HCC)    • Colostomy present (CMS/HCC) 2018   • Depression    • History of blood transfusion    • History of gastroesophageal reflux (GERD)     resolved since Nissen   • History of Nissen fundoplication 2017   • History of small bowel obstruction    • Hyperlipemia    • Hypertension    • Memory loss or impairment    • Migraine    • Parathyroid adenoma     Incidental on thyroid US.   • PONV (postoperative nausea and vomiting)     nausea - preprocedural meds help    • Prediabetes     Last Impression: 2015  Reviewed labs. Excellent control.  Emery Connell Impression: 2015  Reviewed labs. Excellent control.  Michaela Connell   • Thyroid nodule      Last Impression: 2015  r/o thyroid  cancer, will proceed with US.  Michaela Connell (Internal Medicine)    • UTI (urinary tract infection)    • Vision changes     blockages left eye    • Wears glasses     readers        Past Surgical History:   Procedure Laterality Date   • CARPAL TUNNEL RELEASE  2002    right    • COLONOSCOPY N/A 8/18/2018    Procedure: COLONOSCOPY;  Surgeon: Inderjit Campos MD;  Location:  [facility];  Service: Gastroenterology   • COLONOSCOPY N/A 11/28/2018    Procedure: COLONOSCOPY;  Surgeon: Inderjit Campos MD;  Location:  [facility];  Service: Gastroenterology   • COLOSTOMY CLOSURE N/A 2/19/2019    Procedure: COLOSTOMY TAKEDOWN, INCISIONAL HERNIA REPAIR;  Surgeon: Andrea Bocanegra MD;  Location:  [facility];  Service: General   • CRANIOTOMY  2003 & 2014    Dr. Werner Hollis for tumor removal   • ENDOSCOPY     • EXPLORATORY LAPAROTOMY N/A 12/5/2017    Procedure: EXPLORATORY LAPAROTOMY, SMALL BOWEL RESECTION;  Surgeon: Ирина Cobian MD;  Location:  [facility];  Service:    • EXPLORATORY LAPAROTOMY N/A 12/22/2017    Procedure: LAPAROTOMY EXPLORATORY FOR SMALL BOWEL OBSTRUCTION;  Surgeon: Ирина Cobian MD;  Location:  [facility];  Service:    • EXPLORATORY LAPAROTOMY N/A 7/23/2018    Procedure: EXPLORATORY LAPAROTOMY, APPENDECTOMY, CECOPEXY, INCISIONAL HERNIA REPAIR, LYSIS OF ADHESIONS;  Surgeon: Andrea Bocanegra MD;  Location:  [facility];  Service: General   • EXPLORATORY LAPAROTOMY N/A 8/19/2018    Procedure: LAPAROTOMY EXPLORATORY, SIGMOID COLECTOMY, CREATION OF OSTOMY;  Surgeon: Andrea Bocanegra MD;  Location:  [facility];  Service: General   • HYSTERECTOMY      partial - both ovaries still present pt believes    • INSERTION HEMODIALYSIS CATHETER N/A 12/7/2017    Procedure: HEMODIALYSIS CATHETER INSERTION;  Surgeon: Chance Valenzuela MD;  Location:  [facility];  Service:    • ORBITOTOMY Left 11/3/2017    Procedure:  LEFT LATERAL ORBITOTOMY WITH DEBULKING OF TUMOR ;  Surgeon: Moo Hopkins MD;   [facility];  Service:    • OTHER SURGICAL HISTORY      esophagogastric fundoplasty nissen fundoplication   • TUBAL ABDOMINAL LIGATION           EVALUATION  PT Ortho     Row Name 03/03/19 1030       Subjective Comments    Subjective Comments  Pt states she had a little more pain after her last treatment, states she took pain medication this morning before her appt.  -JM       Subjective Pain    Able to rate subjective pain?  yes  -JM    Pre-Treatment Pain Level  2  -JM    Post-Treatment Pain Level  2  -JM       Transfers    Comment (Transfers)  supine for tx  -JM       Gait/Stairs Assessment/Training    [place] Level (Gait)  independent  -      User Key  (r) = Recorded By, (t) = Taken By, (c) = Cosigned By    Initials Name Provider Type    Candice Ramirez PT Physical Therapist              LDA Wound     Row Name 03/03/19 1030             Wound 02/28/19 1400 midline abdomen surgical    Wound - Properties Group Date first assessed: 02/28/19  - Time first assessed: 1400  -MF Orientation: midline  -MF Location: abdomen  -MF Type: surgical  -MF    Wound Image  Images linked: 2  -JM      Dressing Appearance  intact;moist drainage  -JM      Base  moist;pink;yellow;red/granulating  -JM      Periwound  intact  -JM      Periwound Temperature  warm  -JM      Periwound Skin Turgor  soft  -JM      Edges  open  -JM      Drainage Characteristics/Odor  serosanguineous  -JM      Drainage Amount  small  -JM      Care, Wound  cleansed with;wound cleanser;debrided;negative pressure wound therapy  -JM      Wound Output (mL)  150  -JM         Wound 02/28/19 1400 Left lateral abdomen surgical    Wound - Properties Group Date first assessed: 02/28/19  - Time first assessed: 1400  -MF Side: Left  -MF Orientation: lateral  -MF Location: abdomen  -MF Type: surgical  -MF    Wound Image  Images linked: 1  -JM      Dressing Appearance  intact;moist drainage  -JM      Base  moist;pink;red/granulating  -JM      Periwound   intact  -JM      Periwound Temperature  warm  -JM      Periwound Skin Turgor  soft  -JM      Edges  open  -JM      Drainage Characteristics/Odor  serosanguineous  -JM      Drainage Amount  small  -JM      Care, Wound  cleansed with;wound cleanser;debrided;negative pressure wound therapy  -JM         NPWT (Negative Pressure Wound Therapy) 02/28/19 1400 midline and lateral wound    NPWT (Negative Pressure Wound Therapy) - Properties Group Placement Date: 02/28/19  -MF Placement Time: 1400  -MF Location: midline and lateral wound  -MF    Therapy Setting  continuous therapy  -JM      Dressing  foam, black;foam, white  -JM      Pressure Setting  150 mmHg  -JM      Sponges Inserted  5 2 white, 3 black  -JM      Sponges Removed  5 2 white, 3 black  -JM      Finger sweep complete  Yes  -JM        User Key  (r) = Recorded By, (t) = Taken By, (c) = Cosigned By    Initials Name Provider Type    Alli Delvalle, PT Physical Therapist    Candice Ramirez, PT Physical Therapist            WOUND DEBRIDEMENT  Total area of Debridement: nonselective  Debridement Site 1  Location- Site 1: abd wounds  Selective Debridement- Site 1: Wound Surface <20cmsq  Instruments- Site 1: other (comment)(4x4 gauze)  Excised Tissue Description- Site 1: minimum, slough  Bleeding- Site 1: none             Therapy Education     Row Name 03/03/19 1030             Therapy Education    Given  Symptoms/condition management;Bandaging/dressing change  -      Program  Reinforced  -      How Provided  Verbal;Demonstration  -      Provided to  Caregiver;Patient  -      Level of Understanding  Verbalized;Demonstrated  -        User Key  (r) = Recorded By, (t) = Taken By, (c) = Cosigned By    Initials Name Provider Type    Candice Ramirez, PT Physical Therapist          Recommendation and Plan  PT Assessment/Plan     Row Name 03/03/19 1030          PT Assessment    Functional Limitations  Other (comment);Performance in self-care  ADL;Limitations in community activities wound healing   -     Impairments  Integumentary integrity  -     Assessment Comments  ABD wounds with beefy red granulation tissue, scant slough in base.  Periwound skin intact without redness or skin irritation.  Pt tolerating wound vac without issues.  Anticipate good progress with tx.  -        PT Plan    PT Frequency  3x/week  -     Physical Therapy Interventions (Optional Details)  patient/family education;wound care  -     PT Plan Comments  NPWT  -       User Key  (r) = Recorded By, (t) = Taken By, (c) = Cosigned By    Initials Name Provider Type    Candice Ramirez, PT Physical Therapist          Goals  PT OP Goals     Row Name 03/03/19 1030          Time Calculation    PT Goal Re-Cert Due Date  05/29/19  -       User Key  (r) = Recorded By, (t) = Taken By, (c) = Cosigned By    Initials Name Provider Type    Candice Ramirez, PT Physical Therapist          PT Goal Re-Cert Due Date: 05/29/19            Time Calculation: Start Time: 1030    Therapy Charges for Today     Code Description Service Date Service Provider Modifiers Qty    58845763283 HC NONSELECTIVE DEBRIDEMENT 3/3/2019 Candice Constantino, PT GP 1    08587247057 HC PT NEG PRESS WOUND TO 50SQCM DME3 3/3/2019 Candice Constantino, PT GP 1        Therapy Suggested Charges     Code   Minutes Charges    None                   Candice Constantino, PT  3/3/2019

## 2019-03-06 ENCOUNTER — READMISSION MANAGEMENT (OUTPATIENT)
Dept: CALL CENTER | Facility: HOSPITAL | Age: 64
End: 2019-03-06

## 2019-03-06 ENCOUNTER — HOSPITAL ENCOUNTER (OUTPATIENT)
Dept: PHYSICAL THERAPY | Facility: HOSPITAL | Age: 64
Setting detail: THERAPIES SERIES
Discharge: HOME | End: 2019-03-06

## 2019-03-06 DIAGNOSIS — S31.109D OPEN WOUND OF ABDOMEN, SUBSEQUENT ENCOUNTER: Primary | ICD-10-CM

## 2019-03-06 PROCEDURE — 97605 NEG PRS WND THER DME<=50SQCM: CPT

## 2019-03-06 NOTE — OUTREACH NOTE
General Surgery Week 2 Survey      Responses   Facility patient discharged from?  [facility]   Does the patient have one of the following disease processes/diagnoses(primary or secondary)?  General Surgery   Week 2 attempt successful?  Yes   Call start time  0748   Call end time  0757   Discharge diagnosis  Sigmoid volvulus COLOSTOMY TAKEDOWN, INCISIONAL HERNIA REPAIR   Is patient permission given to speak with other caregiver?  No   Meds reviewed with patient/caregiver?  Yes   Is the patient having any side effects they believe may be caused by any medication additions or changes?  No   Does the patient have all medications related to this admission filled (includes all antibiotics, pain medications, etc.)  Yes   Is the patient taking all medications as directed (includes completed medication regime)?  Yes   Does the patient have a follow up appointment scheduled with their surgeon?  Yes   Has the patient kept scheduled appointments due by today?  Yes   Comments  Surgeon appt yesterday 03/05/2019   Wound care appt today 03/06/2019   Has home health visited the patient within 72 hours of discharge?  N/A   What DME was ordered?  Wound Vac in place. Goes to wound care 3x wk.   Psychosocial issues?  No   Did the patient receive a copy of their discharge instructions?  Yes   Nursing interventions  Reviewed instructions with patient, Educated on MyChart   What is the patient's perception of their health status since discharge?  Improving   Nursing interventions  Nurse provided patient education   Is the patient /caregiver able to teach back basic post-op care?  Practice 'cough and deep breath', Drive as instructed by MD in discharge instructions, Take showers only when approved by MD-sponge bathe until then, No tub bath, swimming, or hot tub until instructed by MD, Keep incision areas clean,dry and protected, Do not remove steri-strips, Lifting as instructed by MD in discharge instructions   Is the patient/caregiver able  to teach back signs and symptoms of incisional infection?  Increased redness, swelling or pain at the incisonal site, Increased drainage or bleeding, Incisional warmth, Pus or odor from incision, Fever   Is the patient/caregiver able to teach back steps to recovery at home?  Set small, achievable goals for return to baseline health, Rest and rebuild strength, gradually increase activity, Eat a well-balance diet   Is the patient/caregiver able to teach back the hierarchy of who to call/visit for symptoms/problems? PCP, Specialist, Home health nurse, Urgent Care, ED, 911  Yes   Week 2 call completed?  Yes          Sarah Méndez RN

## 2019-03-06 NOTE — THERAPY WOUND CARE TREATMENT
Outpatient Rehabilitation - Wound/Debridement Treatment Note   Nancy     Patient Name: Tereza Ackerman  : 1955  MRN: [id number]  Today's Date: 3/6/2019                 Admit Date: 3/6/2019    Visit Dx:    ICD-10-CM ICD-9-CM   1. Open wound of abdomen, subsequent encounter S31.109D V58.89     879.2       Patient Active Problem List   Diagnosis   • Impaired glucose tolerance   • Hypertension   • Parathyroid adenoma   • Reaction to chronic stress   • Multinodular goiter   • Vitamin D deficiency   • Meningioma, recurrent of brain (CMS/HCC)   • Arthritis   • Depression   • Small bowel obstruction (CMS/HCC)   • Insomnia   • History of Nissen fundoplication   • Malignant neoplasm of left orbit (CMS/HCC)   • Prediabetes   • Hyperlipemia   • Hyperglycemia   • Atrial flutter with rapid ventricular response (CMS/HCC)   • Anemia   • Large bowel obstruction (CMS/HCC)   • Abdominal pain   • Essential hypertension   • History of creation of ostomy (CMS/HCC)   • Screen for colon cancer   • Sigmoid volvulus (CMS/HCC)        Past Medical History:   Diagnosis Date   • Arthritis     rt knee    • Atrial flutter with rapid ventricular response (CMS/HCC) 2017   • Back pain    • Brain tumor (CMS/HCC)    • Colostomy present (CMS/HCC) 2018   • Depression    • History of blood transfusion    • History of gastroesophageal reflux (GERD)     resolved since Nissen   • History of Nissen fundoplication 2017   • History of small bowel obstruction    • Hyperlipemia    • Hypertension    • Memory loss or impairment    • Migraine    • Parathyroid adenoma     Incidental on thyroid US.   • PONV (postoperative nausea and vomiting)     nausea - preprocedural meds help    • Prediabetes     Last Impression: 2015  Reviewed labs. Excellent control.  Emery Connell Impression: 2015  Reviewed labs. Excellent control.  Michaela Connell   • Thyroid nodule      Last Impression: 2015  r/o thyroid  cancer, will proceed with US.  Michaela Connell (Internal Medicine)    • UTI (urinary tract infection)    • Vision changes     blockages left eye    • Wears glasses     readers        Past Surgical History:   Procedure Laterality Date   • CARPAL TUNNEL RELEASE  2002    right    • COLONOSCOPY N/A 8/18/2018    Procedure: COLONOSCOPY;  Surgeon: Inderjit Campos MD;  Location:  [facility];  Service: Gastroenterology   • COLONOSCOPY N/A 11/28/2018    Procedure: COLONOSCOPY;  Surgeon: Inderjit Campos MD;  Location:  [facility];  Service: Gastroenterology   • COLOSTOMY CLOSURE N/A 2/19/2019    Procedure: COLOSTOMY TAKEDOWN, INCISIONAL HERNIA REPAIR;  Surgeon: Andrea Bocanegra MD;  Location:  [facility];  Service: General   • CRANIOTOMY  2003 & 2014    Dr. Werner Hollis for tumor removal   • ENDOSCOPY     • EXPLORATORY LAPAROTOMY N/A 12/5/2017    Procedure: EXPLORATORY LAPAROTOMY, SMALL BOWEL RESECTION;  Surgeon: Ирина Cobian MD;  Location:  [facility];  Service:    • EXPLORATORY LAPAROTOMY N/A 12/22/2017    Procedure: LAPAROTOMY EXPLORATORY FOR SMALL BOWEL OBSTRUCTION;  Surgeon: Ирина Cobian MD;  Location:  [facility];  Service:    • EXPLORATORY LAPAROTOMY N/A 7/23/2018    Procedure: EXPLORATORY LAPAROTOMY, APPENDECTOMY, CECOPEXY, INCISIONAL HERNIA REPAIR, LYSIS OF ADHESIONS;  Surgeon: Andrea Bocanegra MD;  Location:  [facility];  Service: General   • EXPLORATORY LAPAROTOMY N/A 8/19/2018    Procedure: LAPAROTOMY EXPLORATORY, SIGMOID COLECTOMY, CREATION OF OSTOMY;  Surgeon: Andrea Bocanegra MD;  Location:  [facility];  Service: General   • HYSTERECTOMY      partial - both ovaries still present pt believes    • INSERTION HEMODIALYSIS CATHETER N/A 12/7/2017    Procedure: HEMODIALYSIS CATHETER INSERTION;  Surgeon: Chance Valenzuela MD;  Location:  [facility];  Service:    • ORBITOTOMY Left 11/3/2017    Procedure:  LEFT LATERAL ORBITOTOMY WITH DEBULKING OF TUMOR ;  Surgeon: Moo Hopkins MD;   "Location: [facility];  Service:    • OTHER SURGICAL HISTORY      esophagogastric fundoplasty nissen fundoplication   • TUBAL ABDOMINAL LIGATION           EVALUATION  PT Ortho     Row Name 03/06/19 1030       Subjective Comments    Subjective Comments  No issues to report. Andrea Bocanegra MD present to assess wound and removal staples  -ES       Subjective Pain    Able to rate subjective pain?  yes  -ES    Pre-Treatment Pain Level  2  -ES    Post-Treatment Pain Level  2  -ES       Transfers    Comment (Transfers)  supine for tx  -ES       Gait/Stairs Assessment/Training    [place] Level (Gait)  independent  -ES      User Key  (r) = Recorded By, (t) = Taken By, (c) = Cosigned By    Initials Name Provider Type    ES Jaimie Dueñas, PT Physical Therapist              LDA Wound     Row Name 03/06/19 1030             Wound 02/28/19 1400 midline abdomen surgical    Wound - Properties Group Date first assessed: 02/28/19  - Time first assessed: 1400  - Orientation: midline  - Location: abdomen  -MF Type: surgical  -MF    Dressing Appearance  moist drainage;intact  -ES      Base  moist;pink;yellow;red/granulating  -ES      Periwound  intact  -ES      Periwound Temperature  warm  -ES      Periwound Skin Turgor  soft  -ES      Edges  open  -ES      Wound Length (cm)  4.2 cm  -ES      Wound Width (cm)  2.3 cm  -ES      Wound Depth (cm)  2.8 cm  -ES      Tunneling [Depth (cm)/Location]  4.9 @12:00  -ES      Drainage Characteristics/Odor  serosanguineous  -ES      Drainage Amount  small  -ES      Care, Wound  cleansed with;wound cleanser;debrided;negative pressure wound therapy  -ES      Dressing Care, Wound  low-adherent;border dressing 4\"optifoam to superior portion of incision  -ES      Periwound Care, Wound  dry periwound area maintained;cleansed with pH balanced cleanser;barrier film applied  -ES      Staples Removed Intact  Yes  -ES      Wound Output (mL)  150 changed canister  -ES         Wound 02/28/19 1400 Left " lateral abdomen surgical    Wound - Properties Group Date first assessed: 02/28/19  -MF Time first assessed: 1400  -MF Side: Left  - Orientation: lateral  - Location: abdomen  -MF Type: surgical  -MF    Dressing Appearance  intact;moist drainage  -ES      Base  moist;pink;red/granulating  -ES      Periwound  intact  -ES      Periwound Temperature  warm  -ES      Periwound Skin Turgor  soft  -ES      Edges  open  -ES      Wound Length (cm)  1.5 cm  -ES      Wound Width (cm)  3.5 cm  -ES      Wound Depth (cm)  1.8 cm  -ES      Drainage Characteristics/Odor  serosanguineous  -ES      Drainage Amount  small  -ES      Care, Wound  cleansed with;wound cleanser;debrided;negative pressure wound therapy  -ES         NPWT (Negative Pressure Wound Therapy) 02/28/19 1400 midline and lateral wound    NPWT (Negative Pressure Wound Therapy) - Properties Group Placement Date: 02/28/19  -MF Placement Time: 1400  -MF Location: midline and lateral wound  -MF    Therapy Setting  continuous therapy  -ES      Dressing  foam, black;foam, white;transparent dressing stoma paste  -ES      Pressure Setting  150 mmHg  -ES      Sponges Inserted  5 2 white, 3 black  -ES      Sponges Removed  3 2 white, 1 black  -ES      Finger sweep complete  Yes  -ES        User Key  (r) = Recorded By, (t) = Taken By, (c) = Cosigned By    Initials Name Provider Type    Alli Delvalle, PT Physical Therapist    ES Jaimie Dueñas, PT Physical Therapist            WOUND DEBRIDEMENT  Total area of Debridement: nonselective  Debridement Site 1  Location- Site 1: abd wounds  Selective Debridement- Site 1: Wound Surface <20cmsq  Instruments- Site 1: other (comment)(4x4 gauze)  Excised Tissue Description- Site 1: minimum, slough  Bleeding- Site 1: none             Therapy Education     Row Name 03/06/19 1030             Therapy Education    Education Details  vac mangement and replacing optifoam if disrupted  -ES      Given  Symptoms/condition  management;Bandaging/dressing change  -ES      Program  Progressed  -ES      How Provided  Verbal;Demonstration  -ES      Provided to  Caregiver;Patient  -ES      Level of Understanding  Verbalized;Demonstrated  -ES        User Key  (r) = Recorded By, (t) = Taken By, (c) = Cosigned By    Initials Name Provider Type    Jaimie Lynn, PT Physical Therapist          Recommendation and Plan  PT Assessment/Plan     Row Name 03/06/19 1030          PT Assessment    Functional Limitations  Other (comment);Performance in self-care ADL;Limitations in community activities wound care  -ES     Impairments  Integumentary integrity  -ES     Assessment Comments  ABD wound continues to be beefy red granulation with scant non-viable tissue needing removal. Drape placed on lower portion of incision, but superior aspect covered with dressing to reduce potential irritation from drape. Superior aspect of incision healed as noted after suture removal. Will continue to benefit from NWPT for accelerated granulation.   -ES        PT Plan    Physical Therapy Interventions (Optional Details)  patient/family education;wound care  -ES     PT Plan Comments  NWPT  -ES       User Key  (r) = Recorded By, (t) = Taken By, (c) = Cosigned By    Initials Name Provider Type    Jaimie Lynn PT Physical Therapist          Goals                   Time Calculation:      Therapy Charges for Today     Code Description Service Date Service Provider Modifiers Qty    68099030455 HC PT NEG PRESS WOUND TO 50SQCM DME4 3/6/2019 Jaimie Dueñas, PT  1        Therapy Suggested Charges     Code   Minutes Charges    None                   Jaimie Dueñas PT  3/6/2019

## 2019-03-09 ENCOUNTER — HOSPITAL ENCOUNTER (OUTPATIENT)
Dept: PHYSICAL THERAPY | Facility: HOSPITAL | Age: 64
Setting detail: THERAPIES SERIES
Discharge: HOME | End: 2019-03-09

## 2019-03-09 DIAGNOSIS — S31.109D OPEN WOUND OF ABDOMEN, SUBSEQUENT ENCOUNTER: Primary | ICD-10-CM

## 2019-03-09 PROCEDURE — 97605 NEG PRS WND THER DME<=50SQCM: CPT

## 2019-03-09 NOTE — THERAPY TREATMENT NOTE
Outpatient Rehabilitation - Wound/Debridement Treatment Note   Nancy     Patient Name: Tereza Ackerman  : 1955  MRN: [id number]  Today's Date: 3/9/2019                 Admit Date: 3/9/2019    Visit Dx:    ICD-10-CM ICD-9-CM   1. Open wound of abdomen, subsequent encounter S31.109D V58.89     879.2       Patient Active Problem List   Diagnosis   • Impaired glucose tolerance   • Hypertension   • Parathyroid adenoma   • Reaction to chronic stress   • Multinodular goiter   • Vitamin D deficiency   • Meningioma, recurrent of brain (CMS/HCC)   • Arthritis   • Depression   • Small bowel obstruction (CMS/HCC)   • Insomnia   • History of Nissen fundoplication   • Malignant neoplasm of left orbit (CMS/HCC)   • Prediabetes   • Hyperlipemia   • Hyperglycemia   • Atrial flutter with rapid ventricular response (CMS/HCC)   • Anemia   • Large bowel obstruction (CMS/HCC)   • Abdominal pain   • Essential hypertension   • History of creation of ostomy (CMS/HCC)   • Screen for colon cancer   • Sigmoid volvulus (CMS/HCC)        Past Medical History:   Diagnosis Date   • Arthritis     rt knee    • Atrial flutter with rapid ventricular response (CMS/HCC) 2017   • Back pain    • Brain tumor (CMS/HCC)    • Colostomy present (CMS/HCC) 2018   • Depression    • History of blood transfusion    • History of gastroesophageal reflux (GERD)     resolved since Nissen   • History of Nissen fundoplication 2017   • History of small bowel obstruction    • Hyperlipemia    • Hypertension    • Memory loss or impairment    • Migraine    • Parathyroid adenoma     Incidental on thyroid US.   • PONV (postoperative nausea and vomiting)     nausea - preprocedural meds help    • Prediabetes     Last Impression: 2015  Reviewed labs. Excellent control.  Emery Connell Impression: 2015  Reviewed labs. Excellent control.  Michaela Connell   • Thyroid nodule      Last Impression: 2015  r/o thyroid  cancer, will proceed with US.  Michaela Connell (Internal Medicine)    • UTI (urinary tract infection)    • Vision changes     blockages left eye    • Wears glasses     readers        Past Surgical History:   Procedure Laterality Date   • CARPAL TUNNEL RELEASE  2002    right    • COLONOSCOPY N/A 8/18/2018    Procedure: COLONOSCOPY;  Surgeon: Inderjit Campos MD;  Location:  [facility];  Service: Gastroenterology   • COLONOSCOPY N/A 11/28/2018    Procedure: COLONOSCOPY;  Surgeon: Inderjit Campos MD;  Location:  [facility];  Service: Gastroenterology   • COLOSTOMY CLOSURE N/A 2/19/2019    Procedure: COLOSTOMY TAKEDOWN, INCISIONAL HERNIA REPAIR;  Surgeon: Andrea Bocanegra MD;  Location:  [facility];  Service: General   • CRANIOTOMY  2003 & 2014    Dr. Werner Hollis for tumor removal   • ENDOSCOPY     • EXPLORATORY LAPAROTOMY N/A 12/5/2017    Procedure: EXPLORATORY LAPAROTOMY, SMALL BOWEL RESECTION;  Surgeon: Ирина Cobian MD;  Location:  [facility];  Service:    • EXPLORATORY LAPAROTOMY N/A 12/22/2017    Procedure: LAPAROTOMY EXPLORATORY FOR SMALL BOWEL OBSTRUCTION;  Surgeon: Ирина Cobian MD;  Location:  [facility];  Service:    • EXPLORATORY LAPAROTOMY N/A 7/23/2018    Procedure: EXPLORATORY LAPAROTOMY, APPENDECTOMY, CECOPEXY, INCISIONAL HERNIA REPAIR, LYSIS OF ADHESIONS;  Surgeon: Andrea Bocanegra MD;  Location:  [facility];  Service: General   • EXPLORATORY LAPAROTOMY N/A 8/19/2018    Procedure: LAPAROTOMY EXPLORATORY, SIGMOID COLECTOMY, CREATION OF OSTOMY;  Surgeon: Andrea Bocanegra MD;  Location:  [facility];  Service: General   • HYSTERECTOMY      partial - both ovaries still present pt believes    • INSERTION HEMODIALYSIS CATHETER N/A 12/7/2017    Procedure: HEMODIALYSIS CATHETER INSERTION;  Surgeon: Chance Valenzuela MD;  Location:  [facility];  Service:    • ORBITOTOMY Left 11/3/2017    Procedure:  LEFT LATERAL ORBITOTOMY WITH DEBULKING OF TUMOR ;  Surgeon: Moo Hopkins MD;   Location: [facility];  Service:    • OTHER SURGICAL HISTORY      esophagogastric fundoplasty nissen fundoplication   • TUBAL ABDOMINAL LIGATION           EVALUATION  PT Ortho     Row Name 03/09/19 1345       Subjective Comments    Subjective Comments  Pt stated she was having some increased c/o pain due to decreased pain med use, but no increased pain with dressing changes.   -MF       Subjective Pain    Able to rate subjective pain?  yes  -MF    Pre-Treatment Pain Level  2  -MF    Post-Treatment Pain Level  2  -MF       Transfers    Sit-Stand [place] (Transfers)  independent  -MF    Stand-Sit [place] (Transfers)  independent  -MF    Comment (Transfers)  supine for tx  -MF       Gait/Stairs Assessment/Training    [place] Level (Gait)  independent  -MF      User Key  (r) = Recorded By, (t) = Taken By, (c) = Cosigned By    Initials Name Provider Type    Alli Delvalle, PT Physical Therapist              LDA Wound     Row Name 03/09/19 1345             Wound 02/28/19 1400 midline abdomen surgical    Wound - Properties Group Date first assessed: 02/28/19  -MF Time first assessed: 1400  -MF Orientation: midline  -MF Location: abdomen  -MF Type: surgical  -MF    Dressing Appearance  moist drainage;intact  -MF      Base  moist;pink;yellow;red/granulating  -MF      Periwound  intact  -MF      Periwound Temperature  warm  -MF      Periwound Skin Turgor  soft  -MF      Edges  open  -MF      Drainage Characteristics/Odor  serosanguineous  -MF      Drainage Amount  small  -MF      Care, Wound  irrigated with;sterile normal saline;negative pressure wound therapy  -MF      Wound Output (mL)  50  -MF         Wound 02/28/19 1400 Left lateral abdomen surgical    Wound - Properties Group Date first assessed: 02/28/19  - Time first assessed: 1400  -MF Side: Left  -MF Orientation: lateral  -MF Location: abdomen  -MF Type: surgical  -MF    Dressing Appearance  intact;moist drainage  -MF      Base   moist;pink;red/granulating  -MF      Periwound  intact  -MF      Periwound Temperature  warm  -MF      Periwound Skin Turgor  soft  -MF      Edges  open  -MF      Drainage Characteristics/Odor  serosanguineous  -MF      Drainage Amount  small  -MF      Care, Wound  irrigated with;sterile normal saline  -MF         NPWT (Negative Pressure Wound Therapy) 02/28/19 1400 midline and lateral wound    NPWT (Negative Pressure Wound Therapy) - Properties Group Placement Date: 02/28/19  -MF Placement Time: 1400  -MF Location: midline and lateral wound  -MF    Therapy Setting  continuous therapy  -MF      Dressing  foam, black;foam, white  -MF      Pressure Setting  150 mmHg  -MF      Sponges Inserted  5 2 white 3 black   -MF      Sponges Removed  3 2 white 1 black   -MF      Finger sweep complete  Yes  -MF        User Key  (r) = Recorded By, (t) = Taken By, (c) = Cosigned By    Initials Name Provider Type    Alli Delvalle, PT Physical Therapist            WOUND DEBRIDEMENT                       Recommendation and Plan  PT Assessment/Plan     Row Name 03/09/19 7063          PT Assessment    Functional Limitations  -- wound care  -     Impairments  Integumentary integrity  -     Assessment Comments  Abdominal wounds progressing well good granulation base and no s/s of infection noted.  PT will cont with wound vac changes every 2-3 days.   -MF        PT Plan    PT Frequency  3x/week  -     Physical Therapy Interventions (Optional Details)  wound care;patient/family education  -     PT Plan Comments  NPWT  -MF       User Key  (r) = Recorded By, (t) = Taken By, (c) = Cosigned By    Initials Name Provider Type    Alli Delvalle, PT Physical Therapist          Goals  PT OP Goals     Row Name 03/09/19 5493          Time Calculation    PT Goal Re-Cert Due Date  05/29/19  -MF       User Key  (r) = Recorded By, (t) = Taken By, (c) = Cosigned By    Initials Name Provider Type    Alli Delvalle, PT Physical  Therapist          PT Goal Re-Cert Due Date: 05/29/19            Time Calculation: Start Time: 1345    Therapy Charges for Today     Code Description Service Date Service Provider Modifiers Qty    73022073793 HC PT NEG PRESS WOUND TO 50SQCM DME2 3/9/2019 Alli Fields, PT GP 1        Therapy Suggested Charges     Code   Minutes Charges    None                   Alli Fields, PT  3/9/2019

## 2019-03-12 ENCOUNTER — HOSPITAL ENCOUNTER (OUTPATIENT)
Dept: PHYSICAL THERAPY | Facility: HOSPITAL | Age: 64
Setting detail: THERAPIES SERIES
Discharge: HOME | End: 2019-03-12

## 2019-03-12 DIAGNOSIS — F32.89 OTHER DEPRESSION: ICD-10-CM

## 2019-03-12 DIAGNOSIS — S31.109D OPEN WOUND OF ABDOMEN, SUBSEQUENT ENCOUNTER: Primary | ICD-10-CM

## 2019-03-12 PROCEDURE — 97605 NEG PRS WND THER DME<=50SQCM: CPT

## 2019-03-12 RX ORDER — QUETIAPINE FUMARATE 50 MG/1
TABLET, FILM COATED ORAL
Qty: 30 TABLET | Refills: 0 | OUTPATIENT
Start: 2019-03-12

## 2019-03-12 NOTE — THERAPY WOUND CARE TREATMENT
Outpatient Rehabilitation - Wound/Debridement Treatment Note   Nancy     Patient Name: Tereza Ackerman  : 1955  MRN: [id number]  Today's Date: 3/12/2019                 Admit Date: 3/12/2019    Visit Dx:    ICD-10-CM ICD-9-CM   1. Open wound of abdomen, subsequent encounter S31.109D V58.89     879.2       Patient Active Problem List   Diagnosis   • Impaired glucose tolerance   • Hypertension   • Parathyroid adenoma   • Reaction to chronic stress   • Multinodular goiter   • Vitamin D deficiency   • Meningioma, recurrent of brain (CMS/HCC)   • Arthritis   • Depression   • Small bowel obstruction (CMS/HCC)   • Insomnia   • History of Nissen fundoplication   • Malignant neoplasm of left orbit (CMS/HCC)   • Prediabetes   • Hyperlipemia   • Hyperglycemia   • Atrial flutter with rapid ventricular response (CMS/HCC)   • Anemia   • Large bowel obstruction (CMS/HCC)   • Abdominal pain   • Essential hypertension   • History of creation of ostomy (CMS/HCC)   • Screen for colon cancer   • Sigmoid volvulus (CMS/HCC)        Past Medical History:   Diagnosis Date   • Arthritis     rt knee    • Atrial flutter with rapid ventricular response (CMS/HCC) 2017   • Back pain    • Brain tumor (CMS/HCC)    • Colostomy present (CMS/HCC) 2018   • Depression    • History of blood transfusion    • History of gastroesophageal reflux (GERD)     resolved since Nissen   • History of Nissen fundoplication 2017   • History of small bowel obstruction    • Hyperlipemia    • Hypertension    • Memory loss or impairment    • Migraine    • Parathyroid adenoma     Incidental on thyroid US.   • PONV (postoperative nausea and vomiting)     nausea - preprocedural meds help    • Prediabetes     Last Impression: 2015  Reviewed labs. Excellent control.  Emery Connell Impression: 2015  Reviewed labs. Excellent control.  Michaela Connell   • Thyroid nodule      Last Impression: 2015  r/o thyroid  cancer, will proceed with US.  Michaela Connell (Internal Medicine)    • UTI (urinary tract infection)    • Vision changes     blockages left eye    • Wears glasses     readers        Past Surgical History:   Procedure Laterality Date   • CARPAL TUNNEL RELEASE  2002    right    • COLONOSCOPY N/A 8/18/2018    Procedure: COLONOSCOPY;  Surgeon: Inderjit Campos MD;  Location:  [facility];  Service: Gastroenterology   • COLONOSCOPY N/A 11/28/2018    Procedure: COLONOSCOPY;  Surgeon: Inderjit Campos MD;  Location:  [facility];  Service: Gastroenterology   • COLOSTOMY CLOSURE N/A 2/19/2019    Procedure: COLOSTOMY TAKEDOWN, INCISIONAL HERNIA REPAIR;  Surgeon: Andrea Bocanegra MD;  Location:  [facility];  Service: General   • CRANIOTOMY  2003 & 2014    Dr. Werner Hollis for tumor removal   • ENDOSCOPY     • EXPLORATORY LAPAROTOMY N/A 12/5/2017    Procedure: EXPLORATORY LAPAROTOMY, SMALL BOWEL RESECTION;  Surgeon: Ирина Cobian MD;  Location:  [facility];  Service:    • EXPLORATORY LAPAROTOMY N/A 12/22/2017    Procedure: LAPAROTOMY EXPLORATORY FOR SMALL BOWEL OBSTRUCTION;  Surgeon: Ирина Cobian MD;  Location:  [facility];  Service:    • EXPLORATORY LAPAROTOMY N/A 7/23/2018    Procedure: EXPLORATORY LAPAROTOMY, APPENDECTOMY, CECOPEXY, INCISIONAL HERNIA REPAIR, LYSIS OF ADHESIONS;  Surgeon: Andrea Bocanegra MD;  Location:  [facility];  Service: General   • EXPLORATORY LAPAROTOMY N/A 8/19/2018    Procedure: LAPAROTOMY EXPLORATORY, SIGMOID COLECTOMY, CREATION OF OSTOMY;  Surgeon: Andrea Bocanegra MD;  Location:  [facility];  Service: General   • HYSTERECTOMY      partial - both ovaries still present pt believes    • INSERTION HEMODIALYSIS CATHETER N/A 12/7/2017    Procedure: HEMODIALYSIS CATHETER INSERTION;  Surgeon: Chance Valenzuela MD;  Location:  [facility];  Service:    • ORBITOTOMY Left 11/3/2017    Procedure:  LEFT LATERAL ORBITOTOMY WITH DEBULKING OF TUMOR ;  Surgeon: Moo Hopkins MD;   "Location: [facility] OR;  Service:    • OTHER SURGICAL HISTORY      esophagogastric fundoplasty nissen fundoplication   • TUBAL ABDOMINAL LIGATION           EVALUATION  PT Ortho     Row Name 03/12/19 1300       Subjective Comments    Subjective Comments  No issues to report  -ES       Subjective Pain    Able to rate subjective pain?  yes  -ES    Pre-Treatment Pain Level  1  -ES    Post-Treatment Pain Level  1  -ES       Transfers    Sit-Stand [place] (Transfers)  independent  -ES    Stand-Sit [place] (Transfers)  independent  -ES    Comment (Transfers)  supine for tx  -ES       Gait/Stairs Assessment/Training    [place] Level (Gait)  independent  -ES      User Key  (r) = Recorded By, (t) = Taken By, (c) = Cosigned By    Initials Name Provider Type    ES Jaimie Dueñas, PT Physical Therapist              LDA Wound     Row Name 03/12/19 1300             Wound 02/28/19 1400 midline abdomen surgical    Wound - Properties Group Date first assessed: 02/28/19  -MF Time first assessed: 1400  - Orientation: midline  -MF Location: abdomen  -MF Type: surgical  -MF    Wound Image  Images linked: 1  -ES      Dressing Appearance  moist drainage;intact  -ES      Base  moist;pink;yellow;red/granulating  -ES      Periwound  intact  -ES      Periwound Temperature  warm  -ES      Periwound Skin Turgor  soft  -ES      Edges  open  -ES      Wound Length (cm)  3.1 cm superior incision \"pocket\" expressed 0.5x0.2x1.0  -ES      Wound Width (cm)  1.9 cm  -ES      Wound Depth (cm)  2.3 cm  -ES      Tunneling [Depth (cm)/Location]  3.4 @12:00  -ES      Drainage Characteristics/Odor  serosanguineous  -ES      Drainage Amount  small  -ES      Care, Wound  cleansed with;wound cleanser;negative pressure wound therapy  -ES      Dressing Care, Wound  dressing applied;antimicrobial agent applied;foam;low-adherent;border dressing HFBc packed to depth with 4\"optifoam  -ES      Periwound Care, Wound  cleansed with pH balanced cleanser;dry " periwound area maintained;barrier film applied  -ES         Wound 02/28/19 1400 Left lateral abdomen surgical    Wound - Properties Group Date first assessed: 02/28/19  -MF Time first assessed: 1400  -MF Side: Left  -MF Orientation: lateral  -MF Location: abdomen  -MF Type: surgical  -MF    Dressing Appearance  intact;moist drainage  -ES      Base  moist;pink;red/granulating  -ES      Periwound  intact  -ES      Periwound Temperature  warm  -ES      Periwound Skin Turgor  soft  -ES      Edges  open  -ES      Wound Length (cm)  0.7 cm  -ES      Wound Width (cm)  2.7 cm  -ES      Wound Depth (cm)  1.1 cm  -ES      Drainage Characteristics/Odor  serosanguineous  -ES      Drainage Amount  small  -ES      Care, Wound  cleansed with;wound cleanser  -ES         NPWT (Negative Pressure Wound Therapy) 02/28/19 1400 midline and lateral wound    NPWT (Negative Pressure Wound Therapy) - Properties Group Placement Date: 02/28/19  -MF Placement Time: 1400  -MF Location: midline and lateral wound  -MF    Therapy Setting  continuous therapy  -ES      Dressing  foam, black;foam, white  -ES      Pressure Setting  150 mmHg  -ES      Sponges Inserted  3 2white, 1 black  -ES      Sponges Removed  5 2 white, 3 black  -ES      Finger sweep complete  Yes  -ES        User Key  (r) = Recorded By, (t) = Taken By, (c) = Cosigned By    Initials Name Provider Type    Alli Delvalle, PT Physical Therapist    ES Jaimie Dueñas, PT Physical Therapist            WOUND DEBRIDEMENT  Total area of Debridement: nonselective  Debridement Site 1  Location- Site 1: abd wounds  Selective Debridement- Site 1: Wound Surface <20cmsq  Instruments- Site 1: other (comment)(4x4 gauze)  Excised Tissue Description- Site 1: minimum, slough  Bleeding- Site 1: none             Therapy Education     Row Name 03/12/19 1300             Therapy Education    Education Details  use of HFB to maintain opening of superior incision to avoid premature closure of pocketed  "fluid.  -ES      Given  Symptoms/condition management;Bandaging/dressing change  -ES      Program  Modified  -ES      How Provided  Verbal;Demonstration  -ES      Provided to  Caregiver;Patient  -ES      Level of Understanding  Verbalized;Demonstrated  -ES        User Key  (r) = Recorded By, (t) = Taken By, (c) = Cosigned By    Initials Name Provider Type    Jaimie Lynn, PT Physical Therapist          Recommendation and Plan  PT Assessment/Plan     Row Name 03/12/19 1300          PT Assessment    Functional Limitations  Other (comment) wound management  -ES     Impairments  Integumentary integrity  -ES     Assessment Comments  Patient with newly formed \"pocket\" at superior aspect of incision which PT was able to express small amount of serous fluid. Lightly packed with HFBc to allow for whicking and ultimate closure. NWPT still appropriate for larger wounds with beefy granulation and overall decrease in wound measurements. Making good progress with current POC. Will monitor superior aspect of wound and adjust dressings if warranted.  -ES        PT Plan    Physical Therapy Interventions (Optional Details)  patient/family education;wound care  -ES     PT Plan Comments  NPWT, assess superior incision  -ES       User Key  (r) = Recorded By, (t) = Taken By, (c) = Cosigned By    Initials Name Provider Type    Jaimie Lynn, PT Physical Therapist          Goals                   Time Calculation: Start Time: 1300    Therapy Charges for Today     Code Description Service Date Service Provider Modifiers Qty    13111993770  PT NEG PRESS WOUND TO 50SQCM DME4 3/12/2019 Jaimie Dueñas, PT  1        Therapy Suggested Charges     Code   Minutes Charges    None                   Jaimie Dueñas PT  3/12/2019     "

## 2019-03-14 ENCOUNTER — READMISSION MANAGEMENT (OUTPATIENT)
Dept: CALL CENTER | Facility: HOSPITAL | Age: 64
End: 2019-03-14

## 2019-03-14 DIAGNOSIS — F43.89 ADJUSTMENT REACTION TO CHRONIC STRESS: ICD-10-CM

## 2019-03-14 DIAGNOSIS — F32.89 OTHER DEPRESSION: ICD-10-CM

## 2019-03-14 RX ORDER — QUETIAPINE FUMARATE 50 MG/1
50 TABLET, FILM COATED ORAL NIGHTLY
Qty: 30 TABLET | Refills: 0 | Status: SHIPPED | OUTPATIENT
Start: 2019-03-14 | End: 2019-04-13 | Stop reason: SDUPTHER

## 2019-03-14 RX ORDER — SERTRALINE HYDROCHLORIDE 100 MG/1
100 TABLET, FILM COATED ORAL DAILY
Qty: 30 TABLET | Refills: 0 | Status: SHIPPED | OUTPATIENT
Start: 2019-03-14 | End: 2019-04-13 | Stop reason: SDUPTHER

## 2019-03-14 NOTE — OUTREACH NOTE
General Surgery Week 3 Survey      Responses   Facility patient discharged from?  [facility]   Does the patient have one of the following disease processes/diagnoses(primary or secondary)?  General Surgery   Week 3 attempt successful?  Yes   Call start time  0732   Rescheduled  Rescheduled-pt requested   Call end time  0732   Discharge diagnosis  Sigmoid volvulus COLOSTOMY TAKEDOWN, INCISIONAL HERNIA REPAIR          Kavya Copeland RN

## 2019-03-15 ENCOUNTER — HOSPITAL ENCOUNTER (OUTPATIENT)
Dept: PHYSICAL THERAPY | Facility: HOSPITAL | Age: 64
Setting detail: THERAPIES SERIES
Discharge: HOME | End: 2019-03-15

## 2019-03-15 ENCOUNTER — READMISSION MANAGEMENT (OUTPATIENT)
Dept: CALL CENTER | Facility: HOSPITAL | Age: 64
End: 2019-03-15

## 2019-03-15 DIAGNOSIS — S31.109D OPEN WOUND OF ABDOMEN, SUBSEQUENT ENCOUNTER: Primary | ICD-10-CM

## 2019-03-15 PROCEDURE — 97597 DBRDMT OPN WND 1ST 20 CM/<: CPT

## 2019-03-15 PROCEDURE — 97605 NEG PRS WND THER DME<=50SQCM: CPT

## 2019-03-15 NOTE — OUTREACH NOTE
General Surgery Week 3 Survey      Responses   Facility patient discharged from?  [facility]   Does the patient have one of the following disease processes/diagnoses(primary or secondary)?  General Surgery   Week 3 attempt successful?  No   Rescheduled  Revoked          Sanjana Mortensen RN

## 2019-03-15 NOTE — THERAPY WOUND CARE TREATMENT
Outpatient Rehabilitation - Wound/Debridement Treatment Note   Nancy     Patient Name: Tereza Ackerman  : 1955  MRN: [id number]  Today's Date: 3/15/2019                 Admit Date: 3/15/2019    Visit Dx:    ICD-10-CM ICD-9-CM   1. Open wound of abdomen, subsequent encounter S31.109D V58.89     879.2       Patient Active Problem List   Diagnosis   • Impaired glucose tolerance   • Hypertension   • Parathyroid adenoma   • Reaction to chronic stress   • Multinodular goiter   • Vitamin D deficiency   • Meningioma, recurrent of brain (CMS/HCC)   • Arthritis   • Depression   • Small bowel obstruction (CMS/HCC)   • Insomnia   • History of Nissen fundoplication   • Malignant neoplasm of left orbit (CMS/HCC)   • Prediabetes   • Hyperlipemia   • Hyperglycemia   • Atrial flutter with rapid ventricular response (CMS/HCC)   • Anemia   • Large bowel obstruction (CMS/HCC)   • Abdominal pain   • Essential hypertension   • History of creation of ostomy (CMS/HCC)   • Screen for colon cancer   • Sigmoid volvulus (CMS/HCC)        Past Medical History:   Diagnosis Date   • Arthritis     rt knee    • Atrial flutter with rapid ventricular response (CMS/HCC) 2017   • Back pain    • Brain tumor (CMS/HCC)    • Colostomy present (CMS/HCC) 2018   • Depression    • History of blood transfusion    • History of gastroesophageal reflux (GERD)     resolved since Nissen   • History of Nissen fundoplication 2017   • History of small bowel obstruction    • Hyperlipemia    • Hypertension    • Memory loss or impairment    • Migraine    • Parathyroid adenoma     Incidental on thyroid US.   • PONV (postoperative nausea and vomiting)     nausea - preprocedural meds help    • Prediabetes     Last Impression: 2015  Reviewed labs. Excellent control.  Emery Connell Impression: 2015  Reviewed labs. Excellent control.  Michaela Connell   • Thyroid nodule      Last Impression: 2015  r/o thyroid  cancer, will proceed with US.  Michaela Connell (Internal Medicine)    • UTI (urinary tract infection)    • Vision changes     blockages left eye    • Wears glasses     readers        Past Surgical History:   Procedure Laterality Date   • CARPAL TUNNEL RELEASE  2002    right    • COLONOSCOPY N/A 8/18/2018    Procedure: COLONOSCOPY;  Surgeon: Inderjit Campos MD;  Location:  [facility];  Service: Gastroenterology   • COLONOSCOPY N/A 11/28/2018    Procedure: COLONOSCOPY;  Surgeon: Inderjit Campos MD;  Location:  [facility];  Service: Gastroenterology   • COLOSTOMY CLOSURE N/A 2/19/2019    Procedure: COLOSTOMY TAKEDOWN, INCISIONAL HERNIA REPAIR;  Surgeon: Andrea Bocanegra MD;  Location:  [facility];  Service: General   • CRANIOTOMY  2003 & 2014    Dr. Werner Hollis for tumor removal   • ENDOSCOPY     • EXPLORATORY LAPAROTOMY N/A 12/5/2017    Procedure: EXPLORATORY LAPAROTOMY, SMALL BOWEL RESECTION;  Surgeon: Ирина Cobian MD;  Location:  [facility];  Service:    • EXPLORATORY LAPAROTOMY N/A 12/22/2017    Procedure: LAPAROTOMY EXPLORATORY FOR SMALL BOWEL OBSTRUCTION;  Surgeon: Ирина Cobian MD;  Location:  [facility];  Service:    • EXPLORATORY LAPAROTOMY N/A 7/23/2018    Procedure: EXPLORATORY LAPAROTOMY, APPENDECTOMY, CECOPEXY, INCISIONAL HERNIA REPAIR, LYSIS OF ADHESIONS;  Surgeon: Andrea Bocanegra MD;  Location:  [facility];  Service: General   • EXPLORATORY LAPAROTOMY N/A 8/19/2018    Procedure: LAPAROTOMY EXPLORATORY, SIGMOID COLECTOMY, CREATION OF OSTOMY;  Surgeon: Andrea Bocanegra MD;  Location:  [facility];  Service: General   • HYSTERECTOMY      partial - both ovaries still present pt believes    • INSERTION HEMODIALYSIS CATHETER N/A 12/7/2017    Procedure: HEMODIALYSIS CATHETER INSERTION;  Surgeon: Chance Valenzuela MD;  Location:  [facility];  Service:    • ORBITOTOMY Left 11/3/2017    Procedure:  LEFT LATERAL ORBITOTOMY WITH DEBULKING OF TUMOR ;  Surgeon: Moo Hopkins MD;   "Location: [facility];  Service:    • OTHER SURGICAL HISTORY      esophagogastric fundoplasty nissen fundoplication   • TUBAL ABDOMINAL LIGATION           EVALUATION  PT Ortho     Row Name 03/15/19 1530       Subjective Comments    Subjective Comments  Pt states she has a follow-up with Dr. Bocanegra on Tuesday, and MD office was unable to reschedule for days when wound vac is changed, so she is hoping to be able to have PT replace the wound vac after MD removes it on Tuesday.  -JM       Subjective Pain    Able to rate subjective pain?  yes  -JM    Pre-Treatment Pain Level  2  -JM    Post-Treatment Pain Level  2  -JM       Transfers    Sit-Stand [place] (Transfers)  independent  -JM    Stand-Sit [place] (Transfers)  independent  -JM    Comment (Transfers)  supine for tx  -JM       Gait/Stairs Assessment/Training    [place] Level (Gait)  independent  -JM      User Key  (r) = Recorded By, (t) = Taken By, (c) = Cosigned By    Initials Name Provider Type    Candice Ramirez, PT Physical Therapist              LDA Wound     Row Name 03/15/19 1530             Wound 02/28/19 1400 midline abdomen surgical    Wound - Properties Group Date first assessed: 02/28/19  -MF Time first assessed: 1400  -MF Orientation: midline  -MF Location: abdomen  -MF Type: surgical  -MF    Dressing Appearance  moist drainage;intact  -JM      Base  moist;pink;red/granulating  -JM      Periwound  intact  -JM      Periwound Temperature  warm  -JM      Periwound Skin Turgor  soft  -JM      Edges  open  -JM      Drainage Characteristics/Odor  serosanguineous  -JM      Drainage Amount  small  -JM      Care, Wound  cleansed with;wound cleanser;negative pressure wound therapy  -JM      Dressing Care, Wound  dressing applied;antimicrobial agent applied;foam;low-adherent HFBc, 4\" optifoam gentle to superior aspect  -JM      Periwound Care, Wound  cleansed with pH balanced cleanser;dry periwound area maintained;barrier film applied no-sting, " drape border  -      Wound Output (mL)  100 changed canister today  -JM         Wound 02/28/19 1400 Left lateral abdomen surgical    Wound - Properties Group Date first assessed: 02/28/19  - Time first assessed: 1400  - Side: Left  - Orientation: lateral  - Location: abdomen  - Type: surgical  -MF    Dressing Appearance  intact;moist drainage  -      Base  moist;pink;red/granulating  -      Periwound  intact  -      Periwound Temperature  warm  -      Periwound Skin Turgor  soft  -      Edges  open  -      Drainage Characteristics/Odor  serosanguineous  -      Drainage Amount  small  -JM      Care, Wound  cleansed with;wound cleanser;debrided;negative pressure wound therapy  -         NPWT (Negative Pressure Wound Therapy) 02/28/19 1400 midline and lateral wound    NPWT (Negative Pressure Wound Therapy) - Properties Group Placement Date: 02/28/19  - Placement Time: 1400  -MF Location: midline and lateral wound  -MF    Therapy Setting  continuous therapy  -      Dressing  foam, black;foam, white  -      Pressure Setting  150 mmHg  -      Sponges Inserted  3 2 white, 1 black  -      Sponges Removed  3  (Significant)  2 white, 1 black (midline wound granulating over white foam)  -      Finger sweep complete  Yes  -        User Key  (r) = Recorded By, (t) = Taken By, (c) = Cosigned By    Initials Name Provider Type    MF Alli Fields, PT Physical Therapist    JM Candice Constantino, PT Physical Therapist            WOUND DEBRIDEMENT  Total area of Debridement: 2 cmsq  Debridement Site 1  Location- Site 1: abd wounds  Selective Debridement- Site 1: Wound Surface <20cmsq  Instruments- Site 1: tweezers  Excised Tissue Description- Site 1: minimum, slough  Bleeding- Site 1: none             Therapy Education     Row Name 03/15/19 1530             Therapy Education    Education Details  Instructed pt to make sure that if MD removes wound vac dressings, that 2 white foams are  removed, due to pt with granulation over vac foam today  -      Given  Symptoms/condition management;Bandaging/dressing change  -      Program  Modified  -      How Provided  Verbal;Demonstration  -DIAN      Provided to  Caregiver;Patient  -      Level of Understanding  Verbalized;Demonstrated  -        User Key  (r) = Recorded By, (t) = Taken By, (c) = Cosigned By    Initials Name Provider Type    Candice Ramirez, PT Physical Therapist          Recommendation and Plan  PT Assessment/Plan     Row Name 03/15/19 1530          PT Assessment    Functional Limitations  Other (comment) wound management  -     Impairments  Integumentary integrity  -     Assessment Comments  ABD wounds continue to granulate, with midline inferior wound granulating over white vac foam today, and PT had to use tweezers to separate granulation tissue for removal of all vac sponges.  Superior midline area clean and pink, PT continued with use of HFB to pack and optifoam to cover, may benefit from NPWT if not resolving as anticipated.  Pt responding very well to NPWT and lateral abd wound nearly too small for wound vac dressing today.  PT rearranged tx appts to allow for MD to remove dressing on Tuesday, and PT will see pt after her MD appt.  -        PT Plan    PT Frequency  2x/week;3x/week  -     Physical Therapy Interventions (Optional Details)  patient/family education;wound care  -     PT Plan Comments  pt coming after 11:00 appt with Dr. Bocanegra Tuesday  -       User Key  (r) = Recorded By, (t) = Taken By, (c) = Cosigned By    Initials Name Provider Type    Candice Ramirez, PT Physical Therapist          Goals  PT OP Goals     Row Name 03/15/19 1530          Time Calculation    PT Goal Re-Cert Due Date  05/29/19  -       User Key  (r) = Recorded By, (t) = Taken By, (c) = Cosigned By    Initials Name Provider Type    Candice Ramirez, PT Physical Therapist          PT Goal Re-Cert Due Date: 05/29/19             Time Calculation: Start Time: 1530    Therapy Charges for Today     Code Description Service Date Service Provider Modifiers Qty    80490681668 HC GILA DEBRIDE OPEN WOUND UP TO 20CM 3/15/2019 Candice Constantino, PT GP 1    21644913332 HC PT NEG PRESS WOUND TO 50SQCM DME2 3/15/2019 Candice Constantino, PT GP 1          Candice Constantino, PT  3/15/2019

## 2019-03-18 ENCOUNTER — HOSPITAL ENCOUNTER (EMERGENCY)
Facility: HOSPITAL | Age: 64
Discharge: LEFT BEFORE DISCHARGE | End: 2019-03-18

## 2019-03-18 ENCOUNTER — APPOINTMENT (OUTPATIENT)
Dept: PHYSICAL THERAPY | Facility: HOSPITAL | Age: 64
End: 2019-03-18

## 2019-03-18 VITALS
TEMPERATURE: 97.8 F | DIASTOLIC BLOOD PRESSURE: 74 MMHG | OXYGEN SATURATION: 97 % | HEIGHT: 63 IN | HEART RATE: 65 BPM | BODY MASS INDEX: 28.35 KG/M2 | WEIGHT: 160 LBS | RESPIRATION RATE: 15 BRPM | SYSTOLIC BLOOD PRESSURE: 145 MMHG

## 2019-03-19 ENCOUNTER — HOSPITAL ENCOUNTER (OUTPATIENT)
Dept: PHYSICAL THERAPY | Facility: HOSPITAL | Age: 64
Setting detail: THERAPIES SERIES
Discharge: HOME | End: 2019-03-19

## 2019-03-19 DIAGNOSIS — S31.109D OPEN WOUND OF ABDOMEN, SUBSEQUENT ENCOUNTER: Primary | ICD-10-CM

## 2019-03-19 PROCEDURE — 97605 NEG PRS WND THER DME<=50SQCM: CPT

## 2019-03-19 NOTE — THERAPY WOUND CARE TREATMENT
Outpatient Rehabilitation - Wound/Debridement Treatment Note   Nancy     Patient Name: Tereza Ackerman  : 1955  MRN: [id number]  Today's Date: 3/19/2019                 Admit Date: 3/19/2019    Visit Dx:    ICD-10-CM ICD-9-CM   1. Open wound of abdomen, subsequent encounter S31.109D V58.89     879.2       Patient Active Problem List   Diagnosis   • Impaired glucose tolerance   • Hypertension   • Parathyroid adenoma   • Reaction to chronic stress   • Multinodular goiter   • Vitamin D deficiency   • Meningioma, recurrent of brain (CMS/HCC)   • Arthritis   • Depression   • Small bowel obstruction (CMS/HCC)   • Insomnia   • History of Nissen fundoplication   • Malignant neoplasm of left orbit (CMS/HCC)   • Prediabetes   • Hyperlipemia   • Hyperglycemia   • Atrial flutter with rapid ventricular response (CMS/HCC)   • Anemia   • Large bowel obstruction (CMS/HCC)   • Abdominal pain   • Essential hypertension   • History of creation of ostomy (CMS/HCC)   • Screen for colon cancer   • Sigmoid volvulus (CMS/HCC)        Past Medical History:   Diagnosis Date   • Arthritis     rt knee    • Atrial flutter with rapid ventricular response (CMS/HCC) 2017   • Back pain    • Brain tumor (CMS/HCC)    • Colostomy present (CMS/HCC) 2018   • Depression    • History of blood transfusion    • History of gastroesophageal reflux (GERD)     resolved since Nissen   • History of Nissen fundoplication 2017   • History of small bowel obstruction    • Hyperlipemia    • Hypertension    • Memory loss or impairment    • Migraine    • Parathyroid adenoma     Incidental on thyroid US.   • PONV (postoperative nausea and vomiting)     nausea - preprocedural meds help    • Prediabetes     Last Impression: 2015  Reviewed labs. Excellent control.  Emery Connell Impression: 2015  Reviewed labs. Excellent control.  Michaela Connell   • Thyroid nodule      Last Impression: 2015  r/o thyroid  cancer, will proceed with US.  Michaela Connell (Internal Medicine)    • UTI (urinary tract infection)    • Vision changes     blockages left eye    • Wears glasses     readers        Past Surgical History:   Procedure Laterality Date   • CARPAL TUNNEL RELEASE  2002    right    • COLONOSCOPY N/A 8/18/2018    Procedure: COLONOSCOPY;  Surgeon: Inderjit Campos MD;  Location:  [facility];  Service: Gastroenterology   • COLONOSCOPY N/A 11/28/2018    Procedure: COLONOSCOPY;  Surgeon: Inderjit Campos MD;  Location:  [facility];  Service: Gastroenterology   • COLOSTOMY CLOSURE N/A 2/19/2019    Procedure: COLOSTOMY TAKEDOWN, INCISIONAL HERNIA REPAIR;  Surgeon: Andrea Bocanegra MD;  Location:  [facility];  Service: General   • CRANIOTOMY  2003 & 2014    Dr. Werner Hollis for tumor removal   • ENDOSCOPY     • EXPLORATORY LAPAROTOMY N/A 12/5/2017    Procedure: EXPLORATORY LAPAROTOMY, SMALL BOWEL RESECTION;  Surgeon: Ирина Cobian MD;  Location:  [facility];  Service:    • EXPLORATORY LAPAROTOMY N/A 12/22/2017    Procedure: LAPAROTOMY EXPLORATORY FOR SMALL BOWEL OBSTRUCTION;  Surgeon: Ирина Cobian MD;  Location:  [facility];  Service:    • EXPLORATORY LAPAROTOMY N/A 7/23/2018    Procedure: EXPLORATORY LAPAROTOMY, APPENDECTOMY, CECOPEXY, INCISIONAL HERNIA REPAIR, LYSIS OF ADHESIONS;  Surgeon: Andrea Bocanegra MD;  Location:  [facility];  Service: General   • EXPLORATORY LAPAROTOMY N/A 8/19/2018    Procedure: LAPAROTOMY EXPLORATORY, SIGMOID COLECTOMY, CREATION OF OSTOMY;  Surgeon: Andrea Bocanegra MD;  Location:  [facility];  Service: General   • HYSTERECTOMY      partial - both ovaries still present pt believes    • INSERTION HEMODIALYSIS CATHETER N/A 12/7/2017    Procedure: HEMODIALYSIS CATHETER INSERTION;  Surgeon: Chance Valenzuela MD;  Location:  [facility];  Service:    • ORBITOTOMY Left 11/3/2017    Procedure:  LEFT LATERAL ORBITOTOMY WITH DEBULKING OF TUMOR ;  Surgeon: Moo Hopkins MD;   Location: [facility];  Service:    • OTHER SURGICAL HISTORY      esophagogastric fundoplasty nissen fundoplication   • TUBAL ABDOMINAL LIGATION           EVALUATION  PT Ortho     Row Name 03/19/19 1130       Subjective Comments    Subjective Comments  Pt with no c/o pain  -MF       Subjective Pain    Able to rate subjective pain?  yes  -MF    Pre-Treatment Pain Level  0  -MF    Post-Treatment Pain Level  0  -MF       Transfers    Sit-Stand [place] (Transfers)  independent  -MF    Stand-Sit [place] (Transfers)  independent  -MF    Comment (Transfers)  supine for tx  -MF       Gait/Stairs Assessment/Training    [place] Level (Gait)  independent  -MF      User Key  (r) = Recorded By, (t) = Taken By, (c) = Cosigned By    Initials Name Provider Type     Alli Fields, PT Physical Therapist          LDA Wound     Row Name 03/19/19 1130             Wound 02/28/19 1400 midline abdomen surgical    Wound - Properties Group Date first assessed: 02/28/19  - Time first assessed: 1400  -MF Orientation: midline  -MF Location: abdomen  -MF Type: surgical  -MF    Dressing Appearance  moist drainage;intact  -MF      Base  moist;pink;red/granulating  -MF      Periwound  intact  -MF      Periwound Temperature  warm  -MF      Periwound Skin Turgor  soft  -MF      Edges  open  -MF      Drainage Characteristics/Odor  serosanguineous  -MF      Drainage Amount  small  -MF      Care, Wound  irrigated with;sterile normal saline;negative pressure wound therapy  -MF         Wound 02/28/19 1400 Left lateral abdomen surgical    Wound - Properties Group Date first assessed: 02/28/19  - Time first assessed: 1400  -MF Side: Left  -MF Orientation: lateral  -MF Location: abdomen  -MF Type: surgical  -MF    Dressing Appearance  intact;moist drainage  -MF      Base  moist;pink;red/granulating  -MF      Periwound  intact  -MF      Periwound Temperature  warm  -MF      Periwound Skin Turgor  soft  -MF      Edges  open  -MF       Drainage Characteristics/Odor  serosanguineous  -      Drainage Amount  small  -MF      Care, Wound  sterile normal saline;negative pressure wound therapy  -MF         NPWT (Negative Pressure Wound Therapy) 02/28/19 1400 midline and lateral wound    NPWT (Negative Pressure Wound Therapy) - Properties Group Placement Date: 02/28/19  -MF Placement Time: 1400  -MF Location: midline and lateral wound  -MF    Therapy Setting  continuous therapy  -MF      Dressing  foam, black;foam, white  -MF      Pressure Setting  150 mmHg  -MF      Sponges Inserted  5 3 white and 2 black   -MF      Sponges Removed  other (see comments) no packing in wounds, MD removed dressing  -MF      Finger sweep complete  Yes  -MF        User Key  (r) = Recorded By, (t) = Taken By, (c) = Cosigned By    Initials Name Provider Type    Alli Delvalle, PT Physical Therapist            WOUND DEBRIDEMENT                   Therapy Education     Row Name 03/19/19 3051             Therapy Education    Given  Symptoms/condition management;Bandaging/dressing change  -      Program  Modified  -MF      How Provided  Verbal;Demonstration  -MF      Provided to  Caregiver;Patient  -MF      Level of Understanding  Verbalized;Demonstrated  -MF        User Key  (r) = Recorded By, (t) = Taken By, (c) = Cosigned By    Initials Name Provider Type    Alli Delvalle, PT Physical Therapist          Recommendation and Plan  PT Assessment/Plan     Row Name 03/19/19 1130          PT Assessment    Functional Limitations  -- wound care  -     Impairments  Integumentary integrity  -     Assessment Comments  wounds cont to progress well with good reepithelialization and approximation of wound edges.  -        PT Plan    PT Frequency  2x/week;3x/week  -     Physical Therapy Interventions (Optional Details)  wound care  -     PT Plan Comments  cont with wound vac changes every 2-3 days.   -       User Key  (r) = Recorded By, (t) = Taken By, (c) =  Cosigned By    Initials Name Provider Type    Alli Delvalle, PT Physical Therapist          Goals  PT OP Goals     Row Name 03/19/19 1130          Time Calculation    PT Goal Re-Cert Due Date  05/29/19  -       User Key  (r) = Recorded By, (t) = Taken By, (c) = Cosigned By    Initials Name Provider Type    Alli Delvalle, PT Physical Therapist          PT Goal Re-Cert Due Date: 05/29/19            Time Calculation: Start Time: 1130  Therapy Charges for Today     Code Description Service Date Service Provider Modifiers Qty    34447086968 HC PT NEG PRESS WOUND TO 50SQCM DME2 3/19/2019 Alli Fields, PT GP 1                  Alli Fields, PT  3/19/2019

## 2019-03-20 ENCOUNTER — APPOINTMENT (OUTPATIENT)
Dept: PHYSICAL THERAPY | Facility: HOSPITAL | Age: 64
End: 2019-03-20

## 2019-03-21 NOTE — PROGRESS NOTES
[facility] AT [address], [address]  [place], [place], [address] [phone]  WWW.[facility]           OUTPATIENT CLINIC FOLLOW UP NOTE    Patient Care Team:  Patient Care Team:  Michaela Connell MD as PCP - General  Michaela Connell MD as PCP - Family Medicine  Werner Hollis MD as Referring Physician (Neurosurgery)  Moo Hopkins MD as Consulting Physician (Ophthalmology)  Jamal Ramos MD as Consulting Physician (Nephrology)  Alli Aguirre MD as Consulting Physician (Cardiology)  Costa Raygoza MD as Consulting Physician (Radiation Oncology)  Andrea Bocanegra MD as Consulting Physician (General Surgery)  Alli Aguirre MD as Consulting Physician (Cardiology)    Subjective:      Chief Complaint   Patient presents with   • Atrial Flutter       HPI:    Tereza Ackerman is a 64 y.o. female.   The patient presents today for follow-up for atrial fibrillation and hypertension.  She developed atrial fibrillation in the presence of a small bowel obstruction.      Since her last visit the patient has had her colostomy removed.  She has a wound VAC will be coming out shortly.  She denies palpitations, chest pain, dyspnea with exertion, lower extremity swelling.     PFSH:  Patient Active Problem List   Diagnosis   • Impaired glucose tolerance   • Parathyroid adenoma   • Reaction to chronic stress   • Multinodular goiter   • Vitamin D deficiency   • Meningioma, recurrent of brain (CMS/HCC)   • Arthritis   • Depression   • Small bowel obstruction (CMS/HCC)   • Insomnia   • History of Nissen fundoplication   • Malignant neoplasm of left orbit (CMS/HCC)   • Prediabetes   • Mixed hyperlipidemia   • Hyperglycemia   • Atrial flutter with rapid ventricular response (CMS/HCC)   • Anemia   • Large bowel obstruction (CMS/HCC)   • Abdominal pain   • Essential hypertension   • History of creation of ostomy (CMS/HCC)   • Screen for colon cancer   • Sigmoid  volvulus (CMS/HCC)         Current Outpatient Medications:   •  amLODIPine (NORVASC) 5 MG tablet, Take 1 tablet by mouth Daily. (Patient taking differently: Take 5 mg by mouth As Needed (only takes if blood pressure elevated).), Disp: 30 tablet, Rfl: 0  •  aspirin 325 MG tablet, Take 325 mg by mouth Every Other Day., Disp: , Rfl:   •  busPIRone (BUSPAR) 5 MG tablet, Take 1 tablet by mouth 3 (Three) Times a Day. (Patient taking differently: Take 5 mg by mouth 2 (Two) Times a Day.), Disp: 90 tablet, Rfl: 5  •  carvedilol (COREG) 12.5 MG tablet, Take 12.5 mg by mouth 2 (Two) Times a Day With Meals., Disp: , Rfl:   •  cholecalciferol (VITAMIN D3) 1000 units tablet, Take 2,000 Units by mouth Every Other Day., Disp: , Rfl:   •  HYDROcodone-acetaminophen (NORCO) 5-325 MG per tablet, Take 1 tablet by mouth Every 4 (Four) Hours As Needed for Moderate Pain ., Disp: 15 tablet, Rfl: 0  •  LORazepam (ATIVAN) 0.5 MG tablet, Take 0.25 mg by mouth 2 (Two) Times a Day As Needed for Anxiety., Disp: , Rfl:   •  ondansetron (ZOFRAN) 4 MG tablet, Take 1 tablet by mouth Every 8 (Eight) Hours As Needed for Nausea or Vomiting., Disp: 10 tablet, Rfl: 0  •  QUEtiapine (SEROquel) 50 MG tablet, Take 1 tablet by mouth Every Night. APPT NEEDED FOR ADDITIONAL REFILLS, Disp: 30 tablet, Rfl: 0  •  sertraline (ZOLOFT) 100 MG tablet, Take 1 tablet by mouth Daily. APPT NEEDED FOR FUTURE REFILLS, Disp: 30 tablet, Rfl: 0  •  thiamine (VITAMIN B1) 100 MG tablet, Take 1 tablet by mouth Every Other Day., Disp: 45 tablet, Rfl: 1    Allergies   Allergen Reactions   • Dilantin [Phenytoin Sodium Extended] Rash        reports that she has never smoked. She has never used smokeless tobacco.    Family History   Problem Relation Age of Onset   • Obesity Mother    • Heart attack Mother    • Migraines Father    • Stroke Father    • Diabetes Father    • Cancer Other    • Diabetes Other    • Breast cancer Maternal Aunt    • Breast cancer Paternal Aunt    • Ovarian  "cancer Neg Hx        Review of Systems:  Negative for chest pain, dyspnea with exertion, orthopnea, PND, lower extremity edema, palpitations, lightheadedness, syncope.      Objective:   /60 (BP Location: Left arm, Patient Position: Sitting)   Pulse 61   Ht 160 cm (63\")   Wt 72.6 kg (160 lb)   SpO2 93%   BMI 28.34 kg/m²   CONSTITUTIONAL: No acute distress, normal affect  RESPIRATORY: Normal effort. Clear to auscultation bilaterally without wheezing or rales  CARDIOVASCULAR: Regular rate and rhythm with normal S1 and S2. Without murmur, gallop or rub.  PERIPHERAL VASCULAR: Normal radial pulses bilaterally. There is no peripheral edema bilaterally.    Labs:  Lab Results   Component Value Date    ALT 25 02/15/2019    AST 25 02/15/2019     Lab Results   Component Value Date    CHOL 234 (H) 11/13/2017    TRIG 183 (H) 12/25/2017    HDL 62 (H) 11/13/2017    CREATININE 0.79 02/23/2019       Diagnostic Data:    Procedures    Event Monitor 2/2018  -Events were normal sinus rhythm  -No atrial flutter    TTE 12/2017  · LVEF difficult to evaluate with tachycardia- globally appears mildly depressed.  · Left ventricular wall thickness is consistent with concentric hypertrophy.  · Mild tricuspid valve regurgitation is present.  · Calculated right ventricular systolic pressure from tricuspid regurgitation is 32 mmHg.    Assessment and Plan:   Tereza was seen today for dizziness and hypertension.    Diagnoses and all orders for this visit:    Atrial flutter with rapid ventricular response  -Had brief atrial flutter in the setting of a small bowel obstruction that lasted less than 48 hours, M-cup monitor revealed no recurrent atrial flutter.  Later underwent GI operations without recurrent arrhythmia on carvedilol.  -Continue carvedilol   -Has been taking aspirin only 325 mg every other day due to concerns of the high dosing.  Would switch to aspirin 81 mg daily.    Essential hypertension  -Has been taking carvedilol 12.5 " mg in the morning, 6.25 mg in the afternoon, 12.5 mg in the evening.  Due to a well-controlled blood pressure today here, would switch to 12.5 mg twice daily to simplify the dosing.  Can increase to 25 mg twice daily if she averages above 140/90 mmHg  -Has amlodipine for PRN      - Return in about 6 months (around 9/25/2019).    Alli Aguirre MD, MSc, [facility]  Interventional Cardiology  [facility] at [facility]

## 2019-03-22 ENCOUNTER — HOSPITAL ENCOUNTER (OUTPATIENT)
Dept: PHYSICAL THERAPY | Facility: HOSPITAL | Age: 64
Setting detail: THERAPIES SERIES
Discharge: HOME | End: 2019-03-22

## 2019-03-22 DIAGNOSIS — S31.109D OPEN WOUND OF ABDOMEN, SUBSEQUENT ENCOUNTER: Primary | ICD-10-CM

## 2019-03-22 PROCEDURE — 97605 NEG PRS WND THER DME<=50SQCM: CPT

## 2019-03-22 PROCEDURE — 97597 DBRDMT OPN WND 1ST 20 CM/<: CPT

## 2019-03-22 NOTE — THERAPY PROGRESS REPORT/RE-CERT
Outpatient Rehabilitation - Wound/Debridement Progress Note   [place]     Patient Name: Tereza Ackerman  : 1955  MRN: [id number]  Today's Date: 3/22/2019                 Admit Date: 3/22/2019    Visit Dx:    ICD-10-CM ICD-9-CM   1. Open wound of abdomen, subsequent encounter S31.109D V58.89     879.2       Patient Active Problem List   Diagnosis   • Impaired glucose tolerance   • Hypertension   • Parathyroid adenoma   • Reaction to chronic stress   • Multinodular goiter   • Vitamin D deficiency   • Meningioma, recurrent of brain (CMS/HCC)   • Arthritis   • Depression   • Small bowel obstruction (CMS/HCC)   • Insomnia   • History of Nissen fundoplication   • Malignant neoplasm of left orbit (CMS/HCC)   • Prediabetes   • Hyperlipemia   • Hyperglycemia   • Atrial flutter with rapid ventricular response (CMS/HCC)   • Anemia   • Large bowel obstruction (CMS/HCC)   • Abdominal pain   • Essential hypertension   • History of creation of ostomy (CMS/HCC)   • Screen for colon cancer   • Sigmoid volvulus (CMS/HCC)        Past Medical History:   Diagnosis Date   • Arthritis     rt knee    • Atrial flutter with rapid ventricular response (CMS/HCC) 2017   • Back pain    • Brain tumor (CMS/HCC)    • Colostomy present (CMS/HCC) 2018   • Depression    • History of blood transfusion    • History of gastroesophageal reflux (GERD)     resolved since Nissen   • History of Nissen fundoplication 2017   • History of small bowel obstruction    • Hyperlipemia    • Hypertension    • Memory loss or impairment    • Migraine    • Parathyroid adenoma     Incidental on thyroid US.   • PONV (postoperative nausea and vomiting)     nausea - preprocedural meds help    • Prediabetes     Last Impression: 2015  Reviewed labs. Excellent control.  Emery Connell Impression: 2015  Reviewed labs. Excellent control.  Michaela Connell   • Thyroid nodule      Last Impression: 2015  r/o thyroid  cancer, will proceed with US.  Michaela Connell (Internal Medicine)    • UTI (urinary tract infection)    • Vision changes     blockages left eye    • Wears glasses     readers        Past Surgical History:   Procedure Laterality Date   • CARPAL TUNNEL RELEASE  2002    right    • COLONOSCOPY N/A 8/18/2018    Procedure: COLONOSCOPY;  Surgeon: Inderjit Campos MD;  Location:  [facility];  Service: Gastroenterology   • COLONOSCOPY N/A 11/28/2018    Procedure: COLONOSCOPY;  Surgeon: Inderjit Campos MD;  Location:  [facility];  Service: Gastroenterology   • COLOSTOMY CLOSURE N/A 2/19/2019    Procedure: COLOSTOMY TAKEDOWN, INCISIONAL HERNIA REPAIR;  Surgeon: Andrea Bocanegra MD;  Location:  [facility];  Service: General   • CRANIOTOMY  2003 & 2014    Dr. Werner Hollis for tumor removal   • ENDOSCOPY     • EXPLORATORY LAPAROTOMY N/A 12/5/2017    Procedure: EXPLORATORY LAPAROTOMY, SMALL BOWEL RESECTION;  Surgeon: Ирина Cobian MD;  Location:  [facility];  Service:    • EXPLORATORY LAPAROTOMY N/A 12/22/2017    Procedure: LAPAROTOMY EXPLORATORY FOR SMALL BOWEL OBSTRUCTION;  Surgeon: Ирина Cobian MD;  Location:  [facility];  Service:    • EXPLORATORY LAPAROTOMY N/A 7/23/2018    Procedure: EXPLORATORY LAPAROTOMY, APPENDECTOMY, CECOPEXY, INCISIONAL HERNIA REPAIR, LYSIS OF ADHESIONS;  Surgeon: Andrea Bocanegra MD;  Location:  [facility];  Service: General   • EXPLORATORY LAPAROTOMY N/A 8/19/2018    Procedure: LAPAROTOMY EXPLORATORY, SIGMOID COLECTOMY, CREATION OF OSTOMY;  Surgeon: Andrea Bocanegra MD;  Location:  [facility];  Service: General   • HYSTERECTOMY      partial - both ovaries still present pt believes    • INSERTION HEMODIALYSIS CATHETER N/A 12/7/2017    Procedure: HEMODIALYSIS CATHETER INSERTION;  Surgeon: Chance Valenzuela MD;  Location:  [facility];  Service:    • ORBITOTOMY Left 11/3/2017    Procedure:  LEFT LATERAL ORBITOTOMY WITH DEBULKING OF TUMOR ;  Surgeon: Moo Hopkins MD;   Location: [facility];  Service:    • OTHER SURGICAL HISTORY      esophagogastric fundoplasty nissen fundoplication   • TUBAL ABDOMINAL LIGATION           EVALUATION  PT Ortho     Row Name 03/22/19 1430       Subjective Comments    Subjective Comments  Pt without complaints.  -JM       Subjective Pain    Able to rate subjective pain?  yes  -    Pre-Treatment Pain Level  0  -JM    Post-Treatment Pain Level  0  -JM       Transfers    Sit-Stand Moore (Transfers)  independent  -JM    Stand-Sit Moore (Transfers)  independent  -JM    Comment (Transfers)  supine for tx  -JM       Gait/Stairs Assessment/Training    Moore Level (Gait)  independent  -      User Key  (r) = Recorded By, (t) = Taken By, (c) = Cosigned By    Initials Name Provider Type    Candice Ramirez, PT Physical Therapist          LDA Wound     Row Name 03/22/19 1430             Wound 02/28/19 1400 midline abdomen surgical    Wound - Properties Group Date first assessed: 02/28/19  - Time first assessed: 1400  - Orientation: midline  - Location: abdomen  -MF Type: surgical  -MF    Wound Image  Images linked: 2  -JM      Dressing Appearance  moist drainage;intact  -JM      Base  moist;pink;red/granulating  -      Periwound  intact;pink;yeast ? yeast at superior aspect  -JM      Periwound Temperature  warm  -JM      Periwound Skin Turgor  soft  -JM      Edges  open  -JM      Wound Length (cm)  3 cm superior aspect: 0.6cm x 0.6cm x 1.8cm  -JM      Wound Width (cm)  1 cm  -JM      Wound Depth (cm)  1.6 cm  -JM      Tunneling [Depth (cm)/Location]  1.0cm@12:00  -      Drainage Characteristics/Odor  serosanguineous;malodorous mild odor c/w yeast  -JM      Drainage Amount  small  -      Care, Wound  cleansed with;wound cleanser;debrided;negative pressure wound therapy  -JM      Dressing Care, Wound  dressing changed  -JM      Periwound Care, Wound  cleansed with pH balanced cleanser;dry periwound area maintained;barrier film  applied no-sting, drape border  -JM      Wound Output (mL)  50 changed canister  -JM         Wound 02/28/19 1400 Left lateral abdomen surgical    Wound - Properties Group Date first assessed: 02/28/19  - Time first assessed: 1400  -MF Side: Left  - Orientation: lateral  -MF Location: abdomen  -MF Type: surgical  -MF    Wound Image  Images linked: 1  -JM      Dressing Appearance  intact;moist drainage  -JM      Base  moist;pink;red/granulating  -JM      Periwound  intact  -      Periwound Temperature  warm  -      Periwound Skin Turgor  soft  -JM      Edges  open  -JM      Wound Length (cm)  0.5 cm  -JM      Wound Width (cm)  1.1 cm  -JM      Wound Depth (cm)  0.8 cm  -JM      Drainage Characteristics/Odor  serosanguineous  -JM      Drainage Amount  small  -JM      Care, Wound  cleansed with;wound cleanser;negative pressure wound therapy  -      Dressing Care, Wound  dressing changed  -      Periwound Care, Wound  cleansed with pH balanced cleanser;dry periwound area maintained;barrier film applied no-sting, drape border  -JM         NPWT (Negative Pressure Wound Therapy) 02/28/19 1400 midline and lateral wound    NPWT (Negative Pressure Wound Therapy) - Properties Group Placement Date: 02/28/19  - Placement Time: 1400  -MF Location: midline and lateral wound  -    Therapy Setting  continuous therapy  -JM      Dressing  foam, black;foam, white  -      Pressure Setting  150 mmHg  -JM      Sponges Inserted  5 3 white, 2 black to bridge  -      Sponges Removed  5 3 white, 2 black  -JM      Finger sweep complete  Yes  -        User Key  (r) = Recorded By, (t) = Taken By, (c) = Cosigned By    Initials Name Provider Type    Alli Delvalle, PT Physical Therapist    JM Candice Constantino, CORNELIO Physical Therapist            WOUND DEBRIDEMENT  Total area of Debridement: 2 cmsq  Debridement Site 1  Location- Site 1: abd wounds  Selective Debridement- Site 1: Wound Surface <20cmsq  Instruments- Site 1:  pina  Excised Tissue Description- Site 1: scant, slough(from inferior aspect)  Bleeding- Site 1: none             Therapy Education     Row Name 03/22/19 1430             Therapy Education    Education Details  Discussed that odor may be due to yeast.  -      Given  Symptoms/condition management;Bandaging/dressing change  -      Program  Modified  -      How Provided  Verbal;Demonstration  -      Provided to  Caregiver;Patient  -      Level of Understanding  Verbalized;Demonstrated  -        User Key  (r) = Recorded By, (t) = Taken By, (c) = Cosigned By    Initials Name Provider Type    Candice Ramirez, PT Physical Therapist          Recommendation and Plan  PT Assessment/Plan     Row Name 03/22/19 1430          PT Assessment    Functional Limitations  Other (comment) wound care  -     Impairments  Integumentary integrity  -     Assessment Comments  Pt has met STGs for decrease in wound area.  All wounds filling with beefy red granulation.  Mild odor and yeast noted at superior wound site, PT will contact MD office to request Nystatin powder.  Pt making good progress with current tx, will continue to benefit from NPWT for another 2 weeks until inferior midline wound more granulated.  Will continue with current goals and POC.  -     Rehab Potential  Excellent  -     Patient/caregiver participated in establishment of treatment plan and goals  Yes  -     Patient would benefit from skilled therapy intervention  Yes  -        PT Plan    PT Frequency  2x/week;3x/week  -     Predicted Duration of Therapy Intervention (Therapy Eval)  8 visits  -     Planned CPT's?  PT GILA DEBRIDE OPEN WOUND UP TO 20 CM: 02905;PT NONSELECT DEBRIDE 15 MIN: 62020;PT NEG PRESS WOUND TO 50 SQCM 3: 21664  -     Physical Therapy Interventions (Optional Details)  patient/family education;wound care  -     PT Plan Comments  vac, debridement  -       User Key  (r) = Recorded By, (t) = Taken By, (c) =  Cosigned By    Initials Name Provider Type    Candice Ramirez, PT Physical Therapist          Goals  PT OP Goals     Row Name 03/22/19 1430          PT Short Term Goals    STG Date to Achieve  04/14/19  -     STG 1  Decrease midline wound by 25% as evidence of wound healing  -     STG 1 Progress  Met  -     STG 2  Decrease lateral wound by 25% as evidence of wound healing  -     STG 2 Progress  Met  -     STG 3  Pt and family able to verbalize home management of wound vac   -     STG 3 Progress  Met  -        Long Term Goals    LTG Date to Achieve  05/29/19  -     LTG 1  Decrease midline wound by 75% as evidence of wound healing  -     LTG 1 Progress  Ongoing;Progressing  -     LTG 2  Decrease lateral wound by 75% as evidence of wound healing  -     LTG 2 Progress  Ongoing;Progressing  -     LTG 3  Pt and family independent with home mangement of wound dressing changes.   -     LTG 3 Progress  Ongoing  -        Time Calculation    PT Goal Re-Cert Due Date  05/29/19  -       User Key  (r) = Recorded By, (t) = Taken By, (c) = Cosigned By    Initials Name Provider Type    Candice Ramirez, PT Physical Therapist          Time Calculation: Start Time: 1430  Therapy Charges for Today     Code Description Service Date Service Provider Modifiers Qty    90507303664 HC GILA DEBRIDE OPEN WOUND UP TO 20CM 3/22/2019 Candice Constantino, PT GP 1    77097017247 HC PT NEG PRESS WOUND TO 50SQCM DME2 3/22/2019 Candice Constantino, PT GP 1            PT G-Codes  Outcome Measure Options: BWAT (Marvin-Lee Wound Assess Tool)     Candice Constantino, PT  3/22/2019

## 2019-03-25 ENCOUNTER — OFFICE VISIT (OUTPATIENT)
Dept: CARDIOLOGY | Facility: CLINIC | Age: 64
End: 2019-03-25

## 2019-03-25 VITALS
HEIGHT: 63 IN | WEIGHT: 160 LBS | BODY MASS INDEX: 28.35 KG/M2 | OXYGEN SATURATION: 93 % | DIASTOLIC BLOOD PRESSURE: 60 MMHG | SYSTOLIC BLOOD PRESSURE: 110 MMHG | HEART RATE: 61 BPM

## 2019-03-25 DIAGNOSIS — I10 ESSENTIAL HYPERTENSION: ICD-10-CM

## 2019-03-25 DIAGNOSIS — E78.2 MIXED HYPERLIPIDEMIA: ICD-10-CM

## 2019-03-25 DIAGNOSIS — I48.92 ATRIAL FLUTTER WITH RAPID VENTRICULAR RESPONSE (HCC): Primary | ICD-10-CM

## 2019-03-25 PROCEDURE — 99214 OFFICE O/P EST MOD 30 MIN: CPT | Performed by: INTERNAL MEDICINE

## 2019-03-26 ENCOUNTER — HOSPITAL ENCOUNTER (OUTPATIENT)
Dept: PHYSICAL THERAPY | Facility: HOSPITAL | Age: 64
Setting detail: THERAPIES SERIES
Discharge: HOME | End: 2019-03-26

## 2019-03-26 DIAGNOSIS — S31.109D OPEN WOUND OF ABDOMEN, SUBSEQUENT ENCOUNTER: Primary | ICD-10-CM

## 2019-03-26 PROCEDURE — 97597 DBRDMT OPN WND 1ST 20 CM/<: CPT

## 2019-03-26 PROCEDURE — 97605 NEG PRS WND THER DME<=50SQCM: CPT

## 2019-03-26 NOTE — THERAPY WOUND CARE TREATMENT
Outpatient Rehabilitation - Wound/Debridement Treatment Note   Nancy     Patient Name: Tereza Ackerman  : 1955  MRN: [id number]  Today's Date: 3/26/2019                 Admit Date: 3/26/2019    Visit Dx:    ICD-10-CM ICD-9-CM   1. Open wound of abdomen, subsequent encounter S31.109D V58.89     879.2       Patient Active Problem List   Diagnosis   • Impaired glucose tolerance   • Parathyroid adenoma   • Reaction to chronic stress   • Multinodular goiter   • Vitamin D deficiency   • Meningioma, recurrent of brain (CMS/HCC)   • Arthritis   • Depression   • Small bowel obstruction (CMS/HCC)   • Insomnia   • History of Nissen fundoplication   • Malignant neoplasm of left orbit (CMS/HCC)   • Prediabetes   • Mixed hyperlipidemia   • Hyperglycemia   • Atrial flutter with rapid ventricular response (CMS/HCC)   • Anemia   • Large bowel obstruction (CMS/HCC)   • Abdominal pain   • Essential hypertension   • History of creation of ostomy (CMS/HCC)   • Screen for colon cancer   • Sigmoid volvulus (CMS/HCC)        Past Medical History:   Diagnosis Date   • Arthritis     rt knee    • Atrial flutter with rapid ventricular response (CMS/HCC) 2017   • Back pain    • Brain tumor (CMS/HCC)    • Colostomy present (CMS/HCC) 2018   • Depression    • History of blood transfusion    • History of gastroesophageal reflux (GERD)     resolved since Nissen   • History of Nissen fundoplication 2017   • History of small bowel obstruction    • Hyperlipemia    • Hypertension    • Memory loss or impairment    • Migraine    • Parathyroid adenoma     Incidental on thyroid US.   • PONV (postoperative nausea and vomiting)     nausea - preprocedural meds help    • Prediabetes     Last Impression: 2015  Reviewed labs. Excellent control.  Emery Connell Impression: 2015  Reviewed labs. Excellent control.  Michaela Connell   • Thyroid nodule      Last Impression: 2015  r/o thyroid cancer, will  proceed with US.  Michaela Connell (Internal Medicine)    • UTI (urinary tract infection)    • Vision changes     blockages left eye    • Wears glasses     readers        Past Surgical History:   Procedure Laterality Date   • CARPAL TUNNEL RELEASE  2002    right    • COLONOSCOPY N/A 8/18/2018    Procedure: COLONOSCOPY;  Surgeon: Inderjit Campos MD;  Location:  [facility];  Service: Gastroenterology   • COLONOSCOPY N/A 11/28/2018    Procedure: COLONOSCOPY;  Surgeon: Inderjit Campos MD;  Location:  [facility];  Service: Gastroenterology   • COLOSTOMY CLOSURE N/A 2/19/2019    Procedure: COLOSTOMY TAKEDOWN, INCISIONAL HERNIA REPAIR;  Surgeon: Andrea Bocanegra MD;  Location:  [facility];  Service: General   • CRANIOTOMY  2003 & 2014    Dr. Werner Hollis for tumor removal   • ENDOSCOPY     • EXPLORATORY LAPAROTOMY N/A 12/5/2017    Procedure: EXPLORATORY LAPAROTOMY, SMALL BOWEL RESECTION;  Surgeon: Ирина Cobian MD;  Location:  [facility];  Service:    • EXPLORATORY LAPAROTOMY N/A 12/22/2017    Procedure: LAPAROTOMY EXPLORATORY FOR SMALL BOWEL OBSTRUCTION;  Surgeon: Ирина Cobian MD;  Location:  [facility];  Service:    • EXPLORATORY LAPAROTOMY N/A 7/23/2018    Procedure: EXPLORATORY LAPAROTOMY, APPENDECTOMY, CECOPEXY, INCISIONAL HERNIA REPAIR, LYSIS OF ADHESIONS;  Surgeon: Andrea Bocanegra MD;  Location:  [facility];  Service: General   • EXPLORATORY LAPAROTOMY N/A 8/19/2018    Procedure: LAPAROTOMY EXPLORATORY, SIGMOID COLECTOMY, CREATION OF OSTOMY;  Surgeon: Andrea Bocanegra MD;  Location:  [facility];  Service: General   • HYSTERECTOMY      partial - both ovaries still present pt believes    • INSERTION HEMODIALYSIS CATHETER N/A 12/7/2017    Procedure: HEMODIALYSIS CATHETER INSERTION;  Surgeon: Chance Valenzuela MD;  Location:  [facility];  Service:    • ORBITOTOMY Left 11/3/2017    Procedure:  LEFT LATERAL ORBITOTOMY WITH DEBULKING OF TUMOR ;  Surgeon: Moo Hopkins MD;  Location:  [facility];  Service:    • OTHER SURGICAL HISTORY      esophagogastric fundoplasty nissen fundoplication   • TUBAL ABDOMINAL LIGATION           EVALUATION  PT Ortho     Row Name 03/26/19 1100       Subjective Comments    Subjective Comments  Pt without complaints.  -JM       Subjective Pain    Able to rate subjective pain?  yes  -JM    Pre-Treatment Pain Level  0  -JM    Post-Treatment Pain Level  0  -JM       Transfers    Sit-Stand [place] (Transfers)  independent  -JM    Stand-Sit [place] (Transfers)  independent  -JM    Comment (Transfers)  supine for tx  -JM       Gait/Stairs Assessment/Training    [place] Level (Gait)  independent  -      User Key  (r) = Recorded By, (t) = Taken By, (c) = Cosigned By    Initials Name Provider Type    Candice Ramirez, PT Physical Therapist          [facility]DA Wound     Row Name 03/26/19 1100             Wound 02/28/19 1400 midline abdomen surgical    Wound - Properties Group Date first assessed: 02/28/19  - Time first assessed: 1400  - Orientation: midline  - Location: abdomen  - Type: surgical  -    Dressing Appearance  moist drainage;intact  -JM      Base  moist;pink;red/granulating  -      Periwound  intact;pink;yeast min yeast  -JM      Periwound Temperature  warm  -      Periwound Skin Turgor  soft  -      Edges  open  -      Drainage Characteristics/Odor  serosanguineous;malodorous mild odor c/w yeast  -JM      Drainage Amount  scant  -JM      Care, Wound  cleansed with;wound cleanser;debrided;negative pressure wound therapy  -JM      Dressing Care, Wound  dressing changed vac  -JM      Periwound Care, Wound  cleansed with pH balanced cleanser;dry periwound area maintained;barrier film applied no-sting/drape border  -      Wound Output (mL)  5  -JM         Wound 02/28/19 1400 Left lateral abdomen surgical    Wound - Properties Group Date first assessed: 02/28/19  - Time first assessed: 1400  - Side: Left  - Orientation: lateral  -  Location: abdomen  - Type: surgical  -    Dressing Appearance  intact;moist drainage  -      Base  moist;pink;red/granulating  -      Periwound  intact;moist;yeast  -      Periwound Temperature  warm  -      Periwound Skin Turgor  soft  -      Edges  open  -      Drainage Characteristics/Odor  serosanguineous  -      Drainage Amount  scant  -      Care, Wound  cleansed with;wound cleanser;debrided;negative pressure wound therapy  -      Dressing Care, Wound  dressing changed  -      Periwound Care, Wound  cleansed with pH balanced cleanser;dry periwound area maintained;barrier film applied  -         NPWT (Negative Pressure Wound Therapy) 02/28/19 1400 midline and lateral wound    NPWT (Negative Pressure Wound Therapy) - [facility] Group Placement Date: 02/28/19  - Placement Time: 1400  -MF Location: midline and lateral wound  -    Therapy Setting  continuous therapy  -      Dressing  foam, black;foam, white  -      Pressure Setting  150 mmHg  -      Sponges Inserted  5 3 white, 2 black  -      Sponges Removed  5 3 white, 2 black  -JM      Finger sweep complete  Yes  -        User Key  (r) = Recorded By, (t) = Taken By, (c) = Cosigned By    Initials Name Provider Type    MF Alli Fields, PT Physical Therapist    Candice Ramirez, PT Physical Therapist            WOUND DEBRIDEMENT  Total area of Debridement: 2 cmsq  Debridement Site 1  Location- Site 1: abd wounds  Selective Debridement- Site 1: Wound Surface <20cmsq  Instruments- Site 1: tweezers  Excised Tissue Description- Site 1: scant, slough  Bleeding- Site 1: none             Therapy Education     Row Name 03/26/19 1100             Therapy Education    Given  Symptoms/condition management;Bandaging/dressing change  -      Program  Modified  -      How Provided  Verbal;Demonstration  -      Provided to  Caregiver;Patient  -      Level of Understanding  Verbalized;Demonstrated  -        User Key  (r) =  Recorded By, (t) = Taken By, (c) = Cosigned By    Initials Name Provider Type    Candice Ramirez, PT Physical Therapist          Recommendation and Plan  PT Assessment/Plan     Row Name 03/26/19 1100          PT Assessment    Functional Limitations  Other (comment) wound care  -     Impairments  Integumentary integrity  -     Assessment Comments  Min increase in yeast rash of periwound skin with mild odor.  PT will contact MD office re: nystatin powder.  ABD wounds all improving with beefy red granulation, only scant drainage.  -        PT Plan    Physical Therapy Interventions (Optional Details)  patient/family education;wound care  -     PT Plan Comments  NPWT, debridement  -       User Key  (r) = Recorded By, (t) = Taken By, (c) = Cosigned By    Initials Name Provider Type    Candice Ramirez, PT Physical Therapist          Goals  PT OP Goals     Row Name 03/26/19 1100          Time Calculation    PT Goal Re-Cert Due Date  05/29/19  -       User Key  (r) = Recorded By, (t) = Taken By, (c) = Cosigned By    Initials Name Provider Type    Candice Ramirez, PT Physical Therapist          PT Goal Re-Cert Due Date: 05/29/19            Time Calculation: Start Time: 1100  Therapy Charges for Today     Code Description Service Date Service Provider Modifiers Qty    72464001179 HC GILA DEBRIDE OPEN WOUND UP TO 20CM 3/26/2019 Candice Constantino, PT GP 1    [id number]  PT NEG PRESS WOUND TO 50SQCM DME2 3/26/2019 Candice Constantino, PT GP 1                  Candice Constantino, PT  3/26/2019

## 2019-03-27 RX ORDER — CARVEDILOL 12.5 MG/1
TABLET ORAL
Qty: 180 TABLET | Refills: 3 | Status: SHIPPED | OUTPATIENT
Start: 2019-03-27 | End: 2019-09-30 | Stop reason: SDUPTHER

## 2019-03-30 ENCOUNTER — HOSPITAL ENCOUNTER (OUTPATIENT)
Dept: PHYSICAL THERAPY | Facility: HOSPITAL | Age: 64
Setting detail: THERAPIES SERIES
Discharge: HOME | End: 2019-03-30

## 2019-03-30 DIAGNOSIS — S31.109D OPEN WOUND OF ABDOMEN, SUBSEQUENT ENCOUNTER: Primary | ICD-10-CM

## 2019-03-30 PROCEDURE — 97605 NEG PRS WND THER DME<=50SQCM: CPT

## 2019-03-30 NOTE — THERAPY WOUND CARE TREATMENT
Outpatient Rehabilitation - Wound/Debridement Treatment Note   Nancy     Patient Name: Tereza Ackerman  : 1955  MRN: [id number]  Today's Date: 3/30/2019                 Admit Date: 3/30/2019    Visit Dx:    ICD-10-CM ICD-9-CM   1. Open wound of abdomen, subsequent encounter S31.109D V58.89     879.2       Patient Active Problem List   Diagnosis   • Impaired glucose tolerance   • Parathyroid adenoma   • Reaction to chronic stress   • Multinodular goiter   • Vitamin D deficiency   • Meningioma, recurrent of brain (CMS/HCC)   • Arthritis   • Depression   • Small bowel obstruction (CMS/HCC)   • Insomnia   • History of Nissen fundoplication   • Malignant neoplasm of left orbit (CMS/HCC)   • Prediabetes   • Mixed hyperlipidemia   • Hyperglycemia   • Atrial flutter with rapid ventricular response (CMS/HCC)   • Anemia   • Large bowel obstruction (CMS/HCC)   • Abdominal pain   • Essential hypertension   • History of creation of ostomy (CMS/HCC)   • Screen for colon cancer   • Sigmoid volvulus (CMS/HCC)        Past Medical History:   Diagnosis Date   • Arthritis     rt knee    • Atrial flutter with rapid ventricular response (CMS/HCC) 2017   • Back pain    • Brain tumor (CMS/HCC)    • Colostomy present (CMS/HCC) 2018   • Depression    • History of blood transfusion    • History of gastroesophageal reflux (GERD)     resolved since Nissen   • History of Nissen fundoplication 2017   • History of small bowel obstruction    • Hyperlipemia    • Hypertension    • Memory loss or impairment    • Migraine    • Parathyroid adenoma     Incidental on thyroid US.   • PONV (postoperative nausea and vomiting)     nausea - preprocedural meds help    • Prediabetes     Last Impression: 2015  Reviewed labs. Excellent control.  Emery Connell Impression: 2015  Reviewed labs. Excellent control.  Michaela Connell   • Thyroid nodule      Last Impression: 2015  r/o thyroid cancer, will  proceed with US.  Michaela Connell (Internal Medicine)    • UTI (urinary tract infection)    • Vision changes     blockages left eye    • Wears glasses     readers        Past Surgical History:   Procedure Laterality Date   • CARPAL TUNNEL RELEASE  2002    right    • COLONOSCOPY N/A 8/18/2018    Procedure: COLONOSCOPY;  Surgeon: Inderjit Campos MD;  Location:  [facility];  Service: Gastroenterology   • COLONOSCOPY N/A 11/28/2018    Procedure: COLONOSCOPY;  Surgeon: Inderjit Campos MD;  Location:  [facility];  Service: Gastroenterology   • COLOSTOMY CLOSURE N/A 2/19/2019    Procedure: COLOSTOMY TAKEDOWN, INCISIONAL HERNIA REPAIR;  Surgeon: Andrea Bocanegra MD;  Location:  [facility];  Service: General   • CRANIOTOMY  2003 & 2014    Dr. Werner Hollis for tumor removal   • ENDOSCOPY     • EXPLORATORY LAPAROTOMY N/A 12/5/2017    Procedure: EXPLORATORY LAPAROTOMY, SMALL BOWEL RESECTION;  Surgeon: Ирина Cobian MD;  Location:  [facility];  Service:    • EXPLORATORY LAPAROTOMY N/A 12/22/2017    Procedure: LAPAROTOMY EXPLORATORY FOR SMALL BOWEL OBSTRUCTION;  Surgeon: Ирина Cobian MD;  Location:  [facility];  Service:    • EXPLORATORY LAPAROTOMY N/A 7/23/2018    Procedure: EXPLORATORY LAPAROTOMY, APPENDECTOMY, CECOPEXY, INCISIONAL HERNIA REPAIR, LYSIS OF ADHESIONS;  Surgeon: Andrea Bocanegra MD;  Location:  [facility];  Service: General   • EXPLORATORY LAPAROTOMY N/A 8/19/2018    Procedure: LAPAROTOMY EXPLORATORY, SIGMOID COLECTOMY, CREATION OF OSTOMY;  Surgeon: Andrea Bocanegra MD;  Location:  [facility];  Service: General   • HYSTERECTOMY      partial - both ovaries still present pt believes    • INSERTION HEMODIALYSIS CATHETER N/A 12/7/2017    Procedure: HEMODIALYSIS CATHETER INSERTION;  Surgeon: Chance Valenzuela MD;  Location:  [facility];  Service:    • ORBITOTOMY Left 11/3/2017    Procedure:  LEFT LATERAL ORBITOTOMY WITH DEBULKING OF TUMOR ;  Surgeon: Moo Hopkins MD;  Location:  [facility];  Service:    • OTHER SURGICAL HISTORY      esophagogastric fundoplasty nissen fundoplication   • TUBAL ABDOMINAL LIGATION           EVALUATION  PT Ortho     Row Name 03/30/19 1030       Subjective Comments    Subjective Comments  Pt states they forgot to plug in the pump last night, so the battery is low.  Otherwise, no issues with pump or dressing.  Pt c/o fatigue today, states BP has been running low.  Pt brought Nystatin powder to tx today.  -       Subjective Pain    Able to rate subjective pain?  yes  -JM    Pre-Treatment Pain Level  0  -JM    Post-Treatment Pain Level  0  -JM       Transfers    Sit-Stand [place] (Transfers)  independent  -JM    Stand-Sit [place] (Transfers)  independent  -JM    Comment (Transfers)  supine for tx  -       Gait/Stairs Assessment/Training    [place] Level (Gait)  independent  -      User Key  (r) = Recorded By, (t) = Taken By, (c) = Cosigned By    Initials Name Provider Type    Candice Ramirez, PT Physical Therapist          [facility] Wound     Row Name 03/30/19 1030             Wound 02/28/19 1400 midline abdomen surgical    Wound - Properties Group Date first assessed: 02/28/19  - Time first assessed: 1400  - Orientation: midline  -MF Location: abdomen  -MF Type: surgical  -MF    Dressing Appearance  moist drainage;intact  -JM      Base  moist;pink;red/granulating  -      Periwound  intact;pink;yeast min yeast  -      Periwound Temperature  warm  -      Periwound Skin Turgor  soft  -      Edges  open  -      Drainage Characteristics/Odor  serosanguineous;malodorous mild odor c/w yeast  -JM      Drainage Amount  scant  -JM      Care, Wound  cleansed with;wound cleanser;debrided;negative pressure wound therapy  -JM      Dressing Care, Wound  dressing changed  -      Periwound Care, Wound  cleansed with pH balanced cleanser;dry periwound area maintained;topical treatment applied;barrier film applied nystatin/no-sting crust x2 layers, drape  border  -      Wound Output (mL)  10  -JM         Wound 02/28/19 1400 Left lateral abdomen surgical    Wound - Properties Group Date first assessed: 02/28/19  -MF Time first assessed: 1400  -MF Side: Left  -MF Orientation: lateral  -MF Location: abdomen  -MF Type: surgical  -MF    Dressing Appearance  intact;moist drainage  -JM      Base  moist;pink;red/granulating  -      Periwound  intact;moist;yeast  -      Periwound Temperature  warm  -      Periwound Skin Turgor  soft  -      Edges  open  -      Drainage Characteristics/Odor  serosanguineous  -JM      Drainage Amount  scant  -JM      Care, Wound  cleansed with;wound cleanser;negative pressure wound therapy  -      Dressing Care, Wound  dressing changed  -      Periwound Care, Wound  cleansed with pH balanced cleanser;dry periwound area maintained;topical treatment applied;barrier film applied  -         NPWT (Negative Pressure Wound Therapy) 02/28/19 1400 midline and lateral wound    NPWT (Negative Pressure Wound Therapy) - Properties Group Placement Date: 02/28/19  -MF Placement Time: 1400  -MF Location: midline and lateral wound  -MF    Therapy Setting  continuous therapy  -      Dressing  foam, black;foam, white  -JM      Pressure Setting  150 mmHg  -      Sponges Inserted  5 3 white, 2 black  -      Sponges Removed  5 3 white, 2 black  -JM      Finger sweep complete  Yes  -        User Key  (r) = Recorded By, (t) = Taken By, (c) = Cosigned By    Initials Name Provider Type    Alli Delvalle, PT Physical Therapist    Candice Ramirez, PT Physical Therapist            WOUND DEBRIDEMENT  Total area of Debridement: nonselective  Debridement Site 1  Location- Site 1: abd wounds  Selective Debridement- Site 1: Wound Surface <20cmsq  Instruments- Site 1: other (comment)(cotton swabs)             Therapy Education     Row Name 03/30/19 1030             Therapy Education    Given  Symptoms/condition management;Bandaging/dressing  change  -      Program  Modified  -      How Provided  Verbal;Demonstration  -      Provided to  Caregiver;Patient  -      Level of Understanding  Verbalized;Demonstrated  -        User Key  (r) = Recorded By, (t) = Taken By, (c) = Cosigned By    Initials Name Provider Type    Candice Ramirez, PT Physical Therapist          Recommendation and Plan  PT Assessment/Plan     Row Name 03/30/19 1030          PT Assessment    Functional Limitations  Other (comment) wound care  -     Impairments  Integumentary integrity  -     Assessment Comments  Periwound still with min yeast rash and odor.  PT applied nystatin powder crust to periwound today.  ABD wounds all granulating, with superior and lateral aspects nearly too small for wound vac.  Anticipate only 1-2 more weeks of NPWT to have full granulation of wounds.  -        PT Plan    Physical Therapy Interventions (Optional Details)  patient/family education;wound care  -     PT Plan Comments  NPWT  -       User Key  (r) = Recorded By, (t) = Taken By, (c) = Cosigned By    Initials Name Provider Type    Candice Ramirez, PT Physical Therapist          Goals  PT OP Goals     Row Name 03/30/19 1030          Time Calculation    PT Goal Re-Cert Due Date  05/29/19  -       User Key  (r) = Recorded By, (t) = Taken By, (c) = Cosigned By    Initials Name Provider Type    Candice Ramirez, PT Physical Therapist          PT Goal Re-Cert Due Date: 05/29/19            Time Calculation: Start Time: 1030  Therapy Charges for Today     Code Description Service Date Service Provider Modifiers Qty    71719985882 HC PT NEG PRESS WOUND TO 50SQCM DME3 3/30/2019 Candice Constantino, PT GP 1                  Candice Constantino, PT  3/30/2019

## 2019-04-02 ENCOUNTER — HOSPITAL ENCOUNTER (OUTPATIENT)
Dept: PHYSICAL THERAPY | Facility: HOSPITAL | Age: 64
Setting detail: THERAPIES SERIES
Discharge: HOME | End: 2019-04-02

## 2019-04-02 DIAGNOSIS — S31.109D OPEN WOUND OF ABDOMEN, SUBSEQUENT ENCOUNTER: Primary | ICD-10-CM

## 2019-04-02 PROCEDURE — 97605 NEG PRS WND THER DME<=50SQCM: CPT

## 2019-04-02 PROCEDURE — 97597 DBRDMT OPN WND 1ST 20 CM/<: CPT

## 2019-04-02 NOTE — THERAPY PROGRESS REPORT/RE-CERT
Outpatient Rehabilitation - Wound/Debridement Progress Note   Nancy     Patient Name: Tereza Ackerman  : 1955  MRN: [id number]  Today's Date: 2019                 Admit Date: 2019    Visit Dx:    ICD-10-CM ICD-9-CM   1. Open wound of abdomen, subsequent encounter S31.109D V58.89     879.2       Patient Active Problem List   Diagnosis   • Impaired glucose tolerance   • Parathyroid adenoma   • Reaction to chronic stress   • Multinodular goiter   • Vitamin D deficiency   • Meningioma, recurrent of brain (CMS/HCC)   • Arthritis   • Depression   • Small bowel obstruction (CMS/HCC)   • Insomnia   • History of Nissen fundoplication   • Malignant neoplasm of left orbit (CMS/HCC)   • Prediabetes   • Mixed hyperlipidemia   • Hyperglycemia   • Atrial flutter with rapid ventricular response (CMS/HCC)   • Anemia   • Large bowel obstruction (CMS/HCC)   • Abdominal pain   • Essential hypertension   • History of creation of ostomy (CMS/HCC)   • Screen for colon cancer   • Sigmoid volvulus (CMS/HCC)        Past Medical History:   Diagnosis Date   • Arthritis     rt knee    • Atrial flutter with rapid ventricular response (CMS/HCC) 2017   • Back pain    • Brain tumor (CMS/HCC)    • Colostomy present (CMS/HCC) 2018   • Depression    • History of blood transfusion    • History of gastroesophageal reflux (GERD)     resolved since Nissen   • History of Nissen fundoplication 2017   • History of small bowel obstruction    • Hyperlipemia    • Hypertension    • Memory loss or impairment    • Migraine    • Parathyroid adenoma     Incidental on thyroid US.   • PONV (postoperative nausea and vomiting)     nausea - preprocedural meds help    • Prediabetes     Last Impression: 2015  Reviewed labs. Excellent control.  Emery Connell Impression: 2015  Reviewed labs. Excellent control.  Michaela Connell   • Thyroid nodule      Last Impression: 2015  r/o thyroid cancer, will  proceed with US.  Michaela Connell (Internal Medicine)    • UTI (urinary tract infection)    • Vision changes     blockages left eye    • Wears glasses     readers        Past Surgical History:   Procedure Laterality Date   • CARPAL TUNNEL RELEASE  2002    right    • COLONOSCOPY N/A 8/18/2018    Procedure: COLONOSCOPY;  Surgeon: Inderjit Campos MD;  Location:  [facility];  Service: Gastroenterology   • COLONOSCOPY N/A 11/28/2018    Procedure: COLONOSCOPY;  Surgeon: Inderjit Campos MD;  Location:  [facility];  Service: Gastroenterology   • COLOSTOMY CLOSURE N/A 2/19/2019    Procedure: COLOSTOMY TAKEDOWN, INCISIONAL HERNIA REPAIR;  Surgeon: Andrea Bocanegra MD;  Location:  [facility];  Service: General   • CRANIOTOMY  2003 & 2014    Dr. Werner Hollis for tumor removal   • ENDOSCOPY     • EXPLORATORY LAPAROTOMY N/A 12/5/2017    Procedure: EXPLORATORY LAPAROTOMY, SMALL BOWEL RESECTION;  Surgeon: Ирина Cobian MD;  Location:  [facility];  Service:    • EXPLORATORY LAPAROTOMY N/A 12/22/2017    Procedure: LAPAROTOMY EXPLORATORY FOR SMALL BOWEL OBSTRUCTION;  Surgeon: Ирина Cobian MD;  Location:  [facility];  Service:    • EXPLORATORY LAPAROTOMY N/A 7/23/2018    Procedure: EXPLORATORY LAPAROTOMY, APPENDECTOMY, CECOPEXY, INCISIONAL HERNIA REPAIR, LYSIS OF ADHESIONS;  Surgeon: Andrea Bocanegra MD;  Location:  [facility];  Service: General   • EXPLORATORY LAPAROTOMY N/A 8/19/2018    Procedure: LAPAROTOMY EXPLORATORY, SIGMOID COLECTOMY, CREATION OF OSTOMY;  Surgeon: Andrea Bocanegra MD;  Location:  [facility];  Service: General   • HYSTERECTOMY      partial - both ovaries still present pt believes    • INSERTION HEMODIALYSIS CATHETER N/A 12/7/2017    Procedure: HEMODIALYSIS CATHETER INSERTION;  Surgeon: Chance Valenzuela MD;  Location:  [facility];  Service:    • ORBITOTOMY Left 11/3/2017    Procedure:  LEFT LATERAL ORBITOTOMY WITH DEBULKING OF TUMOR ;  Surgeon: Moo Hopkins MD;  Location:  [facility];  Service:    • OTHER SURGICAL HISTORY      esophagogastric fundoplasty nissen fundoplication   • TUBAL ABDOMINAL LIGATION           EVALUATION  PT Ortho     Row Name 04/02/19 1015       Subjective Comments    Subjective Comments  Pt without complaints, has a follow-up with her doctor on Tuesday next week.  -JM       Subjective Pain    Able to rate subjective pain?  yes  -JM    Pre-Treatment Pain Level  0  -JM    Post-Treatment Pain Level  0  -JM       Transfers    Sit-Stand [place] (Transfers)  independent  -JM    Stand-Sit [place] (Transfers)  independent  -JM    Comment (Transfers)  supine for tx  -JM       Gait/Stairs Assessment/Training    [place] Level (Gait)  independent  -JM      User Key  (r) = Recorded By, (t) = Taken By, (c) = Cosigned By    Initials Name Provider Type    Candice Ramirez, PT Physical Therapist          LDA Wound     Row Name 04/02/19 1015             Wound 02/28/19 1400 midline abdomen surgical    Wound - Properties Group Date first assessed: 02/28/19  -MF Time first assessed: 1400  - Orientation: midline  - Location: abdomen  -MF Type: surgical  -MF    Wound Image  Images linked: 2  -JM      Dressing Appearance  moist drainage;intact  -JM      Base  clean;granulating;moist;red;yellow;slough  -      Periwound  intact;pink;yeast min yeast  -JM      Periwound Temperature  warm  -      Periwound Skin Turgor  soft  -      Edges  open  -JM      Wound Length (cm)  2 cm  -JM      Wound Width (cm)  0.7 cm  -      Wound Depth (cm)  1 cm  -JM      Tunneling [Depth (cm)/Location]  0  -JM      Drainage Characteristics/Odor  serosanguineous;malodorous mild odor c/w yeast  -JM      Drainage Amount  scant  -JM      Care, Wound  cleansed with;wound cleanser;debrided;negative pressure wound therapy  -      Dressing Care, Wound  dressing changed vac  -JM      Periwound Care, Wound  cleansed with pH balanced cleanser;dry periwound area maintained;topical treatment  "applied nystatin/no-sting crust x2  -JM      Wound Output (mL)  10 changed canister  -JM         Wound 02/28/19 1400 Left lateral abdomen surgical    Wound - Properties Group Date first assessed: 02/28/19  - Time first assessed: 1400  - Side: Left  - Orientation: lateral  - Location: abdomen  - Type: surgical  -MF    Dressing Appearance  intact;moist drainage  -      Base  clean;granulating;moist;red  -      Periwound  intact;moist;yeast  -      Periwound Temperature  warm  -      Periwound Skin Turgor  soft  -      Edges  open  -      Wound Length (cm)  0.3 cm  -JM      Wound Width (cm)  0.5 cm  -      Wound Depth (cm)  0.4 cm  -      Drainage Characteristics/Odor  serosanguineous  -      Drainage Amount  scant  -      Care, Wound  cleansed with;wound cleanser  -      Dressing Care, Wound  dressing applied;petroleum-based;gauze;low-adherent;foam xeroform and 4\" optifoam gentle to lateral/superior wounds  -      Periwound Care, Wound  cleansed with pH balanced cleanser;dry periwound area maintained;topical treatment applied nystatin powder  -         NPWT (Negative Pressure Wound Therapy) 02/28/19 1400 midline and lateral wound    NPWT (Negative Pressure Wound Therapy) - Properties Group Placement Date: 02/28/19  - Placement Time: 1400  -MF Location: midline and lateral wound  -MF    Therapy Setting  continuous therapy  -      Dressing  foam, black  -JM      Pressure Setting  125 mmHg  -      Sponges Inserted  1 1 black  -JM      Sponges Removed  5 3 white, 2 black  -JM      Finger sweep complete  Yes  -        User Key  (r) = Recorded By, (t) = Taken By, (c) = Cosigned By    Initials Name Provider Type    Alli Delvalle, PT Physical Therapist    Candice Ramirez, PT Physical Therapist            WOUND DEBRIDEMENT  Total area of Debridement: 2 cmsq  Debridement Site 1  Location- Site 1: abd wounds  Selective Debridement- Site 1: Wound Surface " "<20cmsq  Instruments- Site 1: tweezers  Excised Tissue Description- Site 1: scant, slough  Bleeding- Site 1: none             Therapy Education     Row Name 04/02/19 1015             Therapy Education    Education Details  discussed potential d/c of vac in next 2-3 txs.  -      Given  Symptoms/condition management;Bandaging/dressing change  -      Program  Modified  -      How Provided  Verbal;Demonstration  -      Provided to  Caregiver;Patient  -      Level of Understanding  Verbalized;Demonstrated  -        User Key  (r) = Recorded By, (t) = Taken By, (c) = Cosigned By    Initials Name Provider Type    Candice Ramirez, PT Physical Therapist          Recommendation and Plan  PT Assessment/Plan     Row Name 04/02/19 1015          PT Assessment    Functional Limitations  Other (comment) wound care  -     Impairments  Integumentary integrity  -     Assessment Comments  Superior and lateral wounds too small for NPWT today, PT dressed with xeroform and 4\" optifoam gentle.  Inferior midline aspect with decreased area and depth, still 1.0cm deep and will benefit from another 2-3 txs with NPWT.  Pt making excellent progress toward goals, will continue with current POC.  -     Rehab Potential  Excellent  -     Patient/caregiver participated in establishment of treatment plan and goals  Yes  -     Patient would benefit from skilled therapy intervention  Yes  -        PT Plan    PT Frequency  2x/week;3x/week  -     Predicted Duration of Therapy Intervention (Therapy Eval)  6-8 visits  -     Planned CPT's?  PT NEG PRESS WOUND TO 50SQCM NONDME 2: 54582;PT GILA DEBRIDE OPEN WOUND UP TO 20 CM: 63646;PT NONSELECT DEBRIDE 15 MIN: 57084;PT SELF CARE/MGMT/TRAIN 15 MIN: 23768  -     Physical Therapy Interventions (Optional Details)  patient/family education;wound care  -     PT Plan Comments  NPWT, dressings to sup/lat aspects  -       User Key  (r) = Recorded By, (t) = Taken By, (c) = Cosigned " By    Initials Name Provider Type    Candice Ramirez, PT Physical Therapist          Goals  PT OP Goals     Row Name 04/02/19 1015          PT Short Term Goals    STG Date to Achieve  04/14/19  -     STG 1  Decrease midline wound by 25% as evidence of wound healing  -     STG 1 Progress  Met  -     STG 2  Decrease lateral wound by 25% as evidence of wound healing  -     STG 2 Progress  Met  -     STG 3  Pt and family able to verbalize home management of wound vac   -     STG 3 Progress  Met  -        Long Term Goals    LTG Date to Achieve  05/29/19  -     LTG 1  Decrease midline wound by 75% as evidence of wound healing  -     LTG 1 Progress  Progressing;Partially Met  -     LTG 2  Decrease lateral wound by 75% as evidence of wound healing  -     LTG 2 Progress  Met  -     LTG 3  Pt and family independent with home mangement of wound dressing changes.   -     LTG 3 Progress  Ongoing  -        Time Calculation    PT Goal Re-Cert Due Date  05/29/19  -       User Key  (r) = Recorded By, (t) = Taken By, (c) = Cosigned By    Initials Name Provider Type    Candice Ramirez, PT Physical Therapist            Time Calculation: Start Time: 1015  Therapy Charges for Today     Code Description Service Date Service Provider Modifiers Qty    77406949643 HC PT NEG PRESS WOUND TO 50SQCM DME2 4/2/2019 Candice Constantino, PT GP 1    48689931559 HC GILA DEBRIDE OPEN WOUND UP TO 20CM 4/2/2019 Candice Constantino, PT GP 1            PT G-Codes  Outcome Measure Options: BWAT (Wong-Lee Wound Assess Tool)     Candice Constnatino, PT  4/2/2019

## 2019-04-05 ENCOUNTER — HOSPITAL ENCOUNTER (OUTPATIENT)
Dept: PHYSICAL THERAPY | Facility: HOSPITAL | Age: 64
Setting detail: THERAPIES SERIES
Discharge: HOME | End: 2019-04-05

## 2019-04-05 DIAGNOSIS — S31.109D OPEN WOUND OF ABDOMEN, SUBSEQUENT ENCOUNTER: Primary | ICD-10-CM

## 2019-04-05 PROCEDURE — 97602 WOUND(S) CARE NON-SELECTIVE: CPT

## 2019-04-05 NOTE — THERAPY TREATMENT NOTE
Outpatient Rehabilitation - Wound/Debridement Treatment Note   Nancy     Patient Name: Tereza Ackerman  : 1955  MRN: [id number]  Today's Date: 2019                 Admit Date: 2019    Visit Dx:    ICD-10-CM ICD-9-CM   1. Open wound of abdomen, subsequent encounter S31.109D V58.89     879.2       Patient Active Problem List   Diagnosis   • Impaired glucose tolerance   • Parathyroid adenoma   • Reaction to chronic stress   • Multinodular goiter   • Vitamin D deficiency   • Meningioma, recurrent of brain (CMS/HCC)   • Arthritis   • Depression   • Small bowel obstruction (CMS/HCC)   • Insomnia   • History of Nissen fundoplication   • Malignant neoplasm of left orbit (CMS/HCC)   • Prediabetes   • Mixed hyperlipidemia   • Hyperglycemia   • Atrial flutter with rapid ventricular response (CMS/HCC)   • Anemia   • Large bowel obstruction (CMS/HCC)   • Abdominal pain   • Essential hypertension   • History of creation of ostomy (CMS/HCC)   • Screen for colon cancer   • Sigmoid volvulus (CMS/HCC)        Past Medical History:   Diagnosis Date   • Arthritis     rt knee    • Atrial flutter with rapid ventricular response (CMS/HCC) 2017   • Back pain    • Brain tumor (CMS/HCC)    • Colostomy present (CMS/HCC) 2018   • Depression    • History of blood transfusion    • History of gastroesophageal reflux (GERD)     resolved since Nissen   • History of Nissen fundoplication 2017   • History of small bowel obstruction    • Hyperlipemia    • Hypertension    • Memory loss or impairment    • Migraine    • Parathyroid adenoma     Incidental on thyroid US.   • PONV (postoperative nausea and vomiting)     nausea - preprocedural meds help    • Prediabetes     Last Impression: 2015  Reviewed labs. Excellent control.  Emery Connell Impression: 2015  Reviewed labs. Excellent control.  Michaela Connell   • Thyroid nodule      Last Impression: 2015  r/o thyroid cancer, will  proceed with US.  Michaela Connell (Internal Medicine)    • UTI (urinary tract infection)    • Vision changes     blockages left eye    • Wears glasses     readers        Past Surgical History:   Procedure Laterality Date   • CARPAL TUNNEL RELEASE  2002    right    • COLONOSCOPY N/A 8/18/2018    Procedure: COLONOSCOPY;  Surgeon: Inderjit Campos MD;  Location:  [facility];  Service: Gastroenterology   • COLONOSCOPY N/A 11/28/2018    Procedure: COLONOSCOPY;  Surgeon: Inderjit Campos MD;  Location:  [facility];  Service: Gastroenterology   • COLOSTOMY CLOSURE N/A 2/19/2019    Procedure: COLOSTOMY TAKEDOWN, INCISIONAL HERNIA REPAIR;  Surgeon: Andrea Bocanegra MD;  Location:  [facility];  Service: General   • CRANIOTOMY  2003 & 2014    Dr. Werner Hollis for tumor removal   • ENDOSCOPY     • EXPLORATORY LAPAROTOMY N/A 12/5/2017    Procedure: EXPLORATORY LAPAROTOMY, SMALL BOWEL RESECTION;  Surgeon: Ирина Cobian MD;  Location:  [facility];  Service:    • EXPLORATORY LAPAROTOMY N/A 12/22/2017    Procedure: LAPAROTOMY EXPLORATORY FOR SMALL BOWEL OBSTRUCTION;  Surgeon: Ирина Cobian MD;  Location:  [facility];  Service:    • EXPLORATORY LAPAROTOMY N/A 7/23/2018    Procedure: EXPLORATORY LAPAROTOMY, APPENDECTOMY, CECOPEXY, INCISIONAL HERNIA REPAIR, LYSIS OF ADHESIONS;  Surgeon: Andrea Bocanegra MD;  Location:  [facility];  Service: General   • EXPLORATORY LAPAROTOMY N/A 8/19/2018    Procedure: LAPAROTOMY EXPLORATORY, SIGMOID COLECTOMY, CREATION OF OSTOMY;  Surgeon: Andrea Bocanegra MD;  Location:  [facility];  Service: General   • HYSTERECTOMY      partial - both ovaries still present pt believes    • INSERTION HEMODIALYSIS CATHETER N/A 12/7/2017    Procedure: HEMODIALYSIS CATHETER INSERTION;  Surgeon: Chance Valenzuela MD;  Location:  [facility];  Service:    • ORBITOTOMY Left 11/3/2017    Procedure:  LEFT LATERAL ORBITOTOMY WITH DEBULKING OF TUMOR ;  Surgeon: Moo Hopkins MD;  Location:  [facility];  Service:    • OTHER SURGICAL HISTORY      esophagogastric fundoplasty nissen fundoplication   • TUBAL ABDOMINAL LIGATION           EVALUATION  PT Ortho     Row Name 04/05/19 1030       Subjective Comments    Subjective Comments  Pt without issues  -MF       Subjective Pain    Able to rate subjective pain?  yes  -MF    Pre-Treatment Pain Level  0  -MF    Post-Treatment Pain Level  0  -MF       Transfers    Sit-Stand [place] (Transfers)  independent  -MF    Stand-Sit [place] (Transfers)  independent  -MF    Comment (Transfers)  supine for tx  -MF      User Key  (r) = Recorded By, (t) = Taken By, (c) = Cosigned By    Initials Name Provider Type    MF Alli Fields, PT Physical Therapist          LDA Wound     Row Name 04/05/19 1030             Wound 02/28/19 1400 midline abdomen surgical    Wound - Properties Group Date first assessed: 02/28/19  - Time first assessed: 1400  -MF Orientation: midline  -MF Location: abdomen  -MF Type: surgical  -MF    Dressing Appearance  moist drainage;intact  -MF      Base  clean;granulating;moist;red;yellow;slough  -MF      Periwound  intact;pink  -MF      Periwound Temperature  warm  -MF      Periwound Skin Turgor  soft  -MF      Edges  open  -MF      Wound Length (cm)  2 cm  -MF      Wound Width (cm)  0.7 cm  -MF      Wound Depth (cm)  0.5 cm  -MF      Tunneling [Depth (cm)/Location]  0  -MF      Drainage Characteristics/Odor  serosanguineous  -MF      Drainage Amount  scant  -MF      Care, Wound  irrigated with;sterile normal saline;debrided nonselective debridement with 4x4s and q tip  -MF      Dressing Care, Wound  foam;low-adherent  -MF         Wound 02/28/19 1400 Left lateral abdomen surgical    Wound - Properties Group Date first assessed: 02/28/19  - Time first assessed: 1400  -MF Side: Left  -MF Orientation: lateral  - Location: abdomen  -MF Type: surgical  -MF    Base  closed/resurfaced  -MF      Dressing Care, Wound   foam;low-adherent;petroleum-based  -MF         NPWT (Negative Pressure Wound Therapy) 02/28/19 1400 midline and lateral wound    NPWT (Negative Pressure Wound Therapy) - Properties Group Placement Date: 02/28/19  -MF Placement Time: 1400  -MF Location: midline and lateral wound  -MF    Therapy Setting  vacuum off  -MF      Sponges Removed  1 black  -MF      Finger sweep complete  Yes  -MF        User Key  (r) = Recorded By, (t) = Taken By, (c) = Cosigned By    Initials Name Provider Type    Alli Delvalle, PT Physical Therapist            WOUND DEBRIDEMENT                       Recommendation and Plan  PT Assessment/Plan     Row Name 04/05/19 1030          PT Assessment    Functional Limitations  Other (comment) wound care  -     Impairments  Integumentary integrity  -     Assessment Comments  wound vac d/c'd today with good progression of wounds and no further need for NPWT.  PT covered wound with xeroform and mepilex border to help maintain a clean wound bed.  Pt to f/u with MD lui and PT in 1 week to deteremine further need for therapy vs d/c  -MF     Rehab Potential  Excellent  -     Patient/caregiver participated in establishment of treatment plan and goals  Yes  -MF     Patient would benefit from skilled therapy intervention  Yes  -MF        PT Plan    PT Frequency  1x/week  -     Physical Therapy Interventions (Optional Details)  wound care;patient/family education  -     PT Plan Comments  D/c NPWT and pt to manage dressing at home x 1 week and PT to reassess at that time.   -       User Key  (r) = Recorded By, (t) = Taken By, (c) = Cosigned By    Initials Name Provider Type    Alli Delvalle, PT Physical Therapist          Goals  PT OP Goals     Row Name 04/05/19 1030          Time Calculation    PT Goal Re-Cert Due Date  05/29/19  -       User Key  (r) = Recorded By, (t) = Taken By, (c) = Cosigned By    Initials Name Provider Type    Alli Delvalle, PT Physical  Therapist          PT Goal Re-Cert Due Date: 05/29/19            Time Calculation: Start Time: 1030  Therapy Charges for Today     Code Description Service Date Service Provider Modifiers Qty    81893156519 HC NONSELECTIVE DEBRIDEMENT 4/5/2019 Alli Fields, PT GP 1                  Alli Fields, PT  4/5/2019

## 2019-04-12 ENCOUNTER — HOSPITAL ENCOUNTER (OUTPATIENT)
Dept: PHYSICAL THERAPY | Facility: HOSPITAL | Age: 64
Setting detail: THERAPIES SERIES
Discharge: HOME | End: 2019-04-12

## 2019-04-12 DIAGNOSIS — S31.109D OPEN WOUND OF ABDOMEN, SUBSEQUENT ENCOUNTER: Primary | ICD-10-CM

## 2019-04-12 DIAGNOSIS — F43.89 ADJUSTMENT REACTION TO CHRONIC STRESS: ICD-10-CM

## 2019-04-12 DIAGNOSIS — F32.89 OTHER DEPRESSION: ICD-10-CM

## 2019-04-12 PROCEDURE — 97597 DBRDMT OPN WND 1ST 20 CM/<: CPT

## 2019-04-12 RX ORDER — QUETIAPINE FUMARATE 50 MG/1
TABLET, FILM COATED ORAL
Qty: 30 TABLET | Refills: 0 | OUTPATIENT
Start: 2019-04-12

## 2019-04-13 RX ORDER — SERTRALINE HYDROCHLORIDE 100 MG/1
100 TABLET, FILM COATED ORAL DAILY
Qty: 30 TABLET | Refills: 0 | Status: SHIPPED | OUTPATIENT
Start: 2019-04-13 | End: 2019-05-03 | Stop reason: SDUPTHER

## 2019-04-13 RX ORDER — QUETIAPINE FUMARATE 50 MG/1
50 TABLET, FILM COATED ORAL NIGHTLY
Qty: 30 TABLET | Refills: 0 | Status: SHIPPED | OUTPATIENT
Start: 2019-04-13 | End: 2019-05-03 | Stop reason: SDUPTHER

## 2019-04-13 NOTE — TELEPHONE ENCOUNTER
PATIENT JUST HAD SURGERY AND IS RECUPERATING BUT HAS AN APPOINTMENT 05/03/2019. CAN SHE GET ENOUGH MEDICATION TO LAST HER UNTIL SHE COMES IN?

## 2019-04-19 ENCOUNTER — HOSPITAL ENCOUNTER (OUTPATIENT)
Dept: PHYSICAL THERAPY | Facility: HOSPITAL | Age: 64
Setting detail: THERAPIES SERIES
Discharge: HOME | End: 2019-04-19

## 2019-04-19 DIAGNOSIS — S31.109D OPEN WOUND OF ABDOMEN, SUBSEQUENT ENCOUNTER: Primary | ICD-10-CM

## 2019-04-19 PROCEDURE — 97602 WOUND(S) CARE NON-SELECTIVE: CPT

## 2019-05-03 ENCOUNTER — OFFICE VISIT (OUTPATIENT)
Dept: INTERNAL MEDICINE | Facility: CLINIC | Age: 64
End: 2019-05-03

## 2019-05-03 VITALS
BODY MASS INDEX: 29.02 KG/M2 | SYSTOLIC BLOOD PRESSURE: 142 MMHG | HEART RATE: 60 BPM | DIASTOLIC BLOOD PRESSURE: 80 MMHG | OXYGEN SATURATION: 98 % | WEIGHT: 163.8 LBS | HEIGHT: 63 IN

## 2019-05-03 DIAGNOSIS — F43.89 ADJUSTMENT REACTION TO CHRONIC STRESS: ICD-10-CM

## 2019-05-03 DIAGNOSIS — F32.89 OTHER DEPRESSION: ICD-10-CM

## 2019-05-03 DIAGNOSIS — R53.1 WEAKNESS: Primary | ICD-10-CM

## 2019-05-03 PROCEDURE — 99213 OFFICE O/P EST LOW 20 MIN: CPT | Performed by: INTERNAL MEDICINE

## 2019-05-03 RX ORDER — SERTRALINE HYDROCHLORIDE 100 MG/1
100 TABLET, FILM COATED ORAL DAILY
Qty: 90 TABLET | Refills: 1 | Status: SHIPPED | OUTPATIENT
Start: 2019-05-03 | End: 2019-10-30 | Stop reason: SDUPTHER

## 2019-05-03 RX ORDER — BUSPIRONE HYDROCHLORIDE 5 MG/1
TABLET ORAL
Qty: 90 TABLET | Refills: 4 | OUTPATIENT
Start: 2019-05-03

## 2019-05-03 RX ORDER — QUETIAPINE FUMARATE 50 MG/1
50 TABLET, FILM COATED ORAL NIGHTLY
Qty: 90 TABLET | Refills: 1 | Status: SHIPPED | OUTPATIENT
Start: 2019-05-03 | End: 2019-07-09

## 2019-05-03 RX ORDER — BUSPIRONE HYDROCHLORIDE 7.5 MG/1
7.5 TABLET ORAL 2 TIMES DAILY
Qty: 60 TABLET | Refills: 5 | Status: SHIPPED | OUTPATIENT
Start: 2019-05-03 | End: 2019-05-07 | Stop reason: SDUPTHER

## 2019-05-03 NOTE — PROGRESS NOTES
Follow-up and Med Refill    Subjective   Tereza Ackerman is a 64 y.o. female is here today for follow-up.    History of Present Illness   S/p colostomy takedown, and had a site infection, had the wound vac. Now all better  Meningioma stable.      Current Outpatient Medications:   •  aspirin 81 MG tablet, Take 1 tablet by mouth Daily., Disp: 30 tablet, Rfl: 11  •  busPIRone (BUSPAR) 7.5 MG tablet, Take 1 tablet by mouth 2 (Two) Times a Day., Disp: 60 tablet, Rfl: 5  •  carvedilol (COREG) 12.5 MG tablet, TAKE ONE TABLET BY MOUTH EVERY 12 HOURS, Disp: 180 tablet, Rfl: 3  •  cholecalciferol (VITAMIN D3) 1000 units tablet, Take 2,000 Units by mouth Every Other Day., Disp: , Rfl:   •  ondansetron (ZOFRAN) 4 MG tablet, Take 1 tablet by mouth Every 8 (Eight) Hours As Needed for Nausea or Vomiting., Disp: 10 tablet, Rfl: 0  •  QUEtiapine (SEROquel) 50 MG tablet, Take 1 tablet by mouth Every Night., Disp: 90 tablet, Rfl: 1  •  sertraline (ZOLOFT) 100 MG tablet, Take 1 tablet by mouth Daily., Disp: 90 tablet, Rfl: 1  •  thiamine (VITAMIN B1) 100 MG tablet, Take 1 tablet by mouth Every Other Day., Disp: 45 tablet, Rfl: 1      The following portions of the patient's history were reviewed and updated as appropriate: allergies, current medications, past family history, past medical history, past social history, past surgical history and problem list.    Review of Systems   Constitutional: Negative.  Negative for chills and fever.   HENT: Negative for ear discharge, ear pain, sinus pressure and sore throat.    Respiratory: Negative for cough, chest tightness and shortness of breath.    Cardiovascular: Negative for chest pain, palpitations and leg swelling.   Gastrointestinal: Positive for diarrhea. Negative for nausea and vomiting.   Musculoskeletal: Negative for arthralgias, back pain and myalgias.   Neurological: Negative for dizziness, syncope and headaches.   Psychiatric/Behavioral: Positive for sleep disturbance (better).  "Negative for confusion. The patient is nervous/anxious (better).        Objective   /80   Pulse 60   Ht 160 cm (63\")   Wt 74.3 kg (163 lb 12.8 oz)   SpO2 98% Comment: ra  BMI 29.02 kg/m²   Physical Exam   Constitutional: She is oriented to person, place, and time. She appears well-developed and well-nourished.   HENT:   Head: Normocephalic and atraumatic.   Right Ear: External ear normal.   Left Ear: External ear normal.   Mouth/Throat: No oropharyngeal exudate.   Eyes: Conjunctivae are normal. Pupils are equal, round, and reactive to light.   Neck: Neck supple. No thyromegaly present.   Cardiovascular: Normal rate and regular rhythm.   Pulmonary/Chest: Effort normal and breath sounds normal.   Abdominal: Soft. Bowel sounds are normal. She exhibits no distension. There is tenderness.   Abdominal incision healed well   Musculoskeletal: She exhibits no edema.   Neurological: She is alert and oriented to person, place, and time. No cranial nerve deficit.   Skin: Skin is warm and dry.   Psychiatric: She has a normal mood and affect. Judgment normal.   Nursing note and vitals reviewed.        Results for orders placed or performed during the hospital encounter of 02/19/19   OR Potassium   Result Value Ref Range    Potassium, OR 4.11 3.5 - 5.3 mmol/L   Basic Metabolic Panel   Result Value Ref Range    Glucose 93 70 - 100 mg/dL    BUN 15 9 - 23 mg/dL    Creatinine 0.80 0.60 - 1.30 mg/dL    Sodium 130 (L) 132 - 146 mmol/L    Potassium 4.3 3.5 - 5.5 mmol/L    Chloride 102 99 - 109 mmol/L    CO2 24.0 20.0 - 31.0 mmol/L    Calcium 8.8 8.7 - 10.4 mg/dL    eGFR Non African Amer 72 >60 mL/min/1.73    BUN/Creatinine Ratio 18.8 7.0 - 25.0    Anion Gap 4.0 3.0 - 11.0 mmol/L   CBC (No Diff)   Result Value Ref Range    WBC 7.21 3.50 - 10.80 10*3/mm3    RBC 2.89 (L) 3.89 - 5.14 10*6/mm3    Hemoglobin 8.9 (L) 11.5 - 15.5 g/dL    Hematocrit 27.6 (L) 34.5 - 44.0 %    MCV 95.5 80.0 - 99.0 fL    MCH 30.8 27.0 - 31.0 pg    MCHC " 32.2 32.0 - 36.0 g/dL    RDW 13.3 11.3 - 14.5 %    RDW-SD 46.5 37.0 - 54.0 fl    MPV 10.1 6.0 - 12.0 fL    Platelets 189 150 - 450 10*3/mm3   CBC (No Diff)   Result Value Ref Range    WBC 8.15 3.50 - 10.80 10*3/mm3    RBC 3.29 (L) 3.89 - 5.14 10*6/mm3    Hemoglobin 10.2 (L) 11.5 - 15.5 g/dL    Hematocrit 31.4 (L) 34.5 - 44.0 %    MCV 95.4 80.0 - 99.0 fL    MCH 31.0 27.0 - 31.0 pg    MCHC 32.5 32.0 - 36.0 g/dL    RDW 13.2 11.3 - 14.5 %    RDW-SD 46.3 37.0 - 54.0 fl    MPV 10.0 6.0 - 12.0 fL    Platelets 298 150 - 450 10*3/mm3   Basic Metabolic Panel   Result Value Ref Range    Glucose 117 (H) 70 - 100 mg/dL    BUN 12 9 - 23 mg/dL    Creatinine 0.79 0.60 - 1.30 mg/dL    Sodium 139 132 - 146 mmol/L    Potassium 3.7 3.5 - 5.5 mmol/L    Chloride 107 99 - 109 mmol/L    CO2 25.0 20.0 - 31.0 mmol/L    Calcium 8.7 8.7 - 10.4 mg/dL    eGFR Non African Amer 73 >60 mL/min/1.73    BUN/Creatinine Ratio 15.2 7.0 - 25.0    Anion Gap 7.0 3.0 - 11.0 mmol/L   Tissue Pathology Exam   Result Value Ref Range    Case Report       Surgical Pathology Report                         Case: [id number]                                  Authorizing Provider:  Andrea Bocanegra MD        Collected:           02/19/2019 02:49 PM          Ordering Location:     [facility]   Received:            02/20/2019 07:17 AM                                 OR                                                                           Pathologist:           John Mullins MD                                                           Specimen:    Large Intestine, Left / Descending Colon, colostomy takedown. proximal and distal                   anastomotic ring, rectal stump                                                             Clinical Information       The working history is status post colostomy.      Final Diagnosis       COLOSTOMY TAKEDOWN:  Colonic-cutaneous anastomosis.    [place]/[facility] [place]       Gross Description       Received in formalin labeled  as colostomy takedown, proximal and distal anastomotic ring and rectal stump are two portions of bowel measuring 4.5 x 2.5 x 1.8 cm and 6.0 x 3.0 x 3.0 cm.  Each have a moderate amount of grossly unremarkable yellow lobular mesenteric fat.  The larger segment of bowel has a stoma at one end which is surrounded by a thin rim of gray/tan skin.  The mucosa is tan and glistening.  No masses, polyps, or ulcerations are identified.  Also received in the same container are two resection margin donuts averaging 1.5 x 1.5 x 1.0 cm.  Representative sections from the two segments of bowel to include the stoma are submitted in cassettes A-B and the surgical anastomotic donuts are submitted in cassette C.  HBM/dlb       Microscopic Description       Sections of the colostomy takedown show the skin directly anastomosed to the colon with the distal portion of the colon showing increased chronic inflammation in the lamina propria. No adenomatous changes or neoplasia is identified.                Assessment/Plan   Diagnoses and all orders for this visit:    Weakness  -     Ambulatory Referral to [facility]    Adjustment reaction to chronic stress  Comments:  Start buspar and if not helping, change to trintellix  Orders:  -     busPIRone (BUSPAR) 7.5 MG tablet; Take 1 tablet by mouth 2 (Two) Times a Day.  -     sertraline (ZOLOFT) 100 MG tablet; Take 1 tablet by mouth Daily.    Other depression  -     QUEtiapine (SEROquel) 50 MG tablet; Take 1 tablet by mouth Every Night.    reviewed labs and hospital records.  Needs FLP, but will get at next visit.    Change buspar to 7.5 bid             Return in about 4 months (around 9/3/2019) for Medicare Wellness.

## 2019-05-07 ENCOUNTER — TELEPHONE (OUTPATIENT)
Dept: INTERNAL MEDICINE | Facility: CLINIC | Age: 64
End: 2019-05-07

## 2019-05-07 DIAGNOSIS — F43.89 ADJUSTMENT REACTION TO CHRONIC STRESS: ICD-10-CM

## 2019-05-07 RX ORDER — BUSPIRONE HYDROCHLORIDE 5 MG/1
5 TABLET ORAL 3 TIMES DAILY
Qty: 90 TABLET | Refills: 5 | Status: SHIPPED | OUTPATIENT
Start: 2019-05-07 | End: 2019-06-20 | Stop reason: SDUPTHER

## 2019-05-07 NOTE — TELEPHONE ENCOUNTER
Patient states current buspar 7.5 is over 60 dollars she would like her prescription changed back to the buspar 5 tid.  [facility]  Thank you.

## 2019-05-08 NOTE — TELEPHONE ENCOUNTER
rx changed back. Please let her know.  Sh can also split the pill and take 1.5 tabs to get 7.5 twice daily.    thanks

## 2019-06-03 RX ORDER — DIAZEPAM 10 MG/1
10 TABLET ORAL TAKE AS DIRECTED
Qty: 1 TABLET | Refills: 0 | OUTPATIENT
Start: 2019-06-03 | End: 2019-09-30 | Stop reason: SDUPTHER

## 2019-06-03 NOTE — TELEPHONE ENCOUNTER
Provider:  Bunny    Caller: jewell    Time of call:1130     Phone #: [phone]   Last visit: 2/11/19    Next visit: 06/11/19            Reason for call: pt has called and requested an order for one 10mg valium to take prior to her MRI on 06/11. Order pended.

## 2019-06-11 ENCOUNTER — HOSPITAL ENCOUNTER (OUTPATIENT)
Dept: MRI IMAGING | Facility: HOSPITAL | Age: 64
Discharge: HOME | End: 2019-06-11
Admitting: NEUROLOGICAL SURGERY

## 2019-06-11 ENCOUNTER — OFFICE VISIT (OUTPATIENT)
Dept: NEUROSURGERY | Facility: CLINIC | Age: 64
End: 2019-06-11

## 2019-06-11 VITALS
TEMPERATURE: 98 F | DIASTOLIC BLOOD PRESSURE: 72 MMHG | SYSTOLIC BLOOD PRESSURE: 110 MMHG | BODY MASS INDEX: 29.52 KG/M2 | HEIGHT: 63 IN | WEIGHT: 166.6 LBS

## 2019-06-11 DIAGNOSIS — D32.9 MENINGIOMA (HCC): Primary | ICD-10-CM

## 2019-06-11 DIAGNOSIS — D32.9 MENINGIOMA (HCC): ICD-10-CM

## 2019-06-11 DIAGNOSIS — Z98.890 S/P CRANIOTOMY: ICD-10-CM

## 2019-06-11 PROCEDURE — A9577 INJ MULTIHANCE: HCPCS | Performed by: NEUROLOGICAL SURGERY

## 2019-06-11 PROCEDURE — 0 GADOBENATE DIMEGLUMINE 529 MG/ML SOLUTION: Performed by: NEUROLOGICAL SURGERY

## 2019-06-11 PROCEDURE — 82565 ASSAY OF CREATININE: CPT

## 2019-06-11 PROCEDURE — 99213 OFFICE O/P EST LOW 20 MIN: CPT | Performed by: NEUROLOGICAL SURGERY

## 2019-06-11 PROCEDURE — 70553 MRI BRAIN STEM W/O & W/DYE: CPT

## 2019-06-11 RX ADMIN — GADOBENATE DIMEGLUMINE 15 ML: 529 INJECTION, SOLUTION INTRAVENOUS at 11:29

## 2019-06-11 NOTE — PROGRESS NOTES
Tereza Ackerman  1955  [id number]                        CHIEF COMPLAINT: Follow-up meningioma         MEDICAL HISTORY SINCE LAST ENCOUNTER: She continues to do well subsequent to SRS.  She reports today for sequential MRI.  She has no new symptoms.           Past Medical History:   Diagnosis Date   • Arthritis     rt knee    • Atrial flutter with rapid ventricular response (CMS/HCC) 12/21/2017   • Back pain    • Brain tumor (CMS/HCC)    • Colostomy present (CMS/HCC) 08/2018   • Depression    • History of blood transfusion    • History of gastroesophageal reflux (GERD)     resolved since Nissen   • History of Nissen fundoplication 7/1/2017   • History of small bowel obstruction    • Hyperlipemia    • Hypertension    • Memory loss or impairment    • Migraine    • Parathyroid adenoma     Incidental on thyroid US.   • PONV (postoperative nausea and vomiting)     nausea - preprocedural meds help    • Prediabetes     Last Impression: 06 Feb 2015  Reviewed labs. Excellent control.  Maren Connellast Impression: 06 Feb 2015  Reviewed labs. Excellent control.  Michaela Connell   • Thyroid nodule      Last Impression: 13 Jun 2015  r/o thyroid cancer, will proceed with US.  Michaela Connell (Internal Medicine)    • UTI (urinary tract infection)    • Vision changes     blockages left eye    • Wears glasses     readers              Past Surgical History:   Procedure Laterality Date   • CARPAL TUNNEL RELEASE  2002    right    • COLONOSCOPY N/A 8/18/2018    Procedure: COLONOSCOPY;  Surgeon: Inderjit Campos MD;  Location:  [facility];  Service: Gastroenterology   • COLONOSCOPY N/A 11/28/2018    Procedure: COLONOSCOPY;  Surgeon: Inderjit Campos MD;  Location:  [facility];  Service: Gastroenterology   • COLOSTOMY CLOSURE N/A 2/19/2019    Procedure: COLOSTOMY TAKEDOWN, INCISIONAL HERNIA REPAIR;  Surgeon: Andrea Bocanegra MD;  Location:  [facility];  Service: General   • CRANIOTOMY  2003 & 2014      Werner Hollis for tumor removal   • ENDOSCOPY     • EXPLORATORY LAPAROTOMY N/A 12/5/2017    Procedure: EXPLORATORY LAPAROTOMY, SMALL BOWEL RESECTION;  Surgeon: Ирина Cobian MD;  Location:  [facility];  Service:    • EXPLORATORY LAPAROTOMY N/A 12/22/2017    Procedure: LAPAROTOMY EXPLORATORY FOR SMALL BOWEL OBSTRUCTION;  Surgeon: Ирина Cobian MD;  Location:  [facility];  Service:    • EXPLORATORY LAPAROTOMY N/A 7/23/2018    Procedure: EXPLORATORY LAPAROTOMY, APPENDECTOMY, CECOPEXY, INCISIONAL HERNIA REPAIR, LYSIS OF ADHESIONS;  Surgeon: Andrea Bocanegra MD;  Location:  [facility];  Service: General   • EXPLORATORY LAPAROTOMY N/A 8/19/2018    Procedure: LAPAROTOMY EXPLORATORY, SIGMOID COLECTOMY, CREATION OF OSTOMY;  Surgeon: Andrea Bocanegra MD;  Location:  [facility];  Service: General   • HYSTERECTOMY      partial - both ovaries still present pt believes    • INSERTION HEMODIALYSIS CATHETER N/A 12/7/2017    Procedure: HEMODIALYSIS CATHETER INSERTION;  Surgeon: Chance Valenzuela MD;  Location:  [facility];  Service:    • ORBITOTOMY Left 11/3/2017    Procedure:  LEFT LATERAL ORBITOTOMY WITH DEBULKING OF TUMOR ;  Surgeon: Moo Hopkins MD;  Location:  [facility];  Service:    • OTHER SURGICAL HISTORY      esophagogastric fundoplasty nissen fundoplication   • TUBAL ABDOMINAL LIGATION                Family History   Problem Relation Age of Onset   • Obesity Mother    • Heart attack Mother    • Migraines Father    • Stroke Father    • Diabetes Father    • Cancer Other    • Diabetes Other    • Breast cancer Maternal Aunt    • Breast cancer Paternal Aunt    • Ovarian cancer Neg Hx               Social History     Socioeconomic History   • Marital status:      Spouse name: Not on file   • Number of children: Not on file   • Years of education: Not on file   • Highest education level: Not on file   Tobacco Use   • Smoking status: Never Smoker   • Smokeless tobacco: Never Used   Substance and Sexual  Activity   • Alcohol use: No   • Drug use: No   • Sexual activity: Defer              Allergies   Allergen Reactions   • Dilantin [Phenytoin Sodium Extended] Rash              Current Outpatient Medications:   •  aspirin 81 MG tablet, Take 1 tablet by mouth Daily., Disp: 30 tablet, Rfl: 11  •  busPIRone (BUSPAR) 5 MG tablet, Take 1 tablet by mouth 3 (Three) Times a Day., Disp: 90 tablet, Rfl: 5  •  carvedilol (COREG) 12.5 MG tablet, TAKE ONE TABLET BY MOUTH EVERY 12 HOURS, Disp: 180 tablet, Rfl: 3  •  cholecalciferol (VITAMIN D3) 1000 units tablet, Take 2,000 Units by mouth Every Other Day., Disp: , Rfl:   •  diazePAM (VALIUM) 10 MG tablet, Take 1 tablet by mouth Take As Directed. Take one by mouth 30 minutes before MRI., Disp: 1 tablet, Rfl: 0  •  ondansetron (ZOFRAN) 4 MG tablet, Take 1 tablet by mouth Every 8 (Eight) Hours As Needed for Nausea or Vomiting., Disp: 10 tablet, Rfl: 0  •  QUEtiapine (SEROquel) 50 MG tablet, Take 1 tablet by mouth Every Night., Disp: 90 tablet, Rfl: 1  •  sertraline (ZOLOFT) 100 MG tablet, Take 1 tablet by mouth Daily., Disp: 90 tablet, Rfl: 1  •  thiamine (VITAMIN B1) 100 MG tablet, Take 1 tablet by mouth Every Other Day., Disp: 45 tablet, Rfl: 1  No current facility-administered medications for this visit.          Review of Systems   Constitutional: Positive for fatigue and unexpected weight change. Negative for activity change, appetite change, chills, diaphoresis and fever.   HENT: Negative for congestion, dental problem, drooling, ear discharge, ear pain, facial swelling, hearing loss, mouth sores, nosebleeds, postnasal drip, rhinorrhea, sinus pressure, sneezing, sore throat, tinnitus, trouble swallowing and voice change.    Eyes: Negative for photophobia, pain, discharge, redness, itching and visual disturbance.   Respiratory: Negative for apnea, cough, choking, chest tightness, shortness of breath, wheezing and stridor.    Cardiovascular: Negative for chest pain, palpitations  "and leg swelling.   Gastrointestinal: Positive for diarrhea. Negative for abdominal distention, abdominal pain, anal bleeding, blood in stool, constipation, nausea, rectal pain and vomiting.   Endocrine: Negative for cold intolerance, heat intolerance, polydipsia, polyphagia and polyuria.   Genitourinary: Negative for decreased urine volume, difficulty urinating, dysuria, enuresis, flank pain, frequency, genital sores, hematuria and urgency.   Musculoskeletal: Negative for arthralgias, back pain, gait problem, joint swelling, myalgias, neck pain and neck stiffness.   Skin: Negative for color change, pallor, rash and wound.   Allergic/Immunologic: Negative for environmental allergies, food allergies and immunocompromised state.   Neurological: Negative for dizziness, tremors, seizures, syncope, facial asymmetry, speech difficulty, weakness, light-headedness, numbness and headaches.   Hematological: Negative for adenopathy. Does not bruise/bleed easily.   Psychiatric/Behavioral: Positive for decreased concentration. Negative for agitation, behavioral problems, confusion, dysphoric mood, hallucinations, self-injury, sleep disturbance and suicidal ideas. The patient is nervous/anxious. The patient is not hyperactive.                Vitals:    06/11/19 1337   BP: 110/72   BP Location: Left arm   Patient Position: Sitting   Cuff Size: Adult   Temp: 98 °F (36.7 °C)   TempSrc: Temporal   Weight: 75.6 kg (166 lb 9.6 oz)   Height: 160 cm (62.99\")               EXAMINATION: She has diminished vision in her left eye with slight proptosis.  There is no weakness sensory loss or reflex asymmetry however.            MEDICAL DECISION MAKING: The MRI shows no progression no evidence of recurrence at this time.           ASSESSMENT/DISPOSITION: Return to see us in 4 months for continued surveillance.  Please that she is done well.              I APPRECIATE THE OPPORTUNITY OF THIS REFERRAL. PLEASE CALL IF ANY       QUESTIONS " [phone]    Scribed for Werner Hollis MD by Joy Corral CMA. 6/11/2019  2:10 PM     I have read and concur with the information provided by the scribe.  Werner Hollis MD

## 2019-06-14 LAB — CREAT BLDA-MCNC: 0.9 MG/DL (ref 0.6–1.3)

## 2019-06-19 ENCOUNTER — TELEPHONE (OUTPATIENT)
Dept: INTERNAL MEDICINE | Facility: CLINIC | Age: 64
End: 2019-06-19

## 2019-06-19 DIAGNOSIS — F43.89 ADJUSTMENT REACTION TO CHRONIC STRESS: ICD-10-CM

## 2019-06-19 NOTE — TELEPHONE ENCOUNTER
PATIENT WOULD LIKE TO KNOW IF SHE CAN GET A INCREASE IN HER BUSPAR MEDICATION? SHE WOULD LIKE TO GET A CALL BACK -[phone]

## 2019-06-19 NOTE — TELEPHONE ENCOUNTER
Pt is taking 5mg 1.5 bid and still having issues, would like to increase dose if possible.  5 mg tablets are cheaper than the 7.5mg, and a 90 day supply please.

## 2019-06-20 RX ORDER — BUSPIRONE HYDROCHLORIDE 5 MG/1
5 TABLET ORAL 3 TIMES DAILY
Qty: 270 TABLET | Refills: 1 | Status: SHIPPED | OUTPATIENT
Start: 2019-06-20 | End: 2019-10-30 | Stop reason: SDUPTHER

## 2019-06-20 NOTE — TELEPHONE ENCOUNTER
Pt states she would perfer to stay on 5mg 1.5 tablets bid and a 90 day supply, discussed with Dr Connell and OK'ed. Script sent.

## 2019-07-09 ENCOUNTER — OFFICE VISIT (OUTPATIENT)
Dept: INTERNAL MEDICINE | Facility: CLINIC | Age: 64
End: 2019-07-09

## 2019-07-09 VITALS
DIASTOLIC BLOOD PRESSURE: 84 MMHG | SYSTOLIC BLOOD PRESSURE: 124 MMHG | BODY MASS INDEX: 30.55 KG/M2 | OXYGEN SATURATION: 96 % | HEART RATE: 71 BPM | HEIGHT: 62 IN | WEIGHT: 166 LBS

## 2019-07-09 DIAGNOSIS — D22.9 NEVUS: ICD-10-CM

## 2019-07-09 DIAGNOSIS — F32.89 OTHER DEPRESSION: ICD-10-CM

## 2019-07-09 DIAGNOSIS — K52.9 CHRONIC DIARRHEA: Primary | ICD-10-CM

## 2019-07-09 PROCEDURE — 99213 OFFICE O/P EST LOW 20 MIN: CPT | Performed by: INTERNAL MEDICINE

## 2019-07-09 RX ORDER — QUETIAPINE FUMARATE 25 MG/1
25 TABLET, FILM COATED ORAL NIGHTLY
Qty: 30 TABLET | Refills: 5 | Status: SHIPPED | OUTPATIENT
Start: 2019-07-09 | End: 2019-10-30 | Stop reason: SDUPTHER

## 2019-07-09 RX ORDER — DIPHENOXYLATE HYDROCHLORIDE AND ATROPINE SULFATE 2.5; .025 MG/1; MG/1
1 TABLET ORAL 3 TIMES DAILY PRN
Qty: 30 TABLET | Refills: 2 | Status: SHIPPED | OUTPATIENT
Start: 2019-07-09 | End: 2019-09-30

## 2019-07-09 NOTE — PROGRESS NOTES
Diarrhea (for the past 5 months, since colostomy closure)    Subjective   Tereza Ackerman is a 64 y.o. female is here today for follow-up.    History of Present Illness   Continues to have diarrhea, has been > 6 mos since her take down.  Doing okay on the current dose of zoloft an buspar.  Left wrist birthmark more puffy.    Current Outpatient Medications:   •  aspirin 81 MG tablet, Take 1 tablet by mouth Daily., Disp: 30 tablet, Rfl: 11  •  busPIRone (BUSPAR) 5 MG tablet, Take 1 tablet by mouth 3 (Three) Times a Day., Disp: 270 tablet, Rfl: 1  •  carvedilol (COREG) 12.5 MG tablet, TAKE ONE TABLET BY MOUTH EVERY 12 HOURS, Disp: 180 tablet, Rfl: 3  •  cholecalciferol (VITAMIN D3) 1000 units tablet, Take 2,000 Units by mouth Every Other Day., Disp: , Rfl:   •  diazePAM (VALIUM) 10 MG tablet, Take 1 tablet by mouth Take As Directed. Take one by mouth 30 minutes before MRI., Disp: 1 tablet, Rfl: 0  •  ondansetron (ZOFRAN) 4 MG tablet, Take 1 tablet by mouth Every 8 (Eight) Hours As Needed for Nausea or Vomiting., Disp: 10 tablet, Rfl: 0  •  QUEtiapine (SEROquel) 25 MG tablet, Take 1 tablet by mouth Every Night., Disp: 30 tablet, Rfl: 5  •  sertraline (ZOLOFT) 100 MG tablet, Take 1 tablet by mouth Daily., Disp: 90 tablet, Rfl: 1  •  thiamine (VITAMIN B1) 100 MG tablet, Take 1 tablet by mouth Every Other Day., Disp: 45 tablet, Rfl: 1  •  diphenoxylate-atropine (LOMOTIL) 2.5-0.025 MG per tablet, Take 1 tablet by mouth 3 (Three) Times a Day As Needed for Diarrhea., Disp: 30 tablet, Rfl: 2      The following portions of the patient's history were reviewed and updated as appropriate: allergies, current medications, past family history, past medical history, past social history, past surgical history and problem list.    Review of Systems   Constitutional: Negative.  Negative for chills and fever.   HENT: Negative for ear discharge, ear pain, sinus pressure and sore throat.    Respiratory: Negative for cough, chest  "tightness and shortness of breath.    Cardiovascular: Negative for chest pain, palpitations and leg swelling.   Gastrointestinal: Positive for diarrhea. Negative for nausea and vomiting.   Musculoskeletal: Negative for arthralgias, back pain and myalgias.   Skin: Positive for color change.   Neurological: Negative for dizziness, syncope and headaches.   Psychiatric/Behavioral: Negative for confusion and sleep disturbance.       Objective   /84   Pulse 71   Ht 157.5 cm (62\")   Wt 75.3 kg (166 lb)   SpO2 96% Comment: ra  BMI 30.36 kg/m²   Physical Exam   Constitutional: She is oriented to person, place, and time. She appears well-developed and well-nourished.   HENT:   Head: Normocephalic and atraumatic.   Right Ear: External ear normal.   Left Ear: External ear normal.   Mouth/Throat: No oropharyngeal exudate.   Eyes: Conjunctivae are normal. Pupils are equal, round, and reactive to light.   Neck: Neck supple. No thyromegaly present.   Cardiovascular: Normal rate and regular rhythm.   Pulmonary/Chest: Effort normal and breath sounds normal.   Abdominal: Soft. Bowel sounds are normal. She exhibits distension. There is tenderness.   Musculoskeletal: She exhibits no edema.   Neurological: She is alert and oriented to person, place, and time. No cranial nerve deficit.   Skin: Skin is warm and dry.   Psychiatric: She has a normal mood and affect. Judgment normal.   Nursing note and vitals reviewed.        Results for orders placed or performed during the hospital encounter of 06/11/19   POC Creatinine   Result Value Ref Range    Creatinine 0.90 0.60 - 1.30 mg/dL             Assessment/Plan   Diagnoses and all orders for this visit:    Chronic diarrhea  Comments:  adv. probiotic align, and fiber.    Other depression  Comments:  decrease seroquel to 25mg as better and having weight gain.  Orders:  -     QUEtiapine (SEROquel) 25 MG tablet; Take 1 tablet by mouth Every Night.    Nevus  Comments:  reassured    Other " orders  -     diphenoxylate-atropine (LOMOTIL) 2.5-0.025 MG per tablet; Take 1 tablet by mouth 3 (Three) Times a Day As Needed for Diarrhea.             tdap and shingrix at pharm      Return for Next scheduled follow up.

## 2019-09-11 ENCOUNTER — TRANSCRIBE ORDERS (OUTPATIENT)
Dept: ADMINISTRATIVE | Facility: HOSPITAL | Age: 64
End: 2019-09-11

## 2019-09-11 DIAGNOSIS — K45.0 OBSTRUCTED INTERNAL HERNIA: Primary | ICD-10-CM

## 2019-09-17 ENCOUNTER — HOSPITAL ENCOUNTER (OUTPATIENT)
Dept: CT IMAGING | Facility: HOSPITAL | Age: 64
Discharge: HOME | End: 2019-09-17
Admitting: SURGERY

## 2019-09-17 DIAGNOSIS — K45.0 OBSTRUCTED INTERNAL HERNIA: ICD-10-CM

## 2019-09-17 PROCEDURE — 74176 CT ABD & PELVIS W/O CONTRAST: CPT

## 2019-09-30 ENCOUNTER — OFFICE VISIT (OUTPATIENT)
Dept: CARDIOLOGY | Facility: CLINIC | Age: 64
End: 2019-09-30

## 2019-09-30 VITALS
BODY MASS INDEX: 29.66 KG/M2 | HEART RATE: 72 BPM | DIASTOLIC BLOOD PRESSURE: 82 MMHG | WEIGHT: 167.4 LBS | HEIGHT: 63 IN | SYSTOLIC BLOOD PRESSURE: 124 MMHG

## 2019-09-30 DIAGNOSIS — I10 ESSENTIAL HYPERTENSION: ICD-10-CM

## 2019-09-30 DIAGNOSIS — I48.92 ATRIAL FLUTTER WITH RAPID VENTRICULAR RESPONSE (HCC): Primary | ICD-10-CM

## 2019-09-30 PROCEDURE — 99213 OFFICE O/P EST LOW 20 MIN: CPT | Performed by: INTERNAL MEDICINE

## 2019-09-30 RX ORDER — DIAZEPAM 10 MG/1
10 TABLET ORAL TAKE AS DIRECTED
Qty: 1 TABLET | Refills: 0 | OUTPATIENT
Start: 2019-09-30 | End: 2019-10-30

## 2019-09-30 RX ORDER — CARVEDILOL 12.5 MG/1
12.5 TABLET ORAL EVERY 12 HOURS
Qty: 180 TABLET | Refills: 3 | Status: SHIPPED | OUTPATIENT
Start: 2019-09-30 | End: 2020-12-22 | Stop reason: SDUPTHER

## 2019-09-30 NOTE — TELEPHONE ENCOUNTER
Provider: Bunny    Caller: self  Time of call:  12:50p   Phone #: [phone]   Surgery:  n/a  Surgery Date: n/a    Last visit:   06/11/2019  Next visit: 10/03/2019        Reason for call:   Pt LVM requesting an Rx for Valium 10mg. She said she would like to have it for when she goes to have her MRI for Dr. Jaimes.    BENITA:  03/13/2019 Hydrocodone/Acetaminophen 325MG/5MG 15 2   SHANA MEJIA [facility] [address] [place] [address] 1    06/04/2019 Diazepam 10MG 1 1   MAJO JAIMES [facility] [address] [place] 1

## 2019-09-30 NOTE — PROGRESS NOTES
[facility] AT [address], [address]  [place], [place], [address]    [phone]  WWW.[place]           OUTPATIENT CLINIC FOLLOW UP NOTE    Patient Care Team:  Patient Care Team:  Michaela Connell MD as PCP - General  Michaela Connell MD as PCP - Family Medicine  Werner Hollis MD as Referring Physician (Neurosurgery)  Moo Hopkins MD as Consulting Physician (Ophthalmology)  Jamal Ramos MD as Consulting Physician (Nephrology)  Alli Aguirre MD as Consulting Physician (Cardiology)  Costa Raygoza MD as Consulting Physician (Radiation Oncology)  Andrea Bocanegra MD as Consulting Physician (General Surgery)  Alli Aguirre MD as Consulting Physician (Cardiology)    Subjective:      Chief Complaint   Patient presents with   • Atrial flutter with rapid ventricular response       HPI:    Tereza Ackerman is a 64 y.o. female.   The patient presents today for follow-up for atrial fibrillation and hypertension.  She developed atrial fibrillation in the presence of a small bowel obstruction.      Since her last visit the patient has had some abdominal issues.  She is going to be seen at the [facility] for some sort of preop evaluation.  She denies palpitations, chest pain, dyspnea.    Blood pressure at home may be running high but she is not correlated her blood pressure cuff in the past.  Her blood pressure was normal here in the office today but in the 150s at home this morning    PFSH:  Patient Active Problem List   Diagnosis   • Impaired glucose tolerance   • Parathyroid adenoma   • Reaction to chronic stress   • Multinodular goiter   • Vitamin D deficiency   • Meningioma, recurrent of brain (CMS/HCC)   • Arthritis   • Depression   • Small bowel obstruction (CMS/HCC)   • Insomnia   • History of Nissen fundoplication   • Malignant neoplasm of left orbit (CMS/HCC)   • Prediabetes   • Mixed hyperlipidemia   • Hyperglycemia   • Atrial  flutter with rapid ventricular response (CMS/HCC)   • Anemia   • Large bowel obstruction (CMS/HCC)   • Abdominal pain   • Essential hypertension   • History of creation of ostomy (CMS/HCC)   • Screen for colon cancer   • Sigmoid volvulus (CMS/HCC)         Current Outpatient Medications:   •  aspirin 81 MG tablet, Take 1 tablet by mouth Daily., Disp: 30 tablet, Rfl: 11  •  busPIRone (BUSPAR) 5 MG tablet, Take 1 tablet by mouth 3 (Three) Times a Day., Disp: 270 tablet, Rfl: 1  •  carvedilol (COREG) 12.5 MG tablet, TAKE ONE TABLET BY MOUTH EVERY 12 HOURS, Disp: 180 tablet, Rfl: 3  •  cholecalciferol (VITAMIN D3) 1000 units tablet, Take 2,000 Units by mouth Every Other Day., Disp: , Rfl:   •  diazePAM (VALIUM) 10 MG tablet, Take 1 tablet by mouth Take As Directed. Take one by mouth 30 minutes before MRI., Disp: 1 tablet, Rfl: 0  •  ondansetron (ZOFRAN) 4 MG tablet, Take 1 tablet by mouth Every 8 (Eight) Hours As Needed for Nausea or Vomiting., Disp: 10 tablet, Rfl: 0  •  QUEtiapine (SEROquel) 25 MG tablet, Take 1 tablet by mouth Every Night., Disp: 30 tablet, Rfl: 5  •  sertraline (ZOLOFT) 100 MG tablet, Take 1 tablet by mouth Daily., Disp: 90 tablet, Rfl: 1  •  thiamine (VITAMIN B1) 100 MG tablet, Take 1 tablet by mouth Every Other Day., Disp: 45 tablet, Rfl: 1    Allergies   Allergen Reactions   • Dilantin [Phenytoin Sodium Extended] Rash        reports that she has never smoked. She has never used smokeless tobacco.    Family History   Problem Relation Age of Onset   • Obesity Mother    • Heart attack Mother    • Migraines Father    • Stroke Father    • Diabetes Father    • Cancer Other    • Diabetes Other    • Breast cancer Maternal Aunt    • Breast cancer Paternal Aunt    • Ovarian cancer Neg Hx        Review of Systems:  Negative for chest pain, dyspnea with exertion, orthopnea, PND, lower extremity edema, palpitations, lightheadedness, syncope.      Objective:   /82 (BP Location: Left arm, Patient Position:  "Sitting)   Pulse 72   Ht 160 cm (63\")   Wt 75.9 kg (167 lb 6.4 oz)   BMI 29.65 kg/m²   CONSTITUTIONAL: No acute distress, normal affect  RESPIRATORY: Normal effort. Clear to auscultation bilaterally without wheezing or rales  CARDIOVASCULAR: Regular rate and rhythm with normal S1 and S2. Without murmur, gallop or rub.  PERIPHERAL VASCULAR: Normal radial pulses bilaterally. There is no peripheral edema bilaterally.    Labs:  Lab Results   Component Value Date    ALT 25 02/15/2019    AST 25 02/15/2019     Lab Results   Component Value Date    CHOL 234 (H) 11/13/2017    TRIG 183 (H) 12/25/2017    HDL 62 (H) 11/13/2017    CREATININE 0.90 06/11/2019       Diagnostic Data:    Procedures    Event Monitor 2/2018  -Events were normal sinus rhythm  -No atrial flutter    TTE 12/2017  · LVEF difficult to evaluate with tachycardia- globally appears mildly depressed.  · Left ventricular wall thickness is consistent with concentric hypertrophy.  · Mild tricuspid valve regurgitation is present.  · Calculated right ventricular systolic pressure from tricuspid regurgitation is 32 mmHg.    Assessment and Plan:   Tereza was seen today for dizziness and hypertension.    Diagnoses and all orders for this visit:    Atrial flutter with rapid ventricular response  -Had brief atrial flutter in the setting of a small bowel obstruction that lasted less than 48 hours, outpatient heart monitor revealed no recurrent atrial flutter.  Later underwent GI operations without recurrent arrhythmia on carvedilol.  -Possibly undergo another GI operation.  This to be done at the [facility].  Okay to proceed from a cardiac standpoint at this current time.  If she has recurrent arrhythmias at that time, will consider sending her for an EP study and ablation  -Continue carvedilol, aspirin 81 mg daily    Essential hypertension  -Continue carvedilol 12.5 mg twice daily  -The patient will try to correlate her cuff with another standardized cuff at a " pharmacy or local medical office.  If she is averaging 140 mmHg systolic or higher, she will call us.  Can increase carvedilol to 25 mg twice daily.  Otherwise continued lifestyle and risk factor modification as well as carvedilol.  -Has amlodipine for PRN      - Return in about 1 year (around 9/30/2020).    Alli Aguirre MD, MSc, [facility]  Interventional Cardiology  [facility] at [facility]

## 2019-10-03 ENCOUNTER — HOSPITAL ENCOUNTER (OUTPATIENT)
Dept: MRI IMAGING | Facility: HOSPITAL | Age: 64
Discharge: HOME | End: 2019-10-03
Admitting: NEUROLOGICAL SURGERY

## 2019-10-03 ENCOUNTER — OFFICE VISIT (OUTPATIENT)
Dept: NEUROSURGERY | Facility: CLINIC | Age: 64
End: 2019-10-03

## 2019-10-03 VITALS
BODY MASS INDEX: 29.66 KG/M2 | HEIGHT: 63 IN | TEMPERATURE: 97.5 F | WEIGHT: 167.4 LBS | DIASTOLIC BLOOD PRESSURE: 84 MMHG | SYSTOLIC BLOOD PRESSURE: 120 MMHG

## 2019-10-03 DIAGNOSIS — H53.9 VISUAL CHANGES: ICD-10-CM

## 2019-10-03 DIAGNOSIS — D32.9 MENINGIOMA (HCC): ICD-10-CM

## 2019-10-03 DIAGNOSIS — Z98.890 S/P CRANIOTOMY: ICD-10-CM

## 2019-10-03 DIAGNOSIS — D32.9 MENINGIOMA (HCC): Primary | ICD-10-CM

## 2019-10-03 PROCEDURE — 99213 OFFICE O/P EST LOW 20 MIN: CPT | Performed by: NEUROLOGICAL SURGERY

## 2019-10-03 PROCEDURE — 70553 MRI BRAIN STEM W/O & W/DYE: CPT

## 2019-10-03 PROCEDURE — A9577 INJ MULTIHANCE: HCPCS | Performed by: NEUROLOGICAL SURGERY

## 2019-10-03 PROCEDURE — 0 GADOBENATE DIMEGLUMINE 529 MG/ML SOLUTION: Performed by: NEUROLOGICAL SURGERY

## 2019-10-03 PROCEDURE — 82565 ASSAY OF CREATININE: CPT

## 2019-10-03 RX ADMIN — GADOBENATE DIMEGLUMINE 15 ML: 529 INJECTION, SOLUTION INTRAVENOUS at 11:44

## 2019-10-03 NOTE — PATIENT INSTRUCTIONS
Call Dr. Hollis on a Monday or Tuesday with an update.      Ask for Hanna () and leave a message for  Dr. Hollis.     He will call you back at the end of the day as soon as he can.     [phone]    **Call when you get your surgery date from [facility].

## 2019-10-03 NOTE — PROGRESS NOTES
"Tereza Ackerman  1955  [id number]                        CHIEF COMPLAINT: Recurrent meningioma         MEDICAL HISTORY SINCE LAST ENCOUNTER: This 64-year-old has had SRS treatment on 2 occasions subsequent to surgical excision of a temporal fossa meningioma with recurrence within the orbit.  She is done reasonably well with no specific issues however she noted that her vision may be \"a little off\".  She had MRI done today.  She has not seen the ophthalmologist for some time however.           Past Medical History:   Diagnosis Date   • Abdominal hernia    • Arthritis     rt knee    • Atrial flutter with rapid ventricular response (CMS/HCC) 12/21/2017   • Back pain    • Brain tumor (CMS/HCC)    • Colostomy present (CMS/HCC) 08/2018   • Depression    • History of blood transfusion    • History of gastroesophageal reflux (GERD)     resolved since Nissen   • History of Nissen fundoplication 7/1/2017   • History of small bowel obstruction    • Hyperlipemia    • Hypertension    • Memory loss or impairment    • Migraine    • Parathyroid adenoma     Incidental on thyroid US.   • PONV (postoperative nausea and vomiting)     nausea - preprocedural meds help    • Prediabetes     Last Impression: 06 Feb 2015  Reviewed labs. Excellent control.  Maren Connellast Impression: 06 Feb 2015  Reviewed labs. Excellent control.  Michaela Connell   • Thyroid nodule      Last Impression: 13 Jun 2015  r/o thyroid cancer, will proceed with US.  Michaela Connell (Internal Medicine)    • UTI (urinary tract infection)    • Vision changes     blockages left eye    • Wears glasses     readers              Past Surgical History:   Procedure Laterality Date   • CARPAL TUNNEL RELEASE  2002    right    • COLONOSCOPY N/A 8/18/2018    Procedure: COLONOSCOPY;  Surgeon: Inderjit Campos MD;  Location: [facility];  Service: Gastroenterology   • COLONOSCOPY N/A 11/28/2018    Procedure: COLONOSCOPY;  Surgeon: Inderjit Campos MD;  " Location:  [facility];  Service: Gastroenterology   • COLOSTOMY CLOSURE N/A 2/19/2019    Procedure: COLOSTOMY TAKEDOWN, INCISIONAL HERNIA REPAIR;  Surgeon: Andrea Bocanegra MD;  Location:  [facility];  Service: General   • CRANIOTOMY  2003 & 2014    Dr. Werner Hollis for tumor removal   • ENDOSCOPY     • EXPLORATORY LAPAROTOMY N/A 12/5/2017    Procedure: EXPLORATORY LAPAROTOMY, SMALL BOWEL RESECTION;  Surgeon: Ирина Cobian MD;  Location:  [facility];  Service:    • EXPLORATORY LAPAROTOMY N/A 12/22/2017    Procedure: LAPAROTOMY EXPLORATORY FOR SMALL BOWEL OBSTRUCTION;  Surgeon: Ирина Cobian MD;  Location:  [facility];  Service:    • EXPLORATORY LAPAROTOMY N/A 7/23/2018    Procedure: EXPLORATORY LAPAROTOMY, APPENDECTOMY, CECOPEXY, INCISIONAL HERNIA REPAIR, LYSIS OF ADHESIONS;  Surgeon: Andrea Bocanegra MD;  Location:  [facility];  Service: General   • EXPLORATORY LAPAROTOMY N/A 8/19/2018    Procedure: LAPAROTOMY EXPLORATORY, SIGMOID COLECTOMY, CREATION OF OSTOMY;  Surgeon: Andrea Bocanegra MD;  Location:  [facility];  Service: General   • HYSTERECTOMY      partial - both ovaries still present pt believes    • INSERTION HEMODIALYSIS CATHETER N/A 12/7/2017    Procedure: HEMODIALYSIS CATHETER INSERTION;  Surgeon: Chance Valenzuela MD;  Location:  [facility];  Service:    • ORBITOTOMY Left 11/3/2017    Procedure:  LEFT LATERAL ORBITOTOMY WITH DEBULKING OF TUMOR ;  Surgeon: Moo Hopkins MD;  Location:  [facility];  Service:    • OTHER SURGICAL HISTORY      esophagogastric fundoplasty nissen fundoplication   • TUBAL ABDOMINAL LIGATION                Family History   Problem Relation Age of Onset   • Obesity Mother    • Heart attack Mother    • Migraines Father    • Stroke Father    • Diabetes Father    • Cancer Other    • Diabetes Other    • Breast cancer Maternal Aunt    • Breast cancer Paternal Aunt    • Ovarian cancer Neg Hx               Social History     Socioeconomic History   • Marital status:       Spouse name: Not on file   • Number of children: Not on file   • Years of education: Not on file   • Highest education level: Not on file   Tobacco Use   • Smoking status: Never Smoker   • Smokeless tobacco: Never Used   Substance and Sexual Activity   • Alcohol use: No   • Drug use: No   • Sexual activity: Defer              Allergies   Allergen Reactions   • Dilantin [Phenytoin Sodium Extended] Rash              Current Outpatient Medications:   •  aspirin 81 MG tablet, Take 1 tablet by mouth Daily., Disp: 30 tablet, Rfl: 11  •  busPIRone (BUSPAR) 5 MG tablet, Take 1 tablet by mouth 3 (Three) Times a Day., Disp: 270 tablet, Rfl: 1  •  carvedilol (COREG) 12.5 MG tablet, Take 1 tablet by mouth Every 12 (Twelve) Hours., Disp: 180 tablet, Rfl: 3  •  cholecalciferol (VITAMIN D3) 1000 units tablet, Take 2,000 Units by mouth Every Other Day., Disp: , Rfl:   •  diazePAM (VALIUM) 10 MG tablet, Take 1 tablet by mouth Take As Directed. Take one by mouth 30 minutes before MRI., Disp: 1 tablet, Rfl: 0  •  ondansetron (ZOFRAN) 4 MG tablet, Take 1 tablet by mouth Every 8 (Eight) Hours As Needed for Nausea or Vomiting., Disp: 10 tablet, Rfl: 0  •  QUEtiapine (SEROquel) 25 MG tablet, Take 1 tablet by mouth Every Night., Disp: 30 tablet, Rfl: 5  •  sertraline (ZOLOFT) 100 MG tablet, Take 1 tablet by mouth Daily., Disp: 90 tablet, Rfl: 1  •  thiamine (VITAMIN B1) 100 MG tablet, Take 1 tablet by mouth Every Other Day., Disp: 45 tablet, Rfl: 1  No current facility-administered medications for this visit.          Review of Systems   Constitutional: Positive for fatigue and unexpected weight change. Negative for activity change, appetite change, chills, diaphoresis and fever.   HENT: Negative for congestion, dental problem, drooling, ear discharge, ear pain, facial swelling, hearing loss, mouth sores, nosebleeds, postnasal drip, rhinorrhea, sinus pressure, sneezing, sore throat, tinnitus, trouble swallowing and voice  "change.    Eyes: Negative for photophobia, pain, discharge, redness, itching and visual disturbance.   Respiratory: Negative for apnea, cough, choking, chest tightness, shortness of breath, wheezing and stridor.    Cardiovascular: Negative for chest pain, palpitations and leg swelling.   Gastrointestinal: Positive for diarrhea. Negative for abdominal distention, abdominal pain, anal bleeding, blood in stool, constipation, nausea, rectal pain and vomiting.   Endocrine: Negative for cold intolerance, heat intolerance, polydipsia, polyphagia and polyuria.   Genitourinary: Negative for decreased urine volume, difficulty urinating, dysuria, enuresis, flank pain, frequency, genital sores, hematuria and urgency.   Musculoskeletal: Negative for arthralgias, back pain, gait problem, joint swelling, myalgias, neck pain and neck stiffness.   Skin: Negative for color change, pallor, rash and wound.   Allergic/Immunologic: Negative for environmental allergies, food allergies and immunocompromised state.   Neurological: Negative for dizziness, tremors, seizures, syncope, facial asymmetry, speech difficulty, weakness, light-headedness, numbness and headaches.   Hematological: Negative for adenopathy. Does not bruise/bleed easily.   Psychiatric/Behavioral: Positive for decreased concentration. Negative for agitation, behavioral problems, confusion, dysphoric mood, hallucinations, self-injury, sleep disturbance and suicidal ideas. The patient is nervous/anxious. The patient is not hyperactive.              There were no vitals filed for this visit.            EXAMINATION: There is no weakness sensory loss or reflex asymmetry.  Her visual fields seem to be constricted on the left.            MEDICAL DECISION MAKING: The MRI suggests progression although the \"cuts\" are not symmetrical.  We will get a thin sliced MRI through the orbit and repeat her ophthalmologically evaluation.           ASSESSMENT/DISPOSITION:              I " APPRECIATE THE OPPORTUNITY OF THIS REFERRAL. PLEASE CALL IF ANY       QUESTIONS [phone]

## 2019-10-04 LAB — CREAT BLDA-MCNC: 0.9 MG/DL (ref 0.6–1.3)

## 2019-10-07 ENCOUNTER — TRANSCRIBE ORDERS (OUTPATIENT)
Dept: ADMINISTRATIVE | Facility: HOSPITAL | Age: 64
End: 2019-10-07

## 2019-10-07 DIAGNOSIS — Z12.31 VISIT FOR SCREENING MAMMOGRAM: Primary | ICD-10-CM

## 2019-10-10 ENCOUNTER — APPOINTMENT (OUTPATIENT)
Dept: MRI IMAGING | Facility: HOSPITAL | Age: 64
End: 2019-10-10

## 2019-10-14 ENCOUNTER — HOSPITAL ENCOUNTER (OUTPATIENT)
Dept: MRI IMAGING | Facility: HOSPITAL | Age: 64
Discharge: HOME | End: 2019-10-14
Admitting: NEUROLOGICAL SURGERY

## 2019-10-14 DIAGNOSIS — D32.9 MENINGIOMA (HCC): ICD-10-CM

## 2019-10-14 DIAGNOSIS — D32.9 MENINGIOMA (HCC): Primary | ICD-10-CM

## 2019-10-14 PROCEDURE — 70553 MRI BRAIN STEM W/O & W/DYE: CPT

## 2019-10-14 PROCEDURE — 0 GADOBENATE DIMEGLUMINE 529 MG/ML SOLUTION: Performed by: NEUROLOGICAL SURGERY

## 2019-10-14 PROCEDURE — A9577 INJ MULTIHANCE: HCPCS | Performed by: NEUROLOGICAL SURGERY

## 2019-10-14 RX ORDER — DIAZEPAM 10 MG/1
TABLET ORAL
Qty: 1 TABLET | Refills: 0 | OUTPATIENT
Start: 2019-10-14 | End: 2019-10-30

## 2019-10-14 RX ADMIN — GADOBENATE DIMEGLUMINE 15 ML: 529 INJECTION, SOLUTION INTRAVENOUS at 14:29

## 2019-10-14 NOTE — TELEPHONE ENCOUNTER
Provider:  Bunny  Caller: patient  Time of call:  9:06   Phone #:  [phone]  Surgery:  No-benign neoplasm of meninges  Surgery Date:    Last visit:   10/03/19  Next visit: MRI-today    BENITA:         Reason for call:     Patient requests Valium 10 mg to take before her MRI today.  She said she will need to get this this morning.

## 2019-10-15 ENCOUNTER — TELEPHONE (OUTPATIENT)
Dept: NEUROSURGERY | Facility: CLINIC | Age: 64
End: 2019-10-15

## 2019-10-15 NOTE — TELEPHONE ENCOUNTER
----- Message from Hanna Car sent at 10/15/2019 10:27 AM EDT -----  Contact: [phone]  PATIENT CALLING TO LET YOU KNOW SHE HAD A MRI DONE YESTERDAY AT [facility].

## 2019-10-30 ENCOUNTER — OFFICE VISIT (OUTPATIENT)
Dept: INTERNAL MEDICINE | Facility: CLINIC | Age: 64
End: 2019-10-30

## 2019-10-30 VITALS
OXYGEN SATURATION: 98 % | SYSTOLIC BLOOD PRESSURE: 136 MMHG | WEIGHT: 169.2 LBS | HEIGHT: 63 IN | BODY MASS INDEX: 29.98 KG/M2 | HEART RATE: 60 BPM | DIASTOLIC BLOOD PRESSURE: 74 MMHG

## 2019-10-30 DIAGNOSIS — K56.2 SIGMOID VOLVULUS (HCC): ICD-10-CM

## 2019-10-30 DIAGNOSIS — F43.89 ADJUSTMENT REACTION TO CHRONIC STRESS: ICD-10-CM

## 2019-10-30 DIAGNOSIS — I10 ESSENTIAL HYPERTENSION: ICD-10-CM

## 2019-10-30 DIAGNOSIS — Z00.00 MEDICARE ANNUAL WELLNESS VISIT, SUBSEQUENT: Primary | ICD-10-CM

## 2019-10-30 DIAGNOSIS — F51.01 PRIMARY INSOMNIA: ICD-10-CM

## 2019-10-30 DIAGNOSIS — Z78.0 POSTMENOPAUSAL: ICD-10-CM

## 2019-10-30 DIAGNOSIS — E55.9 VITAMIN D DEFICIENCY: ICD-10-CM

## 2019-10-30 DIAGNOSIS — E78.2 MIXED HYPERLIPIDEMIA: ICD-10-CM

## 2019-10-30 DIAGNOSIS — R42 DIZZINESS: ICD-10-CM

## 2019-10-30 DIAGNOSIS — I42.8 OTHER CARDIOMYOPATHY (HCC): ICD-10-CM

## 2019-10-30 DIAGNOSIS — I86.8 JUGULAR VEIN ANEURYSM: ICD-10-CM

## 2019-10-30 DIAGNOSIS — D32.0 MENINGIOMA, RECURRENT OF BRAIN (HCC): ICD-10-CM

## 2019-10-30 DIAGNOSIS — F32.89 OTHER DEPRESSION: ICD-10-CM

## 2019-10-30 LAB
25(OH)D3 SERPL-MCNC: 35.6 NG/ML (ref 30–100)
ALBUMIN SERPL-MCNC: 4.2 G/DL (ref 3.5–5.2)
ALBUMIN/GLOB SERPL: 1.1 G/DL
ALP SERPL-CCNC: 89 U/L (ref 39–117)
ALT SERPL W P-5'-P-CCNC: 16 U/L (ref 1–33)
ANION GAP SERPL CALCULATED.3IONS-SCNC: 10.8 MMOL/L (ref 5–15)
AST SERPL-CCNC: 19 U/L (ref 1–32)
BILIRUB SERPL-MCNC: 0.3 MG/DL (ref 0.2–1.2)
BUN BLD-MCNC: 11 MG/DL (ref 8–23)
BUN/CREAT SERPL: 13.4 (ref 7–25)
CALCIUM SPEC-SCNC: 9.1 MG/DL (ref 8.6–10.5)
CHLORIDE SERPL-SCNC: 101 MMOL/L (ref 98–107)
CHOLEST SERPL-MCNC: 218 MG/DL (ref 0–200)
CO2 SERPL-SCNC: 26.2 MMOL/L (ref 22–29)
CREAT BLD-MCNC: 0.82 MG/DL (ref 0.57–1)
DEPRECATED RDW RBC AUTO: 40.9 FL (ref 37–54)
ERYTHROCYTE [DISTWIDTH] IN BLOOD BY AUTOMATED COUNT: 12.3 % (ref 12.3–15.4)
GFR SERPL CREATININE-BSD FRML MDRD: 70 ML/MIN/1.73
GLOBULIN UR ELPH-MCNC: 3.7 GM/DL
GLUCOSE BLD-MCNC: 78 MG/DL (ref 65–99)
HCT VFR BLD AUTO: 40.1 % (ref 34–46.6)
HDLC SERPL-MCNC: 58 MG/DL (ref 40–60)
HGB BLD-MCNC: 13.8 G/DL (ref 12–15.9)
LDLC SERPL CALC-MCNC: 128 MG/DL (ref 0–100)
LDLC/HDLC SERPL: 2.2 {RATIO}
MCH RBC QN AUTO: 31.5 PG (ref 26.6–33)
MCHC RBC AUTO-ENTMCNC: 34.4 G/DL (ref 31.5–35.7)
MCV RBC AUTO: 91.6 FL (ref 79–97)
PLATELET # BLD AUTO: 259 10*3/MM3 (ref 140–450)
PMV BLD AUTO: 10.9 FL (ref 6–12)
POTASSIUM BLD-SCNC: 3.7 MMOL/L (ref 3.5–5.2)
PROT SERPL-MCNC: 7.9 G/DL (ref 6–8.5)
RBC # BLD AUTO: 4.38 10*6/MM3 (ref 3.77–5.28)
SODIUM BLD-SCNC: 138 MMOL/L (ref 136–145)
TRIGL SERPL-MCNC: 161 MG/DL (ref 0–150)
TSH SERPL DL<=0.05 MIU/L-ACNC: 1.95 UIU/ML (ref 0.27–4.2)
VLDLC SERPL-MCNC: 32.2 MG/DL (ref 5–40)
WBC NRBC COR # BLD: 5.8 10*3/MM3 (ref 3.4–10.8)

## 2019-10-30 PROCEDURE — 90732 PPSV23 VACC 2 YRS+ SUBQ/IM: CPT | Performed by: INTERNAL MEDICINE

## 2019-10-30 PROCEDURE — G0008 ADMIN INFLUENZA VIRUS VAC: HCPCS | Performed by: INTERNAL MEDICINE

## 2019-10-30 PROCEDURE — 90653 IIV ADJUVANT VACCINE IM: CPT | Performed by: INTERNAL MEDICINE

## 2019-10-30 PROCEDURE — 82306 VITAMIN D 25 HYDROXY: CPT | Performed by: INTERNAL MEDICINE

## 2019-10-30 PROCEDURE — G0444 DEPRESSION SCREEN ANNUAL: HCPCS | Performed by: INTERNAL MEDICINE

## 2019-10-30 PROCEDURE — 80053 COMPREHEN METABOLIC PANEL: CPT | Performed by: INTERNAL MEDICINE

## 2019-10-30 PROCEDURE — 84443 ASSAY THYROID STIM HORMONE: CPT | Performed by: INTERNAL MEDICINE

## 2019-10-30 PROCEDURE — G0439 PPPS, SUBSEQ VISIT: HCPCS | Performed by: INTERNAL MEDICINE

## 2019-10-30 PROCEDURE — 99214 OFFICE O/P EST MOD 30 MIN: CPT | Performed by: INTERNAL MEDICINE

## 2019-10-30 PROCEDURE — 80061 LIPID PANEL: CPT | Performed by: INTERNAL MEDICINE

## 2019-10-30 PROCEDURE — 85027 COMPLETE CBC AUTOMATED: CPT | Performed by: INTERNAL MEDICINE

## 2019-10-30 PROCEDURE — G0009 ADMIN PNEUMOCOCCAL VACCINE: HCPCS | Performed by: INTERNAL MEDICINE

## 2019-10-30 RX ORDER — SERTRALINE HYDROCHLORIDE 100 MG/1
100 TABLET, FILM COATED ORAL DAILY
Qty: 90 TABLET | Refills: 1 | Status: SHIPPED | OUTPATIENT
Start: 2019-10-30 | End: 2020-05-20

## 2019-10-30 RX ORDER — BUSPIRONE HYDROCHLORIDE 5 MG/1
5 TABLET ORAL 3 TIMES DAILY
Qty: 270 TABLET | Refills: 1 | OUTPATIENT
Start: 2019-10-30 | End: 2020-01-19

## 2019-10-30 RX ORDER — QUETIAPINE FUMARATE 25 MG/1
25 TABLET, FILM COATED ORAL NIGHTLY
Qty: 90 TABLET | Refills: 1 | Status: SHIPPED | OUTPATIENT
Start: 2019-10-30 | End: 2020-01-29 | Stop reason: SDUPTHER

## 2019-11-02 ENCOUNTER — APPOINTMENT (OUTPATIENT)
Dept: CT IMAGING | Facility: HOSPITAL | Age: 64
End: 2019-11-02

## 2019-11-02 ENCOUNTER — HOSPITAL ENCOUNTER (EMERGENCY)
Facility: HOSPITAL | Age: 64
Discharge: HOME | End: 2019-11-03
Attending: EMERGENCY MEDICINE | Admitting: EMERGENCY MEDICINE

## 2019-11-02 DIAGNOSIS — K59.00 CONSTIPATION, UNSPECIFIED CONSTIPATION TYPE: Primary | ICD-10-CM

## 2019-11-02 LAB
ALBUMIN SERPL-MCNC: 4.2 G/DL (ref 3.5–5.2)
ALBUMIN/GLOB SERPL: 1.2 G/DL
ALP SERPL-CCNC: 96 U/L (ref 39–117)
ALT SERPL W P-5'-P-CCNC: 18 U/L (ref 1–33)
ANION GAP SERPL CALCULATED.3IONS-SCNC: 11 MMOL/L (ref 5–15)
AST SERPL-CCNC: 23 U/L (ref 1–32)
BACTERIA UR QL AUTO: NORMAL /HPF
BASOPHILS # BLD AUTO: 0.04 10*3/MM3 (ref 0–0.2)
BASOPHILS NFR BLD AUTO: 0.6 % (ref 0–1.5)
BILIRUB SERPL-MCNC: 0.2 MG/DL (ref 0.2–1.2)
BILIRUB UR QL STRIP: NEGATIVE
BUN BLD-MCNC: 18 MG/DL (ref 8–23)
BUN/CREAT SERPL: 19.1 (ref 7–25)
CALCIUM SPEC-SCNC: 9.6 MG/DL (ref 8.6–10.5)
CHLORIDE SERPL-SCNC: 104 MMOL/L (ref 98–107)
CLARITY UR: CLEAR
CO2 SERPL-SCNC: 26 MMOL/L (ref 22–29)
COLOR UR: YELLOW
CREAT BLD-MCNC: 0.94 MG/DL (ref 0.57–1)
DEPRECATED RDW RBC AUTO: 43.9 FL (ref 37–54)
EOSINOPHIL # BLD AUTO: 0.26 10*3/MM3 (ref 0–0.4)
EOSINOPHIL NFR BLD AUTO: 4 % (ref 0.3–6.2)
ERYTHROCYTE [DISTWIDTH] IN BLOOD BY AUTOMATED COUNT: 12.9 % (ref 12.3–15.4)
GFR SERPL CREATININE-BSD FRML MDRD: 60 ML/MIN/1.73
GLOBULIN UR ELPH-MCNC: 3.5 GM/DL
GLUCOSE BLD-MCNC: 119 MG/DL (ref 65–99)
GLUCOSE UR STRIP-MCNC: NEGATIVE MG/DL
HCT VFR BLD AUTO: 39.6 % (ref 34–46.6)
HGB BLD-MCNC: 12.8 G/DL (ref 12–15.9)
HGB UR QL STRIP.AUTO: ABNORMAL
HOLD SPECIMEN: NORMAL
HOLD SPECIMEN: NORMAL
HYALINE CASTS UR QL AUTO: NORMAL /LPF
IMM GRANULOCYTES # BLD AUTO: 0.01 10*3/MM3 (ref 0–0.05)
IMM GRANULOCYTES NFR BLD AUTO: 0.2 % (ref 0–0.5)
KETONES UR QL STRIP: NEGATIVE
LEUKOCYTE ESTERASE UR QL STRIP.AUTO: NEGATIVE
LIPASE SERPL-CCNC: 27 U/L (ref 13–60)
LYMPHOCYTES # BLD AUTO: 2.45 10*3/MM3 (ref 0.7–3.1)
LYMPHOCYTES NFR BLD AUTO: 37.9 % (ref 19.6–45.3)
MCH RBC QN AUTO: 30.2 PG (ref 26.6–33)
MCHC RBC AUTO-ENTMCNC: 32.3 G/DL (ref 31.5–35.7)
MCV RBC AUTO: 93.4 FL (ref 79–97)
MONOCYTES # BLD AUTO: 0.46 10*3/MM3 (ref 0.1–0.9)
MONOCYTES NFR BLD AUTO: 7.1 % (ref 5–12)
NEUTROPHILS # BLD AUTO: 3.24 10*3/MM3 (ref 1.7–7)
NEUTROPHILS NFR BLD AUTO: 50.2 % (ref 42.7–76)
NITRITE UR QL STRIP: NEGATIVE
NRBC BLD AUTO-RTO: 0 /100 WBC (ref 0–0.2)
PH UR STRIP.AUTO: 5.5 [PH] (ref 5–8)
PLATELET # BLD AUTO: 240 10*3/MM3 (ref 140–450)
PMV BLD AUTO: 10.2 FL (ref 6–12)
POTASSIUM BLD-SCNC: 4 MMOL/L (ref 3.5–5.2)
PROT SERPL-MCNC: 7.7 G/DL (ref 6–8.5)
PROT UR QL STRIP: NEGATIVE
RBC # BLD AUTO: 4.24 10*6/MM3 (ref 3.77–5.28)
RBC # UR: NORMAL /HPF
REF LAB TEST METHOD: NORMAL
SODIUM BLD-SCNC: 141 MMOL/L (ref 136–145)
SP GR UR STRIP: 1.01 (ref 1–1.03)
SQUAMOUS #/AREA URNS HPF: NORMAL /HPF
TROPONIN T SERPL-MCNC: <0.01 NG/ML (ref 0–0.03)
UROBILINOGEN UR QL STRIP: ABNORMAL
WBC NRBC COR # BLD: 6.46 10*3/MM3 (ref 3.4–10.8)
WBC UR QL AUTO: NORMAL /HPF
WHOLE BLOOD HOLD SPECIMEN: NORMAL
WHOLE BLOOD HOLD SPECIMEN: NORMAL

## 2019-11-02 PROCEDURE — 99284 EMERGENCY DEPT VISIT MOD MDM: CPT

## 2019-11-02 PROCEDURE — 85025 COMPLETE CBC W/AUTO DIFF WBC: CPT | Performed by: EMERGENCY MEDICINE

## 2019-11-02 PROCEDURE — 25010000002 IOPAMIDOL 61 % SOLUTION: Performed by: EMERGENCY MEDICINE

## 2019-11-02 PROCEDURE — 81001 URINALYSIS AUTO W/SCOPE: CPT | Performed by: EMERGENCY MEDICINE

## 2019-11-02 PROCEDURE — 83690 ASSAY OF LIPASE: CPT | Performed by: EMERGENCY MEDICINE

## 2019-11-02 PROCEDURE — 84484 ASSAY OF TROPONIN QUANT: CPT | Performed by: EMERGENCY MEDICINE

## 2019-11-02 PROCEDURE — 80053 COMPREHEN METABOLIC PANEL: CPT | Performed by: EMERGENCY MEDICINE

## 2019-11-02 PROCEDURE — 93005 ELECTROCARDIOGRAM TRACING: CPT | Performed by: EMERGENCY MEDICINE

## 2019-11-02 PROCEDURE — 96374 THER/PROPH/DIAG INJ IV PUSH: CPT

## 2019-11-02 PROCEDURE — 0 DIATRIZOATE MEGLUMINE & SODIUM PER 1 ML: Performed by: EMERGENCY MEDICINE

## 2019-11-02 PROCEDURE — 74177 CT ABD & PELVIS W/CONTRAST: CPT

## 2019-11-02 RX ORDER — MAGNESIUM CARB/ALUMINUM HYDROX 105-160MG
296 TABLET,CHEWABLE ORAL ONCE
Status: COMPLETED | OUTPATIENT
Start: 2019-11-02 | End: 2019-11-02

## 2019-11-02 RX ORDER — SODIUM CHLORIDE 0.9 % (FLUSH) 0.9 %
10 SYRINGE (ML) INJECTION AS NEEDED
Status: DISCONTINUED | OUTPATIENT
Start: 2019-11-02 | End: 2019-11-03 | Stop reason: HOSPADM

## 2019-11-02 RX ORDER — LABETALOL HYDROCHLORIDE 5 MG/ML
10 INJECTION, SOLUTION INTRAVENOUS ONCE
Status: COMPLETED | OUTPATIENT
Start: 2019-11-02 | End: 2019-11-02

## 2019-11-02 RX ADMIN — Medication 296 ML: at 23:51

## 2019-11-02 RX ADMIN — DIATRIZOATE MEGLUMINE AND DIATRIZOATE SODIUM 15 ML: 660; 100 LIQUID ORAL; RECTAL at 20:36

## 2019-11-02 RX ADMIN — IOPAMIDOL 95 ML: 612 INJECTION, SOLUTION INTRAVENOUS at 21:51

## 2019-11-02 RX ADMIN — LABETALOL 20 MG/4 ML (5 MG/ML) INTRAVENOUS SYRINGE 10 MG: at 23:49

## 2019-11-02 NOTE — ED PROVIDER NOTES
Subjective   Tereza Ackerman is a 64 y.o.female who presents to the ED with complaints of right lower quadrant abdominal pain. The patient reports her pain has been ongoing for the past two days. She states her pain has been gradually worsening since her last bowel movement, which was yesterday morning. She has not taken any medication for her pain. She also complains of a distended abdomen. She currently denies any diarrhea. Additionally, she has a history of multiple abdominal surgeries. She is currently scheduled to see someone at the [facility] in two weeks for an abdominal wall reconstruction surgery. She has a history of small bowel obstruction, large bowel obstruction, hernias, and small intestinal twisting. There are no other complaints at this time.         History provided by:  Patient  Abdominal Pain   Pain location:  RLQ  Pain quality: aching    Pain radiates to:  Does not radiate  Pain severity:  Moderate  Onset quality:  Gradual  Duration:  2 days  Timing:  Constant  Progression:  Worsening  Chronicity:  Recurrent  Relieved by:  None tried  Worsened by:  Nothing  Ineffective treatments:  None tried  Associated symptoms: constipation    Associated symptoms: no diarrhea    Risk factors: multiple surgeries        Review of Systems   Gastrointestinal: Positive for abdominal distention, abdominal pain and constipation. Negative for diarrhea.   All other systems reviewed and are negative.      Past Medical History:   Diagnosis Date   • Abdominal hernia    • Arthritis     rt knee    • Atrial flutter with rapid ventricular response (CMS/HCC) 12/21/2017   • Back pain    • Brain tumor (CMS/HCC)    • Colostomy present (CMS/HCC) 08/2018   • Depression    • History of blood transfusion    • History of gastroesophageal reflux (GERD)     resolved since Nissen   • History of Nissen fundoplication 7/1/2017   • History of small bowel obstruction    • Hyperlipemia    • Hypertension    • Memory loss or  impairment    • Migraine    • Parathyroid adenoma     Incidental on thyroid US.   • PONV (postoperative nausea and vomiting)     nausea - preprocedural meds help    • Prediabetes     Last Impression: 06 Feb 2015  Reviewed labs. Excellent control.  Maren Connellast Impression: 06 Feb 2015  Reviewed labs. Excellent control.  Michaela Connell   • Thyroid nodule      Last Impression: 13 Jun 2015  r/o thyroid cancer, will proceed with US.  Michaela Connell (Internal Medicine)    • UTI (urinary tract infection)    • Vision changes     blockages left eye    • Wears glasses     readers       Allergies   Allergen Reactions   • Dilantin [Phenytoin Sodium Extended] Rash       Past Surgical History:   Procedure Laterality Date   • CARPAL TUNNEL RELEASE  2002    right    • COLONOSCOPY N/A 8/18/2018    Procedure: COLONOSCOPY;  Surgeon: Inderjit Campos MD;  Location:  [facility];  Service: Gastroenterology   • COLONOSCOPY N/A 11/28/2018    Procedure: COLONOSCOPY;  Surgeon: Inderjit Campos MD;  Location:  [facility];  Service: Gastroenterology   • COLOSTOMY CLOSURE N/A 2/19/2019    Procedure: COLOSTOMY TAKEDOWN, INCISIONAL HERNIA REPAIR;  Surgeon: Andrea Bocanegra MD;  Location:  [facility];  Service: General   • CRANIOTOMY  2003 & 2014    Dr. Werner Hollis for tumor removal   • ENDOSCOPY     • EXPLORATORY LAPAROTOMY N/A 12/5/2017    Procedure: EXPLORATORY LAPAROTOMY, SMALL BOWEL RESECTION;  Surgeon: Ирина Cobian MD;  Location:  [facility];  Service:    • EXPLORATORY LAPAROTOMY N/A 12/22/2017    Procedure: LAPAROTOMY EXPLORATORY FOR SMALL BOWEL OBSTRUCTION;  Surgeon: Ирина Cobian MD;  Location:  [facility];  Service:    • EXPLORATORY LAPAROTOMY N/A 7/23/2018    Procedure: EXPLORATORY LAPAROTOMY, APPENDECTOMY, CECOPEXY, INCISIONAL HERNIA REPAIR, LYSIS OF ADHESIONS;  Surgeon: Andrea Bocanegra MD;  Location:  [facility];  Service: General   • EXPLORATORY LAPAROTOMY N/A 8/19/2018    Procedure: LAPAROTOMY  EXPLORATORY, SIGMOID COLECTOMY, CREATION OF OSTOMY;  Surgeon: Andrea Bocanegra MD;  Location:  [facility];  Service: General   • HYSTERECTOMY      partial - both ovaries still present pt believes    • INSERTION HEMODIALYSIS CATHETER N/A 12/7/2017    Procedure: HEMODIALYSIS CATHETER INSERTION;  Surgeon: Chance Valenzuela MD;  Location:  [facility];  Service:    • ORBITOTOMY Left 11/3/2017    Procedure:  LEFT LATERAL ORBITOTOMY WITH DEBULKING OF TUMOR ;  Surgeon: Moo Hopkins MD;  Location:  [facility];  Service:    • OTHER SURGICAL HISTORY      esophagogastric fundoplasty nissen fundoplication   • TUBAL ABDOMINAL LIGATION         Family History   Problem Relation Age of Onset   • Obesity Mother    • Heart attack Mother    • Migraines Father    • Stroke Father    • Diabetes Father    • Cancer Other    • Arthritis Other    • Diabetes Other    • Breast cancer Maternal Aunt    • Breast cancer Paternal Aunt    • Ovarian cancer Neg Hx        Social History     Socioeconomic History   • Marital status:      Spouse name: Not on file   • Number of children: Not on file   • Years of education: Not on file   • Highest education level: Not on file   Tobacco Use   • Smoking status: Never Smoker   • Smokeless tobacco: Never Used   Substance and Sexual Activity   • Alcohol use: No   • Drug use: No   • Sexual activity: Defer         Objective   Physical Exam   Constitutional: She is oriented to person, place, and time. She appears well-developed and well-nourished.   HENT:   Head: Normocephalic and atraumatic.   Eyes: Conjunctivae are normal. No scleral icterus.   Neck: Normal range of motion. Neck supple.   Cardiovascular: Normal rate, regular rhythm, normal heart sounds and intact distal pulses.   No murmur heard.  Pulmonary/Chest: Effort normal and breath sounds normal. No respiratory distress.   Abdominal: Soft. Bowel sounds are normal. There is no tenderness. There is no rebound and no guarding.   Given  extensive scarring, it is difficult to assess herniation. No obvious area of herniation. Especially no, irreducible, painful areas over the abdomen.    Musculoskeletal: Normal range of motion. She exhibits no edema.   Neurological: She is alert and oriented to person, place, and time.   Skin: Skin is warm and dry.   Several scars over the abdomen from multiple previous surgeries.    Psychiatric: She has a normal mood and affect. Her behavior is normal.   Nursing note and vitals reviewed.      Procedures         ED Course     Recent Results (from the past 24 hour(s))   Comprehensive Metabolic Panel    Collection Time: 11/02/19  7:37 PM   Result Value Ref Range    Glucose 119 (H) 65 - 99 mg/dL    BUN 18 8 - 23 mg/dL    Creatinine 0.94 0.57 - 1.00 mg/dL    Sodium 141 136 - 145 mmol/L    Potassium 4.0 3.5 - 5.2 mmol/L    Chloride 104 98 - 107 mmol/L    CO2 26.0 22.0 - 29.0 mmol/L    Calcium 9.6 8.6 - 10.5 mg/dL    Total Protein 7.7 6.0 - 8.5 g/dL    Albumin 4.20 3.50 - 5.20 g/dL    ALT (SGPT) 18 1 - 33 U/L    AST (SGOT) 23 1 - 32 U/L    Alkaline Phosphatase 96 39 - 117 U/L    Total Bilirubin 0.2 0.2 - 1.2 mg/dL    eGFR Non African Amer 60 (L) >60 mL/min/1.73    Globulin 3.5 gm/dL    A/G Ratio 1.2 g/dL    BUN/Creatinine Ratio 19.1 7.0 - 25.0    Anion Gap 11.0 5.0 - 15.0 mmol/L   Lipase    Collection Time: 11/02/19  7:37 PM   Result Value Ref Range    Lipase 27 13 - 60 U/L   Light Blue Top    Collection Time: 11/02/19  7:37 PM   Result Value Ref Range    Extra Tube hold for add-on    Green Top (Gel)    Collection Time: 11/02/19  7:37 PM   Result Value Ref Range    Extra Tube Hold for add-ons.    Lavender Top    Collection Time: 11/02/19  7:37 PM   Result Value Ref Range    Extra Tube hold for add-on    Gold Top - SST    Collection Time: 11/02/19  7:37 PM   Result Value Ref Range    Extra Tube Hold for add-ons.    CBC Auto Differential    Collection Time: 11/02/19  7:37 PM   Result Value Ref Range    WBC 6.46 3.40 - 10.80  10*3/mm3    RBC 4.24 3.77 - 5.28 10*6/mm3    Hemoglobin 12.8 12.0 - 15.9 g/dL    Hematocrit 39.6 34.0 - 46.6 %    MCV 93.4 79.0 - 97.0 fL    MCH 30.2 26.6 - 33.0 pg    MCHC 32.3 31.5 - 35.7 g/dL    RDW 12.9 12.3 - 15.4 %    RDW-SD 43.9 37.0 - 54.0 fl    MPV 10.2 6.0 - 12.0 fL    Platelets 240 140 - 450 10*3/mm3    Neutrophil % 50.2 42.7 - 76.0 %    Lymphocyte % 37.9 19.6 - 45.3 %    Monocyte % 7.1 5.0 - 12.0 %    Eosinophil % 4.0 0.3 - 6.2 %    Basophil % 0.6 0.0 - 1.5 %    Immature Grans % 0.2 0.0 - 0.5 %    Neutrophils, Absolute 3.24 1.70 - 7.00 10*3/mm3    Lymphocytes, Absolute 2.45 0.70 - 3.10 10*3/mm3    Monocytes, Absolute 0.46 0.10 - 0.90 10*3/mm3    Eosinophils, Absolute 0.26 0.00 - 0.40 10*3/mm3    Basophils, Absolute 0.04 0.00 - 0.20 10*3/mm3    Immature Grans, Absolute 0.01 0.00 - 0.05 10*3/mm3    nRBC 0.0 0.0 - 0.2 /100 WBC   Troponin    Collection Time: 11/02/19  7:37 PM   Result Value Ref Range    Troponin T <0.010 0.000 - 0.030 ng/mL   Urinalysis With Microscopic If Indicated (No Culture) - Urine, Clean Catch    Collection Time: 11/02/19  8:04 PM   Result Value Ref Range    Color, UA Yellow Yellow, Straw    Appearance, UA Clear Clear    pH, UA 5.5 5.0 - 8.0    Specific Gravity, UA 1.012 1.001 - 1.030    Glucose, UA Negative Negative    Ketones, UA Negative Negative    Bilirubin, UA Negative Negative    Blood, UA Trace (A) Negative    Protein, UA Negative Negative    Leuk Esterase, UA Negative Negative    Nitrite, UA Negative Negative    Urobilinogen, UA 0.2 E.U./dL 0.2 - 1.0 E.U./dL   Urinalysis, Microscopic Only - Urine, Clean Catch    Collection Time: 11/02/19  8:04 PM   Result Value Ref Range    RBC, UA 0-2 None Seen, 0-2 /HPF    WBC, UA None Seen None Seen, 0-2 /HPF    Bacteria, UA None Seen None Seen, Trace /HPF    Squamous Epithelial Cells, UA None Seen None Seen, 0-2 /HPF    Hyaline Casts, UA None Seen 0 - 6 /LPF    Methodology Automated Microscopy      Note: In addition to lab results from  this visit, the labs listed above may include labs taken at another facility or during a different encounter within the last 24 hours. Please correlate lab times with ED admission and discharge times for further clarification of the services performed during this visit.    CT Abdomen Pelvis With Contrast   Final Result      1. Multiple large ventral abdominal wall hernias containing fat and multiple loops of small bowel and colon. No evidence of small bowel obstruction. Moderate to marked gaseous distention of the transverse and descending colon without clear evidence of   colonic obstruction. Moderate stool burden throughout the colon.   2. Appendix not clearly visualized. No pericecal inflammation.   3. Large hiatal hernia.   4. Hysterectomy.   5. Avascular necrosis in the left femoral head.               Signer Name: Rodríguez Wallace MD    Signed: 11/2/2019 10:13 PM    Workstation Name: [id number]        Vitals:    11/02/19 2323 11/02/19 2330 11/03/19 0000 11/03/19 0030   BP: (!) 181/113 (!) 183/108 166/85 (!) 155/104   Pulse: 63 59 67 66   Resp:       Temp:       SpO2: 96% 98% 92% 98%   Weight:       Height:         Medications   diatrizoate meglumine-sodium (GASTROGRAFIN) 66-10 % solution 15 mL (15 mL Oral Given 11/2/19 2036)   iopamidol (ISOVUE-300) 61 % injection 100 mL (95 mL Intravenous Given 11/2/19 2151)   0.9% sodium chloride for irrigation-mineral oil-glycerin (SMOG) enema 300 mL (300 mL Rectal Given 11/3/19 0003)   magnesium citrate solution 296 mL (296 mL Oral Given 11/2/19 2351)   labetalol (NORMODYNE,TRANDATE) injection 10 mg (10 mg Intravenous Given 11/2/19 2349)     ECG/EMG Results (last 24 hours)     ** No results found for the last 24 hours. **        ECG 12 Lead   Final Result   Test Reason : htn   Blood Pressure : **/** mmHG   Vent. Rate : 065 BPM     Atrial Rate : 065 BPM      P-R Int : 142 ms          QRS Dur : 082 ms       QT Int : 394 ms       P-R-T  Axes : 008 018 030 degrees      QTc Int : 409 ms      Normal sinus rhythm   When compared with ECG of 19-AUG-2018 14:20,   No significant change was found   Confirmed by MD MAYURI, DESHAUN ([id number]) on 11/3/2019 4:04:53 PM      Referred By:  MAYURI           Confirmed By:DESHAUN MESSINA MD                          [facility]    Final diagnoses:   Constipation, unspecified constipation type       Documentation assistance provided by scrtate Taylor.  Information recorded by the scribe was done at my direction and has been verified and validated by me.     Nader Taylor  11/02/19 1959       Ndaer Taylor  11/02/19 2000       Nader Taylor  11/02/19 2304       Deshaun Messina DO  11/03/19 1614

## 2019-11-03 VITALS
HEART RATE: 66 BPM | SYSTOLIC BLOOD PRESSURE: 155 MMHG | OXYGEN SATURATION: 98 % | TEMPERATURE: 98.6 F | HEIGHT: 63 IN | WEIGHT: 168 LBS | RESPIRATION RATE: 16 BRPM | BODY MASS INDEX: 29.77 KG/M2 | DIASTOLIC BLOOD PRESSURE: 104 MMHG

## 2019-11-03 RX ORDER — DOCUSATE SODIUM 100 MG/1
100 CAPSULE, LIQUID FILLED ORAL 2 TIMES DAILY PRN
Qty: 30 CAPSULE | Refills: 0 | Status: ON HOLD | OUTPATIENT
Start: 2019-11-03 | End: 2020-04-25

## 2019-11-03 RX ADMIN — MINERAL OIL 300 ML: 1000 LIQUID ORAL at 00:03

## 2019-11-04 ENCOUNTER — TELEPHONE (OUTPATIENT)
Dept: INTERNAL MEDICINE | Facility: CLINIC | Age: 64
End: 2019-11-04

## 2019-11-04 NOTE — TELEPHONE ENCOUNTER
Called to see if Pt. Agreeable to CTA chest to eval for thoracic aneurysm as ER only did Ct abdomen.  Left VM in her home and cell phone. Will call back in am.

## 2019-11-04 NOTE — TELEPHONE ENCOUNTER
PATIENT CALLED TO INQUIRE ABOUT HER APPOINTMENTS THIS Friday (11/8/19) WITH [facility] . SHE WANTED TO KNOW IF SHE SHOULD STILL COME IN ON Friday TO HAVE THE SCANS DONE BECAUSE SHE CAME INTO THE EMERGENCY DEPARTMENT ON Saturday (11/2/19) NIGHT AND HAD A CT SCAN ON HER ABDOMEN PELVIS WITH CONTRAST WITH [facility]. PATIENT JUST WANTS TO KNOW IF SHE NEEDS TO STILL COME IN ON Friday (11/8/19) OR IF THE SCANS TAKEN ON Saturday HAVE ALREADY TAKEN CARE OF WHAT WILL BE COVERED IN THE UPCOMING APPOINTMENT. PLEASE CALL PATIENT BACK [phone] AND ADVISE.

## 2019-11-04 NOTE — TELEPHONE ENCOUNTER
Call from Pebbles at Dr Lee's office, wanting to know if you could clear pt for general anesthesia. Pt needs macular hole repair of the right eye. Please call back [phone].

## 2019-11-05 NOTE — TELEPHONE ENCOUNTER
Pt. Reports that she saw Dr. Lee, who dxed her with that.  She wants to wait until after her abdominal surgery.  So will hold off on the release.  She is agreeable to proceed with her echo and carotid dopplers as scheduled.

## 2019-11-08 ENCOUNTER — HOSPITAL ENCOUNTER (OUTPATIENT)
Dept: CARDIOLOGY | Facility: HOSPITAL | Age: 64
Discharge: HOME | End: 2019-11-08
Admitting: INTERNAL MEDICINE

## 2019-11-08 ENCOUNTER — HOSPITAL ENCOUNTER (OUTPATIENT)
Dept: CARDIOLOGY | Facility: HOSPITAL | Age: 64
Discharge: HOME | End: 2019-11-08

## 2019-11-08 VITALS — WEIGHT: 165 LBS | BODY MASS INDEX: 29.23 KG/M2 | HEIGHT: 63 IN

## 2019-11-08 VITALS — HEIGHT: 63 IN | WEIGHT: 168 LBS | BODY MASS INDEX: 29.77 KG/M2

## 2019-11-08 PROCEDURE — 93880 EXTRACRANIAL BILAT STUDY: CPT

## 2019-11-08 PROCEDURE — 93306 TTE W/DOPPLER COMPLETE: CPT | Performed by: INTERNAL MEDICINE

## 2019-11-08 PROCEDURE — 93880 EXTRACRANIAL BILAT STUDY: CPT | Performed by: INTERNAL MEDICINE

## 2019-11-08 PROCEDURE — 93306 TTE W/DOPPLER COMPLETE: CPT

## 2019-11-11 ENCOUNTER — TELEPHONE (OUTPATIENT)
Dept: INTERNAL MEDICINE | Facility: CLINIC | Age: 64
End: 2019-11-11

## 2019-11-11 NOTE — TELEPHONE ENCOUNTER
Has to have abdominal wall surgery at [facility] and eye surgery for a macular hole, pt is very nervous and would like to know if she can take take the ativan again.

## 2019-11-11 NOTE — TELEPHONE ENCOUNTER
Pt calling need a prescription for ativan she has 2 surgery's coming up feeling overwhelmed and nervous    She wants to know if the buspar can be taken with ativan.    Please advise and give call back

## 2019-11-12 LAB
BH CV ECHO MEAS - AO ROOT AREA (BSA CORRECTED): 1.6
BH CV ECHO MEAS - AO ROOT AREA: 6.6 CM^2
BH CV ECHO MEAS - AO ROOT DIAM: 2.9 CM
BH CV ECHO MEAS - BSA(HAYCOCK): 1.8 M^2
BH CV ECHO MEAS - BSA: 1.8 M^2
BH CV ECHO MEAS - BZI_BMI: 29.2 KILOGRAMS/M^2
BH CV ECHO MEAS - BZI_METRIC_HEIGHT: 160 CM
BH CV ECHO MEAS - BZI_METRIC_WEIGHT: 74.8 KG
BH CV ECHO MEAS - EDV(CUBED): 51.9 ML
BH CV ECHO MEAS - EDV(MOD-SP2): 47 ML
BH CV ECHO MEAS - EDV(MOD-SP4): 83 ML
BH CV ECHO MEAS - EDV(TEICH): 59.3 ML
BH CV ECHO MEAS - EF(CUBED): 75.3 %
BH CV ECHO MEAS - EF(MOD-BP): 61 %
BH CV ECHO MEAS - EF(MOD-SP2): 46.8 %
BH CV ECHO MEAS - EF(MOD-SP4): 69.9 %
BH CV ECHO MEAS - EF(TEICH): 68.1 %
BH CV ECHO MEAS - ESV(CUBED): 12.8 ML
BH CV ECHO MEAS - ESV(MOD-SP2): 25 ML
BH CV ECHO MEAS - ESV(MOD-SP4): 25 ML
BH CV ECHO MEAS - ESV(TEICH): 18.9 ML
BH CV ECHO MEAS - FS: 37.3 %
BH CV ECHO MEAS - IVS/LVPW: 1
BH CV ECHO MEAS - IVSD: 1 CM
BH CV ECHO MEAS - LA DIMENSION: 4.1 CM
BH CV ECHO MEAS - LA/AO: 1.4
BH CV ECHO MEAS - LAD MAJOR: 5.8 CM
BH CV ECHO MEAS - LAT PEAK E' VEL: 9.5 CM/SEC
BH CV ECHO MEAS - LATERAL E/E' RATIO: 7.5
BH CV ECHO MEAS - LV DIASTOLIC VOL/BSA (35-75): 46.6 ML/M^2
BH CV ECHO MEAS - LV MASS(C)D: 118.1 GRAMS
BH CV ECHO MEAS - LV MASS(C)DI: 66.3 GRAMS/M^2
BH CV ECHO MEAS - LV SYSTOLIC VOL/BSA (12-30): 14 ML/M^2
BH CV ECHO MEAS - LVIDD: 3.7 CM
BH CV ECHO MEAS - LVIDS: 2.3 CM
BH CV ECHO MEAS - LVLD AP2: 6.9 CM
BH CV ECHO MEAS - LVLD AP4: 7.7 CM
BH CV ECHO MEAS - LVLS AP2: 6.8 CM
BH CV ECHO MEAS - LVLS AP4: 6.2 CM
BH CV ECHO MEAS - LVPWD: 1 CM
BH CV ECHO MEAS - MED PEAK E' VEL: 6.4 CM/SEC
BH CV ECHO MEAS - MEDIAL E/E' RATIO: 11.3
BH CV ECHO MEAS - MV A MAX VEL: 69.1 CM/SEC
BH CV ECHO MEAS - MV DEC SLOPE: 574 CM/SEC^2
BH CV ECHO MEAS - MV DEC TIME: 0.25 SEC
BH CV ECHO MEAS - MV E MAX VEL: 71.6 CM/SEC
BH CV ECHO MEAS - MV E/A: 1
BH CV ECHO MEAS - MV P1/2T MAX VEL: 103 CM/SEC
BH CV ECHO MEAS - MV P1/2T: 52.6 MSEC
BH CV ECHO MEAS - MVA P1/2T LCG: 2.1 CM^2
BH CV ECHO MEAS - MVA(P1/2T): 4.2 CM^2
BH CV ECHO MEAS - PA ACC SLOPE: 417.5 CM/SEC^2
BH CV ECHO MEAS - PA ACC TIME: 0.17 SEC
BH CV ECHO MEAS - PA PR(ACCEL): 3 MMHG
BH CV ECHO MEAS - RAP SYSTOLE: 8 MMHG
BH CV ECHO MEAS - RVDD: 3 CM
BH CV ECHO MEAS - RVSP: 29 MMHG
BH CV ECHO MEAS - SI(CUBED): 21.9 ML/M^2
BH CV ECHO MEAS - SI(MOD-SP2): 12.3 ML/M^2
BH CV ECHO MEAS - SI(MOD-SP4): 32.5 ML/M^2
BH CV ECHO MEAS - SI(TEICH): 22.6 ML/M^2
BH CV ECHO MEAS - SV(CUBED): 39.1 ML
BH CV ECHO MEAS - SV(MOD-SP2): 22 ML
BH CV ECHO MEAS - SV(MOD-SP4): 58 ML
BH CV ECHO MEAS - SV(TEICH): 40.3 ML
BH CV ECHO MEAS - TAPSE (>1.6): 2 CM2
BH CV ECHO MEAS - TR MAX PG: 21 MMHG
BH CV ECHO MEAS - TR MAX VEL: 230 CM/SEC
BH CV ECHO MEASUREMENTS AVERAGE E/E' RATIO: 9.01
BH CV VAS BP RIGHT ARM: NORMAL MMHG
BH CV XLRA - RV BASE: 3.5 CM
BH CV XLRA - RV LENGTH: 6.1 CM
BH CV XLRA - RV MID: 2.8 CM
BH CV XLRA - TDI S': 8.9 CM/SEC
BH CV XLRA MEAS LEFT DIST CCA EDV: 18.1 CM/SEC
BH CV XLRA MEAS LEFT DIST CCA PSV: 60.5 CM/SEC
BH CV XLRA MEAS LEFT DIST ICA EDV: 14.5 CM/SEC
BH CV XLRA MEAS LEFT DIST ICA PSV: 40.1 CM/SEC
BH CV XLRA MEAS LEFT ICA/CCA RATIO: 0.9
BH CV XLRA MEAS LEFT MID CCA EDV: 21.2 CM/SEC
BH CV XLRA MEAS LEFT MID CCA PSV: 88.8 CM/SEC
BH CV XLRA MEAS LEFT MID ICA EDV: 29.9 CM/SEC
BH CV XLRA MEAS LEFT MID ICA PSV: 81.7 CM/SEC
BH CV XLRA MEAS LEFT PROX CCA EDV: 14.9 CM/SEC
BH CV XLRA MEAS LEFT PROX CCA PSV: 108 CM/SEC
BH CV XLRA MEAS LEFT PROX ECA EDV: 15.7 CM/SEC
BH CV XLRA MEAS LEFT PROX ECA PSV: 69.1 CM/SEC
BH CV XLRA MEAS LEFT PROX ICA EDV: 17.3 CM/SEC
BH CV XLRA MEAS LEFT PROX ICA PSV: 49.5 CM/SEC
BH CV XLRA MEAS LEFT PROX SCLA EDV: 2.2 CM/SEC
BH CV XLRA MEAS LEFT PROX SCLA PSV: 73.7 CM/SEC
BH CV XLRA MEAS LEFT VERTEBRAL A EDV: 14.9 CM/SEC
BH CV XLRA MEAS LEFT VERTEBRAL A PSV: 47.9 CM/SEC
BH CV XLRA MEAS RIGHT DIST CCA EDV: 18.9 CM/SEC
BH CV XLRA MEAS RIGHT DIST CCA PSV: 58.1 CM/SEC
BH CV XLRA MEAS RIGHT DIST ICA EDV: 21.2 CM/SEC
BH CV XLRA MEAS RIGHT DIST ICA PSV: 54.2 CM/SEC
BH CV XLRA MEAS RIGHT ICA/CCA RATIO: 1.2
BH CV XLRA MEAS RIGHT MID CCA EDV: 19.6 CM/SEC
BH CV XLRA MEAS RIGHT MID CCA PSV: 74.6 CM/SEC
BH CV XLRA MEAS RIGHT MID ICA EDV: 33.8 CM/SEC
BH CV XLRA MEAS RIGHT MID ICA PSV: 90.4 CM/SEC
BH CV XLRA MEAS RIGHT PROX CCA EDV: 14 CM/SEC
BH CV XLRA MEAS RIGHT PROX CCA PSV: 68 CM/SEC
BH CV XLRA MEAS RIGHT PROX ECA EDV: 14.9 CM/SEC
BH CV XLRA MEAS RIGHT PROX ECA PSV: 87.2 CM/SEC
BH CV XLRA MEAS RIGHT PROX ICA EDV: 24.4 CM/SEC
BH CV XLRA MEAS RIGHT PROX ICA PSV: 69.9 CM/SEC
BH CV XLRA MEAS RIGHT PROX SCLA EDV: 2.4 CM/SEC
BH CV XLRA MEAS RIGHT PROX SCLA PSV: 112 CM/SEC
BH CV XLRA MEAS RIGHT VERTEBRAL A EDV: 14.9 CM/SEC
BH CV XLRA MEAS RIGHT VERTEBRAL A PSV: 50.3 CM/SEC
LEFT ARM BP: NORMAL MMHG
LEFT ATRIUM VOLUME INDEX: 39.8 ML/M^2
LEFT ATRIUM VOLUME: 71 ML
LV EF 2D ECHO EST: 60 %
MAXIMAL PREDICTED HEART RATE: 154 BPM
RIGHT ARM BP: NORMAL MMHG
STRESS TARGET HR: 131 BPM

## 2019-11-12 NOTE — TELEPHONE ENCOUNTER
Please let her know , okay to take the ativan prn, and to restrict it to 1/ day.  Can be taken with the buspar.    thanks

## 2019-11-20 ENCOUNTER — TELEPHONE (OUTPATIENT)
Dept: INTERNAL MEDICINE | Facility: CLINIC | Age: 64
End: 2019-11-20

## 2019-11-20 NOTE — TELEPHONE ENCOUNTER
PATIENT STATES EYE DOCTOR FAXED US PAPER WORK FOR SURGERY CLEARNACE YESTERDAY. SHE IS CALLING TO CHECK THE STATUS OF PAPER WORK. HER SURGERY IS SCHEDULED FOR THIS Friday AND NEEDS CLEARANCE FOR EYE SURGERY. PATIENT WOULD LIKE A CALL BACK ABOUT PAPER WORK FOR CLEARANCE OF SURGERY. PHONE NUMBER [phone]

## 2019-11-26 ENCOUNTER — TELEPHONE (OUTPATIENT)
Dept: INTERNAL MEDICINE | Facility: CLINIC | Age: 64
End: 2019-11-26

## 2019-11-26 NOTE — TELEPHONE ENCOUNTER
PLEASE CALL MINGO SHE IS WITH DR CASTELLON'S OFFICE THEY WERE CALLING BACK ON THE SURGERY CLEARANCE FROM THE PTS LAST VISIT WITH DR SHEFFIELD    PLEASE CALL [phone]

## 2019-12-09 ENCOUNTER — TELEPHONE (OUTPATIENT)
Dept: INTERNAL MEDICINE | Facility: CLINIC | Age: 64
End: 2019-12-09

## 2019-12-09 NOTE — TELEPHONE ENCOUNTER
Pt says she occasionally take Lorazepam .5mg.  She says she is almost out and is requesting a refill sent to [facility] in [place].

## 2019-12-11 RX ORDER — LORAZEPAM 0.5 MG/1
.25-.5 TABLET ORAL DAILY PRN
Qty: 15 TABLET | Refills: 0 | Status: SHIPPED | OUTPATIENT
Start: 2019-12-11 | End: 2020-05-08 | Stop reason: SDUPTHER

## 2019-12-11 NOTE — TELEPHONE ENCOUNTER
Sorry, it was > 1 year since I prescribed it to her.  I will call in a small quantity, she should use it very sparingly.    Please let her now  thanks

## 2019-12-21 ENCOUNTER — OFFICE VISIT (OUTPATIENT)
Dept: INTERNAL MEDICINE | Facility: CLINIC | Age: 64
End: 2019-12-21

## 2019-12-21 VITALS
SYSTOLIC BLOOD PRESSURE: 110 MMHG | TEMPERATURE: 98.4 F | WEIGHT: 168 LBS | DIASTOLIC BLOOD PRESSURE: 60 MMHG | BODY MASS INDEX: 29.77 KG/M2 | OXYGEN SATURATION: 95 % | HEIGHT: 63 IN | HEART RATE: 71 BPM

## 2019-12-21 DIAGNOSIS — J00 ACUTE NASOPHARYNGITIS: ICD-10-CM

## 2019-12-21 DIAGNOSIS — R30.0 DYSURIA: ICD-10-CM

## 2019-12-21 DIAGNOSIS — N30.01 ACUTE CYSTITIS WITH HEMATURIA: Primary | ICD-10-CM

## 2019-12-21 LAB
BILIRUB BLD-MCNC: NEGATIVE MG/DL
CLARITY, POC: ABNORMAL
COLOR UR: YELLOW
GLUCOSE UR STRIP-MCNC: NEGATIVE MG/DL
KETONES UR QL: ABNORMAL
LEUKOCYTE EST, POC: ABNORMAL
NITRITE UR-MCNC: NEGATIVE MG/ML
PH UR: 5 [PH] (ref 5–8)
PROT UR STRIP-MCNC: ABNORMAL MG/DL
RBC # UR STRIP: ABNORMAL /UL
SP GR UR: 1.01 (ref 1–1.03)
UROBILINOGEN UR QL: NORMAL

## 2019-12-21 PROCEDURE — 81003 URINALYSIS AUTO W/O SCOPE: CPT | Performed by: NURSE PRACTITIONER

## 2019-12-21 PROCEDURE — 99214 OFFICE O/P EST MOD 30 MIN: CPT | Performed by: NURSE PRACTITIONER

## 2019-12-21 RX ORDER — NITROFURANTOIN 25; 75 MG/1; MG/1
100 CAPSULE ORAL EVERY 12 HOURS SCHEDULED
Qty: 10 CAPSULE | Refills: 0 | Status: SHIPPED | OUTPATIENT
Start: 2019-12-21 | End: 2019-12-26

## 2019-12-21 RX ORDER — FLUTICASONE PROPIONATE 50 MCG
2 SPRAY, SUSPENSION (ML) NASAL DAILY
Qty: 1 BOTTLE | Refills: 0 | Status: ON HOLD | OUTPATIENT
Start: 2019-12-21 | End: 2020-04-25

## 2019-12-21 NOTE — PROGRESS NOTES
Chief Complaint   Patient presents with   • Urinary Tract Infection   • Cough       History of Present Illness    Tereza Ackerman is a 64 y.o. female who presents today for UTI and cough.      Urinary Tract Infection    This is a new problem. Episode onset: 2 days ago. The problem occurs every urination. The problem has been gradually worsening. There has been no fever. Associated symptoms include frequency. Pertinent negatives include no chills, discharge, flank pain, hematuria, hesitancy, nausea, sweats, urgency or vomiting. She has tried nothing for the symptoms. There is no history of recurrent UTIs.   Cough   This is a new problem. Episode onset: 1 week ago. The problem has been gradually worsening. The problem occurs constantly. The cough is non-productive. Associated symptoms include ear congestion, nasal congestion, rhinorrhea and a sore throat. Pertinent negatives include no chest pain, chills, ear pain, fever, headaches, myalgias, postnasal drip, rash, shortness of breath, sweats or wheezing. The symptoms are aggravated by lying down. Treatments tried: Benadryl and Dayquil. The treatment provided mild relief. There is no history of asthma, COPD or environmental allergies.       [facility]    The following portions of the patient's history were reviewed and updated as appropriate: allergies, current medications, past family history, past medical history, past social history, past surgical history and problem list.     Social History     Tobacco Use   • Smoking status: Never Smoker   • Smokeless tobacco: Never Used   Substance Use Topics   • Alcohol use: No       Past Medical History:   Diagnosis Date   • Abdominal hernia    • Arthritis     rt knee    • Atrial flutter with rapid ventricular response (CMS/HCC) 12/21/2017   • Back pain    • Brain tumor (CMS/HCC)    • Colostomy present (CMS/HCC) 08/2018   • Depression    • History of blood transfusion    • History of gastroesophageal reflux (GERD)     resolved  since Nissen   • History of Nissen fundoplication 7/1/2017   • History of small bowel obstruction    • Hyperlipemia    • Hypertension    • Memory loss or impairment    • Migraine    • Parathyroid adenoma     Incidental on thyroid US.   • PONV (postoperative nausea and vomiting)     nausea - preprocedural meds help    • Prediabetes     Last Impression: 06 Feb 2015  Reviewed labs. Excellent control.  Maren Connellast Impression: 06 Feb 2015  Reviewed labs. Excellent control.  Michaela Connell   • Thyroid nodule      Last Impression: 13 Jun 2015  r/o thyroid cancer, will proceed with US.  Michaela Connell (Internal Medicine)    • UTI (urinary tract infection)    • Vision changes     blockages left eye    • Wears glasses     readers       Past Surgical History:   Procedure Laterality Date   • CARPAL TUNNEL RELEASE  2002    right    • COLONOSCOPY N/A 8/18/2018    Procedure: COLONOSCOPY;  Surgeon: Inderjit Campos MD;  Location:  [facility];  Service: Gastroenterology   • COLONOSCOPY N/A 11/28/2018    Procedure: COLONOSCOPY;  Surgeon: Inderjit Campos MD;  Location:  [facility];  Service: Gastroenterology   • COLOSTOMY CLOSURE N/A 2/19/2019    Procedure: COLOSTOMY TAKEDOWN, INCISIONAL HERNIA REPAIR;  Surgeon: Andrea Bocanegra MD;  Location:  [facility];  Service: General   • CRANIOTOMY  2003 & 2014    Dr. Werner Hollis for tumor removal   • ENDOSCOPY     • EXPLORATORY LAPAROTOMY N/A 12/5/2017    Procedure: EXPLORATORY LAPAROTOMY, SMALL BOWEL RESECTION;  Surgeon: Ирина Cobian MD;  Location:  [facility];  Service:    • EXPLORATORY LAPAROTOMY N/A 12/22/2017    Procedure: LAPAROTOMY EXPLORATORY FOR SMALL BOWEL OBSTRUCTION;  Surgeon: Ирина Cobian MD;  Location:  [facility];  Service:    • EXPLORATORY LAPAROTOMY N/A 7/23/2018    Procedure: EXPLORATORY LAPAROTOMY, APPENDECTOMY, CECOPEXY, INCISIONAL HERNIA REPAIR, LYSIS OF ADHESIONS;  Surgeon: Andrea Bocanegra MD;  Location:  [facility];  Service:  General   • EXPLORATORY LAPAROTOMY N/A 8/19/2018    Procedure: LAPAROTOMY EXPLORATORY, SIGMOID COLECTOMY, CREATION OF OSTOMY;  Surgeon: Andrea Bocanegra MD;  Location:  [facility];  Service: General   • HYSTERECTOMY      partial - both ovaries still present pt believes    • INSERTION HEMODIALYSIS CATHETER N/A 12/7/2017    Procedure: HEMODIALYSIS CATHETER INSERTION;  Surgeon: Chance Valenzuela MD;  Location:  [facility];  Service:    • ORBITOTOMY Left 11/3/2017    Procedure:  LEFT LATERAL ORBITOTOMY WITH DEBULKING OF TUMOR ;  Surgeon: Moo Hopkins MD;  Location:  [facility];  Service:    • OTHER SURGICAL HISTORY      esophagogastric fundoplasty nissen fundoplication   • TUBAL ABDOMINAL LIGATION         Family History   Problem Relation Age of Onset   • Obesity Mother    • Heart attack Mother    • Migraines Father    • Stroke Father    • Diabetes Father    • Cancer Other    • Arthritis Other    • Diabetes Other    • Breast cancer Maternal Aunt    • Breast cancer Paternal Aunt    • Ovarian cancer Neg Hx        Allergies   Allergen Reactions   • Dilantin [Phenytoin Sodium Extended] Rash         Current Outpatient Medications:   •  aspirin 81 MG tablet, Take 1 tablet by mouth Daily., Disp: 30 tablet, Rfl: 11  •  busPIRone (BUSPAR) 5 MG tablet, Take 1 tablet by mouth 3 (Three) Times a Day., Disp: 270 tablet, Rfl: 1  •  carvedilol (COREG) 12.5 MG tablet, Take 1 tablet by mouth Every 12 (Twelve) Hours., Disp: 180 tablet, Rfl: 3  •  cholecalciferol (VITAMIN D3) 1000 units tablet, Take 2,000 Units by mouth Every Other Day., Disp: , Rfl:   •  docusate sodium (COLACE) 100 MG capsule, Take 1 capsule by mouth 2 (Two) Times a Day As Needed for Constipation., Disp: 30 capsule, Rfl: 0  •  LORazepam (ATIVAN) 0.5 MG tablet, Take 0.5-1 tablets by mouth Daily As Needed for Anxiety., Disp: 15 tablet, Rfl: 0  •  ondansetron (ZOFRAN) 4 MG tablet, Take 1 tablet by mouth Every 8 (Eight) Hours As Needed for Nausea or Vomiting., Disp:  "10 tablet, Rfl: 0  •  QUEtiapine (SEROquel) 25 MG tablet, Take 1 tablet by mouth Every Night., Disp: 90 tablet, Rfl: 1  •  sertraline (ZOLOFT) 100 MG tablet, Take 1 tablet by mouth Daily., Disp: 90 tablet, Rfl: 1  •  thiamine (VITAMIN B1) 100 MG tablet, Take 1 tablet by mouth Every Other Day., Disp: 45 tablet, Rfl: 1  •  fluticasone (FLONASE) 50 MCG/ACT nasal spray, 2 sprays into the nostril(s) as directed by provider Daily., Disp: 1 bottle, Rfl: 0  •  nitrofurantoin, macrocrystal-monohydrate, (MACROBID) 100 MG capsule, Take 1 capsule by mouth Every 12 (Twelve) Hours for 5 days., Disp: 10 capsule, Rfl: 0    Review of Systems  Review of Systems   Constitutional: Negative for appetite change, chills, diaphoresis, fatigue and fever.   HENT: Positive for congestion, rhinorrhea, sinus pressure, sneezing and sore throat. Negative for ear pain, postnasal drip and sinus pain.    Eyes: Negative for visual disturbance.   Respiratory: Positive for cough. Negative for chest tightness, shortness of breath and wheezing.    Cardiovascular: Negative for chest pain and palpitations.   Gastrointestinal: Negative for abdominal distention, abdominal pain, constipation, diarrhea, nausea and vomiting.   Genitourinary: Positive for dysuria and frequency. Negative for decreased urine volume, difficulty urinating, flank pain, hematuria, hesitancy, pelvic pain and urgency.   Musculoskeletal: Negative for myalgias.   Skin: Negative for color change and rash.   Allergic/Immunologic: Negative for environmental allergies.   Neurological: Negative for dizziness, light-headedness, numbness and headaches.   Psychiatric/Behavioral: Negative for confusion and sleep disturbance.       Vitals:  Vitals:    12/21/19 1109   BP: 110/60   Pulse: 71   Temp: 98.4 °F (36.9 °C)   TempSrc: Oral   SpO2: 95%   Weight: 76.2 kg (168 lb)   Height: 160 cm (62.99\")       Physical Exam  Physical Exam   Constitutional: She is oriented to person, place, and time. Vital " signs are normal. She appears well-developed and well-nourished.   HENT:   Head: Normocephalic and atraumatic.   Right Ear: External ear and ear canal normal. A middle ear effusion is present.   Left Ear: Tympanic membrane, external ear and ear canal normal.   Nose: Mucosal edema and rhinorrhea present. No nasal deformity or congestion. Right sinus exhibits no maxillary sinus tenderness and no frontal sinus tenderness. Left sinus exhibits no maxillary sinus tenderness and no frontal sinus tenderness.   Mouth/Throat: Uvula is midline, oropharynx is clear and moist and mucous membranes are normal. No tonsillar exudate.   Eyes: Pupils are equal, round, and reactive to light. Conjunctivae are normal.   Neck: Trachea normal and phonation normal.   Cardiovascular: Normal rate, regular rhythm and normal heart sounds.   Pulmonary/Chest: Effort normal and breath sounds normal. No respiratory distress. She has no decreased breath sounds. She has no wheezes. She has no rhonchi. She has no rales.   Abdominal: There is no CVA tenderness.   Lymphadenopathy:        Head (right side): No submental, no submandibular, no tonsillar, no preauricular and no posterior auricular adenopathy present.        Head (left side): No submental, no submandibular, no tonsillar, no preauricular and no posterior auricular adenopathy present.     She has no cervical adenopathy.   Neurological: She is alert and oriented to person, place, and time. GCS eye subscore is 4. GCS verbal subscore is 5. GCS motor subscore is 6.   Skin: Skin is warm, dry and intact. Capillary refill takes 2 to 3 seconds.   Psychiatric: She has a normal mood and affect. Her behavior is normal.   Nursing note and vitals reviewed.      Labs  Results for orders placed or performed in visit on 12/21/19   POCT urinalysis dipstick, automated   Result Value Ref Range    Color Yellow Yellow, Straw, Dark Yellow, Jackie    Clarity, UA Cloudy (A) Clear    Specific Gravity  1.010 1.005 -  1.030    pH, Urine 5.0 5.0 - 8.0    Leukocytes 500 Danny/ul (A) Negative    Nitrite, UA Negative Negative    Protein, POC 30 mg/dL (A) Negative mg/dL    Glucose, UA Negative Negative, 1000 mg/dL (3+) mg/dL    Ketones, UA 15 mg/dL (A) Negative    Urobilinogen, UA Normal Normal    Bilirubin Negative Negative    Blood,  Jeff/ul (A) Negative       Assessment/Plan    Tereza was seen today for urinary tract infection and cough.    Diagnoses and all orders for this visit:    Acute cystitis with hematuria  -     Urine Culture - Urine, Urine, Clean Catch  -     nitrofurantoin, macrocrystal-monohydrate, (MACROBID) 100 MG capsule; Take 1 capsule by mouth Every 12 (Twelve) Hours for 5 days.  Will notify of urine culture results received and treat accordingly.  Patient started on antibiotics empirically given presenting symptoms and lab results in office.  Patient advised in adequate water intake, avoidance of excessive caffeine, and hygiene measures. Advised patient to follow-up for new, worsening, or persistent symptoms for repeat urinalysis.    Dysuria  -     POCT urinalysis dipstick, automated    Acute nasopharyngitis  -     fluticasone (FLONASE) 50 MCG/ACT nasal spray; 2 sprays into the nostril(s) as directed by provider Daily.  Discussed Viral vs. Bacterial etiology. Signs and symptoms consistent with viral infection at this time. Advised in symptomatic relief with recommended OTC medications or prescribed medications this visit. Encouraged rest and hydration. Salt water gargles/chloraseptic spray for sore throat prn. Tylenol/Ibuprofen prn for fevers, HA, body aches. Advised in hygiene measures and time frame of 7-10 days for resolution of symptoms. Follow-up with PCP for new, worsening, or persistent symptoms.    Plan of care reviewed with patient at conclusion of today's visit. Patient education was provided regarding diagnosis, management, and prescribed or recommended OTC medications. Patient was informed to notify  office of any new, worsening, or persistent symptoms. Patient verbalized understanding and agreement with plan of care.     Follow-Up  Return if symptoms worsen or fail to improve.    Electronically Signed By:  AYDEE Jauregui

## 2020-01-14 ENCOUNTER — OFFICE VISIT (OUTPATIENT)
Dept: INTERNAL MEDICINE | Facility: CLINIC | Age: 65
End: 2020-01-14

## 2020-01-14 ENCOUNTER — HOSPITAL ENCOUNTER (OUTPATIENT)
Dept: MAMMOGRAPHY | Facility: HOSPITAL | Age: 65
Discharge: HOME | End: 2020-01-14
Admitting: INTERNAL MEDICINE

## 2020-01-14 VITALS
DIASTOLIC BLOOD PRESSURE: 80 MMHG | BODY MASS INDEX: 30.48 KG/M2 | OXYGEN SATURATION: 99 % | WEIGHT: 172 LBS | HEART RATE: 67 BPM | TEMPERATURE: 98.3 F | HEIGHT: 63 IN | SYSTOLIC BLOOD PRESSURE: 110 MMHG

## 2020-01-14 DIAGNOSIS — Z12.31 VISIT FOR SCREENING MAMMOGRAM: ICD-10-CM

## 2020-01-14 DIAGNOSIS — J02.9 SORE THROAT: Primary | ICD-10-CM

## 2020-01-14 DIAGNOSIS — J01.00 SUBACUTE MAXILLARY SINUSITIS: ICD-10-CM

## 2020-01-14 LAB
EXPIRATION DATE: NORMAL
INTERNAL CONTROL: NORMAL
Lab: NORMAL
S PYO AG THROAT QL: NEGATIVE

## 2020-01-14 PROCEDURE — 87880 STREP A ASSAY W/OPTIC: CPT | Performed by: INTERNAL MEDICINE

## 2020-01-14 PROCEDURE — 99213 OFFICE O/P EST LOW 20 MIN: CPT | Performed by: INTERNAL MEDICINE

## 2020-01-14 PROCEDURE — 77067 SCR MAMMO BI INCL CAD: CPT

## 2020-01-14 PROCEDURE — 77067 SCR MAMMO BI INCL CAD: CPT | Performed by: RADIOLOGY

## 2020-01-14 PROCEDURE — 77063 BREAST TOMOSYNTHESIS BI: CPT

## 2020-01-14 PROCEDURE — 77063 BREAST TOMOSYNTHESIS BI: CPT | Performed by: RADIOLOGY

## 2020-01-14 RX ORDER — LEVOCETIRIZINE DIHYDROCHLORIDE 5 MG/1
5 TABLET, FILM COATED ORAL EVERY EVENING
Qty: 30 TABLET | Refills: 0 | Status: SHIPPED | OUTPATIENT
Start: 2020-01-14 | End: 2020-02-06

## 2020-01-14 RX ORDER — AMOXICILLIN 875 MG/1
875 TABLET, COATED ORAL 2 TIMES DAILY
Qty: 20 TABLET | Refills: 0 | Status: SHIPPED | OUTPATIENT
Start: 2020-01-14 | End: 2020-04-21

## 2020-01-14 NOTE — PROGRESS NOTES
Earache; Sore Throat; and Cough    Subjective   Tereza Ackerman is a 64 y.o. female is here today for follow-up.    History of Present Illness   S/p eye surgery on 11/2, abdominal surgery rescheduled for March .    S/p recent uti and has been coughing , but now having a ST, rt ear pain and cough( better).      Current Outpatient Medications:   •  aspirin 81 MG tablet, Take 1 tablet by mouth Daily., Disp: 30 tablet, Rfl: 11  •  busPIRone (BUSPAR) 5 MG tablet, Take 1 tablet by mouth 3 (Three) Times a Day., Disp: 270 tablet, Rfl: 1  •  carvedilol (COREG) 12.5 MG tablet, Take 1 tablet by mouth Every 12 (Twelve) Hours., Disp: 180 tablet, Rfl: 3  •  cholecalciferol (VITAMIN D3) 1000 units tablet, Take 2,000 Units by mouth Every Other Day., Disp: , Rfl:   •  docusate sodium (COLACE) 100 MG capsule, Take 1 capsule by mouth 2 (Two) Times a Day As Needed for Constipation., Disp: 30 capsule, Rfl: 0  •  fluticasone (FLONASE) 50 MCG/ACT nasal spray, 2 sprays into the nostril(s) as directed by provider Daily., Disp: 1 bottle, Rfl: 0  •  LORazepam (ATIVAN) 0.5 MG tablet, Take 0.5-1 tablets by mouth Daily As Needed for Anxiety., Disp: 15 tablet, Rfl: 0  •  ondansetron (ZOFRAN) 4 MG tablet, Take 1 tablet by mouth Every 8 (Eight) Hours As Needed for Nausea or Vomiting., Disp: 10 tablet, Rfl: 0  •  QUEtiapine (SEROquel) 25 MG tablet, Take 1 tablet by mouth Every Night., Disp: 90 tablet, Rfl: 1  •  sertraline (ZOLOFT) 100 MG tablet, Take 1 tablet by mouth Daily., Disp: 90 tablet, Rfl: 1  •  thiamine (VITAMIN B1) 100 MG tablet, Take 1 tablet by mouth Every Other Day., Disp: 45 tablet, Rfl: 1  •  amoxicillin (AMOXIL) 875 MG tablet, Take 1 tablet by mouth 2 (Two) Times a Day. For infection, Disp: 20 tablet, Rfl: 0  •  levocetirizine (XYZAL) 5 MG tablet, Take 1 tablet by mouth Every Evening., Disp: 30 tablet, Rfl: 0      The following portions of the patient's history were reviewed and updated as appropriate: allergies, current  "medications, past family history, past medical history, past social history, past surgical history and problem list.    Review of Systems   Constitutional: Negative.  Negative for chills and fever.   HENT: Positive for ear pain and sore throat. Negative for ear discharge and sinus pressure.    Respiratory: Positive for cough. Negative for chest tightness and shortness of breath.    Cardiovascular: Negative for chest pain, palpitations and leg swelling.   Gastrointestinal: Negative for diarrhea, nausea and vomiting.   Musculoskeletal: Negative for arthralgias, back pain and myalgias.   Neurological: Negative for dizziness, syncope and headaches.   Psychiatric/Behavioral: Negative for confusion and sleep disturbance.       Objective   /80   Pulse 67   Temp 98.3 °F (36.8 °C)   Ht 158.8 cm (62.5\")   Wt 78 kg (172 lb)   SpO2 99% Comment: ra  BMI 30.96 kg/m²   Physical Exam   Constitutional: She is oriented to person, place, and time. She appears well-developed and well-nourished.   HENT:   Head: Normocephalic and atraumatic.   Right Ear: External ear normal. Tympanic membrane is bulging.   Left Ear: External ear normal. Tympanic membrane is bulging.   Mouth/Throat: Posterior oropharyngeal erythema present. No oropharyngeal exudate or tonsillar abscesses.   Eyes: Pupils are equal, round, and reactive to light. Conjunctivae are normal.   Neck: Normal range of motion. Neck supple. No thyromegaly present.   Cardiovascular: Normal rate and regular rhythm.   Pulmonary/Chest: Effort normal and breath sounds normal. No stridor.   Abdominal: Soft. Bowel sounds are normal. She exhibits no distension. There is no tenderness.   Musculoskeletal: She exhibits no edema.   Lymphadenopathy:     She has no cervical adenopathy.   Neurological: She is alert and oriented to person, place, and time. No cranial nerve deficit.   Skin: Skin is warm and dry.   Psychiatric: She has a normal mood and affect. Judgment normal.   Nursing " note and vitals reviewed.        Results for orders placed or performed in visit on 01/14/20   POCT rapid strep A   Result Value Ref Range    Rapid Strep A Screen Negative Negative, VALID, INVALID, Not Performed    Internal Control Passed Passed    Lot Number [id number]     Expiration Date 04/30/21              Assessment/Plan   Diagnoses and all orders for this visit:    Sore throat  -     POCT rapid strep A    Subacute maxillary sinusitis  -     levocetirizine (XYZAL) 5 MG tablet; Take 1 tablet by mouth Every Evening.  -     amoxicillin (AMOXIL) 875 MG tablet; Take 1 tablet by mouth 2 (Two) Times a Day. For infection                 Return for Next scheduled follow up.

## 2020-01-19 ENCOUNTER — APPOINTMENT (OUTPATIENT)
Dept: CT IMAGING | Facility: HOSPITAL | Age: 65
End: 2020-01-19

## 2020-01-19 ENCOUNTER — HOSPITAL ENCOUNTER (EMERGENCY)
Facility: HOSPITAL | Age: 65
Discharge: HOME | End: 2020-01-19
Attending: EMERGENCY MEDICINE | Admitting: EMERGENCY MEDICINE

## 2020-01-19 ENCOUNTER — APPOINTMENT (OUTPATIENT)
Dept: GENERAL RADIOLOGY | Facility: HOSPITAL | Age: 65
End: 2020-01-19

## 2020-01-19 VITALS
OXYGEN SATURATION: 95 % | HEIGHT: 63 IN | TEMPERATURE: 98.1 F | SYSTOLIC BLOOD PRESSURE: 162 MMHG | DIASTOLIC BLOOD PRESSURE: 101 MMHG | HEART RATE: 69 BPM | WEIGHT: 168 LBS | BODY MASS INDEX: 29.77 KG/M2 | RESPIRATION RATE: 12 BRPM

## 2020-01-19 DIAGNOSIS — R51.9 NONINTRACTABLE EPISODIC HEADACHE, UNSPECIFIED HEADACHE TYPE: Primary | ICD-10-CM

## 2020-01-19 DIAGNOSIS — Z86.011 HISTORY OF MENINGIOMA OF THE BRAIN: ICD-10-CM

## 2020-01-19 DIAGNOSIS — R53.1 WEAKNESS: ICD-10-CM

## 2020-01-19 DIAGNOSIS — E87.6 HYPOKALEMIA: ICD-10-CM

## 2020-01-19 DIAGNOSIS — R51.9 FACIAL PAIN: ICD-10-CM

## 2020-01-19 DIAGNOSIS — R07.0 THROAT PAIN IN ADULT: ICD-10-CM

## 2020-01-19 LAB
ALBUMIN SERPL-MCNC: 4.4 G/DL (ref 3.5–5.2)
ALBUMIN/GLOB SERPL: 1.2 G/DL
ALP SERPL-CCNC: 100 U/L (ref 39–117)
ALT SERPL W P-5'-P-CCNC: 14 U/L (ref 1–33)
ANION GAP SERPL CALCULATED.3IONS-SCNC: 12 MMOL/L (ref 5–15)
AST SERPL-CCNC: 16 U/L (ref 1–32)
BACTERIA UR QL AUTO: ABNORMAL /HPF
BASOPHILS # BLD AUTO: 0.04 10*3/MM3 (ref 0–0.2)
BASOPHILS NFR BLD AUTO: 0.5 % (ref 0–1.5)
BILIRUB SERPL-MCNC: 0.4 MG/DL (ref 0.2–1.2)
BILIRUB UR QL STRIP: NEGATIVE
BUN BLD-MCNC: 18 MG/DL (ref 8–23)
BUN/CREAT SERPL: 19.4 (ref 7–25)
CALCIUM SPEC-SCNC: 9.9 MG/DL (ref 8.6–10.5)
CHLORIDE SERPL-SCNC: 101 MMOL/L (ref 98–107)
CLARITY UR: ABNORMAL
CO2 SERPL-SCNC: 25 MMOL/L (ref 22–29)
COD CRY URNS QL: ABNORMAL /HPF
COLOR UR: YELLOW
CREAT BLD-MCNC: 0.93 MG/DL (ref 0.57–1)
DEPRECATED RDW RBC AUTO: 44.8 FL (ref 37–54)
EOSINOPHIL # BLD AUTO: 0.16 10*3/MM3 (ref 0–0.4)
EOSINOPHIL NFR BLD AUTO: 2.2 % (ref 0.3–6.2)
ERYTHROCYTE [DISTWIDTH] IN BLOOD BY AUTOMATED COUNT: 12.9 % (ref 12.3–15.4)
ERYTHROCYTE [SEDIMENTATION RATE] IN BLOOD: 33 MM/HR (ref 0–30)
FLUAV AG NPH QL: NEGATIVE
FLUBV AG NPH QL IA: NEGATIVE
GFR SERPL CREATININE-BSD FRML MDRD: 60 ML/MIN/1.73
GLOBULIN UR ELPH-MCNC: 3.7 GM/DL
GLUCOSE BLD-MCNC: 126 MG/DL (ref 65–99)
GLUCOSE UR STRIP-MCNC: NEGATIVE MG/DL
HCT VFR BLD AUTO: 46.1 % (ref 34–46.6)
HGB BLD-MCNC: 14.6 G/DL (ref 12–15.9)
HGB UR QL STRIP.AUTO: ABNORMAL
HOLD SPECIMEN: NORMAL
HOLD SPECIMEN: NORMAL
HYALINE CASTS UR QL AUTO: ABNORMAL /LPF
IMM GRANULOCYTES # BLD AUTO: 0.02 10*3/MM3 (ref 0–0.05)
IMM GRANULOCYTES NFR BLD AUTO: 0.3 % (ref 0–0.5)
KETONES UR QL STRIP: NEGATIVE
LEUKOCYTE ESTERASE UR QL STRIP.AUTO: ABNORMAL
LYMPHOCYTES # BLD AUTO: 2.24 10*3/MM3 (ref 0.7–3.1)
LYMPHOCYTES NFR BLD AUTO: 30.3 % (ref 19.6–45.3)
MAGNESIUM SERPL-MCNC: 2.3 MG/DL (ref 1.6–2.4)
MCH RBC QN AUTO: 29.9 PG (ref 26.6–33)
MCHC RBC AUTO-ENTMCNC: 31.7 G/DL (ref 31.5–35.7)
MCV RBC AUTO: 94.3 FL (ref 79–97)
MONOCYTES # BLD AUTO: 0.66 10*3/MM3 (ref 0.1–0.9)
MONOCYTES NFR BLD AUTO: 8.9 % (ref 5–12)
NEUTROPHILS # BLD AUTO: 4.28 10*3/MM3 (ref 1.7–7)
NEUTROPHILS NFR BLD AUTO: 57.8 % (ref 42.7–76)
NITRITE UR QL STRIP: NEGATIVE
NRBC BLD AUTO-RTO: 0 /100 WBC (ref 0–0.2)
PH UR STRIP.AUTO: <=5 [PH] (ref 5–8)
PLATELET # BLD AUTO: 283 10*3/MM3 (ref 140–450)
PMV BLD AUTO: 10 FL (ref 6–12)
POTASSIUM BLD-SCNC: 3.2 MMOL/L (ref 3.5–5.2)
PROT SERPL-MCNC: 8.1 G/DL (ref 6–8.5)
PROT UR QL STRIP: ABNORMAL
RBC # BLD AUTO: 4.89 10*6/MM3 (ref 3.77–5.28)
RBC # UR: ABNORMAL /HPF
REF LAB TEST METHOD: ABNORMAL
SODIUM BLD-SCNC: 138 MMOL/L (ref 136–145)
SP GR UR STRIP: 1.03 (ref 1–1.03)
SQUAMOUS #/AREA URNS HPF: ABNORMAL /HPF
TROPONIN T SERPL-MCNC: <0.01 NG/ML (ref 0–0.03)
TSH SERPL DL<=0.05 MIU/L-ACNC: 3.74 UIU/ML (ref 0.27–4.2)
UROBILINOGEN UR QL STRIP: ABNORMAL
WBC NRBC COR # BLD: 7.4 10*3/MM3 (ref 3.4–10.8)
WBC UR QL AUTO: ABNORMAL /HPF
WHOLE BLOOD HOLD SPECIMEN: NORMAL
WHOLE BLOOD HOLD SPECIMEN: NORMAL

## 2020-01-19 PROCEDURE — 85652 RBC SED RATE AUTOMATED: CPT | Performed by: EMERGENCY MEDICINE

## 2020-01-19 PROCEDURE — 80053 COMPREHEN METABOLIC PANEL: CPT | Performed by: EMERGENCY MEDICINE

## 2020-01-19 PROCEDURE — 93005 ELECTROCARDIOGRAM TRACING: CPT | Performed by: EMERGENCY MEDICINE

## 2020-01-19 PROCEDURE — 25010000002 DEXAMETHASONE PER 1 MG: Performed by: EMERGENCY MEDICINE

## 2020-01-19 PROCEDURE — 81001 URINALYSIS AUTO W/SCOPE: CPT | Performed by: EMERGENCY MEDICINE

## 2020-01-19 PROCEDURE — 70490 CT SOFT TISSUE NECK W/O DYE: CPT

## 2020-01-19 PROCEDURE — 96374 THER/PROPH/DIAG INJ IV PUSH: CPT

## 2020-01-19 PROCEDURE — 93005 ELECTROCARDIOGRAM TRACING: CPT

## 2020-01-19 PROCEDURE — 99284 EMERGENCY DEPT VISIT MOD MDM: CPT

## 2020-01-19 PROCEDURE — 71045 X-RAY EXAM CHEST 1 VIEW: CPT

## 2020-01-19 PROCEDURE — 25010000002 KETOROLAC TROMETHAMINE PER 15 MG: Performed by: EMERGENCY MEDICINE

## 2020-01-19 PROCEDURE — 25010000002 ONDANSETRON PER 1 MG: Performed by: EMERGENCY MEDICINE

## 2020-01-19 PROCEDURE — 85025 COMPLETE CBC W/AUTO DIFF WBC: CPT | Performed by: EMERGENCY MEDICINE

## 2020-01-19 PROCEDURE — 84484 ASSAY OF TROPONIN QUANT: CPT | Performed by: EMERGENCY MEDICINE

## 2020-01-19 PROCEDURE — 70486 CT MAXILLOFACIAL W/O DYE: CPT

## 2020-01-19 PROCEDURE — 84443 ASSAY THYROID STIM HORMONE: CPT | Performed by: EMERGENCY MEDICINE

## 2020-01-19 PROCEDURE — 87804 INFLUENZA ASSAY W/OPTIC: CPT | Performed by: EMERGENCY MEDICINE

## 2020-01-19 PROCEDURE — 83735 ASSAY OF MAGNESIUM: CPT | Performed by: EMERGENCY MEDICINE

## 2020-01-19 PROCEDURE — 96375 TX/PRO/DX INJ NEW DRUG ADDON: CPT

## 2020-01-19 RX ORDER — KETOROLAC TROMETHAMINE 15 MG/ML
15 INJECTION, SOLUTION INTRAMUSCULAR; INTRAVENOUS ONCE
Status: COMPLETED | OUTPATIENT
Start: 2020-01-19 | End: 2020-01-19

## 2020-01-19 RX ORDER — OMEPRAZOLE 20 MG/1
20 CAPSULE, DELAYED RELEASE ORAL DAILY
Qty: 30 CAPSULE | Refills: 0 | Status: SHIPPED | OUTPATIENT
Start: 2020-01-19 | End: 2020-02-18

## 2020-01-19 RX ORDER — SODIUM CHLORIDE 0.9 % (FLUSH) 0.9 %
10 SYRINGE (ML) INJECTION AS NEEDED
Status: DISCONTINUED | OUTPATIENT
Start: 2020-01-19 | End: 2020-01-19 | Stop reason: HOSPADM

## 2020-01-19 RX ORDER — DEXAMETHASONE SODIUM PHOSPHATE 4 MG/ML
8 INJECTION, SOLUTION INTRA-ARTICULAR; INTRALESIONAL; INTRAMUSCULAR; INTRAVENOUS; SOFT TISSUE ONCE
Status: COMPLETED | OUTPATIENT
Start: 2020-01-19 | End: 2020-01-19

## 2020-01-19 RX ORDER — ONDANSETRON 2 MG/ML
4 INJECTION INTRAMUSCULAR; INTRAVENOUS ONCE
Status: COMPLETED | OUTPATIENT
Start: 2020-01-19 | End: 2020-01-19

## 2020-01-19 RX ORDER — CYCLOBENZAPRINE HCL 10 MG
5 TABLET ORAL 3 TIMES DAILY PRN
Qty: 12 TABLET | Refills: 0 | Status: SHIPPED | OUTPATIENT
Start: 2020-01-19 | End: 2023-01-17

## 2020-01-19 RX ORDER — POTASSIUM CHLORIDE 750 MG/1
20 CAPSULE, EXTENDED RELEASE ORAL ONCE
Status: COMPLETED | OUTPATIENT
Start: 2020-01-19 | End: 2020-01-19

## 2020-01-19 RX ORDER — BUTALBITAL, ACETAMINOPHEN AND CAFFEINE 50; 325; 40 MG/1; MG/1; MG/1
1 TABLET ORAL EVERY 6 HOURS PRN
Qty: 12 TABLET | Refills: 0 | Status: SHIPPED | OUTPATIENT
Start: 2020-01-19 | End: 2020-01-19

## 2020-01-19 RX ORDER — LIDOCAINE HYDROCHLORIDE 20 MG/ML
15 SOLUTION OROPHARYNGEAL ONCE
Status: COMPLETED | OUTPATIENT
Start: 2020-01-19 | End: 2020-01-19

## 2020-01-19 RX ORDER — ALUMINA, MAGNESIA, AND SIMETHICONE 2400; 2400; 240 MG/30ML; MG/30ML; MG/30ML
15 SUSPENSION ORAL ONCE
Status: COMPLETED | OUTPATIENT
Start: 2020-01-19 | End: 2020-01-19

## 2020-01-19 RX ADMIN — POTASSIUM CHLORIDE 20 MEQ: 750 CAPSULE, EXTENDED RELEASE ORAL at 16:13

## 2020-01-19 RX ADMIN — DEXAMETHASONE SODIUM PHOSPHATE 8 MG: 4 INJECTION, SOLUTION INTRAMUSCULAR; INTRAVENOUS at 16:15

## 2020-01-19 RX ADMIN — LIDOCAINE HYDROCHLORIDE 15 ML: 20 SOLUTION ORAL; TOPICAL at 16:17

## 2020-01-19 RX ADMIN — ALUMINUM HYDROXIDE, MAGNESIUM HYDROXIDE, AND DIMETHICONE 15 ML: 400; 400; 40 SUSPENSION ORAL at 16:17

## 2020-01-19 RX ADMIN — ONDANSETRON 4 MG: 2 INJECTION INTRAMUSCULAR; INTRAVENOUS at 16:11

## 2020-01-19 RX ADMIN — KETOROLAC TROMETHAMINE 15 MG: 15 INJECTION, SOLUTION INTRAMUSCULAR; INTRAVENOUS at 16:13

## 2020-01-19 NOTE — ED PROVIDER NOTES
Subjective   Tereza Recinos is a 64 year old female presenting to the ED today with complaints of a headache. She reports 2 days ago she began experiencing constant frontal head pain along with intermittent bouts of nausea. She also complains of fatigue, generalized weakness, sore throat, diarrhea. She states she's had her sore throat for the past 1 month and diarrhea since having her colostomy bag removed near 1 year ago, however she states her diarrhea is becoming more frequent recently. At home, she states Tylenol has given her no head pain relief. She denies any trauma or incident triggering her head pain.  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 In November 2019 she reports having surgery to a macular hole at her right eye. On 3/14/19 she states she has an abdominal reconstruction surgery at the [facility]. She denies a history of smoking. There are no other acute complaints at this time.       History provided by:  Patient  Headache   Pain location:  Frontal  Onset quality:  Sudden  Duration:  2 days  Timing:  Constant  Chronicity:  New  Ineffective treatments: Tylenol.  Associated symptoms: diarrhea, fatigue, nausea, sore throat and weakness    Associated symptoms: no congestion, no cough, no drainage, no fever and no vomiting        Review of Systems   Constitutional: Positive for fatigue. Negative for chills and fever.   HENT: Positive for sore throat. Negative for congestion, postnasal drip and rhinorrhea.    Respiratory: Negative for cough.    Gastrointestinal: Positive for diarrhea and nausea. Negative for vomiting.   Neurological: Positive for weakness and headaches.   All other systems reviewed and are negative.      Past Medical History:   Diagnosis Date   • Abdominal hernia    • Arthritis     rt knee    • Atrial flutter with rapid ventricular response (CMS/HCC) 12/21/2017   • Back pain    • Brain tumor (CMS/HCC)    • Colostomy present (CMS/HCC) 08/2018   • Depression     • History of blood transfusion    • History of gastroesophageal reflux (GERD)     resolved since Nissen   • History of Nissen fundoplication 7/1/2017   • History of small bowel obstruction    • Hyperlipemia    • Hypertension    • Memory loss or impairment    • Migraine    • Parathyroid adenoma     Incidental on thyroid US.   • PONV (postoperative nausea and vomiting)     nausea - preprocedural meds help    • Prediabetes     Last Impression: 06 Feb 2015  Reviewed labs. Excellent control.  Emery Connell Impression: 06 Feb 2015  Reviewed labs. Excellent control.  Michaela Connell   • Thyroid nodule      Last Impression: 13 Jun 2015  r/o thyroid cancer, will proceed with US.  Michaela Connell (Internal Medicine)    • UTI (urinary tract infection)    • Vision changes     blockages left eye    • Wears glasses     readers       10/14/19 - MRI IMPRESSION:  Impression:     Grossly unchanged appearance of abnormal enhancement on the  left middle cranial fossa and inferior temporal lobe extending into the  left retro-orbital region with unchanged appearance of the left optic  nerve and intraconal structures. No new nodular enhancement with stable  dural margins of enhancement and overall encephalomalacia status post  resection left pterional craniotomy.           Allergies   Allergen Reactions   • Dilantin [Phenytoin Sodium Extended] Rash       Past Surgical History:   Procedure Laterality Date   • CARPAL TUNNEL RELEASE  2002    right    • COLONOSCOPY N/A 8/18/2018    Procedure: COLONOSCOPY;  Surgeon: Inderjit Campos MD;  Location:  [facility];  Service: Gastroenterology   • COLONOSCOPY N/A 11/28/2018    Procedure: COLONOSCOPY;  Surgeon: Inderjit Campos MD;  Location:  [facility];  Service: Gastroenterology   • COLOSTOMY CLOSURE N/A 2/19/2019    Procedure: COLOSTOMY TAKEDOWN, INCISIONAL HERNIA REPAIR;  Surgeon: Andrea Bocanegra MD;  Location:  [facility];  Service: General   • CRANIOTOMY  2003 & 2014     Dr. Werner Hollis for tumor removal   • ENDOSCOPY     • EXPLORATORY LAPAROTOMY N/A 12/5/2017    Procedure: EXPLORATORY LAPAROTOMY, SMALL BOWEL RESECTION;  Surgeon: Ирина Cobian MD;  Location:  [facility];  Service:    • EXPLORATORY LAPAROTOMY N/A 12/22/2017    Procedure: LAPAROTOMY EXPLORATORY FOR SMALL BOWEL OBSTRUCTION;  Surgeon: Ирина Cobian MD;  Location:  [facility];  Service:    • EXPLORATORY LAPAROTOMY N/A 7/23/2018    Procedure: EXPLORATORY LAPAROTOMY, APPENDECTOMY, CECOPEXY, INCISIONAL HERNIA REPAIR, LYSIS OF ADHESIONS;  Surgeon: Andrea Bocanegra MD;  Location:  [facility];  Service: General   • EXPLORATORY LAPAROTOMY N/A 8/19/2018    Procedure: LAPAROTOMY EXPLORATORY, SIGMOID COLECTOMY, CREATION OF OSTOMY;  Surgeon: Andrea Bocanegra MD;  Location:  [facility];  Service: General   • HYSTERECTOMY      partial - both ovaries still present pt believes    • INSERTION HEMODIALYSIS CATHETER N/A 12/7/2017    Procedure: HEMODIALYSIS CATHETER INSERTION;  Surgeon: Chance Valenzuela MD;  Location:  [facility];  Service:    • ORBITOTOMY Left 11/3/2017    Procedure:  LEFT LATERAL ORBITOTOMY WITH DEBULKING OF TUMOR ;  Surgeon: Moo Hopkins MD;  Location:  [facility];  Service:    • OTHER SURGICAL HISTORY      esophagogastric fundoplasty nissen fundoplication   • TUBAL ABDOMINAL LIGATION         Family History   Problem Relation Age of Onset   • Obesity Mother    • Heart attack Mother    • Migraines Father    • Stroke Father    • Diabetes Father    • Cancer Other    • Arthritis Other    • Diabetes Other    • Breast cancer Maternal Aunt    • Breast cancer Paternal Aunt    • Ovarian cancer Neg Hx        Social History     Socioeconomic History   • Marital status:      Spouse name: Not on file   • Number of children: Not on file   • Years of education: Not on file   • Highest education level: Not on file   Tobacco Use   • Smoking status: Never Smoker   • Smokeless tobacco: Never Used   Substance  and Sexual Activity   • Alcohol use: No   • Drug use: No   • Sexual activity: Defer         Objective   Physical Exam   Constitutional: She is oriented to person, place, and time. She appears well-developed and well-nourished. No distress.   Alert, oriented and in no acute distress.    HENT:   Head: Normocephalic and atraumatic.   Mouth/Throat: No oropharyngeal exudate.   Left nares is patchulent and mildly irritated. Oropharyx shows mild generalized redness posteriorly, without swelling or exudate. Bilateral tympanic membranes are dull without fluid or evidence of infection. Uvula midline.   Eyes: Pupils are equal, round, and reactive to light. Conjunctivae and EOM are normal. Right eye exhibits no discharge. Left eye exhibits no discharge. No scleral icterus.   Neck: Normal range of motion. Neck supple. No JVD present. Carotid bruit is not present.   Neck is tender to palpation. No adenopathy, JVD, bruits or thyromegaly.    Cardiovascular: Normal rate, regular rhythm, normal heart sounds and intact distal pulses. Exam reveals no gallop and no friction rub.   No murmur heard.  Regular rate and rhythm. No murmurs, rubs, gallops or heaves.    Pulmonary/Chest: Effort normal and breath sounds normal. No stridor. No respiratory distress. She has no wheezes. She has no rales.   Lungs are clear. No wheezes, rales, rhonchi or crackles.   Abdominal: Soft. Bowel sounds are normal. She exhibits no distension and no mass. There is no tenderness. There is no rebound and no guarding.   Abdomen has multiple well healed scars. Positive bowel sounds, soft, non tender. No organomegaly or hepatosplenomegaly. Flanks are without masses or rashes.    Musculoskeletal: Normal range of motion. She exhibits no edema or tenderness.   No rash, synovitis or edema.   Axilla is without rashes or masses.    Lymphadenopathy:     She has no cervical adenopathy.   Neurological: She is alert and oriented to person, place, and time. No cranial nerve  deficit or sensory deficit. Coordination normal.   Face symmetric, tongue midline, voice strong, hearing, vision and speech preserved. Normal and equal strength to upper and lower extremities. Sensation intact.    Skin: Skin is warm and dry. Capillary refill takes less than 2 seconds. No rash noted. She is not diaphoretic. No erythema.   Psychiatric: She has a normal mood and affect. Her behavior is normal.   Nursing note and vitals reviewed.      Procedures         ED Course  ED Course as of Jan 19 1629   Sun Jan 19, 2020   3437 Reviewed patient's past medical history which includes: macular hole at right eye, meningoma of brain and optic nerve with surgeries in 2003 and 2014, as well as cyberknife treatment in 2017. -EIR    [RP]      ED Course User Index  [RP] Gissell Petersen     Recent Results (from the past 24 hour(s))   Light Blue Top    Collection Time: 01/19/20  2:26 PM   Result Value Ref Range    Extra Tube hold for add-on    Green Top (Gel)    Collection Time: 01/19/20  2:26 PM   Result Value Ref Range    Extra Tube Hold for add-ons.    Lavender Top    Collection Time: 01/19/20  2:26 PM   Result Value Ref Range    Extra Tube hold for add-on    Gold Top - SST    Collection Time: 01/19/20  2:26 PM   Result Value Ref Range    Extra Tube Hold for add-ons.    Comprehensive Metabolic Panel    Collection Time: 01/19/20  2:26 PM   Result Value Ref Range    Glucose 126 (H) 65 - 99 mg/dL    BUN 18 8 - 23 mg/dL    Creatinine 0.93 0.57 - 1.00 mg/dL    Sodium 138 136 - 145 mmol/L    Potassium 3.2 (L) 3.5 - 5.2 mmol/L    Chloride 101 98 - 107 mmol/L    CO2 25.0 22.0 - 29.0 mmol/L    Calcium 9.9 8.6 - 10.5 mg/dL    Total Protein 8.1 6.0 - 8.5 g/dL    Albumin 4.40 3.50 - 5.20 g/dL    ALT (SGPT) 14 1 - 33 U/L    AST (SGOT) 16 1 - 32 U/L    Alkaline Phosphatase 100 39 - 117 U/L    Total Bilirubin 0.4 0.2 - 1.2 mg/dL    eGFR Non African Amer 60 (L) >60 mL/min/1.73    Globulin 3.7 gm/dL    A/G Ratio 1.2 g/dL    BUN/Creatinine  Ratio 19.4 7.0 - 25.0    Anion Gap 12.0 5.0 - 15.0 mmol/L   Troponin    Collection Time: 01/19/20  2:26 PM   Result Value Ref Range    Troponin T <0.010 0.000 - 0.030 ng/mL   TSH    Collection Time: 01/19/20  2:26 PM   Result Value Ref Range    TSH 3.740 0.270 - 4.200 uIU/mL   Magnesium    Collection Time: 01/19/20  2:26 PM   Result Value Ref Range    Magnesium 2.3 1.6 - 2.4 mg/dL   Sedimentation Rate    Collection Time: 01/19/20  2:26 PM   Result Value Ref Range    Sed Rate 33 (H) 0 - 30 mm/hr   CBC Auto Differential    Collection Time: 01/19/20  2:26 PM   Result Value Ref Range    WBC 7.40 3.40 - 10.80 10*3/mm3    RBC 4.89 3.77 - 5.28 10*6/mm3    Hemoglobin 14.6 12.0 - 15.9 g/dL    Hematocrit 46.1 34.0 - 46.6 %    MCV 94.3 79.0 - 97.0 fL    MCH 29.9 26.6 - 33.0 pg    MCHC 31.7 31.5 - 35.7 g/dL    RDW 12.9 12.3 - 15.4 %    RDW-SD 44.8 37.0 - 54.0 fl    MPV 10.0 6.0 - 12.0 fL    Platelets 283 140 - 450 10*3/mm3    Neutrophil % 57.8 42.7 - 76.0 %    Lymphocyte % 30.3 19.6 - 45.3 %    Monocyte % 8.9 5.0 - 12.0 %    Eosinophil % 2.2 0.3 - 6.2 %    Basophil % 0.5 0.0 - 1.5 %    Immature Grans % 0.3 0.0 - 0.5 %    Neutrophils, Absolute 4.28 1.70 - 7.00 10*3/mm3    Lymphocytes, Absolute 2.24 0.70 - 3.10 10*3/mm3    Monocytes, Absolute 0.66 0.10 - 0.90 10*3/mm3    Eosinophils, Absolute 0.16 0.00 - 0.40 10*3/mm3    Basophils, Absolute 0.04 0.00 - 0.20 10*3/mm3    Immature Grans, Absolute 0.02 0.00 - 0.05 10*3/mm3    nRBC 0.0 0.0 - 0.2 /100 WBC   Influenza Antigen, Rapid - Swab, Nasopharynx    Collection Time: 01/19/20  2:28 PM   Result Value Ref Range    Influenza A Ag, EIA Negative Negative    Influenza B Ag, EIA Negative Negative     Note: In addition to lab results from this visit, the labs listed above may include labs taken at another facility or during a different encounter within the last 24 hours. Please correlate lab times with ED admission and discharge times for further clarification of the services performed  "during this visit.    CT Sinus Without Contrast   Preliminary Result   No CT evidence for sinusitis or sinus abnormality   appreciated.       Incidentally noted postsurgical changes to the left frontal lobe with   post surgical changes identified involving the left calvarium which were   present on the MRI of the brain performed 10/14/2019. There is small   linear hyperdensity within this region which may relate to residual   postprocedural hemorrhage with dural thickening. Clinical correlation is   required and and the need for follow-up imaging of the brain should be   determined clinically.          CT Soft Tissue Neck Without Contrast   Preliminary Result   No evidence of tonsillitis, retropharyngeal abscess or   epiglottitis abnormality appreciated. Overall the airway appears patent   as visualized.       Redemonstration of incompletely imaged post surgical changes to the left   frontal lobe and middle cranial fossa as previously detailed.                      XR Chest 1 View   Preliminary Result   Chronic changes in the lungs without evidence of active   airspace disease.       Moderate-sized hiatal hernia.       Numerous distended air-filled loops of bowel within the upper abdomen   which are nonspecific. If the patient has abdominal type symptoms   relatable to this abnormality consider follow-up abdominal radiography   for further evaluation.                Vitals:    01/19/20 1412 01/19/20 1500 01/19/20 1600   BP: 162/82 144/91 174/94   BP Location: Left arm     Patient Position: Sitting     Pulse: 76     Resp: 17     Temp: 98.8 °F (37.1 °C)     TempSrc: Oral     SpO2: 98% 97% 94%   Weight: 76.2 kg (168 lb)     Height: 160 cm (63\")       Medications   sodium chloride 0.9 % flush 10 mL (has no administration in time range)   dexamethasone (DECADRON) injection 8 mg (8 mg Intravenous Given 1/19/20 1615)   ketorolac (TORADOL) injection 15 mg (15 mg Intravenous Given 1/19/20 1613)   ondansetron (ZOFRAN) " injection 4 mg (4 mg Intravenous Given 1/19/20 1611)   aluminum-magnesium hydroxide-simethicone (MAALOX MAX) 400-400-40 MG/5ML suspension 15 mL (15 mL Oral Given 1/19/20 1617)   Lidocaine Viscous HCl (XYLOCAINE) 2 % mouth solution 15 mL (15 mL Mouth/Throat Given 1/19/20 1617)   potassium chloride (MICRO-K) CR capsule 20 mEq (20 mEq Oral Given 1/19/20 1613)     ECG/EMG Results (last 24 hours)     Procedure Component Value Units Date/Time    ECG 12 Lead [[id number]] Collected:  01/19/20 1425     Updated:  01/19/20 1425        ECG 12 Lead         ECG 12 Lead    (Results Pending)                                                  MDM  Number of Diagnoses or Management Options  Facial pain:   History of meningioma of the brain:   Hypokalemia:   Nonintractable episodic headache, unspecified headache type:   Throat pain in adult:   Weakness:   Diagnosis management comments:       I reviewed all available studies at bedside with the patient and her .  Her CT the face shows no evidence of sinusitis and CT neck is also unrevealing as the cause of persistent sore throat.  Madelin treat her for postnasal drip she is already on fluticasone also acid reflux ominous start her back on Prilosec.  She has had a remote Nissen fundoplication tells me she was told the plication may be failing.  Was done by Dr. Segura and I have him follow-up with Dr. Segura regarding this also perhaps with [facility] where she is scheduled for surgery in March for persistent abdominal issues.    If she has persistent issues with her throat I referred her to ENT as she may benefit from nasopharyngoscopy.    Her headache seems to be myofascial in nature and I tried some Flexeril to see if this helps.  As far as her fatigue goes her labs are fairly bland though she does have a mild elevation of her sedimentation rate is not over 50 and I doubt she has any sort of vasculitis.  I have given her some Decadron here and she is feeling improved.  Her  potassium is little bit low and I will give her potassium here and I have instructed her in a high potassium diet with follow-up with her primary care doctor regarding recheck on this.    She will return to the ER if worse in any way.    All are agreeable with the plan       Amount and/or Complexity of Data Reviewed  Clinical lab tests: reviewed  Tests in the radiology section of CPT®: reviewed  Tests in the medicine section of CPT®: reviewed        Final diagnoses:   Nonintractable episodic headache, unspecified headache type   Facial pain   Throat pain in adult   Weakness   History of meningioma of the brain   Hypokalemia       Documentation assistance provided by varun Petersen.  Information recorded by the scrtate was done at my direction and has been verified and validated by me.     Gissell Petersen  01/19/20 [id number]       Billy Méndez MD  01/19/20 [id number]

## 2020-01-20 ENCOUNTER — PATIENT OUTREACH (OUTPATIENT)
Dept: CASE MANAGEMENT | Facility: OTHER | Age: 65
End: 2020-01-20

## 2020-01-20 ENCOUNTER — EPISODE CHANGES (OUTPATIENT)
Dept: CASE MANAGEMENT | Facility: OTHER | Age: 65
End: 2020-01-20

## 2020-01-20 NOTE — OUTREACH NOTE
Patient Outreach Note  Talked with patient. Discussed 1/19/2020 ED visit regarding episodic headaches; nausea.  Patient states to be compliant with ED recommendations and states symptoms have improved regarding headache and nausea. She still has sore throat.  Patient has PCP appointment 2/3/20 and  will schedule ED follow up appointments with ENT and GI as needed. Patient has appointment at [facility] 3/4/20 for abdominal wall reconstructive surgery. She will have eye appointment at [facility] in April for right eye  macular hole surgery  follow up.She has lost vision in left eye and has vision with right eye. She  reports episodes of diarrhea and no difficulty with chest pain; SOB; appetite; or sleeping. Patient lives with spouse; independent with ADL's; meal preparation; receiving assistance with transportation and ambulates without assistive device.She  compliant with medications; medical appointments and monitoring of blood pressure. Reviewed with patient ED recommendations; hydration; foods rich in K+; ; education regarding HTN; monitoring of blood pressure; low sodium diet; 24/7 Nurse Line Telephone number; ACM contact information; Advance Directives(has information/ not completed);  My Chart (Active); gaps in care(addressed); MWV(completed);  and Care Advising program. Patient verbalized understanding.Patient states to appreciated phone call and declines need for further phone calls at this time.  No further questions or concerns voiced at this time.     Umu Coy RN  Ambulatory     1/20/2020, 4:18 PM

## 2020-01-22 ENCOUNTER — TELEPHONE (OUTPATIENT)
Dept: NEUROSURGERY | Facility: CLINIC | Age: 65
End: 2020-01-22

## 2020-01-22 DIAGNOSIS — D32.9 MENINGIOMA (HCC): Primary | ICD-10-CM

## 2020-01-22 DIAGNOSIS — Z98.890 S/P CRANIOTOMY: ICD-10-CM

## 2020-01-29 ENCOUNTER — TELEPHONE (OUTPATIENT)
Dept: INTERNAL MEDICINE | Facility: CLINIC | Age: 65
End: 2020-01-29

## 2020-01-29 DIAGNOSIS — F32.89 OTHER DEPRESSION: ICD-10-CM

## 2020-01-29 RX ORDER — QUETIAPINE FUMARATE 25 MG/1
50 TABLET, FILM COATED ORAL NIGHTLY
Qty: 180 TABLET | Refills: 1 | Status: SHIPPED | OUTPATIENT
Start: 2020-01-29 | End: 2020-11-04 | Stop reason: SDUPTHER

## 2020-01-29 NOTE — TELEPHONE ENCOUNTER
Pt use to be on 50 mg of seroquel, then she went down to 25 mg.   Pt wants to know if she can go back to the 50 mg because she is not sleeping well. Please call pt at [phone] to advise.  Pt uses [facility] pharmacy on [address].

## 2020-02-06 DIAGNOSIS — J01.00 SUBACUTE MAXILLARY SINUSITIS: ICD-10-CM

## 2020-02-06 RX ORDER — LEVOCETIRIZINE DIHYDROCHLORIDE 5 MG/1
TABLET, FILM COATED ORAL
Qty: 30 TABLET | Refills: 5 | Status: ON HOLD | OUTPATIENT
Start: 2020-02-06 | End: 2020-04-25

## 2020-02-21 ENCOUNTER — APPOINTMENT (OUTPATIENT)
Dept: BONE DENSITY | Facility: HOSPITAL | Age: 65
End: 2020-02-21

## 2020-02-21 ENCOUNTER — TELEPHONE (OUTPATIENT)
Dept: INTERNAL MEDICINE | Facility: CLINIC | Age: 65
End: 2020-02-21

## 2020-02-25 ENCOUNTER — APPOINTMENT (OUTPATIENT)
Dept: MRI IMAGING | Facility: HOSPITAL | Age: 65
End: 2020-02-25

## 2020-03-15 RX ORDER — OMEPRAZOLE 20 MG/1
CAPSULE, DELAYED RELEASE ORAL
Qty: 30 CAPSULE | Refills: 3 | Status: SHIPPED | OUTPATIENT
Start: 2020-03-15 | End: 2020-07-01 | Stop reason: SDUPTHER

## 2020-04-21 ENCOUNTER — HOSPITAL ENCOUNTER (OUTPATIENT)
Dept: MRI IMAGING | Facility: HOSPITAL | Age: 65
Discharge: HOME | End: 2020-04-21
Admitting: NEUROLOGICAL SURGERY

## 2020-04-21 ENCOUNTER — OFFICE VISIT (OUTPATIENT)
Dept: NEUROSURGERY | Facility: CLINIC | Age: 65
End: 2020-04-21

## 2020-04-21 ENCOUNTER — TELEPHONE (OUTPATIENT)
Dept: NEUROSURGERY | Facility: CLINIC | Age: 65
End: 2020-04-21

## 2020-04-21 VITALS
BODY MASS INDEX: 29.13 KG/M2 | SYSTOLIC BLOOD PRESSURE: 128 MMHG | HEIGHT: 63 IN | TEMPERATURE: 96.5 F | DIASTOLIC BLOOD PRESSURE: 68 MMHG | WEIGHT: 164.4 LBS

## 2020-04-21 DIAGNOSIS — Z98.890 S/P CRANIOTOMY: Primary | ICD-10-CM

## 2020-04-21 DIAGNOSIS — Z98.890 S/P CRANIOTOMY: ICD-10-CM

## 2020-04-21 DIAGNOSIS — D32.0 MENINGIOMA, RECURRENT OF BRAIN (HCC): ICD-10-CM

## 2020-04-21 DIAGNOSIS — D32.9 MENINGIOMA (HCC): ICD-10-CM

## 2020-04-21 PROCEDURE — 82565 ASSAY OF CREATININE: CPT

## 2020-04-21 PROCEDURE — 99213 OFFICE O/P EST LOW 20 MIN: CPT | Performed by: NEUROLOGICAL SURGERY

## 2020-04-21 PROCEDURE — A9577 INJ MULTIHANCE: HCPCS | Performed by: NEUROLOGICAL SURGERY

## 2020-04-21 PROCEDURE — 0 GADOBENATE DIMEGLUMINE 529 MG/ML SOLUTION: Performed by: NEUROLOGICAL SURGERY

## 2020-04-21 PROCEDURE — 70553 MRI BRAIN STEM W/O & W/DYE: CPT

## 2020-04-21 RX ADMIN — GADOBENATE DIMEGLUMINE 15 ML: 529 INJECTION, SOLUTION INTRAVENOUS at 13:55

## 2020-04-21 NOTE — PROGRESS NOTES
"Tereza Ackerman  1955  [id number]                        CHIEF COMPLAINT: Follow-up SRS for meningioma         MEDICAL HISTORY SINCE LAST ENCOUNTER: This 65-year-old female is being sequentially followed with MRI in reference to a meningioma involving the base of the left temporal fossa along the lateral aspects of the orbit.  Was treated with SRS and reports for follow-up today.  In the interim she has had a \"hole\" in her macula repaired in [place] and has had abdominal reconstruction.  From a neurosurgical perspective she is done quite well and remains essentially asymptomatic other than mild memory loss.  She reports for MRI today           Past Medical History:   Diagnosis Date   • Abdominal hernia    • Arthritis     rt knee    • Atrial flutter with rapid ventricular response (CMS/HCC) 12/21/2017   • Back pain    • Brain tumor (CMS/HCC)    • Colostomy present (CMS/HCC) 08/2018   • Depression    • History of blood transfusion    • History of gastroesophageal reflux (GERD)     resolved since Nissen   • History of Nissen fundoplication 7/1/2017   • History of small bowel obstruction    • Hyperlipemia    • Hypertension    • Memory loss or impairment    • Migraine    • Parathyroid adenoma     Incidental on thyroid US.   • PONV (postoperative nausea and vomiting)     nausea - preprocedural meds help    • Prediabetes     Last Impression: 06 Feb 2015  Reviewed labs. Excellent control.  Maren Connellast Impression: 06 Feb 2015  Reviewed labs. Excellent control.  Michaela Connell   • Thyroid nodule      Last Impression: 13 Jun 2015  r/o thyroid cancer, will proceed with US.  Michaela Connell (Internal Medicine)    • UTI (urinary tract infection)    • Vision changes     blockages left eye    • Wears glasses     readers              Past Surgical History:   Procedure Laterality Date   • CARPAL TUNNEL RELEASE  2002    right    • COLONOSCOPY N/A 8/18/2018    Procedure: COLONOSCOPY;  Surgeon: Inderjit Campos " MD MEME;  Location:  [facility];  Service: Gastroenterology   • COLONOSCOPY N/A 11/28/2018    Procedure: COLONOSCOPY;  Surgeon: Inderjit Campos MD;  Location:  [facility];  Service: Gastroenterology   • COLOSTOMY CLOSURE N/A 2/19/2019    Procedure: COLOSTOMY TAKEDOWN, INCISIONAL HERNIA REPAIR;  Surgeon: Andrea Bocanegra MD;  Location:  [facility];  Service: General   • CRANIOTOMY  2003 & 2014    Dr. Werner Hollis for tumor removal   • ENDOSCOPY     • EXPLORATORY LAPAROTOMY N/A 12/5/2017    Procedure: EXPLORATORY LAPAROTOMY, SMALL BOWEL RESECTION;  Surgeon: Ирина Cobian MD;  Location:  [facility];  Service:    • EXPLORATORY LAPAROTOMY N/A 12/22/2017    Procedure: LAPAROTOMY EXPLORATORY FOR SMALL BOWEL OBSTRUCTION;  Surgeon: Ирина Cobian MD;  Location:  [facility];  Service:    • EXPLORATORY LAPAROTOMY N/A 7/23/2018    Procedure: EXPLORATORY LAPAROTOMY, APPENDECTOMY, CECOPEXY, INCISIONAL HERNIA REPAIR, LYSIS OF ADHESIONS;  Surgeon: Andrea Bocanegra MD;  Location:  [facility];  Service: General   • EXPLORATORY LAPAROTOMY N/A 8/19/2018    Procedure: LAPAROTOMY EXPLORATORY, SIGMOID COLECTOMY, CREATION OF OSTOMY;  Surgeon: Andrea Bocanegra MD;  Location:  [facility];  Service: General   • HYSTERECTOMY      partial - both ovaries still present pt believes    • INSERTION HEMODIALYSIS CATHETER N/A 12/7/2017    Procedure: HEMODIALYSIS CATHETER INSERTION;  Surgeon: Chance Valenzuela MD;  Location:  [facility];  Service:    • ORBITOTOMY Left 11/3/2017    Procedure:  LEFT LATERAL ORBITOTOMY WITH DEBULKING OF TUMOR ;  Surgeon: Moo Hopkins MD;  Location:  [facility];  Service:    • OTHER SURGICAL HISTORY      esophagogastric fundoplasty nissen fundoplication   • TUBAL ABDOMINAL LIGATION                Family History   Problem Relation Age of Onset   • Obesity Mother    • Heart attack Mother    • Migraines Father    • Stroke Father    • Diabetes Father    • Cancer Other    • Arthritis Other    • Diabetes  Other    • Breast cancer Maternal Aunt    • Breast cancer Paternal Aunt    • Ovarian cancer Neg Hx               Social History     Socioeconomic History   • Marital status:      Spouse name: Not on file   • Number of children: Not on file   • Years of education: Not on file   • Highest education level: Not on file   Tobacco Use   • Smoking status: Never Smoker   • Smokeless tobacco: Never Used   Substance and Sexual Activity   • Alcohol use: No   • Drug use: No   • Sexual activity: Defer              Allergies   Allergen Reactions   • Dilantin [Phenytoin Sodium Extended] Rash              Current Outpatient Medications:   •  aspirin 81 MG tablet, Take 1 tablet by mouth Daily., Disp: 30 tablet, Rfl: 11  •  carvedilol (COREG) 12.5 MG tablet, Take 1 tablet by mouth Every 12 (Twelve) Hours., Disp: 180 tablet, Rfl: 3  •  cholecalciferol (VITAMIN D3) 1000 units tablet, Take 2,000 Units by mouth Every Other Day., Disp: , Rfl:   •  cyclobenzaprine (FLEXERIL) 10 MG tablet, Take 0.5 tablets by mouth 3 (Three) Times a Day As Needed (headache)., Disp: 12 tablet, Rfl: 0  •  docusate sodium (COLACE) 100 MG capsule, Take 1 capsule by mouth 2 (Two) Times a Day As Needed for Constipation., Disp: 30 capsule, Rfl: 0  •  fluticasone (FLONASE) 50 MCG/ACT nasal spray, 2 sprays into the nostril(s) as directed by provider Daily., Disp: 1 bottle, Rfl: 0  •  levocetirizine (XYZAL) 5 MG tablet, TAKE 1 TABLET BY MOUTH EVERY DAY IN THE EVENING, Disp: 30 tablet, Rfl: 5  •  LORazepam (ATIVAN) 0.5 MG tablet, Take 0.5-1 tablets by mouth Daily As Needed for Anxiety., Disp: 15 tablet, Rfl: 0  •  omeprazole (priLOSEC) 20 MG capsule, TAKE 1 CAPSULE BY MOUTH EVERY DAY, Disp: 30 capsule, Rfl: 3  •  ondansetron (ZOFRAN) 4 MG tablet, Take 1 tablet by mouth Every 8 (Eight) Hours As Needed for Nausea or Vomiting., Disp: 10 tablet, Rfl: 0  •  QUEtiapine (SEROquel) 25 MG tablet, Take 2 tablets by mouth Every Night., Disp: 180 tablet, Rfl: 1  •   sertraline (ZOLOFT) 100 MG tablet, Take 1 tablet by mouth Daily., Disp: 90 tablet, Rfl: 1  •  thiamine (VITAMIN B1) 100 MG tablet, Take 1 tablet by mouth Every Other Day., Disp: 45 tablet, Rfl: 1  No current facility-administered medications for this visit.          Review of Systems   Constitutional: Positive for fatigue. Negative for activity change, appetite change, chills, diaphoresis, fever and unexpected weight change.   HENT: Negative for congestion, dental problem, drooling, ear discharge, ear pain, facial swelling, hearing loss, mouth sores, nosebleeds, postnasal drip, rhinorrhea, sinus pressure, sneezing, sore throat, tinnitus, trouble swallowing and voice change.    Eyes: Positive for photophobia. Negative for pain, discharge, redness, itching and visual disturbance.   Respiratory: Negative for apnea, cough, choking, chest tightness, shortness of breath, wheezing and stridor.    Cardiovascular: Negative for chest pain, palpitations and leg swelling.   Gastrointestinal: Positive for diarrhea. Negative for abdominal distention, abdominal pain, anal bleeding, blood in stool, constipation, nausea, rectal pain and vomiting.   Endocrine: Negative for cold intolerance, heat intolerance, polydipsia, polyphagia and polyuria.   Genitourinary: Negative for decreased urine volume, difficulty urinating, dysuria, enuresis, flank pain, frequency, genital sores, hematuria and urgency.   Musculoskeletal: Negative for arthralgias, back pain, gait problem, joint swelling, myalgias, neck pain and neck stiffness.   Skin: Negative for color change, pallor, rash and wound.   Allergic/Immunologic: Negative for environmental allergies, food allergies and immunocompromised state.   Neurological: Positive for light-headedness. Negative for dizziness, tremors, seizures, syncope, facial asymmetry, speech difficulty, weakness, numbness and headaches.   Hematological: Negative for adenopathy. Does not bruise/bleed easily.  "  Psychiatric/Behavioral: Positive for confusion and decreased concentration. Negative for agitation, behavioral problems, dysphoric mood, hallucinations, self-injury, sleep disturbance and suicidal ideas. The patient is nervous/anxious. The patient is not hyperactive.    All other systems reviewed and are negative.              Vitals:    04/21/20 1431   BP: 128/68   BP Location: Left arm   Patient Position: Sitting   Cuff Size: Adult   Temp: 96.5 °F (35.8 °C)   TempSrc: Temporal   Weight: 74.6 kg (164 lb 6.4 oz)   Height: 160 cm (62.99\")               EXAMINATION: No weakness sensory loss or reflex asymmetry diminished vision as noted previously but unchanged            MEDICAL DECISION MAKING: The brain MRI shows improvement with significant decrease in the size of the meningioma in the temporal fossa.           ASSESSMENT/DISPOSITION: She has shown improvement with SRS.  She will return to see us in 6 months for continued follow-up.              I APPRECIATE THE OPPORTUNITY OF THIS REFERRAL. PLEASE CALL IF ANY       QUESTIONS [phone]    Scribed for Werner Hollis MD by Joy Corral CMA. 4/21/2020 14:41       "

## 2020-04-21 NOTE — TELEPHONE ENCOUNTER
Per Dr. Hollis, she may have Diazepam 10mg, 1 PO 30 minutes prior to MRI. #1, 0 RF.     Rx called into pharmacy.      Pt notified.

## 2020-04-25 ENCOUNTER — APPOINTMENT (OUTPATIENT)
Dept: CT IMAGING | Facility: HOSPITAL | Age: 65
End: 2020-04-25

## 2020-04-25 ENCOUNTER — APPOINTMENT (OUTPATIENT)
Dept: GENERAL RADIOLOGY | Facility: HOSPITAL | Age: 65
End: 2020-04-25

## 2020-04-25 ENCOUNTER — HOSPITAL ENCOUNTER (INPATIENT)
Facility: HOSPITAL | Age: 65
LOS: 2 days | Discharge: HOME | End: 2020-04-28
Attending: EMERGENCY MEDICINE | Admitting: INTERNAL MEDICINE

## 2020-04-25 DIAGNOSIS — I21.4 NSTEMI (NON-ST ELEVATED MYOCARDIAL INFARCTION) (HCC): Primary | ICD-10-CM

## 2020-04-25 DIAGNOSIS — I16.1 HYPERTENSIVE EMERGENCY: ICD-10-CM

## 2020-04-25 PROBLEM — R07.9 CHEST PAIN: Status: ACTIVE | Noted: 2020-04-25

## 2020-04-25 LAB
ALBUMIN SERPL-MCNC: 4.4 G/DL (ref 3.5–5.2)
ALBUMIN/GLOB SERPL: 1.3 G/DL
ALP SERPL-CCNC: 106 U/L (ref 39–117)
ALT SERPL W P-5'-P-CCNC: 15 U/L (ref 1–33)
ANION GAP SERPL CALCULATED.3IONS-SCNC: 13 MMOL/L (ref 5–15)
APTT PPP: 33.2 SECONDS (ref 50–95)
AST SERPL-CCNC: 26 U/L (ref 1–32)
BASOPHILS # BLD AUTO: 0.04 10*3/MM3 (ref 0–0.2)
BASOPHILS NFR BLD AUTO: 0.6 % (ref 0–1.5)
BILIRUB SERPL-MCNC: 0.3 MG/DL (ref 0.2–1.2)
BUN BLD-MCNC: 12 MG/DL (ref 8–23)
BUN/CREAT SERPL: 15.4 (ref 7–25)
CALCIUM SPEC-SCNC: 9.5 MG/DL (ref 8.6–10.5)
CHLORIDE SERPL-SCNC: 100 MMOL/L (ref 98–107)
CHOLEST SERPL-MCNC: 243 MG/DL (ref 0–200)
CO2 SERPL-SCNC: 23 MMOL/L (ref 22–29)
CREAT BLD-MCNC: 0.78 MG/DL (ref 0.57–1)
DEPRECATED RDW RBC AUTO: 43 FL (ref 37–54)
EOSINOPHIL # BLD AUTO: 0.07 10*3/MM3 (ref 0–0.4)
EOSINOPHIL NFR BLD AUTO: 1 % (ref 0.3–6.2)
ERYTHROCYTE [DISTWIDTH] IN BLOOD BY AUTOMATED COUNT: 12.8 % (ref 12.3–15.4)
GFR SERPL CREATININE-BSD FRML MDRD: 74 ML/MIN/1.73
GLOBULIN UR ELPH-MCNC: 3.5 GM/DL
GLUCOSE BLD-MCNC: 123 MG/DL (ref 65–99)
HCT VFR BLD AUTO: 42.5 % (ref 34–46.6)
HDLC SERPL-MCNC: 61 MG/DL (ref 40–60)
HGB BLD-MCNC: 14 G/DL (ref 12–15.9)
HOLD SPECIMEN: NORMAL
HOLD SPECIMEN: NORMAL
IMM GRANULOCYTES # BLD AUTO: 0.02 10*3/MM3 (ref 0–0.05)
IMM GRANULOCYTES NFR BLD AUTO: 0.3 % (ref 0–0.5)
INR PPP: 1.06 (ref 0.85–1.16)
LDLC SERPL CALC-MCNC: 150 MG/DL (ref 0–100)
LDLC/HDLC SERPL: 2.47 {RATIO}
LIPASE SERPL-CCNC: 24 U/L (ref 13–60)
LYMPHOCYTES # BLD AUTO: 1.43 10*3/MM3 (ref 0.7–3.1)
LYMPHOCYTES NFR BLD AUTO: 20 % (ref 19.6–45.3)
MCH RBC QN AUTO: 30.1 PG (ref 26.6–33)
MCHC RBC AUTO-ENTMCNC: 32.9 G/DL (ref 31.5–35.7)
MCV RBC AUTO: 91.4 FL (ref 79–97)
MONOCYTES # BLD AUTO: 0.33 10*3/MM3 (ref 0.1–0.9)
MONOCYTES NFR BLD AUTO: 4.6 % (ref 5–12)
NEUTROPHILS # BLD AUTO: 5.25 10*3/MM3 (ref 1.7–7)
NEUTROPHILS NFR BLD AUTO: 73.5 % (ref 42.7–76)
NRBC BLD AUTO-RTO: 0 /100 WBC (ref 0–0.2)
NT-PROBNP SERPL-MCNC: 381.7 PG/ML (ref 5–900)
PLATELET # BLD AUTO: 273 10*3/MM3 (ref 140–450)
PMV BLD AUTO: 10.1 FL (ref 6–12)
POTASSIUM BLD-SCNC: 3.9 MMOL/L (ref 3.5–5.2)
PROT SERPL-MCNC: 7.9 G/DL (ref 6–8.5)
PROTHROMBIN TIME: 13.6 SECONDS (ref 11.5–14)
RBC # BLD AUTO: 4.65 10*6/MM3 (ref 3.77–5.28)
SODIUM BLD-SCNC: 136 MMOL/L (ref 136–145)
TRIGL SERPL-MCNC: 158 MG/DL (ref 0–150)
TROPONIN T SERPL-MCNC: 0.13 NG/ML (ref 0–0.03)
TROPONIN T SERPL-MCNC: 0.16 NG/ML (ref 0–0.03)
UFH PPP CHRO-ACNC: 0.1 IU/ML (ref 0.3–0.7)
VLDLC SERPL-MCNC: 31.6 MG/DL
WBC NRBC COR # BLD: 7.14 10*3/MM3 (ref 3.4–10.8)
WHOLE BLOOD HOLD SPECIMEN: NORMAL
WHOLE BLOOD HOLD SPECIMEN: NORMAL

## 2020-04-25 PROCEDURE — 74177 CT ABD & PELVIS W/CONTRAST: CPT

## 2020-04-25 PROCEDURE — 25010000002 IOPAMIDOL 61 % SOLUTION: Performed by: EMERGENCY MEDICINE

## 2020-04-25 PROCEDURE — 85730 THROMBOPLASTIN TIME PARTIAL: CPT | Performed by: EMERGENCY MEDICINE

## 2020-04-25 PROCEDURE — 93005 ELECTROCARDIOGRAM TRACING: CPT | Performed by: EMERGENCY MEDICINE

## 2020-04-25 PROCEDURE — 83690 ASSAY OF LIPASE: CPT | Performed by: EMERGENCY MEDICINE

## 2020-04-25 PROCEDURE — G0378 HOSPITAL OBSERVATION PER HR: HCPCS

## 2020-04-25 PROCEDURE — 85025 COMPLETE CBC W/AUTO DIFF WBC: CPT | Performed by: EMERGENCY MEDICINE

## 2020-04-25 PROCEDURE — 25010000002 HEPARIN (PORCINE) PER 1000 UNITS: Performed by: EMERGENCY MEDICINE

## 2020-04-25 PROCEDURE — 71045 X-RAY EXAM CHEST 1 VIEW: CPT

## 2020-04-25 PROCEDURE — 80061 LIPID PANEL: CPT | Performed by: INTERNAL MEDICINE

## 2020-04-25 PROCEDURE — 80053 COMPREHEN METABOLIC PANEL: CPT | Performed by: EMERGENCY MEDICINE

## 2020-04-25 PROCEDURE — 25010000002 HEPARIN (PORCINE) 25000-0.45 UT/250ML-% SOLUTION: Performed by: EMERGENCY MEDICINE

## 2020-04-25 PROCEDURE — 85610 PROTHROMBIN TIME: CPT | Performed by: EMERGENCY MEDICINE

## 2020-04-25 PROCEDURE — 93005 ELECTROCARDIOGRAM TRACING: CPT | Performed by: INTERNAL MEDICINE

## 2020-04-25 PROCEDURE — 99284 EMERGENCY DEPT VISIT MOD MDM: CPT

## 2020-04-25 PROCEDURE — 83880 ASSAY OF NATRIURETIC PEPTIDE: CPT | Performed by: EMERGENCY MEDICINE

## 2020-04-25 PROCEDURE — 84484 ASSAY OF TROPONIN QUANT: CPT | Performed by: EMERGENCY MEDICINE

## 2020-04-25 PROCEDURE — 85520 HEPARIN ASSAY: CPT | Performed by: EMERGENCY MEDICINE

## 2020-04-25 PROCEDURE — 99223 1ST HOSP IP/OBS HIGH 75: CPT | Performed by: INTERNAL MEDICINE

## 2020-04-25 RX ORDER — SODIUM CHLORIDE 0.9 % (FLUSH) 0.9 %
10 SYRINGE (ML) INJECTION EVERY 12 HOURS SCHEDULED
Status: DISCONTINUED | OUTPATIENT
Start: 2020-04-25 | End: 2020-04-28 | Stop reason: HOSPADM

## 2020-04-25 RX ORDER — ONDANSETRON 2 MG/ML
4 INJECTION INTRAMUSCULAR; INTRAVENOUS EVERY 6 HOURS PRN
Status: DISCONTINUED | OUTPATIENT
Start: 2020-04-25 | End: 2020-04-28 | Stop reason: HOSPADM

## 2020-04-25 RX ORDER — HEPARIN SODIUM 10000 [USP'U]/100ML
13 INJECTION, SOLUTION INTRAVENOUS
Status: DISCONTINUED | OUTPATIENT
Start: 2020-04-25 | End: 2020-04-27

## 2020-04-25 RX ORDER — PANTOPRAZOLE SODIUM 40 MG/1
40 TABLET, DELAYED RELEASE ORAL EVERY MORNING
Status: DISCONTINUED | OUTPATIENT
Start: 2020-04-26 | End: 2020-04-28 | Stop reason: HOSPADM

## 2020-04-25 RX ORDER — MORPHINE SULFATE 2 MG/ML
1 INJECTION, SOLUTION INTRAMUSCULAR; INTRAVENOUS EVERY 4 HOURS PRN
Status: DISCONTINUED | OUTPATIENT
Start: 2020-04-25 | End: 2020-04-28 | Stop reason: HOSPADM

## 2020-04-25 RX ORDER — LORAZEPAM 0.5 MG/1
0.5 TABLET ORAL DAILY PRN
Status: DISCONTINUED | OUTPATIENT
Start: 2020-04-25 | End: 2020-04-28 | Stop reason: HOSPADM

## 2020-04-25 RX ORDER — ASPIRIN 81 MG/1
324 TABLET, CHEWABLE ORAL ONCE
Status: COMPLETED | OUTPATIENT
Start: 2020-04-25 | End: 2020-04-25

## 2020-04-25 RX ORDER — METOPROLOL TARTRATE 5 MG/5ML
5 INJECTION INTRAVENOUS ONCE
Status: COMPLETED | OUTPATIENT
Start: 2020-04-25 | End: 2020-04-25

## 2020-04-25 RX ORDER — HEPARIN SODIUM 1000 [USP'U]/ML
30 INJECTION, SOLUTION INTRAVENOUS; SUBCUTANEOUS AS NEEDED
Status: DISCONTINUED | OUTPATIENT
Start: 2020-04-25 | End: 2020-04-25

## 2020-04-25 RX ORDER — NITROGLYCERIN 20 MG/100ML
10-50 INJECTION INTRAVENOUS
Status: DISCONTINUED | OUTPATIENT
Start: 2020-04-25 | End: 2020-04-26

## 2020-04-25 RX ORDER — SODIUM CHLORIDE 0.9 % (FLUSH) 0.9 %
10 SYRINGE (ML) INJECTION AS NEEDED
Status: DISCONTINUED | OUTPATIENT
Start: 2020-04-25 | End: 2020-04-28 | Stop reason: HOSPADM

## 2020-04-25 RX ORDER — SERTRALINE HYDROCHLORIDE 100 MG/1
100 TABLET, FILM COATED ORAL DAILY
Status: DISCONTINUED | OUTPATIENT
Start: 2020-04-26 | End: 2020-04-28 | Stop reason: HOSPADM

## 2020-04-25 RX ORDER — HEPARIN SODIUM 1000 [USP'U]/ML
4000 INJECTION, SOLUTION INTRAVENOUS; SUBCUTANEOUS ONCE
Status: COMPLETED | OUTPATIENT
Start: 2020-04-25 | End: 2020-04-25

## 2020-04-25 RX ORDER — CETIRIZINE HYDROCHLORIDE 10 MG/1
10 TABLET ORAL DAILY
Status: DISCONTINUED | OUTPATIENT
Start: 2020-04-26 | End: 2020-04-28 | Stop reason: HOSPADM

## 2020-04-25 RX ORDER — QUETIAPINE FUMARATE 25 MG/1
50 TABLET, FILM COATED ORAL NIGHTLY
Status: DISCONTINUED | OUTPATIENT
Start: 2020-04-25 | End: 2020-04-28 | Stop reason: HOSPADM

## 2020-04-25 RX ORDER — HEPARIN SODIUM 1000 [USP'U]/ML
60 INJECTION, SOLUTION INTRAVENOUS; SUBCUTANEOUS AS NEEDED
Status: DISCONTINUED | OUTPATIENT
Start: 2020-04-25 | End: 2020-04-25

## 2020-04-25 RX ORDER — ASPIRIN 81 MG/1
81 TABLET, CHEWABLE ORAL DAILY
Status: DISCONTINUED | OUTPATIENT
Start: 2020-04-26 | End: 2020-04-28 | Stop reason: HOSPADM

## 2020-04-25 RX ORDER — NALOXONE HCL 0.4 MG/ML
0.4 VIAL (ML) INJECTION
Status: DISCONTINUED | OUTPATIENT
Start: 2020-04-25 | End: 2020-04-28 | Stop reason: HOSPADM

## 2020-04-25 RX ORDER — CARVEDILOL 12.5 MG/1
12.5 TABLET ORAL EVERY 12 HOURS
Status: DISCONTINUED | OUTPATIENT
Start: 2020-04-25 | End: 2020-04-26

## 2020-04-25 RX ORDER — DOCUSATE SODIUM 100 MG/1
100 CAPSULE, LIQUID FILLED ORAL 2 TIMES DAILY PRN
Status: DISCONTINUED | OUTPATIENT
Start: 2020-04-25 | End: 2020-04-28 | Stop reason: HOSPADM

## 2020-04-25 RX ADMIN — METOPROLOL TARTRATE 5 MG: 5 INJECTION INTRAVENOUS at 19:40

## 2020-04-25 RX ADMIN — NITROGLYCERIN 10 MCG/MIN: 20 INJECTION INTRAVENOUS at 21:59

## 2020-04-25 RX ADMIN — HEPARIN SODIUM 12 UNITS/KG/HR: 10000 INJECTION, SOLUTION INTRAVENOUS at 20:54

## 2020-04-25 RX ADMIN — HEPARIN SODIUM 4000 UNITS: 1000 INJECTION, SOLUTION INTRAVENOUS; SUBCUTANEOUS at 20:53

## 2020-04-25 RX ADMIN — ASPIRIN 81 MG 243 MG: 81 TABLET ORAL at 17:15

## 2020-04-25 RX ADMIN — IOPAMIDOL 95 ML: 612 INJECTION, SOLUTION INTRAVENOUS at 19:23

## 2020-04-25 RX ADMIN — QUETIAPINE FUMARATE 25 MG: 25 TABLET ORAL at 23:23

## 2020-04-25 RX ADMIN — CARVEDILOL 12.5 MG: 12.5 TABLET, FILM COATED ORAL at 23:23

## 2020-04-26 PROBLEM — I16.0 HYPERTENSIVE URGENCY: Status: ACTIVE | Noted: 2017-06-30

## 2020-04-26 PROBLEM — I20.0 UNSTABLE ANGINA (HCC): Status: ACTIVE | Noted: 2020-04-26

## 2020-04-26 LAB
TROPONIN T SERPL-MCNC: 0.03 NG/ML (ref 0–0.03)
TROPONIN T SERPL-MCNC: 0.12 NG/ML (ref 0–0.03)
UFH PPP CHRO-ACNC: 0.25 IU/ML (ref 0.3–0.7)
UFH PPP CHRO-ACNC: 0.39 IU/ML (ref 0.3–0.7)
UFH PPP CHRO-ACNC: 0.51 IU/ML (ref 0.3–0.7)

## 2020-04-26 PROCEDURE — 99222 1ST HOSP IP/OBS MODERATE 55: CPT | Performed by: INTERNAL MEDICINE

## 2020-04-26 PROCEDURE — 85520 HEPARIN ASSAY: CPT | Performed by: INTERNAL MEDICINE

## 2020-04-26 PROCEDURE — 99233 SBSQ HOSP IP/OBS HIGH 50: CPT | Performed by: FAMILY MEDICINE

## 2020-04-26 PROCEDURE — 93010 ELECTROCARDIOGRAM REPORT: CPT | Performed by: INTERNAL MEDICINE

## 2020-04-26 PROCEDURE — 84484 ASSAY OF TROPONIN QUANT: CPT | Performed by: INTERNAL MEDICINE

## 2020-04-26 PROCEDURE — 25010000002 HEPARIN (PORCINE) PER 1000 UNITS

## 2020-04-26 PROCEDURE — 25010000002 HEPARIN (PORCINE) 25000-0.45 UT/250ML-% SOLUTION

## 2020-04-26 PROCEDURE — 93005 ELECTROCARDIOGRAM TRACING: CPT | Performed by: INTERNAL MEDICINE

## 2020-04-26 RX ORDER — ATORVASTATIN CALCIUM 40 MG/1
80 TABLET, FILM COATED ORAL NIGHTLY
Status: DISCONTINUED | OUTPATIENT
Start: 2020-04-26 | End: 2020-04-28 | Stop reason: HOSPADM

## 2020-04-26 RX ORDER — NITROGLYCERIN 0.4 MG/1
0.4 TABLET SUBLINGUAL
Status: DISCONTINUED | OUTPATIENT
Start: 2020-04-26 | End: 2020-04-28 | Stop reason: HOSPADM

## 2020-04-26 RX ORDER — ACETAMINOPHEN 325 MG/1
650 TABLET ORAL EVERY 6 HOURS PRN
Status: DISCONTINUED | OUTPATIENT
Start: 2020-04-26 | End: 2020-04-27

## 2020-04-26 RX ORDER — HEPARIN SODIUM 1000 [USP'U]/ML
2200 INJECTION, SOLUTION INTRAVENOUS; SUBCUTANEOUS ONCE
Status: COMPLETED | OUTPATIENT
Start: 2020-04-26 | End: 2020-04-26

## 2020-04-26 RX ADMIN — SERTRALINE HYDROCHLORIDE 100 MG: 100 TABLET ORAL at 08:24

## 2020-04-26 RX ADMIN — PANTOPRAZOLE SODIUM 40 MG: 40 TABLET, DELAYED RELEASE ORAL at 05:54

## 2020-04-26 RX ADMIN — ACETAMINOPHEN 650 MG: 325 TABLET, FILM COATED ORAL at 18:59

## 2020-04-26 RX ADMIN — SODIUM CHLORIDE, PRESERVATIVE FREE 10 ML: 5 INJECTION INTRAVENOUS at 21:49

## 2020-04-26 RX ADMIN — ATORVASTATIN CALCIUM 80 MG: 40 TABLET, FILM COATED ORAL at 21:49

## 2020-04-26 RX ADMIN — ASPIRIN 81 MG 81 MG: 81 TABLET ORAL at 08:24

## 2020-04-26 RX ADMIN — CETIRIZINE HYDROCHLORIDE 10 MG: 10 TABLET, FILM COATED ORAL at 08:24

## 2020-04-26 RX ADMIN — HEPARIN SODIUM 2200 UNITS: 1000 INJECTION, SOLUTION INTRAVENOUS; SUBCUTANEOUS at 20:16

## 2020-04-26 RX ADMIN — QUETIAPINE FUMARATE 25 MG: 25 TABLET ORAL at 21:49

## 2020-04-26 RX ADMIN — HEPARIN SODIUM 13 UNITS/KG/HR: 10000 INJECTION, SOLUTION INTRAVENOUS at 22:00

## 2020-04-26 RX ADMIN — METOPROLOL TARTRATE 12.5 MG: 25 TABLET, FILM COATED ORAL at 21:48

## 2020-04-26 RX ADMIN — SODIUM CHLORIDE, PRESERVATIVE FREE 10 ML: 5 INJECTION INTRAVENOUS at 08:24

## 2020-04-27 LAB
BASOPHILS # BLD AUTO: 0.03 10*3/MM3 (ref 0–0.2)
BASOPHILS NFR BLD AUTO: 0.4 % (ref 0–1.5)
CREAT BLDA-MCNC: 1 MG/DL (ref 0.6–1.3)
DEPRECATED RDW RBC AUTO: 45.9 FL (ref 37–54)
EOSINOPHIL # BLD AUTO: 0.11 10*3/MM3 (ref 0–0.4)
EOSINOPHIL NFR BLD AUTO: 1.4 % (ref 0.3–6.2)
ERYTHROCYTE [DISTWIDTH] IN BLOOD BY AUTOMATED COUNT: 13.2 % (ref 12.3–15.4)
HCT VFR BLD AUTO: 39.8 % (ref 34–46.6)
HGB BLD-MCNC: 12.8 G/DL (ref 12–15.9)
IMM GRANULOCYTES # BLD AUTO: 0.03 10*3/MM3 (ref 0–0.05)
IMM GRANULOCYTES NFR BLD AUTO: 0.4 % (ref 0–0.5)
LYMPHOCYTES # BLD AUTO: 1.41 10*3/MM3 (ref 0.7–3.1)
LYMPHOCYTES NFR BLD AUTO: 17.6 % (ref 19.6–45.3)
MCH RBC QN AUTO: 30.4 PG (ref 26.6–33)
MCHC RBC AUTO-ENTMCNC: 32.2 G/DL (ref 31.5–35.7)
MCV RBC AUTO: 94.5 FL (ref 79–97)
MONOCYTES # BLD AUTO: 0.33 10*3/MM3 (ref 0.1–0.9)
MONOCYTES NFR BLD AUTO: 4.1 % (ref 5–12)
NEUTROPHILS # BLD AUTO: 6.09 10*3/MM3 (ref 1.7–7)
NEUTROPHILS NFR BLD AUTO: 76.1 % (ref 42.7–76)
NRBC BLD AUTO-RTO: 0 /100 WBC (ref 0–0.2)
PLATELET # BLD AUTO: 251 10*3/MM3 (ref 140–450)
PMV BLD AUTO: 10.6 FL (ref 6–12)
RBC # BLD AUTO: 4.21 10*6/MM3 (ref 3.77–5.28)
UFH PPP CHRO-ACNC: 0.49 IU/ML (ref 0.3–0.7)
UFH PPP CHRO-ACNC: 0.62 IU/ML (ref 0.3–0.7)
WBC NRBC COR # BLD: 8 10*3/MM3 (ref 3.4–10.8)

## 2020-04-27 PROCEDURE — 25010000002 HEPARIN (PORCINE) PER 1000 UNITS: Performed by: INTERNAL MEDICINE

## 2020-04-27 PROCEDURE — 85520 HEPARIN ASSAY: CPT

## 2020-04-27 PROCEDURE — 99239 HOSP IP/OBS DSCHRG MGMT >30: CPT | Performed by: FAMILY MEDICINE

## 2020-04-27 PROCEDURE — C1769 GUIDE WIRE: HCPCS | Performed by: INTERNAL MEDICINE

## 2020-04-27 PROCEDURE — 25010000002 MIDAZOLAM PER 1 MG: Performed by: INTERNAL MEDICINE

## 2020-04-27 PROCEDURE — 99232 SBSQ HOSP IP/OBS MODERATE 35: CPT | Performed by: INTERNAL MEDICINE

## 2020-04-27 PROCEDURE — 93458 L HRT ARTERY/VENTRICLE ANGIO: CPT | Performed by: INTERNAL MEDICINE

## 2020-04-27 PROCEDURE — B2111ZZ FLUOROSCOPY OF MULTIPLE CORONARY ARTERIES USING LOW OSMOLAR CONTRAST: ICD-10-PCS | Performed by: INTERNAL MEDICINE

## 2020-04-27 PROCEDURE — 25010000002 ONDANSETRON PER 1 MG: Performed by: INTERNAL MEDICINE

## 2020-04-27 PROCEDURE — 85025 COMPLETE CBC W/AUTO DIFF WBC: CPT | Performed by: INTERNAL MEDICINE

## 2020-04-27 PROCEDURE — 0 IOPAMIDOL PER 1 ML: Performed by: INTERNAL MEDICINE

## 2020-04-27 PROCEDURE — 25010000002 FENTANYL CITRATE (PF) 100 MCG/2ML SOLUTION: Performed by: INTERNAL MEDICINE

## 2020-04-27 PROCEDURE — 4A023N7 MEASUREMENT OF CARDIAC SAMPLING AND PRESSURE, LEFT HEART, PERCUTANEOUS APPROACH: ICD-10-PCS | Performed by: INTERNAL MEDICINE

## 2020-04-27 PROCEDURE — B2151ZZ FLUOROSCOPY OF LEFT HEART USING LOW OSMOLAR CONTRAST: ICD-10-PCS | Performed by: INTERNAL MEDICINE

## 2020-04-27 PROCEDURE — C1894 INTRO/SHEATH, NON-LASER: HCPCS | Performed by: INTERNAL MEDICINE

## 2020-04-27 PROCEDURE — C1887 CATHETER, GUIDING: HCPCS | Performed by: INTERNAL MEDICINE

## 2020-04-27 RX ORDER — HYDROCODONE BITARTRATE AND ACETAMINOPHEN 7.5; 325 MG/1; MG/1
1 TABLET ORAL EVERY 4 HOURS PRN
Status: DISCONTINUED | OUTPATIENT
Start: 2020-04-27 | End: 2020-04-28 | Stop reason: HOSPADM

## 2020-04-27 RX ORDER — SODIUM CHLORIDE 9 MG/ML
100 INJECTION, SOLUTION INTRAVENOUS CONTINUOUS
Status: ACTIVE | OUTPATIENT
Start: 2020-04-27 | End: 2020-04-27

## 2020-04-27 RX ORDER — LISINOPRIL 10 MG/1
5 TABLET ORAL DAILY
Qty: 30 TABLET | Refills: 2 | Status: SHIPPED | OUTPATIENT
Start: 2020-04-27 | End: 2020-08-18

## 2020-04-27 RX ORDER — FENTANYL CITRATE 50 UG/ML
INJECTION, SOLUTION INTRAMUSCULAR; INTRAVENOUS AS NEEDED
Status: DISCONTINUED | OUTPATIENT
Start: 2020-04-27 | End: 2020-04-27 | Stop reason: HOSPADM

## 2020-04-27 RX ORDER — MORPHINE SULFATE 2 MG/ML
1 INJECTION, SOLUTION INTRAMUSCULAR; INTRAVENOUS EVERY 4 HOURS PRN
Status: DISCONTINUED | OUTPATIENT
Start: 2020-04-27 | End: 2020-04-28 | Stop reason: HOSPADM

## 2020-04-27 RX ORDER — ACETAMINOPHEN 325 MG/1
650 TABLET ORAL EVERY 4 HOURS PRN
Status: DISCONTINUED | OUTPATIENT
Start: 2020-04-27 | End: 2020-04-28 | Stop reason: HOSPADM

## 2020-04-27 RX ORDER — ATORVASTATIN CALCIUM 80 MG/1
80 TABLET, FILM COATED ORAL NIGHTLY
Qty: 30 TABLET | Refills: 2 | Status: SHIPPED | OUTPATIENT
Start: 2020-04-27 | End: 2020-07-27 | Stop reason: SDUPTHER

## 2020-04-27 RX ORDER — MIDAZOLAM HYDROCHLORIDE 1 MG/ML
INJECTION INTRAMUSCULAR; INTRAVENOUS AS NEEDED
Status: DISCONTINUED | OUTPATIENT
Start: 2020-04-27 | End: 2020-04-27 | Stop reason: HOSPADM

## 2020-04-27 RX ORDER — LIDOCAINE HYDROCHLORIDE 10 MG/ML
INJECTION, SOLUTION EPIDURAL; INFILTRATION; INTRACAUDAL; PERINEURAL AS NEEDED
Status: DISCONTINUED | OUTPATIENT
Start: 2020-04-27 | End: 2020-04-27 | Stop reason: HOSPADM

## 2020-04-27 RX ORDER — NALOXONE HCL 0.4 MG/ML
0.4 VIAL (ML) INJECTION
Status: DISCONTINUED | OUTPATIENT
Start: 2020-04-27 | End: 2020-04-28 | Stop reason: HOSPADM

## 2020-04-27 RX ADMIN — ATORVASTATIN CALCIUM 80 MG: 40 TABLET, FILM COATED ORAL at 20:53

## 2020-04-27 RX ADMIN — SODIUM CHLORIDE, PRESERVATIVE FREE 10 ML: 5 INJECTION INTRAVENOUS at 08:08

## 2020-04-27 RX ADMIN — QUETIAPINE FUMARATE 50 MG: 25 TABLET ORAL at 20:53

## 2020-04-27 RX ADMIN — METOPROLOL TARTRATE 12.5 MG: 25 TABLET, FILM COATED ORAL at 20:53

## 2020-04-27 RX ADMIN — SODIUM CHLORIDE, PRESERVATIVE FREE 10 ML: 5 INJECTION INTRAVENOUS at 20:53

## 2020-04-27 RX ADMIN — PANTOPRAZOLE SODIUM 40 MG: 40 TABLET, DELAYED RELEASE ORAL at 06:38

## 2020-04-27 RX ADMIN — ASPIRIN 81 MG 81 MG: 81 TABLET ORAL at 08:02

## 2020-04-27 RX ADMIN — ONDANSETRON 4 MG: 2 INJECTION INTRAMUSCULAR; INTRAVENOUS at 20:59

## 2020-04-27 RX ADMIN — SODIUM CHLORIDE 100 ML/HR: 9 INJECTION, SOLUTION INTRAVENOUS at 14:44

## 2020-04-27 RX ADMIN — SERTRALINE HYDROCHLORIDE 100 MG: 100 TABLET ORAL at 08:02

## 2020-04-27 RX ADMIN — CETIRIZINE HYDROCHLORIDE 10 MG: 10 TABLET, FILM COATED ORAL at 08:02

## 2020-04-27 RX ADMIN — ACETAMINOPHEN 650 MG: 325 TABLET, FILM COATED ORAL at 16:46

## 2020-04-27 RX ADMIN — METOPROLOL TARTRATE 12.5 MG: 25 TABLET, FILM COATED ORAL at 08:02

## 2020-04-28 ENCOUNTER — READMISSION MANAGEMENT (OUTPATIENT)
Dept: CALL CENTER | Facility: HOSPITAL | Age: 65
End: 2020-04-28

## 2020-04-28 VITALS
TEMPERATURE: 98.1 F | HEART RATE: 73 BPM | RESPIRATION RATE: 16 BRPM | SYSTOLIC BLOOD PRESSURE: 115 MMHG | OXYGEN SATURATION: 90 % | BODY MASS INDEX: 28.7 KG/M2 | DIASTOLIC BLOOD PRESSURE: 83 MMHG | WEIGHT: 162 LBS | HEIGHT: 63 IN

## 2020-04-28 PROBLEM — I20.0 UNSTABLE ANGINA: Status: RESOLVED | Noted: 2020-04-26 | Resolved: 2020-04-28

## 2020-04-28 PROBLEM — R07.9 CHEST PAIN: Status: RESOLVED | Noted: 2020-04-25 | Resolved: 2020-04-28

## 2020-04-28 PROBLEM — I16.0 HYPERTENSIVE URGENCY: Status: RESOLVED | Noted: 2017-06-30 | Resolved: 2020-04-28

## 2020-04-28 PROBLEM — I21.4 NSTEMI (NON-ST ELEVATED MYOCARDIAL INFARCTION) (HCC): Status: RESOLVED | Noted: 2020-04-25 | Resolved: 2020-04-28

## 2020-04-28 PROCEDURE — 99232 SBSQ HOSP IP/OBS MODERATE 35: CPT | Performed by: INTERNAL MEDICINE

## 2020-04-28 RX ADMIN — ACETAMINOPHEN 650 MG: 325 TABLET, FILM COATED ORAL at 04:52

## 2020-04-28 RX ADMIN — PANTOPRAZOLE SODIUM 40 MG: 40 TABLET, DELAYED RELEASE ORAL at 04:52

## 2020-04-28 RX ADMIN — SODIUM CHLORIDE, PRESERVATIVE FREE 10 ML: 5 INJECTION INTRAVENOUS at 08:14

## 2020-04-28 RX ADMIN — CETIRIZINE HYDROCHLORIDE 10 MG: 10 TABLET, FILM COATED ORAL at 08:14

## 2020-04-28 RX ADMIN — METOPROLOL TARTRATE 12.5 MG: 25 TABLET, FILM COATED ORAL at 08:13

## 2020-04-28 RX ADMIN — ASPIRIN 81 MG 81 MG: 81 TABLET ORAL at 08:14

## 2020-04-28 RX ADMIN — SERTRALINE HYDROCHLORIDE 100 MG: 100 TABLET ORAL at 08:14

## 2020-04-28 NOTE — OUTREACH NOTE
Prep Survey      Responses   [facility] patient discharged from?  [place]   Is LACE score < 7 ?  No   Eligibility  [facility]   Date of Admission  04/25/20   Date of Discharge  04/28/20   Discharge Disposition  Home or Self Care   Discharge diagnosis  NSTEMI,    left heart cath    COVID-19 Test Status  Not tested   Does the patient have one of the following disease processes/diagnoses(primary or secondary)?  Acute MI (STEMI,NSTEMI)   Does the patient have Home health ordered?  No   Is there a DME ordered?  No   General alerts for this patient  Patient's mother in law passed away 4/24/20.    Prep survey completed?  Yes          Missy Ybarra RN

## 2020-04-29 ENCOUNTER — TRANSITIONAL CARE MANAGEMENT TELEPHONE ENCOUNTER (OUTPATIENT)
Dept: CALL CENTER | Facility: HOSPITAL | Age: 65
End: 2020-04-29

## 2020-04-29 NOTE — OUTREACH NOTE
Call Center TCM Note      Responses   [facility] patient discharged from?  [place]   Does the patient have one of the following disease processes/diagnoses(primary or secondary)?  Acute MI (STEMI,NSTEMI)   TCM attempt successful?  Yes   Call start time  1103   Call end time  1111   General alerts for this patient  Patient's mother in law passed away 4/24/20.    Discharge diagnosis  NSTEMI,    left heart cath    Meds reviewed with patient/caregiver?  Yes   Is the patient having any side effects they believe may be caused by any medication additions or changes?  No   Does the patient have all prescriptions related to this admission filled (includes statins,anticoagulants,HTN meds,anti-arrhythmia meds)  Yes   Is the patient taking all medications as directed (includes completed medication regime)?  Yes   Does the patient have a primary care provider?   Yes   Does the patient have an appointment with their PCP,cardiologist,or clinic within 7 days of discharge?  Yes   Has the patient kept scheduled appointments due by today?  N/A   Has home health visited the patient within 72 hours of discharge?  N/A   Psychosocial issues?  No   Did the patient receive a copy of their discharge instructions?  Yes   Nursing interventions  Reviewed instructions with patient   What is the patient's perception of their health status since discharge?  Improving   Nursing interventions  Nurse provided patient education   Is the patient/caregiver able to teach back signs and symptoms of when to call for help immediately:  Sudden chest discomfort, Sudden sweating or clammy skin, Irregular or rapid heart rate, Sudden discomfort in arms, back, neck or jaw, Nausea or vomiting, Shortness of breath at any time, Dizziness or lightheadedness   Nursing interventions  Nurse provided patient education   Is the patient/caregiver able to teach back lifestyle changes to help prevent MIs  Maintaining a healthy weight, Regular exercise as approved by  provider, Heart healthy diet, Reducing stress   Is the patient/caregiver able to teach back ways to prevent a second heart attack:  Take medications, Follow up with MD, Manage risk factors   Is the patient/caregiver able to teach back the hierarchy of who to call/visit for symptoms/problems? PCP, Specialist, Home health nurse, Urgent Care, ED, 911  Yes   TCM call completed?  Yes          Chirag Stahl LPN    4/29/2020, 11:11

## 2020-05-04 ENCOUNTER — OFFICE VISIT (OUTPATIENT)
Dept: INTERNAL MEDICINE | Facility: CLINIC | Age: 65
End: 2020-05-04

## 2020-05-04 VITALS
DIASTOLIC BLOOD PRESSURE: 65 MMHG | BODY MASS INDEX: 29.34 KG/M2 | WEIGHT: 165.6 LBS | SYSTOLIC BLOOD PRESSURE: 122 MMHG | HEART RATE: 67 BPM | HEIGHT: 63 IN | OXYGEN SATURATION: 99 %

## 2020-05-04 DIAGNOSIS — F41.9 ANXIETY: ICD-10-CM

## 2020-05-04 DIAGNOSIS — R29.898 WEAKNESS OF BOTH LOWER EXTREMITIES: ICD-10-CM

## 2020-05-04 DIAGNOSIS — E78.2 MIXED HYPERLIPIDEMIA: ICD-10-CM

## 2020-05-04 DIAGNOSIS — I21.4 NSTEMI (NON-ST ELEVATED MYOCARDIAL INFARCTION) (HCC): Primary | ICD-10-CM

## 2020-05-04 PROCEDURE — 99495 TRANSJ CARE MGMT MOD F2F 14D: CPT | Performed by: INTERNAL MEDICINE

## 2020-05-04 RX ORDER — BUSPIRONE HYDROCHLORIDE 10 MG/1
10 TABLET ORAL 3 TIMES DAILY
Qty: 60 TABLET | Refills: 5 | Status: SHIPPED | OUTPATIENT
Start: 2020-05-04 | End: 2020-11-04 | Stop reason: SDUPTHER

## 2020-05-04 NOTE — PROGRESS NOTES
Transitional Care Follow Up Visit  Subjective     Tereza Ackerman is a 65 y.o. female who presents for a transitional care management visit.    Within 48 business hours after discharge our office contacted her via telephone to coordinate her care and needs.      I reviewed and discussed the details of that call along with the discharge summary, hospital problems, inpatient lab results, inpatient diagnostic studies, and consultation reports with Tereza.     Current outpatient and discharge medications have been reconciled for the patient.  Reviewed by: Michaela Connell MD      Date of TCM Phone Call 4/28/2020   [facility]   Date of Admission 4/25/2020   Date of Discharge 4/28/2020   Discharge Disposition Home or Self Care     Risk for Readmission (LACE) Score: 11 (4/28/2020  6:00 AM)      History of Present Illness    Patient reports that her MIL passed away that Friday, was not allowed to see her , until the very end. Were able to see her on Thursday , and then she passed away on Saturday. She had been trying to call her family, and then noted BP was 181/90s. And started having chest pressure.    Of note her mom passed away from a massive MI, that same day 49 yrs ago.  She is s/p abdominal surgery in March, and had the eye surgery last Nov.        Course During Hospital Stay:   presented to the hospital with acute onset of chest pressure and nausea.  She states she lost her mother-in-law yesterday and was on the phone with several family members trying to figure out what to do given the community restrictions for funerals etc.  She became fairly stressed and missed eating lunch.  Shortly thereafter she began to have substernal chest pressure associated with nausea and mild shortness of air.  She was admitted to the hospital with concerns for unstable angina.  Her EKG showed normal sinus rhythm without significant ST changes.  Her troponins were elevated at 0.131, 0.161 and 0.116  She  "reports her only past cardiac history is that she had a history of \"flutter\" in 2017 when she was admitted for small bowel issues.  She has never had a stent or heart surgery.  Patient tells me that when she looked on the calendar it was also the anniversary of her mother's death 49 years ago.     4/27 -patient underwent left heart cath today with no blockages or intervention.  It is unclear what caused the etiology of her NSTEMI.  She will be discharged home with medical management and outpatient follow-up with her PCP and cardiology.  She is tolerating p.o. and feels much better.        The following portions of the patient's history were reviewed and updated as appropriate: allergies, current medications, past family history, past medical history, past social history, past surgical history and problem list.    Review of Systems   Constitutional: Negative.  Negative for chills and fever.   HENT: Negative for ear discharge, ear pain, sinus pressure and sore throat.    Eyes: Positive for visual disturbance.   Respiratory: Negative for cough, chest tightness and shortness of breath.    Cardiovascular: Negative for chest pain, palpitations and leg swelling.   Gastrointestinal: Negative for diarrhea, nausea and vomiting.   Musculoskeletal: Negative for arthralgias, back pain and myalgias.   Neurological: Negative for dizziness, syncope and headaches.   Psychiatric/Behavioral: Positive for sleep disturbance. Negative for confusion. The patient is nervous/anxious.        Objective   Physical Exam   Constitutional: She is oriented to person, place, and time. She appears well-developed and well-nourished.   HENT:   Head: Normocephalic and atraumatic.   Eyes: Pupils are equal, round, and reactive to light. Conjunctivae are normal.   Neck: Neck supple. No thyromegaly present.   Cardiovascular: Normal rate and regular rhythm.   Pulmonary/Chest: Effort normal and breath sounds normal.   Abdominal: Soft. Bowel sounds are " "normal. She exhibits no distension. There is no tenderness.   Surgical site well healed mi   Musculoskeletal: She exhibits no edema.   Neurological: She is alert and oriented to person, place, and time. No cranial nerve deficit.   Skin: Skin is warm and dry.   Psychiatric: She has a normal mood and affect. Judgment normal.   Nursing note and vitals reviewed.          Current Outpatient Medications:   •  aspirin 81 MG tablet, Take 1 tablet by mouth Daily., Disp: 30 tablet, Rfl: 11  •  atorvastatin (LIPITOR) 80 MG tablet, Take 1 tablet by mouth Every Night., Disp: 30 tablet, Rfl: 2  •  carvedilol (COREG) 12.5 MG tablet, Take 1 tablet by mouth Every 12 (Twelve) Hours., Disp: 180 tablet, Rfl: 3  •  cholecalciferol (VITAMIN D3) 1000 units tablet, Take 2,000 Units by mouth Every Other Day., Disp: , Rfl:   •  cyclobenzaprine (FLEXERIL) 10 MG tablet, Take 0.5 tablets by mouth 3 (Three) Times a Day As Needed (headache)., Disp: 12 tablet, Rfl: 0  •  lisinopril (PRINIVIL,ZESTRIL) 10 MG tablet, Take 0.5 tablets by mouth Daily., Disp: 30 tablet, Rfl: 2  •  LORazepam (Ativan) 0.5 MG tablet, Take 0.5-1 tablets by mouth Daily As Needed for Anxiety., Disp: 15 tablet, Rfl: 0  •  omeprazole (priLOSEC) 20 MG capsule, TAKE 1 CAPSULE BY MOUTH EVERY DAY, Disp: 30 capsule, Rfl: 3  •  QUEtiapine (SEROquel) 25 MG tablet, Take 2 tablets by mouth Every Night., Disp: 180 tablet, Rfl: 1  •  sertraline (ZOLOFT) 100 MG tablet, Take 1 tablet by mouth Daily., Disp: 90 tablet, Rfl: 1  •  thiamine (VITAMIN B1) 100 MG tablet, Take 1 tablet by mouth Every Other Day., Disp: 45 tablet, Rfl: 1  •  busPIRone (BUSPAR) 10 MG tablet, Take 1 tablet by mouth 3 (Three) Times a Day., Disp: 60 tablet, Rfl: 5      /65   Pulse 67   Ht 160 cm (63\")   Wt 75.1 kg (165 lb 9.6 oz)   SpO2 99% Comment: ra  BMI 29.33 kg/m²       Results for orders placed or performed during the hospital encounter of 04/25/20   Troponin   Result Value Ref Range    Troponin T 0.131 " (C) 0.000 - 0.030 ng/mL   Comprehensive Metabolic Panel   Result Value Ref Range    Glucose 123 (H) 65 - 99 mg/dL    BUN 12 8 - 23 mg/dL    Creatinine 0.78 0.57 - 1.00 mg/dL    Sodium 136 136 - 145 mmol/L    Potassium 3.9 3.5 - 5.2 mmol/L    Chloride 100 98 - 107 mmol/L    CO2 23.0 22.0 - 29.0 mmol/L    Calcium 9.5 8.6 - 10.5 mg/dL    Total Protein 7.9 6.0 - 8.5 g/dL    Albumin 4.40 3.50 - 5.20 g/dL    ALT (SGPT) 15 1 - 33 U/L    AST (SGOT) 26 1 - 32 U/L    Alkaline Phosphatase 106 39 - 117 U/L    Total Bilirubin 0.3 0.2 - 1.2 mg/dL    eGFR Non African Amer 74 >60 mL/min/1.73    Globulin 3.5 gm/dL    A/G Ratio 1.3 g/dL    BUN/Creatinine Ratio 15.4 7.0 - 25.0    Anion Gap 13.0 5.0 - 15.0 mmol/L   Lipase   Result Value Ref Range    Lipase 24 13 - 60 U/L   BNP   Result Value Ref Range    proBNP 381.7 5.0 - 900.0 pg/mL   CBC Auto Differential   Result Value Ref Range    WBC 7.14 3.40 - 10.80 10*3/mm3    RBC 4.65 3.77 - 5.28 10*6/mm3    Hemoglobin 14.0 12.0 - 15.9 g/dL    Hematocrit 42.5 34.0 - 46.6 %    MCV 91.4 79.0 - 97.0 fL    MCH 30.1 26.6 - 33.0 pg    MCHC 32.9 31.5 - 35.7 g/dL    RDW 12.8 12.3 - 15.4 %    RDW-SD 43.0 37.0 - 54.0 fl    MPV 10.1 6.0 - 12.0 fL    Platelets 273 140 - 450 10*3/mm3    Neutrophil % 73.5 42.7 - 76.0 %    Lymphocyte % 20.0 19.6 - 45.3 %    Monocyte % 4.6 (L) 5.0 - 12.0 %    Eosinophil % 1.0 0.3 - 6.2 %    Basophil % 0.6 0.0 - 1.5 %    Immature Grans % 0.3 0.0 - 0.5 %    Neutrophils, Absolute 5.25 1.70 - 7.00 10*3/mm3    Lymphocytes, Absolute 1.43 0.70 - 3.10 10*3/mm3    Monocytes, Absolute 0.33 0.10 - 0.90 10*3/mm3    Eosinophils, Absolute 0.07 0.00 - 0.40 10*3/mm3    Basophils, Absolute 0.04 0.00 - 0.20 10*3/mm3    Immature Grans, Absolute 0.02 0.00 - 0.05 10*3/mm3    nRBC 0.0 0.0 - 0.2 /100 WBC   Protime-INR   Result Value Ref Range    Protime 13.6 11.5 - 14.0 Seconds    INR 1.06 0.85 - 1.16   aPTT   Result Value Ref Range    PTT 33.2 (L) 50.0 - 95.0 seconds   Heparin Anti-Xa   Result  Value Ref Range    Heparin Anti-Xa (UFH) 0.10 (L) 0.30 - 0.70 IU/ml   Lipid Panel   Result Value Ref Range    Total Cholesterol 243 (H) 0 - 200 mg/dL    Triglycerides 158 (H) 0 - 150 mg/dL    HDL Cholesterol 61 (H) 40 - 60 mg/dL    LDL Cholesterol  150 (H) 0 - 100 mg/dL    VLDL Cholesterol 31.6 mg/dL    LDL/HDL Ratio 2.47    Troponin   Result Value Ref Range    Troponin T 0.161 (C) 0.000 - 0.030 ng/mL   Troponin   Result Value Ref Range    Troponin T 0.116 (C) 0.000 - 0.030 ng/mL   Troponin   Result Value Ref Range    Troponin T 0.026 0.000 - 0.030 ng/mL   Heparin Anti-Xa   Result Value Ref Range    Heparin Anti-Xa (U[facility]) 0.39 0.30 - 0.70 IU/ml   Heparin Anti-Xa   Result Value Ref Range    Heparin Anti-Xa (UFH) 0.51 0.30 - 0.70 IU/ml   Heparin Anti-Xa   Result Value Ref Range    Heparin Anti-Xa (U[facility]) 0.25 (L) 0.30 - 0.70 IU/ml   Heparin Anti-Xa   Result Value Ref Range    Heparin Anti-Xa (UFH) 0.62 0.30 - 0.70 IU/ml   Heparin Anti-Xa   Result Value Ref Range    Heparin Anti-Xa (UFH) 0.49 0.30 - 0.70 IU/ml   CBC Auto Differential   Result Value Ref Range    WBC 8.00 3.40 - 10.80 10*3/mm3    RBC 4.21 3.77 - 5.28 10*6/mm3    Hemoglobin 12.8 12.0 - 15.9 g/dL    Hematocrit 39.8 34.0 - 46.6 %    MCV 94.5 79.0 - 97.0 fL    MCH 30.4 26.6 - 33.0 pg    MCHC 32.2 31.5 - 35.7 g/dL    RDW 13.2 12.3 - 15.4 %    RDW-SD 45.9 37.0 - 54.0 fl    MPV 10.6 6.0 - 12.0 fL    Platelets 251 140 - 450 10*3/mm3    Neutrophil % 76.1 (H) 42.7 - 76.0 %    Lymphocyte % 17.6 (L) 19.6 - 45.3 %    Monocyte % 4.1 (L) 5.0 - 12.0 %    Eosinophil % 1.4 0.3 - 6.2 %    Basophil % 0.4 0.0 - 1.5 %    Immature Grans % 0.4 0.0 - 0.5 %    Neutrophils, Absolute 6.09 1.70 - 7.00 10*3/mm3    Lymphocytes, Absolute 1.41 0.70 - 3.10 10*3/mm3    Monocytes, Absolute 0.33 0.10 - 0.90 10*3/mm3    Eosinophils, Absolute 0.11 0.00 - 0.40 10*3/mm3    Basophils, Absolute 0.03 0.00 - 0.20 10*3/mm3    Immature Grans, Absolute 0.03 0.00 - 0.05 10*3/mm3    nRBC 0.0 0.0 - 0.2 /100  WBC   Light Blue Top   Result Value Ref Range    Extra Tube hold for add-on    Green Top (Gel)   Result Value Ref Range    Extra Tube Hold for add-ons.    Lavender Top   Result Value Ref Range    Extra Tube hold for add-on    Gold Top - SST   Result Value Ref Range    Extra Tube Hold for add-ons.              Assessment/Plan   Diagnoses and all orders for this visit:    NSTEMI (non-ST elevated myocardial infarction) (CMS/[facility])  Comments:  ? stress related. increase buspar. continue risk factor redn with medication.    Anxiety  Comments:  increase buspar. refill ativan  Orders:  -     busPIRone (BUSPAR) 10 MG tablet; Take 1 tablet by mouth 3 (Three) Times a Day.  -     LORazepam (Ativan) 0.5 MG tablet; Take 0.5-1 tablets by mouth Daily As Needed for Anxiety.    Weakness of both lower extremities  -     Ambulatory Referral to Physical Therapy [facility] (weakness and post surgical strengthening)    Mixed hyperlipidemia  Comments:  on lipitor.      The patient has read and signed the [facility] Controlled Substance Contract.  I will continue to see patient for regular follow up appointments.  They are well controlled on their medication.  BENITA has been reviewed by me and is updated every 3 months. The patient is aware of the potential for addiction and dependence.        Current outpatient and discharge medications have been reconciled for the patient.  Reviewed by: Michaela Connell MD      Return in about 3 months (around 8/4/2020) for Next scheduled follow up.

## 2020-05-06 ENCOUNTER — READMISSION MANAGEMENT (OUTPATIENT)
Dept: CALL CENTER | Facility: HOSPITAL | Age: 65
End: 2020-05-06

## 2020-05-06 NOTE — OUTREACH NOTE
AMI Week 2 Survey      Responses   [facility] patient discharged from?  [place]   Does the patient have one of the following disease processes/diagnoses(primary or secondary)?  Acute MI (STEMI,NSTEMI)   Week 2 attempt successful?  Yes   Call start time  1504   Call end time  1508   General alerts for this patient  Patient's mother in law passed away 4/24/20.    Discharge diagnosis  NSTEMI,    left heart cath    Meds reviewed with patient/caregiver?  Yes   Is the patient having any side effects they believe may be caused by any medication additions or changes?  No   Does the patient have all prescriptions related to this admission filled (includes statins,anticoagulants,HTN meds,anti-arrhythmia meds)  Yes   Is the patient taking all medications as directed (includes completed medication regime)?  Yes   Does the patient have a primary care provider?   Yes   Does the patient have an appointment with their PCP,cardiologist,or clinic within 7 days of discharge?  Yes   Has the patient kept scheduled appointments due by today?  Yes   Has home health visited the patient within 72 hours of discharge?  N/A   Psychosocial issues?  No   Did the patient receive a copy of their discharge instructions?  Yes   Nursing interventions  Reviewed instructions with patient   What is the patient's perception of their health status since discharge?  Improving   Nursing interventions  Nurse provided patient education   Is the patient/caregiver able to teach back signs and symptoms of when to call for help immediately:  Sudden chest discomfort, Shortness of breath at any time, Nausea or vomiting, Irregular or rapid heart rate, Dizziness or lightheadedness, Sudden sweating or clammy skin, Sudden discomfort in arms, back, neck or jaw   Nursing interventions  Nurse provided patient education   Is the pateint /caregiver able to teach back the importance of cardiac rehab?  Yes   Nursing interventions  Provided education on importance of  cardiac rehab   Is the patient/caregiver able to teach back lifestyle changes to help prevent MIs  Heart healthy diet, Maintaining a healthy weight, Regular exercise as approved by provider   Is the patient/caregiver able to teach back ways to prevent a second heart attack:  Take medications, Follow up with MD, Participate in Cardiac Rehab   Is the patient/caregiver able to teach back the hierarchy of who to call/visit for symptoms/problems? PCP, Specialist, Home health nurse, Urgent Care, ED, 911  Yes   Additional teach back comments  She is doing a bit more each day she says.  No issues at this time she says.   Week 2 call completed?  Yes          Brooke Warren RN

## 2020-05-08 RX ORDER — LORAZEPAM 0.5 MG/1
.25-.5 TABLET ORAL DAILY PRN
Qty: 15 TABLET | Refills: 0 | Status: SHIPPED | OUTPATIENT
Start: 2020-05-08

## 2020-05-08 RX ORDER — LORAZEPAM 0.5 MG/1
.25-.5 TABLET ORAL DAILY PRN
Qty: 15 TABLET | Refills: 0 | OUTPATIENT
Start: 2020-05-08

## 2020-05-13 ENCOUNTER — READMISSION MANAGEMENT (OUTPATIENT)
Dept: CALL CENTER | Facility: HOSPITAL | Age: 65
End: 2020-05-13

## 2020-05-13 NOTE — OUTREACH NOTE
AMI Week 3 Survey      Responses   [facility] patient discharged from?  [place]   Does the patient have one of the following disease processes/diagnoses(primary or secondary)?  Acute MI (STEMI,NSTEMI)   Week 3 attempt successful?  Yes   Call start time  1221   Call end time  1224   Meds reviewed with patient/caregiver?  Yes   Is the patient having any side effects they believe may be caused by any medication additions or changes?  No   Does the patient have all prescriptions related to this admission filled (includes statins,anticoagulants,HTN meds,anti-arrhythmia meds)  Yes   Is the patient taking all medications as directed (includes completed medication regime)?  Yes   Does the patient have a primary care provider?   Yes   Does the patient have an appointment with their PCP,cardiologist,or clinic within 7 days of discharge?  Yes   Has the patient kept scheduled appointments due by today?  Yes   Has home health visited the patient within 72 hours of discharge?  N/A   Home health comments  none   What DME was ordered?  none   Comments  states feels well, just a little weak   Did the patient receive a copy of their discharge instructions?  Yes   Nursing interventions  Reviewed instructions with patient   What is the patient's perception of their health status since discharge?  Improving   Nursing interventions  Nurse provided patient education   Is the patient/caregiver able to teach back signs and symptoms of when to call for help immediately:  Sudden chest discomfort, Sudden discomfort in arms, back, neck or jaw, Shortness of breath at any time, Sudden sweating or clammy skin, Nausea or vomiting, Dizziness or lightheadedness, Irregular or rapid heart rate   Nursing interventions  Nurse provided patient education   Is the pateint /caregiver able to teach back the importance of cardiac rehab?  Yes   Is the patient/caregiver able to teach back lifestyle changes to help prevent MIs  Regular exercise as  approved by provider, Heart healthy diet, Maintaining a healthy weight, Reducing stress   Is the patient/caregiver able to teach back ways to prevent a second heart attack:  Take medications, Follow up with MD, Participate in Cardiac Rehab, Manage risk factors   Is the patient/caregiver able to teach back the hierarchy of who to call/visit for symptoms/problems? PCP, Specialist, Home health nurse, Urgent Care, ED, 911  Yes   Week 3 call completed?  Yes          Zulma Carrizales RN

## 2020-05-20 ENCOUNTER — READMISSION MANAGEMENT (OUTPATIENT)
Dept: CALL CENTER | Facility: HOSPITAL | Age: 65
End: 2020-05-20

## 2020-05-20 DIAGNOSIS — F43.89 ADJUSTMENT REACTION TO CHRONIC STRESS: ICD-10-CM

## 2020-05-20 RX ORDER — SERTRALINE HYDROCHLORIDE 100 MG/1
TABLET, FILM COATED ORAL
Qty: 90 TABLET | Refills: 0 | Status: SHIPPED | OUTPATIENT
Start: 2020-05-20 | End: 2020-10-16 | Stop reason: SDUPTHER

## 2020-05-20 NOTE — OUTREACH NOTE
AMI Week 4 Survey      Responses   [facility] patient discharged from?  [place]   Does the patient have one of the following disease processes/diagnoses(primary or secondary)?  Acute MI (STEMI,NSTEMI)   Week 4 attempt successful?  Yes   Call start time  1831   Call end time  1832   General alerts for this patient  Patient's mother in law passed away 4/24/20.    Discharge diagnosis  NSTEMI,    left heart cath    Is patient permission given to speak with other caregiver?  No   Meds reviewed with patient/caregiver?  Yes   Is the patient having any side effects they believe may be caused by any medication additions or changes?  No   Is the patient taking all medications as directed (includes completed medication regime)?  Yes   Has the patient kept scheduled appointments due by today?  Yes   Is the patient still receiving Home Health Services?  N/A   Psychosocial issues?  No   Comments  tires easily   What is the patient's perception of their health status since discharge?  Improving   Nursing interventions  Nurse provided patient education   Is the patient/caregiver able to teach back signs and symptoms of when to call for help immediately:  Sudden chest discomfort, Sudden discomfort in arms, back, neck or jaw, Shortness of breath at any time, Sudden sweating or clammy skin, Nausea or vomiting, Dizziness or lightheadedness, Irregular or rapid heart rate   Nursing interventions  Nurse provided patient education   Is the pateint /caregiver able to teach back the importance of cardiac rehab?  Yes   Nursing interventions  Provided education on importance of cardiac rehab   Is the patient/caregiver able to teach back ways to prevent a second heart attack:  Take medications, Follow up with MD, Participate in Cardiac Rehab, Manage risk factors   Is the patient/caregiver able to teach back the hierarchy of who to call/visit for symptoms/problems? PCP, Specialist, Home health nurse, Urgent Care, ED, 911  Yes   Week 4  call completed?  Yes   Would the patient like one additional call?  No   Graduated  Yes   Did the patient feel the follow up calls were helpful during their recovery period?  Yes   Was the number of calls appropriate?  Yes          Gill Marion RN

## 2020-06-11 ENCOUNTER — DOCUMENTATION (OUTPATIENT)
Dept: CARDIAC REHAB | Facility: HOSPITAL | Age: 65
End: 2020-06-11

## 2020-06-11 ENCOUNTER — HOSPITAL ENCOUNTER (OUTPATIENT)
Dept: BONE DENSITY | Facility: HOSPITAL | Age: 65
Discharge: HOME | End: 2020-06-11
Admitting: INTERNAL MEDICINE

## 2020-06-11 PROCEDURE — 77080 DXA BONE DENSITY AXIAL: CPT

## 2020-06-13 DIAGNOSIS — J01.00 SUBACUTE MAXILLARY SINUSITIS: ICD-10-CM

## 2020-06-15 RX ORDER — LEVOCETIRIZINE DIHYDROCHLORIDE 5 MG/1
TABLET, FILM COATED ORAL
Qty: 90 TABLET | Refills: 1 | Status: SHIPPED | OUTPATIENT
Start: 2020-06-15 | End: 2021-11-09 | Stop reason: SDUPTHER

## 2020-06-17 ENCOUNTER — DOCUMENTATION (OUTPATIENT)
Dept: CARDIAC REHAB | Facility: HOSPITAL | Age: 65
End: 2020-06-17

## 2020-06-17 NOTE — PROGRESS NOTES
Pt. Referred for Phase II Cardiac Rehab. Staff discussed benefits of exercise, program protocol, and educational material provided. Teach back verified.  Patient states that she is going to discuss the program with her  and will contact staff back to schedule and an appointment. Teach back verified.

## 2020-06-29 ENCOUNTER — OFFICE VISIT (OUTPATIENT)
Dept: CARDIOLOGY | Facility: CLINIC | Age: 65
End: 2020-06-29

## 2020-06-29 VITALS
WEIGHT: 164 LBS | HEIGHT: 63 IN | BODY MASS INDEX: 29.06 KG/M2 | HEART RATE: 66 BPM | SYSTOLIC BLOOD PRESSURE: 134 MMHG | OXYGEN SATURATION: 97 % | DIASTOLIC BLOOD PRESSURE: 68 MMHG | TEMPERATURE: 96.8 F

## 2020-06-29 DIAGNOSIS — E78.2 MIXED HYPERLIPIDEMIA: ICD-10-CM

## 2020-06-29 DIAGNOSIS — I48.92 ATRIAL FLUTTER WITH RAPID VENTRICULAR RESPONSE (HCC): Primary | ICD-10-CM

## 2020-06-29 DIAGNOSIS — I10 ESSENTIAL HYPERTENSION: ICD-10-CM

## 2020-06-29 PROCEDURE — 99213 OFFICE O/P EST LOW 20 MIN: CPT | Performed by: INTERNAL MEDICINE

## 2020-06-29 RX ORDER — ONDANSETRON 4 MG/1
4 TABLET, FILM COATED ORAL EVERY 8 HOURS PRN
COMMUNITY
End: 2022-08-29

## 2020-06-29 NOTE — PROGRESS NOTES
[facility] AT [address], [address]  [place], [place], [address] [phone]  WWW.[facility]           OUTPATIENT CLINIC FOLLOW UP NOTE    Patient Care Team:  Patient Care Team:  Michaela Connell MD as PCP - General  Michaela Connell MD as PCP - Family Medicine  Michaela Connell MD as PCP - Claims [facility]  Werner Hollis MD as Referring Physician (Neurosurgery)  Moo Hopkins MD as Consulting Physician (Ophthalmology)  Jamal Ramos MD as Consulting Physician (Nephrology)  Alli Aguirre MD as Consulting Physician (Cardiology)  Costa Raygoza MD as Consulting Physician (Radiation Oncology)  Andrea Bocanegra MD as Consulting Physician (General Surgery)  Alli Aguirre MD as Consulting Physician (Cardiology)  Ena Mcduffie RN as Ambulatory  ([facility])    Subjective:      Chief Complaint   Patient presents with   • Atrial flutter with rapid ventricular response (CMS/HCC)       HPI:    Tereza Ackerman is a 65 y.o. female.  Cardiac problem list:  1. Paroxysmal atrial fibrillation  1. Diagnosed at time of small bowel obstruction.  2. ACS 4/2020  1. Cardiac catheterization with near normal coronary arteries.  Troponin elevation.  3. Hypertension  4. Hyperlipidemia    She presents today for follow-up.      Since undergoing cardiac catheterization she reports that she has been doing well from a cardiac standpoint.  She denies any recurrent chest pressure.  She denies shortness of breath, palpitations, lightheadedness, syncope, orthopnea, or PND.  She denies any issues with her right radial artery access site.  She has been tolerating lisinopril and atorvastatin well.      PFSH:  Patient Active Problem List   Diagnosis   • Impaired glucose tolerance   • Parathyroid adenoma   • Reaction to chronic stress   • Multinodular goiter   • Vitamin D deficiency   • Meningioma, recurrent of brain (CMS/HCC)   • Arthritis    • Depression   • Small bowel obstruction (CMS/HCC)   • Insomnia   • History of Nissen fundoplication   • Malignant neoplasm of left orbit (CMS/HCC)   • Prediabetes   • Mixed hyperlipidemia   • Hyperglycemia   • Atrial flutter with rapid ventricular response (CMS/HCC)   • Anemia   • Large bowel obstruction (CMS/HCC)   • Abdominal pain   • Essential hypertension   • History of creation of ostomy (CMS/HCC)   • Screen for colon cancer   • Sigmoid volvulus (CMS/HCC)         Current Outpatient Medications:   •  aspirin 81 MG tablet, Take 1 tablet by mouth Daily., Disp: 30 tablet, Rfl: 11  •  atorvastatin (LIPITOR) 80 MG tablet, Take 1 tablet by mouth Every Night., Disp: 30 tablet, Rfl: 2  •  busPIRone (BUSPAR) 10 MG tablet, Take 1 tablet by mouth 3 (Three) Times a Day. (Patient taking differently: Take 10 mg by mouth 2 (two) times a day.), Disp: 60 tablet, Rfl: 5  •  carvedilol (COREG) 12.5 MG tablet, Take 1 tablet by mouth Every 12 (Twelve) Hours., Disp: 180 tablet, Rfl: 3  •  cholecalciferol (VITAMIN D3) 1000 units tablet, Take 2,000 Units by mouth Every Other Day., Disp: , Rfl:   •  cyclobenzaprine (FLEXERIL) 10 MG tablet, Take 0.5 tablets by mouth 3 (Three) Times a Day As Needed (headache)., Disp: 12 tablet, Rfl: 0  •  levocetirizine (XYZAL) 5 MG tablet, TAKE 1 TABLET BY MOUTH EVERY DAY IN THE EVENING, Disp: 90 tablet, Rfl: 1  •  lisinopril (PRINIVIL,ZESTRIL) 10 MG tablet, Take 0.5 tablets by mouth Daily., Disp: 30 tablet, Rfl: 2  •  LORazepam (Ativan) 0.5 MG tablet, Take 0.5-1 tablets by mouth Daily As Needed for Anxiety., Disp: 15 tablet, Rfl: 0  •  omeprazole (priLOSEC) 20 MG capsule, TAKE 1 CAPSULE BY MOUTH EVERY DAY, Disp: 30 capsule, Rfl: 3  •  ondansetron (ZOFRAN) 4 MG tablet, Take 4 mg by mouth Every 8 (Eight) Hours As Needed for Nausea or Vomiting., Disp: , Rfl:   •  QUEtiapine (SEROquel) 25 MG tablet, Take 2 tablets by mouth Every Night., Disp: 180 tablet, Rfl: 1  •  sertraline (ZOLOFT) 100 MG tablet, TAKE  "1 TABLET BY MOUTH EVERY DAY, Disp: 90 tablet, Rfl: 0  •  thiamine (VITAMIN B1) 100 MG tablet, Take 1 tablet by mouth Every Other Day., Disp: 45 tablet, Rfl: 1    Allergies   Allergen Reactions   • Dilantin [Phenytoin Sodium Extended] Rash        reports that she has never smoked. She has never used smokeless tobacco.    Family History   Problem Relation Age of Onset   • Obesity Mother    • Heart attack Mother    • Migraines Father    • Stroke Father    • Diabetes Father    • Cancer Other    • Arthritis Other    • Diabetes Other    • Breast cancer Maternal Aunt    • Breast cancer Paternal Aunt    • Ovarian cancer Neg Hx        Review of Systems:  Negative for chest pain, dyspnea with exertion, orthopnea, PND, lower extremity edema, palpitations, lightheadedness, syncope.      Objective:   /68 (BP Location: Right arm, Patient Position: Sitting)   Pulse 66   Temp 96.8 °F (36 °C)   Ht 160 cm (63\")   Wt 74.4 kg (164 lb)   SpO2 97%   BMI 29.05 kg/m²   CONSTITUTIONAL: No acute distress, normal affect  RESPIRATORY: Normal effort. Clear to auscultation bilaterally without wheezing or rales  CARDIOVASCULAR: Regular rate and rhythm with normal S1 and S2. Without murmur, gallop or rub.  PERIPHERAL VASCULAR: Right radial pulse 1+, ulnar 2+.  Left radial pulse 2+.  There is no peripheral edema bilaterally.    Labs:  Lab Results   Component Value Date    ALT 15 04/25/2020    AST 26 04/25/2020     Lab Results   Component Value Date    CHOL 243 (H) 04/25/2020    TRIG 158 (H) 04/25/2020    HDL 61 (H) 04/25/2020    CREATININE 0.78 04/25/2020       Diagnostic Data:    Procedures    Event Monitor 2/2018  -Events were normal sinus rhythm  -No atrial flutter    [facility] 4/27/2020  · Near normal coronary arteries  · Normal LV systolic function wall motion    TTE 12/2017  · LVEF difficult to evaluate with tachycardia- globally appears mildly depressed.  · Left ventricular wall thickness is consistent with concentric " hypertrophy.  · Mild tricuspid valve regurgitation is present.  · Calculated right ventricular systolic pressure from tricuspid regurgitation is 32 mmHg.    Assessment and Plan:   Tereza was seen today for dizziness and hypertension.    Diagnoses and all orders for this visit:    Atrial flutter with rapid ventricular response  -Historically, had brief atrial flutter in the setting of a small bowel obstruction that lasted less than 48 hours, outpatient heart monitor revealed no recurrent atrial flutter.  Later underwent GI operations without recurrent arrhythmia on carvedilol.  -No known recent recurrence.  -Continue carvedilol, aspirin 81 mg daily    Essential hypertension  -Has not been checking her BP recently regularly at home.  Has been well controlled at visits with other providers.  -Continue Coreg and lisinopril.    Mixed hyperlipidemia  -Continue statin and lifestyle modications    - Return in about 6 months (around 12/29/2020).    Scribed for Alli Aguirre MD by Chelsea David, AYDEE   6/29/2020  12:36]    I, Alli Aguirre MD, personally performed the services as scribed by the above named individual. I have made any necessary edits and it is both accurate and complete.     Alli Aguirre MD, MSc, [facility]  Interventional Cardiology  [facility] at [facility]

## 2020-07-01 RX ORDER — OMEPRAZOLE 20 MG/1
20 CAPSULE, DELAYED RELEASE ORAL DAILY
Qty: 90 CAPSULE | Refills: 0 | Status: SHIPPED | OUTPATIENT
Start: 2020-07-01 | End: 2020-07-02 | Stop reason: SDUPTHER

## 2020-07-02 RX ORDER — OMEPRAZOLE 20 MG/1
20 CAPSULE, DELAYED RELEASE ORAL DAILY
Qty: 90 CAPSULE | Refills: 0 | Status: SHIPPED | OUTPATIENT
Start: 2020-07-02 | End: 2020-10-07

## 2020-07-16 ENCOUNTER — TRANSCRIBE ORDERS (OUTPATIENT)
Dept: CARDIAC REHAB | Facility: HOSPITAL | Age: 65
End: 2020-07-16

## 2020-07-16 ENCOUNTER — DOCUMENTATION (OUTPATIENT)
Dept: CARDIAC REHAB | Facility: HOSPITAL | Age: 65
End: 2020-07-16

## 2020-07-16 DIAGNOSIS — I21.4 NSTEMI, INITIAL EPISODE OF CARE (HCC): Primary | ICD-10-CM

## 2020-07-16 NOTE — PROGRESS NOTES
Pt. Referred for Phase II Cardiac Rehab. Staff discussed benefits of exercise, program protocol, and educational material provided. Teach back verified.  Patient scheduled for orientation at [facility] on Tuesday, July 28th at 1300.

## 2020-07-27 RX ORDER — ATORVASTATIN CALCIUM 80 MG/1
80 TABLET, FILM COATED ORAL NIGHTLY
Qty: 30 TABLET | Refills: 2 | Status: SHIPPED | OUTPATIENT
Start: 2020-07-27 | End: 2020-08-23

## 2020-07-28 ENCOUNTER — TREATMENT (OUTPATIENT)
Dept: CARDIAC REHAB | Facility: HOSPITAL | Age: 65
End: 2020-07-28

## 2020-07-28 DIAGNOSIS — I21.4 NSTEMI, INITIAL EPISODE OF CARE (HCC): ICD-10-CM

## 2020-07-28 PROCEDURE — 93798 PHYS/QHP OP CAR RHAB W/ECG: CPT

## 2020-07-28 NOTE — PROGRESS NOTES
Cardiac Rehab Initial Assessment      Name: Tereza Ackerman  :1955 Allergies:Dilantin [phenytoin sodium extended]   MRN: [id number] 65 y.o. Physician: Michaela Connell MD   Primary Diagnosis:    Diagnosis Plan   1. NSTEMI, initial episode of care (CMS/[facility])  Cardiac Rehab Phase II    Event Date: 2020 Specialist: Alli Aguirre MD   Secondary Diagnosis:  Risk Stratification:High Risk Note Author: Selene Shah RN     Cardiovascular History: Comments .     EXERCISE AT HOME  no    N/A    EF: >60%%      Source: Cath          Ambulatory Status:Independent  Ambulatory Fall Risk Assessed on Initial Visit: yes 6 Minute Walk Pre- Cardiac Rehab:  Distance:1440 ft      RPE:10  Max. HR: 86       SPO2:96%    MET: 3.0  Resting BP: 118/70 LA, 126/76 RA    Peak BP: 146/88  Recovery BP: 126/78  Comments:       NUTRITION  Lipids:yes If yes, labs as follows;  Total: No components found for: CHOLESTEROL  HDL:   HDL Cholesterol   Date Value Ref Range Status   2020 61 (H) 40 - 60 mg/dL Final    Lipids continued:  LDL:  LDL Cholesterol    Date Value Ref Range Status   2020 150 (H) 0 - 100 mg/dL Final     Triglyceride: No components found for: TRIGLYCERIDE   Weight Management:                 Weight: 164.4 pounds  Height: 63 inches                                   BMI: There is no height or weight on file to calculate BMI.  Waist Circumference:    Alcohol Use: none Diabetes:No    Last HGBA1C with date if applicable:No components found for: A1C         SOCIAL HISTORY  Social History     Socioeconomic History   • Marital status:      Spouse name: Not on file   • Number of children: Not on file   • Years of education: Not on file   • Highest education level: Not on file   Tobacco Use   • Smoking status: Never Smoker   • Smokeless tobacco: Never Used   Substance and Sexual Activity   • Alcohol use: No   • Drug use: No   • Sexual activity: Defer       Educational Level (choose one that applies) some  college Learning Barriers:[employer], [employer]    Family Support:yes    Living Arrangement: lives with their spouse    Risk Factors: Clinical Depression  Yes and Hyperlipidemia  Yes     Tobacco Adjunct: N/A        Comorbidities: Malignancy     PSYCHOSOCIAL  Clinical Depression: yes    Stress: no     Assess presence or absence of depression using a valid screening tool: yes      PHYSICAL ASSESSMENT  Influenza vaccine: yes  Pneumococcal vaccine: yes          Angina: no    Describe angina scale of 0 - 4: 0 = none    Today are you having incisional pain? No. If, Yes, Scale: .      Today are you having any other pain? No. If, Yes, Scale: .     Diagnosed with Hypertension:yes    Heart Sounds: S1S2     Lung Sounds: normal air entry, lungs clear to auscultation         Assessment:  Orthopedic Problems:     Are you being hurt, hit, or frightened by anyone at home or in your life? no    Are you being neglected by a caregiver? N/A      Assessment:     Family attends IA: no COVID-19 Time of arrival: 1300  Time of departure: 1405     Patient Goals: Gain a healthier lifestyle by the end of the program.         7/28/2020  14:40  Selene Shah RN   No

## 2020-08-03 ENCOUNTER — TREATMENT (OUTPATIENT)
Dept: CARDIAC REHAB | Facility: HOSPITAL | Age: 65
End: 2020-08-03

## 2020-08-03 DIAGNOSIS — I21.4 NSTEMI, INITIAL EPISODE OF CARE (HCC): Primary | ICD-10-CM

## 2020-08-03 PROCEDURE — 93798 PHYS/QHP OP CAR RHAB W/ECG: CPT

## 2020-08-07 ENCOUNTER — TREATMENT (OUTPATIENT)
Dept: CARDIAC REHAB | Facility: HOSPITAL | Age: 65
End: 2020-08-07

## 2020-08-07 DIAGNOSIS — I21.4 NSTEMI, INITIAL EPISODE OF CARE (HCC): Primary | ICD-10-CM

## 2020-08-07 PROCEDURE — 93798 PHYS/QHP OP CAR RHAB W/ECG: CPT

## 2020-08-10 ENCOUNTER — TREATMENT (OUTPATIENT)
Dept: CARDIAC REHAB | Facility: HOSPITAL | Age: 65
End: 2020-08-10

## 2020-08-10 DIAGNOSIS — I21.4 NSTEMI, INITIAL EPISODE OF CARE (HCC): Primary | ICD-10-CM

## 2020-08-10 PROCEDURE — 93798 PHYS/QHP OP CAR RHAB W/ECG: CPT

## 2020-08-14 ENCOUNTER — TELEPHONE (OUTPATIENT)
Dept: CARDIAC REHAB | Facility: HOSPITAL | Age: 65
End: 2020-08-14

## 2020-08-17 ENCOUNTER — TREATMENT (OUTPATIENT)
Dept: CARDIAC REHAB | Facility: HOSPITAL | Age: 65
End: 2020-08-17

## 2020-08-17 DIAGNOSIS — I21.4 NSTEMI, INITIAL EPISODE OF CARE (HCC): Primary | ICD-10-CM

## 2020-08-17 PROCEDURE — 93798 PHYS/QHP OP CAR RHAB W/ECG: CPT

## 2020-08-18 ENCOUNTER — TELEPHONE (OUTPATIENT)
Dept: CARDIOLOGY | Facility: CLINIC | Age: 65
End: 2020-08-18

## 2020-08-18 ENCOUNTER — LAB (OUTPATIENT)
Dept: LAB | Facility: HOSPITAL | Age: 65
End: 2020-08-18

## 2020-08-18 ENCOUNTER — OFFICE VISIT (OUTPATIENT)
Dept: INTERNAL MEDICINE | Facility: CLINIC | Age: 65
End: 2020-08-18

## 2020-08-18 VITALS
OXYGEN SATURATION: 98 % | SYSTOLIC BLOOD PRESSURE: 90 MMHG | BODY MASS INDEX: 28.84 KG/M2 | HEIGHT: 63 IN | TEMPERATURE: 97.3 F | HEART RATE: 60 BPM | WEIGHT: 162.8 LBS | DIASTOLIC BLOOD PRESSURE: 62 MMHG

## 2020-08-18 DIAGNOSIS — I95.9 HYPOTENSION, UNSPECIFIED HYPOTENSION TYPE: ICD-10-CM

## 2020-08-18 DIAGNOSIS — I21.4 NSTEMI (NON-ST ELEVATED MYOCARDIAL INFARCTION) (HCC): Primary | ICD-10-CM

## 2020-08-18 DIAGNOSIS — E78.2 MIXED HYPERLIPIDEMIA: ICD-10-CM

## 2020-08-18 DIAGNOSIS — R73.01 IFG (IMPAIRED FASTING GLUCOSE): ICD-10-CM

## 2020-08-18 PROCEDURE — 80061 LIPID PANEL: CPT | Performed by: INTERNAL MEDICINE

## 2020-08-18 PROCEDURE — 83036 HEMOGLOBIN GLYCOSYLATED A1C: CPT | Performed by: INTERNAL MEDICINE

## 2020-08-18 PROCEDURE — 80053 COMPREHEN METABOLIC PANEL: CPT | Performed by: INTERNAL MEDICINE

## 2020-08-18 PROCEDURE — 99214 OFFICE O/P EST MOD 30 MIN: CPT | Performed by: INTERNAL MEDICINE

## 2020-08-18 RX ORDER — LISINOPRIL 2.5 MG/1
2.5 TABLET ORAL DAILY
Qty: 90 TABLET | Refills: 1 | Status: SHIPPED | OUTPATIENT
Start: 2020-08-18 | End: 2021-02-10 | Stop reason: SDUPTHER

## 2020-08-18 NOTE — PROGRESS NOTES
Hypertension; Hyperlipidemia; and birth david (would like you to look at a place on it)    Subjective   Tereza Ackerman is a 65 y.o. female is here today for follow-up.    History of Present Illness   S/p MI/ACS on 4/2020. Cardiac catheterization with near normal coronary arteries. But had troponin elevation, which was c/w, and adv. To start cardiac rehab. Started on ace, bb, and high dose statin.  BP has remained low, and would like to cut back if not needed.  Has stayed tired as a result.  Her left wrist birthmark is darker - so concerned.      Current Outpatient Medications:   •  aspirin 81 MG tablet, Take 1 tablet by mouth Daily., Disp: 30 tablet, Rfl: 11  •  atorvastatin (LIPITOR) 80 MG tablet, Take 1 tablet by mouth Every Night., Disp: 30 tablet, Rfl: 2  •  busPIRone (BUSPAR) 10 MG tablet, Take 1 tablet by mouth 3 (Three) Times a Day. (Patient taking differently: Take 10 mg by mouth 2 (two) times a day.), Disp: 60 tablet, Rfl: 5  •  carvedilol (COREG) 12.5 MG tablet, Take 1 tablet by mouth Every 12 (Twelve) Hours., Disp: 180 tablet, Rfl: 3  •  cholecalciferol (VITAMIN D3) 1000 units tablet, Take 2,000 Units by mouth Every Other Day., Disp: , Rfl:   •  cyclobenzaprine (FLEXERIL) 10 MG tablet, Take 0.5 tablets by mouth 3 (Three) Times a Day As Needed (headache)., Disp: 12 tablet, Rfl: 0  •  levocetirizine (XYZAL) 5 MG tablet, TAKE 1 TABLET BY MOUTH EVERY DAY IN THE EVENING, Disp: 90 tablet, Rfl: 1  •  lisinopril (PRINIVIL,ZESTRIL) 2.5 MG tablet, Take 1 tablet by mouth Daily., Disp: 90 tablet, Rfl: 1  •  LORazepam (Ativan) 0.5 MG tablet, Take 0.5-1 tablets by mouth Daily As Needed for Anxiety., Disp: 15 tablet, Rfl: 0  •  omeprazole (priLOSEC) 20 MG capsule, Take 1 capsule by mouth Daily., Disp: 90 capsule, Rfl: 0  •  ondansetron (ZOFRAN) 4 MG tablet, Take 4 mg by mouth Every 8 (Eight) Hours As Needed for Nausea or Vomiting., Disp: , Rfl:   •  QUEtiapine (SEROquel) 25 MG tablet, Take 2 tablets by mouth Every  "Night., Disp: 180 tablet, Rfl: 1  •  sertraline (ZOLOFT) 100 MG tablet, TAKE 1 TABLET BY MOUTH EVERY DAY, Disp: 90 tablet, Rfl: 0  •  thiamine (VITAMIN B1) 100 MG tablet, Take 1 tablet by mouth Every Other Day., Disp: 45 tablet, Rfl: 1      The following portions of the patient's history were reviewed and updated as appropriate: allergies, current medications, past family history, past medical history, past social history, past surgical history and problem list.    Review of Systems   Constitutional: Negative.  Negative for chills and fever.   HENT: Negative for ear discharge, ear pain, sinus pressure and sore throat.    Respiratory: Negative for cough, chest tightness and shortness of breath.    Cardiovascular: Negative for chest pain, palpitations and leg swelling.   Gastrointestinal: Negative for diarrhea, nausea and vomiting.   Musculoskeletal: Negative for arthralgias, back pain and myalgias.   Neurological: Negative for dizziness, syncope and headaches.   Psychiatric/Behavioral: Negative for confusion and sleep disturbance. The patient is nervous/anxious.        Objective   BP 90/62   Pulse 60   Temp 97.3 °F (36.3 °C)   Ht 160 cm (63\")   Wt 73.8 kg (162 lb 12.8 oz)   SpO2 98% Comment: ra  BMI 28.84 kg/m²   Physical Exam   Constitutional: She is oriented to person, place, and time. She appears well-developed and well-nourished.   HENT:   Head: Normocephalic and atraumatic.   Right Ear: External ear normal.   Left Ear: External ear normal.   Eyes: Pupils are equal, round, and reactive to light. Conjunctivae are normal.   Neck: Neck supple. No thyromegaly present.   Cardiovascular: Normal rate and regular rhythm.   Pulmonary/Chest: Effort normal and breath sounds normal.   Abdominal: Soft. Bowel sounds are normal. She exhibits no distension. There is no tenderness.   Musculoskeletal: She exhibits no edema.   Neurological: She is alert and oriented to person, place, and time. No cranial nerve deficit. "   Skin: Skin is warm and dry.   Psychiatric: She has a normal mood and affect. Judgment normal.   Nursing note and vitals reviewed.        Results for orders placed or performed during the hospital encounter of 04/25/20   Troponin   Result Value Ref Range    Troponin T 0.131 (C) 0.000 - 0.030 ng/mL   Comprehensive Metabolic Panel   Result Value Ref Range    Glucose 123 (H) 65 - 99 mg/dL    BUN 12 8 - 23 mg/dL    Creatinine 0.78 0.57 - 1.00 mg/dL    Sodium 136 136 - 145 mmol/L    Potassium 3.9 3.5 - 5.2 mmol/L    Chloride 100 98 - 107 mmol/L    CO2 23.0 22.0 - 29.0 mmol/L    Calcium 9.5 8.6 - 10.5 mg/dL    Total Protein 7.9 6.0 - 8.5 g/dL    Albumin 4.40 3.50 - 5.20 g/dL    ALT (SGPT) 15 1 - 33 U/L    AST (SGOT) 26 1 - 32 U/L    Alkaline Phosphatase 106 39 - 117 U/L    Total Bilirubin 0.3 0.2 - 1.2 mg/dL    eGFR Non African Amer 74 >60 mL/min/1.73    Globulin 3.5 gm/dL    A/G Ratio 1.3 g/dL    BUN/Creatinine Ratio 15.4 7.0 - 25.0    Anion Gap 13.0 5.0 - 15.0 mmol/L   Lipase   Result Value Ref Range    Lipase 24 13 - 60 U/L   BNP   Result Value Ref Range    proBNP 381.7 5.0 - 900.0 pg/mL   CBC Auto Differential   Result Value Ref Range    WBC 7.14 3.40 - 10.80 10*3/mm3    RBC 4.65 3.77 - 5.28 10*6/mm3    Hemoglobin 14.0 12.0 - 15.9 g/dL    Hematocrit 42.5 34.0 - 46.6 %    MCV 91.4 79.0 - 97.0 fL    MCH 30.1 26.6 - 33.0 pg    MCHC 32.9 31.5 - 35.7 g/dL    RDW 12.8 12.3 - 15.4 %    RDW-SD 43.0 37.0 - 54.0 fl    MPV 10.1 6.0 - 12.0 fL    Platelets 273 140 - 450 10*3/mm3    Neutrophil % 73.5 42.7 - 76.0 %    Lymphocyte % 20.0 19.6 - 45.3 %    Monocyte % 4.6 (L) 5.0 - 12.0 %    Eosinophil % 1.0 0.3 - 6.2 %    Basophil % 0.6 0.0 - 1.5 %    Immature Grans % 0.3 0.0 - 0.5 %    Neutrophils, Absolute 5.25 1.70 - 7.00 10*3/mm3    Lymphocytes, Absolute 1.43 0.70 - 3.10 10*3/mm3    Monocytes, Absolute 0.33 0.10 - 0.90 10*3/mm3    Eosinophils, Absolute 0.07 0.00 - 0.40 10*3/mm3    Basophils, Absolute 0.04 0.00 - 0.20 10*3/mm3     Immature Grans, Absolute 0.02 0.00 - 0.05 10*3/mm3    nRBC 0.0 0.0 - 0.2 /100 WBC   Protime-INR   Result Value Ref Range    Protime 13.6 11.5 - 14.0 Seconds    INR 1.06 0.85 - 1.16   aPTT   Result Value Ref Range    PTT 33.2 (L) 50.0 - 95.0 seconds   Heparin Anti-Xa   Result Value Ref Range    Heparin Anti-Xa (UFH) 0.10 (L) 0.30 - 0.70 IU/ml   Lipid Panel   Result Value Ref Range    Total Cholesterol 243 (H) 0 - 200 mg/dL    Triglycerides 158 (H) 0 - 150 mg/dL    HDL Cholesterol 61 (H) 40 - 60 mg/dL    LDL Cholesterol  150 (H) 0 - 100 mg/dL    VLDL Cholesterol 31.6 mg/dL    LDL/HDL Ratio 2.47    Troponin   Result Value Ref Range    Troponin T 0.161 (C) 0.000 - 0.030 ng/mL   Troponin   Result Value Ref Range    Troponin T 0.116 (C) 0.000 - 0.030 ng/mL   Troponin   Result Value Ref Range    Troponin T 0.026 0.000 - 0.030 ng/mL   Heparin Anti-Xa   Result Value Ref Range    Heparin Anti-Xa (UFH) 0.39 0.30 - 0.70 IU/ml   Heparin Anti-Xa   Result Value Ref Range    Heparin Anti-Xa (UFH) 0.51 0.30 - 0.70 IU/ml   Heparin Anti-Xa   Result Value Ref Range    Heparin Anti-Xa (UFH) 0.25 (L) 0.30 - 0.70 IU/ml   Heparin Anti-Xa   Result Value Ref Range    Heparin Anti-Xa (UFH) 0.62 0.30 - 0.70 IU/ml   Heparin Anti-Xa   Result Value Ref Range    Heparin Anti-Xa (UFH) 0.49 0.30 - 0.70 IU/ml   CBC Auto Differential   Result Value Ref Range    WBC 8.00 3.40 - 10.80 10*3/mm3    RBC 4.21 3.77 - 5.28 10*6/mm3    Hemoglobin 12.8 12.0 - 15.9 g/dL    Hematocrit 39.8 34.0 - 46.6 %    MCV 94.5 79.0 - 97.0 fL    MCH 30.4 26.6 - 33.0 pg    MCHC 32.2 31.5 - 35.7 g/dL    RDW 13.2 12.3 - 15.4 %    RDW-SD 45.9 37.0 - 54.0 fl    MPV 10.6 6.0 - 12.0 fL    Platelets 251 140 - 450 10*3/mm3    Neutrophil % 76.1 (H) 42.7 - 76.0 %    Lymphocyte % 17.6 (L) 19.6 - 45.3 %    Monocyte % 4.1 (L) 5.0 - 12.0 %    Eosinophil % 1.4 0.3 - 6.2 %    Basophil % 0.4 0.0 - 1.5 %    Immature Grans % 0.4 0.0 - 0.5 %    Neutrophils, Absolute 6.09 1.70 - 7.00 10*3/mm3     Lymphocytes, Absolute 1.41 0.70 - 3.10 10*3/mm3    Monocytes, Absolute 0.33 0.10 - 0.90 10*3/mm3    Eosinophils, Absolute 0.11 0.00 - 0.40 10*3/mm3    Basophils, Absolute 0.03 0.00 - 0.20 10*3/mm3    Immature Grans, Absolute 0.03 0.00 - 0.05 10*3/mm3    nRBC 0.0 0.0 - 0.2 /100 WBC   Light Blue Top   Result Value Ref Range    Extra Tube hold for add-on    Green Top (Gel)   Result Value Ref Range    Extra Tube Hold for add-ons.    Lavender Top   Result Value Ref Range    Extra Tube hold for add-on    Gold Top - SST   Result Value Ref Range    Extra Tube Hold for add-ons.              Assessment/Plan   Diagnoses and all orders for this visit:    NSTEMI (non-ST elevated myocardial infarction) (CMS/[facility])  Comments:  doing better, [facility] with near normal coronaries.  followed by Cardiology.  continue risk factor reduction.    Mixed hyperlipidemia  -     Comprehensive Metabolic Panel; Future  -     Lipid Panel; Future    Hypotension, unspecified hypotension type  -     lisinopril (PRINIVIL,ZESTRIL) 2.5 MG tablet; Take 1 tablet by mouth Daily.    IFG (impaired fasting glucose)  -     Hemoglobin A1c; Future      LFTs high, change from lipitor to crestor.           Return for Next scheduled follow up, [employer] [facility].

## 2020-08-18 NOTE — TELEPHONE ENCOUNTER
Patient called to report that BP has been averaging 90/60 for the past few days. Patient states Dr. Connell instructed patient to decrease Lisinopril to 2.5 mg daily. Patient to call next week with BP update.

## 2020-08-19 LAB
ALBUMIN SERPL-MCNC: 4.5 G/DL (ref 3.5–5.2)
ALBUMIN/GLOB SERPL: 1.4 G/DL
ALP SERPL-CCNC: 188 U/L (ref 39–117)
ALT SERPL W P-5'-P-CCNC: 101 U/L (ref 1–33)
ANION GAP SERPL CALCULATED.3IONS-SCNC: 10.7 MMOL/L (ref 5–15)
AST SERPL-CCNC: 77 U/L (ref 1–32)
BILIRUB SERPL-MCNC: 0.5 MG/DL (ref 0–1.2)
BUN SERPL-MCNC: 18 MG/DL (ref 8–23)
BUN/CREAT SERPL: 18.2 (ref 7–25)
CALCIUM SPEC-SCNC: 10.2 MG/DL (ref 8.6–10.5)
CHLORIDE SERPL-SCNC: 105 MMOL/L (ref 98–107)
CHOLEST SERPL-MCNC: 154 MG/DL (ref 0–200)
CO2 SERPL-SCNC: 25.3 MMOL/L (ref 22–29)
CREAT SERPL-MCNC: 0.99 MG/DL (ref 0.57–1)
GFR SERPL CREATININE-BSD FRML MDRD: 56 ML/MIN/1.73
GLOBULIN UR ELPH-MCNC: 3.2 GM/DL
GLUCOSE SERPL-MCNC: 81 MG/DL (ref 65–99)
HBA1C MFR BLD: 5.7 % (ref 4.8–5.6)
HDLC SERPL-MCNC: 57 MG/DL (ref 40–60)
LDLC SERPL CALC-MCNC: 71 MG/DL (ref 0–100)
LDLC/HDLC SERPL: 1.24 {RATIO}
POTASSIUM SERPL-SCNC: 4.7 MMOL/L (ref 3.5–5.2)
PROT SERPL-MCNC: 7.7 G/DL (ref 6–8.5)
SODIUM SERPL-SCNC: 141 MMOL/L (ref 136–145)
TRIGL SERPL-MCNC: 131 MG/DL (ref 0–150)
VLDLC SERPL-MCNC: 26.2 MG/DL (ref 5–40)

## 2020-08-23 RX ORDER — ROSUVASTATIN CALCIUM 40 MG/1
40 TABLET, COATED ORAL DAILY
Qty: 90 TABLET | Refills: 1 | Status: SHIPPED | OUTPATIENT
Start: 2020-08-23 | End: 2021-01-11

## 2020-08-24 ENCOUNTER — TREATMENT (OUTPATIENT)
Dept: CARDIAC REHAB | Facility: HOSPITAL | Age: 65
End: 2020-08-24

## 2020-08-24 DIAGNOSIS — I21.4 NSTEMI, INITIAL EPISODE OF CARE (HCC): Primary | ICD-10-CM

## 2020-08-24 PROCEDURE — 93798 PHYS/QHP OP CAR RHAB W/ECG: CPT

## 2020-08-24 NOTE — PROGRESS NOTES
Adult Outpatient Nutrition  Assessment    Patient Name:  Tereza Ackerman  YOB: 1955  MRN: [id number]    Assessment Date:  8/24/2020 2:15pm    Pt had appt for nutrition education with RD today at 1:00pm but pt did not attend this appt. RD spoke with pt at [facility] during exercise session and pt states she forgot about today's appt. RD offered pt a 3:00pm time slot today, but pt cannot stay to do edu today. RD offered pt appt times on Wednesdays in Sept (Wednesday is when RD usually avail in [facility]), but pt states she does not usually come to [facility] on Wednesdays, so she does not wish to r/s today's appt at this time. Pt to schedule with RD as desired; RD avail prn.    Electronically signed by:  Sharmaine Merritt MS,PAT,LD  08/24/20 14:15

## 2020-08-28 ENCOUNTER — TREATMENT (OUTPATIENT)
Dept: CARDIAC REHAB | Facility: HOSPITAL | Age: 65
End: 2020-08-28

## 2020-08-28 DIAGNOSIS — I21.4 NSTEMI, INITIAL EPISODE OF CARE (HCC): Primary | ICD-10-CM

## 2020-08-28 PROCEDURE — 93798 PHYS/QHP OP CAR RHAB W/ECG: CPT

## 2020-08-31 ENCOUNTER — TELEPHONE (OUTPATIENT)
Dept: CARDIAC REHAB | Facility: HOSPITAL | Age: 65
End: 2020-08-31

## 2020-09-01 ENCOUNTER — TELEPHONE (OUTPATIENT)
Dept: INTERNAL MEDICINE | Facility: CLINIC | Age: 65
End: 2020-09-01

## 2020-09-01 NOTE — TELEPHONE ENCOUNTER
PATIENT IS INQUIRING ABOUT MEDICATION, HAS SPOKEN TO DEMETRICE ABOUT THE CRESTOR.    PLEASE CALL PATIENT AND ADVISE ABOUT CHEAPER MEDICATION AT  [phone]

## 2020-09-01 NOTE — TELEPHONE ENCOUNTER
Pt wanting to know how much her seroquel would be with [facility], advised pt to look on their website at pharmacy of her choice and it would tell her.  Pt stated verbal understanding.

## 2020-09-02 ENCOUNTER — TREATMENT (OUTPATIENT)
Dept: CARDIAC REHAB | Facility: HOSPITAL | Age: 65
End: 2020-09-02

## 2020-09-02 DIAGNOSIS — I21.4 NSTEMI, INITIAL EPISODE OF CARE (HCC): Primary | ICD-10-CM

## 2020-09-02 PROCEDURE — 93798 PHYS/QHP OP CAR RHAB W/ECG: CPT

## 2020-09-04 ENCOUNTER — TREATMENT (OUTPATIENT)
Dept: CARDIAC REHAB | Facility: HOSPITAL | Age: 65
End: 2020-09-04

## 2020-09-04 DIAGNOSIS — I21.4 NSTEMI, INITIAL EPISODE OF CARE (HCC): Primary | ICD-10-CM

## 2020-09-04 PROCEDURE — 93798 PHYS/QHP OP CAR RHAB W/ECG: CPT

## 2020-09-09 ENCOUNTER — TREATMENT (OUTPATIENT)
Dept: CARDIAC REHAB | Facility: HOSPITAL | Age: 65
End: 2020-09-09

## 2020-09-09 DIAGNOSIS — I21.4 NSTEMI, INITIAL EPISODE OF CARE (HCC): Primary | ICD-10-CM

## 2020-09-09 PROCEDURE — 93798 PHYS/QHP OP CAR RHAB W/ECG: CPT

## 2020-09-11 ENCOUNTER — TREATMENT (OUTPATIENT)
Dept: CARDIAC REHAB | Facility: HOSPITAL | Age: 65
End: 2020-09-11

## 2020-09-11 DIAGNOSIS — I21.4 NSTEMI, INITIAL EPISODE OF CARE (HCC): Primary | ICD-10-CM

## 2020-09-11 PROCEDURE — 93798 PHYS/QHP OP CAR RHAB W/ECG: CPT

## 2020-09-14 ENCOUNTER — TREATMENT (OUTPATIENT)
Dept: CARDIAC REHAB | Facility: HOSPITAL | Age: 65
End: 2020-09-14

## 2020-09-14 DIAGNOSIS — I21.4 NSTEMI, INITIAL EPISODE OF CARE (HCC): Primary | ICD-10-CM

## 2020-09-14 PROCEDURE — 93798 PHYS/QHP OP CAR RHAB W/ECG: CPT

## 2020-09-18 ENCOUNTER — TREATMENT (OUTPATIENT)
Dept: CARDIAC REHAB | Facility: HOSPITAL | Age: 65
End: 2020-09-18

## 2020-09-18 DIAGNOSIS — I21.4 NSTEMI, INITIAL EPISODE OF CARE (HCC): Primary | ICD-10-CM

## 2020-09-18 PROCEDURE — 93798 PHYS/QHP OP CAR RHAB W/ECG: CPT

## 2020-09-21 ENCOUNTER — TELEPHONE (OUTPATIENT)
Dept: CARDIAC REHAB | Facility: HOSPITAL | Age: 65
End: 2020-09-21

## 2020-09-23 ENCOUNTER — TREATMENT (OUTPATIENT)
Dept: CARDIAC REHAB | Facility: HOSPITAL | Age: 65
End: 2020-09-23

## 2020-09-23 DIAGNOSIS — I21.4 NSTEMI, INITIAL EPISODE OF CARE (HCC): Primary | ICD-10-CM

## 2020-09-23 PROCEDURE — 93798 PHYS/QHP OP CAR RHAB W/ECG: CPT

## 2020-09-25 ENCOUNTER — TREATMENT (OUTPATIENT)
Dept: CARDIAC REHAB | Facility: HOSPITAL | Age: 65
End: 2020-09-25

## 2020-09-25 DIAGNOSIS — I21.4 NSTEMI, INITIAL EPISODE OF CARE (HCC): Primary | ICD-10-CM

## 2020-09-25 PROCEDURE — 93798 PHYS/QHP OP CAR RHAB W/ECG: CPT

## 2020-09-28 ENCOUNTER — TREATMENT (OUTPATIENT)
Dept: CARDIAC REHAB | Facility: HOSPITAL | Age: 65
End: 2020-09-28

## 2020-09-28 DIAGNOSIS — I21.4 NSTEMI, INITIAL EPISODE OF CARE (HCC): Primary | ICD-10-CM

## 2020-09-28 PROCEDURE — 93798 PHYS/QHP OP CAR RHAB W/ECG: CPT

## 2020-09-30 ENCOUNTER — TELEPHONE (OUTPATIENT)
Dept: INTERNAL MEDICINE | Facility: CLINIC | Age: 65
End: 2020-09-30

## 2020-09-30 DIAGNOSIS — I21.4 NSTEMI (NON-ST ELEVATED MYOCARDIAL INFARCTION): ICD-10-CM

## 2020-09-30 RX ORDER — ROSUVASTATIN CALCIUM 40 MG/1
40 TABLET, COATED ORAL DAILY
Qty: 90 TABLET | Refills: 1 | Status: CANCELLED | OUTPATIENT
Start: 2020-09-30

## 2020-09-30 NOTE — TELEPHONE ENCOUNTER
Pt is wanting to start taking the crestor and would like to wean off of the seroquel 25 mg takes 2 po hs.  Please advise.  Pharmacy verified.

## 2020-09-30 NOTE — TELEPHONE ENCOUNTER
Caller: Tereza Ackerman    Relationship: Self    Best call back number: [phone]    What medications are you currently taking:   Current Outpatient Medications on File Prior to Visit   Medication Sig Dispense Refill   • aspirin 81 MG tablet Take 1 tablet by mouth Daily. 30 tablet 11   • busPIRone (BUSPAR) 10 MG tablet Take 1 tablet by mouth 3 (Three) Times a Day. (Patient taking differently: Take 10 mg by mouth 2 (two) times a day.) 60 tablet 5   • carvedilol (COREG) 12.5 MG tablet Take 1 tablet by mouth Every 12 (Twelve) Hours. 180 tablet 3   • cholecalciferol (VITAMIN D3) 1000 units tablet Take 2,000 Units by mouth Every Other Day.     • cyclobenzaprine (FLEXERIL) 10 MG tablet Take 0.5 tablets by mouth 3 (Three) Times a Day As Needed (headache). 12 tablet 0   • levocetirizine (XYZAL) 5 MG tablet TAKE 1 TABLET BY MOUTH EVERY DAY IN THE EVENING 90 tablet 1   • lisinopril (PRINIVIL,ZESTRIL) 2.5 MG tablet Take 1 tablet by mouth Daily. 90 tablet 1   • LORazepam (Ativan) 0.5 MG tablet Take 0.5-1 tablets by mouth Daily As Needed for Anxiety. 15 tablet 0   • omeprazole (priLOSEC) 20 MG capsule Take 1 capsule by mouth Daily. 90 capsule 0   • ondansetron (ZOFRAN) 4 MG tablet Take 4 mg by mouth Every 8 (Eight) Hours As Needed for Nausea or Vomiting.     • QUEtiapine (SEROquel) 25 MG tablet Take 2 tablets by mouth Every Night. 180 tablet 1   • rosuvastatin (Crestor) 40 MG tablet Take 1 tablet by mouth Daily. 90 tablet 1   • sertraline (ZOLOFT) 100 MG tablet TAKE 1 TABLET BY MOUTH EVERY DAY 90 tablet 0   • thiamine (VITAMIN B1) 100 MG tablet Take 1 tablet by mouth Every Other Day. 45 tablet 1     No current facility-administered medications on file prior to visit.         When did you start taking these medications: Has not started the Crestor unsure about Seroquel    Which medication are you concerned about: QUEtiapine (SEROquel) 25 MG tablet rosuvastatin (Crestor) 40 MG tablet    Who prescribed you this  medication: Ko    What are your concerns: Wants to wean off of  Seroquel and has questions about getting her crestor    How long have you been taking these medications: Has not started the Crestor unsure about Seroquel    How long have you had these concerns: n/a

## 2020-09-30 NOTE — TELEPHONE ENCOUNTER
Crestor Rx was sent to pharm in August, she just needs to call [facility] and have them get it ready for her.    Re: Seroquel, should alternate 25mg and 50mg for 2 weeks and if tolerates , can cut back to 25mg nightly. Should continue the dose for now.    thanks

## 2020-10-02 ENCOUNTER — TREATMENT (OUTPATIENT)
Dept: CARDIAC REHAB | Facility: HOSPITAL | Age: 65
End: 2020-10-02

## 2020-10-02 DIAGNOSIS — I21.4 NSTEMI, INITIAL EPISODE OF CARE (HCC): Primary | ICD-10-CM

## 2020-10-02 PROCEDURE — 93798 PHYS/QHP OP CAR RHAB W/ECG: CPT

## 2020-10-05 ENCOUNTER — TREATMENT (OUTPATIENT)
Dept: CARDIAC REHAB | Facility: HOSPITAL | Age: 65
End: 2020-10-05

## 2020-10-05 DIAGNOSIS — I21.4 NSTEMI, INITIAL EPISODE OF CARE (HCC): Primary | ICD-10-CM

## 2020-10-05 PROCEDURE — 93798 PHYS/QHP OP CAR RHAB W/ECG: CPT

## 2020-10-07 RX ORDER — OMEPRAZOLE 20 MG/1
CAPSULE, DELAYED RELEASE ORAL
Qty: 90 CAPSULE | Refills: 0 | Status: SHIPPED | OUTPATIENT
Start: 2020-10-07 | End: 2020-11-04 | Stop reason: SDUPTHER

## 2020-10-09 ENCOUNTER — TREATMENT (OUTPATIENT)
Dept: CARDIAC REHAB | Facility: HOSPITAL | Age: 65
End: 2020-10-09

## 2020-10-09 DIAGNOSIS — I21.4 NSTEMI, INITIAL EPISODE OF CARE (HCC): Primary | ICD-10-CM

## 2020-10-09 PROCEDURE — 93798 PHYS/QHP OP CAR RHAB W/ECG: CPT

## 2020-10-12 ENCOUNTER — TELEPHONE (OUTPATIENT)
Dept: CARDIAC REHAB | Facility: HOSPITAL | Age: 65
End: 2020-10-12

## 2020-10-14 ENCOUNTER — CLINICAL SUPPORT (OUTPATIENT)
Dept: INTERNAL MEDICINE | Facility: CLINIC | Age: 65
End: 2020-10-14

## 2020-10-14 ENCOUNTER — TREATMENT (OUTPATIENT)
Dept: CARDIAC REHAB | Facility: HOSPITAL | Age: 65
End: 2020-10-14

## 2020-10-14 DIAGNOSIS — Z23 NEED FOR INFLUENZA VACCINATION: ICD-10-CM

## 2020-10-14 DIAGNOSIS — I21.4 NSTEMI, INITIAL EPISODE OF CARE (HCC): Primary | ICD-10-CM

## 2020-10-14 PROCEDURE — 93798 PHYS/QHP OP CAR RHAB W/ECG: CPT

## 2020-10-14 PROCEDURE — G0008 ADMIN INFLUENZA VIRUS VAC: HCPCS | Performed by: INTERNAL MEDICINE

## 2020-10-14 PROCEDURE — 90694 VACC AIIV4 NO PRSRV 0.5ML IM: CPT | Performed by: INTERNAL MEDICINE

## 2020-10-14 NOTE — PROGRESS NOTES
Attended Phase II Cardiac Rehab. No medication or health history changes reported. See [facility] for details.

## 2020-10-16 ENCOUNTER — TELEPHONE (OUTPATIENT)
Dept: NEUROSURGERY | Facility: CLINIC | Age: 65
End: 2020-10-16

## 2020-10-16 ENCOUNTER — TREATMENT (OUTPATIENT)
Dept: CARDIAC REHAB | Facility: HOSPITAL | Age: 65
End: 2020-10-16

## 2020-10-16 DIAGNOSIS — F43.89 ADJUSTMENT REACTION TO CHRONIC STRESS: ICD-10-CM

## 2020-10-16 DIAGNOSIS — I21.4 NSTEMI, INITIAL EPISODE OF CARE (HCC): Primary | ICD-10-CM

## 2020-10-16 PROCEDURE — 93798 PHYS/QHP OP CAR RHAB W/ECG: CPT

## 2020-10-16 RX ORDER — SERTRALINE HYDROCHLORIDE 100 MG/1
100 TABLET, FILM COATED ORAL DAILY
Qty: 90 TABLET | Refills: 0 | Status: SHIPPED | OUTPATIENT
Start: 2020-10-16 | End: 2020-11-04 | Stop reason: SDUPTHER

## 2020-10-16 NOTE — TELEPHONE ENCOUNTER
PATIENT CALLED AND STATED THAT [facility] TOLD HER THEY WERE FAXING OFFICE FOR REFILLS AND HAVE NOT RECEIVED REPLY.  PATIENT IS OUT OF sertraline (ZOLOFT) 100 MG tablet   [facility]/pharmacy #[id number] - [place], [place] - [address] [place] [address] - [phone] PH      PATIENT REQUESTED CALL AT:  [phone] - PATIENT STATED OKAY TO LEAVE MESSAGE ON VOICE MAIL

## 2020-10-16 NOTE — TELEPHONE ENCOUNTER
Patient wants to see if she can get her Zoloft called in as soon as possible.  She can be reached at [phone]

## 2020-10-16 NOTE — TELEPHONE ENCOUNTER
Pt called and had to reschedule her MRI-brain ww/o contrast with same day appt for 10/19/20 because of something that came up. I called and rescheduled her mri and this pushed her appts out to 11/24/20 d/t central scheduling's availability. She is requesting an 1x order for valium to complete her studies.    Please advise!    Pt contact # [phone]  Best time to call: anytime

## 2020-10-19 ENCOUNTER — APPOINTMENT (OUTPATIENT)
Dept: MRI IMAGING | Facility: HOSPITAL | Age: 65
End: 2020-10-19

## 2020-10-21 ENCOUNTER — TREATMENT (OUTPATIENT)
Dept: CARDIAC REHAB | Facility: HOSPITAL | Age: 65
End: 2020-10-21

## 2020-10-21 DIAGNOSIS — I21.4 NSTEMI, INITIAL EPISODE OF CARE (HCC): Primary | ICD-10-CM

## 2020-10-21 PROCEDURE — 93798 PHYS/QHP OP CAR RHAB W/ECG: CPT

## 2020-10-21 RX ORDER — ATORVASTATIN CALCIUM 80 MG/1
TABLET, FILM COATED ORAL
Qty: 90 TABLET | OUTPATIENT
Start: 2020-10-21

## 2020-10-23 ENCOUNTER — TREATMENT (OUTPATIENT)
Dept: CARDIAC REHAB | Facility: HOSPITAL | Age: 65
End: 2020-10-23

## 2020-10-23 DIAGNOSIS — I21.4 NSTEMI, INITIAL EPISODE OF CARE (HCC): Primary | ICD-10-CM

## 2020-10-23 PROCEDURE — 93798 PHYS/QHP OP CAR RHAB W/ECG: CPT

## 2020-10-28 ENCOUNTER — TREATMENT (OUTPATIENT)
Dept: CARDIAC REHAB | Facility: HOSPITAL | Age: 65
End: 2020-10-28

## 2020-10-28 DIAGNOSIS — I21.4 NSTEMI, INITIAL EPISODE OF CARE (HCC): Primary | ICD-10-CM

## 2020-10-28 PROCEDURE — 93798 PHYS/QHP OP CAR RHAB W/ECG: CPT

## 2020-11-02 ENCOUNTER — TREATMENT (OUTPATIENT)
Dept: CARDIAC REHAB | Facility: HOSPITAL | Age: 65
End: 2020-11-02

## 2020-11-02 DIAGNOSIS — I21.4 NSTEMI, INITIAL EPISODE OF CARE (HCC): Primary | ICD-10-CM

## 2020-11-02 PROCEDURE — 93798 PHYS/QHP OP CAR RHAB W/ECG: CPT

## 2020-11-04 ENCOUNTER — LAB (OUTPATIENT)
Dept: LAB | Facility: HOSPITAL | Age: 65
End: 2020-11-04

## 2020-11-04 ENCOUNTER — OFFICE VISIT (OUTPATIENT)
Dept: INTERNAL MEDICINE | Facility: CLINIC | Age: 65
End: 2020-11-04

## 2020-11-04 VITALS
SYSTOLIC BLOOD PRESSURE: 122 MMHG | HEART RATE: 67 BPM | DIASTOLIC BLOOD PRESSURE: 70 MMHG | BODY MASS INDEX: 29.06 KG/M2 | WEIGHT: 164 LBS | TEMPERATURE: 98 F | OXYGEN SATURATION: 98 % | HEIGHT: 63 IN

## 2020-11-04 DIAGNOSIS — D32.0 MENINGIOMA, RECURRENT OF BRAIN (HCC): ICD-10-CM

## 2020-11-04 DIAGNOSIS — E78.2 MIXED HYPERLIPIDEMIA: ICD-10-CM

## 2020-11-04 DIAGNOSIS — F43.89 ADJUSTMENT REACTION TO CHRONIC STRESS: ICD-10-CM

## 2020-11-04 DIAGNOSIS — Z00.00 MEDICARE ANNUAL WELLNESS VISIT, SUBSEQUENT: Primary | ICD-10-CM

## 2020-11-04 DIAGNOSIS — E55.9 VITAMIN D DEFICIENCY: ICD-10-CM

## 2020-11-04 DIAGNOSIS — R10.11 CHRONIC RUQ PAIN: ICD-10-CM

## 2020-11-04 DIAGNOSIS — R73.01 IFG (IMPAIRED FASTING GLUCOSE): ICD-10-CM

## 2020-11-04 DIAGNOSIS — G89.29 CHRONIC RUQ PAIN: ICD-10-CM

## 2020-11-04 LAB
25(OH)D3 SERPL-MCNC: 35 NG/ML (ref 30–100)
ALBUMIN SERPL-MCNC: 4.5 G/DL (ref 3.5–5.2)
ALBUMIN/GLOB SERPL: 1.4 G/DL
ALP SERPL-CCNC: 100 U/L (ref 39–117)
ALT SERPL W P-5'-P-CCNC: 19 U/L (ref 1–33)
ANION GAP SERPL CALCULATED.3IONS-SCNC: 4.9 MMOL/L (ref 5–15)
AST SERPL-CCNC: 25 U/L (ref 1–32)
BILIRUB SERPL-MCNC: 0.3 MG/DL (ref 0–1.2)
BUN SERPL-MCNC: 16 MG/DL (ref 8–23)
BUN/CREAT SERPL: 18.8 (ref 7–25)
CALCIUM SPEC-SCNC: 9.7 MG/DL (ref 8.6–10.5)
CHLORIDE SERPL-SCNC: 104 MMOL/L (ref 98–107)
CHOLEST SERPL-MCNC: 161 MG/DL (ref 0–200)
CO2 SERPL-SCNC: 31.1 MMOL/L (ref 22–29)
CREAT SERPL-MCNC: 0.85 MG/DL (ref 0.57–1)
DEPRECATED RDW RBC AUTO: 41.9 FL (ref 37–54)
ERYTHROCYTE [DISTWIDTH] IN BLOOD BY AUTOMATED COUNT: 12.6 % (ref 12.3–15.4)
GFR SERPL CREATININE-BSD FRML MDRD: 67 ML/MIN/1.73
GLOBULIN UR ELPH-MCNC: 3.2 GM/DL
GLUCOSE SERPL-MCNC: 86 MG/DL (ref 65–99)
HBA1C MFR BLD: 5.29 % (ref 4.8–5.6)
HCT VFR BLD AUTO: 38.7 % (ref 34–46.6)
HDLC SERPL-MCNC: 66 MG/DL (ref 40–60)
HGB BLD-MCNC: 13.1 G/DL (ref 12–15.9)
LDLC SERPL CALC-MCNC: 76 MG/DL (ref 0–100)
LDLC/HDLC SERPL: 1.12 {RATIO}
MCH RBC QN AUTO: 30.3 PG (ref 26.6–33)
MCHC RBC AUTO-ENTMCNC: 33.9 G/DL (ref 31.5–35.7)
MCV RBC AUTO: 89.4 FL (ref 79–97)
PLATELET # BLD AUTO: 196 10*3/MM3 (ref 140–450)
PMV BLD AUTO: 10.9 FL (ref 6–12)
POTASSIUM SERPL-SCNC: 3.9 MMOL/L (ref 3.5–5.2)
PROT SERPL-MCNC: 7.7 G/DL (ref 6–8.5)
RBC # BLD AUTO: 4.33 10*6/MM3 (ref 3.77–5.28)
SODIUM SERPL-SCNC: 140 MMOL/L (ref 136–145)
T4 FREE SERPL-MCNC: 1.07 NG/DL (ref 0.93–1.7)
TRIGL SERPL-MCNC: 105 MG/DL (ref 0–150)
TSH SERPL DL<=0.05 MIU/L-ACNC: 2.08 UIU/ML (ref 0.27–4.2)
VLDLC SERPL-MCNC: 19 MG/DL (ref 5–40)
WBC # BLD AUTO: 5.34 10*3/MM3 (ref 3.4–10.8)

## 2020-11-04 PROCEDURE — 83036 HEMOGLOBIN GLYCOSYLATED A1C: CPT | Performed by: INTERNAL MEDICINE

## 2020-11-04 PROCEDURE — 84439 ASSAY OF FREE THYROXINE: CPT | Performed by: INTERNAL MEDICINE

## 2020-11-04 PROCEDURE — 80061 LIPID PANEL: CPT | Performed by: INTERNAL MEDICINE

## 2020-11-04 PROCEDURE — 99213 OFFICE O/P EST LOW 20 MIN: CPT | Performed by: INTERNAL MEDICINE

## 2020-11-04 PROCEDURE — G0444 DEPRESSION SCREEN ANNUAL: HCPCS | Performed by: INTERNAL MEDICINE

## 2020-11-04 PROCEDURE — 82306 VITAMIN D 25 HYDROXY: CPT | Performed by: INTERNAL MEDICINE

## 2020-11-04 PROCEDURE — 84443 ASSAY THYROID STIM HORMONE: CPT | Performed by: INTERNAL MEDICINE

## 2020-11-04 PROCEDURE — G0439 PPPS, SUBSEQ VISIT: HCPCS | Performed by: INTERNAL MEDICINE

## 2020-11-04 PROCEDURE — 80053 COMPREHEN METABOLIC PANEL: CPT | Performed by: INTERNAL MEDICINE

## 2020-11-04 PROCEDURE — 85027 COMPLETE CBC AUTOMATED: CPT | Performed by: INTERNAL MEDICINE

## 2020-11-04 RX ORDER — BUSPIRONE HYDROCHLORIDE 10 MG/1
10 TABLET ORAL 3 TIMES DAILY
Qty: 60 TABLET | Refills: 5 | Status: SHIPPED | OUTPATIENT
Start: 2020-11-04 | End: 2021-03-09

## 2020-11-04 RX ORDER — OMEPRAZOLE 20 MG/1
20 CAPSULE, DELAYED RELEASE ORAL DAILY
Qty: 90 CAPSULE | Refills: 1 | Status: SHIPPED | OUTPATIENT
Start: 2020-11-04 | End: 2021-08-09

## 2020-11-04 RX ORDER — QUETIAPINE FUMARATE 25 MG/1
50 TABLET, FILM COATED ORAL NIGHTLY
Qty: 180 TABLET | Refills: 1 | Status: SHIPPED | OUTPATIENT
Start: 2020-11-04 | End: 2021-03-09

## 2020-11-04 RX ORDER — SERTRALINE HYDROCHLORIDE 100 MG/1
100 TABLET, FILM COATED ORAL DAILY
Qty: 90 TABLET | Refills: 1 | Status: SHIPPED | OUTPATIENT
Start: 2020-11-04 | End: 2021-07-26

## 2020-11-04 RX ORDER — LANOLIN ALCOHOL/MO/W.PET/CERES
100 CREAM (GRAM) TOPICAL EVERY OTHER DAY
Qty: 45 TABLET | Refills: 1 | Status: SHIPPED | OUTPATIENT
Start: 2020-11-04 | End: 2021-04-12 | Stop reason: SDUPTHER

## 2020-11-04 NOTE — PROGRESS NOTES
The ABCs of the Annual Wellness Visit  Subsequent Medicare Wellness Visit    Chief Complaint   Patient presents with   • Medicare Wellness-subsequent       Subjective   History of Present Illness:  Tereza Ackerman is a 65 y.o. female who presents for a Subsequent Medicare Wellness Visit.    HEALTH RISK ASSESSMENT    Recent Hospitalizations:  Recently treated at the following:  [facility]    Current Medical Providers:  Patient Care Team:  Michaela Connell MD as PCP - General  Michaela Connell MD as PCP - Family Medicine  HollisWerner thao MD as Referring Physician (Neurosurgery)  Moo Hopkins MD as Consulting Physician (Ophthalmology)  Jamal Ramos MD as Consulting Physician (Nephrology)  Alli Aguirre MD as Consulting Physician (Cardiology)  Costa Raygoza MD as Consulting Physician (Radiation Oncology)  Andrea Bocanegra MD as Consulting Physician (General Surgery)  Alli Aguirre MD as Consulting Physician (Cardiology)    Smoking Status:  Social History     Tobacco Use   Smoking Status Never Smoker   Smokeless Tobacco Never Used       Alcohol Consumption:  Social History     Substance and Sexual Activity   Alcohol Use No       Depression Screen:   PHQ-2/PHQ-9 Depression Screening 11/4/2020   Little interest or pleasure in doing things 2   Feeling down, depressed, or hopeless 0   Trouble falling or staying asleep, or sleeping too much 3   Feeling tired or having little energy 2   Poor appetite or overeating 0   Feeling bad about yourself - or that you are a failure or have let yourself or your family down 0   Trouble concentrating on things, such as reading the newspaper or watching television 3   Moving or speaking so slowly that other people could have noticed. Or the opposite - being so fidgety or restless that you have been moving around a lot more than usual 0   Thoughts that you would be better off dead, or of hurting yourself in some way 0   Total Score 10   If  you checked off any problems, how difficult have these problems made it for you to do your work, take care of things at home, or get along with other people? Somewhat difficult       Fall Risk Screen:  JOVAN Fall Risk Assessment was completed, and patient is at LOW risk for falls.Assessment completed on:11/4/2020    Health Habits and Functional and Cognitive Screening:  Functional & Cognitive Status 11/4/2020   Do you have difficulty preparing food and eating? No   Do you have difficulty bathing yourself, getting dressed or grooming yourself? No   Do you have difficulty using the toilet? No   Do you have difficulty moving around from place to place? No   Do you have trouble with steps or getting out of a bed or a chair? No   Current Diet Well Balanced Diet   Dental Exam Up to date   Eye Exam Up to date   Exercise (times per week) 2 times per week   Current Exercise Activities Include Cardiovasular Workout on Exercise Equipment   Do you need help using the phone?  -   Are you deaf or do you have serious difficulty hearing?  -   Do you need help with transportation? -   Do you need help shopping? -   Do you need help preparing meals?  -   Do you need help with housework?  -   Do you need help with laundry? -   Do you need help taking your medications? -   Do you need help managing money? -   Do you ever drive or ride in a car without wearing a seat belt? -   Have you felt unusual stress, anger or loneliness in the last month? No   Who do you live with? Spouse   If you need help, do you have trouble finding someone available to you? No   Have you been bothered in the last four weeks by sexual problems? No   Do you have difficulty concentrating, remembering or making decisions? Yes         Does the patient have evidence of cognitive impairment? No    Asprin use counseling:Taking ASA appropriately as indicated    Age-appropriate Screening Schedule:  Refer to the list below for future screening recommendations based on  patient's age, sex and/or medical conditions. Orders for these recommended tests are listed in the plan section. The patient has been provided with a written plan.    Health Maintenance   Topic Date Due   • ZOSTER VACCINE (2 of 3) 01/21/2015   • MAMMOGRAM  01/14/2021   • LIPID PANEL  11/04/2021   • DXA SCAN  06/11/2022   • COLONOSCOPY  11/28/2028   • INFLUENZA VACCINE  Completed   • TDAP/TD VACCINES  Discontinued          The following portions of the patient's history were reviewed and updated as appropriate: allergies, current medications, past family history, past medical history, past social history, past surgical history and problem list.    Outpatient Medications Prior to Visit   Medication Sig Dispense Refill   • aspirin 81 MG tablet Take 1 tablet by mouth Daily. 30 tablet 11   • carvedilol (COREG) 12.5 MG tablet Take 1 tablet by mouth Every 12 (Twelve) Hours. 180 tablet 3   • cholecalciferol (VITAMIN D3) 1000 units tablet Take 2,000 Units by mouth Every Other Day.     • cyclobenzaprine (FLEXERIL) 10 MG tablet Take 0.5 tablets by mouth 3 (Three) Times a Day As Needed (headache). 12 tablet 0   • levocetirizine (XYZAL) 5 MG tablet TAKE 1 TABLET BY MOUTH EVERY DAY IN THE EVENING 90 tablet 1   • LORazepam (Ativan) 0.5 MG tablet Take 0.5-1 tablets by mouth Daily As Needed for Anxiety. 15 tablet 0   • ondansetron (ZOFRAN) 4 MG tablet Take 4 mg by mouth Every 8 (Eight) Hours As Needed for Nausea or Vomiting.     • rosuvastatin (Crestor) 40 MG tablet Take 1 tablet by mouth Daily. 90 tablet 1   • busPIRone (BUSPAR) 10 MG tablet Take 1 tablet by mouth 3 (Three) Times a Day. (Patient taking differently: Take 10 mg by mouth 2 (two) times a day.) 60 tablet 5   • omeprazole (priLOSEC) 20 MG capsule TAKE 1 CAPSULE BY MOUTH EVERY DAY 90 capsule 0   • QUEtiapine (SEROquel) 25 MG tablet Take 2 tablets by mouth Every Night. 180 tablet 1   • sertraline (ZOLOFT) 100 MG tablet Take 1 tablet by mouth Daily. 90 tablet 0   • thiamine  (VITAMIN B1) 100 MG tablet Take 1 tablet by mouth Every Other Day. 45 tablet 1   • lisinopril (PRINIVIL,ZESTRIL) 2.5 MG tablet Take 1 tablet by mouth Daily. 90 tablet 1     No facility-administered medications prior to visit.        Patient Active Problem List   Diagnosis   • Impaired glucose tolerance   • Parathyroid adenoma   • Reaction to chronic stress   • Multinodular goiter   • Vitamin D deficiency   • Meningioma, recurrent of brain (CMS/HCC)   • Arthritis   • Depression   • Small bowel obstruction (CMS/HCC)   • Insomnia   • History of Nissen fundoplication   • Malignant neoplasm of left orbit (CMS/HCC)   • Prediabetes   • Mixed hyperlipidemia   • Hyperglycemia   • Atrial flutter with rapid ventricular response (CMS/HCC)   • Anemia   • Large bowel obstruction (CMS/HCC)   • Abdominal pain   • Essential hypertension   • History of creation of ostomy (CMS/HCC)   • Screen for colon cancer   • Sigmoid volvulus (CMS/HCC)       Advanced Care Planning:  ACP discussion was held with the patient during this visit. Patient does not have an advance directive, information provided.    Review of Systems   Constitutional: Negative for chills and fatigue.   HENT: Negative for congestion, ear pain and sore throat.    Eyes: Negative for pain, redness and visual disturbance.   Respiratory: Negative for cough and shortness of breath.    Cardiovascular: Negative for chest pain, palpitations and leg swelling.   Gastrointestinal: Negative for abdominal pain, diarrhea and nausea.   Endocrine: Negative for cold intolerance and heat intolerance.   Genitourinary: Negative for flank pain and urgency.   Musculoskeletal: Negative for arthralgias and gait problem.   Skin: Negative for pallor and rash.   Neurological: Negative for dizziness, weakness and headaches.   Psychiatric/Behavioral: Positive for sleep disturbance. Negative for dysphoric mood. The patient is not nervous/anxious.        Compared to one year ago, the patient feels her  "physical health is better.  Compared to one year ago, the patient feels her mental health is better.    Reviewed chart for potential of high risk medication in the elderly: yes  Reviewed chart for potential of harmful drug interactions in the elderly:yes    Objective         Vitals:    11/04/20 0939   BP: 122/70   Pulse: 67   Temp: 98 °F (36.7 °C)   SpO2: 98%  Comment: ra   Weight: 74.4 kg (164 lb)   Height: 160 cm (63\")   PainSc: 0-No pain       Body mass index is 29.05 kg/m².  Discussed the patient's BMI with her. The BMI is above average; BMI management plan is completed.    Physical Exam  Vitals signs and nursing note reviewed.   Constitutional:       Appearance: Normal appearance. She is well-developed.   HENT:      Head: Normocephalic and atraumatic.      Right Ear: Tympanic membrane and external ear normal.      Left Ear: Tympanic membrane and external ear normal.      Mouth/Throat:      Mouth: Mucous membranes are moist.      Pharynx: No oropharyngeal exudate.   Eyes:      General: No scleral icterus.     Conjunctiva/sclera: Conjunctivae normal.      Pupils: Pupils are equal, round, and reactive to light.   Neck:      Musculoskeletal: Neck supple.      Thyroid: No thyromegaly.      Vascular: No carotid bruit.   Cardiovascular:      Rate and Rhythm: Normal rate and regular rhythm.      Pulses: Normal pulses.      Heart sounds: Normal heart sounds.   Pulmonary:      Effort: Pulmonary effort is normal.      Breath sounds: Normal breath sounds. No rhonchi or rales.   Abdominal:      General: Bowel sounds are normal. There is no distension.      Palpations: Abdomen is soft.      Tenderness: There is no abdominal tenderness.   Musculoskeletal:      Right lower leg: No edema.      Left lower leg: No edema.   Skin:     General: Skin is warm and dry.      Findings: No rash.   Neurological:      Mental Status: She is alert and oriented to person, place, and time.      Cranial Nerves: No cranial nerve deficit.      " Sensory: No sensory deficit.      Coordination: Coordination normal.      Gait: Gait normal.      Deep Tendon Reflexes: Reflexes normal.   Psychiatric:         Mood and Affect: Mood normal.         Behavior: Behavior normal.         Judgment: Judgment normal.         Lab Results   Component Value Date    TRIG 105 11/04/2020    HDL 66 (H) 11/04/2020    LDL 76 11/04/2020    VLDL 19 11/04/2020    HGBA1C 5.29 11/04/2020        Assessment/Plan   Medicare Risks and Personalized Health Plan  CMS Preventative Services Quick Reference  Advance Directive Discussion  Breast Cancer/Mammogram Screening  Cardiovascular risk  Depression/Dysphoria  Diabetic Lab Screening   Fall Risk  Glaucoma Risk  Immunizations Discussed/Encouraged (specific immunizations; Influenza and Shingrix )  Osteoprorosis Risk    The above risks/problems have been discussed with the patient.  Pertinent information has been shared with the patient in the After Visit Summary.  Follow up plans and orders are seen below in the Assessment/Plan Section.    Medicare Wellness-subsequent    Subjective   Tereza Ackerman is a 65 y.o. female is here today for follow-up.  HPI  Doing well overall. Plans to come off the seroquel. Shingrix- looking to get it.  Some luq pain on and off.    Current Outpatient Medications:   •  aspirin 81 MG tablet, Take 1 tablet by mouth Daily., Disp: 30 tablet, Rfl: 11  •  busPIRone (BUSPAR) 10 MG tablet, Take 1 tablet by mouth 3 (Three) Times a Day., Disp: 60 tablet, Rfl: 5  •  carvedilol (COREG) 12.5 MG tablet, Take 1 tablet by mouth Every 12 (Twelve) Hours., Disp: 180 tablet, Rfl: 3  •  cholecalciferol (VITAMIN D3) 1000 units tablet, Take 2,000 Units by mouth Every Other Day., Disp: , Rfl:   •  cyclobenzaprine (FLEXERIL) 10 MG tablet, Take 0.5 tablets by mouth 3 (Three) Times a Day As Needed (headache)., Disp: 12 tablet, Rfl: 0  •  levocetirizine (XYZAL) 5 MG tablet, TAKE 1 TABLET BY MOUTH EVERY DAY IN THE EVENING, Disp: 90 tablet,  "Rfl: 1  •  LORazepam (Ativan) 0.5 MG tablet, Take 0.5-1 tablets by mouth Daily As Needed for Anxiety., Disp: 15 tablet, Rfl: 0  •  omeprazole (priLOSEC) 20 MG capsule, Take 1 capsule by mouth Daily., Disp: 90 capsule, Rfl: 1  •  ondansetron (ZOFRAN) 4 MG tablet, Take 4 mg by mouth Every 8 (Eight) Hours As Needed for Nausea or Vomiting., Disp: , Rfl:   •  QUEtiapine (SEROquel) 25 MG tablet, Take 2 tablets by mouth Every Night., Disp: 180 tablet, Rfl: 1  •  rosuvastatin (Crestor) 40 MG tablet, Take 1 tablet by mouth Daily., Disp: 90 tablet, Rfl: 1  •  sertraline (ZOLOFT) 100 MG tablet, Take 1 tablet by mouth Daily., Disp: 90 tablet, Rfl: 1  •  thiamine (VITAMIN B1) 100 MG tablet, Take 1 tablet by mouth Every Other Day., Disp: 45 tablet, Rfl: 1  •  lisinopril (PRINIVIL,ZESTRIL) 2.5 MG tablet, Take 1 tablet by mouth Daily., Disp: 90 tablet, Rfl: 1      The following portions of the patient's history were reviewed and updated as appropriate: allergies, current medications, past family history, past medical history, past social history, past surgical history and problem list.        Objective   /70   Pulse 67   Temp 98 °F (36.7 °C)   Ht 160 cm (63\")   Wt 74.4 kg (164 lb)   SpO2 98% Comment: ra  BMI 29.05 kg/m²         Results for orders placed or performed in visit on 08/18/20   Comprehensive Metabolic Panel    Specimen: Blood   Result Value Ref Range    Glucose 81 65 - 99 mg/dL    BUN 18 8 - 23 mg/dL    Creatinine 0.99 0.57 - 1.00 mg/dL    Sodium 141 136 - 145 mmol/L    Potassium 4.7 3.5 - 5.2 mmol/L    Chloride 105 98 - 107 mmol/L    CO2 25.3 22.0 - 29.0 mmol/L    Calcium 10.2 8.6 - 10.5 mg/dL    Total Protein 7.7 6.0 - 8.5 g/dL    Albumin 4.50 3.50 - 5.20 g/dL    ALT (SGPT) 101 (H) 1 - 33 U/L    AST (SGOT) 77 (H) 1 - 32 U/L    Alkaline Phosphatase 188 (H) 39 - 117 U/L    Total Bilirubin 0.5 0.0 - 1.2 mg/dL    eGFR Non African Amer 56 (L) >60 mL/min/1.73    Globulin 3.2 gm/dL    A/G Ratio 1.4 g/dL    " BUN/Creatinine Ratio 18.2 7.0 - 25.0    Anion Gap 10.7 5.0 - 15.0 mmol/L   Lipid Panel    Specimen: Blood   Result Value Ref Range    Total Cholesterol 154 0 - 200 mg/dL    Triglycerides 131 0 - 150 mg/dL    HDL Cholesterol 57 40 - 60 mg/dL    LDL Cholesterol  71 0 - 100 mg/dL    VLDL Cholesterol 26.2 5 - 40 mg/dL    LDL/HDL Ratio 1.24    Hemoglobin A1c    Specimen: Blood   Result Value Ref Range    Hemoglobin A1C 5.70 (H) 4.80 - 5.60 %             Assessment/Plan   Diagnoses and all orders for this visit:    Medicare annual wellness visit, subsequent    Adjustment reaction to chronic stress  Comments:  continue zoloft,buspar , wean off seroquel gradually.  Orders:  -     busPIRone (BUSPAR) 10 MG tablet; Take 1 tablet by mouth 3 (Three) Times a Day.  -     QUEtiapine (SEROquel) 25 MG tablet; Take 2 tablets by mouth Every Night.  -     sertraline (ZOLOFT) 100 MG tablet; Take 1 tablet by mouth Daily.    Mixed hyperlipidemia  -     Comprehensive Metabolic Panel; Future  -     Lipid Panel; Future  -     TSH; Future  -     T4, Free; Future    IFG (impaired fasting glucose)  -     Hemoglobin A1c; Future    Vitamin D deficiency  -     Vitamin D 25 Hydroxy; Future    Meningioma, recurrent of brain (CMS/HCC)  -     CBC (No Diff); Future    Chronic RUQ pain  Comments:  likely scar tissue as GB okay on recent CT scan. Increase fluids, add fiber and stool softener.    Other orders  -     omeprazole (priLOSEC) 20 MG capsule; Take 1 capsule by mouth Daily.  -     thiamine (VITAMIN B1) 100 MG tablet; Take 1 tablet by mouth Every Other Day.               PHQ-2/PHQ-9 Depression screening reviewed with patient . Total time spent today for depression screening  with Tereza Ackerman  was 15 minutes in counseling, along with plans for any diagnositc work-up and treatment.    Follow Up:  Return in about 6 months (around 5/4/2021) for Next scheduled follow up.     An After Visit Summary and PPPS were given to the patient.

## 2020-11-05 ENCOUNTER — TRANSCRIBE ORDERS (OUTPATIENT)
Dept: ADMINISTRATIVE | Facility: HOSPITAL | Age: 65
End: 2020-11-05

## 2020-11-05 DIAGNOSIS — Z12.31 VISIT FOR SCREENING MAMMOGRAM: Primary | ICD-10-CM

## 2020-11-06 ENCOUNTER — TREATMENT (OUTPATIENT)
Dept: CARDIAC REHAB | Facility: HOSPITAL | Age: 65
End: 2020-11-06

## 2020-11-06 DIAGNOSIS — I21.4 NSTEMI, INITIAL EPISODE OF CARE (HCC): Primary | ICD-10-CM

## 2020-11-06 PROCEDURE — 93798 PHYS/QHP OP CAR RHAB W/ECG: CPT

## 2020-11-11 ENCOUNTER — TREATMENT (OUTPATIENT)
Dept: CARDIAC REHAB | Facility: HOSPITAL | Age: 65
End: 2020-11-11

## 2020-11-11 DIAGNOSIS — I21.4 NSTEMI, INITIAL EPISODE OF CARE (HCC): Primary | ICD-10-CM

## 2020-11-11 PROCEDURE — 93798 PHYS/QHP OP CAR RHAB W/ECG: CPT

## 2020-11-11 NOTE — PROGRESS NOTES
CARDIAC/PULMONARY REHAB NUTRITION EDUCATION/ASSESSMENT      65 y.o.         Height: 63in, per pt    Weight: 164 lb per pt     BMI: 29 IBW: 115lb     %IBW: 143          Time seen: 2:00-3:00 PM   Dx: STEMI, Aflutter, GERD.   Cardiac Risk Factors: HTN, HLP Weight Assessment: Obese  Weight Change:  unchanged         Appetite: good      Food records reviewed? Yes     Occupation:  retired         Who does the patient live with:   Who does the cooking at home:  pt    Spouse/significant other present for diet instruction today? No; not possible per dept protocol during covid-19 pandemic  Patient actively receiving lifestyle support from others at home? yes       Pertinent Lab Values:   Total: No components found for: CHOLESTEROL  HDL:   HDL Cholesterol   Date Value Ref Range Status   11/04/2020 66 (H) 40 - 60 mg/dL Final     LDL:  LDL Cholesterol    Date Value Ref Range Status   11/04/2020 76 0 - 100 mg/dL Final     Triglyceride: No components found for: TRIGLYCERIDE  Last HGBA1C with date if applicable:No components found for: A1C  Glucose:   Glucose   Date Value Ref Range Status   11/04/2020 86 65 - 99 mg/dL Final        Pertinent Nutrition-Related Medications:  Lisinopril              Assessment / Recommendations: Pt has low nutrition knowledge base at start of session, and pt describes a very high fat, high saturated fat, and high sodium typical diet including allison, cheesecake, meatloaf, hamburger helper products, etc. RD explained the differences between saturated, unsaturated fats multiple times and pt still with some difficulty understanding. Pt will have need to change the way she prepares and shops for food in order to comply with guidelines taught. Rec that pt follow 1300calories, 36-50g total fat/d with no more than 7g saturated fat/d plan.  Rec that pt increase fruit/vegetable servings, decrease butter use (pt uses some butter and some brummel and brown margarine), change from 2% milk to 1% or skim milk  use; pt willing.  Pt with approp questions and eager to learn.   Motivation level toward diet compliance: strong       Education:      Previous cardiac diet education prior to coming to Cardiac Rehab?  Yes   Instructed on:  Cardiac/mediterranean diet  Weight management  Lipid management  7601-8068 mg/day Na restriction  Label reading for heart health  Advised against salt-substitute use  Written materials given:  yes         Goal: RD avail prn.                 15:57 EST  11/11/2020  Sharmaine Merritt, MS,RD,LD

## 2020-11-13 ENCOUNTER — TELEPHONE (OUTPATIENT)
Dept: CARDIAC REHAB | Facility: HOSPITAL | Age: 65
End: 2020-11-13

## 2020-11-16 ENCOUNTER — TELEPHONE (OUTPATIENT)
Dept: CARDIAC REHAB | Facility: HOSPITAL | Age: 65
End: 2020-11-16

## 2020-11-19 RX ORDER — DIAZEPAM 10 MG/1
TABLET ORAL
Qty: 1 TABLET | Refills: 0 | OUTPATIENT
Start: 2020-11-19 | End: 2021-02-22

## 2020-11-19 RX ORDER — DIAZEPAM 10 MG/1
TABLET ORAL
COMMUNITY
End: 2020-11-19 | Stop reason: SDUPTHER

## 2020-11-19 NOTE — TELEPHONE ENCOUNTER
Provider:  Bunny  Caller: Patient  Time of call:   9:28am  Phone #:  [phone]  Surgery:  N/A  Surgery Date: N/A   Last visit:   04/21/2020  Next visit: 11/24/2020    BENITA:         Reason for call:  Patient called stating she is scheduled for an MRI Brain, and an appointment with Dr. Hollis on 11/24/2020. Patient requesting Valium 10mg be called in so she can complete the MRI.     Medication pended for approval.

## 2020-11-23 ENCOUNTER — TREATMENT (OUTPATIENT)
Dept: CARDIAC REHAB | Facility: HOSPITAL | Age: 65
End: 2020-11-23

## 2020-11-23 DIAGNOSIS — I21.4 NSTEMI, INITIAL EPISODE OF CARE (HCC): Primary | ICD-10-CM

## 2020-11-23 PROCEDURE — 93798 PHYS/QHP OP CAR RHAB W/ECG: CPT

## 2020-11-23 NOTE — PROGRESS NOTES
Attended Phase II Cardiac Rehab. No medication or health history changes reported. See [facility] for details.

## 2020-11-24 ENCOUNTER — OFFICE VISIT (OUTPATIENT)
Dept: NEUROSURGERY | Facility: CLINIC | Age: 65
End: 2020-11-24

## 2020-11-24 ENCOUNTER — HOSPITAL ENCOUNTER (OUTPATIENT)
Dept: MRI IMAGING | Facility: HOSPITAL | Age: 65
Discharge: HOME | End: 2020-11-24
Admitting: NEUROLOGICAL SURGERY

## 2020-11-24 VITALS
DIASTOLIC BLOOD PRESSURE: 72 MMHG | TEMPERATURE: 96 F | SYSTOLIC BLOOD PRESSURE: 142 MMHG | BODY MASS INDEX: 29.06 KG/M2 | HEIGHT: 63 IN | WEIGHT: 164.02 LBS

## 2020-11-24 DIAGNOSIS — H53.9 VISUAL CHANGES: Primary | ICD-10-CM

## 2020-11-24 DIAGNOSIS — D32.0 MENINGIOMA, RECURRENT OF BRAIN (HCC): ICD-10-CM

## 2020-11-24 DIAGNOSIS — Z98.890 HISTORY OF CRANIOTOMY: ICD-10-CM

## 2020-11-24 DIAGNOSIS — Z98.890 S/P CRANIOTOMY: ICD-10-CM

## 2020-11-24 PROCEDURE — 99213 OFFICE O/P EST LOW 20 MIN: CPT | Performed by: NEUROLOGICAL SURGERY

## 2020-11-24 PROCEDURE — A9577 INJ MULTIHANCE: HCPCS | Performed by: NEUROLOGICAL SURGERY

## 2020-11-24 PROCEDURE — 70553 MRI BRAIN STEM W/O & W/DYE: CPT

## 2020-11-24 PROCEDURE — 0 GADOBENATE DIMEGLUMINE 529 MG/ML SOLUTION: Performed by: NEUROLOGICAL SURGERY

## 2020-11-24 RX ADMIN — GADOBENATE DIMEGLUMINE 15 ML: 529 INJECTION, SOLUTION INTRAVENOUS at 13:50

## 2020-11-24 NOTE — PROGRESS NOTES
Tereza Ackerman  1955  [id number]                        CHIEF COMPLAINT: Follow-up meningioma         MEDICAL HISTORY SINCE LAST ENCOUNTER: This is a 65-year-old female who underwent craniotomy for excision of a large left temporal meningioma 17 years ago.  This was followed subsequent by SRS; a second craniotomy and SRS once again.  She has had tumor recurrence in the temporal fossa extending extracranially into the orbit.  She had exploration of the orbit followed by SRS.  The case was reviewed by neurosurgery at [facility] who recommended SRS in hopes that they would be able to avoid a rather extensive and radical excision.  She is done well; continues to do so.  She reports today for follow-up MRI compared to the one done previously in April           Past Medical History:   Diagnosis Date   • Abdominal hernia    • Arthritis     rt knee    • Atrial flutter with rapid ventricular response (CMS/HCC) 12/21/2017   • Back pain    • Brain tumor (CMS/HCC)    • Colostomy present (CMS/HCC) 08/2018   • Depression    • History of blood transfusion    • History of gastroesophageal reflux (GERD)     resolved since Nissen   • History of Nissen fundoplication 7/1/2017   • History of small bowel obstruction    • Hyperlipemia    • Hypertension    • Memory loss or impairment    • Migraine    • Parathyroid adenoma     Incidental on thyroid US.   • PONV (postoperative nausea and vomiting)     nausea - preprocedural meds help    • Prediabetes     Last Impression: 06 Feb 2015  Reviewed labs. Excellent control.  Maren Connellast Impression: 06 Feb 2015  Reviewed labs. Excellent control.  Michaela Connell   • Thyroid nodule      Last Impression: 13 Jun 2015  r/o thyroid cancer, will proceed with US.  Michaela Connell (Internal Medicine)    • UTI (urinary tract infection)    • Vision changes     blockages left eye    • Wears glasses     readers              Past Surgical History:   Procedure Laterality Date   •  CARDIAC CATHETERIZATION N/A 4/27/2020    Procedure: LEFT HEART CATH;  Surgeon: Marina Ring MD;  Location:  [facility];  Service: Cardiology;  Laterality: N/A;   • CARPAL TUNNEL RELEASE  2002    right    • COLONOSCOPY N/A 8/18/2018    Procedure: COLONOSCOPY;  Surgeon: Inderjit Campos MD;  Location:  [facility];  Service: Gastroenterology   • COLONOSCOPY N/A 11/28/2018    Procedure: COLONOSCOPY;  Surgeon: Inderjit Campos MD;  Location:  [facility];  Service: Gastroenterology   • COLOSTOMY CLOSURE N/A 2/19/2019    Procedure: COLOSTOMY TAKEDOWN, INCISIONAL HERNIA REPAIR;  Surgeon: Andrea Bocanegra MD;  Location:  [facility];  Service: General   • CRANIOTOMY  2003 & 2014    Dr. Werner Hollis for tumor removal   • ENDOSCOPY     • EXPLORATORY LAPAROTOMY N/A 12/5/2017    Procedure: EXPLORATORY LAPAROTOMY, SMALL BOWEL RESECTION;  Surgeon: Ирина Cobian MD;  Location:  [facility];  Service:    • EXPLORATORY LAPAROTOMY N/A 12/22/2017    Procedure: LAPAROTOMY EXPLORATORY FOR SMALL BOWEL OBSTRUCTION;  Surgeon: Ирина Cobian MD;  Location:  [facility];  Service:    • EXPLORATORY LAPAROTOMY N/A 7/23/2018    Procedure: EXPLORATORY LAPAROTOMY, APPENDECTOMY, CECOPEXY, INCISIONAL HERNIA REPAIR, LYSIS OF ADHESIONS;  Surgeon: Andrea Bocanegra MD;  Location:  [facility];  Service: General   • EXPLORATORY LAPAROTOMY N/A 8/19/2018    Procedure: LAPAROTOMY EXPLORATORY, SIGMOID COLECTOMY, CREATION OF OSTOMY;  Surgeon: Andrea Bocanegra MD;  Location:  [facility];  Service: General   • HYSTERECTOMY      partial - both ovaries still present pt believes    • INSERTION HEMODIALYSIS CATHETER N/A 12/7/2017    Procedure: HEMODIALYSIS CATHETER INSERTION;  Surgeon: Chance Valenzuela MD;  Location:  [facility];  Service:    • ORBITOTOMY Left 11/3/2017    Procedure:  LEFT LATERAL ORBITOTOMY WITH DEBULKING OF TUMOR ;  Surgeon: Moo Hopkins MD;  Location:  [facility];  Service:    • OTHER SURGICAL  HISTORY      esophagogastric fundoplasty nissen fundoplication   • TUBAL ABDOMINAL LIGATION                Family History   Problem Relation Age of Onset   • Obesity Mother    • Heart attack Mother    • Migraines Father    • Stroke Father    • Diabetes Father    • Cancer Other    • Arthritis Other    • Diabetes Other    • Breast cancer Maternal Aunt    • Breast cancer Paternal Aunt    • Ovarian cancer Neg Hx               Social History     Socioeconomic History   • Marital status:      Spouse name: Not on file   • Number of children: Not on file   • Years of education: Not on file   • Highest education level: Not on file   Tobacco Use   • Smoking status: Never Smoker   • Smokeless tobacco: Never Used   Substance and Sexual Activity   • Alcohol use: No   • Drug use: No   • Sexual activity: Defer              Allergies   Allergen Reactions   • Dilantin [Phenytoin Sodium Extended] Rash              Current Outpatient Medications:   •  aspirin 81 MG tablet, Take 1 tablet by mouth Daily., Disp: 30 tablet, Rfl: 11  •  busPIRone (BUSPAR) 10 MG tablet, Take 1 tablet by mouth 3 (Three) Times a Day., Disp: 60 tablet, Rfl: 5  •  carvedilol (COREG) 12.5 MG tablet, Take 1 tablet by mouth Every 12 (Twelve) Hours., Disp: 180 tablet, Rfl: 3  •  cholecalciferol (VITAMIN D3) 1000 units tablet, Take 2,000 Units by mouth Every Other Day., Disp: , Rfl:   •  cyclobenzaprine (FLEXERIL) 10 MG tablet, Take 0.5 tablets by mouth 3 (Three) Times a Day As Needed (headache)., Disp: 12 tablet, Rfl: 0  •  diazePAM (VALIUM) 10 MG tablet, Take 1 tablet 30 minutes prior to procedure., Disp: 1 tablet, Rfl: 0  •  levocetirizine (XYZAL) 5 MG tablet, TAKE 1 TABLET BY MOUTH EVERY DAY IN THE EVENING, Disp: 90 tablet, Rfl: 1  •  lisinopril (PRINIVIL,ZESTRIL) 2.5 MG tablet, Take 1 tablet by mouth Daily., Disp: 90 tablet, Rfl: 1  •  LORazepam (Ativan) 0.5 MG tablet, Take 0.5-1 tablets by mouth Daily As Needed for Anxiety., Disp: 15 tablet, Rfl: 0  •   omeprazole (priLOSEC) 20 MG capsule, Take 1 capsule by mouth Daily., Disp: 90 capsule, Rfl: 1  •  ondansetron (ZOFRAN) 4 MG tablet, Take 4 mg by mouth Every 8 (Eight) Hours As Needed for Nausea or Vomiting., Disp: , Rfl:   •  QUEtiapine (SEROquel) 25 MG tablet, Take 2 tablets by mouth Every Night., Disp: 180 tablet, Rfl: 1  •  rosuvastatin (Crestor) 40 MG tablet, Take 1 tablet by mouth Daily., Disp: 90 tablet, Rfl: 1  •  sertraline (ZOLOFT) 100 MG tablet, Take 1 tablet by mouth Daily., Disp: 90 tablet, Rfl: 1  •  thiamine (VITAMIN B1) 100 MG tablet, Take 1 tablet by mouth Every Other Day., Disp: 45 tablet, Rfl: 1  No current facility-administered medications for this visit.          Review of Systems   Constitutional: Positive for fatigue. Negative for activity change, appetite change, chills, diaphoresis, fever and unexpected weight change.   HENT: Negative for congestion, dental problem, drooling, ear discharge, ear pain, facial swelling, hearing loss, mouth sores, nosebleeds, postnasal drip, rhinorrhea, sinus pressure, sneezing, sore throat, tinnitus, trouble swallowing and voice change.    Eyes: Positive for photophobia. Negative for pain, discharge, redness, itching and visual disturbance.   Respiratory: Negative for apnea, cough, choking, chest tightness, shortness of breath, wheezing and stridor.    Cardiovascular: Negative for chest pain, palpitations and leg swelling.   Gastrointestinal: Positive for diarrhea. Negative for abdominal distention, abdominal pain, anal bleeding, blood in stool, constipation, nausea, rectal pain and vomiting.   Endocrine: Negative for cold intolerance, heat intolerance, polydipsia, polyphagia and polyuria.   Genitourinary: Negative for decreased urine volume, difficulty urinating, dysuria, enuresis, flank pain, frequency, genital sores, hematuria and urgency.   Musculoskeletal: Negative for arthralgias, back pain, gait problem, joint swelling, myalgias, neck pain and neck  "stiffness.   Skin: Negative for color change, pallor, rash and wound.   Allergic/Immunologic: Negative for environmental allergies, food allergies and immunocompromised state.   Neurological: Positive for light-headedness. Negative for dizziness, tremors, seizures, syncope, facial asymmetry, speech difficulty, weakness, numbness and headaches.   Hematological: Negative for adenopathy. Does not bruise/bleed easily.   Psychiatric/Behavioral: Positive for confusion and decreased concentration. Negative for agitation, behavioral problems, dysphoric mood, hallucinations, self-injury, sleep disturbance and suicidal ideas. The patient is nervous/anxious. The patient is not hyperactive.    All other systems reviewed and are negative.              Vitals:    11/24/20 1526   BP: 142/72   BP Location: Right arm   Patient Position: Sitting   Cuff Size: Adult   Temp: 96 °F (35.6 °C)   TempSrc: Infrared   Weight: 74.4 kg (164 lb 0.4 oz)   Height: 160 cm (62.99\")               EXAMINATION: Unchanged.  Diminished vision left eye            MEDICAL DECISION MAKING: The MRI report suggests there is slight enlargement of a nodule of tumor located in the mid posterior temporal area that measures approximately 1 mm larger.           ASSESSMENT/DISPOSITION: Persistent temporal lobe meningioma..  The enlargement is quite small.  I will repeat her study in February.  Should it show some increase in size I would recommend SRS.  She will need continued MRI on a sequential basis.              I APPRECIATE THE OPPORTUNITY OF THIS REFERRAL. PLEASE CALL IF ANY       QUESTIONS [phone]    Scribed for Werner Hollis MD by Joy Corral CMA. 11/24/2020 16:04 EST     I have read and concur with the information provided by the scribe.  Werner Hollis MD        "

## 2020-11-25 ENCOUNTER — TREATMENT (OUTPATIENT)
Dept: CARDIAC REHAB | Facility: HOSPITAL | Age: 65
End: 2020-11-25

## 2020-11-25 DIAGNOSIS — I21.4 NSTEMI, INITIAL EPISODE OF CARE (HCC): Primary | ICD-10-CM

## 2020-11-25 PROCEDURE — 93798 PHYS/QHP OP CAR RHAB W/ECG: CPT

## 2020-11-30 ENCOUNTER — TELEPHONE (OUTPATIENT)
Dept: CARDIAC REHAB | Facility: HOSPITAL | Age: 65
End: 2020-11-30

## 2020-12-02 ENCOUNTER — TREATMENT (OUTPATIENT)
Dept: CARDIAC REHAB | Facility: HOSPITAL | Age: 65
End: 2020-12-02

## 2020-12-02 DIAGNOSIS — I21.4 NSTEMI, INITIAL EPISODE OF CARE (HCC): Primary | ICD-10-CM

## 2020-12-02 PROCEDURE — 93798 PHYS/QHP OP CAR RHAB W/ECG: CPT

## 2020-12-07 ENCOUNTER — TELEPHONE (OUTPATIENT)
Dept: CARDIAC REHAB | Facility: HOSPITAL | Age: 65
End: 2020-12-07

## 2020-12-10 NOTE — PATIENT INSTRUCTIONS
The American Heart Association recommends 150 minutes of aerobic exercise per week above and beyond your activities of daily living.  Your target heart rate is .  Your maximum heart rate for exercise is 153 and should not be sustained for long periods of time.  These numbers are based on age and should be evaluated yearly.      Exercise Guidelines   When you are recovering from a heart condition, such as from heart surgery, heart attack, or heart failure, it is important to have heart-healthy habits, including exercise routines. Discuss an appropriate exercise program with your heart specialist (cardiologist) and rehabilitation therapist.  It is important to design a program that is safe and effective for you. The program should meet your specific abilities and needs. Walking, biking, jogging, and swimming are all good aerobic activities. These take light to moderate effort. Adding some light resistance training is also important. Even simple lifestyle changes can help. These lifestyle changes may include parking farther from the store or taking the stairs instead of the elevator.  At first, you may begin exercising under supervision, such as at a hospital or clinic. Over time, you may begin exercising at home, with your health care provider's approval.  Types of exercise  Aerobic exercise  During cardiac rehabilitation, it is important to do aerobic activities. Aerobic exercise keeps joints and muscles moving. It involves large muscle groups. It is also rhythmic and must be done for a longer period of time. Doing these exercises improves circulation and endurance. Examples of aerobic exercise include:  · Swimming.  · Walking.  · Hiking.  · Jogging.  · Cross-country skiing.  · Biking.  · Dancing.    Static exercise  Static exercise (isometric exercise) uses muscles at high intensities without moving the joints. Some examples of static exercise include pushing against a heavy couch that does not move, doing a  wall sit, or holding a plank position. Static exercise improves strength but also quickly increases blood pressure. Follow these guidelines:  · If you have circulation problems or high blood pressure, talk with your health care provider before starting any static exercise routines. Do not do static exercises if your health care provider tells you not to.  · Do not hold your breath while doing static exercises. Holding your breath during static exercises can raise your blood pressure to a dangerously high level.    Weight-resistance exercise  Weight-resistance exercises are another important part of rehabilitation. These exercises strengthen your muscles by making them work against resistance. Resistance exercises may help you return to activities of daily living sooner and improve your quality of life. They also help reduce cardiac risk factors. Examples of weight-resistance exercise include using:  · Free weights.  · Weight-lifting machines.  · Large, specially designed rubber bands.  You will usually do weight-resistance exercises 2 times a week with a 2-day rest period between workouts.  Stretching  Stretching before you exercise warms up your muscles and prevents injury. Stretching also improves your flexibility, balance, coordination, and range of motion. Follow these guidelines:  · Stretch both before and after exercising.  · Do not force a muscle or joint into a painful angle. Stretching should be a relaxing part of your exercise routine.  · Once you feel resistance in your muscle, hold the stretch for a few seconds. Make sure you keep breathing while you hold the stretch.  · Go slowly when doing all stretches.  Setting a pace  · Choose a pace that is comfortable for you.  ? You should be able to talk while exercising. If you are short of breath or unable to speak while you exercise, slow down.  ? If you are able to sing while exercising, you are not exercising hard enough.  · Keep track of how hard you are  "working as you exercise (exertion level). Your rehabilitation therapist can teach you to use a mental scale to measure your level of exertion (perceived exertion). Using a mental scale, you will think about your exertion level and rate it in a range from 6 to 20.  ? A rating of 6 to 9. This means that you are doing \"very light\" exercise and are not exerting yourself enough. For a healthy person, this may be walking at a slow pace.  ? A rating of 11 to 15. This is exercise that is \"somewhat hard.\" For a healthy exercise session, you should aim for an exertion rate that is within this range.  ? A rating of 16 to 17. This is considered \"very hard\" or strenuous. For a healthy person, exercise at this rating may start to feel heavy and difficult.  ? A rating of 19 to 20. This means that you are working \"extremely hard.\" For most people, these numbers represent the hardest you've ever worked to exercise.  · Your health care provider or cardiac rehabilitation specialist may also recommend that you wear a heart rate monitor while you exercise. This will help you keep track of your heart rate zones and how hard your heart is working.  Frequency  As you are recovering, it is important to start exercising slowly and to gradually work up to your goal. Work with your health care provider to set up an exercise routine that works for you. Generally, cardiac rehabilitation exercise should include:  · 40 minutes of aerobic activity 3 - 4 days a week.  · Stretching and strength exercises 2 - 3 days a week.  Contact a doctor if:  · You have any of the following symptoms while exercising:  ? Pain, pressure, or burning in your chest, jaw, shoulder, or back (angina).  ? Lightheadedness.  ? Dizziness.  ? Irregular or fast heartbeat.  ? Shortness of breath.  · You are extremely tired after exercising.  Get help right away if:  · You have angina that does not get better with medicine and lasts for more than 5 minutes.  · You have nausea or " "you vomit.  · You have excessive sweating that is not caused by exercise.  Summary  · When you are recovering from a heart condition, it is important to have heart-healthy habits, including exercise routines.  · At first, you may begin exercising under supervision, such as at a hospital or clinic. Over time, you may begin exercising at home, with your health care provider's approval.  · Aim for 40 minutes of aerobic exercises 3 - 4 days a week.  · Aim to do stretching and strength exercises 2 - 3 days a week.  · Choose a pace that is comfortable for you. You should be able to talk while exercising.  This information is not intended to replace advice given to you by your health care provider. Make sure you discuss any questions you have with your health care provider.  Document Revised: 11/30/2018 Document Reviewed: 11/17/2017  [employer] Patient Education © 2020 [employer]    DASH Eating Plan  DASH stands for \"Dietary Approaches to Stop Hypertension.\" The DASH eating plan is a healthy eating plan that has been shown to reduce high blood pressure (hypertension). It may also reduce your risk for type 2 diabetes, heart disease, and stroke. The DASH eating plan may also help with weight loss.  What are tips for following this plan?    General guidelines  · Avoid eating more than 2,300 mg (milligrams) of salt (sodium) a day. If you have hypertension, you may need to reduce your sodium intake to 1,500 mg a day.  · Limit alcohol intake to no more than 1 drink a day for nonpregnant women and 2 drinks a day for men. One drink equals 12 oz of beer, 5 oz of wine, or 1½ oz of hard liquor.  · Work with your health care provider to maintain a healthy body weight or to lose weight. Ask what an ideal weight is for you.  · Get at least 30 minutes of exercise that causes your heart to beat faster (aerobic exercise) most days of the week. Activities may include walking, swimming, or biking.  · Work with your health care provider or " "diet and nutrition specialist (dietitian) to adjust your eating plan to your individual calorie needs.  Reading food labels    · Check food labels for the amount of sodium per serving. Choose foods with less than 5 percent of the Daily Value of sodium. Generally, foods with less than 300 mg of sodium per serving fit into this eating plan.  · To find whole grains, look for the word \"whole\" as the first word in the ingredient list.  Shopping  · Buy products labeled as \"low-sodium\" or \"no salt added.\"  · Buy fresh foods. Avoid canned foods and premade or frozen meals.  Cooking  · Avoid adding salt when cooking. Use salt-free seasonings or herbs instead of table salt or sea salt. Check with your health care provider or pharmacist before using salt substitutes.  · Do not lopez foods. Cook foods using healthy methods such as baking, boiling, grilling, and broiling instead.  · Cook with heart-healthy oils, such as olive, canola, soybean, or sunflower oil.  Meal planning  · Eat a balanced diet that includes:  ? 5 or more servings of fruits and vegetables each day. At each meal, try to fill half of your plate with fruits and vegetables.  ? Up to 6-8 servings of whole grains each day.  ? Less than 6 oz of lean meat, poultry, or fish each day. A 3-oz serving of meat is about the same size as a deck of cards. One egg equals 1 oz.  ? 2 servings of low-fat dairy each day.  ? A serving of nuts, seeds, or beans 5 times each week.  ? Heart-healthy fats. Healthy fats called Omega-3 fatty acids are found in foods such as flaxseeds and coldwater fish, like sardines, salmon, and mackerel.  · Limit how much you eat of the following:  ? Canned or prepackaged foods.  ? Food that is high in trans fat, such as fried foods.  ? Food that is high in saturated fat, such as fatty meat.  ? Sweets, desserts, sugary drinks, and other foods with added sugar.  ? Full-fat dairy products.  · Do not salt foods before eating.  · Try to eat at least 2 " vegetarian meals each week.  · Eat more home-cooked food and less restaurant, buffet, and fast food.  · When eating at a restaurant, ask that your food be prepared with less salt or no salt, if possible.  What foods are recommended?  The items listed may not be a complete list. Talk with your dietitian about what dietary choices are best for you.  Grains  Whole-grain or whole-wheat bread. Whole-grain or whole-wheat pasta. Brown rice. Oatmeal. Quinoa. Bulgur. Whole-grain and low-sodium cereals. Ade bread. Low-fat, low-sodium crackers. Whole-wheat flour tortillas.  Vegetables  Fresh or frozen vegetables (raw, steamed, roasted, or grilled). Low-sodium or reduced-sodium tomato and vegetable juice. Low-sodium or reduced-sodium tomato sauce and tomato paste. Low-sodium or reduced-sodium canned vegetables.  Fruits  All fresh, dried, or frozen fruit. Canned fruit in natural juice (without added sugar).  Meat and other protein foods  Skinless chicken or turkey. Ground chicken or turkey. Pork with fat trimmed off. Fish and seafood. Egg whites. Dried beans, peas, or lentils. Unsalted nuts, nut butters, and seeds. Unsalted canned beans. Lean cuts of beef with fat trimmed off. Low-sodium, lean deli meat.  Dairy  Low-fat (1%) or fat-free (skim) milk. Fat-free, low-fat, or reduced-fat cheeses. Nonfat, low-sodium ricotta or cottage cheese. Low-fat or nonfat yogurt. Low-fat, low-sodium cheese.  Fats and oils  Soft margarine without trans fats. Vegetable oil. Low-fat, reduced-fat, or light mayonnaise and salad dressings (reduced-sodium). Canola, safflower, olive, soybean, and sunflower oils. Avocado.  Seasoning and other foods  Herbs. Spices. Seasoning mixes without salt. Unsalted popcorn and pretzels. Fat-free sweets.  What foods are not recommended?  The items listed may not be a complete list. Talk with your dietitian about what dietary choices are best for you.  Grains  Baked goods made with fat, such as croissants, muffins, or  some breads. Dry pasta or rice meal packs.  Vegetables  Creamed or fried vegetables. Vegetables in a cheese sauce. Regular canned vegetables (not low-sodium or reduced-sodium). Regular canned tomato sauce and paste (not low-sodium or reduced-sodium). Regular tomato and vegetable juice (not low-sodium or reduced-sodium). Pickles. Olives.  Fruits  Canned fruit in a light or heavy syrup. Fried fruit. Fruit in cream or butter sauce.  Meat and other protein foods  Fatty cuts of meat. Ribs. Fried meat. Matias. Sausage. Bologna and other processed lunch meats. Salami. Fatback. Hotdogs. Bratwurst. Salted nuts and seeds. Canned beans with added salt. Canned or smoked fish. Whole eggs or egg yolks. Chicken or turkey with skin.  Dairy  Whole or 2% milk, cream, and half-and-half. Whole or full-fat cream cheese. Whole-fat or sweetened yogurt. Full-fat cheese. Nondairy creamers. Whipped toppings. Processed cheese and cheese spreads.  Fats and oils  Butter. Stick margarine. Lard. Shortening. Ghee. Matias fat. Tropical oils, such as coconut, palm kernel, or palm oil.  Seasoning and other foods  Salted popcorn and pretzels. Onion salt, garlic salt, seasoned salt, table salt, and sea salt. [place] sauce. Tartar sauce. Barbecue sauce. Teriyaki sauce. Soy sauce, including reduced-sodium. Steak sauce. Canned and packaged gravies. Fish sauce. Oyster sauce. Cocktail sauce. Horseradish that you find on the shelf. Ketchup. Mustard. Meat flavorings and tenderizers. Bouillon cubes. Hot sauce and Tabasco sauce. Premade or packaged marinades. Premade or packaged taco seasonings. Relishes. Regular salad dressings.  Where to find more information:  · National Heart, Lung, and Blood [place]: www.nhlbi.nih.gov  · American Heart Association: www.heart.org  Summary  · The DASH eating plan is a healthy eating plan that has been shown to reduce high blood pressure (hypertension). It may also reduce your risk for type 2 diabetes, heart disease,  and stroke.  · With the DASH eating plan, you should limit salt (sodium) intake to 2,300 mg a day. If you have hypertension, you may need to reduce your sodium intake to 1,500 mg a day.  · When on the DASH eating plan, aim to eat more fresh fruits and vegetables, whole grains, lean proteins, low-fat dairy, and heart-healthy fats.  · Work with your health care provider or diet and nutrition specialist (dietitian) to adjust your eating plan to your individual calorie needs.  This information is not intended to replace advice given to you by your health care provider. Make sure you discuss any questions you have with your health care provider.  Document Revised: 11/30/2018 Document Reviewed: 12/11/2017  [employer] Patient Education © 2020 [employer]

## 2020-12-11 ENCOUNTER — TREATMENT (OUTPATIENT)
Dept: CARDIAC REHAB | Facility: HOSPITAL | Age: 65
End: 2020-12-11

## 2020-12-11 DIAGNOSIS — I21.4 NSTEMI, INITIAL EPISODE OF CARE (HCC): Primary | ICD-10-CM

## 2020-12-11 PROCEDURE — 93798 PHYS/QHP OP CAR RHAB W/ECG: CPT

## 2020-12-14 ENCOUNTER — TREATMENT (OUTPATIENT)
Dept: CARDIAC REHAB | Facility: HOSPITAL | Age: 65
End: 2020-12-14

## 2020-12-14 DIAGNOSIS — I21.4 NSTEMI, INITIAL EPISODE OF CARE (HCC): Primary | ICD-10-CM

## 2020-12-14 PROCEDURE — 93798 PHYS/QHP OP CAR RHAB W/ECG: CPT

## 2020-12-18 ENCOUNTER — TREATMENT (OUTPATIENT)
Dept: CARDIAC REHAB | Facility: HOSPITAL | Age: 65
End: 2020-12-18

## 2020-12-18 DIAGNOSIS — I21.4 NSTEMI, INITIAL EPISODE OF CARE (HCC): Primary | ICD-10-CM

## 2020-12-18 PROCEDURE — 93798 PHYS/QHP OP CAR RHAB W/ECG: CPT

## 2020-12-21 ENCOUNTER — APPOINTMENT (OUTPATIENT)
Dept: CARDIAC REHAB | Facility: HOSPITAL | Age: 65
End: 2020-12-21

## 2020-12-22 RX ORDER — CARVEDILOL 12.5 MG/1
12.5 TABLET ORAL EVERY 12 HOURS
Qty: 60 TABLET | Refills: 0 | Status: SHIPPED | OUTPATIENT
Start: 2020-12-22 | End: 2021-02-24 | Stop reason: SDUPTHER

## 2020-12-22 RX ORDER — ATORVASTATIN CALCIUM 80 MG/1
TABLET, FILM COATED ORAL
Qty: 30 TABLET | Refills: 0 | OUTPATIENT
Start: 2020-12-22

## 2020-12-23 ENCOUNTER — APPOINTMENT (OUTPATIENT)
Dept: CARDIAC REHAB | Facility: HOSPITAL | Age: 65
End: 2020-12-23

## 2021-01-09 ENCOUNTER — OFFICE VISIT (OUTPATIENT)
Dept: INTERNAL MEDICINE | Facility: CLINIC | Age: 66
End: 2021-01-09

## 2021-01-09 DIAGNOSIS — R52 BODY ACHES: Primary | ICD-10-CM

## 2021-01-09 DIAGNOSIS — R05.9 COUGH: ICD-10-CM

## 2021-01-09 LAB
EXPIRATION DATE: NORMAL
FLUAV AG NPH QL: NEGATIVE
FLUBV AG NPH QL: NEGATIVE
INTERNAL CONTROL: NORMAL
Lab: NORMAL

## 2021-01-09 PROCEDURE — 87804 INFLUENZA ASSAY W/OPTIC: CPT | Performed by: NURSE PRACTITIONER

## 2021-01-09 PROCEDURE — U0004 COV-19 TEST NON-CDC HGH THRU: HCPCS | Performed by: NURSE PRACTITIONER

## 2021-01-09 PROCEDURE — 99442 PR PHYS/QHP TELEPHONE EVALUATION 11-20 MIN: CPT | Performed by: NURSE PRACTITIONER

## 2021-01-09 NOTE — PROGRESS NOTES
Chief Complaint   Patient presents with   • Cough   • Generalized Body Aches     You have chosen to receive care through a telephone visit. Do you consent to use a telephone visit for your medical care today? Yes    History of Present Illness    65 y.o.female presents for cough and body aches  Has had Cough stuffy head for week; never too bad. Thought was better, now yesterday started getting achy all over and cough more. No fever. Not much out nose; non productive. No short of air. No lost of taste smell. Feels some like flu. Has not tried anything for sx.     ROS: any positive referenced above.   Other ROS negative.      PMSFH  The following portions of the patient's history were reviewed and updated as appropriate: allergies, current medications, past family history, past medical history, past social history, past surgical history and problem list.     Past Medical History:   Diagnosis Date   • Abdominal hernia    • Arthritis     rt knee    • Atrial flutter with rapid ventricular response (CMS/HCC) 12/21/2017   • Back pain    • Brain tumor (CMS/HCC)    • Colostomy present (CMS/HCC) 08/2018   • Depression    • History of blood transfusion    • History of gastroesophageal reflux (GERD)     resolved since Nissen   • History of Nissen fundoplication 7/1/2017   • History of small bowel obstruction    • Hyperlipemia    • Hypertension    • Memory loss or impairment    • Migraine    • Parathyroid adenoma     Incidental on thyroid US.   • PONV (postoperative nausea and vomiting)     nausea - preprocedural meds help    • Prediabetes     Last Impression: 06 Feb 2015  Reviewed labs. Excellent control.  Maren Connellast Impression: 06 Feb 2015  Reviewed labs. Excellent control.  Michaela Connell   • Thyroid nodule      Last Impression: 13 Jun 2015  r/o thyroid cancer, will proceed with US.  Michaela Connell (Internal Medicine)    • UTI (urinary tract infection)    • Vision changes     blockages left eye    • Wears  glasses     readers      Allergies   Allergen Reactions   • Dilantin [Phenytoin Sodium Extended] Rash      Social History     Tobacco Use   • Smoking status: Never Smoker   • Smokeless tobacco: Never Used   Substance Use Topics   • Alcohol use: No   • Drug use: No         Current Outpatient Medications:   •  aspirin 81 MG tablet, Take 1 tablet by mouth Daily., Disp: 30 tablet, Rfl: 11  •  busPIRone (BUSPAR) 10 MG tablet, Take 1 tablet by mouth 3 (Three) Times a Day., Disp: 60 tablet, Rfl: 5  •  carvedilol (COREG) 12.5 MG tablet, Take 1 tablet by mouth Every 12 (Twelve) Hours., Disp: 60 tablet, Rfl: 0  •  cholecalciferol (VITAMIN D3) 1000 units tablet, Take 2,000 Units by mouth Every Other Day., Disp: , Rfl:   •  cyclobenzaprine (FLEXERIL) 10 MG tablet, Take 0.5 tablets by mouth 3 (Three) Times a Day As Needed (headache)., Disp: 12 tablet, Rfl: 0  •  diazePAM (VALIUM) 10 MG tablet, Take 1 tablet 30 minutes prior to procedure., Disp: 1 tablet, Rfl: 0  •  levocetirizine (XYZAL) 5 MG tablet, TAKE 1 TABLET BY MOUTH EVERY DAY IN THE EVENING, Disp: 90 tablet, Rfl: 1  •  lisinopril (PRINIVIL,ZESTRIL) 2.5 MG tablet, Take 1 tablet by mouth Daily., Disp: 90 tablet, Rfl: 1  •  LORazepam (Ativan) 0.5 MG tablet, Take 0.5-1 tablets by mouth Daily As Needed for Anxiety., Disp: 15 tablet, Rfl: 0  •  omeprazole (priLOSEC) 20 MG capsule, Take 1 capsule by mouth Daily., Disp: 90 capsule, Rfl: 1  •  ondansetron (ZOFRAN) 4 MG tablet, Take 4 mg by mouth Every 8 (Eight) Hours As Needed for Nausea or Vomiting., Disp: , Rfl:   •  QUEtiapine (SEROquel) 25 MG tablet, Take 2 tablets by mouth Every Night., Disp: 180 tablet, Rfl: 1  •  rosuvastatin (Crestor) 40 MG tablet, Take 1 tablet by mouth Daily., Disp: 90 tablet, Rfl: 1  •  sertraline (ZOLOFT) 100 MG tablet, Take 1 tablet by mouth Daily., Disp: 90 tablet, Rfl: 1  •  thiamine (VITAMIN B1) 100 MG tablet, Take 1 tablet by mouth Every Other Day., Disp: 45 tablet, Rfl: 1    VITALS:  Physical  Exam  telehealth able to carry on conversation without difficulties.  Result Review :            Assessment and Plan    Diagnoses and all orders for this visit:    1. Body aches (Primary)  -     COVID-19 PCR, [facility], NP SWAB IN [facility]AL TRANSPORT MEDIA 24-30 HR TAT - Swab, Nasopharynx; Future  -     POC Influenza A / B  -     COVID-19 PCR, [facility], NP SWAB IN [facility] VIRAL TRANSPORT MEDIA 24-30 HR TAT - Swab, Nasopharynx    2. Cough  -     COVID-19 PCR, [facility], NP SWAB IN [facility] VIRAL TRANSPORT MEDIA 24-30 HR TAT - Swab, Nasopharynx; Future  -     POC Influenza A / B  -     COVID-19 PCR, [facility], NP SWAB IN [facility] V[facility]AL TRANSPORT MEDIA 24-30 HR TAT - Swab, Nasopharynx    will check for flu/covid screening. Can try otc allergy med xyzal to help dry up secretions; flonase to help nasal congestion.  Positive for COVID-19 virus.  Patient to be notified.  Self quarantine and treat symptom control.  Any shortness of breath or fever that does not come down with antipyretics to let us know or seek emergency care.    Follow Up   Return if symptoms worsen or fail to improve.      This visit has been rescheduled as a phone visit to comply with patient safety concerns in accordance with CDC recommendations. Total time of discussion was 15 minutes.      I discussed the patients findings and my recommendations with patient.  Patient was encouraged to keep me informed of any acute changes, lack of improvement, or any new concerning symptoms.  Patient voiced understanding of all instructions and denied further questions.    Electronically signed by:    AYDEE Clarke  01/09/2021    EMR Dragon/Transcription Disclaimer:  Much of this encounter note is an electronic transcription/translation of spoken language to printed text.  The electronic translation of spoken language may permit erroneous, or at times, nonsensical words or phrases to be inadvertently transcribed.  Although I have reviewed the note for such  errors, some may still exist

## 2021-01-10 LAB — SARS-COV-2 RNA RESP QL NAA+PROBE: DETECTED

## 2021-01-11 ENCOUNTER — TELEPHONE (OUTPATIENT)
Dept: INTERNAL MEDICINE | Facility: CLINIC | Age: 66
End: 2021-01-11

## 2021-01-11 DIAGNOSIS — I21.4 NSTEMI (NON-ST ELEVATED MYOCARDIAL INFARCTION) (HCC): ICD-10-CM

## 2021-01-11 RX ORDER — ROSUVASTATIN CALCIUM 40 MG/1
TABLET, COATED ORAL
Qty: 90 TABLET | Refills: 0 | Status: SHIPPED | OUTPATIENT
Start: 2021-01-11 | End: 2021-04-13

## 2021-01-11 NOTE — TELEPHONE ENCOUNTER
Last Office Visit: 11/04/20  Next Office Visit:05/04/21    Labs completed in past 6 months? yes  Labs completed in past year? yes    Last Refill Date:08/23/20  Quantity:90  Refills:1  Pharmacy:

## 2021-01-19 ENCOUNTER — OFFICE VISIT (OUTPATIENT)
Dept: INTERNAL MEDICINE | Facility: CLINIC | Age: 66
End: 2021-01-19

## 2021-01-19 DIAGNOSIS — U07.1 COVID-19: Primary | ICD-10-CM

## 2021-01-19 PROCEDURE — 99442 PR PHYS/QHP TELEPHONE EVALUATION 11-20 MIN: CPT | Performed by: NURSE PRACTITIONER

## 2021-01-19 NOTE — PROGRESS NOTES
Chief Complaint   Patient presents with   • Follow-up     COVID      You have chosen to receive care through a telephone visit. Do you consent to use a telephone visit for your medical care today? yes  History of Present Illness    65 y.o.female presents for follow up covid.  Tested positive Jan9.  Doing better, some fatigue. No short of air or cough. Wasn't sure what she needed to do next as for testing, vaccine etc. Pt has not been contacted by local health dept.    ROS: any positive referenced above.   Other ROS negative.      [facility]  The following portions of the patient's history were reviewed and updated as appropriate: allergies, current medications, past family history, past medical history, past social history, past surgical history and problem list.     Past Medical History:   Diagnosis Date   • Abdominal hernia    • Arthritis     rt knee    • Atrial flutter with rapid ventricular response (CMS/HCC) 12/21/2017   • Back pain    • Brain tumor (CMS/HCC)    • Colostomy present (CMS/HCC) 08/2018   • Depression    • History of blood transfusion    • History of gastroesophageal reflux (GERD)     resolved since Nissen   • History of Nissen fundoplication 7/1/2017   • History of small bowel obstruction    • Hyperlipemia    • Hypertension    • Memory loss or impairment    • Migraine    • Parathyroid adenoma     Incidental on thyroid US.   • PONV (postoperative nausea and vomiting)     nausea - preprocedural meds help    • Prediabetes     Last Impression: 06 Feb 2015  Reviewed labs. Excellent control.  Maren Connellast Impression: 06 Feb 2015  Reviewed labs. Excellent control.  Michaela Connell   • Thyroid nodule      Last Impression: 13 Jun 2015  r/o thyroid cancer, will proceed with US.  Michaela Connell (Internal Medicine)    • UTI (urinary tract infection)    • Vision changes     blockages left eye    • Wears glasses     readers      Allergies   Allergen Reactions   • Dilantin [Phenytoin Sodium  Extended] Rash      Social History     Tobacco Use   • Smoking status: Never Smoker   • Smokeless tobacco: Never Used   Substance Use Topics   • Alcohol use: No   • Drug use: No         Current Outpatient Medications:   •  aspirin 81 MG tablet, Take 1 tablet by mouth Daily., Disp: 30 tablet, Rfl: 11  •  busPIRone (BUSPAR) 10 MG tablet, Take 1 tablet by mouth 3 (Three) Times a Day., Disp: 60 tablet, Rfl: 5  •  carvedilol (COREG) 12.5 MG tablet, Take 1 tablet by mouth Every 12 (Twelve) Hours., Disp: 60 tablet, Rfl: 0  •  cholecalciferol (VITAMIN D3) 1000 units tablet, Take 2,000 Units by mouth Every Other Day., Disp: , Rfl:   •  cyclobenzaprine (FLEXERIL) 10 MG tablet, Take 0.5 tablets by mouth 3 (Three) Times a Day As Needed (headache)., Disp: 12 tablet, Rfl: 0  •  diazePAM (VALIUM) 10 MG tablet, Take 1 tablet 30 minutes prior to procedure., Disp: 1 tablet, Rfl: 0  •  levocetirizine (XYZAL) 5 MG tablet, TAKE 1 TABLET BY MOUTH EVERY DAY IN THE EVENING, Disp: 90 tablet, Rfl: 1  •  lisinopril (PRINIVIL,ZESTRIL) 2.5 MG tablet, Take 1 tablet by mouth Daily., Disp: 90 tablet, Rfl: 1  •  LORazepam (Ativan) 0.5 MG tablet, Take 0.5-1 tablets by mouth Daily As Needed for Anxiety., Disp: 15 tablet, Rfl: 0  •  omeprazole (priLOSEC) 20 MG capsule, Take 1 capsule by mouth Daily., Disp: 90 capsule, Rfl: 1  •  ondansetron (ZOFRAN) 4 MG tablet, Take 4 mg by mouth Every 8 (Eight) Hours As Needed for Nausea or Vomiting., Disp: , Rfl:   •  QUEtiapine (SEROquel) 25 MG tablet, Take 2 tablets by mouth Every Night., Disp: 180 tablet, Rfl: 1  •  rosuvastatin (CRESTOR) 40 MG tablet, TAKE ONE TABLET BY MOUTH DAILY, Disp: 90 tablet, Rfl: 0  •  sertraline (ZOLOFT) 100 MG tablet, Take 1 tablet by mouth Daily., Disp: 90 tablet, Rfl: 1  •  thiamine (VITAMIN B1) 100 MG tablet, Take 1 tablet by mouth Every Other Day., Disp: 45 tablet, Rfl: 1    VITALS:  Physical Exam  Pt able to carry on conversation without difficulties.  Result Review :             Assessment and Plan    Diagnoses and all orders for this visit:    1. COVID-19 (Primary)    pt is 10 days post positive covid 19 test and about 13 days post initial symptoms. Explained 14 day quarantine recommendations. Explained no need for retest; she can and will likely cont to test positive for covid 19 up to 3 months but does not mean she is still contagious. Lives in [place]; she will reach out to local health dept. Since just had covid and likely still positive would wait a few weeks before getting the vaccine.    This visit has been rescheduled as a phone visit to comply with patient safety concerns in accordance with CDC recommendations. Total time of discussion was 15 minutes.        Follow Up   Return if symptoms worsen or fail to improve.      I discussed the patients findings and my recommendations with patient.  Patient was encouraged to keep me informed of any acute changes, lack of improvement, or any new concerning symptoms.  Patient voiced understanding of all instructions and denied further questions.    Electronically signed by:    AYDEE Clarke  01/19/2021    EMR Dragon/Transcription Disclaimer:  Much of this encounter note is an electronic transcription/translation of spoken language to printed text.  The electronic translation of spoken language may permit erroneous, or at times, nonsensical words or phrases to be inadvertently transcribed.  Although I have reviewed the note for such errors, some may still exist

## 2021-01-22 ENCOUNTER — APPOINTMENT (OUTPATIENT)
Dept: GENERAL RADIOLOGY | Facility: HOSPITAL | Age: 66
End: 2021-01-22

## 2021-01-22 ENCOUNTER — HOSPITAL ENCOUNTER (EMERGENCY)
Facility: HOSPITAL | Age: 66
Discharge: HOME | End: 2021-01-22
Attending: EMERGENCY MEDICINE | Admitting: EMERGENCY MEDICINE

## 2021-01-22 VITALS
WEIGHT: 160 LBS | OXYGEN SATURATION: 98 % | TEMPERATURE: 98.4 F | DIASTOLIC BLOOD PRESSURE: 93 MMHG | RESPIRATION RATE: 16 BRPM | BODY MASS INDEX: 28.35 KG/M2 | HEIGHT: 63 IN | HEART RATE: 89 BPM | SYSTOLIC BLOOD PRESSURE: 180 MMHG

## 2021-01-22 DIAGNOSIS — Z86.39 HISTORY OF HYPERLIPIDEMIA: ICD-10-CM

## 2021-01-22 DIAGNOSIS — R53.83 MALAISE AND FATIGUE: ICD-10-CM

## 2021-01-22 DIAGNOSIS — I10 ELEVATED BLOOD PRESSURE READING WITH DIAGNOSIS OF HYPERTENSION: ICD-10-CM

## 2021-01-22 DIAGNOSIS — E86.0 MILD DEHYDRATION: ICD-10-CM

## 2021-01-22 DIAGNOSIS — R53.81 MALAISE AND FATIGUE: ICD-10-CM

## 2021-01-22 DIAGNOSIS — Z86.16 HISTORY OF COVID-19: ICD-10-CM

## 2021-01-22 DIAGNOSIS — R53.1 GENERALIZED WEAKNESS: Primary | ICD-10-CM

## 2021-01-22 LAB
ALBUMIN SERPL-MCNC: 3.8 G/DL (ref 3.5–5.2)
ALBUMIN/GLOB SERPL: 1.2 G/DL
ALP SERPL-CCNC: 132 U/L (ref 39–117)
ALT SERPL W P-5'-P-CCNC: 24 U/L (ref 1–33)
ANION GAP SERPL CALCULATED.3IONS-SCNC: 9 MMOL/L (ref 5–15)
AST SERPL-CCNC: 27 U/L (ref 1–32)
BASOPHILS # BLD AUTO: 0.03 10*3/MM3 (ref 0–0.2)
BASOPHILS NFR BLD AUTO: 0.5 % (ref 0–1.5)
BILIRUB SERPL-MCNC: 0.2 MG/DL (ref 0–1.2)
BUN SERPL-MCNC: 19 MG/DL (ref 8–23)
BUN/CREAT SERPL: 19.4 (ref 7–25)
CALCIUM SPEC-SCNC: 9.4 MG/DL (ref 8.6–10.5)
CHLORIDE SERPL-SCNC: 107 MMOL/L (ref 98–107)
CO2 SERPL-SCNC: 28 MMOL/L (ref 22–29)
CREAT SERPL-MCNC: 0.98 MG/DL (ref 0.57–1)
CRP SERPL-MCNC: 0.72 MG/DL (ref 0–0.5)
D-LACTATE SERPL-SCNC: 1 MMOL/L (ref 0.5–2)
DEPRECATED RDW RBC AUTO: 43.7 FL (ref 37–54)
EOSINOPHIL # BLD AUTO: 0.13 10*3/MM3 (ref 0–0.4)
EOSINOPHIL NFR BLD AUTO: 2.2 % (ref 0.3–6.2)
ERYTHROCYTE [DISTWIDTH] IN BLOOD BY AUTOMATED COUNT: 13.1 % (ref 12.3–15.4)
FLUAV RNA RESP QL NAA+PROBE: NOT DETECTED
FLUBV RNA RESP QL NAA+PROBE: NOT DETECTED
GFR SERPL CREATININE-BSD FRML MDRD: 57 ML/MIN/1.73
GLOBULIN UR ELPH-MCNC: 3.2 GM/DL
GLUCOSE SERPL-MCNC: 137 MG/DL (ref 65–99)
HCT VFR BLD AUTO: 39.8 % (ref 34–46.6)
HGB BLD-MCNC: 12.8 G/DL (ref 12–15.9)
HOLD SPECIMEN: NORMAL
HOLD SPECIMEN: NORMAL
IMM GRANULOCYTES # BLD AUTO: 0.01 10*3/MM3 (ref 0–0.05)
IMM GRANULOCYTES NFR BLD AUTO: 0.2 % (ref 0–0.5)
LDH SERPL-CCNC: 227 U/L (ref 135–214)
LYMPHOCYTES # BLD AUTO: 1.52 10*3/MM3 (ref 0.7–3.1)
LYMPHOCYTES NFR BLD AUTO: 26.2 % (ref 19.6–45.3)
MAGNESIUM SERPL-MCNC: 2 MG/DL (ref 1.6–2.4)
MCH RBC QN AUTO: 29.1 PG (ref 26.6–33)
MCHC RBC AUTO-ENTMCNC: 32.2 G/DL (ref 31.5–35.7)
MCV RBC AUTO: 90.5 FL (ref 79–97)
MONOCYTES # BLD AUTO: 0.42 10*3/MM3 (ref 0.1–0.9)
MONOCYTES NFR BLD AUTO: 7.2 % (ref 5–12)
NEUTROPHILS NFR BLD AUTO: 3.7 10*3/MM3 (ref 1.7–7)
NEUTROPHILS NFR BLD AUTO: 63.7 % (ref 42.7–76)
NRBC BLD AUTO-RTO: 0 /100 WBC (ref 0–0.2)
PLATELET # BLD AUTO: 218 10*3/MM3 (ref 140–450)
PMV BLD AUTO: 10.5 FL (ref 6–12)
POTASSIUM SERPL-SCNC: 3.5 MMOL/L (ref 3.5–5.2)
PROCALCITONIN SERPL-MCNC: 0.05 NG/ML (ref 0–0.25)
PROT SERPL-MCNC: 7 G/DL (ref 6–8.5)
RBC # BLD AUTO: 4.4 10*6/MM3 (ref 3.77–5.28)
SARS-COV-2 RNA RESP QL NAA+PROBE: DETECTED
SODIUM SERPL-SCNC: 144 MMOL/L (ref 136–145)
TROPONIN T SERPL-MCNC: <0.01 NG/ML (ref 0–0.03)
TROPONIN T SERPL-MCNC: <0.01 NG/ML (ref 0–0.03)
WBC # BLD AUTO: 5.81 10*3/MM3 (ref 3.4–10.8)
WHOLE BLOOD HOLD SPECIMEN: NORMAL
WHOLE BLOOD HOLD SPECIMEN: NORMAL

## 2021-01-22 PROCEDURE — 84484 ASSAY OF TROPONIN QUANT: CPT

## 2021-01-22 PROCEDURE — 93005 ELECTROCARDIOGRAM TRACING: CPT

## 2021-01-22 PROCEDURE — 87636 SARSCOV2 & INF A&B AMP PRB: CPT | Performed by: PHYSICIAN ASSISTANT

## 2021-01-22 PROCEDURE — 93005 ELECTROCARDIOGRAM TRACING: CPT | Performed by: PHYSICIAN ASSISTANT

## 2021-01-22 PROCEDURE — 83615 LACTATE (LD) (LDH) ENZYME: CPT | Performed by: PHYSICIAN ASSISTANT

## 2021-01-22 PROCEDURE — 84484 ASSAY OF TROPONIN QUANT: CPT | Performed by: PHYSICIAN ASSISTANT

## 2021-01-22 PROCEDURE — 99284 EMERGENCY DEPT VISIT MOD MDM: CPT

## 2021-01-22 PROCEDURE — 85025 COMPLETE CBC W/AUTO DIFF WBC: CPT

## 2021-01-22 PROCEDURE — 80053 COMPREHEN METABOLIC PANEL: CPT

## 2021-01-22 PROCEDURE — 86140 C-REACTIVE PROTEIN: CPT | Performed by: PHYSICIAN ASSISTANT

## 2021-01-22 PROCEDURE — 93005 ELECTROCARDIOGRAM TRACING: CPT | Performed by: EMERGENCY MEDICINE

## 2021-01-22 PROCEDURE — 71045 X-RAY EXAM CHEST 1 VIEW: CPT

## 2021-01-22 PROCEDURE — 83735 ASSAY OF MAGNESIUM: CPT

## 2021-01-22 PROCEDURE — 83605 ASSAY OF LACTIC ACID: CPT | Performed by: PHYSICIAN ASSISTANT

## 2021-01-22 PROCEDURE — 84145 PROCALCITONIN (PCT): CPT | Performed by: PHYSICIAN ASSISTANT

## 2021-01-22 RX ORDER — LISINOPRIL 5 MG/1
5 TABLET ORAL ONCE
Status: DISCONTINUED | OUTPATIENT
Start: 2021-01-22 | End: 2021-01-23 | Stop reason: HOSPADM

## 2021-01-22 RX ORDER — SODIUM CHLORIDE 0.9 % (FLUSH) 0.9 %
10 SYRINGE (ML) INJECTION AS NEEDED
Status: DISCONTINUED | OUTPATIENT
Start: 2021-01-22 | End: 2021-01-23 | Stop reason: HOSPADM

## 2021-01-22 RX ADMIN — SODIUM CHLORIDE 1000 ML: 9 INJECTION, SOLUTION INTRAVENOUS at 21:42

## 2021-01-23 NOTE — DISCHARGE INSTRUCTIONS
ER evaluation revealed normal cardiac work-up and normal CBC and chemistries.  Chest x-ray shows no evidence of pneumonia.  O2 saturation is normal.  Blood pressure was elevated with history of hypertension.  We gave patient an extra dose of her lisinopril and we will refer her closely to the hypertension clinic.  Recommend multiple home blood pressure readings for review at the hypertension clinic.  We also gave patient information on a Dash eating plan.  Increase fluid intake.  Continue with all other current medical management.  Patient tested positive for Covid on 1/9/2021 and requested another test.  She continues to test positive, but she is out of the exposure period.  Return to the ER if any worsening symptoms.

## 2021-01-23 NOTE — ED PROVIDER NOTES
Subjective   Tereza Koch is a 65 year old female who presents to the ED with generalized weakness. She tested positive for Covid-19 two weeks ago, and she reports extreme fluctuations in blood pressure.          Review of Systems   All other systems reviewed and are negative.      Past Medical History:   Diagnosis Date   • Abdominal hernia    • Arthritis     rt knee    • Atrial flutter with rapid ventricular response (CMS/HCC) 12/21/2017   • Back pain    • Brain tumor (CMS/HCC)    • Colostomy present (CMS/HCC) 08/2018   • Depression    • History of blood transfusion    • History of gastroesophageal reflux (GERD)     resolved since Nissen   • History of Nissen fundoplication 7/1/2017   • History of small bowel obstruction    • Hyperlipemia    • Hypertension    • Memory loss or impairment    • Migraine    • Parathyroid adenoma     Incidental on thyroid US.   • PONV (postoperative nausea and vomiting)     nausea - preprocedural meds help    • Prediabetes     Last Impression: 06 Feb 2015  Reviewed labs. Excellent control.  Maren Connellast Impression: 06 Feb 2015  Reviewed labs. Excellent control.  Michaela Connell   • Thyroid nodule      Last Impression: 13 Jun 2015  r/o thyroid cancer, will proceed with US.  Michaela Connell (Internal Medicine)    • UTI (urinary tract infection)    • Vision changes     blockages left eye    • Wears glasses     readers       Allergies   Allergen Reactions   • Dilantin [Phenytoin Sodium Extended] Rash       Past Surgical History:   Procedure Laterality Date   • CARDIAC CATHETERIZATION N/A 4/27/2020    Procedure: LEFT HEART CATH;  Surgeon: Marina Ring MD;  Location:  [facility] CATH INVASIVE LOCATION;  Service: Cardiology;  Laterality: N/A;   • CARPAL TUNNEL RELEASE  2002    right    • COLONOSCOPY N/A 8/18/2018    Procedure: COLONOSCOPY;  Surgeon: Inderjit Campos MD;  Location:  [facility] ENDOSCOPY;  Service: Gastroenterology   • COLONOSCOPY N/A 11/28/2018     Procedure: COLONOSCOPY;  Surgeon: Inderjit Campos MD;  Location:  [facility];  Service: Gastroenterology   • COLOSTOMY CLOSURE N/A 2/19/2019    Procedure: COLOSTOMY TAKEDOWN, INCISIONAL HERNIA REPAIR;  Surgeon: Andrea Bocanegra MD;  Location:  [facility];  Service: General   • CRANIOTOMY  2003 & 2014    Dr. Werner Hollis for tumor removal   • ENDOSCOPY     • EXPLORATORY LAPAROTOMY N/A 12/5/2017    Procedure: EXPLORATORY LAPAROTOMY, SMALL BOWEL RESECTION;  Surgeon: Ирина Cobian MD;  Location:  [facility];  Service:    • EXPLORATORY LAPAROTOMY N/A 12/22/2017    Procedure: LAPAROTOMY EXPLORATORY FOR SMALL BOWEL OBSTRUCTION;  Surgeon: Ирина Cobian MD;  Location:  [facility];  Service:    • EXPLORATORY LAPAROTOMY N/A 7/23/2018    Procedure: EXPLORATORY LAPAROTOMY, APPENDECTOMY, CECOPEXY, INCISIONAL HERNIA REPAIR, LYSIS OF ADHESIONS;  Surgeon: Andrea Bocanegra MD;  Location:  [facility];  Service: General   • EXPLORATORY LAPAROTOMY N/A 8/19/2018    Procedure: LAPAROTOMY EXPLORATORY, SIGMOID COLECTOMY, CREATION OF OSTOMY;  Surgeon: Andrea Bocanegra MD;  Location:  [facility];  Service: General   • HYSTERECTOMY      partial - both ovaries still present pt believes    • INSERTION HEMODIALYSIS CATHETER N/A 12/7/2017    Procedure: HEMODIALYSIS CATHETER INSERTION;  Surgeon: Chance Valenzuela MD;  Location:  [facility];  Service:    • ORBITOTOMY Left 11/3/2017    Procedure:  LEFT LATERAL ORBITOTOMY WITH DEBULKING OF TUMOR ;  Surgeon: Moo Hopkins MD;  Location:  [facility];  Service:    • OTHER SURGICAL HISTORY      esophagogastric fundoplasty nissen fundoplication   • TUBAL ABDOMINAL LIGATION         Family History   Problem Relation Age of Onset   • Obesity Mother    • Heart attack Mother    • Migraines Father    • Stroke Father    • Diabetes Father    • Cancer Other    • Arthritis Other    • Diabetes Other    • Breast cancer Maternal Aunt    • Breast cancer Paternal Aunt    • Ovarian cancer Neg Hx         Social History     Socioeconomic History   • Marital status:      Spouse name: Not on file   • Number of children: Not on file   • Years of education: Not on file   • Highest education level: Not on file   Tobacco Use   • Smoking status: Never Smoker   • Smokeless tobacco: Never Used   Substance and Sexual Activity   • Alcohol use: No   • Drug use: No   • Sexual activity: Defer         Objective   Physical Exam  Vitals signs and nursing note reviewed.   Constitutional:       General: She is not in acute distress.     Appearance: She is well-developed.   HENT:      Head: Normocephalic and atraumatic.      Nose: Nose normal.   Eyes:      General: No scleral icterus.     Conjunctiva/sclera: Conjunctivae normal.   Neck:      Musculoskeletal: Normal range of motion and neck supple.   Cardiovascular:      Rate and Rhythm: Normal rate and regular rhythm.      Heart sounds: Normal heart sounds. No murmur.   Pulmonary:      Effort: Pulmonary effort is normal. No respiratory distress.      Breath sounds: Normal breath sounds.   Abdominal:      Palpations: Abdomen is soft.      Tenderness: There is no abdominal tenderness.   Musculoskeletal: Normal range of motion.   Skin:     General: Skin is warm and dry.   Neurological:      Mental Status: She is alert and oriented to person, place, and time.   Psychiatric:         Behavior: Behavior normal.         Procedures         ED Course                                            MDM    Final diagnoses:   None       Documentation assistance provided by scribe Lauren K Collett.  Information recorded by the scribe was done at my direction and has been verified and validated by me.     Collett, Lauren K  01/22/21 2023

## 2021-01-23 NOTE — ED PROVIDER NOTES
Subjective   This is a 65-year-old female that presents to the ER with generalized weakness and fluctuating blood pressures, dizziness, and near syncope at times.  Patient reports that she tested positive for COVID-19 on 1/9/2021.  She says that overall, the fatigue has started to improve.  She reports that she now has some mild nasal congestion with rhinorrhea.  She denies any significant cough.  She denies chest congestion, chest pain, or shortness of breath.  She denies any abdominal pain or nausea/vomiting.  She did have significant diarrhea during the initial stages of COVID-19, but that has resolved and she had a normal bowel movement earlier today.  She denies any dysuria, urgency, or frequency.  She does report that she has had some increased generalized weakness with fatigue and feels dizzy with standing and near syncopal.  She denies any syncopal episodes.  She denies any loss of taste or smell.  She says that sometimes her blood pressure will be quite elevated and then at other times her blood pressure will be systolic of 104.  She denies any recent change in any of her medications.  Past medical history is significant for hypertension, migraine headaches, hyperlipidemia, atrial flutter, thyroid nodule.  Small bowel obstructions and large bowel obstructions due to adhesions with multiple abdominal surgeries, and history of parathyroid adenoma.      History provided by:  Patient  Weakness - Generalized  Duration:  2 weeks  Timing:  Constant  Progression:  Unchanged  Chronicity:  New  Relieved by:  Nothing  Worsened by:  Nothing  Ineffective treatments:  None tried  Associated symptoms: diarrhea (Diarrhea at the beginning of Covid.  Resolved now.), dizziness, headaches (intermittent generalized HA with Covid; improving.) and near-syncope    Associated symptoms: no abdominal pain, no chest pain, no cough, no dysphagia, no dysuria, no falls, no fever, no frequency, no lethargy, no loss of consciousness, no  melena, no myalgias, no nausea, no sensory-motor deficit, no shortness of breath, no urgency and no vomiting        Review of Systems   Constitutional: Positive for activity change, appetite change and fatigue. Negative for chills, diaphoresis and fever.   HENT: Positive for congestion, postnasal drip and rhinorrhea. Negative for sinus pressure, sinus pain, sneezing and sore throat.         No loss of taste or smell.   Respiratory: Negative.  Negative for cough, chest tightness and shortness of breath.    Cardiovascular: Positive for near-syncope. Negative for chest pain, palpitations and leg swelling.   Gastrointestinal: Positive for diarrhea (Diarrhea at the beginning of Covid.  Resolved now.). Negative for abdominal pain, blood in stool, constipation, dysphagia, melena, nausea and vomiting.   Genitourinary: Negative.  Negative for dysuria, flank pain, frequency and urgency.   Musculoskeletal: Negative.  Negative for back pain, falls and myalgias.   Neurological: Positive for dizziness, weakness and headaches (intermittent generalized HA with Covid; improving.). Negative for loss of consciousness and syncope.        Off balance, concern for dehydration, near syncope.   All other systems reviewed and are negative.      Past Medical History:   Diagnosis Date   • Abdominal hernia    • Arthritis     rt knee    • Atrial flutter with rapid ventricular response (CMS/HCC) 12/21/2017   • Back pain    • Brain tumor (CMS/HCC)    • Colostomy present (CMS/HCC) 08/2018   • Depression    • History of blood transfusion    • History of gastroesophageal reflux (GERD)     resolved since Nissen   • History of Nissen fundoplication 7/1/2017   • History of small bowel obstruction    • Hyperlipemia    • Hypertension    • Memory loss or impairment    • Migraine    • Parathyroid adenoma     Incidental on thyroid US.   • PONV (postoperative nausea and vomiting)     nausea - preprocedural meds help    • Prediabetes     Last Impression: 06  Feb 2015  Reviewed labs. Excellent control.  Emery Connell Impression: 06 Feb 2015  Reviewed labs. Excellent control.  Michaela Connell   • Thyroid nodule      Last Impression: 13 Jun 2015  r/o thyroid cancer, will proceed with US.  Michaela Connell (Internal Medicine)    • UTI (urinary tract infection)    • Vision changes     blockages left eye    • Wears glasses     readers       Allergies   Allergen Reactions   • Dilantin [Phenytoin Sodium Extended] Rash       Past Surgical History:   Procedure Laterality Date   • CARDIAC CATHETERIZATION N/A 4/27/2020    Procedure: LEFT HEART CATH;  Surgeon: Marina Ring MD;  Location:  [facility];  Service: Cardiology;  Laterality: N/A;   • CARPAL TUNNEL RELEASE  2002    right    • COLONOSCOPY N/A 8/18/2018    Procedure: COLONOSCOPY;  Surgeon: Inderjit Campos MD;  Location:  [facility];  Service: Gastroenterology   • COLONOSCOPY N/A 11/28/2018    Procedure: COLONOSCOPY;  Surgeon: Inderjit Campos MD;  Location:  [facility];  Service: Gastroenterology   • COLOSTOMY CLOSURE N/A 2/19/2019    Procedure: COLOSTOMY TAKEDOWN, INCISIONAL HERNIA REPAIR;  Surgeon: Andrea Bocanegra MD;  Location:  [facility];  Service: General   • CRANIOTOMY  2003 & 2014    Dr. Werner Hollis for tumor removal   • ENDOSCOPY     • EXPLORATORY LAPAROTOMY N/A 12/5/2017    Procedure: EXPLORATORY LAPAROTOMY, SMALL BOWEL RESECTION;  Surgeon: Ирина Cobian MD;  Location:  [facility];  Service:    • EXPLORATORY LAPAROTOMY N/A 12/22/2017    Procedure: LAPAROTOMY EXPLORATORY FOR SMALL BOWEL OBSTRUCTION;  Surgeon: Ирина Cobian MD;  Location:  [facility];  Service:    • EXPLORATORY LAPAROTOMY N/A 7/23/2018    Procedure: EXPLORATORY LAPAROTOMY, APPENDECTOMY, CECOPEXY, INCISIONAL HERNIA REPAIR, LYSIS OF ADHESIONS;  Surgeon: Andrea Bocanegra MD;  Location:  [facility];  Service: General   • EXPLORATORY LAPAROTOMY N/A 8/19/2018    Procedure: LAPAROTOMY  EXPLORATORY, SIGMOID COLECTOMY, CREATION OF OSTOMY;  Surgeon: Andrea Bocanegra MD;  Location:  [facility];  Service: General   • HYSTERECTOMY      partial - both ovaries still present pt believes    • INSERTION HEMODIALYSIS CATHETER N/A 12/7/2017    Procedure: HEMODIALYSIS CATHETER INSERTION;  Surgeon: Chance Valenzuela MD;  Location:  [facility];  Service:    • ORBITOTOMY Left 11/3/2017    Procedure:  LEFT LATERAL ORBITOTOMY WITH DEBULKING OF TUMOR ;  Surgeon: Moo Hopkins MD;  Location:  [facility];  Service:    • OTHER SURGICAL HISTORY      esophagogastric fundoplasty nissen fundoplication   • TUBAL ABDOMINAL LIGATION         Family History   Problem Relation Age of Onset   • Obesity Mother    • Heart attack Mother    • Migraines Father    • Stroke Father    • Diabetes Father    • Cancer Other    • Arthritis Other    • Diabetes Other    • Breast cancer Maternal Aunt    • Breast cancer Paternal Aunt    • Ovarian cancer Neg Hx        Social History     Socioeconomic History   • Marital status:      Spouse name: Not on file   • Number of children: Not on file   • Years of education: Not on file   • Highest education level: Not on file   Tobacco Use   • Smoking status: Never Smoker   • Smokeless tobacco: Never Used   Substance and Sexual Activity   • Alcohol use: No   • Drug use: No   • Sexual activity: Defer           Objective   Physical Exam  Vitals signs and nursing note reviewed.   Constitutional:       Appearance: Normal appearance.   HENT:      Head: Normocephalic and atraumatic.      Right Ear: Tympanic membrane normal.      Left Ear: Tympanic membrane normal.      Nose: Congestion and rhinorrhea present.      Right Sinus: No maxillary sinus tenderness or frontal sinus tenderness.      Left Sinus: No maxillary sinus tenderness or frontal sinus tenderness.      Comments: Mild nasal congestion with rhinorrhea.  No frontal or maxillary sinus tenderness.     Mouth/Throat:      Mouth: Mucous  membranes are dry.      Pharynx: Oropharynx is clear.      Comments: Oral mucous membranes appear dry.  Eyes:      Extraocular Movements: Extraocular movements intact.      Conjunctiva/sclera: Conjunctivae normal.      Pupils: Pupils are equal, round, and reactive to light.   Neck:      Musculoskeletal: Normal range of motion and neck supple.   Cardiovascular:      Rate and Rhythm: Normal rate and regular rhythm.  No extrasystoles are present.     Pulses: Normal pulses.           Dorsalis pedis pulses are 2+ on the right side and 2+ on the left side.        Posterior tibial pulses are 2+ on the right side and 2+ on the left side.      Heart sounds: Normal heart sounds. No murmur.      Comments: Regular rate and rhythm.  No ectopy.  No pedal edema to lower extremities.  Pulmonary:      Effort: Pulmonary effort is normal. No tachypnea or accessory muscle usage.      Breath sounds: Normal breath sounds.      Comments: Lungs are clear to auscultation bilaterally.  Abdominal:      General: Bowel sounds are normal. There is no distension.      Palpations: Abdomen is soft.      Tenderness: There is no abdominal tenderness. There is no right CVA tenderness, left CVA tenderness, guarding or rebound.      Comments: Abdomen soft without distention.  Active bowel sounds in all 4 quadrants.  Nontender to palpation.  No flank or CVA tenderness.   Musculoskeletal: Normal range of motion.   Skin:     General: Skin is warm and dry.   Neurological:      General: No focal deficit present.      Mental Status: She is alert.      Cranial Nerves: Cranial nerves are intact.      Sensory: Sensation is intact.      Motor: Motor function is intact.      Coordination: Coordination is intact.      Comments: Generalized weakness noted.  No focal deficits.         Procedures           ED Course  ED Course as of Jan 22 2233 Fri Jan 22, 2021 2228 EKGs x2 show sinus rhythm without evidence of acute ST-T wave changes consistent with ischemia.   Chest x-ray shows moderate to large hiatal hernia.  Increased prominence of the pulmonary interstitial markings perhaps mild changes of viral syndrome.  No significant focal lung disease.  Air distended upper abdominal bowel loops present on multiple prior studies and presumably chronic.  CBC and chemistries were within normal limits.  LDH was 227.  Magnesium was 2.0.  Lactic acid is 1.0.  Troponins x2 sets were less than 0.010.  CRP was 0.72.  Procalcitonin was 0.05.  COVID-19 testing was still positive, but patient tested positive on 1/9/2021.  O2 sat is 98% on room air.  Patient is afebrile.  Blood pressure has been elevated throughout the ER evaluation.  I gave her a dose of her routine lisinopril 5 mg by mouth.  We gave her 1 L bolus of normal saline.  Patient is feeling much better.  We will refer her to the hypertension clinic for fluctuating blood pressures.  Continue with all current medical management.  Patient is out of her exposure period for COVID-19.  Return to the ER sooner if any worsening symptoms.    [FC]      ED Course User Index  [FC] Denise White, MARICHUY            Recent Results (from the past 24 hour(s))   Comprehensive Metabolic Panel    Collection Time: 01/22/21  5:06 PM    Specimen: Blood   Result Value Ref Range    Glucose 137 (H) 65 - 99 mg/dL    BUN 19 8 - 23 mg/dL    Creatinine 0.98 0.57 - 1.00 mg/dL    Sodium 144 136 - 145 mmol/L    Potassium 3.5 3.5 - 5.2 mmol/L    Chloride 107 98 - 107 mmol/L    CO2 28.0 22.0 - 29.0 mmol/L    Calcium 9.4 8.6 - 10.5 mg/dL    Total Protein 7.0 6.0 - 8.5 g/dL    Albumin 3.80 3.50 - 5.20 g/dL    ALT (SGPT) 24 1 - 33 U/L    AST (SGOT) 27 1 - 32 U/L    Alkaline Phosphatase 132 (H) 39 - 117 U/L    Total Bilirubin 0.2 0.0 - 1.2 mg/dL    eGFR Non African Amer 57 (L) >60 mL/min/1.73    Globulin 3.2 gm/dL    A/G Ratio 1.2 g/dL    BUN/Creatinine Ratio 19.4 7.0 - 25.0    Anion Gap 9.0 5.0 - 15.0 mmol/L   Troponin    Collection Time: 01/22/21  5:06 PM    Specimen:  Blood   Result Value Ref Range    Troponin T <0.010 0.000 - 0.030 ng/mL   Magnesium    Collection Time: 01/22/21  5:06 PM    Specimen: Blood   Result Value Ref Range    Magnesium 2.0 1.6 - 2.4 mg/dL   Light Blue Top    Collection Time: 01/22/21  5:06 PM   Result Value Ref Range    Extra Tube hold for add-on    Green Top (Gel)    Collection Time: 01/22/21  5:06 PM   Result Value Ref Range    Extra Tube Hold for add-ons.    Lavender Top    Collection Time: 01/22/21  5:06 PM   Result Value Ref Range    Extra Tube hold for add-on    Gold Top - SST    Collection Time: 01/22/21  5:06 PM   Result Value Ref Range    Extra Tube Hold for add-ons.    CBC Auto Differential    Collection Time: 01/22/21  5:06 PM    Specimen: Blood   Result Value Ref Range    WBC 5.81 3.40 - 10.80 10*3/mm3    RBC 4.40 3.77 - 5.28 10*6/mm3    Hemoglobin 12.8 12.0 - 15.9 g/dL    Hematocrit 39.8 34.0 - 46.6 %    MCV 90.5 79.0 - 97.0 fL    MCH 29.1 26.6 - 33.0 pg    MCHC 32.2 31.5 - 35.7 g/dL    RDW 13.1 12.3 - 15.4 %    RDW-SD 43.7 37.0 - 54.0 fl    MPV 10.5 6.0 - 12.0 fL    Platelets 218 140 - 450 10*3/mm3    Neutrophil % 63.7 42.7 - 76.0 %    Lymphocyte % 26.2 19.6 - 45.3 %    Monocyte % 7.2 5.0 - 12.0 %    Eosinophil % 2.2 0.3 - 6.2 %    Basophil % 0.5 0.0 - 1.5 %    Immature Grans % 0.2 0.0 - 0.5 %    Neutrophils, Absolute 3.70 1.70 - 7.00 10*3/mm3    Lymphocytes, Absolute 1.52 0.70 - 3.10 10*3/mm3    Monocytes, Absolute 0.42 0.10 - 0.90 10*3/mm3    Eosinophils, Absolute 0.13 0.00 - 0.40 10*3/mm3    Basophils, Absolute 0.03 0.00 - 0.20 10*3/mm3    Immature Grans, Absolute 0.01 0.00 - 0.05 10*3/mm3    nRBC 0.0 0.0 - 0.2 /100 WBC   Lactate Dehydrogenase    Collection Time: 01/22/21  5:06 PM    Specimen: Blood   Result Value Ref Range     (H) 135 - 214 U/L   Procalcitonin    Collection Time: 01/22/21  5:06 PM    Specimen: Blood   Result Value Ref Range    Procalcitonin 0.05 0.00 - 0.25 ng/mL   C-reactive Protein    Collection Time: 01/22/21   "8:27 PM    Specimen: Blood   Result Value Ref Range    C-Reactive Protein 0.72 (H) 0.00 - 0.50 mg/dL   Lactic Acid, Plasma    Collection Time: 01/22/21  8:27 PM    Specimen: Blood   Result Value Ref Range    Lactate 1.0 0.5 - 2.0 mmol/L   Troponin    Collection Time: 01/22/21  8:27 PM    Specimen: Blood   Result Value Ref Range    Troponin T <0.010 0.000 - 0.030 ng/mL   COVID-19 and FLU A/B PCR - Swab, Nasopharynx    Collection Time: 01/22/21  8:37 PM    Specimen: Nasopharynx; Swab   Result Value Ref Range    COVID19 Detected (C) Not Detected - Ref. Range    Influenza A PCR Not Detected Not Detected    Influenza B PCR Not Detected Not Detected     Note: In addition to lab results from this visit, the labs listed above may include labs taken at another facility or during a different encounter within the last 24 hours. Please correlate lab times with ED admission and discharge times for further clarification of the services performed during this visit.    XR Chest 1 View   Final Result       1. Moderate to large hiatal hernia.       2. Increased prominence of the pulmonary interstitial markings compared   to prior studies, perhaps mild changes of viral syndrome. No significant   focal lung disease is seen.       3. Air distended upper abdominal bowel loops present on multiple prior   studies and presumably chronic.        DICTATED:   01/22/2021   EDITED/ls :   01/22/2021       This report was finalized on 1/22/2021 9:44 PM by Dr. Jareth Gómez MD.            Vitals:    01/22/21 1627 01/22/21 1628 01/22/21 2000 01/22/21 2145   BP:  (!) 163/142 (!) 181/112 180/93   BP Location:  Left arm Right arm    Patient Position:  Sitting Lying    Pulse: 68  89    Resp: 18  16    Temp: 98.4 °F (36.9 °C)      TempSrc: Infrared      SpO2: 97%  96% 98%   Weight: 72.6 kg (160 lb)      Height: 160 cm (63\")        Medications   sodium chloride 0.9 % flush 10 mL (has no administration in time range)   lisinopril (PRINIVIL,ZESTRIL) tablet 5 " mg (has no administration in time range)   sodium chloride 0.9 % bolus 1,000 mL (1,000 mL Intravenous New Bag 1/22/21 2143)     ECG/EMG Results (last 24 hours)     Procedure Component Value Units Date/Time    ECG 12 Lead [[id number]] Collected: 01/22/21 1703     Updated: 01/22/21 1704        ECG 12 Lead         ECG 12 Lead                                               MDM    Final diagnoses:   Generalized weakness   Malaise and fatigue   Mild dehydration   History of COVID-19   Elevated blood pressure reading with diagnosis of hypertension   History of hyperlipidemia            Denise White PA-C  01/22/21 5363

## 2021-01-25 ENCOUNTER — PATIENT OUTREACH (OUTPATIENT)
Dept: CASE MANAGEMENT | Facility: OTHER | Age: 66
End: 2021-01-25

## 2021-01-25 NOTE — OUTREACH NOTE
"Care Plan Note      Responses   Annual Wellness Visit:   Patient Has Completed   Care Gaps Addressed  Colon Cancer Screening, Flu Shot, Mammogram, Pneumonia Vaccine   Colon Cancer Screening Type  Colonoscopy   Colonoscopy Status  Up to Date (< 10 yrs)   Colon Cancer Screening Comments  11/28/18  Dr. Campos   Flu Shot Status  Up to Date   Mammogram Status  -- [Scheduled for Feb 2021]   Pneumonia Vaccine Status  Up to Date   Specific Disease Process Teaching  Hypertension   Other Patient Education/Resources   24/7 [facility] Nurse Call Line   24/7 Nurse Call Line Education Method  Verbal   Does patient have depression diagnosis?  Yes   Advanced Directives:  Not Interested At This Time   Ed Visits past 12 months:  1   Hospitalizations past 12 months  1        The main concerns and/or symptoms the patient would like to address are: Pt contacted regarding ED visit 1/22/21 with chief c/o Covid 19 +, weakness, generalized.  Was initially dx with Covid 19 1/9/21.   States \"feeling better.  Blood pressure is saying pretty steady.\"  States her  is monitoring her bp and she is logging.  She states she is going to try to get earlier appt with her cardiologist, Dr. Aguirre and will call for a PCP appt.  Offered to assist in coordinating these, however she states she will call.  She does have pulse O2 at home and reports it is staying in mid-high 90s.  She is drinking plenty to stay hydrated including gatorade.  She lives with her spouse and is self sufficient, manages her medicines and medical appt..  She ambulates without assistive device and denies any recent falls. She has not driven in last year, due to eye surgeries, however she states she has been released to drive.   Care gaps reviewed.  UTD on AWV, flu and pneumonia vaccines.  She is on MyChart.  She does not have Living Will.  Not interested in literature, however states she did have a couple questions.  ACP hotline number provided.  She denies any needs " and knows to call RN-ACM for any needs.     Education/instruction provided by Care Coordinator:   Role of  explained and contact number given.  [facility] 24/7 Nurse line explained and contact number provided. Discussed HTN: healthy diet and avoiding sodium, which she states she does follow, monitoring bp and logging, medicine compliance and regular exercise when gets her strength back. She had just finished Cardiac Rehab in Dec.  Orthostatic precautions discussed.      Follow Up Outreach Due:   As needed     Ena Mcduffie RN  Ambulatory     1/25/2021, 10:06 EST

## 2021-01-25 NOTE — OUTREACH NOTE
Care Coordination Assessment    Documented/Reviewed By: Ena Mcduffie RN Date/time: 1/25/2021 10:02 AM   Assessment completed with: patient  Enrolled in care management program: No  Living arrangement: spouse  Support system: spouse, children  Type of residence: private residence  Home care services: No  Equipment used at home:  (Comment: bp monitor    pulse O2)  Communication device: No  Other issues: visual problem  Bed or wheelchair confined: No  Inadequate nutrition: No  Medication adherence problem: No  Experiencing side effects from current medications: No  History of fall(s) in last 6 months: No  Difficulty keeping appointments: No  Family aware of the patient's advance care planning wishes: No  Yazidi or spiritual beliefs that impact treatment: No  Chronic pain: No

## 2021-01-26 ENCOUNTER — EPISODE CHANGES (OUTPATIENT)
Dept: CASE MANAGEMENT | Facility: OTHER | Age: 66
End: 2021-01-26

## 2021-01-27 LAB
QT INTERVAL: 404 MS
QT INTERVAL: 410 MS
QTC INTERVAL: 416 MS
QTC INTERVAL: 423 MS

## 2021-02-03 RX ORDER — ATORVASTATIN CALCIUM 80 MG/1
TABLET, FILM COATED ORAL
Qty: 90 TABLET | Refills: 0 | OUTPATIENT
Start: 2021-02-03

## 2021-02-03 NOTE — TELEPHONE ENCOUNTER
FYI, I went ahead and refused this prescription for atorvastatin since she was prescribed rosuvastatin last month.  Can you look into it and let me know if I am mistaken?  If so, go ahead and order the atorvastatin.

## 2021-02-05 ENCOUNTER — TELEPHONE (OUTPATIENT)
Dept: INTERNAL MEDICINE | Facility: CLINIC | Age: 66
End: 2021-02-05

## 2021-02-05 ENCOUNTER — TELEPHONE (OUTPATIENT)
Dept: NEUROSURGERY | Facility: CLINIC | Age: 66
End: 2021-02-05

## 2021-02-05 DIAGNOSIS — Z98.890 S/P CRANIOTOMY: ICD-10-CM

## 2021-02-05 DIAGNOSIS — D32.9 MENINGIOMA (HCC): Primary | ICD-10-CM

## 2021-02-05 RX ORDER — DIAZEPAM 10 MG/1
10 TABLET ORAL
Qty: 2 TABLET | Refills: 0 | Status: SHIPPED | OUTPATIENT
Start: 2021-02-05 | End: 2021-11-13

## 2021-02-05 RX ORDER — DIAZEPAM 10 MG/1
TABLET ORAL
Qty: 1 TABLET | Refills: 0 | Status: CANCELLED | OUTPATIENT
Start: 2021-02-05

## 2021-02-05 NOTE — TELEPHONE ENCOUNTER
Caller:  GAGANDEEP WHATLEY    Relationship:  PT    Best call back number: [phone]    What medication are you requestin VALIUM 10 MG    What are your current symptoms:  ANXIETY DURING MRI    How long have you been experiencing symptoms: DURING TEST ONLY    Have you had these symptoms before:    [x] Yes  [] No    Have you been treated for these symptoms before:   [x] Yes  [] No    If a prescription is needed, what is your preferred pharmacy and phone number:    [place] [id number]     [phone]    Additional notes: PT NEEDS PRESCRIPTION IN TIME FOR MRI 21.

## 2021-02-05 NOTE — TELEPHONE ENCOUNTER
"Provider:  Fiona  Last visit:   11/24/2020  Next visit: 02/08/2021    BENITA:       04/21/2020 Diazepam 10MG 1953 1 1 FIONA KASPER [facility]  [place] 1  05/08/2020 Lorazepam 0.5MG 1953 15 15 NETTIE SIDHU [facility]  [place] 1  11/19/2020 Diazepam 10MG 1953 1 1 FIONA KASPER [facility]  [place] 1    ** RX MODE SET TO NORMAL, BUT MAY CHANGE BACK TO \"PHONE IN\" PLEASE VERIFY BEFORE APPROVING**                   "

## 2021-02-05 NOTE — TELEPHONE ENCOUNTER
"We cannot \"call this and\" it needs to be E scribed.  Tatum has sent it to [facility] for signatures purpose.  "

## 2021-02-05 NOTE — TELEPHONE ENCOUNTER
Caller: Tereza Ackerman    Relationship: Self    Best call back number:[phone]    What orders are you requesting (i.e. lab or imaging): SLEEP TEST     Additional notes: Valentins odin covarrubias reccommended a sleep test.  Please call her to set one up.

## 2021-02-08 ENCOUNTER — OFFICE VISIT (OUTPATIENT)
Dept: NEUROSURGERY | Facility: CLINIC | Age: 66
End: 2021-02-08

## 2021-02-08 ENCOUNTER — HOSPITAL ENCOUNTER (OUTPATIENT)
Dept: MRI IMAGING | Facility: HOSPITAL | Age: 66
Discharge: HOME | End: 2021-02-08
Admitting: NEUROLOGICAL SURGERY

## 2021-02-08 VITALS
BODY MASS INDEX: 29.48 KG/M2 | SYSTOLIC BLOOD PRESSURE: 124 MMHG | DIASTOLIC BLOOD PRESSURE: 70 MMHG | TEMPERATURE: 96.2 F | WEIGHT: 166.4 LBS | HEIGHT: 63 IN

## 2021-02-08 DIAGNOSIS — C69.62 MALIGNANT NEOPLASM OF LEFT ORBIT (HCC): Primary | ICD-10-CM

## 2021-02-08 DIAGNOSIS — D32.0 MENINGIOMA, RECURRENT OF BRAIN (HCC): ICD-10-CM

## 2021-02-08 PROCEDURE — A9577 INJ MULTIHANCE: HCPCS | Performed by: NEUROLOGICAL SURGERY

## 2021-02-08 PROCEDURE — 99213 OFFICE O/P EST LOW 20 MIN: CPT | Performed by: NEUROLOGICAL SURGERY

## 2021-02-08 PROCEDURE — 0 GADOBENATE DIMEGLUMINE 529 MG/ML SOLUTION: Performed by: NEUROLOGICAL SURGERY

## 2021-02-08 PROCEDURE — 70553 MRI BRAIN STEM W/O & W/DYE: CPT

## 2021-02-08 RX ADMIN — GADOBENATE DIMEGLUMINE 15 ML: 529 INJECTION, SOLUTION INTRAVENOUS at 10:15

## 2021-02-08 NOTE — PROGRESS NOTES
Tereza Ackerman  1955  [id number]                        CHIEF COMPLAINT: Follow-up meningioma         MEDICAL HISTORY SINCE LAST ENCOUNTER: 66-year-old female who we have been following with sequential MRI.  Her initial procedure was done in 2003 with a second procedure in 2014.  Each of these have seemed to be a total resection yet she has had recurrence along the temporal bone and invading the orbit on the left side.  She has received SRS and has been followed with sequential MRI since that time all of which has remained relatively stable.  She reports today for continued follow-up with MRI.  She has no specific complaint.    She has been seeing a retinal specialist for treatments in her eye which have helped somewhat.           Past Medical History:   Diagnosis Date   • Abdominal hernia    • Arthritis     rt knee    • Atrial flutter with rapid ventricular response (CMS/HCC) 12/21/2017   • Back pain    • Brain tumor (CMS/HCC)    • Colostomy present (CMS/HCC) 08/2018   • Depression    • History of blood transfusion    • History of gastroesophageal reflux (GERD)     resolved since Nissen   • History of Nissen fundoplication 7/1/2017   • History of small bowel obstruction    • Hyperlipemia    • Hypertension    • Memory loss or impairment    • Migraine    • Parathyroid adenoma     Incidental on thyroid US.   • PONV (postoperative nausea and vomiting)     nausea - preprocedural meds help    • Prediabetes     Last Impression: 06 Feb 2015  Reviewed labs. Excellent control.  Emery Connell Impression: 06 Feb 2015  Reviewed labs. Excellent control.  Michaela Connell   • Thyroid nodule      Last Impression: 13 Jun 2015  r/o thyroid cancer, will proceed with US.  Michaela Connell (Internal Medicine)    • UTI (urinary tract infection)    • Vision changes     blockages left eye    • Wears glasses     readers              Past Surgical History:   Procedure Laterality Date   • CARDIAC CATHETERIZATION N/A  4/27/2020    Procedure: LEFT HEART CATH;  Surgeon: Marina Ring MD;  Location:  [facility];  Service: Cardiology;  Laterality: N/A;   • CARPAL TUNNEL RELEASE  2002    right    • COLONOSCOPY N/A 8/18/2018    Procedure: COLONOSCOPY;  Surgeon: Inderjit Campos MD;  Location:  [facility];  Service: Gastroenterology   • COLONOSCOPY N/A 11/28/2018    Procedure: COLONOSCOPY;  Surgeon: Inderjit Campos MD;  Location:  [facility];  Service: Gastroenterology   • COLOSTOMY CLOSURE N/A 2/19/2019    Procedure: COLOSTOMY TAKEDOWN, INCISIONAL HERNIA REPAIR;  Surgeon: Andrea Bocanegra MD;  Location:  [facility];  Service: General   • CRANIOTOMY  2003 & 2014    Dr. Werner Hollis for tumor removal   • ENDOSCOPY     • EXPLORATORY LAPAROTOMY N/A 12/5/2017    Procedure: EXPLORATORY LAPAROTOMY, SMALL BOWEL RESECTION;  Surgeon: Ирина Cobian MD;  Location:  [facility];  Service:    • EXPLORATORY LAPAROTOMY N/A 12/22/2017    Procedure: LAPAROTOMY EXPLORATORY FOR SMALL BOWEL OBSTRUCTION;  Surgeon: Ирина Cobian MD;  Location:  [facility];  Service:    • EXPLORATORY LAPAROTOMY N/A 7/23/2018    Procedure: EXPLORATORY LAPAROTOMY, APPENDECTOMY, CECOPEXY, INCISIONAL HERNIA REPAIR, LYSIS OF ADHESIONS;  Surgeon: Andrea Bocanegra MD;  Location:  [facility];  Service: General   • EXPLORATORY LAPAROTOMY N/A 8/19/2018    Procedure: LAPAROTOMY EXPLORATORY, SIGMOID COLECTOMY, CREATION OF OSTOMY;  Surgeon: Andrea Bocanegra MD;  Location:  [facility];  Service: General   • HYSTERECTOMY      partial - both ovaries still present pt believes    • INSERTION HEMODIALYSIS CATHETER N/A 12/7/2017    Procedure: HEMODIALYSIS CATHETER INSERTION;  Surgeon: Chance Vlaenzuela MD;  Location:  [facility];  Service:    • ORBITOTOMY Left 11/3/2017    Procedure:  LEFT LATERAL ORBITOTOMY WITH DEBULKING OF TUMOR ;  Surgeon: Moo Hopkins MD;  Location:  [facility];  Service:    • OTHER SURGICAL HISTORY      esophagogastric  fundoplasty nissen fundoplication   • TUBAL ABDOMINAL LIGATION                Family History   Problem Relation Age of Onset   • Obesity Mother    • Heart attack Mother    • Migraines Father    • Stroke Father    • Diabetes Father    • Cancer Other    • Arthritis Other    • Diabetes Other    • Breast cancer Maternal Aunt    • Breast cancer Paternal Aunt    • Ovarian cancer Neg Hx               Social History     Socioeconomic History   • Marital status:      Spouse name: Not on file   • Number of children: Not on file   • Years of education: Not on file   • Highest education level: Not on file   Social Needs   • Financial resource strain: Not on file   • Food insecurity     Worry: Never true     Inability: Never true   • Transportation needs     Medical: No     Non-medical: No   Tobacco Use   • Smoking status: Never Smoker   • Smokeless tobacco: Never Used   Substance and Sexual Activity   • Alcohol use: No   • Drug use: No   • Sexual activity: Defer              Allergies   Allergen Reactions   • Dilantin [Phenytoin Sodium Extended] Rash              Current Outpatient Medications:   •  aspirin 81 MG tablet, Take 1 tablet by mouth Daily., Disp: 30 tablet, Rfl: 11  •  busPIRone (BUSPAR) 10 MG tablet, Take 1 tablet by mouth 3 (Three) Times a Day., Disp: 60 tablet, Rfl: 5  •  carvedilol (COREG) 12.5 MG tablet, Take 1 tablet by mouth Every 12 (Twelve) Hours., Disp: 60 tablet, Rfl: 0  •  cholecalciferol (VITAMIN D3) 1000 units tablet, Take 2,000 Units by mouth Every Other Day., Disp: , Rfl:   •  cyclobenzaprine (FLEXERIL) 10 MG tablet, Take 0.5 tablets by mouth 3 (Three) Times a Day As Needed (headache)., Disp: 12 tablet, Rfl: 0  •  diazePAM (VALIUM) 10 MG tablet, Take 1 tablet 30 minutes prior to procedure., Disp: 1 tablet, Rfl: 0  •  diazePAM (Valium) 10 MG tablet, Take 1 tablet by mouth 30 Min Pre-Op., Disp: 2 tablet, Rfl: 0  •  levocetirizine (XYZAL) 5 MG tablet, TAKE 1 TABLET BY MOUTH EVERY DAY IN THE  EVENING, Disp: 90 tablet, Rfl: 1  •  lisinopril (PRINIVIL,ZESTRIL) 2.5 MG tablet, Take 1 tablet by mouth Daily., Disp: 90 tablet, Rfl: 1  •  LORazepam (Ativan) 0.5 MG tablet, Take 0.5-1 tablets by mouth Daily As Needed for Anxiety., Disp: 15 tablet, Rfl: 0  •  omeprazole (priLOSEC) 20 MG capsule, Take 1 capsule by mouth Daily., Disp: 90 capsule, Rfl: 1  •  ondansetron (ZOFRAN) 4 MG tablet, Take 4 mg by mouth Every 8 (Eight) Hours As Needed for Nausea or Vomiting., Disp: , Rfl:   •  QUEtiapine (SEROquel) 25 MG tablet, Take 2 tablets by mouth Every Night., Disp: 180 tablet, Rfl: 1  •  rosuvastatin (CRESTOR) 40 MG tablet, TAKE ONE TABLET BY MOUTH DAILY, Disp: 90 tablet, Rfl: 0  •  sertraline (ZOLOFT) 100 MG tablet, Take 1 tablet by mouth Daily., Disp: 90 tablet, Rfl: 1  •  thiamine (VITAMIN B1) 100 MG tablet, Take 1 tablet by mouth Every Other Day., Disp: 45 tablet, Rfl: 1  No current facility-administered medications for this visit.          Review of Systems   Constitutional: Positive for fatigue. Negative for activity change, appetite change, chills, diaphoresis, fever and unexpected weight change.   HENT: Negative for congestion, dental problem, drooling, ear discharge, ear pain, facial swelling, hearing loss, mouth sores, nosebleeds, postnasal drip, rhinorrhea, sinus pressure, sneezing, sore throat, tinnitus, trouble swallowing and voice change.    Eyes: Positive for photophobia. Negative for pain, discharge, redness, itching and visual disturbance.   Respiratory: Negative for apnea, cough, choking, chest tightness, shortness of breath, wheezing and stridor.    Cardiovascular: Negative for chest pain, palpitations and leg swelling.   Gastrointestinal: Positive for diarrhea. Negative for abdominal distention, abdominal pain, anal bleeding, blood in stool, constipation, nausea, rectal pain and vomiting.   Endocrine: Negative for cold intolerance, heat intolerance, polydipsia, polyphagia and polyuria.  "  Genitourinary: Negative for decreased urine volume, difficulty urinating, dysuria, enuresis, flank pain, frequency, genital sores, hematuria and urgency.   Musculoskeletal: Negative for arthralgias, back pain, gait problem, joint swelling, myalgias, neck pain and neck stiffness.   Skin: Negative for color change, pallor, rash and wound.   Allergic/Immunologic: Negative for environmental allergies, food allergies and immunocompromised state.   Neurological: Positive for weakness and light-headedness. Negative for dizziness, tremors, seizures, syncope, facial asymmetry, speech difficulty, numbness and headaches.   Hematological: Negative for adenopathy. Does not bruise/bleed easily.   Psychiatric/Behavioral: Positive for confusion and decreased concentration. Negative for agitation, behavioral problems, dysphoric mood, hallucinations, self-injury, sleep disturbance and suicidal ideas. The patient is nervous/anxious. The patient is not hyperactive.    All other systems reviewed and are negative.              Vitals:    02/08/21 1208   BP: 124/70   BP Location: Right arm   Patient Position: Sitting   Cuff Size: Adult   Temp: 96.2 °F (35.7 °C)   TempSrc: Infrared   Weight: 75.5 kg (166 lb 6.4 oz)   Height: 160 cm (63\")               EXAMINATION: There is no evidence of weakness, sensory loss ,or reflex changes.  She has minimal vision in her left eye.            MEDICAL DECISION MAKING: MRI is stable showing no progression.  She has persistent tumor along the sphenoid wing.  She has 2 other areas that appear to be in the location of the superior bur hole.  This is been present for some time and has not shown any enlargement.           ASSESSMENT/DISPOSITION: She return in 3 months for continued surveillance.  Her studies in case was sent to [facility] who recommended continued SRS as surgical intervention for excision would entail removal of the globe, zygoma and skull base.              I APPRECIATE THE OPPORTUNITY " OF THIS REFERRAL. PLEASE CALL IF ANY       QUESTIONS [phone]

## 2021-02-09 ENCOUNTER — HOSPITAL ENCOUNTER (OUTPATIENT)
Dept: MAMMOGRAPHY | Facility: HOSPITAL | Age: 66
Discharge: HOME | End: 2021-02-09
Admitting: INTERNAL MEDICINE

## 2021-02-09 DIAGNOSIS — Z12.31 VISIT FOR SCREENING MAMMOGRAM: ICD-10-CM

## 2021-02-09 PROCEDURE — 77063 BREAST TOMOSYNTHESIS BI: CPT

## 2021-02-09 PROCEDURE — 77067 SCR MAMMO BI INCL CAD: CPT

## 2021-02-09 PROCEDURE — 77067 SCR MAMMO BI INCL CAD: CPT | Performed by: RADIOLOGY

## 2021-02-09 PROCEDURE — 77063 BREAST TOMOSYNTHESIS BI: CPT | Performed by: RADIOLOGY

## 2021-02-10 DIAGNOSIS — I95.9 HYPOTENSION, UNSPECIFIED HYPOTENSION TYPE: ICD-10-CM

## 2021-02-10 RX ORDER — LISINOPRIL 2.5 MG/1
2.5 TABLET ORAL DAILY
Qty: 90 TABLET | Refills: 1 | Status: SHIPPED | OUTPATIENT
Start: 2021-02-10 | End: 2021-11-09

## 2021-02-22 ENCOUNTER — OFFICE VISIT (OUTPATIENT)
Dept: CARDIOLOGY | Facility: CLINIC | Age: 66
End: 2021-02-22

## 2021-02-22 VITALS
HEART RATE: 72 BPM | DIASTOLIC BLOOD PRESSURE: 76 MMHG | HEIGHT: 63 IN | BODY MASS INDEX: 29.59 KG/M2 | SYSTOLIC BLOOD PRESSURE: 114 MMHG | OXYGEN SATURATION: 97 % | WEIGHT: 167 LBS

## 2021-02-22 DIAGNOSIS — I10 ESSENTIAL HYPERTENSION: ICD-10-CM

## 2021-02-22 DIAGNOSIS — I48.92 ATRIAL FLUTTER WITH RAPID VENTRICULAR RESPONSE (HCC): Primary | ICD-10-CM

## 2021-02-22 DIAGNOSIS — E78.2 MIXED HYPERLIPIDEMIA: ICD-10-CM

## 2021-02-22 PROCEDURE — 99214 OFFICE O/P EST MOD 30 MIN: CPT | Performed by: NURSE PRACTITIONER

## 2021-02-22 NOTE — PROGRESS NOTES
[facility] AT [address], [address]  [place], [place], [address]    [phone]  [email or web address]           OUTPATIENT CLINIC FOLLOW UP NOTE    Patient Care Team:  Patient Care Team:  Michaela Connell MD as PCP - General  Michaela Connell MD as PCP - Family Medicine  Werner Hollis MD as Referring Physician (Neurosurgery)  Moo Hopkins MD as Consulting Physician (Ophthalmology)  Jamal Ramos MD as Consulting Physician (Nephrology)  Alli Aguirre MD as Consulting Physician (Cardiology)  Costa Raygoza MD as Consulting Physician (Radiation Oncology)  Andrea Bocanegra MD as Consulting Physician (General Surgery)  Alli Aguirre MD as Consulting Physician (Cardiology)    Subjective:      Chief Complaint   Patient presents with   • Hypertension   • Hyperlipidemia   • Atrial Flutter       HPI:    Tereza Ackerman is a 66 y.o. female.  Cardiac problem list:  1. Paroxysmal atrial fibrillation  1. Diagnosed at time of small bowel obstruction.  2. ACS 4/2020  1. Cardiac catheterization with near normal coronary arteries.  Troponin elevation.  3. Hypertension  4. Hyperlipidemia  5. COVID-19 1/2021    She presents today for follow-up.      Since the patient was last seen she reports that she has been doing well from a cardiac standpoint.  She denies chest pain, shortness of breath, palpitations, or lower extremity edema.  She was diagnosed with COVID-19 in 1/2021 she reports she had difficulty controlling her blood pressure and felt significantly fatigued.  Her blood pressure has since normalized and is typically in the 110s systolic.      Review of Systems:  Negative for exertional chest pain, dyspnea with exertion, orthopnea, PND, lower extremity edema, palpitations, lightheadedness, syncope.    PFSH:  Patient Active Problem List   Diagnosis   • Impaired glucose tolerance   • Parathyroid adenoma   • Reaction to chronic stress   • Multinodular  goiter   • Vitamin D deficiency   • Meningioma, recurrent of brain (CMS/HCC)   • Arthritis   • Depression   • Small bowel obstruction (CMS/HCC)   • Insomnia   • History of Nissen fundoplication   • Malignant neoplasm of left orbit (CMS/HCC)   • Prediabetes   • Mixed hyperlipidemia   • Hyperglycemia   • Atrial flutter with rapid ventricular response (CMS/HCC)   • Anemia   • Large bowel obstruction (CMS/HCC)   • Abdominal pain   • Essential hypertension   • History of creation of ostomy (CMS/HCC)   • Screen for colon cancer   • Sigmoid volvulus (CMS/HCC)         Current Outpatient Medications:   •  aspirin 81 MG tablet, Take 1 tablet by mouth Daily., Disp: 30 tablet, Rfl: 11  •  busPIRone (BUSPAR) 10 MG tablet, Take 1 tablet by mouth 3 (Three) Times a Day., Disp: 60 tablet, Rfl: 5  •  carvedilol (COREG) 12.5 MG tablet, Take 1 tablet by mouth Every 12 (Twelve) Hours., Disp: 60 tablet, Rfl: 0  •  cholecalciferol (VITAMIN D3) 1000 units tablet, Take 2,000 Units by mouth Every Other Day., Disp: , Rfl:   •  cyclobenzaprine (FLEXERIL) 10 MG tablet, Take 0.5 tablets by mouth 3 (Three) Times a Day As Needed (headache)., Disp: 12 tablet, Rfl: 0  •  diazePAM (Valium) 10 MG tablet, Take 1 tablet by mouth 30 Min Pre-Op., Disp: 2 tablet, Rfl: 0  •  levocetirizine (XYZAL) 5 MG tablet, TAKE 1 TABLET BY MOUTH EVERY DAY IN THE EVENING, Disp: 90 tablet, Rfl: 1  •  lisinopril (PRINIVIL,ZESTRIL) 2.5 MG tablet, Take 1 tablet by mouth Daily., Disp: 90 tablet, Rfl: 1  •  LORazepam (Ativan) 0.5 MG tablet, Take 0.5-1 tablets by mouth Daily As Needed for Anxiety., Disp: 15 tablet, Rfl: 0  •  omeprazole (priLOSEC) 20 MG capsule, Take 1 capsule by mouth Daily., Disp: 90 capsule, Rfl: 1  •  ondansetron (ZOFRAN) 4 MG tablet, Take 4 mg by mouth Every 8 (Eight) Hours As Needed for Nausea or Vomiting., Disp: , Rfl:   •  QUEtiapine (SEROquel) 25 MG tablet, Take 2 tablets by mouth Every Night., Disp: 180 tablet, Rfl: 1  •  rosuvastatin (CRESTOR) 40 MG  "tablet, TAKE ONE TABLET BY MOUTH DAILY, Disp: 90 tablet, Rfl: 0  •  sertraline (ZOLOFT) 100 MG tablet, Take 1 tablet by mouth Daily., Disp: 90 tablet, Rfl: 1  •  thiamine (VITAMIN B1) 100 MG tablet, Take 1 tablet by mouth Every Other Day., Disp: 45 tablet, Rfl: 1    Allergies   Allergen Reactions   • Dilantin [Phenytoin Sodium Extended] Rash        reports that she has never smoked. She has never used smokeless tobacco.    Family History   Problem Relation Age of Onset   • Obesity Mother    • Heart attack Mother    • Migraines Father    • Stroke Father    • Diabetes Father    • Cancer Other    • Arthritis Other    • Diabetes Other    • Breast cancer Maternal Aunt    • Breast cancer Paternal Aunt    • Ovarian cancer Neg Hx        Objective:   /76   Pulse 72   Ht 160 cm (62.99\")   Wt 75.8 kg (167 lb)   SpO2 97%   BMI 29.59 kg/m²   CONSTITUTIONAL: No acute distress  RESPIRATORY: Normal effort. Clear to auscultation bilaterally without wheezing or rales  CARDIOVASCULAR: Regular rate and rhythm with normal S1 and S2. Without murmur, gallop or rub.  Carotids without bruits bilaterally.  PERIPHERAL VASCULAR: Normal radial pulse. There is no lower extremity edema bilaterally.  PSYCH: Normal affect and mood    Labs:  Lab Results   Component Value Date    ALT 24 01/22/2021    AST 27 01/22/2021     Lab Results   Component Value Date    CHOL 161 11/04/2020    TRIG 105 11/04/2020    HDL 66 (H) 11/04/2020    CREATININE 0.98 01/22/2021       Diagnostic Data:    Procedures    Event Monitor 2/2018  -Events were normal sinus rhythm  -No atrial flutter    [facility] 4/27/2020  · Near normal coronary arteries  · Normal LV systolic function wall motion    TTE 12/2017  · LVEF difficult to evaluate with tachycardia- globally appears mildly depressed.  · Left ventricular wall thickness is consistent with concentric hypertrophy.  · Mild tricuspid valve regurgitation is present.  · Calculated right ventricular systolic pressure from " tricuspid regurgitation is 32 mmHg.    Assessment and Plan:   Tereza was seen today for dizziness and hypertension.    Diagnoses and all orders for this visit:    Atrial flutter with rapid ventricular response  -Historically, had brief atrial flutter in the setting of a small bowel obstruction that lasted less than 48 hours, outpatient heart monitor revealed no recurrent atrial flutter.  Later underwent GI operations without recurrent arrhythmia on carvedilol.  -Without no recent recurrence.  -Continue aspirin, beta-blocker    Essential hypertension  -At goal, continue Coreg and lisinopril.    Mixed hyperlipidemia  -LDL 76 on 11/4/2020  -Continue statin    - Return in about 6 months (around 8/22/2021) for Next scheduled follow up with an EKG.    Chelsea David, APRN  02/22/21 16:47 EST

## 2021-02-24 RX ORDER — CARVEDILOL 12.5 MG/1
12.5 TABLET ORAL EVERY 12 HOURS
Qty: 180 TABLET | Refills: 3 | Status: SHIPPED | OUTPATIENT
Start: 2021-02-24 | End: 2022-05-16 | Stop reason: SDUPTHER

## 2021-03-09 ENCOUNTER — OFFICE VISIT (OUTPATIENT)
Dept: INTERNAL MEDICINE | Facility: CLINIC | Age: 66
End: 2021-03-09

## 2021-03-09 VITALS
BODY MASS INDEX: 29.77 KG/M2 | TEMPERATURE: 97.8 F | OXYGEN SATURATION: 99 % | SYSTOLIC BLOOD PRESSURE: 104 MMHG | HEART RATE: 67 BPM | WEIGHT: 168 LBS | HEIGHT: 63 IN | DIASTOLIC BLOOD PRESSURE: 62 MMHG

## 2021-03-09 DIAGNOSIS — G47.9 SLEEP DISTURBANCE: Primary | ICD-10-CM

## 2021-03-09 DIAGNOSIS — F43.89 ADJUSTMENT REACTION TO CHRONIC STRESS: ICD-10-CM

## 2021-03-09 DIAGNOSIS — B00.1 RECURRENT COLD SORES: ICD-10-CM

## 2021-03-09 DIAGNOSIS — H34.8320 BRANCH RETINAL VEIN OCCLUSION OF LEFT EYE WITH MACULAR EDEMA: ICD-10-CM

## 2021-03-09 PROCEDURE — 99214 OFFICE O/P EST MOD 30 MIN: CPT | Performed by: INTERNAL MEDICINE

## 2021-03-09 RX ORDER — QUETIAPINE FUMARATE 25 MG/1
12.5 TABLET, FILM COATED ORAL NIGHTLY
Qty: 45 TABLET | Refills: 1 | Status: SHIPPED | OUTPATIENT
Start: 2021-03-09 | End: 2021-09-19

## 2021-03-09 RX ORDER — ACYCLOVIR 400 MG/1
400 TABLET ORAL 2 TIMES DAILY
Qty: 6 TABLET | Refills: 1 | Status: SHIPPED | OUTPATIENT
Start: 2021-03-09 | End: 2021-11-13

## 2021-03-09 RX ORDER — NYSTATIN 100000 U/G
CREAM TOPICAL DAILY
Qty: 15 G | Refills: 0 | Status: SHIPPED | OUTPATIENT
Start: 2021-03-09 | End: 2021-11-09

## 2021-03-09 RX ORDER — BUSPIRONE HYDROCHLORIDE 10 MG/1
10 TABLET ORAL 2 TIMES DAILY
Qty: 180 TABLET | Refills: 1 | Status: SHIPPED | OUTPATIENT
Start: 2021-03-09 | End: 2021-11-09 | Stop reason: SDUPTHER

## 2021-03-09 NOTE — PROGRESS NOTES
Follow-up (Discuss PSG)    Subjective   Tereza Ackerman is a 66 y.o. female is here today for follow-up.    History of Present Illness   Followed by - retina, for her retinal vein occlusion getting monthly injections( total 6, had 4 so far) was advised sleep apnea can be problem.  Has appt. With Dr. Santos for her abdominal issues.  S/p covid, had diarrhea, and tested postive again in the ER.  Just stayed tired and diarrhea, and BP out of control.    Current Outpatient Medications:   •  aspirin 81 MG tablet, Take 1 tablet by mouth Daily., Disp: 30 tablet, Rfl: 11  •  busPIRone (BUSPAR) 10 MG tablet, Take 1 tablet by mouth 2 (two) times a day., Disp: 180 tablet, Rfl: 1  •  carvedilol (COREG) 12.5 MG tablet, Take 1 tablet by mouth Every 12 (Twelve) Hours., Disp: 180 tablet, Rfl: 3  •  cholecalciferol (VITAMIN D3) 1000 units tablet, Take 2,000 Units by mouth Every Other Day., Disp: , Rfl:   •  cyclobenzaprine (FLEXERIL) 10 MG tablet, Take 0.5 tablets by mouth 3 (Three) Times a Day As Needed (headache)., Disp: 12 tablet, Rfl: 0  •  diazePAM (Valium) 10 MG tablet, Take 1 tablet by mouth 30 Min Pre-Op., Disp: 2 tablet, Rfl: 0  •  levocetirizine (XYZAL) 5 MG tablet, TAKE 1 TABLET BY MOUTH EVERY DAY IN THE EVENING, Disp: 90 tablet, Rfl: 1  •  lisinopril (PRINIVIL,ZESTRIL) 2.5 MG tablet, Take 1 tablet by mouth Daily., Disp: 90 tablet, Rfl: 1  •  LORazepam (Ativan) 0.5 MG tablet, Take 0.5-1 tablets by mouth Daily As Needed for Anxiety., Disp: 15 tablet, Rfl: 0  •  omeprazole (priLOSEC) 20 MG capsule, Take 1 capsule by mouth Daily., Disp: 90 capsule, Rfl: 1  •  ondansetron (ZOFRAN) 4 MG tablet, Take 4 mg by mouth Every 8 (Eight) Hours As Needed for Nausea or Vomiting., Disp: , Rfl:   •  QUEtiapine (SEROquel) 25 MG tablet, Take 0.5 tablets by mouth Every Night., Disp: 45 tablet, Rfl: 1  •  rosuvastatin (CRESTOR) 40 MG tablet, TAKE ONE TABLET BY MOUTH DAILY, Disp: 90 tablet, Rfl: 0  •  sertraline (ZOLOFT) 100 MG  "tablet, Take 1 tablet by mouth Daily., Disp: 90 tablet, Rfl: 1  •  thiamine (VITAMIN B1) 100 MG tablet, Take 1 tablet by mouth Every Other Day., Disp: 45 tablet, Rfl: 1  •  acyclovir (Zovirax) 400 MG tablet, Take 1 tablet by mouth 2 (Two) Times a Day. Take no more than 5 doses a day., Disp: 6 tablet, Rfl: 1  •  nystatin (MYCOSTATIN) 134182 UNIT/GM cream, Apply  topically to the appropriate area as directed Daily., Disp: 15 g, Rfl: 0      The following portions of the patient's history were reviewed and updated as appropriate: allergies, current medications, past family history, past medical history, past social history, past surgical history and problem list.    Review of Systems   Constitutional: Negative.  Negative for chills and fever.   HENT: Negative for ear discharge, ear pain, sinus pressure and sore throat.    Eyes: Positive for visual disturbance.   Respiratory: Negative for cough, chest tightness and shortness of breath.    Cardiovascular: Negative for chest pain, palpitations and leg swelling.   Gastrointestinal: Negative for diarrhea, nausea and vomiting.   Musculoskeletal: Negative for arthralgias, back pain and myalgias.   Neurological: Negative for dizziness, syncope and headaches.   Psychiatric/Behavioral: Negative for confusion and sleep disturbance.       Objective   /62   Pulse 67   Temp 97.8 °F (36.6 °C)   Ht 160 cm (62.99\")   Wt 76.2 kg (168 lb)   SpO2 99% Comment: ra  BMI 29.77 kg/m²   Physical Exam  Vitals and nursing note reviewed.   Constitutional:       Appearance: She is well-developed.   HENT:      Head: Normocephalic and atraumatic.      Right Ear: External ear normal.      Left Ear: External ear normal.      Mouth/Throat:      Pharynx: No oropharyngeal exudate.   Eyes:      Conjunctiva/sclera: Conjunctivae normal.      Pupils: Pupils are equal, round, and reactive to light.   Neck:      Thyroid: No thyromegaly.   Cardiovascular:      Rate and Rhythm: Normal rate and regular " rhythm.   Pulmonary:      Effort: Pulmonary effort is normal.      Breath sounds: Normal breath sounds.   Abdominal:      General: Bowel sounds are normal. There is no distension.      Palpations: Abdomen is soft.      Tenderness: There is no abdominal tenderness.   Musculoskeletal:      Cervical back: Neck supple.   Skin:     General: Skin is warm and dry.   Neurological:      Mental Status: She is alert and oriented to person, place, and time.      Cranial Nerves: No cranial nerve deficit.   Psychiatric:         Judgment: Judgment normal.           Results for orders placed or performed during the hospital encounter of 01/22/21   COVID-19 and FLU A/B PCR - Swab, Nasopharynx    Specimen: Nasopharynx; Swab   Result Value Ref Range    COVID19 Detected (C) Not Detected - Ref. Range    Influenza A PCR Not Detected Not Detected    Influenza B PCR Not Detected Not Detected   Comprehensive Metabolic Panel    Specimen: Blood   Result Value Ref Range    Glucose 137 (H) 65 - 99 mg/dL    BUN 19 8 - 23 mg/dL    Creatinine 0.98 0.57 - 1.00 mg/dL    Sodium 144 136 - 145 mmol/L    Potassium 3.5 3.5 - 5.2 mmol/L    Chloride 107 98 - 107 mmol/L    CO2 28.0 22.0 - 29.0 mmol/L    Calcium 9.4 8.6 - 10.5 mg/dL    Total Protein 7.0 6.0 - 8.5 g/dL    Albumin 3.80 3.50 - 5.20 g/dL    ALT (SGPT) 24 1 - 33 U/L    AST (SGOT) 27 1 - 32 U/L    Alkaline Phosphatase 132 (H) 39 - 117 U/L    Total Bilirubin 0.2 0.0 - 1.2 mg/dL    eGFR Non African Amer 57 (L) >60 mL/min/1.73    Globulin 3.2 gm/dL    A/G Ratio 1.2 g/dL    BUN/Creatinine Ratio 19.4 7.0 - 25.0    Anion Gap 9.0 5.0 - 15.0 mmol/L   Troponin    Specimen: Blood   Result Value Ref Range    Troponin T <0.010 0.000 - 0.030 ng/mL   Magnesium    Specimen: Blood   Result Value Ref Range    Magnesium 2.0 1.6 - 2.4 mg/dL   CBC Auto Differential    Specimen: Blood   Result Value Ref Range    WBC 5.81 3.40 - 10.80 10*3/mm3    RBC 4.40 3.77 - 5.28 10*6/mm3    Hemoglobin 12.8 12.0 - 15.9 g/dL     Hematocrit 39.8 34.0 - 46.6 %    MCV 90.5 79.0 - 97.0 fL    MCH 29.1 26.6 - 33.0 pg    MCHC 32.2 31.5 - 35.7 g/dL    RDW 13.1 12.3 - 15.4 %    RDW-SD 43.7 37.0 - 54.0 fl    MPV 10.5 6.0 - 12.0 fL    Platelets 218 140 - 450 10*3/mm3    Neutrophil % 63.7 42.7 - 76.0 %    Lymphocyte % 26.2 19.6 - 45.3 %    Monocyte % 7.2 5.0 - 12.0 %    Eosinophil % 2.2 0.3 - 6.2 %    Basophil % 0.5 0.0 - 1.5 %    Immature Grans % 0.2 0.0 - 0.5 %    Neutrophils, Absolute 3.70 1.70 - 7.00 10*3/mm3    Lymphocytes, Absolute 1.52 0.70 - 3.10 10*3/mm3    Monocytes, Absolute 0.42 0.10 - 0.90 10*3/mm3    Eosinophils, Absolute 0.13 0.00 - 0.40 10*3/mm3    Basophils, Absolute 0.03 0.00 - 0.20 10*3/mm3    Immature Grans, Absolute 0.01 0.00 - 0.05 10*3/mm3    nRBC 0.0 0.0 - 0.2 /100 WBC   Lactate Dehydrogenase    Specimen: Blood   Result Value Ref Range     (H) 135 - 214 U/L   Procalcitonin    Specimen: Blood   Result Value Ref Range    Procalcitonin 0.05 0.00 - 0.25 ng/mL   C-reactive Protein    Specimen: Blood   Result Value Ref Range    C-Reactive Protein 0.72 (H) 0.00 - 0.50 mg/dL   Lactic Acid, Plasma    Specimen: Blood   Result Value Ref Range    Lactate 1.0 0.5 - 2.0 mmol/L   Troponin    Specimen: Blood   Result Value Ref Range    Troponin T <0.010 0.000 - 0.030 ng/mL   ECG 12 Lead   Result Value Ref Range    QT Interval 404 ms    QTC Interval 423 ms   ECG 12 Lead   Result Value Ref Range    QT Interval 410 ms    QTC Interval 416 ms   Light Blue Top   Result Value Ref Range    Extra Tube hold for add-on    Green Top (Gel)   Result Value Ref Range    Extra Tube Hold for add-ons.    Lavender Top   Result Value Ref Range    Extra Tube hold for add-on    Gold Top - SST   Result Value Ref Range    Extra Tube Hold for add-ons.              Assessment/Plan   Diagnoses and all orders for this visit:    Sleep disturbance  -     Ambulatory Referral to Sleep Medicine    Branch retinal vein occlusion of left eye with macular edema  -      Ambulatory Referral to Sleep Medicine    Adjustment reaction to chronic stress  Comments:  continue zoloft,buspar , wean off seroquel gradually.  Orders:  -     busPIRone (BUSPAR) 10 MG tablet; Take 1 tablet by mouth 2 (two) times a day.  -     QUEtiapine (SEROquel) 25 MG tablet; Take 0.5 tablets by mouth Every Night.    Recurrent cold sores  -     acyclovir (Zovirax) 400 MG tablet; Take 1 tablet by mouth 2 (Two) Times a Day. Take no more than 5 doses a day.  -     nystatin (MYCOSTATIN) 240424 UNIT/GM cream; Apply  topically to the appropriate area as directed Daily.                 Return for Next scheduled follow up.

## 2021-03-10 ENCOUNTER — TRANSCRIBE ORDERS (OUTPATIENT)
Dept: ADMINISTRATIVE | Facility: HOSPITAL | Age: 66
End: 2021-03-10

## 2021-03-10 DIAGNOSIS — K43.2 VENTRAL INCISIONAL HERNIA WITHOUT OBSTRUCTION OR GANGRENE: Primary | ICD-10-CM

## 2021-03-16 ENCOUNTER — HOSPITAL ENCOUNTER (OUTPATIENT)
Dept: CT IMAGING | Facility: HOSPITAL | Age: 66
Discharge: HOME | End: 2021-03-16
Admitting: STUDENT IN AN ORGANIZED HEALTH CARE EDUCATION/TRAINING PROGRAM

## 2021-03-16 DIAGNOSIS — K43.2 VENTRAL INCISIONAL HERNIA WITHOUT OBSTRUCTION OR GANGRENE: ICD-10-CM

## 2021-03-16 PROCEDURE — 74176 CT ABD & PELVIS W/O CONTRAST: CPT

## 2021-03-26 ENCOUNTER — HOSPITAL ENCOUNTER (EMERGENCY)
Facility: HOSPITAL | Age: 66
Discharge: HOME | End: 2021-03-26
Attending: EMERGENCY MEDICINE | Admitting: EMERGENCY MEDICINE

## 2021-03-26 VITALS
TEMPERATURE: 97.6 F | HEART RATE: 59 BPM | WEIGHT: 165 LBS | BODY MASS INDEX: 29.23 KG/M2 | RESPIRATION RATE: 16 BRPM | SYSTOLIC BLOOD PRESSURE: 132 MMHG | HEIGHT: 63 IN | DIASTOLIC BLOOD PRESSURE: 70 MMHG | OXYGEN SATURATION: 94 %

## 2021-03-26 DIAGNOSIS — E86.0 DEHYDRATION: ICD-10-CM

## 2021-03-26 DIAGNOSIS — R55 POSTURAL DIZZINESS WITH PRESYNCOPE: Primary | ICD-10-CM

## 2021-03-26 DIAGNOSIS — R42 POSTURAL DIZZINESS WITH PRESYNCOPE: Primary | ICD-10-CM

## 2021-03-26 LAB
ALBUMIN SERPL-MCNC: 4.2 G/DL (ref 3.5–5.2)
ALBUMIN/GLOB SERPL: 1.3 G/DL
ALP SERPL-CCNC: 102 U/L (ref 39–117)
ALT SERPL W P-5'-P-CCNC: 20 U/L (ref 1–33)
ANION GAP SERPL CALCULATED.3IONS-SCNC: 10 MMOL/L (ref 5–15)
AST SERPL-CCNC: 26 U/L (ref 1–32)
BASOPHILS # BLD AUTO: 0.03 10*3/MM3 (ref 0–0.2)
BASOPHILS NFR BLD AUTO: 0.6 % (ref 0–1.5)
BILIRUB SERPL-MCNC: 0.4 MG/DL (ref 0–1.2)
BUN SERPL-MCNC: 13 MG/DL (ref 8–23)
BUN/CREAT SERPL: 10.7 (ref 7–25)
CALCIUM SPEC-SCNC: 9.3 MG/DL (ref 8.6–10.5)
CHLORIDE SERPL-SCNC: 102 MMOL/L (ref 98–107)
CO2 SERPL-SCNC: 25 MMOL/L (ref 22–29)
CREAT SERPL-MCNC: 1.21 MG/DL (ref 0.57–1)
DEPRECATED RDW RBC AUTO: 47.2 FL (ref 37–54)
EOSINOPHIL # BLD AUTO: 0.05 10*3/MM3 (ref 0–0.4)
EOSINOPHIL NFR BLD AUTO: 1 % (ref 0.3–6.2)
ERYTHROCYTE [DISTWIDTH] IN BLOOD BY AUTOMATED COUNT: 14.1 % (ref 12.3–15.4)
GFR SERPL CREATININE-BSD FRML MDRD: 44 ML/MIN/1.73
GLOBULIN UR ELPH-MCNC: 3.2 GM/DL
GLUCOSE SERPL-MCNC: 108 MG/DL (ref 65–99)
HCT VFR BLD AUTO: 41.1 % (ref 34–46.6)
HGB BLD-MCNC: 13.1 G/DL (ref 12–15.9)
HOLD SPECIMEN: NORMAL
IMM GRANULOCYTES # BLD AUTO: 0.01 10*3/MM3 (ref 0–0.05)
IMM GRANULOCYTES NFR BLD AUTO: 0.2 % (ref 0–0.5)
LYMPHOCYTES # BLD AUTO: 0.88 10*3/MM3 (ref 0.7–3.1)
LYMPHOCYTES NFR BLD AUTO: 17 % (ref 19.6–45.3)
MAGNESIUM SERPL-MCNC: 2.2 MG/DL (ref 1.6–2.4)
MCH RBC QN AUTO: 29.2 PG (ref 26.6–33)
MCHC RBC AUTO-ENTMCNC: 31.9 G/DL (ref 31.5–35.7)
MCV RBC AUTO: 91.7 FL (ref 79–97)
MONOCYTES # BLD AUTO: 0.51 10*3/MM3 (ref 0.1–0.9)
MONOCYTES NFR BLD AUTO: 9.8 % (ref 5–12)
NEUTROPHILS NFR BLD AUTO: 3.71 10*3/MM3 (ref 1.7–7)
NEUTROPHILS NFR BLD AUTO: 71.4 % (ref 42.7–76)
NRBC BLD AUTO-RTO: 0 /100 WBC (ref 0–0.2)
PLATELET # BLD AUTO: 151 10*3/MM3 (ref 140–450)
PMV BLD AUTO: 10.5 FL (ref 6–12)
POTASSIUM SERPL-SCNC: 3.7 MMOL/L (ref 3.5–5.2)
PROT SERPL-MCNC: 7.4 G/DL (ref 6–8.5)
QT INTERVAL: 438 MS
QTC INTERVAL: 415 MS
RBC # BLD AUTO: 4.48 10*6/MM3 (ref 3.77–5.28)
SODIUM SERPL-SCNC: 137 MMOL/L (ref 136–145)
TROPONIN T SERPL-MCNC: <0.01 NG/ML (ref 0–0.03)
WBC # BLD AUTO: 5.19 10*3/MM3 (ref 3.4–10.8)
WHOLE BLOOD HOLD SPECIMEN: NORMAL
WHOLE BLOOD HOLD SPECIMEN: NORMAL

## 2021-03-26 PROCEDURE — 80053 COMPREHEN METABOLIC PANEL: CPT | Performed by: EMERGENCY MEDICINE

## 2021-03-26 PROCEDURE — 99284 EMERGENCY DEPT VISIT MOD MDM: CPT

## 2021-03-26 PROCEDURE — 96374 THER/PROPH/DIAG INJ IV PUSH: CPT

## 2021-03-26 PROCEDURE — 25010000002 ONDANSETRON PER 1 MG: Performed by: EMERGENCY MEDICINE

## 2021-03-26 PROCEDURE — 93005 ELECTROCARDIOGRAM TRACING: CPT | Performed by: EMERGENCY MEDICINE

## 2021-03-26 PROCEDURE — 83735 ASSAY OF MAGNESIUM: CPT | Performed by: EMERGENCY MEDICINE

## 2021-03-26 PROCEDURE — 84484 ASSAY OF TROPONIN QUANT: CPT | Performed by: EMERGENCY MEDICINE

## 2021-03-26 PROCEDURE — 85025 COMPLETE CBC W/AUTO DIFF WBC: CPT | Performed by: EMERGENCY MEDICINE

## 2021-03-26 PROCEDURE — 93005 ELECTROCARDIOGRAM TRACING: CPT

## 2021-03-26 RX ORDER — ONDANSETRON 2 MG/ML
4 INJECTION INTRAMUSCULAR; INTRAVENOUS ONCE
Status: COMPLETED | OUTPATIENT
Start: 2021-03-26 | End: 2021-03-26

## 2021-03-26 RX ORDER — SODIUM CHLORIDE 0.9 % (FLUSH) 0.9 %
10 SYRINGE (ML) INJECTION AS NEEDED
Status: DISCONTINUED | OUTPATIENT
Start: 2021-03-26 | End: 2021-03-26 | Stop reason: HOSPADM

## 2021-03-26 RX ORDER — ONDANSETRON 4 MG/1
4 TABLET, ORALLY DISINTEGRATING ORAL EVERY 4 HOURS
Qty: 12 TABLET | Refills: 0 | Status: SHIPPED | OUTPATIENT
Start: 2021-03-26

## 2021-03-26 RX ADMIN — SODIUM CHLORIDE 1000 ML: 9 INJECTION, SOLUTION INTRAVENOUS at 11:49

## 2021-03-26 RX ADMIN — ONDANSETRON 4 MG: 2 INJECTION INTRAMUSCULAR; INTRAVENOUS at 11:49

## 2021-03-26 NOTE — DISCHARGE INSTRUCTIONS
Increase fluid intake.    If you become lightheaded please lie down and raise your legs.    Parameters for taking your blood pressure medication:  If your blood pressure is at least 110 on the top number you should take your lisinopril.  If your blood pressure is at least 130 you should take your carvedilol/Coreg.    Please check your blood pressure 3 times a day. Keep a chart of these readings and any correlation to symptoms you might be having and bring this chart to the follow up with your primary care physician. If you don't already have a good blood pressure cuff, I recommend the following:    [employer] - [employer]tox Bio Blood Pressure Monitor (for upper arm)    or similar upper arm blood pressure cuffs which can be purchased for around $25.

## 2021-03-26 NOTE — ED PROVIDER NOTES
EMERGENCY DEPARTMENT ENCOUNTER    Pt Name: Tereza Ackerman  MRN: [id number]  Pt :   1955  Room Number:    Date of encounter:  3/26/2021  PCP: Michaela Connell MD  ED Provider: Inderjit Santizo MD    Historian: Patient      HPI:  Chief Complaint: Generalized weakness and low blood pressure        Context: Tereza Ackerman is a 66 y.o. female who presents to the ED c/o nausea.  The patient received her first Covid vaccination 2 days ago.  Yesterday she felt significantly nauseated throughout most of the day.  She retched up small amounts of clear liquid.  Her appetite was down and her fluid intake was down somewhat.  She did take her blood pressure medications as prescribed.  She takes carvedilol 12.5 mg twice daily and lisinopril 2.5 mg every morning.  She did take both medications this morning prior to feeling even worse.  Her blood pressure was checked by her  and found to be in the 60s over 40s per her report.  She continued to feel weak and nauseated in route our facility via private vehicle.  She does feel somewhat dry and notes that she has chronic loose stools secondary to partial colectomy.  No fevers, chills, change in stools, chest or abdominal pain, dyspnea.  She does feel quite lightheaded in the upright position and notes that she felt as if she might pass out earlier today.        PAST MEDICAL HISTORY  Active Ambulatory Problems     Diagnosis Date Noted   • Impaired glucose tolerance 2016   • Parathyroid adenoma 2016   • Reaction to chronic stress 2016   • Multinodular goiter 2016   • Vitamin D deficiency 2016   • Meningioma, recurrent of brain (CMS/HCC) 2016   • Arthritis 2016   • Depression 2016   • Small bowel obstruction (CMS/HCC) 2017   • Insomnia 2017   • History of Nissen fundoplication 2017   • Malignant neoplasm of left orbit (CMS/HCC) 2017   • Prediabetes 2017   • Mixed hyperlipidemia  11/13/2017   • Hyperglycemia 12/04/2017   • Atrial flutter with rapid ventricular response (CMS/[facility]) 12/21/2017   • Anemia 02/05/2018   • Large bowel obstruction (CMS/[facility]) 08/18/2018   • Abdominal pain 08/18/2018   • Essential hypertension 08/18/2018   • History of creation of ostomy (CMS/[facility]) 08/19/2018   • Screen for colon cancer 10/25/2018   • Sigmoid volvulus (CMS/[facility]) 02/19/2019     Resolved Ambulatory Problems     Diagnosis Date Noted   • Hypertension 07/06/2016   • Hypertensive urgency 06/30/2017   • Elevated lactic acid level 12/04/2017   • Hypotension 12/05/2017   • Acute renal failure (CMS/[facility]) 12/05/2017   • Elevated troponin level 12/05/2017   • Syncope 12/05/2017   • Metabolic acidosis 12/06/2017   • Transaminitis 12/08/2017   • Dizziness 02/05/2018   • Hypotension due to medication 02/05/2018   • Sigmoid volvulus (CMS/[facility]) 08/19/2018   • NSTEMI (non-ST elevated myocardial infarction) (CMS/[facility]) 04/25/2020   • Chest pain 04/25/2020   • Unstable angina (CMS/[facility]) 04/26/2020     Past Medical History:   Diagnosis Date   • Abdominal hernia    • Back pain    • Brain tumor (CMS/[facility])    • Colostomy present (CMS/[facility]) 08/2018   • History of blood transfusion    • History of gastroesophageal reflux (GERD)    • History of small bowel obstruction    • Hyperlipemia    • Memory loss or impairment    • Migraine    • PONV (postoperative nausea and vomiting)    • Thyroid nodule    • UTI (urinary tract infection)    • Vision changes    • Wears glasses          PAST SURGICAL HISTORY  Past Surgical History:   Procedure Laterality Date   • CARDIAC CATHETERIZATION N/A 4/27/2020    Procedure: LEFT HEART CATH;  Surgeon: Marina Ring MD;  Location:  [facility];  Service: Cardiology;  Laterality: N/A;   • CARPAL TUNNEL RELEASE  2002    right    • COLONOSCOPY N/A 8/18/2018    Procedure: COLONOSCOPY;  Surgeon: Inderjit Campos MD;  Location:  [facility];  Service: Gastroenterology   • COLONOSCOPY  N/A 11/28/2018    Procedure: COLONOSCOPY;  Surgeon: Inderjit Campos MD;  Location:  [facility];  Service: Gastroenterology   • COLOSTOMY CLOSURE N/A 2/19/2019    Procedure: COLOSTOMY TAKEDOWN, INCISIONAL HERNIA REPAIR;  Surgeon: Andrea Bocanegra MD;  Location:  [facility];  Service: General   • CRANIOTOMY  2003 & 2014    Dr. Werner Hollis for tumor removal   • ENDOSCOPY     • EXPLORATORY LAPAROTOMY N/A 12/5/2017    Procedure: EXPLORATORY LAPAROTOMY, SMALL BOWEL RESECTION;  Surgeon: Ирина Cobian MD;  Location:  [facility];  Service:    • EXPLORATORY LAPAROTOMY N/A 12/22/2017    Procedure: LAPAROTOMY EXPLORATORY FOR SMALL BOWEL OBSTRUCTION;  Surgeon: Ирина Cobian MD;  Location:  [facility];  Service:    • EXPLORATORY LAPAROTOMY N/A 7/23/2018    Procedure: EXPLORATORY LAPAROTOMY, APPENDECTOMY, CECOPEXY, INCISIONAL HERNIA REPAIR, LYSIS OF ADHESIONS;  Surgeon: Andrae Bocanegra MD;  Location:  [facility];  Service: General   • EXPLORATORY LAPAROTOMY N/A 8/19/2018    Procedure: LAPAROTOMY EXPLORATORY, SIGMOID COLECTOMY, CREATION OF OSTOMY;  Surgeon: Andrea Bocanegra MD;  Location:  [facility];  Service: General   • HYSTERECTOMY      partial - both ovaries still present pt believes    • INSERTION HEMODIALYSIS CATHETER N/A 12/7/2017    Procedure: HEMODIALYSIS CATHETER INSERTION;  Surgeon: Chance Valenzuela MD;  Location:  [facility];  Service:    • ORBITOTOMY Left 11/3/2017    Procedure:  LEFT LATERAL ORBITOTOMY WITH DEBULKING OF TUMOR ;  Surgeon: Moo Hopkins MD;  Location:  [facility];  Service:    • OTHER SURGICAL HISTORY      esophagogastric fundoplasty nissen fundoplication   • TUBAL ABDOMINAL LIGATION           FAMILY HISTORY  Family History   Problem Relation Age of Onset   • Obesity Mother    • Heart attack Mother    • Migraines Father    • Stroke Father    • Diabetes Father    • Cancer Other    • Arthritis Other    • Diabetes Other    • Breast cancer Maternal Aunt    • Breast cancer Paternal  Aunt    • Ovarian cancer Neg Hx          SOCIAL HISTORY  Social History     Socioeconomic History   • Marital status:      Spouse name: Not on file   • Number of children: Not on file   • Years of education: Not on file   • Highest education level: Not on file   Tobacco Use   • Smoking status: Never Smoker   • Smokeless tobacco: Never Used   Substance and Sexual Activity   • Alcohol use: No   • Drug use: No   • Sexual activity: Defer         ALLERGIES  Dilantin [phenytoin sodium extended]        REVIEW OF SYSTEMS  Review of Systems     All systems reviewed and negative except for those discussed in HPI.       PHYSICAL EXAM    I have reviewed the triage vital signs and nursing notes.    ED Triage Vitals [03/26/21 1109]   Temp Heart Rate Resp BP SpO2   97.6 °F (36.4 °C) 61 16 (!) 77/58 94 %      Temp src Heart Rate Source Patient Position BP Location FiO2 (%)   -- Monitor Sitting Left arm --       Physical Exam  GENERAL:   Appears very pleasant nontoxic.  HENT: Nares patent.  Very dry mucous membranes  EYES: No scleral icterus.  CV: Regular rhythm, regular rate.  No murmurs gallops rubs  RESPIRATORY: Normal effort.  No audible wheezes, rales or rhonchi.  Clear to auscultation  ABDOMEN: Soft, nontender.  No masses or hepatosplenomegaly  MUSCULOSKELETAL: No deformities.   NEURO: Alert, moves all extremities, follows commands.  SKIN: Warm, dry, no rash visualized.        LAB RESULTS  Recent Results (from the past 24 hour(s))   Comprehensive Metabolic Panel    Collection Time: 03/26/21 11:19 AM    Specimen: Blood   Result Value Ref Range    Glucose 108 (H) 65 - 99 mg/dL    BUN 13 8 - 23 mg/dL    Creatinine 1.21 (H) 0.57 - 1.00 mg/dL    Sodium 137 136 - 145 mmol/L    Potassium 3.7 3.5 - 5.2 mmol/L    Chloride 102 98 - 107 mmol/L    CO2 25.0 22.0 - 29.0 mmol/L    Calcium 9.3 8.6 - 10.5 mg/dL    Total Protein 7.4 6.0 - 8.5 g/dL    Albumin 4.20 3.50 - 5.20 g/dL    ALT (SGPT) 20 1 - 33 U/L    AST (SGOT) 26 1 - 32 U/L     Alkaline Phosphatase 102 39 - 117 U/L    Total Bilirubin 0.4 0.0 - 1.2 mg/dL    eGFR Non African Amer 44 (L) >60 mL/min/1.73    Globulin 3.2 gm/dL    A/G Ratio 1.3 g/dL    BUN/Creatinine Ratio 10.7 7.0 - 25.0    Anion Gap 10.0 5.0 - 15.0 mmol/L   Magnesium    Collection Time: 03/26/21 11:19 AM    Specimen: Blood   Result Value Ref Range    Magnesium 2.2 1.6 - 2.4 mg/dL   Troponin    Collection Time: 03/26/21 11:19 AM    Specimen: Blood   Result Value Ref Range    Troponin T <0.010 0.000 - 0.030 ng/mL   Light Blue Top    Collection Time: 03/26/21 11:19 AM   Result Value Ref Range    Extra Tube hold for add-on    Green Top (Gel)    Collection Time: 03/26/21 11:19 AM   Result Value Ref Range    Extra Tube Hold for add-ons.    Lavender Top    Collection Time: 03/26/21 11:19 AM   Result Value Ref Range    Extra Tube     Gold Top - SST    Collection Time: 03/26/21 11:19 AM   Result Value Ref Range    Extra Tube Hold for add-ons.    Gray Top - Ice    Collection Time: 03/26/21 11:19 AM   Result Value Ref Range    Extra Tube Hold for add-ons.    CBC Auto Differential    Collection Time: 03/26/21 11:19 AM    Specimen: Blood   Result Value Ref Range    WBC 5.19 3.40 - 10.80 10*3/mm3    RBC 4.48 3.77 - 5.28 10*6/mm3    Hemoglobin 13.1 12.0 - 15.9 g/dL    Hematocrit 41.1 34.0 - 46.6 %    MCV 91.7 79.0 - 97.0 fL    MCH 29.2 26.6 - 33.0 pg    MCHC 31.9 31.5 - 35.7 g/dL    RDW 14.1 12.3 - 15.4 %    RDW-SD 47.2 37.0 - 54.0 fl    MPV 10.5 6.0 - 12.0 fL    Platelets 151 140 - 450 10*3/mm3    Neutrophil % 71.4 42.7 - 76.0 %    Lymphocyte % 17.0 (L) 19.6 - 45.3 %    Monocyte % 9.8 5.0 - 12.0 %    Eosinophil % 1.0 0.3 - 6.2 %    Basophil % 0.6 0.0 - 1.5 %    Immature Grans % 0.2 0.0 - 0.5 %    Neutrophils, Absolute 3.71 1.70 - 7.00 10*3/mm3    Lymphocytes, Absolute 0.88 0.70 - 3.10 10*3/mm3    Monocytes, Absolute 0.51 0.10 - 0.90 10*3/mm3    Eosinophils, Absolute 0.05 0.00 - 0.40 10*3/mm3    Basophils, Absolute 0.03 0.00 - 0.20 10*3/mm3     Immature Grans, Absolute 0.01 0.00 - 0.05 10*3/mm3    nRBC 0.0 0.0 - 0.2 /100 WBC   ECG 12 Lead    Collection Time: 03/26/21 11:19 AM   Result Value Ref Range    QT Interval 438 ms    QTC Interval 415 ms       If labs were ordered, I independently reviewed the results.        RADIOLOGY  No Radiology Exams Resulted Within Past 24 Hours        PROCEDURES    Procedures    ECG 12 Lead   Final Result   Test Reason : Syncope triage protocol   Blood Pressure : **/** mmHG   Vent. Rate : 054 BPM     Atrial Rate : 054 BPM      P-R Int : 142 ms          QRS Dur : 082 ms       QT Int : 438 ms       P-R-T Axes : 035 024 046 degrees      QTc Int : 415 ms      Sinus bradycardia   Otherwise normal ECG   Confirmed by LAINE KELLER MD ([id number]) on 3/26/2021 12:27:13 PM      Referred By:  EDMD           Confirmed By:LAINE KELLER MD          MEDICATIONS GIVEN IN ER    Medications   sodium chloride 0.9 % flush 10 mL (has no administration in time range)   sodium chloride 0.9 % bolus 1,000 mL (0 mL Intravenous Stopped 3/26/21 1251)   ondansetron (ZOFRAN) injection 4 mg (4 mg Intravenous Given 3/26/21 1149)         PROGRESS, DATA ANALYSIS, CONSULTS, AND MEDICAL DECISION MAKING    All labs have been independently reviewed by me.  All radiology studies have been reviewed by me and the radiologist dictating the report.   EKG's have been independently viewed and interpreted by me.      Differential diagnoses: Presyncope likely secondary to dehydration along with continuing antihypertensives.  Multiple other more serious etiologies to include ACS etc. considered as well.      ED Course as of Mar 26 1341   Fri Mar 26, 2021   1223 Current blood pressure 103/59.  IV hydration in progress.    [MS]   1339 The patient is feeling well at this point.  I feel her symptoms are likely secondary to dehydration as well as taking her antihypertensives in this dehydrated state.  She has been hydrated and her orthostatics are much improved.  She feels well  and feels ready for discharge.  I spoke with her in detail about discharge instructions including blood pressure medication parameters for taking.  Ultimately her primary care doctor/cardiologist should decide on long-term antihypertensive medications.    [MS]      ED Course User Index  [MS] Inderjit Santizo MD           AS OF 13:41 EDT VITALS:    BP - 132/70  HR - 59  TEMP - 97.6 °F (36.4 °C)  O2 SATS - 94%        DIAGNOSIS  Final diagnoses:   Postural dizziness with presyncope   Dehydration         DISPOSITION  DISCHARGE    FOLLOW-UP  Michaela Connell MD  [address] [facility] [address] [phone]    In 2 days  IF NOT MUCH IMPROVED    [facility] Emergency Department  [address] [facility] [address] [phone]    IF YOU HAVE ANY CONCERN OF WORSENING CONDITION         Medication List      New Prescriptions    ondansetron ODT 4 MG disintegrating tablet  Commonly known as: ZOFRAN-ODT  Place 1 tablet on the tongue Every 4 (Four) Hours. As needed for nausea           Where to Get Your Medications      These medications were sent to [facility] [address] [place] [address] - [address] - [phone]  - [phone] FX  [address] [place] [address]    Phone: [phone]   · ondansetron ODT 4 MG disintegrating tablet                  Inderjit Santizo MD  03/26/21 6400

## 2021-03-29 ENCOUNTER — PATIENT OUTREACH (OUTPATIENT)
Dept: CASE MANAGEMENT | Facility: OTHER | Age: 66
End: 2021-03-29

## 2021-04-06 ENCOUNTER — TELEPHONE (OUTPATIENT)
Dept: INTERNAL MEDICINE | Facility: CLINIC | Age: 66
End: 2021-04-06

## 2021-04-06 NOTE — TELEPHONE ENCOUNTER
Caller: Tyron Terezashahnaz Recinos    Relationship: Self    Best call back number: [phone]    What test was performed: LABS    When was the test performed: 03/26/21    Where was the test performed: IN OFFICE    Additional notes: PATIENT HAS NOT RECEIVED HER LAB RESULTS. HER MYCHART SHOWS ACTIVE BUT SHE DOES NOT USE IT SO SHE NEEDS SOMEONE TO CALL HER WITH THE RESULTS.    ALSO, PATIENT WAS SEEN BY DR. SHEFFIELD AND WAS TOLD SOMEONE WOULD CALL HER TO SCHEDULE A SLEEP STUDY APPOINTMENT.    PLEASE ADVISE AND CALL PATIENT [phone]

## 2021-04-07 NOTE — TELEPHONE ENCOUNTER
Please let her know that the referral has been sent to [facility] Sleep medicine on 3/9 and they are working on it.  I will have our referral coordinator call her with an update.  Re: labs done in the ER, she will have to get the paper copy from Medical records.      Patricia,  Can you notify patient re: her Sleep study referral?

## 2021-04-12 DIAGNOSIS — I21.4 NSTEMI (NON-ST ELEVATED MYOCARDIAL INFARCTION) (HCC): ICD-10-CM

## 2021-04-12 RX ORDER — METHION/INOS/CHOL BT/B COM/LIV 110MG-86MG
100 CAPSULE ORAL EVERY OTHER DAY
Qty: 45 TABLET | Refills: 1 | Status: SHIPPED | OUTPATIENT
Start: 2021-04-12 | End: 2021-09-02

## 2021-04-12 NOTE — TELEPHONE ENCOUNTER
Last Office Visit: 3/9/21  Next Office Visit: 5/4/21    Labs completed in past 6 months? no  Labs completed in past year? no    Last Refill Date:11/4/20  Quantity:45  Refills:1    Pharmacy:     Please review pended refill request for any changes needed on refills or quantities. Thank you!

## 2021-04-12 NOTE — TELEPHONE ENCOUNTER
Caller: Tereza Ackerman    Relationship: Self    Best call back number: [phone]    Medication needed:   Requested Prescriptions     Pending Prescriptions Disp Refills   • thiamine (thiamine) 100 MG tablet tablet 45 tablet 1     Sig: Take 1 tablet by mouth Every Other Day.       When do you need the refill by: 4/12/21    Does the patient have less than a 3 day supply:  [x] Yes  [] No    What is the patient's preferred pharmacy[facility] [place] [address] [facility] [phone] [facility] [phone]

## 2021-04-12 NOTE — TELEPHONE ENCOUNTER
Last Office Visit: 3/9/21  Next Office Visit: 5/4/21    Labs completed in past 6 months? yes  Labs completed in past year? yes    Last Refill Date:1/11/21  Quantity:90  Refills:0    Pharmacy:     Please review pended refill request for any changes needed on refills or quantities. Thank you!

## 2021-04-13 RX ORDER — ROSUVASTATIN CALCIUM 40 MG/1
TABLET, COATED ORAL
Qty: 90 TABLET | Refills: 0 | Status: SHIPPED | OUTPATIENT
Start: 2021-04-13 | End: 2021-06-29

## 2021-05-10 ENCOUNTER — APPOINTMENT (OUTPATIENT)
Dept: MRI IMAGING | Facility: HOSPITAL | Age: 66
End: 2021-05-10

## 2021-05-15 RX ORDER — CALCIUM CARBONATE/VITAMIN D3 600 MG-10
TABLET ORAL
Qty: 45 TABLET | Refills: 1 | OUTPATIENT
Start: 2021-05-15

## 2021-06-02 ENCOUNTER — OFFICE VISIT (OUTPATIENT)
Dept: SLEEP MEDICINE | Facility: HOSPITAL | Age: 66
End: 2021-06-02

## 2021-06-02 VITALS
WEIGHT: 166.8 LBS | DIASTOLIC BLOOD PRESSURE: 100 MMHG | HEART RATE: 71 BPM | BODY MASS INDEX: 29.55 KG/M2 | OXYGEN SATURATION: 93 % | HEIGHT: 63 IN | SYSTOLIC BLOOD PRESSURE: 137 MMHG

## 2021-06-02 DIAGNOSIS — E66.3 OVERWEIGHT: ICD-10-CM

## 2021-06-02 DIAGNOSIS — G47.33 OBSTRUCTIVE SLEEP APNEA, ADULT: ICD-10-CM

## 2021-06-02 DIAGNOSIS — R06.83 SNORING: Primary | ICD-10-CM

## 2021-06-02 PROCEDURE — 99203 OFFICE O/P NEW LOW 30 MIN: CPT | Performed by: INTERNAL MEDICINE

## 2021-06-03 NOTE — PROGRESS NOTES
Subjective   Tereza Ackerman is a 66 y.o. female is being seen for consultation today at the request of Michaela Connell MD: For the evaluation of snoring and possible sleep disordered breathing. She is also seen by Dr. Chel Lee. She is seen in person in the sleep clinic    History of Present Illness  Patient has been found to have evidence of a retinal vein occlusion. She is referred for evaluation of possible sleep disordered breathing is contributing factor. She has had snoring noted for at least 5 years. She denies being noted to have apneas. She denies awakening gasping for breath. She says she is frequently not rested on arising in the morning. She denies having morning headaches. She has a history of reflux and has been on medications. She also had surgery for reflux.    She has broken her nose in the past and has a deviated septum. She denies having trouble breathing through her nose. She will fall asleep with sitting quietly during the day. She denies problems while driving. She complains of decreased memory and decreased concentration. She has gained about 23 pounds over the past 3 years.    She goes to bed about 10:30 PM. She will fall asleep quickly. She awakens 0-1 times per night. She gets 9 to 10 hours of sleep but does not feel rested. She has had a history of hypertension for 2 years. She had atrial flutter noted in 2017. She denies any history of coronary artery disease or diabetes.  Allergies   Allergen Reactions   • Dilantin [Phenytoin Sodium Extended] Rash          Current Outpatient Medications:   •  acyclovir (Zovirax) 400 MG tablet, Take 1 tablet by mouth 2 (Two) Times a Day. Take no more than 5 doses a day., Disp: 6 tablet, Rfl: 1  •  aspirin 81 MG tablet, Take 1 tablet by mouth Daily., Disp: 30 tablet, Rfl: 11  •  busPIRone (BUSPAR) 10 MG tablet, Take 1 tablet by mouth 2 (two) times a day., Disp: 180 tablet, Rfl: 1  •  carvedilol (COREG) 12.5 MG tablet, Take 1 tablet by mouth  Every 12 (Twelve) Hours., Disp: 180 tablet, Rfl: 3  •  cholecalciferol (VITAMIN D3) 1000 units tablet, Take 2,000 Units by mouth Every Other Day., Disp: , Rfl:   •  cyclobenzaprine (FLEXERIL) 10 MG tablet, Take 0.5 tablets by mouth 3 (Three) Times a Day As Needed (headache)., Disp: 12 tablet, Rfl: 0  •  diazePAM (Valium) 10 MG tablet, Take 1 tablet by mouth 30 Min Pre-Op., Disp: 2 tablet, Rfl: 0  •  levocetirizine (XYZAL) 5 MG tablet, TAKE 1 TABLET BY MOUTH EVERY DAY IN THE EVENING, Disp: 90 tablet, Rfl: 1  •  lisinopril (PRINIVIL,ZESTRIL) 2.5 MG tablet, Take 1 tablet by mouth Daily., Disp: 90 tablet, Rfl: 1  •  LORazepam (Ativan) 0.5 MG tablet, Take 0.5-1 tablets by mouth Daily As Needed for Anxiety., Disp: 15 tablet, Rfl: 0  •  nystatin (MYCOSTATIN) 816122 UNIT/GM cream, Apply  topically to the appropriate area as directed Daily., Disp: 15 g, Rfl: 0  •  omeprazole (priLOSEC) 20 MG capsule, Take 1 capsule by mouth Daily., Disp: 90 capsule, Rfl: 1  •  ondansetron ODT (ZOFRAN-ODT) 4 MG disintegrating tablet, Place 1 tablet on the tongue Every 4 (Four) Hours. As needed for nausea, Disp: 12 tablet, Rfl: 0  •  QUEtiapine (SEROquel) 25 MG tablet, Take 0.5 tablets by mouth Every Night., Disp: 45 tablet, Rfl: 1  •  rosuvastatin (CRESTOR) 40 MG tablet, TAKE ONE TABLET BY MOUTH DAILY, Disp: 90 tablet, Rfl: 0  •  sertraline (ZOLOFT) 100 MG tablet, Take 1 tablet by mouth Daily., Disp: 90 tablet, Rfl: 1  •  thiamine (thiamine) 100 MG tablet tablet, Take 1 tablet by mouth Every Other Day., Disp: 45 tablet, Rfl: 1  •  ondansetron (ZOFRAN) 4 MG tablet, Take 4 mg by mouth Every 8 (Eight) Hours As Needed for Nausea or Vomiting., Disp: , Rfl:     Social History    Tobacco Use      Smoking status: Never Smoker      Smokeless tobacco: Never Used       Social History     Substance and Sexual Activity   Alcohol Use No       Caffeine: She has 4 cups of coffee and 2 servings of tea daily    Past Medical History:   Diagnosis Date   •  Abdominal hernia    • Arthritis     rt knee    • Atrial flutter with rapid ventricular response (CMS/HCC) 12/21/2017   • Back pain    • Brain tumor (CMS/HCC)    • Colostomy present (CMS/HCC) 08/2018   • Depression    • History of blood transfusion    • History of gastroesophageal reflux (GERD)     resolved since Nissen   • History of Nissen fundoplication 7/1/2017   • History of small bowel obstruction    • Hyperlipemia    • Hypertension    • Memory loss or impairment    • Migraine    • Parathyroid adenoma     Incidental on thyroid US.   • PONV (postoperative nausea and vomiting)     nausea - preprocedural meds help    • Prediabetes     Last Impression: 06 Feb 2015  Reviewed labs. Excellent control.  Maren Connellast Impression: 06 Feb 2015  Reviewed labs. Excellent control.  Michaela Connell   • Thyroid nodule      Last Impression: 13 Jun 2015  r/o thyroid cancer, will proceed with US.  Michaela Connell (Internal Medicine)    • UTI (urinary tract infection)    • Vision changes     blockages left eye    • Wears glasses     readers       Past Surgical History:   Procedure Laterality Date   • CARDIAC CATHETERIZATION N/A 4/27/2020    Procedure: LEFT HEART CATH;  Surgeon: Marina Ring MD;  Location:  [facility];  Service: Cardiology;  Laterality: N/A;   • CARPAL TUNNEL RELEASE  2002    right    • COLONOSCOPY N/A 8/18/2018    Procedure: COLONOSCOPY;  Surgeon: Inderjit Campos MD;  Location:  [facility];  Service: Gastroenterology   • COLONOSCOPY N/A 11/28/2018    Procedure: COLONOSCOPY;  Surgeon: Inderjit Campos MD;  Location:  [facility];  Service: Gastroenterology   • COLOSTOMY CLOSURE N/A 2/19/2019    Procedure: COLOSTOMY TAKEDOWN, INCISIONAL HERNIA REPAIR;  Surgeon: Andrea Bocanegra MD;  Location:  [facility];  Service: General   • CRANIOTOMY  2003 & 2014    Dr. Werner Hollis for tumor removal   • ENDOSCOPY     • EXPLORATORY LAPAROTOMY N/A 12/5/2017    Procedure:  EXPLORATORY LAPAROTOMY, SMALL BOWEL RESECTION;  Surgeon: Ирина Cobian MD;  Location:  [facility];  Service:    • EXPLORATORY LAPAROTOMY N/A 12/22/2017    Procedure: LAPAROTOMY EXPLORATORY FOR SMALL BOWEL OBSTRUCTION;  Surgeon: Ирина Cobian MD;  Location:  [facility];  Service:    • EXPLORATORY LAPAROTOMY N/A 7/23/2018    Procedure: EXPLORATORY LAPAROTOMY, APPENDECTOMY, CECOPEXY, INCISIONAL HERNIA REPAIR, LYSIS OF ADHESIONS;  Surgeon: Andrea Bocanegra MD;  Location:  [facility];  Service: General   • EXPLORATORY LAPAROTOMY N/A 8/19/2018    Procedure: LAPAROTOMY EXPLORATORY, SIGMOID COLECTOMY, CREATION OF OSTOMY;  Surgeon: Andrea Bocanegra MD;  Location:  [facility];  Service: General   • HYSTERECTOMY      partial - both ovaries still present pt believes    • INSERTION HEMODIALYSIS CATHETER N/A 12/7/2017    Procedure: HEMODIALYSIS CATHETER INSERTION;  Surgeon: Chance Valenzuela MD;  Location:  [facility];  Service:    • ORBITOTOMY Left 11/3/2017    Procedure:  LEFT LATERAL ORBITOTOMY WITH DEBULKING OF TUMOR ;  Surgeon: Moo Hopkins MD;  Location:  [facility];  Service:    • OTHER SURGICAL HISTORY      esophagogastric fundoplasty nissen fundoplication   • TUBAL ABDOMINAL LIGATION     She had a Nissen procedure for hiatal hernia    Family History   Problem Relation Age of Onset   • Obesity Mother    • Heart attack Mother    • Heart disease Mother    • Migraines Father    • Stroke Father    • Diabetes Father    • Cancer Other    • Arthritis Other    • Diabetes Other    • Breast cancer Maternal Aunt    • Breast cancer Paternal Aunt    • Ovarian cancer Neg Hx        The following portions of the patient's history were reviewed and updated as appropriate: allergies, current medications, past family history, past medical history, past social history, past surgical history and problem list.    Review of Systems   Constitutional: Positive for fatigue.   HENT: Negative.    Eyes: Positive for visual  "disturbance.   Respiratory: Negative.    Cardiovascular: Negative.    Gastrointestinal: Positive for diarrhea.   Endocrine: Negative.    Genitourinary: Negative.    Musculoskeletal: Negative.    Skin: Negative.    Allergic/Immunologic: Negative.    Neurological: Positive for weakness and light-headedness.   Hematological: Negative.    Psychiatric/Behavioral: Positive for decreased concentration. The patient is nervous/anxious.    Kasson score is only 3/24    Objective     /100 (BP Location: Left arm, Patient Position: Sitting, Cuff Size: Adult)   Pulse 71   Ht 160 cm (63\")   Wt 75.7 kg (166 lb 12.8 oz)   SpO2 93%   BMI 29.55 kg/m²      Physical Exam  Patient appears to be awake and alert. She does not appear to be in acute respiratory distress.    She appears to be normocephalic she has Mallampati class III anatomy.    Lungs are clear to auscultation    Cardiac exam revealed normal S1-S2    Extremities showed no edema.    Assessment/Plan   Diagnoses and all orders for this visit:    1. Snoring (Primary)  -     Home Sleep Study; Future    2. Obstructive sleep apnea, adult  -     Home Sleep Study; Future    3. Overweight    Patient has a history of snoring and nonrestorative sleep. She gives an excellent story for obstructive sleep apnea. We will plan to proceed to home sleep testing. She preferred that over doing an in lab polysomnogram. We have discussed possible therapies including CPAP, weight control, oral appliance, and surgery. We have also discussed the long-term consequences of untreated obstructive sleep apnea. These include hypertension, diabetes, heart disease, stroke, and dementia. She is encouraged to achieve ideal body weight. She declines referral to nutrition services. She is encouraged to avoid alcohol and sedatives to bedtime. Encouraged to practice lateral position sleep. We will see her back after her study.    Total time: 35 minutes exclusive of procedures.         Rich Fritz MD " FCCP  Sleep Medicine  Pulmonary and Critical Care Medicine

## 2021-06-04 ENCOUNTER — HOSPITAL ENCOUNTER (OUTPATIENT)
Dept: MRI IMAGING | Facility: HOSPITAL | Age: 66
Discharge: HOME | End: 2021-06-04
Admitting: PHYSICIAN ASSISTANT

## 2021-06-04 PROCEDURE — 82565 ASSAY OF CREATININE: CPT

## 2021-06-04 PROCEDURE — 70553 MRI BRAIN STEM W/O & W/DYE: CPT

## 2021-06-04 PROCEDURE — 0 GADOBENATE DIMEGLUMINE 529 MG/ML SOLUTION: Performed by: PHYSICIAN ASSISTANT

## 2021-06-04 PROCEDURE — A9577 INJ MULTIHANCE: HCPCS | Performed by: PHYSICIAN ASSISTANT

## 2021-06-04 RX ADMIN — GADOBENATE DIMEGLUMINE 15 ML: 529 INJECTION, SOLUTION INTRAVENOUS at 10:20

## 2021-06-05 LAB — CREAT BLDA-MCNC: 1 MG/DL (ref 0.6–1.3)

## 2021-06-11 ENCOUNTER — OFFICE VISIT (OUTPATIENT)
Dept: NEUROSURGERY | Facility: CLINIC | Age: 66
End: 2021-06-11

## 2021-06-11 VITALS — TEMPERATURE: 97.6 F | WEIGHT: 167.4 LBS | BODY MASS INDEX: 29.66 KG/M2 | HEIGHT: 63 IN

## 2021-06-11 DIAGNOSIS — D32.9 MENINGIOMA (HCC): Primary | ICD-10-CM

## 2021-06-11 DIAGNOSIS — C69.62 MALIGNANT NEOPLASM OF LEFT ORBIT (HCC): ICD-10-CM

## 2021-06-11 PROCEDURE — 99213 OFFICE O/P EST LOW 20 MIN: CPT | Performed by: NEUROLOGICAL SURGERY

## 2021-06-11 RX ORDER — TIMOLOL MALEATE 5 MG/ML
1 SOLUTION/ DROPS OPHTHALMIC 2 TIMES DAILY
COMMUNITY
Start: 2021-06-10

## 2021-06-11 RX ORDER — LISINOPRIL 2.5 MG/1
2.5 TABLET ORAL
COMMUNITY
Start: 2021-02-10 | End: 2021-08-23 | Stop reason: SDUPTHER

## 2021-06-11 NOTE — PROGRESS NOTES
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    History of Present Illness: Ms. Ackerman is a 66-year-old woman who is well-known to our service.  She underwent surgical intervention for her sphenoid wing meningioma in  by Dr. Hollis.  A second procedure was performed in .  Given recurrence along the resection site invading the orbit she underwent stereotactic radiosurgery.  I provided treatment in that regard.  She has been followed with serial MRI studies.  According Dr. Hollis' notes her studies had been sent to the [facility].  Stereotactic radiosurgery was recommended given that further surgery would entail removal of the globe and skull base.  Patient complains of fatigue.  She does have retinal disease and significant visual compromise in her left eye.  She is followed by Dr. Lee.    Review of Systems   Constitutional: Negative for activity change, appetite change, chills, diaphoresis, fatigue, fever and unexpected weight change.   HENT: Negative for congestion, dental problem, drooling, ear discharge, ear pain, facial swelling, hearing loss, mouth sores, nosebleeds, postnasal drip, rhinorrhea, sinus pressure, sinus pain, sneezing, sore throat, tinnitus, trouble swallowing and voice change.    Eyes: Negative for photophobia, pain, discharge, redness, itching and visual disturbance.   Respiratory: Negative for apnea, cough, choking, chest tightness, shortness of breath, wheezing and stridor.    Cardiovascular: Negative for chest pain, palpitations and leg swelling.   Gastrointestinal: Negative for abdominal distention, abdominal pain, anal bleeding, blood in stool, constipation, diarrhea, nausea, rectal pain and vomiting.   Endocrine: Negative for cold intolerance, heat intolerance, polydipsia, polyphagia and polyuria.   Genitourinary: Negative  "for decreased urine volume, difficulty urinating, dysuria, enuresis, flank pain, frequency, genital sores, hematuria and urgency.   Musculoskeletal: Negative for arthralgias, back pain, gait problem, joint swelling, myalgias, neck pain and neck stiffness.   Skin: Negative for color change, pallor, rash and wound.   Allergic/Immunologic: Negative for environmental allergies, food allergies and immunocompromised state.   Neurological: Negative for dizziness, tremors, seizures, syncope, facial asymmetry, speech difficulty, weakness, light-headedness, numbness and headaches.   Hematological: Negative for adenopathy. Does not bruise/bleed easily.   Psychiatric/Behavioral: Negative for agitation, behavioral problems, confusion, decreased concentration, dysphoric mood, hallucinations, self-injury, sleep disturbance and suicidal ideas. The patient is not nervous/anxious and is not hyperactive.        The patient's past medical history, past surgical history, family history, and social history have been reviewed at length in the electronic medical record.    Physical Exam:   Temp 97.6 °F (36.4 °C)   Ht 160 cm (63\")   Wt 75.9 kg (167 lb 6.4 oz)   BMI 29.65 kg/m²   The patient is awake and alert and in good spirits.  She has some trouble with her left monocular field more inferiorly and temporally.  Extraocular movements are intact.  Facial symmetry is preserved.  There is no pronator drift.  Her gait is normal.    Medical Decision Making    Data Review:   (All imaging studies were personally reviewed unless stated otherwise)  Follow-up MRI of the brain dated 6/4/2021 is compared to a study from 2/8/2021.  She continues to have disease in the anterior medial aspect of the middle fossa.  There is some nodular enhancement higher up along the previous bur hole.  The findings are stable.    Diagnosis:   Left sphenoid wing meningioma status post multiple interventions, stable.    Treatment Options:   Ms. Ackerman is holding her " own.  She will follow-up in 6 months with a new MRI of the brain with and without gadolinium.       Diagnosis Plan   1. Malignant neoplasm of left orbit (CMS/HCC)  MRI Brain With & Without Contrast       Scribed for Robi Perales MD by Katlyn Marsh MEME 6/11/2021 10:56 EDT      I, Dr. Perales, personally performed the services described in the documentation, as scribed in my presence, and it is both accurate and complete.

## 2021-06-21 ENCOUNTER — HOSPITAL ENCOUNTER (OUTPATIENT)
Dept: SLEEP MEDICINE | Facility: HOSPITAL | Age: 66
Discharge: HOME | End: 2021-06-21
Admitting: INTERNAL MEDICINE

## 2021-06-21 VITALS — BODY MASS INDEX: 29.57 KG/M2 | WEIGHT: 166.89 LBS | HEIGHT: 63 IN

## 2021-06-21 DIAGNOSIS — G47.33 OBSTRUCTIVE SLEEP APNEA, ADULT: ICD-10-CM

## 2021-06-21 DIAGNOSIS — R06.83 SNORING: ICD-10-CM

## 2021-06-21 PROCEDURE — 95806 SLEEP STUDY UNATT&RESP EFFT: CPT

## 2021-06-21 PROCEDURE — 95806 SLEEP STUDY UNATT&RESP EFFT: CPT | Performed by: INTERNAL MEDICINE

## 2021-06-23 DIAGNOSIS — G47.33 OSA (OBSTRUCTIVE SLEEP APNEA): Primary | ICD-10-CM

## 2021-06-23 NOTE — PROGRESS NOTES
CALLED PATIENT TO ADVISE OF STUDY RESULTS. PATIENT VERBALIZED UNDERSTANDING BUT DECLINE PAP THERAPY. PATIENT WANTED TO DISCUSS WITH  AND CALL BACK 06/23/21 TRC

## 2021-06-28 DIAGNOSIS — I21.4 NSTEMI (NON-ST ELEVATED MYOCARDIAL INFARCTION) (HCC): ICD-10-CM

## 2021-06-28 NOTE — TELEPHONE ENCOUNTER
Last Office Visit: 03/09/21  Next Office Visit:11/09/21    Labs completed in past 6 months? yes  Labs completed in past year? yes    Last Refill Date: 04/13/21  Quantity:90  Refills:0    Pharmacy:     Please review pended refill request for any changes needed on refills or quantities. Thank you!

## 2021-06-29 RX ORDER — ROSUVASTATIN CALCIUM 40 MG/1
TABLET, COATED ORAL
Qty: 90 TABLET | Refills: 0 | Status: SHIPPED | OUTPATIENT
Start: 2021-06-29 | End: 2021-10-07

## 2021-07-25 DIAGNOSIS — F43.89 ADJUSTMENT REACTION TO CHRONIC STRESS: ICD-10-CM

## 2021-07-26 RX ORDER — SERTRALINE HYDROCHLORIDE 100 MG/1
TABLET, FILM COATED ORAL
Qty: 90 TABLET | Refills: 0 | Status: SHIPPED | OUTPATIENT
Start: 2021-07-26 | End: 2021-10-26

## 2021-07-26 NOTE — TELEPHONE ENCOUNTER
Last Office Visit: 3/9/21  Next Office Visit: 11/9/21    Labs completed in past 6 months? yes  Labs completed in past year? yes    Last Refill Date: 11/4/20  Quantity:90  Refills:1    Pharmacy:     Please review pended refill request for any changes needed on refills or quantities. Thank you!

## 2021-08-09 RX ORDER — OMEPRAZOLE 20 MG/1
CAPSULE, DELAYED RELEASE ORAL
Qty: 90 CAPSULE | Refills: 1 | Status: SHIPPED | OUTPATIENT
Start: 2021-08-09 | End: 2022-02-13

## 2021-08-23 ENCOUNTER — OFFICE VISIT (OUTPATIENT)
Dept: CARDIOLOGY | Facility: CLINIC | Age: 66
End: 2021-08-23

## 2021-08-23 VITALS
WEIGHT: 169 LBS | BODY MASS INDEX: 29.95 KG/M2 | HEIGHT: 63 IN | SYSTOLIC BLOOD PRESSURE: 110 MMHG | DIASTOLIC BLOOD PRESSURE: 70 MMHG | HEART RATE: 74 BPM

## 2021-08-23 DIAGNOSIS — I48.92 ATRIAL FLUTTER WITH RAPID VENTRICULAR RESPONSE (HCC): Primary | ICD-10-CM

## 2021-08-23 PROCEDURE — 99214 OFFICE O/P EST MOD 30 MIN: CPT | Performed by: INTERNAL MEDICINE

## 2021-08-23 NOTE — PROGRESS NOTES
[facility] AT [address], [address]  [place], [place], [address] [phone]  [email or web address]           OUTPATIENT CLINIC FOLLOW UP NOTE    Patient Care Team:  Patient Care Team:  Michaela Connell MD as PCP - General  Michaela Connell MD as PCP - Family Medicine  Werner Hollis MD (Inactive) as Referring Physician (Neurosurgery)  Moo Hopkins MD as Consulting Physician (Ophthalmology)  Jamal Ramos MD as Consulting Physician (Nephrology)  Alli Aguirre MD as Consulting Physician (Cardiology)  Costa Raygoza MD as Consulting Physician (Radiation Oncology)  Andrea Bocanegra MD as Consulting Physician (General Surgery)  Alli Aguirre MD as Consulting Physician (Cardiology)    Subjective:      Chief Complaint   Patient presents with   • PAF   • Atrial Flutter   • Hypertension   • Hyperlipidemia       HPI:    Tereza Ackerman is a 66 y.o. female.  Cardiac problem list:  1. Paroxysmal atrial flutter  1. Diagnosed at time of small bowel obstruction. Lasted less than 48 hours, outpatient heart monitor revealed no recurrent atrial flutter.  Later underwent GI operations without recurrent arrhythmia on carvedilol.  2. ACS 4/2020  1. Cardiac catheterization with near normal coronary arteries.  Troponin elevation.  3. Hypertension  4. Hyperlipidemia  5. COVID-19 1/2021    She presents today for follow-up.      Since the patient was last seen she reports that she has had fatigue and intermittent chest pressure that occurs with or without exertion.  Has not been taking Coreg consistently due to blood pressure readings, is making adjustments off of systolic blood pressure.  Is taking 6.25 mg most mornings.  Had 1 visit to the ED for hypotension post her second Covid vaccination.      Review of Systems:  Positive for fatigue, chest pressure  Negative for dyspnea with exertion, orthopnea, PND, lower extremity edema, palpitations, lightheadedness,  syncope.    PFSH:  Patient Active Problem List   Diagnosis   • Impaired glucose tolerance   • Parathyroid adenoma   • Reaction to chronic stress   • Multinodular goiter   • Vitamin D deficiency   • Meningioma, recurrent of brain (CMS/HCC)   • Arthritis   • Depression   • Small bowel obstruction (CMS/HCC)   • Insomnia   • History of Nissen fundoplication   • Malignant neoplasm of left orbit (CMS/HCC)   • Prediabetes   • Mixed hyperlipidemia   • Hyperglycemia   • Atrial flutter with rapid ventricular response (CMS/HCC)   • Anemia   • Large bowel obstruction (CMS/HCC)   • Abdominal pain   • Essential hypertension   • History of creation of ostomy (CMS/HCC)   • Screen for colon cancer   • Sigmoid volvulus (CMS/HCC)   • SHAE (obstructive sleep apnea)         Current Outpatient Medications:   •  aspirin 81 MG tablet, Take 1 tablet by mouth Daily., Disp: 30 tablet, Rfl: 11  •  busPIRone (BUSPAR) 10 MG tablet, Take 1 tablet by mouth 2 (two) times a day., Disp: 180 tablet, Rfl: 1  •  carvedilol (COREG) 12.5 MG tablet, Take 1 tablet by mouth Every 12 (Twelve) Hours., Disp: 180 tablet, Rfl: 3  •  cholecalciferol (VITAMIN D3) 1000 units tablet, Take 2,000 Units by mouth Every Other Day., Disp: , Rfl:   •  cyclobenzaprine (FLEXERIL) 10 MG tablet, Take 0.5 tablets by mouth 3 (Three) Times a Day As Needed (headache)., Disp: 12 tablet, Rfl: 0  •  diazePAM (Valium) 10 MG tablet, Take 1 tablet by mouth 30 Min Pre-Op., Disp: 2 tablet, Rfl: 0  •  levocetirizine (XYZAL) 5 MG tablet, TAKE 1 TABLET BY MOUTH EVERY DAY IN THE EVENING, Disp: 90 tablet, Rfl: 1  •  lisinopril (PRINIVIL,ZESTRIL) 2.5 MG tablet, Take 1 tablet by mouth Daily., Disp: 90 tablet, Rfl: 1  •  LORazepam (Ativan) 0.5 MG tablet, Take 0.5-1 tablets by mouth Daily As Needed for Anxiety., Disp: 15 tablet, Rfl: 0  •  omeprazole (priLOSEC) 20 MG capsule, TAKE ONE CAPSULE BY MOUTH DAILY, Disp: 90 capsule, Rfl: 1  •  ondansetron (ZOFRAN) 4 MG tablet, Take 4 mg by mouth Every 8  "(Eight) Hours As Needed for Nausea or Vomiting., Disp: , Rfl:   •  QUEtiapine (SEROquel) 25 MG tablet, Take 0.5 tablets by mouth Every Night., Disp: 45 tablet, Rfl: 1  •  rosuvastatin (CRESTOR) 40 MG tablet, TAKE ONE TABLET BY MOUTH DAILY, Disp: 90 tablet, Rfl: 0  •  sertraline (ZOLOFT) 100 MG tablet, TAKE ONE TABLET BY MOUTH DAILY, Disp: 90 tablet, Rfl: 0  •  thiamine (thiamine) 100 MG tablet tablet, Take 1 tablet by mouth Every Other Day., Disp: 45 tablet, Rfl: 1  •  timolol (TIMOPTIC) 0.5 % ophthalmic solution, , Disp: , Rfl:   •  acyclovir (Zovirax) 400 MG tablet, Take 1 tablet by mouth 2 (Two) Times a Day. Take no more than 5 doses a day., Disp: 6 tablet, Rfl: 1  •  nystatin (MYCOSTATIN) 570278 UNIT/GM cream, Apply  topically to the appropriate area as directed Daily., Disp: 15 g, Rfl: 0  •  ondansetron ODT (ZOFRAN-ODT) 4 MG disintegrating tablet, Place 1 tablet on the tongue Every 4 (Four) Hours. As needed for nausea, Disp: 12 tablet, Rfl: 0    Allergies   Allergen Reactions   • Dilantin [Phenytoin Sodium Extended] Rash        reports that she has never smoked. She has never used smokeless tobacco.    Family History   Problem Relation Age of Onset   • Obesity Mother    • Heart attack Mother    • Heart disease Mother    • Migraines Father    • Stroke Father    • Diabetes Father    • Cancer Other    • Arthritis Other    • Diabetes Other    • Breast cancer Maternal Aunt    • Breast cancer Paternal Aunt    • Ovarian cancer Neg Hx        Objective:   /70   Pulse 74   Ht 160 cm (62.99\")   Wt 76.7 kg (169 lb)   BMI 29.94 kg/m²   CONSTITUTIONAL: No acute distress  RESPIRATORY: Normal effort. Clear to auscultation bilaterally without wheezing or rales  CARDIOVASCULAR: Regular rate and rhythm with normal S1 and S2. Without murmur, gallop or rub.  Carotids without bruits bilaterally.  PERIPHERAL VASCULAR: Normal radial pulse. There is no lower extremity edema bilaterally.  PSYCH: Normal affect and " mood    Labs:  Lab Results   Component Value Date    ALT 20 03/26/2021    AST 26 03/26/2021     Lab Results   Component Value Date    CHOL 161 11/04/2020    TRIG 105 11/04/2020    HDL 66 (H) 11/04/2020    CREATININE 1.00 06/04/2021       Diagnostic Data:    Procedures    Event Monitor 2/2018  -Events were normal sinus rhythm  -No atrial flutter    [facility] 4/27/2020  · Near normal coronary arteries  · Normal LV systolic function wall motion    TTE 12/2017  · LVEF difficult to evaluate with tachycardia- globally appears mildly depressed.  · Left ventricular wall thickness is consistent with concentric hypertrophy.  · Mild tricuspid valve regurgitation is present.  · Calculated right ventricular systolic pressure from tricuspid regurgitation is 32 mmHg.    Assessment and Plan:     Atrial flutter with rapid ventricular response  -Without a known recurrence  -Continue aspirin  -Decrease Coreg to 6.25 mg p.o. twice daily.    Essential hypertension  -Decreasing Coreg as above.  -Continue lisinopril.    Mixed hyperlipidemia  -Continue statin.  -Routine lipid panel with PCP yearly.    - Return in about 1 year (around 8/23/2022) for Next scheduled follow up with an EKG.    Scribed for Alli Aguirre MD by Chelsea David, AYDEE. 8/23/2021  12:15 EDT    I, Alli Aguirre MD, personally performed the services as scribed by the above named individual. I have made any necessary edits and it is both accurate and complete.     Alli Aguirre MD, MSc, FACC, [facility]  Interventional Cardiology  [facility]

## 2021-09-02 RX ORDER — CALCIUM CARBONATE/VITAMIN D3 600 MG-10
TABLET ORAL
Qty: 45 TABLET | Refills: 0 | Status: SHIPPED | OUTPATIENT
Start: 2021-09-02 | End: 2021-11-09 | Stop reason: SDUPTHER

## 2021-09-14 NOTE — PLAN OF CARE
Problem: Patient Care Overview (Adult)  Goal: Plan of Care Review  Outcome: Ongoing (interventions implemented as appropriate)   02/05/18 1519   Coping/Psychosocial Response Interventions   Plan Of Care Reviewed With patient;spouse   Patient Care Overview   Progress improving   Outcome Evaluation   Outcome Summary/Follow up Plan patient is going great. stable with BP and hemoglobin level.       Problem: Fluid Volume Deficit (Adult)  Goal: Identify Related Risk Factors and Signs and Symptoms  Outcome: Ongoing (interventions implemented as appropriate)   02/05/18 1519   Fluid Volume Deficit   Fluid Volume Deficit: Related Risk Factors medication effects   Signs and Symptoms (Fluid Volume Deficit) lab value changes     Goal: Fluid/Electrolyte Balance  Outcome: Ongoing (interventions implemented as appropriate)   02/05/18 1519   Fluid Volume Deficit (Adult)   Fluid/Electrolyte Balance making progress toward outcome       Problem: Fall Risk (Adult)  Goal: Identify Related Risk Factors and Signs and Symptoms  Outcome: Ongoing (interventions implemented as appropriate)   02/05/18 1519   Fall Risk   Fall Risk: Related Risk Factors history of falls   Fall Risk: Signs and Symptoms presence of risk factors     Goal: Absence of Falls  Outcome: Ongoing (interventions implemented as appropriate)   02/05/18 1519   Fall Risk (Adult)   Absence of Falls making progress toward outcome          DISPLAY PLAN FREE TEXT DISPLAY PLAN FREE TEXT DISPLAY PLAN FREE TEXT DISPLAY PLAN FREE TEXT DISPLAY PLAN FREE TEXT DISPLAY PLAN FREE TEXT DISPLAY PLAN FREE TEXT DISPLAY PLAN FREE TEXT DISPLAY PLAN FREE TEXT DISPLAY PLAN FREE TEXT DISPLAY PLAN FREE TEXT

## 2021-09-19 DIAGNOSIS — F43.89 ADJUSTMENT REACTION TO CHRONIC STRESS: ICD-10-CM

## 2021-09-19 RX ORDER — QUETIAPINE FUMARATE 25 MG/1
TABLET, FILM COATED ORAL
Qty: 45 TABLET | Refills: 1 | Status: SHIPPED | OUTPATIENT
Start: 2021-09-19 | End: 2022-03-17

## 2021-10-07 DIAGNOSIS — I21.4 NSTEMI (NON-ST ELEVATED MYOCARDIAL INFARCTION) (HCC): ICD-10-CM

## 2021-10-07 RX ORDER — ROSUVASTATIN CALCIUM 40 MG/1
TABLET, COATED ORAL
Qty: 90 TABLET | Refills: 0 | Status: SHIPPED | OUTPATIENT
Start: 2021-10-07 | End: 2021-11-09 | Stop reason: SDUPTHER

## 2021-10-25 DIAGNOSIS — F43.89 ADJUSTMENT REACTION TO CHRONIC STRESS: ICD-10-CM

## 2021-10-26 RX ORDER — SERTRALINE HYDROCHLORIDE 100 MG/1
TABLET, FILM COATED ORAL
Qty: 90 TABLET | Refills: 0 | Status: SHIPPED | OUTPATIENT
Start: 2021-10-26 | End: 2021-11-09 | Stop reason: SDUPTHER

## 2021-11-09 ENCOUNTER — OFFICE VISIT (OUTPATIENT)
Dept: INTERNAL MEDICINE | Facility: CLINIC | Age: 66
End: 2021-11-09

## 2021-11-09 ENCOUNTER — LAB (OUTPATIENT)
Dept: LAB | Facility: HOSPITAL | Age: 66
End: 2021-11-09

## 2021-11-09 VITALS
HEIGHT: 63 IN | SYSTOLIC BLOOD PRESSURE: 110 MMHG | OXYGEN SATURATION: 96 % | BODY MASS INDEX: 30.44 KG/M2 | WEIGHT: 171.8 LBS | HEART RATE: 54 BPM | DIASTOLIC BLOOD PRESSURE: 72 MMHG

## 2021-11-09 DIAGNOSIS — J30.89 SEASONAL ALLERGIC RHINITIS DUE TO OTHER ALLERGIC TRIGGER: ICD-10-CM

## 2021-11-09 DIAGNOSIS — Z23 NEED FOR INFLUENZA VACCINATION: ICD-10-CM

## 2021-11-09 DIAGNOSIS — E78.2 MIXED HYPERLIPIDEMIA: ICD-10-CM

## 2021-11-09 DIAGNOSIS — I95.9 HYPOTENSION, UNSPECIFIED HYPOTENSION TYPE: ICD-10-CM

## 2021-11-09 DIAGNOSIS — R73.01 IFG (IMPAIRED FASTING GLUCOSE): ICD-10-CM

## 2021-11-09 DIAGNOSIS — I21.4 NSTEMI (NON-ST ELEVATED MYOCARDIAL INFARCTION) (HCC): ICD-10-CM

## 2021-11-09 DIAGNOSIS — E55.9 VITAMIN D DEFICIENCY: ICD-10-CM

## 2021-11-09 DIAGNOSIS — F43.89 ADJUSTMENT REACTION TO CHRONIC STRESS: ICD-10-CM

## 2021-11-09 DIAGNOSIS — Z00.00 MEDICARE ANNUAL WELLNESS VISIT, SUBSEQUENT: Primary | ICD-10-CM

## 2021-11-09 LAB
ALBUMIN SERPL-MCNC: 4.7 G/DL (ref 3.5–5.2)
ALBUMIN/GLOB SERPL: 1.4 G/DL
ALP SERPL-CCNC: 99 U/L (ref 39–117)
ALT SERPL W P-5'-P-CCNC: 14 U/L (ref 1–33)
ANION GAP SERPL CALCULATED.3IONS-SCNC: 6.4 MMOL/L (ref 5–15)
AST SERPL-CCNC: 21 U/L (ref 1–32)
BILIRUB SERPL-MCNC: 0.3 MG/DL (ref 0–1.2)
BUN SERPL-MCNC: 18 MG/DL (ref 8–23)
BUN/CREAT SERPL: 18.6 (ref 7–25)
CALCIUM SPEC-SCNC: 9.8 MG/DL (ref 8.6–10.5)
CHLORIDE SERPL-SCNC: 106 MMOL/L (ref 98–107)
CHOLEST SERPL-MCNC: 164 MG/DL (ref 0–200)
CO2 SERPL-SCNC: 27.6 MMOL/L (ref 22–29)
CREAT SERPL-MCNC: 0.97 MG/DL (ref 0.57–1)
DEPRECATED RDW RBC AUTO: 43.3 FL (ref 37–54)
ERYTHROCYTE [DISTWIDTH] IN BLOOD BY AUTOMATED COUNT: 12.7 % (ref 12.3–15.4)
GFR SERPL CREATININE-BSD FRML MDRD: 57 ML/MIN/1.73
GLOBULIN UR ELPH-MCNC: 3.4 GM/DL
GLUCOSE SERPL-MCNC: 78 MG/DL (ref 65–99)
HBA1C MFR BLD: 5.8 % (ref 4.8–5.6)
HCT VFR BLD AUTO: 43.8 % (ref 34–46.6)
HDLC SERPL-MCNC: 60 MG/DL (ref 40–60)
HGB BLD-MCNC: 14 G/DL (ref 12–15.9)
LDLC SERPL CALC-MCNC: 82 MG/DL (ref 0–100)
LDLC/HDLC SERPL: 1.31 {RATIO}
MCH RBC QN AUTO: 29.7 PG (ref 26.6–33)
MCHC RBC AUTO-ENTMCNC: 32 G/DL (ref 31.5–35.7)
MCV RBC AUTO: 93 FL (ref 79–97)
PLATELET # BLD AUTO: 208 10*3/MM3 (ref 140–450)
PMV BLD AUTO: 11.3 FL (ref 6–12)
POTASSIUM SERPL-SCNC: 4.2 MMOL/L (ref 3.5–5.2)
PROT SERPL-MCNC: 8.1 G/DL (ref 6–8.5)
RBC # BLD AUTO: 4.71 10*6/MM3 (ref 3.77–5.28)
SODIUM SERPL-SCNC: 140 MMOL/L (ref 136–145)
TRIGL SERPL-MCNC: 127 MG/DL (ref 0–150)
TSH SERPL DL<=0.05 MIU/L-ACNC: 1.74 UIU/ML (ref 0.27–4.2)
VLDLC SERPL-MCNC: 22 MG/DL (ref 5–40)
WBC # BLD AUTO: 5.37 10*3/MM3 (ref 3.4–10.8)

## 2021-11-09 PROCEDURE — 99214 OFFICE O/P EST MOD 30 MIN: CPT | Performed by: INTERNAL MEDICINE

## 2021-11-09 PROCEDURE — G0444 DEPRESSION SCREEN ANNUAL: HCPCS | Performed by: INTERNAL MEDICINE

## 2021-11-09 PROCEDURE — G0008 ADMIN INFLUENZA VIRUS VAC: HCPCS | Performed by: INTERNAL MEDICINE

## 2021-11-09 PROCEDURE — G0439 PPPS, SUBSEQ VISIT: HCPCS | Performed by: INTERNAL MEDICINE

## 2021-11-09 PROCEDURE — 1170F FXNL STATUS ASSESSED: CPT | Performed by: INTERNAL MEDICINE

## 2021-11-09 PROCEDURE — 86769 SARS-COV-2 COVID-19 ANTIBODY: CPT

## 2021-11-09 PROCEDURE — 82607 VITAMIN B-12: CPT

## 2021-11-09 PROCEDURE — 80061 LIPID PANEL: CPT

## 2021-11-09 PROCEDURE — 1160F RVW MEDS BY RX/DR IN RCRD: CPT | Performed by: INTERNAL MEDICINE

## 2021-11-09 PROCEDURE — 84443 ASSAY THYROID STIM HORMONE: CPT

## 2021-11-09 PROCEDURE — 80053 COMPREHEN METABOLIC PANEL: CPT

## 2021-11-09 PROCEDURE — 85027 COMPLETE CBC AUTOMATED: CPT

## 2021-11-09 PROCEDURE — 83036 HEMOGLOBIN GLYCOSYLATED A1C: CPT

## 2021-11-09 PROCEDURE — 1126F AMNT PAIN NOTED NONE PRSNT: CPT | Performed by: INTERNAL MEDICINE

## 2021-11-09 PROCEDURE — 90662 IIV NO PRSV INCREASED AG IM: CPT | Performed by: INTERNAL MEDICINE

## 2021-11-09 PROCEDURE — 82306 VITAMIN D 25 HYDROXY: CPT

## 2021-11-09 RX ORDER — LEVOCETIRIZINE DIHYDROCHLORIDE 5 MG/1
5 TABLET, FILM COATED ORAL EVERY EVENING
Qty: 90 TABLET | Refills: 1 | Status: SHIPPED | OUTPATIENT
Start: 2021-11-09 | End: 2022-07-11 | Stop reason: SDUPTHER

## 2021-11-09 RX ORDER — LISINOPRIL 2.5 MG/1
TABLET ORAL
Qty: 90 TABLET | Refills: 1 | Status: SHIPPED | OUTPATIENT
Start: 2021-11-09 | End: 2022-05-13 | Stop reason: SDUPTHER

## 2021-11-09 RX ORDER — SERTRALINE HYDROCHLORIDE 100 MG/1
100 TABLET, FILM COATED ORAL DAILY
Qty: 90 TABLET | Refills: 1 | Status: SHIPPED | OUTPATIENT
Start: 2021-11-09 | End: 2022-07-11 | Stop reason: SDUPTHER

## 2021-11-09 RX ORDER — ROSUVASTATIN CALCIUM 40 MG/1
40 TABLET, COATED ORAL DAILY
Qty: 90 TABLET | Refills: 1 | Status: SHIPPED | OUTPATIENT
Start: 2021-11-09 | End: 2022-07-06

## 2021-11-09 RX ORDER — CALCIUM CARBONATE/VITAMIN D3 600 MG-10
1 TABLET ORAL EVERY OTHER DAY
Qty: 45 TABLET | Refills: 1 | Status: SHIPPED | OUTPATIENT
Start: 2021-11-09 | End: 2022-06-16 | Stop reason: SDUPTHER

## 2021-11-09 RX ORDER — BUSPIRONE HYDROCHLORIDE 10 MG/1
10 TABLET ORAL 2 TIMES DAILY
Qty: 180 TABLET | Refills: 1 | Status: SHIPPED | OUTPATIENT
Start: 2021-11-09 | End: 2022-06-16 | Stop reason: SDUPTHER

## 2021-11-09 NOTE — PROGRESS NOTES
Immunization  Immunization performed in LD by Gill Torres MA. Patient tolerated the procedure well without complications.  11/09/21   Gill Torres MA

## 2021-11-09 NOTE — PROGRESS NOTES
The ABCs of the Annual Wellness Visit  Subsequent Medicare Wellness Visit    Chief Complaint   Patient presents with   • Medicare Wellness-subsequent      Subjective    History of Present Illness:  Tereza Ackerman is a 66 y.o. female who presents for a Subsequent Medicare Wellness Visit.    The following portions of the patient's history were reviewed and   updated as appropriate: allergies, current medications, past family history, past medical history, past social history, past surgical history and problem list.    Compared to one year ago, the patient feels her physical   health is better.    Compared to one year ago, the patient feels her mental   health is better.    Recent Hospitalizations:  She was admitted within the past 365 days at [facility].       Current Medical Providers:  Patient Care Team:  Michaela Connell MD as PCP - General  Michaela Connell MD as PCP - Family Medicine  Werner Hollis MD (Inactive) as Referring Physician (Neurosurgery)  Moo Hopkins MD as Consulting Physician (Ophthalmology)  Jamal Ramos MD as Consulting Physician (Nephrology)  Alli Aguirre MD as Consulting Physician (Cardiology)  Costa Raygoza MD as Consulting Physician (Radiation Oncology)  Andrea Bocanegra MD as Consulting Physician (General Surgery)  Alli Aguirre MD as Consulting Physician (Cardiology)    Outpatient Medications Prior to Visit   Medication Sig Dispense Refill   • aspirin 81 MG tablet Take 1 tablet by mouth Daily. 30 tablet 11   • carvedilol (COREG) 12.5 MG tablet Take 1 tablet by mouth Every 12 (Twelve) Hours. 180 tablet 3   • cholecalciferol (VITAMIN D3) 1000 units tablet Take 2,000 Units by mouth Every Other Day.     • cyclobenzaprine (FLEXERIL) 10 MG tablet Take 0.5 tablets by mouth 3 (Three) Times a Day As Needed (headache). 12 tablet 0   • LORazepam (Ativan) 0.5 MG tablet Take 0.5-1 tablets by mouth Daily As Needed for Anxiety. 15 tablet 0   •  omeprazole (priLOSEC) 20 MG capsule TAKE ONE CAPSULE BY MOUTH DAILY 90 capsule 1   • ondansetron (ZOFRAN) 4 MG tablet Take 4 mg by mouth Every 8 (Eight) Hours As Needed for Nausea or Vomiting.     • ondansetron ODT (ZOFRAN-ODT) 4 MG disintegrating tablet Place 1 tablet on the tongue Every 4 (Four) Hours. As needed for nausea 12 tablet 0   • QUEtiapine (SEROquel) 25 MG tablet TAKE 1/2 TABLET BY MOUTH EVERY NIGHT 45 tablet 1   • timolol (TIMOPTIC) 0.5 % ophthalmic solution      • busPIRone (BUSPAR) 10 MG tablet Take 1 tablet by mouth 2 (two) times a day. 180 tablet 1   • levocetirizine (XYZAL) 5 MG tablet TAKE 1 TABLET BY MOUTH EVERY DAY IN THE EVENING 90 tablet 1   • lisinopril (PRINIVIL,ZESTRIL) 2.5 MG tablet Take 1 tablet by mouth Daily. 90 tablet 1   • rosuvastatin (CRESTOR) 40 MG tablet TAKE ONE TABLET BY MOUTH DAILY 90 tablet 0   • sertraline (ZOLOFT) 100 MG tablet TAKE ONE TABLET BY MOUTH DAILY 90 tablet 0   • Thiamine Mononitrate (B1) 100 MG tablet TAKE 1 TABLET BY MOUTH EVERY OTHER DAY 45 tablet 0   • acyclovir (Zovirax) 400 MG tablet Take 1 tablet by mouth 2 (Two) Times a Day. Take no more than 5 doses a day. 6 tablet 1   • diazePAM (Valium) 10 MG tablet Take 1 tablet by mouth 30 Min Pre-Op. 2 tablet 0   • nystatin (MYCOSTATIN) 449297 UNIT/GM cream Apply  topically to the appropriate area as directed Daily. 15 g 0     No facility-administered medications prior to visit.       No opioid medication identified on active medication list. I have reviewed chart for other potential  high risk medication/s and harmful drug interactions in the elderly.          Aspirin is on active medication list. Aspirin use is indicated based on review of current medical condition/s. Pros and cons of this therapy have been discussed today. Benefits of this medication outweigh potential harm.  Patient has been encouraged to continue taking this medication.  .      Patient Active Problem List   Diagnosis   • Impaired glucose  "tolerance   • Parathyroid adenoma   • Reaction to chronic stress   • Multinodular goiter   • Vitamin D deficiency   • Meningioma, recurrent of brain (HCC)   • Arthritis   • Depression   • Small bowel obstruction (HCC)   • Insomnia   • History of Nissen fundoplication   • Malignant neoplasm of left orbit (HCC)   • Prediabetes   • Mixed hyperlipidemia   • Hyperglycemia   • Atrial flutter with rapid ventricular response (HCC)   • Anemia   • Large bowel obstruction (HCC)   • Abdominal pain   • Essential hypertension   • History of creation of ostomy (HCC)   • Screen for colon cancer   • Sigmoid volvulus (HCC)   • SHAE (obstructive sleep apnea)     Advance Care Planning  Advance Directive is not on file.  ACP discussion was held with the patient during this visit. Patient does not have an advance directive, information provided.    Review of Systems   Constitutional: Negative for chills and fever.   HENT: Negative for congestion, ear pain and sore throat.    Eyes: Positive for visual disturbance. Negative for pain and redness.   Respiratory: Negative for cough and shortness of breath.    Cardiovascular: Negative for chest pain, palpitations and leg swelling.   Gastrointestinal: Negative for abdominal pain, diarrhea and nausea.   Endocrine: Negative for cold intolerance and heat intolerance.   Genitourinary: Negative for flank pain and urgency.   Musculoskeletal: Positive for arthralgias. Negative for gait problem.   Skin: Negative for pallor and rash.   Neurological: Negative for dizziness and weakness.   Psychiatric/Behavioral: Negative for dysphoric mood and sleep disturbance. The patient is not nervous/anxious.         Objective    Vitals:    11/09/21 1049   BP: 110/72   Pulse: 54   SpO2: 96%  Comment: ra   Weight: 77.9 kg (171 lb 12.8 oz)   Height: 160 cm (62.99\")   PainSc: 0-No pain     BMI Readings from Last 1 Encounters:   11/09/21 30.44 kg/m²   BMI is above normal parameters. Recommendations include: exercise " counseling and nutrition counseling    Does the patient have evidence of cognitive impairment? No    Physical Exam  Constitutional:       General: She is not in acute distress.     Appearance: Normal appearance. She is well-developed.   HENT:      Head: Normocephalic and atraumatic.      Right Ear: Tympanic membrane and external ear normal.      Left Ear: Tympanic membrane and external ear normal.      Nose: Nose normal.      Mouth/Throat:      Mouth: Mucous membranes are moist.      Pharynx: Posterior oropharyngeal erythema present. No oropharyngeal exudate.      Tonsils: No tonsillar abscesses.   Eyes:      General: No scleral icterus.     Pupils: Pupils are equal, round, and reactive to light.   Neck:      Vascular: No carotid bruit.   Cardiovascular:      Rate and Rhythm: Normal rate and regular rhythm.      Pulses: Normal pulses.      Heart sounds: Normal heart sounds. No murmur heard.  No friction rub. No gallop.    Pulmonary:      Effort: Pulmonary effort is normal.      Breath sounds: Normal breath sounds. No stridor. No rhonchi or rales.   Abdominal:      General: Bowel sounds are normal. There is no distension.      Palpations: Abdomen is soft.      Tenderness: There is no right CVA tenderness, left CVA tenderness, guarding or rebound.      Hernia: No hernia is present.   Musculoskeletal:         General: No tenderness. Normal range of motion.      Cervical back: Normal range of motion.      Right lower leg: No edema.      Left lower leg: No edema.   Lymphadenopathy:      Cervical: No cervical adenopathy.   Skin:     General: Skin is warm and dry.      Findings: No rash.   Neurological:      General: No focal deficit present.      Mental Status: She is alert and oriented to person, place, and time. Mental status is at baseline.      Cranial Nerves: No cranial nerve deficit.      Sensory: No sensory deficit.      Coordination: Coordination normal.      Gait: Gait normal.      Deep Tendon Reflexes: Reflexes  normal.   Psychiatric:         Mood and Affect: Mood normal.         Behavior: Behavior normal.       Lab Results   Component Value Date    TRIG 127 11/09/2021    HDL 60 11/09/2021    LDL 82 11/09/2021    VLDL 22 11/09/2021    HGBA1C 5.80 (H) 11/09/2021            HEALTH RISK ASSESSMENT    Smoking Status:  Social History     Tobacco Use   Smoking Status Never Smoker   Smokeless Tobacco Never Used     Alcohol Consumption:  Social History     Substance and Sexual Activity   Alcohol Use No     Fall Risk Screen:    ROBYNADI Fall Risk Assessment was completed, and patient is at LOW risk for falls.Assessment completed on:11/9/2021    Depression Screening:  PHQ-2/PHQ-9 Depression Screening 11/9/2021   Little interest or pleasure in doing things 1   Feeling down, depressed, or hopeless 1   Trouble falling or staying asleep, or sleeping too much 3   Feeling tired or having little energy 3   Poor appetite or overeating 2   Feeling bad about yourself - or that you are a failure or have let yourself or your family down 2   Trouble concentrating on things, such as reading the newspaper or watching television 3   Moving or speaking so slowly that other people could have noticed. Or the opposite - being so fidgety or restless that you have been moving around a lot more than usual 0   Thoughts that you would be better off dead, or of hurting yourself in some way 0   Total Score 15   If you checked off any problems, how difficult have these problems made it for you to do your work, take care of things at home, or get along with other people? Not difficult at all       Health Habits and Functional and Cognitive Screening:  Functional & Cognitive Status 11/9/2021   Do you have difficulty preparing food and eating? No   Do you have difficulty bathing yourself, getting dressed or grooming yourself? No   Do you have difficulty using the toilet? No   Do you have difficulty moving around from place to place? No   Do you have trouble with  steps or getting out of a bed or a chair? No   Current Diet Well Balanced Diet   Dental Exam Up to date   Eye Exam Up to date   Exercise (times per week) -   Current Exercises Include Walking;No Regular Exercise   Current Exercise Activities Include -   Do you need help using the phone?  No   Are you deaf or do you have serious difficulty hearing?  No   Do you need help with transportation? No   Do you need help shopping? No   Do you need help preparing meals?  No   Do you need help with housework?  No   Do you need help with laundry? No   Do you need help taking your medications? No   Do you need help managing money? No   Do you ever drive or ride in a car without wearing a seat belt? No   Have you felt unusual stress, anger or loneliness in the last month? No   Who do you live with? Spouse   If you need help, do you have trouble finding someone available to you? No   Have you been bothered in the last four weeks by sexual problems? No   Do you have difficulty concentrating, remembering or making decisions? Yes       Age-appropriate Screening Schedule:  Refer to the list below for future screening recommendations based on patient's age, sex and/or medical conditions. Orders for these recommended tests are listed in the plan section. The patient has been provided with a written plan.    Health Maintenance   Topic Date Due   • ZOSTER VACCINE (2 of 3) 01/21/2015   • MAMMOGRAM  02/09/2022   • DXA SCAN  06/11/2022   • LIPID PANEL  11/09/2022   • PAP SMEAR  02/10/2024   • INFLUENZA VACCINE  Completed   • TDAP/TD VACCINES  Discontinued              Assessment/Plan   CMS Preventative Services Quick Reference  Risk Factors Identified During Encounter  Cardiovascular Disease  Fall Risk-High or Moderate  Immunizations Discussed/Encouraged (specific Immunizations; Shingrix  Obesity/Overweight   The above risks/problems have been discussed with the patient.  Follow up actions/plans if indicated are seen below in the  Assessment/Plan Section.  Pertinent information has been shared with the patient in the After Visit Summary.    Medicare Wellness-subsequent    Subjective   Tereza Ackerman is a 66 y.o. female is here today for follow-up.  HPI  Fatigue and concentration issues worse since she got the covid.  S/p Cardiac rehab, and has appt with Dr. Aguirre once a year.    Current Outpatient Medications:   •  aspirin 81 MG tablet, Take 1 tablet by mouth Daily., Disp: 30 tablet, Rfl: 11  •  busPIRone (BUSPAR) 10 MG tablet, Take 1 tablet by mouth 2 (Two) Times a Day., Disp: 180 tablet, Rfl: 1  •  carvedilol (COREG) 12.5 MG tablet, Take 1 tablet by mouth Every 12 (Twelve) Hours., Disp: 180 tablet, Rfl: 3  •  cholecalciferol (VITAMIN D3) 1000 units tablet, Take 2,000 Units by mouth Every Other Day., Disp: , Rfl:   •  cyclobenzaprine (FLEXERIL) 10 MG tablet, Take 0.5 tablets by mouth 3 (Three) Times a Day As Needed (headache)., Disp: 12 tablet, Rfl: 0  •  levocetirizine (XYZAL) 5 MG tablet, Take 1 tablet by mouth Every Evening., Disp: 90 tablet, Rfl: 1  •  LORazepam (Ativan) 0.5 MG tablet, Take 0.5-1 tablets by mouth Daily As Needed for Anxiety., Disp: 15 tablet, Rfl: 0  •  omeprazole (priLOSEC) 20 MG capsule, TAKE ONE CAPSULE BY MOUTH DAILY, Disp: 90 capsule, Rfl: 1  •  ondansetron (ZOFRAN) 4 MG tablet, Take 4 mg by mouth Every 8 (Eight) Hours As Needed for Nausea or Vomiting., Disp: , Rfl:   •  ondansetron ODT (ZOFRAN-ODT) 4 MG disintegrating tablet, Place 1 tablet on the tongue Every 4 (Four) Hours. As needed for nausea, Disp: 12 tablet, Rfl: 0  •  QUEtiapine (SEROquel) 25 MG tablet, TAKE 1/2 TABLET BY MOUTH EVERY NIGHT, Disp: 45 tablet, Rfl: 1  •  rosuvastatin (CRESTOR) 40 MG tablet, Take 1 tablet by mouth Daily., Disp: 90 tablet, Rfl: 1  •  sertraline (ZOLOFT) 100 MG tablet, Take 1 tablet by mouth Daily., Disp: 90 tablet, Rfl: 1  •  Thiamine Mononitrate (B1) 100 MG tablet, Take 1 tablet by mouth Every Other Day., Disp: 45 tablet,  "Rfl: 1  •  timolol (TIMOPTIC) 0.5 % ophthalmic solution, , Disp: , Rfl:   •  acyclovir (Zovirax) 400 MG tablet, Take 1 tablet by mouth 2 (Two) Times a Day. Take no more than 5 doses a day., Disp: 6 tablet, Rfl: 1  •  diazePAM (Valium) 10 MG tablet, Take 1 tablet by mouth 30 Min Pre-Op., Disp: 2 tablet, Rfl: 0  •  lisinopril (PRINIVIL,ZESTRIL) 2.5 MG tablet, TAKE ONE TABLET BY MOUTH DAILY, Disp: 90 tablet, Rfl: 1      The following portions of the patient's history were reviewed and updated as appropriate: allergies, current medications, past family history, past medical history, past social history, past surgical history and problem list.        Objective   /72   Pulse 54   Ht 160 cm (62.99\")   Wt 77.9 kg (171 lb 12.8 oz)   SpO2 96% Comment: ra  BMI 30.44 kg/m²         Results for orders placed or performed during the hospital encounter of 06/04/21   POC Creatinine    Specimen: Blood   Result Value Ref Range    Creatinine 1.00 0.60 - 1.30 mg/dL             Assessment/Plan   Diagnoses and all orders for this visit:    Medicare annual wellness visit, subsequent    Adjustment reaction to chronic stress  Comments:  continue zoloft,buspar , wean off seroquel gradually- skip occ.  Orders:  -     busPIRone (BUSPAR) 10 MG tablet; Take 1 tablet by mouth 2 (Two) Times a Day.  -     sertraline (ZOLOFT) 100 MG tablet; Take 1 tablet by mouth Daily.    Seasonal allergic rhinitis due to other allergic trigger  -     levocetirizine (XYZAL) 5 MG tablet; Take 1 tablet by mouth Every Evening.  -     CBC (No Diff); Future  -     SARS-CoV-2 Antibodies (Roche); Future    Need for influenza vaccination  -     Fluzone High-Dose 65+yrs    Mixed hyperlipidemia  -     Comprehensive Metabolic Panel; Future  -     Lipid Panel; Future  -     TSH; Future    IFG (impaired fasting glucose)  -     Hemoglobin A1c; Future    Vitamin D deficiency  -     Vitamin D 25 Hydroxy; Future  -     Vitamin B12; Future    NSTEMI (non-ST elevated " myocardial infarction) (HCC)  Comments:  doing better, [facility] with near normal coronaries.  followed by Cardiology.  continue risk factor reduction.  Orders:  -     rosuvastatin (CRESTOR) 40 MG tablet; Take 1 tablet by mouth Daily.    Other orders  -     Thiamine Mononitrate (B1) 100 MG tablet; Take 1 tablet by mouth Every Other Day.               PHQ-2/PHQ-9 Depression screening reviewed with patient . Total time spent today for depression screening  with Tereza Ackerman  was 15 minutes in counseling, along with plans for any diagnositc work-up and treatment.         Follow Up:   Return in about 6 months (around 5/9/2022) for Next scheduled follow up.     An After Visit Summary and PPPS were made available to the patient.        I spent 30 minutes caring for Tereza on this date of service. This time includes time spent by me in the following activities:obtaining and/or reviewing a separately obtained history, performing a medically appropriate examination and/or evaluation , counseling and educating the patient/family/caregiver, ordering medications, tests, or procedures and documenting information in the medical record           Electronically signed by:    Michaela Connell MD

## 2021-11-10 LAB
25(OH)D3 SERPL-MCNC: 36.9 NG/ML
SARS-COV-2 AB SERPL QL IA: POSITIVE
VIT B12 BLD-MCNC: 1176 PG/ML (ref 211–946)

## 2021-12-06 ENCOUNTER — TELEPHONE (OUTPATIENT)
Dept: NEUROSURGERY | Facility: CLINIC | Age: 66
End: 2021-12-06

## 2021-12-06 DIAGNOSIS — C69.62 MALIGNANT NEOPLASM OF LEFT ORBIT (HCC): Primary | ICD-10-CM

## 2021-12-06 RX ORDER — DIAZEPAM 2 MG/1
2 TABLET ORAL 2 TIMES DAILY PRN
Qty: 3 TABLET | Refills: 0 | Status: SHIPPED | OUTPATIENT
Start: 2021-12-06 | End: 2023-01-17

## 2021-12-06 NOTE — TELEPHONE ENCOUNTER
PT STATES USUALLY GETS 10MG, ONE TAB BEFORE MRI.      WHAT WAS SENT IN WAS 2MG, 1 TAB BID PRN.    PT IS NEEDING TO GET RX CHANGED TO 10MG.     PLEASE CONTACT PT AT HOME NUMBER ASAP WITH ANY CHANGE    PLEASE REVIEW AND ADVISE    THANK YOU

## 2021-12-06 NOTE — TELEPHONE ENCOUNTER
Caller: GAGANDEEP CHACORTA     Relationship: SELF    Best call back number: [phone]    Requested Prescriptions: Universal Ad     Pharmacy where request should be sent:  [id number]    Additional details provided by patient: PATIENT IS SCHEDULED FOR HER MRI BRAIN AND HER FOLLOW UP WITH DR. CHISHOLM AFTER FOR TOMORROW 12/7/21. SHE IS REQUESTING THIS MEDICATION TO HELP HER THROUGH HER MRI. PLEASE REVIEW. PATIENT WOULD LIKE TO RECEIVE A CB ONCE MEDICATION HAS BEEN SENT TO PHARMACY.     Does the patient have less than a 3 day supply:  [x] Yes  [] No

## 2021-12-07 ENCOUNTER — OFFICE VISIT (OUTPATIENT)
Dept: NEUROSURGERY | Facility: CLINIC | Age: 66
End: 2021-12-07

## 2021-12-07 ENCOUNTER — TELEPHONE (OUTPATIENT)
Dept: NEUROSURGERY | Facility: CLINIC | Age: 66
End: 2021-12-07

## 2021-12-07 ENCOUNTER — HOSPITAL ENCOUNTER (OUTPATIENT)
Dept: MRI IMAGING | Facility: HOSPITAL | Age: 66
Discharge: HOME | End: 2021-12-07
Admitting: NEUROLOGICAL SURGERY

## 2021-12-07 VITALS — TEMPERATURE: 97.5 F | BODY MASS INDEX: 30.62 KG/M2 | WEIGHT: 172.8 LBS | HEIGHT: 63 IN

## 2021-12-07 DIAGNOSIS — C69.62 MALIGNANT NEOPLASM OF LEFT ORBIT (HCC): ICD-10-CM

## 2021-12-07 DIAGNOSIS — D32.9 MENINGIOMA (HCC): Primary | ICD-10-CM

## 2021-12-07 PROCEDURE — 0 GADOBENATE DIMEGLUMINE 529 MG/ML SOLUTION: Performed by: NEUROLOGICAL SURGERY

## 2021-12-07 PROCEDURE — A9577 INJ MULTIHANCE: HCPCS | Performed by: NEUROLOGICAL SURGERY

## 2021-12-07 PROCEDURE — 99213 OFFICE O/P EST LOW 20 MIN: CPT | Performed by: NEUROLOGICAL SURGERY

## 2021-12-07 PROCEDURE — 70553 MRI BRAIN STEM W/O & W/DYE: CPT

## 2021-12-07 RX ADMIN — GADOBENATE DIMEGLUMINE 15 ML: 529 INJECTION, SOLUTION INTRAVENOUS at 11:48

## 2021-12-07 NOTE — TELEPHONE ENCOUNTER
I have tried to call the patient back to let her know that the 6 mg is ok for her to take. There was no answer and no way to leave a message. I have talked to Tennille FUENTES about this and she agreed with me . She said that 10 mg was strong and the patient needs to be alert when they go over the results. The patient's cell phone number is  [phone].

## 2021-12-07 NOTE — TELEPHONE ENCOUNTER
Provider:  Diaz  Caller: patient  Time of call:   8:58  Phone #:  [phone]  Surgery:  na  Surgery Date:  na  Last visit:  6-11-21   Next visit: 12-7-21    BENITA:         Reason for call:   Patient called and is asking why she did not get the 10 mg , she instead got   2 mg for a qt of 6 mg I told her to take them 15 minutes prior to patience MRI and that I would check with someone to see if she needed more or if this is ok. Her MRI is scheduled at 10:15 this morning and then she sees Dr. Perales afterwards. Please Advise. Thank you.

## 2021-12-07 NOTE — TELEPHONE ENCOUNTER
PATIENT IS SCHEDULED FOR HER MRI BRAIN TODAY @ 10 AM AND IS CALLING STATING SHE ONLY RECEIVED A 6 MG VALIUM. SHE USUALLY TAKES A 10 MG VALIUM AND WANTS TO MAKE SURE SHE WILL BE OKAY WITH ONLY TAKING THE 6MG. [facility]ARM TRANSFERRED HER TO CLINICAL.

## 2021-12-07 NOTE — PROGRESS NOTES
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    History of Present Illness: Ms. Ackerman is a 66-year-old woman who is well-known to our service.  She underwent surgical intervention for her sphenoid wing meningioma in  by Dr. Hollis.  A second procedure was performed in .  Given recurrence along the resection site invading the orbit she underwent stereotactic radiosurgery in 2017.  I provided treatment in that regard.  She has been followed with serial MRI studies.  According Dr. Hollis' notes her studies had been sent to the [facility].  Stereotactic radiosurgery was recommended given that further surgery would entail removal of the globe and skull base.  She continues to complain  of fatigue.  She does have retinal disease and significant visual compromise in her left eye.  She is followed by Dr. Lee of ophthalmology.  Her overall vision in her left eye is quite poor and has remained so for the last several years.  She denies any significant headache.       Review of Systems   Constitutional: Positive for fatigue. Negative for activity change, appetite change, chills, diaphoresis, fever and unexpected weight change.   HENT: Negative for congestion, dental problem, drooling, ear discharge, ear pain, facial swelling, hearing loss, mouth sores, nosebleeds, postnasal drip, rhinorrhea, sinus pressure, sinus pain, sneezing, sore throat, tinnitus, trouble swallowing and voice change.    Eyes: Positive for photophobia. Negative for pain, discharge, redness, itching and visual disturbance.   Respiratory: Negative for apnea, cough, choking, chest tightness, shortness of breath, wheezing and stridor.    Cardiovascular: Negative for chest pain, palpitations and leg swelling.   Gastrointestinal: Positive for diarrhea. Negative for abdominal distention,  "abdominal pain, anal bleeding, blood in stool, constipation, nausea, rectal pain and vomiting.   Endocrine: Negative for cold intolerance, heat intolerance, polydipsia, polyphagia and polyuria.   Genitourinary: Negative for decreased urine volume, difficulty urinating, dysuria, enuresis, flank pain, frequency, genital sores, hematuria and urgency.   Musculoskeletal: Negative for arthralgias, back pain, gait problem, joint swelling, myalgias, neck pain and neck stiffness.   Skin: Negative for color change, pallor, rash and wound.   Allergic/Immunologic: Negative for environmental allergies, food allergies and immunocompromised state.   Neurological: Positive for light-headedness. Negative for dizziness, tremors, seizures, syncope, facial asymmetry, speech difficulty, weakness, numbness and headaches.   Hematological: Negative for adenopathy. Does not bruise/bleed easily.   Psychiatric/Behavioral: Positive for decreased concentration. Negative for agitation, behavioral problems, confusion, dysphoric mood, hallucinations, self-injury, sleep disturbance and suicidal ideas. The patient is nervous/anxious. The patient is not hyperactive.    All other systems reviewed and are negative.      The patient's past medical history, past surgical history, family history, and social history have been reviewed at length in the electronic medical record.    Physical Exam:   Temp 97.5 °F (36.4 °C)   Ht 160 cm (62.99\")   Wt 78.4 kg (172 lb 12.8 oz)   BMI 30.62 kg/m²   MUSCULOSKELETAL:  Neck tenderness to palpation is not observed.   ROM in the neck is normal.  NEUROLOGICAL:  Strength is intact in the upper and lower extremities to direct testing.  Muscle tone is normal throughout.  Station and gait are normal.  Sensation is intact to light touch testing throughout.  She is able to count fingers in her left monocular field.  Extraocular movements are intact.  Facial symmetry is preserved.    Medical Decision Making    Data Review: "   (All imaging studies were personally reviewed unless stated otherwise)  Her current MRI study from today is compared to study from 6/4/2021.  There is been interval increase in her tumor.  Tumor extends along the orbit along and the lateral aspect of the sphenoid ridge but there is also a new heterogeneously enhancing oval component in the low left frontal region with some associated the edema and mild left-to-right shift.  There is a small area of dural enhancement in the left parietal region that remains unchanged.    Diagnosis:   Progressive recurrent left sphenoid wing meningioma.    Treatment Options:   I think we are at a point where her tumor is increasing and she needs to have further resection with probably a multidisciplinary approach involving neurosurgery, ophthalmology, and orbital plastics.  Given the limited quality of her vision on the left she probably requires removal of the globe.  She underwent treatment for an abdominal problem with the [facility] last year and I am going to make a referral to neurosurgery at the [facility].       Diagnosis Plan   1. Meningioma (HCC)     2. Malignant neoplasm of left orbit (HCC)         Scribed for Robi Perales MD by MARIA ELENA Cortez 12/7/2021 13:20 EST      I, Dr. Perales, personally performed the services described in the documentation, as scribed in my presence, and it is both accurate and complete.

## 2021-12-08 ENCOUNTER — TELEPHONE (OUTPATIENT)
Dept: NEUROSURGERY | Facility: CLINIC | Age: 66
End: 2021-12-08

## 2021-12-08 NOTE — TELEPHONE ENCOUNTER
Provider:   Diaz  Caller: patient  Time of call:   9:43 [facility]  Phone #: [phone]   Surgery:  na  Surgery Date:  na  Last visit:  12-7-21   Next visit: barbara JOY:         Reason for call: Having injection in her left eye tomorrow. She wants to know if she should have this done now that Dr. Perales is sending her to [facility]. To see a doctor for her tumor. Please Advise. Thank you.

## 2021-12-08 NOTE — TELEPHONE ENCOUNTER
I spoke with Dr. Perales   - she can keep her planned FU for her eye injection   - that will not interfere with treatment    HOWEVER, she needs to be seen with the [facility]

## 2021-12-08 NOTE — TELEPHONE ENCOUNTER
I called and spoke with the patient and gave her the message per Tennille. She said ok and that Cielo has been in contact with her twice today and is working on getting her in to [facility]. Thank you.

## 2021-12-08 NOTE — TELEPHONE ENCOUNTER
Caller: Tereza Ackerman    Relationship to patient: Self    Best call back number: [phone]    Patient is needing: PATIENT HAS EYE APPT TOMORROW FOR AN INJECTION AND NEEDS TO KNOW IF I NEED TO CONTINUE WITH THE TREATMENT OR NOT.    PLEASE CALL THE PATIENT TO ADVISE @ [phone]    THANK YOU

## 2021-12-15 NOTE — TELEPHONE ENCOUNTER
Caller: Tereza Ackerman    Relationship to patient: SELF    Best call back number: [phone]    Patient is needing: PATIENT IS CALLING REQUESTING TO ANDRES COUCH. SHE IS NOT SCHEDULED FOR HER MRI W THE [facility] UNTIL 01/12/22. PATIENT STATED SHE WAS TOLD HER SHE WAS TO HAVE THIS DONE ASAP AND WANTS TO MAKE SURE THAT DATE IS OKAY. PLEASE ADVISE.

## 2021-12-16 NOTE — TELEPHONE ENCOUNTER
As long as patient has not changed neurologic I think that is fine.  Patient may want to reach out to [facility] for perhaps getting this moved up.  Unfortunately do not have any control over their scheduling

## 2021-12-16 NOTE — TELEPHONE ENCOUNTER
I have called and spoke with the patient and gave her the message. She was thankful for the call back.

## 2021-12-28 ENCOUNTER — TELEPHONE (OUTPATIENT)
Dept: INTERNAL MEDICINE | Facility: CLINIC | Age: 66
End: 2021-12-28

## 2021-12-28 NOTE — TELEPHONE ENCOUNTER
Hub staff attempted to follow warm transfer process and was unsuccessful     Caller: Tereza Ackerman    Relationship: Self    Best call back number: [phone]    What orders are you requesting (i.e. lab or imaging): COVID-19 TEST     In what timeframe would the patient need to come in: TODAY IF POSSIBLE       Additional notes: THE PATIENT STATES THAT SHE HAS HEADACHE SORE THROAT COUGH NASAL CONGESTION BODY ACES SHE WOULD LIKE TO COME IN TO BE TESTED FOR COVID-19 THERE ARE NO APPOINTMENTS REMAINING TODAY THE PATIENT DID NOT WANT TO DO A VIRTUAL APPOINTMENT PLEASE CALL PATIENT TO LET HER KNOW IF AN ORDER FOR A COVID-19 TEST CAN BE DONE

## 2021-12-28 NOTE — TELEPHONE ENCOUNTER
PT RETURNED A MISSED CALL FROM BELINDA, SHE STATED SHE HAS AN EYE EXAM IN THE MORNING, BUT SHE WOULD TRY TO CALL THE OFFICE BACK RIGHT AFTER.

## 2021-12-30 ENCOUNTER — HOSPITAL ENCOUNTER (EMERGENCY)
Facility: HOSPITAL | Age: 66
Discharge: HOME | End: 2021-12-30
Attending: EMERGENCY MEDICINE | Admitting: EMERGENCY MEDICINE

## 2021-12-30 ENCOUNTER — TELEPHONE (OUTPATIENT)
Dept: INTERNAL MEDICINE | Facility: CLINIC | Age: 66
End: 2021-12-30

## 2021-12-30 VITALS
WEIGHT: 170 LBS | OXYGEN SATURATION: 96 % | HEIGHT: 63 IN | DIASTOLIC BLOOD PRESSURE: 89 MMHG | BODY MASS INDEX: 30.12 KG/M2 | HEART RATE: 62 BPM | TEMPERATURE: 98.2 F | SYSTOLIC BLOOD PRESSURE: 127 MMHG | RESPIRATION RATE: 18 BRPM

## 2021-12-30 DIAGNOSIS — B34.9 ACUTE VIRAL SYNDROME: Primary | ICD-10-CM

## 2021-12-30 DIAGNOSIS — J06.9 UPPER RESPIRATORY TRACT INFECTION, UNSPECIFIED TYPE: ICD-10-CM

## 2021-12-30 LAB
FLUAV RNA RESP QL NAA+PROBE: NOT DETECTED
FLUBV RNA RESP QL NAA+PROBE: NOT DETECTED
SARS-COV-2 RNA RESP QL NAA+PROBE: NOT DETECTED

## 2021-12-30 PROCEDURE — 99283 EMERGENCY DEPT VISIT LOW MDM: CPT

## 2021-12-30 PROCEDURE — 87636 SARSCOV2 & INF A&B AMP PRB: CPT | Performed by: EMERGENCY MEDICINE

## 2021-12-30 PROCEDURE — 96360 HYDRATION IV INFUSION INIT: CPT

## 2021-12-30 RX ADMIN — SODIUM CHLORIDE 1000 ML: 9 INJECTION, SOLUTION INTRAVENOUS at 15:04

## 2021-12-30 NOTE — TELEPHONE ENCOUNTER
Caller: Tereza Ackerman    Relationship: Self    Best call back number: [phone]     What is the best time to reach you: ANYTIME     Who are you requesting to speak with (clinical staff, provider,  specific staff member): CLINICAL STAFF     Do you know the name of the person who called: BELINDA    What was the call regarding: PATIENT WOULD LIKE TO ASK IF COVID TEST IS A WALK IN OR IF SHE WILL NEED TO SCHEDULE AN APPOINTMENT.     Do you require a callback: YES

## 2022-01-19 ENCOUNTER — TRANSCRIBE ORDERS (OUTPATIENT)
Dept: INTERNAL MEDICINE | Facility: CLINIC | Age: 67
End: 2022-01-19

## 2022-01-19 ENCOUNTER — TRANSCRIBE ORDERS (OUTPATIENT)
Dept: ADMINISTRATIVE | Facility: HOSPITAL | Age: 67
End: 2022-01-19

## 2022-01-19 DIAGNOSIS — Z12.31 VISIT FOR SCREENING MAMMOGRAM: Primary | ICD-10-CM

## 2022-02-13 DIAGNOSIS — K21.9 GASTROESOPHAGEAL REFLUX DISEASE, UNSPECIFIED WHETHER ESOPHAGITIS PRESENT: Primary | ICD-10-CM

## 2022-02-13 RX ORDER — OMEPRAZOLE 20 MG/1
CAPSULE, DELAYED RELEASE ORAL
Qty: 90 CAPSULE | Refills: 1 | Status: SHIPPED | OUTPATIENT
Start: 2022-02-13 | End: 2022-07-11 | Stop reason: SDUPTHER

## 2022-02-17 ENCOUNTER — HOSPITAL ENCOUNTER (OUTPATIENT)
Dept: MAMMOGRAPHY | Facility: HOSPITAL | Age: 67
Discharge: HOME | End: 2022-02-17
Admitting: INTERNAL MEDICINE

## 2022-02-17 ENCOUNTER — APPOINTMENT (OUTPATIENT)
Dept: MAMMOGRAPHY | Facility: HOSPITAL | Age: 67
End: 2022-02-17

## 2022-02-17 DIAGNOSIS — Z12.31 VISIT FOR SCREENING MAMMOGRAM: ICD-10-CM

## 2022-02-17 PROCEDURE — 77067 SCR MAMMO BI INCL CAD: CPT | Performed by: RADIOLOGY

## 2022-02-17 PROCEDURE — 77063 BREAST TOMOSYNTHESIS BI: CPT

## 2022-02-17 PROCEDURE — 77067 SCR MAMMO BI INCL CAD: CPT

## 2022-02-17 PROCEDURE — 77063 BREAST TOMOSYNTHESIS BI: CPT | Performed by: RADIOLOGY

## 2022-02-19 ENCOUNTER — APPOINTMENT (OUTPATIENT)
Dept: MAMMOGRAPHY | Facility: HOSPITAL | Age: 67
End: 2022-02-19

## 2022-03-16 DIAGNOSIS — F43.89 ADJUSTMENT REACTION TO CHRONIC STRESS: ICD-10-CM

## 2022-03-17 RX ORDER — QUETIAPINE FUMARATE 25 MG/1
TABLET, FILM COATED ORAL
Qty: 45 TABLET | Refills: 1 | Status: SHIPPED | OUTPATIENT
Start: 2022-03-17 | End: 2022-07-11 | Stop reason: SDUPTHER

## 2022-05-04 NOTE — TELEPHONE ENCOUNTER
"Provider:  Bunny  Caller: Tereza Ackerman  Time of call:   10:08 AM  Phone #:  [phone]  Last visit:   10/11/16  Next visit: 04/17/17    BENITA:  09/19/2016 Diazepam 10MG 1955 #1 1 days Werner Hollis       Reason for call:       (I went ahead and pended Diazepam 10 mg \"Take 1 tablet 30 min prior to MRI\" #1 0RF)    ----- Message from Tuyet Pierson sent at 4/11/2017 10:08 AM EDT -----  Regarding: RX Request  Contact: [phone]  Patient called and asked if she could have a script for valium 10mg before she has her MRI done on Monday 4/17/17. She said we have done this for her in the past. Needs it called in [facility] in [place].     CMA - please note this needs to be called in if approved  "
I spoke with Dr. Hollis, he has approved the diazepam and added a few more medications to help with the MRI sedation.  These are to be taken 1 hour prior to MRI. These have been called into the [facility] pharmacy in [place].     Called pt to inform- left a detailed msg.   
testicular pain

## 2022-05-12 DIAGNOSIS — I95.9 HYPOTENSION, UNSPECIFIED HYPOTENSION TYPE: ICD-10-CM

## 2022-05-12 RX ORDER — LISINOPRIL 2.5 MG/1
TABLET ORAL
Qty: 90 TABLET | Refills: 1 | OUTPATIENT
Start: 2022-05-12

## 2022-05-13 ENCOUNTER — TELEPHONE (OUTPATIENT)
Dept: INTERNAL MEDICINE | Facility: CLINIC | Age: 67
End: 2022-05-13

## 2022-05-13 DIAGNOSIS — I95.9 HYPOTENSION, UNSPECIFIED HYPOTENSION TYPE: ICD-10-CM

## 2022-05-13 RX ORDER — LISINOPRIL 2.5 MG/1
2.5 TABLET ORAL DAILY
Qty: 90 TABLET | Refills: 0 | Status: SHIPPED | OUTPATIENT
Start: 2022-05-13 | End: 2022-07-11 | Stop reason: SDUPTHER

## 2022-05-13 NOTE — TELEPHONE ENCOUNTER
PATIENT WAS CALLING IN ABOUT THE FOLLOWING RX:  lisinopril (PRINIVIL,ZESTRIL) 2.5 MG tablet    SHE SAID SHE CALLED HER PHARMACY, AND THEY TOLD HER IT WAS DENIED.    SHE WOULD LIKE TO KNOW WHY?    PLEASE ADVISE    GAGANDEEP WHATLEY  [phone]

## 2022-05-13 NOTE — TELEPHONE ENCOUNTER
Called pt to advise she is needing a fu appt as her 5/9/22 appt had been ca'ed.  No answer or VM to leave message on, will call back later.

## 2022-05-16 RX ORDER — CARVEDILOL 12.5 MG/1
12.5 TABLET ORAL EVERY 12 HOURS
Qty: 180 TABLET | Refills: 3 | Status: SHIPPED | OUTPATIENT
Start: 2022-05-16

## 2022-05-30 ENCOUNTER — HOSPITAL ENCOUNTER (EMERGENCY)
Facility: HOSPITAL | Age: 67
Discharge: HOME | End: 2022-05-31
Attending: STUDENT IN AN ORGANIZED HEALTH CARE EDUCATION/TRAINING PROGRAM | Admitting: STUDENT IN AN ORGANIZED HEALTH CARE EDUCATION/TRAINING PROGRAM

## 2022-05-30 DIAGNOSIS — R51.9 CHRONIC INTRACTABLE HEADACHE, UNSPECIFIED HEADACHE TYPE: ICD-10-CM

## 2022-05-30 DIAGNOSIS — G89.29 CHRONIC INTRACTABLE HEADACHE, UNSPECIFIED HEADACHE TYPE: ICD-10-CM

## 2022-05-30 DIAGNOSIS — I10 HYPERTENSION, UNSPECIFIED TYPE: Primary | ICD-10-CM

## 2022-05-30 DIAGNOSIS — R11.0 NAUSEA: ICD-10-CM

## 2022-05-30 PROCEDURE — 85025 COMPLETE CBC W/AUTO DIFF WBC: CPT | Performed by: STUDENT IN AN ORGANIZED HEALTH CARE EDUCATION/TRAINING PROGRAM

## 2022-05-30 PROCEDURE — 84484 ASSAY OF TROPONIN QUANT: CPT | Performed by: STUDENT IN AN ORGANIZED HEALTH CARE EDUCATION/TRAINING PROGRAM

## 2022-05-30 PROCEDURE — 84443 ASSAY THYROID STIM HORMONE: CPT | Performed by: STUDENT IN AN ORGANIZED HEALTH CARE EDUCATION/TRAINING PROGRAM

## 2022-05-30 PROCEDURE — 83735 ASSAY OF MAGNESIUM: CPT | Performed by: STUDENT IN AN ORGANIZED HEALTH CARE EDUCATION/TRAINING PROGRAM

## 2022-05-30 PROCEDURE — 84100 ASSAY OF PHOSPHORUS: CPT | Performed by: STUDENT IN AN ORGANIZED HEALTH CARE EDUCATION/TRAINING PROGRAM

## 2022-05-30 PROCEDURE — 84439 ASSAY OF FREE THYROXINE: CPT | Performed by: STUDENT IN AN ORGANIZED HEALTH CARE EDUCATION/TRAINING PROGRAM

## 2022-05-30 PROCEDURE — 80053 COMPREHEN METABOLIC PANEL: CPT | Performed by: STUDENT IN AN ORGANIZED HEALTH CARE EDUCATION/TRAINING PROGRAM

## 2022-05-30 PROCEDURE — 99284 EMERGENCY DEPT VISIT MOD MDM: CPT

## 2022-05-30 RX ORDER — SODIUM CHLORIDE 0.9 % (FLUSH) 0.9 %
10 SYRINGE (ML) INJECTION AS NEEDED
Status: DISCONTINUED | OUTPATIENT
Start: 2022-05-30 | End: 2022-05-31 | Stop reason: HOSPADM

## 2022-05-31 ENCOUNTER — APPOINTMENT (OUTPATIENT)
Dept: CT IMAGING | Facility: HOSPITAL | Age: 67
End: 2022-05-31

## 2022-05-31 VITALS
HEART RATE: 74 BPM | BODY MASS INDEX: 30.48 KG/M2 | HEIGHT: 63 IN | TEMPERATURE: 99.2 F | DIASTOLIC BLOOD PRESSURE: 78 MMHG | OXYGEN SATURATION: 95 % | SYSTOLIC BLOOD PRESSURE: 139 MMHG | RESPIRATION RATE: 18 BRPM | WEIGHT: 172 LBS

## 2022-05-31 LAB
ALBUMIN SERPL-MCNC: 3.7 G/DL (ref 3.5–5.2)
ALBUMIN/GLOB SERPL: 1.1 G/DL
ALP SERPL-CCNC: 91 U/L (ref 39–117)
ALT SERPL W P-5'-P-CCNC: 20 U/L (ref 1–33)
ANION GAP SERPL CALCULATED.3IONS-SCNC: 13 MMOL/L (ref 5–15)
AST SERPL-CCNC: 25 U/L (ref 1–32)
BASOPHILS # BLD AUTO: 0.04 10*3/MM3 (ref 0–0.2)
BASOPHILS NFR BLD AUTO: 0.5 % (ref 0–1.5)
BILIRUB SERPL-MCNC: 0.3 MG/DL (ref 0–1.2)
BUN SERPL-MCNC: 15 MG/DL (ref 8–23)
BUN/CREAT SERPL: 16.1 (ref 7–25)
CALCIUM SPEC-SCNC: 8.9 MG/DL (ref 8.6–10.5)
CHLORIDE SERPL-SCNC: 102 MMOL/L (ref 98–107)
CO2 SERPL-SCNC: 22 MMOL/L (ref 22–29)
CREAT SERPL-MCNC: 0.93 MG/DL (ref 0.57–1)
DEPRECATED RDW RBC AUTO: 48.7 FL (ref 37–54)
EGFRCR SERPLBLD CKD-EPI 2021: 66.7 ML/MIN/1.73
EOSINOPHIL # BLD AUTO: 0.14 10*3/MM3 (ref 0–0.4)
EOSINOPHIL NFR BLD AUTO: 1.8 % (ref 0.3–6.2)
ERYTHROCYTE [DISTWIDTH] IN BLOOD BY AUTOMATED COUNT: 14.4 % (ref 12.3–15.4)
FLUAV RNA RESP QL NAA+PROBE: NOT DETECTED
FLUBV RNA RESP QL NAA+PROBE: NOT DETECTED
GLOBULIN UR ELPH-MCNC: 3.3 GM/DL
GLUCOSE SERPL-MCNC: 133 MG/DL (ref 65–99)
HCT VFR BLD AUTO: 34.6 % (ref 34–46.6)
HGB BLD-MCNC: 11.4 G/DL (ref 12–15.9)
HOLD SPECIMEN: NORMAL
IMM GRANULOCYTES # BLD AUTO: 0.01 10*3/MM3 (ref 0–0.05)
IMM GRANULOCYTES NFR BLD AUTO: 0.1 % (ref 0–0.5)
LYMPHOCYTES # BLD AUTO: 1.88 10*3/MM3 (ref 0.7–3.1)
LYMPHOCYTES NFR BLD AUTO: 23.5 % (ref 19.6–45.3)
MAGNESIUM SERPL-MCNC: 2 MG/DL (ref 1.6–2.4)
MCH RBC QN AUTO: 30.2 PG (ref 26.6–33)
MCHC RBC AUTO-ENTMCNC: 32.9 G/DL (ref 31.5–35.7)
MCV RBC AUTO: 91.5 FL (ref 79–97)
MONOCYTES # BLD AUTO: 0.6 10*3/MM3 (ref 0.1–0.9)
MONOCYTES NFR BLD AUTO: 7.5 % (ref 5–12)
NEUTROPHILS NFR BLD AUTO: 5.33 10*3/MM3 (ref 1.7–7)
NEUTROPHILS NFR BLD AUTO: 66.6 % (ref 42.7–76)
NRBC BLD AUTO-RTO: 0 /100 WBC (ref 0–0.2)
PHOSPHATE SERPL-MCNC: 2.8 MG/DL (ref 2.5–4.5)
PLATELET # BLD AUTO: 233 10*3/MM3 (ref 140–450)
PMV BLD AUTO: 10.8 FL (ref 6–12)
POTASSIUM SERPL-SCNC: 3.5 MMOL/L (ref 3.5–5.2)
PROT SERPL-MCNC: 7 G/DL (ref 6–8.5)
QT INTERVAL: 402 MS
QTC INTERVAL: 421 MS
RBC # BLD AUTO: 3.78 10*6/MM3 (ref 3.77–5.28)
RSV RNA NPH QL NAA+NON-PROBE: NOT DETECTED
SARS-COV-2 RNA RESP QL NAA+PROBE: NOT DETECTED
SODIUM SERPL-SCNC: 137 MMOL/L (ref 136–145)
T4 FREE SERPL-MCNC: 1 NG/DL (ref 0.93–1.7)
TROPONIN T SERPL-MCNC: <0.01 NG/ML (ref 0–0.03)
TSH SERPL DL<=0.05 MIU/L-ACNC: 1.68 UIU/ML (ref 0.27–4.2)
WBC NRBC COR # BLD: 8 10*3/MM3 (ref 3.4–10.8)
WHOLE BLOOD HOLD COAG: NORMAL
WHOLE BLOOD HOLD SPECIMEN: NORMAL

## 2022-05-31 PROCEDURE — 70450 CT HEAD/BRAIN W/O DYE: CPT

## 2022-05-31 PROCEDURE — 96374 THER/PROPH/DIAG INJ IV PUSH: CPT

## 2022-05-31 PROCEDURE — 87637 SARSCOV2&INF A&B&RSV AMP PRB: CPT | Performed by: STUDENT IN AN ORGANIZED HEALTH CARE EDUCATION/TRAINING PROGRAM

## 2022-05-31 PROCEDURE — 96375 TX/PRO/DX INJ NEW DRUG ADDON: CPT

## 2022-05-31 PROCEDURE — 93005 ELECTROCARDIOGRAM TRACING: CPT | Performed by: STUDENT IN AN ORGANIZED HEALTH CARE EDUCATION/TRAINING PROGRAM

## 2022-05-31 PROCEDURE — 25010000002 ONDANSETRON PER 1 MG: Performed by: STUDENT IN AN ORGANIZED HEALTH CARE EDUCATION/TRAINING PROGRAM

## 2022-05-31 RX ORDER — ONDANSETRON 2 MG/ML
4 INJECTION INTRAMUSCULAR; INTRAVENOUS ONCE
Status: COMPLETED | OUTPATIENT
Start: 2022-05-31 | End: 2022-05-31

## 2022-05-31 RX ORDER — AMLODIPINE BESYLATE 5 MG/1
5 TABLET ORAL DAILY
Qty: 7 TABLET | Refills: 0 | Status: SHIPPED | OUTPATIENT
Start: 2022-05-31 | End: 2022-06-07

## 2022-05-31 RX ORDER — LABETALOL HYDROCHLORIDE 5 MG/ML
10 INJECTION, SOLUTION INTRAVENOUS ONCE
Status: COMPLETED | OUTPATIENT
Start: 2022-05-31 | End: 2022-05-31

## 2022-05-31 RX ADMIN — LABETALOL HYDROCHLORIDE 10 MG: 5 INJECTION INTRAVENOUS at 01:58

## 2022-05-31 RX ADMIN — ONDANSETRON 4 MG: 2 INJECTION INTRAMUSCULAR; INTRAVENOUS at 01:58

## 2022-06-02 NOTE — ED PROVIDER NOTES
EMERGENCY DEPARTMENT ENCOUNTER    Pt Name: Tereza Ackerman  MRN: [id number]  Pt :   1955  Room Number:    Date of encounter:  2022  PCP: Michaela Connell MD  ED Provider: Ezio Ren MD    Historian: Patient      HPI:  Chief Complaint: Nausea, hypertension, chronic headache        Context: Tereza Ackerman is a 67 y.o. female who presents to the ED for evaluation of nausea and hypertension.  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  She has left eye periorbital swelling, pain, vision loss that is baseline since the surgery.  Throughout the day today she has developed worsening nausea and found her blood pressure to be elevated.  She presents for evaluation of the nausea and hypertension. She is scheduled to follow up with neurosurgery in [place] this week.  Denies fevers or systemic symptoms.  She denies any other complaints at this time.    PAST MEDICAL HISTORY  Past Medical History:   Diagnosis Date   • Abdominal hernia    • Arthritis     rt knee    • Atrial flutter with rapid ventricular response (HCC) 2017   • Back pain    • Brain tumor (HCC)    • Colostomy present (HCC) 2018   • Depression    • History of blood transfusion    • History of gastroesophageal reflux (GERD)     resolved since Nissen   • History of Nissen fundoplication 2017   • History of small bowel obstruction    • Hyperlipemia    • Hypertension    • Memory loss or impairment    • Migraine    • Parathyroid adenoma     Incidental on thyroid US.   • PONV (postoperative nausea and vomiting)     nausea - preprocedural meds help    • Prediabetes     Last Impression: 2015  Reviewed labs. Excellent control.  Maren Connellast Impression: 2015  Reviewed labs. Excellent control.  Michaela Connell   • Thyroid nodule      Last Impression: 2015  r/o thyroid cancer, will proceed with  .  Michaela Connell (Internal Medicine)    • UTI (urinary tract infection)    • Vision changes     blockages left eye    • Wears glasses     readers         PAST SURGICAL HISTORY  Past Surgical History:   Procedure Laterality Date   • CARDIAC CATHETERIZATION N/A 4/27/2020    Procedure: LEFT HEART CATH;  Surgeon: Marina Ring MD;  Location:  [facility];  Service: Cardiology;  Laterality: N/A;   • CARPAL TUNNEL RELEASE  2002    right    • COLONOSCOPY N/A 8/18/2018    Procedure: COLONOSCOPY;  Surgeon: Inderjit Campos MD;  Location:  [facility];  Service: Gastroenterology   • COLONOSCOPY N/A 11/28/2018    Procedure: COLONOSCOPY;  Surgeon: Inderjit Campos MD;  Location:  [facility];  Service: Gastroenterology   • COLOSTOMY CLOSURE N/A 2/19/2019    Procedure: COLOSTOMY TAKEDOWN, INCISIONAL HERNIA REPAIR;  Surgeon: Andrea Bocanegra MD;  Location:  [facility];  Service: General   • CRANIOTOMY  2003 & 2014    Dr. Werner Hollis for tumor removal   • ENDOSCOPY     • EXPLORATORY LAPAROTOMY N/A 12/5/2017    Procedure: EXPLORATORY LAPAROTOMY, SMALL BOWEL RESECTION;  Surgeon: Ирина Cobian MD;  Location:  [facility];  Service:    • EXPLORATORY LAPAROTOMY N/A 12/22/2017    Procedure: LAPAROTOMY EXPLORATORY FOR SMALL BOWEL OBSTRUCTION;  Surgeon: Ирина Cobian MD;  Location:  [facility];  Service:    • EXPLORATORY LAPAROTOMY N/A 7/23/2018    Procedure: EXPLORATORY LAPAROTOMY, APPENDECTOMY, CECOPEXY, INCISIONAL HERNIA REPAIR, LYSIS OF ADHESIONS;  Surgeon: Andrea Bocanegra MD;  Location:  [facility];  Service: General   • EXPLORATORY LAPAROTOMY N/A 8/19/2018    Procedure: LAPAROTOMY EXPLORATORY, SIGMOID COLECTOMY, CREATION OF OSTOMY;  Surgeon: Andrea Bocanegra MD;  Location:  [facility];  Service: General   • HYSTERECTOMY      partial - both ovaries still present pt believes    • INSERTION HEMODIALYSIS CATHETER N/A 12/7/2017    Procedure: HEMODIALYSIS CATHETER INSERTION;  Surgeon: Chance  ORTIZ Valenzuela MD;  Location:  [facility];  Service:    • ORBITOTOMY Left 11/3/2017    Procedure:  LEFT LATERAL ORBITOTOMY WITH DEBULKING OF TUMOR ;  Surgeon: Moo Hopkins MD;  Location:  [facility];  Service:    • OTHER SURGICAL HISTORY      esophagogastric fundoplasty nissen fundoplication   • TUBAL ABDOMINAL LIGATION           FAMILY HISTORY  Family History   Problem Relation Age of Onset   • Obesity Mother    • Heart attack Mother    • Heart disease Mother    • Migraines Father    • Stroke Father    • Diabetes Father    • Cancer Other    • Arthritis Other    • Diabetes Other    • Breast cancer Maternal Aunt    • Breast cancer Paternal Aunt    • Ovarian cancer Neg Hx          SOCIAL HISTORY  Social History     Socioeconomic History   • Marital status:    Tobacco Use   • Smoking status: Never Smoker   • Smokeless tobacco: Never Used   Vaping Use   • Vaping Use: Never used   Substance and Sexual Activity   • Alcohol use: No   • Drug use: No   • Sexual activity: Defer         ALLERGIES  Dilantin [phenytoin sodium extended]        REVIEW OF SYSTEMS  Review of Systems     All systems reviewed and negative except for those discussed in HPI.       PHYSICAL EXAM    I have reviewed the triage vital signs and nursing notes.    ED Triage Vitals [05/30/22 2334]   Temp Heart Rate Resp BP SpO2   98.8 °F (37.1 °C) 74 18 (!) 213/118 98 %      Temp src Heart Rate Source Patient Position BP Location FiO2 (%)   Oral Monitor Sitting Left arm --       Physical Exam  GENERAL:   Appears awake and alert in no acute distress  HENT: Nares patent.  Significant left-sided periorbital swelling with left eye vision loss which is baseline.  EYES: No scleral icterus.  Right pupil is reactive  CV: Regular rhythm, regular rate.  RESPIRATORY: Normal effort.  No audible wheezes, rales or rhonchi.  ABDOMEN: Soft, nontender  MUSCULOSKELETAL: No deformities.   NEURO: Alert, moves all extremities, follows commands.  SKIN: Warm, dry, no  rash visualized.        LAB RESULTS  No results found for this or any previous visit (from the past 24 hour(s)).    If labs were ordered, I independently reviewed the results.        RADIOLOGY  No Radiology Exams Resulted Within Past 24 Hours    I ordered and reviewed the above noted radiographic studies.      I viewed images of CT scan of the head reveals postoperative changes and retro-orbital swelling, reviewed with neurosurgery here who says these are normal postoperative findings.  No acute bleeds masses or other abnormalities that I can appreciate.  See radiologist's dictation for official interpretation.        PROCEDURES    Procedures    ECG 12 Lead   Final Result   Test Reason : hypertension   Blood Pressure :   */*   mmHG   Vent. Rate :  66 BPM     Atrial Rate :  66 BPM      P-R Int : 146 ms          QRS Dur :  90 ms       QT Int : 402 ms       P-R-T Axes :   4  14  49 degrees      QTc Int : 421 ms      Normal sinus rhythm   Minimal voltage criteria for LVH, may be normal variant   Nonspecific T wave abnormality   Abnormal ECG   When compared with ECG of 26-MAR-2021 11:19,   No significant change was found   Confirmed by MARLA SYED ([id number]) on 5/31/2022 8:32:13 AM      Referred By: EDMD           Confirmed By: MARLA SYED          MEDICATIONS GIVEN IN ER    Medications   labetalol (NORMODYNE,TRANDATE) injection 10 mg (10 mg Intravenous Given 5/31/22 0158)   ondansetron (ZOFRAN) injection 4 mg (4 mg Intravenous Given 5/31/22 0158)         PROGRESS, DATA ANALYSIS, CONSULTS, AND MEDICAL DECISION MAKING    All labs have been independently reviewed by me.  All radiology studies have been reviewed by me and the radiologist dictating the report.   EKG's have been independently viewed and interpreted by me.      Differential diagnoses: Intracranial hemorrhage, increased intracranial pressure presenting as Cushing's reflex with nausea.  Kidney injury, hypertensive emergency           Patient arrived  awake and alert in no acute distress but is hypertensive with associated nausea.  Concerning for increased ICP with recent neurosurgery.  Treated with zofrand and 10mg IV labetolol.  Labs reassuring with mild anemia and hyperglycemia but no actionable items.  CT head shows retroorbital swelling and postoperative changes but no evidence of active bleeding or mass effect. Images reviewed with neurosurgery who is reassured and think she can safely follow up with her operating surgeon.  Symptoms have resolved with blood pressure control.  She will continue to monitor blood pressure and contact her surgeon first thing in the morning. Starting oral amlodipine.  Counseled on return precautions.      AS OF 14:37 EDT VITALS:    BP - 139/78  HR - 74  TEMP - 99.2 °F (37.3 °C) (Oral)  O2 SATS - 95%                  DIAGNOSIS  Final diagnoses:   Hypertension, unspecified type   Chronic intractable headache, unspecified headache type   Nausea         DISPOSITION  DISCHARGE    Patient discharged in stable condition.    Reviewed implications of results, diagnosis, meds, responsibility to follow up, warning signs and symptoms of possible worsening, potential complications and reasons to return to ER.    Patient/Family voiced understanding of above instructions.    Discussed plan for discharge, as there is no emergent indication for admission.  Pt/family is agreeable and understands need for follow up and possible repeat testing.  Pt/family is aware that discharge does not mean that nothing is wrong but that it indicates no emergency is currently present that requires admission and they must continue care with follow-up as given below or with a physician of their choice.     FOLLOW-UP  Michaela Connell MD  [address] [place] [id number] [phone]    Call   To discuss blood pressure let them know you were started on amlodipine 5 mg in the emergency department today         Medication List      New Prescriptions     amLODIPine 5 MG tablet  Commonly known as: NORVASC  Take 1 tablet by mouth Daily for 7 days.           Where to Get Your Medications      These medications were sent to [facility] [place] - [address] - [phone]  - [phone]   [address] [place] [address]    Phone: [phone]   · amLODIPine 5 MG tablet                    Ezio Ren MD  06/02/22 1555

## 2022-06-15 DIAGNOSIS — F43.89 ADJUSTMENT REACTION TO CHRONIC STRESS: ICD-10-CM

## 2022-06-16 RX ORDER — BUSPIRONE HYDROCHLORIDE 10 MG/1
TABLET ORAL
Qty: 180 TABLET | Refills: 1 | OUTPATIENT
Start: 2022-06-16

## 2022-06-16 RX ORDER — CALCIUM CARBONATE/VITAMIN D3 600 MG-10
1 TABLET ORAL EVERY OTHER DAY
Qty: 15 TABLET | Refills: 0 | Status: SHIPPED | OUTPATIENT
Start: 2022-06-16 | End: 2022-08-02

## 2022-06-16 RX ORDER — METHION/INOS/CHOL BT/B COM/LIV 110MG-86MG
CAPSULE ORAL
Qty: 45 TABLET | Refills: 2 | OUTPATIENT
Start: 2022-06-16

## 2022-06-16 RX ORDER — BUSPIRONE HYDROCHLORIDE 10 MG/1
10 TABLET ORAL 2 TIMES DAILY
Qty: 60 TABLET | Refills: 0 | Status: SHIPPED | OUTPATIENT
Start: 2022-06-16 | End: 2022-07-11 | Stop reason: SDUPTHER

## 2022-06-16 NOTE — TELEPHONE ENCOUNTER
Patient called back and said that she is not getting out of the house right now because she just had brain surgery.  She only goes to her surgeon's appointments at [facility].  I did schedule an appointment for her with Dr Connell on 07/11/22.  Can enough of the medicine be called in until that time.  She said she has a few of the vit but she took her last busphar last night.     Please contact the patient to let her know if they meds can be called in for the month or if she needs to figure out a way to come in sooner.

## 2022-06-21 ENCOUNTER — HOSPITAL ENCOUNTER (EMERGENCY)
Facility: HOSPITAL | Age: 67
Discharge: LEFT BEFORE DISCHARGE | End: 2022-06-21

## 2022-06-21 VITALS
WEIGHT: 173 LBS | HEIGHT: 63 IN | BODY MASS INDEX: 30.65 KG/M2 | OXYGEN SATURATION: 98 % | TEMPERATURE: 97.5 F | HEART RATE: 62 BPM | RESPIRATION RATE: 16 BRPM | SYSTOLIC BLOOD PRESSURE: 128 MMHG | DIASTOLIC BLOOD PRESSURE: 80 MMHG

## 2022-06-21 PROCEDURE — 99211 OFF/OP EST MAY X REQ PHY/QHP: CPT

## 2022-07-05 DIAGNOSIS — I21.4 NSTEMI (NON-ST ELEVATED MYOCARDIAL INFARCTION): ICD-10-CM

## 2022-07-06 RX ORDER — ROSUVASTATIN CALCIUM 40 MG/1
TABLET, COATED ORAL
Qty: 90 TABLET | Refills: 0 | Status: SHIPPED | OUTPATIENT
Start: 2022-07-06 | End: 2022-07-11 | Stop reason: SDUPTHER

## 2022-07-11 ENCOUNTER — LAB (OUTPATIENT)
Dept: LAB | Facility: HOSPITAL | Age: 67
End: 2022-07-11

## 2022-07-11 ENCOUNTER — OFFICE VISIT (OUTPATIENT)
Dept: INTERNAL MEDICINE | Facility: CLINIC | Age: 67
End: 2022-07-11

## 2022-07-11 VITALS
OXYGEN SATURATION: 97 % | HEART RATE: 77 BPM | SYSTOLIC BLOOD PRESSURE: 124 MMHG | DIASTOLIC BLOOD PRESSURE: 62 MMHG | BODY MASS INDEX: 30.48 KG/M2 | WEIGHT: 172 LBS | TEMPERATURE: 97.2 F | HEIGHT: 63 IN

## 2022-07-11 DIAGNOSIS — J30.89 SEASONAL ALLERGIC RHINITIS DUE TO OTHER ALLERGIC TRIGGER: ICD-10-CM

## 2022-07-11 DIAGNOSIS — F43.89 ADJUSTMENT REACTION TO CHRONIC STRESS: ICD-10-CM

## 2022-07-11 DIAGNOSIS — E78.2 MIXED HYPERLIPIDEMIA: ICD-10-CM

## 2022-07-11 DIAGNOSIS — R73.01 IFG (IMPAIRED FASTING GLUCOSE): ICD-10-CM

## 2022-07-11 DIAGNOSIS — I95.9 HYPOTENSION, UNSPECIFIED HYPOTENSION TYPE: ICD-10-CM

## 2022-07-11 DIAGNOSIS — R73.01 IFG (IMPAIRED FASTING GLUCOSE): Primary | ICD-10-CM

## 2022-07-11 DIAGNOSIS — K21.9 GASTROESOPHAGEAL REFLUX DISEASE, UNSPECIFIED WHETHER ESOPHAGITIS PRESENT: ICD-10-CM

## 2022-07-11 DIAGNOSIS — I21.4 NSTEMI (NON-ST ELEVATED MYOCARDIAL INFARCTION): ICD-10-CM

## 2022-07-11 LAB
ALBUMIN SERPL-MCNC: 4.8 G/DL (ref 3.5–5.2)
ALBUMIN/GLOB SERPL: 1.5 G/DL
ALP SERPL-CCNC: 96 U/L (ref 39–117)
ALT SERPL W P-5'-P-CCNC: 15 U/L (ref 1–33)
ANION GAP SERPL CALCULATED.3IONS-SCNC: 11.2 MMOL/L (ref 5–15)
AST SERPL-CCNC: 18 U/L (ref 1–32)
BILIRUB SERPL-MCNC: 0.2 MG/DL (ref 0–1.2)
BUN SERPL-MCNC: 18 MG/DL (ref 8–23)
BUN/CREAT SERPL: 16.5 (ref 7–25)
CALCIUM SPEC-SCNC: 9.9 MG/DL (ref 8.6–10.5)
CHLORIDE SERPL-SCNC: 103 MMOL/L (ref 98–107)
CHOLEST SERPL-MCNC: 159 MG/DL (ref 0–200)
CO2 SERPL-SCNC: 23.8 MMOL/L (ref 22–29)
CREAT SERPL-MCNC: 1.09 MG/DL (ref 0.57–1)
EGFRCR SERPLBLD CKD-EPI 2021: 55.1 ML/MIN/1.73
GLOBULIN UR ELPH-MCNC: 3.3 GM/DL
GLUCOSE SERPL-MCNC: 102 MG/DL (ref 65–99)
HBA1C MFR BLD: 5.8 % (ref 4.8–5.6)
HDLC SERPL-MCNC: 55 MG/DL (ref 40–60)
LDLC SERPL CALC-MCNC: 81 MG/DL (ref 0–100)
LDLC/HDLC SERPL: 1.4 {RATIO}
POTASSIUM SERPL-SCNC: 4.7 MMOL/L (ref 3.5–5.2)
PROT SERPL-MCNC: 8.1 G/DL (ref 6–8.5)
SODIUM SERPL-SCNC: 138 MMOL/L (ref 136–145)
TRIGL SERPL-MCNC: 134 MG/DL (ref 0–150)
VLDLC SERPL-MCNC: 23 MG/DL (ref 5–40)

## 2022-07-11 PROCEDURE — 99214 OFFICE O/P EST MOD 30 MIN: CPT | Performed by: INTERNAL MEDICINE

## 2022-07-11 PROCEDURE — 80061 LIPID PANEL: CPT

## 2022-07-11 PROCEDURE — 83036 HEMOGLOBIN GLYCOSYLATED A1C: CPT

## 2022-07-11 PROCEDURE — 80053 COMPREHEN METABOLIC PANEL: CPT

## 2022-07-11 PROCEDURE — 84443 ASSAY THYROID STIM HORMONE: CPT

## 2022-07-11 RX ORDER — SERTRALINE HYDROCHLORIDE 100 MG/1
100 TABLET, FILM COATED ORAL DAILY
Qty: 90 TABLET | Refills: 1 | Status: SHIPPED | OUTPATIENT
Start: 2022-07-11 | End: 2023-01-17 | Stop reason: SDUPTHER

## 2022-07-11 RX ORDER — BETAMETHASONE DIPROPIONATE 0.5 MG/G
1 CREAM TOPICAL 2 TIMES DAILY
Qty: 45 G | Refills: 0 | Status: SHIPPED | OUTPATIENT
Start: 2022-07-11 | End: 2023-01-17

## 2022-07-11 RX ORDER — BUSPIRONE HYDROCHLORIDE 10 MG/1
10 TABLET ORAL 2 TIMES DAILY
Qty: 180 TABLET | Refills: 1 | Status: SHIPPED | OUTPATIENT
Start: 2022-07-11 | End: 2023-01-17 | Stop reason: SDUPTHER

## 2022-07-11 RX ORDER — LISINOPRIL 2.5 MG/1
2.5 TABLET ORAL DAILY
Qty: 90 TABLET | Refills: 1 | Status: SHIPPED | OUTPATIENT
Start: 2022-07-11 | End: 2023-01-17 | Stop reason: SDUPTHER

## 2022-07-11 RX ORDER — ROSUVASTATIN CALCIUM 40 MG/1
40 TABLET, COATED ORAL DAILY
Qty: 90 TABLET | Refills: 1 | Status: SHIPPED | OUTPATIENT
Start: 2022-07-11 | End: 2023-01-17 | Stop reason: SDUPTHER

## 2022-07-11 RX ORDER — QUETIAPINE FUMARATE 25 MG/1
12.5 TABLET, FILM COATED ORAL
Qty: 45 TABLET | Refills: 1 | Status: SHIPPED | OUTPATIENT
Start: 2022-07-11 | End: 2023-01-17 | Stop reason: SDUPTHER

## 2022-07-11 RX ORDER — LEVOCETIRIZINE DIHYDROCHLORIDE 5 MG/1
5 TABLET, FILM COATED ORAL EVERY EVENING
Qty: 90 TABLET | Refills: 1 | Status: SHIPPED | OUTPATIENT
Start: 2022-07-11 | End: 2023-01-17

## 2022-07-11 RX ORDER — OMEPRAZOLE 20 MG/1
20 CAPSULE, DELAYED RELEASE ORAL DAILY
Qty: 90 CAPSULE | Refills: 1 | Status: SHIPPED | OUTPATIENT
Start: 2022-07-11 | End: 2023-01-17 | Stop reason: SDUPTHER

## 2022-07-11 RX ORDER — BRINZOLAMIDE 10 MG/ML
1 SUSPENSION/ DROPS OPHTHALMIC 2 TIMES DAILY
COMMUNITY
End: 2023-01-17

## 2022-07-11 NOTE — PROGRESS NOTES
Hypertension, Hyperlipidemia, and Rash (Right knee)    Subjective   Tereza Ackerman is a 67 y.o. female is here today for follow-up.    History of Present Illness   S/p brain surgery for cavernous malformation, and meningioma, and has some residual scarring.  Was in the ER here due to high blood pressure.and is better now, on the low side.      Current Outpatient Medications:   •  aspirin 81 MG tablet, Take 1 tablet by mouth Daily., Disp: 30 tablet, Rfl: 11  •  brinzolamide (Azopt) 1 % ophthalmic suspension, 1 drop 3 (Three) Times a Day., Disp: , Rfl:   •  busPIRone (BUSPAR) 10 MG tablet, Take 1 tablet by mouth 2 (Two) Times a Day., Disp: 180 tablet, Rfl: 1  •  carvedilol (COREG) 12.5 MG tablet, Take 1 tablet by mouth Every 12 (Twelve) Hours., Disp: 180 tablet, Rfl: 3  •  cholecalciferol (VITAMIN D3) 1000 units tablet, Take 2,000 Units by mouth Every Other Day., Disp: , Rfl:   •  cyclobenzaprine (FLEXERIL) 10 MG tablet, Take 0.5 tablets by mouth 3 (Three) Times a Day As Needed (headache)., Disp: 12 tablet, Rfl: 0  •  levocetirizine (XYZAL) 5 MG tablet, Take 1 tablet by mouth Every Evening., Disp: 90 tablet, Rfl: 1  •  lisinopril (PRINIVIL,ZESTRIL) 2.5 MG tablet, Take 1 tablet by mouth Daily., Disp: 90 tablet, Rfl: 1  •  LORazepam (Ativan) 0.5 MG tablet, Take 0.5-1 tablets by mouth Daily As Needed for Anxiety., Disp: 15 tablet, Rfl: 0  •  omeprazole (priLOSEC) 20 MG capsule, Take 1 capsule by mouth Daily., Disp: 90 capsule, Rfl: 1  •  ondansetron (ZOFRAN) 4 MG tablet, Take 4 mg by mouth Every 8 (Eight) Hours As Needed for Nausea or Vomiting., Disp: , Rfl:   •  ondansetron ODT (ZOFRAN-ODT) 4 MG disintegrating tablet, Place 1 tablet on the tongue Every 4 (Four) Hours. As needed for nausea, Disp: 12 tablet, Rfl: 0  •  QUEtiapine (SEROquel) 25 MG tablet, Take 0.5 tablets by mouth every night at bedtime., Disp: 45 tablet, Rfl: 1  •  rosuvastatin (CRESTOR) 40 MG tablet, Take 1 tablet by mouth Daily., Disp: 90 tablet,  "Rfl: 1  •  sertraline (ZOLOFT) 100 MG tablet, Take 1 tablet by mouth Daily., Disp: 90 tablet, Rfl: 1  •  Thiamine Mononitrate (B1) 100 MG tablet, Take 1 tablet by mouth Every Other Day., Disp: 15 tablet, Rfl: 0  •  timolol (TIMOPTIC) 0.5 % ophthalmic solution, , Disp: , Rfl:   •  betamethasone dipropionate 0.05 % cream, Apply 1 application topically to the appropriate area as directed 2 (Two) Times a Day. To rash, Disp: 45 g, Rfl: 0  •  diazePAM (Valium) 2 MG tablet, Take 1 tablet by mouth 2 (Two) Times a Day As Needed for Anxiety., Disp: 3 tablet, Rfl: 0      The following portions of the patient's history were reviewed and updated as appropriate: allergies, current medications, past family history, past medical history, past social history, past surgical history and problem list.    Review of Systems   Constitutional: Negative.  Negative for chills and fever.   HENT: Negative for ear discharge, ear pain, sinus pressure and sore throat.    Respiratory: Negative for cough, chest tightness and shortness of breath.    Cardiovascular: Negative for chest pain, palpitations and leg swelling.   Gastrointestinal: Negative for diarrhea, nausea and vomiting.   Musculoskeletal: Negative for arthralgias, back pain and myalgias.   Skin: Positive for color change and wound (left eye).   Neurological: Positive for headaches. Negative for dizziness and syncope.   Psychiatric/Behavioral: Negative for confusion and sleep disturbance.       Objective   /62   Pulse 77   Temp 97.2 °F (36.2 °C)   Ht 160 cm (63\")   Wt 78 kg (172 lb)   SpO2 97% Comment: ra  BMI 30.47 kg/m²   Physical Exam  Vitals and nursing note reviewed.   Constitutional:       Appearance: She is well-developed.   HENT:      Head: Normocephalic and atraumatic.      Right Ear: External ear normal.      Left Ear: External ear normal.      Mouth/Throat:      Pharynx: No oropharyngeal exudate.   Eyes:      Conjunctiva/sclera: Conjunctivae normal.      Pupils: " Pupils are equal, round, and reactive to light.   Neck:      Thyroid: No thyromegaly.   Cardiovascular:      Rate and Rhythm: Normal rate and regular rhythm.      Pulses: Normal pulses.      Heart sounds: Normal heart sounds. No murmur heard.    No friction rub. No gallop.   Pulmonary:      Effort: Pulmonary effort is normal.      Breath sounds: Normal breath sounds.   Abdominal:      General: Bowel sounds are normal. There is no distension.      Palpations: Abdomen is soft.      Tenderness: There is no abdominal tenderness.   Musculoskeletal:      Cervical back: Neck supple.   Skin:     General: Skin is warm and dry.      Findings: Lesion (left lateral canthus 1 cm irregular ? keloid) present.   Neurological:      Mental Status: She is alert and oriented to person, place, and time.      Cranial Nerves: No cranial nerve deficit.   Psychiatric:         Judgment: Judgment normal.           Results for orders placed or performed during the hospital encounter of 05/30/22   COVID-19, FLU A/B, RSV PCR - Swab, Nasopharynx    Specimen: Nasopharynx; Swab   Result Value Ref Range    COVID19 Not Detected Not Detected - Ref. Range    Influenza A PCR Not Detected Not Detected    Influenza B PCR Not Detected Not Detected    RSV, PCR Not Detected Not Detected   Comprehensive Metabolic Panel    Specimen: Blood   Result Value Ref Range    Glucose 133 (H) 65 - 99 mg/dL    BUN 15 8 - 23 mg/dL    Creatinine 0.93 0.57 - 1.00 mg/dL    Sodium 137 136 - 145 mmol/L    Potassium 3.5 3.5 - 5.2 mmol/L    Chloride 102 98 - 107 mmol/L    CO2 22.0 22.0 - 29.0 mmol/L    Calcium 8.9 8.6 - 10.5 mg/dL    Total Protein 7.0 6.0 - 8.5 g/dL    Albumin 3.70 3.50 - 5.20 g/dL    ALT (SGPT) 20 1 - 33 U/L    AST (SGOT) 25 1 - 32 U/L    Alkaline Phosphatase 91 39 - 117 U/L    Total Bilirubin 0.3 0.0 - 1.2 mg/dL    Globulin 3.3 gm/dL    A/G Ratio 1.1 g/dL    BUN/Creatinine Ratio 16.1 7.0 - 25.0    Anion Gap 13.0 5.0 - 15.0 mmol/L    eGFR 66.7 >60.0 mL/min/1.73    Troponin    Specimen: Blood   Result Value Ref Range    Troponin T <0.010 0.000 - 0.030 ng/mL   Magnesium    Specimen: Blood   Result Value Ref Range    Magnesium 2.0 1.6 - 2.4 mg/dL   Phosphorus    Specimen: Blood   Result Value Ref Range    Phosphorus 2.8 2.5 - 4.5 mg/dL   TSH    Specimen: Blood   Result Value Ref Range    TSH 1.680 0.270 - 4.200 uIU/mL   T4, Free    Specimen: Blood   Result Value Ref Range    Free T4 1.00 0.93 - 1.70 ng/dL   CBC Auto Differential    Specimen: Blood   Result Value Ref Range    WBC 8.00 3.40 - 10.80 10*3/mm3    RBC 3.78 3.77 - 5.28 10*6/mm3    Hemoglobin 11.4 (L) 12.0 - 15.9 g/dL    Hematocrit 34.6 34.0 - 46.6 %    MCV 91.5 79.0 - 97.0 fL    MCH 30.2 26.6 - 33.0 pg    MCHC 32.9 31.5 - 35.7 g/dL    RDW 14.4 12.3 - 15.4 %    RDW-SD 48.7 37.0 - 54.0 fl    MPV 10.8 6.0 - 12.0 fL    Platelets 233 140 - 450 10*3/mm3    Neutrophil % 66.6 42.7 - 76.0 %    Lymphocyte % 23.5 19.6 - 45.3 %    Monocyte % 7.5 5.0 - 12.0 %    Eosinophil % 1.8 0.3 - 6.2 %    Basophil % 0.5 0.0 - 1.5 %    Immature Grans % 0.1 0.0 - 0.5 %    Neutrophils, Absolute 5.33 1.70 - 7.00 10*3/mm3    Lymphocytes, Absolute 1.88 0.70 - 3.10 10*3/mm3    Monocytes, Absolute 0.60 0.10 - 0.90 10*3/mm3    Eosinophils, Absolute 0.14 0.00 - 0.40 10*3/mm3    Basophils, Absolute 0.04 0.00 - 0.20 10*3/mm3    Immature Grans, Absolute 0.01 0.00 - 0.05 10*3/mm3    nRBC 0.0 0.0 - 0.2 /100 WBC   ECG 12 Lead   Result Value Ref Range    QT Interval 402 ms    QTC Interval 421 ms   Green Top (Gel)   Result Value Ref Range    Extra Tube Hold for add-ons.    Lavender Top   Result Value Ref Range    Extra Tube hold for add-on    Gold Top - SST   Result Value Ref Range    Extra Tube Hold for add-ons.    Gray Top   Result Value Ref Range    Extra Tube Hold for add-ons.    Light Blue Top   Result Value Ref Range    Extra Tube Hold for add-ons.              Assessment & Plan   Diagnoses and all orders for this visit:    IFG (impaired fasting  glucose)  -     Hemoglobin A1c; Future    Adjustment reaction to chronic stress  Comments:  continue zoloft,buspar , wean off seroquel gradually- skip occ.  Orders:  -     busPIRone (BUSPAR) 10 MG tablet; Take 1 tablet by mouth 2 (Two) Times a Day.  -     QUEtiapine (SEROquel) 25 MG tablet; Take 0.5 tablets by mouth every night at bedtime.  -     sertraline (ZOLOFT) 100 MG tablet; Take 1 tablet by mouth Daily.    Seasonal allergic rhinitis due to other allergic trigger  -     levocetirizine (XYZAL) 5 MG tablet; Take 1 tablet by mouth Every Evening.    Hypotension, unspecified hypotension type  -     lisinopril (PRINIVIL,ZESTRIL) 2.5 MG tablet; Take 1 tablet by mouth Daily.    Gastroesophageal reflux disease, unspecified whether esophagitis present  -     omeprazole (priLOSEC) 20 MG capsule; Take 1 capsule by mouth Daily.    Adjustment reaction to chronic stress  Comments:  continue zoloft,buspar , wean off seroquel gradually.  Orders:  -     busPIRone (BUSPAR) 10 MG tablet; Take 1 tablet by mouth 2 (Two) Times a Day.  -     QUEtiapine (SEROquel) 25 MG tablet; Take 0.5 tablets by mouth every night at bedtime.  -     sertraline (ZOLOFT) 100 MG tablet; Take 1 tablet by mouth Daily.    NSTEMI (non-ST elevated myocardial infarction) ([facility])  Comments:  doing better, [facility] with near normal coronaries.  followed by Cardiology.  continue risk factor reduction.  Orders:  -     rosuvastatin (CRESTOR) 40 MG tablet; Take 1 tablet by mouth Daily.    Mixed hyperlipidemia  -     Comprehensive Metabolic Panel; Future  -     Lipid Panel; Future  -     TSH; Future    Other orders  -     brinzolamide (Azopt) 1 % ophthalmic suspension; 1 drop 3 (Three) Times a Day.  -     betamethasone dipropionate 0.05 % cream; Apply 1 application topically to the appropriate area as directed 2 (Two) Times a Day. To rash    adv to get the covid booster.             Return in about 6 months (around 1/11/2023) for Annual.    Electronically signed  by:    Michaela Connell MD

## 2022-07-12 LAB — TSH SERPL DL<=0.05 MIU/L-ACNC: 2.39 UIU/ML (ref 0.27–4.2)

## 2022-07-18 DIAGNOSIS — F43.89 ADJUSTMENT REACTION TO CHRONIC STRESS: ICD-10-CM

## 2022-07-18 RX ORDER — BUSPIRONE HYDROCHLORIDE 10 MG/1
TABLET ORAL
Qty: 60 TABLET | OUTPATIENT
Start: 2022-07-18

## 2022-08-02 RX ORDER — METHION/INOS/CHOL BT/B COM/LIV 110MG-86MG
CAPSULE ORAL
Qty: 30 EACH | Refills: 5 | Status: SHIPPED | OUTPATIENT
Start: 2022-08-02 | End: 2023-01-17 | Stop reason: SDUPTHER

## 2022-08-02 NOTE — TELEPHONE ENCOUNTER
Last office visit              7/11/22  Next office visit              1/17/23    Lab completed in past 6 months? Yes  Labs completed in past year? Yes    Last refill Date:             6/16/22     Quantity:                       15    Pharmacy:     ANISA MIR[facility] [address] [place], [place] [address]S[place] [address] ANISA [facility] [phone] [facility] [phone] FX    Please review pended refill request for any changes needed on refills or quantities.  Thank you!

## 2022-08-29 ENCOUNTER — OFFICE VISIT (OUTPATIENT)
Dept: CARDIOLOGY | Facility: CLINIC | Age: 67
End: 2022-08-29

## 2022-08-29 VITALS
HEIGHT: 63 IN | SYSTOLIC BLOOD PRESSURE: 98 MMHG | HEART RATE: 76 BPM | OXYGEN SATURATION: 97 % | WEIGHT: 173 LBS | BODY MASS INDEX: 30.65 KG/M2 | DIASTOLIC BLOOD PRESSURE: 58 MMHG

## 2022-08-29 DIAGNOSIS — E78.2 MIXED HYPERLIPIDEMIA: ICD-10-CM

## 2022-08-29 DIAGNOSIS — I48.92 ATRIAL FLUTTER WITH RAPID VENTRICULAR RESPONSE: Primary | ICD-10-CM

## 2022-08-29 DIAGNOSIS — I10 ESSENTIAL HYPERTENSION: ICD-10-CM

## 2022-08-29 PROCEDURE — 99213 OFFICE O/P EST LOW 20 MIN: CPT | Performed by: INTERNAL MEDICINE

## 2022-08-29 PROCEDURE — 93000 ELECTROCARDIOGRAM COMPLETE: CPT | Performed by: INTERNAL MEDICINE

## 2022-08-29 NOTE — PROGRESS NOTES
[facility] AT [address], [address]  [place], [place], [address] [phone]  [email or web address]           OUTPATIENT CLINIC FOLLOW UP NOTE    Patient Care Team:  Patient Care Team:  Michaela Connell MD as PCP - General  Michaela Connell MD as PCP - Family Medicine  Werner Hollis MD (Inactive) as Referring Physician (Neurosurgery)  Moo Hopkins MD as Consulting Physician (Ophthalmology)  Jamal Ramos MD as Consulting Physician (Nephrology)  Alli Aguirre MD as Consulting Physician (Cardiology)  Costa Raygoza MD as Consulting Physician (Radiation Oncology)  Andrea Bocanegra MD as Consulting Physician (General Surgery)  Alli Aguirre MD as Consulting Physician (Cardiology)    Subjective:      Chief Complaint   Patient presents with   • Atrial Flutter       HPI:    Tereza Ackerman is a 67 y.o. female.  Cardiac problem list:  1. Paroxysmal atrial flutter  1. Diagnosed at time of small bowel obstruction. Lasted less than 48 hours, outpatient heart monitor revealed no recurrent atrial flutter.  Later underwent GI operations without recurrent arrhythmia on carvedilol.  2. ACS 4/2020  1. Cardiac catheterization with near normal coronary arteries.  Troponin elevation.  3. Hypertension  4. Hyperlipidemia  5. COVID-19 1/2021    She presents today for follow-up.      Patient's been doing well from cardiac standpoint since her last visit.  Did undergo brain surgery for cavernous malformation and meningioma at [facility] 5/2022.  Recovering well.  Denies chest pain, shortness of breath, palpitations, lower extremity edema, lightheadedness or syncope.  Blood pressure stable at home.  Occasionally taking extra dose of Coreg in the evenings for systolic blood pressure greater than 140.    Review of Systems:  As noted in HPI.     PFSH:  Patient Active Problem List   Diagnosis   • Impaired glucose tolerance   • Parathyroid adenoma   • Reaction  to chronic stress   • Multinodular goiter   • Vitamin D deficiency   • Meningioma, recurrent of brain (HCC)   • Arthritis   • Depression   • Small bowel obstruction (HCC)   • Insomnia   • History of Nissen fundoplication   • Malignant neoplasm of left orbit (HCC)   • Prediabetes   • Mixed hyperlipidemia   • Hyperglycemia   • Atrial flutter with rapid ventricular response (HCC)   • Anemia   • Large bowel obstruction (HCC)   • Abdominal pain   • Essential hypertension   • History of creation of ostomy (HCC)   • Screen for colon cancer   • Sigmoid volvulus (HCC)   • SHAE (obstructive sleep apnea)         Current Outpatient Medications:   •  aspirin 81 MG tablet, Take 1 tablet by mouth Daily., Disp: 30 tablet, Rfl: 11  •  betamethasone dipropionate 0.05 % cream, Apply 1 application topically to the appropriate area as directed 2 (Two) Times a Day. To rash, Disp: 45 g, Rfl: 0  •  brinzolamide (AZOPT) 1 % ophthalmic suspension, 1 drop 2 (Two) Times a Day., Disp: , Rfl:   •  busPIRone (BUSPAR) 10 MG tablet, Take 1 tablet by mouth 2 (Two) Times a Day., Disp: 180 tablet, Rfl: 1  •  carvedilol (COREG) 12.5 MG tablet, Take 1 tablet by mouth Every 12 (Twelve) Hours. (Patient taking differently: Take 12.5 mg by mouth Every 12 (Twelve) Hours. 1/2 tablet according to blood pressure), Disp: 180 tablet, Rfl: 3  •  cholecalciferol (VITAMIN D3) 1000 units tablet, Take 2,000 Units by mouth Every Other Day., Disp: , Rfl:   •  cyclobenzaprine (FLEXERIL) 10 MG tablet, Take 0.5 tablets by mouth 3 (Three) Times a Day As Needed (headache)., Disp: 12 tablet, Rfl: 0  •  diazePAM (Valium) 2 MG tablet, Take 1 tablet by mouth 2 (Two) Times a Day As Needed for Anxiety. (Patient taking differently: Take 2 mg by mouth As Needed for Anxiety. Prior to having MRI's), Disp: 3 tablet, Rfl: 0  •  levocetirizine (XYZAL) 5 MG tablet, Take 1 tablet by mouth Every Evening. (Patient taking differently: Take 5 mg by mouth As Needed.), Disp: 90 tablet, Rfl: 1  •   "lisinopril (PRINIVIL,ZESTRIL) 2.5 MG tablet, Take 1 tablet by mouth Daily., Disp: 90 tablet, Rfl: 1  •  LORazepam (Ativan) 0.5 MG tablet, Take 0.5-1 tablets by mouth Daily As Needed for Anxiety., Disp: 15 tablet, Rfl: 0  •  omeprazole (priLOSEC) 20 MG capsule, Take 1 capsule by mouth Daily., Disp: 90 capsule, Rfl: 1  •  ondansetron ODT (ZOFRAN-ODT) 4 MG disintegrating tablet, Place 1 tablet on the tongue Every 4 (Four) Hours. As needed for nausea, Disp: 12 tablet, Rfl: 0  •  QUEtiapine (SEROquel) 25 MG tablet, Take 0.5 tablets by mouth every night at bedtime., Disp: 45 tablet, Rfl: 1  •  rosuvastatin (CRESTOR) 40 MG tablet, Take 1 tablet by mouth Daily., Disp: 90 tablet, Rfl: 1  •  sertraline (ZOLOFT) 100 MG tablet, Take 1 tablet by mouth Daily., Disp: 90 tablet, Rfl: 1  •  thiamine (VITAMIN B-1) 100 MG tablet tablet, TAKE ONE TABLET BY MOUTH EVERY OTHER DAY, Disp: 30 each, Rfl: 5  •  timolol (TIMOPTIC) 0.5 % ophthalmic solution, 1 drop 2 (Two) Times a Day., Disp: , Rfl:     Allergies   Allergen Reactions   • Dilantin [Phenytoin Sodium Extended] Rash        reports that she has never smoked. She has never used smokeless tobacco.    Family History   Problem Relation Age of Onset   • Obesity Mother    • Heart attack Mother    • Heart disease Mother    • Migraines Father    • Stroke Father    • Diabetes Father    • Cancer Other    • Arthritis Other    • Diabetes Other    • Breast cancer Maternal Aunt    • Breast cancer Paternal Aunt    • Ovarian cancer Neg Hx        Objective:   BP 98/58 (BP Location: Left arm, Patient Position: Sitting)   Pulse 76   Ht 160 cm (63\")   Wt 78.5 kg (173 lb)   SpO2 97%   BMI 30.65 kg/m²   CONSTITUTIONAL: No acute distress  RESPIRATORY: Normal effort. Clear to auscultation bilaterally without wheezing or rales  CARDIOVASCULAR: Regular rate and rhythm with normal S1 and S2. Without murmur.  PERIPHERAL VASCULAR: Normal radial pulse. There is no lower extremity edema " bilaterally.      Labs:  Lab Results   Component Value Date    ALT 15 07/11/2022    AST 18 07/11/2022     Lab Results   Component Value Date    CHOL 159 07/11/2022    TRIG 134 07/11/2022    HDL 55 07/11/2022    CREATININE 1.09 (H) 07/11/2022       Diagnostic Data:      ECG 12 Lead    Date/Time: 8/29/2022 11:12 AM  Performed by: Alli Aguirre MD  Authorized by: Alli Aguirre MD   Comparison: compared with previous ECG from 5/31/2022  Similar to previous ECG  Rhythm: sinus rhythm  Rate: normal  BPM: 68  Comments: Voltage criteria for left ventricular hypertrophy            Event Monitor 2/2018  -Events were normal sinus rhythm  -No atrial flutter    [facility]/27/[facility]  · Near normal coronary arteries  · Normal LV systolic function wall motion    TTE 12/2017  · LVEF difficult to evaluate with tachycardia- globally appears mildly depressed.  · Left ventricular wall thickness is consistent with concentric hypertrophy.  · Mild tricuspid valve regurgitation is present.  · Calculated right ventricular systolic pressure from tricuspid regurgitation is 32 mmHg.    Assessment and Plan:     Atrial flutter with rapid ventricular response  -Without a known recurrence  -Continue aspirin, Coreg.  Can take extra 6.25 mg of carvedilol if SBP  > 160 mmHg.  If SBP consistently > 140 mmHg, patient to contact office for further recommendations.    Essential hypertension  -Parameters for carvedilol as noted above.  -Continue lisinopril.    Mixed hyperlipidemia  -Continue statin.  -Routine lipid panel with PCP yearly.    - Return if symptoms worsen or fail to improve.    Scribed for Alli Aguirre MD by AYDEE Plummer. 8/29/2022  11:15 EDT    I, Alli Aguirre MD, personally performed the services as scribed by the above named individual. I have made any necessary edits and it is both accurate and complete.     Alli Aguirre MD, MSc, FACC, [facility]  Interventional Cardiology  [facility]

## 2022-09-14 ENCOUNTER — HOSPITAL ENCOUNTER (EMERGENCY)
Facility: HOSPITAL | Age: 67
Discharge: HOME | End: 2022-09-14
Attending: EMERGENCY MEDICINE | Admitting: EMERGENCY MEDICINE

## 2022-09-14 VITALS
OXYGEN SATURATION: 95 % | HEIGHT: 63 IN | WEIGHT: 170 LBS | DIASTOLIC BLOOD PRESSURE: 92 MMHG | RESPIRATION RATE: 18 BRPM | TEMPERATURE: 97.9 F | BODY MASS INDEX: 30.12 KG/M2 | SYSTOLIC BLOOD PRESSURE: 146 MMHG | HEART RATE: 84 BPM

## 2022-09-14 DIAGNOSIS — R51.9 NONINTRACTABLE HEADACHE, UNSPECIFIED CHRONICITY PATTERN, UNSPECIFIED HEADACHE TYPE: Primary | ICD-10-CM

## 2022-09-14 DIAGNOSIS — J02.9 PHARYNGITIS, UNSPECIFIED ETIOLOGY: ICD-10-CM

## 2022-09-14 LAB
ALBUMIN SERPL-MCNC: 4.2 G/DL (ref 3.5–5.2)
ALBUMIN/GLOB SERPL: 1.3 G/DL
ALP SERPL-CCNC: 77 U/L (ref 39–117)
ALT SERPL W P-5'-P-CCNC: 12 U/L (ref 1–33)
ANION GAP SERPL CALCULATED.3IONS-SCNC: 8 MMOL/L (ref 5–15)
AST SERPL-CCNC: 16 U/L (ref 1–32)
BASOPHILS # BLD AUTO: 0.03 10*3/MM3 (ref 0–0.2)
BASOPHILS NFR BLD AUTO: 0.5 % (ref 0–1.5)
BILIRUB SERPL-MCNC: 0.3 MG/DL (ref 0–1.2)
BUN SERPL-MCNC: 13 MG/DL (ref 8–23)
BUN/CREAT SERPL: 17.1 (ref 7–25)
CALCIUM SPEC-SCNC: 9.3 MG/DL (ref 8.6–10.5)
CHLORIDE SERPL-SCNC: 102 MMOL/L (ref 98–107)
CO2 SERPL-SCNC: 26 MMOL/L (ref 22–29)
CREAT SERPL-MCNC: 0.76 MG/DL (ref 0.57–1)
DEPRECATED RDW RBC AUTO: 45 FL (ref 37–54)
EGFRCR SERPLBLD CKD-EPI 2021: 84.9 ML/MIN/1.73
EOSINOPHIL # BLD AUTO: 0.09 10*3/MM3 (ref 0–0.4)
EOSINOPHIL NFR BLD AUTO: 1.6 % (ref 0.3–6.2)
ERYTHROCYTE [DISTWIDTH] IN BLOOD BY AUTOMATED COUNT: 13.2 % (ref 12.3–15.4)
FLUAV RNA RESP QL NAA+PROBE: NOT DETECTED
FLUBV RNA RESP QL NAA+PROBE: NOT DETECTED
GLOBULIN UR ELPH-MCNC: 3.3 GM/DL
GLUCOSE SERPL-MCNC: 102 MG/DL (ref 65–99)
HCT VFR BLD AUTO: 40.3 % (ref 34–46.6)
HGB BLD-MCNC: 13.2 G/DL (ref 12–15.9)
IMM GRANULOCYTES # BLD AUTO: 0.01 10*3/MM3 (ref 0–0.05)
IMM GRANULOCYTES NFR BLD AUTO: 0.2 % (ref 0–0.5)
LYMPHOCYTES # BLD AUTO: 1.26 10*3/MM3 (ref 0.7–3.1)
LYMPHOCYTES NFR BLD AUTO: 22.6 % (ref 19.6–45.3)
MCH RBC QN AUTO: 29.9 PG (ref 26.6–33)
MCHC RBC AUTO-ENTMCNC: 32.8 G/DL (ref 31.5–35.7)
MCV RBC AUTO: 91.4 FL (ref 79–97)
MONOCYTES # BLD AUTO: 0.5 10*3/MM3 (ref 0.1–0.9)
MONOCYTES NFR BLD AUTO: 9 % (ref 5–12)
NEUTROPHILS NFR BLD AUTO: 3.68 10*3/MM3 (ref 1.7–7)
NEUTROPHILS NFR BLD AUTO: 66.1 % (ref 42.7–76)
NRBC BLD AUTO-RTO: 0 /100 WBC (ref 0–0.2)
PLATELET # BLD AUTO: 188 10*3/MM3 (ref 140–450)
PMV BLD AUTO: 10.3 FL (ref 6–12)
POTASSIUM SERPL-SCNC: 3.9 MMOL/L (ref 3.5–5.2)
PROT SERPL-MCNC: 7.5 G/DL (ref 6–8.5)
RBC # BLD AUTO: 4.41 10*6/MM3 (ref 3.77–5.28)
S PYO AG THROAT QL: NEGATIVE
SARS-COV-2 RNA RESP QL NAA+PROBE: NOT DETECTED
SODIUM SERPL-SCNC: 136 MMOL/L (ref 136–145)
WBC NRBC COR # BLD: 5.57 10*3/MM3 (ref 3.4–10.8)

## 2022-09-14 PROCEDURE — 87081 CULTURE SCREEN ONLY: CPT | Performed by: NURSE PRACTITIONER

## 2022-09-14 PROCEDURE — C9803 HOPD COVID-19 SPEC COLLECT: HCPCS

## 2022-09-14 PROCEDURE — 87880 STREP A ASSAY W/OPTIC: CPT | Performed by: NURSE PRACTITIONER

## 2022-09-14 PROCEDURE — 87636 SARSCOV2 & INF A&B AMP PRB: CPT | Performed by: NURSE PRACTITIONER

## 2022-09-14 PROCEDURE — 99283 EMERGENCY DEPT VISIT LOW MDM: CPT

## 2022-09-14 PROCEDURE — 80053 COMPREHEN METABOLIC PANEL: CPT | Performed by: NURSE PRACTITIONER

## 2022-09-14 PROCEDURE — 85025 COMPLETE CBC W/AUTO DIFF WBC: CPT | Performed by: NURSE PRACTITIONER

## 2022-09-14 RX ORDER — SODIUM CHLORIDE 0.9 % (FLUSH) 0.9 %
10 SYRINGE (ML) INJECTION AS NEEDED
Status: DISCONTINUED | OUTPATIENT
Start: 2022-09-14 | End: 2022-09-14 | Stop reason: HOSPADM

## 2022-09-14 RX ORDER — HYDROCODONE BITARTRATE AND ACETAMINOPHEN 5; 325 MG/1; MG/1
1 TABLET ORAL ONCE
Status: COMPLETED | OUTPATIENT
Start: 2022-09-14 | End: 2022-09-14

## 2022-09-14 RX ADMIN — HYDROCODONE BITARTATE AND ACETAMINOPHEN 1 TABLET: 5; 325 TABLET ORAL at 12:00

## 2022-09-14 NOTE — ED PROVIDER NOTES
Subjective   PIT    History of Present Illness  Tereza Ackerman is a 67-year-old female that presents emergency department for complaints of headache and sore throat.  Patient reports that her head pain is in the frontal region.  She describes it as feeling achy all over.  She complains of sore throat.  She reports diarrhea.  Negative for nausea, vomiting.  She denies any fevers or chills.  Patient reports chills and subjective fever a few days ago.  Patient did take Tylenol.  Patient's been on 2 different antibiotics and feel that is probably why she feels the way she does.  She reports she was on Bactrim and then switched to Augmentin.  Patient was on the medication related to a skin infection above her left.  She advises that she had a brain tumor removed at the beginning of the year.  She reports that the site became infected.  Patient has been followed by the [facility].  She denies any visual disturbance.  Negative for dizziness or feeling lightheaded.    History provided by:  Patient   used: No    Flu Symptoms  Presenting symptoms: diarrhea, headache, myalgias, nausea and sore throat    Presenting symptoms: no fatigue, no fever, no rhinorrhea, no shortness of breath and no vomiting    Severity:  Moderate  Progression:  Unchanged  Relieved by:  Nothing  Worsened by:  Nothing  Associated symptoms: chills        Review of Systems   Constitutional: Positive for chills. Negative for fatigue and fever.   HENT: Positive for sore throat. Negative for rhinorrhea.    Eyes: Negative for photophobia and visual disturbance.   Respiratory: Negative for shortness of breath.    Cardiovascular: Negative for chest pain.   Gastrointestinal: Positive for diarrhea and nausea. Negative for abdominal pain and vomiting.   Musculoskeletal: Positive for myalgias.   Neurological: Positive for headaches. Negative for weakness and light-headedness.   All other systems reviewed and are negative.      Past  Medical History:   Diagnosis Date   • Abdominal hernia    • Arthritis     rt knee    • Atrial flutter with rapid ventricular response (HCC) 12/21/2017   • Back pain    • Brain tumor (HCC)    • Colostomy present (HCC) 08/2018   • Depression    • History of blood transfusion    • History of gastroesophageal reflux (GERD)     resolved since Nissen   • History of Nissen fundoplication 7/1/2017   • History of small bowel obstruction    • Hyperlipemia    • Hypertension    • Memory loss or impairment    • Migraine    • Parathyroid adenoma     Incidental on thyroid US.   • PONV (postoperative nausea and vomiting)     nausea - preprocedural meds help    • Prediabetes     Last Impression: 06 Feb 2015  Reviewed labs. Excellent control.  Maren Connellast Impression: 06 Feb 2015  Reviewed labs. Excellent control.  Michaela Connell   • Thyroid nodule      Last Impression: 13 Jun 2015  r/o thyroid cancer, will proceed with US.  Michaela Connell (Internal Medicine)    • UTI (urinary tract infection)    • Vision changes     blockages left eye    • Wears glasses     readers       Allergies   Allergen Reactions   • Dilantin [Phenytoin Sodium Extended] Rash       Past Surgical History:   Procedure Laterality Date   • BRAIN SURGERY Left 05/19/2022   • CARDIAC CATHETERIZATION N/A 04/27/2020    Procedure: LEFT HEART CATH;  Surgeon: Marina Ring MD;  Location:  [facility];  Service: Cardiology;  Laterality: N/A;   • CARPAL TUNNEL RELEASE  2002    right    • COLONOSCOPY N/A 08/18/2018    Procedure: COLONOSCOPY;  Surgeon: Inderjit Campos MD;  Location:  [facility];  Service: Gastroenterology   • COLONOSCOPY N/A 11/28/2018    Procedure: COLONOSCOPY;  Surgeon: Inderjit Campos MD;  Location:  [facility];  Service: Gastroenterology   • COLOSTOMY CLOSURE N/A 02/19/2019    Procedure: COLOSTOMY TAKEDOWN, INCISIONAL HERNIA REPAIR;  Surgeon: Andrea Bocanegra MD;  Location:  [facility];  Service:  General   • CRANIOTOMY  2003 & 2014    Dr. Werner Hollis for tumor removal   • ENDOSCOPY     • EXPLORATORY LAPAROTOMY N/A 12/05/2017    Procedure: EXPLORATORY LAPAROTOMY, SMALL BOWEL RESECTION;  Surgeon: Ирина Cobian MD;  Location:  [facility];  Service:    • EXPLORATORY LAPAROTOMY N/A 12/22/2017    Procedure: LAPAROTOMY EXPLORATORY FOR SMALL BOWEL OBSTRUCTION;  Surgeon: Ирина Cobian MD;  Location:  [facility];  Service:    • EXPLORATORY LAPAROTOMY N/A 07/23/2018    Procedure: EXPLORATORY LAPAROTOMY, APPENDECTOMY, CECOPEXY, INCISIONAL HERNIA REPAIR, LYSIS OF ADHESIONS;  Surgeon: Andrea Bocanegra MD;  Location:  [facility];  Service: General   • EXPLORATORY LAPAROTOMY N/A 08/19/2018    Procedure: LAPAROTOMY EXPLORATORY, SIGMOID COLECTOMY, CREATION OF OSTOMY;  Surgeon: Andrea Bocanegra MD;  Location:  [facility];  Service: General   • HYSTERECTOMY      partial - both ovaries still present pt believes    • INSERTION HEMODIALYSIS CATHETER N/A 12/07/2017    Procedure: HEMODIALYSIS CATHETER INSERTION;  Surgeon: Chance Valenzuela MD;  Location:  [facility];  Service:    • ORBITOTOMY Left 11/03/2017    Procedure:  LEFT LATERAL ORBITOTOMY WITH DEBULKING OF TUMOR ;  Surgeon: Moo Hopkins MD;  Location:  [facility];  Service:    • OTHER SURGICAL HISTORY      esophagogastric fundoplasty nissen fundoplication   • TUBAL ABDOMINAL LIGATION         Family History   Problem Relation Age of Onset   • Obesity Mother    • Heart attack Mother    • Heart disease Mother    • Migraines Father    • Stroke Father    • Diabetes Father    • Cancer Other    • Arthritis Other    • Diabetes Other    • Breast cancer Maternal Aunt    • Breast cancer Paternal Aunt    • Ovarian cancer Neg Hx        Social History     Socioeconomic History   • Marital status:    Tobacco Use   • Smoking status: Never Smoker   • Smokeless tobacco: Never Used   Vaping Use   • Vaping Use: Never used   Substance and Sexual Activity   • Alcohol  use: No   • Drug use: No   • Sexual activity: Defer           Objective   Physical Exam  Vitals and nursing note reviewed.   Constitutional:       General: She is not in acute distress.     Appearance: Normal appearance. She is well-developed. She is not ill-appearing or toxic-appearing.   HENT:      Head: Normocephalic and atraumatic.      Right Ear: Tympanic membrane normal.      Left Ear: Tympanic membrane normal.      Nose: No congestion or rhinorrhea.      Mouth/Throat:      Mouth: Mucous membranes are moist.   Eyes:      General: Lids are normal.      Conjunctiva/sclera: Conjunctivae normal.   Neck:      Trachea: Trachea normal.   Cardiovascular:      Rate and Rhythm: Normal rate and regular rhythm.      Pulses: Normal pulses.      Heart sounds: Normal heart sounds.   Pulmonary:      Effort: Pulmonary effort is normal. No respiratory distress.      Breath sounds: Normal breath sounds. No decreased breath sounds, wheezing, rhonchi or rales.   Abdominal:      General: Bowel sounds are normal.      Palpations: Abdomen is soft.      Tenderness: There is no abdominal tenderness.   Musculoskeletal:         General: Normal range of motion.      Cervical back: Full passive range of motion without pain and normal range of motion.   Skin:     General: Skin is warm and dry.      Findings: No rash.   Neurological:      Mental Status: She is alert and oriented to person, place, and time.      Cranial Nerves: No cranial nerve deficit.   Psychiatric:         Speech: Speech normal.         Behavior: Behavior normal. Behavior is cooperative.         Procedures           ED Course  ED Course as of 09/14/22 1551   Wed Sep 14, 2022   1127 Strep A Ag: Negative [KG]   1127 WBC: 5.57 [KG]   1220 Strep A Ag: Negative [KG]   1259 Results are discussed with patient at this time.  Patient will be discharged home.  Patient did take Tylenol for her discomfort.  Patient to follow-up with her primary care physician.  Patient agrees and  verbalized understanding. [KG]      ED Course User Index  [KG] Karen Sherman AKASH, APRN           Recent Results (from the past 24 hour(s))   COVID-19 and FLU A/B PCR - Swab, Nasopharynx    Collection Time: 09/14/22  9:36 AM    Specimen: Nasopharynx; Swab   Result Value Ref Range    COVID19 Not Detected Not Detected - Ref. Range    Influenza A PCR Not Detected Not Detected    Influenza B PCR Not Detected Not Detected   Rapid Strep A Screen - Swab, Throat    Collection Time: 09/14/22  9:36 AM    Specimen: Throat; Swab   Result Value Ref Range    Strep A Ag Negative Negative   CBC Auto Differential    Collection Time: 09/14/22 10:12 AM    Specimen: Blood   Result Value Ref Range    WBC 5.57 3.40 - 10.80 10*3/mm3    RBC 4.41 3.77 - 5.28 10*6/mm3    Hemoglobin 13.2 12.0 - 15.9 g/dL    Hematocrit 40.3 34.0 - 46.6 %    MCV 91.4 79.0 - 97.0 fL    MCH 29.9 26.6 - 33.0 pg    MCHC 32.8 31.5 - 35.7 g/dL    RDW 13.2 12.3 - 15.4 %    RDW-SD 45.0 37.0 - 54.0 fl    MPV 10.3 6.0 - 12.0 fL    Platelets 188 140 - 450 10*3/mm3    Neutrophil % 66.1 42.7 - 76.0 %    Lymphocyte % 22.6 19.6 - 45.3 %    Monocyte % 9.0 5.0 - 12.0 %    Eosinophil % 1.6 0.3 - 6.2 %    Basophil % 0.5 0.0 - 1.5 %    Immature Grans % 0.2 0.0 - 0.5 %    Neutrophils, Absolute 3.68 1.70 - 7.00 10*3/mm3    Lymphocytes, Absolute 1.26 0.70 - 3.10 10*3/mm3    Monocytes, Absolute 0.50 0.10 - 0.90 10*3/mm3    Eosinophils, Absolute 0.09 0.00 - 0.40 10*3/mm3    Basophils, Absolute 0.03 0.00 - 0.20 10*3/mm3    Immature Grans, Absolute 0.01 0.00 - 0.05 10*3/mm3    nRBC 0.0 0.0 - 0.2 /100 WBC   Comprehensive Metabolic Panel    Collection Time: 09/14/22 11:18 AM    Specimen: Blood   Result Value Ref Range    Glucose 102 (H) 65 - 99 mg/dL    BUN 13 8 - 23 mg/dL    Creatinine 0.76 0.57 - 1.00 mg/dL    Sodium 136 136 - 145 mmol/L    Potassium 3.9 3.5 - 5.2 mmol/L    Chloride 102 98 - 107 mmol/L    CO2 26.0 22.0 - 29.0 mmol/L    Calcium 9.3 8.6 - 10.5 mg/dL    Total Protein 7.5  "6.0 - 8.5 g/dL    Albumin 4.20 3.50 - 5.20 g/dL    ALT (SGPT) 12 1 - 33 U/L    AST (SGOT) 16 1 - 32 U/L    Alkaline Phosphatase 77 39 - 117 U/L    Total Bilirubin 0.3 0.0 - 1.2 mg/dL    Globulin 3.3 gm/dL    A/G Ratio 1.3 g/dL    BUN/Creatinine Ratio 17.1 7.0 - 25.0    Anion Gap 8.0 5.0 - 15.0 mmol/L    eGFR 84.9 >60.0 mL/min/1.73     Note: In addition to lab results from this visit, the labs listed above may include labs taken at another facility or during a different encounter within the last 24 hours. Please correlate lab times with ED admission and discharge times for further clarification of the services performed during this visit.    No orders to display     Vitals:    09/14/22 0906 09/14/22 1202   BP: 136/95 146/92   BP Location: Left arm Left arm   Patient Position: Sitting Sitting   Pulse: 85 84   Resp: 20 18   Temp: 97.9 °F (36.6 °C)    TempSrc: Oral    SpO2: 95%    Weight: 77.1 kg (170 lb)    Height: 160 cm (63\")      Medications   HYDROcodone-acetaminophen (NORCO) 5-325 MG per tablet 1 tablet (1 tablet Oral Given 9/14/22 1200)     ECG/EMG Results (last 24 hours)     ** No results found for the last 24 hours. **        No orders to display                                       MDM    Final diagnoses:   Nonintractable headache, unspecified chronicity pattern, unspecified headache type   Pharyngitis, unspecified etiology       ED Disposition  ED Disposition     ED Disposition   Discharge    Condition   Stable    Comment   --             Michaela Connell MD  [address] [place] [id number] [phone]               Medication List      Changed    carvedilol 12.5 MG tablet  Commonly known as: COREG  Take 1 tablet by mouth Every 12 (Twelve) Hours.  What changed: additional instructions     diazePAM 2 MG tablet  Commonly known as: Valium  Take 1 tablet by mouth 2 (Two) Times a Day As Needed for Anxiety.  What changed:   · when to take this  · additional instructions     levocetirizine 5 MG " tablet  Commonly known as: XYZAL  Take 1 tablet by mouth Every Evening.  What changed:   · when to take this  · reasons to take this             Karen Sherman, APRN  09/14/22 2530

## 2022-09-14 NOTE — DISCHARGE INSTRUCTIONS
Take Tylenol for headache.  Follow-up with your primary care physician.    COVID, flu, strep screens were normal.

## 2022-09-16 LAB — BACTERIA SPEC AEROBE CULT: NORMAL

## 2022-10-17 ENCOUNTER — TELEPHONE (OUTPATIENT)
Dept: INTERNAL MEDICINE | Facility: CLINIC | Age: 67
End: 2022-10-17

## 2022-10-17 NOTE — TELEPHONE ENCOUNTER
Caller: Tereza Ackerman    Relationship: Self    Best call back number: [phone]    What is the best time to reach you: ANYTIME LEAVE MESSAGE IF NEEDED     Who are you requesting to speak with (clinical staff, provider,  specific staff member): CLINICAL STAFF        What was the call regarding: THE PATIENT WOULD LIKE TO KNOW IF SHE CAN HAVE THE SHINGLES SHOT DONE AT THE OFFICE     Do you require a callback: YES

## 2022-10-19 ENCOUNTER — TRANSCRIBE ORDERS (OUTPATIENT)
Dept: ADMINISTRATIVE | Facility: HOSPITAL | Age: 67
End: 2022-10-19

## 2022-10-19 DIAGNOSIS — D32.9 MENINGIOMA: Primary | ICD-10-CM

## 2022-10-27 ENCOUNTER — FLU SHOT (OUTPATIENT)
Dept: INTERNAL MEDICINE | Facility: CLINIC | Age: 67
End: 2022-10-27

## 2022-10-27 DIAGNOSIS — Z23 NEED FOR INFLUENZA VACCINATION: Primary | ICD-10-CM

## 2022-10-27 PROCEDURE — G0008 ADMIN INFLUENZA VIRUS VAC: HCPCS | Performed by: INTERNAL MEDICINE

## 2022-10-27 PROCEDURE — 90662 IIV NO PRSV INCREASED AG IM: CPT | Performed by: INTERNAL MEDICINE

## 2022-11-09 ENCOUNTER — HOSPITAL ENCOUNTER (OUTPATIENT)
Dept: MRI IMAGING | Facility: HOSPITAL | Age: 67
Discharge: HOME | End: 2022-11-09
Admitting: STUDENT IN AN ORGANIZED HEALTH CARE EDUCATION/TRAINING PROGRAM

## 2022-11-09 DIAGNOSIS — D32.9 MENINGIOMA: ICD-10-CM

## 2022-11-09 PROCEDURE — 0 GADOBENATE DIMEGLUMINE 529 MG/ML SOLUTION: Performed by: STUDENT IN AN ORGANIZED HEALTH CARE EDUCATION/TRAINING PROGRAM

## 2022-11-09 PROCEDURE — 70553 MRI BRAIN STEM W/O & W/DYE: CPT

## 2022-11-09 PROCEDURE — A9577 INJ MULTIHANCE: HCPCS | Performed by: STUDENT IN AN ORGANIZED HEALTH CARE EDUCATION/TRAINING PROGRAM

## 2022-11-09 RX ADMIN — GADOBENATE DIMEGLUMINE 15 ML: 529 INJECTION, SOLUTION INTRAVENOUS at 20:22

## 2022-11-18 ENCOUNTER — HOSPITAL ENCOUNTER (OUTPATIENT)
Dept: MRI IMAGING | Facility: HOSPITAL | Age: 67
Discharge: HOME | End: 2022-11-18

## 2023-01-17 ENCOUNTER — LAB (OUTPATIENT)
Dept: LAB | Facility: HOSPITAL | Age: 68
End: 2023-01-17
Payer: MEDICARE

## 2023-01-17 ENCOUNTER — OFFICE VISIT (OUTPATIENT)
Dept: INTERNAL MEDICINE | Facility: CLINIC | Age: 68
End: 2023-01-17
Payer: MEDICARE

## 2023-01-17 VITALS
BODY MASS INDEX: 30.48 KG/M2 | OXYGEN SATURATION: 97 % | HEART RATE: 72 BPM | DIASTOLIC BLOOD PRESSURE: 76 MMHG | SYSTOLIC BLOOD PRESSURE: 120 MMHG | HEIGHT: 63 IN | TEMPERATURE: 98.1 F | WEIGHT: 172 LBS

## 2023-01-17 DIAGNOSIS — Z78.0 POSTMENOPAUSAL: ICD-10-CM

## 2023-01-17 DIAGNOSIS — F43.89 ADJUSTMENT REACTION TO CHRONIC STRESS: ICD-10-CM

## 2023-01-17 DIAGNOSIS — R73.01 IFG (IMPAIRED FASTING GLUCOSE): ICD-10-CM

## 2023-01-17 DIAGNOSIS — L98.9 SKIN LESION OF CHEST WALL: Primary | ICD-10-CM

## 2023-01-17 DIAGNOSIS — K21.9 GASTROESOPHAGEAL REFLUX DISEASE, UNSPECIFIED WHETHER ESOPHAGITIS PRESENT: ICD-10-CM

## 2023-01-17 DIAGNOSIS — I21.4 NSTEMI (NON-ST ELEVATED MYOCARDIAL INFARCTION): ICD-10-CM

## 2023-01-17 DIAGNOSIS — I95.9 HYPOTENSION, UNSPECIFIED HYPOTENSION TYPE: ICD-10-CM

## 2023-01-17 DIAGNOSIS — E78.2 MIXED HYPERLIPIDEMIA: ICD-10-CM

## 2023-01-17 DIAGNOSIS — E55.9 VITAMIN D DEFICIENCY: ICD-10-CM

## 2023-01-17 PROCEDURE — G0444 DEPRESSION SCREEN ANNUAL: HCPCS | Performed by: INTERNAL MEDICINE

## 2023-01-17 PROCEDURE — 82607 VITAMIN B-12: CPT

## 2023-01-17 PROCEDURE — 85027 COMPLETE CBC AUTOMATED: CPT

## 2023-01-17 PROCEDURE — 99213 OFFICE O/P EST LOW 20 MIN: CPT | Performed by: INTERNAL MEDICINE

## 2023-01-17 PROCEDURE — 80061 LIPID PANEL: CPT

## 2023-01-17 PROCEDURE — 1159F MED LIST DOCD IN RCRD: CPT | Performed by: INTERNAL MEDICINE

## 2023-01-17 PROCEDURE — G0439 PPPS, SUBSEQ VISIT: HCPCS | Performed by: INTERNAL MEDICINE

## 2023-01-17 PROCEDURE — 80053 COMPREHEN METABOLIC PANEL: CPT

## 2023-01-17 PROCEDURE — 1126F AMNT PAIN NOTED NONE PRSNT: CPT | Performed by: INTERNAL MEDICINE

## 2023-01-17 PROCEDURE — 83036 HEMOGLOBIN GLYCOSYLATED A1C: CPT

## 2023-01-17 PROCEDURE — 82306 VITAMIN D 25 HYDROXY: CPT

## 2023-01-17 PROCEDURE — 1170F FXNL STATUS ASSESSED: CPT | Performed by: INTERNAL MEDICINE

## 2023-01-17 PROCEDURE — 84443 ASSAY THYROID STIM HORMONE: CPT

## 2023-01-17 RX ORDER — OMEPRAZOLE 20 MG/1
20 CAPSULE, DELAYED RELEASE ORAL DAILY
Qty: 90 CAPSULE | Refills: 3 | Status: SHIPPED | OUTPATIENT
Start: 2023-01-17

## 2023-01-17 RX ORDER — BUSPIRONE HYDROCHLORIDE 10 MG/1
10 TABLET ORAL 2 TIMES DAILY
Qty: 180 TABLET | Refills: 3 | Status: SHIPPED | OUTPATIENT
Start: 2023-01-17

## 2023-01-17 RX ORDER — SERTRALINE HYDROCHLORIDE 100 MG/1
100 TABLET, FILM COATED ORAL DAILY
Qty: 90 TABLET | Refills: 3 | Status: SHIPPED | OUTPATIENT
Start: 2023-01-17

## 2023-01-17 RX ORDER — ROSUVASTATIN CALCIUM 40 MG/1
40 TABLET, COATED ORAL DAILY
Qty: 90 TABLET | Refills: 3 | Status: SHIPPED | OUTPATIENT
Start: 2023-01-17

## 2023-01-17 RX ORDER — QUETIAPINE FUMARATE 25 MG/1
12.5 TABLET, FILM COATED ORAL
Qty: 45 TABLET | Refills: 3 | Status: SHIPPED | OUTPATIENT
Start: 2023-01-17

## 2023-01-17 RX ORDER — METHION/INOS/CHOL BT/B COM/LIV 110MG-86MG
100 CAPSULE ORAL EVERY OTHER DAY
Qty: 30 EACH | Refills: 5 | Status: SHIPPED | OUTPATIENT
Start: 2023-01-17

## 2023-01-17 RX ORDER — LISINOPRIL 2.5 MG/1
2.5 TABLET ORAL DAILY
Qty: 90 TABLET | Refills: 3 | Status: SHIPPED | OUTPATIENT
Start: 2023-01-17

## 2023-01-17 NOTE — PROGRESS NOTES
The ABCs of the Annual Wellness Visit  Subsequent Medicare Wellness Visit    Subjective    Tereza Ackerman is a 67 y.o. female who presents for a Subsequent Medicare Wellness Visit.    The following portions of the patient's history were reviewed and   updated as appropriate: allergies, current medications, past family history, past medical history, past social history, past surgical history and problem list.    Compared to one year ago, the patient feels her physical   health is the same.    Compared to one year ago, the patient feels her mental   health is the same.    Recent Hospitalizations:  She was not admitted to the hospital during the last year.       Current Medical Providers:  Patient Care Team:  Michaela Connell MD as PCP - General  Michaela Connell MD as PCP - Family Medicine  HollisWerner thao MD (Inactive) as Referring Physician (Neurosurgery)  Moo Hopkins MD as Consulting Physician (Ophthalmology)  Jamal Ramos MD as Consulting Physician (Nephrology)  Alli Aguirre MD as Consulting Physician (Cardiology)  Costa Raygoza MD as Consulting Physician (Radiation Oncology)  Andrea Bocanegra MD as Consulting Physician (General Surgery)  Alli Aguirre MD as Consulting Physician (Cardiology)    Outpatient Medications Prior to Visit   Medication Sig Dispense Refill   • aspirin 81 MG tablet Take 1 tablet by mouth Daily. 30 tablet 11   • carvedilol (COREG) 12.5 MG tablet Take 1 tablet by mouth Every 12 (Twelve) Hours. (Patient taking differently: Take 12.5 mg by mouth Every 12 (Twelve) Hours. 1/2 tablet according to blood pressure) 180 tablet 3   • cholecalciferol (VITAMIN D3) 1000 units tablet Take 2,000 Units by mouth Every Other Day.     • LORazepam (Ativan) 0.5 MG tablet Take 0.5-1 tablets by mouth Daily As Needed for Anxiety. 15 tablet 0   • ondansetron ODT (ZOFRAN-ODT) 4 MG disintegrating tablet Place 1 tablet on the tongue Every 4 (Four) Hours. As needed for nausea 12  tablet 0   • timolol (TIMOPTIC) 0.5 % ophthalmic solution 1 drop 2 (Two) Times a Day.     • busPIRone (BUSPAR) 10 MG tablet Take 1 tablet by mouth 2 (Two) Times a Day. 180 tablet 1   • lisinopril (PRINIVIL,ZESTRIL) 2.5 MG tablet Take 1 tablet by mouth Daily. 90 tablet 1   • omeprazole (priLOSEC) 20 MG capsule Take 1 capsule by mouth Daily. 90 capsule 1   • QUEtiapine (SEROquel) 25 MG tablet Take 0.5 tablets by mouth every night at bedtime. 45 tablet 1   • rosuvastatin (CRESTOR) 40 MG tablet Take 1 tablet by mouth Daily. 90 tablet 1   • sertraline (ZOLOFT) 100 MG tablet Take 1 tablet by mouth Daily. 90 tablet 1   • thiamine (VITAMIN B-1) 100 MG tablet tablet TAKE ONE TABLET BY MOUTH EVERY OTHER DAY 30 each 5   • betamethasone dipropionate 0.05 % cream Apply 1 application topically to the appropriate area as directed 2 (Two) Times a Day. To rash 45 g 0   • brinzolamide (AZOPT) 1 % ophthalmic suspension 1 drop 2 (Two) Times a Day.     • cyclobenzaprine (FLEXERIL) 10 MG tablet Take 0.5 tablets by mouth 3 (Three) Times a Day As Needed (headache). 12 tablet 0   • diazePAM (Valium) 2 MG tablet Take 1 tablet by mouth 2 (Two) Times a Day As Needed for Anxiety. (Patient taking differently: Take 2 mg by mouth As Needed for Anxiety. Prior to having MRI's) 3 tablet 0   • levocetirizine (XYZAL) 5 MG tablet Take 1 tablet by mouth Every Evening. (Patient taking differently: Take 5 mg by mouth As Needed.) 90 tablet 1     No facility-administered medications prior to visit.       No opioid medication identified on active medication list. I have reviewed chart for other potential  high risk medication/s and harmful drug interactions in the elderly.          Aspirin is on active medication list. Aspirin use is indicated based on review of current medical condition/s. Pros and cons of this therapy have been discussed today. Benefits of this medication outweigh potential harm.  Patient has been encouraged to continue taking this  "medication.  .      Patient Active Problem List   Diagnosis   • Impaired glucose tolerance   • Parathyroid adenoma   • Reaction to chronic stress   • Multinodular goiter   • Vitamin D deficiency   • Meningioma, recurrent of brain (HCC)   • Arthritis   • Depression   • Small bowel obstruction (HCC)   • Insomnia   • History of Nissen fundoplication   • Malignant neoplasm of left orbit (HCC)   • Prediabetes   • Mixed hyperlipidemia   • Hyperglycemia   • Atrial flutter with rapid ventricular response (HCC)   • Anemia   • Large bowel obstruction (HCC)   • Abdominal pain   • Essential hypertension   • History of creation of ostomy (HCC)   • Screen for colon cancer   • Sigmoid volvulus (HCC)   • SHAE (obstructive sleep apnea)     Advance Care Planning  Advance Directive is not on file.  ACP discussion was held with the patient during this visit. Patient has an advance directive (not in EMR), copy requested.     Objective    Vitals:    01/17/23 1114   BP: 120/76   Pulse: 72   Temp: 98.1 °F (36.7 °C)   SpO2: 97%  Comment: ra   Weight: 78 kg (172 lb)   Height: 160 cm (63\")   PainSc: 0-No pain     Estimated body mass index is 30.47 kg/m² as calculated from the following:    Height as of this encounter: 160 cm (63\").    Weight as of this encounter: 78 kg (172 lb).    BMI is >= 30 and <35. (Class 1 Obesity). The following options were offered after discussion;: exercise counseling/recommendations and nutrition counseling/recommendations      Does the patient have evidence of cognitive impairment? No    Lab Results   Component Value Date    TRIG 163 (H) 01/17/2023    HDL 64 (H) 01/17/2023    LDL 76 01/17/2023    VLDL 27 01/17/2023    HGBA1C 5.80 (H) 01/17/2023        HEALTH RISK ASSESSMENT    Smoking Status:  Social History     Tobacco Use   Smoking Status Never   Smokeless Tobacco Never     Alcohol Consumption:  Social History     Substance and Sexual Activity   Alcohol Use No     Fall Risk Screen:    JOVAN Fall Risk Assessment " was completed, and patient is at LOW risk for falls.Assessment completed on:1/17/2023    Depression Screening:  PHQ-2/PHQ-9 Depression Screening 1/17/2023   Little Interest or Pleasure in Doing Things 1-->several days   Feeling Down, Depressed or Hopeless 1-->several days   Trouble Falling or Staying Asleep, or Sleeping Too Much 3-->nearly every day   Feeling Tired or Having Little Energy 3-->nearly every day   Poor Appetite or Overeating 0-->not at all   Feeling Bad about Yourself - or that You are a Failure or Have Let Yourself or Your Family Down 1-->several days   Trouble Concentrating on Things, Such as Reading the Newspaper or Watching Television 3-->nearly every day   Moving or Speaking So Slowly that Other People Could Have Noticed? Or the Opposite - Being So Fidgety 0-->not at all   Thoughts that You Would be Better Off Dead or of Hurting Yourself in Some Way 0-->not at all   PHQ-9: Brief Depression Severity Measure Score 12   If You Checked Off Any Problems, How Difficult Have These Problems Made It For You to Do Your Work, Take Care of Things at Home, or Get Along with Other People? somewhat difficult       Health Habits and Functional and Cognitive Screening:  Functional & Cognitive Status 1/17/2023   Do you have difficulty preparing food and eating? No   Do you have difficulty bathing yourself, getting dressed or grooming yourself? No   Do you have difficulty using the toilet? No   Do you have difficulty moving around from place to place? No   Do you have trouble with steps or getting out of a bed or a chair? No   Current Diet Well Balanced Diet   Dental Exam Up to date   Eye Exam Up to date   Exercise (times per week) -   Current Exercises Include No Regular Exercise   Current Exercise Activities Include -   Do you need help using the phone?  No   Are you deaf or do you have serious difficulty hearing?  No   Do you need help with transportation? No   Do you need help shopping? No   Do you need help  preparing meals?  No   Do you need help with housework?  No   Do you need help with laundry? No   Do you need help taking your medications? No   Do you need help managing money? No   Do you ever drive or ride in a car without wearing a seat belt? No   Have you felt unusual stress, anger or loneliness in the last month? No   Who do you live with? Spouse   If you need help, do you have trouble finding someone available to you? No   Have you been bothered in the last four weeks by sexual problems? No   Do you have difficulty concentrating, remembering or making decisions? Yes       Age-appropriate Screening Schedule:  Refer to the list below for future screening recommendations based on patient's age, sex and/or medical conditions. Orders for these recommended tests are listed in the plan section. The patient has been provided with a written plan.    Health Maintenance   Topic Date Due   • ZOSTER VACCINE (2 of 3) 01/21/2015   • DXA SCAN  06/11/2022   • MAMMOGRAM  02/17/2023   • LIPID PANEL  01/17/2024   • PAP SMEAR  02/10/2024   • INFLUENZA VACCINE  Completed   • TDAP/TD VACCINES  Discontinued                CMS Preventative Services Quick Reference  Risk Factors Identified During Encounter  Depression/Dysphoria: better  Fall Risk-High or Moderate: Discussed Fall Prevention in the home  Immunizations Discussed/Encouraged: Shingrix  The above risks/problems have been discussed with the patient.  Pertinent information has been shared with the patient in the After Visit Summary.  An After Visit Summary and PPPS were made available to the patient.    Follow Up:   Next Medicare Wellness visit to be scheduled in 1 year.       Additional E&M Note during same encounter follows:  Patient has multiple medical problems which are significant and separately identifiable that require additional work above and beyond the Medicare Wellness Visit.      Chief Complaint  Medicare Wellness-subsequent    Subjective        Tereza Ackerman  is a 67-year-old female who presents today for a Medicare wellness visit.    The patient states that she had surgery for a tumor in her brain in 05/2022. She went back to the plastic surgeon, and he worked on the incision for approximately 1 hour. She notes that the incision does not heal completely. She adds that her eyelid is full. She was told that she has a tumor that is headed towards the brain. She is scheduled to see a neurosurgeon, Dr. Torres, on 01/27/2023. She is also seeing a radiation oncologist. She denies any headaches or dizzy spells. She notes that she does not have any vision in her left eye. She reports that when she had brain surgery, they went through her eyelid, and it did not open. She was unable to use eye drops. She adds that her vision was gone for 9 weeks. She is still driving.    She reports that she has a rough area on her skin. She notes that it has been there for approximately 1 month.    She is due for a bone density scan. She is due for a mammogram on 02/17/2023.    She is due for the shingles vaccine. She is up-to-date with her COVID-19 vaccines.    Tereza Ackerman is also being seen today for htn, hlp and gerd.    Review of Systems   Constitutional: Negative for chills and fever.   HENT: Negative for congestion, ear pain and sore throat.    Eyes: Positive for visual disturbance. Negative for pain and redness.   Respiratory: Negative for cough and shortness of breath.    Cardiovascular: Negative for chest pain, palpitations and leg swelling.   Gastrointestinal: Negative for abdominal pain, diarrhea and nausea.   Endocrine: Negative for cold intolerance and heat intolerance.   Genitourinary: Negative for flank pain and urgency.   Musculoskeletal: Negative for arthralgias and gait problem.   Skin: Negative for pallor and rash.   Neurological: Negative for dizziness and weakness.   Psychiatric/Behavioral: Positive for dysphoric mood. Negative for sleep disturbance. The patient is  "not nervous/anxious.        Objective   Vital Signs:  /76   Pulse 72   Temp 98.1 °F (36.7 °C)   Ht 160 cm (63\")   Wt 78 kg (172 lb)   SpO2 97% Comment: ra  BMI 30.47 kg/m²     Physical Exam  Vitals and nursing note reviewed.   Constitutional:       Appearance: She is well-developed.   HENT:      Head: Normocephalic and atraumatic.      Right Ear: External ear normal.      Left Ear: External ear normal.      Mouth/Throat:      Pharynx: No oropharyngeal exudate.   Eyes:      Conjunctiva/sclera: Conjunctivae normal.      Pupils: Pupils are equal, round, and reactive to light.   Neck:      Thyroid: No thyromegaly.   Cardiovascular:      Rate and Rhythm: Normal rate and regular rhythm.      Pulses: Normal pulses.      Heart sounds: Normal heart sounds. No murmur heard.    No friction rub. No gallop.   Pulmonary:      Effort: Pulmonary effort is normal.      Breath sounds: Normal breath sounds.   Abdominal:      General: Bowel sounds are normal. There is no distension.      Palpations: Abdomen is soft.      Tenderness: There is no abdominal tenderness.   Musculoskeletal:      Cervical back: Neck supple.   Skin:     General: Skin is warm and dry.   Neurological:      Mental Status: She is alert and oriented to person, place, and time.      Cranial Nerves: No cranial nerve deficit.   Psychiatric:         Judgment: Judgment normal.                         Assessment and Plan   Diagnoses and all orders for this visit:    1. Skin lesion of chest wall (Primary)  -     Ambulatory Referral to Dermatology  -     mupirocin (BACTROBAN) 2 % ointment; Apply 1 application topically to the appropriate area as directed 3 (Three) Times a Day. To wound area until healed  Dispense: 22 g; Refill: 1    2. Adjustment reaction to chronic stress  Comments:  continue zoloft,buspar , wean off seroquel gradually- skip occ.  Orders:  -     busPIRone (BUSPAR) 10 MG tablet; Take 1 tablet by mouth 2 (Two) Times a Day.  Dispense: 180 tablet; " Refill: 3  -     QUEtiapine (SEROquel) 25 MG tablet; Take 0.5 tablets by mouth every night at bedtime.  Dispense: 45 tablet; Refill: 3  -     sertraline (ZOLOFT) 100 MG tablet; Take 1 tablet by mouth Daily.  Dispense: 90 tablet; Refill: 3    3. Hypotension, unspecified hypotension type  -     lisinopril (PRINIVIL,ZESTRIL) 2.5 MG tablet; Take 1 tablet by mouth Daily.  Dispense: 90 tablet; Refill: 3    4. Gastroesophageal reflux disease, unspecified whether esophagitis present  -     omeprazole (priLOSEC) 20 MG capsule; Take 1 capsule by mouth Daily.  Dispense: 90 capsule; Refill: 3  -     CBC (No Diff); Future    5. Adjustment reaction to chronic stress  Comments:  continue zoloft,buspar , wean off seroquel gradually.  Orders:  -     busPIRone (BUSPAR) 10 MG tablet; Take 1 tablet by mouth 2 (Two) Times a Day.  Dispense: 180 tablet; Refill: 3  -     QUEtiapine (SEROquel) 25 MG tablet; Take 0.5 tablets by mouth every night at bedtime.  Dispense: 45 tablet; Refill: 3  -     sertraline (ZOLOFT) 100 MG tablet; Take 1 tablet by mouth Daily.  Dispense: 90 tablet; Refill: 3    6. NSTEMI (non-ST elevated myocardial infarction) ([facility])  Comments:  doing better, [facility] with near normal coronaries.  followed by Cardiology.  continue risk factor reduction.  Orders:  -     rosuvastatin (CRESTOR) 40 MG tablet; Take 1 tablet by mouth Daily.  Dispense: 90 tablet; Refill: 3    7. IFG (impaired fasting glucose)  -     Hemoglobin A1c; Future    8. Mixed hyperlipidemia  -     Comprehensive Metabolic Panel; Future  -     Lipid Panel; Future  -     TSH; Future    9. Vitamin D deficiency  -     Vitamin D,25-Hydroxy; Future  -     Vitamin B12; Future    10. Postmenopausal  -     DEXA Bone Density Axial; Future    Other orders  -     thiamine (VITAMIN B-1) 100 MG tablet tablet; Take 1 tablet by mouth Every Other Day.  Dispense: 30 each; Refill: 5    1. Medicare annual wellness visit, subsequent  - Routine labs will be obtained.  - DEXA scan will  be obtained.    2. Meningioma (HCC)  - She is scheduled to see Dr. Jose Manuel Cardona on 01/27/2023.    3. Skin lesion  - She will be referred to dermatology for further evaluation and treatment.  - She will be prescribed mupirocin ointment to be applied twice daily for the next 2 weeks.         Follow Up   No follow-ups on file.  Patient was given instructions and counseling regarding her condition or for health maintenance advice. Please see specific information pulled into the AVS if appropriate.       Transcribed from ambient dictation for Michaela Connell MD by Deepika [employer] .  01/17/23   13:35 EST    Patient or patient representative verbalized consent to the visit recording.  I have personally performed the services described in this document as transcribed by the above individual, and it is both accurate and complete.  Michaela Connell MD  1/21/2023  01:22 EST

## 2023-01-18 LAB
25(OH)D3 SERPL-MCNC: 38.1 NG/ML (ref 30–100)
ALBUMIN SERPL-MCNC: 4.6 G/DL (ref 3.5–5.2)
ALBUMIN/GLOB SERPL: 1.5 G/DL
ALP SERPL-CCNC: 83 U/L (ref 39–117)
ALT SERPL W P-5'-P-CCNC: 14 U/L (ref 1–33)
ANION GAP SERPL CALCULATED.3IONS-SCNC: 11.6 MMOL/L (ref 5–15)
AST SERPL-CCNC: 23 U/L (ref 1–32)
BILIRUB SERPL-MCNC: 0.3 MG/DL (ref 0–1.2)
BUN SERPL-MCNC: 15 MG/DL (ref 8–23)
BUN/CREAT SERPL: 16.3 (ref 7–25)
CALCIUM SPEC-SCNC: 9.9 MG/DL (ref 8.6–10.5)
CHLORIDE SERPL-SCNC: 103 MMOL/L (ref 98–107)
CHOLEST SERPL-MCNC: 167 MG/DL (ref 0–200)
CO2 SERPL-SCNC: 27.4 MMOL/L (ref 22–29)
CREAT SERPL-MCNC: 0.92 MG/DL (ref 0.57–1)
DEPRECATED RDW RBC AUTO: 42.6 FL (ref 37–54)
EGFRCR SERPLBLD CKD-EPI 2021: 67.5 ML/MIN/1.73
ERYTHROCYTE [DISTWIDTH] IN BLOOD BY AUTOMATED COUNT: 12.9 % (ref 12.3–15.4)
GLOBULIN UR ELPH-MCNC: 3.1 GM/DL
GLUCOSE SERPL-MCNC: 81 MG/DL (ref 65–99)
HBA1C MFR BLD: 5.8 % (ref 4.8–5.6)
HCT VFR BLD AUTO: 40 % (ref 34–46.6)
HDLC SERPL-MCNC: 64 MG/DL (ref 40–60)
HGB BLD-MCNC: 13.1 G/DL (ref 12–15.9)
LDLC SERPL CALC-MCNC: 76 MG/DL (ref 0–100)
LDLC/HDLC SERPL: 1.1 {RATIO}
MCH RBC QN AUTO: 29.8 PG (ref 26.6–33)
MCHC RBC AUTO-ENTMCNC: 32.8 G/DL (ref 31.5–35.7)
MCV RBC AUTO: 90.9 FL (ref 79–97)
PLATELET # BLD AUTO: 224 10*3/MM3 (ref 140–450)
PMV BLD AUTO: 11.3 FL (ref 6–12)
POTASSIUM SERPL-SCNC: 4.1 MMOL/L (ref 3.5–5.2)
PROT SERPL-MCNC: 7.7 G/DL (ref 6–8.5)
RBC # BLD AUTO: 4.4 10*6/MM3 (ref 3.77–5.28)
SODIUM SERPL-SCNC: 142 MMOL/L (ref 136–145)
TRIGL SERPL-MCNC: 163 MG/DL (ref 0–150)
TSH SERPL DL<=0.05 MIU/L-ACNC: 2.11 UIU/ML (ref 0.27–4.2)
VIT B12 BLD-MCNC: 1053 PG/ML (ref 211–946)
VLDLC SERPL-MCNC: 27 MG/DL (ref 5–40)
WBC NRBC COR # BLD: 4.65 10*3/MM3 (ref 3.4–10.8)

## 2023-01-23 ENCOUNTER — TRANSCRIBE ORDERS (OUTPATIENT)
Dept: ADMINISTRATIVE | Facility: HOSPITAL | Age: 68
End: 2023-01-23
Payer: MEDICARE

## 2023-01-23 DIAGNOSIS — Z12.31 VISIT FOR SCREENING MAMMOGRAM: Primary | ICD-10-CM

## 2023-03-17 ENCOUNTER — HOSPITAL ENCOUNTER (OUTPATIENT)
Dept: MAMMOGRAPHY | Facility: HOSPITAL | Age: 68
Discharge: HOME | End: 2023-03-17
Payer: MEDICARE

## 2023-03-17 ENCOUNTER — HOSPITAL ENCOUNTER (OUTPATIENT)
Dept: BONE DENSITY | Facility: HOSPITAL | Age: 68
Discharge: HOME | End: 2023-03-17
Payer: MEDICARE

## 2023-03-17 DIAGNOSIS — Z78.0 POSTMENOPAUSAL: ICD-10-CM

## 2023-03-17 DIAGNOSIS — Z12.31 VISIT FOR SCREENING MAMMOGRAM: ICD-10-CM

## 2023-03-17 PROCEDURE — 77080 DXA BONE DENSITY AXIAL: CPT

## 2023-03-17 PROCEDURE — 77063 BREAST TOMOSYNTHESIS BI: CPT | Performed by: RADIOLOGY

## 2023-03-17 PROCEDURE — 77067 SCR MAMMO BI INCL CAD: CPT | Performed by: RADIOLOGY

## 2023-03-17 PROCEDURE — 77067 SCR MAMMO BI INCL CAD: CPT

## 2023-03-17 PROCEDURE — 77063 BREAST TOMOSYNTHESIS BI: CPT

## 2023-04-13 NOTE — PROGRESS NOTES
"   LOS: 30 days    Patient Care Team:  Michaela Connell MD as PCP - General  Michaela Connell MD as PCP - Family Medicine    Reason For Visit:  F/U DEJA. SEEN ON DIALYSIS.  Subjective           Review of Systems:    Pulm: No soa   CV:  No CP      Objective       amiodarone 200 mg Oral Q12H   Followed by      [START ON 1/12/2018] amiodarone 200 mg Oral Daily   amLODIPine 5 mg Oral Q24H   carvedilol 12.5 mg Oral Q12H   docusate sodium 100 mg Oral BID   epoetin marcel 10,000 Units Subcutaneous Once per day on Tue Thu Sat   furosemide 20 mg Intravenous Once   heparin (porcine) 5,000 Units Subcutaneous Q8H   metoclopramide 5 mg Intravenous Q12H   nystatin  Topical Q12H   sertraline 100 mg Oral Daily   sodium bicarbonate 650 mg Oral TID   thiamine 100 mg Oral Daily            Vital Signs:  Blood pressure 173/83, pulse 73, temperature 98.4 °F (36.9 °C), temperature source Oral, resp. rate 18, height 160 cm (62.99\"), weight 74.1 kg (163 lb 6.4 oz), SpO2 97 %.    Flowsheet Rows         First Filed Value    Admission Height  160 cm (63\") Documented at 12/04/2017 1002    Admission Weight  68.9 kg (152 lb) Documented at 12/04/2017 1002          01/02 0701 - 01/03 0700  In: 100 [P.O.:100]  Out: 4075 [Urine:1775]    Physical Exam:    General Appearance: NAD, alert and cooperative, Ox3  Eyes: PER, conjunctivae and sclerae normal, no icterus  Lungs: respirations regular and unlabored, no crepitus, clear to auscultation  Heart/CV: regular rhythm & normal rate, no murmur, no gallop, no rub and 1+ edema  Abdomen: not distended, soft, non-tender, no masses,  bowel sounds present  Skin: No rash, Warm and dry. TUNNELED HD CATH.    Radiology:            Labs:    Results from last 7 days  Lab Units 01/03/18  0612 01/02/18  0453 01/01/18  0740 01/01/18  0342  12/31/17  0523   WBC 10*3/mm3 10.42  --   --  12.94*  --  12.32*   HEMOGLOBIN g/dL 8.5* 10.1* 9.9* 9.9*  9.9*  < > 6.8*   HEMATOCRIT % 26.8* 31.5* 31.0* 31.1*  31.1*  < > 22.1* "   PLATELETS 10*3/mm3 357  --   --  348  --  380   < > = values in this interval not displayed.    Results from last 7 days  Lab Units 01/03/18  0612 01/02/18  0453 01/01/18  0342 12/31/17  0523 12/30/17  0446   SODIUM mmol/L 140 138 138 138 135   POTASSIUM mmol/L 3.2* 3.3* 3.7 4.1 4.4   CHLORIDE mmol/L 102 99 101 99 101   CO2 mmol/L 26.0 25.0 23.0 28.0 26.0   BUN mg/dL 28* 55* 48* 40* 65*   CREATININE mg/dL 2.60* 3.90* 3.30* 2.60* 3.50*   CALCIUM mg/dL 7.8* 8.4* 8.4* 8.2* 8.5*   PHOSPHORUS mg/dL 3.5 5.5* 4.9 3.3 2.9  2.9   MAGNESIUM mg/dL 1.9 1.9 2.1 2.2 2.5   ALBUMIN g/dL 2.60* 3.20  --  2.70* 2.80*       Results from last 7 days  Lab Units 01/03/18  0612   GLUCOSE mg/dL 101*                       Estimated Creatinine Clearance: 21.1 mL/min (by C-G formula based on Cr of 2.6).      Assessment     Principal Problem:    Small bowel obstruction  Active Problems:    Meningioma, recurrent of brain    Insomnia    Malignant neoplasm of left orbit    Acute renal failure    Elevated troponin level    Metabolic acidosis    Transaminitis    Atrial flutter with rapid ventricular response            Impression: NONOLIGURIC DEJA. ATN. INCREASED U/O.? STARTING TO RECOVER. POOR GFR. ANEMIA.        Recommendations:   Daily evaluation for dialysis. UOP improving. Will decrease UF with dialysis.   Renal diet.   Epo with HD.     Jamal Ramos MD  01/03/18  1:34 PM         no

## 2023-07-24 ENCOUNTER — OFFICE VISIT (OUTPATIENT)
Dept: INTERNAL MEDICINE | Facility: CLINIC | Age: 68
End: 2023-07-24
Payer: MEDICARE

## 2023-07-24 ENCOUNTER — LAB (OUTPATIENT)
Dept: LAB | Facility: HOSPITAL | Age: 68
End: 2023-07-24
Payer: MEDICARE

## 2023-07-24 VITALS
BODY MASS INDEX: 30.48 KG/M2 | HEART RATE: 59 BPM | OXYGEN SATURATION: 95 % | DIASTOLIC BLOOD PRESSURE: 70 MMHG | HEIGHT: 63 IN | SYSTOLIC BLOOD PRESSURE: 120 MMHG | TEMPERATURE: 98 F | WEIGHT: 172 LBS

## 2023-07-24 DIAGNOSIS — Z01.818 PRE-OP EXAM: Primary | ICD-10-CM

## 2023-07-24 DIAGNOSIS — D32.0 MENINGIOMA, RECURRENT OF BRAIN: ICD-10-CM

## 2023-07-24 DIAGNOSIS — Z01.818 PRE-OP EXAM: ICD-10-CM

## 2023-07-24 DIAGNOSIS — R73.02 IMPAIRED GLUCOSE TOLERANCE: ICD-10-CM

## 2023-07-24 DIAGNOSIS — I10 ESSENTIAL HYPERTENSION: ICD-10-CM

## 2023-07-24 LAB
ANION GAP SERPL CALCULATED.3IONS-SCNC: 10.2 MMOL/L (ref 5–15)
BUN SERPL-MCNC: 17 MG/DL (ref 8–23)
BUN/CREAT SERPL: 15.6 (ref 7–25)
CALCIUM SPEC-SCNC: 9.6 MG/DL (ref 8.6–10.5)
CHLORIDE SERPL-SCNC: 104 MMOL/L (ref 98–107)
CO2 SERPL-SCNC: 25.8 MMOL/L (ref 22–29)
CREAT SERPL-MCNC: 1.09 MG/DL (ref 0.57–1)
DEPRECATED RDW RBC AUTO: 45.4 FL (ref 37–54)
EGFRCR SERPLBLD CKD-EPI 2021: 54.8 ML/MIN/1.73
ERYTHROCYTE [DISTWIDTH] IN BLOOD BY AUTOMATED COUNT: 13.7 % (ref 12.3–15.4)
GLUCOSE SERPL-MCNC: 93 MG/DL (ref 65–99)
HBA1C MFR BLD: 6 % (ref 4.8–5.6)
HCT VFR BLD AUTO: 39.9 % (ref 34–46.6)
HGB BLD-MCNC: 12.8 G/DL (ref 12–15.9)
MCH RBC QN AUTO: 29.1 PG (ref 26.6–33)
MCHC RBC AUTO-ENTMCNC: 32.1 G/DL (ref 31.5–35.7)
MCV RBC AUTO: 90.7 FL (ref 79–97)
PLATELET # BLD AUTO: 191 10*3/MM3 (ref 140–450)
PMV BLD AUTO: 11.1 FL (ref 6–12)
POTASSIUM SERPL-SCNC: 4 MMOL/L (ref 3.5–5.2)
RBC # BLD AUTO: 4.4 10*6/MM3 (ref 3.77–5.28)
SODIUM SERPL-SCNC: 140 MMOL/L (ref 136–145)
WBC NRBC COR # BLD: 4.67 10*3/MM3 (ref 3.4–10.8)

## 2023-07-24 PROCEDURE — 3074F SYST BP LT 130 MM HG: CPT | Performed by: INTERNAL MEDICINE

## 2023-07-24 PROCEDURE — 99214 OFFICE O/P EST MOD 30 MIN: CPT | Performed by: INTERNAL MEDICINE

## 2023-07-24 PROCEDURE — 80048 BASIC METABOLIC PNL TOTAL CA: CPT

## 2023-07-24 PROCEDURE — 3078F DIAST BP <80 MM HG: CPT | Performed by: INTERNAL MEDICINE

## 2023-07-24 PROCEDURE — 83036 HEMOGLOBIN GLYCOSYLATED A1C: CPT

## 2023-07-24 PROCEDURE — 93000 ELECTROCARDIOGRAM COMPLETE: CPT | Performed by: INTERNAL MEDICINE

## 2023-07-24 PROCEDURE — 85027 COMPLETE CBC AUTOMATED: CPT

## 2023-07-24 RX ORDER — DORZOLAMIDE HCL 20 MG/ML
1 SOLUTION/ DROPS OPHTHALMIC 2 TIMES DAILY
COMMUNITY

## 2023-07-24 NOTE — PROGRESS NOTES
Pre-op Exam    Subjective   Tereza Ackerman is a 68 y.o. female is here today for follow-up.    Pre-Op Visit (Brief): The patient is being seen for a preoperative visit.   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 Chris Back.  Indication for surgery: Fistula/ swelling    Surgical Risk Assessment:   Prior Anesthesia: yes  Pertinent Past Medical History:   Exercise Capacity: able to walk 4 blocks, two flights of stairs without symptoms.   Lifestyle Factors: Denies tobacco use, alcohol se or Illicit drug use.   Symptoms: no easy bleeding, no easy bruising, no frequent nosebleeds, no chest pain,   Pertinent Family History: no pertinent family history early sudden death, ischemic heart disease :   but no family history of an adverse reaction to anesthesia, no aneurysm, no bleeding problems and no stroke.   Living Situation: home is secure and supportive and no post-op concerns with her living situation      History of Present Illness  S/p Gamma knife procedure end of June, done at [facility]. Having mild residual headaches. Not due for a f/u with them in 6 months.here for a f/u on her ifg, htn and hlp.    Current Outpatient Medications:     aspirin 81 MG tablet, Take 1 tablet by mouth Daily., Disp: 30 tablet, Rfl: 11    busPIRone (BUSPAR) 10 MG tablet, Take 1 tablet by mouth 2 (Two) Times a Day., Disp: 180 tablet, Rfl: 3    carvedilol (COREG) 12.5 MG tablet, Take 1 tablet by mouth Every 12 (Twelve) Hours. (Patient taking differently: Take 1 tablet by mouth Every 12 (Twelve) Hours. 1/2 tablet according to blood pressure), Disp: 180 tablet, Rfl: 3    cholecalciferol (VITAMIN D3) 1000 units tablet, Take 2 tablets by mouth Every Other Day., Disp: , Rfl:     dorzolamide (TRUSOPT) 2 % ophthalmic solution, Administer 1 drop into the left eye 2 (Two) Times a Day., Disp: , Rfl:     lisinopril (PRINIVIL,ZESTRIL) 2.5 MG tablet, Take 1 tablet by mouth Daily., Disp:  "90 tablet, Rfl: 3    LORazepam (Ativan) 0.5 MG tablet, Take 0.5-1 tablets by mouth Daily As Needed for Anxiety., Disp: 15 tablet, Rfl: 0    omeprazole (priLOSEC) 20 MG capsule, Take 1 capsule by mouth Daily., Disp: 90 capsule, Rfl: 3    ondansetron ODT (ZOFRAN-ODT) 4 MG disintegrating tablet, Place 1 tablet on the tongue Every 4 (Four) Hours. As needed for nausea, Disp: 12 tablet, Rfl: 0    QUEtiapine (SEROquel) 25 MG tablet, Take 0.5 tablets by mouth every night at bedtime., Disp: 45 tablet, Rfl: 3    rosuvastatin (CRESTOR) 40 MG tablet, Take 1 tablet by mouth Daily., Disp: 90 tablet, Rfl: 3    sertraline (ZOLOFT) 100 MG tablet, Take 1 tablet by mouth Daily., Disp: 90 tablet, Rfl: 3    thiamine (VITAMIN B-1) 100 MG tablet tablet, Take 1 tablet by mouth Every Other Day., Disp: 30 each, Rfl: 5    timolol (TIMOPTIC) 0.5 % ophthalmic solution, 1 drop 2 (Two) Times a Day., Disp: , Rfl:       The following portions of the patient's history were reviewed and updated as appropriate: allergies, current medications, past family history, past medical history, past social history, past surgical history and problem list.    Review of Systems   Constitutional: Negative.  Negative for chills and fever.   HENT:  Negative for ear discharge, ear pain, sinus pressure and sore throat.    Respiratory:  Negative for cough, chest tightness and shortness of breath.    Cardiovascular:  Negative for chest pain, palpitations and leg swelling.   Gastrointestinal:  Negative for diarrhea, nausea and vomiting.   Musculoskeletal:  Negative for arthralgias, back pain and myalgias.   Neurological:  Positive for light-headedness and headaches. Negative for dizziness and syncope.   Psychiatric/Behavioral:  Negative for confusion and sleep disturbance.      Objective   /70   Pulse 59   Temp 98 °F (36.7 °C)   Ht 160 cm (63\")   Wt 78 kg (172 lb)   SpO2 95% Comment: ra  BMI 30.47 kg/m²   Physical Exam  Vitals and nursing note reviewed. "   Constitutional:       Appearance: She is well-developed.   HENT:      Head: Normocephalic and atraumatic.      Right Ear: External ear normal.      Left Ear: External ear normal.      Mouth/Throat:      Pharynx: No oropharyngeal exudate.   Eyes:      Conjunctiva/sclera: Conjunctivae normal.      Pupils: Pupils are equal, round, and reactive to light.   Neck:      Thyroid: No thyromegaly.   Cardiovascular:      Rate and Rhythm: Normal rate and regular rhythm.      Pulses: Normal pulses.      Heart sounds: Normal heart sounds. No murmur heard.    No friction rub. No gallop.   Pulmonary:      Effort: Pulmonary effort is normal.      Breath sounds: Normal breath sounds.   Abdominal:      General: Bowel sounds are normal. There is no distension.      Palpations: Abdomen is soft.      Tenderness: There is no abdominal tenderness.   Musculoskeletal:      Cervical back: Neck supple.   Skin:     General: Skin is warm and dry.   Neurological:      Mental Status: She is alert and oriented to person, place, and time.      Cranial Nerves: No cranial nerve deficit.   Psychiatric:         Judgment: Judgment normal.               ECG 12 Lead    Date/Time: 7/24/2023 9:09 AM  Performed by: Michaela Connell MD  Authorized by: Michaela Connell MD   Comparison: compared with previous ECG from 8/19/2022  Similar to previous ECG  Rhythm: sinus bradycardia  Rate: normal  Conduction: conduction normal  ST Segments: ST segments normal  T Waves: T waves normal  QRS axis: normal  Other: no other findings    Clinical impression: normal ECG           Assessment & Plan   Diagnoses and all orders for this visit:    Pre-op exam  -     Cancel: POCT urinalysis dipstick, automated  -     ECG 12 Lead  -     CBC (No Diff); Future    Essential hypertension  -     Basic Metabolic Panel; Future    Meningioma, recurrent of brain  -     CBC (No Diff); Future    Impaired glucose tolerance  -     Hemoglobin A1c; Future    Other orders  -     dorzolamide  (TRUSOPT) 2 % ophthalmic solution; Administer 1 drop into the left eye 2 (Two) Times a Day.                 Discussion/Summary  Surgical Clearance: She is at a LOW risk from a cardiovascular standpoint at this time without any additional cardiac testing. Reevaluation needed, if she should present with symptoms prior to surgery/procedure.    Return for Next scheduled follow up, Medicare Wellness.    Electronically signed by:    Michaela Connell MD

## 2023-08-04 NOTE — OUTREACH NOTE
"Patient Outreach Note  See previous outreach note.    Pt contacted regarding ED visit 3/26/21 with chief c/o weakness/generalized and to assess any case management needs.  States she is \"better. I was dehydrated.\"  Discussed preventing dehydration and looking at urine color to assess for dehydration. V/u.  States after her colon surgery, she has had problems with diarrhea and this is what usually leads to dehydration, \"plus I got my first Covid shot and I felt bad afterwards.\"  She states she is up and about.  She has not picked up prescription from ED, states she already has some at home, but does not think needs at present. She lives with her spouse and states they have been following the ED guidelines regarding taking bp tid and what bp parimeters to follow for her medication. Offered to assist in scheduling ED f/u visit, which she states \"I just Dr. Connell recently and have an upcoming appt in May.\"  States she will call if feel like she needs earlier appt.  She states she is self sufficient, manages her own medicines and medical appt and can drive herself, although her  has been doing most of the driving lately.  She denies any recent falls and states she did not pass out.  She tries to eat healthy and follow PRASHANT diet.  She does not have living will, but says she has lots of information regarding this, \"I just need to sit down and look at it.\"  She is active on [employer]. Reminded of Role of , [employer] 24/7 Nurse line and Covid 19 hotline and numbers provided.  She denies any needs, but voiced her appreciation for the call.     Ena Mcduffie RN  Ambulatory     3/29/2021, 11:43 EDT      "
Care Coordination Assessment    Documented/Reviewed By: Ena Mcduffie RN Date/time: 3/29/2021 11:43 AM   Assessment completed with: patient  Enrolled in care management program: No  Living arrangement: spouse  Support system: spouse, children  Type of residence: private residence  Home care services: No  Equipment used at home:  (Comment: bp monitor    pulse O2)  Communication device: No  Other issues: visual problem  Bed or wheelchair confined: No  Inadequate nutrition: No  Medication adherence problem: No  Experiencing side effects from current medications: No  History of fall(s) in last 6 months: No  Difficulty keeping appointments: No  Family aware of the patient's advance care planning wishes: No  Orthodoxy or spiritual beliefs that impact treatment: No  Chronic pain: No       
General Sunscreen Counseling: I recommended  SPF 30+ or sunprotective clothing.
Detail Level: Generalized

## 2023-08-18 RX ORDER — CARVEDILOL 12.5 MG/1
TABLET ORAL
Qty: 180 TABLET | Refills: 3 | Status: SHIPPED | OUTPATIENT
Start: 2023-08-18

## 2023-10-21 PROCEDURE — 87086 URINE CULTURE/COLONY COUNT: CPT | Performed by: FAMILY MEDICINE

## 2024-01-09 DIAGNOSIS — F43.89 ADJUSTMENT REACTION TO CHRONIC STRESS: ICD-10-CM

## 2024-01-09 RX ORDER — BUSPIRONE HYDROCHLORIDE 10 MG/1
10 TABLET ORAL 2 TIMES DAILY
Qty: 180 TABLET | Refills: 0 | Status: SHIPPED | OUTPATIENT
Start: 2024-01-09

## 2024-02-02 ENCOUNTER — OFFICE VISIT (OUTPATIENT)
Dept: INTERNAL MEDICINE | Facility: CLINIC | Age: 69
End: 2024-02-02
Payer: MEDICARE

## 2024-02-02 ENCOUNTER — LAB (OUTPATIENT)
Dept: LAB | Facility: HOSPITAL | Age: 69
End: 2024-02-02
Payer: MEDICARE

## 2024-02-02 VITALS
SYSTOLIC BLOOD PRESSURE: 132 MMHG | HEIGHT: 63 IN | WEIGHT: 175 LBS | DIASTOLIC BLOOD PRESSURE: 80 MMHG | HEART RATE: 61 BPM | OXYGEN SATURATION: 97 % | TEMPERATURE: 97.2 F | BODY MASS INDEX: 31.01 KG/M2

## 2024-02-02 DIAGNOSIS — F43.89 ADJUSTMENT REACTION TO CHRONIC STRESS: ICD-10-CM

## 2024-02-02 DIAGNOSIS — K21.9 GASTROESOPHAGEAL REFLUX DISEASE, UNSPECIFIED WHETHER ESOPHAGITIS PRESENT: ICD-10-CM

## 2024-02-02 DIAGNOSIS — I95.9 HYPOTENSION, UNSPECIFIED HYPOTENSION TYPE: ICD-10-CM

## 2024-02-02 DIAGNOSIS — I21.4 NSTEMI (NON-ST ELEVATED MYOCARDIAL INFARCTION): ICD-10-CM

## 2024-02-02 DIAGNOSIS — E55.9 VITAMIN D DEFICIENCY: ICD-10-CM

## 2024-02-02 DIAGNOSIS — E78.2 MIXED HYPERLIPIDEMIA: ICD-10-CM

## 2024-02-02 DIAGNOSIS — R73.01 IFG (IMPAIRED FASTING GLUCOSE): ICD-10-CM

## 2024-02-02 DIAGNOSIS — Z00.00 MEDICARE ANNUAL WELLNESS VISIT, SUBSEQUENT: Primary | ICD-10-CM

## 2024-02-02 LAB
25(OH)D3 SERPL-MCNC: 22.1 NG/ML (ref 30–100)
HBA1C MFR BLD: 5.8 % (ref 4.8–5.6)
VIT B12 BLD-MCNC: 928 PG/ML (ref 211–946)

## 2024-02-02 PROCEDURE — 85027 COMPLETE CBC AUTOMATED: CPT

## 2024-02-02 PROCEDURE — 80053 COMPREHEN METABOLIC PANEL: CPT

## 2024-02-02 PROCEDURE — 83036 HEMOGLOBIN GLYCOSYLATED A1C: CPT

## 2024-02-02 PROCEDURE — 82306 VITAMIN D 25 HYDROXY: CPT

## 2024-02-02 PROCEDURE — 80061 LIPID PANEL: CPT

## 2024-02-02 PROCEDURE — 82607 VITAMIN B-12: CPT

## 2024-02-02 PROCEDURE — 84443 ASSAY THYROID STIM HORMONE: CPT

## 2024-02-02 RX ORDER — QUETIAPINE FUMARATE 25 MG/1
12.5 TABLET, FILM COATED ORAL
Qty: 45 TABLET | Refills: 3 | Status: SHIPPED | OUTPATIENT
Start: 2024-02-02

## 2024-02-02 RX ORDER — ROSUVASTATIN CALCIUM 40 MG/1
40 TABLET, COATED ORAL DAILY
Qty: 90 TABLET | Refills: 3 | Status: SHIPPED | OUTPATIENT
Start: 2024-02-02

## 2024-02-02 RX ORDER — BUSPIRONE HYDROCHLORIDE 10 MG/1
10 TABLET ORAL 2 TIMES DAILY
Qty: 180 TABLET | Refills: 3 | Status: SHIPPED | OUTPATIENT
Start: 2024-02-02

## 2024-02-02 RX ORDER — SERTRALINE HYDROCHLORIDE 100 MG/1
100 TABLET, FILM COATED ORAL DAILY
Qty: 90 TABLET | Refills: 3 | Status: SHIPPED | OUTPATIENT
Start: 2024-02-02

## 2024-02-02 RX ORDER — LISINOPRIL 2.5 MG/1
2.5 TABLET ORAL DAILY
Qty: 90 TABLET | Refills: 3 | Status: SHIPPED | OUTPATIENT
Start: 2024-02-02

## 2024-02-02 RX ORDER — OMEPRAZOLE 20 MG/1
20 CAPSULE, DELAYED RELEASE ORAL DAILY
Qty: 90 CAPSULE | Refills: 3 | Status: SHIPPED | OUTPATIENT
Start: 2024-02-02

## 2024-02-02 NOTE — PROGRESS NOTES
The ABCs of the Annual Wellness Visit  Subsequent Medicare Wellness Visit    Subjective    Tereza Ackerman is a 69 y.o. female who presents for a Subsequent Medicare Wellness Visit.    The following portions of the patient's history were reviewed and   updated as appropriate: allergies, current medications, past family history, past medical history, past social history, past surgical history, and problem list.    Compared to one year ago, the patient feels her physical   health is the same.    Compared to one year ago, the patient feels her mental   health is the same.    Recent Hospitalizations:  She was not admitted to the hospital during the last year.       Current Medical Providers:  Patient Care Team:  Michaela Connell MD as PCP - General  Michaela Connell MD as PCP - Family Medicine  HollisWerner thao MD (Inactive) as Referring Physician (Neurosurgery)  Moo Hopkins MD as Consulting Physician (Ophthalmology)  Jamal Ramos MD as Consulting Physician (Nephrology)  Alli Aguirre MD as Consulting Physician (Cardiology)  Costa Raygoza MD as Consulting Physician (Radiation Oncology)  Andrea Bocanegra MD as Consulting Physician (General Surgery)  Alli Aguirre MD as Consulting Physician (Cardiology)    Outpatient Medications Prior to Visit   Medication Sig Dispense Refill    aspirin 81 MG tablet Take 1 tablet by mouth Daily. 30 tablet 11    carvedilol (COREG) 12.5 MG tablet TAKE ONE TABLET BY MOUTH EVERY 12 HOURS 180 tablet 3    cholecalciferol (VITAMIN D3) 1000 units tablet Take 2 tablets by mouth Every Other Day.      dorzolamide (TRUSOPT) 2 % ophthalmic solution Administer 1 drop into the left eye 2 (Two) Times a Day.      LORazepam (Ativan) 0.5 MG tablet Take 0.5-1 tablets by mouth Daily As Needed for Anxiety. 15 tablet 0    ondansetron ODT (ZOFRAN-ODT) 4 MG disintegrating tablet Place 1 tablet on the tongue Every 4 (Four) Hours. As needed for nausea 12 tablet 0    thiamine  (VITAMIN B-1) 100 MG tablet tablet Take 1 tablet by mouth Every Other Day. 30 each 5    timolol (TIMOPTIC) 0.5 % ophthalmic solution 1 drop 2 (Two) Times a Day.      busPIRone (BUSPAR) 10 MG tablet TAKE ONE TABLET BY MOUTH TWICE A  tablet 0    lisinopril (PRINIVIL,ZESTRIL) 2.5 MG tablet Take 1 tablet by mouth Daily. 90 tablet 3    omeprazole (priLOSEC) 20 MG capsule Take 1 capsule by mouth Daily. 90 capsule 3    QUEtiapine (SEROquel) 25 MG tablet Take 0.5 tablets by mouth every night at bedtime. 45 tablet 3    rosuvastatin (CRESTOR) 40 MG tablet Take 1 tablet by mouth Daily. 90 tablet 3    sertraline (ZOLOFT) 100 MG tablet Take 1 tablet by mouth Daily. 90 tablet 3    bacitracin-polymyxin b (POLYSPORIN) 500-27729 UNIT/GM ophthalmic ointment       phenazopyridine (PYRIDIUM) 200 MG tablet Take 1 tablet by mouth 3 (Three) Times a Day As Needed for Bladder Spasms. 15 tablet 0    sulfamethoxazole-trimethoprim (BACTRIM DS,SEPTRA DS) 800-160 MG per tablet Take 1 tablet by mouth 2 (Two) Times a Day. 20 tablet 0     No facility-administered medications prior to visit.       No opioid medication identified on active medication list. I have reviewed chart for other potential  high risk medication/s and harmful drug interactions in the elderly.        Aspirin is on active medication list. Aspirin use is indicated based on review of current medical condition/s. Pros and cons of this therapy have been discussed today. Benefits of this medication outweigh potential harm.  Patient has been encouraged to continue taking this medication.  .      Patient Active Problem List   Diagnosis    Impaired glucose tolerance    Parathyroid adenoma    Reaction to chronic stress    Multinodular goiter    Vitamin D deficiency    Meningioma, recurrent of brain    Arthritis    Depression    Small bowel obstruction    Insomnia    History of Nissen fundoplication    Malignant neoplasm of left orbit    Prediabetes    Mixed hyperlipidemia     "Hyperglycemia    Atrial flutter with rapid ventricular response    Anemia    Large bowel obstruction    Abdominal pain    Essential hypertension    History of creation of ostomy    Screen for colon cancer    Sigmoid volvulus    SHAE (obstructive sleep apnea)     Advance Care Planning   Advance Care Planning     Advance Directive is not on file.  ACP discussion was held with the patient during this visit. Patient has an advance directive (not in EMR), copy requested.     Objective    Vitals:    24 0932   BP: 132/80   Pulse: 61   Temp: 97.2 °F (36.2 °C)   SpO2: 97%  Comment: ra   Weight: 79.4 kg (175 lb)   Height: 160 cm (63\")   PainSc: 0-No pain     Estimated body mass index is 31 kg/m² as calculated from the following:    Height as of this encounter: 160 cm (63\").    Weight as of this encounter: 79.4 kg (175 lb).    BMI is >= 30 and <35. (Class 1 Obesity). The following options were offered after discussion;: exercise counseling/recommendations and nutrition counseling/recommendations      Does the patient have evidence of cognitive impairment? No          HEALTH RISK ASSESSMENT    Smoking Status:  Social History     Tobacco Use   Smoking Status Never   Smokeless Tobacco Never     Alcohol Consumption:  Social History     Substance and Sexual Activity   Alcohol Use No     Fall Risk Screen:    STEADI Fall Risk Assessment was completed, and patient is at LOW risk for falls.Assessment completed on:2024    Depression Screenin/2/2024     9:44 AM   PHQ-2/PHQ-9 Depression Screening   Little Interest or Pleasure in Doing Things 2-->more than half the days   Feeling Down, Depressed or Hopeless 2-->more than half the days   Trouble Falling or Staying Asleep, or Sleeping Too Much 2-->more than half the days   Feeling Tired or Having Little Energy 2-->more than half the days   Poor Appetite or Overeating 0-->not at all   Feeling Bad about Yourself - or that You are a Failure or Have Let Yourself or Your Family " Down 2-->more than half the days   Trouble Concentrating on Things, Such as Reading the Newspaper or Watching Television 1-->several days   Moving or Speaking So Slowly that Other People Could Have Noticed? Or the Opposite - Being So Fidgety 0-->not at all   Thoughts that You Would be Better Off Dead or of Hurting Yourself in Some Way 0-->not at all   PHQ-9: Brief Depression Severity Measure Score 11   If You Checked Off Any Problems, How Difficult Have These Problems Made It For You to Do Your Work, Take Care of Things at Home, or Get Along with Other People? somewhat difficult       Health Habits and Functional and Cognitive Screenin/2/2024     9:43 AM   Functional & Cognitive Status   Do you have difficulty preparing food and eating? No   Do you have difficulty bathing yourself, getting dressed or grooming yourself? No   Do you have difficulty using the toilet? No   Do you have difficulty moving around from place to place? No   Do you have trouble with steps or getting out of a bed or a chair? No   Current Diet Well Balanced Diet   Dental Exam Up to date   Eye Exam Up to date   Exercise (times per week) 7 times per week   Current Exercises Include Walking   Do you need help using the phone?  No   Are you deaf or do you have serious difficulty hearing?  No   Do you need help to go to places out of walking distance? No   Do you need help shopping? No   Do you need help preparing meals?  No   Do you need help with housework?  No   Do you need help with laundry? No   Do you need help taking your medications? No   Do you need help managing money? No   Do you ever drive or ride in a car without wearing a seat belt? No   Have you felt unusual stress, anger or loneliness in the last month? No   Who do you live with? Spouse   If you need help, do you have trouble finding someone available to you? No   Have you been bothered in the last four weeks by sexual problems? No   Do you have difficulty concentrating,  remembering or making decisions? Yes       Age-appropriate Screening Schedule:  Refer to the list below for future screening recommendations based on patient's age, sex and/or medical conditions. Orders for these recommended tests are listed in the plan section. The patient has been provided with a written plan.    Health Maintenance   Topic Date Due    LIPID PANEL  01/17/2024    MAMMOGRAM  03/17/2024    ANNUAL WELLNESS VISIT  02/02/2025    BMI FOLLOWUP  02/02/2025    DXA SCAN  03/17/2025    PAP SMEAR  01/25/2027    COLORECTAL CANCER SCREENING  11/28/2028    HEPATITIS C SCREENING  Completed    COVID-19 Vaccine  Completed    INFLUENZA VACCINE  Completed    Pneumococcal Vaccine 65+  Completed    ZOSTER VACCINE  Completed    TDAP/TD VACCINES  Discontinued                  CMS Preventative Services Quick Reference  Risk Factors Identified During Encounter  Chronic Pain:  stable on current regimen.  Fall Risk-High or Moderate: Discussed Fall Prevention in the home  Immunizations Discussed/Encouraged: RSV (Respiratory Syncytial Virus)  The above risks/problems have been discussed with the patient.  Pertinent information has been shared with the patient in the After Visit Summary.  An After Visit Summary and PPPS were made available to the patient.    Follow Up:   Next Medicare Wellness visit to be scheduled in 1 year.       Additional E&M Note during same encounter follows:  Patient has multiple medical problems which are significant and separately identifiable that require additional work above and beyond the Medicare Wellness Visit.      Chief Complaint  Medicare Wellness-subsequent    Subjective        HPI  Tereza Ackerman is also being seen today for a f/u on her htn , hlp and stress.  Has been followed by [facility] @ [facility],    Review of Systems   Constitutional:  Negative for chills and fever.   HENT:  Negative for congestion, ear pain and sore throat.    Eyes:  Positive for visual disturbance. Negative for  "pain and redness.   Respiratory:  Negative for cough and shortness of breath.    Cardiovascular:  Negative for chest pain, palpitations and leg swelling.   Gastrointestinal:  Negative for abdominal pain, diarrhea and nausea.   Endocrine: Negative for cold intolerance and heat intolerance.   Genitourinary:  Negative for flank pain and urgency.   Musculoskeletal:  Negative for arthralgias and gait problem.   Skin:  Negative for pallor and rash.   Neurological:  Negative for dizziness and weakness.   Psychiatric/Behavioral:  Negative for dysphoric mood and sleep disturbance. The patient is not nervous/anxious.        Objective   Vital Signs:  /80   Pulse 61   Temp 97.2 °F (36.2 °C)   Ht 160 cm (63\")   Wt 79.4 kg (175 lb)   SpO2 97% Comment: ra  BMI 31.00 kg/m²     Physical Exam  Constitutional:       General: She is not in acute distress.     Appearance: Normal appearance.   HENT:      Head: Atraumatic.      Comments: S/p left meningioma surgery     Right Ear: Tympanic membrane and external ear normal.      Left Ear: Tympanic membrane and external ear normal.      Nose: Nose normal.      Mouth/Throat:      Mouth: Mucous membranes are moist.   Eyes:      General: No scleral icterus.  Neck:      Vascular: No carotid bruit.   Cardiovascular:      Rate and Rhythm: Normal rate and regular rhythm.      Pulses: Normal pulses.      Heart sounds: Normal heart sounds. No murmur heard.     No friction rub. No gallop.   Pulmonary:      Effort: Pulmonary effort is normal.      Breath sounds: Normal breath sounds. No rhonchi or rales.   Abdominal:      General: Bowel sounds are normal. There is no distension.      Palpations: Abdomen is soft.      Tenderness: There is no right CVA tenderness, left CVA tenderness, guarding or rebound.      Hernia: No hernia is present.   Musculoskeletal:         General: No tenderness. Normal range of motion.      Cervical back: Normal range of motion.      Right lower leg: No edema.      " Left lower leg: No edema.   Lymphadenopathy:      Cervical: No cervical adenopathy.   Skin:     General: Skin is warm.      Findings: No rash.   Neurological:      General: No focal deficit present.      Mental Status: She is alert and oriented to person, place, and time. Mental status is at baseline.      Cranial Nerves: No cranial nerve deficit.      Sensory: No sensory deficit.      Coordination: Coordination normal.      Gait: Gait normal.      Deep Tendon Reflexes: Reflexes normal.   Psychiatric:         Mood and Affect: Mood normal.         Behavior: Behavior normal.                             Current Outpatient Medications:     aspirin 81 MG tablet, Take 1 tablet by mouth Daily., Disp: 30 tablet, Rfl: 11    carvedilol (COREG) 12.5 MG tablet, TAKE ONE TABLET BY MOUTH EVERY 12 HOURS, Disp: 180 tablet, Rfl: 3    cholecalciferol (VITAMIN D3) 1000 units tablet, Take 2 tablets by mouth Every Other Day., Disp: , Rfl:     dorzolamide (TRUSOPT) 2 % ophthalmic solution, Administer 1 drop into the left eye 2 (Two) Times a Day., Disp: , Rfl:     LORazepam (Ativan) 0.5 MG tablet, Take 0.5-1 tablets by mouth Daily As Needed for Anxiety., Disp: 15 tablet, Rfl: 0    ondansetron ODT (ZOFRAN-ODT) 4 MG disintegrating tablet, Place 1 tablet on the tongue Every 4 (Four) Hours. As needed for nausea, Disp: 12 tablet, Rfl: 0    thiamine (VITAMIN B-1) 100 MG tablet tablet, Take 1 tablet by mouth Every Other Day., Disp: 30 each, Rfl: 5    timolol (TIMOPTIC) 0.5 % ophthalmic solution, 1 drop 2 (Two) Times a Day., Disp: , Rfl:     busPIRone (BUSPAR) 10 MG tablet, Take 1 tablet by mouth 2 (Two) Times a Day., Disp: 180 tablet, Rfl: 3    lisinopril (PRINIVIL,ZESTRIL) 2.5 MG tablet, Take 1 tablet by mouth Daily., Disp: 90 tablet, Rfl: 3    omeprazole (priLOSEC) 20 MG capsule, Take 1 capsule by mouth Daily., Disp: 90 capsule, Rfl: 3    QUEtiapine (SEROquel) 25 MG tablet, Take 0.5 tablets by mouth every night at bedtime., Disp: 45 tablet,  "Rfl: 3    rosuvastatin (CRESTOR) 40 MG tablet, Take 1 tablet by mouth Daily., Disp: 90 tablet, Rfl: 3    sertraline (ZOLOFT) 100 MG tablet, Take 1 tablet by mouth Daily., Disp: 90 tablet, Rfl: 3      The following portions of the patient's history were reviewed and updated as appropriate: allergies, current medications, past family history, past medical history, past social history, past surgical history and problem list.        Objective   /80   Pulse 61   Temp 97.2 °F (36.2 °C)   Ht 160 cm (63\")   Wt 79.4 kg (175 lb)   SpO2 97% Comment: ra  BMI 31.00 kg/m²         Results for orders placed or performed during the hospital encounter of 10/21/23   Urine Culture - Urine, Urine, Clean Catch    Specimen: Urine, Clean Catch   Result Value Ref Range    Urine Culture >100,000 CFU/mL Mixed Pao Isolated    POC Urinalysis Dipstick, Multipro (Automated Dipstick)    Specimen: Urine   Result Value Ref Range    Color Straw     Clarity, UA Slightly Cloudy (A)     Glucose, UA Negative mg/dL    Bilirubin Negative     Ketones, UA Negative     Specific Gravity  1.015 1.005 - 1.030    Blood, UA 3+ (A)     pH, Urine 6.0 5.0 - 8.0    Protein, POC 1+ (A) mg/dL    Urobilinogen, UA Normal     Nitrite, UA Negative     Leukocytes Moderate (2+) (A)              Assessment & Plan   Diagnoses and all orders for this visit:    Medicare annual wellness visit, subsequent    Adjustment reaction to chronic stress  Comments:  continue zoloft,buspar , wean off seroquel gradually- skip occ.  Orders:  -     busPIRone (BUSPAR) 10 MG tablet; Take 1 tablet by mouth 2 (Two) Times a Day.  -     QUEtiapine (SEROquel) 25 MG tablet; Take 0.5 tablets by mouth every night at bedtime.  -     sertraline (ZOLOFT) 100 MG tablet; Take 1 tablet by mouth Daily.    Hypotension, unspecified hypotension type  -     lisinopril (PRINIVIL,ZESTRIL) 2.5 MG tablet; Take 1 tablet by mouth Daily.    Gastroesophageal reflux disease, unspecified whether esophagitis " present  -     omeprazole (priLOSEC) 20 MG capsule; Take 1 capsule by mouth Daily.  -     CBC (No Diff); Future    Adjustment reaction to chronic stress  Comments:  continue zoloft,buspar , wean off seroquel gradually.  Orders:  -     busPIRone (BUSPAR) 10 MG tablet; Take 1 tablet by mouth 2 (Two) Times a Day.  -     QUEtiapine (SEROquel) 25 MG tablet; Take 0.5 tablets by mouth every night at bedtime.  -     sertraline (ZOLOFT) 100 MG tablet; Take 1 tablet by mouth Daily.    NSTEMI (non-ST elevated myocardial infarction)  Comments:  [facility] with near normal coronaries.  followed by Cardiology.  continue risk factor reduction.  Orders:  -     rosuvastatin (CRESTOR) 40 MG tablet; Take 1 tablet by mouth Daily.    IFG (impaired fasting glucose)  -     Hemoglobin A1c; Future    Mixed hyperlipidemia  -     Comprehensive Metabolic Panel; Future  -     Lipid Panel; Future  -     TSH Rfx On Abnormal To Free T4; Future    Vitamin D deficiency  -     Vitamin D,25-Hydroxy; Future  -     Vitamin B12; Future               PHQ-2/PHQ-9 Depression screening reviewed with patient . Total time spent today for depression screening  with Tereza Ackerman  was 15 minutes in counseling, along with plans for any diagnositc work-up and treatment.    Advice and education were given regarding cardiovascular risk reduction, healthy dietary habits, Seatbelt and helmet use and self skin examination.          Electronically signed by:    Michaela Connell MD           Follow Up   No follow-ups on file.  Patient was given instructions and counseling regarding her condition or for health maintenance advice. Please see specific information pulled into the AVS if appropriate.

## 2024-02-03 LAB
ALBUMIN SERPL-MCNC: 4.4 G/DL (ref 3.5–5.2)
ALBUMIN/GLOB SERPL: 1.3 G/DL
ALP SERPL-CCNC: 80 U/L (ref 39–117)
ALT SERPL W P-5'-P-CCNC: 13 U/L (ref 1–33)
ANION GAP SERPL CALCULATED.3IONS-SCNC: 10.7 MMOL/L (ref 5–15)
AST SERPL-CCNC: 20 U/L (ref 1–32)
BILIRUB SERPL-MCNC: 0.3 MG/DL (ref 0–1.2)
BUN SERPL-MCNC: 16 MG/DL (ref 8–23)
BUN/CREAT SERPL: 17.2 (ref 7–25)
CALCIUM SPEC-SCNC: 9.7 MG/DL (ref 8.6–10.5)
CHLORIDE SERPL-SCNC: 102 MMOL/L (ref 98–107)
CHOLEST SERPL-MCNC: 158 MG/DL (ref 0–200)
CO2 SERPL-SCNC: 25.3 MMOL/L (ref 22–29)
CREAT SERPL-MCNC: 0.93 MG/DL (ref 0.57–1)
DEPRECATED RDW RBC AUTO: 43.5 FL (ref 37–54)
EGFRCR SERPLBLD CKD-EPI 2021: 65.8 ML/MIN/1.73
ERYTHROCYTE [DISTWIDTH] IN BLOOD BY AUTOMATED COUNT: 13.1 % (ref 12.3–15.4)
GLOBULIN UR ELPH-MCNC: 3.4 GM/DL
GLUCOSE SERPL-MCNC: 93 MG/DL (ref 65–99)
HCT VFR BLD AUTO: 40.3 % (ref 34–46.6)
HDLC SERPL-MCNC: 59 MG/DL (ref 40–60)
HGB BLD-MCNC: 13.2 G/DL (ref 12–15.9)
LDLC SERPL CALC-MCNC: 75 MG/DL (ref 0–100)
LDLC/HDLC SERPL: 1.2 {RATIO}
MCH RBC QN AUTO: 29.9 PG (ref 26.6–33)
MCHC RBC AUTO-ENTMCNC: 32.8 G/DL (ref 31.5–35.7)
MCV RBC AUTO: 91.2 FL (ref 79–97)
PLATELET # BLD AUTO: 214 10*3/MM3 (ref 140–450)
PMV BLD AUTO: 11.4 FL (ref 6–12)
POTASSIUM SERPL-SCNC: 4 MMOL/L (ref 3.5–5.2)
PROT SERPL-MCNC: 7.8 G/DL (ref 6–8.5)
RBC # BLD AUTO: 4.42 10*6/MM3 (ref 3.77–5.28)
SODIUM SERPL-SCNC: 138 MMOL/L (ref 136–145)
TRIGL SERPL-MCNC: 141 MG/DL (ref 0–150)
TSH SERPL DL<=0.05 MIU/L-ACNC: 2.06 UIU/ML (ref 0.27–4.2)
VLDLC SERPL-MCNC: 24 MG/DL (ref 5–40)
WBC NRBC COR # BLD AUTO: 5.04 10*3/MM3 (ref 3.4–10.8)

## 2024-02-26 ENCOUNTER — TRANSCRIBE ORDERS (OUTPATIENT)
Dept: ADMINISTRATIVE | Facility: HOSPITAL | Age: 69
End: 2024-02-26
Payer: MEDICARE

## 2024-02-26 DIAGNOSIS — Z12.31 VISIT FOR SCREENING MAMMOGRAM: Primary | ICD-10-CM

## 2024-02-27 ENCOUNTER — APPOINTMENT (OUTPATIENT)
Dept: MRI IMAGING | Facility: HOSPITAL | Age: 69
DRG: 054 | End: 2024-02-27
Payer: MEDICARE

## 2024-02-27 ENCOUNTER — APPOINTMENT (OUTPATIENT)
Dept: CT IMAGING | Facility: HOSPITAL | Age: 69
DRG: 054 | End: 2024-02-27
Payer: MEDICARE

## 2024-02-27 ENCOUNTER — HOSPITAL ENCOUNTER (INPATIENT)
Facility: HOSPITAL | Age: 69
LOS: 2 days | Discharge: HOME | DRG: 054 | End: 2024-03-01
Attending: EMERGENCY MEDICINE | Admitting: STUDENT IN AN ORGANIZED HEALTH CARE EDUCATION/TRAINING PROGRAM
Payer: MEDICARE

## 2024-02-27 DIAGNOSIS — H54.62 VISION LOSS OF LEFT EYE: ICD-10-CM

## 2024-02-27 DIAGNOSIS — G93.6 VASOGENIC BRAIN EDEMA: ICD-10-CM

## 2024-02-27 DIAGNOSIS — G93.89 BRAIN MASS: Primary | ICD-10-CM

## 2024-02-27 DIAGNOSIS — Z86.39 HISTORY OF HYPERLIPIDEMIA: ICD-10-CM

## 2024-02-27 DIAGNOSIS — R51.9 LEFT-SIDED HEADACHE: ICD-10-CM

## 2024-02-27 DIAGNOSIS — J34.89 SPHENOID MASS, LEFT: ICD-10-CM

## 2024-02-27 DIAGNOSIS — R47.01 APHASIA: ICD-10-CM

## 2024-02-27 DIAGNOSIS — D32.9 MENINGIOMA: ICD-10-CM

## 2024-02-27 DIAGNOSIS — Z86.79 HISTORY OF HYPERTENSION: ICD-10-CM

## 2024-02-27 LAB
ALBUMIN SERPL-MCNC: 4.1 G/DL (ref 3.5–5.2)
ALBUMIN/GLOB SERPL: 1.3 G/DL
ALP SERPL-CCNC: 87 U/L (ref 39–117)
ALT SERPL W P-5'-P-CCNC: 11 U/L (ref 1–33)
ANION GAP SERPL CALCULATED.3IONS-SCNC: 9 MMOL/L (ref 5–15)
AST SERPL-CCNC: 19 U/L (ref 1–32)
BASOPHILS # BLD AUTO: 0.03 10*3/MM3 (ref 0–0.2)
BASOPHILS NFR BLD AUTO: 0.5 % (ref 0–1.5)
BILIRUB SERPL-MCNC: 0.2 MG/DL (ref 0–1.2)
BUN SERPL-MCNC: 18 MG/DL (ref 8–23)
BUN/CREAT SERPL: 18.8 (ref 7–25)
CALCIUM SPEC-SCNC: 9.2 MG/DL (ref 8.6–10.5)
CHLORIDE SERPL-SCNC: 106 MMOL/L (ref 98–107)
CO2 SERPL-SCNC: 26 MMOL/L (ref 22–29)
CREAT BLDA-MCNC: 1 MG/DL (ref 0.6–1.3)
CREAT BLDA-MCNC: 1 MG/DL (ref 0.6–1.3)
CREAT SERPL-MCNC: 0.96 MG/DL (ref 0.57–1)
DEPRECATED RDW RBC AUTO: 46.3 FL (ref 37–54)
EGFRCR SERPLBLD CKD-EPI 2021: 63.4 ML/MIN/1.73
EOSINOPHIL # BLD AUTO: 0.16 10*3/MM3 (ref 0–0.4)
EOSINOPHIL NFR BLD AUTO: 2.7 % (ref 0.3–6.2)
ERYTHROCYTE [DISTWIDTH] IN BLOOD BY AUTOMATED COUNT: 13.4 % (ref 12.3–15.4)
GLOBULIN UR ELPH-MCNC: 3.2 GM/DL
GLUCOSE SERPL-MCNC: 100 MG/DL (ref 65–99)
HCT VFR BLD AUTO: 40.4 % (ref 34–46.6)
HGB BLD-MCNC: 13 G/DL (ref 12–15.9)
IMM GRANULOCYTES # BLD AUTO: 0.01 10*3/MM3 (ref 0–0.05)
IMM GRANULOCYTES NFR BLD AUTO: 0.2 % (ref 0–0.5)
INR PPP: 1.09 (ref 0.89–1.12)
LYMPHOCYTES # BLD AUTO: 1.64 10*3/MM3 (ref 0.7–3.1)
LYMPHOCYTES NFR BLD AUTO: 27.2 % (ref 19.6–45.3)
MCH RBC QN AUTO: 30.4 PG (ref 26.6–33)
MCHC RBC AUTO-ENTMCNC: 32.2 G/DL (ref 31.5–35.7)
MCV RBC AUTO: 94.4 FL (ref 79–97)
MONOCYTES # BLD AUTO: 0.69 10*3/MM3 (ref 0.1–0.9)
MONOCYTES NFR BLD AUTO: 11.5 % (ref 5–12)
NEUTROPHILS NFR BLD AUTO: 3.49 10*3/MM3 (ref 1.7–7)
NEUTROPHILS NFR BLD AUTO: 57.9 % (ref 42.7–76)
NRBC BLD AUTO-RTO: 0 /100 WBC (ref 0–0.2)
PLATELET # BLD AUTO: 207 10*3/MM3 (ref 140–450)
PMV BLD AUTO: 10.8 FL (ref 6–12)
POTASSIUM SERPL-SCNC: 3.8 MMOL/L (ref 3.5–5.2)
PROT SERPL-MCNC: 7.3 G/DL (ref 6–8.5)
PROTHROMBIN TIME: 14.3 SECONDS (ref 12.2–14.5)
RBC # BLD AUTO: 4.28 10*6/MM3 (ref 3.77–5.28)
SODIUM SERPL-SCNC: 141 MMOL/L (ref 136–145)
WBC NRBC COR # BLD AUTO: 6.02 10*3/MM3 (ref 3.4–10.8)

## 2024-02-27 PROCEDURE — 80053 COMPREHEN METABOLIC PANEL: CPT | Performed by: PHYSICIAN ASSISTANT

## 2024-02-27 PROCEDURE — 0 GADOBENATE DIMEGLUMINE 529 MG/ML SOLUTION: Performed by: EMERGENCY MEDICINE

## 2024-02-27 PROCEDURE — A9577 INJ MULTIHANCE: HCPCS | Performed by: EMERGENCY MEDICINE

## 2024-02-27 PROCEDURE — 99285 EMERGENCY DEPT VISIT HI MDM: CPT

## 2024-02-27 PROCEDURE — 70553 MRI BRAIN STEM W/O & W/DYE: CPT

## 2024-02-27 PROCEDURE — 85610 PROTHROMBIN TIME: CPT | Performed by: PHYSICIAN ASSISTANT

## 2024-02-27 PROCEDURE — 70450 CT HEAD/BRAIN W/O DYE: CPT

## 2024-02-27 PROCEDURE — 85025 COMPLETE CBC W/AUTO DIFF WBC: CPT | Performed by: PHYSICIAN ASSISTANT

## 2024-02-27 PROCEDURE — 82565 ASSAY OF CREATININE: CPT

## 2024-02-27 PROCEDURE — 36415 COLL VENOUS BLD VENIPUNCTURE: CPT

## 2024-02-27 PROCEDURE — 25010000002 DIAZEPAM PER 5 MG: Performed by: EMERGENCY MEDICINE

## 2024-02-27 RX ORDER — FAMOTIDINE 10 MG/ML
20 INJECTION, SOLUTION INTRAVENOUS ONCE
Status: COMPLETED | OUTPATIENT
Start: 2024-02-27 | End: 2024-02-28

## 2024-02-27 RX ORDER — SODIUM CHLORIDE 0.9 % (FLUSH) 0.9 %
10 SYRINGE (ML) INJECTION AS NEEDED
Status: DISCONTINUED | OUTPATIENT
Start: 2024-02-27 | End: 2024-03-01 | Stop reason: HOSPADM

## 2024-02-27 RX ORDER — DIAZEPAM 5 MG/ML
2.5 INJECTION, SOLUTION INTRAMUSCULAR; INTRAVENOUS ONCE
Status: COMPLETED | OUTPATIENT
Start: 2024-02-27 | End: 2024-02-27

## 2024-02-27 RX ADMIN — GADOBENATE DIMEGLUMINE 15 ML: 529 INJECTION, SOLUTION INTRAVENOUS at 21:44

## 2024-02-27 RX ADMIN — DIAZEPAM 2.5 MG: 10 INJECTION, SOLUTION INTRAMUSCULAR; INTRAVENOUS at 21:12

## 2024-02-27 NOTE — Clinical Note
Level of Care: Telemetry [5]   Diagnosis: Brain mass [[id number]]   Admitting Physician: MICHELLE HICKMAN [[id number]]   none

## 2024-02-28 ENCOUNTER — APPOINTMENT (OUTPATIENT)
Dept: CT IMAGING | Facility: HOSPITAL | Age: 69
DRG: 054 | End: 2024-02-28
Payer: MEDICARE

## 2024-02-28 ENCOUNTER — APPOINTMENT (OUTPATIENT)
Dept: CARDIOLOGY | Facility: HOSPITAL | Age: 69
DRG: 054 | End: 2024-02-28
Payer: MEDICARE

## 2024-02-28 LAB
ANION GAP SERPL CALCULATED.3IONS-SCNC: 10 MMOL/L (ref 5–15)
BASOPHILS # BLD AUTO: 0.01 10*3/MM3 (ref 0–0.2)
BASOPHILS NFR BLD AUTO: 0.1 % (ref 0–1.5)
BH CV ECHO MEAS - AO MAX PG: 5.5 MMHG
BH CV ECHO MEAS - AO MEAN PG: 3 MMHG
BH CV ECHO MEAS - AO ROOT DIAM: 2.9 CM
BH CV ECHO MEAS - AO V2 MAX: 117 CM/SEC
BH CV ECHO MEAS - AO V2 VTI: 24 CM
BH CV ECHO MEAS - AVA(I,D): 2.8 CM2
BH CV ECHO MEAS - EDV(CUBED): 74.1 ML
BH CV ECHO MEAS - EDV(MOD-SP2): 87.2 ML
BH CV ECHO MEAS - EDV(MOD-SP4): 84.5 ML
BH CV ECHO MEAS - EF(MOD-BP): 63.9 %
BH CV ECHO MEAS - EF(MOD-SP2): 63 %
BH CV ECHO MEAS - EF(MOD-SP4): 64.6 %
BH CV ECHO MEAS - ESV(CUBED): 17.6 ML
BH CV ECHO MEAS - ESV(MOD-SP2): 32.3 ML
BH CV ECHO MEAS - ESV(MOD-SP4): 29.9 ML
BH CV ECHO MEAS - FS: 38.1 %
BH CV ECHO MEAS - IVS/LVPW: 1.11 CM
BH CV ECHO MEAS - IVSD: 1 CM
BH CV ECHO MEAS - LA DIMENSION: 3.8 CM
BH CV ECHO MEAS - LAT PEAK E' VEL: 10.2 CM/SEC
BH CV ECHO MEAS - LV MASS(C)D: 127.8 GRAMS
BH CV ECHO MEAS - LV MAX PG: 4.6 MMHG
BH CV ECHO MEAS - LV MEAN PG: 2 MMHG
BH CV ECHO MEAS - LV V1 MAX: 107 CM/SEC
BH CV ECHO MEAS - LV V1 VTI: 23.4 CM
BH CV ECHO MEAS - LVIDD: 4.2 CM
BH CV ECHO MEAS - LVIDS: 2.6 CM
BH CV ECHO MEAS - LVOT AREA: 2.8 CM2
BH CV ECHO MEAS - LVOT DIAM: 1.9 CM
BH CV ECHO MEAS - LVPWD: 0.9 CM
BH CV ECHO MEAS - MED PEAK E' VEL: 7.4 CM/SEC
BH CV ECHO MEAS - MV A MAX VEL: 83.8 CM/SEC
BH CV ECHO MEAS - MV DEC SLOPE: 564 CM/SEC2
BH CV ECHO MEAS - MV DEC TIME: 0.25 SEC
BH CV ECHO MEAS - MV E MAX VEL: 74.9 CM/SEC
BH CV ECHO MEAS - MV E/A: 0.89
BH CV ECHO MEAS - MV MAX PG: 4.2 MMHG
BH CV ECHO MEAS - MV MEAN PG: 2 MMHG
BH CV ECHO MEAS - MV P1/2T: 53.5 MSEC
BH CV ECHO MEAS - MV V2 VTI: 28.1 CM
BH CV ECHO MEAS - MVA(P1/2T): 4.1 CM2
BH CV ECHO MEAS - MVA(VTI): 2.36 CM2
BH CV ECHO MEAS - PA ACC TIME: 0.14 SEC
BH CV ECHO MEAS - SV(LVOT): 66.3 ML
BH CV ECHO MEAS - SV(MOD-SP2): 54.9 ML
BH CV ECHO MEAS - SV(MOD-SP4): 54.6 ML
BH CV ECHO MEAS - TAPSE (>1.6): 1.87 CM
BH CV ECHO MEASUREMENTS AVERAGE E/E' RATIO: 8.51
BH CV XLRA - TDI S': 11.5 CM/SEC
BUN SERPL-MCNC: 18 MG/DL (ref 8–23)
BUN/CREAT SERPL: 22.8 (ref 7–25)
CALCIUM SPEC-SCNC: 9.3 MG/DL (ref 8.6–10.5)
CHLORIDE SERPL-SCNC: 105 MMOL/L (ref 98–107)
CO2 SERPL-SCNC: 23 MMOL/L (ref 22–29)
CREAT SERPL-MCNC: 0.79 MG/DL (ref 0.57–1)
DEPRECATED RDW RBC AUTO: 44 FL (ref 37–54)
EGFRCR SERPLBLD CKD-EPI 2021: 80.1 ML/MIN/1.73
EOSINOPHIL # BLD AUTO: 0.01 10*3/MM3 (ref 0–0.4)
EOSINOPHIL NFR BLD AUTO: 0.1 % (ref 0.3–6.2)
ERYTHROCYTE [DISTWIDTH] IN BLOOD BY AUTOMATED COUNT: 13.3 % (ref 12.3–15.4)
GLUCOSE BLDC GLUCOMTR-MCNC: 192 MG/DL (ref 70–130)
GLUCOSE BLDC GLUCOMTR-MCNC: 242 MG/DL (ref 70–130)
GLUCOSE BLDC GLUCOMTR-MCNC: 243 MG/DL (ref 70–130)
GLUCOSE SERPL-MCNC: 167 MG/DL (ref 65–99)
HBA1C MFR BLD: 5.5 % (ref 4.8–5.6)
HCT VFR BLD AUTO: 38.9 % (ref 34–46.6)
HGB BLD-MCNC: 12.6 G/DL (ref 12–15.9)
IMM GRANULOCYTES # BLD AUTO: 0.01 10*3/MM3 (ref 0–0.05)
IMM GRANULOCYTES NFR BLD AUTO: 0.1 % (ref 0–0.5)
LEFT ATRIUM VOLUME INDEX: 26.2 ML/M2
LYMPHOCYTES # BLD AUTO: 0.82 10*3/MM3 (ref 0.7–3.1)
LYMPHOCYTES NFR BLD AUTO: 11.9 % (ref 19.6–45.3)
MCH RBC QN AUTO: 29.1 PG (ref 26.6–33)
MCHC RBC AUTO-ENTMCNC: 32.4 G/DL (ref 31.5–35.7)
MCV RBC AUTO: 89.8 FL (ref 79–97)
MONOCYTES # BLD AUTO: 0.1 10*3/MM3 (ref 0.1–0.9)
MONOCYTES NFR BLD AUTO: 1.5 % (ref 5–12)
NEUTROPHILS NFR BLD AUTO: 5.92 10*3/MM3 (ref 1.7–7)
NEUTROPHILS NFR BLD AUTO: 86.3 % (ref 42.7–76)
NRBC BLD AUTO-RTO: 0 /100 WBC (ref 0–0.2)
PLATELET # BLD AUTO: 197 10*3/MM3 (ref 140–450)
PMV BLD AUTO: 11 FL (ref 6–12)
POTASSIUM SERPL-SCNC: 3.5 MMOL/L (ref 3.5–5.2)
POTASSIUM SERPL-SCNC: 4.5 MMOL/L (ref 3.5–5.2)
QT INTERVAL: 418 MS
QTC INTERVAL: 438 MS
RBC # BLD AUTO: 4.33 10*6/MM3 (ref 3.77–5.28)
SODIUM SERPL-SCNC: 138 MMOL/L (ref 136–145)
WBC NRBC COR # BLD AUTO: 6.87 10*3/MM3 (ref 3.4–10.8)

## 2024-02-28 PROCEDURE — 0042T HC CT CEREBRAL PERFUSION W/WO CONTRAST: CPT

## 2024-02-28 PROCEDURE — 93010 ELECTROCARDIOGRAM REPORT: CPT | Performed by: INTERNAL MEDICINE

## 2024-02-28 PROCEDURE — 25010000002 DEXAMETHASONE PER 1 MG: Performed by: NURSE PRACTITIONER

## 2024-02-28 PROCEDURE — 92523 SPEECH SOUND LANG COMPREHEN: CPT

## 2024-02-28 PROCEDURE — 97162 PT EVAL MOD COMPLEX 30 MIN: CPT

## 2024-02-28 PROCEDURE — 25510000001 IOPAMIDOL PER 1 ML: Performed by: FAMILY MEDICINE

## 2024-02-28 PROCEDURE — 97165 OT EVAL LOW COMPLEX 30 MIN: CPT

## 2024-02-28 PROCEDURE — 80048 BASIC METABOLIC PNL TOTAL CA: CPT | Performed by: NURSE PRACTITIONER

## 2024-02-28 PROCEDURE — 84132 ASSAY OF SERUM POTASSIUM: CPT | Performed by: STUDENT IN AN ORGANIZED HEALTH CARE EDUCATION/TRAINING PROGRAM

## 2024-02-28 PROCEDURE — 70498 CT ANGIOGRAPHY NECK: CPT

## 2024-02-28 PROCEDURE — 85025 COMPLETE CBC W/AUTO DIFF WBC: CPT | Performed by: NURSE PRACTITIONER

## 2024-02-28 PROCEDURE — 93306 TTE W/DOPPLER COMPLETE: CPT | Performed by: INTERNAL MEDICINE

## 2024-02-28 PROCEDURE — 99222 1ST HOSP IP/OBS MODERATE 55: CPT | Performed by: NEUROLOGICAL SURGERY

## 2024-02-28 PROCEDURE — 25010000002 DEXAMETHASONE SODIUM PHOSPHATE 100 MG/10ML SOLUTION: Performed by: PHYSICIAN ASSISTANT

## 2024-02-28 PROCEDURE — 70450 CT HEAD/BRAIN W/O DYE: CPT

## 2024-02-28 PROCEDURE — 83036 HEMOGLOBIN GLYCOSYLATED A1C: CPT | Performed by: NURSE PRACTITIONER

## 2024-02-28 PROCEDURE — 82948 REAGENT STRIP/BLOOD GLUCOSE: CPT

## 2024-02-28 PROCEDURE — 99223 1ST HOSP IP/OBS HIGH 75: CPT

## 2024-02-28 PROCEDURE — 70496 CT ANGIOGRAPHY HEAD: CPT

## 2024-02-28 PROCEDURE — 93306 TTE W/DOPPLER COMPLETE: CPT

## 2024-02-28 PROCEDURE — 93005 ELECTROCARDIOGRAM TRACING: CPT | Performed by: FAMILY MEDICINE

## 2024-02-28 PROCEDURE — 92610 EVALUATE SWALLOWING FUNCTION: CPT

## 2024-02-28 RX ORDER — SODIUM CHLORIDE 0.9 % (FLUSH) 0.9 %
10 SYRINGE (ML) INJECTION EVERY 12 HOURS SCHEDULED
Status: DISCONTINUED | OUTPATIENT
Start: 2024-02-28 | End: 2024-03-01 | Stop reason: HOSPADM

## 2024-02-28 RX ORDER — ACETAMINOPHEN 325 MG/1
650 TABLET ORAL EVERY 4 HOURS PRN
Status: DISCONTINUED | OUTPATIENT
Start: 2024-02-28 | End: 2024-03-01 | Stop reason: HOSPADM

## 2024-02-28 RX ORDER — SERTRALINE HYDROCHLORIDE 100 MG/1
100 TABLET, FILM COATED ORAL DAILY
Status: DISCONTINUED | OUTPATIENT
Start: 2024-02-28 | End: 2024-03-01 | Stop reason: HOSPADM

## 2024-02-28 RX ORDER — QUETIAPINE FUMARATE 25 MG/1
12.5 TABLET, FILM COATED ORAL NIGHTLY
Status: DISCONTINUED | OUTPATIENT
Start: 2024-02-28 | End: 2024-03-01 | Stop reason: HOSPADM

## 2024-02-28 RX ORDER — DORZOLAMIDE HCL 20 MG/ML
1 SOLUTION/ DROPS OPHTHALMIC 2 TIMES DAILY
Status: DISCONTINUED | OUTPATIENT
Start: 2024-02-28 | End: 2024-03-01 | Stop reason: HOSPADM

## 2024-02-28 RX ORDER — ROSUVASTATIN CALCIUM 20 MG/1
40 TABLET, COATED ORAL DAILY
Status: DISCONTINUED | OUTPATIENT
Start: 2024-02-28 | End: 2024-03-01 | Stop reason: HOSPADM

## 2024-02-28 RX ORDER — SODIUM CHLORIDE 9 MG/ML
40 INJECTION, SOLUTION INTRAVENOUS AS NEEDED
Status: DISCONTINUED | OUTPATIENT
Start: 2024-02-28 | End: 2024-03-01 | Stop reason: HOSPADM

## 2024-02-28 RX ORDER — NITROGLYCERIN 0.4 MG/1
0.4 TABLET SUBLINGUAL
Status: DISCONTINUED | OUTPATIENT
Start: 2024-02-28 | End: 2024-03-01 | Stop reason: HOSPADM

## 2024-02-28 RX ORDER — AMOXICILLIN 250 MG
2 CAPSULE ORAL 2 TIMES DAILY PRN
Status: DISCONTINUED | OUTPATIENT
Start: 2024-02-28 | End: 2024-03-01 | Stop reason: HOSPADM

## 2024-02-28 RX ORDER — CARVEDILOL 12.5 MG/1
12.5 TABLET ORAL EVERY 12 HOURS SCHEDULED
Status: DISCONTINUED | OUTPATIENT
Start: 2024-02-28 | End: 2024-03-01 | Stop reason: HOSPADM

## 2024-02-28 RX ORDER — LORAZEPAM 0.5 MG/1
0.5 TABLET ORAL DAILY PRN
Status: DISCONTINUED | OUTPATIENT
Start: 2024-02-28 | End: 2024-03-01 | Stop reason: HOSPADM

## 2024-02-28 RX ORDER — POTASSIUM CHLORIDE 20 MEQ/1
40 TABLET, EXTENDED RELEASE ORAL EVERY 4 HOURS
Qty: 4 TABLET | Refills: 0 | Status: COMPLETED | OUTPATIENT
Start: 2024-02-28 | End: 2024-02-28

## 2024-02-28 RX ORDER — ACETAMINOPHEN 160 MG/5ML
650 SOLUTION ORAL EVERY 4 HOURS PRN
Status: DISCONTINUED | OUTPATIENT
Start: 2024-02-28 | End: 2024-03-01 | Stop reason: HOSPADM

## 2024-02-28 RX ORDER — ENALAPRILAT 1.25 MG/ML
1.25 INJECTION INTRAVENOUS ONCE
Status: DISCONTINUED | OUTPATIENT
Start: 2024-02-28 | End: 2024-02-28

## 2024-02-28 RX ORDER — ACETAMINOPHEN 650 MG/1
650 SUPPOSITORY RECTAL EVERY 4 HOURS PRN
Status: DISCONTINUED | OUTPATIENT
Start: 2024-02-28 | End: 2024-03-01 | Stop reason: HOSPADM

## 2024-02-28 RX ORDER — DEXAMETHASONE SODIUM PHOSPHATE 4 MG/ML
4 INJECTION, SOLUTION INTRA-ARTICULAR; INTRALESIONAL; INTRAMUSCULAR; INTRAVENOUS; SOFT TISSUE EVERY 6 HOURS
Status: DISCONTINUED | OUTPATIENT
Start: 2024-02-28 | End: 2024-03-01

## 2024-02-28 RX ORDER — POTASSIUM CHLORIDE 20 MEQ/1
40 TABLET, EXTENDED RELEASE ORAL EVERY 4 HOURS
Status: CANCELLED | OUTPATIENT
Start: 2024-02-28 | End: 2024-02-28

## 2024-02-28 RX ORDER — BISACODYL 5 MG/1
5 TABLET, DELAYED RELEASE ORAL DAILY PRN
Status: DISCONTINUED | OUTPATIENT
Start: 2024-02-28 | End: 2024-03-01 | Stop reason: HOSPADM

## 2024-02-28 RX ORDER — BUSPIRONE HYDROCHLORIDE 10 MG/1
10 TABLET ORAL 2 TIMES DAILY
Status: DISCONTINUED | OUTPATIENT
Start: 2024-02-28 | End: 2024-03-01 | Stop reason: HOSPADM

## 2024-02-28 RX ORDER — MELATONIN
2000 EVERY OTHER DAY
Status: DISCONTINUED | OUTPATIENT
Start: 2024-02-28 | End: 2024-03-01 | Stop reason: HOSPADM

## 2024-02-28 RX ORDER — SODIUM CHLORIDE 0.9 % (FLUSH) 0.9 %
10 SYRINGE (ML) INJECTION AS NEEDED
Status: DISCONTINUED | OUTPATIENT
Start: 2024-02-28 | End: 2024-03-01 | Stop reason: HOSPADM

## 2024-02-28 RX ORDER — FAMOTIDINE 10 MG/ML
20 INJECTION, SOLUTION INTRAVENOUS EVERY 12 HOURS SCHEDULED
Status: DISCONTINUED | OUTPATIENT
Start: 2024-02-28 | End: 2024-03-01 | Stop reason: HOSPADM

## 2024-02-28 RX ORDER — BISACODYL 10 MG
10 SUPPOSITORY, RECTAL RECTAL DAILY PRN
Status: DISCONTINUED | OUTPATIENT
Start: 2024-02-28 | End: 2024-03-01 | Stop reason: HOSPADM

## 2024-02-28 RX ORDER — POLYETHYLENE GLYCOL 3350 17 G/17G
17 POWDER, FOR SOLUTION ORAL DAILY PRN
Status: DISCONTINUED | OUTPATIENT
Start: 2024-02-28 | End: 2024-03-01 | Stop reason: HOSPADM

## 2024-02-28 RX ORDER — LISINOPRIL 5 MG/1
2.5 TABLET ORAL DAILY
Status: DISCONTINUED | OUTPATIENT
Start: 2024-02-28 | End: 2024-03-01 | Stop reason: HOSPADM

## 2024-02-28 RX ADMIN — Medication 10 ML: at 09:47

## 2024-02-28 RX ADMIN — POTASSIUM CHLORIDE 40 MEQ: 1500 TABLET, EXTENDED RELEASE ORAL at 14:00

## 2024-02-28 RX ADMIN — CARVEDILOL 12.5 MG: 12.5 TABLET, FILM COATED ORAL at 22:29

## 2024-02-28 RX ADMIN — QUETIAPINE FUMARATE 12.5 MG: 25 TABLET ORAL at 01:28

## 2024-02-28 RX ADMIN — Medication 10 ML: at 22:36

## 2024-02-28 RX ADMIN — FAMOTIDINE 20 MG: 10 INJECTION, SOLUTION INTRAVENOUS at 00:08

## 2024-02-28 RX ADMIN — Medication 2000 UNITS: at 09:46

## 2024-02-28 RX ADMIN — Medication 10 ML: at 22:35

## 2024-02-28 RX ADMIN — DEXAMETHASONE SODIUM PHOSPHATE 4 MG: 4 INJECTION, SOLUTION INTRA-ARTICULAR; INTRALESIONAL; INTRAMUSCULAR; INTRAVENOUS; SOFT TISSUE at 06:27

## 2024-02-28 RX ADMIN — BUSPIRONE HYDROCHLORIDE 10 MG: 10 TABLET ORAL at 09:46

## 2024-02-28 RX ADMIN — DEXAMETHASONE SODIUM PHOSPHATE 10 MG: 10 INJECTION, SOLUTION INTRAMUSCULAR; INTRAVENOUS at 00:08

## 2024-02-28 RX ADMIN — FAMOTIDINE 20 MG: 10 INJECTION, SOLUTION INTRAVENOUS at 09:51

## 2024-02-28 RX ADMIN — DEXAMETHASONE SODIUM PHOSPHATE 4 MG: 4 INJECTION, SOLUTION INTRA-ARTICULAR; INTRALESIONAL; INTRAMUSCULAR; INTRAVENOUS; SOFT TISSUE at 14:00

## 2024-02-28 RX ADMIN — ROSUVASTATIN CALCIUM 40 MG: 20 TABLET, COATED ORAL at 09:45

## 2024-02-28 RX ADMIN — DEXAMETHASONE SODIUM PHOSPHATE 4 MG: 4 INJECTION, SOLUTION INTRA-ARTICULAR; INTRALESIONAL; INTRAMUSCULAR; INTRAVENOUS; SOFT TISSUE at 17:09

## 2024-02-28 RX ADMIN — BUSPIRONE HYDROCHLORIDE 10 MG: 10 TABLET ORAL at 22:29

## 2024-02-28 RX ADMIN — DEXAMETHASONE SODIUM PHOSPHATE 4 MG: 4 INJECTION, SOLUTION INTRA-ARTICULAR; INTRALESIONAL; INTRAMUSCULAR; INTRAVENOUS; SOFT TISSUE at 23:35

## 2024-02-28 RX ADMIN — DORZOLAMIDE HCL 1 DROP: 20 SOLUTION/ DROPS OPHTHALMIC at 22:29

## 2024-02-28 RX ADMIN — DORZOLAMIDE HCL 1 DROP: 20 SOLUTION/ DROPS OPHTHALMIC at 09:51

## 2024-02-28 RX ADMIN — IOPAMIDOL 115 ML: 755 INJECTION, SOLUTION INTRAVENOUS at 04:51

## 2024-02-28 RX ADMIN — POTASSIUM CHLORIDE 40 MEQ: 1500 TABLET, EXTENDED RELEASE ORAL at 09:46

## 2024-02-28 RX ADMIN — Medication 10 ML: at 14:04

## 2024-02-28 RX ADMIN — FAMOTIDINE 20 MG: 10 INJECTION, SOLUTION INTRAVENOUS at 22:29

## 2024-02-28 RX ADMIN — THIAMINE HCL TAB 100 MG 100 MG: 100 TAB at 09:46

## 2024-02-28 RX ADMIN — QUETIAPINE FUMARATE 12.5 MG: 25 TABLET ORAL at 22:43

## 2024-02-28 RX ADMIN — LISINOPRIL 2.5 MG: 5 TABLET ORAL at 09:46

## 2024-02-28 RX ADMIN — SERTRALINE HYDROCHLORIDE 100 MG: 100 TABLET ORAL at 09:46

## 2024-02-28 RX ADMIN — CARVEDILOL 12.5 MG: 12.5 TABLET, FILM COATED ORAL at 01:28

## 2024-02-28 NOTE — PLAN OF CARE
Goal Outcome Evaluation:  Plan of Care Reviewed With: patient        Progress: no change  Outcome Evaluation: Pt presents at/near recent baseline for ADL performance and related mobility with symmetrical BUE strength and coordination/sensation intact. Intermittent confusion observed, hypertensive but stable. OT signing off, defer to PT for any further mobility needs. Ongoing POC regarding pending surgical intervention, will monitor and re-evaluate as needed. Rec d/c to home with supervision based on today's performance.      Anticipated Discharge Disposition (OT): home with supervision

## 2024-02-28 NOTE — ED PROVIDER NOTES
"Subjective   History of Present Illness  This is 69-year-old female that presents the ER with 1 week history of left-sided headache described as throbbing as well as pressure behind the left eye.  History of meningioma diagnosed in 2017.  Patient has had 2 CyberKnife procedures at [facility] and initially followed with Dr. Werner Hollis, neurosurgeon, who has since retired.  Patient was also seen by Dr. Perales, neurosurgeon.  Patient was referred to brain tumor clinic at [facility] and had gamma knife procedure in June, 2023.  She is seeing Neurology, Dr. Fabian Mendieta, at [facility] and had an appointment scheduled for follow-up in December, 2023, but she was unable to make that appointment due to weather.  Patient says her next follow-up is March, 2024.  She reports 1 week history of left-sided headache.  She has had left vision loss since the initial diagnosis of meningioma in 2017.  She denies any dizziness or near syncope/syncope.  She denies any right sided visual changes.  She denies any left upper extremity or lower extremity weakness or numbness/tingling.  According to , her speech has been slowed and at times patient seems to \"space out\".  She denies chest pain or shortness of breath.  She denies any other recent symptoms of illness.  Patient says that she had 4 other small \"tumors\" in the brain but they were told that these were her meningiomas, as well.  Past medical history is also significant for hyperlipidemia, memory loss or impairment, parathyroid adenoma, atrial flutter, migraine headaches, depression, meningioma diagnosed in 2017, chronic left-sided vision loss after meningioma diagnosis and multiple procedures.  Patient takes aspirin 81 mg by mouth daily but no other chronic anticoagulation.    History provided by:  Patient  Illness  Location:  Left sided HA, throbbing, pressure behind left eye, h/o meningioma.  Duration:  1 week  Chronicity:  " "Recurrent  Context:  1 week h/o left sided throbbing HA. No vision to left eye after meningioma diagnosed in 2017 and subsequent surgeries. Increased pressure behind left eye.  Associated symptoms: headaches (left sided throbbing headache)    Associated symptoms: no abdominal pain, no chest pain, no congestion, no ear pain, no fatigue, no fever, no myalgias, no nausea, no rhinorrhea, no shortness of breath, no sore throat, no vomiting and no wheezing    Risk factors:  3 surgeries for meningioma. Initial surgeries per Dr. Werner Hollis, Neurosurgery, here at [facility]. Now, following at [facility]. Gamma knife in 6/23 and f/u in 12/23 at [facility], but pt couldn't get to the appointment due to weather. Next appt is in 3/24.      Review of Systems   Constitutional: Negative.  Negative for activity change, appetite change, chills, diaphoresis, fatigue and fever.   HENT:  Negative for congestion, ear pain, rhinorrhea and sore throat.    Eyes:  Positive for visual disturbance (Loss of vision to left eye 3 years ago with dx of meningioma and subsequent surgeries. Pressure behind left eye.). Negative for discharge.   Respiratory: Negative.  Negative for chest tightness, shortness of breath and wheezing.    Cardiovascular:  Negative for chest pain.   Gastrointestinal:  Negative for abdominal pain, nausea and vomiting.   Genitourinary: Negative.    Musculoskeletal:  Negative for myalgias.   Neurological:  Positive for speech difficulty (Pt has had slowed speech. Pt seems \"spaced out\" per patient's .) and headaches (left sided throbbing headache). Negative for dizziness, seizures, syncope and weakness.        History of meningioma dx in 2017 and pt had cyber knife x 2 per Dr. Perales, Neurosurgery. Then, pt had gamma knife at [facility] in 6/23. Pt said she has \"4 other smaller tumors\". She missed appt at [facility] in 12/2023 due to weather, but she has close f/u in 3/24.   All other systems " reviewed and are negative.      Past Medical History:   Diagnosis Date    Abdominal hernia     Arthritis     rt knee     Atrial flutter with rapid ventricular response 12/21/2017    Back pain     Brain tumor     Colostomy present 08/2018    Depression     History of blood transfusion     History of gastroesophageal reflux (GERD)     resolved since Nissen    History of Nissen fundoplication 7/1/2017    History of small bowel obstruction     Hyperlipemia     Hypertension     Memory loss or impairment     Migraine     Parathyroid adenoma     Incidental on thyroid US.    PONV (postoperative nausea and vomiting)     nausea - preprocedural meds help     Prediabetes     Last Impression: 06 Feb 2015  Reviewed labs. Excellent control.  Maren Connellast Impression: 06 Feb 2015  Reviewed labs. Excellent control.  Michaela Connell    Thyroid nodule      Last Impression: 13 Jun 2015  r/o thyroid cancer, will proceed with US.  Michaela Connell (Internal Medicine)     UTI (urinary tract infection)     Vision changes     blockages left eye     Wears glasses     readers       Allergies   Allergen Reactions    Dilantin [Phenytoin Sodium Extended] Rash       Past Surgical History:   Procedure Laterality Date    BRAIN SURGERY Left 05/19/2022    BRAIN SURGERY  08/2023    left side done & radiationg a wk. later,  in [place]    CARDIAC CATHETERIZATION N/A 04/27/2020    Procedure: LEFT HEART CATH;  Surgeon: Marina Ring MD;  Location:  [facility];  Service: Cardiology;  Laterality: N/A;    CARPAL TUNNEL RELEASE  2002    right     COLONOSCOPY N/A 08/18/2018    Procedure: COLONOSCOPY;  Surgeon: Inderjit Campos MD;  Location:  [facility];  Service: Gastroenterology    COLONOSCOPY N/A 11/28/2018    Procedure: COLONOSCOPY;  Surgeon: Inderjit Campos MD;  Location:  [facility];  Service: Gastroenterology    COLOSTOMY CLOSURE N/A 02/19/2019    Procedure: COLOSTOMY TAKEDOWN, INCISIONAL HERNIA  REPAIR;  Surgeon: Andrea Bocanegra MD;  Location:  [facility];  Service: General    CRANIOTOMY  2003 & 2014    Dr. Werner Hollis for tumor removal    ENDOSCOPY      EXPLORATORY LAPAROTOMY N/A 12/05/2017    Procedure: EXPLORATORY LAPAROTOMY, SMALL BOWEL RESECTION;  Surgeon: Ирина Cobian MD;  Location:  [facility];  Service:     EXPLORATORY LAPAROTOMY N/A 12/22/2017    Procedure: LAPAROTOMY EXPLORATORY FOR SMALL BOWEL OBSTRUCTION;  Surgeon: Ирина Cobian MD;  Location:  [facility];  Service:     EXPLORATORY LAPAROTOMY N/A 07/23/2018    Procedure: EXPLORATORY LAPAROTOMY, APPENDECTOMY, CECOPEXY, INCISIONAL HERNIA REPAIR, LYSIS OF ADHESIONS;  Surgeon: Andrea Bocanegra MD;  Location:  [facility];  Service: General    EXPLORATORY LAPAROTOMY N/A 08/19/2018    Procedure: LAPAROTOMY EXPLORATORY, SIGMOID COLECTOMY, CREATION OF OSTOMY;  Surgeon: Andrea Bocanegra MD;  Location:  [facility];  Service: General    HYSTERECTOMY      partial - both ovaries still present pt believes     INSERTION HEMODIALYSIS CATHETER N/A 12/07/2017    Procedure: HEMODIALYSIS CATHETER INSERTION;  Surgeon: Chance Valenzuela MD;  Location:  [facility];  Service:     ORBITOTOMY Left 11/03/2017    Procedure:  LEFT LATERAL ORBITOTOMY WITH DEBULKING OF TUMOR ;  Surgeon: Moo Hopkins MD;  Location:  [facility];  Service:     OTHER SURGICAL HISTORY      esophagogastric fundoplasty nissen fundoplication    TUBAL ABDOMINAL LIGATION         Family History   Problem Relation Age of Onset    Obesity Mother     Heart attack Mother     Heart disease Mother     Migraines Father     Stroke Father     Diabetes Father     Cancer Other     Arthritis Other     Diabetes Other     Breast cancer Maternal Aunt     Breast cancer Paternal Aunt     Ovarian cancer Neg Hx        Social History     Socioeconomic History    Marital status:    Tobacco Use    Smoking status: Never    Smokeless tobacco: Never   Vaping Use    Vaping Use: Never used   Substance and  Sexual Activity    Alcohol use: No    Drug use: No    Sexual activity: Defer           Objective   Physical Exam  Vitals and nursing note reviewed.   Constitutional:       General: She is not in acute distress.     Appearance: Normal appearance. She is not ill-appearing, toxic-appearing or diaphoretic.      Comments: No acute sign pain or distress.  Nontoxic   HENT:      Head: Normocephalic and atraumatic.      Nose: Nose normal.      Mouth/Throat:      Mouth: Mucous membranes are moist.      Pharynx: Oropharynx is clear.   Eyes:      Extraocular Movements: Extraocular movements intact.      Right eye: Normal extraocular motion and no nystagmus.      Left eye: Normal extraocular motion and no nystagmus.      Conjunctiva/sclera: Conjunctivae normal.      Pupils: Pupils are equal, round, and reactive to light.      Comments: Pupils equal round reactive to light.  Extraocular movements intact.  Patient says that she does not have any vision in the left eye after diagnosis of meningioma with subsequent surgeries in 2017.  She does not even see shadows.  There is some enlargement of the bony region around the left periorbital/sphenoid region, which patient says is chronic.  There is no confluent erythema or warmth.  No other facial swelling.   Neck:      Comments: No C-spine tenderness.  Full range of motion.  Cardiovascular:      Rate and Rhythm: Normal rate and regular rhythm. No extrasystoles are present.     Pulses: Normal pulses.      Heart sounds: Normal heart sounds.      Comments: Regular rate and rhythm.  No ectopy  Pulmonary:      Effort: Pulmonary effort is normal.      Breath sounds: Normal breath sounds.      Comments: Lungs are clear to auscultation bilaterally  Abdominal:      General: Bowel sounds are normal.      Palpations: Abdomen is soft.   Musculoskeletal:         General: Normal range of motion.      Cervical back: Normal range of motion and neck supple. No pain with movement, spinous process  tenderness or muscular tenderness.      Right lower leg: No edema.      Left lower leg: No edema.   Skin:     General: Skin is warm and dry.   Neurological:      General: No focal deficit present.      Mental Status: She is alert and oriented to person, place, and time.      Cranial Nerves: Cranial nerves 2-12 are intact.      Sensory: Sensation is intact.      Motor: Motor function is intact.      Coordination: Coordination is intact.      Gait: Gait is intact.      Comments: Alert and oriented x 3.  Smile is equal and tongue is midline.  Equal  strength 5 out of 5 and equal muscle strength the lower extremity 5 out of 5.  Normal gait.  Patient ambulated to her exam room by herself and did not have any imbalance or gait disturbance.  Speech is clear and concentrated and I do not appreciate any slurred speech or expressive aphasia.   Psychiatric:         Mood and Affect: Mood and affect normal.         Speech: Speech normal.         Behavior: Behavior normal. Behavior is cooperative.         Thought Content: Thought content normal.         Cognition and Memory: Cognition normal.         Judgment: Judgment normal.      Comments: Normal mood and affect.  Normal cognition and memory and normal judgment.         Procedures           ED Course  ED Course as of 02/28/24 0041   e Feb 27, 2024 1921 Pt says she is following at [facility] for meningioma now. She hasn't seen [facility] x 3 years according to patient.  After reviewing epic, patient is following with Dr. Fabian Mendieta, Neurology, at the [facility] at [facility]. [FC]   2250 CBC and chemistries were within normal limits.  INR is 1.09.  MRI of the brain with and without contrast was performed which revealed a new 3.8 cm left frontal dural based mass presumably meningioma with associated surrounding vasogenic edema and mass effect.  There is also an enlarging ill-defined mass with destructive changes involving the left  sphenoid oh-orbital region.  Other relatively stable meningiomas in the left frontal dural region measuring 1.5 x 0.8 cm and 1.7 x 1.2 cm posterior.  I discussed the case with on-call neurosurgery midlevel, Michelle Holt.  She is going to review MRI of the brain with and without contrast and return my call.  Patient has no speech changes at present and no focal neurologic deficits.  Blood pressure is 165/95. [FC]   2318 After neurosurgery midlevel,  Michelle Holt evaluated the studies, she recommended hospitalist team to admit the patient and neurosurgery will consult in the morning.  No plans for surgical intervention in the morning so patient does not need to be n.p.o.  She recommended Decadron 10 mg IV now and 4 mg IV q 6 hours. They also recommended Pepcid 20 mg IV q 12 hours. [FC]   Wed Feb 28, 2024   0036 Discussed admission with Dr. Betts, hospitalist.  She is agreeable to admission on telemetry and will consult neurosurgery this morning.  I updated patient and spouse and they are agreeable to admission but say that they do not want any surgical intervention at our facility.  They would like to continue with her care at [facility]. [FC]      ED Course User Index  [FC] Denise White, MARICHUY                                           Recent Results (from the past 24 hour(s))   Comprehensive Metabolic Panel    Collection Time: 02/27/24  8:10 PM    Specimen: Blood   Result Value Ref Range    Glucose 100 (H) 65 - 99 mg/dL    BUN 18 8 - 23 mg/dL    Creatinine 0.96 0.57 - 1.00 mg/dL    Sodium 141 136 - 145 mmol/L    Potassium 3.8 3.5 - 5.2 mmol/L    Chloride 106 98 - 107 mmol/L    CO2 26.0 22.0 - 29.0 mmol/L    Calcium 9.2 8.6 - 10.5 mg/dL    Total Protein 7.3 6.0 - 8.5 g/dL    Albumin 4.1 3.5 - 5.2 g/dL    ALT (SGPT) 11 1 - 33 U/L    AST (SGOT) 19 1 - 32 U/L    Alkaline Phosphatase 87 39 - 117 U/L    Total Bilirubin 0.2 0.0 - 1.2 mg/dL    Globulin 3.2 gm/dL    A/G Ratio 1.3 g/dL    BUN/Creatinine Ratio  18.8 7.0 - 25.0    Anion Gap 9.0 5.0 - 15.0 mmol/L    eGFR 63.4 >60.0 mL/min/1.73   Protime-INR    Collection Time: 02/27/24  8:10 PM    Specimen: Blood   Result Value Ref Range    Protime 14.3 12.2 - 14.5 Seconds    INR 1.09 0.89 - 1.12   CBC Auto Differential    Collection Time: 02/27/24  8:10 PM    Specimen: Blood   Result Value Ref Range    WBC 6.02 3.40 - 10.80 10*3/mm3    RBC 4.28 3.77 - 5.28 10*6/mm3    Hemoglobin 13.0 12.0 - 15.9 g/dL    Hematocrit 40.4 34.0 - 46.6 %    MCV 94.4 79.0 - 97.0 fL    MCH 30.4 26.6 - 33.0 pg    MCHC 32.2 31.5 - 35.7 g/dL    RDW 13.4 12.3 - 15.4 %    RDW-SD 46.3 37.0 - 54.0 fl    MPV 10.8 6.0 - 12.0 fL    Platelets 207 140 - 450 10*3/mm3    Neutrophil % 57.9 42.7 - 76.0 %    Lymphocyte % 27.2 19.6 - 45.3 %    Monocyte % 11.5 5.0 - 12.0 %    Eosinophil % 2.7 0.3 - 6.2 %    Basophil % 0.5 0.0 - 1.5 %    Immature Grans % 0.2 0.0 - 0.5 %    Neutrophils, Absolute 3.49 1.70 - 7.00 10*3/mm3    Lymphocytes, Absolute 1.64 0.70 - 3.10 10*3/mm3    Monocytes, Absolute 0.69 0.10 - 0.90 10*3/mm3    Eosinophils, Absolute 0.16 0.00 - 0.40 10*3/mm3    Basophils, Absolute 0.03 0.00 - 0.20 10*3/mm3    Immature Grans, Absolute 0.01 0.00 - 0.05 10*3/mm3    nRBC 0.0 0.0 - 0.2 /100 WBC   POC Creatinine    Collection Time: 02/27/24  8:17 PM    Specimen: Blood   Result Value Ref Range    Creatinine 1.00 0.60 - 1.30 mg/dL   POC Creatinine    Collection Time: 02/27/24  8:18 PM    Specimen: Blood   Result Value Ref Range    Creatinine 1.00 0.60 - 1.30 mg/dL     Note: In addition to lab results from this visit, the labs listed above may include labs taken at another facility or during a different encounter within the last 24 hours. Please correlate lab times with ED admission and discharge times for further clarification of the services performed during this visit.    MRI Brain With & Without Contrast   Final Result   Impression:   1.There is a new 3.8 cm maximum diameter left frontal dural based mass  presumably a meningioma with associated surrounding vasogenic edema and mass effect as detailed above.   2.Enlarging ill-defined mass with destructive changes involving the left spheno-orbital region..   3.Other relatively stable meningiomas.            Electronically Signed: Noel Lemus MD     2/27/2024 10:19 PM EST     Workstation ID: [id number]      CT Head Without Contrast   Final Result   Impression:      1. Compared to most recent previous brain MRI and head CT scans, there is significant interval enlargement of the patient's left orbital/sphenoid mass, with new bony destruction of the posterior lateral orbital wall.      2. Also, there new mass effect on the frontal horns, and significantly increased left frontal vasogenic edema, and edema in the corpus callosum. This is thought to be due to a new anterior left frontal mass/meningioma. Brain MRI with and without contrast    is suggested for further lesion characterization.            Electronically Signed: Jareth Gómez MD     2/27/2024 8:04 PM EST     Workstation ID: [id number]        Vitals:    02/27/24 2203 02/27/24 2205 02/27/24 2230 02/27/24 2300   BP: (!) 141/101  165/95 179/95   BP Location:       Patient Position:       Pulse:  73 63 64   Resp:       Temp:       TempSrc:       SpO2:  100% 94% 97%   Weight:       Height:         Medications   sodium chloride 0.9 % flush 10 mL (has no administration in time range)   diazePAM (VALIUM) injection 2.5 mg (2.5 mg Intravenous Given 2/27/24 2112)   gadobenate dimeglumine (MULTIHANCE) injection 15 mL (15 mL Intravenous Given 2/27/24 2144)   famotidine (PEPCID) injection 20 mg (20 mg Intravenous Given 2/28/24 0008)   dexAMETHasone (DECADRON) IVPB 10 mg (0 mg Intravenous Stopped 2/28/24 0032)     ECG/EMG Results (last 24 hours)       ** No results found for the last 24 hours. **          No orders to display           Medical Decision Making  Amount and/or Complexity of Data Reviewed  Labs: ordered.  Radiology:  ordered.    Risk  Prescription drug management.  Decision regarding hospitalization.        Final diagnoses:   Brain mass   Vasogenic brain edema   Sphenoid mass, left   Meningioma   Left-sided headache   Vision loss of left eye   History of hypertension   History of hyperlipidemia       ED Disposition  ED Disposition       ED Disposition   Decision to Admit    Condition   --    Comment   Level of Care: Telemetry [5]   Diagnosis: Brain mass [421572]   Admitting Physician: MICHELLE HICKMAN [[id number]]                 No follow-up provider specified.       Medication List      No changes were made to your prescriptions during this visit.            Denise White PA-C  02/28/24 0041

## 2024-02-28 NOTE — CASE MANAGEMENT/SOCIAL WORK
Discharge Planning Assessment  [facility]     Patient Name: Tereza Ackerman  MRN: [id number]  Today's Date: 2/28/2024    Admit Date: 2/27/2024    Plan: Home   Discharge Needs Assessment       Row Name 02/28/24 1202       Living Environment    People in Home spouse    Current Living Arrangements home    Primary Care Provided by self    Provides Primary Care For no one    Family Caregiver if Needed spouse    Quality of Family Relationships supportive    Able to Return to Prior Arrangements yes       Transition Planning    Patient/Family Anticipates Transition to home with family    Patient/Family Anticipated Services at Transition none    Transportation Anticipated family or friend will provide       Discharge Needs Assessment    Readmission Within the Last 30 Days no previous admission in last 30 days    Equipment Currently Used at Home none                   Discharge Plan       Row Name 02/28/24 1203       Plan    Plan Home    Patient/Family in Agreement with Plan yes    Plan Comments Spoke with Mrs. Ackerman at the bedside. She lives with her Spouse in [place] [facility]. She is independent with ADL's. She does not use any DME, Oxygen or home health. She does not have advance directives. Her PCP is Michaela Connell and she has Medicare and [employer] insurance. Discharge plan is home. CM will continue to follow for discharge needs.    Final Discharge Disposition Code 01 - home or self-care                  Continued Care and Services - Admitted Since 2/27/2024    Coordination has not been started for this encounter.       Expected Discharge Date and Time       Expected Discharge Date Expected Discharge Time    Mar 1, 2024            Demographic Summary       Row Name 02/28/24 1201       General Information    Admission Type inpatient    Arrived From home    Referral Source admission list    Reason for Consult discharge planning    Preferred Language English       Contact Information    Permission Granted  to Share Info With                    Functional Status       Row Name 02/28/24 1202       Functional Status, IADL    Medications independent    Meal Preparation independent    Housekeeping independent    Laundry independent    Shopping independent       Mental Status    General Appearance WDL WDL                   Psychosocial    No documentation.                  Abuse/Neglect    No documentation.                  Legal    No documentation.                  Substance Abuse    No documentation.                  Patient Forms    No documentation.                     Shae Johnston RN

## 2024-02-28 NOTE — THERAPY EVALUATION
Acute Care - Speech Language Pathology Initial Evaluation  [facility]  Clinical Swallow Evaluation  Cognitive-Communication Evaluation       Patient Name: Tereza Ackerman  : 1955  MRN: [id number]  Today's Date: 2024               Admit Date: 2024     Visit Dx:    ICD-10-CM ICD-9-CM   1. Brain mass  G93.89 348.89   2. Vasogenic brain edema  G93.6 348.5   3. Sphenoid mass, left  J34.89 478.19   4. Meningioma  D32.9 225.2   5. Left-sided headache  R51.9 784.0   6. Vision loss of left eye  H54.62 369.8   7. History of hypertension  Z86.79 V12.59   8. History of hyperlipidemia  Z86.39 V12.29   9. Aphasia  R47.01 784.3     Patient Active Problem List   Diagnosis    Impaired glucose tolerance    Parathyroid adenoma    Reaction to chronic stress    Multinodular goiter    Vitamin D deficiency    Meningioma, recurrent of brain    Arthritis    Depression    Small bowel obstruction    Insomnia    History of Nissen fundoplication    Malignant neoplasm of left orbit    Prediabetes    Mixed hyperlipidemia    Hyperglycemia    Atrial flutter with rapid ventricular response    Anemia    Large bowel obstruction    Abdominal pain    Essential hypertension    History of creation of ostomy    Screen for colon cancer    Sigmoid volvulus    SHAE (obstructive sleep apnea)    Brain mass     Past Medical History:   Diagnosis Date    Abdominal hernia     Arthritis     rt knee     Atrial flutter with rapid ventricular response 2017    Back pain     Brain tumor     Colostomy present 2018    Depression     History of blood transfusion     History of gastroesophageal reflux (GERD)     resolved since Nissen    History of Nissen fundoplication 2017    History of small bowel obstruction     Hyperlipemia     Hypertension     Memory loss or impairment     Migraine     Parathyroid adenoma     Incidental on thyroid US.    PONV (postoperative nausea and vomiting)     nausea - preprocedural meds help     Prediabetes      Last Impression: 06 Feb 2015  Reviewed labs. Excellent control.  Maren Connellast Impression: 06 Feb 2015  Reviewed labs. Excellent control.  Michaela Connell    Thyroid nodule      Last Impression: 13 Jun 2015  r/o thyroid cancer, will proceed with US.  Michaela Connell (Internal Medicine)     UTI (urinary tract infection)     Vision changes     blockages left eye     Wears glasses     readers     Past Surgical History:   Procedure Laterality Date    BRAIN SURGERY Left 05/19/2022    BRAIN SURGERY  08/2023    left side done & radiationg a wk. later,  in [place]    CARDIAC CATHETERIZATION N/A 04/27/2020    Procedure: LEFT HEART CATH;  Surgeon: Marina Ring MD;  [facility]:  [facility];  Service: Cardiology;  Laterality: N/A;    CARPAL TUNNEL RELEASE  2002    right     COLONOSCOPY N/A 08/18/2018    Procedure: COLONOSCOPY;  Surgeon: Inderjit Campos MD;  Location:  [facility];  Service: Gastroenterology    COLONOSCOPY N/A 11/28/2018    Procedure: COLONOSCOPY;  Surgeon: Inderjit Campos MD;  Location:  [facility];  Service: Gastroenterology    COLOSTOMY CLOSURE N/A 02/19/2019    Procedure: COLOSTOMY TAKEDOWN, INCISIONAL HERNIA REPAIR;  Surgeon: Andrea Bocanegra MD;  Location:  [facility];  Service: General    CRANIOTOMY  2003 & 2014    Dr. Werner Hollis for tumor removal    ENDOSCOPY      EXPLORATORY LAPAROTOMY N/A 12/05/2017    Procedure: EXPLORATORY LAPAROTOMY, SMALL BOWEL RESECTION;  Surgeon: Ирина Cobian MD;  Location:  [facility];  Service:     EXPLORATORY LAPAROTOMY N/A 12/22/2017    Procedure: LAPAROTOMY EXPLORATORY FOR SMALL BOWEL OBSTRUCTION;  Surgeon: Ирина Cobian MD;  Location:  [facility];  Service:     EXPLORATORY LAPAROTOMY N/A 07/23/2018    Procedure: EXPLORATORY LAPAROTOMY, APPENDECTOMY, CECOPEXY, INCISIONAL HERNIA REPAIR, LYSIS OF ADHESIONS;  Surgeon: Andrea Bocanegra MD;  Location:  [facility];  Service: General    EXPLORATORY LAPAROTOMY N/A  "08/19/2018    Procedure: LAPAROTOMY EXPLORATORY, SIGMOID COLECTOMY, CREATION OF OSTOMY;  Surgeon: Andrea Bocanegra MD;  Location:  [facility];  Service: General    HYSTERECTOMY      partial - both ovaries still present pt believes     INSERTION HEMODIALYSIS CATHETER N/A 12/07/2017    Procedure: HEMODIALYSIS CATHETER INSERTION;  Surgeon: Chance Valenzuela MD;  [facility]:  [facility];  Service:     ORBITOTOMY Left 11/03/2017    Procedure:  LEFT LATERAL ORBITOTOMY WITH DEBULKING OF TUMOR ;  Surgeon: Moo Hopkins MD;  Location:  [facility];  Service:     OTHER SURGICAL HISTORY      esophagogastric fundoplasty nissen fundoplication    TUBAL ABDOMINAL LIGATION         SLP Recommendation and Plan  SLP Diagnosis: mild-moderate, aphasia (02/28/24 0910)  SLP Diagnosis Comments: Pt presents w/ mild to moderate aphasia per this eval. U/a to fully assess cognitive linguistic fx 2' severity of aphasia. Spouse reports pt w/ \"some word finding issues @ baseline\", however acutely worse this date. SLP will f/u for tx and dx tx as appropriate (02/28/24 0910)        Swallow Criteria for Skilled Therapeutic Interventions Met: no problems identified which require skilled intervention (02/28/24 0910)  SLC Criteria for Skilled Therapy Interventions Met: yes (02/28/24 0910)  Anticipated Discharge Disposition (SLP): home with assist (02/28/24 0910)     Therapy Frequency (Swallow): evaluation only (02/28/24 0910)  Therapy Frequency (SLP SLC): 3 days per week (02/28/24 0910)  Predicted Duration Therapy Intervention (Days): until discharge (02/28/24 0910)  Oral Care Recommendations: Oral Care BID/PRN, Toothbrush (02/28/24 0910)                                 SLP EVALUATION (last 72 hours)       SLP SLC Evaluation       Row Name 02/28/24 0910                   General Information    Prior Level of Function-Communication unknown  -        Patient's Goals for Discharge patient did not state  -        Family Goals for Discharge family " "did not state  -           Comprehension Assessment/Intervention    Comprehension Assessment/Intervention Auditory Comprehension  -           Auditory Comprehension Assessment/Intervention    Auditory Comprehension (Communication) other (see comments)  WFL @ simple level  -        Able to Identify Objects/Pictures (Communication) body part;familiar objects;[facility]  -        Answers Questions (Communication) yes/no;wh questions;personal;simple;[facility]  -        Able to Follow Commands (Communication) 1-step;[facility]  -           Expression Assessment/Intervention    Expression Assessment/Intervention verbal expression  -           Verbal Expression Assessment/Intervention    Verbal Expression mild impairment;moderate impairment  -        Automatic Speech (Communication) response to greeting;days of week;months of year;[facility]  -        Repetition phrases;[facility]        Phrase Completion automatic/predictable;mild impairment  -        Responsive Naming simple;mild impairment;moderate impairment  -        Confrontational Naming high frequency;mild impairment;moderate impairment  -        Spontaneous/Functional Words simple;mild impairment;moderate impairment  -           Motor Speech Assessment/Intervention    Motor Speech Function [facility]  -           Cognitive Assessment Intervention- SLP    Cognitive Function (Cognition) unable/difficult to assess;other (see comments)  Difficult to assess 2' severity  of aphasia  -        Orientation Status (Cognition) person;place;WFL;time;situation;moderate impairment  -           SLP Evaluation Clinical Impressions    SLP Diagnosis mild-moderate;aphasia  -        SLP Diagnosis Comments Pt presents w/ mild to moderate aphasia per this eval. U/a to fully assess cognitive linguistic fx 2' severity of aphasia. Spouse reports pt w/ \"some word finding issues @ baseline\", however acutely worse this date. SLP will f/u for tx and dx tx as appropriate  -        Rehab " Potential/Prognosis good  -        SLC Criteria for Skilled Therapy Interventions Met yes  -        Functional Impact functional impact in ADLs;difficulty communicating in an emergency  -           Recommendations    Therapy Frequency (SLP SLC) 3 days per week  -        Predicted Duration Therapy Intervention (Days) until discharge  -                  User Key  (r) = Recorded By, (t) = Taken By, (c) = Cosigned By      Initials Name Effective Dates    MYRNA Olga Newby, MS CCC-SLP 05/12/23 -                        EDUCATION  The patient has been educated in the following areas:     Communication Impairment.           SLP GOALS       Row Name 02/28/24 0910             Patient will demonstrate functional language skills for return to discharge environment     [place] [facility]  -      Time frame by discharge  -      Progress/Outcomes new goal  -         SLP Diagnostic Treatment     Patient will participate in further assessment in the following areas reading comprehension;graphic expression;cognitive-linguistic  -      Time Frame (Diagnostic) short term goal (STG)  -      Progress/Outcomes (Additional Goal 1, SLP) new goal  -         Word Retrieval Skills Goal 1 (SLP)    Improve Word Retrieval Skills By Goal 1 (SLP) confrontational naming task;responsive naming task;completing open ended unstructured sentence;completing functional word finding tasks;supplying an antonym;supplying a synonym;80%;with minimal cues (75-90%)  -      Time Frame (Word Retrieval Goal 1, SLP) short term goal (STG)  -      Progress/Outcomes (Word Retrieval Goal 1, SLP) new goal  -                User Key  (r) = Recorded By, (t) = Taken By, (c) = Cosigned By      Initials Name Provider Type    MYRNA Olga Newby GERRY, MS CCC-SLP Speech and Language Pathologist                            Time Calculation:      Time Calculation- SLP       Row Name 02/28/24 0949             Time Calculation- SLP    SLP Start Time  0910  -      SLP Received On 24  -         Untimed Charges    60254-PL Eval Speech and Production w/ Language Minutes 35  -MH      99236-PW Eval Oral Pharyng Swallow Minutes 35  -MH         Total Minutes    Untimed Charges Total Minutes 70  -MH       Total Minutes 70  -MH                User Key  (r) = Recorded By, (t) = Taken By, (c) = Cosigned By      Initials Name Provider Type     Olga Newby, MS CCC-SLP Speech and Language Pathologist                    Therapy Charges for Today       Code Description Service Date Service Provider Modifiers Qty    57052584918 HC ST EVAL ORAL PHARYNG SWALLOW 2 2024 Olga Newby, MS CCC-SLP GN 1    39881478807 HC ST EVAL SPEECH AND PROD W LANG  2 2024 Olga Newby, MS CCC-SLP GN 1                       Olga Newby MS CCC-SLP  2024   and Acute Care - Speech Language Pathology   Swallow Initial Evaluation [facility]     Patient Name: Tereza Ackerman  : 1955  MRN: [id number]  Today's Date: 2024               Admit Date: 2024    Visit Dx:     ICD-10-CM ICD-9-CM   1. Brain mass  G93.89 348.89   2. Vasogenic brain edema  G93.6 348.5   3. Sphenoid mass, left  J34.89 478.19   4. Meningioma  D32.9 225.2   5. Left-sided headache  R51.9 784.0   6. Vision loss of left eye  H54.62 369.8   7. History of hypertension  Z86.79 V12.59   8. History of hyperlipidemia  Z86.39 V12.29   9. Aphasia  R47.01 784.3     Patient Active Problem List   Diagnosis    Impaired glucose tolerance    Parathyroid adenoma    Reaction to chronic stress    Multinodular goiter    Vitamin D deficiency    Meningioma, recurrent of brain    Arthritis    Depression    Small bowel obstruction    Insomnia    History of Nissen fundoplication    Malignant neoplasm of left orbit    Prediabetes    Mixed hyperlipidemia    Hyperglycemia    Atrial flutter with rapid ventricular response    Anemia    Large bowel obstruction    Abdominal pain    Essential  hypertension    History of creation of ostomy    Screen for colon cancer    Sigmoid volvulus    SHAE (obstructive sleep apnea)    Brain mass     Past Medical History:   Diagnosis Date    Abdominal hernia     Arthritis     rt knee     Atrial flutter with rapid ventricular response 12/21/2017    Back pain     Brain tumor     Colostomy present 08/2018    Depression     History of blood transfusion     History of gastroesophageal reflux (GERD)     resolved since Nissen    History of Nissen fundoplication 7/1/2017    History of small bowel obstruction     Hyperlipemia     Hypertension     Memory loss or impairment     Migraine     Parathyroid adenoma     Incidental on thyroid US.    PONV (postoperative nausea and vomiting)     nausea - preprocedural meds help     Prediabetes     Last Impression: 06 Feb 2015  Reviewed labs. Excellent control.  Maren Connellast Impression: 06 Feb 2015  Reviewed labs. Excellent control.  Michaela Connell    Thyroid nodule      Last Impression: 13 Jun 2015  r/o thyroid cancer, will proceed with US.  Michaela Connell (Internal Medicine)     UTI (urinary tract infection)     Vision changes     blockages left eye     Wears glasses     readers     Past Surgical History:   Procedure Laterality Date    BRAIN SURGERY Left 05/19/2022    BRAIN SURGERY  08/2023    left side done & radiationg a wk. later,  in [place]    CARDIAC CATHETERIZATION N/A 04/27/2020    Procedure: LEFT HEART CATH;  Surgeon: Marina iRng MD;  [facility]:  [facility];  Service: Cardiology;  Laterality: N/A;    CARPAL TUNNEL RELEASE  2002    right     COLONOSCOPY N/A 08/18/2018    Procedure: COLONOSCOPY;  Surgeon: Inderjit Campos MD;  Location:  [facility];  Service: Gastroenterology    COLONOSCOPY N/A 11/28/2018    Procedure: COLONOSCOPY;  Surgeon: Inderjit Campos MD;  Location:  [facility];  Service: Gastroenterology    COLOSTOMY CLOSURE N/A 02/19/2019    Procedure: COLOSTOMY  TAKEDOWN, INCISIONAL HERNIA REPAIR;  Surgeon: Andrea Bocanegra MD;  Location:  [facility];  Service: General    CRANIOTOMY  2003 & 2014    Dr. Werner Hollis for tumor removal    ENDOSCOPY      EXPLORATORY LAPAROTOMY N/A 12/05/2017    Procedure: EXPLORATORY LAPAROTOMY, SMALL BOWEL RESECTION;  Surgeon: Ирина Cobian MD;  Location:  [facility];  Service:     EXPLORATORY LAPAROTOMY N/A 12/22/2017    Procedure: LAPAROTOMY EXPLORATORY FOR SMALL BOWEL OBSTRUCTION;  Surgeon: Ирина Cobian MD;  [facility]:  [facility];  Service:     EXPLORATORY LAPAROTOMY N/A 07/23/2018    Procedure: EXPLORATORY LAPAROTOMY, APPENDECTOMY, CECOPEXY, INCISIONAL HERNIA REPAIR, LYSIS OF ADHESIONS;  Surgeon: Andrea Bocanegra MD;  [facility]:  [facility];  Service: General    EXPLORATORY LAPAROTOMY N/A 08/19/2018    Procedure: LAPAROTOMY EXPLORATORY, SIGMOID COLECTOMY, CREATION OF OSTOMY;  Surgeon: Andrea Bocanegra MD;  [facility]:  [facility];  Service: General    HYSTERECTOMY      partial - both ovaries still present pt believes     INSERTION HEMODIALYSIS CATHETER N/A 12/07/2017    Procedure: HEMODIALYSIS CATHETER INSERTION;  Surgeon: Chance Valenzuela MD;  [facility]:  [facility];  Service:     ORBITOTOMY Left 11/03/2017    Procedure:  LEFT LATERAL ORBITOTOMY WITH DEBULKING OF TUMOR ;  Surgeon: Moo Hopkins MD;  Location:  [facility];  Service:     OTHER SURGICAL HISTORY      esophagogastric fundoplasty nissen fundoplication    TUBAL ABDOMINAL LIGATION         SLP Recommendation and Plan  SLP Swallowing Diagnosis: swallow WFL/no suspected pharyngeal impairment (02/28/24 0910)  SLP Diet Recommendation: regular textures, thin liquids (02/28/24 0910)  Recommended Precautions and Strategies: general aspiration precautions (02/28/24 0910)  SLP Rec. for Method of Medication Administration: meds whole, with thin liquids, meds crushed, with puree, as tolerated (02/28/24 0910)     Monitor for Signs of Aspiration: yes, notify SLP if any concerns  (02/28/24 0910)  Recommended Diagnostics: No further SLP services recommended (02/28/24 0910)  Swallow Criteria for Skilled Therapeutic Interventions Met: no problems identified which require skilled intervention (02/28/24 0910)  Anticipated Discharge Disposition (SLP): home with assist (02/28/24 0910)     Therapy Frequency (Swallow): evaluation only (02/28/24 0910)  Predicted Duration Therapy Intervention (Days): until discharge (02/28/24 0910)  Oral Care Recommendations: Oral Care BID/PRN, Toothbrush (02/28/24 0910)                                               SWALLOW EVALUATION (last 72 hours)       SLP Adult Swallow Evaluation       Row Name 02/28/24 0910                   Rehab Evaluation    Document Type evaluation  -        Subjective Information no complaints  -        Patient Observations alert;cooperative  -        Patient/Family/Caregiver Comments/Observations Family present  -        Patient Effort good  -        Symptoms Noted During/After Treatment none  -           General Information    Patient Profile Reviewed yes  -        Pertinent History Of Current Problem Adm with headache & aphasia. Hx: a-fib, HTN, HLD, depression, GERD meningioma (diagnosed in 2017) s/p cyberknife x2. MRI: new L frontal dural mass w/ surrounding vasogenic edema & mass effect  -        Current Method of Nutrition NPO  -        Precautions/Limitations, Vision vision impairment, left;other (see comments)  baseline per chart  -        Precautions/Limitations, Hearing WFL;for purposes of eval  -        Prior Level of Function-Communication unknown  -        Prior Level of Function-Swallowing no diet consistency restrictions;other (see comments)  Per pt/spouse report  -        Plans/Goals Discussed with patient;family;agreed upon  -        Barriers to Rehab medically complex  -        Patient's Goals for Discharge patient did not state  -        Family Goals for Discharge family did not state  -            Pain    Additional Documentation Pain Scale: FACES Pre/Post-Treatment (Group)  -           Pain Scale: FACES Pre/Post-Treatment    Pain: FACES Scale, Pretreatment 0-->no hurt  -        Posttreatment Pain Rating 0-->no hurt  -           Oral Motor Structure and Function    Dentition Assessment natural, present and adequate  -        Secretion Management WNL/WFL  -        Gag Response WFL  -           Oral Musculature and Cranial Nerve Assessment    Oral Motor General Assessment oral labial or buccal impairment  -        Oral Labial or Buccal Impairment, Detail, Cranial Nerve VII (Facial): left labial droop  -           General Eating/Swallowing Observations    Respiratory Support Currently in Use room air  -        Eating/Swallowing Skills self-fed  -        Positioning During Eating upright in chair  -        Utensils Used spoon;straw;cup  -        Consistencies Trialed ice chips;thin liquids;pureed;regular textures  -           Clinical Swallow Eval    Oral Prep Phase WF  -        Oral Transit WFL  -        Oral Residue WFL  -        Pharyngeal Phase no overt signs/symptoms of pharyngeal impairment  -        Clinical Swallow Evaluation Summary No overt s/s of aspiration accross trials of thin liquid, pureed, or regular solid consistencies; even when pushed for consecutive sips of thin. Adequate oral manipulation/mastication of regular solid and no significant oral residue. Recommend regular diet w/ thin liquids, general aspiration precuations. No acute needs @ this time, SLP will sign off for dysphagia  -           SLP Evaluation Clinical Impression    SLP Swallowing Diagnosis swallow WFL/no suspected pharyngeal impairment  -        Functional Impact no impact on function  -        Swallow Criteria for Skilled Therapeutic Interventions Met no problems identified which require skilled intervention  -           Recommendations    Therapy Frequency (Swallow)  evaluation only  -        SLP Diet Recommendation regular textures;thin liquids  -        Recommended Diagnostics No further SLP services recommended  -        Recommended Precautions and Strategies general aspiration precautions  -        Oral Care Recommendations Oral Care BID/PRN;Toothbrush  -        SLP Rec. for Method of Medication Administration meds whole;with thin liquids;meds crushed;with puree;as tolerated  -        Monitor for Signs of Aspiration yes;notify SLP if any concerns  -        Anticipated Discharge Disposition (SLP) home with assist  -                  User Key  (r) = Recorded By, (t) = Taken By, (c) = Cosigned By      Initials Name Effective Dates     OdinOlga, MS [facility]-SLP 05/12/23 -                     EDUCATION  The patient has been educated in the following areas:   Dysphagia (Swallowing Impairment).        [facility] GOALS       Row Name 02/28/24 0974             Patient will demonstrate functional language skills for return to discharge environment     [place] [facility]  -      Time frame by discharge  -      Progress/Outcomes new goal  -         SLP Diagnostic Treatment     Patient will participate in further assessment in the following areas reading comprehension;graphic expression;cognitive-linguistic  -      Time Frame (Diagnostic) short term goal (STG)  -      Progress/Outcomes (Additional Goal 1, SLP) new goal  -         Word Retrieval Skills Goal 1 (SLP)    Improve Word Retrieval Skills By Goal 1 (SLP) confrontational naming task;responsive naming task;completing open ended unstructured sentence;completing functional word finding tasks;supplying an antonym;supplying a synonym;80%;with minimal cues (75-90%)  -      Time Frame (Word Retrieval Goal 1, SLP) short term goal (STG)  -      Progress/Outcomes (Word Retrieval Goal 1, SLP) new goal  -                User Key  (r) = Recorded By, (t) = Taken By, (c) = Cosigned By      Initials Name  Provider Type    Olga Watson MS CCC-SLP Speech and Language Pathologist                       Time Calculation:    Time Calculation- SLP       Row Name 02/28/24 0949             Time Calculation- SLP    SLP Start Time 0910  -      SLP Received On 02/28/24  -         Untimed Charges    41941-AO Eval Speech and Production w/ Language Minutes 35  -MH      88862-LQ Eval Oral Pharyng Swallow Minutes 35  -MH         Total Minutes    Untimed Charges Total Minutes 70  -MH       Total Minutes 70  -MH                User Key  (r) = Recorded By, (t) = Taken By, (c) = Cosigned By      Initials Name Provider Type     Olga Newby, MS CCC-SLP Speech and Language Pathologist                    Therapy Charges for Today       Code Description Service Date Service Provider Modifiers Qty    80828640658 HC ST EVAL ORAL PHARYNG SWALLOW 2 2/28/2024 Olga Newby MS CCC-SLP GN 1    13322895852 HC ST EVAL SPEECH AND PROD W LANG  2 2/28/2024 Olga Newby, MS VALENZUELASLP GN 1                 MS KEMI Jeffery  2/28/2024

## 2024-02-28 NOTE — PLAN OF CARE
Goal Outcome Evaluation:                     Anticipated Discharge Disposition (SLP): home with assist    SLP Diagnosis: mild-moderate, aphasia (02/28/24 0910)    SLP Swallowing Diagnosis: swallow WFL/no suspected pharyngeal impairment (02/28/24 0910)

## 2024-02-28 NOTE — THERAPY DISCHARGE NOTE
Acute Care - Occupational Therapy Discharge  [facility]    Patient Name: Tereza Ackerman  : 1955    MRN: [id number]                              Today's Date: 2024       Admit Date: 2024    Visit Dx:     ICD-10-CM ICD-9-CM   1. Brain mass  G93.89 348.89   2. Vasogenic brain edema  G93.6 348.5   3. Sphenoid mass, left  J34.89 478.19   4. Meningioma  D32.9 225.2   5. Left-sided headache  R51.9 784.0   6. Vision loss of left eye  H54.62 369.8   7. History of hypertension  Z86.79 V12.59   8. History of hyperlipidemia  Z86.39 V12.29     Patient Active Problem List   Diagnosis    Impaired glucose tolerance    Parathyroid adenoma    Reaction to chronic stress    Multinodular goiter    Vitamin D deficiency    Meningioma, recurrent of brain    Arthritis    Depression    Small bowel obstruction    Insomnia    History of Nissen fundoplication    Malignant neoplasm of left orbit    Prediabetes    Mixed hyperlipidemia    Hyperglycemia    Atrial flutter with rapid ventricular response    Anemia    Large bowel obstruction    Abdominal pain    Essential hypertension    History of creation of ostomy    Screen for colon cancer    Sigmoid volvulus    SHAE (obstructive sleep apnea)    Brain mass     Past Medical History:   Diagnosis Date    Abdominal hernia     Arthritis     rt knee     Atrial flutter with rapid ventricular response 2017    Back pain     Brain tumor     Colostomy present 2018    Depression     History of blood transfusion     History of gastroesophageal reflux (GERD)     resolved since Nissen    History of Nissen fundoplication 2017    History of small bowel obstruction     Hyperlipemia     Hypertension     Memory loss or impairment     Migraine     Parathyroid adenoma     Incidental on thyroid US.    PONV (postoperative nausea and vomiting)     nausea - preprocedural meds help     Prediabetes     Last Impression: 2015  Reviewed labs. Excellent control.  Emery Connell  Impression: 06 Feb 2015  Reviewed labs. Excellent control.  Michaela Connell    Thyroid nodule      Last Impression: 13 Jun 2015  r/o thyroid cancer, will proceed with US.  Michaela Connell (Internal Medicine)     UTI (urinary tract infection)     Vision changes     blockages left eye     Wears glasses     readers     Past Surgical History:   Procedure Laterality Date    BRAIN SURGERY Left 05/19/2022    BRAIN SURGERY  08/2023    left side done & radiationg a wk. later,  in [place]    CARDIAC CATHETERIZATION N/A 04/27/2020    Procedure: LEFT HEART CATH;  Surgeon: Marina Ring MD;  Location:  [facility];  Service: Cardiology;  Laterality: N/A;    CARPAL TUNNEL RELEASE  2002    right     COLONOSCOPY N/A 08/18/2018    Procedure: COLONOSCOPY;  Surgeon: Inderjit Campos MD;  Location:  [facility];  Service: Gastroenterology    COLONOSCOPY N/A 11/28/2018    Procedure: COLONOSCOPY;  Surgeon: Inderjit Campos MD;  Location:  [facility];  Service: Gastroenterology    COLOSTOMY CLOSURE N/A 02/19/2019    Procedure: COLOSTOMY TAKEDOWN, INCISIONAL HERNIA REPAIR;  Surgeon: Andrea Bocanegra MD;  Location:  [facility];  Service: General    CRANIOTOMY  2003 & 2014    Dr. Werner Hollis for tumor removal    ENDOSCOPY      EXPLORATORY LAPAROTOMY N/A 12/05/2017    Procedure: EXPLORATORY LAPAROTOMY, SMALL BOWEL RESECTION;  Surgeon: Ирина Cobian MD;  Location:  [facility];  Service:     EXPLORATORY LAPAROTOMY N/A 12/22/2017    Procedure: LAPAROTOMY EXPLORATORY FOR SMALL BOWEL OBSTRUCTION;  Surgeon: Ирина Cobian MD;  Location:  [facility];  Service:     EXPLORATORY LAPAROTOMY N/A 07/23/2018    Procedure: EXPLORATORY LAPAROTOMY, APPENDECTOMY, CECOPEXY, INCISIONAL HERNIA REPAIR, LYSIS OF ADHESIONS;  Surgeon: Andrea Bocanegra MD;  Location:  [facility];  Service: General    EXPLORATORY LAPAROTOMY N/A 08/19/2018    Procedure: LAPAROTOMY EXPLORATORY, SIGMOID COLECTOMY, CREATION OF OSTOMY;   Surgeon: Andrea Bocanegra MD;  Location:  [facility];  Service: General    HYSTERECTOMY      partial - both ovaries still present pt believes     INSERTION HEMODIALYSIS CATHETER N/A 12/07/2017    Procedure: HEMODIALYSIS CATHETER INSERTION;  Surgeon: Chance Valenzuela MD;  Location:  [facility];  Service:     ORBITOTOMY Left 11/03/2017    Procedure:  LEFT LATERAL ORBITOTOMY WITH DEBULKING OF TUMOR ;  Surgeon: Moo Hopkins MD;  Location:  [facility];  Service:     OTHER SURGICAL HISTORY      esophagogastric fundoplasty nissen fundoplication    TUBAL ABDOMINAL LIGATION        General Information       Row Name 02/28/24 0821          OT Time and Intention    Document Type discharge evaluation/summary  -CS     Mode of Treatment occupational therapy  -CS       Row Name 02/28/24 0821          General Information    Patient Profile Reviewed yes  -CS     Prior Level of Function independent:;all household mobility;transfer;ADL's  Pt's spouse does most driving however, Pt can still legally drive with visual deficit. Shower stool in place for safe bathing tasks. No AD reported for baseline mobility.  -CS     Existing Precautions/Restrictions other (see comments)  L eye blindness, recurrent brain meningioma w/ long history of surgical interventions  -CS     Barriers to Rehab medically complex;cognitive status;visual deficit  -CS       Row Name 02/28/24 0821          Living Environment    People in Home spouse  -CS       Row Name 02/28/24 0821          Home Main Entrance    Number of Stairs, Main Entrance one  -CS       Row Name 02/28/24 0821          Stairs Within Home, Primary    Stairs, Within Home, Primary Flight of stairs to second floor bed and bath  -CS     Number of Stairs, Within Home, Primary twelve  -CS       Row Name 02/28/24 0821          Cognition    Orientation Status (Cognition) oriented to;person;place  knew hospital  -CS       Row Name 02/28/24 0821          Safety Issues, Functional Mobility    Safety  Issues Affecting Function (Mobility) insight into deficits/self-awareness;safety precaution awareness;safety precautions follow-through/compliance  -CS     Impairments Affecting Function (Mobility) cognition;visual/perceptual  -CS     Cognitive Impairments, Mobility Safety/Performance awareness, need for assistance;insight into deficits/self-awareness;problem-solving/reasoning;sequencing abilities  -CS               User Key  (r) = Recorded By, (t) = Taken By, (c) = Cosigned By      Initials Name Provider Type    CS Leonides Oglesby OT Occupational Therapist                   Mobility/ADL's       Row Name 02/28/24 0827          Bed Mobility    Bed Mobility supine-sit;scooting/bridging  -CS     Scooting/Bridging [place] (Bed Mobility) independent  -CS     Supine-Sit [place] (Bed Mobility) modified independence  -CS     Comment, (Bed Mobility) appropriate sequencing intact, no dizziness reported with sitting  -CS       Row Name 02/28/24 0827          Transfers    Transfers toilet transfer;bed-chair transfer  -CS     Comment, (Transfers) no dizziness reported  -       Row Name 02/28/24 0827          Bed-Chair Transfer    Bed-Chair [place] (Transfers) supervision  -       Row Name 02/28/24 0827          Toilet Transfer    Type (Toilet Transfer) stand-sit;sit-stand  -     [place] Level (Toilet Transfer) supervision  -       Row Name 02/28/24 0827          Functional Mobility    Functional Mobility- Ind. Level supervision required  -     Functional Mobility- Comment No AD or LOB during in-room distance to toilet, sinkside, and recliner  -       Row Name 02/28/24 0827          Activities of Daily Living    BADL Assessment/Intervention lower body dressing;grooming  -       Row Name 02/28/24 0827          Lower Body Dressing Assessment/Training    [place] Level (Lower Body Dressing) don;socks  -CS     Position (Lower Body Dressing) edge of bed sitting  -CS     Comment, (Lower Body  Dressing) reach to distal BLEs intact  -       Row Name 02/28/24 0827          Grooming Assessment/Training    [place] Level (Grooming) hair care, combing/brushing;wash face, hands;supervision  -     Position (Grooming) sink side  -               User Key  (r) = Recorded By, (t) = Taken By, (c) = Cosigned By      Initials Name Provider Type     Leonides Oglesby OT Occupational Therapist                   Obj/Interventions       Row Name 02/28/24 0830          Sensory Assessment (Somatosensory)    Sensory Assessment (Somatosensory) UE sensation intact;bilateral UE  -CS     Bilateral UE Sensory Assessment general sensation;light touch awareness;light touch localization;proprioception;intact  -       Row Name 02/28/24 0830          Vision Assessment/Intervention    Visual Impairment/Limitations visual/perceptual impairments present;peripheral vision impaired left;other (see comments)  -     Vision Assessment Comment L eye blind  -       Row Name 02/28/24 0830          Motor Skills    Motor Skills coordination;functional endurance  -     Coordination bimanual skills;WFL  -     Functional Endurance O2 sats stable on RA  -       Row Name 02/28/24 0830          Balance    Balance Assessment sitting static balance;sitting dynamic balance;standing static balance;standing dynamic balance  -     Static Sitting Balance independent  -     Dynamic Sitting Balance supervision  -CS     Position, Sitting Balance unsupported;sitting edge of bed  -     Static Standing Balance supervision  -     Dynamic Standing Balance standby assist  -     Balance Interventions sitting;sit to stand;standing;occupation based/functional task  -     Comment, Balance no LOB during ADL completion or related mobility  -               User Key  (r) = Recorded By, (t) = Taken By, (c) = Cosigned By      Initials Name Provider Type    Leonides Morillo OT Occupational Therapist                   Goals/Plan    No  documentation.                  Clinical Impression       Row Name 02/28/24 0831          Pain Assessment    Pretreatment Pain Rating 0/10 - no pain  -CS     Posttreatment Pain Rating 0/10 - no pain  -CS       Row Name 02/28/24 0831          Plan of Care Review    Plan of Care Reviewed With patient  -CS     Progress no change  -CS     Outcome Evaluation Pt presents at/near recent baseline for ADL performance and related mobility with symmetrical BUE strength and coordination/sensation intact. Intermittent confusion observed, hypertensive but stable. OT signing off, defer to PT for any further mobility needs. Ongoing POC regarding pending surgical intervention, will monitor and re-evaluate as needed. Rec d/c to home with supervision based on today's performance.  -CS       Row Name 02/28/24 0831          Therapy Assessment/Plan (OT)    Criteria for Skilled Therapeutic Interventions Met (OT) no;does not meet criteria for skilled intervention  -CS     Therapy Frequency (OT) evaluation only  -CS       Row Name 02/28/24 0831          Therapy Plan Review/Discharge Plan (OT)    Anticipated Discharge Disposition (OT) home with supervision  -       Row Name 02/28/24 0831          Vital Signs    Pre Systolic BP Rehab 147  RN cleared for eval  -CS     Pre Treatment Diastolic BP 83  -CS     Post Systolic BP Rehab 161  -CS     Post Treatment Diastolic   -CS     O2 Delivery Pre Treatment room air  -CS     O2 Delivery Intra Treatment room air  -CS     O2 Delivery Post Treatment room air  -CS     Pre Patient Position Supine  -CS     Intra Patient Position Standing  -CS     Post Patient Position Sitting  -CS       Row Name 02/28/24 0831          Positioning and Restraints    Pre-Treatment Position in bed  -CS     Post Treatment Position chair  -CS     In Chair notified nsg;reclined;call light within reach;sitting;encouraged to call for assist;exit alarm on;with family/caregiver;waffle cushion;legs elevated  -CS                User Key  (r) = Recorded By, (t) = Taken By, (c) = Cosigned By      Initials Name Provider Type    CS Leonides Oglesby OT Occupational Therapist                   Outcome Measures       Row Name 02/28/24 0842          How much help from another is currently needed...    Putting on and taking off regular lower body clothing? 4  -CS     Bathing (including washing, rinsing, and drying) 4  -CS     Toileting (which includes using toilet bed pan or urinal) 4  -CS     Putting on and taking off regular upper body clothing 4  -CS     Taking care of personal grooming (such as brushing teeth) 4  -CS     Eating meals 4  -CS     AM-PAC 6 Clicks Score (OT) 24  -CS       Row Name 02/28/24 0104          How much help from another person do you currently need...    Turning from your back to your side while in flat bed without using bedrails? 4  -MC     Moving from lying on back to sitting on the side of a flat bed without bedrails? 4  -MC     Moving to and from a bed to a chair (including a wheelchair)? 4  -MC     Standing up from a chair using your arms (e.g., wheelchair, bedside chair)? 3  -MC     Climbing 3-5 steps with a railing? 3  -MC     To walk in hospital room? 4  -MC     AM-PAC 6 Clicks Score (PT) 22  -MC     Highest Level of Mobility Goal 7 --> Walk 25 feet or more  -       Row Name 02/28/24 0842          Modified Bessie Scale    Pre-Stroke Modified Maybeury Scale 2 - Slight disability.  Unable to carry out all previous activities but able to look after own affairs without assistance.  -       Row Name 02/28/24 0842          Functional Assessment    Outcome Measure Options AM-PAC 6 Clicks Daily Activity (OT);Modified Maybeury  -CS               User Key  (r) = Recorded By, (t) = Taken By, (c) = Cosigned By      Initials Name Provider Type    Leonides Morillo OT Occupational Therapist    Ernesto Jenkins, RN Registered Nurse                  Occupational Therapy Education       Title: PT OT SLP Therapies (In  Progress)       Topic: Occupational Therapy (Done)       Point: ADL training (Done)       Description:   Instruct learner(s) on proper safety adaptation and remediation techniques during self care or transfers.   Instruct in proper use of assistive devices.                  Learning Progress Summary             Patient Acceptance, E,D, VU,DU by CS at 2/28/2024 0845    Comment: in-room safety awareness   Significant Other Acceptance, E,D, VU,DU by CS at 2/28/2024 0845    Comment: in-room safety awareness                         Point: Home exercise program (Done)       Description:   Instruct learner(s) on appropriate technique for monitoring, assisting and/or progressing therapeutic exercises/activities.                  Learning Progress Summary             Patient Acceptance, E,D, VU,DU by CS at 2/28/2024 0845    Comment: in-room safety awareness   Significant Other Acceptance, E,D, VU,DU by CS at 2/28/2024 0845    Comment: in-room safety awareness                         Point: Precautions (Done)       Description:   Instruct learner(s) on prescribed precautions during self-care and functional transfers.                  Learning Progress Summary             Patient Acceptance, E,D, VU,DU by CS at 2/28/2024 0845    Comment: in-room safety awareness   Significant Other Acceptance, E,D, VU,DU by CS at 2/28/2024 0845    Comment: in-room safety awareness                         Point: Body mechanics (Done)       Description:   Instruct learner(s) on proper positioning and spine alignment during self-care, functional mobility activities and/or exercises.                  Learning Progress Summary             Patient Acceptance, E,D, VU,DU by CS at 2/28/2024 0845    Comment: in-room safety awareness   Significant Other Acceptance, E,D, VU,DU by CS at 2/28/2024 0845    Comment: in-room safety awareness                                         User Key       Initials Effective Dates Name Provider Type Discipline    CS  06/16/21 -  Leonides Oglesby OT Occupational Therapist OT                  OT Recommendation and Plan  Therapy Frequency (OT): evaluation only  Plan of Care Review  Plan of Care Reviewed With: patient  Progress: no change  Outcome Evaluation: Pt presents at/near recent baseline for ADL performance and related mobility with symmetrical BUE strength and coordination/sensation intact. Intermittent confusion observed, hypertensive but stable. OT signing off, defer to PT for any further mobility needs. Ongoing POC regarding pending surgical intervention, will monitor and re-evaluate as needed. Rec d/c to home with supervision based on today's performance.  Plan of Care Reviewed With: patient  Outcome Evaluation: Pt presents at/near recent baseline for ADL performance and related mobility with symmetrical BUE strength and coordination/sensation intact. Intermittent confusion observed, hypertensive but stable. OT signing off, defer to PT for any further mobility needs. Ongoing POC regarding pending surgical intervention, will monitor and re-evaluate as needed. Rec d/c to home with supervision based on today's performance.     Time Calculation:   Evaluation Complexity (OT)  Review Occupational Profile/Medical/Therapy History Complexity: expanded/moderate complexity  Assessment, Occupational Performance/Identification of Deficit Complexity: 3-5 performance deficits  Clinical Decision Making Complexity (OT): problem focused assessment/low complexity  Overall Complexity of Evaluation (OT): low complexity     Time Calculation- OT       Row Name 02/28/24 0845             Time Calculation- OT    OT Start Time 0736  -CS      OT Received On 02/28/24  -CS         Untimed Charges    OT Eval/Re-eval Minutes 47  -CS         Total Minutes    Untimed Charges Total Minutes 47  -CS       Total Minutes 47  -CS                User Key  (r) = Recorded By, (t) = Taken By, (c) = Cosigned By      Initials Name Provider Type    Leonides Morillo  [place] OT Occupational Therapist                  Therapy Charges for Today       Code Description Service Date Service Provider Modifiers Qty    [id number] HC OT EVAL LOW COMPLEXITY 4 2/28/2024 Leonides Oglesby OT GO 1               OT Discharge Summary  Anticipated Discharge Disposition (OT): home with supervision  Reason for Discharge: At baseline function, other (comment) (for ADL completion)  Discharge Destination: Home, Home with assist    Leonides Oglesby OT  2/28/2024

## 2024-02-28 NOTE — PLAN OF CARE
Goal Outcome Evaluation:  Plan of Care Reviewed With: patient, spouse        Progress: no change  Outcome Evaluation: PT initial eval completed. Pt presents below her functional baseline with balance and endurance deficits. She ambulated 180' with CGA and UE support. 2 near LOB's noted with pt able to self correct. Further IPPT is warrented. PT rec d/c home with assist when medically appropriate.      Anticipated Discharge Disposition (PT): home with assist

## 2024-02-28 NOTE — NURSING NOTE
0500 verbal notification at the bed side with stoke NP about blood pressure 190/90. Allowing permissive hypertension at this time.    0545 call to stroke team to clarify BP goals. Orders to keep BP less than 220

## 2024-02-28 NOTE — CONSULTS
Stroke Consult Note    Patient Name: Tereza Ackerman   MRN: [id number]  Age: 69 y.o.  Sex: female  : 1955    Primary Care Physician: Michaela Connell MD  Referring Physician: Dr. Betts    TIME STROKE TEAM CALLED: 0419 EST     TIME PATIENT SEEN: 0420 EST    Handedness: Right  Race: White    Chief Complaint/Reason for Consultation: Left facial droop and speech difficulty    HPI: Ms. Ackerman is a 69-year-old female with PMH of HTN, HLD, atrial flutter, depression, GERD, meningioma ().  She presented to [facility] ED with left-sided headache x 1 week.  CT/MRI scans revealed enlargement of known brain mass with vasogenic edema.  ER physician reports  mentioned changes to patient's speech and cognition.  Patient was admitted on behalf of neurosurgery recommendations.  Decadron IV and Pepcid IV ordered by neurosurgery team.    Of note, patient has received 2 CyberKnife procedures at [facility].  She now follows with neurology at [facility] and had a scheduled follow-up appointment 2023 but was unable to attend due to inclement weather.  Follow-up appointment now scheduled for 3/2024.    Code stroke was called this morning for acute onset of left facial droop and speech difficulty.  Patient was taken urgently to CT scanner for CT head, CTA head and neck and cerebral perfusion.  On-call radiologist reported no new changes from prior CT head.    Initial NIH 5.  Blood pressure 171/81.  On exam patient is able answer 1 question appropriately and follow two-step commands.  Denies any new sensory deficits.  Chronic vision loss to left eye.  No headache, nausea, falls or recent trauma.  Left facial droop with expressive aphasia and mild dysarthria noted during conversation.  Strength in all extremities 5/5.  Non-smoker and no EtOH use. Takes prescribed medications routinely.    Last Known Normal Date/Time: 2024 at 0300 EST     Review of Systems   Constitutional:  Negative for fever.   HENT:  Negative  for trouble swallowing and voice change.    Eyes:  Positive for visual disturbance (Chronic vision loss to left eye).   Respiratory:  Negative for shortness of breath.    Cardiovascular:  Negative for chest pain.   Gastrointestinal:  Negative for abdominal pain and nausea.   Neurological:  Positive for facial asymmetry and speech difficulty. Negative for dizziness, weakness, light-headedness, numbness and headaches.   Psychiatric/Behavioral:  Positive for confusion.       Past Medical History:   Diagnosis Date    Abdominal hernia     Arthritis     rt knee     Atrial flutter with rapid ventricular response 12/21/2017    Back pain     Brain tumor     Colostomy present 08/2018    Depression     History of blood transfusion     History of gastroesophageal reflux (GERD)     resolved since Nissen    History of Nissen fundoplication 7/1/2017    History of small bowel obstruction     Hyperlipemia     Hypertension     Memory loss or impairment     Migraine     Parathyroid adenoma     Incidental on thyroid US.    PONV (postoperative nausea and vomiting)     nausea - preprocedural meds help     Prediabetes     Last Impression: 06 Feb 2015  Reviewed labs. Excellent control.  Emery Connell Impression: 06 Feb 2015  Reviewed labs. Excellent control.  Michaela Connell    Thyroid nodule      Last Impression: 13 Jun 2015  r/o thyroid cancer, will proceed with US.  Michaela Connell (Internal Medicine)     UTI (urinary tract infection)     Vision changes     blockages left eye     Wears glasses     readers     Past Surgical History:   Procedure Laterality Date    BRAIN SURGERY Left 05/19/2022    BRAIN SURGERY  08/2023    left side done & radiationg a wk. later,  in [place]    CARDIAC CATHETERIZATION N/A 04/27/2020    Procedure: LEFT HEART CATH;  Surgeon: Marina Ring MD;  Location: [facility] CATH INVASIVE LOC[facility];  Service: Cardiology;  Laterality: N/A;    CARPAL TUNNEL RELEASE  2002    right     COLONOSCOPY N/A  08/18/2018    Procedure: COLONOSCOPY;  Surgeon: Inderjit Campos MD;  Location:  [facility];  Service: Gastroenterology    COLONOSCOPY N/A 11/28/2018    Procedure: COLONOSCOPY;  Surgeon: Inderjit Campos MD;  Location:  [facility];  Service: Gastroenterology    COLOSTOMY CLOSURE N/A 02/19/2019    Procedure: COLOSTOMY TAKEDOWN, INCISIONAL HERNIA REPAIR;  Surgeon: Andrea Bocanegra MD;  Location:  [facility];  Service: General    CRANIOTOMY  2003 & 2014    Dr. Werner Hollis for tumor removal    ENDOSCOPY      EXPLORATORY LAPAROTOMY N/A 12/05/2017    Procedure: EXPLORATORY LAPAROTOMY, SMALL BOWEL RESECTION;  Surgeon: Ирина Cobian MD;  Location:  [facility];  Service:     EXPLORATORY LAPAROTOMY N/A 12/22/2017    Procedure: LAPAROTOMY EXPLORATORY FOR SMALL BOWEL OBSTRUCTION;  Surgeon: Ирина Cobian MD;  Location:  [facility];  Service:     EXPLORATORY LAPAROTOMY N/A 07/23/2018    Procedure: EXPLORATORY LAPAROTOMY, APPENDECTOMY, CECOPEXY, INCISIONAL HERNIA REPAIR, LYSIS OF ADHESIONS;  Surgeon: Andrea Bocanegra MD;  Location:  [facility];  Service: General    EXPLORATORY LAPAROTOMY N/A 08/19/2018    Procedure: LAPAROTOMY EXPLORATORY, SIGMOID COLECTOMY, CREATION OF OSTOMY;  Surgeon: Andrea Bocanegra MD;  Location:  [facility];  Service: General    HYSTERECTOMY      partial - both ovaries still present pt believes     INSERTION HEMODIALYSIS CATHETER N/A 12/07/2017    Procedure: HEMODIALYSIS CATHETER INSERTION;  Surgeon: Chance Valenzuela MD;  Location:  [facility];  Service:     ORBITOTOMY Left 11/03/2017    Procedure:  LEFT LATERAL ORBITOTOMY WITH DEBULKING OF TUMOR ;  Surgeon: Moo Hopkins MD;  Location:  [facility];  Service:     OTHER SURGICAL HISTORY      esophagogastric fundoplasty nissen fundoplication    TUBAL ABDOMINAL LIGATION       Family History   Problem Relation Age of Onset    Obesity Mother     Heart attack Mother     Heart disease Mother     Migraines Father     Stroke Father      Diabetes Father     Cancer Other     Arthritis Other     Diabetes Other     Breast cancer Maternal Aunt     Breast cancer Paternal Aunt     Ovarian cancer Neg Hx      Social History     Socioeconomic History    Marital status:    Tobacco Use    Smoking status: Never    Smokeless tobacco: Never   Vaping Use    Vaping Use: Never used   Substance and Sexual Activity    Alcohol use: No    Drug use: No    Sexual activity: Defer     Allergies   Allergen Reactions    Dilantin [Phenytoin Sodium Extended] Rash     Prior to Admission medications    Medication Sig Start Date End Date Taking? Authorizing Provider   aspirin 81 MG tablet Take 1 tablet by mouth Daily.   Yes Alli Aguirre MD   busPIRone (BUSPAR) 10 MG tablet Take 1 tablet by mouth 2 (Two) Times a Day. 2/2/24  Yes Michaela Connell MD   carvedilol (COREG) 12.5 MG tablet TAKE ONE TABLET BY MOUTH EVERY 12 HOURS  Patient taking differently: Take 0.5 tablets by mouth 2 (Two) Times a Day With Meals. 8/18/23  Yes Alli Aguirre MD   cholecalciferol (VITAMIN D3) 1000 units tablet Take 2 tablets by mouth Every Other Day.   Yes ProviderBabatunde MD   dorzolamide (TRUSOPT) 2 % ophthalmic solution Administer 1 drop into the left eye 2 (Two) Times a Day.   Yes Babatunde Horn MD   lisinopril (PRINIVIL,ZESTRIL) 2.5 MG tablet Take 1 tablet by mouth Daily. 2/2/24  Yes Michaela Connell MD   LORazepam (Ativan) 0.5 MG tablet Take 0.5-1 tablets by mouth Daily As Needed for Anxiety. 5/8/20  Yes Michaela Connell MD   omeprazole (priLOSEC) 20 MG capsule Take 1 capsule by mouth Daily. 2/2/24  Yes Michaela Connell MD   ondansetron ODT (ZOFRAN-ODT) 4 MG disintegrating tablet Place 1 tablet on the tongue Every 4 (Four) Hours. As needed for nausea 3/26/21  Yes Inderjit Santizo MD   QUEtiapine (SEROquel) 25 MG tablet Take 0.5 tablets by mouth every night at bedtime. 2/2/24  Yes Michaela Connell MD   rosuvastatin (CRESTOR) 40 MG tablet Take 1 tablet by mouth Daily. 2/2/24   Yes Michaela Connell MD   sertraline (ZOLOFT) 100 MG tablet Take 1 tablet by mouth Daily. 2/2/24  Yes Michaela Connell MD   thiamine (VITAMIN B-1) 100 MG tablet tablet Take 1 tablet by mouth Every Other Day. 1/17/23  Yes Michaela Connell MD   timolol (TIMOPTIC) 0.5 % ophthalmic solution 1 drop 2 (Two) Times a Day. 6/10/21  Yes Provider, MD Babatunde         Temp:  [97.5 °F (36.4 °C)-98.4 °F (36.9 °C)] 97.5 °F (36.4 °C)  Heart Rate:  [62-73] 66  Resp:  [16-18] 16  BP: (141-181)/() 171/81  Neurological Exam  Mental Status  Awake and alert. Oriented only to person and time. Mild dysarthria present. Expressive aphasia present.    Cranial Nerves  CN II: Vision test: PERRLA right eye, left eye nonreactive. Right visual acuity: Normal. Left visual acuity: Finger movement.  CN III, IV, VI: Extraocular movements intact bilaterally.  CN V: Facial sensation is normal.  CN VII: Full and symmetric facial movement.  CN VIII: Hearing intact.  CN XI: Shoulder shrug strength is normal.  CN XII: Tongue midline without atrophy or fasciculations.    Motor  Normal muscle bulk throughout. Normal muscle tone. Strength is 5/5 throughout all four extremities.    Sensory  Light touch is normal in upper and lower extremities.     Coordination  Right: Finger-to-nose normal. Heel-to-shin normal.Left: Finger-to-nose normal. Heel-to-shin normal.    Gait    Unable to assess.      Physical Exam  Constitutional:       General: She is awake. She is not in acute distress.     Appearance: Normal appearance.   HENT:      Head: Normocephalic and atraumatic.      Nose: Nose normal.      Mouth/Throat:      Mouth: Mucous membranes are dry.   Eyes:      Extraocular Movements: Extraocular movements intact.      Comments: PERRLA right eye, left eye nonreactive   Cardiovascular:      Pulses: Normal pulses.   Pulmonary:      Effort: Pulmonary effort is normal.   Abdominal:      General: Abdomen is flat.   Musculoskeletal:         General: Normal range  of motion.      Cervical back: Normal range of motion.   Skin:     General: Skin is warm and dry.   Neurological:      Mental Status: She is alert.      Cranial Nerves: Cranial nerve deficit and dysarthria present.      Sensory: No sensory deficit.      Motor: Motor strength is normal.No weakness.      Coordination: Coordination normal.   Psychiatric:         Mood and Affect: Mood normal.         Behavior: Behavior normal.         Acute Stroke Data    Thrombolytic Inclusion / Exclusion Criteria    Time: 05:18 EST  Person Administering Scale: AYDEE aYnez    Inclusion Criteria  [x]   18 years of age or greater   [x]   Onset of symptoms < 4.5 hours before beginning treatment (stroke onset = time patient was last seen well or without symptoms).   []   Diagnosis of acute ischemic stroke causing measurable disabling deficit (Complete Hemianopia, Any Aphasia, Visual or Sensory Extinction, Any weakness limiting sustained effort against gravity)   []   Any remaining deficit considered potentially disabling in view of patient and practitioner   Exclusion criteria (Do not proceed with Alteplase if any are checked under exclusion criteria)  []   Onset unknown or GREATER than 4.5 hours   []   ICH on CT/MRI   []   CT demonstrates hypodensity representing acute or subacute infarct   []   Significant head trauma or prior stroke in the previous 3 months   []   Symptoms suggestive of subarachnoid hemorrhage   []   History of un-ruptured intracranial aneurysm GREATER than 10 mm   []   Recent intracranial or intraspinal surgery within the last 3 months   []   Arterial puncture at a non-compressible site in the previous 7 days   []   Active internal bleeding   []   Acute bleeding tendency   []   Platelet count LESS than 100,000 for known hematological diseases such as leukemia, thrombocytopenia or chronic cirrhosis   []   Current use of anticoagulant with INR GREATER than 1.7 or PT GREATER than 15 seconds, aPTT GREATER than 40  seconds   []   Heparin received within 48 hours, resulting in abnormally elevated aPTT GREATER than upper limit of normal   []   Current use of direct thrombin inhibitors or direct factor Xa inhibitors in the past 48 hours   []   Elevated blood pressure refractory to treatment (systolic GREATER than 185 mm/Hg or diastolic  GREATER than 110 mm/Hg   []   Suspected infective endocarditis and aortic arch dissection   []   Current use of therapeutic treatment dose of low-molecular-weight heparin (LMWH) within the previous 24 hours   []   Structural GI malignancy or bleed   Relative exclusion for all patients  []   Only minor non-disabling symptoms   []   Pregnancy   []   Seizure at onset with postictal residual neurological impairments   []   Major surgery or previous trauma within past 14 days   [x]   History of previous spontaneous ICH, intracranial neoplasm, or AV malformation   []   Postpartum (within previous 14 days)   []   Recent GI or urinary tract hemorrhage (within previous 21 days)   []   Recent acute MI (within previous 3 months)   []   History of un-ruptured intracranial aneurysm LESS than 10 mm   []   History of ruptured intracranial aneurysm   []   Blood glucose LESS than 50 mg/dL (2.7 mmol/L)   []   Dural puncture within the last 7 days   []   Known GREATER than 10 cerebral microbleeds   Additional exclusions for patients with symptoms onset between 3 and 4.5 hours.  []   Age > 80.   []   On any anticoagulants regardless of INR  >>> Warfarin (Coumadin), Heparin, Enoxaparin (Lovenox), fondaparinux (Arixtra), bivalirudin (Angiomax), Argatroban, dabigatran (Pradaxa), rivaroxaban (Xarelto), or apixaban (Eliquis)   []   Severe stroke (NIHSS > 25).   []   History of BOTH diabetes and previous ischemic stroke.   []   The risks and benefits have been discussed with the patient or family related to the administration of IV thrombolytic therapy for stroke symptoms.   []   I have discussed and reviewed the patient's  case and imaging with the attending prior to IV thrombolytic therapy.   NA Time IV thrombolytic administered       Hospital Meds:  Scheduled- busPIRone, 10 mg, Oral, BID  carvedilol, 12.5 mg, Oral, Q12H  cholecalciferol, 2,000 Units, Oral, Every Other Day  dexAMETHasone, 4 mg, Intravenous, Q6H  dorzolamide, 1 drop, Left Eye, BID  enalaprilat, 1.25 mg, Intravenous, Once  famotidine, 20 mg, Intravenous, Q12H  lisinopril, 2.5 mg, Oral, Daily  QUEtiapine, 12.5 mg, Oral, Nightly  rosuvastatin, 40 mg, Oral, Daily  sertraline, 100 mg, Oral, Daily  sodium chloride, 10 mL, Intravenous, Q12H  thiamine, 100 mg, Oral, Every Other Day      Infusions-     PRNs-   acetaminophen **OR** acetaminophen **OR** acetaminophen    senna-docusate sodium **AND** polyethylene glycol **AND** bisacodyl **AND** bisacodyl    LORazepam    nitroglycerin    [COMPLETED] Insert Peripheral IV **AND** sodium chloride    sodium chloride    sodium chloride    Functional Status Prior to Current Stroke/Bessie Score:   MODIFIED BESSIE SCALE (to be assessed for each patient having history of stroke) []Stroke history but not assessed  []0: No symptoms at all  [x]1: No significant disability despite symptoms  []2: Slight disability  []3: Moderate disability  []4: Moderately severe disability  []5: Severe disability  []6: Death        NIH Stroke Scale  Time: 05:19 EST  Person Administering Scale: AYDEE Yanez  Interval: baseline  1a. Level of Consciousness: 0-->Alert, keenly responsive  1b. LOC Questions: 1-->Answers one question correctly  1c. LOC Commands: 0-->Performs both tasks correctly  2. Best Gaze: 0-->Normal  3. Visual: 1-->Partial hemianopia  4. Facial Palsy: 1-->Minor paralysis (flattened nasolabial fold, asymmetry on smiling)  5a. Motor Arm, Left: 0-->No drift, limb holds 90 (or 45) degrees for full 10 secs  5b. Motor Arm, Right: 0-->No drift, limb holds 90 (or 45) degrees for full 10 secs  6a. Motor Leg, Left: 0-->No drift, leg holds 30  degree position for full 5 secs  6b. Motor Leg, Right: 0-->No drift, leg holds 30 degree position for full 5 secs  7. Limb Ataxia: 0-->Absent  8. Sensory: 0-->Normal, no sensory loss  9. Best Language: 1-->Mild-to-moderate aphasia, some obvious loss of fluency or facility of comprehension, without significant limitation on ideas expressed or form of expression. Reduction of speech and/or comprehension, however, makes conversation. . . (see row details)  10. Dysarthria: 1-->Mild-to-moderate dysarthria, patient slurs at least some words and, at worst, can be understood with some difficulty  11. Extinction and Inattention (formerly Neglect): 0-->No abnormality    Total (NIH Stroke Scale): 5     Results Reviewed:  I have personally reviewed current lab, radiology, and data and agree with results.    Results for orders placed in visit on 10/30/19    Adult Transthoracic Echo Complete W/ Cont if Necessary Per Protocol    Interpretation Summary  · Left ventricular systolic function is normal. Estimated EF = 60%.  · Left ventricular wall thickness is consistent with borderline concentric hypertrophy.  · Left atrial volume is mildly increased.  · Mild tricuspid valve regurgitation is present.     WBC   Date Value Ref Range Status   02/28/2024 6.87 3.40 - 10.80 10*3/mm3 Final     RBC   Date Value Ref Range Status   02/28/2024 4.33 3.77 - 5.28 10*6/mm3 Final     Hemoglobin   Date Value Ref Range Status   02/28/2024 12.6 12.0 - 15.9 g/dL Final     Hematocrit   Date Value Ref Range Status   02/28/2024 38.9 34.0 - 46.6 % Final     MCV   Date Value Ref Range Status   02/28/2024 89.8 79.0 - 97.0 fL Final     MCH   Date Value Ref Range Status   02/28/2024 29.1 26.6 - 33.0 pg Final     MCHC   Date Value Ref Range Status   02/28/2024 32.4 31.5 - 35.7 g/dL Final     RDW   Date Value Ref Range Status   02/28/2024 13.3 12.3 - 15.4 % Final     RDW-SD   Date Value Ref Range Status   02/28/2024 44.0 37.0 - 54.0 fl Final     MPV   Date Value  Ref Range Status   02/28/2024 11.0 6.0 - 12.0 fL Final     Platelets   Date Value Ref Range Status   02/28/2024 197 140 - 450 10*3/mm3 Final     Neutrophil %   Date Value Ref Range Status   02/28/2024 86.3 (H) 42.7 - 76.0 % Final     Lymphocyte %   Date Value Ref Range Status   02/28/2024 11.9 (L) 19.6 - 45.3 % Final     Monocyte %   Date Value Ref Range Status   02/28/2024 1.5 (L) 5.0 - 12.0 % Final     Eosinophil %   Date Value Ref Range Status   02/28/2024 0.1 (L) 0.3 - 6.2 % Final     Basophil %   Date Value Ref Range Status   02/28/2024 0.1 0.0 - 1.5 % Final     Immature Grans %   Date Value Ref Range Status   02/28/2024 0.1 0.0 - 0.5 % Final     Neutrophils, Absolute   Date Value Ref Range Status   02/28/2024 5.92 1.70 - 7.00 10*3/mm3 Final     Lymphocytes, Absolute   Date Value Ref Range Status   02/28/2024 0.82 0.70 - 3.10 10*3/mm3 Final     Monocytes, Absolute   Date Value Ref Range Status   02/28/2024 0.10 0.10 - 0.90 10*3/mm3 Final     Eosinophils, Absolute   Date Value Ref Range Status   02/28/2024 0.01 0.00 - 0.40 10*3/mm3 Final     Basophils, Absolute   Date Value Ref Range Status   02/28/2024 0.01 0.00 - 0.20 10*3/mm3 Final     Immature Grans, Absolute   Date Value Ref Range Status   02/28/2024 0.01 0.00 - 0.05 10*3/mm3 Final     nRBC   Date Value Ref Range Status   02/28/2024 0.0 0.0 - 0.2 /100 WBC Final      Lab Results   Component Value Date    GLUCOSE 167 (H) 02/28/2024    BUN 18 02/28/2024    CREATININE 0.79 02/28/2024    EGFR 80.1 02/28/2024    BCR 22.8 02/28/2024    K 3.5 02/28/2024    CO2 23.0 02/28/2024    CALCIUM 9.3 02/28/2024    ALBUMIN 4.1 02/27/2024    BILITOT 0.2 02/27/2024    AST 19 02/27/2024    ALT 11 02/27/2024    CT Angiogram Head w AI Analysis of LVO    Result Date: 2/28/2024  Impression: 1.No evidence of hemodynamically significant stenosis or occlusion of the carotid or vertebral arteries. 2.No evidence of large vessel occlusion of the anterior or posterior circulation.  3.Multiple left-sided meningiomas including a large left frontal meningioma with significant surrounding vasogenic edema. 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 Please see separate CT noncontrast head dictation and recent MRI from 2/27/2024. Electronically Signed: Dimitri Lyons MD  2/28/2024 5:10 AM EST  Workstation ID: [id number]    CT Angiogram Neck    Result Date: 2/28/2024  Impression: 1.No evidence of hemodynamically significant stenosis or occlusion of the carotid or vertebral arteries. 2.No evidence of large vessel occlusion of the anterior or posterior circulation. 3.Multiple left-sided meningiomas including a large left frontal meningioma with significant surrounding vasogenic edema. 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 Please see separate CT noncontrast head dictation and recent MRI from 2/27/2024. Electronically Signed: Dimitri Lyons MD  2/28/2024 5:10 AM EST  Workstation ID: [id number]    CT CEREBRAL PERFUSION WITH & WITHOUT CONTRAST    Result Date: 2/28/2024  Impression: The abnormal hypoperfusion in the left frontal lobe is most likely secondary to hypoperfusion associated with the mass effect from the left frontal mass and adjacent vasogenic edema. Please refer to separate CT angiogram for findings related to cerebral vasculature and possible other cause of ischemia. Electronically Signed: Dimitri Lyons MD  2/28/2024 5:01 AM EST  Workstation ID: [id number]    CT Head Without Contrast Stroke Protocol    Result Date: 2/28/2024  Impression: Stable CT scan of the head when compared to yesterday's CT and MRI of the brain. Significant vasogenic edema is associated with the anterior left frontal mass/meningioma. Again seen is a destructive lesion of the left sphenoid bone and extension of soft tissue into the left orbit. Electronically Signed: Dimitri Lyons MD   2/28/2024 4:52 AM EST  Workstation ID: [id number]    MRI Brain With & Without Contrast    Result Date: 2/27/2024  Impression: 1.There is a new 3.8 cm maximum diameter left frontal dural based mass presumably a meningioma with associated surrounding vasogenic edema and mass effect as detailed above. 2.Enlarging ill-defined mass with destructive changes involving the left spheno-orbital region.. 3.Other relatively stable meningiomas. Electronically Signed: Noel Lemus MD  2/27/2024 10:19 PM EST  Workstation ID: [id number]    CT Head Without Contrast    Result Date: 2/27/2024  Impression: 1. Compared to most recent previous brain MRI and head CT scans, there is significant interval enlargement of the patient's left orbital/sphenoid mass, with new bony destruction of the posterior lateral orbital wall. 2. Also, there new mass effect on the frontal horns, and significantly increased left frontal vasogenic edema, and edema in the corpus callosum. This is thought to be due to a new anterior left frontal mass/meningioma. Brain MRI with and without contrast  is suggested for further lesion characterization. Electronically Signed: Jareth Gómez MD  2/27/2024 8:04 PM EST  Workstation ID: [id number]      Assessment/Plan:  Ms. Ackerman is a 69-year-old female with PMH of HTN, HLD, atrial flutter, depression, GERD, meningioma (2017).  She presented to [facility] ED with left-sided headache x 1 week.  CT/MRI scans revealed enlargement of known brain mass with vasogenic edema.  ER physician reports  mentioned changes to patient's speech and cognition.  Patient was admitted on behalf of neurosurgery recommendations.  Decadron IV and Pepcid IV ordered by neurosurgery team. Patient is not a candidate for IV thrombolytic therapy due to current neoplasm.  Patient is not a candidate for endovascular therapy due to no LVO on CT scans.    Antiplatelet PTA: Aspirin  Anticoagulant PTA: None        Left facial droop and speech  difficulty  Differentials to include TIA, CVA  Initiate TIA/CVA without IV thrombolytic therapy order set  N.p.o. until bedside dysphagia screening complete by RN  Activity as tolerated  Continue home Crestor p.o.  TTE routine  Blood pressure goals, SBP less than 220  Diabetes educator to see if appropriate  PT/OT/SLP eval and treat  Case management to follow      Plan of care discussed with patient and primary RN.  Stroke neurology will continue to follow.  Thank you for this consult.  Call with any questions or concerns.    Radu Davey, APRN  February 28, 2024  05:19 EST

## 2024-02-28 NOTE — PROGRESS NOTES
[facility]  PROGRESS NOTE    Patient Name: Tereza Ackerman  : 1955  MRN: [id number]    Date of Admission: 2024  Primary Care Physician: Michaela Connell MD    Subjective   Subjective     CC:  Headache    HPI:  Evaluated patient this morning. Was reporting mild headache, otherwise felt well. Discussed patient's care with  present in room.      Objective   Objective     Vital Signs:   Temp:  [97.5 °F (36.4 °C)-98.4 °F (36.9 °C)] 97.9 °F (36.6 °C)  Heart Rate:  [62-73] 73  Resp:  [16-18] 18  BP: (141-181)/() 150/87     Physical Exam:  Constitutional: Awake, alert, resting in bed,  at bedside  Eyes:  sclerae anicteric, no conjunctival injection  HENT: NCAT, mucous membranes moist  Respiratory: Clear to auscultation bilaterally, nonlabored respirations   Cardiovascular: RRR, no murmurs, rubs, or gallops, palpable pedal pulses bilaterally  Gastrointestinal: Positive bowel sounds, soft, nontender, nondistended  Musculoskeletal: No bilateral ankle edema, no clubbing or cyanosis to extremities  Psychiatric: Appropriate affect, cooperative  Neurologic: Alert and oriented x 3, strength symmetric in all extremities, speech clear, some word finding difficulty  Skin: No rashes    Results Reviewed:  LAB RESULTS:      Lab 24   WBC 6.87 6.02   HEMOGLOBIN 12.6 13.0   HEMATOCRIT 38.9 40.4   PLATELETS 197 207   NEUTROS ABS 5.92 3.49   IMMATURE GRANS (ABS) 0.01 0.01   LYMPHS ABS 0.82 1.64   MONOS ABS 0.10 0.69   EOS ABS 0.01 0.16   MCV 89.8 94.4   PROTIME  --  14.3         Lab 24   SODIUM 138  --   --  141   POTASSIUM 3.5  --   --  3.8   CHLORIDE 105  --   --  106   CO2 23.0  --   --  26.0   ANION GAP 10.0  --   --  9.0   BUN 18  --   --  18   CREATININE 0.79 1.00 1.00 0.96   EGFR 80.1  --   --  63.4   GLUCOSE 167*  --   --  100*   CALCIUM 9.3  --   --  9.2   HEMOGLOBIN A1C  5.50  --   --   --          Lab 02/27/24 2010   TOTAL PROTEIN 7.3   ALBUMIN 4.1   GLOBULIN 3.2   ALT (SGPT) 11   AST (SGOT) 19   BILIRUBIN 0.2   ALK PHOS 87         Lab 02/27/24 2010   PROTIME 14.3   INR 1.09                 Brief Urine Lab Results  (Last result in the past 365 days)        Color   Clarity   Blood   Leuk Est   Nitrite   Protein   CREAT   Urine HCG        10/21/23 1123 Straw   Slightly Cloudy   3+   Moderate (2+)   Negative   1+                   Microbiology Results Abnormal       None            CT Angiogram Head w AI Analysis of LVO    Result Date: 2/28/2024  CT ANGIOGRAM HEAD W AI ANALYSIS OF LVO, CT ANGIOGRAM NECK Date of Exam: 2/28/2024 4:32 AM EST Indication: Stroke, follow up. Comparison: None available. Technique: CTA of the head was performed after the uneventful intravenous administration of 115 mL Isovue-370. Reconstructed coronal and sagittal images were also obtained. In addition, a 3-D volume rendered image was created for interpretation. Automated exposure control and iterative reconstruction methods were used. Findings: Aortic arch: The aortic arch is unremarkable.  There is conventional 3 vessel arch anatomy.  The right brachiocephalic and visualized bilateral subclavian arteries are within normal limits. Right carotid: The right CCA arises as expected from the brachiocephalic trunk.  The CCA follows a normal course and appears normal caliber.  The carotid bifurcation is unremarkable.  The external carotid artery and distal branches appear within normal limits.  The cervical internal carotid artery follows a normal course and appears normal caliber throughout the neck and into the head.  The intracranial ICA segments appear within normal limits.  The ophthalmic artery origin is normal.  The A1 and M1 segments appear within normal limits.  The visualized distal LOUIE and MCA branches appear patent.  There is  a patent  anterior communicating artery. There is a patent  posterior  communicating artery. Left carotid: The left CCA arises as expected from the aortic arch.   The CCA follows a normal course and appears normal caliber.  The carotid bifurcation is unremarkable.  The external carotid artery and distal branches appear within normal limits.  The  cervical internal carotid artery follows a normal course and appears normal caliber throughout the neck and into the head.  The intracranial ICA segments appear within normal limits.  The ophthalmic artery origin is normal.  The A1 and M1 segments appear within normal limits.  The visualized distal LOUIE and MCA branches appear patent.  There is a patent  posterior communicating artery. Posterior circulation: Vertebral arteries arise as expected from ipsilateral subclavian arteries.  The vertebral arteries are codominant.  The vertebral arteries follow a normal course and appear normal caliber throughout the neck and into the head.  The  V4 segments are patent.  Visualized posterior inferior cerebellar arteries are within normal limits.  The basilar artery is normal caliber.  Superior cerebellar arteries are patent.  Bilateral P1 segments and posterior cerebral arteries appear within normal limits. Nonvascular findings: There are changes of multiple left-sided meningioma including a large left anterior frontal mass. This mass is 3.9 x 3.7 cm. There is significant surrounding vasogenic edema. There are extensive postsurgical changes from left sided temporal craniotomy. 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     Impression: Impression: 1.No evidence of hemodynamically significant stenosis or occlusion of the carotid or vertebral arteries. 2.No evidence of large vessel occlusion of the anterior or posterior circulation. 3.Multiple left-sided meningiomas including a large left frontal meningioma with significant surrounding vasogenic edema. 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 Please see separate CT noncontrast head dictation and recent MRI from 2/27/2024. Electronically Signed: Dimitri Lyons MD  2/28/2024 5:10 AM EST  Workstation ID: [id number]    CT Angiogram Neck    Result Date: 2/28/2024  CT ANGIOGRAM HEAD W AI ANALYSIS OF LVO, CT ANGIOGRAM NECK Date of Exam: 2/28/2024 4:32 AM EST Indication: Stroke, follow up. Comparison: None available. Technique: CTA of the head was performed after the uneventful intravenous administration of 115 mL Isovue-370. Reconstructed coronal and sagittal images were also obtained. In addition, a 3-D volume rendered image was created for interpretation. Automated exposure control and iterative reconstruction methods were used. Findings: Aortic arch: The aortic arch is unremarkable.  There is conventional 3 vessel arch anatomy.  The right brachiocephalic and visualized bilateral subclavian arteries are within normal limits. Right carotid: The right CCA arises as expected from the brachiocephalic trunk.  The CCA follows a normal course and appears normal caliber.  The carotid bifurcation is unremarkable.  The external carotid artery and distal branches appear within normal limits.  The cervical internal carotid artery follows a normal course and appears normal caliber throughout the neck and into the head.  The intracranial ICA segments appear within normal limits.  The ophthalmic artery origin is normal.  The A1 and M1 segments appear within normal limits.  The visualized distal LOUIE and MCA branches appear patent.  There is  a patent  anterior communicating artery. There is a patent  posterior communicating artery. Left carotid: The left CCA arises as expected from the aortic arch.   The CCA follows a normal course and appears normal caliber.  The carotid bifurcation is unremarkable.  The external carotid artery and distal branches appear within normal  limits.  The  cervical internal carotid artery follows a normal course and appears normal caliber throughout the neck and into the head.  The intracranial ICA segments appear within normal limits.  The ophthalmic artery origin is normal.  The A1 and M1 segments appear within normal limits.  The visualized distal LOUIE and MCA branches appear patent.  There is a patent  posterior communicating artery. Posterior circulation: Vertebral arteries arise as expected from ipsilateral subclavian arteries.  The vertebral arteries are codominant.  The vertebral arteries follow a normal course and appear normal caliber throughout the neck and into the head.  The  V4 segments are patent.  Visualized posterior inferior cerebellar arteries are within normal limits.  The basilar artery is normal caliber.  Superior cerebellar arteries are patent.  Bilateral P1 segments and posterior cerebral arteries appear within normal limits. Nonvascular findings: There are changes of multiple left-sided meningioma including a large left anterior frontal mass. This mass is 3.9 x 3.7 cm. There is significant surrounding vasogenic edema. There are extensive postsurgical changes from left sided temporal craniotomy. 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     Impression: Impression: 1.No evidence of hemodynamically significant stenosis or occlusion of the carotid or vertebral arteries. 2.No evidence of large vessel occlusion of the anterior or posterior circulation. 3.Multiple left-sided meningiomas including a large left frontal meningioma with significant surrounding vasogenic edema. 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 Please see separate CT noncontrast head dictation and recent MRI from 2/27/2024. Electronically Signed: Dimitri Lyons MD  2/28/2024 5:10 AM EST   Workstation ID: [id number]    CT CEREBRAL PERFUSION WITH & WITHOUT CONTRAST    Result Date: 2/28/2024  CT CEREBRAL PERFUSION W WO CONTRAST Date of Exam: 2/28/2024 4:32 AM EST Indication: Neuro deficit, acute, stroke suspected.  Comparison: None available. Technique: Axial CT images of the brain were obtained prior to and after the administration of 115 mL Isovue-370. Core blood volume, core blood flow, mean transit time, and Tmax images were obtained utilizing the Rapid software protocol. A limited CT angiogram of the head was also performed to measure the blood vessel density. The radiation dose reduction device was turned on for each scan per the ALARA (As Low as Reasonably Achievable) protocol. Findings: CBF (<30%) volume: 22 mL Tmax (>6.0s) volume: 15 mL Mismatch volume: -7 mL Mismatch ratio: 0.7     Impression: Impression: The abnormal hypoperfusion in the left frontal lobe is most likely secondary to hypoperfusion associated with the mass effect from the left frontal mass and adjacent vasogenic edema. Please refer to separate CT angiogram for findings related to cerebral vasculature and possible other cause of ischemia. Electronically Signed: Dimitri Lyons MD  2/28/2024 5:01 AM EST  Workstation ID: [id number]    CT Head Without Contrast Stroke Protocol    Result Date: 2/28/2024  CT HEAD WO CONTRAST STROKE PROTOCOL Date of Exam: 2/28/2024 4:32 AM EST Indication: Neuro deficit, acute, stroke suspected. Comparison: CT scan of the head from 2/27/2024 at 1937 hours Technique: Axial CT images were obtained of the head without contrast administration.  Reconstructed coronal images were also obtained. Automated exposure control and iterative construction methods were used. Scan Time: 0441 hours Results discussed with stroke team Rep.Radu at 0448 hours. Findings: Again seen is a left sided orbital/sphenoid a mass with bony cortical destruction of the anterior portion of the skull base at the middle cranial fossa and  extending into the orbit. Postsurgical changes are seen from previous craniotomy procedures. There  is a large slightly increased density left frontal mass with a large amount of surrounding vasogenic edema. There is left to right shift unchanged from the study of yesterday. There is no convincing acute hemorrhage. There are no abnormal extra-axial fluid collections.     Impression: Impression: Stable CT scan of the head when compared to yesterday's CT and MRI of the brain. Significant vasogenic edema is associated with the anterior left frontal mass/meningioma. Again seen is a destructive lesion of the left sphenoid bone and extension of soft tissue into the left orbit. Electronically Signed: Dimitri Lyons MD  2/28/2024 4:52 AM EST  Workstation ID: [id number]    MRI Brain With & Without Contrast    Result Date: 2/27/2024  MRI BRAIN W WO CONTRAST Date of Exam: 2/27/2024 9:27 PM EST Indication: history of meningioma, 1 wk h/o left sided HA, pressure behind left eye, speech changes.  Comparison: 11/9/2022 Technique:  Routine multiplanar/multisequence sequence images of the brain were obtained before and after the uneventful administration of 15 cc Multihance. Findings: There is no diffusion restriction to suggest acute infarct. There is a new anterior left frontal dural based mass measuring 3.8 cm transverse dimension by 3.5 cm in AP dimension with surrounding vasogenic edema and mass effect on the anterior horn of the left lateral ventricle. Vasogenic edema extends into the anterior aspect of the right frontal lobe as well and into the left temporal lobe. There is up to 1.2 cm of rightward midline shift of the anterior left frontal lobe. Imaging features and patient's history are suggestive of a new meningioma. There has been enlargement of an ill-defined spheno-orbital lesion now measuring 3.8 x 3.2 cm with invasion into the anterior margin of the left temporal fossa, previously 2.9 x 2.4 cm when measured at similar  level. This results in continued proptosis and inferior medial deviation of the optic nerve. There are 2 other relatively stable lateral left frontal dural based nodules measuring 1.5 x 0.8 cm anterior and 1.7 x 1.2 cm posterior. The posterior nodule has an extra calvarial extension measuring 2.4 x 1.0 cm, similar to prior examination as well. There is no evidence of acute intracranial hemorrhage. The basal ganglia, brainstem and cerebellum appear within normal limits. There are postoperative changes involving the calvarium. There is normal enhancement within the intracranial vasculature including the dural venous sinuses.     Impression: Impression: 1.There is a new 3.8 cm maximum diameter left frontal dural based mass presumably a meningioma with associated surrounding vasogenic edema and mass effect as detailed above. 2.Enlarging ill-defined mass with destructive changes involving the left spheno-orbital region.. 3.Other relatively stable meningiomas. Electronically Signed: Noel Lemus MD  2/27/2024 10:19 PM EST  Workstation ID: [id number]    CT Head Without Contrast    Result Date: 2/27/2024  CT HEAD WO CONTRAST Date of Exam: 2/27/2024 7:38 PM EST Indication: left sided HA, h/o meningioma. Comparison: Head CT scan 5/31/2022. Brain MRI 11/9/2022 Technique: Axial CT images were obtained of the head without contrast administration.  Automated exposure control and iterative construction methods were used. Findings: Previous brain MRI report noted changes from multiple previous craniotomies for resection of meningioma on the left. Potential radiation-induced cavernous venous malformation. Mild interval progression of 3 small meningiomas, 2 along the left frontal convexity, and 1 spheno-orbital lesion. Meningiomas are typically isodense or near isodense to brain and cortical, the lateral convexity lesions are difficult to directly identify on today's scan, if present. Sphenoid lesion, however, involves much of the  intraorbital fat of the posterior left  orbit, and adjacent left temporal bone, appearing much more uniformly masslike than on the prior study. On today's exam it is seen as a discrete 2.5 x 1.5 cm mass on image 8 series 2. There is increasing vasogenic edema of the left frontal lobe, now extending across the midline in the corpus callosum, and area that was previously spared. There is associated new distortion of both frontal horns. There is a new, masslike 3.5 cm area in the anterior left frontal lobe with no cortical sulcation and slightly more hyperdense than brain parenchyma elsewhere worrisome for new meningioma here. No new mass is seen elsewhere. No other areas of edema are identified. There is no hydrocephalus, or evidence of hemorrhage. Extensive postoperative/posttreatment changes of the left frontal and temporal calvarium and orbit are again noted, with appearance of some increasing bony destruction associated with the orbital mass, bone window image 22 series 4, and stable elsewhere. Remaining paranasal sinuses and mastoids appear clear.     Impression: Impression: 1. Compared to most recent previous brain MRI and head CT scans, there is significant interval enlargement of the patient's left orbital/sphenoid mass, with new bony destruction of the posterior lateral orbital wall. 2. Also, there new mass effect on the frontal horns, and significantly increased left frontal vasogenic edema, and edema in the corpus callosum. This is thought to be due to a new anterior left frontal mass/meningioma. Brain MRI with and without contrast  is suggested for further lesion characterization. Electronically Signed: Jareth Gómez MD  2/27/2024 8:04 PM EST  Workstation ID: [id number]     Results for orders placed in visit on 10/30/19    Adult Transthoracic Echo Complete W/ Cont if Necessary Per Protocol    Interpretation Summary  · Left ventricular systolic function is normal. Estimated EF = 60%.  · Left ventricular wall  thickness is consistent with borderline concentric hypertrophy.  · Left atrial volume is mildly increased.  · Mild tricuspid valve regurgitation is present.      Current medications:  Scheduled Meds:busPIRone, 10 mg, Oral, BID  carvedilol, 12.5 mg, Oral, Q12H  cholecalciferol, 2,000 Units, Oral, Every Other Day  dexAMETHasone, 4 mg, Intravenous, Q6H  dorzolamide, 1 drop, Left Eye, BID  famotidine, 20 mg, Intravenous, Q12H  lisinopril, 2.5 mg, Oral, Daily  QUEtiapine, 12.5 mg, Oral, Nightly  rosuvastatin, 40 mg, Oral, Daily  sertraline, 100 mg, Oral, Daily  sodium chloride, 10 mL, Intravenous, Q12H  sodium chloride, 10 mL, Intravenous, Q12H  thiamine, 100 mg, Oral, Every Other Day      Continuous Infusions:   PRN Meds:.  acetaminophen **OR** acetaminophen **OR** acetaminophen    senna-docusate sodium **AND** polyethylene glycol **AND** bisacodyl **AND** bisacodyl    Calcium Replacement - Follow Nurse / BPA Driven Protocol    LORazepam    Magnesium Standard Dose Replacement - Follow Nurse / BPA Driven Protocol    nitroglycerin    Phosphorus Replacement - Follow Nurse / BPA Driven Protocol    Potassium Replacement - Follow Nurse / BPA Driven Protocol    [COMPLETED] Insert Peripheral IV **AND** sodium chloride    sodium chloride    sodium chloride    sodium chloride    sodium chloride    Assessment & Plan   Assessment & Plan     Active Hospital Problems    Diagnosis  POA    **Brain mass [G93.89]  Yes    Essential hypertension [I10]  Yes    Prediabetes [R73.03]  Yes    Mixed hyperlipidemia [E78.2]  Yes      Resolved Hospital Problems   No resolved problems to display.        Brief Hospital Course to date:  Tereza Ackerman is a 69 y.o. female with a history of hypertension, hyperlipidemia, atrial flutter, depression, prediabetes, vitamin D deficiency, GERD, was diagnosed with meningioma in 2017 and has been followed by Dr. Dawson and Dr. Perales with neurosurgery here at [facility]. Patient had CyberKnife x 2 here at [facility] and  was then referred to [facility] to undergo gamma knife therapy. She was supposed to have gamma knife procedure done in December 2023 but did not make her appointment due to to weather. She is scheduled at the [facility] in March, 2024. Patient presented to the ED with complaints of left-sided headache and pressure behind her left eye for the past week     This patient's problems and plans were partially entered by my partner and updated as appropriate by me 02/28/24.    Progressive recurrent meningioma  -CT head showed significant interval enlargement of the patient's left orbital/sphenoid mass with new bony destruction of the posterior lateral orbital walls, also new mass effect on the frontal horns and significantly increased left frontal vasogenic edema and edema in the corpus callosum.   -MRI brain showed new 3.8 cm left frontal dural based mass presumably meningioma with associated surrounding vasogenic edema and mass effect. Enlarging ill-defined mass with destructive changes involving the left sphenoid orbital region. Other relatively stable meningiomas.  -Was diagnosed with a meningioma in 2017 and has been followed by Dr. Dawson and Dr. Perales with neurosurgery. S/p CyberKnife x 2 at [facility] and then was referred to the [facility]. Was planned to have another gamma knife done at [facility] in December, 2023 but did not go due to weather. She has follow-up at the [facility] in March, 2024.  -Neurosurgery following, recommended to continue Decadron to help reduce edema. She will likely require additional surgery for new meningioma.  -Continue Decadron 4 mg IV every 6 hours, Pepcid 20 mg IV every 12 hours.  -Neuro checks, fall precautions.     Hypertension  -Continue carvedilol, lisinopril.    Hyperlipidemia  -Continue rosuvastatin.    Anxiety/depression  -Continue Zoloft, BuSpar, Seroquel.     Prediabetes  -A1c 5.5% on admission, previously elevated.     GERD  -Pepcid IV  BID.    Expected Discharge Location and Transportation: TBD  Expected Discharge   Expected Discharge Date: 3/1/2024; Expected Discharge Time:      DVT prophylaxis:  Mechanical DVT prophylaxis orders are present.         AM-PAC 6 Clicks Score (PT): 19 (02/28/24 1026)    CODE STATUS:   Code Status and Medical Interventions:   Ordered at: 02/28/24 0032     Level Of Support Discussed With:    Patient     Code Status (Patient has no pulse and is not breathing):    CPR (Attempt to Resuscitate)     Medical Interventions (Patient has pulse or is breathing):    Full Support       Jessica Koch,   02/28/24

## 2024-02-28 NOTE — H&P
[facility] Medicine Services  HISTORY AND PHYSICAL    Patient Name: Tereza Ackerman  : 1955  MRN: [id number]  Primary Care Physician: Michaela Connell MD  Date of admission: 2024    Subjective   Subjective     Chief Complaint:  Headache    HPI:  Tereza Ackerman is a 69 y.o. female with a history of hypertension, hyperlipidemia, atrial flutter, depression, prediabetes, vitamin D deficiency, GERD, was diagnosed with meningioma in 2017 and has been followed by Dr. Hollis and Dr. Perales with neurosurgery here at [facility].  Patient has had CyberKnife x 2 here at [facility] and was then referred to [facility] to undergo gamma knife.  She was supposed to have gamma knife procedure done in 2023 but did not make her appointment due to the weather.  She is scheduled at the [facility] in .    Patient presents to the ED today with complaints of a left-sided headache and pressure behind her left eye for more than one week.  She has vision loss in her left eye since her initial surgeries.  Patient also reports that she has been slow with her speech at times and has been having difficulty with word finding.  She also reports having lower extremity weakness and feeling like her feet are pulling together.   reports that over the last several days she has been sleeping more than usual.  She states that she has numbness and tingling but she is unable to tell me where it is located.  She has been experiencing subjective fever and chills, cough, and urinary frequency.  No shortness of air, chest pain, nausea, vomiting, abdominal pain, diarrhea, dizziness, lightheadedness, or any other complaints at this time.  MRI brain shows a new 3.8 cm left frontal dural based mass presumably meningioma with associated surrounding vasogenic edema and mass effect.  Enlarging ill-defined mass with destructive changes involving the left sphenoid orbital region.  Other stable  meningiomas.  ED discussed case with neurosurgery who recommended Decadron, Pepcid, and admission.  Patient is being admitted to the hospitalist for further evaluation and management.      Review of Systems   Constitutional:  Positive for chills, fatigue and fever.   HENT: Negative.     Eyes: Negative.    Respiratory:  Positive for cough. Negative for shortness of breath and wheezing.    Cardiovascular: Negative.    Gastrointestinal: Negative.    Endocrine: Negative.    Genitourinary:  Positive for frequency. Negative for dysuria and urgency.   Musculoskeletal: Negative.    Skin: Negative.    Allergic/Immunologic: Negative.    Neurological:  Positive for speech difficulty, weakness, numbness and headaches.   Hematological: Negative.    Psychiatric/Behavioral: Negative.                  Personal History     Past Medical History:   Diagnosis Date    Abdominal hernia     Arthritis     rt knee     Atrial flutter with rapid ventricular response 12/21/2017    Back pain     Brain tumor     Colostomy present 08/2018    Depression     History of blood transfusion     History of gastroesophageal reflux (GERD)     resolved since Nissen    History of Nissen fundoplication 7/1/2017    History of small bowel obstruction     Hyperlipemia     Hypertension     Memory loss or impairment     Migraine     Parathyroid adenoma     Incidental on thyroid US.    PONV (postoperative nausea and vomiting)     nausea - preprocedural meds help     Prediabetes     Last Impression: 06 Feb 2015  Reviewed labs. Excellent control.  Emery Connell Impression: 06 Feb 2015  Reviewed labs. Excellent control.  Michaela Connell    Thyroid nodule      Last Impression: 13 Jun 2015  r/o thyroid cancer, will proceed with US.  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         Past Surgical History:   Procedure Laterality Date    BRAIN SURGERY Left 05/19/2022    BRAIN SURGERY  08/2023    left side done & radiationg a wk. later,  in [place]    CARDIAC CATHETERIZATION N/A 04/27/2020    Procedure: LEFT HEART CATH;  Surgeon: Marina Ring MD;  Location:  [facility];  Service: Cardiology;  Laterality: N/A;    CARPAL TUNNEL RELEASE  2002    right     COLONOSCOPY N/A 08/18/2018    Procedure: COLONOSCOPY;  Surgeon: Inderjit Campos MD;  Location: [facility];  Service: Gastroenterology    COLONOSCOPY N/A 11/28/2018    Procedure: COLONOSCOPY;  Surgeon: Inderjit Campos MD;  Location: [facility];  Service: Gastroenterology    COLOSTOMY CLOSURE N/A 02/19/2019    Procedure: COLOSTOMY TAKEDOWN, INCISIONAL HERNIA REPAIR;  Surgeon: Andrea Bocanegra MD;  Location: [facility];  Service: General    CRANIOTOMY  2003 & 2014    Dr. Werner Hollis for tumor removal    ENDOSCOPY      EXPLORATORY LAPAROTOMY N/A 12/05/2017    Procedure: EXPLORATORY LAPAROTOMY, SMALL BOWEL RESECTION;  Surgeon: Ирина Cobian MD;  Location:  [facility];  Service:     EXPLORATORY LAPAROTOMY N/A 12/22/2017    Procedure: LAPAROTOMY EXPLORATORY FOR SMALL BOWEL OBSTRUCTION;  Surgeon: Ирина Cobian MD;  Location:  [facility];  Service:     EXPLORATORY LAPAROTOMY N/A 07/23/2018    Procedure: EXPLORATORY LAPAROTOMY, APPENDECTOMY, CECOPEXY, INCISIONAL HERNIA REPAIR, LYSIS OF ADHESIONS;  Surgeon: Andrea Bocanegra MD;  Location: [facility];  Service: General    EXPLORATORY LAPAROTOMY N/A 08/19/2018    Procedure: LAPAROTOMY EXPLORATORY, SIGMOID COLECTOMY,  CREATION OF OSTOMY;  Surgeon: Andrea Bocanegra MD;  Location:  [facility];  Service: General    HYSTERECTOMY      partial - both ovaries still present pt believes     INSERTION HEMODIALYSIS CATHETER N/A 12/07/2017    Procedure: HEMODIALYSIS CATHETER INSERTION;  Surgeon: Chance Valenzuela MD;  Location:  [facility];  Service:     ORBITOTOMY Left 11/03/2017    Procedure:  LEFT LATERAL ORBITOTOMY WITH DEBULKING OF TUMOR ;  Surgeon: Moo Hopkins MD;  Location:  [facility];  Service:     OTHER SURGICAL HISTORY      esophagogastric fundoplasty nissen fundoplication    TUBAL ABDOMINAL LIGATION         Family History:  family history includes Arthritis in an other family member; Breast cancer in her maternal aunt and paternal aunt; Cancer in an other family member; Diabetes in her father and another family member; Heart attack in her mother; Heart disease in her mother; Migraines in her father; Obesity in her mother; Stroke in her father.     Social History:  reports that she has never smoked. She has never used smokeless tobacco. She reports that she does not drink alcohol and does not use drugs.  Social History     Social History Narrative    Not on file       Medications:  LORazepam, QUEtiapine, aspirin, busPIRone, carvedilol, cholecalciferol, dorzolamide, lisinopril, omeprazole, ondansetron ODT, rosuvastatin, sertraline, thiamine, and timolol    Allergies   Allergen Reactions    Dilantin [Phenytoin Sodium Extended] Rash       Objective   Objective     Vital Signs:   Temp:  [98.2 °F (36.8 °C)] 98.2 °F (36.8 °C)  Heart Rate:  [63-73] 64  Resp:  [16] 16  BP: (141-179)/() 179/95    Physical Exam   Constitutional: Awake, alert, resting in bed,  at bedside  Eyes:  sclerae anicteric, no conjunctival injection  HENT: NCAT, mucous membranes moist  Neck: Supple, no thyromegaly, no lymphadenopathy, trachea midline  Respiratory: Clear to auscultation bilaterally, nonlabored respirations   Cardiovascular: RRR,  no murmurs, rubs, or gallops, palpable pedal pulses bilaterally  Gastrointestinal: Positive bowel sounds, soft, nontender, nondistended  Musculoskeletal: No bilateral ankle edema, no clubbing or cyanosis to extremities  Psychiatric: Appropriate affect, cooperative  Neurologic: Oriented x 3, strength symmetric in all extremities, Cranial Nerves grossly intact to confrontation, speech clear, some word finding difficulty  Skin: No rashes       Result Review:  I have personally reviewed the results from the time of this admission to 2/28/2024 00:31 EST and agree with these findings:  [x]  Laboratory list / accordion  []  Microbiology  [x]  Radiology  []  EKG/Telemetry   []  Cardiology/Vascular   []  Pathology  [x]  Old records  []  Other:  Most notable findings include:     LAB RESULTS:      Lab 02/27/24 2010   WBC 6.02   HEMOGLOBIN 13.0   HEMATOCRIT 40.4   PLATELETS 207   NEUTROS ABS 3.49   IMMATURE GRANS (ABS) 0.01   LYMPHS ABS 1.64   MONOS ABS 0.69   EOS ABS 0.16   MCV 94.4   PROTIME 14.3         Lab 02/27/24  2018 02/27/24  2017 02/27/24 2010   SODIUM  --   --  141   POTASSIUM  --   --  3.8   CHLORIDE  --   --  106   CO2  --   --  26.0   ANION GAP  --   --  9.0   BUN  --   --  18   CREATININE 1.00 1.00 0.96   EGFR  --   --  63.4   GLUCOSE  --   --  100*   CALCIUM  --   --  9.2         Lab 02/27/24 2010   TOTAL PROTEIN 7.3   ALBUMIN 4.1   GLOBULIN 3.2   ALT (SGPT) 11   AST (SGOT) 19   BILIRUBIN 0.2   ALK PHOS 87         Lab 02/27/24 2010   PROTIME 14.3   INR 1.09                 Brief Urine Lab Results  (Last result in the past 365 days)        Color   Clarity   Blood   Leuk Est   Nitrite   Protein   CREAT   Urine HCG        10/21/23 1123 Straw   Slightly Cloudy   3+   Moderate (2+)   Negative   1+                 Microbiology Results (last 10 days)       ** No results found for the last 240 hours. **            MRI Brain With & Without Contrast    Result Date: 2/27/2024  MRI BRAIN W WO CONTRAST Date of Exam:  2/27/2024 9:27 PM EST Indication: history of meningioma, 1 wk h/o left sided HA, pressure behind left eye, speech changes.  Comparison: 11/9/2022 Technique:  Routine multiplanar/multisequence sequence images of the brain were obtained before and after the uneventful administration of 15 cc Multihance. Findings: There is no diffusion restriction to suggest acute infarct. There is a new anterior left frontal dural based mass measuring 3.8 cm transverse dimension by 3.5 cm in AP dimension with surrounding vasogenic edema and mass effect on the anterior horn of the left lateral ventricle. Vasogenic edema extends into the anterior aspect of the right frontal lobe as well and into the left temporal lobe. There is up to 1.2 cm of rightward midline shift of the anterior left frontal lobe. Imaging features and patient's history are suggestive of a new meningioma. There has been enlargement of an ill-defined spheno-orbital lesion now measuring 3.8 x 3.2 cm with invasion into the anterior margin of the left temporal fossa, previously 2.9 x 2.4 cm when measured at similar level. This results in continued proptosis and inferior medial deviation of the optic nerve. There are 2 other relatively stable lateral left frontal dural based nodules measuring 1.5 x 0.8 cm anterior and 1.7 x 1.2 cm posterior. The posterior nodule has an extra calvarial extension measuring 2.4 x 1.0 cm, similar to prior examination as well. There is no evidence of acute intracranial hemorrhage. The basal ganglia, brainstem and cerebellum appear within normal limits. There are postoperative changes involving the calvarium. There is normal enhancement within the intracranial vasculature including the dural venous sinuses.     Impression: Impression: 1.There is a new 3.8 cm maximum diameter left frontal dural based mass presumably a meningioma with associated surrounding vasogenic edema and mass effect as detailed above. 2.Enlarging ill-defined mass with  destructive changes involving the left spheno-orbital region.. 3.Other relatively stable meningiomas. Electronically Signed: Noel Lemus MD  2/27/2024 10:19 PM EST  Workstation ID: [id number]    CT Head Without Contrast    Result Date: 2/27/2024  CT HEAD WO CONTRAST Date of Exam: 2/27/2024 7:38 PM EST Indication: left sided HA, h/o meningioma. Comparison: Head CT scan 5/31/2022. Brain MRI 11/9/2022 Technique: Axial CT images were obtained of the head without contrast administration.  Automated exposure control and iterative construction methods were used. Findings: Previous brain MRI report noted changes from multiple previous craniotomies for resection of meningioma on the left. Potential radiation-induced cavernous venous malformation. Mild interval progression of 3 small meningiomas, 2 along the left frontal convexity, and 1 spheno-orbital lesion. Meningiomas are typically isodense or near isodense to brain and cortical, the lateral convexity lesions are difficult to directly identify on today's scan, if present. Sphenoid lesion, however, involves much of the intraorbital fat of the posterior left  orbit, and adjacent left temporal bone, appearing much more uniformly masslike than on the prior study. On today's exam it is seen as a discrete 2.5 x 1.5 cm mass on image 8 series 2. There is increasing vasogenic edema of the left frontal lobe, now extending across the midline in the corpus callosum, and area that was previously spared. There is associated new distortion of both frontal horns. There is a new, masslike 3.5 cm area in the anterior left frontal lobe with no cortical sulcation and slightly more hyperdense than brain parenchyma elsewhere worrisome for new meningioma here. No new mass is seen elsewhere. No other areas of edema are identified. There is no hydrocephalus, or evidence of hemorrhage. Extensive postoperative/posttreatment changes of the left frontal and temporal calvarium and orbit are again  noted, with appearance of some increasing bony destruction associated with the orbital mass, bone window image 22 series 4, and stable elsewhere. Remaining paranasal sinuses and mastoids appear clear.     Impression: Impression: 1. Compared to most recent previous brain MRI and head CT scans, there is significant interval enlargement of the patient's left orbital/sphenoid mass, with new bony destruction of the posterior lateral orbital wall. 2. Also, there new mass effect on the frontal horns, and significantly increased left frontal vasogenic edema, and edema in the corpus callosum. This is thought to be due to a new anterior left frontal mass/meningioma. Brain MRI with and without contrast  is suggested for further lesion characterization. Electronically Signed: Jareth Gómez MD  2/27/2024 8:04 PM EST  Workstation ID: [id number]     Results for orders placed in visit on 10/30/19    Adult Transthoracic Echo Complete W/ Cont if Necessary Per Protocol    Interpretation Summary  · Left ventricular systolic function is normal. Estimated EF = 60%.  · Left ventricular wall thickness is consistent with borderline concentric hypertrophy.  · Left atrial volume is mildly increased.  · Mild tricuspid valve regurgitation is present.      Assessment & Plan   Assessment & Plan       Brain mass    Prediabetes    Mixed hyperlipidemia    Essential hypertension    Tereza Ackerman is a 69 y.o. female with a history of hypertension, hyperlipidemia, atrial flutter, depression, prediabetes, vitamin D deficiency, GERD, was diagnosed with meningioma in 2017 and has been followed by Dr. Dawson and Dr. Perales with neurosurgery here at [facility].  Patient has had CyberKnife x 2 here at [facility] and was then referred to [facility] to undergo gamma knife.  She was supposed to have gamma knife procedure done in December 2023 but did not make her appointment due to to weather.  She is scheduled at the [facility] in March, 2024.    Patient  presents to the ED today with complaints of a left-sided headache and pressure behind her left eye for the past week     Assessment and plan:    Brain mass  --CT head shows compared to the most recent previous brain MRIs head CT scans there is significant interval enlargement of the patient's left orbital/sphenoid mass with new bony destruction of the posterior lateral orbital walls.  Also there is new mass effect on the frontal horns and significantly increased left frontal vasogenic edema and edema in the corpus callosum.  This is thought to be due to a new anterior left frontal mass/meningioma.  -- MRI brain shows a new 3.8 cm maximal diameter left frontal dural based mass presumably a meningioma with associated surrounding vasogenic edema and mass effect.  Enlarging ill-defined mass with destructive changes involving the left sphenoid orbital region.  Other relatively stable meningiomas  -- Was diagnosed with a meningioma in 2017 and has been followed by Dr. Dawson and Dr. Perales with neurosurgery.  Patient had CyberKnife x 2 at [facility] and then was referred to the [facility].  She was supposed to have another gamma knife done at [facility] in December, 2023 but did not go due to weather.  She has follow-up at the [facility] in March, 2024.  -- Consult Dr. Perales with neurosurgery  -- Decadron 4 mg IV every 6 hours  -- Pepcid 20 mg IV every 12 hours  -- neuro checks  -- fall precautions  -- am labs    HTN  HLD  -- coreg, lisinopril, crestor    Prediabetes  -- A1c in the am    GERD  -- pepcid IV BID    DVT prophylaxis:  Mechanical    CODE STATUS:    Level Of Support Discussed With: Patient  Code Status (Patient has no pulse and is not breathing): CPR (Attempt to Resuscitate)  Medical Interventions (Patient has pulse or is breathing): Full Support      Expected Discharge  TBD  Expected discharge date/ time has not been documented.      This note has been completed as part of a split-shared workflow.      Signature: Electronically signed by AYDEE Harmon, 02/28/24, 12:31 AM EST.       Attending Bakari     I supervised care of the patient on day of service with direct care provided by the advanced care provider (APC).    Tennille Betts,   02/28/24

## 2024-02-28 NOTE — ED NOTES
Tereza Ackerman    Nursing Report ED to Floor:  Mental status: a&ox4  Ambulatory status: up ad marty   Oxygen Therapy:  room air   Cardiac Rhythm: NSR  Admitted from: home/ed  Safety Concerns:  none  Social Issues: none  ED Room #:  19    ED Nurse Phone Extension - [phone] or may call [phone].      HPI:   Chief Complaint   Patient presents with    Eye Problem       Past Medical History:  Past Medical History:   Diagnosis Date    Abdominal hernia     Arthritis     rt knee     Atrial flutter with rapid ventricular response 12/21/2017    Back pain     Brain tumor     Colostomy present 08/2018    Depression     History of blood transfusion     History of gastroesophageal reflux (GERD)     resolved since Nissen    History of Nissen fundoplication 7/1/2017    History of small bowel obstruction     Hyperlipemia     Hypertension     Memory loss or impairment     Migraine     Parathyroid adenoma     Incidental on thyroid US.    PONV (postoperative nausea and vomiting)     nausea - preprocedural meds help     Prediabetes     Last Impression: 06 Feb 2015  Reviewed labs. Excellent control.  Emery Connell Impression: 06 Feb 2015  Reviewed labs. Excellent control.  Michaela Connell    Thyroid nodule      Last Impression: 13 Jun 2015  r/o thyroid cancer, will proceed with US.  Michaela Connell (Internal Medicine)     UTI (urinary tract infection)     Vision changes     blockages left eye     Wears glasses     readers        Past Surgical History:  Past Surgical History:   Procedure Laterality Date    BRAIN SURGERY Left 05/19/2022    BRAIN SURGERY  08/2023    left side done & radiationg a wk. later,  in [place]    CARDIAC CATHETERIZATION N/A 04/27/2020    Procedure: LEFT HEART CATH;  Surgeon: Marina Ring MD;  Location: [facility] CATH INVASIVE LOCATION;  Service: Cardiology;  Laterality: N/A;    CARPAL TUNNEL RELEASE  2002    right     COLONOSCOPY N/A 08/18/2018    Procedure: COLONOSCOPY;  Surgeon: Inderjit Campos  MD MEME;  Location:  [facility];  Service: Gastroenterology    COLONOSCOPY N/A 11/28/2018    Procedure: COLONOSCOPY;  Surgeon: Inderjit Campos MD;  Location:  [facility];  Service: Gastroenterology    COLOSTOMY CLOSURE N/A 02/19/2019    Procedure: COLOSTOMY TAKEDOWN, INCISIONAL HERNIA REPAIR;  Surgeon: Andrea Bocanegra MD;  Location:  [facility];  Service: General    CRANIOTOMY  2003 & 2014    Dr. Werenr Hollis for tumor removal    ENDOSCOPY      EXPLORATORY LAPAROTOMY N/A 12/05/2017    Procedure: EXPLORATORY LAPAROTOMY, SMALL BOWEL RESECTION;  Surgeon: Ирина Cobian MD;  Location:  [facility];  Service:     EXPLORATORY LAPAROTOMY N/A 12/22/2017    Procedure: LAPAROTOMY EXPLORATORY FOR SMALL BOWEL OBSTRUCTION;  Surgeon: Ирина Cobian MD;  Location:  [facility];  Service:     EXPLORATORY LAPAROTOMY N/A 07/23/2018    Procedure: EXPLORATORY LAPAROTOMY, APPENDECTOMY, CECOPEXY, INCISIONAL HERNIA REPAIR, LYSIS OF ADHESIONS;  Surgeon: Andrea Bocanegra MD;  Location:  [facility];  Service: General    EXPLORATORY LAPAROTOMY N/A 08/19/2018    Procedure: LAPAROTOMY EXPLORATORY, SIGMOID COLECTOMY, CREATION OF OSTOMY;  Surgeon: Andrea Bocanegra MD;  Location:  [facility];  Service: General    HYSTERECTOMY      partial - both ovaries still present pt believes     INSERTION HEMODIALYSIS CATHETER N/A 12/07/2017    Procedure: HEMODIALYSIS CATHETER INSERTION;  Surgeon: Chance Valenzuela MD;  Location:  [facility];  Service:     ORBITOTOMY Left 11/03/2017    Procedure:  LEFT LATERAL ORBITOTOMY WITH DEBULKING OF TUMOR ;  Surgeon: Moo Hopkins MD;  Location:  [facility];  Service:     OTHER SURGICAL HISTORY      esophagogastric fundoplasty nissen fundoplication    TUBAL ABDOMINAL LIGATION          Admitting Doctor:   Tennille Betts DO    Consulting Provider(s):  Consults       No orders found from 1/29/2024 to 2/28/2024.             Admitting Diagnosis:   There were no encounter diagnoses.    Most  Recent Vitals:   Vitals:    02/27/24 2203 02/27/24 2205 02/27/24 2230 02/27/24 2300   BP: (!) 141/101  165/95 179/95   BP Location:       Patient Position:       Pulse:  73 63 64   Resp:       Temp:       TempSrc:       SpO2:  100% 94% 97%   Weight:       Height:           Active LDAs/IV Access:   Lines, Drains & Airways       Active LDAs       Name Placement date Placement time Site Days    Peripheral IV 02/27/24 2028 Anterior;Right Arm 02/27/24 2028  Arm  less than 1                    Labs (abnormal labs have a star):   Labs Reviewed   COMPREHENSIVE METABOLIC PANEL - Abnormal; Notable for the following components:       Result Value    Glucose 100 (*)     All other components within normal limits    Narrative:     GFR Normal >60  Chronic Kidney Disease <60  Kidney Failure <15    The GFR formula is only valid for adults with stable renal function between ages 18 and 70.   PROTIME-INR - Normal   CBC WITH AUTO DIFFERENTIAL - Normal   POCT CREATININE - Normal   POCT CREATININE - Normal   CBC AND DIFFERENTIAL    Narrative:     The following orders were created for panel order CBC & Differential.  Procedure                               Abnormality         Status                     ---------                               -----------         ------                     CBC Auto Differential[[id number]]        Normal              Final result                 Please view results for these tests on the individual orders.       Meds Given in ED:   Medications   sodium chloride 0.9 % flush 10 mL (has no administration in time range)   famotidine (PEPCID) injection 20 mg (has no administration in time range)   dexAMETHasone (DECADRON) IVPB 10 mg (has no administration in time range)   diazePAM (VALIUM) injection 2.5 mg (2.5 mg Intravenous Given 2/27/24 2112)   gadobenate dimeglumine (MULTIHANCE) injection 15 mL (15 mL Intravenous Given 2/27/24 2144)

## 2024-02-28 NOTE — THERAPY EVALUATION
Patient Name: Tereza Ackerman  : 1955    MRN: [id number]                              Today's Date: 2024       Admit Date: 2024    Visit Dx:     ICD-10-CM ICD-9-CM   1. Brain mass  G93.89 348.89   2. Vasogenic brain edema  G93.6 348.5   3. Sphenoid mass, left  J34.89 478.19   4. Meningioma  D32.9 225.2   5. Left-sided headache  R51.9 784.0   6. Vision loss of left eye  H54.62 369.8   7. History of hypertension  Z86.79 V12.59   8. History of hyperlipidemia  Z86.39 V12.29   9. Aphasia  R47.01 784.3     Patient Active Problem List   Diagnosis    Impaired glucose tolerance    Parathyroid adenoma    Reaction to chronic stress    Multinodular goiter    Vitamin D deficiency    Meningioma, recurrent of brain    Arthritis    Depression    Small bowel obstruction    Insomnia    History of Nissen fundoplication    Malignant neoplasm of left orbit    Prediabetes    Mixed hyperlipidemia    Hyperglycemia    Atrial flutter with rapid ventricular response    Anemia    Large bowel obstruction    Abdominal pain    Essential hypertension    History of creation of ostomy    Screen for colon cancer    Sigmoid volvulus    SHAE (obstructive sleep apnea)    Brain mass     Past Medical History:   Diagnosis Date    Abdominal hernia     Arthritis     rt knee     Atrial flutter with rapid ventricular response 2017    Back pain     Brain tumor     Colostomy present 2018    Depression     History of blood transfusion     History of gastroesophageal reflux (GERD)     resolved since Nissen    History of Nissen fundoplication 2017    History of small bowel obstruction     Hyperlipemia     Hypertension     Memory loss or impairment     Migraine     Parathyroid adenoma     Incidental on thyroid US.    PONV (postoperative nausea and vomiting)     nausea - preprocedural meds help     Prediabetes     Last Impression: 2015  Reviewed labs. Excellent control.  Emery Connell Impression: 2015  Reviewed  labs. Excellent control.  Michaela Connell    Thyroid nodule      Last Impression: 13 Jun 2015  r/o thyroid cancer, will proceed with US.  Michaela Connell (Internal Medicine)     UTI (urinary tract infection)     Vision changes     blockages left eye     Wears glasses     readers     Past Surgical History:   Procedure Laterality Date    BRAIN SURGERY Left 05/19/2022    BRAIN SURGERY  08/2023    left side done & radiationg a wk. later,  in [place]    CARDIAC CATHETERIZATION N/A 04/27/2020    Procedure: LEFT HEART CATH;  Surgeon: Marina Ring MD;  Location:  [facility];  Service: Cardiology;  Laterality: N/A;    CARPAL TUNNEL RELEASE  2002    right     COLONOSCOPY N/A 08/18/2018    Procedure: COLONOSCOPY;  Surgeon: Inderjit Campos MD;  Location:  [facility];  Service: Gastroenterology    COLONOSCOPY N/A 11/28/2018    Procedure: COLONOSCOPY;  Surgeon: Inderjit Campos MD;  Location:  [facility];  Service: Gastroenterology    COLOSTOMY CLOSURE N/A 02/19/2019    Procedure: COLOSTOMY TAKEDOWN, INCISIONAL HERNIA REPAIR;  Surgeon: Andrea Bocanegra MD;  Location:  [facility];  Service: General    CRANIOTOMY  2003 & 2014    Dr. Werner Hollis for tumor removal    ENDOSCOPY      EXPLORATORY LAPAROTOMY N/A 12/05/2017    Procedure: EXPLORATORY LAPAROTOMY, SMALL BOWEL RESECTION;  Surgeon: Ирина Cobian MD;  Location:  [facility];  Service:     EXPLORATORY LAPAROTOMY N/A 12/22/2017    Procedure: LAPAROTOMY EXPLORATORY FOR SMALL BOWEL OBSTRUCTION;  Surgeon: Ирина Cobian MD;  Location:  [facility];  Service:     EXPLORATORY LAPAROTOMY N/A 07/23/2018    Procedure: EXPLORATORY LAPAROTOMY, APPENDECTOMY, CECOPEXY, INCISIONAL HERNIA REPAIR, LYSIS OF ADHESIONS;  Surgeon: Andrea Bocanegra MD;  Location:  [facility];  Service: General    EXPLORATORY LAPAROTOMY N/A 08/19/2018    Procedure: LAPAROTOMY EXPLORATORY, SIGMOID COLECTOMY, CREATION OF OSTOMY;  Surgeon: Andrea Bocanegra MD;   Location:  [facility];  Service: General    HYSTERECTOMY      partial - both ovaries still present pt believes     INSERTION HEMODIALYSIS CATHETER N/A 12/07/2017    Procedure: HEMODIALYSIS CATHETER INSERTION;  Surgeon: Chance Valenzuela MD;  Location:  [facility];  Service:     ORBITOTOMY Left 11/03/2017    Procedure:  LEFT LATERAL ORBITOTOMY WITH DEBULKING OF TUMOR ;  Surgeon: Moo Hopkins MD;  Location:  [facility];  Service:     OTHER SURGICAL HISTORY      esophagogastric fundoplasty nissen fundoplication    TUBAL ABDOMINAL LIGATION        General Information       Row Name 02/28/24 1013          Physical Therapy Time and Intention    Document Type evaluation  -AB     Mode of Treatment physical therapy  -AB       Row Name 02/28/24 1013          General Information    Patient Profile Reviewed yes  -AB     Prior Level of Function independent:;all household mobility;community mobility;gait;transfer;bed mobility;ADL's  Pt's spouse does most driving however, Pt can still legally drive with visual deficit. Denies recent falls. No AD reported for baseline mobility.  -AB     Existing Precautions/Restrictions fall;other (see comments)  L eye blindness, recurrent brain meningioma w/ long history of surgical interventions  -AB     Barriers to Rehab medically complex;cognitive status;visual deficit  -AB       Row Name 02/28/24 1013          Living Environment    People in Home spouse  -AB       Row Name 02/28/24 1013          Home Main Entrance    Number of Stairs, Main Entrance one  -AB     Stair Railings, Main Entrance none  -AB       Row Name 02/28/24 1013          Stairs Within Home, Primary    Stairs, Within Home, Primary Flight of steps to main bed/bath  -AB     Number of Stairs, Within Home, Primary twelve  -AB       Row Name 02/28/24 1013          Cognition    Orientation Status (Cognition) oriented to;person;place;disoriented to;time  -AB       Row Name 02/28/24 1013          Safety Issues, Functional  Mobility    Safety Issues Affecting Function (Mobility) awareness of need for assistance;insight into deficits/self-awareness;safety precaution awareness;safety precautions follow-through/compliance;sequencing abilities;problem-solving  -AB     Impairments Affecting Function (Mobility) cognition;endurance/activity tolerance;strength;balance  -AB     Cognitive Impairments, Mobility Safety/Performance insight into deficits/self-awareness;problem-solving/reasoning;safety precaution awareness;safety precaution follow-through;sequencing abilities  -AB     Comment, Safety Issues/Impairments (Mobility) Conversational confusion  -AB               User Key  (r) = Recorded By, (t) = Taken By, (c) = Cosigned By      Initials Name Provider Type    AB Poornima Mallory, PT Physical Therapist                   Mobility       Row Name 02/28/24 1015          Bed Mobility    Comment, (Bed Mobility) received and left [facility].  -AB       Row Name 02/28/24 1015          Transfers    Comment, (Transfers) Denied dizziness throughout. Cues for hand placement and sequencing. Increased time for processing.  -AB       Row Name 02/28/24 1015          Sit-Stand Transfer    Sit-Stand Radiant (Transfers) contact guard;1 person assist;verbal cues  -AB     Assistive Device (Sit-Stand Transfers) other (see comments)  HHA  -AB       Row Name 02/28/24 1015          Gait/Stairs (Locomotion)    Radiant Level (Gait) contact guard;1 person assist;verbal cues  -AB     Assistive Device (Gait) other (see comments)  HHA  -AB     Distance in Feet (Gait) 180  -AB     Deviations/Abnormal Patterns (Gait) bilateral deviations;becky decreased;gait speed decreased;stride length decreased  -AB     Radiant Level (Stairs) unable to assess  -AB     Comment, (Gait/Stairs) Pt ambulated with step through gait pattern and slowed becky. HHA for improved stability. Pt with two episodes of near LOB's but able to self correct. Cues for forward gaze and widening  LIOR. Further activity limited by fatigue.  -AB               User Key  (r) = Recorded By, (t) = Taken By, (c) = Cosigned By      Initials Name Provider Type    AB Poornima Mallory, PT Physical Therapist                   Obj/Interventions       Row Name 02/28/24 1018          Range of Motion Comprehensive    General Range of Motion bilateral lower extremity ROM WFL  -AB       Row Name 02/28/24 1018          Strength Comprehensive (MMT)    General Manual Muscle Testing (MMT) Assessment lower extremity strength deficits identified  -AB     Comment, General Manual Muscle Testing (MMT) Assessment BLE grossly 4/5; equal bilaterally.  -AB       Row Name 02/28/24 1018          Motor Skills    Motor Skills coordination  -AB     Coordination bilateral;lower extremity;heel to shin;WFL  -AB       Row Name 02/28/24 1018          Balance    Balance Assessment sitting dynamic balance;sitting static balance;standing static balance;standing dynamic balance  -AB     Static Sitting Balance standby assist  -AB     Dynamic Sitting Balance standby assist  -AB     Position, Sitting Balance unsupported;sitting in chair  -AB     Static Standing Balance contact guard  -AB     Dynamic Standing Balance contact guard;1-person assist;verbal cues  -AB     Position/Device Used, Standing Balance supported  -AB     Balance Interventions sitting;standing;sit to stand;supported;static;dynamic;occupation based/functional task  -AB     Comment, Balance 2 near LOB's. Able to self correct. HHA for improved stability.  -AB       Row Name 02/28/24 1018          Sensory Assessment (Somatosensory)    Sensory Assessment (Somatosensory) LE sensation intact  -AB               User Key  (r) = Recorded By, (t) = Taken By, (c) = Cosigned By      Initials Name Provider Type    AB Poornima Mallory, PT Physical Therapist                   Goals/Plan       Row Name 02/28/24 1026          Bed Mobility Goal 1 (PT)    Activity/Assistive Device (Bed Mobility Goal 1, PT) sit  to supine;supine to sit  -AB     Divide Level/Cues Needed (Bed Mobility Goal 1, PT) independent  -AB     Time Frame (Bed Mobility Goal 1, PT) long term goal (LTG);10 days  -AB       Row Name 02/28/24 1026          Transfer Goal 1 (PT)    Activity/Assistive Device (Transfer Goal 1, PT) sit-to-stand/stand-to-sit;bed-to-chair/chair-to-bed  -AB     Divide Level/Cues Needed (Transfer Goal 1, PT) independent  -AB     Time Frame (Transfer Goal 1, PT) long term goal (LTG);10 days  -AB       Row Name 02/28/24 1026          Gait Training Goal 1 (PT)    Activity/Assistive Device (Gait Training Goal 1, PT) gait (walking locomotion)  -AB     Divide Level (Gait Training Goal 1, PT) independent  -AB     Distance (Gait Training Goal 1, PT) 350  -AB     Time Frame (Gait Training Goal 1, PT) long term goal (LTG);10 days  -AB       Row Name 02/28/24 1026          Stairs Goal 1 (PT)    Activity/Assistive Device (Stairs Goal 1, PT) ascending stairs;descending stairs  -AB     Divide Level/Cues Needed (Stairs Goal 1, PT) contact guard required  -AB     Number of Stairs (Stairs Goal 1, PT) 12  -AB     Time Frame (Stairs Goal 1, PT) long term goal (LTG);10 days  -AB       Row Name 02/28/24 1026          Therapy Assessment/Plan (PT)    Planned Therapy Interventions (PT) balance training;home exercise program;bed mobility training;gait training;patient/family education;postural re-education;transfer training;stretching;strengthening;stair training;ROM (range of motion)  -AB               User Key  (r) = Recorded By, (t) = Taken By, (c) = Cosigned By      Initials Name Provider Type    AB Poornima Mallory, PT Physical Therapist                   Clinical Impression       Row Name 02/28/24 1020          Pain    Pretreatment Pain Rating 0/10 - no pain  -AB     Posttreatment Pain Rating 0/10 - no pain  -AB       Row Name 02/28/24 1020          Pain Scale: FACES Pre/Post-Treatment    Pain: FACES Scale, Pretreatment 0-->no  hurt  -AB     Posttreatment Pain Rating 0-->no hurt  -AB       Row Name 02/28/24 1020          Plan of Care Review    Plan of Care Reviewed With patient;spouse  -AB     Progress no change  -AB     Outcome Evaluation PT initial eval completed. Pt presents below her functional baseline with balance and endurance deficits. She ambulated 180' with CGA and UE support. 2 near LOB's noted with pt able to self correct. Further IPPT is warrented. PT rec d/c home with assist when medically appropriate.  -AB       Row Name 02/28/24 1020          Therapy Assessment/Plan (PT)    Rehab Potential (PT) good, to achieve stated therapy goals  -AB     Criteria for Skilled Interventions Met (PT) yes;meets criteria;skilled treatment is necessary  -AB     Therapy Frequency (PT) daily  -AB       Row Name 02/28/24 1020          Vital Signs    Pre Systolic BP Rehab 158  -AB     Pre Treatment Diastolic    -AB     Post Systolic BP Rehab 161  -AB     Post Treatment Diastolic BP 85  -AB     Pretreatment Heart Rate (beats/min) 66  -AB     Posttreatment Heart Rate (beats/min) 74  -AB     Pre SpO2 (%) 91  -AB     O2 Delivery Pre Treatment room air  -AB     O2 Delivery Intra Treatment room air  -AB     Post SpO2 (%) 90  -AB     O2 Delivery Post Treatment room air  -AB     Pre Patient Position Sitting  -AB     Intra Patient Position Standing  -AB     Post Patient Position Sitting  -AB       Row Name 02/28/24 1020          Positioning and Restraints    Pre-Treatment Position sitting in chair/recliner  -AB     Post Treatment Position chair  -AB     In Chair notified nsg;reclined;sitting;call light within reach;encouraged to call for assist;exit alarm on;with family/caregiver;legs elevated;waffle cushion  -AB               User Key  (r) = Recorded By, (t) = Taken By, (c) = Cosigned By      Initials Name Provider Type    AB Poornima Mallory, PT Physical Therapist                   Outcome Measures       Row Name 02/28/24 1026 02/28/24 0104        How much help from another person do you currently need...    Turning from your back to your side while in flat bed without using bedrails? 4  -AB 4  -MC    Moving from lying on back to sitting on the side of a flat bed without bedrails? 3  -AB 4  -MC    Moving to and from a bed to a chair (including a wheelchair)? 3  -AB 4  -MC    Standing up from a chair using your arms (e.g., wheelchair, bedside chair)? 3  -AB 3  -MC    Climbing 3-5 steps with a railing? 3  -AB 3  -MC    To walk in hospital room? 3  -AB 4  -MC    AM-PAC 6 Clicks Score (PT) 19  -AB 22  -MC    Highest Level of Mobility Goal 6 --> Walk 10 steps or more  -AB 7 --> Walk 25 feet or more  -      Row Name 02/28/24 1026 02/28/24 0842       Modified [place] Scale    Pre-Stroke Modified [place] Scale 6 - Unable to determine (UTD) from the medical record documentation  -AB 2 - Slight disability.  Unable to carry out all previous activities but able to look after own affairs without assistance.  -CS    Modified [place] Scale 2 - Slight disability.  Unable to carry out all previous activities but able to look after own affairs without assistance.  -AB --      Row Name 02/28/24 1026 02/28/24 0842       Functional Assessment    Outcome Measure Options AM-PAC 6 Clicks Basic Mobility (PT)  -AB AM-PAC 6 Clicks Daily Activity (OT);Modified [place]  -CS              User Key  (r) = Recorded By, (t) = Taken By, (c) = Cosigned By      Initials Name Provider Type    CS Leonides Oglesby, OT Occupational Therapist    Poornima Alonso, PT Physical Therapist    Ernesto Jenkins, RN Registered Nurse                                 Physical Therapy Education       Title: PT OT SLP Therapies (In Progress)       Topic: Physical Therapy (In Progress)       Point: Mobility training (Done)       Learning Progress Summary             Patient Acceptance, E,D, VU,NR by AB at 2/28/2024 1027                         Point: Home exercise program (Not Started)       Learner Progress:   Not documented in this visit.              Point: Body mechanics (Done)       Learning Progress Summary             Patient Acceptance, E,D, VU,NR by AB at 2/28/2024 1027                         Point: Precautions (Done)       Learning Progress Summary             Patient Acceptance, E,D, VU,NR by AB at 2/28/2024 1027                                         User Key       Initials Effective Dates Name Provider Type Discipline    AB 09/22/22 -  Poornima Mallory, PT Physical Therapist PT                  PT Recommendation and Plan  Planned Therapy Interventions (PT): balance training, home exercise program, bed mobility training, gait training, patient/family education, postural re-education, transfer training, stretching, strengthening, stair training, ROM (range of motion)  Plan of Care Reviewed With: patient, spouse  Progress: no change  Outcome Evaluation: PT initial eval completed. Pt presents below her functional baseline with balance and endurance deficits. She ambulated 180' with CGA and UE support. 2 near LOB's noted with pt able to self correct. Further IPPT is warrented. PT rec d/c home with assist when medically appropriate.     Time Calculation:   PT Evaluation Complexity  History, PT Evaluation Complexity: 1-2 personal factors and/or comorbidities  Examination of Body Systems (PT Eval Complexity): total of 3 or more elements  Clinical Presentation (PT Evaluation Complexity): evolving  Clinical Decision Making (PT Evaluation Complexity): moderate complexity  Overall Complexity (PT Evaluation Complexity): moderate complexity     PT Charges       Row Name 02/28/24 1028             Time Calculation    Start Time 0857  -AB      PT Received On 02/28/24  -AB      PT Goal Re-Cert Due Date 03/09/24  -AB         Untimed Charges    PT Eval/Re-eval Minutes 50  -AB         Total Minutes    Untimed Charges Total Minutes 50  -AB       Total Minutes 50  -AB                User Key  (r) = Recorded By, (t) = Taken By, (c)  = Cosigned By      Initials Name Provider Type    AB Poornima Mallory, PT Physical Therapist                  Therapy Charges for Today       Code Description Service Date Service Provider Modifiers Qty    [id number] HC PT EVAL MOD COMPLEXITY 4 2/28/2024 Poornima Mallory, PT GP 1            PT G-Codes  Outcome Measure Options: AM-PAC 6 Clicks Basic Mobility (PT)  AM-PAC 6 Clicks Score (PT): 19  AM-PAC 6 Clicks Score (OT): 24  Modified [place] Scale: 2 - Slight disability.  Unable to carry out all previous activities but able to look after own affairs without assistance.  PT Discharge Summary  Anticipated Discharge Disposition (PT): home with assist    Poornima Mallory PT  2/28/2024

## 2024-02-28 NOTE — CONSULTS
"NEUROSURGERY CONSULTATION    Referring Provider: Inderjit Santizo MD    Patient Care Team:  Michaela Connell MD as PCP - General  Michaela Connell MD as PCP - Family Medicine  Werner Hollis MD (Inactive) as Referring Physician (Neurosurgery)  Moo Hopkins MD as Consulting Physician (Ophthalmology)  Jamal Ramos MD as Consulting Physician (Nephrology)  Alli Aguirre MD as Consulting Physician (Cardiology)  Costa Raygoza MD as Consulting Physician (Radiation Oncology)  Andrea Bocanegra MD as Consulting Physician (General Surgery)  Alli Aguirre MD as Consulting Physician (Cardiology)    Chief Complaint: Headache and confusion.    History of Present Illness: Ms. Ackerman is a 69-year-old woman who is well-known to our service.  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 Hollis.  A second procedure was performed in 2014. At one point she was referred to the [facility] for evaluation. Given recurrence along the resection site extending into the orbit she underwent CyberKnife radiosurgery in 2017 and I took part in that treatment.  I last saw her on 12/7/2021.  She had previously undergone treatment for an abdominal problem at the [facility] and as such I referred her there believing that she would require additional surgery that would require neurosurgery, ophthalmology, and oculoplastics involvement.  At one point she seems to have undergone a procedure by ophthalmology according to her .  In June of last year she underwent gamma knife treatment for what the patient's  says were for 4 \"spots.\"  Over the last week or so she has had confusion and has been performing tasks slowly.  She also reports some headache and nausea.  She has had no vomiting.  She denies focal weakness.  She has not had any vision in her left eye for the last 1.5-2 years.      Review of Systems:  Musculoskeletal and Neurological systems were " reviewed and are negative except for:    Neurological: positive for confusion and headaches.    History:  Past Medical History:   Diagnosis Date    Abdominal hernia     Arthritis     rt knee     Atrial flutter with rapid ventricular response 12/21/2017    Back pain     Brain tumor     Colostomy present 08/2018    Depression     History of blood transfusion     History of gastroesophageal reflux (GERD)     resolved since Nissen    History of Nissen fundoplication 7/1/2017    History of small bowel obstruction     Hyperlipemia     Hypertension     Memory loss or impairment     Migraine     Parathyroid adenoma     Incidental on thyroid US.    PONV (postoperative nausea and vomiting)     nausea - preprocedural meds help     Prediabetes     Last Impression: 06 Feb 2015  Reviewed labs. Excellent control.  Emery Connell Impression: 06 Feb 2015  Reviewed labs. Excellent control.  Michaela Connell    Thyroid nodule      Last Impression: 13 Jun 2015  r/o thyroid cancer, will proceed with US.  Michaela Connell (Internal Medicine)     UTI (urinary tract infection)     Vision changes     blockages left eye     Wears glasses     readers   ,   Past Surgical History:   Procedure Laterality Date    BRAIN SURGERY Left 05/19/2022    BRAIN SURGERY  08/2023    left side done & radiationg a wk. later,  in [place]    CARDIAC CATHETERIZATION N/A 04/27/2020    Procedure: LEFT HEART CATH;  Surgeon: Marina Ring MD;  [facility];  Service: Cardiology;  Laterality: N/A;    CARPAL TUNNEL RELEASE  2002    right     COLONOSCOPY N/A 08/18/2018    Procedure: COLONOSCOPY;  Surgeon: Inderjit Campos MD;  Location:  [facility];  Service: Gastroenterology    COLONOSCOPY N/A 11/28/2018    Procedure: COLONOSCOPY;  Surgeon: Inderjit Campos MD;  Location:  [facility];  Service: Gastroenterology    COLOSTOMY CLOSURE N/A 02/19/2019    Procedure: COLOSTOMY TAKEDOWN, INCISIONAL HERNIA REPAIR;   Surgeon: Andrea Bocanegra MD;  Location:  [facility];  Service: General    CRANIOTOMY  2003 & 2014    Dr. Werner Hollis for tumor removal    ENDOSCOPY      EXPLORATORY LAPAROTOMY N/A 12/05/2017    Procedure: EXPLORATORY LAPAROTOMY, SMALL BOWEL RESECTION;  Surgeon: Ирина Cobian MD;  Location:  [facility];  Service:     EXPLORATORY LAPAROTOMY N/A 12/22/2017    Procedure: LAPAROTOMY EXPLORATORY FOR SMALL BOWEL OBSTRUCTION;  Surgeon: Ирина Cobian MD;  Location:  [facility];  Service:     EXPLORATORY LAPAROTOMY N/A 07/23/2018    Procedure: EXPLORATORY LAPAROTOMY, APPENDECTOMY, CECOPEXY, INCISIONAL HERNIA REPAIR, LYSIS OF ADHESIONS;  Surgeon: Andrea Bocanegra MD;  Location:  [facility];  Service: General    EXPLORATORY LAPAROTOMY N/A 08/19/2018    Procedure: LAPAROTOMY EXPLORATORY, SIGMOID COLECTOMY, CREATION OF OSTOMY;  Surgeon: Andrea Bocanegra MD;  Location:  [facility];  Service: General    HYSTERECTOMY      partial - both ovaries still present pt believes     INSERTION HEMODIALYSIS CATHETER N/A 12/07/2017    Procedure: HEMODIALYSIS CATHETER INSERTION;  Surgeon: Chance Valenzuela MD;  Location:  [facility];  Service:     ORBITOTOMY Left 11/03/2017    Procedure:  LEFT LATERAL ORBITOTOMY WITH DEBULKING OF TUMOR ;  Surgeon: Moo Hopkins MD;  Location:  [facility];  Service:     OTHER SURGICAL HISTORY      esophagogastric fundoplasty nissen fundoplication    TUBAL ABDOMINAL LIGATION     ,   Family History   Problem Relation Age of Onset    Obesity Mother     Heart attack Mother     Heart disease Mother     Migraines Father     Stroke Father     Diabetes Father     Cancer Other     Arthritis Other     Diabetes Other     Breast cancer Maternal Aunt     Breast cancer Paternal Aunt     Ovarian cancer Neg Hx    ,   Social History     Tobacco Use    Smoking status: Never    Smokeless tobacco: Never   Vaping Use    Vaping Use: Never used   Substance Use Topics    Alcohol use: No    Drug use: No   ,   Medications  "Prior to Admission   Medication Sig Dispense Refill Last Dose    aspirin 81 MG tablet Take 1 tablet by mouth Daily. 30 tablet 11 2/27/2024    busPIRone (BUSPAR) 10 MG tablet Take 1 tablet by mouth 2 (Two) Times a Day. 180 tablet 3 2/27/2024    carvedilol (COREG) 12.5 MG tablet TAKE ONE TABLET BY MOUTH EVERY 12 HOURS (Patient taking differently: Take 0.5 tablets by mouth 2 (Two) Times a Day With Meals.) 180 tablet 3 Patient Taking Differently    cholecalciferol (VITAMIN D3) 1000 units tablet Take 2 tablets by mouth Every Other Day.   Past Week    dorzolamide (TRUSOPT) 2 % ophthalmic solution Administer 1 drop into the left eye 2 (Two) Times a Day.   2/27/2024    lisinopril (PRINIVIL,ZESTRIL) 2.5 MG tablet Take 1 tablet by mouth Daily. 90 tablet 3 2/27/2024    LORazepam (Ativan) 0.5 MG tablet Take 0.5-1 tablets by mouth Daily As Needed for Anxiety. 15 tablet 0 Past Month    omeprazole (priLOSEC) 20 MG capsule Take 1 capsule by mouth Daily. 90 capsule 3 2/27/2024    ondansetron ODT (ZOFRAN-ODT) 4 MG disintegrating tablet Place 1 tablet on the tongue Every 4 (Four) Hours. As needed for nausea 12 tablet 0 Past Month    QUEtiapine (SEROquel) 25 MG tablet Take 0.5 tablets by mouth every night at bedtime. 45 tablet 3 2/27/2024    rosuvastatin (CRESTOR) 40 MG tablet Take 1 tablet by mouth Daily. 90 tablet 3 2/27/2024    sertraline (ZOLOFT) 100 MG tablet Take 1 tablet by mouth Daily. 90 tablet 3 2/27/2024    thiamine (VITAMIN B-1) 100 MG tablet tablet Take 1 tablet by mouth Every Other Day. 30 each 5 2/27/2024    timolol (TIMOPTIC) 0.5 % ophthalmic solution 1 drop 2 (Two) Times a Day.   2/27/2024    Allergies:  Dilantin [phenytoin sodium extended]      Physical Exam:  Vital Signs: Blood pressure 171/81, pulse 66, temperature 97.5 °F (36.4 °C), temperature source Oral, resp. rate 16, height 160 cm (63\"), weight 79.4 kg (175 lb), SpO2 94%, not currently breastfeeding.  The patient is awake and alert.  She follows simple " commands.  Her speech is fluent.  Some of her responses, but slowly.  She is oriented to the place and the month but is uncertain of the year.  She moves all extremities equally.  There is no pronator drift.  There is some ptosis on the left.  There is some extraocular movement dysfunction in her left eye as well.  The left pupil is midsize but unreactive.  She has no vision in her left eye.    Data Review:  MRI of the brain demonstrates a large rounded left frontal lesion that is above the medial to the orbit.  This is a new lesion when compared to our prior imaging from 2021.  There continues to be tumor within the left orbit that may be able to slightly larger.  Superficial lesions in the left mid temporal region and more posterior temporal region on the left are also noted.  The posterior temporal lesion involves the cranium and extends extracranial and displaces the scalp outward.  This is slightly more prominent.  Postsurgical changes with encephalomalacia are noted in the anterior left temporal region.  There is extensive left frontal edema with posterior displacement of the frontal horn of the left lateral ventricle.    Report for an MRI of the brain dated June 20, 2023 performed at the [facility] demonstrated the above new mass in the left frontal region above the orbit.  That lesion was new at that time and not on prior studies.  It measured 1 x 1.3 x 1.8 cm.    CTA of the head and neck from yesterday did not demonstrate large vessel occlusion or vascular malformation.    Results from last 7 days   Lab Units 02/28/24  0430   SODIUM mmol/L 138   POTASSIUM mmol/L 3.5   CHLORIDE mmol/L 105   CO2 mmol/L 23.0   BUN mg/dL 18   CREATININE mg/dL 0.79   GLUCOSE mg/dL 167*   CALCIUM mg/dL 9.3     Results from last 7 days   Lab Units 02/28/24  0430   WBC 10*3/mm3 6.87   HEMOGLOBIN g/dL 12.6   HEMATOCRIT % 38.9   PLATELETS 10*3/mm3 197         Diagnosis:  Progressive recurrent meningioma.  The large left frontal  lesion is new to us but was observed mid-last year and apparently she underwent CyberKnife radiosurgery for that.  The lesion has enlarged and there is substantial associated left frontal edema.    Treatment Recommendations:  Continue Decadron to help reduce the edema.  The patient has a follow-up scheduled at the [facility] on 3/14/2023.  She will likely require additional surgery.  The question is whether to remove the new large lesion or to pursue a more extensive intervention that encompasses the orbit.  I can remove the enlarging left frontal lesion but further intervention involving the orbit will require treatment elsewhere.      Robi Perales MD  02/28/24  07:11 EST

## 2024-02-29 LAB
ANION GAP SERPL CALCULATED.3IONS-SCNC: 11 MMOL/L (ref 5–15)
BUN SERPL-MCNC: 19 MG/DL (ref 8–23)
BUN/CREAT SERPL: 26 (ref 7–25)
CALCIUM SPEC-SCNC: 9.6 MG/DL (ref 8.6–10.5)
CHLORIDE SERPL-SCNC: 110 MMOL/L (ref 98–107)
CO2 SERPL-SCNC: 21 MMOL/L (ref 22–29)
CREAT SERPL-MCNC: 0.73 MG/DL (ref 0.57–1)
EGFRCR SERPLBLD CKD-EPI 2021: 88 ML/MIN/1.73
GLUCOSE BLDC GLUCOMTR-MCNC: 134 MG/DL (ref 70–130)
GLUCOSE BLDC GLUCOMTR-MCNC: 157 MG/DL (ref 70–130)
GLUCOSE BLDC GLUCOMTR-MCNC: 160 MG/DL (ref 70–130)
GLUCOSE SERPL-MCNC: 149 MG/DL (ref 65–99)
POTASSIUM SERPL-SCNC: 4.5 MMOL/L (ref 3.5–5.2)
SODIUM SERPL-SCNC: 142 MMOL/L (ref 136–145)

## 2024-02-29 PROCEDURE — 99232 SBSQ HOSP IP/OBS MODERATE 35: CPT | Performed by: NEUROLOGICAL SURGERY

## 2024-02-29 PROCEDURE — 82948 REAGENT STRIP/BLOOD GLUCOSE: CPT

## 2024-02-29 PROCEDURE — 25010000002 DEXAMETHASONE PER 1 MG: Performed by: NURSE PRACTITIONER

## 2024-02-29 PROCEDURE — 97530 THERAPEUTIC ACTIVITIES: CPT

## 2024-02-29 PROCEDURE — 80048 BASIC METABOLIC PNL TOTAL CA: CPT | Performed by: STUDENT IN AN ORGANIZED HEALTH CARE EDUCATION/TRAINING PROGRAM

## 2024-02-29 RX ADMIN — ROSUVASTATIN CALCIUM 40 MG: 20 TABLET, COATED ORAL at 08:55

## 2024-02-29 RX ADMIN — DEXAMETHASONE SODIUM PHOSPHATE 4 MG: 4 INJECTION, SOLUTION INTRA-ARTICULAR; INTRALESIONAL; INTRAMUSCULAR; INTRAVENOUS; SOFT TISSUE at 18:01

## 2024-02-29 RX ADMIN — QUETIAPINE FUMARATE 12.5 MG: 25 TABLET ORAL at 20:11

## 2024-02-29 RX ADMIN — DEXAMETHASONE SODIUM PHOSPHATE 4 MG: 4 INJECTION, SOLUTION INTRA-ARTICULAR; INTRALESIONAL; INTRAMUSCULAR; INTRAVENOUS; SOFT TISSUE at 06:21

## 2024-02-29 RX ADMIN — Medication 10 ML: at 09:44

## 2024-02-29 RX ADMIN — BUSPIRONE HYDROCHLORIDE 10 MG: 10 TABLET ORAL at 20:12

## 2024-02-29 RX ADMIN — FAMOTIDINE 20 MG: 10 INJECTION, SOLUTION INTRAVENOUS at 08:56

## 2024-02-29 RX ADMIN — BUSPIRONE HYDROCHLORIDE 10 MG: 10 TABLET ORAL at 08:56

## 2024-02-29 RX ADMIN — DORZOLAMIDE HCL 1 DROP: 20 SOLUTION/ DROPS OPHTHALMIC at 09:42

## 2024-02-29 RX ADMIN — Medication 10 ML: at 20:16

## 2024-02-29 RX ADMIN — DORZOLAMIDE HCL 1 DROP: 20 SOLUTION/ DROPS OPHTHALMIC at 20:12

## 2024-02-29 RX ADMIN — CARVEDILOL 12.5 MG: 12.5 TABLET, FILM COATED ORAL at 20:11

## 2024-02-29 RX ADMIN — FAMOTIDINE 20 MG: 10 INJECTION, SOLUTION INTRAVENOUS at 20:12

## 2024-02-29 RX ADMIN — SERTRALINE HYDROCHLORIDE 100 MG: 100 TABLET ORAL at 08:55

## 2024-02-29 RX ADMIN — DEXAMETHASONE SODIUM PHOSPHATE 4 MG: 4 INJECTION, SOLUTION INTRA-ARTICULAR; INTRALESIONAL; INTRAMUSCULAR; INTRAVENOUS; SOFT TISSUE at 23:14

## 2024-02-29 RX ADMIN — DEXAMETHASONE SODIUM PHOSPHATE 4 MG: 4 INJECTION, SOLUTION INTRA-ARTICULAR; INTRALESIONAL; INTRAMUSCULAR; INTRAVENOUS; SOFT TISSUE at 11:45

## 2024-02-29 RX ADMIN — Medication 10 ML: at 08:57

## 2024-02-29 RX ADMIN — LISINOPRIL 2.5 MG: 5 TABLET ORAL at 08:56

## 2024-02-29 RX ADMIN — CARVEDILOL 12.5 MG: 12.5 TABLET, FILM COATED ORAL at 08:56

## 2024-02-29 NOTE — THERAPY TREATMENT NOTE
Patient Name: Tereza Ackerman  : 1955    MRN: [id number]                              Today's Date: 2024       Admit Date: 2024    Visit Dx:     ICD-10-CM ICD-9-CM   1. Brain mass  G93.89 348.89   2. Vasogenic brain edema  G93.6 348.5   3. Sphenoid mass, left  J34.89 478.19   4. Meningioma  D32.9 225.2   5. Left-sided headache  R51.9 784.0   6. Vision loss of left eye  H54.62 369.8   7. History of hypertension  Z86.79 V12.59   8. History of hyperlipidemia  Z86.39 V12.29   9. Aphasia  R47.01 784.3     Patient Active Problem List   Diagnosis    Impaired glucose tolerance    Parathyroid adenoma    Reaction to chronic stress    Multinodular goiter    Vitamin D deficiency    Meningioma, recurrent of brain    Arthritis    Depression    Small bowel obstruction    Insomnia    History of Nissen fundoplication    Malignant neoplasm of left orbit    Prediabetes    Mixed hyperlipidemia    Hyperglycemia    Atrial flutter with rapid ventricular response    Anemia    Large bowel obstruction    Abdominal pain    Essential hypertension    History of creation of ostomy    Screen for colon cancer    Sigmoid volvulus    SHAE (obstructive sleep apnea)    Brain mass     Past Medical History:   Diagnosis Date    Abdominal hernia     Arthritis     rt knee     Atrial flutter with rapid ventricular response 2017    Back pain     Brain tumor     Colostomy present 2018    Depression     History of blood transfusion     History of gastroesophageal reflux (GERD)     resolved since Nissen    History of Nissen fundoplication 2017    History of small bowel obstruction     Hyperlipemia     Hypertension     Memory loss or impairment     Migraine     Parathyroid adenoma     Incidental on thyroid US.    PONV (postoperative nausea and vomiting)     nausea - preprocedural meds help     Prediabetes     Last Impression: 2015  Reviewed labs. Excellent control.  Emery Connell Impression: 2015  Reviewed  labs. Excellent control.  Michaela Connell    Thyroid nodule      Last Impression: 13 Jun 2015  r/o thyroid cancer, will proceed with US.  Michaela Connell (Internal Medicine)     UTI (urinary tract infection)     Vision changes     blockages left eye     Wears glasses     readers     Past Surgical History:   Procedure Laterality Date    BRAIN SURGERY Left 05/19/2022    BRAIN SURGERY  08/2023    left side done & radiationg a wk. later,  in [place]    CARDIAC CATHETERIZATION N/A 04/27/2020    Procedure: LEFT HEART CATH;  Surgeon: Marina Ring MD;  [facility];  Service: Cardiology;  Laterality: N/A;    CARPAL TUNNEL RELEASE  2002    right     COLONOSCOPY N/A 08/18/2018    Procedure: COLONOSCOPY;  Surgeon: Inderjit Campos MD;  Location:  [facility];  Service: Gastroenterology    COLONOSCOPY N/A 11/28/2018    Procedure: COLONOSCOPY;  Surgeon: Inderjit Campos MD;  Location:  [facility];  Service: Gastroenterology    COLOSTOMY CLOSURE N/A 02/19/2019    Procedure: COLOSTOMY TAKEDOWN, INCISIONAL HERNIA REPAIR;  Surgeon: Andrea Bocanegra MD;  Location:  [facility];  Service: General    CRANIOTOMY  2003 & 2014    Dr. Werner Hollis for tumor removal    ENDOSCOPY      EXPLORATORY LAPAROTOMY N/A 12/05/2017    Procedure: EXPLORATORY LAPAROTOMY, SMALL BOWEL RESECTION;  Surgeon: Ирина Cobian MD;  Location:  [facility];  Service:     EXPLORATORY LAPAROTOMY N/A 12/22/2017    Procedure: LAPAROTOMY EXPLORATORY FOR SMALL BOWEL OBSTRUCTION;  Surgeon: Ирина Cobian MD;  Location:  [facility];  Service:     EXPLORATORY LAPAROTOMY N/A 07/23/2018    Procedure: EXPLORATORY LAPAROTOMY, APPENDECTOMY, CECOPEXY, INCISIONAL HERNIA REPAIR, LYSIS OF ADHESIONS;  Surgeon: Andrea Bocanegra MD;  Location:  [facility];  Service: General    EXPLORATORY LAPAROTOMY N/A 08/19/2018    Procedure: LAPAROTOMY EXPLORATORY, SIGMOID COLECTOMY, CREATION OF OSTOMY;  Surgeon: Andrea Bocanegra MD;   Location:  [facility];  Service: General    HYSTERECTOMY      partial - both ovaries still present pt believes     INSERTION HEMODIALYSIS CATHETER N/A 12/07/2017    Procedure: HEMODIALYSIS CATHETER INSERTION;  Surgeon: Chance Valenzuela MD;  Location:  [facility];  Service:     ORBITOTOMY Left 11/03/2017    Procedure:  LEFT LATERAL ORBITOTOMY WITH DEBULKING OF TUMOR ;  Surgeon: Moo Hopkins MD;  Location:  [facility];  Service:     OTHER SURGICAL HISTORY      esophagogastric fundoplasty nissen fundoplication    TUBAL ABDOMINAL LIGATION        General Information       Row Name 02/29/24 1449          Physical Therapy Time and Intention    Document Type therapy note (daily note)  -CK     Mode of Treatment physical therapy;individual therapy  -CK       Row Name 02/29/24 1446          General Information    Patient Profile Reviewed yes  -CK     Existing Precautions/Restrictions fall;other (see comments)  L eye blindness, recurrent brain meningioma w/ long history of surgical interventions  -CK     Barriers to Rehab medically complex;cognitive status;visual deficit  -CK       Row Name 02/29/24 5945          Cognition    Orientation Status (Cognition) oriented to;person;place  -CK       Row Name 02/29/24 1445          Safety Issues, Functional Mobility    Safety Issues Affecting Function (Mobility) awareness of need for assistance;insight into deficits/self-awareness;safety precaution awareness;safety precautions follow-through/compliance;problem-solving  -CK     Impairments Affecting Function (Mobility) cognition;endurance/activity tolerance;strength;balance  -CK     Comment, Safety Issues/Impairments (Mobility) oriented with situational confusion  -CK               User Key  (r) = Recorded By, (t) = Taken By, (c) = Cosigned By      Initials Name Provider Type    CK Sammie Bauer, PT Physical Therapist                   Mobility       Row Name 02/29/24 4634          Bed Mobility    Bed Mobility  supine-sit-supine  -CK     Supine-Sit-Supine Ramsey (Bed Mobility) modified independence  -CK     Assistive Device (Bed Mobility) head of bed elevated  -CK       Row Name 02/29/24 1457          Sit-Stand Transfer    Sit-Stand Ramsey (Transfers) standby assist  -CK       Row Name 02/29/24 1457          Gait/Stairs (Locomotion)    Ramsey Level (Gait) standby assist  -CK     Distance in Feet (Gait) 400  -CK     Deviations/Abnormal Patterns (Gait) gait speed decreased  -CK     Comment, (Gait/Stairs) Step through gait pattern in ling. No LOB today with mobility, patient did take 1 standing rest break to look out the window, but appeared to have improved balance and endurance today. Recommend stair training as appropriate next session.  -CK               User Key  (r) = Recorded By, (t) = Taken By, (c) = Cosigned By      Initials Name Provider Type    CK Sammie Bauer PT Physical Therapist                   Obj/Interventions       Row Name 02/29/24 1502          Balance    Balance Assessment sitting static balance;standing static balance;standing dynamic balance  -CK     Static Sitting Balance independent  -CK     Position, Sitting Balance unsupported;sitting edge of bed  -CK     Static Standing Balance standby assist  -CK     Dynamic Standing Balance contact guard  -CK     Position/Device Used, Standing Balance unsupported  -CK     Comment, Balance no LOB  -CK               User Key  (r) = Recorded By, (t) = Taken By, (c) = Cosigned By      Initials Name Provider Type    CK Sammie Bauer PT Physical Therapist                   Goals/Plan    No documentation.                  Clinical Impression       Row Name 02/29/24 1502          Pain    Pretreatment Pain Rating 0/10 - no pain  -CK     Posttreatment Pain Rating 0/10 - no pain  -CK       Row Name 02/29/24 1502          Plan of Care Review    Plan of Care Reviewed With patient;spouse  -CK     Progress improving  -CK     Outcome Evaluation  Patient showing improvements in balance and endurance today with ability to ambulate 400' SBA unsupported, conversationally confused at times. IPPT will continue to follow to promote independence with functional mobility. Recommend trialing stairs next session as appropriate.  -CK       Row Name 02/29/24 1502          Vital Signs    Pre Systolic BP Rehab 147  -CK     Pre Treatment Diastolic BP 72  -CK     Pretreatment Heart Rate (beats/min) 75  -CK     Posttreatment Heart Rate (beats/min) 66  -CK     Pre SpO2 (%) 95  -CK     O2 Delivery Pre Treatment room air  -CK     O2 Delivery Intra Treatment room air  -CK     Post SpO2 (%) 98  -CK     O2 Delivery Post Treatment room air  -CK     Pre Patient Position Supine  -CK     Intra Patient Position Standing  -CK     Post Patient Position Supine  -CK       Row Name 02/29/24 1502          Positioning and Restraints    Pre-Treatment Position in bed  -CK     Post Treatment Position bed  -CK     In Bed supine;call light within reach;encouraged to call for assist;with family/caregiver;notified nsg  no alarm upon entry  -CK               User Key  (r) = Recorded By, (t) = Taken By, (c) = Cosigned By      Initials Name Provider Type    CK Sammie Bauer, PT Physical Therapist                   Outcome Measures       Row Name 02/29/24 1506 02/29/24 0800       How much help from another person do you currently need...    Turning from your back to your side while in flat bed without using bedrails? 4  -CK 4  -BB    Moving from lying on back to sitting on the side of a flat bed without bedrails? 4  -CK 4  -BB    Moving to and from a bed to a chair (including a wheelchair)? 4  -CK 3  -BB    Standing up from a chair using your arms (e.g., wheelchair, bedside chair)? 4  -CK 3  -BB    Climbing 3-5 steps with a railing? 3  -CK 3  -BB    To walk in hospital room? 3  -CK 3  -BB    AM-PAC 6 Clicks Score (PT) 22  -CK 20  -BB    Highest Level of Mobility Goal 7 --> Walk 25 feet or more  -CK  6 --> Walk 10 steps or more  -      Row Name 02/29/24 1506          Functional Assessment    Outcome Measure Options AM-PAC 6 Clicks Basic Mobility (PT)  -CK               User Key  (r) = Recorded By, (t) = Taken By, (c) = Cosigned By      Initials Name Provider Type    BB Lori Bruce, RN Registered Nurse    CK Sammie Bauer, PT Physical Therapist                                 Physical Therapy Education       Title: PT OT SLP Therapies (In Progress)       Topic: Physical Therapy (In Progress)       Point: Mobility training (Done)       Learning Progress Summary             Patient Acceptance, E, VU by CK at 2/29/2024 1507    Acceptance, E,D, VU,NR by AB at 2/28/2024 1027   Significant Other Acceptance, E, VU by CK at 2/29/2024 1507                         Point: Home exercise program (Not Started)       Learner Progress:  Not documented in this visit.              Point: Body mechanics (Done)       Learning Progress Summary             Patient Acceptance, E, VU by CK at 2/29/2024 1507    Acceptance, E,D, VU,NR by AB at 2/28/2024 1027   Significant Other Acceptance, E, VU by CK at 2/29/2024 1507                         Point: Precautions (Done)       Learning Progress Summary             Patient Acceptance, E, VU by CK at 2/29/2024 1507    Acceptance, E,D, VU,NR by AB at 2/28/2024 1027   Significant Other Acceptance, E, VU by CK at 2/29/2024 1507                                         User Key       Initials Effective Dates Name Provider Type Discipline    AB 09/22/22 -  Poornima Mallory, PT Physical Therapist PT     02/06/24 -  Sammie Bauer PT Physical Therapist PT                  PT Recommendation and Plan     Plan of Care Reviewed With: patient, spouse  Progress: improving  Outcome Evaluation: Patient showing improvements in balance and endurance today with ability to ambulate 400' SBA unsupported, conversationally confused at times. IPPT will continue to follow to promote independence with  functional mobility. Recommend trialing stairs next session as appropriate.     Time Calculation:         PT Charges       Row Name 02/29/24 1507             Time Calculation    Start Time 1404  -CK      PT Received On 02/29/24  -CK      PT Goal Re-Cert Due Date 03/09/24  -CK         Timed Charges    10078 - PT Therapeutic Activity Minutes 15  -CK         Total Minutes    Timed Charges Total Minutes 15  -CK       Total Minutes 15  -CK                User Key  (r) = Recorded By, (t) = Taken By, (c) = Cosigned By      Initials Name Provider Type    CK Sammie Bauer PT Physical Therapist                  Therapy Charges for Today       Code Description Service Date Service Provider Modifiers Qty    [id number] HC PT THERAPEUTIC ACT EA 15 MIN 2/29/2024 Sammie Bauer PT GP 1            PT G-Codes  Outcome Measure Options: AM-PAC 6 Clicks Basic Mobility (PT)  AM-PAC 6 Clicks Score (PT): 22  AM-PAC 6 Clicks Score (OT): 24  Modified Bessie Scale: 2 - Slight disability.  Unable to carry out all previous activities but able to look after own affairs without assistance.  PT Discharge Summary  Anticipated Discharge Disposition (PT): home with assist    Sammie Bauer PT  2/29/2024

## 2024-02-29 NOTE — PROGRESS NOTES
[facility] Medicine Services  PROGRESS NOTE    Patient Name: Tereza Ackerman  : 1955  MRN: [id number]    Date of Admission: 2024  Primary Care Physician: Michaela Connell MD    Subjective   Subjective     CC:  Headache    HPI:  Evaluated patient this morning. Patient still very forgetful but mentation seems to be improving with each day. No complaints today. Daughter present in room, discussed plan with her.      Objective   Objective     Vital Signs:   Temp:  [97.7 °F (36.5 °C)-98.1 °F (36.7 °C)] 98 °F (36.7 °C)  Heart Rate:  [62-73] 65  Resp:  [16-18] 18  BP: (147-175)/(72-90) 147/72     Physical Exam:  Constitutional: Awake, alert, resting in bed  HENT: NCAT, mucous membranes moist  Respiratory: Clear to auscultation bilaterally, nonlabored respirations   Cardiovascular: RRR, no murmurs, rubs, or gallops, palpable pedal pulses bilaterally  Gastrointestinal: Positive bowel sounds, soft, nontender, nondistended  Musculoskeletal: No bilateral ankle edema, no clubbing or cyanosis to extremities  Psychiatric: Appropriate affect, cooperative  Neurologic: Alert and oriented x 3, strength symmetric in all extremities, speech clear, some word finding difficulty  Skin: No rashes    Results Reviewed:  LAB RESULTS:      Lab 24   WBC 6.87 6.02   HEMOGLOBIN 12.6 13.0   HEMATOCRIT 38.9 40.4   PLATELETS 197 207   NEUTROS ABS 5.92 3.49   IMMATURE GRANS (ABS) 0.01 0.01   LYMPHS ABS 0.82 1.64   MONOS ABS 0.10 0.69   EOS ABS 0.01 0.16   MCV 89.8 94.4   PROTIME  --  14.3         Lab 242 24  17524   SODIUM 142  --  138  --   --  141   POTASSIUM 4.5 4.5 3.5  --   --  3.8   CHLORIDE 110*  --  105  --   --  106   CO2 21.0*  --  23.0  --   --  26.0   ANION GAP 11.0  --  10.0  --   --  9.0   BUN 19  --  18  --   --  18   CREATININE 0.73  --  0.79 1.00 1.00 0.96   EGFR 88.0  --  80.1  --   --   63.4   GLUCOSE 149*  --  167*  --   --  100*   CALCIUM 9.6  --  9.3  --   --  9.2   HEMOGLOBIN A1C  --   --  5.50  --   --   --          Lab 02/27/24 2010   TOTAL PROTEIN 7.3   ALBUMIN 4.1   GLOBULIN 3.2   ALT (SGPT) 11   AST (SGOT) 19   BILIRUBIN 0.2   ALK PHOS 87         Lab 02/27/24 2010   PROTIME 14.3   INR 1.09                 Brief Urine Lab Results  (Last result in the past 365 days)        Color   Clarity   Blood   Leuk Est   Nitrite   Protein   CREAT   Urine HCG        10/21/23 1123 Straw   Slightly Cloudy   3+   Moderate (2+)   Negative   1+                   Microbiology Results Abnormal       None            Adult Transthoracic Echo Complete W/ Cont if Necessary Per Protocol (With Agitated Saline)    Result Date: 2/28/2024    Left ventricular systolic function is normal. Calculated left ventricular EF = 63.9%   Left ventricular diastolic function is consistent with (grade II w/high LAP) pseudonormalization.   Negative bubble study     CT Angiogram Head w AI Analysis of LVO    Result Date: 2/28/2024  CT ANGIOGRAM HEAD W AI ANALYSIS OF LVO, CT ANGIOGRAM NECK Date of Exam: 2/28/2024 4:32 AM EST Indication: Stroke, follow up. Comparison: None available. Technique: CTA of the head was performed after the uneventful intravenous administration of 115 mL Isovue-370. Reconstructed coronal and sagittal images were also obtained. In addition, a 3-D volume rendered image was created for interpretation. Automated exposure control and iterative reconstruction methods were used. Findings: Aortic arch: The aortic arch is unremarkable.  There is conventional 3 vessel arch anatomy.  The right brachiocephalic and visualized bilateral subclavian arteries are within normal limits. Right carotid: The right CCA arises as expected from the brachiocephalic trunk.  The CCA follows a normal course and appears normal caliber.  The carotid bifurcation is unremarkable.  The external carotid artery and distal branches appear within  normal limits.  The cervical internal carotid artery follows a normal course and appears normal caliber throughout the neck and into the head.  The intracranial ICA segments appear within normal limits.  The ophthalmic artery origin is normal.  The A1 and M1 segments appear within normal limits.  The visualized distal LOUIE and MCA branches appear patent.  There is  a patent  anterior communicating artery. There is a patent  posterior communicating artery. Left carotid: The left CCA arises as expected from the aortic arch.   The CCA follows a normal course and appears normal caliber.  The carotid bifurcation is unremarkable.  The external carotid artery and distal branches appear within normal limits.  The  cervical internal carotid artery follows a normal course and appears normal caliber throughout the neck and into the head.  The intracranial ICA segments appear within normal limits.  The ophthalmic artery origin is normal.  The A1 and M1 segments appear within normal limits.  The visualized distal LOUIE and MCA branches appear patent.  There is a patent  posterior communicating artery. Posterior circulation: Vertebral arteries arise as expected from ipsilateral subclavian arteries.  The vertebral arteries are codominant.  The vertebral arteries follow a normal course and appear normal caliber throughout the neck and into the head.  The  V4 segments are patent.  Visualized posterior inferior cerebellar arteries are within normal limits.  The basilar artery is normal caliber.  Superior cerebellar arteries are patent.  Bilateral P1 segments and posterior cerebral arteries appear within normal limits. Nonvascular findings: There are changes of multiple left-sided meningioma including a large left anterior frontal mass. This mass is 3.9 x 3.7 cm. There is significant surrounding vasogenic edema. There are extensive postsurgical changes from left sided temporal craniotomy. 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     Impression: Impression: 1.No evidence of hemodynamically significant stenosis or occlusion of the carotid or vertebral arteries. 2.No evidence of large vessel occlusion of the anterior or posterior circulation. 3.Multiple left-sided meningiomas including a large left frontal meningioma with significant surrounding vasogenic edema. 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 Please see separate CT noncontrast head dictation and recent MRI from 2/27/2024. Electronically Signed: Dimitri Lyons MD  2/28/2024 5:10 AM EST  Workstation ID: [id number]    CT Angiogram Neck    Result Date: 2/28/2024  CT ANGIOGRAM HEAD W AI ANALYSIS OF LVO, CT ANGIOGRAM NECK Date of Exam: 2/28/2024 4:32 AM EST Indication: Stroke, follow up. Comparison: None available. Technique: CTA of the head was performed after the uneventful intravenous administration of 115 mL Isovue-370. Reconstructed coronal and sagittal images were also obtained. In addition, a 3-D volume rendered image was created for interpretation. Automated exposure control and iterative reconstruction methods were used. Findings: Aortic arch: The aortic arch is unremarkable.  There is conventional 3 vessel arch anatomy.  The right brachiocephalic and visualized bilateral subclavian arteries are within normal limits. Right carotid: The right CCA arises as expected from the brachiocephalic trunk.  The CCA follows a normal course and appears normal caliber.  The carotid bifurcation is unremarkable.  The external carotid artery and distal branches appear within normal limits.  The cervical internal carotid artery follows a normal course and appears normal caliber throughout the neck and into the head.  The intracranial ICA segments appear within normal limits.  The ophthalmic artery origin is normal.  The A1 and  M1 segments appear within normal limits.  The visualized distal LOUIE and MCA branches appear patent.  There is  a patent  anterior communicating artery. There is a patent  posterior communicating artery. Left carotid: The left CCA arises as expected from the aortic arch.   The CCA follows a normal course and appears normal caliber.  The carotid bifurcation is unremarkable.  The external carotid artery and distal branches appear within normal limits.  The  cervical internal carotid artery follows a normal course and appears normal caliber throughout the neck and into the head.  The intracranial ICA segments appear within normal limits.  The ophthalmic artery origin is normal.  The A1 and M1 segments appear within normal limits.  The visualized distal LOUIE and MCA branches appear patent.  There is a patent  posterior communicating artery. Posterior circulation: Vertebral arteries arise as expected from ipsilateral subclavian arteries.  The vertebral arteries are codominant.  The vertebral arteries follow a normal course and appear normal caliber throughout the neck and into the head.  The  V4 segments are patent.  Visualized posterior inferior cerebellar arteries are within normal limits.  The basilar artery is normal caliber.  Superior cerebellar arteries are patent.  Bilateral P1 segments and posterior cerebral arteries appear within normal limits. Nonvascular findings: There are changes of multiple left-sided meningioma including a large left anterior frontal mass. This mass is 3.9 x 3.7 cm. There is significant surrounding vasogenic edema. There are extensive postsurgical changes from left sided temporal craniotomy. 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     Impression: Impression: 1.No evidence of hemodynamically significant stenosis or occlusion of the carotid or vertebral arteries. 2.No evidence of large vessel  occlusion of the anterior or posterior circulation. 3.Multiple left-sided meningiomas including a large left frontal meningioma with significant surrounding vasogenic edema. 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 Please see separate CT noncontrast head dictation and recent MRI from 2/27/2024. Electronically Signed: Dimitri Lyons MD  2/28/2024 5:10 AM EST  Workstation ID: [id number]    CT CEREBRAL PERFUSION WITH & WITHOUT CONTRAST    Result Date: 2/28/2024  CT CEREBRAL PERFUSION W WO CONTRAST Date of Exam: 2/28/2024 4:32 AM EST Indication: Neuro deficit, acute, stroke suspected.  Comparison: None available. Technique: Axial CT images of the brain were obtained prior to and after the administration of 115 mL Isovue-370. Core blood volume, core blood flow, mean transit time, and Tmax images were obtained utilizing the Rapid software protocol. A limited CT angiogram of the head was also performed to measure the blood vessel density. The radiation dose reduction device was turned on for each scan per the ALARA (As Low as Reasonably Achievable) protocol. Findings: CBF (<30%) volume: 22 mL Tmax (>6.0s) volume: 15 mL Mismatch volume: -7 mL Mismatch ratio: 0.7     Impression: Impression: The abnormal hypoperfusion in the left frontal lobe is most likely secondary to hypoperfusion associated with the mass effect from the left frontal mass and adjacent vasogenic edema. Please refer to separate CT angiogram for findings related to cerebral vasculature and possible other cause of ischemia. Electronically Signed: Dimitri Lyons MD  2/28/2024 5:01 AM EST  Workstation ID: [id number]    CT Head Without Contrast Stroke Protocol    Result Date: 2/28/2024  CT HEAD WO CONTRAST STROKE PROTOCOL Date of Exam: 2/28/2024 4:32 AM EST Indication: Neuro deficit, acute, stroke suspected. Comparison: CT scan of the head from 2/27/2024 at 1937 hours Technique: Axial CT images  were obtained of the head without contrast administration.  Reconstructed coronal images were also obtained. Automated exposure control and iterative construction methods were used. Scan Time: 0441 hours Results discussed with stroke team Rep.Radu at 0448 hours. Findings: Again seen is a left sided orbital/sphenoid a mass with bony cortical destruction of the anterior portion of the skull base at the middle cranial fossa and extending into the orbit. Postsurgical changes are seen from previous craniotomy procedures. There  is a large slightly increased density left frontal mass with a large amount of surrounding vasogenic edema. There is left to right shift unchanged from the study of yesterday. There is no convincing acute hemorrhage. There are no abnormal extra-axial fluid collections.     Impression: Impression: Stable CT scan of the head when compared to yesterday's CT and MRI of the brain. Significant vasogenic edema is associated with the anterior left frontal mass/meningioma. Again seen is a destructive lesion of the left sphenoid bone and extension of soft tissue into the left orbit. Electronically Signed: Dimitri Lyons MD  2/28/2024 4:52 AM EST  Workstation ID: [id number]    MRI Brain With & Without Contrast    Result Date: 2/27/2024  MRI BRAIN W WO CONTRAST Date of Exam: 2/27/2024 9:27 PM EST Indication: history of meningioma, 1 wk h/o left sided HA, pressure behind left eye, speech changes.  Comparison: 11/9/2022 Technique:  Routine multiplanar/multisequence sequence images of the brain were obtained before and after the uneventful administration of 15 cc Multihance. Findings: There is no diffusion restriction to suggest acute infarct. There is a new anterior left frontal dural based mass measuring 3.8 cm transverse dimension by 3.5 cm in AP dimension with surrounding vasogenic edema and mass effect on the anterior horn of the left lateral ventricle. Vasogenic edema extends into the anterior aspect of the  right frontal lobe as well and into the left temporal lobe. There is up to 1.2 cm of rightward midline shift of the anterior left frontal lobe. Imaging features and patient's history are suggestive of a new meningioma. There has been enlargement of an ill-defined spheno-orbital lesion now measuring 3.8 x 3.2 cm with invasion into the anterior margin of the left temporal fossa, previously 2.9 x 2.4 cm when measured at similar level. This results in continued proptosis and inferior medial deviation of the optic nerve. There are 2 other relatively stable lateral left frontal dural based nodules measuring 1.5 x 0.8 cm anterior and 1.7 x 1.2 cm posterior. The posterior nodule has an extra calvarial extension measuring 2.4 x 1.0 cm, similar to prior examination as well. There is no evidence of acute intracranial hemorrhage. The basal ganglia, brainstem and cerebellum appear within normal limits. There are postoperative changes involving the calvarium. There is normal enhancement within the intracranial vasculature including the dural venous sinuses.     Impression: Impression: 1.There is a new 3.8 cm maximum diameter left frontal dural based mass presumably a meningioma with associated surrounding vasogenic edema and mass effect as detailed above. 2.Enlarging ill-defined mass with destructive changes involving the left spheno-orbital region.. 3.Other relatively stable meningiomas. Electronically Signed: Noel Lemus MD  2/27/2024 10:19 PM EST  Workstation ID: [id number]    CT Head Without Contrast    Result Date: 2/27/2024  CT HEAD WO CONTRAST Date of Exam: 2/27/2024 7:38 PM EST Indication: left sided HA, h/o meningioma. Comparison: Head CT scan 5/31/2022. Brain MRI 11/9/2022 Technique: Axial CT images were obtained of the head without contrast administration.  Automated exposure control and iterative construction methods were used. Findings: Previous brain MRI report noted changes from multiple previous craniotomies for  resection of meningioma on the left. Potential radiation-induced cavernous venous malformation. Mild interval progression of 3 small meningiomas, 2 along the left frontal convexity, and 1 spheno-orbital lesion. Meningiomas are typically isodense or near isodense to brain and cortical, the lateral convexity lesions are difficult to directly identify on today's scan, if present. Sphenoid lesion, however, involves much of the intraorbital fat of the posterior left  orbit, and adjacent left temporal bone, appearing much more uniformly masslike than on the prior study. On today's exam it is seen as a discrete 2.5 x 1.5 cm mass on image 8 series 2. There is increasing vasogenic edema of the left frontal lobe, now extending across the midline in the corpus callosum, and area that was previously spared. There is associated new distortion of both frontal horns. There is a new, masslike 3.5 cm area in the anterior left frontal lobe with no cortical sulcation and slightly more hyperdense than brain parenchyma elsewhere worrisome for new meningioma here. No new mass is seen elsewhere. No other areas of edema are identified. There is no hydrocephalus, or evidence of hemorrhage. Extensive postoperative/posttreatment changes of the left frontal and temporal calvarium and orbit are again noted, with appearance of some increasing bony destruction associated with the orbital mass, bone window image 22 series 4, and stable elsewhere. Remaining paranasal sinuses and mastoids appear clear.     Impression: Impression: 1. Compared to most recent previous brain MRI and head CT scans, there is significant interval enlargement of the patient's left orbital/sphenoid mass, with new bony destruction of the posterior lateral orbital wall. 2. Also, there new mass effect on the frontal horns, and significantly increased left frontal vasogenic edema, and edema in the corpus callosum. This is thought to be due to a new anterior left frontal  mass/meningioma. Brain MRI with and without contrast  is suggested for further lesion characterization. Electronically Signed: Jareth Gómez MD  2/27/2024 8:04 PM EST  Workstation ID: [id number]     Results for orders placed during the hospital encounter of 02/27/24    Adult Transthoracic Echo Complete W/ Cont if Necessary Per Protocol (With Agitated Saline)    Interpretation Summary    Left ventricular systolic function is normal. Calculated left ventricular EF = 63.9%    Left ventricular diastolic function is consistent with (grade II w/high LAP) pseudonormalization.    Negative bubble study      Current medications:  Scheduled Meds:busPIRone, 10 mg, Oral, BID  carvedilol, 12.5 mg, Oral, Q12H  cholecalciferol, 2,000 Units, Oral, Every Other Day  dexAMETHasone, 4 mg, Intravenous, Q6H  dorzolamide, 1 drop, Left Eye, BID  famotidine, 20 mg, Intravenous, Q12H  lisinopril, 2.5 mg, Oral, Daily  QUEtiapine, 12.5 mg, Oral, Nightly  rosuvastatin, 40 mg, Oral, Daily  sertraline, 100 mg, Oral, Daily  sodium chloride, 10 mL, Intravenous, Q12H  sodium chloride, 10 mL, Intravenous, Q12H  thiamine, 100 mg, Oral, Every Other Day      Continuous Infusions:   PRN Meds:.  acetaminophen **OR** acetaminophen **OR** acetaminophen    senna-docusate sodium **AND** polyethylene glycol **AND** bisacodyl **AND** bisacodyl    Calcium Replacement - Follow Nurse / BPA Driven Protocol    LORazepam    Magnesium Standard Dose Replacement - Follow Nurse / BPA Driven Protocol    nitroglycerin    Phosphorus Replacement - Follow Nurse / BPA Driven Protocol    Potassium Replacement - Follow Nurse / BPA Driven Protocol    [COMPLETED] Insert Peripheral IV **AND** sodium chloride    sodium chloride    sodium chloride    sodium chloride    sodium chloride    Assessment & Plan   Assessment & Plan     Active Hospital Problems    Diagnosis  POA    **Brain mass [G93.89]  Yes    Essential hypertension [I10]  Yes    Prediabetes [R73.03]  Yes    Mixed hyperlipidemia  [E78.2]  Yes      Resolved Hospital Problems   No resolved problems to display.        Brief Hospital Course to date:  Tereza Ackerman is a 69 y.o. female with a history of hypertension, hyperlipidemia, atrial flutter, depression, prediabetes, vitamin D deficiency, GERD, was diagnosed with meningioma in 2017 and has been followed by Dr. Dawson and Dr. Perales with neurosurgery here at [facility]. Patient had CyberKnife x 2 here at [facility] and was then referred to [facility] to undergo gamma knife therapy. She was supposed to have gamma knife procedure done in December 2023 but did not make her appointment due to to weather. She is scheduled at the [facility] in March, 2024. Patient presented to the ED with complaints of left-sided headache and pressure behind her left eye for the past week     This patient's problems and plans were partially entered by my partner and updated as appropriate by me 02/29/24.    Progressive recurrent meningioma  -CT head showed significant interval enlargement of the patient's left orbital/sphenoid mass with new bony destruction of the posterior lateral orbital walls, also new mass effect on the frontal horns and significantly increased left frontal vasogenic edema and edema in the corpus callosum.   -MRI brain showed new 3.8 cm left frontal dural based mass presumably meningioma with associated surrounding vasogenic edema and mass effect. Enlarging ill-defined mass with destructive changes involving the left sphenoid orbital region. Other relatively stable meningiomas.  -Was diagnosed with a meningioma in 2017 and has been followed by Dr. Dawson and Dr. Perales with neurosurgery. S/p CyberKnife x 2 at [facility] and then was referred to the [facility]. Was planned to have another gamma knife done at [facility] in December, 2023 but did not go due to weather. She has follow-up at the [facility] in March, 2024.  -Neurosurgery following, recommended to continue IV  dexamethasone to help reduce edema. She will likely require additional surgery for new meningioma. Awaiting final recommendations from Neurosurgery.  -Continue IV dexamethasone 4 mg IV every 6 hours, Pepcid 20 mg IV every 12 hours.  -Continue neuro checks, fall precautions. PT/OT evaluating.     Hypertension  -Continue carvedilol, lisinopril.    Hyperlipidemia  -Continue rosuvastatin.    Anxiety/depression  -Continue Zoloft, BuSpar, Seroquel.     Prediabetes  -A1c 5.5% on admission, previously elevated.     GERD  -Pepcid IV BID.    Expected Discharge Location and Transportation: Likely home  Expected Discharge   Expected Discharge Date: 3/1/2024; Expected Discharge Time:      DVT prophylaxis:  Mechanical DVT prophylaxis orders are present.         AM-PAC 6 Clicks Score (PT): 22 (02/29/24 1506)    CODE STATUS:   Code Status and Medical Interventions:   Ordered at: 02/28/24 0032     Level Of Support Discussed With:    Patient     Code Status (Patient has no pulse and is not breathing):    CPR (Attempt to Resuscitate)     Medical Interventions (Patient has pulse or is breathing):    Full Support       Jessica Koch, DO  02/29/24

## 2024-02-29 NOTE — PROGRESS NOTES
"NEUROSURGERY PROGRESS NOTE     LOS: 1 day   Patient Care Team:  Michaela Connell MD as PCP - General  Michaela Connell MD as PCP - Family Medicine  Werner Hollis MD (Inactive) as Referring Physician (Neurosurgery)  Moo Hopkins MD as Consulting Physician (Ophthalmology)  Jamal Ramos MD as Consulting Physician (Nephrology)  Alli Aguirre MD as Consulting Physician (Cardiology)  Costa Raygoza MD as Consulting Physician (Radiation Oncology)  Andrea Bocanegra MD as Consulting Physician (General Surgery)  Alli Aguirre MD as Consulting Physician (Cardiology)    Chief Complaint: Headache and confusion.    Interval History:   Patient Complaints: Mild ongoing confusion, but improved.  Patient Denies: Nausea or vomiting.    Review of Systems:   Musculoskeletal and Neurological systems were reviewed and are negative except for:    Neurological: positive for headaches.    Vital Signs: Blood pressure 162/80, pulse 63, temperature 98.2 °F (36.8 °C), temperature source Oral, resp. rate 16, height 160 cm (63\"), weight 79.4 kg (175 lb), SpO2 96%, not currently breastfeeding.  Intake/Output:   Intake/Output Summary (Last 24 hours) at 2/29/2024 1801  Last data filed at 2/29/2024 0900  Gross per 24 hour   Intake 120 ml   Output --   Net 120 ml       Physical Exam:  The patient is ambulating in the ling with her .  She appears much more focused and appropriate.  She is well-oriented with fluent speech.  She follows all commands.     Data Review:    Results from last 7 days   Lab Units 02/29/24  0342   SODIUM mmol/L 142   POTASSIUM mmol/L 4.5   CHLORIDE mmol/L 110*   CO2 mmol/L 21.0*   BUN mg/dL 19   CREATININE mg/dL 0.73   GLUCOSE mg/dL 149*   CALCIUM mg/dL 9.6     Results from last 7 days   Lab Units 02/28/24  0430   WBC 10*3/mm3 6.87   HEMOGLOBIN g/dL 12.6   HEMATOCRIT % 38.9   PLATELETS 10*3/mm3 197     MRI of the brain demonstrates a large rounded left frontal lesion that is above the " medial to the orbit.  This is a new lesion when compared to our prior imaging from 2021.  There continues to be tumor within the left orbit that may be able to slightly larger.  Superficial lesions in the left mid temporal region and more posterior temporal region on the left are also noted.  The posterior temporal lesion involves the cranium and extends extracranial and displaces the scalp outward.  This is slightly more prominent.  Postsurgical changes with encephalomalacia are noted in the anterior left temporal region.  There is extensive left frontal edema with posterior displacement of the frontal horn of the left lateral ventricle.     Report for an MRI of the brain dated June 20, 2023 performed at the [facility] demonstrated the above new mass in the left frontal region above the orbit.  That lesion was new at that time and not on prior studies.  It measured 1 x 1.3 x 1.8 cm.     CTA of the head and neck from yesterday did not demonstrate large vessel occlusion or vascular malformation.    Assessment/Plan:  I reviewed notes from the [facility].  The patient has not seen neurosurgery since early in 2023 or undergone surgical intervention.  She is followed by radiation oncology and has had several areas treated by gamma knife last June including the large area of tumor noted on her current imaging.  There really are not good nonoperative options.  Further radiation is not feasible.  There are no chemotherapeutic regimens that are typically used for meningioma.  As such I would recommend redo craniotomy for resection of the new left polar lesion.  Despite extensive treatment the patient's disease has progressed and is recurrent and progressive.  The goal of surgery would be to improve her quality of life and hopefully extend her life span although that cannot be guaranteed.  Surgery would not be curative.  I have explained what the surgery would entail as well as the potential risks, complications,  limitations.  She and her  are going to consider their options as to whether they would like to stay here or return to the [facility].      Robi Perales MD  02/29/24  18:01 EST

## 2024-02-29 NOTE — PLAN OF CARE
Goal Outcome Evaluation:            Patient alert and oriented x3. Aphasic, NIH 4. Ambulates standby assist. Per spouse patients personality is different. During conversation patient will say the wrong words and laugh about it.  at bedside most of day and attentive to patient.

## 2024-02-29 NOTE — PLAN OF CARE
Goal Outcome Evaluation:  Plan of Care Reviewed With: patient, spouse        Progress: improving  Outcome Evaluation: Patient showing improvements in balance and endurance today with ability to ambulate 400' SBA unsupported, conversationally confused at times. IPPT will continue to follow to promote independence with functional mobility. Recommend trialing stairs next session as appropriate.      Anticipated Discharge Disposition (PT): home with assist

## 2024-03-01 ENCOUNTER — TELEPHONE (OUTPATIENT)
Dept: NEUROSURGERY | Facility: CLINIC | Age: 69
End: 2024-03-01
Payer: MEDICARE

## 2024-03-01 ENCOUNTER — READMISSION MANAGEMENT (OUTPATIENT)
Dept: CALL CENTER | Facility: HOSPITAL | Age: 69
End: 2024-03-01
Payer: MEDICARE

## 2024-03-01 VITALS
RESPIRATION RATE: 16 BRPM | BODY MASS INDEX: 31.01 KG/M2 | WEIGHT: 175 LBS | DIASTOLIC BLOOD PRESSURE: 75 MMHG | TEMPERATURE: 97.6 F | HEART RATE: 58 BPM | HEIGHT: 63 IN | OXYGEN SATURATION: 95 % | SYSTOLIC BLOOD PRESSURE: 126 MMHG

## 2024-03-01 DIAGNOSIS — G93.89 BRAIN MASS: Primary | ICD-10-CM

## 2024-03-01 LAB
GLUCOSE BLDC GLUCOMTR-MCNC: 130 MG/DL (ref 70–130)
GLUCOSE BLDC GLUCOMTR-MCNC: 136 MG/DL (ref 70–130)

## 2024-03-01 PROCEDURE — 25010000002 DEXAMETHASONE PER 1 MG: Performed by: NURSE PRACTITIONER

## 2024-03-01 PROCEDURE — 63710000001 DEXAMETHASONE PER 0.25 MG: Performed by: NEUROLOGICAL SURGERY

## 2024-03-01 PROCEDURE — 99232 SBSQ HOSP IP/OBS MODERATE 35: CPT | Performed by: NEUROLOGICAL SURGERY

## 2024-03-01 PROCEDURE — 82948 REAGENT STRIP/BLOOD GLUCOSE: CPT

## 2024-03-01 RX ORDER — LEVETIRACETAM 500 MG/1
500 TABLET ORAL EVERY 12 HOURS
Qty: 60 TABLET | Refills: 0 | Status: SHIPPED | OUTPATIENT
Start: 2024-03-01

## 2024-03-01 RX ORDER — LEVETIRACETAM 500 MG/1
500 TABLET ORAL 2 TIMES DAILY
Status: DISCONTINUED | OUTPATIENT
Start: 2024-03-01 | End: 2024-03-01 | Stop reason: HOSPADM

## 2024-03-01 RX ORDER — DEXAMETHASONE 4 MG/1
4 TABLET ORAL EVERY 8 HOURS SCHEDULED
Status: DISCONTINUED | OUTPATIENT
Start: 2024-03-01 | End: 2024-03-01 | Stop reason: HOSPADM

## 2024-03-01 RX ORDER — DEXAMETHASONE 4 MG/1
4 TABLET ORAL EVERY 8 HOURS SCHEDULED
Status: DISCONTINUED | OUTPATIENT
Start: 2024-03-01 | End: 2024-03-01

## 2024-03-01 RX ORDER — DEXAMETHASONE 4 MG/1
4 TABLET ORAL EVERY 8 HOURS SCHEDULED
Qty: 90 TABLET | Refills: 0 | Status: SHIPPED | OUTPATIENT
Start: 2024-03-01

## 2024-03-01 RX ADMIN — THIAMINE HCL TAB 100 MG 100 MG: 100 TAB at 09:25

## 2024-03-01 RX ADMIN — DEXAMETHASONE SODIUM PHOSPHATE 4 MG: 4 INJECTION, SOLUTION INTRA-ARTICULAR; INTRALESIONAL; INTRAMUSCULAR; INTRAVENOUS; SOFT TISSUE at 06:16

## 2024-03-01 RX ADMIN — ROSUVASTATIN CALCIUM 40 MG: 20 TABLET, COATED ORAL at 09:25

## 2024-03-01 RX ADMIN — LEVETIRACETAM 500 MG: 500 TABLET, FILM COATED ORAL at 09:25

## 2024-03-01 RX ADMIN — Medication 10 ML: at 09:28

## 2024-03-01 RX ADMIN — SERTRALINE HYDROCHLORIDE 100 MG: 100 TABLET ORAL at 09:25

## 2024-03-01 RX ADMIN — CARVEDILOL 12.5 MG: 12.5 TABLET, FILM COATED ORAL at 09:25

## 2024-03-01 RX ADMIN — BUSPIRONE HYDROCHLORIDE 10 MG: 10 TABLET ORAL at 09:25

## 2024-03-01 RX ADMIN — DORZOLAMIDE HCL 1 DROP: 20 SOLUTION/ DROPS OPHTHALMIC at 09:28

## 2024-03-01 RX ADMIN — LISINOPRIL 2.5 MG: 5 TABLET ORAL at 09:25

## 2024-03-01 RX ADMIN — DEXAMETHASONE 4 MG: 4 TABLET ORAL at 14:01

## 2024-03-01 RX ADMIN — Medication 2000 UNITS: at 09:25

## 2024-03-01 RX ADMIN — FAMOTIDINE 20 MG: 10 INJECTION, SOLUTION INTRAVENOUS at 09:25

## 2024-03-01 NOTE — DISCHARGE SUMMARY
[facility] Medicine Services  DISCHARGE SUMMARY    Patient Name: Tereza Ackerman  : 1955  MRN: [id number]    Date of Admission: 2024  7:08 PM  Date of Discharge: 3/1/2024  Primary Care Physician: Michaela Connell MD    Consults       Date and Time Order Name Status Description    2024 12:55 AM Inpatient Neurosurgery Consult Completed             Hospital Course     Presenting Problem:     Active Hospital Problems    Diagnosis  POA    **Brain mass [G93.89]  Yes    Essential hypertension [I10]  Yes    Prediabetes [R73.03]  Yes    Mixed hyperlipidemia [E78.2]  Yes      Resolved Hospital Problems   No resolved problems to display.          Hospital Course:  Tereza Ackerman is a 69 y.o. female with a PMHx of hypertension, hyperlipidemia, atrial flutter, depression, prediabetes, vitamin D deficiency, GERD, meningioma in  and has been followed by Dr. Dawson and Dr. Perales with Neurosurgery here at [facility]. Patient had CyberKnife x 2 here at [facility] and was then referred to [facility] to undergo gamma knife therapy. She was planned to have gamma knife procedure in 2023 but did not make her appointment due to to weather. Patient presented to the ED with complaints of left-sided headache and pressure behind her left eye for the past week. She was found to have recurrent frontal meningioma on MRI brain. IV dexamethasone was initiated. Neurosurgery evaluated, recommended craniotomy to resect the progressive left frontal lesion. Patient was discharged home with plan for close follow-up with Neurosurgery and plan for readmission for craniotomy.    Progressive recurrent meningioma   -CT head showed significant interval enlargement of the patient's left orbital/sphenoid mass with new bony destruction of the posterior lateral orbital walls, also new mass effect on the frontal horns and significantly increased left frontal vasogenic edema and edema in the corpus callosum.    -MRI brain showed new 3.8 cm left frontal dural based mass presumably meningioma with associated surrounding vasogenic edema and mass effect. Enlarging ill-defined mass with destructive changes involving the left sphenoid orbital region. Other relatively stable meningiomas.  -Was diagnosed with meningioma in 2017 and has been followed by Dr. Dawson and Dr. Perales with neurosurgery. S/p CyberKnife x 2 at [facility] and then was referred to the [facility]. Was planned to have another gamma knife done at [facility] in December, 2023 but did not go due to weather. She has follow-up at the [facility] in March 2024.  -Neurosurgery followed, recommended IV dexamethasone to help reduce edema. Planning for readmission for craniotomy, to be arranged by neurosurgery office. Continue dexamethasone and Keppra on discharge.     Hypertension  -Continue carvedilol, lisinopril.     Hyperlipidemia  -Continue rosuvastatin.     Anxiety/depression  -Continue home Zoloft, BuSpar, Seroquel.     Prediabetes with hyperglycemia   -A1c 5.5% on admission, previously elevated  -Hyperglycemia likely worsened by steroids     GERD  -Continue home PPI on discharge.      Discharge Follow Up Recommendations for outpatient labs/diagnostics:  -Follow-up with PCP in 1 week  -Follow-up with neurosurgery in 1 week for further planning of craniotomy  -Continue dexamethasone 4 mg every 8 hours and Keppra 500 mg every 12 hours on discharge.    Day of Discharge     HPI:   Evaluated patient this morning. Still had dull headache today. Denied nausea, vomiting, blurry vision. Felt ready for discharge home.      Vital Signs:   Temp:  [97.6 °F (36.4 °C)-98.5 °F (36.9 °C)] 97.6 °F (36.4 °C)  Heart Rate:  [57-84] 58  Resp:  [16-18] 16  BP: (126-180)/(75-98) 126/75      Physical Exam:  Constitutional: Awake, alert, resting in bed  HENT: NCAT, mucous membranes moist  Respiratory: Clear to auscultation bilaterally, nonlabored respirations   Cardiovascular:  Bradycardic, no murmurs, rubs, or gallops, palpable pedal pulses bilaterally  Gastrointestinal: Positive bowel sounds, soft, nontender, nondistended  Musculoskeletal: No bilateral ankle edema, no clubbing or cyanosis to extremities  Psychiatric: Appropriate affect, cooperative  Neurologic: Alert and oriented x3, strength symmetric in all extremities, speech clear, some word finding difficulty, forgetful  Skin: No rashes    Pertinent  and/or Most Recent Results     LAB RESULTS:      Lab 02/28/24  0430 02/27/24 2010   WBC 6.87 6.02   HEMOGLOBIN 12.6 13.0   HEMATOCRIT 38.9 40.4   PLATELETS 197 207   NEUTROS ABS 5.92 3.49   IMMATURE GRANS (ABS) 0.01 0.01   LYMPHS ABS 0.82 1.64   MONOS ABS 0.10 0.69   EOS ABS 0.01 0.16   MCV 89.8 94.4   PROTIME  --  14.3         Lab 02/29/24  0342 02/28/24  1756 02/28/24  0430 02/27/24 2018 02/27/24 2017 02/27/24 2010   SODIUM 142  --  138  --   --  141   POTASSIUM 4.5 4.5 3.5  --   --  3.8   CHLORIDE 110*  --  105  --   --  106   CO2 21.0*  --  23.0  --   --  26.0   ANION GAP 11.0  --  10.0  --   --  9.0   BUN 19  --  18  --   --  18   CREATININE 0.73  --  0.79 1.00 1.00 0.96   EGFR 88.0  --  80.1  --   --  63.4   GLUCOSE 149*  --  167*  --   --  100*   CALCIUM 9.6  --  9.3  --   --  9.2   HEMOGLOBIN A1C  --   --  5.50  --   --   --          Lab 02/27/24 2010   TOTAL PROTEIN 7.3   ALBUMIN 4.1   GLOBULIN 3.2   ALT (SGPT) 11   AST (SGOT) 19   BILIRUBIN 0.2   ALK PHOS 87         Lab 02/27/24 2010   PROTIME 14.3   INR 1.09                 Brief Urine Lab Results  (Last result in the past 365 days)        Color   Clarity   Blood   Leuk Est   Nitrite   Protein   CREAT   Urine HCG        10/21/23 1123 Straw   Slightly Cloudy   3+   Moderate (2+)   Negative   1+                 Microbiology Results (last 10 days)       ** No results found for the last 240 hours. **            Adult Transthoracic Echo Complete W/ Cont if Necessary Per Protocol (With Agitated Saline)    Result Date:  2/28/2024    Left ventricular systolic function is normal. Calculated left ventricular EF = 63.9%   Left ventricular diastolic function is consistent with (grade II w/high LAP) pseudonormalization.   Negative bubble study     CT Angiogram Head w AI Analysis of LVO    Result Date: 2/28/2024  CT ANGIOGRAM HEAD W AI ANALYSIS OF LVO, CT ANGIOGRAM NECK Date of Exam: 2/28/2024 4:32 AM EST Indication: Stroke, follow up. Comparison: None available. Technique: CTA of the head was performed after the uneventful intravenous administration of 115 mL Isovue-370. Reconstructed coronal and sagittal images were also obtained. In addition, a 3-D volume rendered image was created for interpretation. Automated exposure control and iterative reconstruction methods were used. Findings: Aortic arch: The aortic arch is unremarkable.  There is conventional 3 vessel arch anatomy.  The right brachiocephalic and visualized bilateral subclavian arteries are within normal limits. Right carotid: The right CCA arises as expected from the brachiocephalic trunk.  The CCA follows a normal course and appears normal caliber.  The carotid bifurcation is unremarkable.  The external carotid artery and distal branches appear within normal limits.  The cervical internal carotid artery follows a normal course and appears normal caliber throughout the neck and into the head.  The intracranial ICA segments appear within normal limits.  The ophthalmic artery origin is normal.  The A1 and M1 segments appear within normal limits.  The visualized distal LOUIE and MCA branches appear patent.  There is  a patent  anterior communicating artery. There is a patent  posterior communicating artery. Left carotid: The left CCA arises as expected from the aortic arch.   The CCA follows a normal course and appears normal caliber.  The carotid bifurcation is unremarkable.  The external carotid artery and distal branches appear within normal limits.  The  cervical internal  carotid artery follows a normal course and appears normal caliber throughout the neck and into the head.  The intracranial ICA segments appear within normal limits.  The ophthalmic artery origin is normal.  The A1 and M1 segments appear within normal limits.  The visualized distal LOUIE and MCA branches appear patent.  There is a patent  posterior communicating artery. Posterior circulation: Vertebral arteries arise as expected from ipsilateral subclavian arteries.  The vertebral arteries are codominant.  The vertebral arteries follow a normal course and appear normal caliber throughout the neck and into the head.  The  V4 segments are patent.  Visualized posterior inferior cerebellar arteries are within normal limits.  The basilar artery is normal caliber.  Superior cerebellar arteries are patent.  Bilateral P1 segments and posterior cerebral arteries appear within normal limits. Nonvascular findings: There are changes of multiple left-sided meningioma including a large left anterior frontal mass. This mass is 3.9 x 3.7 cm. There is significant surrounding vasogenic edema. There are extensive postsurgical changes from left sided temporal craniotomy. 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     Impression: 1.No evidence of hemodynamically significant stenosis or occlusion of the carotid or vertebral arteries. 2.No evidence of large vessel occlusion of the anterior or posterior circulation. 3.Multiple left-sided meningiomas including a large left frontal meningioma with significant surrounding vasogenic edema. 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 Please see separate CT noncontrast head dictation and recent MRI from 2/27/2024. Electronically Signed: Dimitri Lyons MD  2/28/2024 5:10 AM EST  Workstation ID: [id number]    CT Angiogram  Neck    Result Date: 2/28/2024  CT ANGIOGRAM HEAD W AI ANALYSIS OF LVO, CT ANGIOGRAM NECK Date of Exam: 2/28/2024 4:32 AM EST Indication: Stroke, follow up. Comparison: None available. Technique: CTA of the head was performed after the uneventful intravenous administration of 115 mL Isovue-370. Reconstructed coronal and sagittal images were also obtained. In addition, a 3-D volume rendered image was created for interpretation. Automated exposure control and iterative reconstruction methods were used. Findings: Aortic arch: The aortic arch is unremarkable.  There is conventional 3 vessel arch anatomy.  The right brachiocephalic and visualized bilateral subclavian arteries are within normal limits. Right carotid: The right CCA arises as expected from the brachiocephalic trunk.  The CCA follows a normal course and appears normal caliber.  The carotid bifurcation is unremarkable.  The external carotid artery and distal branches appear within normal limits.  The cervical internal carotid artery follows a normal course and appears normal caliber throughout the neck and into the head.  The intracranial ICA segments appear within normal limits.  The ophthalmic artery origin is normal.  The A1 and M1 segments appear within normal limits.  The visualized distal LOUIE and MCA branches appear patent.  There is  a patent  anterior communicating artery. There is a patent  posterior communicating artery. Left carotid: The left CCA arises as expected from the aortic arch.   The CCA follows a normal course and appears normal caliber.  The carotid bifurcation is unremarkable.  The external carotid artery and distal branches appear within normal limits.  The  cervical internal carotid artery follows a normal course and appears normal caliber throughout the neck and into the head.  The intracranial ICA segments appear within normal limits.  The ophthalmic artery origin is normal.  The A1 and M1 segments appear within normal limits.  The  visualized distal LOUIE and MCA branches appear patent.  There is a patent  posterior communicating artery. Posterior circulation: Vertebral arteries arise as expected from ipsilateral subclavian arteries.  The vertebral arteries are codominant.  The vertebral arteries follow a normal course and appear normal caliber throughout the neck and into the head.  The  V4 segments are patent.  Visualized posterior inferior cerebellar arteries are within normal limits.  The basilar artery is normal caliber.  Superior cerebellar arteries are patent.  Bilateral P1 segments and posterior cerebral arteries appear within normal limits. Nonvascular findings: There are changes of multiple left-sided meningioma including a large left anterior frontal mass. This mass is 3.9 x 3.7 cm. There is significant surrounding vasogenic edema. There are extensive postsurgical changes from left sided temporal craniotomy. 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     Impression: 1.No evidence of hemodynamically significant stenosis or occlusion of the carotid or vertebral arteries. 2.No evidence of large vessel occlusion of the anterior or posterior circulation. 3.Multiple left-sided meningiomas including a large left frontal meningioma with significant surrounding vasogenic edema. 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 Please see separate CT noncontrast head dictation and recent MRI from 2/27/2024. Electronically Signed: Dimitri Lyons MD  2/28/2024 5:10 AM EST  Workstation ID: [id number]    CT CEREBRAL PERFUSION WITH & WITHOUT CONTRAST    Result Date: 2/28/2024  CT CEREBRAL PERFUSION W WO CONTRAST Date of Exam: 2/28/2024 4:32 AM EST Indication: Neuro deficit, acute, stroke suspected.  Comparison: None available. Technique: Axial CT images of the brain were obtained prior to  and after the administration of 115 mL Isovue-370. Core blood volume, core blood flow, mean transit time, and Tmax images were obtained utilizing the Rapid software protocol. A limited CT angiogram of the head was also performed to measure the blood vessel density. The radiation dose reduction device was turned on for each scan per the ALARA (As Low as Reasonably Achievable) protocol. Findings: CBF (<30%) volume: 22 mL Tmax (>6.0s) volume: 15 mL Mismatch volume: -7 mL Mismatch ratio: 0.7     Impression: The abnormal hypoperfusion in the left frontal lobe is most likely secondary to hypoperfusion associated with the mass effect from the left frontal mass and adjacent vasogenic edema. Please refer to separate CT angiogram for findings related to cerebral vasculature and possible other cause of ischemia. Electronically Signed: Dimitri Lyons MD  2/28/2024 5:01 AM EST  Workstation ID: [id number]    CT Head Without Contrast Stroke Protocol    Result Date: 2/28/2024  CT HEAD WO CONTRAST STROKE PROTOCOL Date of Exam: 2/28/2024 4:32 AM EST Indication: Neuro deficit, acute, stroke suspected. Comparison: CT scan of the head from 2/27/2024 at 1937 hours Technique: Axial CT images were obtained of the head without contrast administration.  Reconstructed coronal images were also obtained. Automated exposure control and iterative construction methods were used. Scan Time: 0441 hours Results discussed with stroke team Rep.Radu at 0448 hours. Findings: Again seen is a left sided orbital/sphenoid a mass with bony cortical destruction of the anterior portion of the skull base at the middle cranial fossa and extending into the orbit. Postsurgical changes are seen from previous craniotomy procedures. There  is a large slightly increased density left frontal mass with a large amount of surrounding vasogenic edema. There is left to right shift unchanged from the study of yesterday. There is no convincing acute hemorrhage. There are no  abnormal extra-axial fluid collections.     Impression: Stable CT scan of the head when compared to yesterday's CT and MRI of the brain. Significant vasogenic edema is associated with the anterior left frontal mass/meningioma. Again seen is a destructive lesion of the left sphenoid bone and extension of soft tissue into the left orbit. Electronically Signed: Dimitri Lyons MD  2/28/2024 4:52 AM EST  Workstation ID: [id number]    MRI Brain With & Without Contrast    Result Date: 2/27/2024  MRI BRAIN W WO CONTRAST Date of Exam: 2/27/2024 9:27 PM EST Indication: history of meningioma, 1 wk h/o left sided HA, pressure behind left eye, speech changes.  Comparison: 11/9/2022 Technique:  Routine multiplanar/multisequence sequence images of the brain were obtained before and after the uneventful administration of 15 cc Multihance. Findings: There is no diffusion restriction to suggest acute infarct. There is a new anterior left frontal dural based mass measuring 3.8 cm transverse dimension by 3.5 cm in AP dimension with surrounding vasogenic edema and mass effect on the anterior horn of the left lateral ventricle. Vasogenic edema extends into the anterior aspect of the right frontal lobe as well and into the left temporal lobe. There is up to 1.2 cm of rightward midline shift of the anterior left frontal lobe. Imaging features and patient's history are suggestive of a new meningioma. There has been enlargement of an ill-defined spheno-orbital lesion now measuring 3.8 x 3.2 cm with invasion into the anterior margin of the left temporal fossa, previously 2.9 x 2.4 cm when measured at similar level. This results in continued proptosis and inferior medial deviation of the optic nerve. There are 2 other relatively stable lateral left frontal dural based nodules measuring 1.5 x 0.8 cm anterior and 1.7 x 1.2 cm posterior. The posterior nodule has an extra calvarial extension measuring 2.4 x 1.0 cm, similar to prior examination as  well. There is no evidence of acute intracranial hemorrhage. The basal ganglia, brainstem and cerebellum appear within normal limits. There are postoperative changes involving the calvarium. There is normal enhancement within the intracranial vasculature including the dural venous sinuses.     Impression: 1.There is a new 3.8 cm maximum diameter left frontal dural based mass presumably a meningioma with associated surrounding vasogenic edema and mass effect as detailed above. 2.Enlarging ill-defined mass with destructive changes involving the left spheno-orbital region.. 3.Other relatively stable meningiomas. Electronically Signed: Noel Lemus MD  2/27/2024 10:19 PM EST  Workstation ID: [id number]    CT Head Without Contrast    Result Date: 2/27/2024  CT HEAD WO CONTRAST Date of Exam: 2/27/2024 7:38 PM EST Indication: left sided HA, h/o meningioma. Comparison: Head CT scan 5/31/2022. Brain MRI 11/9/2022 Technique: Axial CT images were obtained of the head without contrast administration.  Automated exposure control and iterative construction methods were used. Findings: Previous brain MRI report noted changes from multiple previous craniotomies for resection of meningioma on the left. Potential radiation-induced cavernous venous malformation. Mild interval progression of 3 small meningiomas, 2 along the left frontal convexity, and 1 spheno-orbital lesion. Meningiomas are typically isodense or near isodense to brain and cortical, the lateral convexity lesions are difficult to directly identify on today's scan, if present. Sphenoid lesion, however, involves much of the intraorbital fat of the posterior left  orbit, and adjacent left temporal bone, appearing much more uniformly masslike than on the prior study. On today's exam it is seen as a discrete 2.5 x 1.5 cm mass on image 8 series 2. There is increasing vasogenic edema of the left frontal lobe, now extending across the midline in the corpus callosum, and area  that was previously spared. There is associated new distortion of both frontal horns. There is a new, masslike 3.5 cm area in the anterior left frontal lobe with no cortical sulcation and slightly more hyperdense than brain parenchyma elsewhere worrisome for new meningioma here. No new mass is seen elsewhere. No other areas of edema are identified. There is no hydrocephalus, or evidence of hemorrhage. Extensive postoperative/posttreatment changes of the left frontal and temporal calvarium and orbit are again noted, with appearance of some increasing bony destruction associated with the orbital mass, bone window image 22 series 4, and stable elsewhere. Remaining paranasal sinuses and mastoids appear clear.     Impression: 1. Compared to most recent previous brain MRI and head CT scans, there is significant interval enlargement of the patient's left orbital/sphenoid mass, with new bony destruction of the posterior lateral orbital wall. 2. Also, there new mass effect on the frontal horns, and significantly increased left frontal vasogenic edema, and edema in the corpus callosum. This is thought to be due to a new anterior left frontal mass/meningioma. Brain MRI with and without contrast  is suggested for further lesion characterization. Electronically Signed: Jareth Gómez MD  2/27/2024 8:04 PM EST  Workstation ID: [id number]     Results for orders placed in visit on 10/30/19    Duplex Carotid Ultrasound CAR    Interpretation Summary  · Proximal right internal carotid artery is normal.  · Proximal left internal carotid artery is normal.  · Bilateral antegrade flow in the vertebral arteries.  · There were no notable findings involving the right internal jugular vein in the transverse views on this arterial study. A dedicated venous study could be considered if clinically warranted.      Results for orders placed in visit on 10/30/19    Duplex Carotid Ultrasound CAR    Interpretation Summary  · Proximal right internal carotid  artery is normal.  · Proximal left internal carotid artery is normal.  · Bilateral antegrade flow in the vertebral arteries.  · There were no notable findings involving the right internal jugular vein in the transverse views on this arterial study. A dedicated venous study could be considered if clinically warranted.      Results for orders placed during the hospital encounter of 02/27/24    Adult Transthoracic Echo Complete W/ Cont if Necessary Per Protocol (With Agitated Saline)    Interpretation Summary    Left ventricular systolic function is normal. Calculated left ventricular EF = 63.9%    Left ventricular diastolic function is consistent with (grade II w/high LAP) pseudonormalization.    Negative bubble study      Plan for Follow-up of Pending Labs/Results: N/A    Discharge Details        Discharge Medications        New Medications        Instructions Start Date   dexAMETHasone 4 MG tablet  Commonly known as: DECADRON   4 mg, Oral, Every 8 Hours Scheduled      levETIRAcetam 500 MG tablet  Commonly known as: KEPPRA   500 mg, Oral, Every 12 Hours             Changes to Medications        Instructions Start Date   carvedilol 12.5 MG tablet  Commonly known as: COREG  What changed:   how much to take  when to take this   TAKE ONE TABLET BY MOUTH EVERY 12 HOURS             Continue These Medications        Instructions Start Date   aspirin 81 MG tablet   81 mg, Oral, Daily      busPIRone 10 MG tablet  Commonly known as: BUSPAR   10 mg, Oral, 2 Times Daily      cholecalciferol 25 MCG (1000 UT) tablet  Commonly known as: VITAMIN D3   2,000 Units, Oral, Every Other Day      dorzolamide 2 % ophthalmic solution  Commonly known as: TRUSOPT   1 drop, Left Eye, 2 Times Daily      lisinopril 2.5 MG tablet  Commonly known as: PRINIVIL,ZESTRIL   2.5 mg, Oral, Daily      LORazepam 0.5 MG tablet  Commonly known as: Ativan   0.25-0.5 mg, Oral, Daily PRN      omeprazole 20 MG capsule  Commonly known as: priLOSEC   20 mg, Oral,  Daily      ondansetron ODT 4 MG disintegrating tablet  Commonly known as: ZOFRAN-ODT   4 mg, Translingual, Every 4 Hours, As needed for nausea      QUEtiapine 25 MG tablet  Commonly known as: SEROquel   12.5 mg, Oral, Every Night at Bedtime      rosuvastatin 40 MG tablet  Commonly known as: CRESTOR   40 mg, Oral, Daily      sertraline 100 MG tablet  Commonly known as: ZOLOFT   100 mg, Oral, Daily      thiamine 100 MG tablet tablet  Commonly known as: VITAMIN B-1   100 mg, Oral, Every Other Day      timolol 0.5 % ophthalmic solution  Commonly known as: TIMOPTIC   1 drop, 2 Times Daily               Allergies   Allergen Reactions    Dilantin [Phenytoin Sodium Extended] Rash         Discharge Disposition:  Home or Self Care    Diet:  Hospital:  Diet Order   Procedures    Diet: Cardiac Diets; Healthy Heart (2-3 Na+); Texture: Regular Texture (IDDSI 7); Fluid Consistency: Thin (IDDSI 0)            Activity:      Restrictions or Other Recommendations:  N/A       CODE STATUS:    Code Status and Medical Interventions:   Ordered at: 02/28/24 0032     Level Of Support Discussed With:    Patient     Code Status (Patient has no pulse and is not breathing):    CPR (Attempt to Resuscitate)     Medical Interventions (Patient has pulse or is breathing):    Full Support       Future Appointments   Date Time Provider Department Center   4/1/2024  4:20 PM [facility] [place]   8/2/2024 10:30 AM Michaela Connell MD [facility]   2/5/2025 10:45 AM Michaela Connell MD [facility]       Additional Instructions for the Follow-ups that You Need to Schedule       Discharge Follow-up with PCP   As directed       Currently Documented PCP:    Michaela Connell MD    PCP Phone Number:    [phone]     Follow Up Details: in 1 week        Discharge Follow-up with Specified Provider: Dr. Robi Perales (Neurosurgery); 1 Week   As directed      To: Dr. Robi Perales (Neurosurgery)   Follow Up: 1 Week                       Jessica Koch,   03/01/24      Time Spent on Discharge:  I spent  45  minutes on this discharge activity which included: face-to-face encounter with the patient, reviewing the data in the system, coordination of the care with the nursing staff as well as consultants, documentation, and entering orders.

## 2024-03-01 NOTE — OUTREACH NOTE
Prep Survey      Flowsheet Row Responses   [facility] patient discharged from? [place]   Is LACE score < 7 ? No   Eligibility [facility]   Date of Admission 02/27/24   Date of Discharge 03/01/24   Discharge Disposition Home or Self Care   Discharge diagnosis brain mass, plan for readmission for craniotomy.   Does the patient have one of the following disease processes/diagnoses(primary or secondary)? Other   Does the patient have Home health ordered? No   Is there a DME ordered? No   Prep survey completed? Yes            Mirella Franco Registered Nurse

## 2024-03-01 NOTE — PROGRESS NOTES
"NEUROSURGERY PROGRESS NOTE     LOS: 2 days   Patient Care Team:  Michaela Connell MD as PCP - General  Michaela Connell MD as PCP - Family Medicine  Werner Hollis MD (Inactive) as Referring Physician (Neurosurgery)  Moo Hopkins MD as Consulting Physician (Ophthalmology)  Jamal Ramos MD as Consulting Physician (Nephrology)  Alli Aguirre MD as Consulting Physician (Cardiology)  Costa Raygoza MD as Consulting Physician (Radiation Oncology)  Andrea Bocanegra MD as Consulting Physician (General Surgery)  Alli Aguirre MD as Consulting Physician (Cardiology)    Chief Complaint: Headache and confusion.    Interval History:   Patient Complaints: Very mild headache.  Patient Denies: Nausea, vomiting, weakness.    Review of Systems:   Musculoskeletal and Neurological systems were reviewed and are negative except for:   Neurological: positive for headaches.    Vital Signs: Blood pressure 167/91, pulse 61, temperature 97.8 °F (36.6 °C), temperature source Oral, resp. rate 18, height 160 cm (63\"), weight 79.4 kg (175 lb), SpO2 95%, not currently breastfeeding.  Intake/Output:   Intake/Output Summary (Last 24 hours) at 3/1/2024 0651  Last data filed at 2/29/2024 0900  Gross per 24 hour   Intake 120 ml   Output --   Net 120 ml       Physical Exam:  The patient awakens easily.  She is oriented and appropriate.  Her speech is fluent.  She follows all commands.  She moves all extremities equally.     Data Review:    MRI of the brain demonstrates a large rounded left frontal lesion that is above the medial to the orbit.  This is a new lesion when compared to our prior imaging from 2021.  There continues to be tumor within the left orbit that may be able to slightly larger.  Superficial lesions in the left mid temporal region and more posterior temporal region on the left are also noted.  The posterior temporal lesion involves the cranium and extends extracranial and displaces the scalp outward.  " This is slightly more prominent.  Postsurgical changes with encephalomalacia are noted in the anterior left temporal region.  There is extensive left frontal edema with posterior displacement of the frontal horn of the left lateral ventricle.     Report for an MRI of the brain dated June 20, 2023 performed at the [facility] demonstrated the above new mass in the left frontal region above the orbit.  That lesion was new at that time and not on prior studies.  It measured 1 x 1.3 x 1.8 cm.     CTA of the head and neck from yesterday did not demonstrate large vessel occlusion or vascular malformation.    Assessment/Plan:  From neurosurgery standpoint patient may go home on Decadron 4 mg every 8 hours.  Add Keppra.  My office will make arrangements for readmission for craniotomy to resect the progressive left frontal lesion.  The patient and her  understand the potential risks, complications, limitations and wish to proceed.      Robi Perales MD  03/01/24  06:51 EST

## 2024-03-01 NOTE — CASE MANAGEMENT/SOCIAL WORK
Continued Stay Note  [facility]     Patient Name: Tereza Ackerman  MRN: [id number]  Today's Date: 3/1/2024    Admit Date: 2/27/2024    Plan: Home   Discharge Plan       Row Name 03/01/24 1507       Plan    Plan Home    Patient/Family in Agreement with Plan yes    Plan Comments Spoke with Mrs. Ackerman at the bedside. She is being discharged home today if medically ready. She denies any discharge needs. IMM letter given 3/1/2024 at 1450 and copy sent to medical records be electronically scanned in the system. Mrs. Ackerman agreeable with discharge plan.    Final Discharge Disposition Code 01 - home or self-care                   Discharge Codes    No documentation.                 Expected Discharge Date and Time       Expected Discharge Date Expected Discharge Time    Mar 1, 2024               Shae Johnston RN

## 2024-03-04 ENCOUNTER — TRANSITIONAL CARE MANAGEMENT TELEPHONE ENCOUNTER (OUTPATIENT)
Dept: CALL CENTER | Facility: HOSPITAL | Age: 69
End: 2024-03-04
Payer: MEDICARE

## 2024-03-04 ENCOUNTER — TELEPHONE (OUTPATIENT)
Dept: NEUROSURGERY | Facility: CLINIC | Age: 69
End: 2024-03-04
Payer: MEDICARE

## 2024-03-04 ENCOUNTER — PREP FOR SURGERY (OUTPATIENT)
Dept: OTHER | Facility: HOSPITAL | Age: 69
End: 2024-03-04
Payer: MEDICARE

## 2024-03-04 DIAGNOSIS — D32.0 MENINGIOMA, RECURRENT OF BRAIN: Primary | ICD-10-CM

## 2024-03-04 RX ORDER — FAMOTIDINE 20 MG/1
20 TABLET, FILM COATED ORAL
Status: CANCELLED | OUTPATIENT
Start: 2024-03-04

## 2024-03-04 RX ORDER — CHLORHEXIDINE GLUCONATE 4 G/100ML
SOLUTION TOPICAL
Qty: 120 ML | Refills: 0 | Status: ON HOLD | OUTPATIENT
Start: 2024-03-04 | End: 2024-03-06

## 2024-03-04 NOTE — TELEPHONE ENCOUNTER
Caller: Tereza Ackerman    Relationship to patient: Self    Best call back number: [phone]     Patient is needing:     PATIENT STATES SHE JUST GOT OUT OF HOSP ON 3/1/24, HAS NOT HAD A BM SINCE  AT LEAST SINCE 3/1.    HAS NOT TAKEN ANY PRN MEDS    ASKING FOR A CALL BACK CLINICAL STAFF

## 2024-03-04 NOTE — OUTREACH NOTE
Please check if she can come in 3/13/24 at 1.45 pm to see me instead- can be a 30 min as 2 pm is open currntly.  thanks

## 2024-03-04 NOTE — OUTREACH NOTE
Call Center TCM Note      Flowsheet Row Responses   [facility] patient discharged from? [place]   Does the patient have one of the following disease processes/diagnoses(primary or secondary)? Other   TCM attempt successful? Yes   Call start time 1424   Call end time 1426   Discharge diagnosis brain mass, plan for readmission for craniotomy.   Meds reviewed with patient/caregiver? Yes   Is the patient having any side effects they believe may be caused by any medication additions or changes? No   Does the patient have all medications ordered at discharge? Yes   Is the patient taking all medications as directed (includes completed medication regime)? Yes   Comments HOSP DC FU appt 3/12/24 415 pm   Does the patient have an appointment with their PCP within 7-14 days of discharge? Yes   Has home health visited the patient within 72 hours of discharge? N/A   Psychosocial issues? No   Did the patient receive a copy of their discharge instructions? Yes   Nursing interventions Reviewed instructions with patient   What is the patient's perception of their health status since discharge? Improving   Is the patient/caregiver able to teach back signs and symptoms related to disease process for when to call PCP? Yes   Is the patient/caregiver able to teach back signs and symptoms related to disease process for when to call 911? Yes   Is the patient/caregiver able to teach back the hierarchy of who to call/visit for symptoms/problems? PCP, Specialist, Home health nurse, Urgent Care, ED, 911 Yes   TCM call completed? Yes   Wrap up additional comments Pt reports she is doing ok. States she will be having surgery on Wednesday.   Call end time 1426            Cherry Phipps RN    3/4/2024, 14:26 EST

## 2024-03-05 ENCOUNTER — ANESTHESIA EVENT (OUTPATIENT)
Dept: PERIOP | Facility: HOSPITAL | Age: 69
End: 2024-03-05
Payer: MEDICARE

## 2024-03-05 RX ORDER — FAMOTIDINE 10 MG/ML
20 INJECTION, SOLUTION INTRAVENOUS ONCE
Status: CANCELLED | OUTPATIENT
Start: 2024-03-05 | End: 2024-03-05

## 2024-03-05 RX ORDER — FAMOTIDINE 20 MG/1
20 TABLET, FILM COATED ORAL ONCE
Status: CANCELLED | OUTPATIENT
Start: 2024-03-05 | End: 2024-03-05

## 2024-03-05 NOTE — TELEPHONE ENCOUNTER
Provider: Diaz   Surgery/Procedure:  Surgery with Robi Perales MD (03/06/2024)   Last visit:  Office Visit with Robi Perales MD (12/07/2021)    Next visit: Surgery with Robi Perales MD (03/06/2024)      Reason for call:     Caller: Tereza Ackerman    Relationship to patient: Self    Best call back number: [phone]     Patient is needing:     PATIENT STATES SHE JUST GOT OUT OF HOSP ON 3/1/24, HAS NOT HAD A BM SINCE  AT LEAST SINCE 3/1.    HAS NOT TAKEN ANY PRN MEDS    ASKING FOR A CALL BACK CLINICAL STAFF    Returned pt's call and pt stated that she has had a BM since yesterday. She was thankful for the return call.

## 2024-03-06 ENCOUNTER — HOSPITAL ENCOUNTER (OUTPATIENT)
Dept: MRI IMAGING | Facility: HOSPITAL | Age: 69
Discharge: HOME | End: 2024-03-06
Payer: MEDICARE

## 2024-03-06 ENCOUNTER — HOSPITAL ENCOUNTER (INPATIENT)
Facility: HOSPITAL | Age: 69
LOS: 5 days | Discharge: HOME | DRG: 027 | End: 2024-03-11
Attending: NEUROLOGICAL SURGERY | Admitting: NEUROLOGICAL SURGERY
Payer: MEDICARE

## 2024-03-06 ENCOUNTER — ANESTHESIA (OUTPATIENT)
Dept: PERIOP | Facility: HOSPITAL | Age: 69
End: 2024-03-06
Payer: MEDICARE

## 2024-03-06 ENCOUNTER — READMISSION MANAGEMENT (OUTPATIENT)
Dept: CALL CENTER | Facility: HOSPITAL | Age: 69
End: 2024-03-06
Payer: MEDICARE

## 2024-03-06 DIAGNOSIS — D32.0 MENINGIOMA, RECURRENT OF BRAIN: ICD-10-CM

## 2024-03-06 DIAGNOSIS — G93.89 BRAIN MASS: ICD-10-CM

## 2024-03-06 PROBLEM — D32.9 MENINGIOMA: Status: ACTIVE | Noted: 2024-03-06

## 2024-03-06 LAB
ABO GROUP BLD: NORMAL
ANION GAP SERPL CALCULATED.3IONS-SCNC: 12 MMOL/L (ref 5–15)
ANTI-FYA: NORMAL
ANTI-K: NORMAL
BLD GP AB SCN SERPL QL: POSITIVE
BUN SERPL-MCNC: 28 MG/DL (ref 8–23)
BUN/CREAT SERPL: 37.3 (ref 7–25)
CALCIUM SPEC-SCNC: 8.9 MG/DL (ref 8.6–10.5)
CHLORIDE SERPL-SCNC: 107 MMOL/L (ref 98–107)
CO2 SERPL-SCNC: 20 MMOL/L (ref 22–29)
CREAT SERPL-MCNC: 0.75 MG/DL (ref 0.57–1)
DEPRECATED RDW RBC AUTO: 45.6 FL (ref 37–54)
EGFRCR SERPLBLD CKD-EPI 2021: 85.2 ML/MIN/1.73
ERYTHROCYTE [DISTWIDTH] IN BLOOD BY AUTOMATED COUNT: 13.6 % (ref 12.3–15.4)
GLUCOSE SERPL-MCNC: 96 MG/DL (ref 65–99)
HCT VFR BLD AUTO: 40.6 % (ref 34–46.6)
HGB BLD-MCNC: 13.5 G/DL (ref 12–15.9)
MCH RBC QN AUTO: 30.2 PG (ref 26.6–33)
MCHC RBC AUTO-ENTMCNC: 33.3 G/DL (ref 31.5–35.7)
MCV RBC AUTO: 90.8 FL (ref 79–97)
MRSA DNA SPEC QL NAA+PROBE: NEGATIVE
PLATELET # BLD AUTO: 227 10*3/MM3 (ref 140–450)
PMV BLD AUTO: 10.7 FL (ref 6–12)
POTASSIUM SERPL-SCNC: 3.8 MMOL/L (ref 3.5–5.2)
RBC # BLD AUTO: 4.47 10*6/MM3 (ref 3.77–5.28)
RH BLD: POSITIVE
SODIUM SERPL-SCNC: 139 MMOL/L (ref 136–145)
T&S EXPIRATION DATE: NORMAL
WBC NRBC COR # BLD AUTO: 11.88 10*3/MM3 (ref 3.4–10.8)

## 2024-03-06 PROCEDURE — 0 GADOBENATE DIMEGLUMINE 529 MG/ML SOLUTION: Performed by: NEUROLOGICAL SURGERY

## 2024-03-06 PROCEDURE — 86850 RBC ANTIBODY SCREEN: CPT | Performed by: ANESTHESIOLOGY

## 2024-03-06 PROCEDURE — 25010000002 DEXAMETHASONE PER 1 MG: Performed by: NEUROLOGICAL SURGERY

## 2024-03-06 PROCEDURE — 25810000003 SODIUM CHLORIDE 0.9 % SOLUTION 250 ML FLEX CONT: Performed by: NURSE ANESTHETIST, CERTIFIED REGISTERED

## 2024-03-06 PROCEDURE — A9577 INJ MULTIHANCE: HCPCS | Performed by: NEUROLOGICAL SURGERY

## 2024-03-06 PROCEDURE — 61781 SCAN PROC CRANIAL INTRA: CPT | Performed by: NEUROLOGICAL SURGERY

## 2024-03-06 PROCEDURE — 61512 CRNEC TREPH EXC MNGIOMA STTL: CPT

## 2024-03-06 PROCEDURE — C1713 ANCHOR/SCREW BN/BN,TIS/BN: HCPCS | Performed by: NEUROLOGICAL SURGERY

## 2024-03-06 PROCEDURE — 86901 BLOOD TYPING SEROLOGIC RH(D): CPT | Performed by: ANESTHESIOLOGY

## 2024-03-06 PROCEDURE — 25010000002 FENTANYL CITRATE (PF) 50 MCG/ML SOLUTION

## 2024-03-06 PROCEDURE — 00B10ZZ EXCISION OF CEREBRAL MENINGES, OPEN APPROACH: ICD-10-PCS | Performed by: NEUROLOGICAL SURGERY

## 2024-03-06 PROCEDURE — 25010000002 CEFAZOLIN PER 500 MG: Performed by: NEUROLOGICAL SURGERY

## 2024-03-06 PROCEDURE — 87641 MR-STAPH DNA AMP PROBE: CPT | Performed by: NEUROLOGICAL SURGERY

## 2024-03-06 PROCEDURE — 25010000002 ESMOLOL 100 MG/10ML SOLUTION: Performed by: NURSE ANESTHETIST, CERTIFIED REGISTERED

## 2024-03-06 PROCEDURE — 25810000003 SODIUM CHLORIDE 0.9 % SOLUTION 250 ML FLEX CONT: Performed by: NEUROLOGICAL SURGERY

## 2024-03-06 PROCEDURE — 25010000002 PHENYLEPHRINE 10 MG/ML SOLUTION 1 ML VIAL: Performed by: NURSE ANESTHETIST, CERTIFIED REGISTERED

## 2024-03-06 PROCEDURE — 70552 MRI BRAIN STEM W/DYE: CPT

## 2024-03-06 PROCEDURE — 25010000002 ONDANSETRON PER 1 MG: Performed by: NURSE ANESTHETIST, CERTIFIED REGISTERED

## 2024-03-06 PROCEDURE — 85027 COMPLETE CBC AUTOMATED: CPT

## 2024-03-06 PROCEDURE — C1889 IMPLANT/INSERT DEVICE, NOC: HCPCS | Performed by: NEUROLOGICAL SURGERY

## 2024-03-06 PROCEDURE — 86870 RBC ANTIBODY IDENTIFICATION: CPT | Performed by: ANESTHESIOLOGY

## 2024-03-06 PROCEDURE — 80048 BASIC METABOLIC PNL TOTAL CA: CPT

## 2024-03-06 PROCEDURE — 86900 BLOOD TYPING SEROLOGIC ABO: CPT | Performed by: ANESTHESIOLOGY

## 2024-03-06 PROCEDURE — 25010000002 NICARDIPINE 2.5 MG/ML SOLUTION: Performed by: NURSE ANESTHETIST, CERTIFIED REGISTERED

## 2024-03-06 PROCEDURE — 25010000002 FENTANYL CITRATE (PF) 100 MCG/2ML SOLUTION: Performed by: NURSE ANESTHETIST, CERTIFIED REGISTERED

## 2024-03-06 PROCEDURE — 25810000003 SODIUM CHLORIDE 0.9 % SOLUTION: Performed by: NEUROLOGICAL SURGERY

## 2024-03-06 PROCEDURE — 25010000002 NICARDIPINE 2.5 MG/ML SOLUTION 10 ML VIAL: Performed by: NEUROLOGICAL SURGERY

## 2024-03-06 PROCEDURE — 25810000003 SODIUM CHLORIDE PER 500 ML: Performed by: NEUROLOGICAL SURGERY

## 2024-03-06 PROCEDURE — 25810000003 LACTATED RINGERS PER 1000 ML: Performed by: ANESTHESIOLOGY

## 2024-03-06 PROCEDURE — 25010000002 PROPOFOL 10 MG/ML EMULSION: Performed by: NURSE ANESTHETIST, CERTIFIED REGISTERED

## 2024-03-06 PROCEDURE — 25010000002 DEXAMETHASONE SODIUM PHOSPHATE 100 MG/10ML SOLUTION

## 2024-03-06 PROCEDURE — 25010000002 CEFAZOLIN PER 500 MG

## 2024-03-06 PROCEDURE — 61512 CRNEC TREPH EXC MNGIOMA STTL: CPT | Performed by: NEUROLOGICAL SURGERY

## 2024-03-06 PROCEDURE — 25010000002 FUROSEMIDE PER 20 MG: Performed by: NURSE ANESTHETIST, CERTIFIED REGISTERED

## 2024-03-06 DEVICE — WAX BONE HEMO AESCULAP 2.5GM: Type: IMPLANTABLE DEVICE | Site: CRANIAL | Status: FUNCTIONAL

## 2024-03-06 DEVICE — SCRW MATRIXNEURO SD TI 4MM: Type: IMPLANTABLE DEVICE | Site: CRANIAL | Status: FUNCTIONAL

## 2024-03-06 DEVICE — PLT MATRIXNEURO STR TI 2HL 12MM: Type: IMPLANTABLE DEVICE | Site: CRANIAL | Status: FUNCTIONAL

## 2024-03-06 DEVICE — HEMOST ABS SURGIFOAM SZ100 8X12 10MM: Type: IMPLANTABLE DEVICE | Site: CRANIAL | Status: FUNCTIONAL

## 2024-03-06 DEVICE — DURAGEN® SUTURABLE DURAL REGENERATION MATRIX, 3 IN X 3 IN (7.5 CM X 7.5 CM)
Type: IMPLANTABLE DEVICE | Site: CRANIAL | Status: FUNCTIONAL
Brand: DURAGEN® SUTURABLE

## 2024-03-06 DEVICE — FLOSEAL HEMOSTATIC MATRIX, 10ML
Type: IMPLANTABLE DEVICE | Site: CRANIAL | Status: FUNCTIONAL
Brand: FLOSEAL HEMOSTATIC MATRIX

## 2024-03-06 DEVICE — PLT CVR BURHL MATRIXNEURO TI 17MM: Type: IMPLANTABLE DEVICE | Site: CRANIAL | Status: FUNCTIONAL

## 2024-03-06 RX ORDER — HYDROMORPHONE HYDROCHLORIDE 1 MG/ML
0.5 INJECTION, SOLUTION INTRAMUSCULAR; INTRAVENOUS; SUBCUTANEOUS
Status: DISCONTINUED | OUTPATIENT
Start: 2024-03-06 | End: 2024-03-06

## 2024-03-06 RX ORDER — SODIUM CHLORIDE 9 MG/ML
40 INJECTION, SOLUTION INTRAVENOUS AS NEEDED
Status: DISCONTINUED | OUTPATIENT
Start: 2024-03-06 | End: 2024-03-06 | Stop reason: HOSPADM

## 2024-03-06 RX ORDER — ONDANSETRON 4 MG/1
4 TABLET, ORALLY DISINTEGRATING ORAL EVERY 6 HOURS PRN
Status: DISCONTINUED | OUTPATIENT
Start: 2024-03-06 | End: 2024-03-11 | Stop reason: HOSPADM

## 2024-03-06 RX ORDER — HYDROCODONE BITARTRATE AND ACETAMINOPHEN 5; 325 MG/1; MG/1
1 TABLET ORAL ONCE AS NEEDED
Status: DISCONTINUED | OUTPATIENT
Start: 2024-03-06 | End: 2024-03-06

## 2024-03-06 RX ORDER — LIDOCAINE HYDROCHLORIDE 10 MG/ML
INJECTION, SOLUTION EPIDURAL; INFILTRATION; INTRACAUDAL; PERINEURAL AS NEEDED
Status: DISCONTINUED | OUTPATIENT
Start: 2024-03-06 | End: 2024-03-06 | Stop reason: SURG

## 2024-03-06 RX ORDER — SODIUM CHLORIDE 0.9 % (FLUSH) 0.9 %
10 SYRINGE (ML) INJECTION AS NEEDED
Status: DISCONTINUED | OUTPATIENT
Start: 2024-03-06 | End: 2024-03-06 | Stop reason: HOSPADM

## 2024-03-06 RX ORDER — LISINOPRIL 5 MG/1
2.5 TABLET ORAL DAILY
Status: DISCONTINUED | OUTPATIENT
Start: 2024-03-06 | End: 2024-03-11

## 2024-03-06 RX ORDER — SODIUM CHLORIDE 0.9 % (FLUSH) 0.9 %
3-10 SYRINGE (ML) INJECTION AS NEEDED
Status: DISCONTINUED | OUTPATIENT
Start: 2024-03-06 | End: 2024-03-06 | Stop reason: HOSPADM

## 2024-03-06 RX ORDER — MEPERIDINE HYDROCHLORIDE 25 MG/ML
12.5 INJECTION INTRAMUSCULAR; INTRAVENOUS; SUBCUTANEOUS
Status: DISCONTINUED | OUTPATIENT
Start: 2024-03-06 | End: 2024-03-06 | Stop reason: HOSPADM

## 2024-03-06 RX ORDER — MORPHINE SULFATE 4 MG/ML
4 INJECTION, SOLUTION INTRAMUSCULAR; INTRAVENOUS EVERY 4 HOURS PRN
Status: DISCONTINUED | OUTPATIENT
Start: 2024-03-06 | End: 2024-03-08

## 2024-03-06 RX ORDER — IPRATROPIUM BROMIDE AND ALBUTEROL SULFATE 2.5; .5 MG/3ML; MG/3ML
3 SOLUTION RESPIRATORY (INHALATION) ONCE AS NEEDED
Status: DISCONTINUED | OUTPATIENT
Start: 2024-03-06 | End: 2024-03-06 | Stop reason: HOSPADM

## 2024-03-06 RX ORDER — SODIUM CHLORIDE 9 MG/ML
90 INJECTION, SOLUTION INTRAVENOUS CONTINUOUS
Status: DISCONTINUED | OUTPATIENT
Start: 2024-03-06 | End: 2024-03-07

## 2024-03-06 RX ORDER — PROPOFOL 10 MG/ML
VIAL (ML) INTRAVENOUS AS NEEDED
Status: DISCONTINUED | OUTPATIENT
Start: 2024-03-06 | End: 2024-03-06 | Stop reason: SURG

## 2024-03-06 RX ORDER — EPHEDRINE SULFATE 50 MG/ML
INJECTION INTRAVENOUS AS NEEDED
Status: DISCONTINUED | OUTPATIENT
Start: 2024-03-06 | End: 2024-03-06 | Stop reason: SURG

## 2024-03-06 RX ORDER — LABETALOL HYDROCHLORIDE 5 MG/ML
5 INJECTION, SOLUTION INTRAVENOUS
Status: DISCONTINUED | OUTPATIENT
Start: 2024-03-06 | End: 2024-03-06 | Stop reason: SDUPTHER

## 2024-03-06 RX ORDER — DEXAMETHASONE SODIUM PHOSPHATE 4 MG/ML
4 INJECTION, SOLUTION INTRA-ARTICULAR; INTRALESIONAL; INTRAMUSCULAR; INTRAVENOUS; SOFT TISSUE EVERY 6 HOURS
Status: DISCONTINUED | OUTPATIENT
Start: 2024-03-06 | End: 2024-03-11

## 2024-03-06 RX ORDER — NALOXONE HCL 0.4 MG/ML
0.4 VIAL (ML) INJECTION AS NEEDED
Status: DISCONTINUED | OUTPATIENT
Start: 2024-03-06 | End: 2024-03-06

## 2024-03-06 RX ORDER — DROPERIDOL 2.5 MG/ML
0.62 INJECTION, SOLUTION INTRAMUSCULAR; INTRAVENOUS ONCE AS NEEDED
Status: DISCONTINUED | OUTPATIENT
Start: 2024-03-06 | End: 2024-03-06 | Stop reason: SDUPTHER

## 2024-03-06 RX ORDER — NALOXONE HCL 0.4 MG/ML
0.4 VIAL (ML) INJECTION
Status: DISCONTINUED | OUTPATIENT
Start: 2024-03-06 | End: 2024-03-11 | Stop reason: HOSPADM

## 2024-03-06 RX ORDER — LEVETIRACETAM 500 MG/1
500 TABLET ORAL EVERY 12 HOURS
Status: DISCONTINUED | OUTPATIENT
Start: 2024-03-06 | End: 2024-03-11 | Stop reason: HOSPADM

## 2024-03-06 RX ORDER — HYDROCODONE BITARTRATE AND ACETAMINOPHEN 5; 325 MG/1; MG/1
1 TABLET ORAL ONCE AS NEEDED
Status: DISCONTINUED | OUTPATIENT
Start: 2024-03-06 | End: 2024-03-06 | Stop reason: HOSPADM

## 2024-03-06 RX ORDER — LABETALOL HYDROCHLORIDE 5 MG/ML
10 INJECTION, SOLUTION INTRAVENOUS
Status: COMPLETED | OUTPATIENT
Start: 2024-03-06 | End: 2024-03-11

## 2024-03-06 RX ORDER — HYDROMORPHONE HYDROCHLORIDE 1 MG/ML
0.5 INJECTION, SOLUTION INTRAMUSCULAR; INTRAVENOUS; SUBCUTANEOUS
Status: DISCONTINUED | OUTPATIENT
Start: 2024-03-06 | End: 2024-03-06 | Stop reason: HOSPADM

## 2024-03-06 RX ORDER — FENTANYL CITRATE 50 UG/ML
INJECTION, SOLUTION INTRAMUSCULAR; INTRAVENOUS AS NEEDED
Status: DISCONTINUED | OUTPATIENT
Start: 2024-03-06 | End: 2024-03-06 | Stop reason: SURG

## 2024-03-06 RX ORDER — PROMETHAZINE HYDROCHLORIDE 25 MG/1
25 SUPPOSITORY RECTAL ONCE AS NEEDED
Status: DISCONTINUED | OUTPATIENT
Start: 2024-03-06 | End: 2024-03-06 | Stop reason: HOSPADM

## 2024-03-06 RX ORDER — DROPERIDOL 2.5 MG/ML
0.62 INJECTION, SOLUTION INTRAMUSCULAR; INTRAVENOUS
Status: DISCONTINUED | OUTPATIENT
Start: 2024-03-06 | End: 2024-03-06 | Stop reason: SDUPTHER

## 2024-03-06 RX ORDER — TIMOLOL MALEATE 5 MG/ML
1 SOLUTION/ DROPS OPHTHALMIC 2 TIMES DAILY
Status: DISCONTINUED | OUTPATIENT
Start: 2024-03-06 | End: 2024-03-11 | Stop reason: HOSPADM

## 2024-03-06 RX ORDER — ONDANSETRON 2 MG/ML
4 INJECTION INTRAMUSCULAR; INTRAVENOUS ONCE AS NEEDED
Status: DISCONTINUED | OUTPATIENT
Start: 2024-03-06 | End: 2024-03-06

## 2024-03-06 RX ORDER — ONDANSETRON 2 MG/ML
INJECTION INTRAMUSCULAR; INTRAVENOUS AS NEEDED
Status: DISCONTINUED | OUTPATIENT
Start: 2024-03-06 | End: 2024-03-06 | Stop reason: SURG

## 2024-03-06 RX ORDER — SODIUM CHLORIDE 0.9 % (FLUSH) 0.9 %
3 SYRINGE (ML) INJECTION EVERY 12 HOURS SCHEDULED
Status: DISCONTINUED | OUTPATIENT
Start: 2024-03-06 | End: 2024-03-06 | Stop reason: HOSPADM

## 2024-03-06 RX ORDER — HYDROCODONE BITARTRATE AND ACETAMINOPHEN 10; 325 MG/1; MG/1
1 TABLET ORAL EVERY 4 HOURS PRN
Status: DISCONTINUED | OUTPATIENT
Start: 2024-03-06 | End: 2024-03-11 | Stop reason: HOSPADM

## 2024-03-06 RX ORDER — FAMOTIDINE 20 MG/1
20 TABLET, FILM COATED ORAL
Status: COMPLETED | OUTPATIENT
Start: 2024-03-06 | End: 2024-03-06

## 2024-03-06 RX ORDER — MANNITOL 20 G/100ML
INJECTION, SOLUTION INTRAVENOUS CONTINUOUS PRN
Status: DISCONTINUED | OUTPATIENT
Start: 2024-03-06 | End: 2024-03-06 | Stop reason: SURG

## 2024-03-06 RX ORDER — BUSPIRONE HYDROCHLORIDE 10 MG/1
10 TABLET ORAL 2 TIMES DAILY
Status: DISCONTINUED | OUTPATIENT
Start: 2024-03-06 | End: 2024-03-11 | Stop reason: HOSPADM

## 2024-03-06 RX ORDER — LORAZEPAM 0.5 MG/1
0.5 TABLET ORAL DAILY PRN
Status: DISCONTINUED | OUTPATIENT
Start: 2024-03-06 | End: 2024-03-11 | Stop reason: HOSPADM

## 2024-03-06 RX ORDER — NICARDIPINE HYDROCHLORIDE 2.5 MG/ML
INJECTION INTRAVENOUS AS NEEDED
Status: DISCONTINUED | OUTPATIENT
Start: 2024-03-06 | End: 2024-03-06 | Stop reason: SURG

## 2024-03-06 RX ORDER — QUETIAPINE FUMARATE 25 MG/1
12.5 TABLET, FILM COATED ORAL NIGHTLY
Status: DISCONTINUED | OUTPATIENT
Start: 2024-03-06 | End: 2024-03-11 | Stop reason: HOSPADM

## 2024-03-06 RX ORDER — ONDANSETRON 2 MG/ML
4 INJECTION INTRAMUSCULAR; INTRAVENOUS ONCE AS NEEDED
Status: DISCONTINUED | OUTPATIENT
Start: 2024-03-06 | End: 2024-03-06 | Stop reason: HOSPADM

## 2024-03-06 RX ORDER — PROMETHAZINE HYDROCHLORIDE 25 MG/1
25 TABLET ORAL ONCE AS NEEDED
Status: DISCONTINUED | OUTPATIENT
Start: 2024-03-06 | End: 2024-03-06 | Stop reason: HOSPADM

## 2024-03-06 RX ORDER — ROSUVASTATIN CALCIUM 20 MG/1
40 TABLET, COATED ORAL DAILY
Status: DISCONTINUED | OUTPATIENT
Start: 2024-03-06 | End: 2024-03-11 | Stop reason: HOSPADM

## 2024-03-06 RX ORDER — FENTANYL CITRATE 50 UG/ML
50 INJECTION, SOLUTION INTRAMUSCULAR; INTRAVENOUS
Status: DISCONTINUED | OUTPATIENT
Start: 2024-03-06 | End: 2024-03-06 | Stop reason: HOSPADM

## 2024-03-06 RX ORDER — PROMETHAZINE HYDROCHLORIDE 25 MG/1
25 SUPPOSITORY RECTAL ONCE AS NEEDED
Status: DISCONTINUED | OUTPATIENT
Start: 2024-03-06 | End: 2024-03-06 | Stop reason: SDUPTHER

## 2024-03-06 RX ORDER — HYDRALAZINE HYDROCHLORIDE 20 MG/ML
5 INJECTION INTRAMUSCULAR; INTRAVENOUS
Status: DISCONTINUED | OUTPATIENT
Start: 2024-03-06 | End: 2024-03-06 | Stop reason: HOSPADM

## 2024-03-06 RX ORDER — SODIUM CHLORIDE, SODIUM LACTATE, POTASSIUM CHLORIDE, CALCIUM CHLORIDE 600; 310; 30; 20 MG/100ML; MG/100ML; MG/100ML; MG/100ML
9 INJECTION, SOLUTION INTRAVENOUS CONTINUOUS
Status: DISCONTINUED | OUTPATIENT
Start: 2024-03-06 | End: 2024-03-08

## 2024-03-06 RX ORDER — MIDAZOLAM HYDROCHLORIDE 1 MG/ML
0.5 INJECTION INTRAMUSCULAR; INTRAVENOUS
Status: DISCONTINUED | OUTPATIENT
Start: 2024-03-06 | End: 2024-03-06 | Stop reason: HOSPADM

## 2024-03-06 RX ORDER — ASPIRIN 81 MG/1
81 TABLET ORAL DAILY
Status: DISCONTINUED | OUTPATIENT
Start: 2024-03-06 | End: 2024-03-11 | Stop reason: HOSPADM

## 2024-03-06 RX ORDER — FUROSEMIDE 10 MG/ML
INJECTION INTRAMUSCULAR; INTRAVENOUS AS NEEDED
Status: DISCONTINUED | OUTPATIENT
Start: 2024-03-06 | End: 2024-03-06 | Stop reason: SURG

## 2024-03-06 RX ORDER — HYDROCODONE BITARTRATE AND ACETAMINOPHEN 5; 325 MG/1; MG/1
1 TABLET ORAL EVERY 4 HOURS PRN
Status: DISCONTINUED | OUTPATIENT
Start: 2024-03-06 | End: 2024-03-11 | Stop reason: HOSPADM

## 2024-03-06 RX ORDER — MAGNESIUM HYDROXIDE 1200 MG/15ML
LIQUID ORAL AS NEEDED
Status: DISCONTINUED | OUTPATIENT
Start: 2024-03-06 | End: 2024-03-06 | Stop reason: HOSPADM

## 2024-03-06 RX ORDER — SERTRALINE HYDROCHLORIDE 100 MG/1
100 TABLET, FILM COATED ORAL DAILY
Status: DISCONTINUED | OUTPATIENT
Start: 2024-03-06 | End: 2024-03-11 | Stop reason: HOSPADM

## 2024-03-06 RX ORDER — LABETALOL HYDROCHLORIDE 5 MG/ML
5 INJECTION, SOLUTION INTRAVENOUS
Status: DISCONTINUED | OUTPATIENT
Start: 2024-03-06 | End: 2024-03-06 | Stop reason: HOSPADM

## 2024-03-06 RX ORDER — CARVEDILOL 12.5 MG/1
12.5 TABLET ORAL EVERY 12 HOURS
Status: DISCONTINUED | OUTPATIENT
Start: 2024-03-06 | End: 2024-03-11 | Stop reason: HOSPADM

## 2024-03-06 RX ORDER — LIDOCAINE HYDROCHLORIDE 10 MG/ML
0.5 INJECTION, SOLUTION EPIDURAL; INFILTRATION; INTRACAUDAL; PERINEURAL ONCE AS NEEDED
Status: COMPLETED | OUTPATIENT
Start: 2024-03-06 | End: 2024-03-06

## 2024-03-06 RX ORDER — DORZOLAMIDE HCL 20 MG/ML
1 SOLUTION/ DROPS OPHTHALMIC 2 TIMES DAILY
Status: DISCONTINUED | OUTPATIENT
Start: 2024-03-06 | End: 2024-03-11 | Stop reason: HOSPADM

## 2024-03-06 RX ORDER — PANTOPRAZOLE SODIUM 40 MG/1
40 TABLET, DELAYED RELEASE ORAL
Status: DISCONTINUED | OUTPATIENT
Start: 2024-03-07 | End: 2024-03-11 | Stop reason: HOSPADM

## 2024-03-06 RX ORDER — MORPHINE SULFATE 2 MG/ML
2 INJECTION, SOLUTION INTRAMUSCULAR; INTRAVENOUS EVERY 4 HOURS PRN
Status: DISCONTINUED | OUTPATIENT
Start: 2024-03-06 | End: 2024-03-08

## 2024-03-06 RX ORDER — SODIUM CHLORIDE 9 MG/ML
INJECTION, SOLUTION INTRAVENOUS AS NEEDED
Status: DISCONTINUED | OUTPATIENT
Start: 2024-03-06 | End: 2024-03-06 | Stop reason: HOSPADM

## 2024-03-06 RX ORDER — MEPERIDINE HYDROCHLORIDE 25 MG/ML
12.5 INJECTION INTRAMUSCULAR; INTRAVENOUS; SUBCUTANEOUS
Status: DISCONTINUED | OUTPATIENT
Start: 2024-03-06 | End: 2024-03-06

## 2024-03-06 RX ORDER — FENTANYL CITRATE 50 UG/ML
50 INJECTION, SOLUTION INTRAMUSCULAR; INTRAVENOUS
Status: DISCONTINUED | OUTPATIENT
Start: 2024-03-06 | End: 2024-03-06

## 2024-03-06 RX ORDER — IPRATROPIUM BROMIDE AND ALBUTEROL SULFATE 2.5; .5 MG/3ML; MG/3ML
3 SOLUTION RESPIRATORY (INHALATION) ONCE AS NEEDED
Status: DISCONTINUED | OUTPATIENT
Start: 2024-03-06 | End: 2024-03-06 | Stop reason: SDUPTHER

## 2024-03-06 RX ORDER — FENTANYL CITRATE 50 UG/ML
INJECTION, SOLUTION INTRAMUSCULAR; INTRAVENOUS
Status: COMPLETED
Start: 2024-03-06 | End: 2024-03-06

## 2024-03-06 RX ORDER — ESMOLOL HYDROCHLORIDE 10 MG/ML
INJECTION INTRAVENOUS AS NEEDED
Status: DISCONTINUED | OUTPATIENT
Start: 2024-03-06 | End: 2024-03-06 | Stop reason: SURG

## 2024-03-06 RX ORDER — NALOXONE HCL 0.4 MG/ML
0.4 VIAL (ML) INJECTION AS NEEDED
Status: DISCONTINUED | OUTPATIENT
Start: 2024-03-06 | End: 2024-03-06 | Stop reason: HOSPADM

## 2024-03-06 RX ORDER — DIAZEPAM 10 MG/1
10 TABLET ORAL
Status: DISCONTINUED | OUTPATIENT
Start: 2024-03-06 | End: 2024-03-08

## 2024-03-06 RX ORDER — NICARDIPINE HYDROCHLORIDE 2.5 MG/ML
INJECTION INTRAVENOUS
Status: DISPENSED
Start: 2024-03-06 | End: 2024-03-07

## 2024-03-06 RX ORDER — DROPERIDOL 2.5 MG/ML
0.62 INJECTION, SOLUTION INTRAMUSCULAR; INTRAVENOUS
Status: DISCONTINUED | OUTPATIENT
Start: 2024-03-06 | End: 2024-03-06 | Stop reason: HOSPADM

## 2024-03-06 RX ORDER — PROMETHAZINE HYDROCHLORIDE 25 MG/1
25 TABLET ORAL ONCE AS NEEDED
Status: DISCONTINUED | OUTPATIENT
Start: 2024-03-06 | End: 2024-03-06 | Stop reason: SDUPTHER

## 2024-03-06 RX ORDER — SODIUM CHLORIDE 0.9 % (FLUSH) 0.9 %
10 SYRINGE (ML) INJECTION EVERY 12 HOURS SCHEDULED
Status: DISCONTINUED | OUTPATIENT
Start: 2024-03-06 | End: 2024-03-06 | Stop reason: HOSPADM

## 2024-03-06 RX ORDER — HYDRALAZINE HYDROCHLORIDE 20 MG/ML
5 INJECTION INTRAMUSCULAR; INTRAVENOUS
Status: DISCONTINUED | OUTPATIENT
Start: 2024-03-06 | End: 2024-03-06 | Stop reason: SDUPTHER

## 2024-03-06 RX ORDER — ONDANSETRON 2 MG/ML
4 INJECTION INTRAMUSCULAR; INTRAVENOUS EVERY 6 HOURS PRN
Status: DISCONTINUED | OUTPATIENT
Start: 2024-03-06 | End: 2024-03-11 | Stop reason: HOSPADM

## 2024-03-06 RX ORDER — VECURONIUM BROMIDE 1 MG/ML
INJECTION, POWDER, LYOPHILIZED, FOR SOLUTION INTRAVENOUS AS NEEDED
Status: DISCONTINUED | OUTPATIENT
Start: 2024-03-06 | End: 2024-03-06 | Stop reason: SURG

## 2024-03-06 RX ORDER — DROPERIDOL 2.5 MG/ML
0.62 INJECTION, SOLUTION INTRAMUSCULAR; INTRAVENOUS ONCE AS NEEDED
Status: DISCONTINUED | OUTPATIENT
Start: 2024-03-06 | End: 2024-03-06 | Stop reason: HOSPADM

## 2024-03-06 RX ADMIN — MANNITOL: 20 INJECTION, SOLUTION INTRAVENOUS at 13:02

## 2024-03-06 RX ADMIN — ESMOLOL HYDROCHLORIDE 70 MG: 100 INJECTION, SOLUTION INTRAVENOUS at 12:53

## 2024-03-06 RX ADMIN — VECURONIUM BROMIDE 3 MG: 1 INJECTION, POWDER, LYOPHILIZED, FOR SOLUTION INTRAVENOUS at 14:44

## 2024-03-06 RX ADMIN — SERTRALINE HYDROCHLORIDE 100 MG: 100 TABLET ORAL at 19:54

## 2024-03-06 RX ADMIN — SODIUM CHLORIDE 2 G: 900 INJECTION INTRAVENOUS at 12:56

## 2024-03-06 RX ADMIN — FENTANYL CITRATE 50 MCG: 50 INJECTION, SOLUTION INTRAMUSCULAR; INTRAVENOUS at 16:27

## 2024-03-06 RX ADMIN — NICARDIPINE HYDROCHLORIDE 0.3 MCG: 25 INJECTION, SOLUTION INTRAVENOUS at 14:19

## 2024-03-06 RX ADMIN — DORZOLAMIDE 1 DROP: 20 SOLUTION/ DROPS OPHTHALMIC at 22:06

## 2024-03-06 RX ADMIN — FAMOTIDINE 20 MG: 20 TABLET, FILM COATED ORAL at 11:00

## 2024-03-06 RX ADMIN — ROSUVASTATIN CALCIUM 40 MG: 20 TABLET, FILM COATED ORAL at 19:54

## 2024-03-06 RX ADMIN — ASPIRIN 81 MG: 81 TABLET, COATED ORAL at 19:54

## 2024-03-06 RX ADMIN — TIMOLOL MALEATE 1 DROP: 5 SOLUTION/ DROPS OPHTHALMIC at 22:04

## 2024-03-06 RX ADMIN — BUSPIRONE HYDROCHLORIDE 10 MG: 10 TABLET ORAL at 21:58

## 2024-03-06 RX ADMIN — QUETIAPINE FUMARATE 12.5 MG: 25 TABLET ORAL at 21:59

## 2024-03-06 RX ADMIN — LEVETIRACETAM 500 MG: 500 TABLET, FILM COATED ORAL at 21:59

## 2024-03-06 RX ADMIN — NICARDIPINE HYDROCHLORIDE 5 MG/HR: 2.5 INJECTION, SOLUTION INTRAVENOUS at 17:48

## 2024-03-06 RX ADMIN — VECURONIUM BROMIDE 3 MG: 1 INJECTION, POWDER, LYOPHILIZED, FOR SOLUTION INTRAVENOUS at 13:54

## 2024-03-06 RX ADMIN — EPHEDRINE SULFATE 5 MG: 50 INJECTION INTRAVENOUS at 13:35

## 2024-03-06 RX ADMIN — GADOBENATE DIMEGLUMINE 15 ML: 529 INJECTION, SOLUTION INTRAVENOUS at 09:45

## 2024-03-06 RX ADMIN — EPHEDRINE SULFATE 5 MG: 50 INJECTION INTRAVENOUS at 13:34

## 2024-03-06 RX ADMIN — DEXAMETHASONE SODIUM PHOSPHATE 4 MG: 4 INJECTION INTRA-ARTICULAR; INTRALESIONAL; INTRAMUSCULAR; INTRAVENOUS; SOFT TISSUE at 19:54

## 2024-03-06 RX ADMIN — FUROSEMIDE 20 MG: 10 INJECTION, SOLUTION INTRAMUSCULAR; INTRAVENOUS at 13:09

## 2024-03-06 RX ADMIN — HYDROCODONE BITARTRATE AND ACETAMINOPHEN 1 TABLET: 10; 325 TABLET ORAL at 18:54

## 2024-03-06 RX ADMIN — FENTANYL CITRATE 100 MCG: 50 INJECTION, SOLUTION INTRAMUSCULAR; INTRAVENOUS at 13:20

## 2024-03-06 RX ADMIN — VECURONIUM BROMIDE 7 MG: 1 INJECTION, POWDER, LYOPHILIZED, FOR SOLUTION INTRAVENOUS at 12:53

## 2024-03-06 RX ADMIN — PROPOFOL 150 MG: 10 INJECTION, EMULSION INTRAVENOUS at 12:53

## 2024-03-06 RX ADMIN — NICARDIPINE HYDROCHLORIDE 0.2 MCG: 25 INJECTION, SOLUTION INTRAVENOUS at 14:46

## 2024-03-06 RX ADMIN — ONDANSETRON 4 MG: 2 INJECTION INTRAMUSCULAR; INTRAVENOUS at 14:49

## 2024-03-06 RX ADMIN — PHENYLEPHRINE HYDROCHLORIDE 0.5 MCG/KG/MIN: 10 INJECTION INTRAVENOUS at 14:14

## 2024-03-06 RX ADMIN — SODIUM CHLORIDE, POTASSIUM CHLORIDE, SODIUM LACTATE AND CALCIUM CHLORIDE 9 ML/HR: 600; 310; 30; 20 INJECTION, SOLUTION INTRAVENOUS at 11:02

## 2024-03-06 RX ADMIN — SODIUM CHLORIDE, POTASSIUM CHLORIDE, SODIUM LACTATE AND CALCIUM CHLORIDE 9 ML/HR: 600; 310; 30; 20 INJECTION, SOLUTION INTRAVENOUS at 11:01

## 2024-03-06 RX ADMIN — LIDOCAINE HYDROCHLORIDE 0.5 ML: 10 INJECTION, SOLUTION EPIDURAL; INFILTRATION; INTRACAUDAL; PERINEURAL at 11:00

## 2024-03-06 RX ADMIN — EPHEDRINE SULFATE 5 MG: 50 INJECTION INTRAVENOUS at 13:59

## 2024-03-06 RX ADMIN — SODIUM CHLORIDE, POTASSIUM CHLORIDE, SODIUM LACTATE AND CALCIUM CHLORIDE: 600; 310; 30; 20 INJECTION, SOLUTION INTRAVENOUS at 14:52

## 2024-03-06 RX ADMIN — SODIUM CHLORIDE 90 ML/HR: 9 INJECTION, SOLUTION INTRAVENOUS at 18:50

## 2024-03-06 RX ADMIN — SODIUM CHLORIDE 2 G: 900 INJECTION INTRAVENOUS at 21:59

## 2024-03-06 RX ADMIN — DEXMEDETOMIDINE HYDROCHLORIDE 0.4 MCG/KG/HR: 100 INJECTION, SOLUTION INTRAVENOUS at 13:19

## 2024-03-06 RX ADMIN — LIDOCAINE HYDROCHLORIDE 50 MG: 10 INJECTION, SOLUTION EPIDURAL; INFILTRATION; INTRACAUDAL; PERINEURAL at 12:53

## 2024-03-06 NOTE — OP NOTE
NEUROSURGICAL OPERATIVE NOTE        PREOPERATIVE DIAGNOSIS:    Recurrent left frontal meningioma      POSTOPERATIVE DIAGNOSIS:  Same      PROCEDURE:  Left craniotomy for resection of recurrent meningioma      SURGEON:  Robi Perales M.D.      ASSISTANT: Ingrid MUNSON and Madelyn Sanchez PA-C    PAC assisted with:   Suctioning   Retraction   Tying   Suturing   Closing   Application of dressing   Skilled neurosurgery PA assistance was necessary to perform this procedure.        ANESTHESIA:  General      ESTIMATED BLOOD LOSS: 200 mL      SPECIMEN: Tumor to pathology      DRAINS: None      COMPLICATIONS:  None      CLINICAL NOTE:  The patient is a 69-year-old woman with a history of a left sphenoid wing meningioma that was initially operated on in 2003 and then again in 2014.  Given recurrence she has undergone radiosurgery on multiple occasions.  She now presents for further recurrence superior and medial in the left frontal region.  She has presented with confusion and headache and as such presents at this time for left frontal craniotomy to resect the recurrent tumor.  The nature of the procedure as well as the potential risks, complications, limitations, and alternatives to the procedure were discussed at length with the patient and the patient has agreed to proceed with surgery.      TECHNICAL NOTE:  The patient was brought to the operating room and placed on the operating table in the supine position. General endotracheal anesthesia was achieved. Her head was affixed to the operating room table via Cuellar 3-point pin fixation. Her head was maintained in a neutral, brow-up position. Previously placed Stealth fiducial markers were registered. Her bifrontal region was prepared and draped in the usual fashion. A bicoronal incision was fashioned. The scalp flap was reflected anteriorly. Two mary holes were then placed posteriorly with one more toward the midline and one more lateral. A couple of screws from  the previous plating from a previous surgery were removed and the plates were bent inferiorly just a little bit to allow access. A craniotome was utilized to harvest the square bone flap in the left frontal region. Finally dural tack-up sutures were placed. The dura was opened and reflected anteriorly. Tumor was identified at the anterior aspect of the exposure. Tumor was incised and a large specimen submitted to pathology. The capsule around the tumor was coagulated and an arachnoid plane was more superficially anyway defined using Cottonoid sponges. The tumor was internally debulked using the ultrasonic aspirator. Deeper the surrounding brain was more gliotic and adherent to the tumor. Gross total resection of the tumor was achieved. The tumor had extended to the falx and seemed to emanate from the dura low in the left frontal region. Once again, gross total resection was achieved. Bleeding points were controlled with bipolar cautery and Floseal at the end of the procedure. The wound was washed out with a saline solution and the bed was quite dry. It was however lined with Surgicel sponges. A suturable DuraGen patch was then sewn into place to replace the previous dura. This was covered with Surgicel. The bone flap was returned to its native location using Synthes craniofacial plates and screws. Two of the previous plates were reengaged and seated with screws once again. The galea was reapproximated in an interrupted fashion with 3-0 Vicryl suture. The skin was closed in a running, locked fashion with a 3-0 nylon suture. Sterile dressing was applied. She did receive preoperative antibiotics, Decadron, Lasix and mannitol.             Robi Perales M.D.

## 2024-03-06 NOTE — ANESTHESIA PREPROCEDURE EVALUATION
Anesthesia Evaluation     Patient summary reviewed and Nursing notes reviewed   NPO Solid Status: > 8 hours  NPO Liquid Status: > 8 hours           Airway   Mallampati: II  TM distance: >3 FB  Neck ROM: full  No difficulty expected  Dental - normal exam     Pulmonary     breath sounds clear to auscultation  Cardiovascular   Exercise tolerance: good (4-7 METS)    Rhythm: regular  Rate: normal        Neuro/Psych  GI/Hepatic/Renal/Endo      Musculoskeletal     Abdominal    Substance History      OB/GYN          Other        ROS/Med Hx Other: Poor historian                Anesthesia Plan    ASA 3     general     ( A line)    Anesthetic plan, risks, benefits, and alternatives have been provided, discussed and informed consent has been obtained with: patient.    CODE STATUS:

## 2024-03-06 NOTE — H&P
Patient Care Team:      Chief complaint: Brain tumor    Subjective:  Patient is a 69 y.o.female presents with a history of previous brain lesions.  Previous gamma knife treatment.  Presented recently to ED with H/A and pressure behind left eye.  She has known lesion left orbit with previous treatment.  She denies recent changes in her general health.    Review of Systems:  General ROS: negative  Cardiovascular ROS: no chest pain or dyspnea on exertion  Respiratory ROS: positive for - shortness of breath      Allergies:   Allergies   Allergen Reactions    Dilantin [Phenytoin Sodium Extended] Rash          Latex: no  Contrast Dye: no    Home Meds    Medications Prior to Admission   Medication Sig Dispense Refill Last Dose    aspirin 81 MG tablet Take 1 tablet by mouth Daily. 30 tablet 11 2/27/2024    busPIRone (BUSPAR) 10 MG tablet Take 1 tablet by mouth 2 (Two) Times a Day. 180 tablet 3 2/27/2024    carvedilol (COREG) 12.5 MG tablet TAKE ONE TABLET BY MOUTH EVERY 12 HOURS (Patient taking differently: Take 0.5 tablets by mouth 2 (Two) Times a Day With Meals.) 180 tablet 3 Patient Taking Differently    cholecalciferol (VITAMIN D3) 1000 units tablet Take 2 tablets by mouth Every Other Day.   Past Week    dexAMETHasone (DECADRON) 4 MG tablet Take 1 tablet by mouth Every 8 (Eight) Hours. 90 tablet 0     levETIRAcetam (KEPPRA) 500 MG tablet Take 1 tablet by mouth Every 12 (Twelve) Hours. 60 tablet 0     lisinopril (PRINIVIL,ZESTRIL) 2.5 MG tablet Take 1 tablet by mouth Daily. 90 tablet 3 2/27/2024    omeprazole (priLOSEC) 20 MG capsule Take 1 capsule by mouth Daily. 90 capsule 3 2/27/2024    QUEtiapine (SEROquel) 25 MG tablet Take 0.5 tablets by mouth every night at bedtime. 45 tablet 3 2/27/2024    rosuvastatin (CRESTOR) 40 MG tablet Take 1 tablet by mouth Daily. 90 tablet 3 2/27/2024    sertraline (ZOLOFT) 100 MG tablet Take 1 tablet by mouth Daily. 90 tablet 3 2/27/2024    thiamine (VITAMIN B-1) 100 MG tablet tablet  Take 1 tablet by mouth Every Other Day. 30 each 5 2/27/2024    chlorhexidine (HIBICLENS) 4 % external liquid Shower each day with solution for 5 days beginning 5 days before surgery. 120 mL 0     dorzolamide (TRUSOPT) 2 % ophthalmic solution Administer 1 drop into the left eye 2 (Two) Times a Day.   2/27/2024    LORazepam (Ativan) 0.5 MG tablet Take 0.5-1 tablets by mouth Daily As Needed for Anxiety. 15 tablet 0 Past Month    mupirocin (BACTROBAN) 2 % nasal ointment Apply to the inside of each nostril with a cotton swab two times daily, morning and evening, for 5 days before surgery. 10 each 0     ondansetron ODT (ZOFRAN-ODT) 4 MG disintegrating tablet Place 1 tablet on the tongue Every 4 (Four) Hours. As needed for nausea 12 tablet 0 Past Month    timolol (TIMOPTIC) 0.5 % ophthalmic solution 1 drop 2 (Two) Times a Day.   2/27/2024     PMH:   Past Medical History:   Diagnosis Date    Abdominal hernia     Arthritis     rt knee     Atrial flutter with rapid ventricular response 12/21/2017    Back pain     Brain tumor     Colostomy present 08/2018    Depression     History of blood transfusion     History of gastroesophageal reflux (GERD)     resolved since Nissen    History of Nissen fundoplication 7/1/2017    History of small bowel obstruction     Hyperlipemia     Hypertension     Memory loss or impairment     Migraine     Parathyroid adenoma     Incidental on thyroid US.    PONV (postoperative nausea and vomiting)     nausea - preprocedural meds help     Prediabetes     Last Impression: 06 Feb 2015  Reviewed labs. Excellent control.  Emery Connell Impression: 06 Feb 2015  Reviewed labs. Excellent control.  Michaela Connell    Thyroid nodule      Last Impression: 13 Jun 2015  r/o thyroid cancer, will proceed with US.  Michaela Connell (Internal Medicine)     UTI (urinary tract infection)     Vision changes     blockages left eye     Wears glasses     readers     PSH:    Past Surgical History:   Procedure  Laterality Date    BRAIN SURGERY Left 05/19/2022    BRAIN SURGERY  08/2023    left side done & radiationg a wk. later,  in [place]    CARDIAC CATHETERIZATION N/A 04/27/2020    Procedure: LEFT HEART CATH;  Surgeon: Marina Ring MD;  Location:  [facility];  Service: Cardiology;  Laterality: N/A;    CARPAL TUNNEL RELEASE  2002    right     COLONOSCOPY N/A 08/18/2018    Procedure: COLONOSCOPY;  Surgeon: Inderjit Campos MD;  Location:  [facility];  Service: Gastroenterology    COLONOSCOPY N/A 11/28/2018    Procedure: COLONOSCOPY;  Surgeon: Inderjit Campos MD;  Location:  [facility];  Service: Gastroenterology    COLOSTOMY CLOSURE N/A 02/19/2019    Procedure: COLOSTOMY TAKEDOWN, INCISIONAL HERNIA REPAIR;  Surgeon: Andrea Bocanegra MD;  Location:  [facility];  Service: General    CRANIOTOMY  2003 & 2014    Dr. Werner Hollis for tumor removal    ENDOSCOPY      EXPLORATORY LAPAROTOMY N/A 12/05/2017    Procedure: EXPLORATORY LAPAROTOMY, SMALL BOWEL RESECTION;  Surgeon: Ирина Cobian MD;  Location:  [facility];  Service:     EXPLORATORY LAPAROTOMY N/A 12/22/2017    Procedure: LAPAROTOMY EXPLORATORY FOR SMALL BOWEL OBSTRUCTION;  Surgeon: Ирина Cobian MD;  Location:  [facility];  Service:     EXPLORATORY LAPAROTOMY N/A 07/23/2018    Procedure: EXPLORATORY LAPAROTOMY, APPENDECTOMY, CECOPEXY, INCISIONAL HERNIA REPAIR, LYSIS OF ADHESIONS;  Surgeon: Andrea Bocanegra MD;  Location:  [facility];  Service: General    EXPLORATORY LAPAROTOMY N/A 08/19/2018    Procedure: LAPAROTOMY EXPLORATORY, SIGMOID COLECTOMY, CREATION OF OSTOMY;  Surgeon: Andrea Bocanegra MD;  Location:  [facility];  Service: General    HYSTERECTOMY      partial - both ovaries still present pt believes     INSERTION HEMODIALYSIS CATHETER N/A 12/07/2017    Procedure: HEMODIALYSIS CATHETER INSERTION;  Surgeon: Chance Valenzuela MD;  Location:  [facility];  Service:     ORBITOTOMY Left 11/03/2017    Procedure:  LEFT LATERAL  "ORBITOTOMY WITH DEBULKING OF TUMOR ;  Surgeon: Moo Hopkins MD;  Location: [facility];  Service:     OTHER SURGICAL HISTORY      esophagogastric fundoplasty nissen fundoplication    TUBAL ABDOMINAL LIGATION       Immunization History: pneumo: yes   Flu: yes  Tetanus: unknown  Social History:   Tobacco: no   Alcohol: no      Physical Exam:/75 (BP Location: Left arm, Patient Position: Lying)   Pulse 58   Temp 97.6 °F (36.4 °C) (Oral)   Resp 16   Ht 160 cm (63\")   Wt 79.4 kg (175 lb)   SpO2 95%   BMI 31.00 kg/m²       General Appearance:    Alert, cooperative, no distress, appears stated age   Head:    Normocephalic, without obvious abnormality, atraumatic   Lungs:     Clear to auscultation bilaterally, respirations unlabored    Heart: Regular rate and rhythm, S1 and S2 normal, no murmur, rub    or gallop    Abdomen:    Soft without tenderness   Breast Exam:    deferred   Genitalia:    deferred   Extremities:   Extremities normal, atraumatic, no cyanosis or edema   Skin:   Skin color, texture, turgor normal, no rashes or lesions   Neurologic:   Grossly intact     Results Review: Chem profile, CBC, EKG    Impression: Left frontal brain tumor    Plan: For left craniotomy for tumor, stereotactic with stealth today  JEIMY Hoffmann 3/6/2024 12:30 EST          "

## 2024-03-06 NOTE — ANESTHESIA POSTPROCEDURE EVALUATION
Patient: Tereza Ackerman    Procedure Summary       Date: 03/06/24 Room / Location:  [facility] /  [facility]    Anesthesia Start: 1246 Anesthesia Stop: 1546    Procedure: CRANIOTOMY FOR TUMOR STEREOTACTIC WITH STEALTH (Left: Head) Diagnosis:       Meningioma, recurrent of brain      (Meningioma, recurrent of brain [D32.0])    Surgeons: Robi Perales MD Provider: Chance Barrera MD    Anesthesia Type: general ASA Status: 3            Anesthesia Type: general    Vitals  Vitals Value Taken Time   BP     Temp     Pulse 61 03/06/24 1544   Resp     SpO2 94 % 03/06/24 1544   Vitals shown include unfiled device data.        Post Anesthesia Care and Evaluation    Patient location during evaluation: PACU  Patient participation: complete - patient cannot participate  Level of consciousness: lethargic  Pain score: 2  Pain management: adequate    Airway patency: patent  Anesthetic complications: No anesthetic complications  PONV Status: none  Cardiovascular status: acceptable  Respiratory status: acceptable  Hydration status: balanced

## 2024-03-06 NOTE — ANESTHESIA PROCEDURE NOTES
Airway  Urgency: elective    Date/Time: 3/6/2024 12:55 PM  Airway not difficult    General Information and Staff    Patient location during procedure: OR  CRNA/CAA: Papo Burnham CRNA    Indications and Patient Condition  Indications for airway management: airway protection    Preoxygenated: yes  MILS not maintained throughout  Mask difficulty assessment: 1 - vent by mask    Final Airway Details  Final airway type: endotracheal airway      Successful airway: ETT  Cuffed: yes   Successful intubation technique: direct laryngoscopy  Endotracheal tube insertion site: oral  Blade: José Miguel  Blade size: 3  ETT size (mm): 7.0  Cormack-Lehane Classification: grade I - full view of glottis  Placement verified by: chest auscultation and capnometry   Measured from: lips  ETT/EBT  to lips (cm): 20  Number of attempts at approach: 1  Assessment: lips, teeth, and gum same as pre-op and atraumatic intubation    Additional Comments  Negative epigastric sounds, Breath sound equal bilaterally with symmetric chest rise and fall

## 2024-03-07 ENCOUNTER — APPOINTMENT (OUTPATIENT)
Dept: CT IMAGING | Facility: HOSPITAL | Age: 69
DRG: 027 | End: 2024-03-07
Payer: MEDICARE

## 2024-03-07 LAB
ANION GAP SERPL CALCULATED.3IONS-SCNC: 9 MMOL/L (ref 5–15)
BUN SERPL-MCNC: 25 MG/DL (ref 8–23)
BUN/CREAT SERPL: 36.8 (ref 7–25)
CALCIUM SPEC-SCNC: 8.2 MG/DL (ref 8.6–10.5)
CHLORIDE SERPL-SCNC: 103 MMOL/L (ref 98–107)
CO2 SERPL-SCNC: 23 MMOL/L (ref 22–29)
CREAT SERPL-MCNC: 0.68 MG/DL (ref 0.57–1)
DEPRECATED RDW RBC AUTO: 46.5 FL (ref 37–54)
EGFRCR SERPLBLD CKD-EPI 2021: 93.2 ML/MIN/1.73
ERYTHROCYTE [DISTWIDTH] IN BLOOD BY AUTOMATED COUNT: 13.8 % (ref 12.3–15.4)
GLUCOSE SERPL-MCNC: 127 MG/DL (ref 65–99)
HCT VFR BLD AUTO: 36.6 % (ref 34–46.6)
HGB BLD-MCNC: 12.3 G/DL (ref 12–15.9)
MCH RBC QN AUTO: 30.7 PG (ref 26.6–33)
MCHC RBC AUTO-ENTMCNC: 33.6 G/DL (ref 31.5–35.7)
MCV RBC AUTO: 91.3 FL (ref 79–97)
PLATELET # BLD AUTO: 206 10*3/MM3 (ref 140–450)
PMV BLD AUTO: 10.6 FL (ref 6–12)
POTASSIUM SERPL-SCNC: 3.9 MMOL/L (ref 3.5–5.2)
RBC # BLD AUTO: 4.01 10*6/MM3 (ref 3.77–5.28)
SODIUM SERPL-SCNC: 135 MMOL/L (ref 136–145)
WBC NRBC COR # BLD AUTO: 14.29 10*3/MM3 (ref 3.4–10.8)

## 2024-03-07 PROCEDURE — 25810000003 SODIUM CHLORIDE 0.9 % SOLUTION: Performed by: NEUROLOGICAL SURGERY

## 2024-03-07 PROCEDURE — 97162 PT EVAL MOD COMPLEX 30 MIN: CPT

## 2024-03-07 PROCEDURE — 97166 OT EVAL MOD COMPLEX 45 MIN: CPT

## 2024-03-07 PROCEDURE — 80048 BASIC METABOLIC PNL TOTAL CA: CPT | Performed by: NEUROLOGICAL SURGERY

## 2024-03-07 PROCEDURE — 25010000002 DEXAMETHASONE PER 1 MG: Performed by: NEUROLOGICAL SURGERY

## 2024-03-07 PROCEDURE — 85027 COMPLETE CBC AUTOMATED: CPT | Performed by: NEUROLOGICAL SURGERY

## 2024-03-07 PROCEDURE — 25010000002 CEFAZOLIN PER 500 MG: Performed by: NEUROLOGICAL SURGERY

## 2024-03-07 PROCEDURE — 99024 POSTOP FOLLOW-UP VISIT: CPT | Performed by: NEUROLOGICAL SURGERY

## 2024-03-07 PROCEDURE — 70450 CT HEAD/BRAIN W/O DYE: CPT

## 2024-03-07 PROCEDURE — 25010000002 NICARDIPINE 2.5 MG/ML SOLUTION 10 ML VIAL: Performed by: NEUROLOGICAL SURGERY

## 2024-03-07 PROCEDURE — 25810000003 SODIUM CHLORIDE 0.9 % SOLUTION 250 ML FLEX CONT: Performed by: NEUROLOGICAL SURGERY

## 2024-03-07 RX ADMIN — LEVETIRACETAM 500 MG: 500 TABLET, FILM COATED ORAL at 08:00

## 2024-03-07 RX ADMIN — ROSUVASTATIN CALCIUM 40 MG: 20 TABLET, FILM COATED ORAL at 08:00

## 2024-03-07 RX ADMIN — PANTOPRAZOLE SODIUM 40 MG: 40 TABLET, DELAYED RELEASE ORAL at 05:33

## 2024-03-07 RX ADMIN — HYDROCODONE BITARTRATE AND ACETAMINOPHEN 1 TABLET: 5; 325 TABLET ORAL at 20:13

## 2024-03-07 RX ADMIN — HYDROCODONE BITARTRATE AND ACETAMINOPHEN 1 TABLET: 10; 325 TABLET ORAL at 08:00

## 2024-03-07 RX ADMIN — SERTRALINE HYDROCHLORIDE 100 MG: 100 TABLET ORAL at 08:00

## 2024-03-07 RX ADMIN — CARVEDILOL 12.5 MG: 12.5 TABLET, FILM COATED ORAL at 15:34

## 2024-03-07 RX ADMIN — DEXAMETHASONE SODIUM PHOSPHATE 4 MG: 4 INJECTION INTRA-ARTICULAR; INTRALESIONAL; INTRAMUSCULAR; INTRAVENOUS; SOFT TISSUE at 19:52

## 2024-03-07 RX ADMIN — LISINOPRIL 2.5 MG: 2.5 TABLET ORAL at 08:00

## 2024-03-07 RX ADMIN — QUETIAPINE FUMARATE 12.5 MG: 25 TABLET ORAL at 21:41

## 2024-03-07 RX ADMIN — DEXAMETHASONE SODIUM PHOSPHATE 4 MG: 4 INJECTION INTRA-ARTICULAR; INTRALESIONAL; INTRAMUSCULAR; INTRAVENOUS; SOFT TISSUE at 13:30

## 2024-03-07 RX ADMIN — SODIUM CHLORIDE 2 G: 900 INJECTION INTRAVENOUS at 05:33

## 2024-03-07 RX ADMIN — SODIUM CHLORIDE 90 ML/HR: 9 INJECTION, SOLUTION INTRAVENOUS at 05:36

## 2024-03-07 RX ADMIN — DORZOLAMIDE 1 DROP: 20 SOLUTION/ DROPS OPHTHALMIC at 08:00

## 2024-03-07 RX ADMIN — DORZOLAMIDE 1 DROP: 20 SOLUTION/ DROPS OPHTHALMIC at 21:42

## 2024-03-07 RX ADMIN — DEXAMETHASONE SODIUM PHOSPHATE 4 MG: 4 INJECTION INTRA-ARTICULAR; INTRALESIONAL; INTRAMUSCULAR; INTRAVENOUS; SOFT TISSUE at 08:00

## 2024-03-07 RX ADMIN — LEVETIRACETAM 500 MG: 500 TABLET, FILM COATED ORAL at 21:41

## 2024-03-07 RX ADMIN — BUSPIRONE HYDROCHLORIDE 10 MG: 10 TABLET ORAL at 08:00

## 2024-03-07 RX ADMIN — TIMOLOL MALEATE 1 DROP: 5 SOLUTION/ DROPS OPHTHALMIC at 21:41

## 2024-03-07 RX ADMIN — CARVEDILOL 12.5 MG: 12.5 TABLET, FILM COATED ORAL at 03:19

## 2024-03-07 RX ADMIN — ASPIRIN 81 MG: 81 TABLET, COATED ORAL at 08:00

## 2024-03-07 RX ADMIN — NICARDIPINE HYDROCHLORIDE 2.5 MG/HR: 2.5 INJECTION, SOLUTION INTRAVENOUS at 03:19

## 2024-03-07 RX ADMIN — BUSPIRONE HYDROCHLORIDE 10 MG: 10 TABLET ORAL at 21:41

## 2024-03-07 RX ADMIN — TIMOLOL MALEATE 1 DROP: 5 SOLUTION/ DROPS OPHTHALMIC at 08:00

## 2024-03-07 RX ADMIN — DEXAMETHASONE SODIUM PHOSPHATE 4 MG: 4 INJECTION INTRA-ARTICULAR; INTRALESIONAL; INTRAMUSCULAR; INTRAVENOUS; SOFT TISSUE at 00:54

## 2024-03-07 NOTE — PLAN OF CARE
Goal Outcome Evaluation:              Outcome Evaluation: Pt neuro exam stable throughout day. Remained off cardene. Worked with PT/OT and sat up in chair <4hrs. Tolerated advancement of diet. Lisset and acuna removed. Pt due to void. Bladder scanned twice with 0mL measured.

## 2024-03-07 NOTE — CASE MANAGEMENT/SOCIAL WORK
Discharge Planning Assessment  [facility]     Patient Name: Tereza Ackerman  MRN: [id number]  Today's Date: 3/7/2024    Admit Date: 3/6/2024    Plan: Home   Discharge Needs Assessment       Row Name 03/07/24 1111       Living Environment    People in Home spouse    Name(s) of People in Home Cash, kirsty    Current Living Arrangements home    Potentially Unsafe Housing Conditions unable to assess    In the past 12 months has the electric, gas, oil, or water company threatened to shut off services in your home? No    Primary Care Provided by self    Provides Primary Care For no one    Family Caregiver if Needed spouse    Family Caregiver Names Cash, spouse    Quality of Family Relationships unable to assess       Resource/Environmental Concerns    Resource/Environmental Concerns none    Transportation Concerns none       Transportation Needs    In the past 12 months, has lack of transportation kept you from medical appointments or from getting medications? no    In the past 12 months, has lack of transportation kept you from meetings, work, or from getting things needed for daily living? No       Food Insecurity    Within the past 12 months, you worried that your food would run out before you got the money to buy more. Never true    Within the past 12 months, the food you bought just didn't last and you didn't have money to get more. Never true       Transition Planning    Patient/Family Anticipates Transition to home with family    Patient/Family Anticipated Services at Transition     Transportation Anticipated family or friend will provide       Discharge Needs Assessment    Equipment Currently Used at Home none    Concerns to be Addressed discharge planning                   Discharge Plan       Row Name 03/07/24 1113       Plan    Plan Home    Patient/Family in Agreement with Plan yes    Plan Comments Spoke with patient at bedside to initiate discharge planning.  Confirmed residence in  [facility]; PCP is Michaela Connell; primary insurance is Medicare and secondary is [employer].  Patient states she is independent with ADLs and mobility; no DME or HH.  Discharge goal is home and family will transport.  CM will continue to follow.    Final Discharge Disposition Code 01 - home or self-care                  Continued Care and Services - Admitted Since 3/6/2024    No active coordination exists for this encounter.       Expected Discharge Date and Time       Expected Discharge Date Expected Discharge Time    Mar 9, 2024            Demographic Summary       Row Name 03/07/24 1108       General Information    Arrived From home    Referral Source admission list    Reason for Consult discharge planning    Preferred Language English       Contact Information    Permission Granted to Share Info With                    Functional Status       Row Name 03/07/24 1109       Functional Status    Usual Activity Tolerance good    Current Activity Tolerance other (see comments)  see therapy notes       Functional Status, IADL    Medications independent    Meal Preparation independent    Housekeeping independent    Laundry independent    Shopping independent                   Psychosocial    No documentation.                  Abuse/Neglect    No documentation.                  Legal    No documentation.                  Substance Abuse    No documentation.                  Patient Forms    No documentation.                     Rima Stephenson RN

## 2024-03-07 NOTE — PLAN OF CARE
Goal Outcome Evaluation:  Plan of Care Reviewed With: patient           Outcome Evaluation: PT evaluation completed. Pt presenting with generalized weakness, impaired balance, impaired coordination, L sided visual deficit, and limited activity tolerance warranting IP PT services to facilitate increased functional strength and mobility. Recommend IRF but will monitor progress closely.      Anticipated Discharge Disposition (PT): inpatient rehabilitation facility

## 2024-03-07 NOTE — PLAN OF CARE
Goal Outcome Evaluation:  Plan of Care Reviewed With: patient        Progress: no change  Outcome Evaluation: OT evaluation complete this date. Pt presents with impaired balance, UE coordination, L visual deficit, impaired activity tolerance, and ADL performance warranting IP OT services. Recommend IRF but will monitor progress closely.      Anticipated Discharge Disposition (OT): inpatient rehabilitation facility

## 2024-03-07 NOTE — PLAN OF CARE
Goal Outcome Evaluation:  Patient rested comfortably overnight, did not complain of any pain. Cardene used on and off during shift to keep SBP less than 140 per orders, otherwise vital signs stable. Patient did become confused during 0100 neuro checks, answering orientation questions in only numbers, however she was able to carry conversation outside of these questions, but would continue to repeat the same numbers each time asked orientation questions. Contacted JEIMY Barrera with neurosurgery who ordered stat CT, which was not remarkable. Afterwards, neuro exam back to baseline with little confusion to situation at times.  remains at bedside.

## 2024-03-07 NOTE — OUTREACH NOTE
Medical Week 2 Survey      Flowsheet Row Responses   [facility] patient discharged from? [facility]   Does the patient have one of the following disease processes/diagnoses(primary or secondary)? Other   Week 2 attempt successful? No   Unsuccessful attempts Attempt 1   Revoke Readmitted            Mirella RIVERO - Registered Nurse

## 2024-03-07 NOTE — THERAPY EVALUATION
Patient Name: Tereza Ackerman  : 1955    MRN: [id number]                              Today's Date: 3/7/2024       Admit Date: 3/6/2024    Visit Dx:     ICD-10-CM ICD-9-CM   1. Meningioma, recurrent of brain  D32.0 225.2     Patient Active Problem List   Diagnosis    Impaired glucose tolerance    Parathyroid adenoma    Reaction to chronic stress    Multinodular goiter    Vitamin D deficiency    Meningioma, recurrent of brain    Arthritis    Depression    Small bowel obstruction    Insomnia    History of Nissen fundoplication    Malignant neoplasm of left orbit    Prediabetes    Mixed hyperlipidemia    Hyperglycemia    Atrial flutter with rapid ventricular response    Anemia    Large bowel obstruction    Abdominal pain    Essential hypertension    History of creation of ostomy    Screen for colon cancer    Sigmoid volvulus    SHAE (obstructive sleep apnea)    Brain mass    Meningioma     Past Medical History:   Diagnosis Date    Abdominal hernia     Arthritis     rt knee     Atrial flutter with rapid ventricular response 2017    Back pain     Brain tumor     Colostomy present 2018    Depression     History of blood transfusion     History of gastroesophageal reflux (GERD)     resolved since Nissen    History of Nissen fundoplication 2017    History of small bowel obstruction     Hyperlipemia     Hypertension     Memory loss or impairment     Migraine     Parathyroid adenoma     Incidental on thyroid US.    PONV (postoperative nausea and vomiting)     nausea - preprocedural meds help     Prediabetes     Last Impression: 2015  Reviewed labs. Excellent control.  Emery Connell Impression: 2015  Reviewed labs. Excellent control.  Micahela Connell    Thyroid nodule      Last Impression: 2015  r/o thyroid cancer, will proceed with US.  Michaela Connell (Internal Medicine)     UTI (urinary tract infection)     Vision changes     blockages left eye     Wears glasses      readers     Past Surgical History:   Procedure Laterality Date    BRAIN SURGERY Left 05/19/2022    BRAIN SURGERY  08/2023    left side done & radiationg a wk. later,  in [place]    CARDIAC CATHETERIZATION N/A 04/27/2020    Procedure: LEFT HEART CATH;  Surgeon: Marina Ring MD;  Location:  [facility];  Service: Cardiology;  Laterality: N/A;    CARPAL TUNNEL RELEASE  2002    right     COLONOSCOPY N/A 08/18/2018    Procedure: COLONOSCOPY;  Surgeon: Inderjit Campos MD;  Location:  [facility];  Service: Gastroenterology    COLONOSCOPY N/A 11/28/2018    Procedure: COLONOSCOPY;  Surgeon: Inderjit Campos MD;  Location:  [facility];  Service: Gastroenterology    COLOSTOMY CLOSURE N/A 02/19/2019    Procedure: COLOSTOMY TAKEDOWN, INCISIONAL HERNIA REPAIR;  Surgeon: Andrea Bocanegra MD;  Location:  [facility];  Service: General    CRANIOTOMY  2003 & 2014    Dr. Werner Hollis for tumor removal    ENDOSCOPY      EXPLORATORY LAPAROTOMY N/A 12/05/2017    Procedure: EXPLORATORY LAPAROTOMY, SMALL BOWEL RESECTION;  Surgeon: Ирина Cobian MD;  Location:  [facility];  Service:     EXPLORATORY LAPAROTOMY N/A 12/22/2017    Procedure: LAPAROTOMY EXPLORATORY FOR SMALL BOWEL OBSTRUCTION;  Surgeon: Ирина Cobian MD;  Location:  [facility];  Service:     EXPLORATORY LAPAROTOMY N/A 07/23/2018    Procedure: EXPLORATORY LAPAROTOMY, APPENDECTOMY, CECOPEXY, INCISIONAL HERNIA REPAIR, LYSIS OF ADHESIONS;  Surgeon: Andrea Bocanegra MD;  Location:  [facility];  Service: General    EXPLORATORY LAPAROTOMY N/A 08/19/2018    Procedure: LAPAROTOMY EXPLORATORY, SIGMOID COLECTOMY, CREATION OF OSTOMY;  Surgeon: Andrea Bocanegra MD;  Location:  [facility];  Service: General    HYSTERECTOMY      partial - both ovaries still present pt believes     INSERTION HEMODIALYSIS CATHETER N/A 12/07/2017    Procedure: HEMODIALYSIS CATHETER INSERTION;  Surgeon: Chance Valenzuela MD;  Location:  [facility];  Service:      ORBITOTOMY Left 11/03/2017    Procedure:  LEFT LATERAL ORBITOTOMY WITH DEBULKING OF TUMOR ;  Surgeon: Moo Hopkins MD;  Location: [facility];  Service:     OTHER SURGICAL HISTORY      esophagogastric fundoplasty nissen fundoplication    TUBAL ABDOMINAL LIGATION        General Information       Row Name 03/07/24 1129          Physical Therapy Time and Intention    Document Type evaluation  -ND     Mode of Treatment physical therapy  -ND       Row Name 03/07/24 1129          General Information    Patient Profile Reviewed yes  -ND     Prior Level of Function independent:;all household mobility;gait;transfer;bed mobility  No AD.  -ND     Existing Precautions/Restrictions fall  -ND     Barriers to Rehab medically complex;visual deficit;cognitive status  Blind in L eye.  -ND       Row Name 03/07/24 1129          Living Environment    People in Home spouse  -ND       Row Name 03/07/24 1129          Home Main Entrance    Number of Stairs, Main Entrance none  -ND       Row Name 03/07/24 1129          Stairs Within Home, Primary    Number of Stairs, Within Home, Primary none  -ND       Row Name 03/07/24 1129          Cognition    Orientation Status (Cognition) oriented to;person  -ND       Row Name 03/07/24 1129          Safety Issues, Functional Mobility    Safety Issues Affecting Function (Mobility) ability to follow commands;awareness of need for assistance;insight into deficits/self-awareness;problem-solving;safety precaution awareness;safety precautions follow-through/compliance;sequencing abilities  -ND     Impairments Affecting Function (Mobility) balance;cognition;coordination;endurance/activity tolerance;strength;motor planning  -ND     Cognitive Impairments, Mobility Safety/Performance awareness, need for assistance;insight into deficits/self-awareness;problem-solving/reasoning;safety precaution awareness;safety precaution follow-through;sequencing abilities  -ND               User Key  (r) = Recorded  By, (t) = Taken By, (c) = Cosigned By      Initials Name Provider Type    Roxie Dietz, CORNELIO Physical Therapist                   Mobility       Row Name 03/07/24 1131          Bed Mobility    Comment, (Bed Mobility) Pt received on toilet with OT.  -ND       Row Name 03/07/24 1131          Sit-Stand Transfer    Sit-Stand [place] (Transfers) contact guard;1 person assist;verbal cues  -ND       Row Name 03/07/24 1131          Gait/Stairs (Locomotion)    [place] Level (Gait) contact guard;1 person assist;verbal cues;nonverbal cues (demo/gesture)  -ND     Assistive Device (Gait) other (see comments)  UE support  -ND     Distance in Feet (Gait) 80  -ND     Deviations/Abnormal Patterns (Gait) bilateral deviations;base of support, narrow;becky decreased;gait speed decreased;stride length decreased  -ND     Comment, (Gait/Stairs) Pt ambulating with step-through pattern, decreased becky, and decreased arm swing. Pt demonstrating difficulty with navigating environment due to visual deficit.  -ND               User Key  (r) = Recorded By, (t) = Taken By, (c) = Cosigned By      Initials Name Provider Type    Roxie Dietz, PT Physical Therapist                   Obj/Interventions       Row Name 03/07/24 1133          Range of Motion Comprehensive    General Range of Motion bilateral lower extremity ROM WFL  -ND       Row Name 03/07/24 1133          Strength Comprehensive (MMT)    General Manual Muscle Testing (MMT) Assessment lower extremity strength deficits identified  -ND     Comment, General Manual Muscle Testing (MMT) Assessment BLE MMT 4/5.  -ND       Row Name 03/07/24 1133          Motor Skills    Motor Skills coordination  -ND     Coordination heel to shin;minimal impairment  -ND       Row Name 03/07/24 1133          Balance    Balance Assessment sitting static balance;sitting dynamic balance;sit to stand dynamic balance;standing static balance;standing dynamic balance  -ND     Static  Sitting Balance standby assist  -ND     Dynamic Sitting Balance standby assist  -ND     Position, Sitting Balance unsupported;other (see comments)  toilet  -ND     Sit to Stand Dynamic Balance contact guard  -ND     Static Standing Balance contact guard;verbal cues;non-verbal cues (demo/gesture)  -ND     Dynamic Standing Balance contact guard;verbal cues;non-verbal cues (demo/gesture)  -ND     Position/Device Used, Standing Balance supported;other (see comments)  UE support  -ND     Balance Interventions sitting;standing;sit to stand;supported;static;dynamic  -ND     Comment, Balance Pt generally unstable with standing/ambulating tasks but no gross LOB noted.  -ND       Row Name 03/07/24 1133          Sensory Assessment (Somatosensory)    Sensory Assessment (Somatosensory) LE sensation intact  -ND               User Key  (r) = Recorded By, (t) = Taken By, (c) = Cosigned By      Initials Name Provider Type    ND Roxie Reddy, PT Physical Therapist                   Goals/Plan       Row Name 03/07/24 1137          Bed Mobility Goal 1 (PT)    Activity/Assistive Device (Bed Mobility Goal 1, PT) sit to supine/supine to sit  -ND     Topmost Level/Cues Needed (Bed Mobility Goal 1, PT) independent  -ND     Time Frame (Bed Mobility Goal 1, PT) long term goal (LTG);10 days  -ND       Row Name 03/07/24 1137          Transfer Goal 1 (PT)    Activity/Assistive Device (Transfer Goal 1, PT) sit-to-stand/stand-to-sit;bed-to-chair/chair-to-bed  -ND     Topmost Level/Cues Needed (Transfer Goal 1, PT) independent  -ND     Time Frame (Transfer Goal 1, PT) long term goal (LTG);10 days  -ND       Row Name 03/07/24 1137          Gait Training Goal 1 (PT)    Activity/Assistive Device (Gait Training Goal 1, PT) gait (walking locomotion);increase endurance/gait distance;decrease fall risk  -ND     Topmost Level (Gait Training Goal 1, PT) independent  -ND     Distance (Gait Training Goal 1, PT) 500  -ND     Time Frame  (Gait Training Goal 1, PT) long term goal (LTG);10 days  -ND       Row Name 03/07/24 1137          Therapy Assessment/Plan (PT)    Planned Therapy Interventions (PT) balance training;bed mobility training;gait training;home exercise program;patient/family education;transfer training;strengthening;neuromuscular re-education  -ND               User Key  (r) = Recorded By, (t) = Taken By, (c) = Cosigned By      Initials Name Provider Type    ND Roxie Reddy, PT Physical Therapist                   Clinical Impression       Row Name 03/07/24 1134          Pain    Pretreatment Pain Rating 0/10 - no pain  -ND     Posttreatment Pain Rating 0/10 - no pain  -ND     Pain Intervention(s) Repositioned;Ambulation/increased activity  -ND       Row Name 03/07/24 1134          Plan of Care Review    Plan of Care Reviewed With patient  -ND     Outcome Evaluation PT evaluation completed. Pt presenting with generalized weakness, impaired balance, impaired coordination, L sided visual deficit, and limited activity tolerance warranting IP PT services to facilitate increased functional strength and mobility. Recommend IRF but will monitor progress closely.  -ND       Row Name 03/07/24 1134          Therapy Assessment/Plan (PT)    Patient/Family Therapy Goals Statement (PT) Return to PLOF.  -ND     Rehab Potential (PT) good, to achieve stated therapy goals  -ND     Criteria for Skilled Interventions Met (PT) yes;meets criteria;skilled treatment is necessary  -ND     Therapy Frequency (PT) daily  -ND       Row Name 03/07/24 1134          Vital Signs    Pre Systolic BP Rehab --  with OT.  -ND     Post Systolic BP Rehab 115  -ND     Post Treatment Diastolic BP 68  -ND     Pretreatment Heart Rate (beats/min) 63  -ND     Posttreatment Heart Rate (beats/min) 58  -ND     Pre SpO2 (%) 96  -ND     O2 Delivery Pre Treatment room air  -ND     O2 Delivery Intra Treatment room air  -ND     Post SpO2 (%) 97  -ND     O2 Delivery Post Treatment  room air  -ND     Pre Patient Position Sitting  -ND     Intra Patient Position Standing  -ND     Post Patient Position Sitting  -ND       Row Name 03/07/24 1134          Positioning and Restraints    Pre-Treatment Position bathroom  -ND     Post Treatment Position chair  -ND     In Chair notified nsg;reclined;legs elevated;call light within reach;exit alarm on;encouraged to call for assist;waffle cushion  -ND               User Key  (r) = Recorded By, (t) = Taken By, (c) = Cosigned By      Initials Name Provider Type    Roxie Dietz PT Physical Therapist                   Outcome Measures       Row Name 03/07/24 1137          How much help from another person do you currently need...    Turning from your back to your side while in flat bed without using bedrails? 3  -ND     Moving from lying on back to sitting on the side of a flat bed without bedrails? 3  -ND     Moving to and from a bed to a chair (including a wheelchair)? 3  -ND     Standing up from a chair using your arms (e.g., wheelchair, bedside chair)? 3  -ND     Climbing 3-5 steps with a railing? 3  -ND     To walk in hospital room? 3  -ND     AM-PAC 6 Clicks Score (PT) 18  -ND     Highest Level of Mobility Goal 6 --> Walk 10 steps or more  -ND       Row Name 03/07/24 1137          Functional Assessment    Outcome Measure Options AM-PAC 6 Clicks Basic Mobility (PT)  -ND               User Key  (r) = Recorded By, (t) = Taken By, (c) = Cosigned By      Initials Name Provider Type    Roxie Dietz PT Physical Therapist                                 Physical Therapy Education       Title: PT OT SLP Therapies (In Progress)       Topic: Physical Therapy (In Progress)       Point: Mobility training (In Progress)       Learning Progress Summary             Patient Acceptance, E, NR by ND at 3/7/2024 1138                         Point: Home exercise program (Not Started)       Learner Progress:  Not documented in this visit.              Point:  Body mechanics (In Progress)       Learning Progress Summary             Patient Acceptance, E, NR by ND at 3/7/2024 1138                         Point: Precautions (In Progress)       Learning Progress Summary             Patient Acceptance, E, NR by ND at 3/7/2024 1138                                         User Key       Initials Effective Dates Name Provider Type Discipline    ND 11/16/23 -  Roxie Reddy, PT Physical Therapist PT                  PT Recommendation and Plan  Planned Therapy Interventions (PT): balance training, bed mobility training, gait training, home exercise program, patient/family education, transfer training, strengthening, neuromuscular re-education  Plan of Care Reviewed With: patient  Outcome Evaluation: PT evaluation completed. Pt presenting with generalized weakness, impaired balance, impaired coordination, L sided visual deficit, and limited activity tolerance warranting IP PT services to facilitate increased functional strength and mobility. Recommend IRF but will monitor progress closely.     Time Calculation:   PT Evaluation Complexity  History, PT Evaluation Complexity: 3 or more personal factors and/or comorbidities  Examination of Body Systems (PT Eval Complexity): total of 4 or more elements  Clinical Presentation (PT Evaluation Complexity): evolving  Clinical Decision Making (PT Evaluation Complexity): moderate complexity  Overall Complexity (PT Evaluation Complexity): moderate complexity     PT Charges       Row Name 03/07/24 1138             Time Calculation    Start Time 1046  -ND      PT Received On 03/07/24  -ND      PT Goal Re-Cert Due Date 03/17/24  -ND         Untimed Charges    PT Eval/Re-eval Minutes 46  -ND         Total Minutes    Untimed Charges Total Minutes 46  -ND       Total Minutes 46  -ND                User Key  (r) = Recorded By, (t) = Taken By, (c) = Cosigned By      Initials Name Provider Type    Roxie Dietz, PT Physical Therapist                   Therapy Charges for Today       Code Description Service Date Service Provider Modifiers Qty    90235974778 HC PT EVAL MOD COMPLEXITY 4 3/7/2024 Roxie Reddy, PT GP 1            PT G-Codes  Outcome Measure Options: AM-PAC 6 Clicks Basic Mobility (PT)  AM-PAC 6 Clicks Score (PT): 18  PT Discharge Summary  Anticipated Discharge Disposition (PT): inpatient rehabilitation facility    Roxie Reddy, CORNELIO  3/7/2024

## 2024-03-07 NOTE — PROGRESS NOTES
"NEUROSURGERY PROGRESS NOTE     LOS: 1 day   Patient Care Team:  Michaela Connell MD as PCP - General  Michaela Connell MD as PCP - Family Medicine  Moo Hopkins MD as Consulting Physician (Ophthalmology)  Jamal Ramos MD as Consulting Physician (Nephrology)  Alli Aguirre MD as Consulting Physician (Cardiology)  Costa Raygoza MD as Consulting Physician (Radiation Oncology)  Andrea Bocanegra MD as Consulting Physician (General Surgery)  Alli Aguirre MD as Consulting Physician (Cardiology)  Michaela Connell MD as Primary Care Provider (Internal Medicine)  Robi Perales MD as Consulting Physician (Neurosurgery)    Chief Complaint: Headache and confusion.    POD#: 1 Day Post-Op  Procedures:  Left frontal craniotomy for resection of recurrent meningioma.    Interval History:   Patient had some increased confusion during the night.  Patient Complaints: None.  Patient Denies: Mild headache.    Vital Signs: Blood pressure 116/83, pulse 53, temperature 97.6 °F (36.4 °C), temperature source Oral, resp. rate 20, height 160 cm (63\"), weight 79.4 kg (175 lb), SpO2 94%, not currently breastfeeding.  Intake/Output:   Intake/Output Summary (Last 24 hours) at 3/7/2024 0552  Last data filed at 3/7/2024 0400  Gross per 24 hour   Intake 2266 ml   Output 3600 ml   Net -1334 ml       Physical Exam:  The patient awakens easily.  She is generally oriented.  She follows simple commands.  There is some mild right pronator drift.  Dry dressing is in place on her incision.     Data Review:    Results from last 7 days   Lab Units 03/07/24  0420   SODIUM mmol/L 135*   POTASSIUM mmol/L 3.9   CHLORIDE mmol/L 103   CO2 mmol/L 23.0   BUN mg/dL 25*   CREATININE mg/dL 0.68   GLUCOSE mg/dL 127*   CALCIUM mg/dL 8.2*     Results from last 7 days   Lab Units 03/07/24  0420   WBC 10*3/mm3 14.29*   HEMOGLOBIN g/dL 12.3   HEMATOCRIT % 36.6   PLATELETS 10*3/mm3 206     CT of the head from this morning demonstrates " expected postoperative changes.  There continues to be significant left frontal edema and some effacement of the frontal horn of the left lateral ventricle.  Agree, that study is improved from the preoperative studies.    Assessment/Plan:  1.  Recurrent left frontal meningioma status post redo craniotomy for resection.  2.  History of hypertension.  3.  Disposition: Mobilize patient.  I anticipate that she will be discharged home in a couple of days.  Continue Decadron.      Robi Perales MD  03/07/24  05:52 EST     Modified Advancement Flap Text: The defect edges were debeveled with a #15 scalpel blade.  Given the location of the defect, shape of the defect and the proximity to free margins a modified advancement flap was deemed most appropriate.  Using a sterile surgical marker, an appropriate advancement flap was drawn incorporating the defect and placing the expected incisions within the relaxed skin tension lines where possible.    The area thus outlined was incised deep to adipose tissue with a #15 scalpel blade.  The skin margins were undermined to an appropriate distance in all directions utilizing iris scissors.

## 2024-03-07 NOTE — THERAPY EVALUATION
Patient Name: Tereza Ackerman  : 1955    MRN: [id number]                              Today's Date: 3/7/2024       Admit Date: 3/6/2024    Visit Dx:     ICD-10-CM ICD-9-CM   1. Meningioma, recurrent of brain  D32.0 225.2     Patient Active Problem List   Diagnosis    Impaired glucose tolerance    Parathyroid adenoma    Reaction to chronic stress    Multinodular goiter    Vitamin D deficiency    Meningioma, recurrent of brain    Arthritis    Depression    Small bowel obstruction    Insomnia    History of Nissen fundoplication    Malignant neoplasm of left orbit    Prediabetes    Mixed hyperlipidemia    Hyperglycemia    Atrial flutter with rapid ventricular response    Anemia    Large bowel obstruction    Abdominal pain    Essential hypertension    History of creation of ostomy    Screen for colon cancer    Sigmoid volvulus    SHAE (obstructive sleep apnea)    Brain mass    Meningioma     Past Medical History:   Diagnosis Date    Abdominal hernia     Arthritis     rt knee     Atrial flutter with rapid ventricular response 2017    Back pain     Brain tumor     Colostomy present 2018    Depression     History of blood transfusion     History of gastroesophageal reflux (GERD)     resolved since Nissen    History of Nissen fundoplication 2017    History of small bowel obstruction     Hyperlipemia     Hypertension     Memory loss or impairment     Migraine     Parathyroid adenoma     Incidental on thyroid US.    PONV (postoperative nausea and vomiting)     nausea - preprocedural meds help     Prediabetes     Last Impression: 2015  Reviewed labs. Excellent control.  Emery Connell Impression: 2015  Reviewed labs. Excellent control.  Michaela Connell    Thyroid nodule      Last Impression: 2015  r/o thyroid cancer, will proceed with US.  Micheala Connell (Internal Medicine)     UTI (urinary tract infection)     Vision changes     blockages left eye     Wears glasses      readers     Past Surgical History:   Procedure Laterality Date    BRAIN SURGERY Left 05/19/2022    BRAIN SURGERY  08/2023    left side done & radiationg a wk. later,  in [place]    CARDIAC CATHETERIZATION N/A 04/27/2020    Procedure: LEFT HEART CATH;  Surgeon: Marina Ring MD;  Location:  [facility];  Service: Cardiology;  Laterality: N/A;    CARPAL TUNNEL RELEASE  2002    right     COLONOSCOPY N/A 08/18/2018    Procedure: COLONOSCOPY;  Surgeon: Inderjit Campos MD;  Location:  [facility];  Service: Gastroenterology    COLONOSCOPY N/A 11/28/2018    Procedure: COLONOSCOPY;  Surgeon: Inderjit Campos MD;  Location:  [facility];  Service: Gastroenterology    COLOSTOMY CLOSURE N/A 02/19/2019    Procedure: COLOSTOMY TAKEDOWN, INCISIONAL HERNIA REPAIR;  Surgeon: Andrea Bocanegra MD;  Location:  [facility];  Service: General    CRANIOTOMY  2003 & 2014    Dr. Werner Hollis for tumor removal    CRANIOTOMY FOR TUMOR Left 3/6/2024    Procedure: CRANIOTOMY FOR TUMOR STEREOTACTIC WITH STEALTH;  Surgeon: Robi Perales MD;  Location:  [facility];  Service: Neurosurgery;  Laterality: Left;    ENDOSCOPY      EXPLORATORY LAPAROTOMY N/A 12/05/2017    Procedure: EXPLORATORY LAPAROTOMY, SMALL BOWEL RESECTION;  Surgeon: Ирина Cobian MD;  Location:  [facility];  Service:     EXPLORATORY LAPAROTOMY N/A 12/22/2017    Procedure: LAPAROTOMY EXPLORATORY FOR SMALL BOWEL OBSTRUCTION;  Surgeon: Ирина Cobian MD;  Location:  [facility];  Service:     EXPLORATORY LAPAROTOMY N/A 07/23/2018    Procedure: EXPLORATORY LAPAROTOMY, APPENDECTOMY, CECOPEXY, INCISIONAL HERNIA REPAIR, LYSIS OF ADHESIONS;  Surgeon: Andrea Bocanegra MD;  Location:  [facility];  Service: General    EXPLORATORY LAPAROTOMY N/A 08/19/2018    Procedure: LAPAROTOMY EXPLORATORY, SIGMOID COLECTOMY, CREATION OF OSTOMY;  Surgeon: Andrea Bocanegra MD;  Location:  [facility];  Service: General    HYSTERECTOMY      partial - both ovaries  still present pt believes     INSERTION HEMODIALYSIS CATHETER N/A 12/07/2017    Procedure: HEMODIALYSIS CATHETER INSERTION;  Surgeon: Chance Valenzuela MD;  Location:  [facility];  Service:     ORBITOTOMY Left 11/03/2017    Procedure:  LEFT LATERAL ORBITOTOMY WITH DEBULKING OF TUMOR ;  Surgeon: Moo Hopkins MD;  Location:  [facility];  Service:     OTHER SURGICAL HISTORY      esophagogastric fundoplasty nissen fundoplication    TUBAL ABDOMINAL LIGATION        General Information       Row Name 03/07/24 1329          OT Time and Intention    Document Type evaluation  -SR     Mode of Treatment occupational therapy  -SR       Row Name 03/07/24 1329          General Information    Patient Profile Reviewed yes  -SR     Prior Level of Function independent:;all household mobility;bed mobility;ADL's  -SR     Existing Precautions/Restrictions fall  -SR     Barriers to Rehab medically complex;visual deficit;cognitive status  Blind in L eye  -SR       Row Name 03/07/24 1329          Living Environment    People in Home spouse  -SR       Row Name 03/07/24 1329          Home Main Entrance    Number of Stairs, Main Entrance none  -SR       Row Name 03/07/24 1329          Stairs Within Home, Primary    Number of Stairs, Within Home, Primary none  -SR       Row Name 03/07/24 1329          Cognition    Orientation Status (Cognition) oriented to;person  -SR       Row Name 03/07/24 1329          Safety Issues, Functional Mobility    Safety Issues Affecting Function (Mobility) ability to follow commands;awareness of need for assistance;insight into deficits/self-awareness;problem-solving;safety precaution awareness;safety precautions follow-through/compliance;sequencing abilities  -SR     Impairments Affecting Function (Mobility) balance;cognition;coordination;endurance/activity tolerance;motor planning  -SR     Cognitive Impairments, Mobility Safety/Performance awareness, need for assistance;insight into  deficits/self-awareness;problem-solving/reasoning;safety precaution awareness;safety precaution follow-through;sequencing abilities  -SR               User Key  (r) = Recorded By, (t) = Taken By, (c) = Cosigned By      Initials Name Provider Type    SR Brandy Craig OT Occupational Therapist                     Mobility/ADL's       Row Name 03/07/24 1330          Bed Mobility    Bed Mobility supine-sit  -SR     Supine-Sit [place] (Bed Mobility) contact guard;verbal cues  -SR     Assistive Device (Bed Mobility) bed rails;head of bed elevated  -SR       Row Name 03/07/24 1330          Transfers    Transfers sit-stand transfer;stand-sit transfer;toilet transfer  -SR       Row Name 03/07/24 1330          Sit-Stand Transfer    Sit-Stand [place] (Transfers) contact guard;verbal cues  -SR       Row Name 03/07/24 1330          Stand-Sit Transfer    Stand-Sit [place] (Transfers) contact guard;verbal cues  -SR       Row Name 03/07/24 1330          Toilet Transfer    Type (Toilet Transfer) sit-stand;stand-sit  -SR     [place] Level (Toilet Transfer) contact guard;verbal cues;nonverbal cues (demo/gesture)  -SR     Assistive Device (Toilet Transfer) --  UE support  -SR       Row Name 03/07/24 1330          Functional Mobility    Functional Mobility- Ind. Level contact guard assist;verbal cues required;nonverbal cues required (demo/gesture)  -SR     Functional Mobility- Device --  UE support  -SR     Functional Mobility-Distance (Feet) --  < household distances  -SR     Functional Mobility- Safety Issues sequencing ability decreased  -SR       Row Name 03/07/24 1330          Activities of Daily Living    BADL Assessment/Intervention lower body dressing;toileting  -SR       Row Name 03/07/24 1330          Lower Body Dressing Assessment/Training    [place] Level (Lower Body Dressing) don;doff;contact guard assist  -SR     Position (Lower Body Dressing) edge of bed sitting  -SR       Row Name 03/07/24  1330          Toileting Assessment/Training    [place] Level (Toileting) adjust/manage clothing;perform perineal hygiene;contact guard assist;verbal cues  -SR     Assistive Devices (Toileting) commode  -SR     Position (Toileting) other (see comments)  on commode  -SR               User Key  (r) = Recorded By, (t) = Taken By, (c) = Cosigned By      Initials Name Provider Type    SR Brandy Craig OT Occupational Therapist                   Obj/Interventions       Row Name 03/07/24 1332          Sensory Assessment (Somatosensory)    Sensory Assessment (Somatosensory) UE sensation intact  -SR       Row Name 03/07/24 1332          Vision Assessment/Intervention    Visual Impairment/Limitations other (see comments)  Blind in L eye  -SR     Visual Processing Deficit visual attention, left  -SR       Row Name 03/07/24 1332          Range of Motion Comprehensive    General Range of Motion bilateral upper extremity ROM WFL  -SR       Row Name 03/07/24 1332          Strength Comprehensive (MMT)    General Manual Muscle Testing (MMT) Assessment upper extremity strength deficits identified  -SR     Comment, General Manual Muscle Testing (MMT) Assessment BUE grossly 3+/5  -SR       Row Name 03/07/24 1332          Balance    Balance Assessment sitting static balance;sitting dynamic balance;sit to stand dynamic balance;standing static balance;standing dynamic balance  -SR     Static Sitting Balance standby assist  -SR     Dynamic Sitting Balance standby assist  -SR     Position, Sitting Balance sitting edge of bed  -SR     Sit to Stand Dynamic Balance contact guard  -SR     Static Standing Balance verbal cues;non-verbal cues (demo/gesture);contact guard  -SR     Dynamic Standing Balance contact guard;non-verbal cues (demo/gesture);verbal cues  -SR     Balance Interventions sitting;standing;sit to stand;static;dynamic;minimal challenge;occupation based/functional task  -SR               User Key  (r) = Recorded By, (t) =  Taken By, (c) = Cosigned By      Initials Name Provider Type    SR Brandy Craig, OT Occupational Therapist                   Goals/Plan       Row Name 03/07/24 1340          Bed Mobility Goal 1 (OT)    Activity/Assistive Device (Bed Mobility Goal 1, OT) supine to sit  -SR     [place] Level/Cues Needed (Bed Mobility Goal 1, OT) standby assist  -SR       Row Name 03/07/24 1340          Dressing Goal 1 (OT)    Activity/Device (Dressing Goal 1, OT) lower body dressing  -SR     [place]/Cues Needed (Dressing Goal 1, OT) standby assist  -SR     Time Frame (Dressing Goal 1, OT) long term goal (LTG);10 days  -SR       Row Name 03/07/24 1340          Toileting Goal 1 (OT)    Activity/Device (Toileting Goal 1, OT) perform perineal hygiene;adjust/manage clothing;commode  -SR     [place] Level/Cues Needed (Toileting Goal 1, OT) standby assist  -SR     Time Frame (Toileting Goal 1, OT) long term goal (LTG);10 days  -SR       Row Name 03/07/24 1340          Therapy Assessment/Plan (OT)    Planned Therapy Interventions (OT) activity tolerance training;adaptive equipment training;BADL retraining;cognitive/visual perception retraining;functional balance retraining;occupation/activity based interventions;patient/caregiver education/training;strengthening exercise  -SR               User Key  (r) = Recorded By, (t) = Taken By, (c) = Cosigned By      Initials Name Provider Type    SR Brandy Craig OT Occupational Therapist                   Clinical Impression       Row Name 03/07/24 1335          Pain Assessment    Pretreatment Pain Rating 0/10 - no pain  -SR     Posttreatment Pain Rating 0/10 - no pain  -SR     Pain Intervention(s) Ambulation/increased activity  -SR       Row Name 03/07/24 1335          Plan of Care Review    Plan of Care Reviewed With patient  -SR     Progress no change  -SR     Outcome Evaluation OT evaluation complete this date. Pt presents with impaired balance, UE coordination, L visual  deficit, impaired activity tolerance, and ADL performance warranting IP OT services. Recommend IRF but will monitor progress closely.  -SR       Row Name 03/07/24 1335          Therapy Assessment/Plan (OT)    Patient/Family Therapy Goal Statement (OT) Return to PLOF.  -SR     Rehab Potential (OT) good, to achieve stated therapy goals  -SR     Criteria for Skilled Therapeutic Interventions Met (OT) yes;skilled treatment is necessary  -SR     Therapy Frequency (OT) daily  -SR       Row Name 03/07/24 1335          Therapy Plan Review/Discharge Plan (OT)    Anticipated Discharge Disposition (OT) inpatient rehabilitation facility  -SR       Row Name 03/07/24 1335          Vital Signs    Pre Systolic BP Rehab 110  -SR     Pre Treatment Diastolic BP 70  -SR     Post Systolic BP Rehab --  with PT  -SR     Pretreatment Heart Rate (beats/min) 57  -SR     Posttreatment Heart Rate (beats/min) --  with PT  -SR     Pre SpO2 (%) 98  -SR     O2 Delivery Pre Treatment room air  -SR     O2 Delivery Intra Treatment room air  -SR     Pre Patient Position Supine  -SR     Intra Patient Position Standing  -SR     Post Patient Position Sitting  left with PT  -SR       Row Name 03/07/24 4015          Positioning and Restraints    Pre-Treatment Position in bed  -SR     Post Treatment Position bathroom  -SR     Bathroom with PT;with nsg  -SR               User Key  (r) = Recorded By, (t) = Taken By, (c) = Cosigned By      Initials Name Provider Type    SR Brandy Craig, OT Occupational Therapist                   Outcome Measures       Row Name 03/07/24 1341          How much help from another is currently needed...    Putting on and taking off regular lower body clothing? 3  -SR     Bathing (including washing, rinsing, and drying) 3  -SR     Toileting (which includes using toilet bed pan or urinal) 3  -SR     Putting on and taking off regular upper body clothing 3  -SR     Taking care of personal grooming (such as brushing teeth) 3  -SR      Eating meals 3  -SR     AM-PAC 6 Clicks Score (OT) 18  -SR       Row Name 03/07/24 1137          How much help from another person do you currently need...    Turning from your back to your side while in flat bed without using bedrails? 3  -ND     Moving from lying on back to sitting on the side of a flat bed without bedrails? 3  -ND     Moving to and from a bed to a chair (including a wheelchair)? 3  -ND     Standing up from a chair using your arms (e.g., wheelchair, bedside chair)? 3  -ND     Climbing 3-5 steps with a railing? 3  -ND     To walk in hospital room? 3  -ND     AM-PAC 6 Clicks Score (PT) 18  -ND     Highest Level of Mobility Goal 6 --> Walk 10 steps or more  -ND       Row Name 03/07/24 1341 03/07/24 1137       Functional Assessment    Outcome Measure Options AM-PAC 6 Clicks Daily Activity (OT)  -SR AM-PAC 6 Clicks Basic Mobility (PT)  -ND              User Key  (r) = Recorded By, (t) = Taken By, (c) = Cosigned By      Initials Name Provider Type    ND Roxie Reddy, PT Physical Therapist    SR Brandy Craig, OT Occupational Therapist                    Occupational Therapy Education       Title: PT OT SLP Therapies (In Progress)       Topic: Occupational Therapy (In Progress)       Point: ADL training (Done)       Description:   Instruct learner(s) on proper safety adaptation and remediation techniques during self care or transfers.   Instruct in proper use of assistive devices.                  Learning Progress Summary             Patient Acceptance, E, VU by SR at 3/7/2024 1343                         Point: Home exercise program (Not Started)       Description:   Instruct learner(s) on appropriate technique for monitoring, assisting and/or progressing therapeutic exercises/activities.                  Learner Progress:  Not documented in this visit.              Point: Precautions (Done)       Description:   Instruct learner(s) on prescribed precautions during self-care and functional  transfers.                  Learning Progress Summary             Patient Acceptance, E, VU by SR at 3/7/2024 1343                         Point: Body mechanics (Done)       Description:   Instruct learner(s) on proper positioning and spine alignment during self-care, functional mobility activities and/or exercises.                  Learning Progress Summary             Patient Acceptance, E, VU by SR at 3/7/2024 1343                                         User Key       Initials Effective Dates Name Provider Type Discipline     02/22/24 -  Brandy Craig OT Occupational Therapist OT                  OT Recommendation and Plan  Planned Therapy Interventions (OT): activity tolerance training, adaptive equipment training, BADL retraining, cognitive/visual perception retraining, functional balance retraining, occupation/activity based interventions, patient/caregiver education/training, strengthening exercise  Therapy Frequency (OT): daily  Plan of Care Review  Plan of Care Reviewed With: patient  Progress: no change  Outcome Evaluation: OT evaluation complete this date. Pt presents with impaired balance, UE coordination, L visual deficit, impaired activity tolerance, and ADL performance warranting IP OT services. Recommend IRF but will monitor progress closely.     Time Calculation:   Evaluation Complexity (OT)  Review Occupational Profile/Medical/Therapy History Complexity: expanded/moderate complexity  Assessment, Occupational Performance/Identification of Deficit Complexity: 3-5 performance deficits  Clinical Decision Making Complexity (OT): detailed assessment/moderate complexity  Overall Complexity of Evaluation (OT): moderate complexity     Time Calculation- OT       Row Name 03/07/24 1344             Time Calculation- OT    OT Start Time 1043  -SR      OT Received On 03/07/24  -SR      OT Goal Re-Cert Due Date 03/17/24  -SR         Untimed Charges    OT Eval/Re-eval Minutes 46  -SR         Total Minutes     Untimed Charges Total Minutes 46  -SR       Total Minutes 46  -SR                User Key  (r) = Recorded By, (t) = Taken By, (c) = Cosigned By      Initials Name Provider Type    SR Brandy Craig OT Occupational Therapist                  Therapy Charges for Today       Code Description Service Date Service Provider Modifiers Qty    [id number] HC OT EVAL MOD COMPLEXITY 4 3/7/2024 Brandy Craig OT GO 1                 Brandy Craig OT  3/7/2024

## 2024-03-08 PROCEDURE — 25010000002 DEXAMETHASONE PER 1 MG: Performed by: NEUROLOGICAL SURGERY

## 2024-03-08 PROCEDURE — 25810000003 LACTATED RINGERS SOLUTION: Performed by: NEUROLOGICAL SURGERY

## 2024-03-08 PROCEDURE — 97530 THERAPEUTIC ACTIVITIES: CPT

## 2024-03-08 PROCEDURE — 25810000003 SODIUM CHLORIDE 0.9 % SOLUTION: Performed by: NEUROLOGICAL SURGERY

## 2024-03-08 PROCEDURE — 99024 POSTOP FOLLOW-UP VISIT: CPT | Performed by: NEUROLOGICAL SURGERY

## 2024-03-08 PROCEDURE — 97535 SELF CARE MNGMENT TRAINING: CPT

## 2024-03-08 RX ORDER — SODIUM CHLORIDE 9 MG/ML
90 INJECTION, SOLUTION INTRAVENOUS CONTINUOUS
Status: ACTIVE | OUTPATIENT
Start: 2024-03-08 | End: 2024-03-09

## 2024-03-08 RX ADMIN — PANTOPRAZOLE SODIUM 40 MG: 40 TABLET, DELAYED RELEASE ORAL at 05:51

## 2024-03-08 RX ADMIN — LEVETIRACETAM 500 MG: 500 TABLET, FILM COATED ORAL at 20:28

## 2024-03-08 RX ADMIN — SODIUM CHLORIDE 90 ML/HR: 9 INJECTION, SOLUTION INTRAVENOUS at 11:25

## 2024-03-08 RX ADMIN — BUSPIRONE HYDROCHLORIDE 10 MG: 10 TABLET ORAL at 08:10

## 2024-03-08 RX ADMIN — DORZOLAMIDE 1 DROP: 20 SOLUTION/ DROPS OPHTHALMIC at 08:09

## 2024-03-08 RX ADMIN — DORZOLAMIDE 1 DROP: 20 SOLUTION/ DROPS OPHTHALMIC at 20:30

## 2024-03-08 RX ADMIN — BUSPIRONE HYDROCHLORIDE 10 MG: 10 TABLET ORAL at 20:28

## 2024-03-08 RX ADMIN — TIMOLOL MALEATE 1 DROP: 5 SOLUTION/ DROPS OPHTHALMIC at 08:09

## 2024-03-08 RX ADMIN — DEXAMETHASONE SODIUM PHOSPHATE 4 MG: 4 INJECTION INTRA-ARTICULAR; INTRALESIONAL; INTRAMUSCULAR; INTRAVENOUS; SOFT TISSUE at 12:35

## 2024-03-08 RX ADMIN — DEXAMETHASONE SODIUM PHOSPHATE 4 MG: 4 INJECTION INTRA-ARTICULAR; INTRALESIONAL; INTRAMUSCULAR; INTRAVENOUS; SOFT TISSUE at 00:42

## 2024-03-08 RX ADMIN — CARVEDILOL 12.5 MG: 12.5 TABLET, FILM COATED ORAL at 16:41

## 2024-03-08 RX ADMIN — LISINOPRIL 2.5 MG: 2.5 TABLET ORAL at 08:09

## 2024-03-08 RX ADMIN — SODIUM CHLORIDE, POTASSIUM CHLORIDE, SODIUM LACTATE AND CALCIUM CHLORIDE 500 ML: 600; 310; 30; 20 INJECTION, SOLUTION INTRAVENOUS at 08:10

## 2024-03-08 RX ADMIN — SODIUM CHLORIDE 90 ML/HR: 9 INJECTION, SOLUTION INTRAVENOUS at 22:02

## 2024-03-08 RX ADMIN — LEVETIRACETAM 500 MG: 500 TABLET, FILM COATED ORAL at 08:10

## 2024-03-08 RX ADMIN — DEXAMETHASONE SODIUM PHOSPHATE 4 MG: 4 INJECTION INTRA-ARTICULAR; INTRALESIONAL; INTRAMUSCULAR; INTRAVENOUS; SOFT TISSUE at 08:09

## 2024-03-08 RX ADMIN — ROSUVASTATIN CALCIUM 40 MG: 20 TABLET, FILM COATED ORAL at 08:09

## 2024-03-08 RX ADMIN — DEXAMETHASONE SODIUM PHOSPHATE 4 MG: 4 INJECTION INTRA-ARTICULAR; INTRALESIONAL; INTRAMUSCULAR; INTRAVENOUS; SOFT TISSUE at 18:23

## 2024-03-08 RX ADMIN — TIMOLOL MALEATE 1 DROP: 5 SOLUTION/ DROPS OPHTHALMIC at 20:30

## 2024-03-08 RX ADMIN — HYDROCODONE BITARTRATE AND ACETAMINOPHEN 1 TABLET: 5; 325 TABLET ORAL at 16:41

## 2024-03-08 RX ADMIN — SODIUM CHLORIDE 90 ML/HR: 9 INJECTION, SOLUTION INTRAVENOUS at 08:15

## 2024-03-08 RX ADMIN — SERTRALINE HYDROCHLORIDE 100 MG: 100 TABLET ORAL at 08:10

## 2024-03-08 RX ADMIN — ASPIRIN 81 MG: 81 TABLET, COATED ORAL at 08:09

## 2024-03-08 RX ADMIN — QUETIAPINE FUMARATE 12.5 MG: 25 TABLET ORAL at 20:28

## 2024-03-08 RX ADMIN — CARVEDILOL 12.5 MG: 12.5 TABLET, FILM COATED ORAL at 03:49

## 2024-03-08 NOTE — H&P
Edwin Phipps PA   Physician Assistant  Surgery     H&P     Attested     Date of Service: 03/01/24 1502  Creation Time: 03/06/24 1230   Related encounter: ED to Hosp-Admission (Discharged) from 2/27/2024 in [address] with Jessica Koch DO and Inderjit Santizo MD     Attested           Attestation signed by Robi Perales MD at 03/06/24 1536     .            Expand All Collapse All    Show:Clear all  [x]Written[x]Templated[]Copied    Added by:  [x]Edwin Phipps PA    []Gregoria for details     Patient Care Team:        Chief complaint: Brain tumor     Subjective:  Patient is a 69 y.o.female presents with a history of previous brain lesions.  Previous gamma knife treatment.  Presented recently to ED with H/A and pressure behind left eye.  She has known lesion left orbit with previous treatment.  She denies recent changes in her general health.     Review of Systems:  General ROS: negative  Cardiovascular ROS: no chest pain or dyspnea on exertion  Respiratory ROS: positive for - shortness of breath        Allergies:   Allergies        Allergies   Allergen Reactions    Dilantin [Phenytoin Sodium Extended] Rash               Latex: no  Contrast Dye: no     Home Meds    Prescriptions Prior to Admission           Medications Prior to Admission   Medication Sig Dispense Refill Last Dose    aspirin 81 MG tablet Take 1 tablet by mouth Daily. 30 tablet 11 2/27/2024    busPIRone (BUSPAR) 10 MG tablet Take 1 tablet by mouth 2 (Two) Times a Day. 180 tablet 3 2/27/2024    carvedilol (COREG) 12.5 MG tablet TAKE ONE TABLET BY MOUTH EVERY 12 HOURS (Patient taking differently: Take 0.5 tablets by mouth 2 (Two) Times a Day With Meals.) 180 tablet 3 Patient Taking Differently    cholecalciferol (VITAMIN D3) 1000 units tablet Take 2 tablets by mouth Every Other Day.     Past Week    dexAMETHasone (DECADRON) 4 MG tablet Take 1 tablet by mouth Every 8 (Eight) Hours. 90 tablet 0      levETIRAcetam (KEPPRA) 500 MG  tablet Take 1 tablet by mouth Every 12 (Twelve) Hours. 60 tablet 0      lisinopril (PRINIVIL,ZESTRIL) 2.5 MG tablet Take 1 tablet by mouth Daily. 90 tablet 3 2/27/2024    omeprazole (priLOSEC) 20 MG capsule Take 1 capsule by mouth Daily. 90 capsule 3 2/27/2024    QUEtiapine (SEROquel) 25 MG tablet Take 0.5 tablets by mouth every night at bedtime. 45 tablet 3 2/27/2024    rosuvastatin (CRESTOR) 40 MG tablet Take 1 tablet by mouth Daily. 90 tablet 3 2/27/2024    sertraline (ZOLOFT) 100 MG tablet Take 1 tablet by mouth Daily. 90 tablet 3 2/27/2024    thiamine (VITAMIN B-1) 100 MG tablet tablet Take 1 tablet by mouth Every Other Day. 30 each 5 2/27/2024    chlorhexidine (HIBICLENS) 4 % external liquid Shower each day with solution for 5 days beginning 5 days before surgery. 120 mL 0      dorzolamide (TRUSOPT) 2 % ophthalmic solution Administer 1 drop into the left eye 2 (Two) Times a Day.     2/27/2024    LORazepam (Ativan) 0.5 MG tablet Take 0.5-1 tablets by mouth Daily As Needed for Anxiety. 15 tablet 0 Past Month    mupirocin (BACTROBAN) 2 % nasal ointment Apply to the inside of each nostril with a cotton swab two times daily, morning and evening, for 5 days before surgery. 10 each 0      ondansetron ODT (ZOFRAN-ODT) 4 MG disintegrating tablet Place 1 tablet on the tongue Every 4 (Four) Hours. As needed for nausea 12 tablet 0 Past Month    timolol (TIMOPTIC) 0.5 % ophthalmic solution 1 drop 2 (Two) Times a Day.     2/27/2024         PMH:   Medical History        Past Medical History:   Diagnosis Date    Abdominal hernia      Arthritis       rt knee     Atrial flutter with rapid ventricular response 12/21/2017    Back pain      Brain tumor      Colostomy present 08/2018    Depression      History of blood transfusion      History of gastroesophageal reflux (GERD)       resolved since Nissen    History of Nissen fundoplication 7/1/2017    History of small bowel obstruction      Hyperlipemia      Hypertension       Memory loss or impairment      Migraine      Parathyroid adenoma       Incidental on thyroid US.    PONV (postoperative nausea and vomiting)       nausea - preprocedural meds help     Prediabetes       Last Impression: 06 Feb 2015  Reviewed labs. Excellent control.  Maren Connellast Impression: 06 Feb 2015  Reviewed labs. Excellent control.  Michaela Connell    Thyroid nodule        Last Impression: 13 Jun 2015  r/o thyroid cancer, will proceed with US.  Michaela Connell (Internal Medicine)     UTI (urinary tract infection)      Vision changes       blockages left eye     Wears glasses       readers         PSH:    Surgical History         Past Surgical History:   Procedure Laterality Date    BRAIN SURGERY Left 05/19/2022    BRAIN SURGERY   08/2023     left side done & radiationg a wk. later,  in [place]    CARDIAC CATHETERIZATION N/A 04/27/2020     Procedure: LEFT HEART CATH;  Surgeon: Marina Ring MD;  Location:  [facility];  Service: Cardiology;  Laterality: N/A;    CARPAL TUNNEL RELEASE   2002     right     COLONOSCOPY N/A 08/18/2018     Procedure: COLONOSCOPY;  Surgeon: Inderjit Campos MD;  Location:  [facility];  Service: Gastroenterology    COLONOSCOPY N/A 11/28/2018     Procedure: COLONOSCOPY;  Surgeon: Inderjit Campos MD;  Location:  [facility];  Service: Gastroenterology    COLOSTOMY CLOSURE N/A 02/19/2019     Procedure: COLOSTOMY TAKEDOWN, INCISIONAL HERNIA REPAIR;  Surgeon: Andrea Bocanegra MD;  Location:  [facility];  Service: General    CRANIOTOMY   2003 & 2014     Dr. Werner Hollis for tumor removal    ENDOSCOPY        EXPLORATORY LAPAROTOMY N/A 12/05/2017     Procedure: EXPLORATORY LAPAROTOMY, SMALL BOWEL RESECTION;  Surgeon: Ирина Cobian MD;  Location:  [facility];  Service:     EXPLORATORY LAPAROTOMY N/A 12/22/2017     Procedure: LAPAROTOMY EXPLORATORY FOR SMALL BOWEL OBSTRUCTION;  Surgeon: Ирина Cobian MD;  Location:  [facility];   "Service:     EXPLORATORY LAPAROTOMY N/A 07/23/2018     Procedure: EXPLORATORY LAPAROTOMY, APPENDECTOMY, CECOPEXY, INCISIONAL HERNIA REPAIR, LYSIS OF ADHESIONS;  Surgeon: Andrea Bocanegra MD;  Location:  [facility];  Service: General    EXPLORATORY LAPAROTOMY N/A 08/19/2018     Procedure: LAPAROTOMY EXPLORATORY, SIGMOID COLECTOMY, CREATION OF OSTOMY;  Surgeon: Andrea Bocanegra MD;  Location:  [facility];  Service: General    HYSTERECTOMY         partial - both ovaries still present pt believes     INSERTION HEMODIALYSIS CATHETER N/A 12/07/2017     Procedure: HEMODIALYSIS CATHETER INSERTION;  Surgeon: Chance Valenzuela MD;  Location:  [facility];  Service:     ORBITOTOMY Left 11/03/2017     Procedure:  LEFT LATERAL ORBITOTOMY WITH DEBULKING OF TUMOR ;  Surgeon: Moo Hopkins MD;  Location[facility];  Service:     OTHER SURGICAL HISTORY         esophagogastric fundoplasty nissen fundoplication    TUBAL ABDOMINAL LIGATION             Immunization History: pneumo: yes   Flu: yes  Tetanus: unknown  Social History:              Tobacco: no              Alcohol: no        Physical Exam:/75 (BP Location: Left arm, Patient Position: Lying)   Pulse 58   Temp 97.6 °F (36.4 °C) (Oral)   Resp 16   Ht 160 cm (63\")   Wt 79.4 kg (175 lb)   SpO2 95%   BMI 31.00 kg/m²         General Appearance:    Alert, cooperative, no distress, appears stated age   Head:    Normocephalic, without obvious abnormality, atraumatic   Lungs:     Clear to auscultation bilaterally, respirations unlabored    Heart: Regular rate and rhythm, S1 and S2 normal, no murmur, rub    or gallop    Abdomen:    Soft without tenderness   Breast Exam:    deferred   Genitalia:    deferred   Extremities:   Extremities normal, atraumatic, no cyanosis or edema   Skin:   Skin color, texture, turgor normal, no rashes or lesions   Neurologic:   Grossly intact      Results Review: Chem profile, CBC, EKG     Impression: Left frontal brain tumor     Plan: For " left craniotomy for tumor, stereotactic with stealth today  JEIMY Hoffmann 3/6/2024 12:30 EST

## 2024-03-08 NOTE — THERAPY TREATMENT NOTE
Patient Name: Tereza Ackerman  : 1955    MRN: [id number]                              Today's Date: 3/8/2024       Admit Date: 3/6/2024    Visit Dx:     ICD-10-CM ICD-9-CM   1. Meningioma, recurrent of brain  D32.0 225.2     Patient Active Problem List   Diagnosis    Impaired glucose tolerance    Parathyroid adenoma    Reaction to chronic stress    Multinodular goiter    Vitamin D deficiency    Meningioma, recurrent of brain    Arthritis    Depression    Small bowel obstruction    Insomnia    History of Nissen fundoplication    Malignant neoplasm of left orbit    Prediabetes    Mixed hyperlipidemia    Hyperglycemia    Atrial flutter with rapid ventricular response    Anemia    Large bowel obstruction    Abdominal pain    Essential hypertension    History of creation of ostomy    Screen for colon cancer    Sigmoid volvulus    SHAE (obstructive sleep apnea)    Brain mass    Meningioma     Past Medical History:   Diagnosis Date    Abdominal hernia     Arthritis     rt knee     Atrial flutter with rapid ventricular response 2017    Back pain     Brain tumor     Colostomy present 2018    Depression     History of blood transfusion     History of gastroesophageal reflux (GERD)     resolved since Nissen    History of Nissen fundoplication 2017    History of small bowel obstruction     Hyperlipemia     Hypertension     Memory loss or impairment     Migraine     Parathyroid adenoma     Incidental on thyroid US.    PONV (postoperative nausea and vomiting)     nausea - preprocedural meds help     Prediabetes     Last Impression: 2015  Reviewed labs. Excellent control.  Emery Connell Impression: 2015  Reviewed labs. Excellent control.  Michaela Connell    Thyroid nodule      Last Impression: 2015  r/o thyroid cancer, will proceed with US.  Michaela Connell (Internal Medicine)     UTI (urinary tract infection)     Vision changes     blockages left eye     Wears glasses      readers     Past Surgical History:   Procedure Laterality Date    BRAIN SURGERY Left 05/19/2022    BRAIN SURGERY  08/2023    left side done & radiationg a wk. later,  in [place]    CARDIAC CATHETERIZATION N/A 04/27/2020    Procedure: LEFT HEART CATH;  Surgeon: Marina Ring MD;  Location:  [facility];  Service: Cardiology;  Laterality: N/A;    CARPAL TUNNEL RELEASE  2002    right     COLONOSCOPY N/A 08/18/2018    Procedure: COLONOSCOPY;  Surgeon: Inderjit Campos MD;  Location:  [facility];  Service: Gastroenterology    COLONOSCOPY N/A 11/28/2018    Procedure: COLONOSCOPY;  Surgeon: Inderjit Campos MD;  Location:  [facility];  Service: Gastroenterology    COLOSTOMY CLOSURE N/A 02/19/2019    Procedure: COLOSTOMY TAKEDOWN, INCISIONAL HERNIA REPAIR;  Surgeon: Andrea Bocanegra MD;  Location:  [facility];  Service: General    CRANIOTOMY  2003 & 2014    Dr. Werner Hollis for tumor removal    CRANIOTOMY FOR TUMOR Left 3/6/2024    Procedure: CRANIOTOMY FOR TUMOR STEREOTACTIC WITH STEALTH;  Surgeon: Robi Perales MD;  Location:  [facility];  Service: Neurosurgery;  Laterality: Left;    ENDOSCOPY      EXPLORATORY LAPAROTOMY N/A 12/05/2017    Procedure: EXPLORATORY LAPAROTOMY, SMALL BOWEL RESECTION;  Surgeon: Ирина Cobian MD;  Location:  [facility];  Service:     EXPLORATORY LAPAROTOMY N/A 12/22/2017    Procedure: LAPAROTOMY EXPLORATORY FOR SMALL BOWEL OBSTRUCTION;  Surgeon: Ирина Cobian MD;  Location:  [facility];  Service:     EXPLORATORY LAPAROTOMY N/A 07/23/2018    Procedure: EXPLORATORY LAPAROTOMY, APPENDECTOMY, CECOPEXY, INCISIONAL HERNIA REPAIR, LYSIS OF ADHESIONS;  Surgeon: Andrea Bocanegra MD;  Location:  [facility];  Service: General    EXPLORATORY LAPAROTOMY N/A 08/19/2018    Procedure: LAPAROTOMY EXPLORATORY, SIGMOID COLECTOMY, CREATION OF OSTOMY;  Surgeon: Andrea Bocanegra MD;  Location:  [facility];  Service: General    HYSTERECTOMY      partial - both ovaries  still present pt believes     INSERTION HEMODIALYSIS CATHETER N/A 12/07/2017    Procedure: HEMODIALYSIS CATHETER INSERTION;  Surgeon: Chance Valenzuela MD;  Location:  [facility];  Service:     ORBITOTOMY Left 11/03/2017    Procedure:  LEFT LATERAL ORBITOTOMY WITH DEBULKING OF TUMOR ;  Surgeon: Moo Hopkins MD;  Location:  [facility];  Service:     OTHER SURGICAL HISTORY      esophagogastric fundoplasty nissen fundoplication    TUBAL ABDOMINAL LIGATION        General Information       Row Name 03/08/24 1407          OT Time and Intention    Document Type therapy note (daily note)  -KF     Mode of Treatment occupational therapy;individual therapy  -KF       Row Name 03/08/24 1402          General Information    Patient Profile Reviewed yes  -KF     Existing Precautions/Restrictions fall;other (see comments)  mildly impulsive, L eye blindness  -KF     Barriers to Rehab medically complex;visual deficit  -KF       Row Name 03/08/24 1403          Cognition    Orientation Status (Cognition) oriented to;person;place  -KF       Row Name 03/08/24 1408          Safety Issues, Functional Mobility    Safety Issues Affecting Function (Mobility) awareness of need for assistance;insight into deficits/self-awareness;positioning of assistive device;safety precaution awareness;safety precautions follow-through/compliance;sequencing abilities  -KF     Impairments Affecting Function (Mobility) balance;cognition;coordination;endurance/activity tolerance;motor planning;strength;postural/trunk control  -KF     Cognitive Impairments, Mobility Safety/Performance awareness, need for assistance;insight into deficits/self-awareness;judgment;problem-solving/reasoning;safety precaution awareness;safety precaution follow-through;sequencing abilities  -KF               User Key  (r) = Recorded By, (t) = Taken By, (c) = Cosigned By      Initials Name Provider Type    KF Chacha Saba, ARNAUD Occupational Therapist                      Mobility/ADL's       Row Name 03/08/24 1408          Bed Mobility    Bed Mobility supine-sit  -     Supine-Sit [place] (Bed Mobility) standby assist  -     Assistive Device (Bed Mobility) bed rails;head of bed elevated  -     Comment, (Bed Mobility) No physical assistance needed, verbal cues for hand placement.  -       Row Name 03/08/24 1408          Transfers    Transfers sit-stand transfer;stand-sit transfer;toilet transfer  -     Comment, (Transfers) Pt performed STS from the EOB x3 and from the commode using a RWx with CGA, verbal cues for hand placement  -       Row Name 03/08/24 1408          Sit-Stand Transfer    Sit-Stand [place] (Transfers) contact guard;verbal cues  -     Assistive Device (Sit-Stand Transfers) walker, front-wheeled  -     Comment, (Sit-Stand Transfer) STS x1, no AD with CGA. Pt mildly unsteady and reaching for IV pole, thus RWx provided for remainder of session.  -       Row Name 03/08/24 1408          Stand-Sit Transfer    Stand-Sit [place] (Transfers) contact guard;verbal cues  -     Assistive Device (Stand-Sit Transfers) walker, front-wheeled  -       Row Name 03/08/24 1408          Toilet Transfer    Type (Toilet Transfer) sit-stand;stand-sit  -     [place] Level (Toilet Transfer) contact guard;verbal cues  -     Assistive Device (Toilet Transfer) commode;grab bars/safety frame  -       Row Name 03/08/24 1408          Functional Mobility    Functional Mobility- Ind. Level contact guard assist;verbal cues required;nonverbal cues required (demo/gesture)  -     Functional Mobility- Device walker, front-wheeled  -     Functional Mobility- Comment Pt ambulated >household distance using a RWx with CGA. Pt veering to the left throughout ambulation requiring physical assistance of RWx.  -       Row Name 03/08/24 1408          Activities of Daily Living    BADL Assessment/Intervention lower body dressing;grooming;toileting  -       Gael  Name 03/08/24 1408          Lower Body Dressing Assessment/Training    [place] Level (Lower Body Dressing) doff;don;socks;minimum assist (75% patient effort)  -KF     Position (Lower Body Dressing) edge of bed sitting  -KF     Comment, (Lower Body Dressing) Pt requiring assistance to maintain figure four position and to maintain sitting balance secondary to strong anterior lean throughout activity.  -KF       Row Name 03/08/24 1408          Toileting Assessment/Training    [place] Level (Toileting) adjust/manage clothing;perform perineal hygiene;contact guard assist;verbal cues  -KF     Assistive Devices (Toileting) commode;grab bar/safety frame  -KF     Position (Toileting) unsupported sitting;supported standing  -KF       Row Name 03/08/24 1408          Grooming Assessment/Training    [place] Level (Grooming) wash face, hands;set up;contact guard assist  -KF     Position (Grooming) sink side;supported standing  -KF               User Key  (r) = Recorded By, (t) = Taken By, (c) = Cosigned By      Initials Name Provider Type    KF Chacha Saba OT Occupational Therapist                   Obj/Interventions       Row Name 03/08/24 1417          Balance    Balance Assessment sitting static balance;sitting dynamic balance;sit to stand dynamic balance;standing static balance;standing dynamic balance  -KF     Static Sitting Balance standby assist  -KF     Dynamic Sitting Balance contact guard  -KF     Position, Sitting Balance unsupported;sitting edge of bed;other (see comments)  commode  -KF     Sit to Stand Dynamic Balance contact guard  -KF     Static Standing Balance contact guard  -KF     Dynamic Standing Balance contact guard  -KF     Position/Device Used, Standing Balance supported;walker, front-wheeled  -KF     Balance Interventions sitting;standing;sit to stand;supported;static;dynamic;occupation based/functional task  -KF               User Key  (r) = Recorded By, (t) = Taken By, (c) = Cosigned  By      Initials Name Provider Type    KF Chacha Saba, ARNAUD Occupational Therapist                   Goals/Plan    No documentation.                  Clinical Impression       Row Name 03/08/24 1418          Pain Assessment    Pretreatment Pain Rating 0/10 - no pain  -KF     Posttreatment Pain Rating 0/10 - no pain  -KF     Pain Intervention(s) Repositioned;Ambulation/increased activity  -KF       Row Name 03/08/24 1418          Plan of Care Review    Plan of Care Reviewed With patient  -KF     Progress improving  -KF     Outcome Evaluation Pt with good participation and progress toward goals during OT session. Pt performed bed mobility with SBA and ambulated >household distance using a RWx with CGA. Pt required assistance for obstacle negotiation and managing RWx secondary to a L veer. Pt performed toileting and subsequent hand hygiene with set up and CGA. No dizziness, lightheadednes or headache noted during mobility. Pt remains below her functional baseline with balance deficits, generalized weakness, and decreased safety awareness warranting continued IP OT services. Continue to recommend a d/c to IRF for best outcome, however pt is not agreeable recommend a d/c home with 24/7 assist and HHOT.  -       Row Name 03/08/24 1418          Therapy Assessment/Plan (OT)    Rehab Potential (OT) good, to achieve stated therapy goals  -     Criteria for Skilled Therapeutic Interventions Met (OT) yes;skilled treatment is necessary  -KF     Therapy Frequency (OT) daily  -KF       Row Name 03/08/24 1418          Therapy Plan Review/Discharge Plan (OT)    Anticipated Discharge Disposition (OT) inpatient rehabilitation facility  -       Row Name 03/08/24 1418          Vital Signs    Pre Systolic BP Rehab 135  -KF     Pre Treatment Diastolic BP 82  -KF     Pretreatment Heart Rate (beats/min) 73  -KF     Pre SpO2 (%) 96  -KF     O2 Delivery Pre Treatment room air  -KF     Pre Patient Position Supine  -KF     Intra Patient  Position Standing  -KF     Post Patient Position Sitting  -KF       Row Name 03/08/24 1418          Positioning and Restraints    Pre-Treatment Position in bed  -KF     Post Treatment Position chair  -KF     In Chair notified nsg;reclined;call light within reach;encouraged to call for assist;exit alarm on;waffle cushion;legs elevated  -KF               User Key  (r) = Recorded By, (t) = Taken By, (c) = Cosigned By      Initials Name Provider Type    KF Chacha Saba OT Occupational Therapist                   Outcome Measures       Row Name 03/08/24 1423          How much help from another is currently needed...    Putting on and taking off regular lower body clothing? 3  -KF     Bathing (including washing, rinsing, and drying) 3  -KF     Toileting (which includes using toilet bed pan or urinal) 3  -KF     Putting on and taking off regular upper body clothing 3  -KF     Taking care of personal grooming (such as brushing teeth) 3  -KF     Eating meals 3  -KF     AM-PAC 6 Clicks Score (OT) 18  -KF       Row Name 03/08/24 1019 03/08/24 0800       How much help from another person do you currently need...    Turning from your back to your side while in flat bed without using bedrails? 4  -RD 4  -EG    Moving from lying on back to sitting on the side of a flat bed without bedrails? 3  -RD 4  -EG    Moving to and from a bed to a chair (including a wheelchair)? 3  -RD 3  -EG    Standing up from a chair using your arms (e.g., wheelchair, bedside chair)? 3  -RD 3  -EG    Climbing 3-5 steps with a railing? 3  -RD 3  -EG    To walk in hospital room? 3  -RD 3  -EG    AM-PAC 6 Clicks Score (PT) 19  -RD 20  -EG    Highest Level of Mobility Goal 6 --> Walk 10 steps or more  -RD 6 --> Walk 10 steps or more  -EG      Row Name 03/08/24 1423          Functional Assessment    Outcome Measure Options AM-PAC 6 Clicks Daily Activity (OT)  -KF               User Key  (r) = Recorded By, (t) = Taken By, (c) = Cosigned By      Initials  Name Provider Type    EG Alka Mariano, RN Registered Nurse    Valery Neumann, RN Registered Nurse    Chacha Alberts OT Occupational Therapist                    Occupational Therapy Education       Title: PT OT SLP Therapies (In Progress)       Topic: Occupational Therapy (In Progress)       Point: ADL training (Done)       Description:   Instruct learner(s) on proper safety adaptation and remediation techniques during self care or transfers.   Instruct in proper use of assistive devices.                  Learning Progress Summary             Patient Acceptance, E,TB, VU,DU by  at 3/8/2024 1342    Acceptance, E, VU by SR at 3/7/2024 1343                         Point: Home exercise program (Not Started)       Description:   Instruct learner(s) on appropriate technique for monitoring, assisting and/or progressing therapeutic exercises/activities.                  Learner Progress:  Not documented in this visit.              Point: Precautions (Done)       Description:   Instruct learner(s) on prescribed precautions during self-care and functional transfers.                  Learning Progress Summary             Patient Acceptance, E,TB, VU,DU by  at 3/8/2024 1342    Acceptance, E, VU by SR at 3/7/2024 1343                         Point: Body mechanics (Done)       Description:   Instruct learner(s) on proper positioning and spine alignment during self-care, functional mobility activities and/or exercises.                  Learning Progress Summary             Patient Acceptance, E,TB, VU,DU by  at 3/8/2024 1342    Acceptance, E, VU by SR at 3/7/2024 1343                                         User Key       Initials Effective Dates Name Provider Type Discipline     08/09/23 -  Chacha Saba OT Occupational Therapist OT     02/22/24 -  Brandy Craig OT Occupational Therapist OT                  OT Recommendation and Plan  Therapy Frequency (OT): daily  Plan of Care Review  Plan of Care  Reviewed With: patient  Progress: improving  Outcome Evaluation: Pt with good participation and progress toward goals during OT session. Pt performed bed mobility with SBA and ambulated >household distance using a RWx with CGA. Pt required assistance for obstacle negotiation and managing RWx secondary to a L veer. Pt performed toileting and subsequent hand hygiene with set up and CGA. No dizziness, lightheadednes or headache noted during mobility. Pt remains below her functional baseline with balance deficits, generalized weakness, and decreased safety awareness warranting continued IP OT services. Continue to recommend a d/c to IRF for best outcome, however pt is not agreeable recommend a d/c home with 24/7 assist and HHOT.     Time Calculation:         Time Calculation- OT       Row Name 03/08/24 1424             Time Calculation- OT    OT Start Time 1342  -KF      OT Received On 03/08/24  -KF      OT Goal Re-Cert Due Date 03/17/24  -KF         Timed Charges    09204 - OT Therapeutic Activity Minutes 8  -KF      84094 - OT Self Care/Mgmt Minutes 15  -KF         Total Minutes    Timed Charges Total Minutes 23  -KF       Total Minutes 23  -KF                User Key  (r) = Recorded By, (t) = Taken By, (c) = Cosigned By      Initials Name Provider Type    KF Chacha Saba OT Occupational Therapist                  Therapy Charges for Today       Code Description Service Date Service Provider Modifiers Qty    00051292654 HC OT THERAPEUTIC ACT EA 15 MIN 3/8/2024 Chacha Saba OT GO 1    34997484126 HC OT SELF CARE/MGMT/TRAIN EA 15 MIN 3/8/2024 Chacha Saba OT GO 1                 Chacha Saba OT  3/8/2024

## 2024-03-08 NOTE — PLAN OF CARE
Goal Outcome Evaluation:  Plan of Care Reviewed With: patient        Progress: improving  Outcome Evaluation: Pt with good participation and progress toward goals during OT session. Pt performed bed mobility with SBA and ambulated >household distance using a RWx with CGA. Pt required assistance for obstacle negotiation and managing RWx secondary to a L veer. Pt performed toileting and subsequent hand hygiene with set up and CGA. No dizziness, lightheadednes or headache noted during mobility. Pt remains below her functional baseline with balance deficits, generalized weakness, and decreased safety awareness warranting continued IP OT services. Continue to recommend a d/c to IRF for best outcome, however pt is not agreeable recommend a d/c home with 24/7 assist and HHOT.      Anticipated Discharge Disposition (OT): inpatient rehabilitation facility

## 2024-03-08 NOTE — CASE MANAGEMENT/SOCIAL WORK
Continued Stay Note  [facility]     Patient Name: Tereza Ackerman  MRN: [id number]  Today's Date: 3/8/2024    Admit Date: 3/6/2024    Plan: Home   Discharge Plan       Row Name 03/08/24 1117       Plan    Plan Home    Plan Comments Spoke with patient to discuss disposition and therapy recommendations for  IRF; patient declines IRF and prefers to go home.  Patient also declines home health.  Patient states she has access to a walker if needed.  Patient denies needs at this time.  CM will continue to follow.                   Discharge Codes    No documentation.                          Rima Stephenson RN

## 2024-03-08 NOTE — PLAN OF CARE
Goal Outcome Evaluation:  Patient alert and oriented 3x/4x during the shift. Due to void at the start of the shift, bladder scan showed 0 ml. Due to acuna being pulled for almost 12 hours in and out catheterization performed, 150 ml out. Patient ambulated to the bathroom with stand by, successfully had a bowel movement. Reports pain is tolerable, given 1 Norco (5mg) to manage. Otherwise, vital signs stable .

## 2024-03-08 NOTE — PROGRESS NOTES
"NEUROSURGERY PROGRESS NOTE     LOS: 2 days   Patient Care Team:  Michaela Connell MD as PCP - General  Michaela Connell MD as PCP - Family Medicine  Moo Hopkins MD as Consulting Physician (Ophthalmology)  Jamal Ramos MD as Consulting Physician (Nephrology)  Alli Aguirre MD as Consulting Physician (Cardiology)  Costa Raygoza MD as Consulting Physician (Radiation Oncology)  Andrea Bocanegra MD as Consulting Physician (General Surgery)  Alli Aguirre MD as Consulting Physician (Cardiology)  Michaela Connell MD as Primary Care Provider (Internal Medicine)  Robi Perales MD as Consulting Physician (Neurosurgery)    Chief Complaint: Headache and confusion.    POD#: 2 Days Post-Op  Procedures:  Left frontal craniotomy for resection of recurrent meningioma.    Interval History:   Urine output has been modest.  Patient Complaints: None.  Patient Denies: Pain, nausea, vomiting.    Vital Signs: Blood pressure 144/74, pulse 55, temperature 97.6 °F (36.4 °C), temperature source Oral, resp. rate 16, height 160 cm (63\"), weight 79.4 kg (175 lb), SpO2 93%, not currently breastfeeding.  Intake/Output:   Intake/Output Summary (Last 24 hours) at 3/8/2024 0655  Last data filed at 3/7/2024 2200  Gross per 24 hour   Intake 450 ml   Output 450 ml   Net 0 ml       Physical Exam:  Mucous membranes appear dry.  The patient is awake but harbors mild confusion.  She follows simple commands.  There is a mild right pronator drift.  Dry dressing is in place on her incision.     Assessment/Plan:  1.  Recurrent left frontal meningioma status post redo craniotomy for resection.  2.  History of hypertension.  3.  Disposition: Mobilize patient. Transfer to floor.  IV fluids.       Robi Perales MD  03/08/24  06:55 EST    "

## 2024-03-09 PROCEDURE — 25010000002 DEXAMETHASONE PER 1 MG: Performed by: NEUROLOGICAL SURGERY

## 2024-03-09 PROCEDURE — 25810000003 SODIUM CHLORIDE 0.9 % SOLUTION: Performed by: NEUROLOGICAL SURGERY

## 2024-03-09 PROCEDURE — 99024 POSTOP FOLLOW-UP VISIT: CPT | Performed by: NEUROLOGICAL SURGERY

## 2024-03-09 RX ADMIN — ROSUVASTATIN CALCIUM 40 MG: 20 TABLET, FILM COATED ORAL at 08:22

## 2024-03-09 RX ADMIN — TIMOLOL MALEATE 1 DROP: 5 SOLUTION/ DROPS OPHTHALMIC at 21:07

## 2024-03-09 RX ADMIN — QUETIAPINE FUMARATE 12.5 MG: 25 TABLET ORAL at 21:03

## 2024-03-09 RX ADMIN — BUSPIRONE HYDROCHLORIDE 10 MG: 10 TABLET ORAL at 08:22

## 2024-03-09 RX ADMIN — TIMOLOL MALEATE 1 DROP: 5 SOLUTION/ DROPS OPHTHALMIC at 08:27

## 2024-03-09 RX ADMIN — DEXAMETHASONE SODIUM PHOSPHATE 4 MG: 4 INJECTION INTRA-ARTICULAR; INTRALESIONAL; INTRAMUSCULAR; INTRAVENOUS; SOFT TISSUE at 18:09

## 2024-03-09 RX ADMIN — CARVEDILOL 12.5 MG: 12.5 TABLET, FILM COATED ORAL at 14:35

## 2024-03-09 RX ADMIN — CARVEDILOL 12.5 MG: 12.5 TABLET, FILM COATED ORAL at 03:03

## 2024-03-09 RX ADMIN — DEXAMETHASONE SODIUM PHOSPHATE 4 MG: 4 INJECTION INTRA-ARTICULAR; INTRALESIONAL; INTRAMUSCULAR; INTRAVENOUS; SOFT TISSUE at 14:35

## 2024-03-09 RX ADMIN — LEVETIRACETAM 500 MG: 500 TABLET, FILM COATED ORAL at 08:22

## 2024-03-09 RX ADMIN — LISINOPRIL 2.5 MG: 2.5 TABLET ORAL at 08:23

## 2024-03-09 RX ADMIN — LEVETIRACETAM 500 MG: 500 TABLET, FILM COATED ORAL at 21:03

## 2024-03-09 RX ADMIN — DEXAMETHASONE SODIUM PHOSPHATE 4 MG: 4 INJECTION INTRA-ARTICULAR; INTRALESIONAL; INTRAMUSCULAR; INTRAVENOUS; SOFT TISSUE at 06:43

## 2024-03-09 RX ADMIN — DORZOLAMIDE 1 DROP: 20 SOLUTION/ DROPS OPHTHALMIC at 08:27

## 2024-03-09 RX ADMIN — ASPIRIN 81 MG: 81 TABLET, COATED ORAL at 08:23

## 2024-03-09 RX ADMIN — PANTOPRAZOLE SODIUM 40 MG: 40 TABLET, DELAYED RELEASE ORAL at 05:28

## 2024-03-09 RX ADMIN — BUSPIRONE HYDROCHLORIDE 10 MG: 10 TABLET ORAL at 21:03

## 2024-03-09 RX ADMIN — DEXAMETHASONE SODIUM PHOSPHATE 4 MG: 4 INJECTION INTRA-ARTICULAR; INTRALESIONAL; INTRAMUSCULAR; INTRAVENOUS; SOFT TISSUE at 00:50

## 2024-03-09 RX ADMIN — SERTRALINE HYDROCHLORIDE 100 MG: 100 TABLET ORAL at 08:22

## 2024-03-09 RX ADMIN — SODIUM CHLORIDE 90 ML/HR: 9 INJECTION, SOLUTION INTRAVENOUS at 08:25

## 2024-03-09 RX ADMIN — DORZOLAMIDE 1 DROP: 20 SOLUTION/ DROPS OPHTHALMIC at 21:07

## 2024-03-09 NOTE — PLAN OF CARE
VSS on RA. One episode of aphasia observed but answered A&O questions correctly x4 during shift. Ambulated with 1 assist, walker and gait belt. No complaints of dizziness when ambulating. PRN PO pain medication administered and effective per patient. Up in chair today. Family at bedside. Call light in reach

## 2024-03-09 NOTE — PROGRESS NOTES
"NEUROSURGERY PROGRESS NOTE     LOS: 3 days   Patient Care Team:  Michaela Connell MD as PCP - General  Michaela Connell MD as PCP - Family Medicine  Moo Hopkins MD as Consulting Physician (Ophthalmology)  Jamal Ramos MD as Consulting Physician (Nephrology)  Alli Aguirre MD as Consulting Physician (Cardiology)  Costa Raygoza MD as Consulting Physician (Radiation Oncology)  Andrea Bocanegra MD as Consulting Physician (General Surgery)  Alli Aguirre MD as Consulting Physician (Cardiology)  Michaela Connell MD as Primary Care Provider (Internal Medicine)  Robi Perales MD as Consulting Physician (Neurosurgery)    Chief Complaint: Headache and confusion.    POD#: 3 Days Post-Op  Procedures:  Redo left frontal craniotomy for resection of recurrent meningioma.    Interval History:   Patient Complaints: None.  Patient Denies: Headache, nausea, vomiting.    Vital Signs: Blood pressure 152/80, pulse 69, temperature 98 °F (36.7 °C), temperature source Oral, resp. rate 16, height 160 cm (63\"), weight 79.4 kg (175 lb), SpO2 94%, not currently breastfeeding.  Intake/Output:   Intake/Output Summary (Last 24 hours) at 3/9/2024 1045  Last data filed at 3/9/2024 0900  Gross per 24 hour   Intake 478 ml   Output 500 ml   Net -22 ml       Physical Exam:  The patient is alert and in good spirits.  She is generally oriented and appropriate.  Dry dressing is in place on her incision.  She moves all extremities.     Assessment/Plan:  1.  Recurrent left frontal meningioma status post redo craniotomy for resection.  2.  History of hypertension.  3.  Disposition: Mobilize patient.  I anticipate that the patient will be discharged early in the upcoming week.  Continue IV Decadron.      Robi Perales MD  03/09/24  10:45 EST    "

## 2024-03-09 NOTE — PLAN OF CARE
Goal Outcome Evaluation:           Progress: no change       Pt is A & O x4. VSS on room air. Pt walked in the hallway this shift with walker, gait belt, and 2 assist; no symptoms of dizziness noted. Pt has had no complaints of pain, numbness or tingling throughout the shift. Pt was able to sleep throughout the night.

## 2024-03-10 LAB
ANION GAP SERPL CALCULATED.3IONS-SCNC: 10 MMOL/L (ref 5–15)
BUN SERPL-MCNC: 34 MG/DL (ref 8–23)
BUN/CREAT SERPL: 55.7 (ref 7–25)
CALCIUM SPEC-SCNC: 8.5 MG/DL (ref 8.6–10.5)
CHLORIDE SERPL-SCNC: 106 MMOL/L (ref 98–107)
CO2 SERPL-SCNC: 21 MMOL/L (ref 22–29)
CREAT SERPL-MCNC: 0.61 MG/DL (ref 0.57–1)
EGFRCR SERPLBLD CKD-EPI 2021: 95.7 ML/MIN/1.73
GLUCOSE SERPL-MCNC: 131 MG/DL (ref 65–99)
POTASSIUM SERPL-SCNC: 4 MMOL/L (ref 3.5–5.2)
SODIUM SERPL-SCNC: 137 MMOL/L (ref 136–145)

## 2024-03-10 PROCEDURE — 99024 POSTOP FOLLOW-UP VISIT: CPT | Performed by: PHYSICIAN ASSISTANT

## 2024-03-10 PROCEDURE — 25010000002 LABETALOL 5 MG/ML SOLUTION: Performed by: NEUROLOGICAL SURGERY

## 2024-03-10 PROCEDURE — 25010000002 DEXAMETHASONE PER 1 MG: Performed by: NEUROLOGICAL SURGERY

## 2024-03-10 PROCEDURE — 80048 BASIC METABOLIC PNL TOTAL CA: CPT | Performed by: NEUROLOGICAL SURGERY

## 2024-03-10 RX ORDER — LISINOPRIL 5 MG/1
5 TABLET ORAL ONCE
Status: COMPLETED | OUTPATIENT
Start: 2024-03-10 | End: 2024-03-10

## 2024-03-10 RX ADMIN — LABETALOL HYDROCHLORIDE 10 MG: 5 INJECTION, SOLUTION INTRAVENOUS at 05:13

## 2024-03-10 RX ADMIN — BUSPIRONE HYDROCHLORIDE 10 MG: 10 TABLET ORAL at 08:24

## 2024-03-10 RX ADMIN — DEXAMETHASONE SODIUM PHOSPHATE 4 MG: 4 INJECTION INTRA-ARTICULAR; INTRALESIONAL; INTRAMUSCULAR; INTRAVENOUS; SOFT TISSUE at 06:48

## 2024-03-10 RX ADMIN — DEXAMETHASONE SODIUM PHOSPHATE 4 MG: 4 INJECTION INTRA-ARTICULAR; INTRALESIONAL; INTRAMUSCULAR; INTRAVENOUS; SOFT TISSUE at 03:04

## 2024-03-10 RX ADMIN — ASPIRIN 81 MG: 81 TABLET, COATED ORAL at 08:24

## 2024-03-10 RX ADMIN — LEVETIRACETAM 500 MG: 500 TABLET, FILM COATED ORAL at 08:24

## 2024-03-10 RX ADMIN — PANTOPRAZOLE SODIUM 40 MG: 40 TABLET, DELAYED RELEASE ORAL at 05:06

## 2024-03-10 RX ADMIN — CARVEDILOL 12.5 MG: 12.5 TABLET, FILM COATED ORAL at 16:12

## 2024-03-10 RX ADMIN — TIMOLOL MALEATE 1 DROP: 5 SOLUTION/ DROPS OPHTHALMIC at 20:16

## 2024-03-10 RX ADMIN — DEXAMETHASONE SODIUM PHOSPHATE 4 MG: 4 INJECTION INTRA-ARTICULAR; INTRALESIONAL; INTRAMUSCULAR; INTRAVENOUS; SOFT TISSUE at 14:24

## 2024-03-10 RX ADMIN — DEXAMETHASONE SODIUM PHOSPHATE 4 MG: 4 INJECTION INTRA-ARTICULAR; INTRALESIONAL; INTRAMUSCULAR; INTRAVENOUS; SOFT TISSUE at 18:38

## 2024-03-10 RX ADMIN — LABETALOL HYDROCHLORIDE 10 MG: 5 INJECTION, SOLUTION INTRAVENOUS at 04:12

## 2024-03-10 RX ADMIN — LEVETIRACETAM 500 MG: 500 TABLET, FILM COATED ORAL at 20:17

## 2024-03-10 RX ADMIN — DORZOLAMIDE 1 DROP: 20 SOLUTION/ DROPS OPHTHALMIC at 08:25

## 2024-03-10 RX ADMIN — SERTRALINE HYDROCHLORIDE 100 MG: 100 TABLET ORAL at 08:24

## 2024-03-10 RX ADMIN — TIMOLOL MALEATE 1 DROP: 5 SOLUTION/ DROPS OPHTHALMIC at 08:25

## 2024-03-10 RX ADMIN — LISINOPRIL 2.5 MG: 2.5 TABLET ORAL at 08:24

## 2024-03-10 RX ADMIN — DORZOLAMIDE 1 DROP: 20 SOLUTION/ DROPS OPHTHALMIC at 20:16

## 2024-03-10 RX ADMIN — BUSPIRONE HYDROCHLORIDE 10 MG: 10 TABLET ORAL at 20:17

## 2024-03-10 RX ADMIN — CARVEDILOL 12.5 MG: 12.5 TABLET, FILM COATED ORAL at 03:04

## 2024-03-10 RX ADMIN — LISINOPRIL 5 MG: 5 TABLET ORAL at 06:48

## 2024-03-10 RX ADMIN — QUETIAPINE FUMARATE 12.5 MG: 25 TABLET ORAL at 20:17

## 2024-03-10 RX ADMIN — ROSUVASTATIN CALCIUM 40 MG: 20 TABLET, FILM COATED ORAL at 08:24

## 2024-03-10 NOTE — PLAN OF CARE
Goal Outcome Evaluation:  Plan of Care Reviewed With: patient        Progress: no change  Outcome Evaluation: Pt alert and oriented x4. Pt rested well in bed throughout shift. BP elevated, PRN given, see MAR. No complaints of pain. Pt ambulated in ling with stand by assist and walker. No needs voiced at this time, call light and personal items within reach.

## 2024-03-10 NOTE — PLAN OF CARE
VSS on RA. Up in chair during shift. Ambulated in ling with assist of 1, gait belt, and walker.  Bladder scan was 0ml. No complaints of pain. Call light in reach.

## 2024-03-11 ENCOUNTER — READMISSION MANAGEMENT (OUTPATIENT)
Dept: CALL CENTER | Facility: HOSPITAL | Age: 69
End: 2024-03-11
Payer: MEDICARE

## 2024-03-11 VITALS
SYSTOLIC BLOOD PRESSURE: 151 MMHG | BODY MASS INDEX: 31.01 KG/M2 | TEMPERATURE: 98.3 F | DIASTOLIC BLOOD PRESSURE: 82 MMHG | HEART RATE: 66 BPM | OXYGEN SATURATION: 96 % | WEIGHT: 175 LBS | HEIGHT: 63 IN | RESPIRATION RATE: 18 BRPM

## 2024-03-11 PROCEDURE — 25010000002 DEXAMETHASONE PER 1 MG: Performed by: NEUROLOGICAL SURGERY

## 2024-03-11 PROCEDURE — 99024 POSTOP FOLLOW-UP VISIT: CPT | Performed by: PHYSICIAN ASSISTANT

## 2024-03-11 PROCEDURE — 63710000001 DEXAMETHASONE PER 0.25 MG: Performed by: NEUROLOGICAL SURGERY

## 2024-03-11 PROCEDURE — 25010000002 LABETALOL 5 MG/ML SOLUTION: Performed by: NEUROLOGICAL SURGERY

## 2024-03-11 PROCEDURE — 99024 POSTOP FOLLOW-UP VISIT: CPT | Performed by: NEUROLOGICAL SURGERY

## 2024-03-11 RX ORDER — ENALAPRILAT 1.25 MG/ML
1.25 INJECTION INTRAVENOUS ONCE
Status: COMPLETED | OUTPATIENT
Start: 2024-03-11 | End: 2024-03-11

## 2024-03-11 RX ORDER — LISINOPRIL 5 MG/1
5 TABLET ORAL 2 TIMES DAILY
Status: DISCONTINUED | OUTPATIENT
Start: 2024-03-11 | End: 2024-03-11 | Stop reason: HOSPADM

## 2024-03-11 RX ORDER — DEXAMETHASONE 4 MG/1
4 TABLET ORAL EVERY 8 HOURS SCHEDULED
Qty: 90 TABLET | Refills: 0 | Status: SHIPPED | OUTPATIENT
Start: 2024-03-11

## 2024-03-11 RX ORDER — DEXAMETHASONE 4 MG/1
4 TABLET ORAL EVERY 8 HOURS SCHEDULED
Status: DISCONTINUED | OUTPATIENT
Start: 2024-03-11 | End: 2024-03-11 | Stop reason: HOSPADM

## 2024-03-11 RX ADMIN — SERTRALINE HYDROCHLORIDE 100 MG: 100 TABLET ORAL at 08:51

## 2024-03-11 RX ADMIN — CARVEDILOL 12.5 MG: 12.5 TABLET, FILM COATED ORAL at 03:22

## 2024-03-11 RX ADMIN — ENALAPRILAT 1.25 MG: 2.5 INJECTION INTRAVENOUS at 04:36

## 2024-03-11 RX ADMIN — TIMOLOL MALEATE 1 DROP: 5 SOLUTION/ DROPS OPHTHALMIC at 08:51

## 2024-03-11 RX ADMIN — LISINOPRIL 5 MG: 5 TABLET ORAL at 05:13

## 2024-03-11 RX ADMIN — DORZOLAMIDE 1 DROP: 20 SOLUTION/ DROPS OPHTHALMIC at 08:51

## 2024-03-11 RX ADMIN — PANTOPRAZOLE SODIUM 40 MG: 40 TABLET, DELAYED RELEASE ORAL at 05:13

## 2024-03-11 RX ADMIN — BUSPIRONE HYDROCHLORIDE 10 MG: 10 TABLET ORAL at 08:51

## 2024-03-11 RX ADMIN — DEXAMETHASONE 4 MG: 4 TABLET ORAL at 06:52

## 2024-03-11 RX ADMIN — ROSUVASTATIN CALCIUM 40 MG: 20 TABLET, FILM COATED ORAL at 08:50

## 2024-03-11 RX ADMIN — ASPIRIN 81 MG: 81 TABLET, COATED ORAL at 08:51

## 2024-03-11 RX ADMIN — DEXAMETHASONE SODIUM PHOSPHATE 4 MG: 4 INJECTION INTRA-ARTICULAR; INTRALESIONAL; INTRAMUSCULAR; INTRAVENOUS; SOFT TISSUE at 00:39

## 2024-03-11 RX ADMIN — LEVETIRACETAM 500 MG: 500 TABLET, FILM COATED ORAL at 08:51

## 2024-03-11 RX ADMIN — LABETALOL HYDROCHLORIDE 10 MG: 5 INJECTION, SOLUTION INTRAVENOUS at 04:08

## 2024-03-11 NOTE — CASE MANAGEMENT/SOCIAL WORK
Continued Stay Note  [facility]     Patient Name: Tereza Ackerman  MRN: [id number]  Today's Date: 3/11/2024    Admit Date: 3/6/2024    Plan: Home with 's assistance   Discharge Plan       Row Name 03/11/24 1158       Plan    Plan Home with 's assistance    Patient/Family in Agreement with Plan yes    Plan Comments Followed up with Ms. Ackerman, at the bedside, for discharge planning.  Ms. Ackerman states that she is being discharged to home today.  She states that she has a rolling walker at home and denies any additional DME needs.    DC plan is home with her 's assistance.  No discharge needs identified.    Final Discharge Disposition Code 01 - home or self-care                                  Expected Discharge Date and Time       Expected Discharge Date Expected Discharge Time    Mar 11, 2024               Aleshia Lopez RN

## 2024-03-11 NOTE — PROGRESS NOTES
"NEUROSURGERY PROGRESS NOTE     LOS: 5 days   Patient Care Team:  Michaela Connell MD as PCP - General  Michaela Connell MD as PCP - Family Medicine  Moo Hopkins MD as Consulting Physician (Ophthalmology)  Jamal Ramos MD as Consulting Physician (Nephrology)  Alli Aguirre MD as Consulting Physician (Cardiology)  Costa Raygoza MD as Consulting Physician (Radiation Oncology)  Andrea Bocanegra MD as Consulting Physician (General Surgery)  Alli Aguirre MD as Consulting Physician (Cardiology)  Michaela Connell MD as Primary Care Provider (Internal Medicine)  Robi Perales MD as Consulting Physician (Neurosurgery)    Chief Complaint: Headache and confusion.    POD#: 5 Days Post-Op  Procedures:  Redo left frontal craniotomy for resection of recurrent meningioma.    Interval History:   Patient Complaints: None.  Patient Denies: Headache.    Vital Signs: Blood pressure 166/87, pulse 61, temperature 97.6 °F (36.4 °C), temperature source Oral, resp. rate 18, height 160 cm (63\"), weight 79.4 kg (175 lb), SpO2 94%, not currently breastfeeding.  Intake/Output:   Intake/Output Summary (Last 24 hours) at 3/11/2024 0555  Last data filed at 3/10/2024 1300  Gross per 24 hour   Intake 560 ml   Output --   Net 560 ml       Physical Exam:  Patient is alert and sitting up at bedside.  She is generally oriented and follows all commands.  Incision is intact.     Data Review:    Results from last 7 days   Lab Units 03/10/24  0434   SODIUM mmol/L 137   POTASSIUM mmol/L 4.0   CHLORIDE mmol/L 106   CO2 mmol/L 21.0*   BUN mg/dL 34*   CREATININE mg/dL 0.61   GLUCOSE mg/dL 131*   CALCIUM mg/dL 8.5*         Assessment/Plan:  1.  Recurrent left frontal meningioma status post redo craniotomy for resection.  2.  History of hypertension.  3.  Disposition: Home today.  Follow-up with MARICHUY in my office in approximately 10 days for wound check and suture removal.  Decadron 4 mg 3 times daily for 4 days then 2 mg 3 " times daily for 4 days then 2 mg twice daily and continue until follow-up.      Robi Perales MD  03/11/24  05:55 EDT

## 2024-03-11 NOTE — DISCHARGE SUMMARY
[facility]    Date of Admission: 3/6/2024  Date of Discharge:  3/11/2024    Discharge Diagnosis: recurrent left frontal meningioma    Procedures Performed  Procedure(s):  CRANIOTOMY FOR TUMOR STEREOTACTIC WITH STEALTH      History of Present Illness:  The patient is a 69-year-old woman with a history of a left sphenoid wing meningioma that was initially operated on in 2003 and then again in 2014.  Given recurrence she has undergone radiosurgery on multiple occasions. She presented to the ED with left sided headache and left eye pressure. MRI brain demonstrated a recurrent left frontal meningioma with considerable surrounding edema. She was started on steroids to reduce swelling and prompt craniotomy for resection was planned.     Hospital Course:   On 3/6/24, she underwent stealth MRI and was admitted for surgery that day. Gross total resection of the tumor was achieved and she was discharged to recovery and then to the ICU for observation. Overnight she had headache and some mild confusion; she had some postop swelling in her face which resolved. CT of the head showed expected postoperative changes. Lerner was removed and she was mobilized on POD 1. Initial PT evaluation recommended inpatient rehab. She was transferred to the floor on POD 2. Patient preferred discharged to home and declined inpatient rehab as well as home health. She continued to advance slowly with PT and OT. She denied any pain and did not demonstrate any focal weakness. IV decadron was continued during her hospitalization. Her incision remained clean and dry. She was discharged to home on POD 5      Condition on Discharge:  Stable  Discharge to: home     PATIENT SPECIFIC EDUCATION/PLAN:  1. Follow-up with neurosurgery PA in 10 days for wound check and suture removal   2. Steroid wean: Decadron 4 mg 3 times daily for 4 days then 2 mg 3 times daily for 4 days then 2 mg twice daily and  continue until follow-up.   3. No driving until follow-up.  4. No lifting more than 5lbs  5. Keep incision clean and dry: The patient may get incision wet in the shower beginning on 3/12/24   6. NO tub bathing or swimming until follow-up  7. Head of bed at 30 degrees or higher until follow up  5. Ice pack to incision(s) as needed for associated pain or swelling       Discharge Medications     Discharge Medications        Changes to Medications        Instructions Start Date   carvedilol 12.5 MG tablet  Commonly known as: COREG  What changed:   how much to take  when to take this   TAKE ONE TABLET BY MOUTH EVERY 12 HOURS      dexAMETHasone 4 MG tablet  Commonly known as: DECADRON  What changed: additional instructions   4 mg, Oral, Every 8 Hours Scheduled, 1 tablet by mouth every 8 hours for 4 days, then 1/2 tablet by mouth every 8 hours for 4 days, then continue 1/2 tablet twice a day until follow-up             Continue These Medications        Instructions Start Date   aspirin 81 MG tablet   81 mg, Oral, Daily      busPIRone 10 MG tablet  Commonly known as: BUSPAR   10 mg, Oral, 2 Times Daily      cholecalciferol 25 MCG (1000 UT) tablet   2,000 Units, Oral, Every Other Day      dorzolamide 2 % ophthalmic solution  Commonly known as: TRUSOPT   1 drop, Left Eye, 2 Times Daily      levETIRAcetam 500 MG tablet  Commonly known as: KEPPRA   500 mg, Oral, Every 12 Hours      lisinopril 2.5 MG tablet  Commonly known as: PRINIVIL,ZESTRIL   2.5 mg, Oral, Daily      LORazepam 0.5 MG tablet  Commonly known as: Ativan   0.25-0.5 mg, Oral, Daily PRN      omeprazole 20 MG capsule  Commonly known as: priLOSEC   20 mg, Oral, Daily      ondansetron ODT 4 MG disintegrating tablet  Commonly known as: ZOFRAN-ODT   4 mg, Translingual, Every 4 Hours, As needed for nausea      polyethyl glycol-propyl glycol 0.4-0.3 % solution ophthalmic solution (artificial tears)  Commonly known as: SYSTANE   1 drop, Left Eye, Every 1 Hour PRN       QUEtiapine 25 MG tablet  Commonly known as: SEROquel   12.5 mg, Oral, Every Night at Bedtime      rosuvastatin 40 MG tablet  Commonly known as: CRESTOR   40 mg, Oral, Daily      sertraline 100 MG tablet  Commonly known as: ZOLOFT   100 mg, Oral, Daily      thiamine 100 MG tablet tablet  Commonly known as: VITAMIN B-1   100 mg, Oral, Every Other Day      timolol 0.5 % ophthalmic solution  Commonly known as: TIMOPTIC   1 drop, 2 Times Daily               Follow-up Appointments  Future Appointments   Date Time Provider Department Center   3/13/2024  1:45 PM Michaela Connell MD [facility]   4/1/2024  4:20 PM [facility] [place]   8/2/2024 10:30 AM Michaela Connell MD [facility]   2/5/2025 10:45 AM Michaela Connell MD [facility]     Additional Instructions for the Follow-ups that You Need to Schedule       Discharge Follow-up with Specified Provider: Diaz; 2 Weeks   As directed      To: Diaz   Follow Up: 2 Weeks   Follow Up Details: Follow-up with physician's assistant in approximately 10 days for suture removal and/or wound check.                Referring Provider  Steve Polo III, MD    PCP   Michaela Connell MD Talitha L Hunt, PA-C  03/11/24  08:01 EDT

## 2024-03-11 NOTE — OUTREACH NOTE
Prep Survey      Flowsheet Row Responses   [facility] patient discharged from? [place]   Is LACE score < 7 ? No   Eligibility [facility]   Date of Admission 03/06/24   Date of Discharge 03/11/24   Discharge Disposition Home or Self Care   Discharge diagnosis Meningioma, recurrent of brain, CRANIOTOMY FOR TUMOR   Does the patient have one of the following disease processes/diagnoses(primary or secondary)? General Surgery   Does the patient have Home health ordered? No   Is there a DME ordered? No   Prep survey completed? Yes            Mirella RIVERO - Registered Nurse

## 2024-03-11 NOTE — PLAN OF CARE
Goal Outcome Evaluation:  Plan of Care Reviewed With: patient                   Pt is alert and oriented X 4. Slept throughout the night. Prn labetalol , one time dose of vasotec  and lisinopril given for elevated bp. Denied pain. Voiding well.

## 2024-03-11 NOTE — PLAN OF CARE
Goal Outcome Evaluation:      Patient is alert and oriented.Ambulated outside room.For Discharge today home.Refused to take out the surgical dressing today, she will take it off tomorrow at home.

## 2024-03-12 ENCOUNTER — TRANSITIONAL CARE MANAGEMENT TELEPHONE ENCOUNTER (OUTPATIENT)
Dept: CALL CENTER | Facility: HOSPITAL | Age: 69
End: 2024-03-12
Payer: MEDICARE

## 2024-03-12 NOTE — OUTREACH NOTE
Call Center TCM Note      Flowsheet Row Responses   [facility] patient discharged from? [place]   Does the patient have one of the following disease processes/diagnoses(primary or secondary)? General Surgery   TCM attempt successful? Yes   Call start time 1603   Call end time 1608   Discharge diagnosis Meningioma, recurrent of brain, CRANIOTOMY FOR TUMOR   Meds reviewed with patient/caregiver? Yes   Is the patient having any side effects they believe may be caused by any medication additions or changes? No   Does the patient have all medications related to this admission filled (includes all antibiotics, pain medications, etc.) Yes   Is the patient taking all medications as directed (includes completed medication regime)? Yes   Does the patient have an appointment with their PCP within 7-14 days of discharge? No   Nursing Interventions Patient desires to follow up with specialty only   Has home health visited the patient within 72 hours of discharge? N/A   Psychosocial issues? No   Did the patient receive a copy of their discharge instructions? Yes   Nursing interventions Reviewed instructions with patient   What is the patient's perception of their health status since discharge? Improving  [Pt is doing well except for being tired today, pt feels unstable at times but has family at home to assist.  Pt aware to monitor for any concerning s/s, has reviewed AVS in full detail.]   Nursing interventions Nurse provided patient education  [post op instructions reviwed]   Is the patient /caregiver able to teach back basic post-op care? No tub bath, swimming, or hot tub until instructed by MD, Keep incision areas clean,dry and protected, Lifting as instructed by MD in discharge instructions   Is the patient/caregiver able to teach back signs and symptoms of incisional infection? Increased redness, swelling or pain at the incisonal site, Increased drainage or bleeding, Incisional warmth, Pus or odor from incision,  Fever  [reviewed]   Is the patient/caregiver able to teach back steps to recovery at home? Rest and rebuild strength, gradually increase activity   Is the patient/caregiver able to teach back the hierarchy of who to call/visit for symptoms/problems? PCP, Specialist, Home health nurse, Urgent Care, ED, 911 Yes   TCM call completed? Yes   Call end time 3551            Shae Mosquera RN    3/12/2024, 16:12 EDT

## 2024-03-12 NOTE — OUTREACH NOTE
Call Center TCM Note      Flowsheet Row Responses   [facility] patient discharged from? [place]   Does the patient have one of the following disease processes/diagnoses(primary or secondary)? General Surgery   TCM attempt successful? No  [verbal release for Cash]   Unsuccessful attempts Attempt 1            Shae Mosquera, TEODORA    3/12/2024, 14:51 EDT

## 2024-03-18 PROCEDURE — 87086 URINE CULTURE/COLONY COUNT: CPT | Performed by: FAMILY MEDICINE

## 2024-03-21 LAB
CYTO UR: NORMAL
LAB AP CASE REPORT: NORMAL
LAB AP CLINICAL INFORMATION: NORMAL
LAB AP DIAGNOSIS COMMENT: NORMAL
PATH REPORT.FINAL DX SPEC: NORMAL
PATH REPORT.GROSS SPEC: NORMAL

## 2024-03-26 ENCOUNTER — OFFICE VISIT (OUTPATIENT)
Dept: NEUROSURGERY | Facility: CLINIC | Age: 69
End: 2024-03-26
Payer: MEDICARE

## 2024-03-26 VITALS
TEMPERATURE: 97.1 F | DIASTOLIC BLOOD PRESSURE: 82 MMHG | SYSTOLIC BLOOD PRESSURE: 116 MMHG | BODY MASS INDEX: 30.41 KG/M2 | HEIGHT: 63 IN | WEIGHT: 171.6 LBS

## 2024-03-26 DIAGNOSIS — C70.9 MENINGIOMA, MALIGNANT: Primary | ICD-10-CM

## 2024-03-26 PROCEDURE — 3079F DIAST BP 80-89 MM HG: CPT | Performed by: PHYSICIAN ASSISTANT

## 2024-03-26 PROCEDURE — 99024 POSTOP FOLLOW-UP VISIT: CPT | Performed by: PHYSICIAN ASSISTANT

## 2024-03-26 PROCEDURE — 3074F SYST BP LT 130 MM HG: CPT | Performed by: PHYSICIAN ASSISTANT

## 2024-03-26 NOTE — PROGRESS NOTES
Patient: Tereza Ackerman  : 1955  Chart #: [id number]    Date of Service: 2024    CHIEF COMPLAINT:     History of Present Illness       Past Medical History:   Diagnosis Date   • Abdominal hernia    • Arthritis     rt knee    • Atrial flutter with rapid ventricular response 2017   • Back pain    • Brain tumor    • Colostomy present 2018   • Depression    • History of blood transfusion    • History of gastroesophageal reflux (GERD)     resolved since Nissen   • History of Nissen fundoplication 2017   • History of small bowel obstruction    • Hyperlipemia    • Hypertension    • Memory loss or impairment    • Migraine    • Parathyroid adenoma     Incidental on thyroid US.   • PONV (postoperative nausea and vomiting)     nausea - preprocedural meds help    • Prediabetes     Last Impression: 2015  Reviewed labs. Excellent control.  Maren Connellast Impression: 2015  Reviewed labs. Excellent control.  Michaela Connell   • Thyroid nodule      Last Impression: 2015  r/o thyroid cancer, will proceed with US.  Michaela Connell (Internal Medicine)    • UTI (urinary tract infection)    • Vision changes     blockages left eye    • Wears glasses     readers         Current Outpatient Medications:   •  aspirin 81 MG tablet, Take 1 tablet by mouth Daily., Disp: 30 tablet, Rfl: 11  •  busPIRone (BUSPAR) 10 MG tablet, Take 1 tablet by mouth 2 (Two) Times a Day., Disp: 180 tablet, Rfl: 3  •  carvedilol (COREG) 12.5 MG tablet, TAKE ONE TABLET BY MOUTH EVERY 12 HOURS (Patient taking differently: Take 0.5 tablets by mouth 2 (Two) Times a Day With Meals.), Disp: 180 tablet, Rfl: 3  •  cholecalciferol (VITAMIN D3) 1000 units tablet, Take 2 tablets by mouth Every Other Day., Disp: , Rfl:   •  dexAMETHasone (DECADRON) 4 MG tablet, Take 1 tablet by mouth Every 8 (Eight) Hours. 1 tablet by mouth every 8 hours for 4 days, then 1/2 tablet by mouth every 8 hours for 4 days, then  continue 1/2 tablet twice a day until follow-up, Disp: 90 tablet, Rfl: 0  •  dorzolamide (TRUSOPT) 2 % ophthalmic solution, Administer 1 drop into the left eye 2 (Two) Times a Day., Disp: , Rfl:   •  levETIRAcetam (KEPPRA) 500 MG tablet, Take 1 tablet by mouth Every 12 (Twelve) Hours., Disp: 60 tablet, Rfl: 0  •  lisinopril (PRINIVIL,ZESTRIL) 2.5 MG tablet, Take 1 tablet by mouth Daily., Disp: 90 tablet, Rfl: 3  •  LORazepam (Ativan) 0.5 MG tablet, Take 0.5-1 tablets by mouth Daily As Needed for Anxiety., Disp: 15 tablet, Rfl: 0  •  omeprazole (priLOSEC) 20 MG capsule, Take 1 capsule by mouth Daily., Disp: 90 capsule, Rfl: 3  •  ondansetron ODT (ZOFRAN-ODT) 4 MG disintegrating tablet, Place 1 tablet on the tongue Every 4 (Four) Hours. As needed for nausea, Disp: 12 tablet, Rfl: 0  •  phenazopyridine (PYRIDIUM) 200 MG tablet, Take 1 tablet by mouth 3 (Three) Times a Day As Needed for Dysuria., Disp: 6 tablet, Rfl: 0  •  polyethyl glycol-propyl glycol (SYSTANE) 0.4-0.3 % solution ophthalmic solution (artificial tears), Administer 1 drop into the left eye Every 1 (One) Hour As Needed (dry eye)., Disp: , Rfl:   •  QUEtiapine (SEROquel) 25 MG tablet, Take 0.5 tablets by mouth every night at bedtime., Disp: 45 tablet, Rfl: 3  •  rosuvastatin (CRESTOR) 40 MG tablet, Take 1 tablet by mouth Daily., Disp: 90 tablet, Rfl: 3  •  sertraline (ZOLOFT) 100 MG tablet, Take 1 tablet by mouth Daily., Disp: 90 tablet, Rfl: 3  •  thiamine (VITAMIN B-1) 100 MG tablet tablet, Take 1 tablet by mouth Every Other Day., Disp: 30 each, Rfl: 5  •  timolol (TIMOPTIC) 0.5 % ophthalmic solution, 1 drop 2 (Two) Times a Day., Disp: , Rfl:     Past Surgical History:   Procedure Laterality Date   • BRAIN SURGERY Left 05/19/2022   • BRAIN SURGERY  08/2023    left side done & radiationg a wk. later,  in [place]   • CARDIAC CATHETERIZATION N/A 04/27/2020    Procedure: LEFT HEART CATH;  Surgeon: Marina Ring MD;  Location: [facility]  INVASIVE LOCATION;  Service: Cardiology;  Laterality: N/A;   • CARPAL TUNNEL RELEASE  2002    right    • COLONOSCOPY N/A 08/18/2018    Procedure: COLONOSCOPY;  Surgeon: Inderjit Campos MD;  Location:  [facility];  Service: Gastroenterology   • COLONOSCOPY N/A 11/28/2018    Procedure: COLONOSCOPY;  Surgeon: Inderjit Campos MD;  Location:  [facility];  Service: Gastroenterology   • COLOSTOMY CLOSURE N/A 02/19/2019    Procedure: COLOSTOMY TAKEDOWN, INCISIONAL HERNIA REPAIR;  Surgeon: Andrea Bocanegra MD;  Location:  [facility];  Service: General   • CRANIOTOMY  2003 & 2014    Dr. Werner Hollis for tumor removal   • CRANIOTOMY FOR TUMOR Left 3/6/2024    Procedure: CRANIOTOMY FOR TUMOR STEREOTACTIC WITH STEALTH;  Surgeon: Robi Perales MD;  Location:  [facility];  Service: Neurosurgery;  Laterality: Left;   • ENDOSCOPY     • EXPLORATORY LAPAROTOMY N/A 12/05/2017    Procedure: EXPLORATORY LAPAROTOMY, SMALL BOWEL RESECTION;  Surgeon: Ирина Cobian MD;  Location:  [facility];  Service:    • EXPLORATORY LAPAROTOMY N/A 12/22/2017    Procedure: LAPAROTOMY EXPLORATORY FOR SMALL BOWEL OBSTRUCTION;  Surgeon: Ирина Cobian MD;  Location:  [facility];  Service:    • EXPLORATORY LAPAROTOMY N/A 07/23/2018    Procedure: EXPLORATORY LAPAROTOMY, APPENDECTOMY, CECOPEXY, INCISIONAL HERNIA REPAIR, LYSIS OF ADHESIONS;  Surgeon: Andrea Bocanegra MD;  Location:  [facility];  Service: General   • EXPLORATORY LAPAROTOMY N/A 08/19/2018    Procedure: LAPAROTOMY EXPLORATORY, SIGMOID COLECTOMY, CREATION OF OSTOMY;  Surgeon: Andrea Bocanegra MD;  Location:  [facility];  Service: General   • HYSTERECTOMY      partial - both ovaries still present pt believes    • INSERTION HEMODIALYSIS CATHETER N/A 12/07/2017    Procedure: HEMODIALYSIS CATHETER INSERTION;  Surgeon: Chance Valenzuela MD;  Location:  [facility];  Service:    • ORBITOTOMY Left 11/03/2017    Procedure:  LEFT LATERAL ORBITOTOMY WITH DEBULKING OF TUMOR ;  Surgeon: Moo  Long Hopkins MD;  Location: [facility];  Service:    • OTHER SURGICAL HISTORY      esophagogastric fundoplasty nissen fundoplication   • TUBAL ABDOMINAL LIGATION         Social History     Socioeconomic History   • Marital status:    Tobacco Use   • Smoking status: Never   • Smokeless tobacco: Never   Vaping Use   • Vaping status: Never Used   Substance and Sexual Activity   • Alcohol use: No   • Drug use: No   • Sexual activity: Defer         Review of Systems   Constitutional:  Negative for activity change, appetite change, chills, diaphoresis, fatigue, fever and unexpected weight change.   HENT:  Negative for congestion, dental problem, drooling, ear discharge, ear pain, facial swelling, hearing loss, mouth sores, nosebleeds, postnasal drip, rhinorrhea, sinus pressure, sinus pain, sneezing, sore throat, tinnitus, trouble swallowing and voice change.    Eyes:  Negative for photophobia, pain, discharge, redness, itching and visual disturbance.   Respiratory:  Negative for apnea, cough, choking, chest tightness, shortness of breath, wheezing and stridor.    Cardiovascular:  Negative for chest pain, palpitations and leg swelling.   Gastrointestinal:  Negative for abdominal distention, abdominal pain, anal bleeding, blood in stool, constipation, diarrhea, nausea, rectal pain and vomiting.   Endocrine: Negative for cold intolerance, heat intolerance, polydipsia, polyphagia and polyuria.   Genitourinary:  Negative for decreased urine volume, difficulty urinating, dyspareunia, dysuria, enuresis, flank pain, frequency, genital sores, hematuria, menstrual problem, pelvic pain, urgency, vaginal bleeding, vaginal discharge and vaginal pain.   Musculoskeletal:  Negative for arthralgias, back pain, gait problem, joint swelling, myalgias, neck pain and neck stiffness.   Skin:  Negative for color change, pallor, rash and wound.   Allergic/Immunologic: Negative for environmental allergies, food allergies and  "immunocompromised state.   Neurological:  Negative for dizziness, tremors, seizures, syncope, facial asymmetry, speech difficulty, weakness, light-headedness, numbness and headaches.   Hematological:  Negative for adenopathy. Does not bruise/bleed easily.   Psychiatric/Behavioral:  Negative for agitation, behavioral problems, confusion, decreased concentration, dysphoric mood, hallucinations, self-injury, sleep disturbance and suicidal ideas. The patient is not nervous/anxious and is not hyperactive.      Objective   Vital Signs: Blood pressure 116/82, temperature 97.1 °F (36.2 °C), temperature source Infrared, height 160 cm (63\"), weight 77.8 kg (171 lb 9.6 oz), not currently breastfeeding.  Physical Exam  Musculoskeletal:     Strength is intact in upper and lower extremities to direct testing.     Station and gait are normal.     Straight leg raising is negative.   Neurologic:     Muscle tone is normal throughout.     Coordination is intact.     Deep tendon reflexes: 2+ and symmetrical.     Sensation is intact to light touch throughout.     Patient is oriented to person, place, and time.         Independent review of radiographic imaging:     Assessment & Plan   Diagnosis:    Medical Decision Making:     There are no diagnoses linked to this encounter.                              Selene Jamil LPN  Patient Care Team:  Michaela Connell MD as PCP - General  Michaela Connell MD as PCP - Family Medicine  Moo Hopkins MD as Consulting Physician (Ophthalmology)  Jamal Ramos MD as Consulting Physician (Nephrology)  Alli Aguirre MD as Consulting Physician (Cardiology)  Costa Raygoza MD as Consulting Physician (Radiation Oncology)  Andrea Bocanegra MD as Consulting Physician (General Surgery)  Alli Aguirre MD as Consulting Physician (Cardiology)  Michaela Connell MD as Primary Care Provider (Internal Medicine)  Robi Perales MD as Consulting Physician " (Neurosurgery)

## 2024-03-26 NOTE — PROGRESS NOTES
Patient: Tereza Ackerman  : 1955  Chart #: [id number]    Date of Service: 2024    CHIEF COMPLAINT: Recurrent left frontal meningioma    History of Present Illness Ms Ackerman is a 69 year old women who is well known to our service.  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 Hollis.  A second procedure was performed in .  Given recurrence, she has undergone radiosurgery on multiple occasions.  More recently she presented with confusion and headache and was found to have recurrence superior and medial in the left frontal region.  As such, on 3/6/2024 she underwent left craniotomy for resection of recurrent lesion.  Gross total resection of the enlarging lesion was achieved. 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     Today patient is 3 weeks post-op and presents for suture removal. She is in good spirits. Denies headache, N/V, fevers/chills. She continues with decadron 2mg BID and keppra.      Past Medical History:   Diagnosis Date    Abdominal hernia     Arthritis     rt knee     Atrial flutter with rapid ventricular response 2017    Back pain     Brain tumor     Colostomy present 2018    Depression     History of blood transfusion     History of gastroesophageal reflux (GERD)     resolved since Nissen    History of Nissen fundoplication 2017    History of small bowel obstruction     Hyperlipemia     Hypertension     Memory loss or impairment     Migraine     Parathyroid adenoma     Incidental on thyroid US.    PONV (postoperative nausea and vomiting)     nausea - preprocedural meds help     Prediabetes     Last Impression: 2015  Reviewed labs. Excellent control.  Emeyr Connell Impression: 2015  Reviewed labs. Excellent control.  Michaela Connell    Thyroid nodule      Last Impression: 2015  r/o thyroid cancer, will proceed with US.  Michaela Connell (Internal Medicine)     UTI (urinary tract  infection)     Vision changes     blockages left eye     Wears glasses     readers         Current Outpatient Medications:     aspirin 81 MG tablet, Take 1 tablet by mouth Daily., Disp: 30 tablet, Rfl: 11    busPIRone (BUSPAR) 10 MG tablet, Take 1 tablet by mouth 2 (Two) Times a Day., Disp: 180 tablet, Rfl: 3    carvedilol (COREG) 12.5 MG tablet, TAKE ONE TABLET BY MOUTH EVERY 12 HOURS (Patient taking differently: Take 0.5 tablets by mouth 2 (Two) Times a Day With Meals.), Disp: 180 tablet, Rfl: 3    cholecalciferol (VITAMIN D3) 1000 units tablet, Take 2 tablets by mouth Every Other Day., Disp: , Rfl:     dexAMETHasone (DECADRON) 4 MG tablet, Take 1 tablet by mouth Every 8 (Eight) Hours. 1 tablet by mouth every 8 hours for 4 days, then 1/2 tablet by mouth every 8 hours for 4 days, then continue 1/2 tablet twice a day until follow-up, Disp: 90 tablet, Rfl: 0    dorzolamide (TRUSOPT) 2 % ophthalmic solution, Administer 1 drop into the left eye 2 (Two) Times a Day., Disp: , Rfl:     levETIRAcetam (KEPPRA) 500 MG tablet, Take 1 tablet by mouth Every 12 (Twelve) Hours., Disp: 60 tablet, Rfl: 0    lisinopril (PRINIVIL,ZESTRIL) 2.5 MG tablet, Take 1 tablet by mouth Daily., Disp: 90 tablet, Rfl: 3    LORazepam (Ativan) 0.5 MG tablet, Take 0.5-1 tablets by mouth Daily As Needed for Anxiety., Disp: 15 tablet, Rfl: 0    omeprazole (priLOSEC) 20 MG capsule, Take 1 capsule by mouth Daily., Disp: 90 capsule, Rfl: 3    ondansetron ODT (ZOFRAN-ODT) 4 MG disintegrating tablet, Place 1 tablet on the tongue Every 4 (Four) Hours. As needed for nausea, Disp: 12 tablet, Rfl: 0    phenazopyridine (PYRIDIUM) 200 MG tablet, Take 1 tablet by mouth 3 (Three) Times a Day As Needed for Dysuria., Disp: 6 tablet, Rfl: 0    polyethyl glycol-propyl glycol (SYSTANE) 0.4-0.3 % solution ophthalmic solution (artificial tears), Administer 1 drop into the left eye Every 1 (One) Hour As Needed (dry eye)., Disp: , Rfl:     QUEtiapine (SEROquel) 25 MG  tablet, Take 0.5 tablets by mouth every night at bedtime., Disp: 45 tablet, Rfl: 3    rosuvastatin (CRESTOR) 40 MG tablet, Take 1 tablet by mouth Daily., Disp: 90 tablet, Rfl: 3    sertraline (ZOLOFT) 100 MG tablet, Take 1 tablet by mouth Daily., Disp: 90 tablet, Rfl: 3    thiamine (VITAMIN B-1) 100 MG tablet tablet, Take 1 tablet by mouth Every Other Day., Disp: 30 each, Rfl: 5    timolol (TIMOPTIC) 0.5 % ophthalmic solution, 1 drop 2 (Two) Times a Day., Disp: , Rfl:     Past Surgical History:   Procedure Laterality Date    BRAIN SURGERY Left 05/19/2022    BRAIN SURGERY  08/2023    left side done & radiationg a wk. later,  in [place]    CARDIAC CATHETERIZATION N/A 04/27/2020    Procedure: LEFT HEART CATH;  Surgeon: Marina Ring MD;  Location:  [facility];  Service: Cardiology;  Laterality: N/A;    CARPAL TUNNEL RELEASE  2002    right     COLONOSCOPY N/A 08/18/2018    Procedure: COLONOSCOPY;  Surgeon: Inderjit Campos MD;  Location:  [facility];  Service: Gastroenterology    COLONOSCOPY N/A 11/28/2018    Procedure: COLONOSCOPY;  Surgeon: Inderjit Campos MD;  Location:  [facility];  Service: Gastroenterology    COLOSTOMY CLOSURE N/A 02/19/2019    Procedure: COLOSTOMY TAKEDOWN, INCISIONAL HERNIA REPAIR;  Surgeon: Andrea Bocanegra MD;  Location:  [facility];  Service: General    CRANIOTOMY  2003 & 2014    Dr. Werner Hollis for tumor removal    CRANIOTOMY FOR TUMOR Left 3/6/2024    Procedure: CRANIOTOMY FOR TUMOR STEREOTACTIC WITH STEALTH;  Surgeon: Robi Perales MD;  Location:  [facility];  Service: Neurosurgery;  Laterality: Left;    ENDOSCOPY      EXPLORATORY LAPAROTOMY N/A 12/05/2017    Procedure: EXPLORATORY LAPAROTOMY, SMALL BOWEL RESECTION;  Surgeon: Ирина Cobian MD;  Location:  [facility];  Service:     EXPLORATORY LAPAROTOMY N/A 12/22/2017    Procedure: LAPAROTOMY EXPLORATORY FOR SMALL BOWEL OBSTRUCTION;  Surgeon: Ирина Cobian MD;  Location:  [facility];   "Service:     EXPLORATORY LAPAROTOMY N/A 07/23/2018    Procedure: EXPLORATORY LAPAROTOMY, APPENDECTOMY, CECOPEXY, INCISIONAL HERNIA REPAIR, LYSIS OF ADHESIONS;  Surgeon: Andrea Bocanegra MD;  Location: [facility] OR;  Service: General    EXPLORATORY LAPAROTOMY N/A 08/19/2018    Procedure: LAPAROTOMY EXPLORATORY, SIGMOID COLECTOMY, CREATION OF OSTOMY;  Surgeon: Andrea Bocanegra MD;  Location:  [facility];  Service: General    HYSTERECTOMY      partial - both ovaries still present pt believes     INSERTION HEMODIALYSIS CATHETER N/A 12/07/2017    Procedure: HEMODIALYSIS CATHETER INSERTION;  Surgeon: Chance Valenzuela MD;  Location:  [facility];  Service:     ORBITOTOMY Left 11/03/2017    Procedure:  LEFT LATERAL ORBITOTOMY WITH DEBULKING OF TUMOR ;  Surgeon: Moo Hopknis MD;  Location: [facility] OR;  Service:     OTHER SURGICAL HISTORY      esophagogastric fundoplasty nissen fundoplication    TUBAL ABDOMINAL LIGATION         Social History     Socioeconomic History    Marital status:    Tobacco Use    Smoking status: Never    Smokeless tobacco: Never   Vaping Use    Vaping status: Never Used   Substance and Sexual Activity    Alcohol use: No    Drug use: No    Sexual activity: Defer         Review of Systems    Objective   Vital Signs: Blood pressure 116/82, temperature 97.1 °F (36.2 °C), temperature source Infrared, height 160 cm (63\"), weight 77.8 kg (171 lb 9.6 oz), not currently breastfeeding.  Physical Exam  Vitals and nursing note reviewed.   Constitutional:       General: She is not in acute distress.     Appearance: She is well-developed.   HENT:      Head: Normocephalic and atraumatic.   Skin:     Comments: Sutures were removed in a clean fashion from frontal cranial incision. Incision is intact and francia appropriately.         Left eye ptosis and , unchanged    Assessment & Plan   Diagnosis:  Recurrent left frontal meningioma status post redo craniotomy for resection     Medical Decision Making: Ms " Tyron is recovering nicely. She is in good spirits today and has no complaints. Sutures were removed in a clean fashion and further wound care instructions were discussed. I gave her instructions for decadron wean. She will take one 2mg tablet daily for 10 days, then one 2mg tablet every other day for 10 days, then discontinue.  Continue keppra for now. Follow up with Dr Perales in 6 weeks. Call the office in the interim with any questions or concerns.     Diagnoses and all orders for this visit:    1. Meningioma, malignant (Primary)                                  Chula Randolph PA-C  Patient Care Team:  Michaela Connell MD as PCP - General  Michaela Connell MD as PCP - Family Medicine  Moo Hopkins MD as Consulting Physician (Ophthalmology)  Jamal Ramos MD as Consulting Physician (Nephrology)  Alli Aguirre MD as Consulting Physician (Cardiology)  Costa Raygoza MD as Consulting Physician (Radiation Oncology)  Andrea Bocanegra MD as Consulting Physician (General Surgery)  Alli Aguirre MD as Consulting Physician (Cardiology)  Mcihaela Connell MD as Primary Care Provider (Internal Medicine)  Robi Perales MD as Consulting Physician (Neurosurgery)

## 2024-04-02 DIAGNOSIS — G93.89 BRAIN MASS: ICD-10-CM

## 2024-04-02 DIAGNOSIS — C70.9 MENINGIOMA, MALIGNANT: Primary | ICD-10-CM

## 2024-04-02 RX ORDER — LEVETIRACETAM 500 MG/1
500 TABLET ORAL EVERY 12 HOURS
Qty: 60 TABLET | Refills: 0 | Status: SHIPPED | OUTPATIENT
Start: 2024-04-02

## 2024-04-12 ENCOUNTER — HOSPITAL ENCOUNTER (EMERGENCY)
Facility: HOSPITAL | Age: 69
Discharge: HOME | End: 2024-04-12
Attending: EMERGENCY MEDICINE
Payer: MEDICARE

## 2024-04-12 VITALS
DIASTOLIC BLOOD PRESSURE: 91 MMHG | TEMPERATURE: 97.9 F | WEIGHT: 170 LBS | HEART RATE: 58 BPM | RESPIRATION RATE: 18 BRPM | SYSTOLIC BLOOD PRESSURE: 157 MMHG | BODY MASS INDEX: 30.12 KG/M2 | HEIGHT: 63 IN | OXYGEN SATURATION: 96 %

## 2024-04-12 DIAGNOSIS — M54.50 ACUTE BILATERAL LOW BACK PAIN WITHOUT SCIATICA: Primary | ICD-10-CM

## 2024-04-12 LAB
ALBUMIN SERPL-MCNC: 4 G/DL (ref 3.5–5.2)
ALBUMIN/GLOB SERPL: 1.7 G/DL
ALP SERPL-CCNC: 83 U/L (ref 39–117)
ALT SERPL W P-5'-P-CCNC: 18 U/L (ref 1–33)
ANION GAP SERPL CALCULATED.3IONS-SCNC: 10 MMOL/L (ref 5–15)
AST SERPL-CCNC: 19 U/L (ref 1–32)
BACTERIA UR QL AUTO: ABNORMAL /HPF
BASOPHILS # BLD AUTO: 0.02 10*3/MM3 (ref 0–0.2)
BASOPHILS NFR BLD AUTO: 0.2 % (ref 0–1.5)
BILIRUB SERPL-MCNC: 0.4 MG/DL (ref 0–1.2)
BILIRUB UR QL STRIP: NEGATIVE
BUN SERPL-MCNC: 15 MG/DL (ref 8–23)
BUN/CREAT SERPL: 21.4 (ref 7–25)
CALCIUM SPEC-SCNC: 9 MG/DL (ref 8.6–10.5)
CHLORIDE SERPL-SCNC: 105 MMOL/L (ref 98–107)
CLARITY UR: CLEAR
CO2 SERPL-SCNC: 28 MMOL/L (ref 22–29)
COLOR UR: YELLOW
CREAT SERPL-MCNC: 0.7 MG/DL (ref 0.57–1)
DEPRECATED RDW RBC AUTO: 50.3 FL (ref 37–54)
EGFRCR SERPLBLD CKD-EPI 2021: 92.6 ML/MIN/1.73
EOSINOPHIL # BLD AUTO: 0.04 10*3/MM3 (ref 0–0.4)
EOSINOPHIL NFR BLD AUTO: 0.5 % (ref 0.3–6.2)
ERYTHROCYTE [DISTWIDTH] IN BLOOD BY AUTOMATED COUNT: 14.9 % (ref 12.3–15.4)
GLOBULIN UR ELPH-MCNC: 2.4 GM/DL
GLUCOSE SERPL-MCNC: 70 MG/DL (ref 65–99)
GLUCOSE UR STRIP-MCNC: NEGATIVE MG/DL
HCT VFR BLD AUTO: 38.7 % (ref 34–46.6)
HGB BLD-MCNC: 12.4 G/DL (ref 12–15.9)
HGB UR QL STRIP.AUTO: ABNORMAL
HYALINE CASTS UR QL AUTO: ABNORMAL /LPF
IMM GRANULOCYTES # BLD AUTO: 0.08 10*3/MM3 (ref 0–0.05)
IMM GRANULOCYTES NFR BLD AUTO: 1 % (ref 0–0.5)
KETONES UR QL STRIP: NEGATIVE
LEUKOCYTE ESTERASE UR QL STRIP.AUTO: ABNORMAL
LYMPHOCYTES # BLD AUTO: 1.68 10*3/MM3 (ref 0.7–3.1)
LYMPHOCYTES NFR BLD AUTO: 20.9 % (ref 19.6–45.3)
MCH RBC QN AUTO: 29.5 PG (ref 26.6–33)
MCHC RBC AUTO-ENTMCNC: 32 G/DL (ref 31.5–35.7)
MCV RBC AUTO: 91.9 FL (ref 79–97)
MONOCYTES # BLD AUTO: 0.52 10*3/MM3 (ref 0.1–0.9)
MONOCYTES NFR BLD AUTO: 6.5 % (ref 5–12)
NEUTROPHILS NFR BLD AUTO: 5.7 10*3/MM3 (ref 1.7–7)
NEUTROPHILS NFR BLD AUTO: 70.9 % (ref 42.7–76)
NITRITE UR QL STRIP: NEGATIVE
NRBC BLD AUTO-RTO: 0 /100 WBC (ref 0–0.2)
PH UR STRIP.AUTO: 6 [PH] (ref 5–8)
PLATELET # BLD AUTO: 179 10*3/MM3 (ref 140–450)
PMV BLD AUTO: 9.5 FL (ref 6–12)
POTASSIUM SERPL-SCNC: 3.8 MMOL/L (ref 3.5–5.2)
PROT SERPL-MCNC: 6.4 G/DL (ref 6–8.5)
PROT UR QL STRIP: NEGATIVE
RBC # BLD AUTO: 4.21 10*6/MM3 (ref 3.77–5.28)
RBC # UR STRIP: ABNORMAL /HPF
REF LAB TEST METHOD: ABNORMAL
SODIUM SERPL-SCNC: 143 MMOL/L (ref 136–145)
SP GR UR STRIP: 1.01 (ref 1–1.03)
SQUAMOUS #/AREA URNS HPF: ABNORMAL /HPF
UROBILINOGEN UR QL STRIP: ABNORMAL
WBC # UR STRIP: ABNORMAL /HPF
WBC NRBC COR # BLD AUTO: 8.04 10*3/MM3 (ref 3.4–10.8)

## 2024-04-12 PROCEDURE — 80053 COMPREHEN METABOLIC PANEL: CPT | Performed by: PHYSICIAN ASSISTANT

## 2024-04-12 PROCEDURE — 81001 URINALYSIS AUTO W/SCOPE: CPT | Performed by: PHYSICIAN ASSISTANT

## 2024-04-12 PROCEDURE — 87086 URINE CULTURE/COLONY COUNT: CPT | Performed by: PHYSICIAN ASSISTANT

## 2024-04-12 PROCEDURE — 85025 COMPLETE CBC W/AUTO DIFF WBC: CPT | Performed by: PHYSICIAN ASSISTANT

## 2024-04-12 PROCEDURE — P9612 CATHETERIZE FOR URINE SPEC: HCPCS

## 2024-04-12 PROCEDURE — 99283 EMERGENCY DEPT VISIT LOW MDM: CPT

## 2024-04-12 RX ORDER — SODIUM CHLORIDE 0.9 % (FLUSH) 0.9 %
10 SYRINGE (ML) INJECTION AS NEEDED
Status: DISCONTINUED | OUTPATIENT
Start: 2024-04-12 | End: 2024-04-12 | Stop reason: HOSPADM

## 2024-04-12 NOTE — ED PROVIDER NOTES
Subjective   History of Present Illness  Ms. Ackerman is a 69-year-old female who is status post craniotomy for meningioma (Dr. Perales) who presents to the emergency department with complaints of mild bilateral low back pain for the past couple of days.  The patient is concerned that she may have a urinary tract infection and does not want to risk getting ill after her recent brain surgery.  She denies any associated dysuria or hematuria.  No abdominal pain, nausea, vomiting or diarrhea.  She denies any injury to the back.  No radicular pain into the legs.  No loss of bowel or bladder control.  No numbness or weakness.  No fever or chills.      Review of Systems   Constitutional:  Negative for chills and fever.   HENT:  Negative for sore throat.    Respiratory:  Negative for cough and shortness of breath.    Cardiovascular:  Negative for chest pain.   Gastrointestinal:  Negative for abdominal pain, nausea and vomiting.   Genitourinary:  Negative for dysuria and hematuria.   Musculoskeletal:  Positive for back pain.   Skin:  Negative for rash.   Neurological:  Negative for weakness and numbness.   Hematological: Negative.    Psychiatric/Behavioral: Negative.         Past Medical History:   Diagnosis Date    Abdominal hernia     Arthritis     rt knee     Atrial flutter with rapid ventricular response 12/21/2017    Back pain     Brain tumor     Colostomy present 08/2018    Depression     History of blood transfusion     History of gastroesophageal reflux (GERD)     resolved since Nissen    History of Nissen fundoplication 7/1/2017    History of small bowel obstruction     Hyperlipemia     Hypertension     Memory loss or impairment     Migraine     Parathyroid adenoma     Incidental on thyroid US.    PONV (postoperative nausea and vomiting)     nausea - preprocedural meds help     Prediabetes     Last Impression: 06 Feb 2015  Reviewed labs. Excellent control.  Emery Connell Impression: 06 Feb 2015  Reviewed  labs. Excellent control.  Michaela Connell    Thyroid nodule      Last Impression: 13 Jun 2015  r/o thyroid cancer, will proceed with US.  Michaela Connell (Internal Medicine)     UTI (urinary tract infection)     Vision changes     blockages left eye     Wears glasses     readers       Allergies   Allergen Reactions    Dilantin [Phenytoin Sodium Extended] Rash       Past Surgical History:   Procedure Laterality Date    BRAIN SURGERY Left 05/19/2022    BRAIN SURGERY  08/2023    left side done & radiationg a wk. later,  in [place]    CARDIAC CATHETERIZATION N/A 04/27/2020    Procedure: LEFT HEART CATH;  Surgeon: Marina Ring MD;  Location:  [facility];  Service: Cardiology;  Laterality: N/A;    CARPAL TUNNEL RELEASE  2002    right     COLONOSCOPY N/A 08/18/2018    Procedure: COLONOSCOPY;  Surgeon: Inderjit Campos MD;  Location:  [facility];  Service: Gastroenterology    COLONOSCOPY N/A 11/28/2018    Procedure: COLONOSCOPY;  Surgeon: Inderjit Campos MD;  Location:  [facility];  Service: Gastroenterology    COLOSTOMY CLOSURE N/A 02/19/2019    Procedure: COLOSTOMY TAKEDOWN, INCISIONAL HERNIA REPAIR;  Surgeon: Andrea Bocanegra MD;  Location:  [facility];  Service: General    CRANIOTOMY  2003 & 2014    Dr. Werner Hollis for tumor removal    CRANIOTOMY FOR TUMOR Left 3/6/2024    Procedure: CRANIOTOMY FOR TUMOR STEREOTACTIC WITH STEALTH;  Surgeon: Robi Perales MD;  Location:  [facility];  Service: Neurosurgery;  Laterality: Left;    ENDOSCOPY      EXPLORATORY LAPAROTOMY N/A 12/05/2017    Procedure: EXPLORATORY LAPAROTOMY, SMALL BOWEL RESECTION;  Surgeon: Ирина Cobian MD;  Location:  [facility];  Service:     EXPLORATORY LAPAROTOMY N/A 12/22/2017    Procedure: LAPAROTOMY EXPLORATORY FOR SMALL BOWEL OBSTRUCTION;  Surgeon: Ирина Cobian MD;  Location:  [facility];  Service:     EXPLORATORY LAPAROTOMY N/A 07/23/2018    Procedure: EXPLORATORY LAPAROTOMY, APPENDECTOMY,  CECOPEXY, INCISIONAL HERNIA REPAIR, LYSIS OF ADHESIONS;  Surgeon: Andrea Bocanegra MD;  Location:  [facility];  Service: General    EXPLORATORY LAPAROTOMY N/A 08/19/2018    Procedure: LAPAROTOMY EXPLORATORY, SIGMOID COLECTOMY, CREATION OF OSTOMY;  Surgeon: Andrea Bocanegra MD;  Location:  [facility];  Service: General    HYSTERECTOMY      partial - both ovaries still present pt believes     INSERTION HEMODIALYSIS CATHETER N/A 12/07/2017    Procedure: HEMODIALYSIS CATHETER INSERTION;  Surgeon: Chance Valenzuela MD;  Location:  [facility];  Service:     ORBITOTOMY Left 11/03/2017    Procedure:  LEFT LATERAL ORBITOTOMY WITH DEBULKING OF TUMOR ;  Surgeon: Moo Hopkins MD;  Location:  [facility];  Service:     OTHER SURGICAL HISTORY      esophagogastric fundoplasty nissen fundoplication    TUBAL ABDOMINAL LIGATION         Family History   Problem Relation Age of Onset    Obesity Mother     Heart attack Mother     Heart disease Mother     Migraines Father     Stroke Father     Diabetes Father     Cancer Other     Arthritis Other     Diabetes Other     Breast cancer Maternal Aunt     Breast cancer Paternal Aunt     Ovarian cancer Neg Hx        Social History     Socioeconomic History    Marital status:    Tobacco Use    Smoking status: Never    Smokeless tobacco: Never   Vaping Use    Vaping status: Never Used   Substance and Sexual Activity    Alcohol use: No    Drug use: No    Sexual activity: Defer           Objective   Physical Exam  Constitutional:       General: She is not in acute distress.     Appearance: Normal appearance.   HENT:      Head:      Comments: Well-healing Craney ostomy incision left frontal region     Nose: Nose normal.      Mouth/Throat:      Mouth: Mucous membranes are moist.   Eyes:      Pupils: Pupils are equal, round, and reactive to light.   Cardiovascular:      Rate and Rhythm: Normal rate and regular rhythm.      Pulses: Normal pulses.      Heart sounds: Normal heart sounds.    Pulmonary:      Effort: Pulmonary effort is normal.      Breath sounds: Normal breath sounds.   Abdominal:      Tenderness: There is no abdominal tenderness.   Musculoskeletal:         General: No tenderness.   Skin:     General: Skin is warm and dry.   Neurological:      Mental Status: She is alert and oriented to person, place, and time.   Psychiatric:         Mood and Affect: Mood normal.         Procedures           ED Course      In summary, 69-year-old female status post craniotomy for glioblastoma in March of this year, presents to the emergency department with several day history of mild diffuse low back pain.  She recalls no injury or strain.  She states that she is concerned she may have developed a UTI.  She has no associated dysuria or hematuria.  No abdominal pain.  Normal bowel movements.  No numbness or weakness.    MDM: Differential includes UTI versus musculoskeletal etiology.    UA and labs collected.    UA shows no convincing evidence of UTI.  There are 6-10 white cells with no bacteria seen.  3-6 epithelial cells.  I will culture the urine but do not see any indication to treat with antibiotics at this time.  I will plan to discharge her home to take Tylenol or Motrin and apply heat and to follow-up or return if worse.                                       Medical Decision Making  Problems Addressed:  Acute bilateral low back pain without sciatica: complicated acute illness or injury    Amount and/or Complexity of Data Reviewed  Labs: ordered.    Risk  Prescription drug management.        Final diagnoses:   Acute bilateral low back pain without sciatica       ED Disposition  ED Disposition       ED Disposition   Discharge    Condition   Stable    Comment   --               Michaela Connell MD  [address] [facility] [address] [phone]      As needed    [facility] EMERGENCY DEPARTMENT  [address] [facility] [id number] [phone]    If symptoms  worsen         Medication List        Changed      carvedilol 12.5 MG tablet  Commonly known as: COREG  TAKE ONE TABLET BY MOUTH EVERY 12 HOURS  What changed:   how much to take  when to take this                 Odin Valdivia, PA  04/12/24 1209

## 2024-04-12 NOTE — DISCHARGE INSTRUCTIONS
Rest.  Continue current medications.  Tylenol or Motrin as directed for pain.  Follow-up with your PCP if symptoms persist.  Return to the emergency department if worse.

## 2024-04-13 LAB — BACTERIA SPEC AEROBE CULT: NORMAL

## 2024-04-29 DIAGNOSIS — C70.9 MENINGIOMA, MALIGNANT: ICD-10-CM

## 2024-04-29 DIAGNOSIS — G93.89 BRAIN MASS: ICD-10-CM

## 2024-04-29 RX ORDER — LEVETIRACETAM 500 MG/1
500 TABLET ORAL EVERY 12 HOURS
Qty: 60 TABLET | Refills: 0 | Status: SHIPPED | OUTPATIENT
Start: 2024-04-29

## 2024-04-29 NOTE — TELEPHONE ENCOUNTER
Provider:  TRISTIN  Surgery/Procedure:  CRANIOTOMY FOR TUMOR STEREOTACTIC WITH STEALTH   Surgery/Procedure Date:  Surgery with Robi Perales MD (03/06/2024)   Last visit:   Office Visit with Chula Randolph PA-C (03/26/2024)   Next visit: Appointment with Robi Perales MD (05/07/2024)     Meningioma Pt    LOV:  Continue keppra for now. Follow up with Dr Perales in 6 weeks. Call the office in the interim with any questions or concerns.      Reason for call:     Requested Prescriptions     Pending Prescriptions Disp Refills    levETIRAcetam (KEPPRA) 500 MG tablet [Pharmacy Med Name: levETIRAcetam 500 MG TABLET] 60 tablet 0     Sig: TAKE ONE TABLET BY MOUTH EVERY 12 HOURS

## 2024-05-07 ENCOUNTER — OFFICE VISIT (OUTPATIENT)
Dept: NEUROSURGERY | Facility: CLINIC | Age: 69
End: 2024-05-07
Payer: MEDICARE

## 2024-05-07 VITALS — BODY MASS INDEX: 31.84 KG/M2 | HEIGHT: 63 IN | TEMPERATURE: 97.1 F | WEIGHT: 179.7 LBS

## 2024-05-07 DIAGNOSIS — G93.89 BRAIN MASS: ICD-10-CM

## 2024-05-07 DIAGNOSIS — Z98.890 S/P CRANIOTOMY: ICD-10-CM

## 2024-05-07 DIAGNOSIS — C70.9 MENINGIOMA, MALIGNANT: Primary | ICD-10-CM

## 2024-05-07 PROCEDURE — 99024 POSTOP FOLLOW-UP VISIT: CPT | Performed by: NEUROLOGICAL SURGERY

## 2024-05-07 PROCEDURE — 1160F RVW MEDS BY RX/DR IN RCRD: CPT | Performed by: NEUROLOGICAL SURGERY

## 2024-05-07 PROCEDURE — 1159F MED LIST DOCD IN RCRD: CPT | Performed by: NEUROLOGICAL SURGERY

## 2024-05-22 DIAGNOSIS — C70.9 MENINGIOMA, MALIGNANT: ICD-10-CM

## 2024-05-22 DIAGNOSIS — G93.89 BRAIN MASS: ICD-10-CM

## 2024-05-22 RX ORDER — LEVETIRACETAM 500 MG/1
500 TABLET ORAL EVERY 12 HOURS
Qty: 60 TABLET | Refills: 0 | Status: SHIPPED | OUTPATIENT
Start: 2024-05-22

## 2024-05-22 NOTE — TELEPHONE ENCOUNTER
Provider:  Diaz  Surgery/Procedure:  CRANIOTOMY FOR TUMOR STEREOTACTIC WITH STEALTH   Surgery/Procedure Date:  3-6-24  Last visit:  Office Visit with Robi Perales MD (05/07/2024)    Next visit: 7-12-24     Reason for call:  Pharmacy has faxed over a refill request for Keppra. Please Advise. Thank you.        Requested Prescriptions     Pending Prescriptions Disp Refills    levETIRAcetam (KEPPRA) 500 MG tablet 60 tablet 0     Sig: Take 1 tablet by mouth Every 12 (Twelve) Hours.

## 2024-05-28 ENCOUNTER — TELEPHONE (OUTPATIENT)
Dept: NEUROSURGERY | Facility: OTHER | Age: 69
End: 2024-05-28
Payer: MEDICARE

## 2024-05-28 ENCOUNTER — TELEPHONE (OUTPATIENT)
Dept: NEUROSURGERY | Facility: CLINIC | Age: 69
End: 2024-05-28
Payer: MEDICARE

## 2024-05-28 NOTE — TELEPHONE ENCOUNTER
Documentation Only: I have called and left a message for the patient  to bring his disc tomorrow to his appointment.

## 2024-05-28 NOTE — TELEPHONE ENCOUNTER
Provider: MD KATHRINE.     Caller: Tereza Ackerman     Relationship to Patient: SELF    Pharmacy:   [facility] [id number] - [place] [address] [facility] [phone] [facility] [phone]  [phone]     Phone Number: [phone]    Reason for Call: PATIENT CALLED IN TO SPEAK WITH SOMEONE CLINICAL- SHE STATED SHE HAD BRAIN SURGERY BACK IN MARCH -03/06/24 - CRANIOTOMY FOR TUMOR STEREOTACTIC WITH STEALTH - SHE IS HAVING A HEADACHE NEAR SURGERY SITE (LEFT SIDE FOREHEAD) -     When was the patient last seen: 05/07/24    When did it start: MORE IN THE LAST COUPLE WEEKS    Where is it located: LEFT SIDE FOREHEAD NEAR LEFT SIDE OF INCISION SITE-    Characteristics of symptom/severity: ON A SCALE OF 1-10 - RATES THIS A 5    Timing- Is it constant or intermittent: INTERMITTENT    What makes it worse: MOVING HER HEAD AT ALL     What makes it better: SITTING AND RESTING- NOT MOVING    What therapies/medications have you tried: TYLENOL-     SHE IS STILL ALITTLE UNSTEADY ON HER FEET BUT NOT AS BAD AS IN THE BEGINNING - NO CHANGES IN PERSONALITY, BEHAVIOR, OR SPEECH/ COMMUNICATION, NO MOOD CHANGES OR NAUSEA ASSOCIATED-    PLEASE ADVISE- THANK YOU

## 2024-05-29 NOTE — TELEPHONE ENCOUNTER
Provider:  Diaz  Surgery/Procedure:  Craniotomy for tumor stereotactic with stealth  Surgery/Procedure Date:  3/6/24  Last visit:   5/7/24  Next visit: 7/12/24     Reason for call:  Patient reports she has had headaches over the incision of the left side of her forehead since surgery, that have worsened some over time. She is taking Tylenol as needed. She denies any other neurological changes: no vision changes, no confusion or change in LOC, no new balance deficits (had some post op that are gradually improving), no change in speech, no change to overall mood.

## 2024-05-30 NOTE — TELEPHONE ENCOUNTER
Left VM on husbands line (her primary phone was not connecting), her neuro symptoms were negative yesterday and she had no drainage from the wound, so we will keep her appointment as scheduled for now. But did tell them to call us back if there are any changes.

## 2024-06-06 ENCOUNTER — TELEPHONE (OUTPATIENT)
Dept: NEUROSURGERY | Facility: CLINIC | Age: 69
End: 2024-06-06
Payer: MEDICARE

## 2024-06-18 ENCOUNTER — HOSPITAL ENCOUNTER (OUTPATIENT)
Dept: MAMMOGRAPHY | Facility: HOSPITAL | Age: 69
Discharge: HOME | End: 2024-06-18
Admitting: INTERNAL MEDICINE
Payer: MEDICARE

## 2024-06-18 DIAGNOSIS — Z12.31 VISIT FOR SCREENING MAMMOGRAM: ICD-10-CM

## 2024-06-18 PROCEDURE — 77063 BREAST TOMOSYNTHESIS BI: CPT

## 2024-06-18 PROCEDURE — 77067 SCR MAMMO BI INCL CAD: CPT

## 2024-06-21 DIAGNOSIS — C70.9 MENINGIOMA, MALIGNANT: ICD-10-CM

## 2024-06-21 DIAGNOSIS — G93.89 BRAIN MASS: ICD-10-CM

## 2024-06-21 RX ORDER — LEVETIRACETAM 500 MG/1
500 TABLET ORAL EVERY 12 HOURS
Qty: 60 TABLET | Refills: 0 | Status: SHIPPED | OUTPATIENT
Start: 2024-06-21

## 2024-07-12 ENCOUNTER — OFFICE VISIT (OUTPATIENT)
Dept: NEUROSURGERY | Facility: CLINIC | Age: 69
End: 2024-07-12
Payer: MEDICARE

## 2024-07-12 ENCOUNTER — HOSPITAL ENCOUNTER (OUTPATIENT)
Dept: MRI IMAGING | Facility: HOSPITAL | Age: 69
Discharge: HOME | End: 2024-07-12
Payer: MEDICARE

## 2024-07-12 VITALS
TEMPERATURE: 97.1 F | BODY MASS INDEX: 33.13 KG/M2 | DIASTOLIC BLOOD PRESSURE: 80 MMHG | HEIGHT: 63 IN | WEIGHT: 187 LBS | SYSTOLIC BLOOD PRESSURE: 130 MMHG

## 2024-07-12 DIAGNOSIS — G93.89 BRAIN MASS: ICD-10-CM

## 2024-07-12 DIAGNOSIS — Z98.890 S/P CRANIOTOMY: ICD-10-CM

## 2024-07-12 DIAGNOSIS — C70.9 MENINGIOMA, MALIGNANT: Primary | ICD-10-CM

## 2024-07-12 DIAGNOSIS — C70.9 MENINGIOMA, MALIGNANT: ICD-10-CM

## 2024-07-12 PROCEDURE — 0 GADOBENATE DIMEGLUMINE 529 MG/ML SOLUTION: Performed by: NEUROLOGICAL SURGERY

## 2024-07-12 PROCEDURE — A9577 INJ MULTIHANCE: HCPCS | Performed by: NEUROLOGICAL SURGERY

## 2024-07-12 PROCEDURE — 70553 MRI BRAIN STEM W/O & W/DYE: CPT

## 2024-07-12 RX ORDER — PREDNISOLONE ACETATE 10 MG/ML
SUSPENSION/ DROPS OPHTHALMIC
COMMUNITY
Start: 2024-06-27

## 2024-07-12 RX ADMIN — GADOBENATE DIMEGLUMINE 15 ML: 529 INJECTION, SOLUTION INTRAVENOUS at 13:45

## 2024-07-12 NOTE — PROGRESS NOTES
Patient: Tereza Ackerman  : 1955  Chart #: [id number]    Date of Service: 2024    Chief complaint: Follow-up    HPI: Ms. Ackerman is a 69-year-old woman with a history of left sphenoid wing meningioma that was initially operated on in  and then again in .  She has undergone multiple radiosurgery procedures as well.  She presented to our practice for further superior and medial left frontal region recurrences.  She has had some confusion and headaches.  Thus, Dr. Perales performed a left frontal craniotomy for resection of the recurrent tumor.     Today, Ms. Ackerman is here in follow-up with new MRI studies of the brain to establish a new baseline.  She complains of left orbital swelling that has gotten somewhat worse by her report.  She also endorses some mild headaches and confusion.  She is also been nervous to wash her scalp and her incision line as she was concerned for infection, and states that she occasionally gets drainage from it when she wipes it with a tissue.  She denies any fever or chills.    The patient's past medical history, past surgical history, family history, and social history have been reviewed at length in the electronic medical record.    Review of Systems   Constitutional:  Negative for activity change, appetite change, chills, diaphoresis, fatigue, fever and unexpected weight change.   HENT:  Negative for congestion, dental problem, drooling, ear discharge, ear pain, facial swelling, hearing loss, mouth sores, nosebleeds, postnasal drip, rhinorrhea, sinus pressure, sinus pain, sneezing, sore throat, tinnitus, trouble swallowing and voice change.    Eyes:  Negative for photophobia, pain, discharge, redness, itching and visual disturbance.   Respiratory:  Negative for apnea, cough, choking, chest tightness, shortness of breath, wheezing and stridor.    Cardiovascular:  Negative for chest pain, palpitations and leg swelling.   Gastrointestinal:  Negative for abdominal  "distention, abdominal pain, anal bleeding, blood in stool, constipation, diarrhea, nausea, rectal pain and vomiting.   Endocrine: Negative for cold intolerance, heat intolerance, polydipsia, polyphagia and polyuria.   Genitourinary:  Negative for decreased urine volume, difficulty urinating, dyspareunia, dysuria, enuresis, flank pain, frequency, genital sores, hematuria, menstrual problem, pelvic pain, urgency, vaginal bleeding, vaginal discharge and vaginal pain.   Musculoskeletal:  Negative for arthralgias, back pain, gait problem, joint swelling, myalgias, neck pain and neck stiffness.   Skin:  Negative for color change, pallor, rash and wound.   Allergic/Immunologic: Negative for environmental allergies, food allergies and immunocompromised state.   Neurological:  Positive for headaches. Negative for dizziness, tremors, seizures, syncope, facial asymmetry, speech difficulty, weakness, light-headedness and numbness.   Hematological:  Negative for adenopathy. Does not bruise/bleed easily.   Psychiatric/Behavioral:  Negative for agitation, behavioral problems, confusion, decreased concentration, dysphoric mood, hallucinations, self-injury, sleep disturbance and suicidal ideas. The patient is not nervous/anxious and is not hyperactive.           Physical examination:  Blood pressure 130/80, temperature 97.1 °F (36.2 °C), temperature source Infrared, height 160 cm (63\"), weight 84.8 kg (187 lb), not currently breastfeeding.    Constitutional: Patient is well-developed.  She is overweight.  She is in no acute distress    HEENT: There is left periorbital swelling.    Dermatological: There appears to continue to be dried eschar and scab along the incision line.  The patient has admitted that she has picked at this before, but she has been hesitant to wash it with soap and water as she was concerned with infection.  In comparison to previous notes by my colleagues, she may have increased drainage, but it is dry as of " today.      Neurologic Exam  A/A/C, speech clear, attention normal, conversant, answers questions appropriately, good historian.  There is some diminished lateral gaze on the left.  Cranial nerves otherwise normal  Motor examination does not reveal weakness in the , upper or lower extremities.   Gait is normal, balance is normal.   No tremors are noted.  Marie is negative. Clonus is negative.     Radiographic Imaging:  For my review MRI of the brain with and without contrast dated 7/12/2024 demonstrates postoperative changes from resection of the left frontal lobe meningioma.  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  There is no evidence of mass effect.  Imaging was reviewed with Dr. Perales.    Medical Decision Making  Diagnoses and all orders for this visit:    1. Meningioma, malignant (Primary)    2. S/P craniotomy    3. Brain mass    Ms. Ackerman's MRI studies show expected postoperative changes.  I am concerned with the appearance of her surgical incision; it is difficult for me to assess if she is having increased drainage as she reports that she has really avoided washing the area, but I would expect it to be more closed at this interval.  I have recommended that she wash her hair with shampoo daily and avoid directly scratching over the site but allow some of the eschar to loosen.  She will follow-up in 3 weeks for reexamination of her surgical incision.    Any copied data from previous notes included in the (1) HPI, (2) PE, (3) MDM and/or assessment and plan has been reviewed and is accurate as of this day.    Ingrid Lemons PA-C     Patient Care Team:  Michaela Connell MD as PCP - General  Michaela Connell MD as PCP - Family Medicine  Moo Hopkins MD as Consulting Physician (Ophthalmology)  Jamal Ramos MD as Consulting Physician (Nephrology)  Alli Aguirre MD as Consulting Physician (Cardiology)  Costa Raygoza  MD ILAN as Consulting Physician (Radiation Oncology)  Andrea Bocanegra MD as Consulting Physician (General Surgery)  Alli Aguirre MD as Consulting Physician (Cardiology)  Michaela Connell MD as Primary Care Provider (Internal Medicine)  Robi Perales MD as Consulting Physician (Neurosurgery)

## 2024-07-15 LAB — CREAT BLDA-MCNC: 1 MG/DL (ref 0.6–1.3)

## 2024-07-22 DIAGNOSIS — C70.9 MENINGIOMA, MALIGNANT: ICD-10-CM

## 2024-07-22 DIAGNOSIS — G93.89 BRAIN MASS: ICD-10-CM

## 2024-07-22 RX ORDER — LEVETIRACETAM 500 MG/1
500 TABLET ORAL EVERY 12 HOURS
Qty: 60 TABLET | Refills: 0 | Status: SHIPPED | OUTPATIENT
Start: 2024-07-22

## 2024-08-02 ENCOUNTER — OFFICE VISIT (OUTPATIENT)
Dept: NEUROSURGERY | Facility: CLINIC | Age: 69
End: 2024-08-02
Payer: MEDICARE

## 2024-08-02 VITALS
HEIGHT: 63 IN | DIASTOLIC BLOOD PRESSURE: 68 MMHG | SYSTOLIC BLOOD PRESSURE: 112 MMHG | WEIGHT: 187.3 LBS | BODY MASS INDEX: 33.19 KG/M2 | TEMPERATURE: 97.3 F

## 2024-08-02 DIAGNOSIS — C70.9 MENINGIOMA, MALIGNANT: Primary | ICD-10-CM

## 2024-08-02 DIAGNOSIS — Z51.89 VISIT FOR WOUND CHECK: ICD-10-CM

## 2024-08-02 PROCEDURE — 3078F DIAST BP <80 MM HG: CPT | Performed by: PHYSICIAN ASSISTANT

## 2024-08-02 PROCEDURE — 3074F SYST BP LT 130 MM HG: CPT | Performed by: PHYSICIAN ASSISTANT

## 2024-08-02 PROCEDURE — 99213 OFFICE O/P EST LOW 20 MIN: CPT | Performed by: PHYSICIAN ASSISTANT

## 2024-08-02 NOTE — PROGRESS NOTES
Patient: Tereza Ackerman  : 1955  Chart #: [id number]    Date of Service: 2024    CHIEF COMPLAINT: Wound check    History of Present Illness Ms. Ackerman is a 69-year-old woman who underwent left sphenoid wing meningioma resection in  and then again in .  She has undergone multiple radiosurgery procedures as well.  More recently she presented with recurrence and on 3/6/2024 she underwent redo left frontal craniotomy for resection.  Gross total resection was achieved.  She has left orbital swelling and ptosis.  She has mild headache.   she is here today to evaluate the incision.  At last appointment, there was very thick eschar along the length.  Patient mentions she has occasional drainage.  She has not been showering and washing the area with warm water and soap.  She has been using a normal saline solution to clean it.  She has inquired about backing off of Keppra.  No fevers chills or flulike symptoms.  No drainage in the recent couple days.      Past Medical History:   Diagnosis Date    Abdominal hernia     Arthritis     rt knee     Atrial flutter with rapid ventricular response 2017    Back pain     Brain tumor     Colostomy present 2018    Depression     History of blood transfusion     History of gastroesophageal reflux (GERD)     resolved since Nissen    History of Nissen fundoplication 2017    History of small bowel obstruction     Hyperlipemia     Hypertension     Memory loss or impairment     Migraine     Parathyroid adenoma     Incidental on thyroid US.    PONV (postoperative nausea and vomiting)     nausea - preprocedural meds help     Prediabetes     Last Impression: 2015  Reviewed labs. Excellent control.  Emery Connell Impression: 2015  Reviewed labs. Excellent control.  Michaela Connell    Thyroid nodule      Last Impression: 2015  r/o thyroid cancer, will proceed with US.  Michaela Connell (Internal Medicine)     UTI (urinary  tract infection)     Vision changes     blockages left eye     Wears glasses     readers         Current Outpatient Medications:     Artificial Tear Ointment (artificial tears) ophthalmic ointment, Administer  to both eyes Every 1 (One) Hour As Needed., Disp: , Rfl:     aspirin 81 MG tablet, Take 1 tablet by mouth Daily., Disp: 30 tablet, Rfl: 11    busPIRone (BUSPAR) 10 MG tablet, Take 1 tablet by mouth 2 (Two) Times a Day., Disp: 180 tablet, Rfl: 3    carvedilol (COREG) 12.5 MG tablet, TAKE ONE TABLET BY MOUTH EVERY 12 HOURS (Patient taking differently: Take 0.5 tablets by mouth 2 (Two) Times a Day With Meals.), Disp: 180 tablet, Rfl: 3    cholecalciferol (VITAMIN D3) 1000 units tablet, Take 2 tablets by mouth Every Other Day., Disp: , Rfl:     dexAMETHasone (DECADRON) 4 MG tablet, Take 1 tablet by mouth Every 8 (Eight) Hours. 1 tablet by mouth every 8 hours for 4 days, then 1/2 tablet by mouth every 8 hours for 4 days, then continue 1/2 tablet twice a day until follow-up, Disp: 90 tablet, Rfl: 0    dorzolamide (TRUSOPT) 2 % ophthalmic solution, Administer 1 drop into the left eye 2 (Two) Times a Day., Disp: , Rfl:     levETIRAcetam (KEPPRA) 500 MG tablet, TAKE ONE TABLET BY MOUTH EVERY 12 HOURS, Disp: 60 tablet, Rfl: 0    lisinopril (PRINIVIL,ZESTRIL) 2.5 MG tablet, Take 1 tablet by mouth Daily., Disp: 90 tablet, Rfl: 3    LORazepam (Ativan) 0.5 MG tablet, Take 0.5-1 tablets by mouth Daily As Needed for Anxiety., Disp: 15 tablet, Rfl: 0    omeprazole (priLOSEC) 20 MG capsule, Take 1 capsule by mouth Daily., Disp: 90 capsule, Rfl: 3    ondansetron ODT (ZOFRAN-ODT) 4 MG disintegrating tablet, Place 1 tablet on the tongue Every 4 (Four) Hours. As needed for nausea, Disp: 12 tablet, Rfl: 0    phenazopyridine (PYRIDIUM) 200 MG tablet, Take 1 tablet by mouth 3 (Three) Times a Day As Needed for Dysuria., Disp: 6 tablet, Rfl: 0    polyethyl glycol-propyl glycol (SYSTANE) 0.4-0.3 % solution ophthalmic solution (artificial  tears), Administer 1 drop into the left eye Every 1 (One) Hour As Needed (dry eye)., Disp: , Rfl:     prednisoLONE acetate (PRED FORTE) 1 % ophthalmic suspension, Administer  into the left eye., Disp: , Rfl:     QUEtiapine (SEROquel) 25 MG tablet, Take 0.5 tablets by mouth every night at bedtime., Disp: 45 tablet, Rfl: 3    rosuvastatin (CRESTOR) 40 MG tablet, Take 1 tablet by mouth Daily., Disp: 90 tablet, Rfl: 3    sertraline (ZOLOFT) 100 MG tablet, Take 1 tablet by mouth Daily., Disp: 90 tablet, Rfl: 3    thiamine (VITAMIN B-1) 100 MG tablet tablet, Take 1 tablet by mouth Every Other Day., Disp: 30 each, Rfl: 5    timolol (TIMOPTIC) 0.5 % ophthalmic solution, 1 drop 2 (Two) Times a Day., Disp: , Rfl:     Past Surgical History:   Procedure Laterality Date    BRAIN SURGERY Left 05/19/2022    BRAIN SURGERY  08/2023    left side done & radiationg a wk. later,  in [place]    CARDIAC CATHETERIZATION N/A 04/27/2020    Procedure: LEFT HEART CATH;  Surgeon: Marina Ring MD;  Location: [facility] CATH INVASIVE LOCATION;  Service: Cardiology;  Laterality: N/A;    CARPAL TUNNEL RELEASE  2002    right     COLONOSCOPY N/A 08/18/2018    Procedure: COLONOSCOPY;  Surgeon: Inderjit Campos MD;  Location:  [facility];  Service: Gastroenterology    COLONOSCOPY N/A 11/28/2018    Procedure: COLONOSCOPY;  Surgeon: Inderjit Campos MD;  Location:  [facility];  Service: Gastroenterology    COLOSTOMY CLOSURE N/A 02/19/2019    Procedure: COLOSTOMY TAKEDOWN, INCISIONAL HERNIA REPAIR;  Surgeon: Andrea Bocanegra MD;  Location:  [facility];  Service: General    CRANIOTOMY  2003 & 2014    Dr. Werner Hollis for tumor removal    CRANIOTOMY FOR TUMOR Left 3/6/2024    Procedure: CRANIOTOMY FOR TUMOR STEREOTACTIC WITH STEALTH;  Surgeon: Robi Perales MD;  Location:  [facility];  Service: Neurosurgery;  Laterality: Left;    ENDOSCOPY      EXPLORATORY LAPAROTOMY N/A 12/05/2017    Procedure: EXPLORATORY LAPAROTOMY, SMALL BOWEL  RESECTION;  Surgeon: Ирина Cobian MD;  Location:  [facility];  Service:     EXPLORATORY LAPAROTOMY N/A 12/22/2017    Procedure: LAPAROTOMY EXPLORATORY FOR SMALL BOWEL OBSTRUCTION;  Surgeon: Ирина Cobian MD;  Location:  [facility];  Service:     EXPLORATORY LAPAROTOMY N/A 07/23/2018    Procedure: EXPLORATORY LAPAROTOMY, APPENDECTOMY, CECOPEXY, INCISIONAL HERNIA REPAIR, LYSIS OF ADHESIONS;  Surgeon: Andrea Bocanegra MD;  Location:  [facility];  Service: General    EXPLORATORY LAPAROTOMY N/A 08/19/2018    Procedure: LAPAROTOMY EXPLORATORY, SIGMOID COLECTOMY, CREATION OF OSTOMY;  Surgeon: Andrea Bocanegra MD;  Location:  [facility];  Service: General    HYSTERECTOMY      partial - both ovaries still present pt believes     INSERTION HEMODIALYSIS CATHETER N/A 12/07/2017    Procedure: HEMODIALYSIS CATHETER INSERTION;  Surgeon: Chance Valenzuela MD;  Location:  [facility];  Service:     ORBITOTOMY Left 11/03/2017    Procedure:  LEFT LATERAL ORBITOTOMY WITH DEBULKING OF TUMOR ;  Surgeon: Moo Hopkins MD;  Location:  [facility];  Service:     OTHER SURGICAL HISTORY      esophagogastric fundoplasty nissen fundoplication    TUBAL ABDOMINAL LIGATION         Social History     Socioeconomic History    Marital status:    Tobacco Use    Smoking status: Never     Passive exposure: Never    Smokeless tobacco: Never   Vaping Use    Vaping status: Never Used   Substance and Sexual Activity    Alcohol use: No    Drug use: No    Sexual activity: Defer         Review of Systems   Constitutional:  Negative for activity change, appetite change, chills, diaphoresis, fatigue, fever and unexpected weight change.   HENT:  Negative for congestion, dental problem, drooling, ear discharge, ear pain, facial swelling, hearing loss, mouth sores, nosebleeds, postnasal drip, rhinorrhea, sinus pressure, sinus pain, sneezing, sore throat, tinnitus, trouble swallowing and voice change.    Eyes:  Positive for pain and discharge.  "Negative for photophobia, redness, itching and visual disturbance.   Respiratory:  Negative for apnea, cough, choking, chest tightness, shortness of breath, wheezing and stridor.    Cardiovascular:  Negative for chest pain, palpitations and leg swelling.   Gastrointestinal:  Negative for abdominal distention, abdominal pain, anal bleeding, blood in stool, constipation, diarrhea, nausea, rectal pain and vomiting.   Endocrine: Negative for cold intolerance, heat intolerance, polydipsia, polyphagia and polyuria.   Genitourinary:  Negative for decreased urine volume, difficulty urinating, dyspareunia, dysuria, enuresis, flank pain, frequency, genital sores, hematuria, menstrual problem, pelvic pain, urgency, vaginal bleeding, vaginal discharge and vaginal pain.   Musculoskeletal:  Negative for arthralgias, back pain, gait problem, joint swelling, myalgias, neck pain and neck stiffness.   Skin:  Positive for wound. Negative for color change, pallor and rash.   Allergic/Immunologic: Negative for environmental allergies, food allergies and immunocompromised state.   Neurological:  Positive for headaches. Negative for dizziness, tremors, seizures, syncope, facial asymmetry, speech difficulty, weakness, light-headedness and numbness.   Hematological:  Negative for adenopathy. Does not bruise/bleed easily.   Psychiatric/Behavioral:  Negative for agitation, behavioral problems, confusion, decreased concentration, dysphoric mood, hallucinations, self-injury, sleep disturbance and suicidal ideas. The patient is not nervous/anxious and is not hyperactive.        Objective   Vital Signs: Blood pressure 112/68, temperature 97.3 °F (36.3 °C), temperature source Infrared, height 160 cm (63\"), weight 85 kg (187 lb 4.8 oz), not currently breastfeeding.  Physical Exam  Skin:     Comments: Very thick eschar is observed around the length of the entire left cranial incision.  No significant erythema.  I am not able to express drainage "         Assessment & Plan   Diagnosis: Meningioma status post craniotomy for resection    Medical Decision Making: Patient has done well postoperatively.  She presents today for wound follow-up.  I am concerned about the thick eschar along the length of her incision.  It does not appear overtly infectious.  She has not been letting the water run over the incision in the shower.  I have given her instructions to shower daily and allow the warm water to run over the incision.  She may use mild soap or shampoo.  No scrubbing or picking the incision.  She will follow-up in 1 week.    Diagnoses and all orders for this visit:    1. Meningioma, malignant (Primary)    2. Visit for wound check                                  Chula Randolph PA-C  Patient Care Team:  Michaela Connell MD as PCP - General  Michaela Connell MD as PCP - Family Medicine  Moo Hopkins MD as Consulting Physician (Ophthalmology)  Jamal Ramos MD as Consulting Physician (Nephrology)  Alli Aguirre MD as Consulting Physician (Cardiology)  Costa Raygoza MD as Consulting Physician (Radiation Oncology)  Andrea Bocanegra MD as Consulting Physician (General Surgery)  Alli Aguirre MD as Consulting Physician (Cardiology)  Michaela Connell MD as Primary Care Provider (Internal Medicine)  Robi Perales MD as Consulting Physician (Neurosurgery)

## 2024-08-09 ENCOUNTER — OFFICE VISIT (OUTPATIENT)
Dept: NEUROSURGERY | Facility: CLINIC | Age: 69
End: 2024-08-09
Payer: MEDICARE

## 2024-08-09 ENCOUNTER — HOSPITAL ENCOUNTER (OUTPATIENT)
Dept: PHYSICAL THERAPY | Facility: HOSPITAL | Age: 69
Setting detail: THERAPIES SERIES
Discharge: HOME | End: 2024-08-09
Payer: MEDICARE

## 2024-08-09 VITALS
BODY MASS INDEX: 33.31 KG/M2 | TEMPERATURE: 97.1 F | SYSTOLIC BLOOD PRESSURE: 116 MMHG | DIASTOLIC BLOOD PRESSURE: 62 MMHG | WEIGHT: 188 LBS | HEIGHT: 63 IN

## 2024-08-09 DIAGNOSIS — T14.8XXA ESCHAR OF WOUND BED: Primary | ICD-10-CM

## 2024-08-09 DIAGNOSIS — S01.00XD OPEN WOUND OF SCALP, SUBSEQUENT ENCOUNTER: ICD-10-CM

## 2024-08-09 DIAGNOSIS — T81.89XD NON-HEALING SURGICAL WOUND, SUBSEQUENT ENCOUNTER: Primary | ICD-10-CM

## 2024-08-09 PROCEDURE — 97598 DBRDMT OPN WND ADDL 20CM/<: CPT

## 2024-08-09 PROCEDURE — 97162 PT EVAL MOD COMPLEX 30 MIN: CPT

## 2024-08-09 PROCEDURE — 97597 DBRDMT OPN WND 1ST 20 CM/<: CPT

## 2024-08-09 NOTE — THERAPY EVALUATION
Outpatient Rehabilitation - Wound/Debridement Initial Eval   [place]     Patient Name: Tereza Ackerman  : 1955  MRN: [id number]  Today's Date: 2024                  Admit Date: 2024    Visit Dx:    ICD-10-CM ICD-9-CM   1. Non-healing surgical wound, subsequent encounter  T81.89XD V58.89     998.83   2. Open wound of scalp, subsequent encounter  S01.00XD V58.89     873.0     L scalp (pre-debridement)      L scalp (post-debridement)      Patient Active Problem List   Diagnosis    Impaired glucose tolerance    Parathyroid adenoma    Reaction to chronic stress    Multinodular goiter    Vitamin D deficiency    Meningioma, recurrent of brain    Arthritis    Depression    Small bowel obstruction    Insomnia    History of Nissen fundoplication    Malignant neoplasm of left orbit    Prediabetes    Mixed hyperlipidemia    Hyperglycemia    Atrial flutter with rapid ventricular response    Anemia    Large bowel obstruction    Abdominal pain    Essential hypertension    History of creation of ostomy    Screen for colon cancer    Sigmoid volvulus    SHAE (obstructive sleep apnea)    Brain mass    Meningioma        Past Medical History:   Diagnosis Date    Abdominal hernia     Arthritis     rt knee     Atrial flutter with rapid ventricular response 2017    Back pain     Brain tumor     Colostomy present 2018    Depression     History of blood transfusion     History of gastroesophageal reflux (GERD)     resolved since Nissen    History of Nissen fundoplication 2017    History of small bowel obstruction     Hyperlipemia     Hypertension     Memory loss or impairment     Migraine     Parathyroid adenoma     Incidental on thyroid US.    PONV (postoperative nausea and vomiting)     nausea - preprocedural meds help     Prediabetes     Last Impression: 2015  Reviewed labs. Excellent control.  Emery Connell Impression: 2015  Reviewed labs. Excellent control.  Michaela Connell     Thyroid nodule      Last Impression: 13 Jun 2015  r/o thyroid cancer, will proceed with US.  Michaela Connell (Internal Medicine)     UTI (urinary tract infection)     Vision changes     blockages left eye     Wears glasses     readers        Past Surgical History:   Procedure Laterality Date    BRAIN SURGERY Left 05/19/2022    BRAIN SURGERY  08/2023    left side done & radiationg a wk. later,  in [place]    CARDIAC CATHETERIZATION N/A 04/27/2020    Procedure: LEFT HEART CATH;  Surgeon: Marina Ring MD;  Location:  [facility];  Service: Cardiology;  Laterality: N/A;    CARPAL TUNNEL RELEASE  2002    right     COLONOSCOPY N/A 08/18/2018    Procedure: COLONOSCOPY;  Surgeon: Inderjit Campos MD;  Location: [facility];  Service: Gastroenterology    COLONOSCOPY N/A 11/28/2018    Procedure: COLONOSCOPY;  Surgeon: Inderjit Campos MD;  Location: [facility];  Service: Gastroenterology    COLOSTOMY CLOSURE N/A 02/19/2019    Procedure: COLOSTOMY TAKEDOWN, INCISIONAL HERNIA REPAIR;  Surgeon: Andrea Bocanegra MD;  Location:  [facility];  Service: General    CRANIOTOMY  2003 & 2014    Dr. Werner Hollis for tumor removal    CRANIOTOMY FOR TUMOR Left 3/6/2024    Procedure: CRANIOTOMY FOR TUMOR STEREOTACTIC WITH STEALTH;  Surgeon: Robi Perales MD;  Location:  [facility];  Service: Neurosurgery;  Laterality: Left;    ENDOSCOPY      EXPLORATORY LAPAROTOMY N/A 12/05/2017    Procedure: EXPLORATORY LAPAROTOMY, SMALL BOWEL RESECTION;  Surgeon: Ирина Cobian MD;  Location:  [facility];  Service:     EXPLORATORY LAPAROTOMY N/A 12/22/2017    Procedure: LAPAROTOMY EXPLORATORY FOR SMALL BOWEL OBSTRUCTION;  Surgeon: Ирина Cobian MD;  Location:  [facility];  Service:     EXPLORATORY LAPAROTOMY N/A 07/23/2018    Procedure: EXPLORATORY LAPAROTOMY, APPENDECTOMY, CECOPEXY, INCISIONAL HERNIA REPAIR, LYSIS OF ADHESIONS;  Surgeon: Andrea Bocanegra MD;  Location:  [facility];  Service: General     EXPLORATORY LAPAROTOMY N/A 08/19/2018    Procedure: LAPAROTOMY EXPLORATORY, SIGMOID COLECTOMY, CREATION OF OSTOMY;  Surgeon: Andrea Bocanegra MD;  Location:  [facility];  Service: General    HYSTERECTOMY      partial - both ovaries still present pt believes     INSERTION HEMODIALYSIS CATHETER N/A 12/07/2017    Procedure: HEMODIALYSIS CATHETER INSERTION;  Surgeon: Chance Valenzuela MD;  Location:  [facility];  Service:     ORBITOTOMY Left 11/03/2017    Procedure:  LEFT LATERAL ORBITOTOMY WITH DEBULKING OF TUMOR ;  Surgeon: Moo Hopkins MD;  Location:  [facility];  Service:     OTHER SURGICAL HISTORY      esophagogastric fundoplasty nissen fundoplication    TUBAL ABDOMINAL LIGATION          Patient History       Row Name 08/09/24 1500             History    Chief Complaint Ulcer, wound or other skin conditions  -      Date Current Problem(s) Began 03/06/24  -      Brief Description of Current Complaint Pt with complex history of multiple resections of meningiomas with initial operation in 2003. Pt with most recent operation on 3/6/24 with craniotomy and meningioma removal. Pt with subsequent nonhealing craniotomy incision to the L side with ongoing draiange.  -      Patient/Caregiver Goals Heal wound  -      Patient/Caregiver Goals Comment Reduce draiange and heal wound  -      Patient's Rating of General Health Fair  -      Patient seeing anyone else for problem(s)? Dr. Perales/ [facility]-  -      How has patient tried to help current problem? Washed wound with shampoo and water in the shower, otherwise left ROSELYN.  -         Pain     Pain Location --  L scalp  -      Pain at Present 0  -      Pain at Best 0  -      Pain at Worst 4  -         Services    Are you currently receiving Home Health services No  -      Do you plan to receive Home Health services in the near future No  -         Daily Activities    Primary Language English  -      Are you able to read Yes  -       Are you able to write Yes  -      How does patient learn best? Listening;Reading;Demonstration  -      Teaching needs identified Management of Condition  -      Barriers to learning None  -      Pt Participated in POC and Goals Yes  -                User Key  (r) = Recorded By, (t) = Taken By, (c) = Cosigned By      Initials Name Provider Type     Malcolm Reyna, PT Physical Therapist                    EVALUATION   PT Ortho       Row Name 08/09/24 1500       Subjective    Subjective Comments Pt reports she was told to wash her wound daily with soap and water in the shower, however was not given any further advanced instructions. Reports she has never had abx for this issue. Reports her superior and R scalp incision healed well, however the L continues to drain constantly.  -       Subjective Pain    Able to rate subjective pain? yes  -    Pre-Treatment Pain Level 0  -    Post-Treatment Pain Level 0  -    Subjective Pain Comment ~4/10 FACES with debridement.  -       Transfers    Sit-Stand [place] (Transfers) independent  -    Stand-Sit [place] (Transfers) independent  -    Comment, (Transfers) Seated for tx  -       Gait/Stairs (Locomotion)    [place] Level (Gait) independent  -              User Key  (r) = Recorded By, (t) = Taken By, (c) = Cosigned By      Initials Name Provider Type     Malcolm Reyna, PT Physical Therapist                   LDA Wound       Row Name 08/09/24 1500             Wound 08/09/24 Left lateral scalp Incision    Wound - Properties Group Placement Date: 08/09/24  - Present on Original Admission: Y  -LH Side: Left  - Orientation: lateral  - Location: scalp  -LH Primary Wound Type: Incision  -    Wound Image Images linked: 2  -LH      Dressing Appearance open to air  -      Base moist;red;granulating;other (see comments)  Initially considerable thick crusts. Following debridement moderate/severe hypergranulation of wound base   -      Periwound intact;dry;warm  -      Periwound Temperature warm  -      Periwound Skin Turgor soft  -      Edges open;irregular  -      Wound Length (cm) 7.5 cm  -      Wound Width (cm) 0.8 cm  -      Wound Depth (cm) --  Raised w/ hypergranulation approximately 0.4cm  -      Wound Surface Area (cm^2) 6 cm^2  -      Drainage Characteristics/Odor serosanguineous;yellow;tan;brown;other (see comments)  Thick, brown/tan drainage trapped beneath thick crusts. No brown/tan draiange noted following debridement of crusts  -      Drainage Amount small  -      Care, Wound cleansed with;soap and water;debrided;silver agent applied  Ag Nitrate to hypergranulation. Trimmed periwound hair with clippers and scissors.  -      Dressing Care dressing applied;silver impregnated;low-adherent;foam  Mepilex Ag, primafix tape  -      Periwound Care cleansed with pH balanced cleanser;dry periwound area maintained  nosting  -      Retired Wound - Properties Group Placement Date: 08/09/24  - Present on Original Admission: Y  - Side: Left  - Orientation: lateral  - Location: scalp  - Primary Wound Type: Incision  -    Retired Wound - Properties Group Date first assessed: 08/09/24  - Present on Original Admission: Y  - Side: Left  - Location: scalp  - Primary Wound Type: Incision  -LH              User Key  (r) = Recorded By, (t) = Taken By, (c) = Cosigned By      Initials Name Provider Type     Malcolm Reyna, PT Physical Therapist                      WOUND DEBRIDEMENT  Total area of Debridement: ~22cm2  Debridement Site 1  Location- Site 1: L scalp  Selective Debridement- Site 1: Wound Surface <20cmsq  Instruments- Site 1: tweezers, scissors  Excised Tissue Description- Site 1: maximum, slough, other (comment) (dried exudate, crusts)  Bleeding- Site 1: scant              Therapy Education       Row Name 08/09/24 1500             Therapy Education    Education Details Reviewed s/sx of  infection and when to seek medical attention. Reviewed benefits of current dressings. Do not leave wound ROSELYN. May leave dressing intact until next session. If soaked with drainage or if bandage comes loose, cleanse wound with theraworx and green wipes and cover with mepilex and tape.  Reviewed benefits of Ag Nitrate.  -      Given Symptoms/condition management;Bandaging/dressing change  -      Program New  -      How Provided Verbal;Demonstration  -      Provided to Patient  -      Level of Understanding Teach back education performed;Verbalized  -                User Key  (r) = Recorded By, (t) = Taken By, (c) = Cosigned By      Initials Name Provider Type     Malcolm Reyna, PT Physical Therapist                    Recommendation and Plan   PT Assessment/Plan       Row Name 08/09/24 1500          PT Assessment    Functional Limitations Performance in self-care ADL  -     Impairments Integumentary integrity  -     Assessment Comments PT wound care initial evaluation complete. Pt presenting with complex L scalp wound related to craniotomy and meningioma removal on 3/6/24. Pt presenting with significant amount of thick crusts and dried exudate completely obscuring wound and firmly adhered/intertwined with surrounding hair. Following extensive debridement of crusts and trimming of hair, pt's wound demonstrating considerable hypergranulation requiring Ag Nitrate to manage. Beneath crusts PT noted some thickened brown/tan drainage, however following irrigation, only serosanguineous draiange noted. Pt would continue to benefit from further debridement, advanced wound dressing mangement, and Ag Nitrate use to promote wound healing.  -     Rehab Potential Good  -     Patient/caregiver participated in establishment of treatment plan and goals Yes  -     Patient would benefit from skilled therapy intervention Yes  -        PT Plan    PT Frequency 2x/week  -     Predicted Duration of Therapy  Intervention (PT) 24 visits  [facility]     Planned CPT's? PT EVAL MOD COMPLELITY: 98180;PT SELF CARE/MGMT/TRAIN 15 MIN: 60411;PT NONSELECT DEBRIDE 15 MIN: 68851;PT GILA DEBRIDE OPEN WOUND UP TO 20 CM: 71275;PT GILA DEBRIDE OPEN WOUND EA ADD 20 CM: 05386;PT NLFU MIST: 45521  -     Physical Therapy Interventions (Optional Details) wound care;patient/family education  -     PT Plan Comments Debridement, dressings, Ag Nitrate  -               User Key  (r) = Recorded By, (t) = Taken By, (c) = Cosigned By      Initials Name Provider Type     Malcolm Reyna, PT Physical Therapist                      Goals   PT OP Goals       Row Name 08/09/24 1557 08/09/24 1500       PT Short Term Goals    STG 1 -- Verbalize s/sx of infection and when to seek medical attention.  -    STG 1 Progress -- New  -    STG 2 -- Demonstrate minimal remaining hypergranulation of wound base to improve wound healing potential.  -    STG 2 Progress -- New  -    STG 3 -- Decrease area of wound by 50% as evidence of wound healing.  -    STG 3 Progress -- New  -       Long Term Goals    LTG 1 -- Demonstrate independence with home dressing management to promote return to [facility].  -    LTG 1 Progress -- New  -    LTG 2 -- Demonstrate no remaining hypergranulation of wound base to improve wound healing potential.  -    LTG 2 Progress -- New  -    LTG 3 -- Decrease area of wound by 90% as evidence of wound healing.  -    LTG 3 Progress -- New  -       Time Calculation    PT Goal Re-Cert Due Date 11/07/24  - 11/07/24  -              User Key  (r) = Recorded By, (t) = Taken By, (c) = Cosigned By      Initials Name Provider Type     Malcolm Reyna, PT Physical Therapist                    Time Calculation: Start Time: 1300  Untimed Charges  PT Eval/Re-eval Minutes: 51  Wound Care: 09747 Selective debridement, 54849 Add't debridement ea 20 cm  56705-Dzctfntnx debridement: 20  79065-Kyw't debridement ea 20 cm: 15  Total  Minutes  Untimed Charges Total Minutes: 86   Total Minutes: 86  Therapy Charges for Today       Code Description Service Date Service Provider Modifiers Qty    20675067192 HC PT EVAL MOD COMPLEXITY 4 8/9/2024 Malcolm Reyna, PT GP 1    60749596108 HC GILA DEBRIDE OPEN WOUND UP TO 20CM 8/9/2024 Malcolm Reyna, PT GP 1    [id number] HC PT GILA DEBRIDE OPEN WOUND EA ADD 20CM 8/9/2024 Malcolm Reyna, PT GP 1                  Malcolm Reyna PT  8/9/2024

## 2024-08-09 NOTE — PROGRESS NOTES
Patient: Tereza Ackerman  : 1955  Chart #: [id number]    Date of Service: 2024    CHIEF COMPLAINT: Wound check    History of Present Illness Ms. Ackerman is a 69-year-old woman who underwent left sphenoid wing meningioma resection in  and then again in .  She has undergone multiple radiosurgery procedures as well.  More recently she presented with recurrence and on 3/6/2024 she underwent redo left frontal craniotomy for resection.  Gross total resection was achieved.  She has left orbital swelling and ptosis.  She has mild headache.   she is here today to evaluate the incision.  At last appointment, there was very thick eschar along the length.  Patient mentions she has occasional drainage.  She has not been showering and washing the area with warm water and soap.  She has been using a normal saline solution to clean it.  When last seen, I advised for her to wash it daily. She has only showered once because she is afraid of making it worse. No fevers chills or flulike symptoms.  No drainage in the recent couple days.      Past Medical History:   Diagnosis Date    Abdominal hernia     Arthritis     rt knee     Atrial flutter with rapid ventricular response 2017    Back pain     Brain tumor     Colostomy present 2018    Depression     History of blood transfusion     History of gastroesophageal reflux (GERD)     resolved since Nissen    History of Nissen fundoplication 2017    History of small bowel obstruction     Hyperlipemia     Hypertension     Memory loss or impairment     Migraine     Parathyroid adenoma     Incidental on thyroid US.    PONV (postoperative nausea and vomiting)     nausea - preprocedural meds help     Prediabetes     Last Impression: 2015  Reviewed labs. Excellent control.  Maren Connellast Impression: 2015  Reviewed labs. Excellent control.  Michaela Connell    Thyroid nodule      Last Impression: 2015  r/o thyroid cancer, will  proceed with US.  Michaela Connell (Internal Medicine)     UTI (urinary tract infection)     Vision changes     blockages left eye     Wears glasses     readers         Current Outpatient Medications:     Artificial Tear Ointment (artificial tears) ophthalmic ointment, Administer  to both eyes Every 1 (One) Hour As Needed., Disp: , Rfl:     aspirin 81 MG tablet, Take 1 tablet by mouth Daily., Disp: 30 tablet, Rfl: 11    busPIRone (BUSPAR) 10 MG tablet, Take 1 tablet by mouth 2 (Two) Times a Day., Disp: 180 tablet, Rfl: 3    carvedilol (COREG) 12.5 MG tablet, TAKE ONE TABLET BY MOUTH EVERY 12 HOURS (Patient taking differently: Take 0.5 tablets by mouth 2 (Two) Times a Day With Meals.), Disp: 180 tablet, Rfl: 3    cholecalciferol (VITAMIN D3) 1000 units tablet, Take 2 tablets by mouth Every Other Day., Disp: , Rfl:     dexAMETHasone (DECADRON) 4 MG tablet, Take 1 tablet by mouth Every 8 (Eight) Hours. 1 tablet by mouth every 8 hours for 4 days, then 1/2 tablet by mouth every 8 hours for 4 days, then continue 1/2 tablet twice a day until follow-up, Disp: 90 tablet, Rfl: 0    dorzolamide (TRUSOPT) 2 % ophthalmic solution, Administer 1 drop into the left eye 2 (Two) Times a Day., Disp: , Rfl:     levETIRAcetam (KEPPRA) 500 MG tablet, TAKE ONE TABLET BY MOUTH EVERY 12 HOURS, Disp: 60 tablet, Rfl: 0    lisinopril (PRINIVIL,ZESTRIL) 2.5 MG tablet, Take 1 tablet by mouth Daily., Disp: 90 tablet, Rfl: 3    LORazepam (Ativan) 0.5 MG tablet, Take 0.5-1 tablets by mouth Daily As Needed for Anxiety., Disp: 15 tablet, Rfl: 0    omeprazole (priLOSEC) 20 MG capsule, Take 1 capsule by mouth Daily., Disp: 90 capsule, Rfl: 3    ondansetron ODT (ZOFRAN-ODT) 4 MG disintegrating tablet, Place 1 tablet on the tongue Every 4 (Four) Hours. As needed for nausea, Disp: 12 tablet, Rfl: 0    phenazopyridine (PYRIDIUM) 200 MG tablet, Take 1 tablet by mouth 3 (Three) Times a Day As Needed for Dysuria., Disp: 6 tablet, Rfl: 0    polyethyl  glycol-propyl glycol (SYSTANE) 0.4-0.3 % solution ophthalmic solution (artificial tears), Administer 1 drop into the left eye Every 1 (One) Hour As Needed (dry eye)., Disp: , Rfl:     prednisoLONE acetate (PRED FORTE) 1 % ophthalmic suspension, Administer  into the left eye., Disp: , Rfl:     QUEtiapine (SEROquel) 25 MG tablet, Take 0.5 tablets by mouth every night at bedtime., Disp: 45 tablet, Rfl: 3    rosuvastatin (CRESTOR) 40 MG tablet, Take 1 tablet by mouth Daily., Disp: 90 tablet, Rfl: 3    sertraline (ZOLOFT) 100 MG tablet, Take 1 tablet by mouth Daily., Disp: 90 tablet, Rfl: 3    thiamine (VITAMIN B-1) 100 MG tablet tablet, Take 1 tablet by mouth Every Other Day., Disp: 30 each, Rfl: 5    timolol (TIMOPTIC) 0.5 % ophthalmic solution, 1 drop 2 (Two) Times a Day., Disp: , Rfl:     Past Surgical History:   Procedure Laterality Date    BRAIN SURGERY Left 05/19/2022    BRAIN SURGERY  08/2023    left side done & radiationg a wk. later,  in [place]    CARDIAC CATHETERIZATION N/A 04/27/2020    Procedure: LEFT HEART CATH;  Surgeon: Marina Ring MD;  Location: [facility] CATH INVASIVE LOC[facility];  Service: Cardiology;  Laterality: N/A;    CARPAL TUNNEL RELEASE  2002    right     COLONOSCOPY N/A 08/18/2018    Procedure: COLONOSCOPY;  Surgeon: Inderjit Campos MD;  Location:  [facility];  Service: Gastroenterology    COLONOSCOPY N/A 11/28/2018    Procedure: COLONOSCOPY;  Surgeon: Inderjit Campos MD;  Location:  [facility];  Service: Gastroenterology    COLOSTOMY CLOSURE N/A 02/19/2019    Procedure: COLOSTOMY TAKEDOWN, INCISIONAL HERNIA REPAIR;  Surgeon: Andrea Bocanegra MD;  Location:  [facility];  Service: General    CRANIOTOMY  2003 & 2014    Dr. Werner Hollis for tumor removal    CRANIOTOMY FOR TUMOR Left 3/6/2024    Procedure: CRANIOTOMY FOR TUMOR STEREOTACTIC WITH STEALTH;  Surgeon: Robi Perales MD;  Location:  [facility];  Service: Neurosurgery;  Laterality: Left;    ENDOSCOPY      EXPLORATORY  LAPAROTOMY N/A 12/05/2017    Procedure: EXPLORATORY LAPAROTOMY, SMALL BOWEL RESECTION;  Surgeon: Ирина Cobian MD;  Location:  [facility];  Service:     EXPLORATORY LAPAROTOMY N/A 12/22/2017    Procedure: LAPAROTOMY EXPLORATORY FOR SMALL BOWEL OBSTRUCTION;  Surgeon: Ирина Cobian MD;  Location:  [facility];  Service:     EXPLORATORY LAPAROTOMY N/A 07/23/2018    Procedure: EXPLORATORY LAPAROTOMY, APPENDECTOMY, CECOPEXY, INCISIONAL HERNIA REPAIR, LYSIS OF ADHESIONS;  Surgeon: Andrea Bocanegra MD;  Location:  [facility];  Service: General    EXPLORATORY LAPAROTOMY N/A 08/19/2018    Procedure: LAPAROTOMY EXPLORATORY, SIGMOID COLECTOMY, CREATION OF OSTOMY;  Surgeon: Andrea Bocanegra MD;  Location:  [facility];  Service: General    HYSTERECTOMY      partial - both ovaries still present pt believes     INSERTION HEMODIALYSIS CATHETER N/A 12/07/2017    Procedure: HEMODIALYSIS CATHETER INSERTION;  Surgeon: Chance Valenzuela MD;  Location:  [facility];  Service:     ORBITOTOMY Left 11/03/2017    Procedure:  LEFT LATERAL ORBITOTOMY WITH DEBULKING OF TUMOR ;  Surgeon: Moo Hopkins MD;  Location:  [facility];  Service:     OTHER SURGICAL HISTORY      esophagogastric fundoplasty nissen fundoplication    TUBAL ABDOMINAL LIGATION         Social History     Socioeconomic History    Marital status:    Tobacco Use    Smoking status: Never     Passive exposure: Never    Smokeless tobacco: Never   Vaping Use    Vaping status: Never Used   Substance and Sexual Activity    Alcohol use: No    Drug use: No    Sexual activity: Defer         Review of Systems   Constitutional:  Negative for activity change, appetite change, chills, diaphoresis, fatigue, fever and unexpected weight change.   HENT:  Negative for congestion, dental problem, drooling, ear discharge, ear pain, facial swelling, hearing loss, mouth sores, nosebleeds, postnasal drip, rhinorrhea, sinus pressure, sinus pain, sneezing, sore throat, tinnitus,  "trouble swallowing and voice change.    Eyes:  Positive for pain and discharge. Negative for photophobia, redness, itching and visual disturbance.   Respiratory:  Negative for apnea, cough, choking, chest tightness, shortness of breath, wheezing and stridor.    Cardiovascular:  Negative for chest pain, palpitations and leg swelling.   Gastrointestinal:  Negative for abdominal distention, abdominal pain, anal bleeding, blood in stool, constipation, diarrhea, nausea, rectal pain and vomiting.   Endocrine: Negative for cold intolerance, heat intolerance, polydipsia, polyphagia and polyuria.   Genitourinary:  Negative for decreased urine volume, difficulty urinating, dyspareunia, dysuria, enuresis, flank pain, frequency, genital sores, hematuria, menstrual problem, pelvic pain, urgency, vaginal bleeding, vaginal discharge and vaginal pain.   Musculoskeletal:  Negative for arthralgias, back pain, gait problem, joint swelling, myalgias, neck pain and neck stiffness.   Skin:  Positive for wound. Negative for color change, pallor and rash.   Allergic/Immunologic: Negative for environmental allergies, food allergies and immunocompromised state.   Neurological:  Positive for headaches. Negative for dizziness, tremors, seizures, syncope, facial asymmetry, speech difficulty, weakness, light-headedness and numbness.   Hematological:  Negative for adenopathy. Does not bruise/bleed easily.   Psychiatric/Behavioral:  Negative for agitation, behavioral problems, confusion, decreased concentration, dysphoric mood, hallucinations, self-injury, sleep disturbance and suicidal ideas. The patient is not nervous/anxious and is not hyperactive.        Objective   Vital Signs: Blood pressure 116/62, temperature 97.1 °F (36.2 °C), temperature source Infrared, height 160 cm (63\"), weight 85.3 kg (188 lb), not currently breastfeeding.  Physical Exam  Skin:     Comments: Very thick eschar is observed around the length of the entire left cranial " incision.  No significant erythema.  I am not able to express drainage         Assessment & Plan   Diagnosis: Meningioma status post craniotomy for resection    Medical Decision Making: Dr Perales stepped in to examine patient's incision today. He had a hernán conversation about the importance of washing it daily and allowing warm water to run over the incision. We are going to refer her to wound care to try to get some of the eschar off and clean it up. I appreciate their input and will be available if there are any concerns prior to patient coming back to see me in 2 weeks.     Diagnoses and all orders for this visit:    1. Eschar of wound bed (Primary)  -     Ambulatory Referral to Wound Clinic                                    Chula Randolph PA-C  Patient Care Team:  Michaela Connell MD as PCP - General  Michaela Connell MD as PCP - Family Medicine  Moo Hopkins MD as Consulting Physician (Ophthalmology)  Jamal Ramos MD as Consulting Physician (Nephrology)  Alli Aguirre MD as Consulting Physician (Cardiology)  Costa Raygoza MD as Consulting Physician (Radiation Oncology)  Andrea Bocanegra MD as Consulting Physician (General Surgery)  Alli Aguirre MD as Consulting Physician (Cardiology)  Michaela Connell MD as Primary Care Provider (Internal Medicine)  Robi Perales MD as Consulting Physician (Neurosurgery)

## 2024-08-11 ENCOUNTER — HOSPITAL ENCOUNTER (OUTPATIENT)
Dept: PHYSICAL THERAPY | Facility: HOSPITAL | Age: 69
Setting detail: THERAPIES SERIES
Discharge: HOME | End: 2024-08-11
Payer: MEDICARE

## 2024-08-11 DIAGNOSIS — T81.89XD NON-HEALING SURGICAL WOUND, SUBSEQUENT ENCOUNTER: Primary | ICD-10-CM

## 2024-08-11 DIAGNOSIS — S01.00XD OPEN WOUND OF SCALP, SUBSEQUENT ENCOUNTER: ICD-10-CM

## 2024-08-11 PROCEDURE — 97597 DBRDMT OPN WND 1ST 20 CM/<: CPT

## 2024-08-11 NOTE — THERAPY WOUND CARE TREATMENT
Outpatient Rehabilitation - Wound/Debridement Treatment Note   [place]     Patient Name: Tereza Ackerman  : 1955  MRN: [id number]  Today's Date: 2024                 Admit Date: 2024    Visit Dx:    ICD-10-CM ICD-9-CM   1. Non-healing surgical wound, subsequent encounter  T81.89XD V58.89     998.83   2. Open wound of scalp, subsequent encounter  S01.00XD V58.89     873.0     L scalp        Patient Active Problem List   Diagnosis    Impaired glucose tolerance    Parathyroid adenoma    Reaction to chronic stress    Multinodular goiter    Vitamin D deficiency    Meningioma, recurrent of brain    Arthritis    Depression    Small bowel obstruction    Insomnia    History of Nissen fundoplication    Malignant neoplasm of left orbit    Prediabetes    Mixed hyperlipidemia    Hyperglycemia    Atrial flutter with rapid ventricular response    Anemia    Large bowel obstruction    Abdominal pain    Essential hypertension    History of creation of ostomy    Screen for colon cancer    Sigmoid volvulus    SHAE (obstructive sleep apnea)    Brain mass    Meningioma        Past Medical History:   Diagnosis Date    Abdominal hernia     Arthritis     rt knee     Atrial flutter with rapid ventricular response 2017    Back pain     Brain tumor     Colostomy present 2018    Depression     History of blood transfusion     History of gastroesophageal reflux (GERD)     resolved since Nissen    History of Nissen fundoplication 2017    History of small bowel obstruction     Hyperlipemia     Hypertension     Memory loss or impairment     Migraine     Parathyroid adenoma     Incidental on thyroid US.    PONV (postoperative nausea and vomiting)     nausea - preprocedural meds help     Prediabetes     Last Impression: 2015  Reviewed labs. Excellent control.  Emery Connell Impression: 2015  Reviewed labs. Excellent control.  Michaela Connell    Thyroid nodule      Last Impression: 13  Jun 2015  r/o thyroid cancer, will proceed with US.  Michaela Connell (Internal Medicine)     UTI (urinary tract infection)     Vision changes     blockages left eye     Wears glasses     readers        Past Surgical History:   Procedure Laterality Date    BRAIN SURGERY Left 05/19/2022    BRAIN SURGERY  08/2023    left side done & radiationg a wk. later,  in [place]    CARDIAC CATHETERIZATION N/A 04/27/2020    Procedure: LEFT HEART CATH;  Surgeon: Marina Ring MD;  Location:  [facility];  Service: Cardiology;  Laterality: N/A;    CARPAL TUNNEL RELEASE  2002    right     COLONOSCOPY N/A 08/18/2018    Procedure: COLONOSCOPY;  Surgeon: Inderjit Campos MD;  Location: [facility];  Service: Gastroenterology    COLONOSCOPY N/A 11/28/2018    Procedure: COLONOSCOPY;  Surgeon: Inderjit Campos MD;  Location: [facility];  Service: Gastroenterology    COLOSTOMY CLOSURE N/A 02/19/2019    Procedure: COLOSTOMY TAKEDOWN, INCISIONAL HERNIA REPAIR;  Surgeon: Andrea Bocanegra MD;  Location:  [facility];  Service: General    CRANIOTOMY  2003 & 2014    Dr. Werner Hollis for tumor removal    CRANIOTOMY FOR TUMOR Left 3/6/2024    Procedure: CRANIOTOMY FOR TUMOR STEREOTACTIC WITH STEALTH;  Surgeon: Robi Perales MD;  Location:  [facility];  Service: Neurosurgery;  Laterality: Left;    ENDOSCOPY      EXPLORATORY LAPAROTOMY N/A 12/05/2017    Procedure: EXPLORATORY LAPAROTOMY, SMALL BOWEL RESECTION;  Surgeon: Ирина Cobian MD;  Location: [facility];  Service:     EXPLORATORY LAPAROTOMY N/A 12/22/2017    Procedure: LAPAROTOMY EXPLORATORY FOR SMALL BOWEL OBSTRUCTION;  Surgeon: Ирина Cobian MD;  Location: [facility];  Service:     EXPLORATORY LAPAROTOMY N/A 07/23/2018    Procedure: EXPLORATORY LAPAROTOMY, APPENDECTOMY, CECOPEXY, INCISIONAL HERNIA REPAIR, LYSIS OF ADHESIONS;  Surgeon: Andrea Bocanegra MD;  Location: [facility];  Service: General    EXPLORATORY LAPAROTOMY N/A 08/19/2018     Procedure: LAPAROTOMY EXPLORATORY, SIGMOID COLECTOMY, CREATION OF OSTOMY;  Surgeon: Andrea Bocanegra MD;  Location:  [facility];  Service: General    HYSTERECTOMY      partial - both ovaries still present pt believes     INSERTION HEMODIALYSIS CATHETER N/A 12/07/2017    Procedure: HEMODIALYSIS CATHETER INSERTION;  Surgeon: Chance Valenzuela MD;  Location:  [facility];  Service:     ORBITOTOMY Left 11/03/2017    Procedure:  LEFT LATERAL ORBITOTOMY WITH DEBULKING OF TUMOR ;  Surgeon: Moo Hopkins MD;  Location:  [facility];  Service:     OTHER SURGICAL HISTORY      esophagogastric fundoplasty nissen fundoplication    TUBAL ABDOMINAL LIGATION           EVALUATION   PT Ortho       Row Name 08/11/24 1000       Subjective    Subjective Comments Reports she had some bloody drainage escape beneath the dressing and run down her face and had to reinforce the dressing with another piece of tape, however no other issues/complaints.  -       Subjective Pain    Able to rate subjective pain? yes  -    Pre-Treatment Pain Level 0  -LH    Post-Treatment Pain Level 0  -       Transfers    Sit-Stand Rock (Transfers) independent  -    Stand-Sit Rock (Transfers) independent  -    Comment, (Transfers) Seated for tx  -       Gait/Stairs (Locomotion)    Rock Level (Gait) independent  -              User Key  (r) = Recorded By, (t) = Taken By, (c) = Cosigned By      Initials Name Provider Type     Malcolm Reyna, PT Physical Therapist                     [facility] Wound       Row Name 08/11/24 1000             Wound 08/09/24 Left lateral scalp Incision    Wound - Properties Group Placement Date: 08/09/24  - Present on Original Admission: Y  - Side: Left  - Orientation: lateral  - Location: scalp  -LH Primary Wound Type: Incision  -LH    Wound Image Images linked: 1  -LH      Dressing Appearance intact;moist drainage  -      Base moist;red;granulating;gray;yellow;other (see comments)   Some art/black Ag nitrate debris. Skin bridging centrally  -      Periwound intact;dry;warm  -      Periwound Temperature warm  -      Periwound Skin Turgor soft  -      Edges open;irregular  -      Drainage Characteristics/Odor serosanguineous;sanguineous  -      Drainage Amount small  -      Care, Wound cleansed with;soap and water;debrided;silver agent applied  Ag Nitrate to hypergranulation. Trimmed periwound hair with scissors.  -      Dressing Care dressing applied;silver impregnated;low-adherent;foam  Mepilex Ag, Primafix tape  -      Periwound Care cleansed with pH balanced cleanser;dry periwound area maintained  nosting wipes  -      Retired Wound - Properties Group Placement Date: 08/09/24  - Present on Original Admission: Y  - Side: Left  - Orientation: lateral  - Location: scalp  -LH Primary Wound Type: Incision  -    Retired Wound - Properties Group Date first assessed: 08/09/24  - Present on Original Admission: Y  -LH Side: Left  -LH Location: scalp  -LH Primary Wound Type: Incision  -LH              User Key  (r) = Recorded By, (t) = Taken By, (c) = Cosigned By      Initials Name Provider Type     Malcolm Reyna, PT Physical Therapist                      WOUND DEBRIDEMENT  Total area of Debridement: 6cm2  Debridement Site 1  Location- Site 1: L scalp  Selective Debridement- Site 1: Wound Surface <20cmsq  Instruments- Site 1: tweezers, scissors  Excised Tissue Description- Site 1: moderate, slough, other (comment) (Ag Nitrate debris)  Bleeding- Site 1: minimum, held pressure, 1 minute              Therapy Education       Row Name 08/11/24 1100             Therapy Education    Education Details Continue current POC, may change dressing if Mepilex become saturated and drainage begins seeping beneath dressing.  -      Given Symptoms/condition management;Bandaging/dressing change  -      Program Reinforced  -      How Provided Verbal;Demonstration  [facility]       Provided to Patient  -      Level of Understanding Teach back education performed;Verbalized  -                User Key  (r) = Recorded By, (t) = Taken By, (c) = Cosigned By      Initials Name Provider Type     Malcolm Reyna, PT Physical Therapist                    Recommendation and Plan   PT Assessment/Plan       Row Name 08/11/24 1000          PT Assessment    Functional Limitations Performance in self-care ADL  -     Impairments Integumentary integrity  -     Assessment Comments Pt demonstrating notable improvements in reduction of hypergranulation of L lateral scalp wound this date. Pt with moderate hypergranulation, continuing to requrie Ag Nitrate to manage. Pt now with skin bridge formation centrally. Pt would continue to benefit from further debridement and Ag Nitrate use to promote wound healing potential.  -     Rehab Potential Good  -     Patient/caregiver participated in establishment of treatment plan and goals Yes  -     Patient would benefit from skilled therapy intervention Yes  -        PT Plan    PT Frequency 2x/week  -     Physical Therapy Interventions (Optional Details) wound care;patient/family education  -     PT Plan Comments Debridement, dressings, Ag Nitrate PRN  -               User Key  (r) = Recorded By, (t) = Taken By, (c) = Cosigned By      Initials Name Provider Type     Malcolm Reyna, PT Physical Therapist                    Goals   PT OP Goals       Row Name 08/11/24 1101          Time Calculation    PT Goal Re-Cert Due Date 11/07/24  -               User Key  (r) = Recorded By, (t) = Taken By, (c) = Cosigned By      Initials Name Provider Type     Malcolm Reyna, PT Physical Therapist                    PT Goal Re-Cert Due Date: 11/07/24            Time Calculation: Start Time: 1030  Untimed Charges  [id number] debridement: 20  Total Minutes  Untimed Charges Total Minutes: 20   Total Minutes: 20  Therapy Charges for Today       Code  Description Service Date Service Provider Modifiers Qty    [id number] HC GILA DEBRIDE OPEN WOUND UP TO 20CM 8/11/2024 Malcolm Reyna, PT GP 1                    Malcolm Reyna, PT  8/11/2024

## 2024-08-13 ENCOUNTER — HOSPITAL ENCOUNTER (OUTPATIENT)
Dept: PHYSICAL THERAPY | Facility: HOSPITAL | Age: 69
Setting detail: THERAPIES SERIES
Discharge: HOME | End: 2024-08-13
Payer: MEDICARE

## 2024-08-13 DIAGNOSIS — T81.89XD NON-HEALING SURGICAL WOUND, SUBSEQUENT ENCOUNTER: Primary | ICD-10-CM

## 2024-08-13 DIAGNOSIS — S01.00XD OPEN WOUND OF SCALP, SUBSEQUENT ENCOUNTER: ICD-10-CM

## 2024-08-13 PROCEDURE — 97597 DBRDMT OPN WND 1ST 20 CM/<: CPT

## 2024-08-13 NOTE — THERAPY WOUND CARE TREATMENT
Outpatient Rehabilitation - Wound/Debridement Treatment Note   [facility]     Patient Name: Tereza Ackerman  : 1955  MRN: [id number]  Today's Date: 2024                 Admit Date: 2024    Visit Dx:    ICD-10-CM ICD-9-CM   1. Non-healing surgical wound, subsequent encounter  T81.89XD V58.89     998.83   2. Open wound of scalp, subsequent encounter  S01.00XD V58.89     873.0           Patient Active Problem List   Diagnosis    Impaired glucose tolerance    Parathyroid adenoma    Reaction to chronic stress    Multinodular goiter    Vitamin D deficiency    Meningioma, recurrent of brain    Arthritis    Depression    Small bowel obstruction    Insomnia    History of Nissen fundoplication    Malignant neoplasm of left orbit    Prediabetes    Mixed hyperlipidemia    Hyperglycemia    Atrial flutter with rapid ventricular response    Anemia    Large bowel obstruction    Abdominal pain    Essential hypertension    History of creation of ostomy    Screen for colon cancer    Sigmoid volvulus    SHAE (obstructive sleep apnea)    Brain mass    Meningioma        Past Medical History:   Diagnosis Date    Abdominal hernia     Arthritis     rt knee     Atrial flutter with rapid ventricular response 2017    Back pain     Brain tumor     Colostomy present 2018    Depression     History of blood transfusion     History of gastroesophageal reflux (GERD)     resolved since Nissen    History of Nissen fundoplication 2017    History of small bowel obstruction     Hyperlipemia     Hypertension     Memory loss or impairment     Migraine     Parathyroid adenoma     Incidental on thyroid US.    PONV (postoperative nausea and vomiting)     nausea - preprocedural meds help     Prediabetes     Last Impression: 2015  Reviewed labs. Excellent control.  Emery Connell Impression: 2015  Reviewed labs. Excellent control.  Michaela Connell    Thyroid nodule      Last Impression: 2015   r/o thyroid cancer, will proceed with US.  Michaela Connell (Internal Medicine)     UTI (urinary tract infection)     Vision changes     blockages left eye     Wears glasses     readers        Past Surgical History:   Procedure Laterality Date    BRAIN SURGERY Left 05/19/2022    BRAIN SURGERY  08/2023    left side done & radiationg a wk. later,  in [place]    CARDIAC CATHETERIZATION N/A 04/27/2020    Procedure: LEFT HEART CATH;  Surgeon: Marina Ring MD;  Location:  [facility];  Service: Cardiology;  Laterality: N/A;    CARPAL TUNNEL RELEASE  2002    right     COLONOSCOPY N/A 08/18/2018    Procedure: COLONOSCOPY;  Surgeon: Inderjit Campos MD;  Location:  [facility];  Service: Gastroenterology    COLONOSCOPY N/A 11/28/2018    Procedure: COLONOSCOPY;  Surgeon: Inderjit Campos MD;  Location: [facility];  Service: Gastroenterology    COLOSTOMY CLOSURE N/A 02/19/2019    Procedure: COLOSTOMY TAKEDOWN, INCISIONAL HERNIA REPAIR;  Surgeon: Andrea Bocanegra MD;  Location:  [facility];  Service: General    CRANIOTOMY  2003 & 2014    Dr. Werner Hollis for tumor removal    CRANIOTOMY FOR TUMOR Left 3/6/2024    Procedure: CRANIOTOMY FOR TUMOR STEREOTACTIC WITH STEALTH;  Surgeon: Robi Perales MD;  Location:  [facility];  Service: Neurosurgery;  Laterality: Left;    ENDOSCOPY      EXPLORATORY LAPAROTOMY N/A 12/05/2017    Procedure: EXPLORATORY LAPAROTOMY, SMALL BOWEL RESECTION;  Surgeon: Ирина Cobian MD;  Location:  [facility];  Service:     EXPLORATORY LAPAROTOMY N/A 12/22/2017    Procedure: LAPAROTOMY EXPLORATORY FOR SMALL BOWEL OBSTRUCTION;  Surgeon: Ирина Cobian MD;  Location:  [facility];  Service:     EXPLORATORY LAPAROTOMY N/A 07/23/2018    Procedure: EXPLORATORY LAPAROTOMY, APPENDECTOMY, CECOPEXY, INCISIONAL HERNIA REPAIR, LYSIS OF ADHESIONS;  Surgeon: Andrea Bocanegra MD;  Location:  [facility];  Service: General    EXPLORATORY LAPAROTOMY N/A 08/19/2018    Procedure:  LAPAROTOMY EXPLORATORY, SIGMOID COLECTOMY, CREATION OF OSTOMY;  Surgeon: Andrea Bocanegra MD;  Location:  [facility];  Service: General    HYSTERECTOMY      partial - both ovaries still present pt believes     INSERTION HEMODIALYSIS CATHETER N/A 12/07/2017    Procedure: HEMODIALYSIS CATHETER INSERTION;  Surgeon: Chance Valenzuela MD;  Location:  [facility];  Service:     ORBITOTOMY Left 11/03/2017    Procedure:  LEFT LATERAL ORBITOTOMY WITH DEBULKING OF TUMOR ;  Surgeon: Moo Hopkins MD;  Location:  [facility];  Service:     OTHER SURGICAL HISTORY      esophagogastric fundoplasty nissen fundoplication    TUBAL ABDOMINAL LIGATION           EVALUATION   PT Ortho       Row Name 08/13/24 1030       Subjective    Subjective Comments No complaints, did not have to change the dressing.  -       Subjective Pain    Able to rate subjective pain? yes  -    Pre-Treatment Pain Level 0  -    Post-Treatment Pain Level 0  -    Subjective Pain Comment min c/o pain with ag nitrate use  -       Transfers    Sit-Stand Clay (Transfers) independent  -    Stand-Sit Clay (Transfers) independent  -    Comment, (Transfers) seated for tx  -       Gait/Stairs (Locomotion)    Clay Level (Gait) independent  -              User Key  (r) = Recorded By, (t) = Taken By, (c) = Cosigned By      Initials Name Provider Type    Candice Ramirez, PT Physical Therapist                     [facility] Wound       Row Name 08/13/24 1030             Wound 08/09/24 Left lateral scalp Incision    Wound - Properties Group Placement Date: 08/09/24  -LH Present on Original Admission: Y  -LH Side: Left  -LH Orientation: lateral  -LH Location: scalp  -LH Primary Wound Type: Incision  -LH    Wound Image Images linked: 2  -JM      Dressing Appearance intact;moist drainage  -      Base moist;red;granulating;gray;yellow;other (see comments)  Ag nitrate debris initially, min hypergranulation  -      Periwound  intact;dry;warm  -      Periwound Temperature warm  -      Periwound Skin Turgor soft  -      Edges open;irregular  -      Wound Length (cm) 7.2 cm  -      Wound Width (cm) 0.5 cm  -      Wound Depth (cm) 0.1 cm  -      Wound Surface Area (cm^2) 3.6 cm^2  -JM      Wound Volume (cm^3) 0.36 cm^3  -      Drainage Characteristics/Odor serosanguineous;yellow;creamy  -      Drainage Amount small  -      Care, Wound cleansed with;wound cleanser;debrided;silver agent applied  ag nitrate stick to hypergranulation  -      Dressing Care dressing applied;silver impregnated;foam  mepilex ag, primafix  -      Periwound Care cleansed with pH balanced cleanser;dry periwound area maintained;barrier film applied  nosting wipe  -      Retired Wound - Properties Group Placement Date: 08/09/24  - Present on Original Admission: Y  - Side: Left  - Orientation: lateral  - Location: scalp  - Primary Wound Type: Incision  -    Retired Wound - Properties Group Date first assessed: 08/09/24  - Present on Original Admission: Y  - Side: Left  - Location: scalp  - Primary Wound Type: Incision  -              User Key  (r) = Recorded By, (t) = Taken By, (c) = Cosigned By      Initials Name Provider Type    Candice Ramirez, PT Physical Therapist     Malcolm Reyna, PT Physical Therapist                      WOUND DEBRIDEMENT  Total area of Debridement: 7cmsq  Debridement Site 1  Location- Site 1: L scalp  Selective Debridement- Site 1: Wound Surface <20cmsq  Instruments- Site 1: tweezers  Excised Tissue Description- Site 1: moderate, slough, other (comment) (ag nitrate debris, crusts)  Bleeding- Site 1: scant              Therapy Education       Row Name 08/13/24 1030             Therapy Education    Education Details Continuation of POC, change dressing PRN b/n txs  -      Given Symptoms/condition management;Bandaging/dressing change  -      Program Reinforced  -      How Provided  Verbal;Demonstration  -      Provided to Patient  -      Level of Understanding Teach back education performed;Verbalized  -                User Key  (r) = Recorded By, (t) = Taken By, (c) = Cosigned By      Initials Name Provider Type    Candice Ramirez, PT Physical Therapist                    Recommendation and Plan   PT Assessment/Plan       Row Name 08/13/24 1030          PT Assessment    Functional Limitations Performance in self-care ADL  -     Impairments Integumentary integrity  -     Assessment Comments Pt with less hypergranulation, small decrease in wound dimensions.  Min creamy drainage trapped under ag nitrate debris but wound clean and without S&S of infection.  PT continued with small amount of ag nitrate to remaining hypergranulation to faciliate wound closure.  Continue with POC.  -        PT Plan    PT Frequency 2x/week  -     Physical Therapy Interventions (Optional Details) patient/family education;wound care  -     PT Plan Comments debridement, ag nitrate PRN  -               User Key  (r) = Recorded By, (t) = Taken By, (c) = Cosigned By      Initials Name Provider Type    Candice Ramirez, PT Physical Therapist                    Goals   PT OP Goals       Row Name 08/13/24 1030          Time Calculation    PT Goal Re-Cert Due Date 11/07/24  -               User Key  (r) = Recorded By, (t) = Taken By, (c) = Cosigned By      Initials Name Provider Type    Candice Ramirez, PT Physical Therapist                    PT Goal Re-Cert Due Date: 11/07/24            Time Calculation: Start Time: 1030  Untimed Charges  34207-[id number] debridement: 25  Total Minutes  Untimed Charges Total Minutes: 25   Total Minutes: 25  Therapy Charges for Today       Code Description Service Date Service Provider Modifiers Qty    [id number] HC GILA DEBRIDE OPEN WOUND UP TO 20CM 8/13/2024 Candice Constantino, PT GP 1                    Candice Constantino, PT  8/13/2024

## 2024-08-16 ENCOUNTER — HOSPITAL ENCOUNTER (OUTPATIENT)
Dept: PHYSICAL THERAPY | Facility: HOSPITAL | Age: 69
Setting detail: THERAPIES SERIES
Discharge: HOME | End: 2024-08-16
Payer: MEDICARE

## 2024-08-16 DIAGNOSIS — S01.00XD OPEN WOUND OF SCALP, SUBSEQUENT ENCOUNTER: ICD-10-CM

## 2024-08-16 DIAGNOSIS — T81.89XD NON-HEALING SURGICAL WOUND, SUBSEQUENT ENCOUNTER: Primary | ICD-10-CM

## 2024-08-16 PROCEDURE — 97597 DBRDMT OPN WND 1ST 20 CM/<: CPT

## 2024-08-16 NOTE — THERAPY WOUND CARE TREATMENT
Acute Care - Wound/Debridement Treatment Note  [facility]     Patient Name: Tereza Ackerman  : 1955  MRN: [id number]  Today's Date: 2024                Admit Date: 2024    Visit Dx:    ICD-10-CM ICD-9-CM   1. Non-healing surgical wound, subsequent encounter  T81.89XD V58.89     998.83   2. Open wound of scalp, subsequent encounter  S01.00XD V58.89     873.0       Patient Active Problem List   Diagnosis    Impaired glucose tolerance    Parathyroid adenoma    Reaction to chronic stress    Multinodular goiter    Vitamin D deficiency    Meningioma, recurrent of brain    Arthritis    Depression    Small bowel obstruction    Insomnia    History of Nissen fundoplication    Malignant neoplasm of left orbit    Prediabetes    Mixed hyperlipidemia    Hyperglycemia    Atrial flutter with rapid ventricular response    Anemia    Large bowel obstruction    Abdominal pain    Essential hypertension    History of creation of ostomy    Screen for colon cancer    Sigmoid volvulus    SHAE (obstructive sleep apnea)    Brain mass    Meningioma        Past Medical History:   Diagnosis Date    Abdominal hernia     Arthritis     rt knee     Atrial flutter with rapid ventricular response 2017    Back pain     Brain tumor     Colostomy present 2018    Depression     History of blood transfusion     History of gastroesophageal reflux (GERD)     resolved since Nissen    History of Nissen fundoplication 2017    History of small bowel obstruction     Hyperlipemia     Hypertension     Memory loss or impairment     Migraine     Parathyroid adenoma     Incidental on thyroid US.    PONV (postoperative nausea and vomiting)     nausea - preprocedural meds help     Prediabetes     Last Impression: 2015  Reviewed labs. Excellent control.  Emery Connell Impression: 2015  Reviewed labs. Excellent control.  Michaela Connell    Thyroid nodule      Last Impression: 2015  r/o thyroid cancer,  will proceed with US.  Michaela Connell (Internal Medicine)     UTI (urinary tract infection)     Vision changes     blockages left eye     Wears glasses     readers        Past Surgical History:   Procedure Laterality Date    BRAIN SURGERY Left 05/19/2022    BRAIN SURGERY  08/2023    left side done & radiationg a wk. later,  in [place]    CARDIAC CATHETERIZATION N/A 04/27/2020    Procedure: LEFT HEART CATH;  Surgeon: Marina Ring MD;  Location:  [facility];  Service: Cardiology;  Laterality: N/A;    CARPAL TUNNEL RELEASE  2002    right     COLONOSCOPY N/A 08/18/2018    Procedure: COLONOSCOPY;  Surgeon: Inderjit Campos MD;  Location:  [facility];  Service: Gastroenterology    COLONOSCOPY N/A 11/28/2018    Procedure: COLONOSCOPY;  Surgeon: Inderjit Campos MD;  Location:  [facility];  Service: Gastroenterology    COLOSTOMY CLOSURE N/A 02/19/2019    Procedure: COLOSTOMY TAKEDOWN, INCISIONAL HERNIA REPAIR;  Surgeon: Andrea Bocanegra MD;  Location:  [facility];  Service: General    CRANIOTOMY  2003 & 2014    Dr. Werner Hollis for tumor removal    CRANIOTOMY FOR TUMOR Left 3/6/2024    Procedure: CRANIOTOMY FOR TUMOR STEREOTACTIC WITH STEALTH;  Surgeon: Robi Perales MD;  Location:  [facility];  Service: Neurosurgery;  Laterality: Left;    ENDOSCOPY      EXPLORATORY LAPAROTOMY N/A 12/05/2017    Procedure: EXPLORATORY LAPAROTOMY, SMALL BOWEL RESECTION;  Surgeon: Ирина Cobian MD;  Location:  [facility];  Service:     EXPLORATORY LAPAROTOMY N/A 12/22/2017    Procedure: LAPAROTOMY EXPLORATORY FOR SMALL BOWEL OBSTRUCTION;  Surgeon: Ирина Cobian MD;  Location:  [facility];  Service:     EXPLORATORY LAPAROTOMY N/A 07/23/2018    Procedure: EXPLORATORY LAPAROTOMY, APPENDECTOMY, CECOPEXY, INCISIONAL HERNIA REPAIR, LYSIS OF ADHESIONS;  Surgeon: Andrea Bocanegra MD;  Location:  [facility];  Service: General    EXPLORATORY LAPAROTOMY N/A 08/19/2018    Procedure: LAPAROTOMY  EXPLORATORY, SIGMOID COLECTOMY, CREATION OF OSTOMY;  Surgeon: Andrea Bocanegra MD;  Location:  [facility];  Service: General    HYSTERECTOMY      partial - both ovaries still present pt believes     INSERTION HEMODIALYSIS CATHETER N/A 12/07/2017    Procedure: HEMODIALYSIS CATHETER INSERTION;  Surgeon: Chance Valenzuela MD;  Location:  [facility];  Service:     ORBITOTOMY Left 11/03/2017    Procedure:  LEFT LATERAL ORBITOTOMY WITH DEBULKING OF TUMOR ;  Surgeon: Moo Hopkins MD;  Location:  [facility];  Service:     OTHER SURGICAL HISTORY      esophagogastric fundoplasty nissen fundoplication    TUBAL ABDOMINAL LIGATION            [facility] Wound       Row Name 08/16/24 1430             Wound 08/09/24 Left lateral scalp Incision    Wound - Properties Group Placement Date: 08/09/24  - Present on Original Admission: Y  -LH Side: Left  -LH Orientation: lateral  -LH Location: scalp  -LH Primary Wound Type: Incision  -LH    Dressing Appearance intact;moist drainage  -MF      Base moist;red;granulating;gray;yellow;other (see comments)  silver nitrate debris to wound base  -MF      Periwound intact;dry;warm  -MF      Periwound Temperature warm  -MF      Periwound Skin Turgor soft  -MF      Edges open;irregular  -MF      Wound Length (cm) 7 cm  -MF      Wound Width (cm) 0.4 cm  -MF      Wound Depth (cm) 0.1 cm  -MF      Wound Surface Area (cm^2) 2.8 cm^2  -MF      Wound Volume (cm^3) 0.28 cm^3  -MF      Drainage Characteristics/Odor serosanguineous  -MF      Drainage Amount small  -MF      Care, Wound irrigated with;wound cleanser;debrided  -MF      Dressing Care foam;low-adherent;silver impregnated  mepilex Ag foam with primafix tape  -MF      Periwound Care cleansed with pH balanced cleanser  -MF      Retired Wound - Properties Group Placement Date: 08/09/24  - Present on Original Admission: Y  -LH Side: Left  -LH Orientation: lateral  - Location: scalp  -LH Primary Wound Type: Incision  -LH    Retired Wound -  Properties Group Date first assessed: 08/09/24  - Present on Original Admission: Y  -LH Side: Left  -LH Location: scalp  -LH Primary Wound Type: Incision  -LH              User Key  (r) = Recorded By, (t) = Taken By, (c) = Cosigned By      Initials Name Provider Type    Alli Delvalle, PT Physical Therapist     Malcolm Reyna, PT Physical Therapist                  Wound 08/09/24 Left lateral scalp Incision (Active)   Dressing Appearance intact;moist drainage 08/16/24 1430   Base moist;red;granulating;gray;yellow;other (see comments) 08/16/24 1430   Periwound intact;dry;warm 08/16/24 1430   Periwound Temperature warm 08/16/24 1430   Periwound Skin Turgor soft 08/16/24 1430   Edges open;irregular 08/16/24 1430   Wound Length (cm) 7 cm 08/16/24 1430   Wound Width (cm) 0.4 cm 08/16/24 1430   Wound Depth (cm) 0.1 cm 08/16/24 1430   Wound Surface Area (cm^2) 2.8 cm^2 08/16/24 1430   Wound Volume (cm^3) 0.28 cm^3 08/16/24 1430   Drainage Characteristics/Odor serosanguineous 08/16/24 1430   Drainage Amount small 08/16/24 1430   Care, Wound irrigated with;wound cleanser;debrided 08/16/24 1430   Dressing Care foam;low-adherent;silver impregnated 08/16/24 1430   Periwound Care cleansed with pH balanced cleanser 08/16/24 1430         WOUND DEBRIDEMENT                     PT Assessment (Last 12 Hours)       PT Evaluation and Treatment    No documentation.                     Recommendation and Plan                              Time Calculation   PT Charges       Row Name 08/16/24 1430             Time Calculation    Start Time 1430  -MF      PT Goal Re-Cert Due Date 11/07/24  -         Untimed Charges    [id number]-[id number] debridement 25  -MF         Total Minutes    Untimed Charges Total Minutes 25  -MF       Total Minutes 25  -MF                User Key  (r) = Recorded By, (t) = Taken By, (c) = Cosigned By      Initials Name Provider Type    Alli Delvalle, PT Physical Therapist                                     Alli Fields, PT  8/16/2024

## 2024-08-16 NOTE — THERAPY WOUND CARE TREATMENT
Outpatient Rehabilitation - Wound/Debridement Treatment Note   [place]     Patient Name: Tereza Ackerman  : 1955  MRN: [id number]  Today's Date: 2024                 Admit Date: 2024    Visit Dx:    ICD-10-CM ICD-9-CM   1. Non-healing surgical wound, subsequent encounter  T81.89XD V58.89     998.83   2. Open wound of scalp, subsequent encounter  S01.00XD V58.89     873.0       Patient Active Problem List   Diagnosis    Impaired glucose tolerance    Parathyroid adenoma    Reaction to chronic stress    Multinodular goiter    Vitamin D deficiency    Meningioma, recurrent of brain    Arthritis    Depression    Small bowel obstruction    Insomnia    History of Nissen fundoplication    Malignant neoplasm of left orbit    Prediabetes    Mixed hyperlipidemia    Hyperglycemia    Atrial flutter with rapid ventricular response    Anemia    Large bowel obstruction    Abdominal pain    Essential hypertension    History of creation of ostomy    Screen for colon cancer    Sigmoid volvulus    SHAE (obstructive sleep apnea)    Brain mass    Meningioma        Past Medical History:   Diagnosis Date    Abdominal hernia     Arthritis     rt knee     Atrial flutter with rapid ventricular response 2017    Back pain     Brain tumor     Colostomy present 2018    Depression     History of blood transfusion     History of gastroesophageal reflux (GERD)     resolved since Nissen    History of Nissen fundoplication 2017    History of small bowel obstruction     Hyperlipemia     Hypertension     Memory loss or impairment     Migraine     Parathyroid adenoma     Incidental on thyroid US.    PONV (postoperative nausea and vomiting)     nausea - preprocedural meds help     Prediabetes     Last Impression: 2015  Reviewed labs. Excellent control.  Emery Connell Impression: 2015  Reviewed labs. Excellent control.  Michaela Connell    Thyroid nodule      Last Impression: 2015  r/o  thyroid cancer, will proceed with US.  Michaela Connell (Internal Medicine)     UTI (urinary tract infection)     Vision changes     blockages left eye     Wears glasses     readers        Past Surgical History:   Procedure Laterality Date    BRAIN SURGERY Left 05/19/2022    BRAIN SURGERY  08/2023    left side done & radiationg a wk. later,  in [place]    CARDIAC CATHETERIZATION N/A 04/27/2020    Procedure: LEFT HEART CATH;  Surgeon: Marina Ring MD;  Location:  [facility];  Service: Cardiology;  Laterality: N/A;    CARPAL TUNNEL RELEASE  2002    right     COLONOSCOPY N/A 08/18/2018    Procedure: COLONOSCOPY;  Surgeon: Inderjit Campos MD;  Location:  [facility];  Service: Gastroenterology    COLONOSCOPY N/A 11/28/2018    Procedure: COLONOSCOPY;  Surgeon: Inderjit Campos MD;  Location:  [facility];  Service: Gastroenterology    COLOSTOMY CLOSURE N/A 02/19/2019    Procedure: COLOSTOMY TAKEDOWN, INCISIONAL HERNIA REPAIR;  Surgeon: Andrea Bocanegra MD;  Location:  [facility];  Service: General    CRANIOTOMY  2003 & 2014    Dr. Werner Hollis for tumor removal    CRANIOTOMY FOR TUMOR Left 3/6/2024    Procedure: CRANIOTOMY FOR TUMOR STEREOTACTIC WITH STEALTH;  Surgeon: Robi Perales MD;  Location:  [facility];  Service: Neurosurgery;  Laterality: Left;    ENDOSCOPY      EXPLORATORY LAPAROTOMY N/A 12/05/2017    Procedure: EXPLORATORY LAPAROTOMY, SMALL BOWEL RESECTION;  Surgeon: Ирина Cobian MD;  Location:  [facility];  Service:     EXPLORATORY LAPAROTOMY N/A 12/22/2017    Procedure: LAPAROTOMY EXPLORATORY FOR SMALL BOWEL OBSTRUCTION;  Surgeon: Ирина Cobian MD;  Location:  [facility];  Service:     EXPLORATORY LAPAROTOMY N/A 07/23/2018    Procedure: EXPLORATORY LAPAROTOMY, APPENDECTOMY, CECOPEXY, INCISIONAL HERNIA REPAIR, LYSIS OF ADHESIONS;  Surgeon: Andrea Bocanegra MD;  Location:  [facility];  Service: General    EXPLORATORY LAPAROTOMY N/A 08/19/2018    Procedure:  LAPAROTOMY EXPLORATORY, SIGMOID COLECTOMY, CREATION OF OSTOMY;  Surgeon: Andrea Bocanegra MD;  Location:  [facility];  Service: General    HYSTERECTOMY      partial - both ovaries still present pt believes     INSERTION HEMODIALYSIS CATHETER N/A 12/07/2017    Procedure: HEMODIALYSIS CATHETER INSERTION;  Surgeon: Chance Valenzuela MD;  Location:  [facility];  Service:     ORBITOTOMY Left 11/03/2017    Procedure:  LEFT LATERAL ORBITOTOMY WITH DEBULKING OF TUMOR ;  Surgeon: Moo Hopkins MD;  Location:  [facility];  Service:     OTHER SURGICAL HISTORY      esophagogastric fundoplasty nissen fundoplication    TUBAL ABDOMINAL LIGATION           EVALUATION   PT Ortho       Row Name 08/16/24 1430       Subjective    Subjective Comments Pt with no issues, no c/o pain  -MF       Subjective Pain    Able to rate subjective pain? yes  -MF    Pre-Treatment Pain Level 0  -MF    Post-Treatment Pain Level 0  -MF       Transfers    Sit-Stand [place] (Transfers) independent  -MF    Stand-Sit [place] (Transfers) independent  -MF    Comment, (Transfers) seated for tx  -MF       Gait/Stairs (Locomotion)    [place] Level (Gait) independent  -MF              User Key  (r) = Recorded By, (t) = Taken By, (c) = Cosigned By      Initials Name Provider Type     Alli Fields, PT Physical Therapist                     LDA Wound       Row Name 08/16/24 1430             Wound 08/09/24 Left lateral scalp Incision    Wound - Properties Group Placement Date: 08/09/24  -LH Present on Original Admission: Y  -LH Side: Left  -LH Orientation: lateral  -LH Location: scalp  -LH Primary Wound Type: Incision  -LH    Dressing Appearance intact;moist drainage  -MF      Base moist;red;granulating;gray;yellow;other (see comments)  silver nitrate debris to wound base  -MF      Periwound intact;dry;warm  -MF      Periwound Temperature warm  -      Periwound Skin Turgor soft  -MF      Edges open;irregular  -MF      Wound Length (cm)  7 cm  -MF      Wound Width (cm) 0.4 cm  -MF      Wound Depth (cm) 0.1 cm  -MF      Wound Surface Area (cm^2) 2.8 cm^2  -MF      Wound Volume (cm^3) 0.28 cm^3  -MF      Drainage Characteristics/Odor serosanguineous  -      Drainage Amount small  -MF      Care, Wound irrigated with;wound cleanser;debrided  -MF      Dressing Care foam;low-adherent;silver impregnated  mepilex Ag foam with primafix tape  -      Periwound Care cleansed with pH balanced cleanser  -      Retired Wound - Properties Group Placement Date: 08/09/24  - Present on Original Admission: Y  - Side: Left  - Orientation: lateral  -LH Location: scalp  -LH Primary Wound Type: Incision  -LH    Retired Wound - Properties Group Date first assessed: 08/09/24  - Present on Original Admission: Y  -LH Side: Left  -LH Location: scalp  -LH Primary Wound Type: Incision  -LH              User Key  (r) = Recorded By, (t) = Taken By, (c) = Cosigned By      Initials Name Provider Type     Alli Fields, PT Physical Therapist     Malcolm Reyna, PT Physical Therapist                      WOUND DEBRIDEMENT                       Recommendation and Plan   PT Assessment/Plan       Row Name 08/16/24 1430          PT Assessment    Functional Limitations Performance in self-care ADL  -     Impairments Integumentary integrity  -     Assessment Comments Pt cont with slow, steady progress with moderate reepithelialization noted to wound edges.  PT will cont with debridement and dressing management 2 x/week  -     Rehab Potential Good  -MF     Patient/caregiver participated in establishment of treatment plan and goals Yes  -     Patient would benefit from skilled therapy intervention Yes  -MF        PT Plan    PT Frequency 2x/week  -     Physical Therapy Interventions (Optional Details) wound care;patient/family education  -     PT Plan Comments debridement, ag nitrate PRN  -               User Key  (r) = Recorded By, (t) = Taken By, (c) =  Cosigned By      Initials Name Provider Type    Alli Delvalle, PT Physical Therapist                    Goals   PT OP Goals       Row Name 08/16/24 1430          Time Calculation    PT Goal Re-Cert Due Date 11/07/24  -               User Key  (r) = Recorded By, (t) = Taken By, (c) = Cosigned By      Initials Name Provider Type    Alli Delvalle, PT Physical Therapist                    PT Goal Re-Cert Due Date: 11/07/24            Time Calculation: Start Time: 1430  Untimed Charges  30648-[id number] debridement: 25  Total Minutes  Untimed Charges Total Minutes: 25   Total Minutes: 25  Therapy Charges for Today       Code Description Service Date Service Provider Modifiers Qty    76769359777 HC GILA DEBRIDE OPEN WOUND UP TO 20CM 8/16/2024 Alli Fields, PT GP 1                    Alli Fields, PT  8/16/2024

## 2024-08-20 ENCOUNTER — HOSPITAL ENCOUNTER (OUTPATIENT)
Dept: PHYSICAL THERAPY | Facility: HOSPITAL | Age: 69
Setting detail: THERAPIES SERIES
Discharge: HOME | End: 2024-08-20
Payer: MEDICARE

## 2024-08-20 DIAGNOSIS — T81.89XD NON-HEALING SURGICAL WOUND, SUBSEQUENT ENCOUNTER: Primary | ICD-10-CM

## 2024-08-20 DIAGNOSIS — S01.00XD OPEN WOUND OF SCALP, SUBSEQUENT ENCOUNTER: ICD-10-CM

## 2024-08-20 PROCEDURE — 97597 DBRDMT OPN WND 1ST 20 CM/<: CPT

## 2024-08-20 NOTE — THERAPY WOUND CARE TREATMENT
Outpatient Rehabilitation - Wound/Debridement Treatment Note   [place]     Patient Name: Tereza Ackerman  : 1955  MRN: [id number]  Today's Date: 2024                 Admit Date: 2024    Visit Dx:    ICD-10-CM ICD-9-CM   1. Non-healing surgical wound, subsequent encounter  T81.89XD V58.89     998.83   2. Open wound of scalp, subsequent encounter  S01.00XD V58.89     873.0     L scalp        Patient Active Problem List   Diagnosis    Impaired glucose tolerance    Parathyroid adenoma    Reaction to chronic stress    Multinodular goiter    Vitamin D deficiency    Meningioma, recurrent of brain    Arthritis    Depression    Small bowel obstruction    Insomnia    History of Nissen fundoplication    Malignant neoplasm of left orbit    Prediabetes    Mixed hyperlipidemia    Hyperglycemia    Atrial flutter with rapid ventricular response    Anemia    Large bowel obstruction    Abdominal pain    Essential hypertension    History of creation of ostomy    Screen for colon cancer    Sigmoid volvulus    SHAE (obstructive sleep apnea)    Brain mass    Meningioma        Past Medical History:   Diagnosis Date    Abdominal hernia     Arthritis     rt knee     Atrial flutter with rapid ventricular response 2017    Back pain     Brain tumor     Colostomy present 2018    Depression     History of blood transfusion     History of gastroesophageal reflux (GERD)     resolved since Nissen    History of Nissen fundoplication 2017    History of small bowel obstruction     Hyperlipemia     Hypertension     Memory loss or impairment     Migraine     Parathyroid adenoma     Incidental on thyroid US.    PONV (postoperative nausea and vomiting)     nausea - preprocedural meds help     Prediabetes     Last Impression: 2015  Reviewed labs. Excellent control.  Emery Connell Impression: 2015  Reviewed labs. Excellent control.  Michaela Connell    Thyroid nodule      Last Impression: 13  Jun 2015  r/o thyroid cancer, will proceed with US.  Michaela Connell (Internal Medicine)     UTI (urinary tract infection)     Vision changes     blockages left eye     Wears glasses     readers        Past Surgical History:   Procedure Laterality Date    BRAIN SURGERY Left 05/19/2022    BRAIN SURGERY  08/2023    left side done & radiationg a wk. later,  in [place]    CARDIAC CATHETERIZATION N/A 04/27/2020    Procedure: LEFT HEART CATH;  Surgeon: Marina Ring MD;  Location:  [facility];  Service: Cardiology;  Laterality: N/A;    CARPAL TUNNEL RELEASE  2002    right     COLONOSCOPY N/A 08/18/2018    Procedure: COLONOSCOPY;  Surgeon: Inderjit Campos MD;  Location: [facility];  Service: Gastroenterology    COLONOSCOPY N/A 11/28/2018    Procedure: COLONOSCOPY;  Surgeon: Inderjit Campos MD;  Location: [facility];  Service: Gastroenterology    COLOSTOMY CLOSURE N/A 02/19/2019    Procedure: COLOSTOMY TAKEDOWN, INCISIONAL HERNIA REPAIR;  Surgeon: Andrea Bocanegra MD;  Location:  [facility];  Service: General    CRANIOTOMY  2003 & 2014    Dr. Werner Hollis for tumor removal    CRANIOTOMY FOR TUMOR Left 3/6/2024    Procedure: CRANIOTOMY FOR TUMOR STEREOTACTIC WITH STEALTH;  Surgeon: Robi Perales MD;  Location:  [facility];  Service: Neurosurgery;  Laterality: Left;    ENDOSCOPY      EXPLORATORY LAPAROTOMY N/A 12/05/2017    Procedure: EXPLORATORY LAPAROTOMY, SMALL BOWEL RESECTION;  Surgeon: Ирина Cobian MD;  Location: [facility];  Service:     EXPLORATORY LAPAROTOMY N/A 12/22/2017    Procedure: LAPAROTOMY EXPLORATORY FOR SMALL BOWEL OBSTRUCTION;  Surgeon: Ирина Cobian MD;  Location: [facility];  Service:     EXPLORATORY LAPAROTOMY N/A 07/23/2018    Procedure: EXPLORATORY LAPAROTOMY, APPENDECTOMY, CECOPEXY, INCISIONAL HERNIA REPAIR, LYSIS OF ADHESIONS;  Surgeon: Andrea Bocanegra MD;  Location: [facility];  Service: General    EXPLORATORY LAPAROTOMY N/A 08/19/2018     Procedure: LAPAROTOMY EXPLORATORY, SIGMOID COLECTOMY, CREATION OF OSTOMY;  Surgeon: Andrea Bocanegra MD;  Location:  [facility];  Service: General    HYSTERECTOMY      partial - both ovaries still present pt believes     INSERTION HEMODIALYSIS CATHETER N/A 2017    Procedure: HEMODIALYSIS CATHETER INSERTION;  Surgeon: Chance Valenzuela MD;  Location:  [facility];  Service:     ORBITOTOMY Left 2017    Procedure:  LEFT LATERAL ORBITOTOMY WITH DEBULKING OF TUMOR ;  Surgeon: oMo Hopkins MD;  Location:  [facility];  Service:     OTHER SURGICAL HISTORY      esophagogastric fundoplasty nissen fundoplication    TUBAL ABDOMINAL LIGATION           EVALUATION   PT Ortho       Row Name 24 1100       Subjective    Subjective Comments Reports her child passed away recently and wants to cut and wash her hair prior to the . No other issues/complaints.  -       Subjective Pain    Able to rate subjective pain? yes  -    Pre-Treatment Pain Level 0  -LH    Post-Treatment Pain Level 0  -       Transfers    Sit-Stand [place] (Transfers) independent  -    Stand-Sit [place] (Transfers) independent  -    Comment, (Transfers) seated for tx  -       Gait/Stairs (Locomotion)    [place] Level (Gait) independent  -              User Key  (r) = Recorded By, (t) = Taken By, (c) = Cosigned By      Initials Name Provider Type     Malcolm Reyna, PT Physical Therapist                     [facility] Wound       Row Name 24 1100             Wound 24 Left lateral scalp Incision    Wound - Properties Group Placement Date: 24  - Present on Original Admission: Y  -LH Side: Left  - Orientation: lateral  - Location: scalp  - Primary Wound Type: Incision  -    Wound Image Images linked: 1  -LH      Dressing Appearance intact;moist drainage  -      Base moist;red;granulating;gray;yellow;other (see comments)  scattered skin bridging  -      Periwound intact;dry;warm  -       Periwound Temperature warm  -      Periwound Skin Turgor soft  -      Edges open;irregular  -      Drainage Characteristics/Odor serosanguineous  -      Drainage Amount small  -      Care, Wound cleansed with;soap and water;wound cleanser;debrided  -      Dressing Care dressing applied;silver impregnated;low-adherent;foam  Mepilex Ag, primafix tape  -      Periwound Care cleansed with pH balanced cleanser;dry periwound area maintained;other (see comments)  nosting wipes  -      Retired Wound - Properties Group Placement Date: 08/09/24  - Present on Original Admission: Y  -LH Side: Left  - Orientation: lateral  - Location: scalp  -LH Primary Wound Type: Incision  -LH    Retired Wound - Properties Group Date first assessed: 08/09/24  - Present on Original Admission: Y  -LH Side: Left  -LH Location: scalp  -LH Primary Wound Type: Incision  -LH              User Key  (r) = Recorded By, (t) = Taken By, (c) = Cosigned By      Initials Name Provider Type     Malcolm Reyna, PT Physical Therapist                      WOUND DEBRIDEMENT  Total area of Debridement: 4cm2  Debridement Site 1  Location- Site 1: L scalp  Selective Debridement- Site 1: Wound Surface <20cmsq  Instruments- Site 1: tweezers  Excised Tissue Description- Site 1: moderate, other (comment) (crusts)  Bleeding- Site 1: scant, held pressure, 1 minute              Therapy Education       Row Name 08/20/24 1100             Therapy Education    Education Details Continue current POC. May wash and cut hair as needed, keep dressing intact and cleanse and change dressing after washing hair.  -      Given Symptoms/condition management;Bandaging/dressing change  -      Program Reinforced  -      How Provided Verbal;Demonstration  -      Provided to Patient  -      Level of Understanding Teach back education performed;Verbalized  -                User Key  (r) = Recorded By, (t) = Taken By, (c) = Cosigned By      Initials  Name Provider Type     Malcolm Reyna, PT Physical Therapist                    Recommendation and Plan   PT Assessment/Plan       Row Name 08/20/24 1100          PT Assessment    Functional Limitations Performance in self-care ADL  -     Impairments Integumentary integrity  -     Assessment Comments Pt continuing to demonstrate good improvements in wound healing with re-epithelialization of wound edges and skin bridge formation. Pt with scant/no remaining hypergranulation this date. Pt would continue to benefit from current POC to promote wound healing.  -     Rehab Potential Good  -     Patient/caregiver participated in establishment of treatment plan and goals Yes  -     Patient would benefit from skilled therapy intervention Yes  -        PT Plan    PT Frequency 2x/week  -     Physical Therapy Interventions (Optional Details) wound care;patient/family education  -     PT Plan Comments debridement, ag nitrate PRN  -               User Key  (r) = Recorded By, (t) = Taken By, (c) = Cosigned By      Initials Name Provider Type     Malcolm Reyna, PT Physical Therapist                    Goals   PT OP Goals       Row Name 08/20/24 1154          Time Calculation    PT Goal Re-Cert Due Date 11/07/24  -               User Key  (r) = Recorded By, (t) = Taken By, (c) = Cosigned By      Initials Name Provider Type     Malcolm Reyna, PT Physical Therapist                    PT Goal Re-Cert Due Date: 11/07/24            Time Calculation: Start Time: 1115  Untimed Charges  99374-[id number] debridement: 25  Total Minutes  Untimed Charges Total Minutes: 25   Total Minutes: 25  Therapy Charges for Today       Code Description Service Date Service Provider Modifiers Qty    [id number]  GILA DEBRIDE OPEN WOUND UP TO 20CM 8/20/2024 Malcolm Reyna, PT GP 1                    Malcolm Reyna PT  8/20/2024

## 2024-08-23 ENCOUNTER — HOSPITAL ENCOUNTER (OUTPATIENT)
Dept: PHYSICAL THERAPY | Facility: HOSPITAL | Age: 69
Setting detail: THERAPIES SERIES
Discharge: HOME | End: 2024-08-23
Payer: MEDICARE

## 2024-08-23 DIAGNOSIS — T81.89XD NON-HEALING SURGICAL WOUND, SUBSEQUENT ENCOUNTER: Primary | ICD-10-CM

## 2024-08-23 DIAGNOSIS — S01.00XD OPEN WOUND OF SCALP, SUBSEQUENT ENCOUNTER: ICD-10-CM

## 2024-08-23 PROCEDURE — 97597 DBRDMT OPN WND 1ST 20 CM/<: CPT

## 2024-08-23 NOTE — THERAPY WOUND CARE TREATMENT
Outpatient Rehabilitation - Wound/Debridement Treatment Note   [place]     Patient Name: Tereza Ackerman  : 1955  MRN: [id number]  Today's Date: 2024                 Admit Date: 2024    Visit Dx:    ICD-10-CM ICD-9-CM   1. Non-healing surgical wound, subsequent encounter  T81.89XD V58.89     998.83   2. Open wound of scalp, subsequent encounter  S01.00XD V58.89     873.0       Patient Active Problem List   Diagnosis    Impaired glucose tolerance    Parathyroid adenoma    Reaction to chronic stress    Multinodular goiter    Vitamin D deficiency    Meningioma, recurrent of brain    Arthritis    Depression    Small bowel obstruction    Insomnia    History of Nissen fundoplication    Malignant neoplasm of left orbit    Prediabetes    Mixed hyperlipidemia    Hyperglycemia    Atrial flutter with rapid ventricular response    Anemia    Large bowel obstruction    Abdominal pain    Essential hypertension    History of creation of ostomy    Screen for colon cancer    Sigmoid volvulus    SHAE (obstructive sleep apnea)    Brain mass    Meningioma        Past Medical History:   Diagnosis Date    Abdominal hernia     Arthritis     rt knee     Atrial flutter with rapid ventricular response 2017    Back pain     Brain tumor     Colostomy present 2018    Depression     History of blood transfusion     History of gastroesophageal reflux (GERD)     resolved since Nissen    History of Nissen fundoplication 2017    History of small bowel obstruction     Hyperlipemia     Hypertension     Memory loss or impairment     Migraine     Parathyroid adenoma     Incidental on thyroid US.    PONV (postoperative nausea and vomiting)     nausea - preprocedural meds help     Prediabetes     Last Impression: 2015  Reviewed labs. Excellent control.  Emery Connell Impression: 2015  Reviewed labs. Excellent control.  Michaela Connell    Thyroid nodule      Last Impression: 2015  r/o  thyroid cancer, will proceed with US.  Michaela Connell (Internal Medicine)     UTI (urinary tract infection)     Vision changes     blockages left eye     Wears glasses     readers        Past Surgical History:   Procedure Laterality Date    BRAIN SURGERY Left 05/19/2022    BRAIN SURGERY  08/2023    left side done & radiationg a wk. later,  in [place]    CARDIAC CATHETERIZATION N/A 04/27/2020    Procedure: LEFT HEART CATH;  Surgeon: Marina Ring MD;  Location:  [facility];  Service: Cardiology;  Laterality: N/A;    CARPAL TUNNEL RELEASE  2002    right     COLONOSCOPY N/A 08/18/2018    Procedure: COLONOSCOPY;  Surgeon: Inderjit Campos MD;  Location:  [facility];  Service: Gastroenterology    COLONOSCOPY N/A 11/28/2018    Procedure: COLONOSCOPY;  Surgeon: Inderjit Campos MD;  Location:  [facility];  Service: Gastroenterology    COLOSTOMY CLOSURE N/A 02/19/2019    Procedure: COLOSTOMY TAKEDOWN, INCISIONAL HERNIA REPAIR;  Surgeon: Andrea Bocanegra MD;  Location:  [facility];  Service: General    CRANIOTOMY  2003 & 2014    Dr. Werner Hollis for tumor removal    CRANIOTOMY FOR TUMOR Left 3/6/2024    Procedure: CRANIOTOMY FOR TUMOR STEREOTACTIC WITH STEALTH;  Surgeon: Robi Perales MD;  Location:  [facility];  Service: Neurosurgery;  Laterality: Left;    ENDOSCOPY      EXPLORATORY LAPAROTOMY N/A 12/05/2017    Procedure: EXPLORATORY LAPAROTOMY, SMALL BOWEL RESECTION;  Surgeon: Ирина Cobian MD;  Location:  [facility];  Service:     EXPLORATORY LAPAROTOMY N/A 12/22/2017    Procedure: LAPAROTOMY EXPLORATORY FOR SMALL BOWEL OBSTRUCTION;  Surgeon: Ирина Cobian MD;  Location:  [facility];  Service:     EXPLORATORY LAPAROTOMY N/A 07/23/2018    Procedure: EXPLORATORY LAPAROTOMY, APPENDECTOMY, CECOPEXY, INCISIONAL HERNIA REPAIR, LYSIS OF ADHESIONS;  Surgeon: Andrea Bocanegra MD;  Location:  [facility];  Service: General    EXPLORATORY LAPAROTOMY N/A 08/19/2018    Procedure:  LAPAROTOMY EXPLORATORY, SIGMOID COLECTOMY, CREATION OF OSTOMY;  Surgeon: Andrea Bocanegra MD;  Location:  [facility];  Service: General    HYSTERECTOMY      partial - both ovaries still present pt believes     INSERTION HEMODIALYSIS CATHETER N/A 12/07/2017    Procedure: HEMODIALYSIS CATHETER INSERTION;  Surgeon: Chance Valenzuela MD;  Location:  [facility];  Service:     ORBITOTOMY Left 11/03/2017    Procedure:  LEFT LATERAL ORBITOTOMY WITH DEBULKING OF TUMOR ;  Surgeon: Moo Hopkins MD;  Location:  [facility];  Service:     OTHER SURGICAL HISTORY      esophagogastric fundoplasty nissen fundoplication    TUBAL ABDOMINAL LIGATION           EVALUATION   PT Ortho       Row Name 08/23/24 1015       Subjective    Subjective Comments No new complaints, did not try to shampoo her hair.  -       Subjective Pain    Able to rate subjective pain? yes  -JM    Pre-Treatment Pain Level 0  -JM    Post-Treatment Pain Level 0  -JM       Transfers    Sit-Stand [place] (Transfers) independent  -JM    Stand-Sit [place] (Transfers) independent  -    Comment, (Transfers) seated for tx  -       Gait/Stairs (Locomotion)    [place] Level (Gait) independent  -              User Key  (r) = Recorded By, (t) = Taken By, (c) = Cosigned By      Initials Name Provider Type    Candice Ramirez, PT Physical Therapist                     LDA Wound       Row Name 08/23/24 1015             Wound 08/09/24 Left lateral scalp Incision    Wound - Properties Group Placement Date: 08/09/24  -LH Present on Original Admission: Y  -LH Side: Left  -LH Orientation: lateral  -LH Location: scalp  -LH Primary Wound Type: Incision  -LH    Wound Image Images linked: 1  -JM      Dressing Appearance intact;dry;dressing loose  tape loose  -      Base moist;red;granulating;yellow;other (see comments);epithelialization  3 small open areas remaining, otherwise reepithelialized or dry crust  -      Periwound intact;dry;warm  -       Periwound Temperature warm  -      Periwound Skin Turgor soft  -      Edges open;irregular  -      Wound Length (cm) 4 cm  -      Wound Width (cm) 0.3 cm  -      Wound Depth (cm) 0.1 cm  -      Wound Surface Area (cm^2) 1.2 cm^2  -JM      Wound Volume (cm^3) 0.12 cm^3  -JM      Drainage Characteristics/Odor serosanguineous  -      Drainage Amount small  -JM      Care, Wound cleansed with;wound cleanser;debrided  -      Dressing Care dressing applied;collagen;silver impregnated;foam  helena to small open areas, mepilex ag, primafix  -      Periwound Care cleansed with pH balanced cleanser;dry periwound area maintained;barrier film applied;other (see comments)  clipped hair, nosting  -      Retired Wound - Properties Group Placement Date: 08/09/24  - Present on Original Admission: Y  - Side: Left  - Orientation: lateral  - Location: scalp  - Primary Wound Type: Incision  -    Retired Wound - Properties Group Date first assessed: 08/09/24  - Present on Original Admission: Y  - Side: Left  - Location: scalp  - Primary Wound Type: Incision  -              User Key  (r) = Recorded By, (t) = Taken By, (c) = Cosigned By      Initials Name Provider Type    Candice Ramirez, PT Physical Therapist     Malcolm Reyna, PT Physical Therapist                      WOUND DEBRIDEMENT  Total area of Debridement: 4cmsq  Debridement Site 1  Location- Site 1: L scalp  Selective Debridement- Site 1: Wound Surface <20cmsq  Instruments- Site 1: tweezers  Excised Tissue Description- Site 1: minimum, slough, other (comment) (crusts)  Bleeding- Site 1: none              Therapy Education       Row Name 08/23/24 1015             Therapy Education    Given Symptoms/condition management;Bandaging/dressing change  -      Program Reinforced  -DIAN      How Provided Verbal;Demonstration  -DIAN      Provided to Patient  -DIAN      Level of Understanding Teach back education performed;Verbalized  -DIAN                 User Key  (r) = Recorded By, (t) = Taken By, (c) = Cosigned By      Initials Name Provider Type    Candice Ramirez, PT Physical Therapist                    Recommendation and Plan   PT Assessment/Plan       Row Name 08/23/24 1015          PT Assessment    Functional Limitations Performance in self-care ADL  -     Impairments Integumentary integrity  -     Assessment Comments Pt's wound improved with near-closure, only 3 small open areas remaining with expanding skin bridges.  PT added helena to remaining areas to help promote wound closure.  Likely to have closure in next 1-2 weeks.  -        PT Plan    PT Frequency 1x/week;2x/week  -     Physical Therapy Interventions (Optional Details) patient/family education;wound care  -     PT Plan Comments debridement, dressings  -               User Key  (r) = Recorded By, (t) = Taken By, (c) = Cosigned By      Initials Name Provider Type    Candice Ramirez, PT Physical Therapist                    Goals   PT OP Goals       Row Name 08/23/24 1239          Time Calculation    PT Goal Re-Cert Due Date 11/07/24  -               User Key  (r) = Recorded By, (t) = Taken By, (c) = Cosigned By      Initials Name Provider Type    Candice Ramirez, PT Physical Therapist                    PT Goal Re-Cert Due Date: 11/07/24            Time Calculation: Start Time: 1015  Untimed Charges  71237-[id number] debridement: 15  Total Minutes  Untimed Charges Total Minutes: 15   Total Minutes: 15  Therapy Charges for Today       Code Description Service Date Service Provider Modifiers Qty    [id number] HC GILA DEBRIDE OPEN WOUND UP TO 20CM 8/23/2024 Candice Constantino, PT GP 1                    Candice Constantino, PT  8/23/2024

## 2024-08-27 ENCOUNTER — HOSPITAL ENCOUNTER (OUTPATIENT)
Dept: PHYSICAL THERAPY | Facility: HOSPITAL | Age: 69
Setting detail: THERAPIES SERIES
Discharge: HOME | End: 2024-08-27
Payer: MEDICARE

## 2024-08-27 ENCOUNTER — OFFICE VISIT (OUTPATIENT)
Dept: NEUROSURGERY | Facility: CLINIC | Age: 69
End: 2024-08-27
Payer: MEDICARE

## 2024-08-27 VITALS — BODY MASS INDEX: 32.71 KG/M2 | HEIGHT: 63 IN | WEIGHT: 184.6 LBS | TEMPERATURE: 97.3 F

## 2024-08-27 DIAGNOSIS — T81.89XD NON-HEALING SURGICAL WOUND, SUBSEQUENT ENCOUNTER: Primary | ICD-10-CM

## 2024-08-27 DIAGNOSIS — C70.9 MENINGIOMA, MALIGNANT: ICD-10-CM

## 2024-08-27 DIAGNOSIS — Z51.89 VISIT FOR WOUND CHECK: ICD-10-CM

## 2024-08-27 DIAGNOSIS — S01.00XD OPEN WOUND OF SCALP, SUBSEQUENT ENCOUNTER: ICD-10-CM

## 2024-08-27 DIAGNOSIS — T14.8XXA ESCHAR OF WOUND BED: Primary | ICD-10-CM

## 2024-08-27 PROCEDURE — 97597 DBRDMT OPN WND 1ST 20 CM/<: CPT

## 2024-08-27 PROCEDURE — 1159F MED LIST DOCD IN RCRD: CPT | Performed by: PHYSICIAN ASSISTANT

## 2024-08-27 PROCEDURE — 99213 OFFICE O/P EST LOW 20 MIN: CPT | Performed by: PHYSICIAN ASSISTANT

## 2024-08-27 PROCEDURE — 1160F RVW MEDS BY RX/DR IN RCRD: CPT | Performed by: PHYSICIAN ASSISTANT

## 2024-08-27 NOTE — THERAPY WOUND CARE TREATMENT
Outpatient Rehabilitation - Wound/Debridement Treatment Note   [place]     Patient Name: Tereza Ackerman  : 1955  MRN: [id number]  Today's Date: 2024                 Admit Date: 2024    Visit Dx:    ICD-10-CM ICD-9-CM   1. Non-healing surgical wound, subsequent encounter  T81.89XD V58.89     998.83   2. Open wound of scalp, subsequent encounter  S01.00XD V58.89     873.0     L lateral scalp      Patient Active Problem List   Diagnosis    Impaired glucose tolerance    Parathyroid adenoma    Reaction to chronic stress    Multinodular goiter    Vitamin D deficiency    Meningioma, recurrent of brain    Arthritis    Depression    Small bowel obstruction    Insomnia    History of Nissen fundoplication    Malignant neoplasm of left orbit    Prediabetes    Mixed hyperlipidemia    Hyperglycemia    Atrial flutter with rapid ventricular response    Anemia    Large bowel obstruction    Abdominal pain    Essential hypertension    History of creation of ostomy    Screen for colon cancer    Sigmoid volvulus    SHAE (obstructive sleep apnea)    Brain mass    Meningioma        Past Medical History:   Diagnosis Date    Abdominal hernia     Arthritis     rt knee     Atrial flutter with rapid ventricular response 2017    Back pain     Brain tumor     Colostomy present 2018    Depression     History of blood transfusion     History of gastroesophageal reflux (GERD)     resolved since Nissen    History of Nissen fundoplication 2017    History of small bowel obstruction     Hyperlipemia     Hypertension     Memory loss or impairment     Migraine     Parathyroid adenoma     Incidental on thyroid US.    PONV (postoperative nausea and vomiting)     nausea - preprocedural meds help     Prediabetes     Last Impression: 2015  Reviewed labs. Excellent control.  Emery Connell Impression: 2015  Reviewed labs. Excellent control.  Michaela Connell    Thyroid nodule      Last Impression:  13 Jun 2015  r/o thyroid cancer, will proceed with US.  Michaela Connell (Internal Medicine)     UTI (urinary tract infection)     Vision changes     blockages left eye     Wears glasses     readers        Past Surgical History:   Procedure Laterality Date    BRAIN SURGERY Left 05/19/2022    BRAIN SURGERY  08/2023    left side done & radiationg a wk. later,  in [place]    CARDIAC CATHETERIZATION N/A 04/27/2020    Procedure: LEFT HEART CATH;  Surgeon: Marina Ring MD;  Location:  [facility];  Service: Cardiology;  Laterality: N/A;    CARPAL TUNNEL RELEASE  2002    right     COLONOSCOPY N/A 08/18/2018    Procedure: COLONOSCOPY;  Surgeon: Inderjit Campos MD;  Location: [facility];  Service: Gastroenterology    COLONOSCOPY N/A 11/28/2018    Procedure: COLONOSCOPY;  Surgeon: Inderjit Campos MD;  Location: [facility];  Service: Gastroenterology    COLOSTOMY CLOSURE N/A 02/19/2019    Procedure: COLOSTOMY TAKEDOWN, INCISIONAL HERNIA REPAIR;  Surgeon: Andrea Bocanegra MD;  Location:  [facility];  Service: General    CRANIOTOMY  2003 & 2014    Dr. Werner Hollis for tumor removal    CRANIOTOMY FOR TUMOR Left 3/6/2024    Procedure: CRANIOTOMY FOR TUMOR STEREOTACTIC WITH STEALTH;  Surgeon: Robi Perales MD;  Location:  [facility];  Service: Neurosurgery;  Laterality: Left;    ENDOSCOPY      EXPLORATORY LAPAROTOMY N/A 12/05/2017    Procedure: EXPLORATORY LAPAROTOMY, SMALL BOWEL RESECTION;  Surgeon: Ирина Cobian MD;  Location: [facility];  Service:     EXPLORATORY LAPAROTOMY N/A 12/22/2017    Procedure: LAPAROTOMY EXPLORATORY FOR SMALL BOWEL OBSTRUCTION;  Surgeon: Ирина Cobian MD;  Location:  [facility];  Service:     EXPLORATORY LAPAROTOMY N/A 07/23/2018    Procedure: EXPLORATORY LAPAROTOMY, APPENDECTOMY, CECOPEXY, INCISIONAL HERNIA REPAIR, LYSIS OF ADHESIONS;  Surgeon: Andrea Bocanegra MD;  Location: [facility];  Service: General    EXPLORATORY LAPAROTOMY N/A 08/19/2018     Procedure: LAPAROTOMY EXPLORATORY, SIGMOID COLECTOMY, CREATION OF OSTOMY;  Surgeon: Andrea Bocanegra MD;  Location:  [facility];  Service: General    HYSTERECTOMY      partial - both ovaries still present pt believes     INSERTION HEMODIALYSIS CATHETER N/A 12/07/2017    Procedure: HEMODIALYSIS CATHETER INSERTION;  Surgeon: Chance Vaelnzuela MD;  Location:  [facility];  Service:     ORBITOTOMY Left 11/03/2017    Procedure:  LEFT LATERAL ORBITOTOMY WITH DEBULKING OF TUMOR ;  Surgeon: Moo Hopkins MD;  Location:  [facility];  Service:     OTHER SURGICAL HISTORY      esophagogastric fundoplasty nissen fundoplication    TUBAL ABDOMINAL LIGATION           EVALUATION   PT Ortho       Row Name 08/27/24 1100       Subjective    Subjective Comments Reports she has still not attempted to shampoo her hair and plans to wait until the wound is fully healed. No issues/complaints.  -       Subjective Pain    Able to rate subjective pain? yes  -    Pre-Treatment Pain Level 0  -LH    Post-Treatment Pain Level 0  -LH       Transfers    Sit-Stand [place] (Transfers) independent  -    Stand-Sit [place] (Transfers) independent  -    Comment, (Transfers) seated for tx  -       Gait/Stairs (Locomotion)    [place] Level (Gait) independent  -              User Key  (r) = Recorded By, (t) = Taken By, (c) = Cosigned By      Initials Name Provider Type     Malcolm Reyna, PT Physical Therapist                     [facility] Wound       Row Name 08/27/24 1100             Wound 08/09/24 Left lateral scalp Incision    Wound - Properties Group Placement Date: 08/09/24  - Present on Original Admission: Y  -LH Side: Left  - Orientation: lateral  - Location: scalp  -LH Primary Wound Type: Incision  -LH    Wound Image Images linked: 1  -LH      Dressing Appearance intact;dried drainage  -      Base moist;red;granulating;yellow;other (see comments);epithelialization  2 small open areas remaining, otherwise  reepithelialized or dry crust  -      Periwound intact;dry;warm  -      Periwound Temperature warm  -      Periwound Skin Turgor soft  -      Edges open;irregular  -      Drainage Characteristics/Odor serosanguineous  -      Drainage Amount small  -LH      Care, Wound cleansed with;wound cleanser;debrided  -      Dressing Care dressing applied;collagen;silver impregnated;foam  helena to small open areas, mepilex ag, primafix  -      Periwound Care cleansed with pH balanced cleanser;dry periwound area maintained;barrier film applied;other (see comments)  Trimmed hair, nosting wipe  -      Retired Wound - Properties Group Placement Date: 08/09/24  - Present on Original Admission: Y  - Side: Left  - Orientation: lateral  - Location: scalp  -LH Primary Wound Type: Incision  -LH    Retired Wound - Properties Group Date first assessed: 08/09/24  - Present on Original Admission: Y  -LH Side: Left  - Location: scalp  - Primary Wound Type: Incision  -LH              User Key  (r) = Recorded By, (t) = Taken By, (c) = Cosigned By      Initials Name Provider Type     Malcolm Reyna, PT Physical Therapist                      WOUND DEBRIDEMENT  Total area of Debridement: 4cmsq  Debridement Site 1  Location- Site 1: L scalp  Selective Debridement- Site 1: Wound Surface <20cmsq  Instruments- Site 1: tweezers  Excised Tissue Description- Site 1: minimum, slough, other (comment) (crusts)  Bleeding- Site 1: none              Therapy Education       Row Name 08/27/24 1100             Therapy Education    Education Details Continue current POC, may have to change dressing at home on Labor day. Gave pt pre-cut Mepilex and PF tape.  -      Given Symptoms/condition management;Bandaging/dressing change  -      Program Reinforced  -      How Provided Verbal;Demonstration  -      Provided to Patient  -      Level of Understanding Teach back education performed;Verbalized  -                User  Key  (r) = Recorded By, (t) = Taken By, (c) = Cosigned By      Initials Name Provider Type     Malcolm Reyna, PT Physical Therapist                    Recommendation and Plan   PT Assessment/Plan       Row Name 08/27/24 1100          PT Assessment    Functional Limitations Performance in self-care ADL  -     Impairments Integumentary integrity  -     Assessment Comments Pt continuing with steady improvements in wound healing with increasing epithelialization of wound edges. Pt with only 2 small open areas remaining along incision line following debridement of crusts. Pt would continue to benefit from current POC to promote wound healing.  -     Rehab Potential Good  -     Patient/caregiver participated in establishment of treatment plan and goals Yes  -     Patient would benefit from skilled therapy intervention Yes  -        PT Plan    PT Frequency 1x/week;2x/week  -     Physical Therapy Interventions (Optional Details) patient/family education;wound care  -     PT Plan Comments debridement, dressings  -               User Key  (r) = Recorded By, (t) = Taken By, (c) = Cosigned By      Initials Name Provider Type     Malcolm Reyna, PT Physical Therapist                    Goals   PT OP Goals       Row Name 08/27/24 1148          Time Calculation    PT Goal Re-Cert Due Date 11/07/24  -               User Key  (r) = Recorded By, (t) = Taken By, (c) = Cosigned By      Initials Name Provider Type     Malcolm Reyna, PT Physical Therapist                    PT Goal Re-Cert Due Date: 11/07/24            Time Calculation: Start Time: 1115  Untimed Charges  [id number] debridement: 15  Total Minutes  Untimed Charges Total Minutes: 15   Total Minutes: 15  Therapy Charges for Today       Code Description Service Date Service Provider Modifiers Qty    [id number]  GILA DEBRIDE OPEN WOUND UP TO 20CM 8/27/2024 Malcolm Reyna, PT GP 1                    Malcolm Reyna PT  8/27/2024

## 2024-08-27 NOTE — PROGRESS NOTES
Patient: Tereza Ackerman  : 1955  Chart #: [id number]    Date of Service: 2024    CHIEF COMPLAINT: Wound check    History of Present Illness Ms. Ackerman is a 69-year-old woman who underwent left sphenoid wing meningioma resection in  and then again in .  She has undergone multiple radiosurgery procedures as well.  More recently she presented with recurrence and on 3/6/2024 she underwent redo left frontal craniotomy for resection.  Gross total resection was achieved.  She has left orbital swelling and ptosis.  She has mild headache.   She is here today to evaluate the incision.  At last appointment, there was very thick eschar along the length. I referred her to wound care for debridement.  She is here today to follow up. No complaints      Past Medical History:   Diagnosis Date    Abdominal hernia     Arthritis     rt knee     Atrial flutter with rapid ventricular response 2017    Back pain     Brain tumor     Colostomy present 2018    Depression     History of blood transfusion     History of gastroesophageal reflux (GERD)     resolved since Nissen    History of Nissen fundoplication 2017    History of small bowel obstruction     Hyperlipemia     Hypertension     Memory loss or impairment     Migraine     Parathyroid adenoma     Incidental on thyroid US.    PONV (postoperative nausea and vomiting)     nausea - preprocedural meds help     Prediabetes     Last Impression: 2015  Reviewed labs. Excellent control.  Emery Connell Impression: 2015  Reviewed labs. Excellent control.  Michaela Connell    Thyroid nodule      Last Impression: 2015  r/o thyroid cancer, will proceed with US.  Michaela Connell (Internal Medicine)     UTI (urinary tract infection)     Vision changes     blockages left eye     Wears glasses     readers         Current Outpatient Medications:     Artificial Tear Ointment (artificial tears) ophthalmic ointment, Administer  to  both eyes Every 1 (One) Hour As Needed., Disp: , Rfl:     aspirin 81 MG tablet, Take 1 tablet by mouth Daily., Disp: 30 tablet, Rfl: 11    busPIRone (BUSPAR) 10 MG tablet, Take 1 tablet by mouth 2 (Two) Times a Day., Disp: 180 tablet, Rfl: 3    carvedilol (COREG) 12.5 MG tablet, TAKE ONE TABLET BY MOUTH EVERY 12 HOURS (Patient taking differently: Take 0.5 tablets by mouth 2 (Two) Times a Day With Meals.), Disp: 180 tablet, Rfl: 3    cholecalciferol (VITAMIN D3) 1000 units tablet, Take 2 tablets by mouth Every Other Day., Disp: , Rfl:     dorzolamide (TRUSOPT) 2 % ophthalmic solution, Administer 1 drop into the left eye 2 (Two) Times a Day., Disp: , Rfl:     lisinopril (PRINIVIL,ZESTRIL) 2.5 MG tablet, Take 1 tablet by mouth Daily., Disp: 90 tablet, Rfl: 3    LORazepam (Ativan) 0.5 MG tablet, Take 0.5-1 tablets by mouth Daily As Needed for Anxiety., Disp: 15 tablet, Rfl: 0    omeprazole (priLOSEC) 20 MG capsule, Take 1 capsule by mouth Daily., Disp: 90 capsule, Rfl: 3    ondansetron ODT (ZOFRAN-ODT) 4 MG disintegrating tablet, Place 1 tablet on the tongue Every 4 (Four) Hours. As needed for nausea, Disp: 12 tablet, Rfl: 0    phenazopyridine (PYRIDIUM) 200 MG tablet, Take 1 tablet by mouth 3 (Three) Times a Day As Needed for Dysuria., Disp: 6 tablet, Rfl: 0    polyethyl glycol-propyl glycol (SYSTANE) 0.4-0.3 % solution ophthalmic solution (artificial tears), Administer 1 drop into the left eye Every 1 (One) Hour As Needed (dry eye)., Disp: , Rfl:     prednisoLONE acetate (PRED FORTE) 1 % ophthalmic suspension, Administer  into the left eye., Disp: , Rfl:     QUEtiapine (SEROquel) 25 MG tablet, Take 0.5 tablets by mouth every night at bedtime., Disp: 45 tablet, Rfl: 3    rosuvastatin (CRESTOR) 40 MG tablet, Take 1 tablet by mouth Daily., Disp: 90 tablet, Rfl: 3    sertraline (ZOLOFT) 100 MG tablet, Take 1 tablet by mouth Daily., Disp: 90 tablet, Rfl: 3    thiamine (VITAMIN B-1) 100 MG tablet tablet, Take 1 tablet by  mouth Every Other Day., Disp: 30 each, Rfl: 5    timolol (TIMOPTIC) 0.5 % ophthalmic solution, 1 drop 2 (Two) Times a Day., Disp: , Rfl:     levETIRAcetam (KEPPRA) 500 MG tablet, TAKE ONE TABLET BY MOUTH EVERY 12 HOURS (Patient taking differently: Take 1 tablet by mouth Daily.), Disp: 60 tablet, Rfl: 0    Past Surgical History:   Procedure Laterality Date    BRAIN SURGERY Left 05/19/2022    BRAIN SURGERY  08/2023    left side done & radiationg a wk. later,  in [place]    CARDIAC CATHETERIZATION N/A 04/27/2020    Procedure: LEFT HEART CATH;  Surgeon: Marina Ring MD;  Location:  [facility];  Service: Cardiology;  Laterality: N/A;    CARPAL TUNNEL RELEASE  2002    right     COLONOSCOPY N/A 08/18/2018    Procedure: COLONOSCOPY;  Surgeon: Inderjit Campos MD;  Location:  [facility];  Service: Gastroenterology    COLONOSCOPY N/A 11/28/2018    Procedure: COLONOSCOPY;  Surgeon: Inderjit Campos MD;  Location:  [facility];  Service: Gastroenterology    COLOSTOMY CLOSURE N/A 02/19/2019    Procedure: COLOSTOMY TAKEDOWN, INCISIONAL HERNIA REPAIR;  Surgeon: Andrea Bocanegra MD;  Location:  [facility];  Service: General    CRANIOTOMY  2003 & 2014    Dr. Werner Hollis for tumor removal    CRANIOTOMY FOR TUMOR Left 3/6/2024    Procedure: CRANIOTOMY FOR TUMOR STEREOTACTIC WITH STEALTH;  Surgeon: Robi Perales MD;  Location:  [facility];  Service: Neurosurgery;  Laterality: Left;    ENDOSCOPY      EXPLORATORY LAPAROTOMY N/A 12/05/2017    Procedure: EXPLORATORY LAPAROTOMY, SMALL BOWEL RESECTION;  Surgeon: Ирина Cobian MD;  Location:  [facility];  Service:     EXPLORATORY LAPAROTOMY N/A 12/22/2017    Procedure: LAPAROTOMY EXPLORATORY FOR SMALL BOWEL OBSTRUCTION;  Surgeon: Ирина Cobian MD;  Location:  [facility];  Service:     EXPLORATORY LAPAROTOMY N/A 07/23/2018    Procedure: EXPLORATORY LAPAROTOMY, APPENDECTOMY, CECOPEXY, INCISIONAL HERNIA REPAIR, LYSIS OF ADHESIONS;  Surgeon:  Andrea Bocanegra MD;  Location:  [facility];  Service: General    EXPLORATORY LAPAROTOMY N/A 08/19/2018    Procedure: LAPAROTOMY EXPLORATORY, SIGMOID COLECTOMY, CREATION OF OSTOMY;  Surgeon: Andrea Bocanegra MD;  Location:  [facility];  Service: General    HYSTERECTOMY      partial - both ovaries still present pt believes     INSERTION HEMODIALYSIS CATHETER N/A 12/07/2017    Procedure: HEMODIALYSIS CATHETER INSERTION;  Surgeon: Chance Vaelnzuela MD;  Location:  [facility];  Service:     ORBITOTOMY Left 11/03/2017    Procedure:  LEFT LATERAL ORBITOTOMY WITH DEBULKING OF TUMOR ;  Surgeon: Moo Hopkins MD;  Location:  [facility];  Service:     OTHER SURGICAL HISTORY      esophagogastric fundoplasty nissen fundoplication    TUBAL ABDOMINAL LIGATION         Social History     Socioeconomic History    Marital status:    Tobacco Use    Smoking status: Never     Passive exposure: Never    Smokeless tobacco: Never   Vaping Use    Vaping status: Never Used   Substance and Sexual Activity    Alcohol use: No    Drug use: No    Sexual activity: Defer         Review of Systems   Constitutional:  Negative for activity change, appetite change, chills, diaphoresis, fatigue, fever and unexpected weight change.   HENT:  Negative for congestion, dental problem, drooling, ear discharge, ear pain, facial swelling, hearing loss, mouth sores, nosebleeds, postnasal drip, rhinorrhea, sinus pressure, sinus pain, sneezing, sore throat, tinnitus, trouble swallowing and voice change.    Eyes:  Positive for pain and discharge. Negative for photophobia, redness, itching and visual disturbance.   Respiratory:  Negative for apnea, cough, choking, chest tightness, shortness of breath, wheezing and stridor.    Cardiovascular:  Negative for chest pain, palpitations and leg swelling.   Gastrointestinal:  Negative for abdominal distention, abdominal pain, anal bleeding, blood in stool, constipation, diarrhea, nausea, rectal pain and  "vomiting.   Endocrine: Negative for cold intolerance, heat intolerance, polydipsia, polyphagia and polyuria.   Genitourinary:  Negative for decreased urine volume, difficulty urinating, dyspareunia, dysuria, enuresis, flank pain, frequency, genital sores, hematuria, menstrual problem, pelvic pain, urgency, vaginal bleeding, vaginal discharge and vaginal pain.   Musculoskeletal:  Negative for arthralgias, back pain, gait problem, joint swelling, myalgias, neck pain and neck stiffness.   Skin:  Positive for wound. Negative for color change, pallor and rash.   Allergic/Immunologic: Negative for environmental allergies, food allergies and immunocompromised state.   Neurological:  Positive for headaches. Negative for dizziness, tremors, seizures, syncope, facial asymmetry, speech difficulty, weakness, light-headedness and numbness.   Hematological:  Negative for adenopathy. Does not bruise/bleed easily.   Psychiatric/Behavioral:  Negative for agitation, behavioral problems, confusion, decreased concentration, dysphoric mood, hallucinations, self-injury, sleep disturbance and suicidal ideas. The patient is not nervous/anxious and is not hyperactive.        Objective   Vital Signs: Temperature 97.3 °F (36.3 °C), temperature source Infrared, height 160 cm (63\"), weight 83.7 kg (184 lb 9.6 oz), not currently breastfeeding.  Physical Exam  Skin:     Comments: Wound has been debrided. Incision is intact with two small areas of healthy granulation tissue to still heal in. No concern for infection.          Assessment & Plan   Diagnosis:   Meningioma status post craniotomy for resection   Poor wound healing     Medical Decision Making: I am very pleased with the progress on patient's incision. Wound care has been following her weekly. I suspect a few more weeks and things will be healed up. I plan to see her back in one month.     Diagnoses and all orders for this visit:    1. Eschar of wound bed (Primary)    2. Meningioma, " malignant    3. Visit for wound check                                      Chula Randolph PA-C  Patient Care Team:  Michaela Connell MD as PCP - General  Michaela Connell MD as PCP - Family Medicine  Moo Hopkins MD as Consulting Physician (Ophthalmology)  Jamal Ramos MD as Consulting Physician (Nephrology)  Alli Aguirre MD as Consulting Physician (Cardiology)  Costa Raygoza MD as Consulting Physician (Radiation Oncology)  Andrea Bocanegra MD as Consulting Physician (General Surgery)  Alli Aguirre MD as Consulting Physician (Cardiology)  Michaela Connell MD as Primary Care Provider (Internal Medicine)  Robi Perales MD as Consulting Physician (Neurosurgery)

## 2024-08-30 ENCOUNTER — HOSPITAL ENCOUNTER (OUTPATIENT)
Dept: PHYSICAL THERAPY | Facility: HOSPITAL | Age: 69
Setting detail: THERAPIES SERIES
Discharge: HOME | End: 2024-08-30
Payer: MEDICARE

## 2024-08-30 DIAGNOSIS — T81.89XD NON-HEALING SURGICAL WOUND, SUBSEQUENT ENCOUNTER: Primary | ICD-10-CM

## 2024-08-30 DIAGNOSIS — S01.00XD OPEN WOUND OF SCALP, SUBSEQUENT ENCOUNTER: ICD-10-CM

## 2024-08-30 PROCEDURE — 97597 DBRDMT OPN WND 1ST 20 CM/<: CPT

## 2024-08-30 NOTE — THERAPY WOUND CARE TREATMENT
Outpatient Rehabilitation - Wound/Debridement Treatment Note   [place]     Patient Name: Tereza Ackerman  : 1955  MRN: [id number]  Today's Date: 2024                 Admit Date: 2024    Visit Dx:    ICD-10-CM ICD-9-CM   1. Non-healing surgical wound, subsequent encounter  T81.89XD V58.89     998.83   2. Open wound of scalp, subsequent encounter  S01.00XD V58.89     873.0       Patient Active Problem List   Diagnosis    Impaired glucose tolerance    Parathyroid adenoma    Reaction to chronic stress    Multinodular goiter    Vitamin D deficiency    Meningioma, recurrent of brain    Arthritis    Depression    Small bowel obstruction    Insomnia    History of Nissen fundoplication    Malignant neoplasm of left orbit    Prediabetes    Mixed hyperlipidemia    Hyperglycemia    Atrial flutter with rapid ventricular response    Anemia    Large bowel obstruction    Abdominal pain    Essential hypertension    History of creation of ostomy    Screen for colon cancer    Sigmoid volvulus    SHAE (obstructive sleep apnea)    Brain mass    Meningioma        Past Medical History:   Diagnosis Date    Abdominal hernia     Arthritis     rt knee     Atrial flutter with rapid ventricular response 2017    Back pain     Brain tumor     Colostomy present 2018    Depression     History of blood transfusion     History of gastroesophageal reflux (GERD)     resolved since Nissen    History of Nissen fundoplication 2017    History of small bowel obstruction     Hyperlipemia     Hypertension     Memory loss or impairment     Migraine     Parathyroid adenoma     Incidental on thyroid US.    PONV (postoperative nausea and vomiting)     nausea - preprocedural meds help     Prediabetes     Last Impression: 2015  Reviewed labs. Excellent control.  Emery Connell Impression: 2015  Reviewed labs. Excellent control.  Michaela Connell    Thyroid nodule      Last Impression: 2015  r/o  thyroid cancer, will proceed with US.  Michaela Connell (Internal Medicine)     UTI (urinary tract infection)     Vision changes     blockages left eye     Wears glasses     readers        Past Surgical History:   Procedure Laterality Date    BRAIN SURGERY Left 05/19/2022    BRAIN SURGERY  08/2023    left side done & radiationg a wk. later,  in [place]    CARDIAC CATHETERIZATION N/A 04/27/2020    Procedure: LEFT HEART CATH;  Surgeon: Marina Ring MD;  Location:  [facility];  Service: Cardiology;  Laterality: N/A;    CARPAL TUNNEL RELEASE  2002    right     COLONOSCOPY N/A 08/18/2018    Procedure: COLONOSCOPY;  Surgeon: Inderjit Campos MD;  Location:  [facility];  Service: Gastroenterology    COLONOSCOPY N/A 11/28/2018    Procedure: COLONOSCOPY;  Surgeon: Inderjit Campos MD;  Location:  [facility];  Service: Gastroenterology    COLOSTOMY CLOSURE N/A 02/19/2019    Procedure: COLOSTOMY TAKEDOWN, INCISIONAL HERNIA REPAIR;  Surgeon: Andrea Bocanegra MD;  Location:  [facility];  Service: General    CRANIOTOMY  2003 & 2014    Dr. Werner Hollis for tumor removal    CRANIOTOMY FOR TUMOR Left 3/6/2024    Procedure: CRANIOTOMY FOR TUMOR STEREOTACTIC WITH STEALTH;  Surgeon: Robi Perales MD;  Location:  [facility];  Service: Neurosurgery;  Laterality: Left;    ENDOSCOPY      EXPLORATORY LAPAROTOMY N/A 12/05/2017    Procedure: EXPLORATORY LAPAROTOMY, SMALL BOWEL RESECTION;  Surgeon: Ирина Cobian MD;  Location:  [facility];  Service:     EXPLORATORY LAPAROTOMY N/A 12/22/2017    Procedure: LAPAROTOMY EXPLORATORY FOR SMALL BOWEL OBSTRUCTION;  Surgeon: Ирина Cobian MD;  Location:  [facility];  Service:     EXPLORATORY LAPAROTOMY N/A 07/23/2018    Procedure: EXPLORATORY LAPAROTOMY, APPENDECTOMY, CECOPEXY, INCISIONAL HERNIA REPAIR, LYSIS OF ADHESIONS;  Surgeon: Andrea Bocanegra MD;  Location:  [facility];  Service: General    EXPLORATORY LAPAROTOMY N/A 08/19/2018    Procedure:  LAPAROTOMY EXPLORATORY, SIGMOID COLECTOMY, CREATION OF OSTOMY;  Surgeon: Andrea Bocanegra MD;  Location:  [facility];  Service: General    HYSTERECTOMY      partial - both ovaries still present pt believes     INSERTION HEMODIALYSIS CATHETER N/A 12/07/2017    Procedure: HEMODIALYSIS CATHETER INSERTION;  Surgeon: Chance Valenzuela MD;  Location:  [facility];  Service:     ORBITOTOMY Left 11/03/2017    Procedure:  LEFT LATERAL ORBITOTOMY WITH DEBULKING OF TUMOR ;  Surgeon: Moo Hopkins MD;  Location:  [facility];  Service:     OTHER SURGICAL HISTORY      esophagogastric fundoplasty nissen fundoplication    TUBAL ABDOMINAL LIGATION           EVALUATION   PT Ortho       Row Name 08/30/24 1400       Subjective    Subjective Comments No complaints or changes since last tx  -MC       Subjective Pain    Able to rate subjective pain? yes  -MC    Pre-Treatment Pain Level 0  -MC    Post-Treatment Pain Level 0  -MC       Transfers    Sit-Stand [place] (Transfers) independent  -MC    Stand-Sit [place] (Transfers) independent  -MC    Comment, (Transfers) seated for tx  -MC       Gait/Stairs (Locomotion)    [place] Level (Gait) independent  -MC              User Key  (r) = Recorded By, (t) = Taken By, (c) = Cosigned By      Initials Name Provider Type     Bonita Prakash, PT Physical Therapist                     [facility] Wound       Row Name 08/30/24 1400             Wound 08/09/24 Left lateral scalp Incision    Wound - Properties Group Placement Date: 08/09/24  -LH Present on Original Admission: Y  -LH Side: Left  -LH Orientation: lateral  -LH Location: scalp  -LH Primary Wound Type: Incision  -LH    Dressing Appearance intact;moist drainage;dried drainage  -      Base moist;red;granulating;yellow;other (see comments);epithelialization  2 small open areas remaining  -      Periwound intact;dry;warm  -      Periwound Temperature warm  -      Periwound Skin Turgor soft  -      Edges open;irregular   -MC      Drainage Characteristics/Odor serosanguineous  -      Drainage Amount small  -MC      Care, Wound cleansed with;wound cleanser;debrided  -      Dressing Care dressing applied;silver impregnated;collagen;low-adherent;foam  helena to open areas, mepilex Ag, PF tape  -      Periwound Care cleansed with pH balanced cleanser;other (see comments)  clipped hair  -MC      Retired Wound - Properties Group Placement Date: 08/09/24  - Present on Original Admission: Y  - Side: Left  -LH Orientation: lateral  -LH Location: scalp  -LH Primary Wound Type: Incision  -LH    Retired Wound - Properties Group Date first assessed: 08/09/24  - Present on Original Admission: Y  -LH Side: Left  -LH Location: scalp  -LH Primary Wound Type: Incision  -LH              User Key  (r) = Recorded By, (t) = Taken By, (c) = Cosigned By      Initials Name Provider Type    Bonita Clark, PT Physical Therapist     Malcolm Reyna, PT Physical Therapist                      WOUND DEBRIDEMENT  Total area of Debridement: 8 cm2  Debridement Site 1  Location- Site 1: L scalp  Selective Debridement- Site 1: Wound Surface <20cmsq  Instruments- Site 1: tweezers, scissors  Excised Tissue Description- Site 1: minimum, slough, moderate, other (comment) (crust)  Bleeding- Site 1: none              Therapy Education       Row Name 08/30/24 1400             Therapy Education    Education Details Continue current POC  -MC      Given Symptoms/condition management;Bandaging/dressing change  -MC      Program Reinforced  -MC      How Provided Verbal;Demonstration  -MC      Provided to Patient  -MC      Level of Understanding Teach back education performed;Verbalized  -MC                User Key  (r) = Recorded By, (t) = Taken By, (c) = Cosigned By      Initials Name Provider Type    Bonita Clark, PT Physical Therapist                    Recommendation and Plan   PT Assessment/Plan       Row Name 08/30/24 1400          PT  Assessment    Functional Limitations Performance in self-care ADL  -     Impairments Integumentary integrity  -     Assessment Comments Pt with expanding epithelialization. PT was able to remove additional crust and hair adherent to the periwound area. Pt will continue to benefit from skilled PT wound care to promote healing.  -     Rehab Potential Good  -     Patient/caregiver participated in establishment of treatment plan and goals Yes  -     Patient would benefit from skilled therapy intervention Yes  -        PT Plan    PT Frequency 1x/week;2x/week  -     Physical Therapy Interventions (Optional Details) wound care;patient/family education  -     PT Plan Comments debridement, dressings  -               User Key  (r) = Recorded By, (t) = Taken By, (c) = Cosigned By      Initials Name Provider Type    Bonita Clark, PT Physical Therapist                    Goals   PT OP Goals       Row Name 08/30/24 1412          Time Calculation    PT Goal Re-Cert Due Date 11/07/24  -               User Key  (r) = Recorded By, (t) = Taken By, (c) = Cosigned By      Initials Name Provider Type    Bonita Clark, PT Physical Therapist                    PT Goal Re-Cert Due Date: 11/07/24            Time Calculation: Start Time: 1300  Untimed Charges  [id number] debridement: 20  Total Minutes  Untimed Charges Total Minutes: 20   Total Minutes: 20              Bonita Prakash PT  8/30/2024

## 2024-09-02 ENCOUNTER — APPOINTMENT (OUTPATIENT)
Dept: CT IMAGING | Facility: HOSPITAL | Age: 69
End: 2024-09-02
Payer: MEDICARE

## 2024-09-02 ENCOUNTER — HOSPITAL ENCOUNTER (EMERGENCY)
Facility: HOSPITAL | Age: 69
Discharge: HOME | End: 2024-09-02
Attending: EMERGENCY MEDICINE
Payer: MEDICARE

## 2024-09-02 VITALS
WEIGHT: 185 LBS | TEMPERATURE: 98.2 F | SYSTOLIC BLOOD PRESSURE: 161 MMHG | HEART RATE: 55 BPM | BODY MASS INDEX: 32.78 KG/M2 | HEIGHT: 63 IN | DIASTOLIC BLOOD PRESSURE: 83 MMHG | OXYGEN SATURATION: 98 % | RESPIRATION RATE: 18 BRPM

## 2024-09-02 DIAGNOSIS — K59.00 CONSTIPATION, UNSPECIFIED CONSTIPATION TYPE: Primary | ICD-10-CM

## 2024-09-02 DIAGNOSIS — R10.9 ABDOMINAL PAIN, UNSPECIFIED ABDOMINAL LOCATION: ICD-10-CM

## 2024-09-02 LAB
ALBUMIN SERPL-MCNC: 4 G/DL (ref 3.5–5.2)
ALBUMIN/GLOB SERPL: 1.3 G/DL
ALP SERPL-CCNC: 119 U/L (ref 39–117)
ALT SERPL W P-5'-P-CCNC: 16 U/L (ref 1–33)
ANION GAP SERPL CALCULATED.3IONS-SCNC: 12 MMOL/L (ref 5–15)
AST SERPL-CCNC: 25 U/L (ref 1–32)
BACTERIA UR QL AUTO: ABNORMAL /HPF
BASOPHILS # BLD AUTO: 0.05 10*3/MM3 (ref 0–0.2)
BASOPHILS NFR BLD AUTO: 1 % (ref 0–1.5)
BILIRUB SERPL-MCNC: 0.3 MG/DL (ref 0–1.2)
BILIRUB UR QL STRIP: NEGATIVE
BUN SERPL-MCNC: 13 MG/DL (ref 8–23)
BUN/CREAT SERPL: 15.5 (ref 7–25)
CALCIUM SPEC-SCNC: 9.1 MG/DL (ref 8.6–10.5)
CHLORIDE SERPL-SCNC: 101 MMOL/L (ref 98–107)
CLARITY UR: CLEAR
CO2 SERPL-SCNC: 25 MMOL/L (ref 22–29)
COLOR UR: YELLOW
CREAT SERPL-MCNC: 0.84 MG/DL (ref 0.57–1)
DEPRECATED RDW RBC AUTO: 45.7 FL (ref 37–54)
EGFRCR SERPLBLD CKD-EPI 2021: 74.4 ML/MIN/1.73
EOSINOPHIL # BLD AUTO: 0.16 10*3/MM3 (ref 0–0.4)
EOSINOPHIL NFR BLD AUTO: 3.1 % (ref 0.3–6.2)
ERYTHROCYTE [DISTWIDTH] IN BLOOD BY AUTOMATED COUNT: 14.5 % (ref 12.3–15.4)
GLOBULIN UR ELPH-MCNC: 3 GM/DL
GLUCOSE SERPL-MCNC: 143 MG/DL (ref 65–99)
GLUCOSE UR STRIP-MCNC: NEGATIVE MG/DL
HCT VFR BLD AUTO: 40.6 % (ref 34–46.6)
HGB BLD-MCNC: 13 G/DL (ref 12–15.9)
HGB UR QL STRIP.AUTO: ABNORMAL
HYALINE CASTS UR QL AUTO: ABNORMAL /LPF
IMM GRANULOCYTES # BLD AUTO: 0.01 10*3/MM3 (ref 0–0.05)
IMM GRANULOCYTES NFR BLD AUTO: 0.2 % (ref 0–0.5)
KETONES UR QL STRIP: NEGATIVE
LEUKOCYTE ESTERASE UR QL STRIP.AUTO: ABNORMAL
LYMPHOCYTES # BLD AUTO: 1.25 10*3/MM3 (ref 0.7–3.1)
LYMPHOCYTES NFR BLD AUTO: 24.4 % (ref 19.6–45.3)
MCH RBC QN AUTO: 27.7 PG (ref 26.6–33)
MCHC RBC AUTO-ENTMCNC: 32 G/DL (ref 31.5–35.7)
MCV RBC AUTO: 86.4 FL (ref 79–97)
MONOCYTES # BLD AUTO: 0.3 10*3/MM3 (ref 0.1–0.9)
MONOCYTES NFR BLD AUTO: 5.8 % (ref 5–12)
NEUTROPHILS NFR BLD AUTO: 3.36 10*3/MM3 (ref 1.7–7)
NEUTROPHILS NFR BLD AUTO: 65.5 % (ref 42.7–76)
NITRITE UR QL STRIP: NEGATIVE
NRBC BLD AUTO-RTO: 0 /100 WBC (ref 0–0.2)
PH UR STRIP.AUTO: 7 [PH] (ref 5–8)
PLATELET # BLD AUTO: 221 10*3/MM3 (ref 140–450)
PMV BLD AUTO: 10.3 FL (ref 6–12)
POTASSIUM SERPL-SCNC: 3.1 MMOL/L (ref 3.5–5.2)
PROT SERPL-MCNC: 7 G/DL (ref 6–8.5)
PROT UR QL STRIP: ABNORMAL
RBC # BLD AUTO: 4.7 10*6/MM3 (ref 3.77–5.28)
RBC # UR STRIP: ABNORMAL /HPF
REF LAB TEST METHOD: ABNORMAL
SODIUM SERPL-SCNC: 138 MMOL/L (ref 136–145)
SP GR UR STRIP: 1.02 (ref 1–1.03)
SQUAMOUS #/AREA URNS HPF: ABNORMAL /HPF
UROBILINOGEN UR QL STRIP: ABNORMAL
WBC # UR STRIP: ABNORMAL /HPF
WBC NRBC COR # BLD AUTO: 5.13 10*3/MM3 (ref 3.4–10.8)

## 2024-09-02 PROCEDURE — 99285 EMERGENCY DEPT VISIT HI MDM: CPT

## 2024-09-02 PROCEDURE — 85025 COMPLETE CBC W/AUTO DIFF WBC: CPT

## 2024-09-02 PROCEDURE — 80053 COMPREHEN METABOLIC PANEL: CPT

## 2024-09-02 PROCEDURE — 25510000001 IOPAMIDOL 61 % SOLUTION: Performed by: EMERGENCY MEDICINE

## 2024-09-02 PROCEDURE — 81001 URINALYSIS AUTO W/SCOPE: CPT

## 2024-09-02 PROCEDURE — 74177 CT ABD & PELVIS W/CONTRAST: CPT

## 2024-09-02 RX ORDER — IOPAMIDOL 612 MG/ML
85 INJECTION, SOLUTION INTRAVASCULAR
Status: COMPLETED | OUTPATIENT
Start: 2024-09-02 | End: 2024-09-02

## 2024-09-02 RX ORDER — SODIUM CHLORIDE 0.9 % (FLUSH) 0.9 %
10 SYRINGE (ML) INJECTION AS NEEDED
Status: DISCONTINUED | OUTPATIENT
Start: 2024-09-02 | End: 2024-09-02 | Stop reason: HOSPADM

## 2024-09-02 RX ADMIN — IOPAMIDOL 85 ML: 612 INJECTION, SOLUTION INTRAVENOUS at 12:05

## 2024-09-02 RX ADMIN — IOPAMIDOL 85 ML: 612 INJECTION, SOLUTION INTRAVENOUS at 11:51

## 2024-09-02 NOTE — ED PROVIDER NOTES
Subjective   History of Present Illness patient reports constipation, with a history of 6 bowel resections of both small and large intestine, she is concerned about getting backed up.  She reports she is taking stool softeners, and did have a very small stool this morning but it was hard to defecate.  She denies blood in the stool, mild abdominal pain.  Reports recent brain surgery here, follows with wound care, with a dressing in place to the left scalp.    Review of Systems   Constitutional: Negative.    Respiratory: Negative.     Cardiovascular: Negative.    Gastrointestinal:  Positive for abdominal pain and constipation.   Genitourinary: Negative.    Neurological: Negative.        Past Medical History:   Diagnosis Date    Abdominal hernia     Arthritis     rt knee     Atrial flutter with rapid ventricular response 12/21/2017    Back pain     Brain tumor     Colostomy present 08/2018    Depression     History of blood transfusion     History of gastroesophageal reflux (GERD)     resolved since Nissen    History of Nissen fundoplication 7/1/2017    History of small bowel obstruction     Hyperlipemia     Hypertension     Memory loss or impairment     Migraine     Parathyroid adenoma     Incidental on thyroid US.    PONV (postoperative nausea and vomiting)     nausea - preprocedural meds help     Prediabetes     Last Impression: 06 Feb 2015  Reviewed labs. Excellent control.  Emery Connell Impression: 06 Feb 2015  Reviewed labs. Excellent control.  Michaela Connell    Thyroid nodule      Last Impression: 13 Jun 2015  r/o thyroid cancer, will proceed with US.  Michaela Connell (Internal Medicine)     UTI (urinary tract infection)     Vision changes     blockages left eye     Wears glasses     readers       Allergies   Allergen Reactions    Dilantin [Phenytoin Sodium Extended] Rash       Past Surgical History:   Procedure Laterality Date    BRAIN SURGERY Left 05/19/2022    BRAIN SURGERY  08/2023    left  side done & radiationg a wk. later,  in [place]    CARDIAC CATHETERIZATION N/A 04/27/2020    Procedure: LEFT HEART CATH;  Surgeon: Marina Ring MD;  Location:  [facility];  Service: Cardiology;  Laterality: N/A;    CARPAL TUNNEL RELEASE  2002    right     COLONOSCOPY N/A 08/18/2018    Procedure: COLONOSCOPY;  Surgeon: Inderjit Campos MD;  Location:  [facility];  Service: Gastroenterology    COLONOSCOPY N/A 11/28/2018    Procedure: COLONOSCOPY;  Surgeon: Inderjit Campos MD;  Location:  [facility];  Service: Gastroenterology    COLOSTOMY CLOSURE N/A 02/19/2019    Procedure: COLOSTOMY TAKEDOWN, INCISIONAL HERNIA REPAIR;  Surgeon: Andrea Bocanegra MD;  Location:  [facility];  Service: General    CRANIOTOMY  2003 & 2014    Dr. Werner Hollis for tumor removal    CRANIOTOMY FOR TUMOR Left 3/6/2024    Procedure: CRANIOTOMY FOR TUMOR STEREOTACTIC WITH STEALTH;  Surgeon: Robi Perales MD;  Location:  [facility];  Service: Neurosurgery;  Laterality: Left;    ENDOSCOPY      EXPLORATORY LAPAROTOMY N/A 12/05/2017    Procedure: EXPLORATORY LAPAROTOMY, SMALL BOWEL RESECTION;  Surgeon: Ирина Cobian MD;  Location:  [facility];  Service:     EXPLORATORY LAPAROTOMY N/A 12/22/2017    Procedure: LAPAROTOMY EXPLORATORY FOR SMALL BOWEL OBSTRUCTION;  Surgeon: Ирина Cobian MD;  Location:  [facility];  Service:     EXPLORATORY LAPAROTOMY N/A 07/23/2018    Procedure: EXPLORATORY LAPAROTOMY, APPENDECTOMY, CECOPEXY, INCISIONAL HERNIA REPAIR, LYSIS OF ADHESIONS;  Surgeon: Andrea Bocanegra MD;  Location:  [facility];  Service: General    EXPLORATORY LAPAROTOMY N/A 08/19/2018    Procedure: LAPAROTOMY EXPLORATORY, SIGMOID COLECTOMY, CREATION OF OSTOMY;  Surgeon: Andrea Bocanegra MD;  Location:  [facility];  Service: General    HYSTERECTOMY      partial - both ovaries still present pt believes     INSERTION HEMODIALYSIS CATHETER N/A 12/07/2017    Procedure: HEMODIALYSIS CATHETER INSERTION;  Surgeon:  Chance Valenzuela MD;  Location:  [facility];  Service:     ORBITOTOMY Left 11/03/2017    Procedure:  LEFT LATERAL ORBITOTOMY WITH DEBULKING OF TUMOR ;  Surgeon: Moo Hopkins MD;  Location:  [facility];  Service:     OTHER SURGICAL HISTORY      esophagogastric fundoplasty nissen fundoplication    TUBAL ABDOMINAL LIGATION         Family History   Problem Relation Age of Onset    Obesity Mother     Heart attack Mother     Heart disease Mother     Migraines Father     Stroke Father     Diabetes Father     Cancer Other     Arthritis Other     Diabetes Other     Breast cancer Maternal Aunt     Breast cancer Paternal Aunt     Ovarian cancer Neg Hx        Social History     Socioeconomic History    Marital status:    Tobacco Use    Smoking status: Never     Passive exposure: Never    Smokeless tobacco: Never   Vaping Use    Vaping status: Never Used   Substance and Sexual Activity    Alcohol use: No    Drug use: No    Sexual activity: Defer           Objective   Physical Exam  Constitutional:       Appearance: Normal appearance.   Eyes:      Extraocular Movements: Extraocular movements intact.      Pupils: Pupils are equal, round, and reactive to light.   Cardiovascular:      Pulses: Normal pulses.   Pulmonary:      Effort: Pulmonary effort is normal.   Abdominal:      Tenderness: There is abdominal tenderness.   Musculoskeletal:         General: Normal range of motion.      Cervical back: Normal range of motion.   Skin:     General: Skin is warm and dry.      Capillary Refill: Capillary refill takes less than 2 seconds.   Neurological:      General: No focal deficit present.      Mental Status: She is alert and oriented to person, place, and time.         Procedures           ED Course  ED Course as of 09/02/24 1435   Mon Sep 02, 2024   1013 Initially evaluated ED bed 23, accompanied by her . [JH]   1105 Serum labs have been obtained and in process.  Still awaiting urinalysis. [JH]   1121 CBC  within normal limits, serum chemistry without actionable abnormality. [JH]   1240 Urinalysis without actionable abnormality.  Awaiting CT scan interpretation, on the wet read, she has obvious stool in the rectal vault, as well as dispersed through the sigmoid colon. [JH]   1248 CT result completed, without acute obstruction, or other acute abnormality, representing previous postsurgical changes, we will communicate this to the patient. [JH]   1256 Reevaluated the patient, communicated results of her CT, electively, she agrees to a enema administration a digital disimpaction.  She did have the request for a female to provide this, I am unaware of any other female providers today, the primary nurse is going to ask a female nurse if she is able to do so. [JH]   1418 Nurse notified provider that patient had a large formed stool evacuated after the enema was completed, and she is ready for discharge home.  She reports relief of the discomfort with defecation. [JH]   1434 Follow-up evaluation of the patient, confirmed that she has felt much better following the enema.  She understands home instructions. [JH]      ED Course User Index  [JH] Suhail Phelps, AYDEE                                             Medical Decision Making  Patient's complaints, her history, my exam, differential diagnosis cannot exclude bowel obstruction, stercoral colitis, fecal impaction, constipation, diverticulitis, urinary tract infection.  Patient will have serum screening labs, urinalysis, CT imaging abdomen pelvis.  Will communicate results of her workup and plan disposition, including interventions like enema and digital disimpaction if indicated.  Patient is agreeable with this plan.  Patient declines pain medicine at this time.    Amount and/or Complexity of Data Reviewed  Labs: ordered.  Radiology: ordered.    Risk  Prescription drug management.        Final diagnoses:   Constipation, unspecified constipation type   Abdominal pain,  unspecified abdominal location       ED Disposition  ED Disposition       ED Disposition   Discharge    Condition   Stable    Comment   --               Michaela Connell MD  [address] [place] [address]  [phone]      As needed         Medication List        Changed      carvedilol 12.5 MG tablet  Commonly known as: COREG  TAKE ONE TABLET BY MOUTH EVERY 12 HOURS  What changed:   how much to take  when to take this     levETIRAcetam 500 MG tablet  Commonly known as: KEPPRA  TAKE ONE TABLET BY MOUTH EVERY 12 HOURS  What changed: when to take this                 Suhail Phelps, APRN  09/02/24 4013

## 2024-09-02 NOTE — DISCHARGE INSTRUCTIONS
MiraLAX powder with a drink, can be taken multiple times per day until stools the consistency of soft serve ice cream.

## 2024-09-03 ENCOUNTER — PATIENT OUTREACH (OUTPATIENT)
Dept: CASE MANAGEMENT | Facility: OTHER | Age: 69
End: 2024-09-03
Payer: MEDICARE

## 2024-09-03 NOTE — OUTREACH NOTE
AMBULATORY CASE MANAGEMENT NOTE    Names and Relationships of Patient/Support Persons: Contact: Tereza Ackerman; Relationship: Self -     Patient Outreach    Spoke with patient as an ER follow up call. Patient reports she follows with [facility]. She reports she has all her medications, food, and transportation. Patient reports her daughter  2 weeks ago. Expressed my condolences, patient was appreciative. She denied further questions/needs. CCM closed.       Edyta STEINER  Ambulatory Case Management    9/3/2024, 15:08 EDT

## 2024-09-04 ENCOUNTER — HOSPITAL ENCOUNTER (OUTPATIENT)
Dept: PHYSICAL THERAPY | Facility: HOSPITAL | Age: 69
Setting detail: THERAPIES SERIES
Discharge: HOME | End: 2024-09-04
Payer: MEDICARE

## 2024-09-04 DIAGNOSIS — T81.89XD NON-HEALING SURGICAL WOUND, SUBSEQUENT ENCOUNTER: Primary | ICD-10-CM

## 2024-09-04 DIAGNOSIS — S01.00XD OPEN WOUND OF SCALP, SUBSEQUENT ENCOUNTER: ICD-10-CM

## 2024-09-04 PROCEDURE — 97597 DBRDMT OPN WND 1ST 20 CM/<: CPT

## 2024-09-04 NOTE — THERAPY WOUND CARE TREATMENT
Outpatient Rehabilitation - Wound/Debridement Treatment Note   [place]     Patient Name: Tereza Ackerman  : 1955  MRN: [id number]  Today's Date: 2024                 Admit Date: 2024    Visit Dx:    ICD-10-CM ICD-9-CM   1. Non-healing surgical wound, subsequent encounter  T81.89XD V58.89     998.83   2. Open wound of scalp, subsequent encounter  S01.00XD V58.89     873.0     L scalp        Patient Active Problem List   Diagnosis    Impaired glucose tolerance    Parathyroid adenoma    Reaction to chronic stress    Multinodular goiter    Vitamin D deficiency    Meningioma, recurrent of brain    Arthritis    Depression    Small bowel obstruction    Insomnia    History of Nissen fundoplication    Malignant neoplasm of left orbit    Prediabetes    Mixed hyperlipidemia    Hyperglycemia    Atrial flutter with rapid ventricular response    Anemia    Large bowel obstruction    Abdominal pain    Essential hypertension    History of creation of ostomy    Screen for colon cancer    Sigmoid volvulus    SHAE (obstructive sleep apnea)    Brain mass    Meningioma        Past Medical History:   Diagnosis Date    Abdominal hernia     Arthritis     rt knee     Atrial flutter with rapid ventricular response 2017    Back pain     Brain tumor     Colostomy present 2018    Depression     History of blood transfusion     History of gastroesophageal reflux (GERD)     resolved since Nissen    History of Nissen fundoplication 2017    History of small bowel obstruction     Hyperlipemia     Hypertension     Memory loss or impairment     Migraine     Parathyroid adenoma     Incidental on thyroid US.    PONV (postoperative nausea and vomiting)     nausea - preprocedural meds help     Prediabetes     Last Impression: 2015  Reviewed labs. Excellent control.  Emery Connell Impression: 2015  Reviewed labs. Excellent control.  Michaela Connell    Thyroid nodule      Last Impression:   2015  r/o thyroid cancer, will proceed with US.  Michaela Connell (Internal Medicine)     UTI (urinary tract infection)     Vision changes     blockages left eye     Wears glasses     readers        Past Surgical History:   Procedure Laterality Date    BRAIN SURGERY Left 05/19/2022    BRAIN SURGERY  08/2023    left side done & radiationg a wk. later,  in [place]    CARDIAC CATHETERIZATION N/A 04/27/2020    Procedure: LEFT HEART CATH;  Surgeon: Marina Ring MD;  Location:  [facility];  Service: Cardiology;  Laterality: N/A;    CARPAL TUNNEL RELEASE  2002    right     COLONOSCOPY N/A 08/18/2018    Procedure: COLONOSCOPY;  Surgeon: Inderjit Campos MD;  Location: [facility];  Service: Gastroenterology    COLONOSCOPY N/A 11/28/2018    Procedure: COLONOSCOPY;  Surgeon: Inderjit Campos MD;  Location: [facility];  Service: Gastroenterology    COLOSTOMY CLOSURE N/A 02/19/2019    Procedure: COLOSTOMY TAKEDOWN, INCISIONAL HERNIA REPAIR;  Surgeon: Andrea Bocanegra MD;  Location:  [facility];  Service: General    CRANIOTOMY  2003 & 2014    Dr. Werner Hollis for tumor removal    CRANIOTOMY FOR TUMOR Left 3/6/2024    Procedure: CRANIOTOMY FOR TUMOR STEREOTACTIC WITH STEALTH;  Surgeon: Robi Perales MD;  Location:  [facility];  Service: Neurosurgery;  Laterality: Left;    ENDOSCOPY      EXPLORATORY LAPAROTOMY N/A 12/05/2017    Procedure: EXPLORATORY LAPAROTOMY, SMALL BOWEL RESECTION;  Surgeon: Ирина Cobian MD;  Location: [facility];  Service:     EXPLORATORY LAPAROTOMY N/A 12/22/2017    Procedure: LAPAROTOMY EXPLORATORY FOR SMALL BOWEL OBSTRUCTION;  Surgeon: Ирина Cobian MD;  Location:  [facility];  Service:     EXPLORATORY LAPAROTOMY N/A 07/23/2018    Procedure: EXPLORATORY LAPAROTOMY, APPENDECTOMY, CECOPEXY, INCISIONAL HERNIA REPAIR, LYSIS OF ADHESIONS;  Surgeon: Andrea Bocanegra MD;  Location:  [facility];  Service: General    EXPLORATORY LAPAROTOMY N/A 08/19/2018     Procedure: LAPAROTOMY EXPLORATORY, SIGMOID COLECTOMY, CREATION OF OSTOMY;  Surgeon: Andrea Bocanegra MD;  Location:  [facility];  Service: General    HYSTERECTOMY      partial - both ovaries still present pt believes     INSERTION HEMODIALYSIS CATHETER N/A 12/07/2017    Procedure: HEMODIALYSIS CATHETER INSERTION;  Surgeon: Chance Valenzuela MD;  Location:  [facility];  Service:     ORBITOTOMY Left 11/03/2017    Procedure:  LEFT LATERAL ORBITOTOMY WITH DEBULKING OF TUMOR ;  Surgeon: Moo Hopkins MD;  Location:  [facility];  Service:     OTHER SURGICAL HISTORY      esophagogastric fundoplasty nissen fundoplication    TUBAL ABDOMINAL LIGATION           EVALUATION   PT Ortho       Row Name 09/04/24 1400       Subjective    Subjective Comments Reports her spouse changed the dressing on monday, however the tape hasnt been sticking well and thinks he might have placed the mepilex foam upside down becuse he didnt put the sticky side on the wounds.  -       Subjective Pain    Able to rate subjective pain? yes  -    Pre-Treatment Pain Level 0  -    Post-Treatment Pain Level 0  -       Transfers    Sit-Stand [place] (Transfers) independent  -    Stand-Sit [place] (Transfers) independent  -    Comment, (Transfers) seated for tx  -       Gait/Stairs (Locomotion)    [place] Level (Gait) independent  -              User Key  (r) = Recorded By, (t) = Taken By, (c) = Cosigned By      Initials Name Provider Type     Malcolm Reyna, PT Physical Therapist                     LDA Wound       Row Name 09/04/24 1400             Wound 08/09/24 Left lateral scalp Incision    Wound - Properties Group Placement Date: 08/09/24  - Present on Original Admission: Y  -LH Side: Left  -LH Orientation: lateral  -LH Location: scalp  -LH Primary Wound Type: Incision  -LH    Wound Image Images linked: 1  -LH      Dressing Appearance dry;intact  -LH      Base moist;red;granulating;yellow;other (see  comments);epithelialization  2 small open areas remaining following debridement of thick crusts  -      Periwound intact;dry;warm  -      Periwound Temperature warm  -      Periwound Skin Turgor soft  -      Edges open;irregular  -      Wound Length (cm) 1 cm  larger superior open area  -      Wound Width (cm) 0.3 cm  -      Wound Depth (cm) 0.1 cm  -      Wound Surface Area (cm^2) 0.3 cm^2  -LH      Wound Volume (cm^3) 0.03 cm^3  -LH      Drainage Characteristics/Odor serosanguineous  -      Drainage Amount small  -LH      Care, Wound cleansed with;wound cleanser;debrided  -      Dressing Care dressing applied;silver impregnated;collagen;low-adherent;foam;petroleum-based;gauze  helena to open areas, xeroform to larger are, mepilex Ag, PF tape  -      Periwound Care cleansed with pH balanced cleanser;other (see comments);dry periwound area maintained  nosting wipe  -      Retired Wound - Properties Group Placement Date: 08/09/24  - Present on Original Admission: Y  - Side: Left  - Orientation: lateral  - Location: scalp  - Primary Wound Type: Incision  -LH    Retired Wound - Properties Group Date first assessed: 08/09/24  - Present on Original Admission: Y  - Side: Left  - Location: scalp  - Primary Wound Type: Incision  -LH              User Key  (r) = Recorded By, (t) = Taken By, (c) = Cosigned By      Initials Name Provider Type     Malcolm Reyna, PT Physical Therapist                      WOUND DEBRIDEMENT  Total area of Debridement: 4cm2  Debridement Site 1  Location- Site 1: L scalp  Selective Debridement- Site 1: Wound Surface <20cmsq  Instruments- Site 1: tweezers, scissors  Excised Tissue Description- Site 1: scant, slough, moderate, other (comment) (thick crusts)  Bleeding- Site 1: scant, held pressure, 1 minute              Therapy Education       Row Name 09/04/24 1400             Therapy Education    Education Details Continue current [facility]  -      Given  Symptoms/condition management;Bandaging/dressing change  -      Program Reinforced  -      How Provided Verbal;Demonstration  -      Provided to Patient  -      Level of Understanding Teach back education performed;Verbalized  -                User Key  (r) = Recorded By, (t) = Taken By, (c) = Cosigned By      Initials Name Provider Type     Malcolm Reyna, PT Physical Therapist                    Recommendation and Plan   PT Assessment/Plan       Row Name 09/04/24 1400          PT Assessment    Functional Limitations Performance in self-care ADL  -     Impairments Integumentary integrity  -     Assessment Comments Pt presenting this date still with two distinct open areas along the L scalp incision line, however the more inferior area is very nearly closed. Pt continuing with thick crust buildup requiring debridement which appears to be partially disrupting the fragile new epithelial growth at the periphery. PT added xeroform to the larger superior area to help reduce crust buildup to encourage better epithelialization.  -     Rehab Potential Good  -     Patient/caregiver participated in establishment of treatment plan and goals Yes  -     Patient would benefit from skilled therapy intervention Yes  -        PT Plan    PT Frequency 1x/week;2x/week  -     Physical Therapy Interventions (Optional Details) wound care;patient/family education  -     PT Plan Comments debridement, dressings  -               User Key  (r) = Recorded By, (t) = Taken By, (c) = Cosigned By      Initials Name Provider Type     Malcolm Reyna, PT Physical Therapist                    Goals   PT OP Goals       Row Name 09/04/24 1418          Time Calculation    PT Goal Re-Cert Due Date 11/07/24  -               User Key  (r) = Recorded By, (t) = Taken By, (c) = Cosigned By      Initials Name Provider Type     Malcolm Reyna, PT Physical Therapist                    PT Goal Re-Cert Due Date: 11/07/24             Time Calculation: Start Time: 1345  Untimed Charges  [id number] debridement: 20  Total Minutes  Untimed Charges Total Minutes: 20   Total Minutes: 20  Therapy Charges for Today       Code Description Service Date Service Provider Modifiers Qty    [id number] HC GILA DEBRIDE OPEN WOUND UP TO 20CM 9/4/2024 Malcolm Reyna, PT GP 1                    Malcolm Reyna, PT  9/4/2024

## 2024-09-06 ENCOUNTER — HOSPITAL ENCOUNTER (OUTPATIENT)
Dept: PHYSICAL THERAPY | Facility: HOSPITAL | Age: 69
Setting detail: THERAPIES SERIES
Discharge: HOME | End: 2024-09-06
Payer: MEDICARE

## 2024-09-06 DIAGNOSIS — T81.89XD NON-HEALING SURGICAL WOUND, SUBSEQUENT ENCOUNTER: Primary | ICD-10-CM

## 2024-09-06 DIAGNOSIS — S01.00XD OPEN WOUND OF SCALP, SUBSEQUENT ENCOUNTER: ICD-10-CM

## 2024-09-06 PROCEDURE — 97597 DBRDMT OPN WND 1ST 20 CM/<: CPT

## 2024-09-06 NOTE — THERAPY PROGRESS REPORT/RE-CERT
Outpatient Rehabilitation - Wound/Debridement Progress Note  [facility]     Patient Name: Tereza Ackerman  : 1955  MRN: [id number]  Today's Date: 2024                 Admit Date: 2024    Visit Dx:    ICD-10-CM ICD-9-CM   1. Non-healing surgical wound, subsequent encounter  T81.89XD V58.89     998.83   2. Open wound of scalp, subsequent encounter  S01.00XD V58.89     873.0     L scalp      Patient Active Problem List   Diagnosis    Impaired glucose tolerance    Parathyroid adenoma    Reaction to chronic stress    Multinodular goiter    Vitamin D deficiency    Meningioma, recurrent of brain    Arthritis    Depression    Small bowel obstruction    Insomnia    History of Nissen fundoplication    Malignant neoplasm of left orbit    Prediabetes    Mixed hyperlipidemia    Hyperglycemia    Atrial flutter with rapid ventricular response    Anemia    Large bowel obstruction    Abdominal pain    Essential hypertension    History of creation of ostomy    Screen for colon cancer    Sigmoid volvulus    SHAE (obstructive sleep apnea)    Brain mass    Meningioma        Past Medical History:   Diagnosis Date    Abdominal hernia     Arthritis     rt knee     Atrial flutter with rapid ventricular response 2017    Back pain     Brain tumor     Colostomy present 2018    Depression     History of blood transfusion     History of gastroesophageal reflux (GERD)     resolved since Nissen    History of Nissen fundoplication 2017    History of small bowel obstruction     Hyperlipemia     Hypertension     Memory loss or impairment     Migraine     Parathyroid adenoma     Incidental on thyroid US.    PONV (postoperative nausea and vomiting)     nausea - preprocedural meds help     Prediabetes     Last Impression: 2015  Reviewed labs. Excellent control.  Emery Connell Impression: 2015  Reviewed labs. Excellent control.  Michaela Connell    Thyroid nodule      Last Impression:   2015  r/o thyroid cancer, will proceed with US.  Michaela Connell (Internal Medicine)     UTI (urinary tract infection)     Vision changes     blockages left eye     Wears glasses     readers        Past Surgical History:   Procedure Laterality Date    BRAIN SURGERY Left 05/19/2022    BRAIN SURGERY  08/2023    left side done & radiationg a wk. later,  in [place]    CARDIAC CATHETERIZATION N/A 04/27/2020    Procedure: LEFT HEART CATH;  Surgeon: Marina Ring MD;  Location:  [facility];  Service: Cardiology;  Laterality: N/A;    CARPAL TUNNEL RELEASE  2002    right     COLONOSCOPY N/A 08/18/2018    Procedure: COLONOSCOPY;  Surgeon: Inderjit Campos MD;  Location: [facility];  Service: Gastroenterology    COLONOSCOPY N/A 11/28/2018    Procedure: COLONOSCOPY;  Surgeon: Inderjit Campos MD;  Location: [facility];  Service: Gastroenterology    COLOSTOMY CLOSURE N/A 02/19/2019    Procedure: COLOSTOMY TAKEDOWN, INCISIONAL HERNIA REPAIR;  Surgeon: Andrae Bocanegra MD;  Location:  [facility];  Service: General    CRANIOTOMY  2003 & 2014    Dr. Werner Hollis for tumor removal    CRANIOTOMY FOR TUMOR Left 3/6/2024    Procedure: CRANIOTOMY FOR TUMOR STEREOTACTIC WITH STEALTH;  Surgeon: Robi Perales MD;  Location:  [facility];  Service: Neurosurgery;  Laterality: Left;    ENDOSCOPY      EXPLORATORY LAPAROTOMY N/A 12/05/2017    Procedure: EXPLORATORY LAPAROTOMY, SMALL BOWEL RESECTION;  Surgeon: Ирина Cobian MD;  Location: [facility];  Service:     EXPLORATORY LAPAROTOMY N/A 12/22/2017    Procedure: LAPAROTOMY EXPLORATORY FOR SMALL BOWEL OBSTRUCTION;  Surgeon: Ирина Cobian MD;  Location:  [facility];  Service:     EXPLORATORY LAPAROTOMY N/A 07/23/2018    Procedure: EXPLORATORY LAPAROTOMY, APPENDECTOMY, CECOPEXY, INCISIONAL HERNIA REPAIR, LYSIS OF ADHESIONS;  Surgeon: Anrdea Bocanegra MD;  Location:  [facility];  Service: General    EXPLORATORY LAPAROTOMY N/A 08/19/2018     Procedure: LAPAROTOMY EXPLORATORY, SIGMOID COLECTOMY, CREATION OF OSTOMY;  Surgeon: Andrea Bocanegra MD;  Location:  [facility];  Service: General    HYSTERECTOMY      partial - both ovaries still present pt believes     INSERTION HEMODIALYSIS CATHETER N/A 12/07/2017    Procedure: HEMODIALYSIS CATHETER INSERTION;  Surgeon: Chance Valenzuela MD;  Location:  [facility];  Service:     ORBITOTOMY Left 11/03/2017    Procedure:  LEFT LATERAL ORBITOTOMY WITH DEBULKING OF TUMOR ;  Surgeon: Moo Hopkins MD;  Location:  [facility];  Service:     OTHER SURGICAL HISTORY      esophagogastric fundoplasty nissen fundoplication    TUBAL ABDOMINAL LIGATION           EVALUATION   PT Ortho       Row Name 09/06/24 1400       Subjective    Subjective Comments Reports she has a family gathering this weekend so she had her hair washed and cut recently, however the hairdresser was able to keep the dressing dry and intact. No issues/complaints.  -       Subjective Pain    Able to rate subjective pain? yes  -    Pre-Treatment Pain Level 0  -    Post-Treatment Pain Level 0  -       Transfers    Sit-Stand [place] (Transfers) independent  -    Stand-Sit [place] (Transfers) independent  -    Comment, (Transfers) seated for tx  -       Gait/Stairs (Locomotion)    [place] Level (Gait) independent  -              User Key  (r) = Recorded By, (t) = Taken By, (c) = Cosigned By      Initials Name Provider Type     Malcolm Reyna, PT Physical Therapist                     [facility] Wound       Row Name 09/06/24 1400             Wound 08/09/24 Left lateral scalp Incision    Wound - Properties Group Placement Date: 08/09/24  - Present on Original Admission: Y  -LH Side: Left  - Orientation: lateral  -LH Location: scalp  -LH Primary Wound Type: Incision  -    Wound Image Images linked: 1  -LH      Dressing Appearance dry;intact  -LH      Base moist;red;granulating;yellow;other (see comments);epithelialization  2  small open areas remaining  -      Periwound intact;dry;warm  -      Periwound Temperature warm  -      Periwound Skin Turgor soft  -      Edges open;irregular  -      Wound Length (cm) 0.5 cm  larger superior open area  -      Wound Width (cm) 0.2 cm  -      Wound Depth (cm) 0.1 cm  -      Wound Surface Area (cm^2) 0.1 cm^2  -      Wound Volume (cm^3) 0.01 cm^3  -      Drainage Characteristics/Odor serosanguineous  -      Drainage Amount scant  -      Care, Wound cleansed with;wound cleanser;debrided  -      Dressing Care dressing applied;silver impregnated;collagen;low-adherent;foam;petroleum-based;gauze  helena to open areas, xeroform to larger area, mepilex Ag, PF tape  -      Periwound Care cleansed with pH balanced cleanser;other (see comments);dry periwound area maintained  nosting wipe  -      Retired Wound - Properties Group Placement Date: 08/09/24  - Present on Original Admission: Y  - Side: Left  - Orientation: lateral  - Location: scalp  - Primary Wound Type: Incision  -LH    Retired Wound - Properties Group Date first assessed: 08/09/24  - Present on Original Admission: Y  -LH Side: Left  - Location: scalp  - Primary Wound Type: Incision  -LH              User Key  (r) = Recorded By, (t) = Taken By, (c) = Cosigned By      Initials Name Provider Type     Malcolm Reyna, PT Physical Therapist                      WOUND DEBRIDEMENT  Total area of Debridement: 1cm2  Debridement Site 1  Location- Site 1: L scalp  Selective Debridement- Site 1: Wound Surface <20cmsq  Instruments- Site 1: tweezers  Excised Tissue Description- Site 1: minimum, slough, other (comment) (crusts)  Bleeding- Site 1: scant, held pressure, 1 minute              Therapy Education       Row Name 09/06/24 1400             Therapy Education    Education Details debridement, dressings  -      Given Symptoms/condition management;Bandaging/dressing change  -      Program Reinforced  -       How Provided Verbal;Demonstration  -      Provided to Patient  -      Level of Understanding Teach back education performed;Verbalized  -                User Key  (r) = Recorded By, (t) = Taken By, (c) = Cosigned By      Initials Name Provider Type     Malcolm Reyna, PT Physical Therapist                    Recommendation and Plan   PT Assessment/Plan       Row Name 09/06/24 1400          PT Assessment    Functional Limitations Performance in self-care ADL  -     Impairments Integumentary integrity  -     Assessment Comments Pt has met all of her wound care STGs and 2/3 LTGs. Pt continuing with slow, steady improvements towards wound healing as pt with additional epithelialization of wound edges this date. Pt's inferior incision line with only pinpoint open area remaining and scant drainage. Pt with good reduction in crust builduip with use of xeroform. Pt would continue to benefit from current POC to promote wound healing.  -     Rehab Potential Good  -     Patient/caregiver participated in establishment of treatment plan and goals Yes  -     Patient would benefit from skilled therapy intervention Yes  -        PT Plan    PT Frequency 1x/week;2x/week  -     Predicted Duration of Therapy Intervention (PT) 4 visits  -     Planned CPT's? PT SELF CARE/MGMT/TRAIN 15 MIN: 78846;PT NONSELECT DEBRIDE 15 MIN: 25881;PT GILA DEBRIDE OPEN WOUND UP TO 20 CM: 40114  -     Physical Therapy Interventions (Optional Details) wound care;patient/family education  -     PT Plan Comments debridement, dressings  -               User Key  (r) = Recorded By, (t) = Taken By, (c) = Cosigned By      Initials Name Provider Type     Malcolm Reyna, PT Physical Therapist                    Goals   PT OP Goals       Row Name 09/06/24 1437          PT Short Term Goals    STG 1 Verbalize s/sx of infection and when to seek medical attention.  -     STG 1 Progress Met  -     STG 2 Demonstrate minimal  remaining hypergranulation of wound base to improve wound healing potential.  -     STG 2 Progress Met  -     STG 3 Decrease area of wound by 50% as evidence of wound healing.  -     STG 3 Progress Met  -        Long Term Goals    LTG 1 Demonstrate independence with home dressing management to promote return to PLOF.  -     LTG 1 Progress Ongoing;Progressing  -     LTG 2 Demonstrate no remaining hypergranulation of wound base to improve wound healing potential.  -     LTG 2 Progress Met  -     LTG 3 Decrease area of wound by 90% as evidence of wound healing.  -     LTG 3 Progress Met  -        Time Calculation    PT Goal Re-Cert Due Date 11/07/24  -               User Key  (r) = Recorded By, (t) = Taken By, (c) = Cosigned By      Initials Name Provider Type     Malcolm Reyna, PT Physical Therapist                    PT Goal Re-Cert Due Date: 11/07/24  PT Short Term Goals  STG 1: Verbalize s/sx of infection and when to seek medical attention.  STG 1 Progress: Met  STG 2: Demonstrate minimal remaining hypergranulation of wound base to improve wound healing potential.  STG 2 Progress: Met  STG 3: Decrease area of wound by 50% as evidence of wound healing.  STG 3 Progress: Met  Long Term Goals  LTG 1: Demonstrate independence with home dressing management to promote return to PLOF.  LTG 1 Progress: Ongoing, Progressing  LTG 2: Demonstrate no remaining hypergranulation of wound base to improve wound healing potential.  LTG 2 Progress: Met  LTG 3: Decrease area of wound by 90% as evidence of wound healing.  LTG 3 Progress: Met      Time Calculation: Start Time: 1345  Untimed Charges  28059-[id number] debridement: 15  Total Minutes  Untimed Charges Total Minutes: 15   Total Minutes: 15  Therapy Charges for Today       Code Description Service Date Service Provider Modifiers Qty    [id number] HC GILA DEBRIDE OPEN WOUND UP TO 20CM 9/6/2024 Malcolm Reyna, PT GP 1                    Malcolm ESCOTO  Axel, PT  9/6/2024

## 2024-09-11 ENCOUNTER — HOSPITAL ENCOUNTER (OUTPATIENT)
Dept: PHYSICAL THERAPY | Facility: HOSPITAL | Age: 69
Setting detail: THERAPIES SERIES
Discharge: HOME | End: 2024-09-11
Payer: MEDICARE

## 2024-09-11 DIAGNOSIS — S01.00XD OPEN WOUND OF SCALP, SUBSEQUENT ENCOUNTER: ICD-10-CM

## 2024-09-11 DIAGNOSIS — T81.89XD NON-HEALING SURGICAL WOUND, SUBSEQUENT ENCOUNTER: Primary | ICD-10-CM

## 2024-09-11 PROCEDURE — 97602 WOUND(S) CARE NON-SELECTIVE: CPT

## 2024-09-11 NOTE — THERAPY WOUND CARE TREATMENT
Outpatient Rehabilitation - Wound/Debridement Treatment Note   [facility]     Patient Name: Tereza Ackerman  : 1955  MRN: [id number]  Today's Date: 2024                 Admit Date: 2024    Visit Dx:    ICD-10-CM ICD-9-CM   1. Non-healing surgical wound, subsequent encounter  T81.89XD V58.89     998.83   2. Open wound of scalp, subsequent encounter  S01.00XD V58.89     873.0       Patient Active Problem List   Diagnosis    Impaired glucose tolerance    Parathyroid adenoma    Reaction to chronic stress    Multinodular goiter    Vitamin D deficiency    Meningioma, recurrent of brain    Arthritis    Depression    Small bowel obstruction    Insomnia    History of Nissen fundoplication    Malignant neoplasm of left orbit    Prediabetes    Mixed hyperlipidemia    Hyperglycemia    Atrial flutter with rapid ventricular response    Anemia    Large bowel obstruction    Abdominal pain    Essential hypertension    History of creation of ostomy    Screen for colon cancer    Sigmoid volvulus    SHAE (obstructive sleep apnea)    Brain mass    Meningioma        Past Medical History:   Diagnosis Date    Abdominal hernia     Arthritis     rt knee     Atrial flutter with rapid ventricular response 2017    Back pain     Brain tumor     Colostomy present 2018    Depression     History of blood transfusion     History of gastroesophageal reflux (GERD)     resolved since Nissen    History of Nissen fundoplication 2017    History of small bowel obstruction     Hyperlipemia     Hypertension     Memory loss or impairment     Migraine     Parathyroid adenoma     Incidental on thyroid US.    PONV (postoperative nausea and vomiting)     nausea - preprocedural meds help     Prediabetes     Last Impression: 2015  Reviewed labs. Excellent control.  Emery Connell Impression: 2015  Reviewed labs. Excellent control.  Michaela Connell    Thyroid nodule      Last Impression: 2015  r/o  thyroid cancer, will proceed with US.  Michaela Connell (Internal Medicine)     UTI (urinary tract infection)     Vision changes     blockages left eye     Wears glasses     readers        Past Surgical History:   Procedure Laterality Date    BRAIN SURGERY Left 05/19/2022    BRAIN SURGERY  08/2023    left side done & radiationg a wk. later,  in [place]    CARDIAC CATHETERIZATION N/A 04/27/2020    Procedure: LEFT HEART CATH;  Surgeon: Marina Ring MD;  Location:  [facility];  Service: Cardiology;  Laterality: N/A;    CARPAL TUNNEL RELEASE  2002    right     COLONOSCOPY N/A 08/18/2018    Procedure: COLONOSCOPY;  Surgeon: Inderjit Campos MD;  Location:  [facility];  Service: Gastroenterology    COLONOSCOPY N/A 11/28/2018    Procedure: COLONOSCOPY;  Surgeon: Inderjit Campos MD;  Location:  [facility];  Service: Gastroenterology    COLOSTOMY CLOSURE N/A 02/19/2019    Procedure: COLOSTOMY TAKEDOWN, INCISIONAL HERNIA REPAIR;  Surgeon: Andrea Bocanegra MD;  Location:  [facility];  Service: General    CRANIOTOMY  2003 & 2014    Dr. Werner Hollis for tumor removal    CRANIOTOMY FOR TUMOR Left 3/6/2024    Procedure: CRANIOTOMY FOR TUMOR STEREOTACTIC WITH STEALTH;  Surgeon: Robi Perales MD;  Location:  [facility];  Service: Neurosurgery;  Laterality: Left;    ENDOSCOPY      EXPLORATORY LAPAROTOMY N/A 12/05/2017    Procedure: EXPLORATORY LAPAROTOMY, SMALL BOWEL RESECTION;  Surgeon: Ирина Cobian MD;  Location:  [facility];  Service:     EXPLORATORY LAPAROTOMY N/A 12/22/2017    Procedure: LAPAROTOMY EXPLORATORY FOR SMALL BOWEL OBSTRUCTION;  Surgeon: Ирина Cobian MD;  Location:  [facility];  Service:     EXPLORATORY LAPAROTOMY N/A 07/23/2018    Procedure: EXPLORATORY LAPAROTOMY, APPENDECTOMY, CECOPEXY, INCISIONAL HERNIA REPAIR, LYSIS OF ADHESIONS;  Surgeon: Andrea Bocanegra MD;  Location:  [facility];  Service: General    EXPLORATORY LAPAROTOMY N/A 08/19/2018    Procedure:  LAPAROTOMY EXPLORATORY, SIGMOID COLECTOMY, CREATION OF OSTOMY;  Surgeon: Andrea Bocanegra MD;  Location:  [facility];  Service: General    HYSTERECTOMY      partial - both ovaries still present pt believes     INSERTION HEMODIALYSIS CATHETER N/A 12/07/2017    Procedure: HEMODIALYSIS CATHETER INSERTION;  Surgeon: Chance Valenzuela MD;  Location:  [facility];  Service:     ORBITOTOMY Left 11/03/2017    Procedure:  LEFT LATERAL ORBITOTOMY WITH DEBULKING OF TUMOR ;  Surgeon: Moo Hopkins MD;  Location:  [facility];  Service:     OTHER SURGICAL HISTORY      esophagogastric fundoplasty nissen fundoplication    TUBAL ABDOMINAL LIGATION           EVALUATION   PT Ortho       Row Name 09/11/24 1100       Subjective    Subjective Comments Pt notes some soreness anterior to the incision, but not very severe  -       Subjective Pain    Able to rate subjective pain? yes  -MC    Pre-Treatment Pain Level 0  -MC    Post-Treatment Pain Level 0  -MC       Transfers    Sit-Stand [place] (Transfers) independent  -MC    Stand-Sit [place] (Transfers) independent  -MC    Comment, (Transfers) seated for tx  -       Gait/Stairs (Locomotion)    [place] Level (Gait) independent  -              User Key  (r) = Recorded By, (t) = Taken By, (c) = Cosigned By      Initials Name Provider Type    Bonita Clark PT Physical Therapist                     [facility] Wound       Row Name 09/11/24 1200             Wound 08/09/24 Left lateral scalp Incision    Wound - Properties Group Placement Date: 08/09/24  -LH Present on Original Admission: Y  -LH Side: Left  -LH Orientation: lateral  -LH Location: scalp  -LH Primary Wound Type: Incision  -LH    Dressing Appearance dry;intact  -      Base clean;closed/resurfaced;dry;pink;epithelialization  no open areas noted today  -      Periwound intact;dry;warm  -      Periwound Temperature warm  -      Periwound Skin Turgor soft  -      Drainage Amount none  -      Care,  Wound cleansed with;wound cleanser;debrided  -      Dressing Care dressing applied;silver impregnated;low-adherent;foam  mepilex Ag, PF tape  -      Periwound Care cleansed with pH balanced cleanser;dry periwound area maintained  -      Retired Wound - Properties Group Placement Date: 08/09/24  - Present on Original Admission: Y  -LH Side: Left  - Orientation: lateral  - Location: scalp  -LH Primary Wound Type: Incision  -LH    Retired Wound - Properties Group Date first assessed: 08/09/24  - Present on Original Admission: Y  -LH Side: Left  -LH Location: scalp  -LH Primary Wound Type: Incision  -LH              User Key  (r) = Recorded By, (t) = Taken By, (c) = Cosigned By      Initials Name Provider Type    Bonita Clark, PT Physical Therapist     Malcolm Reyna, PT Physical Therapist                      WOUND DEBRIDEMENT  Total area of Debridement: nonselective  Debridement Site 1  Location- Site 1: L scalp  Selective Debridement- Site 1: Wound Surface <20cmsq  Instruments- Site 1: other (comment) (gauze)  Excised Tissue Description- Site 1: minimum, other (comment) (crust)  Bleeding- Site 1: none              Therapy Education       Row Name 09/11/24 1200             Therapy Education    Education Details Leave this dressing on for 2-3 days and then you may leave the incision ROSELYN. May apply ointment like aquaphor as often as desired. Observe the incision regularly and call with any skin integrity concerns.  -MC      Given Symptoms/condition management;Bandaging/dressing change  -      Program Progressed  -MC      How Provided Verbal;Demonstration  -MC      Provided to Patient  -MC      Level of Understanding Teach back education performed;Verbalized  -                User Key  (r) = Recorded By, (t) = Taken By, (c) = Cosigned By      Initials Name Provider Type    Bonita Clark, PT Physical Therapist                    Recommendation and Plan   PT Assessment/Plan       Gael  Name 09/11/24 1200          PT Assessment    Functional Limitations Performance in self-care ADL  -     Impairments Integumentary integrity  -     Assessment Comments Pt with no discernable open area noted today after debridement. Pt with mild fragility, but PT anticipates this will resolve in the next 1-2 weeks. Pt is appropriate for tentative transition to independent home care in this final stages of healing.  -     Rehab Potential Good  -     Patient/caregiver participated in establishment of treatment plan and goals Yes  -     Patient would benefit from skilled therapy intervention Yes  -        PT Plan    PT Frequency Other (comment)  prn within certification period  -     Physical Therapy Interventions (Optional Details) wound care;patient/family education  -     PT Plan Comments tentative d/c  -               User Key  (r) = Recorded By, (t) = Taken By, (c) = Cosigned By      Initials Name Provider Type    Bonita Clark, PT Physical Therapist                    Goals   PT OP Goals       Row Name 09/11/24 1205          Time Calculation    PT Goal Re-Cert Due Date 11/07/24  -               User Key  (r) = Recorded By, (t) = Taken By, (c) = Cosigned By      Initials Name Provider Type    Bonita Clark, PT Physical Therapist                    PT Goal Re-Cert Due Date: 11/07/24            Time Calculation: Start Time: 0800  Untimed Charges  Wound Care: 76448 Non-selective debridement  97602-Non-selective debridement: 25  Total Minutes  Untimed Charges Total Minutes: 25   Total Minutes: 25              Bonita Prakash PT  9/11/2024

## 2024-09-13 ENCOUNTER — APPOINTMENT (OUTPATIENT)
Dept: GENERAL RADIOLOGY | Facility: HOSPITAL | Age: 69
End: 2024-09-13
Payer: MEDICARE

## 2024-09-13 ENCOUNTER — HOSPITAL ENCOUNTER (EMERGENCY)
Facility: HOSPITAL | Age: 69
Discharge: HOME | End: 2024-09-13
Attending: EMERGENCY MEDICINE
Payer: MEDICARE

## 2024-09-13 ENCOUNTER — APPOINTMENT (OUTPATIENT)
Dept: PHYSICAL THERAPY | Facility: HOSPITAL | Age: 69
End: 2024-09-13
Payer: MEDICARE

## 2024-09-13 VITALS
HEART RATE: 65 BPM | SYSTOLIC BLOOD PRESSURE: 122 MMHG | OXYGEN SATURATION: 97 % | TEMPERATURE: 97.3 F | BODY MASS INDEX: 31.89 KG/M2 | WEIGHT: 180 LBS | RESPIRATION RATE: 14 BRPM | DIASTOLIC BLOOD PRESSURE: 91 MMHG | HEIGHT: 63 IN

## 2024-09-13 DIAGNOSIS — S50.02XA CONTUSION OF LEFT ELBOW, INITIAL ENCOUNTER: Primary | ICD-10-CM

## 2024-09-13 PROCEDURE — 90471 IMMUNIZATION ADMIN: CPT | Performed by: PHYSICIAN ASSISTANT

## 2024-09-13 PROCEDURE — 90715 TDAP VACCINE 7 YRS/> IM: CPT | Performed by: PHYSICIAN ASSISTANT

## 2024-09-13 PROCEDURE — 99283 EMERGENCY DEPT VISIT LOW MDM: CPT

## 2024-09-13 PROCEDURE — 25010000002 TETANUS-DIPHTH-ACELL PERTUSSIS 5-2.5-18.5 LF-MCG/0.5 SUSPENSION PREFILLED SYRINGE: Performed by: PHYSICIAN ASSISTANT

## 2024-09-13 PROCEDURE — 73080 X-RAY EXAM OF ELBOW: CPT

## 2024-09-13 RX ADMIN — TETANUS TOXOID, REDUCED DIPHTHERIA TOXOID AND ACELLULAR PERTUSSIS VACCINE, ADSORBED 0.5 ML: 5; 2.5; 8; 8; 2.5 SUSPENSION INTRAMUSCULAR at 11:55

## 2024-09-13 NOTE — ED PROVIDER NOTES
Subjective   History of Present Illness  69-year-old female presents emergency department today after having a fall injuring her left elbow.  She fell off a porch try to catch her dog.  States that she injured the left elbow this occurred yesterday.  She got some abrasions last tetanus unknown she did not strike her head.  She is currently in wound care for a wound on the skull from her previous brain surgery.  She states that she has large swollen area over the lateral left elbow.  She is able to fully extend.  Denies any numbness or tingling.  She is right-hand dominant.  No other injuries.    History provided by:  Patient   used: No    Elbow Injury  Location:  Left elbow over the radial head  Quality:  Mild soreness  Severity:  Mild  Onset quality:  Sudden  Duration:  1 day  Timing:  Constant  Progression:  Unchanged  Chronicity:  New  Context:  Fell off a porch yesterday  Relieved by:  Nothing  Worsened by:  Nothing  Associated symptoms: no abdominal pain, no congestion, no cough, no fever, no loss of consciousness, no myalgias, no rhinorrhea, no sore throat, no vomiting and no wheezing        Review of Systems   Constitutional:  Negative for fever.   HENT:  Negative for congestion, rhinorrhea and sore throat.    Respiratory:  Negative for cough and wheezing.    Gastrointestinal:  Negative for abdominal pain and vomiting.   Musculoskeletal:  Negative for myalgias.   Neurological:  Negative for loss of consciousness.       Past Medical History:   Diagnosis Date   • Abdominal hernia    • Arthritis     rt knee    • Atrial flutter with rapid ventricular response 12/21/2017   • Back pain    • Brain tumor    • Colostomy present 08/2018   • Depression    • History of blood transfusion    • History of gastroesophageal reflux (GERD)     resolved since Nissen   • History of Nissen fundoplication 7/1/2017   • History of small bowel obstruction    • Hyperlipemia    • Hypertension    • Memory loss or  impairment    • Migraine    • Parathyroid adenoma     Incidental on thyroid US.   • PONV (postoperative nausea and vomiting)     nausea - preprocedural meds help    • Prediabetes     Last Impression: 06 Feb 2015  Reviewed labs. Excellent control.  Maren Connellast Impression: 06 Feb 2015  Reviewed labs. Excellent control.  Michaela Connell   • Thyroid nodule      Last Impression: 13 Jun 2015  r/o thyroid cancer, will proceed with US.  Michaela Connell (Internal Medicine)    • UTI (urinary tract infection)    • Vision changes     blockages left eye    • Wears glasses     readers       Allergies   Allergen Reactions   • Dilantin [Phenytoin Sodium Extended] Rash       Past Surgical History:   Procedure Laterality Date   • BRAIN SURGERY Left 05/19/2022   • BRAIN SURGERY  08/2023    left side done & radiationg a wk. later,  in [place]   • CARDIAC CATHETERIZATION N/A 04/27/2020    Procedure: LEFT HEART CATH;  Surgeon: Marina Ring MD;  Location:  [facility];  Service: Cardiology;  Laterality: N/A;   • CARPAL TUNNEL RELEASE  2002    right    • COLONOSCOPY N/A 08/18/2018    Procedure: COLONOSCOPY;  Surgeon: Inderjit Campos MD;  Location:  [facility];  Service: Gastroenterology   • COLONOSCOPY N/A 11/28/2018    Procedure: COLONOSCOPY;  Surgeon: Inderjit Campos MD;  Location:  [facility];  Service: Gastroenterology   • COLOSTOMY CLOSURE N/A 02/19/2019    Procedure: COLOSTOMY TAKEDOWN, INCISIONAL HERNIA REPAIR;  Surgeon: Andrea Bocanegra MD;  Location:  [facility];  Service: General   • CRANIOTOMY  2003 & 2014    Dr. Werner Hollis for tumor removal   • CRANIOTOMY FOR TUMOR Left 3/6/2024    Procedure: CRANIOTOMY FOR TUMOR STEREOTACTIC WITH STEALTH;  Surgeon: Robi Perales MD;  Location:  [facility];  Service: Neurosurgery;  Laterality: Left;   • ENDOSCOPY     • EXPLORATORY LAPAROTOMY N/A 12/05/2017    Procedure: EXPLORATORY LAPAROTOMY, SMALL BOWEL RESECTION;  Surgeon: Ирина  MEME Cobian MD;  Location:  [facility];  Service:    • EXPLORATORY LAPAROTOMY N/A 12/22/2017    Procedure: LAPAROTOMY EXPLORATORY FOR SMALL BOWEL OBSTRUCTION;  Surgeon: Ирина Cobian MD;  Location:  [facility];  Service:    • EXPLORATORY LAPAROTOMY N/A 07/23/2018    Procedure: EXPLORATORY LAPAROTOMY, APPENDECTOMY, CECOPEXY, INCISIONAL HERNIA REPAIR, LYSIS OF ADHESIONS;  Surgeon: Andrea Bocanegra MD;  Location:  [facility];  Service: General   • EXPLORATORY LAPAROTOMY N/A 08/19/2018    Procedure: LAPAROTOMY EXPLORATORY, SIGMOID COLECTOMY, CREATION OF OSTOMY;  Surgeon: Andrea Bocanegra MD;  Location:  [facility];  Service: General   • HYSTERECTOMY      partial - both ovaries still present pt believes    • INSERTION HEMODIALYSIS CATHETER N/A 12/07/2017    Procedure: HEMODIALYSIS CATHETER INSERTION;  Surgeon: Chance Valenzuela MD;  Location:  [facility];  Service:    • ORBITOTOMY Left 11/03/2017    Procedure:  LEFT LATERAL ORBITOTOMY WITH DEBULKING OF TUMOR ;  Surgeon: Moo Hopkins MD;  Location:  [facility];  Service:    • OTHER SURGICAL HISTORY      esophagogastric fundoplasty nissen fundoplication   • TUBAL ABDOMINAL LIGATION         Family History   Problem Relation Age of Onset   • Obesity Mother    • Heart attack Mother    • Heart disease Mother    • Migraines Father    • Stroke Father    • Diabetes Father    • Cancer Other    • Arthritis Other    • Diabetes Other    • Breast cancer Maternal Aunt    • Breast cancer Paternal Aunt    • Ovarian cancer Neg Hx        Social History     Socioeconomic History   • Marital status:    Tobacco Use   • Smoking status: Never     Passive exposure: Never   • Smokeless tobacco: Never   Vaping Use   • Vaping status: Never Used   Substance and Sexual Activity   • Alcohol use: No   • Drug use: No   • Sexual activity: Defer           Objective   Physical Exam  Vitals and nursing note reviewed.   Constitutional:       General: She is not in acute distress.      "Appearance: She is well-developed. She is not diaphoretic.   HENT:      Head: Normocephalic and atraumatic.      Nose: Nose normal.   Eyes:      General: No scleral icterus.     Conjunctiva/sclera: Conjunctivae normal.   Pulmonary:      Effort: Pulmonary effort is normal. No respiratory distress.   Musculoskeletal:         General: Normal range of motion.      Cervical back: Normal range of motion and neck supple.      Comments: Left elbow has a hematoma.  It is over the radial head.  There are several superficial abrasions no laceration.  Full range of motion with flexion extension supination and pronation.  Radial pulses are palpable.  Sensations intact in all dermatomes skin is warm pink and dry.  No tenderness at the humeral head.   Skin:     General: Skin is warm and dry.   Neurological:      Mental Status: She is alert and oriented to person, place, and time.   Psychiatric:         Behavior: Behavior normal.       Procedures           ED Course                                 No results found for this or any previous visit (from the past 24 hour(s)).  Note: In addition to lab results from this visit, the labs listed above may include labs taken at another facility or during a different encounter within the last 24 hours. Please correlate lab times with ED admission and discharge times for further clarification of the services performed during this visit.    XR Elbow 3+ View Left   Final Result   Impression:   Osteopenia and degenerative changes of the left elbow. No acute osseous abnormality.         Electronically Signed: Marilyn Quiñones MD     9/13/2024 11:54 AM EDT     Workstation ID: [id number]        Vitals:    09/13/24 1059   BP: 122/91   BP Location: Left arm   Patient Position: Sitting   Pulse: 65   Resp: 14   Temp: 97.3 °F (36.3 °C)   TempSrc: Oral   SpO2: 97%   Weight: 81.6 kg (180 lb)   Height: 160 cm (63\")     Medications   Tetanus-Diphth-Acell Pertussis (BOOSTRIX) injection 0.5 mL (0.5 mL " Intramuscular Given 9/13/24 1475)     ECG/EMG Results (last 24 hours)       ** No results found for the last 24 hours. **          No orders to display                   Medical Decision Making  Problems Addressed:  Contusion of left elbow, initial encounter: complicated acute illness or injury    Risk  Prescription drug management.        Final diagnoses:   Contusion of left elbow, initial encounter       ED Disposition  ED Disposition       ED Disposition   Discharge    Condition   Stable    Comment   --               Michaela Connell MD  [address] [place] [address]  [phone]               Medication List        Changed      carvedilol 12.5 MG tablet  Commonly known as: COREG  TAKE ONE TABLET BY MOUTH EVERY 12 HOURS  What changed:   how much to take  when to take this     levETIRAcetam 500 MG tablet  Commonly known as: KEPPRA  TAKE ONE TABLET BY MOUTH EVERY 12 HOURS  What changed: when to take this                 Rich Sy PA  09/13/24 3776

## 2024-09-15 DIAGNOSIS — G93.89 BRAIN MASS: ICD-10-CM

## 2024-09-15 DIAGNOSIS — C70.9 MENINGIOMA, MALIGNANT: ICD-10-CM

## 2024-09-16 RX ORDER — LEVETIRACETAM 500 MG/1
500 TABLET ORAL EVERY 12 HOURS
Qty: 60 TABLET | Refills: 0 | Status: SHIPPED | OUTPATIENT
Start: 2024-09-16

## 2024-09-17 ENCOUNTER — APPOINTMENT (OUTPATIENT)
Dept: PHYSICAL THERAPY | Facility: HOSPITAL | Age: 69
End: 2024-09-17
Payer: MEDICARE

## 2024-09-19 ENCOUNTER — APPOINTMENT (OUTPATIENT)
Dept: PHYSICAL THERAPY | Facility: HOSPITAL | Age: 69
End: 2024-09-19
Payer: MEDICARE

## 2024-10-09 ENCOUNTER — OFFICE VISIT (OUTPATIENT)
Dept: NEUROSURGERY | Facility: CLINIC | Age: 69
End: 2024-10-09
Payer: MEDICARE

## 2024-10-09 VITALS
DIASTOLIC BLOOD PRESSURE: 72 MMHG | WEIGHT: 185.1 LBS | SYSTOLIC BLOOD PRESSURE: 122 MMHG | BODY MASS INDEX: 32.8 KG/M2 | HEIGHT: 63 IN | TEMPERATURE: 97.7 F

## 2024-10-09 DIAGNOSIS — C70.9 MENINGIOMA, MALIGNANT: Primary | ICD-10-CM

## 2024-10-09 PROCEDURE — 99213 OFFICE O/P EST LOW 20 MIN: CPT | Performed by: PHYSICIAN ASSISTANT

## 2024-10-09 PROCEDURE — 3078F DIAST BP <80 MM HG: CPT | Performed by: PHYSICIAN ASSISTANT

## 2024-10-09 PROCEDURE — 3074F SYST BP LT 130 MM HG: CPT | Performed by: PHYSICIAN ASSISTANT

## 2024-10-09 NOTE — PROGRESS NOTES
Patient: Tereza Ackerman  : 1955  Chart #: [id number]    Date of Service: 10/9/2024    CHIEF COMPLAINT: Wound check    History of Present Illness Ms. Ackerman is seen in follow up for wound check. She underwent left sphenoid wing meningioma resection in  and then again in .  She has undergone multiple radiosurgery procedures as well.  More recently she presented with recurrence and on 3/6/2024 she underwent redo left frontal craniotomy for resection.  Gross total resection was achieved.  She has left orbital swelling and ptosis.  Post-operatively she has had delayed wound healing. Non-infectious. There was very thick eschar along the length which delayed healing. She was referred to wound care for debridement. She was discharged from their clinic a few weeks.      She tells me that her daughter passed away a couple months ago.       Past Medical History:   Diagnosis Date    Abdominal hernia     Arthritis     rt knee     Atrial flutter with rapid ventricular response 2017    Back pain     Brain tumor     Colostomy present 2018    Depression     History of blood transfusion     History of gastroesophageal reflux (GERD)     resolved since Nissen    History of Nissen fundoplication 2017    History of small bowel obstruction     Hyperlipemia     Hypertension     Memory loss or impairment     Migraine     Parathyroid adenoma     Incidental on thyroid US.    PONV (postoperative nausea and vomiting)     nausea - preprocedural meds help     Prediabetes     Last Impression: 2015  Reviewed labs. Excellent control.  Emery Connell Impression: 2015  Reviewed labs. Excellent control.  Michaela Connell    Thyroid nodule      Last Impression: 2015  r/o thyroid cancer, will proceed with US.  Michaela Connell (Internal Medicine)     UTI (urinary tract infection)     Vision changes     blockages left eye     Wears glasses     readers         Current Outpatient  Medications:     Artificial Tear Ointment (artificial tears) ophthalmic ointment, Administer  to both eyes Every 1 (One) Hour As Needed., Disp: , Rfl:     aspirin 81 MG tablet, Take 1 tablet by mouth Daily., Disp: 30 tablet, Rfl: 11    busPIRone (BUSPAR) 10 MG tablet, Take 1 tablet by mouth 2 (Two) Times a Day., Disp: 180 tablet, Rfl: 3    carvedilol (COREG) 12.5 MG tablet, TAKE ONE TABLET BY MOUTH EVERY 12 HOURS (Patient taking differently: Take 0.5 tablets by mouth 2 (Two) Times a Day With Meals.), Disp: 180 tablet, Rfl: 3    cholecalciferol (VITAMIN D3) 1000 units tablet, Take 2 tablets by mouth Every Other Day., Disp: , Rfl:     dorzolamide (TRUSOPT) 2 % ophthalmic solution, Administer 1 drop into the left eye 2 (Two) Times a Day., Disp: , Rfl:     lisinopril (PRINIVIL,ZESTRIL) 2.5 MG tablet, Take 1 tablet by mouth Daily., Disp: 90 tablet, Rfl: 3    LORazepam (Ativan) 0.5 MG tablet, Take 0.5-1 tablets by mouth Daily As Needed for Anxiety., Disp: 15 tablet, Rfl: 0    omeprazole (priLOSEC) 20 MG capsule, Take 1 capsule by mouth Daily., Disp: 90 capsule, Rfl: 3    ondansetron ODT (ZOFRAN-ODT) 4 MG disintegrating tablet, Place 1 tablet on the tongue Every 4 (Four) Hours. As needed for nausea, Disp: 12 tablet, Rfl: 0    phenazopyridine (PYRIDIUM) 200 MG tablet, Take 1 tablet by mouth 3 (Three) Times a Day As Needed for Dysuria., Disp: 6 tablet, Rfl: 0    polyethyl glycol-propyl glycol (SYSTANE) 0.4-0.3 % solution ophthalmic solution (artificial tears), Administer 1 drop into the left eye Every 1 (One) Hour As Needed (dry eye)., Disp: , Rfl:     prednisoLONE acetate (PRED FORTE) 1 % ophthalmic suspension, Administer  into the left eye., Disp: , Rfl:     QUEtiapine (SEROquel) 25 MG tablet, Take 0.5 tablets by mouth every night at bedtime., Disp: 45 tablet, Rfl: 3    rosuvastatin (CRESTOR) 40 MG tablet, Take 1 tablet by mouth Daily., Disp: 90 tablet, Rfl: 3    sertraline (ZOLOFT) 100 MG tablet, Take 1 tablet by mouth  Daily., Disp: 90 tablet, Rfl: 3    thiamine (VITAMIN B-1) 100 MG tablet tablet, Take 1 tablet by mouth Every Other Day., Disp: 30 each, Rfl: 5    timolol (TIMOPTIC) 0.5 % ophthalmic solution, 1 drop 2 (Two) Times a Day., Disp: , Rfl:     Past Surgical History:   Procedure Laterality Date    BRAIN SURGERY Left 05/19/2022    BRAIN SURGERY  08/2023    left side done & radiationg a wk. later,  in [place]    CARDIAC CATHETERIZATION N/A 04/27/2020    Procedure: LEFT HEART CATH;  Surgeon: Marina Ring MD;  Location:  [facility];  Service: Cardiology;  Laterality: N/A;    CARPAL TUNNEL RELEASE  2002    right     COLONOSCOPY N/A 08/18/2018    Procedure: COLONOSCOPY;  Surgeon: Inderjit Campos MD;  Location:  [facility];  Service: Gastroenterology    COLONOSCOPY N/A 11/28/2018    Procedure: COLONOSCOPY;  Surgeon: Inderjit Campos MD;  Location:  [facility];  Service: Gastroenterology    COLOSTOMY CLOSURE N/A 02/19/2019    Procedure: COLOSTOMY TAKEDOWN, INCISIONAL HERNIA REPAIR;  Surgeon: Andrea Bocanegra MD;  Location:  [facility];  Service: General    CRANIOTOMY  2003 & 2014    Dr. Werner Hollis for tumor removal    CRANIOTOMY FOR TUMOR Left 3/6/2024    Procedure: CRANIOTOMY FOR TUMOR STEREOTACTIC WITH STEALTH;  Surgeon: Robi Perales MD;  Location:  [facility];  Service: Neurosurgery;  Laterality: Left;    ENDOSCOPY      EXPLORATORY LAPAROTOMY N/A 12/05/2017    Procedure: EXPLORATORY LAPAROTOMY, SMALL BOWEL RESECTION;  Surgeon: Ирина Cobian MD;  Location:  [facility];  Service:     EXPLORATORY LAPAROTOMY N/A 12/22/2017    Procedure: LAPAROTOMY EXPLORATORY FOR SMALL BOWEL OBSTRUCTION;  Surgeon: Ирина Cobian MD;  Location:  [facility];  Service:     EXPLORATORY LAPAROTOMY N/A 07/23/2018    Procedure: EXPLORATORY LAPAROTOMY, APPENDECTOMY, CECOPEXY, INCISIONAL HERNIA REPAIR, LYSIS OF ADHESIONS;  Surgeon: Andrea Bocanegra MD;  Location:  [facility];  Service: General     EXPLORATORY LAPAROTOMY N/A 08/19/2018    Procedure: LAPAROTOMY EXPLORATORY, SIGMOID COLECTOMY, CREATION OF OSTOMY;  Surgeon: Andrea Bocanegra MD;  Location:  [facility];  Service: General    HYSTERECTOMY      partial - both ovaries still present pt believes     INSERTION HEMODIALYSIS CATHETER N/A 12/07/2017    Procedure: HEMODIALYSIS CATHETER INSERTION;  Surgeon: Chance Valenzuela MD;  Location:  [facility];  Service:     ORBITOTOMY Left 11/03/2017    Procedure:  LEFT LATERAL ORBITOTOMY WITH DEBULKING OF TUMOR ;  Surgeon: Moo Hopkins MD;  Location:  [facility];  Service:     OTHER SURGICAL HISTORY      esophagogastric fundoplasty nissen fundoplication    TUBAL ABDOMINAL LIGATION         Social History     Socioeconomic History    Marital status:    Tobacco Use    Smoking status: Never     Passive exposure: Never    Smokeless tobacco: Never   Vaping Use    Vaping status: Never Used   Substance and Sexual Activity    Alcohol use: No    Drug use: No    Sexual activity: Defer         Review of Systems   Constitutional:  Negative for activity change, appetite change, chills, diaphoresis, fatigue, fever and unexpected weight change.   HENT:  Negative for congestion, dental problem, drooling, ear discharge, ear pain, facial swelling, hearing loss, mouth sores, nosebleeds, postnasal drip, rhinorrhea, sinus pressure, sinus pain, sneezing, sore throat, tinnitus, trouble swallowing and voice change.    Eyes:  Positive for pain and discharge. Negative for photophobia, redness, itching and visual disturbance.   Respiratory:  Negative for apnea, cough, choking, chest tightness, shortness of breath, wheezing and stridor.    Cardiovascular:  Negative for chest pain, palpitations and leg swelling.   Gastrointestinal:  Negative for abdominal distention, abdominal pain, anal bleeding, blood in stool, constipation, diarrhea, nausea, rectal pain and vomiting.   Endocrine: Negative for cold intolerance, heat intolerance,  "polydipsia, polyphagia and polyuria.   Genitourinary:  Negative for decreased urine volume, difficulty urinating, dyspareunia, dysuria, enuresis, flank pain, frequency, genital sores, hematuria, menstrual problem, pelvic pain, urgency, vaginal bleeding, vaginal discharge and vaginal pain.   Musculoskeletal:  Negative for arthralgias, back pain, gait problem, joint swelling, myalgias, neck pain and neck stiffness.   Skin:  Positive for wound. Negative for color change, pallor and rash.   Allergic/Immunologic: Negative for environmental allergies, food allergies and immunocompromised state.   Neurological:  Positive for headaches. Negative for dizziness, tremors, seizures, syncope, facial asymmetry, speech difficulty, weakness, light-headedness and numbness.   Hematological:  Negative for adenopathy. Does not bruise/bleed easily.   Psychiatric/Behavioral:  Negative for agitation, behavioral problems, confusion, decreased concentration, dysphoric mood, hallucinations, self-injury, sleep disturbance and suicidal ideas. The patient is not nervous/anxious and is not hyperactive.        Objective   Vital Signs: Blood pressure 122/72, temperature 97.7 °F (36.5 °C), temperature source Infrared, height 160 cm (63\"), weight 84 kg (185 lb 1.6 oz), not currently breastfeeding.  Physical Exam  Skin:     Comments: Cranial wound is intact without drainage        Assessment & Plan   Diagnosis:   Meningioma status post craniotomy for resection   Poor wound healing     Medical Decision Making: Incision has healed up.  Last MRI scan of the brain was in July.  We will do a 6-month follow-up scan in January and patient will see Dr. Perales.  Call the office in the interim with questions or concerns.       Diagnoses and all orders for this visit:    1. Meningioma, malignant (Primary)  -     MRI Brain With & Without Contrast; Future                                      Chula Randolph PA-C  Patient Care Team:  Mcihaela Connell MD as " PCP - General  Michaela Connell MD as PCP - Family Medicine  Moo Hopkins MD as Consulting Physician (Ophthalmology)  Jamal Ramos MD as Consulting Physician (Nephrology)  Alli Aguirre MD as Consulting Physician (Cardiology)  Costa Raygoza MD as Consulting Physician (Radiation Oncology)  Andrea Bocanegra MD as Consulting Physician (General Surgery)  Alli Aguirre MD as Consulting Physician (Cardiology)  Michaela Connell MD as Primary Care Provider (Internal Medicine)  Robi Perales MD as Consulting Physician (Neurosurgery)

## 2024-11-06 ENCOUNTER — DOCUMENTATION (OUTPATIENT)
Dept: PHYSICAL THERAPY | Facility: HOSPITAL | Age: 69
End: 2024-11-06
Payer: MEDICARE

## 2024-11-06 DIAGNOSIS — T81.89XD NON-HEALING SURGICAL WOUND, SUBSEQUENT ENCOUNTER: Primary | ICD-10-CM

## 2024-11-06 DIAGNOSIS — S01.00XD OPEN WOUND OF SCALP, SUBSEQUENT ENCOUNTER: ICD-10-CM

## 2024-11-06 NOTE — THERAPY DISCHARGE NOTE
Outpatient Rehabilitation - Wound/Debridement Discharge Summary       Patient Name: Tereza Ackerman  : 1955  MRN: [id number]  Today's Date: 2024                  Admit Date: (Not on file)    Visit Dx:    ICD-10-CM ICD-9-CM   1. Non-healing surgical wound, subsequent encounter  T81.89XD V58.89     998.83   2. Open wound of scalp, subsequent encounter  S01.00XD V58.89     873.0       Patient Active Problem List   Diagnosis    Impaired glucose tolerance    Parathyroid adenoma    Reaction to chronic stress    Multinodular goiter    Vitamin D deficiency    Meningioma, recurrent of brain    Arthritis    Depression    Small bowel obstruction    Insomnia    History of Nissen fundoplication    Malignant neoplasm of left orbit    Prediabetes    Mixed hyperlipidemia    Hyperglycemia    Atrial flutter with rapid ventricular response    Anemia    Large bowel obstruction    Abdominal pain    Essential hypertension    History of creation of ostomy    Screen for colon cancer    Sigmoid volvulus    SHAE (obstructive sleep apnea)    Brain mass    Meningioma        Past Medical History:   Diagnosis Date    Abdominal hernia     Arthritis     rt knee     Atrial flutter with rapid ventricular response 2017    Back pain     Brain tumor     Colostomy present 2018    Depression     History of blood transfusion     History of gastroesophageal reflux (GERD)     resolved since Nissen    History of Nissen fundoplication 2017    History of small bowel obstruction     Hyperlipemia     Hypertension     Memory loss or impairment     Migraine     Parathyroid adenoma     Incidental on thyroid US.    PONV (postoperative nausea and vomiting)     nausea - preprocedural meds help     Prediabetes     Last Impression: 2015  Reviewed labs. Excellent control.  Emery Connell Impression: 2015  Reviewed labs. Excellent control.  Michaela Connell    Thyroid nodule      Last Impression: 2015  r/o  thyroid cancer, will proceed with US.  Michaela Connell (Internal Medicine)     UTI (urinary tract infection)     Vision changes     blockages left eye     Wears glasses     readers        Past Surgical History:   Procedure Laterality Date    BRAIN SURGERY Left 05/19/2022    BRAIN SURGERY  08/2023    left side done & radiationg a wk. later,  in [place]    CARDIAC CATHETERIZATION N/A 04/27/2020    Procedure: LEFT HEART CATH;  Surgeon: Marina Ring MD;  Location:  [facility];  Service: Cardiology;  Laterality: N/A;    CARPAL TUNNEL RELEASE  2002    right     COLONOSCOPY N/A 08/18/2018    Procedure: COLONOSCOPY;  Surgeon: Inderjit Campos MD;  Location:  [facility];  Service: Gastroenterology    COLONOSCOPY N/A 11/28/2018    Procedure: COLONOSCOPY;  Surgeon: Inderjit Campos MD;  Location:  [facility];  Service: Gastroenterology    COLOSTOMY CLOSURE N/A 02/19/2019    Procedure: COLOSTOMY TAKEDOWN, INCISIONAL HERNIA REPAIR;  Surgeon: Andrea Bocanegra MD;  Location:  [facility];  Service: General    CRANIOTOMY  2003 & 2014    Dr. Werner Hollis for tumor removal    CRANIOTOMY FOR TUMOR Left 3/6/2024    Procedure: CRANIOTOMY FOR TUMOR STEREOTACTIC WITH STEALTH;  Surgeon: Robi Perales MD;  Location:  [facility];  Service: Neurosurgery;  Laterality: Left;    ENDOSCOPY      EXPLORATORY LAPAROTOMY N/A 12/05/2017    Procedure: EXPLORATORY LAPAROTOMY, SMALL BOWEL RESECTION;  Surgeon: Ирина Cobian MD;  Location:  [facility];  Service:     EXPLORATORY LAPAROTOMY N/A 12/22/2017    Procedure: LAPAROTOMY EXPLORATORY FOR SMALL BOWEL OBSTRUCTION;  Surgeon: Ирина Cobian MD;  Location:  [facility];  Service:     EXPLORATORY LAPAROTOMY N/A 07/23/2018    Procedure: EXPLORATORY LAPAROTOMY, APPENDECTOMY, CECOPEXY, INCISIONAL HERNIA REPAIR, LYSIS OF ADHESIONS;  Surgeon: Andrea Bocanegra MD;  Location:  [facility];  Service: General    EXPLORATORY LAPAROTOMY N/A 08/19/2018    Procedure:  LAPAROTOMY EXPLORATORY, SIGMOID COLECTOMY, CREATION OF OSTOMY;  Surgeon: Andrea Bocanegar MD;  Location:  [facility];  Service: General    HYSTERECTOMY      partial - both ovaries still present pt believes     INSERTION HEMODIALYSIS CATHETER N/A 12/07/2017    Procedure: HEMODIALYSIS CATHETER INSERTION;  Surgeon: Chance Valenzuela MD;  Location:  [facility];  Service:     ORBITOTOMY Left 11/03/2017    Procedure:  LEFT LATERAL ORBITOTOMY WITH DEBULKING OF TUMOR ;  Surgeon: Moo Hopkins MD;  Location:  [facility];  Service:     OTHER SURGICAL HISTORY      esophagogastric fundoplasty nissen fundoplication    TUBAL ABDOMINAL LIGATION           Goals   PT OP Goals       Row Name 11/06/24 1600          PT Short Term Goals    STG Date to Achieve 04/14/19  -     STG 1 Verbalize s/sx of infection and when to seek medical attention.  -     STG 1 Progress Met  -     STG 2 Demonstrate minimal remaining hypergranulation of wound base to improve wound healing potential.  -     STG 2 Progress Met  -     STG 3 Decrease area of wound by 50% as evidence of wound healing.  -     STG 3 Progress Met  -        Long Term Goals    LTG Date to Achieve 05/29/19  -     LTG 1 Demonstrate independence with home dressing management to promote return to PLOF.  -     LTG 1 Progress Met  -     LTG 2 Demonstrate no remaining hypergranulation of wound base to improve wound healing potential.  -     LTG 2 Progress Met  -     LTG 3 Decrease area of wound by 90% as evidence of wound healing.  -     LTG 3 Progress Met  [facility]               User Key  (r) = Recorded By, (t) = Taken By, (c) = Cosigned By      Initials Name Provider Type    Candice Ramirez, PT Physical Therapist                     OP Discharge Summary       Row Name 11/06/24 1612             OP PT Discharge Summary    Date of Discharge 11/06/24  -DIAN      Reason for Discharge All goals achieved;Independent  -      Outcomes Achieved Able to achieve all  goals within established timeline  -DIAN      Discharge Destination Home without follow-up  -DIAN                User Key  (r) = Recorded By, (t) = Taken By, (c) = Cosigned By      Initials Name Provider Type    Candice Ramirez, PT Physical Therapist                    Candice Constantino, PT  11/6/2024

## 2024-11-18 NOTE — PROGRESS NOTES
"Tereza Ackerman  1955  [id number]    Surgery Progress Note    Date of visit: 1/11/2018    Subjective:no new complaints  Eating well  Having BM    Objective:    /82 (BP Location: Right arm, Patient Position: Lying)  Pulse 64  Temp 98.3 °F (36.8 °C) (Oral)   Resp 18  Ht 160 cm (62.99\")  Wt 67.3 kg (148 lb 6 oz)  SpO2 97%  BMI 26.29 kg/m2    Intake/Output Summary (Last 24 hours) at 01/11/18 0749  Last data filed at 01/10/18 1249   Gross per 24 hour   Intake                0 ml   Output              500 ml   Net             -500 ml       CV: Regular rate and rhythm  L: normal air entry  Abd: soft and non tender      LABS:      Results from last 7 days  Lab Units 01/09/18  0523   WBC 10*3/mm3 8.09   HEMOGLOBIN g/dL 9.1*   HEMATOCRIT % 30.2*   PLATELETS 10*3/mm3 357       Results from last 7 days  Lab Units 01/09/18  0523   SODIUM mmol/L 139   POTASSIUM mmol/L 4.3   CHLORIDE mmol/L 106   CO2 mmol/L 24.0   BUN mg/dL 27*   CREATININE mg/dL 2.00*   CALCIUM mg/dL 8.1*   GLUCOSE mg/dL 85       Results from last 7 days  Lab Units 01/09/18  0523   SODIUM mmol/L 139   POTASSIUM mmol/L 4.3   CHLORIDE mmol/L 106   CO2 mmol/L 24.0   BUN mg/dL 27*   CREATININE mg/dL 2.00*   GLUCOSE mg/dL 85   CALCIUM mg/dL 8.1*       Lab Results  Lab Value Date/Time   LIPASE 52 (H) 12/04/2017 1043   LIPASE 39 06/30/2017 1959         Assessment/ Plan: improving nicely  Awaiting transfer to rehab. today  Wound care daily with NS wet to dry  Miralax daily  MOM PRN  F/U in 2-3 weeks    Problem List Items Addressed This Visit     Hypertension    Relevant Medications    amLODIPine (NORVASC) tablet 10 mg    * (Principal)Small bowel obstruction - Primary    Relevant Orders    Tissue Pathology Exam - Tissue, Small Intestine (Completed)    Acute renal failure    Relevant Medications    potassium chloride (MICRO-K) CR capsule 40 mEq    potassium chloride (KLOR-CON) packet 40 mEq    potassium chloride 10 mEq in 100 mL IVPB      Other Visit " Diagnoses     Intractable vomiting with nausea, unspecified vomiting type        Uncontrolled hypertension                Ирина Cobian MD  1/11/2018  7:49 AM     [place]

## 2024-12-17 RX ORDER — CARVEDILOL 12.5 MG/1
TABLET ORAL
Qty: 180 TABLET | Refills: 3 | Status: SHIPPED | OUTPATIENT
Start: 2024-12-17

## 2025-01-01 ENCOUNTER — READMISSION MANAGEMENT (OUTPATIENT)
Dept: CALL CENTER | Facility: HOSPITAL | Age: 70
End: 2025-01-01
Payer: MEDICARE

## 2025-01-01 ENCOUNTER — TELEPHONE (OUTPATIENT)
Dept: INTERNAL MEDICINE | Facility: CLINIC | Age: 70
End: 2025-01-01
Payer: MEDICARE

## 2025-01-06 NOTE — THERAPY DISCHARGE NOTE
Outpatient Rehabilitation - Wound/Debridement Treatment Note &  Discharge Summary  [facility]     Patient Name: Tereza Ackerman  : 1955  MRN: [id number]  Today's Date: 2019                  Admit Date: 2019    Visit Dx:    ICD-10-CM ICD-9-CM   1. Open wound of abdomen, subsequent encounter S31.109D V58.89     879.2       Patient Active Problem List   Diagnosis   • Impaired glucose tolerance   • Parathyroid adenoma   • Reaction to chronic stress   • Multinodular goiter   • Vitamin D deficiency   • Meningioma, recurrent of brain (CMS/HCC)   • Arthritis   • Depression   • Small bowel obstruction (CMS/HCC)   • Insomnia   • History of Nissen fundoplication   • Malignant neoplasm of left orbit (CMS/HCC)   • Prediabetes   • Mixed hyperlipidemia   • Hyperglycemia   • Atrial flutter with rapid ventricular response (CMS/HCC)   • Anemia   • Large bowel obstruction (CMS/HCC)   • Abdominal pain   • Essential hypertension   • History of creation of ostomy (CMS/HCC)   • Screen for colon cancer   • Sigmoid volvulus (CMS/HCC)        Past Medical History:   Diagnosis Date   • Arthritis     rt knee    • Atrial flutter with rapid ventricular response (CMS/HCC) 2017   • Back pain    • Brain tumor (CMS/HCC)    • Colostomy present (CMS/HCC) 2018   • Depression    • History of blood transfusion    • History of gastroesophageal reflux (GERD)     resolved since Nissen   • History of Nissen fundoplication 2017   • History of small bowel obstruction    • Hyperlipemia    • Hypertension    • Memory loss or impairment    • Migraine    • Parathyroid adenoma     Incidental on thyroid US.   • PONV (postoperative nausea and vomiting)     nausea - preprocedural meds help    • Prediabetes     Last Impression: 2015  Reviewed labs. Excellent control.  Emery Connell Impression: 2015  Reviewed labs. Excellent control.  Michaela Connell   • Thyroid nodule      Last Impression: 2015   Received request via: Pharmacy    Was the patient seen in the last year in this department? Yes    Does the patient have an active prescription (recently filled or refills available) for medication(s) requested? No    Pharmacy Name:   [id number] - [place], [place] - [address] AT [facility] [address]  [address] [facility] [address]  Phone: [phone] Fax: [phone]        Does the patient have nursing home Plus and need 100-day supply? (This applies to ALL medications) Patient does not have SCP   r/o thyroid cancer, will proceed with US.  Michaela Connell (Internal Medicine)    • UTI (urinary tract infection)    • Vision changes     blockages left eye    • Wears glasses     readers        Past Surgical History:   Procedure Laterality Date   • CARPAL TUNNEL RELEASE  2002    right    • COLONOSCOPY N/A 8/18/2018    Procedure: COLONOSCOPY;  Surgeon: Inderjit Campos MD;  Location:  [facility];  Service: Gastroenterology   • COLONOSCOPY N/A 11/28/2018    Procedure: COLONOSCOPY;  Surgeon: Inderjit Campos MD;  Location:  [facility];  Service: Gastroenterology   • COLOSTOMY CLOSURE N/A 2/19/2019    Procedure: COLOSTOMY TAKEDOWN, INCISIONAL HERNIA REPAIR;  Surgeon: Andrea Bocanegra MD;  Location:  [facility];  Service: General   • CRANIOTOMY  2003 & 2014    Dr. Werner Hollis for tumor removal   • ENDOSCOPY     • EXPLORATORY LAPAROTOMY N/A 12/5/2017    Procedure: EXPLORATORY LAPAROTOMY, SMALL BOWEL RESECTION;  Surgeon: Ирина Cobian MD;  Location:  [facility];  Service:    • EXPLORATORY LAPAROTOMY N/A 12/22/2017    Procedure: LAPAROTOMY EXPLORATORY FOR SMALL BOWEL OBSTRUCTION;  Surgeon: Ирина Cobian MD;  Location:  [facility];  Service:    • EXPLORATORY LAPAROTOMY N/A 7/23/2018    Procedure: EXPLORATORY LAPAROTOMY, APPENDECTOMY, CECOPEXY, INCISIONAL HERNIA REPAIR, LYSIS OF ADHESIONS;  Surgeon: Andrea Bocanegra MD;  Location:  [facility];  Service: General   • EXPLORATORY LAPAROTOMY N/A 8/19/2018    Procedure: LAPAROTOMY EXPLORATORY, SIGMOID COLECTOMY, CREATION OF OSTOMY;  Surgeon: Andrea Bocanegra MD;  Location:  [facility];  Service: General   • HYSTERECTOMY      partial - both ovaries still present pt believes    • INSERTION HEMODIALYSIS CATHETER N/A 12/7/2017    Procedure: HEMODIALYSIS CATHETER INSERTION;  Surgeon: Chance Valenzuela MD;  Location:  [facility];  Service:    • ORBITOTOMY Left 11/3/2017    Procedure:  LEFT LATERAL ORBITOTOMY WITH DEBULKING OF TUMOR ;  Surgeon: Moo Alves  MD Sandeep;  Location: [facility];  Service:    • OTHER SURGICAL HISTORY      esophagogastric fundoplasty nissen fundoplication   • TUBAL ABDOMINAL LIGATION           EVALUATION  PT Ortho     Row Name 04/19/19 1040       Subjective Comments    Subjective Comments  Pt without complaint. No issues with dressing changes at home  -ES       Subjective Pain    Able to rate subjective pain?  yes  -ES    Pre-Treatment Pain Level  0  -ES    Post-Treatment Pain Level  0  -ES       Transfers    Sit-Stand Piatt (Transfers)  independent  -ES    Stand-Sit Piatt (Transfers)  independent  -ES    Comment (Transfers)  supine for tx  -ES       Gait/Stairs Assessment/Training    Piatt Level (Gait)  independent  -ES      User Key  (r) = Recorded By, (t) = Taken By, (c) = Cosigned By    Initials Name Provider Type    ES Jaimie Dueñas, PT Physical Therapist              LDA Wound     Row Name 04/19/19 1040             Wound 02/28/19 1400 midline abdomen surgical    Wound - Properties Group Date first assessed: 02/28/19  - Time first assessed: 1400  -MF Orientation: midline  -MF Location: abdomen  -MF Type: surgical  -MF    Dressing Appearance  intact;dry  -ES      Base  clean;epithelialization;pink  -ES      Periwound  intact;pink  -ES      Periwound Temperature  warm  -ES      Periwound Skin Turgor  soft  -ES      Edges  rolled/closed  -ES      Drainage Amount  none  -ES      Care, Wound  irrigated with;wound cleanser;debrided  -ES      Dressing Care, Wound  dressing applied;low-adherent;border dressing  -ES         Wound 02/28/19 1400 Left lateral abdomen surgical    Wound - Properties Group Date first assessed: 02/28/19  -MF Time first assessed: 1400  -MF Side: Left  -MF Orientation: lateral  -MF Location: abdomen  -MF Type: surgical  -MF    Base  closed/resurfaced  -ES         NPWT (Negative Pressure Wound Therapy) 02/28/19 1400 midline and lateral wound    NPWT (Negative Pressure Wound Therapy) - Properties  Group Placement Date: 02/28/19  - Placement Time: 1400  - Location: midline and lateral wound  -      User Key  (r) = Recorded By, (t) = Taken By, (c) = Cosigned By    Initials Name Provider Type    Alli Delvalle, PT Physical Therapist    Jaimie Lynn, PT Physical Therapist            WOUND DEBRIDEMENT  Total area of Debridement: 1sqcm -nonselective  Debridement Site 1  Location- Site 1: abd wounds  Selective Debridement- Site 1: Wound Surface <20cmsq  Excised Tissue Description- Site 1: scant(crust)  Bleeding- Site 1: none             Therapy Education  Education Details: protection of newly formed epithelial tissue for 1 week then pt may discontinue dressing  Given: Symptoms/condition management, Bandaging/dressing change  Program: Progressed  How Provided: Verbal, Demonstration  Provided to: Caregiver, Patient  Level of Understanding: Verbalized, Demonstrated    Therapy Education     Row Name 04/19/19 1040             Therapy Education    Education Details  protection of newly formed epithelial tissue for 1 week then pt may discontinue dressing  -ES      Given  Symptoms/condition management;Bandaging/dressing change  -ES      Program  Progressed  -ES      How Provided  Verbal;Demonstration  -ES      Provided to  Caregiver;Patient  -ES      Level of Understanding  Verbalized;Demonstrated  -ES        User Key  (r) = Recorded By, (t) = Taken By, (c) = Cosigned By    Initials Name Provider Type    Jaimie Lynn, PT Physical Therapist          Recommendation and Plan  PT Assessment/Plan     Row Name 04/19/19 1040          PT Assessment    Impairments  Integumentary integrity  -ES     Assessment Comments  Patient wounds resolved with fragile epithelial tissue. No further need for PT wound care at this time.  -ES        PT Plan    PT Plan Comments  D/C PT  -ES       User Key  (r) = Recorded By, (t) = Taken By, (c) = Cosigned By    Initials Name Provider Type    Jaimie Lynn, PT Physical  Therapist          Goals  PT OP Goals     Row Name 04/19/19 1040          PT Short Term Goals    STG Date to Achieve  04/14/19  -ES     STG 1  Decrease midline wound by 25% as evidence of wound healing  -ES     STG 1 Progress  Met  -ES     STG 2  Decrease lateral wound by 25% as evidence of wound healing  -ES     STG 2 Progress  Met  -ES     STG 3  Pt and family able to verbalize home management of wound vac   -ES     STG 3 Progress  Met  -ES        Long Term Goals    LTG Date to Achieve  05/29/19  -ES     LTG 1  Decrease midline wound by 75% as evidence of wound healing  -ES     LTG 1 Progress  Met  -ES     LTG 2  Decrease lateral wound by 75% as evidence of wound healing  -ES     LTG 2 Progress  Met  -ES     LTG 3  Pt and family independent with home mangement of wound dressing changes.   -ES     LTG 3 Progress  Met  -ES       User Key  (r) = Recorded By, (t) = Taken By, (c) = Cosigned By    Initials Name Provider Type    Jaimie Lynn, PT Physical Therapist          Time Calculation: Start Time: 1040    Therapy Charges for Today     Code Description Service Date Service Provider Modifiers Qty    [id number] HC NONSELECTIVE DEBRIDEMENT 4/19/2019 Jaimie Dueñas, PT GP 1                  Jaimie Dueñas, PT  4/19/2019

## 2025-01-10 ENCOUNTER — TELEPHONE (OUTPATIENT)
Dept: CARDIOLOGY | Facility: CLINIC | Age: 70
End: 2025-01-10
Payer: MEDICARE

## 2025-01-10 NOTE — TELEPHONE ENCOUNTER
Caller: Tereza Ackerman     Relationship: SELF    Best call back number: [phone]    What is your medical concern? PT IS CONCERNED ABOUT HER BLOOD PRESSURE. SHE SAYS IT IS GOING HIGH OCCASIONALLY. SHE WOULD LIKE TO COME IN TO SEE . SHE SAID THE TOP NUMBER IS IN THE 50'S A LOT OF THE TIME. SHE IS CONCERNED. PLEASE REACH OUT TO THE PT.     How long has this issue been going on? SHE ISN'T SURE.

## 2025-01-10 NOTE — TELEPHONE ENCOUNTER
Pt reports her BP has been running in the 150s. She is concerned and would like an appointment. Pt reports having some shortness of breath at times.  Pt reports taking a whole pill (12.5 mg of carvedilol) if she is above 135 systolic.     Pt reports systolic BP as: 145, 152, 136, 171, 123, 152, 148, 137, 132,158.   Pt reports diastolic BP as: 90s    Pt scheduled for clinic appointment 1/30 @ 1115.     Please advise any further recommendations.

## 2025-01-13 NOTE — TELEPHONE ENCOUNTER
Pt notified of recommendations. Pt agreeable to taking 12.5 mg BID of carvedilol. Pt going to keep BP log until appointment 1/30. Pt agreeable to reach out if she has nay issues in the interim. Pt has no further questions at this time.

## 2025-01-21 ENCOUNTER — TELEPHONE (OUTPATIENT)
Dept: NEUROSURGERY | Facility: CLINIC | Age: 70
End: 2025-01-21
Payer: MEDICARE

## 2025-01-21 NOTE — TELEPHONE ENCOUNTER
Spoke to Maria Dolores Anguiano PA-C and updated patient. She can take one tablet about a half hour before the procedure, and then take another half to one tablet if needed right before the procedure.

## 2025-01-21 NOTE — TELEPHONE ENCOUNTER
Provider:  Diaz  Surgery/Procedure:  CRANIOTOMY FOR TUMOR STEREOTACTIC WITH STEALTH   Surgery/Procedure Date:  3-6-24  Last visit:  Office Visit with Chula Randolph PA-C (10/09/2024)   Next visit: tomorrow 01/22/24        Reason for call:  Pt needs to know how much ativan to take prior to upcoming MRI.

## 2025-01-22 ENCOUNTER — OFFICE VISIT (OUTPATIENT)
Dept: NEUROSURGERY | Facility: CLINIC | Age: 70
End: 2025-01-22
Payer: MEDICARE

## 2025-01-22 ENCOUNTER — HOSPITAL ENCOUNTER (OUTPATIENT)
Dept: MRI IMAGING | Facility: HOSPITAL | Age: 70
Discharge: HOME | End: 2025-01-22
Admitting: PHYSICIAN ASSISTANT
Payer: MEDICARE

## 2025-01-22 VITALS — TEMPERATURE: 97.3 F | BODY MASS INDEX: 33.7 KG/M2 | HEIGHT: 63 IN | WEIGHT: 190.2 LBS

## 2025-01-22 DIAGNOSIS — D32.9 MENINGIOMA: Primary | ICD-10-CM

## 2025-01-22 DIAGNOSIS — C70.9 MENINGIOMA, MALIGNANT: ICD-10-CM

## 2025-01-22 PROCEDURE — A9577 INJ MULTIHANCE: HCPCS | Performed by: PHYSICIAN ASSISTANT

## 2025-01-22 PROCEDURE — 99213 OFFICE O/P EST LOW 20 MIN: CPT | Performed by: NEUROLOGICAL SURGERY

## 2025-01-22 PROCEDURE — 25510000002 GADOBENATE DIMEGLUMINE 529 MG/ML SOLUTION: Performed by: PHYSICIAN ASSISTANT

## 2025-01-22 PROCEDURE — 70553 MRI BRAIN STEM W/O & W/DYE: CPT

## 2025-01-22 RX ADMIN — GADOBENATE DIMEGLUMINE 17 ML: 529 INJECTION, SOLUTION INTRAVENOUS at 14:38

## 2025-01-22 NOTE — PROGRESS NOTES
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    History of Present Illness: Ms. Ackerman is a 69-year-old woman with a history of a left sphenoid wing meningioma that was initially operated on in  and then again in  by former colleague Dr. Hollis.  Given recurrence she has undergone radiosurgery on multiple occasions.  Recently she presented for further recurrence superior and medial in the left frontal region.  She harbored confusion and headache.  As such, on 3/6/2024 she underwent redo left frontal craniotomy for resection of recurrent tumor.  Gross total resection was achieved.  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  Her postop course was complicated by poor wound healing in part due to her picking at the incision line.  She was ultimately referred to wound care ultimately allow for healing of her wound..  She has had some left orbital swelling that has been there since surgery according to her .  She denies diplopia.  Today she had some headache but she typically does not.    Review of Systems   Constitutional:  Negative for activity change, appetite change, chills, diaphoresis, fatigue, fever and unexpected weight change.   HENT:  Negative for congestion, dental problem, drooling, ear discharge, ear pain, facial swelling, hearing loss, mouth sores, nosebleeds, postnasal drip, rhinorrhea, sinus pressure, sinus pain, sneezing, sore throat, tinnitus, trouble swallowing and voice change.    Eyes:  Positive for pain. Negative for photophobia, discharge, redness, itching and visual disturbance.   Respiratory:  Negative for apnea, cough, choking, chest tightness, shortness of breath, wheezing and stridor.    Cardiovascular:  Negative for chest pain, palpitations and leg swelling.  "  Gastrointestinal:  Negative for abdominal distention, abdominal pain, anal bleeding, blood in stool, constipation, diarrhea, nausea, rectal pain and vomiting.   Endocrine: Negative for cold intolerance, heat intolerance, polydipsia, polyphagia and polyuria.   Genitourinary:  Negative for decreased urine volume, difficulty urinating, dyspareunia, dysuria, enuresis, flank pain, frequency, genital sores, hematuria, menstrual problem, pelvic pain, urgency, vaginal bleeding, vaginal discharge and vaginal pain.   Musculoskeletal:  Negative for arthralgias, back pain, gait problem, joint swelling, myalgias, neck pain and neck stiffness.   Skin:  Negative for color change, pallor, rash and wound.   Allergic/Immunologic: Negative for environmental allergies, food allergies and immunocompromised state.   Neurological:  Positive for headaches. Negative for dizziness, tremors, seizures, syncope, facial asymmetry, speech difficulty, weakness, light-headedness and numbness.   Hematological:  Negative for adenopathy. Does not bruise/bleed easily.   Psychiatric/Behavioral:  Negative for agitation, behavioral problems, confusion, decreased concentration, dysphoric mood, hallucinations, self-injury, sleep disturbance and suicidal ideas. The patient is not nervous/anxious and is not hyperactive.    All other systems reviewed and are negative.    The patient's past medical history, past surgical history, family history, and social history have been reviewed at length in the electronic medical record.      Physical Exam:   Temp 97.3 °F (36.3 °C) (Temporal)   Ht 160 cm (63\")   Wt 86.3 kg (190 lb 3.2 oz)   BMI 33.69 kg/m²   Patient is awake and alert.  There are some proptosis on the left.  She has some disconjugate gaze.  She has no vision in her left monocular field.  Her incision site is healed.  She has a sunken defect in the left temporal region.    Medical Decision Making    Data Review:   (All imaging studies were personally " reviewed unless stated otherwise)  Follow-up MRI of the brain with and without gadolinium is compared to the prior study from 7/12/2024.  Disease in the left temporal region is stable.  However there is been substantial recurrence of her tumor in the recently operated area in the left frontal region.  This extends into the frontal area sinus.    Diagnosis:   Anaplastic and angioma with yet further recurrence.    Treatment Options:   Unfortunately we do not have further options.  She is not a candidate for further surgical intervention given her prior wound healing issues and her extensive radiation.  I am going to obtain a new MRI of her brain with and without gadolinium in 2.5 months.  If she develops increasing symptoms then we will have to treat her symptomatically.  She she will follow-up in our clinic in 2.5 months with the above-noted study.          I, Dr. Perales, personally performed the services described in the documentation, as scribed in my presence, and it is both accurate and complete.

## 2025-01-26 DIAGNOSIS — K21.9 GASTROESOPHAGEAL REFLUX DISEASE, UNSPECIFIED WHETHER ESOPHAGITIS PRESENT: ICD-10-CM

## 2025-01-30 ENCOUNTER — OFFICE VISIT (OUTPATIENT)
Dept: CARDIOLOGY | Facility: CLINIC | Age: 70
End: 2025-01-30
Payer: MEDICARE

## 2025-01-30 VITALS
WEIGHT: 188.6 LBS | DIASTOLIC BLOOD PRESSURE: 80 MMHG | BODY MASS INDEX: 33.42 KG/M2 | HEART RATE: 59 BPM | OXYGEN SATURATION: 97 % | HEIGHT: 63 IN | SYSTOLIC BLOOD PRESSURE: 122 MMHG

## 2025-01-30 DIAGNOSIS — I48.92 ATRIAL FLUTTER WITH RAPID VENTRICULAR RESPONSE: Primary | ICD-10-CM

## 2025-01-30 DIAGNOSIS — I10 ESSENTIAL HYPERTENSION: ICD-10-CM

## 2025-01-30 DIAGNOSIS — E78.2 MIXED HYPERLIPIDEMIA: ICD-10-CM

## 2025-01-30 NOTE — PROGRESS NOTES
[facility] AT [address], [address]  [place], [place], [address] [phone]  [email or web address]           OUTPATIENT CLINIC FOLLOW UP NOTE    Patient Care Team:  Patient Care Team:  Michaela Connell MD as PCP - General  Michaela Connell MD as PCP - Family Medicine  Moo Hopkins MD as Consulting Physician (Ophthalmology)  Jamal Ramos MD as Consulting Physician (Nephrology)  Alli Aguirre MD as Consulting Physician (Cardiology)  Costa Raygoza MD as Consulting Physician (Radiation Oncology)  Michaela Connell MD as Primary Care Provider (Internal Medicine)  Robi Perales MD as Consulting Physician (Neurosurgery)  Didi Esparza APRN as Nurse Practitioner (Cardiology)  Andrea Bocanegra MD as Consulting Physician (General Surgery)    Subjective:      Chief Complaint   Patient presents with    Atrial flutter with rapid ventricular response       HPI:    Tereza Ackerman is a 69 y.o. female.  Cardiac problem list:  Paroxysmal atrial flutter  Diagnosed at time of small bowel obstruction. Lasted less than 48 hours, outpatient heart monitor revealed no recurrent atrial flutter.  Later underwent GI operations without recurrent arrhythmia on carvedilol.  ACS 4/2020  Cardiac catheterization with near normal coronary arteries.  Troponin elevation.  Hypertension  Hyperlipidemia  COVID-19 1/2021    Patient presents today for follow-up.  Blood pressure has been elevated recently at home.  Carvedilol increased about 10 days ago.  Blood pressure at goal in clinic today.  Denies chest pain, shortness of breath, palpitations, edema or lightheadedness.     Review of Systems:  As noted in HPI.     PFSH:  Patient Active Problem List   Diagnosis    Impaired glucose tolerance    Parathyroid adenoma    Reaction to chronic stress    Multinodular goiter    Vitamin D deficiency    Meningioma, recurrent of brain    Arthritis    Depression    Small bowel  obstruction    Insomnia    History of Nissen fundoplication    Malignant neoplasm of left orbit    Prediabetes    Mixed hyperlipidemia    Hyperglycemia    Atrial flutter with rapid ventricular response    Anemia    Large bowel obstruction    Abdominal pain    Essential hypertension    History of creation of ostomy    Screen for colon cancer    Sigmoid volvulus    SHAE (obstructive sleep apnea)    Brain mass    Meningioma         Current Outpatient Medications:     Artificial Tear Ointment (artificial tears) ophthalmic ointment, Administer  to both eyes As Needed., Disp: , Rfl:     Artificial Tear Ointment (artificial tears) ophthalmic ointment, Administer 1 Application to both eyes As Needed., Disp: , Rfl:     aspirin 81 MG tablet, Take 1 tablet by mouth Daily., Disp: 30 tablet, Rfl: 11    busPIRone (BUSPAR) 10 MG tablet, Take 1 tablet by mouth 2 (Two) Times a Day., Disp: 180 tablet, Rfl: 3    carvedilol (COREG) 12.5 MG tablet, TAKE ONE TABLET BY MOUTH EVERY 12 HOURS, Disp: 180 tablet, Rfl: 3    cholecalciferol (VITAMIN D3) 1000 units tablet, Take 2 tablets by mouth Every Other Day., Disp: , Rfl:     lisinopril (PRINIVIL,ZESTRIL) 2.5 MG tablet, Take 1 tablet by mouth Daily., Disp: 90 tablet, Rfl: 3    LORazepam (Ativan) 0.5 MG tablet, Take 0.5-1 tablets by mouth Daily As Needed for Anxiety., Disp: 15 tablet, Rfl: 0    omeprazole (priLOSEC) 20 MG capsule, TAKE 1 CAPSULE BY MOUTH DAILY, Disp: 90 capsule, Rfl: 3    ondansetron ODT (ZOFRAN-ODT) 4 MG disintegrating tablet, Place 1 tablet on the tongue Every 4 (Four) Hours. As needed for nausea, Disp: 12 tablet, Rfl: 0    QUEtiapine (SEROquel) 25 MG tablet, Take 0.5 tablets by mouth every night at bedtime., Disp: 45 tablet, Rfl: 3    rosuvastatin (CRESTOR) 40 MG tablet, Take 1 tablet by mouth Daily., Disp: 90 tablet, Rfl: 3    sertraline (ZOLOFT) 100 MG tablet, Take 1 tablet by mouth Daily., Disp: 90 tablet, Rfl: 3    thiamine (VITAMIN B-1) 100 MG tablet tablet, Take 1  "tablet by mouth Every Other Day., Disp: 30 each, Rfl: 5    timolol (TIMOPTIC) 0.5 % ophthalmic solution, 1 drop 2 (Two) Times a Day., Disp: , Rfl:     Allergies   Allergen Reactions    Dilantin [Phenytoin Sodium Extended] Rash        reports that she has never smoked. She has never been exposed to tobacco smoke. She has never used smokeless tobacco.    Family History   Problem Relation Age of Onset    Obesity Mother     Heart attack Mother     Heart disease Mother     Migraines Father     Stroke Father     Diabetes Father     Cancer Other     Arthritis Other     Diabetes Other     Breast cancer Maternal Aunt     Breast cancer Paternal Aunt     Ovarian cancer Neg Hx        Objective:   /80 (BP Location: Left arm, Patient Position: Sitting, Cuff Size: Adult)   Pulse 59   Ht 160 cm (63\")   Wt 85.5 kg (188 lb 9.6 oz)   SpO2 97%   BMI 33.41 kg/m²   CONSTITUTIONAL: No acute distress  RESPIRATORY: Normal effort. Clear to auscultation bilaterally without wheezing or rales  CARDIOVASCULAR: Regular rate and rhythm with normal S1 and S2. Without murmur.  PERIPHERAL VASCULAR: Normal radial pulse. There is no lower extremity edema bilaterally.      Labs:  Lab Results   Component Value Date    ALT 16 09/02/2024    AST 25 09/02/2024     Lab Results   Component Value Date    CHOL 158 02/02/2024    TRIG 141 02/02/2024    HDL 59 02/02/2024    CREATININE 0.84 09/02/2024       Diagnostic Data:    Procedures    Event Monitor 2/2018  -Events were normal sinus rhythm  -No atrial flutter    [facility] 4/27/2020  Near normal coronary arteries  Normal LV systolic function wall motion    TTE 12/2017  LVEF difficult to evaluate with tachycardia- globally appears mildly depressed.  Left ventricular wall thickness is consistent with concentric hypertrophy.  Mild tricuspid valve regurgitation is present.  Calculated right ventricular systolic pressure from tricuspid regurgitation is 32 mmHg.    Assessment and Plan:     Atrial flutter with " rapid ventricular response  -Without a known recurrence  -Continue aspirin, carvedilol.     Essential hypertension  -Blood pressure elevated recently on home blood pressure cuff.  At goal in clinic today.  Patient has appointment with PCP next week.  She will take her cuff to that appointment to check accuracy.  If greater than 10-15 mmHg difference, recommend obtaining new cuff and checking blood pressure for 5 days.    -Patient to contact our office with blood pressure readings next week. Will consider increasing lisinopril if bp remains elevated.   -Continue carvedilol, lisinopril.     Mixed hyperlipidemia  -Continue statin.  -Routine lipid panel with PCP yearly.    - Return in about 1 year (around 1/30/2026) for Next scheduled follow up with Dr. Aguirre, EKG .      Electronically signed by AYDEE Leonard, 01/30/25, 12:26 PM EST.

## 2025-02-05 ENCOUNTER — LAB (OUTPATIENT)
Dept: LAB | Facility: HOSPITAL | Age: 70
End: 2025-02-05
Payer: MEDICARE

## 2025-02-05 ENCOUNTER — OFFICE VISIT (OUTPATIENT)
Dept: INTERNAL MEDICINE | Facility: CLINIC | Age: 70
End: 2025-02-05
Payer: MEDICARE

## 2025-02-05 VITALS
HEIGHT: 63 IN | WEIGHT: 189 LBS | BODY MASS INDEX: 33.49 KG/M2 | TEMPERATURE: 97.4 F | DIASTOLIC BLOOD PRESSURE: 82 MMHG | HEART RATE: 59 BPM | OXYGEN SATURATION: 97 % | SYSTOLIC BLOOD PRESSURE: 134 MMHG

## 2025-02-05 DIAGNOSIS — Z00.00 MEDICARE ANNUAL WELLNESS VISIT, SUBSEQUENT: Primary | ICD-10-CM

## 2025-02-05 DIAGNOSIS — E78.2 MIXED HYPERLIPIDEMIA: ICD-10-CM

## 2025-02-05 DIAGNOSIS — E55.9 VITAMIN D DEFICIENCY: ICD-10-CM

## 2025-02-05 DIAGNOSIS — D32.0 MENINGIOMA, RECURRENT OF BRAIN: ICD-10-CM

## 2025-02-05 DIAGNOSIS — F43.89 ADJUSTMENT REACTION TO CHRONIC STRESS: ICD-10-CM

## 2025-02-05 DIAGNOSIS — R73.01 IFG (IMPAIRED FASTING GLUCOSE): ICD-10-CM

## 2025-02-05 DIAGNOSIS — I21.4 NSTEMI (NON-ST ELEVATED MYOCARDIAL INFARCTION): ICD-10-CM

## 2025-02-05 DIAGNOSIS — I95.9 HYPOTENSION, UNSPECIFIED HYPOTENSION TYPE: ICD-10-CM

## 2025-02-05 LAB
ALBUMIN SERPL-MCNC: 4.4 G/DL (ref 3.5–5.2)
ALBUMIN/GLOB SERPL: 1.3 G/DL
ALP SERPL-CCNC: 123 U/L (ref 39–117)
ALT SERPL W P-5'-P-CCNC: 17 U/L (ref 1–33)
ANION GAP SERPL CALCULATED.3IONS-SCNC: 10 MMOL/L (ref 5–15)
AST SERPL-CCNC: 24 U/L (ref 1–32)
BILIRUB SERPL-MCNC: 0.3 MG/DL (ref 0–1.2)
BUN SERPL-MCNC: 14 MG/DL (ref 8–23)
BUN/CREAT SERPL: 15.7 (ref 7–25)
CALCIUM SPEC-SCNC: 9.4 MG/DL (ref 8.6–10.5)
CHLORIDE SERPL-SCNC: 104 MMOL/L (ref 98–107)
CHOLEST SERPL-MCNC: 165 MG/DL (ref 0–200)
CO2 SERPL-SCNC: 25 MMOL/L (ref 22–29)
CREAT SERPL-MCNC: 0.89 MG/DL (ref 0.57–1)
DEPRECATED RDW RBC AUTO: 45.8 FL (ref 37–54)
EGFRCR SERPLBLD CKD-EPI 2021: 69 ML/MIN/1.73
ERYTHROCYTE [DISTWIDTH] IN BLOOD BY AUTOMATED COUNT: 14.3 % (ref 12.3–15.4)
GLOBULIN UR ELPH-MCNC: 3.3 GM/DL
GLUCOSE SERPL-MCNC: 78 MG/DL (ref 65–99)
HBA1C MFR BLD: 5.9 % (ref 4.8–5.6)
HCT VFR BLD AUTO: 42.8 % (ref 34–46.6)
HDLC SERPL-MCNC: 62 MG/DL (ref 40–60)
HGB BLD-MCNC: 13.6 G/DL (ref 12–15.9)
LDLC SERPL CALC-MCNC: 74 MG/DL (ref 0–100)
LDLC/HDLC SERPL: 1.1 {RATIO}
MCH RBC QN AUTO: 28 PG (ref 26.6–33)
MCHC RBC AUTO-ENTMCNC: 31.8 G/DL (ref 31.5–35.7)
MCV RBC AUTO: 88.2 FL (ref 79–97)
PLATELET # BLD AUTO: 225 10*3/MM3 (ref 140–450)
PMV BLD AUTO: 11.3 FL (ref 6–12)
POTASSIUM SERPL-SCNC: 4 MMOL/L (ref 3.5–5.2)
PROT SERPL-MCNC: 7.7 G/DL (ref 6–8.5)
RBC # BLD AUTO: 4.85 10*6/MM3 (ref 3.77–5.28)
SODIUM SERPL-SCNC: 139 MMOL/L (ref 136–145)
TRIGL SERPL-MCNC: 174 MG/DL (ref 0–150)
TSH SERPL DL<=0.05 MIU/L-ACNC: 2.53 UIU/ML (ref 0.27–4.2)
VLDLC SERPL-MCNC: 29 MG/DL (ref 5–40)
WBC NRBC COR # BLD AUTO: 6.01 10*3/MM3 (ref 3.4–10.8)

## 2025-02-05 PROCEDURE — 1160F RVW MEDS BY RX/DR IN RCRD: CPT | Performed by: INTERNAL MEDICINE

## 2025-02-05 PROCEDURE — 84443 ASSAY THYROID STIM HORMONE: CPT

## 2025-02-05 PROCEDURE — G0439 PPPS, SUBSEQ VISIT: HCPCS | Performed by: INTERNAL MEDICINE

## 2025-02-05 PROCEDURE — 1126F AMNT PAIN NOTED NONE PRSNT: CPT | Performed by: INTERNAL MEDICINE

## 2025-02-05 PROCEDURE — G0444 DEPRESSION SCREEN ANNUAL: HCPCS | Performed by: INTERNAL MEDICINE

## 2025-02-05 PROCEDURE — 82607 VITAMIN B-12: CPT

## 2025-02-05 PROCEDURE — 99214 OFFICE O/P EST MOD 30 MIN: CPT | Performed by: INTERNAL MEDICINE

## 2025-02-05 PROCEDURE — 82306 VITAMIN D 25 HYDROXY: CPT

## 2025-02-05 PROCEDURE — 83036 HEMOGLOBIN GLYCOSYLATED A1C: CPT

## 2025-02-05 PROCEDURE — 80053 COMPREHEN METABOLIC PANEL: CPT

## 2025-02-05 PROCEDURE — 1170F FXNL STATUS ASSESSED: CPT | Performed by: INTERNAL MEDICINE

## 2025-02-05 PROCEDURE — 3079F DIAST BP 80-89 MM HG: CPT | Performed by: INTERNAL MEDICINE

## 2025-02-05 PROCEDURE — 85027 COMPLETE CBC AUTOMATED: CPT

## 2025-02-05 PROCEDURE — 80061 LIPID PANEL: CPT

## 2025-02-05 PROCEDURE — 1159F MED LIST DOCD IN RCRD: CPT | Performed by: INTERNAL MEDICINE

## 2025-02-05 PROCEDURE — 3075F SYST BP GE 130 - 139MM HG: CPT | Performed by: INTERNAL MEDICINE

## 2025-02-05 RX ORDER — BUSPIRONE HYDROCHLORIDE 10 MG/1
10 TABLET ORAL 2 TIMES DAILY
Qty: 180 TABLET | Refills: 3 | Status: SHIPPED | OUTPATIENT
Start: 2025-02-05

## 2025-02-05 RX ORDER — LISINOPRIL 2.5 MG/1
2.5 TABLET ORAL DAILY
Qty: 90 TABLET | Refills: 3 | Status: SHIPPED | OUTPATIENT
Start: 2025-02-05

## 2025-02-05 RX ORDER — ROSUVASTATIN CALCIUM 40 MG/1
40 TABLET, COATED ORAL DAILY
Qty: 90 TABLET | Refills: 3 | Status: SHIPPED | OUTPATIENT
Start: 2025-02-05

## 2025-02-05 RX ORDER — QUETIAPINE FUMARATE 25 MG/1
12.5 TABLET, FILM COATED ORAL
Qty: 45 TABLET | Refills: 3 | Status: SHIPPED | OUTPATIENT
Start: 2025-02-05

## 2025-02-05 RX ORDER — SERTRALINE HYDROCHLORIDE 100 MG/1
100 TABLET, FILM COATED ORAL DAILY
Qty: 90 TABLET | Refills: 3 | Status: SHIPPED | OUTPATIENT
Start: 2025-02-05

## 2025-02-05 NOTE — PROGRESS NOTES
Subjective   The ABCs of the Annual Wellness Visit  Medicare Wellness Visit      Tereza Ackerman is a 70 y.o. patient who presents for a Medicare Wellness Visit.    The following portions of the patient's history were reviewed and   updated as appropriate: allergies, current medications, past family history, past medical history, past social history, past surgical history, and problem list.    Compared to one year ago, the patient's physical   health is the same.  Compared to one year ago, the patient's mental   health is better.    Recent Hospitalizations:  This patient has had a [facility] admission record on file within the last 365 days.  Current Medical Providers:  Patient Care Team:  Michaela Connell MD as PCP - General  Michaela Connell MD as PCP - Family Medicine  Moo Hopkins MD as Consulting Physician (Ophthalmology)  Jamal Ramos MD as Consulting Physician (Nephrology)  Alli Aguirre MD as Consulting Physician (Cardiology)  Costa Raygoza MD as Consulting Physician (Radiation Oncology)  Michaela Connell MD as Primary Care Provider (Internal Medicine)  Robi Perales MD as Consulting Physician (Neurosurgery)  Didi Esparza APRN as Nurse Practitioner (Cardiology)  Andrea Bocanegra MD as Consulting Physician (General Surgery)    Outpatient Medications Prior to Visit   Medication Sig Dispense Refill    Artificial Tear Ointment (artificial tears) ophthalmic ointment Administer  to both eyes As Needed.      aspirin 81 MG tablet Take 1 tablet by mouth Daily. 30 tablet 11    carvedilol (COREG) 12.5 MG tablet TAKE ONE TABLET BY MOUTH EVERY 12 HOURS 180 tablet 3    cholecalciferol (VITAMIN D3) 1000 units tablet Take 2 tablets by mouth Every Other Day.      LORazepam (Ativan) 0.5 MG tablet Take 0.5-1 tablets by mouth Daily As Needed for Anxiety. 15 tablet 0    omeprazole (priLOSEC) 20 MG capsule TAKE 1 CAPSULE BY MOUTH DAILY 90 capsule 3    ondansetron ODT (ZOFRAN-ODT)  4 MG disintegrating tablet Place 1 tablet on the tongue Every 4 (Four) Hours. As needed for nausea 12 tablet 0    thiamine (VITAMIN B-1) 100 MG tablet tablet Take 1 tablet by mouth Every Other Day. 30 each 5    timolol (TIMOPTIC) 0.5 % ophthalmic solution 1 drop 2 (Two) Times a Day.      busPIRone (BUSPAR) 10 MG tablet Take 1 tablet by mouth 2 (Two) Times a Day. 180 tablet 3    lisinopril (PRINIVIL,ZESTRIL) 2.5 MG tablet Take 1 tablet by mouth Daily. 90 tablet 3    QUEtiapine (SEROquel) 25 MG tablet Take 0.5 tablets by mouth every night at bedtime. 45 tablet 3    rosuvastatin (CRESTOR) 40 MG tablet Take 1 tablet by mouth Daily. 90 tablet 3    sertraline (ZOLOFT) 100 MG tablet Take 1 tablet by mouth Daily. 90 tablet 3    Artificial Tear Ointment (artificial tears) ophthalmic ointment Administer 1 Application to both eyes As Needed.       No facility-administered medications prior to visit.     No opioid medication identified on active medication list. I have reviewed chart for other potential  high risk medication/s and harmful drug interactions in the elderly.      Aspirin is on active medication list. Aspirin use is indicated based on review of current medical condition/s. Pros and cons of this therapy have been discussed today. Benefits of this medication outweigh potential harm.  Patient has been encouraged to continue taking this medication.  .      Patient Active Problem List   Diagnosis    Impaired glucose tolerance    Parathyroid adenoma    Reaction to chronic stress    Multinodular goiter    Vitamin D deficiency    Meningioma, recurrent of brain    Arthritis    Depression    Small bowel obstruction    Insomnia    History of Nissen fundoplication    Malignant neoplasm of left orbit    Prediabetes    Mixed hyperlipidemia    Hyperglycemia    Atrial flutter with rapid ventricular response    Anemia    Large bowel obstruction    Abdominal pain    Essential hypertension    History of creation of ostomy    Screen  "for colon cancer    Sigmoid volvulus    SHAE (obstructive sleep apnea)    Brain mass    Meningioma     Advance Care Planning Advance Directive is not on file.  ACP discussion was held with the patient during this visit. Patient has an advance directive (not in EMR), copy requested.            Objective   Vitals:    02/05/25 1045   BP: 134/82   Pulse: 59   Temp: 97.4 °F (36.3 °C)   SpO2: 97%  Comment: ra   Weight: 85.7 kg (189 lb)   Height: 160 cm (63\")   PainSc: 0-No pain       Estimated body mass index is 33.48 kg/m² as calculated from the following:    Height as of this encounter: 160 cm (63\").    Weight as of this encounter: 85.7 kg (189 lb).    BMI is >= 30 and <35. (Class 1 Obesity). The following options were offered after discussion;: exercise counseling/recommendations and nutrition counseling/recommendations           Does the patient have evidence of cognitive impairment? No  Lab Results   Component Value Date    TRIG 174 (H) 02/05/2025    HDL 62 (H) 02/05/2025    LDL 74 02/05/2025    VLDL 29 02/05/2025    HGBA1C 5.90 (H) 02/05/2025                                                                                                Health  Risk Assessment    Smoking Status:  Social History     Tobacco Use   Smoking Status Never    Passive exposure: Never   Smokeless Tobacco Never     Alcohol Consumption:  Social History     Substance and Sexual Activity   Alcohol Use No       Fall Risk Screen  STEADI Fall Risk Assessment was completed, and patient is at HIGH risk for falls. Assessment completed on:2/5/2025    Depression Screening   Little interest or pleasure in doing things? Several days   Feeling down, depressed, or hopeless? Several days   PHQ-2 Total Score 2   Trouble falling or staying asleep, or sleeping too much? Several days   Feeling tired or having little energy? Several days   Poor appetite or overeating? Not at all   Feeling bad about yourself - or that you are a failure or have let yourself or your " family down? Several days   Trouble concentrating on things, such as reading the newspaper or watching television? Several days   Moving or speaking so slowly that other people could have noticed? Or the opposite - being so fidgety or restless that you have been moving around a lot more than usual? Not at all   Thoughts that you would be better off dead, or of hurting yourself in some way? Not at all   PHQ-9 Total Score 6   If you checked off any problems, how difficult have these problems made it for you to do your work, take care of things at home, or get along with other people? Somewhat difficult      Health Habits and Functional and Cognitive Screenin/5/2025    10:53 AM   Functional & Cognitive Status   Do you have difficulty preparing food and eating? No   Do you have difficulty bathing yourself, getting dressed or grooming yourself? No   Do you have difficulty using the toilet? No   Do you have difficulty moving around from place to place? No   Do you have trouble with steps or getting out of a bed or a chair? No   Current Diet Well Balanced Diet   Dental Exam Up to date   Eye Exam Up to date   Current Exercises Include No Regular Exercise;Walking   Do you need help using the phone?  No   Are you deaf or do you have serious difficulty hearing?  No   Do you need help to go to places out of walking distance? No   Do you need help shopping? No   Do you need help preparing meals?  No   Do you need help with housework?  No   Do you need help with laundry? No   Do you need help taking your medications? No   Do you need help managing money? No   Do you ever drive or ride in a car without wearing a seat belt? No   Have you felt unusual stress, anger or loneliness in the last month? No   Who do you live with? Spouse   If you need help, do you have trouble finding someone available to you? No   Have you been bothered in the last four weeks by sexual problems? No   Do you have difficulty concentrating,  remembering or making decisions? Yes           Age-appropriate Screening Schedule:  Refer to the list below for future screening recommendations based on patient's age, sex and/or medical conditions. Orders for these recommended tests are listed in the plan section. The patient has been provided with a written plan.    Health Maintenance List  Health Maintenance   Topic Date Due    BMI FOLLOWUP  02/02/2025    COVID-19 Vaccine (4 - 2024-25 season) 02/05/2026 (Originally 9/1/2024)    MAMMOGRAM  06/21/2025    ANNUAL WELLNESS VISIT  02/05/2026    LIPID PANEL  02/05/2026    DXA SCAN  03/17/2026    PAP SMEAR  01/25/2027    COLORECTAL CANCER SCREENING  11/28/2028    TDAP/TD VACCINES (2 - Td or Tdap) 09/13/2034    HEPATITIS C SCREENING  Completed    INFLUENZA VACCINE  Completed    Pneumococcal Vaccine 65+  Completed    ZOSTER VACCINE  Completed                                                                                                                                                CMS Preventative Services Quick Reference  Risk Factors Identified During Encounter  Immunizations Discussed/Encouraged: RSV (Respiratory Syncytial Virus)    The above risks/problems have been discussed with the patient.  Pertinent information has been shared with the patient in the After Visit Summary.  An After Visit Summary and PPPS were made available to the patient.    Follow Up:   Next Medicare Wellness visit to be scheduled in 1 year.         Additional E&M Note during same encounter follows:  Patient has additional, significant, and separately identifiable condition(s)/problem(s) that require work above and beyond the Medicare Wellness Visit     Chief Complaint  Medicare Wellness-subsequent and referral (To neurosurgery)    Subjective   HPI  Tereza is also being seen today for additional medical problem/s.      Tereza reports that she has had a rough year last year, had surgery last march, then last her Dtr to etoh 5 mos later.  Occ  "HA, takes tylenol, seen by Dr. Perales, plan for rpt MRI in 2.5 mos.  He was stating that she may not be a surgical candidiate due to her poor wound healing.  Review of Systems   Constitutional:  Negative for chills and fever.   HENT:  Negative for congestion, ear pain and sore throat.    Eyes:  Positive for visual disturbance. Negative for pain and redness.   Respiratory:  Negative for cough and shortness of breath.    Cardiovascular:  Negative for chest pain, palpitations and leg swelling.   Gastrointestinal:  Negative for abdominal pain, diarrhea and nausea.   Endocrine: Negative for cold intolerance and heat intolerance.   Genitourinary:  Negative for flank pain and urgency.   Musculoskeletal:  Negative for arthralgias and gait problem.   Skin:  Negative for pallor and rash.   Neurological:  Negative for dizziness and weakness.   Psychiatric/Behavioral:  Positive for dysphoric mood. Negative for sleep disturbance. The patient is not nervous/anxious.               Objective   Vital Signs:  /82   Pulse 59   Temp 97.4 °F (36.3 °C)   Ht 160 cm (63\")   Wt 85.7 kg (189 lb)   SpO2 97% Comment: ra  BMI 33.48 kg/m²   Physical Exam  Vitals and nursing note reviewed.   Constitutional:       General: She is not in acute distress.     Appearance: Normal appearance. She is well-developed.   HENT:      Head: Normocephalic and atraumatic.      Comments: S/p multiple left meningioma surgeries     Right Ear: Tympanic membrane and external ear normal.      Left Ear: Tympanic membrane and external ear normal.      Nose: Nose normal.      Mouth/Throat:      Mouth: Mucous membranes are moist.      Pharynx: No oropharyngeal exudate.   Eyes:      General: No scleral icterus.     Conjunctiva/sclera: Conjunctivae normal.      Pupils: Pupils are equal, round, and reactive to light.      Comments: Left eye EOM decreased tone   Neck:      Thyroid: No thyromegaly.      Vascular: No carotid bruit.   Cardiovascular:      Rate and " Rhythm: Normal rate and regular rhythm.      Pulses: Normal pulses.      Heart sounds: Normal heart sounds. No murmur heard.     No friction rub. No gallop.   Pulmonary:      Effort: Pulmonary effort is normal.      Breath sounds: Normal breath sounds. No rhonchi or rales.   Abdominal:      General: Bowel sounds are normal. There is no distension.      Palpations: Abdomen is soft.      Tenderness: There is no abdominal tenderness. There is no right CVA tenderness, left CVA tenderness, guarding or rebound.      Hernia: No hernia is present.   Musculoskeletal:         General: No tenderness. Normal range of motion.      Cervical back: Normal range of motion and neck supple.      Right lower leg: No edema.      Left lower leg: No edema.   Lymphadenopathy:      Cervical: No cervical adenopathy.   Skin:     General: Skin is warm and dry.      Findings: No rash.   Neurological:      General: No focal deficit present.      Mental Status: She is alert and oriented to person, place, and time. Mental status is at baseline.      Cranial Nerves: No cranial nerve deficit.      Sensory: No sensory deficit.      Coordination: Coordination normal.      Gait: Gait normal.      Deep Tendon Reflexes: Reflexes normal.      Comments: Balance wnl   Psychiatric:         Mood and Affect: Mood normal.         Behavior: Behavior normal.         Judgment: Judgment normal.         The following data was reviewed by: Michaela Connell MD on 02/05/2025:              Assessment and Plan         Current Outpatient Medications:     Artificial Tear Ointment (artificial tears) ophthalmic ointment, Administer  to both eyes As Needed., Disp: , Rfl:     aspirin 81 MG tablet, Take 1 tablet by mouth Daily., Disp: 30 tablet, Rfl: 11    busPIRone (BUSPAR) 10 MG tablet, Take 1 tablet by mouth 2 (Two) Times a Day., Disp: 180 tablet, Rfl: 3    carvedilol (COREG) 12.5 MG tablet, TAKE ONE TABLET BY MOUTH EVERY 12 HOURS, Disp: 180 tablet, Rfl: 3    cholecalciferol  "(VITAMIN D3) 1000 units tablet, Take 2 tablets by mouth Every Other Day., Disp: , Rfl:     lisinopril (PRINIVIL,ZESTRIL) 2.5 MG tablet, Take 1 tablet by mouth Daily., Disp: 90 tablet, Rfl: 3    LORazepam (Ativan) 0.5 MG tablet, Take 0.5-1 tablets by mouth Daily As Needed for Anxiety., Disp: 15 tablet, Rfl: 0    omeprazole (priLOSEC) 20 MG capsule, TAKE 1 CAPSULE BY MOUTH DAILY, Disp: 90 capsule, Rfl: 3    ondansetron ODT (ZOFRAN-ODT) 4 MG disintegrating tablet, Place 1 tablet on the tongue Every 4 (Four) Hours. As needed for nausea, Disp: 12 tablet, Rfl: 0    QUEtiapine (SEROquel) 25 MG tablet, Take 0.5 tablets by mouth every night at bedtime., Disp: 45 tablet, Rfl: 3    rosuvastatin (CRESTOR) 40 MG tablet, Take 1 tablet by mouth Daily., Disp: 90 tablet, Rfl: 3    sertraline (ZOLOFT) 100 MG tablet, Take 1 tablet by mouth Daily., Disp: 90 tablet, Rfl: 3    thiamine (VITAMIN B-1) 100 MG tablet tablet, Take 1 tablet by mouth Every Other Day., Disp: 30 each, Rfl: 5    timolol (TIMOPTIC) 0.5 % ophthalmic solution, 1 drop 2 (Two) Times a Day., Disp: , Rfl:       The following portions of the patient's history were reviewed and updated as appropriate: allergies, current medications, past family history, past medical history, past social history, past surgical history and problem list.       Objective   /82   Pulse 59   Temp 97.4 °F (36.3 °C)   Ht 160 cm (63\")   Wt 85.7 kg (189 lb)   SpO2 97% Comment: ra  BMI 33.48 kg/m²         Results for orders placed or performed during the hospital encounter of 09/02/24   Comprehensive Metabolic Panel    Collection Time: 09/02/24 10:39 AM    Specimen: Blood   Result Value Ref Range    Glucose 143 (H) 65 - 99 mg/dL    BUN 13 8 - 23 mg/dL    Creatinine 0.84 0.57 - 1.00 mg/dL    Sodium 138 136 - 145 mmol/L    Potassium 3.1 (L) 3.5 - 5.2 mmol/L    Chloride 101 98 - 107 mmol/L    CO2 25.0 22.0 - 29.0 mmol/L    Calcium 9.1 8.6 - 10.5 mg/dL    Total Protein 7.0 6.0 - 8.5 g/dL    " Albumin 4.0 3.5 - 5.2 g/dL    ALT (SGPT) 16 1 - 33 U/L    AST (SGOT) 25 1 - 32 U/L    Alkaline Phosphatase 119 (H) 39 - 117 U/L    Total Bilirubin 0.3 0.0 - 1.2 mg/dL    Globulin 3.0 gm/dL    A/G Ratio 1.3 g/dL    BUN/Creatinine Ratio 15.5 7.0 - 25.0    Anion Gap 12.0 5.0 - 15.0 mmol/L    eGFR 74.4 >60.0 mL/min/1.73   CBC Auto Differential    Collection Time: 09/02/24 10:39 AM    Specimen: Blood   Result Value Ref Range    WBC 5.13 3.40 - 10.80 10*3/mm3    RBC 4.70 3.77 - 5.28 10*6/mm3    Hemoglobin 13.0 12.0 - 15.9 g/dL    Hematocrit 40.6 34.0 - 46.6 %    MCV 86.4 79.0 - 97.0 fL    MCH 27.7 26.6 - 33.0 pg    MCHC 32.0 31.5 - 35.7 g/dL    RDW 14.5 12.3 - 15.4 %    RDW-SD 45.7 37.0 - 54.0 fl    MPV 10.3 6.0 - 12.0 fL    Platelets 221 140 - 450 10*3/mm3    Neutrophil % 65.5 42.7 - 76.0 %    Lymphocyte % 24.4 19.6 - 45.3 %    Monocyte % 5.8 5.0 - 12.0 %    Eosinophil % 3.1 0.3 - 6.2 %    Basophil % 1.0 0.0 - 1.5 %    Immature Grans % 0.2 0.0 - 0.5 %    Neutrophils, Absolute 3.36 1.70 - 7.00 10*3/mm3    Lymphocytes, Absolute 1.25 0.70 - 3.10 10*3/mm3    Monocytes, Absolute 0.30 0.10 - 0.90 10*3/mm3    Eosinophils, Absolute 0.16 0.00 - 0.40 10*3/mm3    Basophils, Absolute 0.05 0.00 - 0.20 10*3/mm3    Immature Grans, Absolute 0.01 0.00 - 0.05 10*3/mm3    nRBC 0.0 0.0 - 0.2 /100 WBC   Urinalysis With Microscopic If Indicated (No Culture) - Urine, Clean Catch    Collection Time: 09/02/24 12:05 PM    Specimen: Urine, Clean Catch   Result Value Ref Range    Color, UA Yellow Yellow, Straw    Appearance, UA Clear Clear    pH, UA 7.0 5.0 - 8.0    Specific Gravity, UA 1.021 1.001 - 1.030    Glucose, UA Negative Negative    Ketones, UA Negative Negative    Bilirubin, UA Negative Negative    Blood, UA Trace (A) Negative    Protein, UA 30 mg/dL (1+) (A) Negative    Leuk Esterase, UA Small (1+) (A) Negative    Nitrite, UA Negative Negative    Urobilinogen, UA 1.0 E.U./dL 0.2 - 1.0 E.U./dL   Urinalysis, Microscopic Only - Urine, Clean  Catch    Collection Time: 09/02/24 12:05 PM    Specimen: Urine, Clean Catch   Result Value Ref Range    RBC, UA 0-2 None Seen, 0-2 /HPF    WBC, UA 3-5 (A) None Seen, 0-2 /HPF    Bacteria, UA None Seen None Seen, Trace /HPF    Squamous Epithelial Cells, UA 3-6 (A) None Seen, 0-2 /HPF    Hyaline Casts, UA 0-6 0 - 6 /LPF    Methodology Manual Light Microscopy              Assessment & Plan   Diagnoses and all orders for this visit:    Medicare annual wellness visit, subsequent    Adjustment reaction to chronic stress  Comments:  continue zoloft,buspar , wean off seroquel gradually- skip occ.  Orders:  -     busPIRone (BUSPAR) 10 MG tablet; Take 1 tablet by mouth 2 (Two) Times a Day.  -     QUEtiapine (SEROquel) 25 MG tablet; Take 0.5 tablets by mouth every night at bedtime.  -     sertraline (ZOLOFT) 100 MG tablet; Take 1 tablet by mouth Daily.    Hypotension, unspecified hypotension type  -     lisinopril (PRINIVIL,ZESTRIL) 2.5 MG tablet; Take 1 tablet by mouth Daily.    NSTEMI (non-ST elevated myocardial infarction)  Comments:  [facility] with near normal coronaries.  followed by Cardiology.  continue risk factor reduction.  Orders:  -     rosuvastatin (CRESTOR) 40 MG tablet; Take 1 tablet by mouth Daily.    Meningioma, recurrent of brain  -     Ambulatory Referral to Neurosurgery  -     CBC (No Diff); Future    Mixed hyperlipidemia  -     Comprehensive Metabolic Panel; Future  -     Lipid Panel; Future  -     TSH Rfx On Abnormal To Free T4; Future    IFG (impaired fasting glucose)  -     Hemoglobin A1c; Future    Vitamin D deficiency  -     Vitamin B12; Future  -     Vitamin D,25-Hydroxy; Future      Referral to Dr. Kemar Lee at [facility] placed per patient request.  Advised to call their/ our office if she does not hear anything from them in the next 3 weeks.         PHQ-2/PHQ-9 Depression screening reviewed with patient . Total time spent today for depression screening  with Tereza Ackerman  was 15  minutes in counseling, along with plans for any diagnositc work-up and treatment.    Advice and education were given regarding cardiovascular risk reduction, healthy dietary habits, Seatbelt and helmet use and self skin examination.         Adjustment reaction to chronic stress      Orders:    busPIRone (BUSPAR) 10 MG tablet; Take 1 tablet by mouth 2 (Two) Times a Day.    QUEtiapine (SEROquel) 25 MG tablet; Take 0.5 tablets by mouth every night at bedtime.    sertraline (ZOLOFT) 100 MG tablet; Take 1 tablet by mouth Daily.    Hypotension, unspecified hypotension type    Orders:    lisinopril (PRINIVIL,ZESTRIL) 2.5 MG tablet; Take 1 tablet by mouth Daily.    NSTEMI (non-ST elevated myocardial infarction)      Orders:    rosuvastatin (CRESTOR) 40 MG tablet; Take 1 tablet by mouth Daily.    Meningioma, recurrent of brain    Orders:    Ambulatory Referral to Neurosurgery    CBC (No Diff); Future    Mixed hyperlipidemia       Orders:    Comprehensive Metabolic Panel; Future    Lipid Panel; Future    TSH Rfx On Abnormal To Free T4; Future    IFG (impaired fasting glucose)    Orders:    Hemoglobin A1c; Future    Vitamin D deficiency    Orders:    Vitamin B12; Future    Vitamin D,25-Hydroxy; Future    Medicare annual wellness visit, subsequent                 Follow Up   Return in about 6 months (around 8/5/2025) for Next scheduled follow up.  Patient was given instructions and counseling regarding her condition or for health maintenance advice. Please see specific information pulled into the AVS if appropriate.        Electronically signed by:    Michaela Connell MD

## 2025-02-05 NOTE — ASSESSMENT & PLAN NOTE
Orders:    Comprehensive Metabolic Panel; Future    Lipid Panel; Future    TSH Rfx On Abnormal To Free T4; Future

## 2025-02-06 LAB
25(OH)D3 SERPL-MCNC: 23 NG/ML (ref 30–100)
VIT B12 BLD-MCNC: 678 PG/ML (ref 211–946)

## 2025-02-12 DIAGNOSIS — E55.9 VITAMIN D DEFICIENCY: Primary | ICD-10-CM

## 2025-02-12 RX ORDER — ERGOCALCIFEROL 1.25 MG/1
50000 CAPSULE, LIQUID FILLED ORAL WEEKLY
Qty: 12 CAPSULE | Refills: 1 | Status: SHIPPED | OUTPATIENT
Start: 2025-02-12

## 2025-03-25 ENCOUNTER — APPOINTMENT (OUTPATIENT)
Dept: CT IMAGING | Facility: HOSPITAL | Age: 70
End: 2025-03-25
Payer: MEDICARE

## 2025-03-25 ENCOUNTER — HOSPITAL ENCOUNTER (EMERGENCY)
Facility: HOSPITAL | Age: 70
Discharge: HOME | End: 2025-03-25
Attending: EMERGENCY MEDICINE | Admitting: EMERGENCY MEDICINE
Payer: MEDICARE

## 2025-03-25 VITALS
HEART RATE: 64 BPM | SYSTOLIC BLOOD PRESSURE: 170 MMHG | TEMPERATURE: 99.4 F | DIASTOLIC BLOOD PRESSURE: 88 MMHG | HEIGHT: 63 IN | RESPIRATION RATE: 16 BRPM | WEIGHT: 191.8 LBS | BODY MASS INDEX: 33.98 KG/M2 | OXYGEN SATURATION: 95 %

## 2025-03-25 DIAGNOSIS — D32.0 MENINGIOMA, CEREBRAL: Primary | ICD-10-CM

## 2025-03-25 DIAGNOSIS — R51.9 ACUTE NONINTRACTABLE HEADACHE, UNSPECIFIED HEADACHE TYPE: ICD-10-CM

## 2025-03-25 DIAGNOSIS — I95.9 HYPOTENSION, UNSPECIFIED HYPOTENSION TYPE: ICD-10-CM

## 2025-03-25 DIAGNOSIS — G93.5 BRAIN HERNIATION: ICD-10-CM

## 2025-03-25 LAB
ALBUMIN SERPL-MCNC: 4 G/DL (ref 3.5–5.2)
ALBUMIN/GLOB SERPL: 1.3 G/DL
ALP SERPL-CCNC: 111 U/L (ref 39–117)
ALT SERPL W P-5'-P-CCNC: 13 U/L (ref 1–33)
ANION GAP SERPL CALCULATED.3IONS-SCNC: 10 MMOL/L (ref 5–15)
AST SERPL-CCNC: 20 U/L (ref 1–32)
BASOPHILS # BLD AUTO: 0.04 10*3/MM3 (ref 0–0.2)
BASOPHILS NFR BLD AUTO: 0.6 % (ref 0–1.5)
BILIRUB SERPL-MCNC: 0.3 MG/DL (ref 0–1.2)
BUN SERPL-MCNC: 17 MG/DL (ref 8–23)
BUN/CREAT SERPL: 20 (ref 7–25)
CALCIUM SPEC-SCNC: 9.3 MG/DL (ref 8.6–10.5)
CHLORIDE SERPL-SCNC: 101 MMOL/L (ref 98–107)
CO2 SERPL-SCNC: 25 MMOL/L (ref 22–29)
CREAT BLDA-MCNC: 1 MG/DL (ref 0.6–1.3)
CREAT SERPL-MCNC: 0.85 MG/DL (ref 0.57–1)
DEPRECATED RDW RBC AUTO: 49.8 FL (ref 37–54)
EGFRCR SERPLBLD CKD-EPI 2021: 72.9 ML/MIN/1.73
EOSINOPHIL # BLD AUTO: 0.22 10*3/MM3 (ref 0–0.4)
EOSINOPHIL NFR BLD AUTO: 3.1 % (ref 0.3–6.2)
ERYTHROCYTE [DISTWIDTH] IN BLOOD BY AUTOMATED COUNT: 15.2 % (ref 12.3–15.4)
GLOBULIN UR ELPH-MCNC: 3.2 GM/DL
GLUCOSE SERPL-MCNC: 98 MG/DL (ref 65–99)
HCT VFR BLD AUTO: 40.2 % (ref 34–46.6)
HGB BLD-MCNC: 13 G/DL (ref 12–15.9)
IMM GRANULOCYTES # BLD AUTO: 0 10*3/MM3 (ref 0–0.05)
IMM GRANULOCYTES NFR BLD AUTO: 0 % (ref 0–0.5)
LYMPHOCYTES # BLD AUTO: 1.87 10*3/MM3 (ref 0.7–3.1)
LYMPHOCYTES NFR BLD AUTO: 26.7 % (ref 19.6–45.3)
MCH RBC QN AUTO: 28.7 PG (ref 26.6–33)
MCHC RBC AUTO-ENTMCNC: 32.3 G/DL (ref 31.5–35.7)
MCV RBC AUTO: 88.7 FL (ref 79–97)
MONOCYTES # BLD AUTO: 0.53 10*3/MM3 (ref 0.1–0.9)
MONOCYTES NFR BLD AUTO: 7.6 % (ref 5–12)
NEUTROPHILS NFR BLD AUTO: 4.35 10*3/MM3 (ref 1.7–7)
NEUTROPHILS NFR BLD AUTO: 62 % (ref 42.7–76)
NRBC BLD AUTO-RTO: 0 /100 WBC (ref 0–0.2)
PLATELET # BLD AUTO: 236 10*3/MM3 (ref 140–450)
PMV BLD AUTO: 10.3 FL (ref 6–12)
POTASSIUM SERPL-SCNC: 4.5 MMOL/L (ref 3.5–5.2)
PROT SERPL-MCNC: 7.2 G/DL (ref 6–8.5)
RBC # BLD AUTO: 4.53 10*6/MM3 (ref 3.77–5.28)
SODIUM SERPL-SCNC: 136 MMOL/L (ref 136–145)
WBC NRBC COR # BLD AUTO: 7.01 10*3/MM3 (ref 3.4–10.8)

## 2025-03-25 PROCEDURE — 99285 EMERGENCY DEPT VISIT HI MDM: CPT

## 2025-03-25 PROCEDURE — 36415 COLL VENOUS BLD VENIPUNCTURE: CPT

## 2025-03-25 PROCEDURE — 85025 COMPLETE CBC W/AUTO DIFF WBC: CPT | Performed by: EMERGENCY MEDICINE

## 2025-03-25 PROCEDURE — 25510000001 IOPAMIDOL 61 % SOLUTION: Performed by: EMERGENCY MEDICINE

## 2025-03-25 PROCEDURE — 82565 ASSAY OF CREATININE: CPT

## 2025-03-25 PROCEDURE — 70481 CT ORBIT/EAR/FOSSA W/DYE: CPT

## 2025-03-25 PROCEDURE — 80053 COMPREHEN METABOLIC PANEL: CPT | Performed by: EMERGENCY MEDICINE

## 2025-03-25 PROCEDURE — 63710000001 DEXAMETHASONE PER 0.25 MG: Performed by: EMERGENCY MEDICINE

## 2025-03-25 RX ORDER — DEXAMETHASONE 4 MG/1
TABLET ORAL
Qty: 9 TABLET | Refills: 0 | Status: SHIPPED | OUTPATIENT
Start: 2025-03-25 | End: 2025-03-31

## 2025-03-25 RX ORDER — LISINOPRIL 2.5 MG/1
2.5 TABLET ORAL DAILY
Qty: 90 TABLET | Refills: 3 | OUTPATIENT
Start: 2025-03-25

## 2025-03-25 RX ORDER — IOPAMIDOL 612 MG/ML
84 INJECTION, SOLUTION INTRAVASCULAR
Status: COMPLETED | OUTPATIENT
Start: 2025-03-25 | End: 2025-03-25

## 2025-03-25 RX ORDER — ACETAMINOPHEN 500 MG
1000 TABLET ORAL ONCE
Status: COMPLETED | OUTPATIENT
Start: 2025-03-25 | End: 2025-03-25

## 2025-03-25 RX ADMIN — IOPAMIDOL 84 ML: 612 INJECTION, SOLUTION INTRAVENOUS at 18:57

## 2025-03-25 RX ADMIN — DEXAMETHASONE 6 MG: 2 TABLET ORAL at 22:56

## 2025-03-25 RX ADMIN — ACETAMINOPHEN 1000 MG: 500 TABLET ORAL at 21:56

## 2025-03-25 NOTE — ED PROVIDER NOTES
[place]    EMERGENCY DEPARTMENT ENCOUNTER      Pt Name: Tereza Ackerman  MRN: [id number]  YOB: 1955  Date of evaluation: 3/25/2025  Provider: Alfred Smalls MD    CHIEF COMPLAINT       Chief Complaint   Patient presents with    Facial Swelling         HISTORY OF PRESENT ILLNESS   Tereza Ackerman is a 70 y.o. female who presents to the emergency department with complaint of worsening swelling and pain to the left side of her face.  Patient has history of meningioma followed by Dr. Perales as well as specialist at [facility].  This has been followed for quite some time and patient deemed to not be a surgical candidate.  She has lost vision in her left eye over time.  She wanted to be reevaluated today due to worsening swelling and discomfort in that area.  She denies any extremity symptoms including any weakness or numbness and has been ambulating without difficulty.      Nursing notes were reviewed.    REVIEW OF SYSTEMS     ROS:  A chief complaint appropriate review of systems was completed and is negative except as noted in the HPI.      PAST MEDICAL HISTORY     Past Medical History:   Diagnosis Date    Abdominal hernia     Arthritis     rt knee     Atrial flutter with rapid ventricular response 12/21/2017    Back pain     Brain tumor     Colostomy present 08/2018    Depression     History of blood transfusion     History of gastroesophageal reflux (GERD)     resolved since Nissen    History of Nissen fundoplication 7/1/2017    History of small bowel obstruction     Hyperlipemia     Hypertension     Memory loss or impairment     Migraine     Parathyroid adenoma     Incidental on thyroid US.    PONV (postoperative nausea and vomiting)     nausea - preprocedural meds help     Prediabetes     Last Impression: 06 Feb 2015  Reviewed labs. Excellent control.  Maren Connellast Impression: 06 Feb 2015  Reviewed labs. Excellent control.  Michaela Connell    Thyroid nodule      Last Impression:  13 Jun 2015  r/o thyroid cancer, will proceed with US.  Michaela Connell (Internal Medicine)     UTI (urinary tract infection)     Vision changes     blockages left eye     Wears glasses     readers         SURGICAL HISTORY       Past Surgical History:   Procedure Laterality Date    BRAIN SURGERY Left 05/19/2022    BRAIN SURGERY  08/2023    left side done & radiationg a wk. later,  in [place]    CARDIAC CATHETERIZATION N/A 04/27/2020    Procedure: LEFT HEART CATH;  Surgeon: Marina Ring MD;  Location:  [facility];  Service: Cardiology;  Laterality: N/A;    CARPAL TUNNEL RELEASE  2002    right     COLONOSCOPY N/A 08/18/2018    Procedure: COLONOSCOPY;  Surgeon: Inderjit Campos MD;  Location: [facility];  Service: Gastroenterology    COLONOSCOPY N/A 11/28/2018    Procedure: COLONOSCOPY;  Surgeon: Inderjit Campos MD;  Location: [facility];  Service: Gastroenterology    COLOSTOMY CLOSURE N/A 02/19/2019    Procedure: COLOSTOMY TAKEDOWN, INCISIONAL HERNIA REPAIR;  Surgeon: Andrea Bocanegra MD;  Location:  [facility];  Service: General    CRANIOTOMY  2003 & 2014    Dr. Werner Hollis for tumor removal    CRANIOTOMY FOR TUMOR Left 3/6/2024    Procedure: CRANIOTOMY FOR TUMOR STEREOTACTIC WITH STEALTH;  Surgeon: Robi Perales MD;  Location:  [facility];  Service: Neurosurgery;  Laterality: Left;    ENDOSCOPY      EXPLORATORY LAPAROTOMY N/A 12/05/2017    Procedure: EXPLORATORY LAPAROTOMY, SMALL BOWEL RESECTION;  Surgeon: Ирина Cobian MD;  Location:  [facility];  Service:     EXPLORATORY LAPAROTOMY N/A 12/22/2017    Procedure: LAPAROTOMY EXPLORATORY FOR SMALL BOWEL OBSTRUCTION;  Surgeon: Ирина Cobian MD;  Location:  [facility];  Service:     EXPLORATORY LAPAROTOMY N/A 07/23/2018    Procedure: EXPLORATORY LAPAROTOMY, APPENDECTOMY, CECOPEXY, INCISIONAL HERNIA REPAIR, LYSIS OF ADHESIONS;  Surgeon: Andrea Bocanegra MD;  Location:  [facility];  Service: General    EXPLORATORY  LAPAROTOMY N/A 08/19/2018    Procedure: LAPAROTOMY EXPLORATORY, SIGMOID COLECTOMY, CREATION OF OSTOMY;  Surgeon: Andrea Bocanegra MD;  Location:  [facility];  Service: General    HYSTERECTOMY      partial - both ovaries still present pt believes     INSERTION HEMODIALYSIS CATHETER N/A 12/07/2017    Procedure: HEMODIALYSIS CATHETER INSERTION;  Surgeon: Chance Valenzuela MD;  Location:  [facility];  Service:     ORBITOTOMY Left 11/03/2017    Procedure:  LEFT LATERAL ORBITOTOMY WITH DEBULKING OF TUMOR ;  Surgeon: Moo Hopkins MD;  Location:  [facility];  Service:     OTHER SURGICAL HISTORY      esophagogastric fundoplasty nissen fundoplication    TUBAL ABDOMINAL LIGATION           CURRENT MEDICATIONS     No current facility-administered medications for this encounter.    Current Outpatient Medications:     Artificial Tear Ointment (artificial tears) ophthalmic ointment, Administer  to both eyes As Needed., Disp: , Rfl:     aspirin 81 MG tablet, Take 1 tablet by mouth Daily., Disp: 30 tablet, Rfl: 11    busPIRone (BUSPAR) 10 MG tablet, Take 1 tablet by mouth 2 (Two) Times a Day., Disp: 180 tablet, Rfl: 3    carvedilol (COREG) 12.5 MG tablet, TAKE ONE TABLET BY MOUTH EVERY 12 HOURS, Disp: 180 tablet, Rfl: 3    cholecalciferol (VITAMIN D3) 1000 units tablet, Take 2 tablets by mouth Every Other Day., Disp: , Rfl:     dexAMETHasone (DECADRON) 4 MG tablet, Take 1 tablet by mouth 2 (Two) Times a Day With Meals for 3 days, THEN 0.5 tablets 2 (Two) Times a Day With Meals for 3 days., Disp: 9 tablet, Rfl: 0    lisinopril (PRINIVIL,ZESTRIL) 2.5 MG tablet, Take 1 tablet by mouth Daily., Disp: 90 tablet, Rfl: 3    LORazepam (Ativan) 0.5 MG tablet, Take 0.5-1 tablets by mouth Daily As Needed for Anxiety., Disp: 15 tablet, Rfl: 0    omeprazole (priLOSEC) 20 MG capsule, TAKE 1 CAPSULE BY MOUTH DAILY, Disp: 90 capsule, Rfl: 3    ondansetron ODT (ZOFRAN-ODT) 4 MG disintegrating tablet, Place 1 tablet on the tongue Every 4  (Four) Hours. As needed for nausea, Disp: 12 tablet, Rfl: 0    QUEtiapine (SEROquel) 25 MG tablet, Take 0.5 tablets by mouth every night at bedtime., Disp: 45 tablet, Rfl: 3    rosuvastatin (CRESTOR) 40 MG tablet, Take 1 tablet by mouth Daily., Disp: 90 tablet, Rfl: 3    sertraline (ZOLOFT) 100 MG tablet, Take 1 tablet by mouth Daily., Disp: 90 tablet, Rfl: 3    thiamine (VITAMIN B-1) 100 MG tablet tablet, Take 1 tablet by mouth Every Other Day., Disp: 30 each, Rfl: 5    timolol (TIMOPTIC) 0.5 % ophthalmic solution, 1 drop 2 (Two) Times a Day., Disp: , Rfl:     vitamin D (ERGOCALCIFEROL) 1.25 MG (19637 UT) capsule capsule, Take 1 capsule by mouth 1 (One) Time Per Week., Disp: 12 capsule, Rfl: 1    ALLERGIES     Dilantin [phenytoin sodium extended]    FAMILY HISTORY       Family History   Problem Relation Age of Onset    Obesity Mother     Heart attack Mother     Heart disease Mother     Migraines Father     Stroke Father     Diabetes Father     Cancer Other     Arthritis Other     Diabetes Other     Breast cancer Maternal Aunt     Breast cancer Paternal Aunt     Ovarian cancer Neg Hx           SOCIAL HISTORY       Social History     Socioeconomic History    Marital status:    Tobacco Use    Smoking status: Never     Passive exposure: Never    Smokeless tobacco: Never   Vaping Use    Vaping status: Never Used   Substance and Sexual Activity    Alcohol use: No    Drug use: No    Sexual activity: Defer         PHYSICAL EXAM    (up to 7 for level 4, 8 or more for level 5)     Vitals:    03/25/25 2100 03/25/25 2130 03/25/25 2200 03/25/25 2230   BP: 166/91 (!) 181/87 (!) 185/91 170/88   BP Location:       Patient Position:       Pulse: 66 62 64    Resp:       Temp:       TempSrc:       SpO2: 96% 95% 97% 95%   Weight:       Height:         General: Awake, alert, no acute distress.  HEENT: Swelling in the left periorbital region with some proptosis.  Mildly tender.  Neck: Trachea midline.  Cardiac: Heart regular  rate  Lungs: Normal effort  Chest wall: No deformity  Abdomen: Non-distended  Musculoskeletal: No deformity.  Neuro: Alert and oriented x 4.  Patient observed ambulating and moving all extremities without difficulty.  Dermatology: Skin is warm and dry  Psych: Mentation is grossly normal, cognition is grossly normal. Affect is appropriate.        DIAGNOSTIC RESULTS     EKG: All EKGs are interpreted by the Emergency Department Physician who either signs or Co-signs this chart in the absence of a cardiologist.    No orders to display         RADIOLOGY:   [x] Radiologist's Report Reviewed:  CT Orbits With Contrast   Final Result   Impression:   1.Two separate enhancing masses along medial left frontal convexity and posterior left orbit with osseous erosive changes as described above and involvement of left orbit with mild left proptosis, which have increased in size from prior MRI from January 22, 2025.   2.Mass effect mostly from the larger tumor resulting in 6 mm left to right subfalcine herniation anteriorly with mass effect on the frontal horn of the left ventricle, new from prior MRI.   3.There is adjacent encephalomalacia along the left frontal and left anterior temporal lobes.            Electronically Signed: Chris Miles MD     3/25/2025 7:41 PM EDT     Workstation ID: [id number]          I ordered and independently reviewed the above noted radiographic studies.        LABS:    I have reviewed and interpreted all of the currently available lab results from this visit (if applicable):  Results for orders placed or performed during the hospital encounter of 03/25/25   CBC Auto Differential    Collection Time: 03/25/25  5:39 PM    Specimen: Blood   Result Value Ref Range    WBC 7.01 3.40 - 10.80 10*3/mm3    RBC 4.53 3.77 - 5.28 10*6/mm3    Hemoglobin 13.0 12.0 - 15.9 g/dL    Hematocrit 40.2 34.0 - 46.6 %    MCV 88.7 79.0 - 97.0 fL    MCH 28.7 26.6 - 33.0 pg    MCHC 32.3 31.5 - 35.7 g/dL    RDW 15.2 12.3 -  15.4 %    RDW-SD 49.8 37.0 - 54.0 fl    MPV 10.3 6.0 - 12.0 fL    Platelets 236 140 - 450 10*3/mm3    Neutrophil % 62.0 42.7 - 76.0 %    Lymphocyte % 26.7 19.6 - 45.3 %    Monocyte % 7.6 5.0 - 12.0 %    Eosinophil % 3.1 0.3 - 6.2 %    Basophil % 0.6 0.0 - 1.5 %    Immature Grans % 0.0 0.0 - 0.5 %    Neutrophils, Absolute 4.35 1.70 - 7.00 10*3/mm3    Lymphocytes, Absolute 1.87 0.70 - 3.10 10*3/mm3    Monocytes, Absolute 0.53 0.10 - 0.90 10*3/mm3    Eosinophils, Absolute 0.22 0.00 - 0.40 10*3/mm3    Basophils, Absolute 0.04 0.00 - 0.20 10*3/mm3    Immature Grans, Absolute 0.00 0.00 - 0.05 10*3/mm3    nRBC 0.0 0.0 - 0.2 /100 WBC   POC Creatinine    Collection Time: 03/25/25  5:40 PM    Specimen: Blood   Result Value Ref Range    Creatinine 1.00 0.60 - 1.30 mg/dL   Comprehensive Metabolic Panel    Collection Time: 03/25/25  7:14 PM    Specimen: Arm, Right; Blood   Result Value Ref Range    Glucose 98 65 - 99 mg/dL    BUN 17 8 - 23 mg/dL    Creatinine 0.85 0.57 - 1.00 mg/dL    Sodium 136 136 - 145 mmol/L    Potassium 4.5 3.5 - 5.2 mmol/L    Chloride 101 98 - 107 mmol/L    CO2 25.0 22.0 - 29.0 mmol/L    Calcium 9.3 8.6 - 10.5 mg/dL    Total Protein 7.2 6.0 - 8.5 g/dL    Albumin 4.0 3.5 - 5.2 g/dL    ALT (SGPT) 13 1 - 33 U/L    AST (SGOT) 20 1 - 32 U/L    Alkaline Phosphatase 111 39 - 117 U/L    Total Bilirubin 0.3 0.0 - 1.2 mg/dL    Globulin 3.2 gm/dL    A/G Ratio 1.3 g/dL    BUN/Creatinine Ratio 20.0 7.0 - 25.0    Anion Gap 10.0 5.0 - 15.0 mmol/L    eGFR 72.9 >60.0 mL/min/1.73        If labs were ordered, I independently reviewed the results and considered them in treating the patient.      EMERGENCY DEPARTMENT COURSE and DIFFERENTIAL DIAGNOSIS/MDM:   Vitals:  AS OF 00:46 EDT    BP - 170/88  HR - 64  TEMP - 99.4 °F (37.4 °C) (Oral)  O2 SATS - 95%        Discussion below represents my analysis of pertinent findings related to patient's condition, differential diagnosis, treatment plan and final  disposition.      Differential diagnosis:  The differential diagnosis associated with the patient's presentation includes: Intracranial mass, vasogenic edema, intracranial hemorrhage, retrobulbar hemorrhage      Independent interpretations (ECG/rhythm strip/X-ray/US/CT scan): I independently interpreted the patient's head CT and cardiac monitor.  The patient is in sinus rhythm.  There is a left frontal meningioma, there appears to be some left-to-right midline shift.      Additional sources:  Discussed/obtained information from independent historians:   [] Spouse:   [] Parent:   [] Friend:   [] EMS:   [] Other:  External (non-ED) record review:   [] Inpatient record:   [] Office record:   [] Outpatient record:   [] Prior Outpatient labs:   [] Prior Outpatient radiology:   [] Primary Care record:   [] Outside ED record:   [x] Other: I reviewed record from visit with Dr. Perales earlier this year.  Patient deemed not to be a surgical candidate.  Recommended symptomatic management at that point.      Patient's care impacted by:   [] Diabetes   [] Hypertension   [] Coronary Artery Disease   [] Cancer   [x] Other:: Known meningioma    Care significantly affected by Social Determinants of Health (housing and economic circumstances, unemployment)    [] Yes     [x] No   If yes, Patient's care significantly limited by  Social Determinants of Health including:    [] Inadequate housing    [] Low income    [] Alcoholism and drug addiction in family    [] Problems related to primary support group    [] Unemployment    [] Problems related to employment    [] Other Social Determinants of Health:       Consideration of admission/observation vs discharge: Considered admission, however after discussion with neurosurgery, it was felt the patient was stable for discharge home on oral steroids as there is no role for surgical intervention per the report.      ED Course:    ED Course as of 03/26/25 0046   Tue Mar 25, 2025   2156 I spoke  with Linda Koch with neurosurgery regarding this patient.  She has personally reviewed the patient's imaging.  She is also reviewed the case with Dr. Perales, the patient's neurosurgeon.  They have the following recommendations:    I reviewed Dr Perales's last note and had Dr Patricia read the MRI as well. Unfortunately she is not a surgical candidate and Dr Perales told her in Jan we would just have to treat symptomatically. You could give steroids which may help a bit with the swelling, and have her call the office tomorrow to move her [facility] appointment up a bit, but probably looking at hospice if pain is severe...seems to be progressing quickly. [NS]   2244 On reevaluation, patient remains very well-appearing and nontoxic.  Her only complaint today with increased swelling around the left eye.  Her CT today does appear to show some worsening of her known tumor with some subfalcine herniation noted on CT.  I consulted neurosurgery who again, as per their previous note, advised that patient is not a surgical candidate.  Would recommend discharge on steroids and follow-up in clinic.  On final examination, patient remains very well-appearing and says that she is ready to go. [NS]      ED Course User Index  [NS] Alfred Smalls MD           I had a discussion with the patient/family regarding diagnosis, diagnostic results, treatment plan, and medications.  The patient/family indicated understanding of these instructions.  I spent adequate time at the bedside preceding discharge necessary to personally discuss the aftercare instructions, giving patient education, providing explanations of the results of our evaluations/findings, and my decision making to assure that the patient/family understand the plan of care.  Time was allotted to answer questions at that time and throughout the ED course.  Emphasis was placed on timely follow-up after discharge.  I also discussed the potential for the development of an acute  emergent condition requiring further evaluation, admission, or even surgical intervention. I discussed that we found nothing during the visit today indicating the need for further workup, admission, or the presence of an unstable medical condition.  I encouraged the patient to return to the emergency department immediately for ANY concerns, worsening, new complaints, or if symptoms persist and unable to seek follow-up in a timely fashion.  The patient/family expressed understanding and agreement with this plan.  The patient will follow-up with their PCP in 1-2 days for reevaluation.           PROCEDURES:  Procedures    CRITICAL CARE TIME        FINAL IMPRESSION      1. Meningioma, cerebral    2. Brain herniation    3. Acute nonintractable headache, unspecified headache type          DISPOSITION/PLAN     ED Disposition       ED Disposition   Discharge    Condition   Stable    Comment   --                 Comment: Please note this report has been produced using speech recognition software.      Alfred Smalls MD  Attending Emergency Physician             Alfred Smalls MD  03/26/25 0049

## 2025-03-26 ENCOUNTER — PATIENT OUTREACH (OUTPATIENT)
Dept: CASE MANAGEMENT | Facility: OTHER | Age: 70
End: 2025-03-26
Payer: MEDICARE

## 2025-03-26 ENCOUNTER — PATIENT MESSAGE (OUTPATIENT)
Dept: CASE MANAGEMENT | Facility: OTHER | Age: 70
End: 2025-03-26
Payer: MEDICARE

## 2025-03-26 NOTE — PLAN OF CARE
Problem: Cancer Posttreatment Phase  Goal: Manage Fatigue (Tiredness)  Outcome: Not Progressing  Intervention: My Fatigue Management To Do List  Description: Why is this important?Cancer treatment and its side effects can drain your energy. It can keep you from doing things you would like to do.There are many things that can be done to manage fatigue.  Flowsheets (Taken 3/26/2025 5930)  My Fatigue Management To Do List:   eat healthy   get at least 8 hours of sleep each night   go outside each day if weather allows   use meditation or relaxation techniques  Goal: Keep Nausea and Vomiting Under Control  Outcome: Progressing  Intervention: My Nausea/Vomiting Control To Do List  Description: Why is this important?During cancer treatment, it may be hard to keep your weight and strength up.Feeling sick a lot can make life tough.There are some simple things to try that will help you have fewer bad days.Eating a variety of healthy foods that are high in calories and protein will help.Keeping away from foods and smells that might make you feel worse can make a big difference.  Flowsheets (Taken 3/26/2025 2610)  My Nausea/Vomiting Control To Do List:   avoid foods that might trigger nausea   change position slowly   eat 5 small meals each day   eat things that taste good  Goal: Keep Pain Under Control  Outcome: Not Progressing  Intervention: My Pain Control To Do List  Description: Why is this important?Day-to-day life can be hard when you have pain.Even a small change in emotion or a physical problem can make pain better or worse.Coping with pain depends on how the mind and body react to pain.Pain medicine is just one piece of the treatment puzzle.There are many tools to help manage pain. A combination of them can be used to best meet your needs.  Flowsheets (Taken 3/26/2025 0559)  My Pain Control To Do List:   call for prescription refill 3 days before needed   keep track of prescription refills   track times pain is  worst and when it is best   track what makes the pain worse and what makes it better  Goal: Optimal Care Coordination of Patient With Cancer: Posttreatment Phase  Outcome: Not Progressing  Intervention: Facilitate Strategies to Manage Long-Term Side Effects  Flowsheets (Taken 3/26/2025 1559)  Facilitate Strategies to Manage Long-Term Side Effects:   activity or exercise based on tolerance encouraged   cognitive-stimulating activities promoted   self-awareness of worsening symptoms encouraged   side effects of medication monitored  Intervention: Alleviate Barriers to Optimal Nutrition  Flowsheets (Taken 3/26/2025 1559)  Alleviate Barriers to Optimal Nutrition:   optimal oral hygiene encouraged   support and encouragement provided  Intervention: Facilitate Development and Maintenance of Palliative Care Plan  Flowsheets (Taken 3/26/2025 1559)  Facilitate Development and Maintenance of Palliative Care Plan:   active listening utilized   affirmation provided   positive reinforcement provided

## 2025-03-26 NOTE — OUTREACH NOTE
AMBULATORY CASE MANAGEMENT NOTE    Names and Relationships of Patient/Support Persons: Contact: Tereza Ackerman; Relationship: Self -     Patient Outreach    Mrs. Ackerman is a sweet 70 year old patient who has been fighting a left sphenoid wing meningioma since 2003. She has had several radiosurgeries over that time and presented to [facility] ED on 03/25/2025 after a fall, injuring her left elbow. XR right elbow showed osteopenia and degenerative changes without abnormality.   Mrs. Ackerman reports her headache pain is bearable and has minimal discomfort in left elbow.  Her main concern today was the headache and facial edema which has improved slightly. She states she is eating and hydrating adequately, denies problems with bowel or bladder. Mrs. Ackerman states she finds deb in caring for her granddaughters and is not ready to leave them. She is hopeful there will be a new treatment option for her. Active listening and empathy provided.  Role of oncology RN-ACM explained, she is agreeable to service.    Lakshmi FUENTES  Ambulatory Case Management    3/26/2025, 16:05 EDT

## 2025-04-02 ENCOUNTER — OFFICE VISIT (OUTPATIENT)
Dept: NEUROSURGERY | Facility: CLINIC | Age: 70
End: 2025-04-02
Payer: MEDICARE

## 2025-04-02 ENCOUNTER — HOSPITAL ENCOUNTER (OUTPATIENT)
Dept: MRI IMAGING | Facility: HOSPITAL | Age: 70
Discharge: HOME | End: 2025-04-02
Admitting: NEUROLOGICAL SURGERY
Payer: MEDICARE

## 2025-04-02 VITALS — HEIGHT: 63 IN | BODY MASS INDEX: 32.85 KG/M2 | TEMPERATURE: 98.3 F | WEIGHT: 185.4 LBS

## 2025-04-02 DIAGNOSIS — C70.9 MENINGIOMA, MALIGNANT: Primary | ICD-10-CM

## 2025-04-02 DIAGNOSIS — D32.9 MENINGIOMA: ICD-10-CM

## 2025-04-02 PROCEDURE — 25510000002 GADOBENATE DIMEGLUMINE 529 MG/ML SOLUTION: Performed by: NEUROLOGICAL SURGERY

## 2025-04-02 PROCEDURE — 70553 MRI BRAIN STEM W/O & W/DYE: CPT

## 2025-04-02 PROCEDURE — A9577 INJ MULTIHANCE: HCPCS | Performed by: NEUROLOGICAL SURGERY

## 2025-04-02 RX ORDER — DEXAMETHASONE 2 MG/1
2 TABLET ORAL 2 TIMES DAILY WITH MEALS
Qty: 20 TABLET | Refills: 1 | Status: SHIPPED | OUTPATIENT
Start: 2025-04-02 | End: 2025-04-02

## 2025-04-02 RX ORDER — DEXAMETHASONE 2 MG/1
2 TABLET ORAL 2 TIMES DAILY WITH MEALS
Qty: 20 TABLET | Refills: 0 | Status: SHIPPED | OUTPATIENT
Start: 2025-04-02

## 2025-04-02 RX ADMIN — GADOBENATE DIMEGLUMINE 15 ML: 529 INJECTION, SOLUTION INTRAVENOUS at 10:49

## 2025-04-02 NOTE — PROGRESS NOTES
Patient: Tereza Ackerman  : 1955  Chart #: [id number]    Date of Service: 2025    Chief Complaint   Patient presents with    Follow-up     F/U with MRI. Meningioma, malignant      Brain Tumor    Fatigue    Headache       HPI: Ms. Ackerman is a 69-year-old woman with a history of a left sphenoid wing meningioma that was initially operated on in  and then again in  by former colleague Dr. Hollis.  Given recurrence she has undergone radiosurgery on multiple occasions.  Recently she presented for further recurrence superior and medial in the left frontal region.  She harbored confusion and headache.  As such, on 3/6/2024 she underwent redo left frontal craniotomy for resection of recurrent tumor.  Gross total resection was achieved.  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  Her postop course was complicated by poor wound healing in part due to her picking at the incision line.  She was ultimately referred to wound care ultimately allow for healing of her wound.  She has had some left orbital swelling that has been there since surgery according to her .  She has increased headaches, and a general sense of fatigue and imbalance.  She presented to [facility] emergency department for increasing headache on 3/25/2025.  She was given steroids and recommended to follow-up outpatient.  Steroids did help with her symptoms.  Of note, Ms. Ackerman did present to [facility] and was evaluated by a neurosurgeon there as well.      Brain Tumor  Symptoms include fatigue and headaches.    Pertinent negative symptoms include no abdominal pain, no chest pain, no chills, no congestion, no cough, no diaphoresis, no fever, no myalgias, no nausea, no neck pain, no numbness, no rash, no sore throat, no dysuria, no vomiting and no weakness.   Fatigue  Symptoms include fatigue and headaches.    Pertinent negative symptoms include no abdominal pain, no chest pain, no chills, no congestion, no  cough, no diaphoresis, no fever, no myalgias, no nausea, no neck pain, no numbness, no rash, no sore throat, no dysuria, no vomiting and no weakness.   Headache        Review of Systems   Constitutional:  Positive for fatigue. Negative for activity change, appetite change, chills, diaphoresis, fever and unexpected weight change.   HENT:  Negative for congestion, dental problem, drooling, ear discharge, ear pain, facial swelling, hearing loss, mouth sores, nosebleeds, postnasal drip, rhinorrhea, sinus pressure, sinus pain, sneezing, sore throat, tinnitus, trouble swallowing and voice change.    Eyes:  Negative for photophobia, pain, discharge, redness, itching and visual disturbance.   Respiratory:  Negative for apnea, cough, choking, chest tightness, shortness of breath, wheezing and stridor.    Cardiovascular:  Negative for chest pain, palpitations and leg swelling.   Gastrointestinal:  Negative for abdominal distention, abdominal pain, anal bleeding, blood in stool, constipation, diarrhea, nausea, rectal pain and vomiting.   Endocrine: Negative for cold intolerance, heat intolerance, polydipsia, polyphagia and polyuria.   Genitourinary:  Negative for decreased urine volume, difficulty urinating, dyspareunia, dysuria, enuresis, flank pain, frequency, genital sores, hematuria, menstrual problem, pelvic pain, urgency, vaginal bleeding, vaginal discharge and vaginal pain.   Musculoskeletal:  Negative for arthralgias, back pain, gait problem, joint swelling, myalgias, neck pain and neck stiffness.   Skin:  Negative for color change, pallor, rash and wound.   Allergic/Immunologic: Negative for environmental allergies, food allergies and immunocompromised state.   Neurological:  Positive for headaches. Negative for dizziness, tremors, seizures, syncope, facial asymmetry, speech difficulty, weakness, light-headedness and numbness.   Hematological:  Negative for adenopathy. Does not bruise/bleed easily.  "  Psychiatric/Behavioral:  Negative for agitation, behavioral problems, confusion, decreased concentration, dysphoric mood, hallucinations, self-injury, sleep disturbance and suicidal ideas. The patient is not nervous/anxious and is not hyperactive.    All other systems reviewed and are negative.       The patient's past medical history, past surgical history, family history, and social history have been reviewed at length the electronic medical record.    Physical examination:  Temperature 98.3 °F (36.8 °C), temperature source Temporal, height 160 cm (63\"), weight 84.1 kg (185 lb 6.4 oz), not currently breastfeeding.    Constitutional: The patient is well-developed, well-nourished.  She is pleasant and is in no acute distress.    HEENT: There is proptosis on the left.  The left eyelid is completely closed.  She does have some disconjugate gaze.  The left pupil is not responsive to light.  She has no vision in her left monocular field.  Her incision site is well-healed.  She has a sunken defect in the left temporal region.    Neurological Exam   A/A/C, speech clear, attention normal, conversant, answers questions appropriately, good historian.  Motor examination does not reveal weakness in the  or upper extremities.  The patient ambulates normally.  Sensation is intact.  Gait is normal.  No tremors are noted.  Reflexes are intact, 1+.     Radiographic Imaging:  For my review MRI of the brain dated today demonstrates enhancing extra-axial mass lesion about the left frontal lobe that has enlarged since the prior study.  The lesion now crosses the midline.  There is associated mass effect with surrounding edema and right-to-left mass effect.  There is also increasing deformity along the left superior orbit.    Medical Decision Making  Diagnoses and all orders for this visit:    1. Meningioma, malignant (Primary)    Other orders  -     Discontinue: dexAMETHasone (DECADRON) 2 MG tablet; Take 1 tablet by mouth 2 " (Two) Times a Day With Meals.  Dispense: 20 tablet; Refill: 1  -     dexAMETHasone (DECADRON) 2 MG tablet; Take 1 tablet by mouth 2 (Two) Times a Day With Meals.  Dispense: 20 tablet; Refill: 0    I reviewed Ms. Ackerman's case both with she and her  and explained to her that the tumor in her brain is aggressively growing. Dr. Perales presently would not recommend surgical intervention at this time; to his knowledge there would not be a chemotherapy that could address a malignant meningioma.  I have also reviewed the notes from [facility]; Dr. Lee is going to discuss her case with radiation oncology and their team if neuro oncologist and will make recommendations.  He seems to be in agreement that surgery would not be ideal given the rapid recurrence of her tumor.   I am going to give the patient a short course of steroids to take to help with swelling.  She is going to follow-up with [facility] and proceed with their recommendations, and I have encouraged this.  She will follow-up with our office on an as-needed basis.    Any copied data from previous notes included in the (1) HPI, (2) PE, (3) MDM and/or assessment and plan has been reviewed and is accurate as of this day.    Ingrid Lemons PA-C     Patient Care Team:  Michaela Connell MD as PCP - General  Michaela Connell MD as PCP - Family Medicine  Moo Hopkins MD as Consulting Physician (Ophthalmology)  Jamal Ramos MD as Consulting Physician (Nephrology)  Alli Aguirre MD as Consulting Physician (Cardiology)  Costa Raygoza MD as Consulting Physician (Radiation Oncology)  Michaela Connell MD as Primary Care Provider (Internal Medicine)  Robi Perales MD as Consulting Physician (Neurosurgery)  Didi Esparza APRN as Nurse Practitioner (Cardiology)  Andrea Bocanegra MD as Consulting Physician (General Surgery)  Lakshmi Puckett, TEODORA as Ambulatory  (Oncology) ([facility])

## 2025-04-11 LAB — CREAT BLDA-MCNC: 1.1 MG/DL (ref 0.6–1.3)

## 2025-04-14 ENCOUNTER — PATIENT OUTREACH (OUTPATIENT)
Dept: CASE MANAGEMENT | Facility: OTHER | Age: 70
End: 2025-04-14
Payer: MEDICARE

## 2025-04-14 NOTE — OUTREACH NOTE
AMBULATORY CASE MANAGEMENT NOTE    Names and Relationships of Patient/Support Persons: Contact: Tyron Terezashahnaz Recinos; Relationship: Self -     Patient Outreach  Mrs. Ackerman was seen at [facility] on 3/10/2025 for progressing aplastic meningioma. She met with her physician there and is waiting to hear back about a possible treatment plan. Mrs. Ackerman reports she has ongoing headaches that she treats with Tylenol and decadron 2 mg tablets, taking one tablet twice daily, these are providing some relief. Active listening, empathy and encouragement provided.      Lakshmi FUENTES  Ambulatory Case Management    4/14/2025, 16:11 EDT

## 2025-04-14 NOTE — PLAN OF CARE
Problem: Cancer Posttreatment Phase  Goal: Manage Fatigue (Tiredness)  Outcome: Not Progressing  Goal: Keep Nausea and Vomiting Under Control  Outcome: Progressing  Goal: Keep Pain Under Control  Outcome: Progressing  Goal: Optimal Care Coordination of Patient With Cancer: Posttreatment Phase  Outcome: Progressing

## 2025-05-19 ENCOUNTER — OFFICE VISIT (OUTPATIENT)
Dept: INTERNAL MEDICINE | Facility: CLINIC | Age: 70
End: 2025-05-19
Payer: MEDICARE

## 2025-05-19 VITALS
DIASTOLIC BLOOD PRESSURE: 80 MMHG | WEIGHT: 183.2 LBS | BODY MASS INDEX: 32.46 KG/M2 | HEART RATE: 73 BPM | OXYGEN SATURATION: 95 % | HEIGHT: 63 IN | TEMPERATURE: 98.7 F | SYSTOLIC BLOOD PRESSURE: 108 MMHG

## 2025-05-19 DIAGNOSIS — D32.0 MENINGIOMA, RECURRENT OF BRAIN: Primary | ICD-10-CM

## 2025-05-19 RX ORDER — ONDANSETRON 4 MG/1
4 TABLET, ORALLY DISINTEGRATING ORAL EVERY 8 HOURS PRN
Qty: 20 TABLET | Refills: 3 | Status: SHIPPED | OUTPATIENT
Start: 2025-05-19

## 2025-05-19 RX ORDER — DEXAMETHASONE 2 MG/1
2 TABLET ORAL 2 TIMES DAILY WITH MEALS
Qty: 20 TABLET | Refills: 0 | Status: SHIPPED | OUTPATIENT
Start: 2025-05-19

## 2025-05-19 NOTE — PROGRESS NOTES
Referral Needed (To Dr Daigle for eye)    Subjective   Tereza Ackerman is a 70 y.o. female is here today for follow-up.    History of Present Illness  Left facial swelling due to aggressive recurrance of her malignant meningioma.  Has seen Dr. Lee at [facility] and Plan to see Dr. Vera at [facility] for possible upcoming clinical trial opening soon that involves the use of cabozantinib. This drug has shown activity in meningioma but is an investigational agent. Was wanting t see Dr. Back, to see if he could offer suggestions for her meningioma recurrance.  Pt accompanied by spouse.  NOtes TESFAYE, occ dizziness, her left eyelids are swollen shut.    Current Outpatient Medications:     Artificial Tear Ointment (artificial tears) ophthalmic ointment, Administer  to both eyes As Needed., Disp: , Rfl:     aspirin 81 MG tablet, Take 1 tablet by mouth Daily., Disp: 30 tablet, Rfl: 11    busPIRone (BUSPAR) 10 MG tablet, Take 1 tablet by mouth 2 (Two) Times a Day., Disp: 180 tablet, Rfl: 3    carvedilol (COREG) 12.5 MG tablet, TAKE ONE TABLET BY MOUTH EVERY 12 HOURS, Disp: 180 tablet, Rfl: 3    cholecalciferol (VITAMIN D3) 1000 units tablet, Take 2 tablets by mouth Every Other Day., Disp: , Rfl:     dexAMETHasone (DECADRON) 2 MG tablet, Take 1 tablet by mouth 2 (Two) Times a Day With Meals., Disp: 20 tablet, Rfl: 0    lisinopril (PRINIVIL,ZESTRIL) 2.5 MG tablet, Take 1 tablet by mouth Daily., Disp: 90 tablet, Rfl: 3    LORazepam (Ativan) 0.5 MG tablet, Take 0.5-1 tablets by mouth Daily As Needed for Anxiety., Disp: 15 tablet, Rfl: 0    omeprazole (priLOSEC) 20 MG capsule, TAKE 1 CAPSULE BY MOUTH DAILY, Disp: 90 capsule, Rfl: 3    ondansetron ODT (ZOFRAN-ODT) 4 MG disintegrating tablet, Place 1 tablet on the tongue Every 8 (Eight) Hours As Needed for Nausea or Vomiting. As needed for nausea, Disp: 20 tablet, Rfl: 3    QUEtiapine (SEROquel) 25 MG tablet, Take 0.5 tablets by mouth every night at bedtime., Disp: 45  "tablet, Rfl: 3    rosuvastatin (CRESTOR) 40 MG tablet, Take 1 tablet by mouth Daily., Disp: 90 tablet, Rfl: 3    sertraline (ZOLOFT) 100 MG tablet, Take 1 tablet by mouth Daily., Disp: 90 tablet, Rfl: 3    thiamine (VITAMIN B-1) 100 MG tablet tablet, Take 1 tablet by mouth Every Other Day., Disp: 30 each, Rfl: 5    timolol (TIMOPTIC) 0.5 % ophthalmic solution, 1 drop 2 (Two) Times a Day., Disp: , Rfl:     vitamin D (ERGOCALCIFEROL) 1.25 MG (16905 UT) capsule capsule, Take 1 capsule by mouth 1 (One) Time Per Week., Disp: 12 capsule, Rfl: 1      The following portions of the patient's history were reviewed and updated as appropriate: allergies, current medications, past family history, past medical history, past social history, past surgical history and problem list.    Review of Systems   Constitutional:  Positive for activity change. Negative for chills and fever.   HENT:  Negative for ear discharge, ear pain, sinus pressure and sore throat.    Eyes:  Positive for visual disturbance.   Respiratory:  Negative for cough, chest tightness and shortness of breath.    Cardiovascular:  Negative for chest pain, palpitations and leg swelling.   Gastrointestinal:  Negative for diarrhea, nausea and vomiting.   Musculoskeletal:  Positive for arthralgias. Negative for back pain and myalgias.   Neurological:  Positive for dizziness and headaches. Negative for syncope.   Psychiatric/Behavioral:  Negative for confusion and sleep disturbance.        Objective   /80   Pulse 73   Temp 98.7 °F (37.1 °C)   Ht 160 cm (63\")   Wt 83.1 kg (183 lb 3.2 oz)   SpO2 95% Comment: ra  BMI 32.45 kg/m²   Physical Exam  Vitals and nursing note reviewed.   Constitutional:       Appearance: She is well-developed. She is ill-appearing.   HENT:      Head: Normocephalic and atraumatic.      Right Ear: External ear normal.      Left Ear: External ear normal.      Mouth/Throat:      Pharynx: No oropharyngeal exudate.   Eyes:      " Conjunctiva/sclera: Conjunctivae normal.      Pupils: Pupils are equal, round, and reactive to light.      Comments: Left eyelids edematous and swollen shut   Neck:      Thyroid: No thyromegaly.   Cardiovascular:      Rate and Rhythm: Normal rate and regular rhythm.      Pulses: Normal pulses.      Heart sounds: Normal heart sounds. No murmur heard.     No friction rub. No gallop.   Pulmonary:      Effort: Pulmonary effort is normal.      Breath sounds: Normal breath sounds.   Musculoskeletal:         General: Swelling (left forehead- boggy texture. not bony.) and deformity (bossing of left forehead) present. No tenderness.      Cervical back: Neck supple.   Skin:     General: Skin is warm and dry.   Neurological:      Mental Status: She is alert and oriented to person, place, and time.      Cranial Nerves: No cranial nerve deficit.   Psychiatric:         Judgment: Judgment normal.                 Results for orders placed or performed during the hospital encounter of 04/02/25   POC Creatinine    Collection Time: 04/02/25 10:09 AM    Specimen: Blood   Result Value Ref Range    Creatinine 1.10 0.60 - 1.30 mg/dL             Assessment & Plan   Diagnoses and all orders for this visit:    Meningioma, recurrent of brain  -     dexAMETHasone (DECADRON) 2 MG tablet; Take 1 tablet by mouth 2 (Two) Times a Day With Meals.  -     ondansetron ODT (ZOFRAN-ODT) 4 MG disintegrating tablet; Place 1 tablet on the tongue Every 8 (Eight) Hours As Needed for Nausea or Vomiting. As needed for nausea    Reviewed notes from Dr. Lee and her neurosurgeon.  Advised that proceeding with clinical trial is her best option and needs to contact [facility] to see if she can be seen any sooner.  Hold off any referral to Dr. Back.    Start Decadron as above to help with the swelling.  Advised to contact [facility] to make sure she does not need to be off the steroids for certain.  Of time and to comply by their instructions for the clinical  trial.  Plan discussed with patient and .           No follow-ups on file.    Electronically signed by:    Michaela Connell MD

## 2025-06-12 ENCOUNTER — TELEPHONE (OUTPATIENT)
Dept: INTERNAL MEDICINE | Facility: CLINIC | Age: 70
End: 2025-06-12
Payer: MEDICARE

## 2025-06-12 NOTE — TELEPHONE ENCOUNTER
JOSETTE CALLED TO SEE IF THERE'S A WAY A REFERRAL CAN BE SENT TO [facility] FOR GAGANDEEP, HIS MOM, REGARDING A TUMOR PUTTING PRESSURE ON HER BRAIN, TO GET A SECOND OPINION IN NEUROSURGERY. SHE WAS SEEN AT [facility] THIS YEAR. PLEASE CALL HIM [phone]

## 2025-06-12 NOTE — TELEPHONE ENCOUNTER
Is there a particular neurosurgeon he was wanting the referral to?  If not , I can place a referral to Dr. Walton.  thanks

## 2025-06-14 DIAGNOSIS — I95.9 HYPOTENSION, UNSPECIFIED HYPOTENSION TYPE: ICD-10-CM

## 2025-06-16 RX ORDER — LISINOPRIL 2.5 MG/1
2.5 TABLET ORAL DAILY
Qty: 90 TABLET | Refills: 3 | OUTPATIENT
Start: 2025-06-16

## 2025-06-18 ENCOUNTER — TELEPHONE (OUTPATIENT)
Dept: INTERNAL MEDICINE | Facility: CLINIC | Age: 70
End: 2025-06-18
Payer: MEDICARE

## 2025-06-18 NOTE — TELEPHONE ENCOUNTER
PATIENT'S SON JOSETTE CALLED AND REQUESTED CURRENT MED LIST AND RECENT MEDICAL RECORDS TO BE FAXED TO DR TIRADO AT [facility].  PATIENT IS CURRENTLY THERE FOR A SECOND OPINION FOR MENINGIOMA, MALIGNANT AND DR TIRADO HAS ASKED FOR MEDICAL RECORDS AND CURRENT MED LIST BC HE HAS NO PATIENT HISTORY.  FAXED OFFICE VISIT NOTES, MEDS AND LABS FROM PAST 2 YEARS.    FAX #: [phone]    JOSETTE #: [phone]  CALLED AND LEFT  THAT THE FAX WENT THROUGH AT 11:11AM

## 2025-06-25 NOTE — TELEPHONE ENCOUNTER
TIMBO FROM [facility] CALLED AND STATED THAT PATIENT IS BEING DISCHARGED FROM [facility] 6/25 AFTER BEING ADMITTED 6/22 FOR ALTERED MENTAL STATIS.  SHE IS  REQUESTING A VERBAL ORDER FOR CONTINUING CARE WITH PCP.  THERAPY CAN'T BEGIN UNTIL THIS IS DONE.  AFTER THE THERAPIES BEGIN, PCP WILL RECEIVE PLAN OF CARE.    TIMBO: [phone]

## 2025-06-25 NOTE — TELEPHONE ENCOUNTER
Please give verbal for home health.  Please check with Gina if patient's family is looking into Home hospice?
